# Patient Record
Sex: MALE | Race: WHITE | NOT HISPANIC OR LATINO
[De-identification: names, ages, dates, MRNs, and addresses within clinical notes are randomized per-mention and may not be internally consistent; named-entity substitution may affect disease eponyms.]

---

## 2017-01-09 ENCOUNTER — APPOINTMENT (OUTPATIENT)
Dept: FAMILY MEDICINE | Facility: CLINIC | Age: 52
End: 2017-01-09

## 2017-01-09 ENCOUNTER — RX RENEWAL (OUTPATIENT)
Age: 52
End: 2017-01-09

## 2017-01-10 ENCOUNTER — RX RENEWAL (OUTPATIENT)
Age: 52
End: 2017-01-10

## 2017-01-16 ENCOUNTER — APPOINTMENT (OUTPATIENT)
Dept: FAMILY MEDICINE | Facility: CLINIC | Age: 52
End: 2017-01-16

## 2017-01-30 ENCOUNTER — APPOINTMENT (OUTPATIENT)
Dept: FAMILY MEDICINE | Facility: CLINIC | Age: 52
End: 2017-01-30

## 2017-01-30 VITALS
OXYGEN SATURATION: 98 % | WEIGHT: 252 LBS | HEIGHT: 62 IN | SYSTOLIC BLOOD PRESSURE: 130 MMHG | TEMPERATURE: 98.3 F | RESPIRATION RATE: 14 BRPM | BODY MASS INDEX: 46.38 KG/M2 | HEART RATE: 90 BPM | DIASTOLIC BLOOD PRESSURE: 86 MMHG

## 2017-02-27 ENCOUNTER — APPOINTMENT (OUTPATIENT)
Dept: FAMILY MEDICINE | Facility: CLINIC | Age: 52
End: 2017-02-27

## 2017-02-27 VITALS
HEART RATE: 89 BPM | HEIGHT: 62 IN | RESPIRATION RATE: 13 BRPM | BODY MASS INDEX: 46.38 KG/M2 | DIASTOLIC BLOOD PRESSURE: 76 MMHG | WEIGHT: 252 LBS | SYSTOLIC BLOOD PRESSURE: 128 MMHG | OXYGEN SATURATION: 98 %

## 2017-05-25 ENCOUNTER — OTHER (OUTPATIENT)
Age: 52
End: 2017-05-25

## 2017-06-22 ENCOUNTER — OTHER (OUTPATIENT)
Age: 52
End: 2017-06-22

## 2017-07-17 ENCOUNTER — RX RENEWAL (OUTPATIENT)
Age: 52
End: 2017-07-17

## 2017-08-01 ENCOUNTER — RX RENEWAL (OUTPATIENT)
Age: 52
End: 2017-08-01

## 2017-10-02 ENCOUNTER — APPOINTMENT (OUTPATIENT)
Dept: FAMILY MEDICINE | Facility: CLINIC | Age: 52
End: 2017-10-02
Payer: OTHER MISCELLANEOUS

## 2017-10-02 VITALS
HEIGHT: 62 IN | OXYGEN SATURATION: 98 % | RESPIRATION RATE: 12 BRPM | HEART RATE: 64 BPM | BODY MASS INDEX: 49.13 KG/M2 | DIASTOLIC BLOOD PRESSURE: 74 MMHG | SYSTOLIC BLOOD PRESSURE: 128 MMHG | WEIGHT: 267 LBS | TEMPERATURE: 98.6 F

## 2017-10-02 DIAGNOSIS — R94.31 ABNORMAL ELECTROCARDIOGRAM [ECG] [EKG]: ICD-10-CM

## 2017-10-02 PROCEDURE — 93000 ELECTROCARDIOGRAM COMPLETE: CPT | Mod: 59

## 2017-10-02 PROCEDURE — 36415 COLL VENOUS BLD VENIPUNCTURE: CPT

## 2017-10-02 PROCEDURE — 99215 OFFICE O/P EST HI 40 MIN: CPT | Mod: 25

## 2017-10-03 LAB
ALBUMIN SERPL ELPH-MCNC: 4.6 G/DL
ALP BLD-CCNC: 82 U/L
ALT SERPL-CCNC: 64 U/L
ANION GAP SERPL CALC-SCNC: 18 MMOL/L
APPEARANCE: CLEAR
APTT BLD: 29.5 SEC
AST SERPL-CCNC: 19 U/L
BASOPHILS # BLD AUTO: 0.01 K/UL
BASOPHILS NFR BLD AUTO: 0.2 %
BILIRUB SERPL-MCNC: 0.4 MG/DL
BILIRUBIN URINE: NEGATIVE
BLOOD URINE: NEGATIVE
BUN SERPL-MCNC: 14 MG/DL
CALCIUM SERPL-MCNC: 10.4 MG/DL
CHLORIDE SERPL-SCNC: 101 MMOL/L
CO2 SERPL-SCNC: 23 MMOL/L
COLOR: YELLOW
CREAT SERPL-MCNC: 0.89 MG/DL
EOSINOPHIL # BLD AUTO: 0.09 K/UL
EOSINOPHIL NFR BLD AUTO: 1.5 %
GLUCOSE QUALITATIVE U: 1000 MG/DL
GLUCOSE SERPL-MCNC: 146 MG/DL
HCT VFR BLD CALC: 46 %
HGB BLD-MCNC: 15 G/DL
IMM GRANULOCYTES NFR BLD AUTO: 0.3 %
INR PPP: 0.84 RATIO
KETONES URINE: NEGATIVE
LEUKOCYTE ESTERASE URINE: NEGATIVE
LYMPHOCYTES # BLD AUTO: 2.26 K/UL
LYMPHOCYTES NFR BLD AUTO: 36.5 %
MAN DIFF?: NORMAL
MCHC RBC-ENTMCNC: 30.7 PG
MCHC RBC-ENTMCNC: 32.6 GM/DL
MCV RBC AUTO: 94.3 FL
MONOCYTES # BLD AUTO: 0.52 K/UL
MONOCYTES NFR BLD AUTO: 8.4 %
NEUTROPHILS # BLD AUTO: 3.3 K/UL
NEUTROPHILS NFR BLD AUTO: 53.1 %
NITRITE URINE: NEGATIVE
PH URINE: 5.5
PLATELET # BLD AUTO: 248 K/UL
POTASSIUM SERPL-SCNC: 4.7 MMOL/L
PROT SERPL-MCNC: 7.3 G/DL
PROTEIN URINE: NEGATIVE MG/DL
PT BLD: 9.5 SEC
RBC # BLD: 4.88 M/UL
RBC # FLD: 14.6 %
SODIUM SERPL-SCNC: 142 MMOL/L
SPECIFIC GRAVITY URINE: 1.04
UROBILINOGEN URINE: NORMAL MG/DL
WBC # FLD AUTO: 6.2 K/UL

## 2017-10-13 PROBLEM — R94.31 ABNORMAL EKG: Status: ACTIVE | Noted: 2017-10-13

## 2017-10-16 ENCOUNTER — RX RENEWAL (OUTPATIENT)
Age: 52
End: 2017-10-16

## 2017-10-17 ENCOUNTER — RX RENEWAL (OUTPATIENT)
Age: 52
End: 2017-10-17

## 2017-11-02 ENCOUNTER — APPOINTMENT (OUTPATIENT)
Dept: FAMILY MEDICINE | Facility: CLINIC | Age: 52
End: 2017-11-02
Payer: OTHER MISCELLANEOUS

## 2017-11-02 VITALS
WEIGHT: 270 LBS | RESPIRATION RATE: 12 BRPM | DIASTOLIC BLOOD PRESSURE: 82 MMHG | SYSTOLIC BLOOD PRESSURE: 140 MMHG | HEART RATE: 90 BPM | BODY MASS INDEX: 49.38 KG/M2 | TEMPERATURE: 97.7 F | OXYGEN SATURATION: 98 %

## 2017-11-02 PROCEDURE — 99215 OFFICE O/P EST HI 40 MIN: CPT

## 2017-11-02 PROCEDURE — 36415 COLL VENOUS BLD VENIPUNCTURE: CPT

## 2017-11-03 LAB
ALBUMIN SERPL ELPH-MCNC: 4.5 G/DL
ALP BLD-CCNC: 79 U/L
ALT SERPL-CCNC: 65 U/L
ANION GAP SERPL CALC-SCNC: 20 MMOL/L
APPEARANCE: CLEAR
APTT BLD: 28.6 SEC
AST SERPL-CCNC: 26 U/L
BASOPHILS # BLD AUTO: 0.02 K/UL
BASOPHILS NFR BLD AUTO: 0.3 %
BILIRUB SERPL-MCNC: 0.4 MG/DL
BILIRUBIN URINE: NEGATIVE
BLOOD URINE: NEGATIVE
BUN SERPL-MCNC: 13 MG/DL
CALCIUM SERPL-MCNC: 10.7 MG/DL
CHLORIDE SERPL-SCNC: 100 MMOL/L
CO2 SERPL-SCNC: 24 MMOL/L
COLOR: YELLOW
CREAT SERPL-MCNC: 0.88 MG/DL
EOSINOPHIL # BLD AUTO: 0.07 K/UL
EOSINOPHIL NFR BLD AUTO: 1 %
GLUCOSE QUALITATIVE U: 1000 MG/DL
GLUCOSE SERPL-MCNC: 201 MG/DL
HCT VFR BLD CALC: 44.3 %
HGB BLD-MCNC: 14.8 G/DL
IMM GRANULOCYTES NFR BLD AUTO: 0.1 %
INR PPP: 0.89 RATIO
KETONES URINE: NEGATIVE
LEUKOCYTE ESTERASE URINE: NEGATIVE
LYMPHOCYTES # BLD AUTO: 1.75 K/UL
LYMPHOCYTES NFR BLD AUTO: 25.7 %
MAN DIFF?: NORMAL
MCHC RBC-ENTMCNC: 30.5 PG
MCHC RBC-ENTMCNC: 33.4 GM/DL
MCV RBC AUTO: 91.2 FL
MONOCYTES # BLD AUTO: 0.54 K/UL
MONOCYTES NFR BLD AUTO: 7.9 %
NEUTROPHILS # BLD AUTO: 4.43 K/UL
NEUTROPHILS NFR BLD AUTO: 65 %
NITRITE URINE: NEGATIVE
PH URINE: 5.5
PLATELET # BLD AUTO: 297 K/UL
POTASSIUM SERPL-SCNC: 4.6 MMOL/L
PROT SERPL-MCNC: 7.4 G/DL
PROTEIN URINE: NEGATIVE MG/DL
PT BLD: 10 SEC
RBC # BLD: 4.86 M/UL
RBC # FLD: 13.9 %
SODIUM SERPL-SCNC: 144 MMOL/L
SPECIFIC GRAVITY URINE: 1.04
UROBILINOGEN URINE: NEGATIVE MG/DL
WBC # FLD AUTO: 6.82 K/UL

## 2017-12-14 ENCOUNTER — LABORATORY RESULT (OUTPATIENT)
Age: 52
End: 2017-12-14

## 2017-12-15 ENCOUNTER — APPOINTMENT (OUTPATIENT)
Dept: FAMILY MEDICINE | Facility: CLINIC | Age: 52
End: 2017-12-15
Payer: OTHER MISCELLANEOUS

## 2017-12-15 VITALS
DIASTOLIC BLOOD PRESSURE: 76 MMHG | HEART RATE: 87 BPM | WEIGHT: 270 LBS | OXYGEN SATURATION: 95 % | RESPIRATION RATE: 12 BRPM | BODY MASS INDEX: 49.69 KG/M2 | HEIGHT: 62 IN | TEMPERATURE: 98.4 F | SYSTOLIC BLOOD PRESSURE: 120 MMHG

## 2017-12-15 PROCEDURE — 36415 COLL VENOUS BLD VENIPUNCTURE: CPT

## 2017-12-15 PROCEDURE — 99215 OFFICE O/P EST HI 40 MIN: CPT | Mod: 25

## 2017-12-15 RX ORDER — LEVOFLOXACIN 500 MG/1
500 TABLET, FILM COATED ORAL DAILY
Qty: 10 | Refills: 1 | Status: COMPLETED | COMMUNITY
Start: 2017-05-25 | End: 2017-06-01

## 2017-12-15 RX ORDER — OXICONAZOLE NITRATE 10 MG/ML
1 LOTION TOPICAL
Qty: 60 | Refills: 0 | Status: COMPLETED | COMMUNITY
Start: 2017-11-25

## 2017-12-15 RX ORDER — TIZANIDINE 4 MG/1
4 TABLET ORAL
Qty: 90 | Refills: 0 | Status: COMPLETED | COMMUNITY
Start: 2017-06-22

## 2017-12-15 RX ORDER — GABAPENTIN 600 MG/1
600 TABLET, COATED ORAL
Qty: 120 | Refills: 0 | Status: COMPLETED | COMMUNITY
Start: 2017-06-21

## 2017-12-15 RX ORDER — OSELTAMIVIR PHOSPHATE 75 MG/1
75 CAPSULE ORAL TWICE DAILY
Qty: 10 | Refills: 1 | Status: COMPLETED | COMMUNITY
Start: 2017-01-30 | End: 2017-02-06

## 2017-12-15 RX ORDER — AZITHROMYCIN 250 MG/1
250 TABLET, FILM COATED ORAL
Qty: 1 | Refills: 3 | Status: COMPLETED | COMMUNITY
Start: 2017-01-30 | End: 2017-02-06

## 2017-12-15 RX ORDER — PREGABALIN 75 MG/1
75 CAPSULE ORAL
Qty: 60 | Refills: 0 | Status: COMPLETED | COMMUNITY
Start: 2017-06-22

## 2017-12-15 RX ORDER — DIAZEPAM 5 MG/1
5 TABLET ORAL
Qty: 90 | Refills: 0 | Status: COMPLETED | COMMUNITY
Start: 2017-06-14

## 2017-12-16 LAB
ALBUMIN SERPL ELPH-MCNC: 4.5 G/DL
ALP BLD-CCNC: 82 U/L
ALT SERPL-CCNC: 51 U/L
ANION GAP SERPL CALC-SCNC: 18 MMOL/L
APPEARANCE: CLEAR
AST SERPL-CCNC: 16 U/L
BASOPHILS # BLD AUTO: 0.03 K/UL
BASOPHILS NFR BLD AUTO: 0.4 %
BILIRUB SERPL-MCNC: 0.3 MG/DL
BILIRUBIN URINE: NEGATIVE
BLOOD URINE: NEGATIVE
BUN SERPL-MCNC: 13 MG/DL
CALCIUM SERPL-MCNC: 10.1 MG/DL
CHLORIDE SERPL-SCNC: 100 MMOL/L
CO2 SERPL-SCNC: 23 MMOL/L
COLOR: YELLOW
CREAT SERPL-MCNC: 0.78 MG/DL
EOSINOPHIL # BLD AUTO: 0.13 K/UL
EOSINOPHIL NFR BLD AUTO: 1.9 %
GLUCOSE QUALITATIVE U: 1000 MG/DL
GLUCOSE SERPL-MCNC: 210 MG/DL
HCT VFR BLD CALC: 44.4 %
HGB BLD-MCNC: 14.7 G/DL
IMM GRANULOCYTES NFR BLD AUTO: 0.4 %
KETONES URINE: NEGATIVE
LEUKOCYTE ESTERASE URINE: NEGATIVE
LYMPHOCYTES # BLD AUTO: 2.19 K/UL
LYMPHOCYTES NFR BLD AUTO: 32.7 %
MAN DIFF?: NORMAL
MCHC RBC-ENTMCNC: 30.7 PG
MCHC RBC-ENTMCNC: 33.1 GM/DL
MCV RBC AUTO: 92.7 FL
MONOCYTES # BLD AUTO: 0.63 K/UL
MONOCYTES NFR BLD AUTO: 9.4 %
NEUTROPHILS # BLD AUTO: 3.69 K/UL
NEUTROPHILS NFR BLD AUTO: 55.2 %
NITRITE URINE: NEGATIVE
PH URINE: 5.5
PLATELET # BLD AUTO: 265 K/UL
POTASSIUM SERPL-SCNC: 4.3 MMOL/L
PROT SERPL-MCNC: 7.3 G/DL
PROTEIN URINE: NEGATIVE MG/DL
RBC # BLD: 4.79 M/UL
RBC # FLD: 13.6 %
SODIUM SERPL-SCNC: 141 MMOL/L
SPECIFIC GRAVITY URINE: 1.04
UROBILINOGEN URINE: NEGATIVE MG/DL
WBC # FLD AUTO: 6.7 K/UL

## 2018-01-12 ENCOUNTER — RX RENEWAL (OUTPATIENT)
Age: 53
End: 2018-01-12

## 2018-03-20 ENCOUNTER — RX RENEWAL (OUTPATIENT)
Age: 53
End: 2018-03-20

## 2018-05-11 ENCOUNTER — APPOINTMENT (OUTPATIENT)
Dept: FAMILY MEDICINE | Facility: CLINIC | Age: 53
End: 2018-05-11
Payer: COMMERCIAL

## 2018-05-11 VITALS
WEIGHT: 271 LBS | RESPIRATION RATE: 12 BRPM | HEIGHT: 74 IN | DIASTOLIC BLOOD PRESSURE: 78 MMHG | OXYGEN SATURATION: 97 % | BODY MASS INDEX: 34.78 KG/M2 | HEART RATE: 86 BPM | TEMPERATURE: 97.8 F | SYSTOLIC BLOOD PRESSURE: 124 MMHG

## 2018-05-11 DIAGNOSIS — Z01.818 ENCOUNTER FOR OTHER PREPROCEDURAL EXAMINATION: ICD-10-CM

## 2018-05-11 DIAGNOSIS — J06.9 ACUTE UPPER RESPIRATORY INFECTION, UNSPECIFIED: ICD-10-CM

## 2018-05-11 DIAGNOSIS — Z86.19 PERSONAL HISTORY OF OTHER INFECTIOUS AND PARASITIC DISEASES: ICD-10-CM

## 2018-05-11 PROCEDURE — 99214 OFFICE O/P EST MOD 30 MIN: CPT

## 2018-05-11 RX ORDER — HYDROCODONE BITARTRATE AND ACETAMINOPHEN 5; 325 MG/1; MG/1
5-325 TABLET ORAL
Qty: 120 | Refills: 0 | Status: COMPLETED | COMMUNITY
Start: 2017-01-30 | End: 2018-05-11

## 2018-05-11 RX ORDER — HYDROMORPHONE HYDROCHLORIDE 4 MG/1
4 TABLET ORAL
Qty: 60 | Refills: 0 | Status: COMPLETED | COMMUNITY
Start: 2017-12-23

## 2018-05-11 RX ORDER — OXYCODONE HYDROCHLORIDE 15 MG/1
15 TABLET ORAL
Qty: 120 | Refills: 0 | Status: COMPLETED | COMMUNITY
Start: 2017-10-11 | End: 2018-05-11

## 2018-05-11 RX ORDER — OXYCODONE HYDROCHLORIDE 15 MG/1
15 TABLET, FILM COATED, EXTENDED RELEASE ORAL
Qty: 60 | Refills: 0 | Status: COMPLETED | COMMUNITY
Start: 2017-12-27

## 2018-06-12 ENCOUNTER — RX RENEWAL (OUTPATIENT)
Age: 53
End: 2018-06-12

## 2018-07-09 ENCOUNTER — RX RENEWAL (OUTPATIENT)
Age: 53
End: 2018-07-09

## 2018-08-20 ENCOUNTER — OUTPATIENT (OUTPATIENT)
Dept: OUTPATIENT SERVICES | Facility: HOSPITAL | Age: 53
LOS: 1 days | End: 2018-08-20
Payer: COMMERCIAL

## 2018-08-20 DIAGNOSIS — G90.522 COMPLEX REGIONAL PAIN SYNDROME I OF LEFT LOWER LIMB: ICD-10-CM

## 2018-08-20 LAB — GLUCOSE BLDC GLUCOMTR-MCNC: 149 MG/DL — HIGH (ref 70–99)

## 2018-08-20 PROCEDURE — 64510 N BLOCK STELLATE GANGLION: CPT | Mod: LT

## 2018-08-20 PROCEDURE — 82962 GLUCOSE BLOOD TEST: CPT

## 2018-08-20 PROCEDURE — 77003 FLUOROGUIDE FOR SPINE INJECT: CPT

## 2018-09-10 ENCOUNTER — RX RENEWAL (OUTPATIENT)
Age: 53
End: 2018-09-10

## 2018-09-18 ENCOUNTER — APPOINTMENT (OUTPATIENT)
Dept: FAMILY MEDICINE | Facility: CLINIC | Age: 53
End: 2018-09-18
Payer: COMMERCIAL

## 2018-09-18 VITALS
WEIGHT: 242 LBS | OXYGEN SATURATION: 98 % | HEART RATE: 112 BPM | BODY MASS INDEX: 31.06 KG/M2 | SYSTOLIC BLOOD PRESSURE: 128 MMHG | RESPIRATION RATE: 13 BRPM | DIASTOLIC BLOOD PRESSURE: 86 MMHG | TEMPERATURE: 97.9 F | HEIGHT: 74 IN

## 2018-09-18 DIAGNOSIS — Z00.00 ENCOUNTER FOR GENERAL ADULT MEDICAL EXAMINATION W/OUT ABNORMAL FINDINGS: ICD-10-CM

## 2018-09-18 PROCEDURE — 99214 OFFICE O/P EST MOD 30 MIN: CPT | Mod: 25

## 2018-09-18 PROCEDURE — 36415 COLL VENOUS BLD VENIPUNCTURE: CPT

## 2018-09-18 RX ORDER — PREGABALIN 100 MG/1
100 CAPSULE ORAL
Qty: 90 | Refills: 0 | Status: COMPLETED | COMMUNITY
Start: 2017-10-11 | End: 2018-09-18

## 2018-09-18 RX ORDER — METHOCARBAMOL 500 MG/1
500 TABLET, FILM COATED ORAL
Qty: 42 | Refills: 0 | Status: COMPLETED | COMMUNITY
Start: 2018-08-31

## 2018-09-18 RX ORDER — CIPROFLOXACIN AND DEXAMETHASONE 3; 1 MG/ML; MG/ML
0.3-0.1 SUSPENSION/ DROPS AURICULAR (OTIC)
Qty: 8 | Refills: 0 | Status: COMPLETED | COMMUNITY
Start: 2018-07-10

## 2018-09-18 RX ORDER — QUETIAPINE FUMARATE 50 MG/1
50 TABLET ORAL
Qty: 30 | Refills: 0 | Status: COMPLETED | COMMUNITY
Start: 2018-09-04

## 2018-09-18 RX ORDER — CELECOXIB 200 MG/1
200 CAPSULE ORAL
Qty: 30 | Refills: 0 | Status: COMPLETED | COMMUNITY
Start: 2017-12-23 | End: 2018-09-18

## 2018-09-18 RX ORDER — ONDANSETRON 2 MG/ML
4 INJECTION INTRAMUSCULAR; INTRAVENOUS
Qty: 2 | Refills: 0 | Status: COMPLETED | COMMUNITY
Start: 2018-09-14

## 2018-09-18 RX ORDER — BLOOD SUGAR DIAGNOSTIC
STRIP MISCELLANEOUS
Qty: 3 | Refills: 2 | Status: ACTIVE | COMMUNITY
Start: 2018-09-18 | End: 1900-01-01

## 2018-09-18 RX ORDER — PROMETHAZINE HYDROCHLORIDE AND DEXTROMETHORPHAN HYDROBROMIDE ORAL SOLUTION 15; 6.25 MG/5ML; MG/5ML
6.25-15 SOLUTION ORAL
Qty: 240 | Refills: 1 | Status: COMPLETED | COMMUNITY
Start: 2017-01-30 | End: 2018-09-18

## 2018-09-18 RX ORDER — AMITRIPTYLINE HYDROCHLORIDE 25 MG/1
25 TABLET, FILM COATED ORAL
Qty: 30 | Refills: 0 | Status: COMPLETED | COMMUNITY
Start: 2018-01-09 | End: 2018-09-18

## 2018-09-18 RX ORDER — DICLOFENAC SODIUM 75 MG/1
75 TABLET, DELAYED RELEASE ORAL
Qty: 30 | Refills: 0 | Status: COMPLETED | COMMUNITY
Start: 2017-02-21 | End: 2018-09-18

## 2018-09-18 RX ORDER — BUPRENORPHINE HYDROCHLORIDE 2 MG/1
2 TABLET SUBLINGUAL
Qty: 20 | Refills: 0 | Status: COMPLETED | COMMUNITY
Start: 2018-08-28

## 2018-09-20 LAB
25(OH)D3 SERPL-MCNC: 26.4 NG/ML
ALBUMIN SERPL ELPH-MCNC: 4.7 G/DL
ALP BLD-CCNC: 86 U/L
ALT SERPL-CCNC: 50 U/L
ANION GAP SERPL CALC-SCNC: 24 MMOL/L
APPEARANCE: CLEAR
AST SERPL-CCNC: 17 U/L
BASOPHILS # BLD AUTO: 0.02 K/UL
BASOPHILS NFR BLD AUTO: 0.2 %
BILIRUB SERPL-MCNC: 0.6 MG/DL
BILIRUBIN URINE: NEGATIVE
BLOOD URINE: NEGATIVE
BUN SERPL-MCNC: 10 MG/DL
C PEPTIDE SERPL-MCNC: 2 NG/ML
CALCIUM SERPL-MCNC: 10 MG/DL
CHLORIDE SERPL-SCNC: 97 MMOL/L
CHOLEST SERPL-MCNC: 178 MG/DL
CHOLEST/HDLC SERPL: 4.9 RATIO
CO2 SERPL-SCNC: 17 MMOL/L
COLOR: YELLOW
CREAT SERPL-MCNC: 0.84 MG/DL
CREAT SPEC-SCNC: 71 MG/DL
EOSINOPHIL # BLD AUTO: 0.05 K/UL
EOSINOPHIL NFR BLD AUTO: 0.5 %
FERRITIN SERPL-MCNC: 281 NG/ML
FOLATE SERPL-MCNC: >20 NG/ML
GLUCOSE QUALITATIVE U: 1000 MG/DL
GLUCOSE SERPL-MCNC: 193 MG/DL
HBA1C MFR BLD HPLC: 8.3 %
HCT VFR BLD CALC: 42.2 %
HDLC SERPL-MCNC: 36 MG/DL
HGB BLD-MCNC: 13.9 G/DL
IMM GRANULOCYTES NFR BLD AUTO: 0.3 %
IRON SATN MFR SERPL: 22 %
IRON SERPL-MCNC: 76 UG/DL
KETONES URINE: ABNORMAL
LDLC SERPL CALC-MCNC: 121 MG/DL
LEUKOCYTE ESTERASE URINE: NEGATIVE
LYMPHOCYTES # BLD AUTO: 2.8 K/UL
LYMPHOCYTES NFR BLD AUTO: 26.2 %
MAN DIFF?: NORMAL
MCHC RBC-ENTMCNC: 29.9 PG
MCHC RBC-ENTMCNC: 32.9 GM/DL
MCV RBC AUTO: 90.8 FL
MICROALBUMIN 24H UR DL<=1MG/L-MCNC: <1.2 MG/DL
MICROALBUMIN/CREAT 24H UR-RTO: NORMAL
MONOCYTES # BLD AUTO: 0.95 K/UL
MONOCYTES NFR BLD AUTO: 8.9 %
NEUTROPHILS # BLD AUTO: 6.84 K/UL
NEUTROPHILS NFR BLD AUTO: 63.9 %
NITRITE URINE: NEGATIVE
PH URINE: 6
PLATELET # BLD AUTO: 405 K/UL
POTASSIUM SERPL-SCNC: 4.1 MMOL/L
PROT SERPL-MCNC: 7.4 G/DL
PROTEIN URINE: NEGATIVE MG/DL
PSA SERPL-MCNC: 0.4 NG/ML
RBC # BLD: 4.65 M/UL
RBC # FLD: 13.9 %
SODIUM SERPL-SCNC: 138 MMOL/L
SPECIFIC GRAVITY URINE: 1.04
TIBC SERPL-MCNC: 344 UG/DL
TRIGL SERPL-MCNC: 106 MG/DL
UIBC SERPL-MCNC: 268 UG/DL
UROBILINOGEN URINE: NEGATIVE MG/DL
VIT B12 SERPL-MCNC: 1290 PG/ML
WBC # FLD AUTO: 10.69 K/UL

## 2018-10-15 ENCOUNTER — RX RENEWAL (OUTPATIENT)
Age: 53
End: 2018-10-15

## 2019-01-08 ENCOUNTER — APPOINTMENT (OUTPATIENT)
Dept: FAMILY MEDICINE | Facility: CLINIC | Age: 54
End: 2019-01-08
Payer: COMMERCIAL

## 2019-01-08 VITALS
RESPIRATION RATE: 13 BRPM | HEART RATE: 111 BPM | TEMPERATURE: 98.1 F | OXYGEN SATURATION: 99 % | BODY MASS INDEX: 31.06 KG/M2 | WEIGHT: 242 LBS | SYSTOLIC BLOOD PRESSURE: 142 MMHG | DIASTOLIC BLOOD PRESSURE: 76 MMHG | HEIGHT: 74 IN

## 2019-01-08 DIAGNOSIS — M67.90 UNSPECIFIED DISORDER OF SYNOVIUM AND TENDON, UNSPECIFIED SITE: ICD-10-CM

## 2019-01-08 PROCEDURE — 99213 OFFICE O/P EST LOW 20 MIN: CPT

## 2019-01-08 NOTE — HISTORY OF PRESENT ILLNESS
[FreeTextEntry1] : C/O Mass on Tendon of Left Hand x 4 Months. [de-identified] : C/O Mass on Tendon of Left Hand x 4 Months.

## 2019-01-08 NOTE — REVIEW OF SYSTEMS
[Patient Intake Form Reviewed] : Patient intake form was reviewed [Fatigue] : fatigue [Muscle Pain] : muscle pain [Back Pain] : back pain [Anxiety] : anxiety [Negative] : Heme/Lymph [FreeTextEntry2] : Overweight [FreeTextEntry5] : HTN, HLD [FreeTextEntry9] : HECTOR [facility]CD[facility] Spine [de-identified] : PTSD S/P 911. [FreeTextEntry1] : Low Vit. D,

## 2019-01-08 NOTE — COUNSELING
[Weight management counseling provided] : Weight management [Healthy eating counseling provided] : healthy eating [Activity counseling provided] : activity [Smoking cessation counseling provided] : smoking cessation [Behavioral health counseling provided] : behavioral health  [None] : None

## 2019-01-08 NOTE — PHYSICAL EXAM

## 2019-03-18 ENCOUNTER — OUTPATIENT (OUTPATIENT)
Dept: OUTPATIENT SERVICES | Facility: HOSPITAL | Age: 54
LOS: 1 days | End: 2019-03-18
Payer: COMMERCIAL

## 2019-03-18 DIAGNOSIS — M47.812 SPONDYLOSIS WITHOUT MYELOPATHY OR RADICULOPATHY, CERVICAL REGION: ICD-10-CM

## 2019-03-18 LAB — GLUCOSE BLDC GLUCOMTR-MCNC: 189 MG/DL — HIGH (ref 70–99)

## 2019-03-18 PROCEDURE — 64490 INJ PARAVERT F JNT C/T 1 LEV: CPT | Mod: 50

## 2019-03-18 PROCEDURE — 77003 FLUOROGUIDE FOR SPINE INJECT: CPT

## 2019-03-18 PROCEDURE — 82962 GLUCOSE BLOOD TEST: CPT

## 2019-04-25 ENCOUNTER — APPOINTMENT (OUTPATIENT)
Dept: GASTROENTEROLOGY | Facility: CLINIC | Age: 54
End: 2019-04-25

## 2019-05-10 ENCOUNTER — APPOINTMENT (OUTPATIENT)
Dept: FAMILY MEDICINE | Facility: CLINIC | Age: 54
End: 2019-05-10

## 2019-05-20 ENCOUNTER — RX RENEWAL (OUTPATIENT)
Age: 54
End: 2019-05-20

## 2019-06-06 ENCOUNTER — OUTPATIENT (OUTPATIENT)
Dept: OUTPATIENT SERVICES | Facility: HOSPITAL | Age: 54
LOS: 1 days | Discharge: HOME | End: 2019-06-06
Payer: COMMERCIAL

## 2019-06-06 DIAGNOSIS — M47.812 SPONDYLOSIS WITHOUT MYELOPATHY OR RADICULOPATHY, CERVICAL REGION: ICD-10-CM

## 2019-06-06 LAB — GLUCOSE BLDC GLUCOMTR-MCNC: 240 MG/DL — HIGH (ref 70–99)

## 2019-06-06 PROCEDURE — 82962 GLUCOSE BLOOD TEST: CPT

## 2019-06-06 PROCEDURE — 64490 INJ PARAVERT F JNT C/T 1 LEV: CPT | Mod: 50

## 2019-06-06 PROCEDURE — 77003 FLUOROGUIDE FOR SPINE INJECT: CPT

## 2019-06-20 ENCOUNTER — RX CHANGE (OUTPATIENT)
Age: 54
End: 2019-06-20

## 2019-06-20 RX ORDER — AMLODIPINE BESYLATE 5 MG/1
5 TABLET ORAL DAILY
Qty: 90 | Refills: 3 | Status: DISCONTINUED | COMMUNITY
Start: 2018-09-12 | End: 2019-06-20

## 2019-06-20 RX ORDER — LABETALOL HYDROCHLORIDE 100 MG/1
100 TABLET, FILM COATED ORAL
Qty: 180 | Refills: 3 | Status: DISCONTINUED | COMMUNITY
Start: 2018-09-12 | End: 2019-06-20

## 2019-07-11 ENCOUNTER — RX RENEWAL (OUTPATIENT)
Age: 54
End: 2019-07-11

## 2019-07-11 ENCOUNTER — APPOINTMENT (OUTPATIENT)
Dept: FAMILY MEDICINE | Facility: CLINIC | Age: 54
End: 2019-07-11
Payer: OTHER MISCELLANEOUS

## 2019-07-11 VITALS
HEIGHT: 74 IN | WEIGHT: 270 LBS | BODY MASS INDEX: 34.65 KG/M2 | TEMPERATURE: 98.2 F | HEART RATE: 91 BPM | RESPIRATION RATE: 12 BRPM | DIASTOLIC BLOOD PRESSURE: 88 MMHG | SYSTOLIC BLOOD PRESSURE: 126 MMHG | OXYGEN SATURATION: 95 %
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
## 2019-07-11 DIAGNOSIS — M50.20 OTHER CERVICAL DISC DISPLACEMENT, UNSPECIFIED CERVICAL REGION: ICD-10-CM

## 2019-07-11 DIAGNOSIS — R29.898 OTHER SYMPTOMS AND SIGNS INVOLVING THE MUSCULOSKELETAL SYSTEM: ICD-10-CM

## 2019-07-11 PROCEDURE — 99214 OFFICE O/P EST MOD 30 MIN: CPT

## 2019-08-12 ENCOUNTER — RX RENEWAL (OUTPATIENT)
Age: 54
End: 2019-08-12

## 2019-08-13 ENCOUNTER — RX CHANGE (OUTPATIENT)
Age: 54
End: 2019-08-13

## 2019-08-13 RX ORDER — INSULIN GLARGINE 100 [IU]/ML
100 INJECTION, SOLUTION SUBCUTANEOUS
Qty: 15 | Refills: 2 | Status: DISCONTINUED | COMMUNITY
Start: 2017-08-01 | End: 2019-08-13

## 2019-09-30 ENCOUNTER — RX RENEWAL (OUTPATIENT)
Age: 54
End: 2019-09-30

## 2019-10-07 ENCOUNTER — RX RENEWAL (OUTPATIENT)
Age: 54
End: 2019-10-07

## 2019-10-10 ENCOUNTER — RX CHANGE (OUTPATIENT)
Age: 54
End: 2019-10-10

## 2019-10-10 RX ORDER — INSULIN GLARGINE 100 [IU]/ML
100 INJECTION, SOLUTION SUBCUTANEOUS
Qty: 15 | Refills: 2 | Status: DISCONTINUED | COMMUNITY
Start: 2019-08-13 | End: 2019-10-10

## 2019-10-14 ENCOUNTER — RX RENEWAL (OUTPATIENT)
Age: 54
End: 2019-10-14

## 2019-11-04 ENCOUNTER — OUTPATIENT (OUTPATIENT)
Dept: OUTPATIENT SERVICES | Facility: HOSPITAL | Age: 54
LOS: 1 days | End: 2019-11-04
Payer: COMMERCIAL

## 2019-11-04 DIAGNOSIS — M47.812 SPONDYLOSIS WITHOUT MYELOPATHY OR RADICULOPATHY, CERVICAL REGION: ICD-10-CM

## 2019-11-04 LAB — GLUCOSE BLDC GLUCOMTR-MCNC: 213 MG/DL — HIGH (ref 70–99)

## 2019-11-04 PROCEDURE — 77003 FLUOROGUIDE FOR SPINE INJECT: CPT

## 2019-11-04 PROCEDURE — 82962 GLUCOSE BLOOD TEST: CPT

## 2019-11-04 PROCEDURE — 64490 INJ PARAVERT F JNT C/T 1 LEV: CPT | Mod: 50

## 2019-11-11 ENCOUNTER — APPOINTMENT (OUTPATIENT)
Dept: FAMILY MEDICINE | Facility: CLINIC | Age: 54
End: 2019-11-11

## 2019-11-19 ENCOUNTER — RX RENEWAL (OUTPATIENT)
Age: 54
End: 2019-11-19

## 2019-11-25 ENCOUNTER — APPOINTMENT (OUTPATIENT)
Dept: FAMILY MEDICINE | Facility: CLINIC | Age: 54
End: 2019-11-25
Payer: MEDICARE

## 2019-11-25 VITALS
SYSTOLIC BLOOD PRESSURE: 138 MMHG | DIASTOLIC BLOOD PRESSURE: 90 MMHG | HEART RATE: 92 BPM | WEIGHT: 270 LBS | RESPIRATION RATE: 12 BRPM | HEIGHT: 74 IN | BODY MASS INDEX: 34.65 KG/M2 | OXYGEN SATURATION: 98 %

## 2019-11-25 DIAGNOSIS — Z01.84 ENCOUNTER FOR ANTIBODY RESPONSE EXAMINATION: ICD-10-CM

## 2019-11-25 PROCEDURE — 36415 COLL VENOUS BLD VENIPUNCTURE: CPT

## 2019-11-25 PROCEDURE — 99214 OFFICE O/P EST MOD 30 MIN: CPT | Mod: 25

## 2019-11-25 RX ORDER — NEBIVOLOL HYDROCHLORIDE 5 MG/1
5 TABLET ORAL
Refills: 0 | Status: COMPLETED | COMMUNITY
Start: 2018-09-07 | End: 2019-11-25

## 2019-11-25 RX ORDER — AMITRIPTYLINE HYDROCHLORIDE 50 MG/1
50 TABLET, FILM COATED ORAL
Qty: 30 | Refills: 0 | Status: COMPLETED | COMMUNITY
Start: 2018-02-17 | End: 2019-11-25

## 2019-11-26 LAB
25(OH)D3 SERPL-MCNC: 27.5 NG/ML
ALBUMIN SERPL ELPH-MCNC: 4.3 G/DL
ALP BLD-CCNC: 116 U/L
ALT SERPL-CCNC: 31 U/L
ANION GAP SERPL CALC-SCNC: 15 MMOL/L
APPEARANCE: CLEAR
AST SERPL-CCNC: 8 U/L
BASOPHILS # BLD AUTO: 0.07 K/UL
BASOPHILS NFR BLD AUTO: 0.8 %
BILIRUB SERPL-MCNC: 0.2 MG/DL
BILIRUBIN URINE: NEGATIVE
BLOOD URINE: NEGATIVE
BUN SERPL-MCNC: 6 MG/DL
C PEPTIDE SERPL-MCNC: 2.9 NG/ML
CALCIUM SERPL-MCNC: 10.4 MG/DL
CHLORIDE SERPL-SCNC: 100 MMOL/L
CHOLEST SERPL-MCNC: 227 MG/DL
CHOLEST/HDLC SERPL: 4.4 RATIO
CO2 SERPL-SCNC: 28 MMOL/L
COLOR: NORMAL
CREAT SERPL-MCNC: 0.63 MG/DL
CREAT SPEC-SCNC: 73 MG/DL
EOSINOPHIL # BLD AUTO: 0.21 K/UL
EOSINOPHIL NFR BLD AUTO: 2.3 %
ESTIMATED AVERAGE GLUCOSE: 260 MG/DL
FERRITIN SERPL-MCNC: 84 NG/ML
FOLATE SERPL-MCNC: 13.8 NG/ML
GLUCOSE QUALITATIVE U: ABNORMAL
GLUCOSE SERPL-MCNC: 248 MG/DL
HBA1C MFR BLD HPLC: 10.7 %
HBV SURFACE AB SER QL: NONREACTIVE
HCT VFR BLD CALC: 42.8 %
HDLC SERPL-MCNC: 52 MG/DL
HGB BLD-MCNC: 13.2 G/DL
IMM GRANULOCYTES NFR BLD AUTO: 0.4 %
IRON SATN MFR SERPL: 16 %
IRON SERPL-MCNC: 55 UG/DL
KETONES URINE: NEGATIVE
LDLC SERPL CALC-MCNC: 134 MG/DL
LEUKOCYTE ESTERASE URINE: NEGATIVE
LYMPHOCYTES # BLD AUTO: 2.57 K/UL
LYMPHOCYTES NFR BLD AUTO: 28 %
MAN DIFF?: NORMAL
MCHC RBC-ENTMCNC: 27.4 PG
MCHC RBC-ENTMCNC: 30.8 GM/DL
MCV RBC AUTO: 88.8 FL
MEV IGG FLD QL IA: 5.5 AU/ML
MEV IGG+IGM SER-IMP: NEGATIVE
MICROALBUMIN 24H UR DL<=1MG/L-MCNC: <1.2 MG/DL
MICROALBUMIN/CREAT 24H UR-RTO: NORMAL MG/G
MONOCYTES # BLD AUTO: 0.55 K/UL
MONOCYTES NFR BLD AUTO: 6 %
MUV AB SER-ACNC: POSITIVE
MUV IGG SER QL IA: 185 AU/ML
NEUTROPHILS # BLD AUTO: 5.74 K/UL
NEUTROPHILS NFR BLD AUTO: 62.5 %
NITRITE URINE: NEGATIVE
PH URINE: 7
PLATELET # BLD AUTO: 373 K/UL
POTASSIUM SERPL-SCNC: 4 MMOL/L
PROT SERPL-MCNC: 7.2 G/DL
PROTEIN URINE: NEGATIVE
PSA SERPL-MCNC: 0.2 NG/ML
RBC # BLD: 4.82 M/UL
RBC # FLD: 15.4 %
RUBV IGG FLD-ACNC: 32.4 INDEX
RUBV IGG SER-IMP: POSITIVE
SODIUM SERPL-SCNC: 143 MMOL/L
SPECIFIC GRAVITY URINE: 1.04
T4 SERPL-MCNC: 8.1 UG/DL
TIBC SERPL-MCNC: 347 UG/DL
TRIGL SERPL-MCNC: 207 MG/DL
TSH SERPL-ACNC: 3.03 UIU/ML
UIBC SERPL-MCNC: 292 UG/DL
UROBILINOGEN URINE: NORMAL
VIT B12 SERPL-MCNC: 993 PG/ML
VZV AB TITR SER: POSITIVE
VZV IGG SER IF-ACNC: 2236 INDEX
WBC # FLD AUTO: 9.18 K/UL

## 2019-12-02 ENCOUNTER — RX CHANGE (OUTPATIENT)
Age: 54
End: 2019-12-02

## 2019-12-02 LAB
TESTOST BND SERPL-MCNC: 0.8 PG/ML
TESTOST SERPL-MCNC: 9.8 NG/DL
URINE CULTURE <10: NORMAL

## 2019-12-02 RX ORDER — TADALAFIL 5 MG/1
5 TABLET, FILM COATED ORAL
Qty: 90 | Refills: 5 | Status: DISCONTINUED | COMMUNITY
Start: 2017-12-30 | End: 2019-12-02

## 2019-12-05 ENCOUNTER — APPOINTMENT (OUTPATIENT)
Dept: FAMILY MEDICINE | Facility: CLINIC | Age: 54
End: 2019-12-05

## 2019-12-23 ENCOUNTER — APPOINTMENT (OUTPATIENT)
Dept: FAMILY MEDICINE | Facility: CLINIC | Age: 54
End: 2019-12-23

## 2019-12-26 ENCOUNTER — RX CHANGE (OUTPATIENT)
Age: 54
End: 2019-12-26

## 2019-12-27 ENCOUNTER — APPOINTMENT (OUTPATIENT)
Dept: FAMILY MEDICINE | Facility: CLINIC | Age: 54
End: 2019-12-27
Payer: MEDICARE

## 2019-12-27 VITALS
RESPIRATION RATE: 13 BRPM | HEART RATE: 80 BPM | BODY MASS INDEX: 33.11 KG/M2 | SYSTOLIC BLOOD PRESSURE: 130 MMHG | DIASTOLIC BLOOD PRESSURE: 78 MMHG | WEIGHT: 258 LBS | HEIGHT: 74 IN | OXYGEN SATURATION: 98 %

## 2019-12-27 PROCEDURE — 96372 THER/PROPH/DIAG INJ SC/IM: CPT | Mod: 59

## 2019-12-27 PROCEDURE — 99214 OFFICE O/P EST MOD 30 MIN: CPT | Mod: 25

## 2019-12-27 RX ORDER — NEEDLES, DISPOSABLE 25GX5/8"
18G X 1" NEEDLE, DISPOSABLE MISCELLANEOUS
Qty: 1 | Refills: 0 | Status: ACTIVE | COMMUNITY
Start: 2019-12-27 | End: 1900-01-01

## 2019-12-27 RX ORDER — NEEDLES, SAFETY 22GX1 1/2"
23G X 1" NEEDLE, DISPOSABLE MISCELLANEOUS
Qty: 1 | Refills: 0 | Status: ACTIVE | COMMUNITY
Start: 2019-12-27 | End: 1900-01-01

## 2020-02-27 ENCOUNTER — OUTPATIENT (OUTPATIENT)
Dept: OUTPATIENT SERVICES | Facility: HOSPITAL | Age: 55
LOS: 1 days | End: 2020-02-27
Payer: COMMERCIAL

## 2020-02-27 DIAGNOSIS — M47.812 SPONDYLOSIS WITHOUT MYELOPATHY OR RADICULOPATHY, CERVICAL REGION: ICD-10-CM

## 2020-02-27 LAB — GLUCOSE BLDC GLUCOMTR-MCNC: 176 MG/DL — HIGH (ref 70–99)

## 2020-02-27 PROCEDURE — 77003 FLUOROGUIDE FOR SPINE INJECT: CPT

## 2020-02-27 PROCEDURE — 82962 GLUCOSE BLOOD TEST: CPT

## 2020-02-27 PROCEDURE — 64490 INJ PARAVERT F JNT C/T 1 LEV: CPT | Mod: 50

## 2020-03-12 ENCOUNTER — APPOINTMENT (OUTPATIENT)
Dept: FAMILY MEDICINE | Facility: CLINIC | Age: 55
End: 2020-03-12

## 2020-04-10 ENCOUNTER — RX RENEWAL (OUTPATIENT)
Age: 55
End: 2020-04-10

## 2020-06-19 ENCOUNTER — RX RENEWAL (OUTPATIENT)
Age: 55
End: 2020-06-19

## 2020-06-22 ENCOUNTER — RX RENEWAL (OUTPATIENT)
Age: 55
End: 2020-06-22

## 2020-07-02 ENCOUNTER — RX RENEWAL (OUTPATIENT)
Age: 55
End: 2020-07-02

## 2020-07-23 ENCOUNTER — APPOINTMENT (OUTPATIENT)
Dept: FAMILY MEDICINE | Facility: CLINIC | Age: 55
End: 2020-07-23
Payer: MEDICARE

## 2020-07-23 VITALS
RESPIRATION RATE: 14 BRPM | SYSTOLIC BLOOD PRESSURE: 110 MMHG | HEART RATE: 79 BPM | BODY MASS INDEX: 31.83 KG/M2 | HEIGHT: 74 IN | DIASTOLIC BLOOD PRESSURE: 70 MMHG | WEIGHT: 248 LBS | OXYGEN SATURATION: 98 %

## 2020-07-23 DIAGNOSIS — J06.9 ACUTE UPPER RESPIRATORY INFECTION, UNSPECIFIED: ICD-10-CM

## 2020-07-23 PROCEDURE — 99214 OFFICE O/P EST MOD 30 MIN: CPT | Mod: 25

## 2020-07-23 PROCEDURE — 36415 COLL VENOUS BLD VENIPUNCTURE: CPT

## 2020-07-23 NOTE — PHYSICAL EXAM
[Normal] : no rash [de-identified] : Overweight [de-identified] : Decreased ROM, Pain Associated with Mvt + Leg Raise Tests BL. [de-identified] : Decreased RD, Pain Associated with Mvt. [de-identified] : UE's - Decreased DTRs  and Weakness BL L > L..  LE's - Decreased DTR's BL

## 2020-07-23 NOTE — REVIEW OF SYSTEMS
[Patient Intake Form Reviewed] : Patient intake form was reviewed [Back Pain] : back pain [Anxiety] : anxiety [Muscle Pain] : muscle pain [Fatigue] : fatigue [Negative] : Heme/Lymph [FreeTextEntry2] : Overweight [FreeTextEntry5] : HTN, HLD [de-identified] : UE Cervical Radiculopathy [FreeTextEntry1] : Low Vit. D,  [de-identified] : PTSD S/P 911. [FreeTextEntry9] : HNP C Spine, S/P Cervical Laminectomy Syndrome

## 2020-07-23 NOTE — ASSESSMENT
[FreeTextEntry1] : Overweight/Fatigue/DM - Labs for Fatigue and Dysmetabolism.\par Low Vit. D - Vit. D Level.\par BPH w LUTS/PSA Screening - PSA.\par COVID 19 Ab. Screening -- COVID 19 IgG Abs.\par Low T - T Level.\par \par F/U to Review Test Results.\par

## 2020-07-23 NOTE — HEALTH RISK ASSESSMENT
[No] : In the past 12 months have you used drugs other than those required for medical reasons? No [No falls in past year] : Patient reported no falls in the past year [0] : 2) Feeling down, depressed, or hopeless: Not at all (0) [] : No [de-identified] : Never Smoked [de-identified] : Average [de-identified] : Average

## 2020-07-23 NOTE — COUNSELING
[Fall prevention counseling provided] : Fall prevention counseling provided [Adequate lighting] : Adequate lighting [No throw rugs] : No throw rugs [Behavioral health counseling provided] : Behavioral health counseling provided [Use proper foot wear] : Use proper foot wear [Engage in a relaxing activity] : Engage in a relaxing activity [Sleep ___ hours/day] : Sleep [unfilled] hours/day [Plan in advance] : Plan in advance [Structured Weight Management Program suggested:] : Structured weight management program suggested [AUDIT-C Screening administered and reviewed] : AUDIT-C Screening administered and reviewed [Weigh Self Weekly] : weigh self weekly [Decrease Portions] : decrease portions [Good understanding] : Patient has a good understanding of lifestyle changes and steps needed to achieve self management goal [None] : None [FreeTextEntry2] : Never Smoked

## 2020-07-24 LAB
25(OH)D3 SERPL-MCNC: 39.6 NG/ML
ALBUMIN SERPL ELPH-MCNC: 4.9 G/DL
ALP BLD-CCNC: 94 U/L
ALT SERPL-CCNC: 29 U/L
ANION GAP SERPL CALC-SCNC: 14 MMOL/L
AST SERPL-CCNC: 11 U/L
BASOPHILS # BLD AUTO: 0.06 K/UL
BASOPHILS NFR BLD AUTO: 0.8 %
BILIRUB SERPL-MCNC: 0.3 MG/DL
BUN SERPL-MCNC: 14 MG/DL
C PEPTIDE SERPL-MCNC: 1.2 NG/ML
CALCIUM SERPL-MCNC: 10.4 MG/DL
CHLORIDE SERPL-SCNC: 99 MMOL/L
CHOLEST SERPL-MCNC: 236 MG/DL
CHOLEST/HDLC SERPL: 4.1 RATIO
CO2 SERPL-SCNC: 26 MMOL/L
CREAT SERPL-MCNC: 0.72 MG/DL
CREAT SPEC-SCNC: 76 MG/DL
EOSINOPHIL # BLD AUTO: 0.15 K/UL
EOSINOPHIL NFR BLD AUTO: 1.9 %
ESTIMATED AVERAGE GLUCOSE: 200 MG/DL
FERRITIN SERPL-MCNC: 97 NG/ML
FOLATE SERPL-MCNC: 12 NG/ML
GLUCOSE SERPL-MCNC: 149 MG/DL
HBA1C MFR BLD HPLC: 8.6 %
HCT VFR BLD CALC: 48.4 %
HDLC SERPL-MCNC: 58 MG/DL
HGB BLD-MCNC: 14.3 G/DL
IMM GRANULOCYTES NFR BLD AUTO: 0.1 %
IRON SATN MFR SERPL: 22 %
IRON SERPL-MCNC: 88 UG/DL
LDLC SERPL CALC-MCNC: 145 MG/DL
LYMPHOCYTES # BLD AUTO: 2.14 K/UL
LYMPHOCYTES NFR BLD AUTO: 27.2 %
MAN DIFF?: NORMAL
MCHC RBC-ENTMCNC: 26.2 PG
MCHC RBC-ENTMCNC: 29.5 GM/DL
MCV RBC AUTO: 88.8 FL
MICROALBUMIN 24H UR DL<=1MG/L-MCNC: <1.2 MG/DL
MICROALBUMIN/CREAT 24H UR-RTO: NORMAL MG/G
MONOCYTES # BLD AUTO: 0.66 K/UL
MONOCYTES NFR BLD AUTO: 8.4 %
NEUTROPHILS # BLD AUTO: 4.86 K/UL
NEUTROPHILS NFR BLD AUTO: 61.6 %
PLATELET # BLD AUTO: 402 K/UL
POTASSIUM SERPL-SCNC: 5.2 MMOL/L
PROT SERPL-MCNC: 7.4 G/DL
PSA SERPL-MCNC: 0.63 NG/ML
RBC # BLD: 5.45 M/UL
RBC # FLD: 18 %
SODIUM SERPL-SCNC: 140 MMOL/L
T4 SERPL-MCNC: 6.8 UG/DL
TIBC SERPL-MCNC: 399 UG/DL
TRIGL SERPL-MCNC: 162 MG/DL
TSH SERPL-ACNC: 2.16 UIU/ML
UIBC SERPL-MCNC: 311 UG/DL
VIT B12 SERPL-MCNC: 699 PG/ML
WBC # FLD AUTO: 7.88 K/UL

## 2020-07-28 ENCOUNTER — OUTPATIENT (OUTPATIENT)
Dept: OUTPATIENT SERVICES | Facility: HOSPITAL | Age: 55
LOS: 1 days | End: 2020-07-28
Payer: COMMERCIAL

## 2020-07-28 DIAGNOSIS — Z11.59 ENCOUNTER FOR SCREENING FOR OTHER VIRAL DISEASES: ICD-10-CM

## 2020-07-28 LAB — SARS-COV-2 RNA SPEC QL NAA+PROBE: SIGNIFICANT CHANGE UP

## 2020-07-28 PROCEDURE — U0003: CPT

## 2020-07-30 ENCOUNTER — OUTPATIENT (OUTPATIENT)
Dept: OUTPATIENT SERVICES | Facility: HOSPITAL | Age: 55
LOS: 1 days | End: 2020-07-30
Payer: COMMERCIAL

## 2020-07-30 DIAGNOSIS — M47.812 SPONDYLOSIS WITHOUT MYELOPATHY OR RADICULOPATHY, CERVICAL REGION: ICD-10-CM

## 2020-07-30 LAB
GLUCOSE BLDC GLUCOMTR-MCNC: 145 MG/DL — HIGH (ref 70–99)
TESTOST BND SERPL-MCNC: 2.8 PG/ML
TESTOST SERPL-MCNC: 123 NG/DL

## 2020-07-30 PROCEDURE — 77003 FLUOROGUIDE FOR SPINE INJECT: CPT

## 2020-07-30 PROCEDURE — 64490 INJ PARAVERT F JNT C/T 1 LEV: CPT | Mod: 50

## 2020-07-30 PROCEDURE — 82962 GLUCOSE BLOOD TEST: CPT

## 2020-09-14 ENCOUNTER — RX RENEWAL (OUTPATIENT)
Age: 55
End: 2020-09-14

## 2020-10-05 ENCOUNTER — RX RENEWAL (OUTPATIENT)
Age: 55
End: 2020-10-05

## 2020-10-05 RX ORDER — PEN NEEDLE, DIABETIC 29 G X1/2"
32G X 4 MM NEEDLE, DISPOSABLE MISCELLANEOUS
Qty: 100 | Refills: 4 | Status: ACTIVE | COMMUNITY
Start: 2017-10-17 | End: 1900-01-01

## 2020-10-19 ENCOUNTER — RX RENEWAL (OUTPATIENT)
Age: 55
End: 2020-10-19

## 2020-10-22 ENCOUNTER — RX RENEWAL (OUTPATIENT)
Age: 55
End: 2020-10-22

## 2020-10-22 DIAGNOSIS — R05 COUGH: ICD-10-CM

## 2020-11-13 ENCOUNTER — RX RENEWAL (OUTPATIENT)
Age: 55
End: 2020-11-13

## 2020-11-16 ENCOUNTER — RX RENEWAL (OUTPATIENT)
Age: 55
End: 2020-11-16

## 2020-11-22 ENCOUNTER — OUTPATIENT (OUTPATIENT)
Dept: OUTPATIENT SERVICES | Facility: HOSPITAL | Age: 55
LOS: 1 days | End: 2020-11-22
Payer: COMMERCIAL

## 2020-11-22 DIAGNOSIS — Z11.59 ENCOUNTER FOR SCREENING FOR OTHER VIRAL DISEASES: ICD-10-CM

## 2020-11-22 LAB — SARS-COV-2 RNA SPEC QL NAA+PROBE: SIGNIFICANT CHANGE UP

## 2020-11-22 PROCEDURE — U0003: CPT

## 2020-11-24 ENCOUNTER — OUTPATIENT (OUTPATIENT)
Dept: OUTPATIENT SERVICES | Facility: HOSPITAL | Age: 55
LOS: 1 days | End: 2020-11-24
Payer: COMMERCIAL

## 2020-11-24 DIAGNOSIS — M47.817 SPONDYLOSIS WITHOUT MYELOPATHY OR RADICULOPATHY, LUMBOSACRAL REGION: ICD-10-CM

## 2020-11-24 LAB — GLUCOSE BLDC GLUCOMTR-MCNC: 113 MG/DL — HIGH (ref 70–99)

## 2020-11-24 PROCEDURE — 64490 INJ PARAVERT F JNT C/T 1 LEV: CPT

## 2020-11-24 PROCEDURE — 82962 GLUCOSE BLOOD TEST: CPT

## 2020-12-01 RX ORDER — AZITHROMYCIN 250 MG/1
250 TABLET, FILM COATED ORAL
Qty: 6 | Refills: 2 | Status: COMPLETED | COMMUNITY
Start: 2020-02-13 | End: 2020-12-01

## 2020-12-07 ENCOUNTER — APPOINTMENT (OUTPATIENT)
Dept: FAMILY MEDICINE | Facility: CLINIC | Age: 55
End: 2020-12-07
Payer: MEDICARE

## 2020-12-07 VITALS
RESPIRATION RATE: 13 BRPM | HEART RATE: 80 BPM | WEIGHT: 248 LBS | HEIGHT: 74 IN | OXYGEN SATURATION: 98 % | DIASTOLIC BLOOD PRESSURE: 74 MMHG | BODY MASS INDEX: 31.83 KG/M2 | SYSTOLIC BLOOD PRESSURE: 130 MMHG | TEMPERATURE: 98.2 F

## 2020-12-07 PROCEDURE — 99214 OFFICE O/P EST MOD 30 MIN: CPT

## 2021-01-07 ENCOUNTER — RX RENEWAL (OUTPATIENT)
Age: 56
End: 2021-01-07

## 2021-01-28 ENCOUNTER — APPOINTMENT (OUTPATIENT)
Dept: FAMILY MEDICINE | Facility: CLINIC | Age: 56
End: 2021-01-28
Payer: MEDICARE

## 2021-01-28 VITALS
WEIGHT: 248 LBS | TEMPERATURE: 97.9 F | DIASTOLIC BLOOD PRESSURE: 80 MMHG | OXYGEN SATURATION: 97 % | HEART RATE: 100 BPM | HEIGHT: 62 IN | SYSTOLIC BLOOD PRESSURE: 140 MMHG | BODY MASS INDEX: 45.64 KG/M2 | RESPIRATION RATE: 16 BRPM

## 2021-01-28 DIAGNOSIS — Z11.59 ENCOUNTER FOR SCREENING FOR OTHER VIRAL DISEASES: ICD-10-CM

## 2021-01-28 DIAGNOSIS — N13.8 BENIGN PROSTATIC HYPERPLASIA WITH LOWER URINARY TRACT SYMPMS: ICD-10-CM

## 2021-01-28 DIAGNOSIS — K27.9 PEPTIC ULCER, SITE UNSPECIFIED, UNSPECIFIED AS ACUTE OR CHRONIC, W/OUT HEMORRHAGE OR PERFORATION: ICD-10-CM

## 2021-01-28 DIAGNOSIS — F41.1 GENERALIZED ANXIETY DISORDER: ICD-10-CM

## 2021-01-28 DIAGNOSIS — R53.83 OTHER FATIGUE: ICD-10-CM

## 2021-01-28 DIAGNOSIS — N40.1 BENIGN PROSTATIC HYPERPLASIA WITH LOWER URINARY TRACT SYMPMS: ICD-10-CM

## 2021-01-28 DIAGNOSIS — Z11.4 ENCOUNTER FOR SCREENING FOR HUMAN IMMUNODEFICIENCY VIRUS [HIV]: ICD-10-CM

## 2021-01-28 DIAGNOSIS — E66.3 OVERWEIGHT: ICD-10-CM

## 2021-01-28 DIAGNOSIS — Z12.5 ENCOUNTER FOR SCREENING FOR MALIGNANT NEOPLASM OF PROSTATE: ICD-10-CM

## 2021-01-28 DIAGNOSIS — R79.89 OTHER SPECIFIED ABNORMAL FINDINGS OF BLOOD CHEMISTRY: ICD-10-CM

## 2021-01-28 DIAGNOSIS — Z20.822 CONTACT WITH AND (SUSPECTED) EXPOSURE TO COVID-19: ICD-10-CM

## 2021-01-28 DIAGNOSIS — N52.9 MALE ERECTILE DYSFUNCTION, UNSPECIFIED: ICD-10-CM

## 2021-01-28 PROCEDURE — 99214 OFFICE O/P EST MOD 30 MIN: CPT | Mod: CS,25

## 2021-01-28 PROCEDURE — 36415 COLL VENOUS BLD VENIPUNCTURE: CPT

## 2021-01-28 RX ORDER — AMOXICILLIN 500 MG/1
500 CAPSULE ORAL
Qty: 21 | Refills: 0 | Status: COMPLETED | COMMUNITY
Start: 2020-08-21 | End: 2021-01-28

## 2021-01-29 LAB
25(OH)D3 SERPL-MCNC: 31.1 NG/ML
ALBUMIN SERPL ELPH-MCNC: 4.8 G/DL
ALP BLD-CCNC: 85 U/L
ALT SERPL-CCNC: 29 U/L
ANION GAP SERPL CALC-SCNC: 17 MMOL/L
AST SERPL-CCNC: 8 U/L
BASOPHILS # BLD AUTO: 0.07 K/UL
BASOPHILS NFR BLD AUTO: 0.9 %
BILIRUB SERPL-MCNC: 0.2 MG/DL
BUN SERPL-MCNC: 11 MG/DL
C PEPTIDE SERPL-MCNC: 2.5 NG/ML
CALCIUM SERPL-MCNC: 10.6 MG/DL
CHLORIDE SERPL-SCNC: 101 MMOL/L
CHOLEST SERPL-MCNC: 213 MG/DL
CO2 SERPL-SCNC: 23 MMOL/L
CREAT SERPL-MCNC: 0.82 MG/DL
CREAT SPEC-SCNC: 37 MG/DL
EOSINOPHIL # BLD AUTO: 0.13 K/UL
EOSINOPHIL NFR BLD AUTO: 1.8 %
ESTIMATED AVERAGE GLUCOSE: 226 MG/DL
FERRITIN SERPL-MCNC: 41 NG/ML
FOLATE SERPL-MCNC: 11.2 NG/ML
GLUCOSE SERPL-MCNC: 240 MG/DL
HBA1C MFR BLD HPLC: 9.5 %
HCT VFR BLD CALC: 48.5 %
HDLC SERPL-MCNC: 51 MG/DL
HGB BLD-MCNC: 14.8 G/DL
IMM GRANULOCYTES NFR BLD AUTO: 0.3 %
IRON SATN MFR SERPL: 9 %
IRON SERPL-MCNC: 43 UG/DL
LDLC SERPL CALC-MCNC: 122 MG/DL
LYMPHOCYTES # BLD AUTO: 2.68 K/UL
LYMPHOCYTES NFR BLD AUTO: 36.3 %
MAN DIFF?: NORMAL
MCHC RBC-ENTMCNC: 26.7 PG
MCHC RBC-ENTMCNC: 30.5 GM/DL
MCV RBC AUTO: 87.4 FL
MICROALBUMIN 24H UR DL<=1MG/L-MCNC: <1.2 MG/DL
MICROALBUMIN/CREAT 24H UR-RTO: NORMAL MG/G
MONOCYTES # BLD AUTO: 0.67 K/UL
MONOCYTES NFR BLD AUTO: 9.1 %
NEUTROPHILS # BLD AUTO: 3.81 K/UL
NEUTROPHILS NFR BLD AUTO: 51.6 %
NONHDLC SERPL-MCNC: 162 MG/DL
PLATELET # BLD AUTO: 364 K/UL
POTASSIUM SERPL-SCNC: 4.5 MMOL/L
PROT SERPL-MCNC: 7.5 G/DL
PSA SERPL-MCNC: 0.75 NG/ML
RBC # BLD: 5.55 M/UL
RBC # FLD: 18.3 %
SODIUM SERPL-SCNC: 141 MMOL/L
T4 SERPL-MCNC: 6.9 UG/DL
TIBC SERPL-MCNC: 491 UG/DL
TRIGL SERPL-MCNC: 205 MG/DL
TSH SERPL-ACNC: 3.99 UIU/ML
UIBC SERPL-MCNC: 448 UG/DL
VIT B12 SERPL-MCNC: 546 PG/ML
WBC # FLD AUTO: 7.38 K/UL

## 2021-01-30 LAB
SARS-COV-2 IGG SERPL IA-ACNC: <0.1 INDEX
SARS-COV-2 IGG SERPL QL IA: NEGATIVE

## 2021-01-31 LAB
APPEARANCE: CLEAR
BILIRUBIN URINE: NEGATIVE
BLOOD URINE: NEGATIVE
COLOR: NORMAL
GLUCOSE QUALITATIVE U: ABNORMAL
KETONES URINE: NEGATIVE
LEUKOCYTE ESTERASE URINE: NEGATIVE
NITRITE URINE: NEGATIVE
PH URINE: 5.5
PROTEIN URINE: NEGATIVE
SPECIFIC GRAVITY URINE: 1.04
URINE CULTURE <10: NORMAL
UROBILINOGEN URINE: NORMAL

## 2021-02-02 LAB — HIV1+2 AB SPEC QL IA.RAPID: NONREACTIVE

## 2021-02-04 LAB
HCV RNA SERPL NAA DL=5-ACNC: NOT DETECTED IU/ML
HCV RNA SERPL NAA+PROBE-LOG IU: NOT DETECTED LOG10IU/ML

## 2021-02-10 ENCOUNTER — RX RENEWAL (OUTPATIENT)
Age: 56
End: 2021-02-10

## 2021-02-23 ENCOUNTER — EMERGENCY (EMERGENCY)
Facility: HOSPITAL | Age: 56
LOS: 1 days | Discharge: HOME | End: 2021-02-23
Attending: EMERGENCY MEDICINE | Admitting: EMERGENCY MEDICINE
Payer: MEDICARE

## 2021-02-23 VITALS
SYSTOLIC BLOOD PRESSURE: 171 MMHG | DIASTOLIC BLOOD PRESSURE: 79 MMHG | HEART RATE: 84 BPM | RESPIRATION RATE: 16 BRPM | OXYGEN SATURATION: 100 %

## 2021-02-23 VITALS
HEART RATE: 92 BPM | OXYGEN SATURATION: 98 % | DIASTOLIC BLOOD PRESSURE: 105 MMHG | RESPIRATION RATE: 16 BRPM | SYSTOLIC BLOOD PRESSURE: 180 MMHG

## 2021-02-23 LAB
ALBUMIN SERPL ELPH-MCNC: 3.9 G/DL — SIGNIFICANT CHANGE UP (ref 3.3–5)
ALP SERPL-CCNC: 78 U/L — SIGNIFICANT CHANGE UP (ref 40–120)
ALT FLD-CCNC: 36 U/L — SIGNIFICANT CHANGE UP (ref 12–78)
ANION GAP SERPL CALC-SCNC: 10 MMOL/L — SIGNIFICANT CHANGE UP (ref 5–17)
APPEARANCE UR: CLEAR — SIGNIFICANT CHANGE UP
AST SERPL-CCNC: 13 U/L — LOW (ref 15–37)
BASOPHILS # BLD AUTO: 0.05 K/UL — SIGNIFICANT CHANGE UP (ref 0–0.2)
BASOPHILS NFR BLD AUTO: 0.4 % — SIGNIFICANT CHANGE UP (ref 0–2)
BILIRUB SERPL-MCNC: 0.6 MG/DL — SIGNIFICANT CHANGE UP (ref 0.2–1.2)
BILIRUB UR-MCNC: NEGATIVE — SIGNIFICANT CHANGE UP
BUN SERPL-MCNC: 12 MG/DL — SIGNIFICANT CHANGE UP (ref 7–23)
CALCIUM SERPL-MCNC: 10 MG/DL — SIGNIFICANT CHANGE UP (ref 8.5–10.1)
CHLORIDE SERPL-SCNC: 106 MMOL/L — SIGNIFICANT CHANGE UP (ref 96–108)
CO2 SERPL-SCNC: 23 MMOL/L — SIGNIFICANT CHANGE UP (ref 22–31)
COLOR SPEC: YELLOW — SIGNIFICANT CHANGE UP
CREAT SERPL-MCNC: 0.96 MG/DL — SIGNIFICANT CHANGE UP (ref 0.5–1.3)
DIFF PNL FLD: NEGATIVE — SIGNIFICANT CHANGE UP
EOSINOPHIL # BLD AUTO: 0.05 K/UL — SIGNIFICANT CHANGE UP (ref 0–0.5)
EOSINOPHIL NFR BLD AUTO: 0.4 % — SIGNIFICANT CHANGE UP (ref 0–6)
EPI CELLS # UR: SIGNIFICANT CHANGE UP
GLUCOSE SERPL-MCNC: 252 MG/DL — HIGH (ref 70–99)
GLUCOSE UR QL: 1000 MG/DL
HCT VFR BLD CALC: 47 % — SIGNIFICANT CHANGE UP (ref 39–50)
HGB BLD-MCNC: 15.4 G/DL — SIGNIFICANT CHANGE UP (ref 13–17)
IMM GRANULOCYTES NFR BLD AUTO: 0.2 % — SIGNIFICANT CHANGE UP (ref 0–1.5)
KETONES UR-MCNC: ABNORMAL
LEUKOCYTE ESTERASE UR-ACNC: ABNORMAL
LIDOCAIN IGE QN: 61 U/L — LOW (ref 73–393)
LYMPHOCYTES # BLD AUTO: 1.53 K/UL — SIGNIFICANT CHANGE UP (ref 1–3.3)
LYMPHOCYTES # BLD AUTO: 12.1 % — LOW (ref 13–44)
MCHC RBC-ENTMCNC: 27.2 PG — SIGNIFICANT CHANGE UP (ref 27–34)
MCHC RBC-ENTMCNC: 32.8 GM/DL — SIGNIFICANT CHANGE UP (ref 32–36)
MCV RBC AUTO: 83 FL — SIGNIFICANT CHANGE UP (ref 80–100)
MONOCYTES # BLD AUTO: 0.76 K/UL — SIGNIFICANT CHANGE UP (ref 0–0.9)
MONOCYTES NFR BLD AUTO: 6 % — SIGNIFICANT CHANGE UP (ref 2–14)
NEUTROPHILS # BLD AUTO: 10.22 K/UL — HIGH (ref 1.8–7.4)
NEUTROPHILS NFR BLD AUTO: 80.9 % — HIGH (ref 43–77)
NITRITE UR-MCNC: POSITIVE
NRBC # BLD: 0 /100 WBCS — SIGNIFICANT CHANGE UP (ref 0–0)
PH UR: 7 — SIGNIFICANT CHANGE UP (ref 5–8)
PLATELET # BLD AUTO: 396 K/UL — SIGNIFICANT CHANGE UP (ref 150–400)
POTASSIUM SERPL-MCNC: 4.6 MMOL/L — SIGNIFICANT CHANGE UP (ref 3.5–5.3)
POTASSIUM SERPL-SCNC: 4.6 MMOL/L — SIGNIFICANT CHANGE UP (ref 3.5–5.3)
PROT SERPL-MCNC: 7.8 G/DL — SIGNIFICANT CHANGE UP (ref 6–8.3)
PROT UR-MCNC: 15
RBC # BLD: 5.66 M/UL — SIGNIFICANT CHANGE UP (ref 4.2–5.8)
RBC # FLD: 16.1 % — HIGH (ref 10.3–14.5)
RBC CASTS # UR COMP ASSIST: SIGNIFICANT CHANGE UP /HPF (ref 0–4)
SODIUM SERPL-SCNC: 139 MMOL/L — SIGNIFICANT CHANGE UP (ref 135–145)
SP GR SPEC: 1 — LOW (ref 1.01–1.02)
UROBILINOGEN FLD QL: NEGATIVE — SIGNIFICANT CHANGE UP
WBC # BLD: 12.64 K/UL — HIGH (ref 3.8–10.5)
WBC # FLD AUTO: 12.64 K/UL — HIGH (ref 3.8–10.5)
WBC UR QL: SIGNIFICANT CHANGE UP

## 2021-02-23 PROCEDURE — 96376 TX/PRO/DX INJ SAME DRUG ADON: CPT

## 2021-02-23 PROCEDURE — 80053 COMPREHEN METABOLIC PANEL: CPT

## 2021-02-23 PROCEDURE — 74177 CT ABD & PELVIS W/CONTRAST: CPT

## 2021-02-23 PROCEDURE — 96375 TX/PRO/DX INJ NEW DRUG ADDON: CPT

## 2021-02-23 PROCEDURE — 36415 COLL VENOUS BLD VENIPUNCTURE: CPT

## 2021-02-23 PROCEDURE — 93010 ELECTROCARDIOGRAM REPORT: CPT

## 2021-02-23 PROCEDURE — 83690 ASSAY OF LIPASE: CPT

## 2021-02-23 PROCEDURE — 74177 CT ABD & PELVIS W/CONTRAST: CPT | Mod: 26,MG

## 2021-02-23 PROCEDURE — 76705 ECHO EXAM OF ABDOMEN: CPT

## 2021-02-23 PROCEDURE — 81001 URINALYSIS AUTO W/SCOPE: CPT

## 2021-02-23 PROCEDURE — G1004: CPT

## 2021-02-23 PROCEDURE — 85025 COMPLETE CBC W/AUTO DIFF WBC: CPT

## 2021-02-23 PROCEDURE — 99285 EMERGENCY DEPT VISIT HI MDM: CPT

## 2021-02-23 PROCEDURE — 93005 ELECTROCARDIOGRAM TRACING: CPT

## 2021-02-23 PROCEDURE — 96361 HYDRATE IV INFUSION ADD-ON: CPT

## 2021-02-23 PROCEDURE — 96374 THER/PROPH/DIAG INJ IV PUSH: CPT | Mod: XU

## 2021-02-23 PROCEDURE — 99284 EMERGENCY DEPT VISIT MOD MDM: CPT | Mod: 25

## 2021-02-23 PROCEDURE — 76705 ECHO EXAM OF ABDOMEN: CPT | Mod: 26,RT

## 2021-02-23 RX ORDER — SODIUM CHLORIDE 9 MG/ML
1000 INJECTION INTRAMUSCULAR; INTRAVENOUS; SUBCUTANEOUS ONCE
Refills: 0 | Status: COMPLETED | OUTPATIENT
Start: 2021-02-23 | End: 2021-02-23

## 2021-02-23 RX ORDER — METOCLOPRAMIDE HCL 10 MG
10 TABLET ORAL ONCE
Refills: 0 | Status: COMPLETED | OUTPATIENT
Start: 2021-02-23 | End: 2021-02-23

## 2021-02-23 RX ORDER — MORPHINE SULFATE 50 MG/1
4 CAPSULE, EXTENDED RELEASE ORAL ONCE
Refills: 0 | Status: DISCONTINUED | OUTPATIENT
Start: 2021-02-23 | End: 2021-02-23

## 2021-02-23 RX ORDER — ONDANSETRON 8 MG/1
4 TABLET, FILM COATED ORAL ONCE
Refills: 0 | Status: COMPLETED | OUTPATIENT
Start: 2021-02-23 | End: 2021-02-23

## 2021-02-23 RX ORDER — LIDOCAINE 4 G/100G
1 CREAM TOPICAL ONCE
Refills: 0 | Status: COMPLETED | OUTPATIENT
Start: 2021-02-23 | End: 2021-02-23

## 2021-02-23 RX ORDER — HYDROMORPHONE HYDROCHLORIDE 2 MG/ML
0.5 INJECTION INTRAMUSCULAR; INTRAVENOUS; SUBCUTANEOUS ONCE
Refills: 0 | Status: DISCONTINUED | OUTPATIENT
Start: 2021-02-23 | End: 2021-02-23

## 2021-02-23 RX ORDER — OXYCODONE AND ACETAMINOPHEN 5; 325 MG/1; MG/1
1 TABLET ORAL ONCE
Refills: 0 | Status: DISCONTINUED | OUTPATIENT
Start: 2021-02-23 | End: 2021-02-23

## 2021-02-23 RX ORDER — OXYCODONE HYDROCHLORIDE 5 MG/1
1 TABLET ORAL
Qty: 4 | Refills: 0
Start: 2021-02-23 | End: 2021-02-23

## 2021-02-23 RX ORDER — FAMOTIDINE 10 MG/ML
20 INJECTION INTRAVENOUS ONCE
Refills: 0 | Status: COMPLETED | OUTPATIENT
Start: 2021-02-23 | End: 2021-02-23

## 2021-02-23 RX ORDER — ONDANSETRON 8 MG/1
1 TABLET, FILM COATED ORAL
Qty: 20 | Refills: 0
Start: 2021-02-23 | End: 2021-02-27

## 2021-02-23 RX ORDER — SENNA PLUS 8.6 MG/1
2 TABLET ORAL ONCE
Refills: 0 | Status: DISCONTINUED | OUTPATIENT
Start: 2021-02-23 | End: 2021-02-26

## 2021-02-23 RX ORDER — KETOROLAC TROMETHAMINE 30 MG/ML
30 SYRINGE (ML) INJECTION ONCE
Refills: 0 | Status: DISCONTINUED | OUTPATIENT
Start: 2021-02-23 | End: 2021-02-23

## 2021-02-23 RX ADMIN — LIDOCAINE 1 PATCH: 4 CREAM TOPICAL at 12:02

## 2021-02-23 RX ADMIN — SODIUM CHLORIDE 1000 MILLILITER(S): 9 INJECTION INTRAMUSCULAR; INTRAVENOUS; SUBCUTANEOUS at 11:30

## 2021-02-23 RX ADMIN — SODIUM CHLORIDE 1000 MILLILITER(S): 9 INJECTION INTRAMUSCULAR; INTRAVENOUS; SUBCUTANEOUS at 12:30

## 2021-02-23 RX ADMIN — FAMOTIDINE 20 MILLIGRAM(S): 10 INJECTION INTRAVENOUS at 09:28

## 2021-02-23 RX ADMIN — SODIUM CHLORIDE 1000 MILLILITER(S): 9 INJECTION INTRAMUSCULAR; INTRAVENOUS; SUBCUTANEOUS at 12:57

## 2021-02-23 RX ADMIN — HYDROMORPHONE HYDROCHLORIDE 0.5 MILLIGRAM(S): 2 INJECTION INTRAMUSCULAR; INTRAVENOUS; SUBCUTANEOUS at 10:19

## 2021-02-23 RX ADMIN — ONDANSETRON 4 MILLIGRAM(S): 8 TABLET, FILM COATED ORAL at 10:00

## 2021-02-23 RX ADMIN — HYDROMORPHONE HYDROCHLORIDE 0.5 MILLIGRAM(S): 2 INJECTION INTRAMUSCULAR; INTRAVENOUS; SUBCUTANEOUS at 09:42

## 2021-02-23 RX ADMIN — MORPHINE SULFATE 4 MILLIGRAM(S): 50 CAPSULE, EXTENDED RELEASE ORAL at 09:22

## 2021-02-23 RX ADMIN — SODIUM CHLORIDE 1000 MILLILITER(S): 9 INJECTION INTRAMUSCULAR; INTRAVENOUS; SUBCUTANEOUS at 09:22

## 2021-02-23 RX ADMIN — Medication 104 MILLIGRAM(S): at 12:57

## 2021-02-23 RX ADMIN — OXYCODONE AND ACETAMINOPHEN 1 TABLET(S): 5; 325 TABLET ORAL at 13:15

## 2021-02-23 RX ADMIN — ONDANSETRON 4 MILLIGRAM(S): 8 TABLET, FILM COATED ORAL at 09:25

## 2021-02-23 RX ADMIN — Medication 30 MILLIGRAM(S): at 12:02

## 2021-02-23 RX ADMIN — SODIUM CHLORIDE 1000 MILLILITER(S): 9 INJECTION INTRAMUSCULAR; INTRAVENOUS; SUBCUTANEOUS at 10:22

## 2021-02-23 NOTE — ED PROVIDER NOTE - CARE PROVIDER_API CALL
Janusz Ye ()  Internal Medicine  [address] [place] [address]  Phone: [phone]  Fax: [phone]  Follow Up Time: 1-3 Days

## 2021-02-23 NOTE — ED ADULT NURSE NOTE - OBJECTIVE STATEMENT
Received the patient in the ER. patient is alert and oriented. Skin warm and dry. CC/O abdominal pain , Abdomen soft and tender. Patient is vomiting.

## 2021-02-23 NOTE — ED PROVIDER NOTE - PROGRESS NOTE DETAILS
pt still complains of abd pain c n/v, will give zofran, dilaudid, ivf ns x 1. pt is on dilaudid 4mg q 6 for chronic neck pain.  ekg result dw pt. All results were explained to patient and/or family and a copy of all available results given.  pt feels better, but complains mild abd pain. pt has no diarrhea while in the ED and complains of constipation. Pt vomited x 1 about 13:30.  Pt took Senekot for constipation in the past.  Recommended Miralax OTC.  Offered more IVF and observation bc pt vomited, but pt feels better and wants to go home.

## 2021-02-23 NOTE — ED PROVIDER NOTE - NSFOLLOWUPINSTRUCTIONS_ED_ALL_ED_FT
1.  Take percocet and zofran as prescribed. 1.  Take percocet and zofran as prescribed.      [employer]® CareNotes®     :  [facility]  	                       GASTRITIS - AfterCare(R) Instructions(ER/ED)           Gastritis    WHAT YOU NEED TO KNOW:    Gastritis is inflammation or irritation of the lining of your stomach.     Abdominal Organs         DISCHARGE INSTRUCTIONS:    Call 911 for any of the following:   •You develop chest pain or shortness of breath.          Return to the emergency department if:   •You vomit blood.      •You have black or bloody bowel movements.      •You have severe stomach or back pain.      Contact your healthcare provider if:   •You have a fever.      •You have new or worsening symptoms, even after treatment.      •You have questions or concerns about your condition or care.      Medicines:   •Medicines may be given to help treat a bacterial infection or decrease stomach acid.       •Take your medicine as directed. Contact your healthcare provider if you think your medicine is not helping or if you have side effects. Tell him or her if you are allergic to any medicine. Keep a list of the medicines, vitamins, and herbs you take. Include the amounts, and when and why you take them. Bring the list or the pill bottles to follow-up visits. Carry your medicine list with you in case of an emergency.      Manage or prevent gastritis:   •Do not smoke. Nicotine and other chemicals in cigarettes and cigars can make your symptoms worse and cause lung damage. Ask your healthcare provider for information if you currently smoke and need help to quit. E-cigarettes or smokeless tobacco still contain nicotine. Talk to your healthcare provider before you use these products.       •Do not drink alcohol. Alcohol can prevent healing and make your gastritis worse. Talk to your healthcare provider if you need help to stop drinking.      •Do not take NSAIDs or aspirin unless directed. These and similar medicines can cause irritation. If your healthcare provider says it is okay to take NSAIDs, take them with food.       •Do not eat foods that cause irritation. Foods such as oranges and salsa can cause burning or pain. Eat a variety of healthy foods. Examples include fruits (not citrus), vegetables, low-fat dairy products, beans, whole-grain breads, and lean meats and fish. Try to eat small meals, and drink water with your meals. Do not eat for at least 3 hours before you go to bed.      •Find ways to relax and decrease stress. Stress can increase stomach acid and make gastritis worse. Activities such as yoga, meditation, or listening to music can help you relax. Spend time with friends, or do things you enjoy.      Follow up with your healthcare provider as directed: You may need ongoing tests or treatment, or referral to a gastroenterologist. Write down your questions so you remember to ask them during your visits.       © Copyright [employer] 2021           back to top                          © Copyright [employer] 2021 1.  Take percocet and zofran as prescribed.  2.  Take Miralax as prescribed over the counter for 5 days.        [employer]® CareNotes®     :  [facility]  	                       GASTRITIS - AfterCare(R) Instructions(ER/ED)           Gastritis    WHAT YOU NEED TO KNOW:    Gastritis is inflammation or irritation of the lining of your stomach.     Abdominal Organs         DISCHARGE INSTRUCTIONS:    Call 911 for any of the following:   •You develop chest pain or shortness of breath.          Return to the emergency department if:   •You vomit blood.      •You have black or bloody bowel movements.      •You have severe stomach or back pain.      Contact your healthcare provider if:   •You have a fever.      •You have new or worsening symptoms, even after treatment.      •You have questions or concerns about your condition or care.      Medicines:   •Medicines may be given to help treat a bacterial infection or decrease stomach acid.       •Take your medicine as directed. Contact your healthcare provider if you think your medicine is not helping or if you have side effects. Tell him or her if you are allergic to any medicine. Keep a list of the medicines, vitamins, and herbs you take. Include the amounts, and when and why you take them. Bring the list or the pill bottles to follow-up visits. Carry your medicine list with you in case of an emergency.      Manage or prevent gastritis:   •Do not smoke. Nicotine and other chemicals in cigarettes and cigars can make your symptoms worse and cause lung damage. Ask your healthcare provider for information if you currently smoke and need help to quit. E-cigarettes or smokeless tobacco still contain nicotine. Talk to your healthcare provider before you use these products.       •Do not drink alcohol. Alcohol can prevent healing and make your gastritis worse. Talk to your healthcare provider if you need help to stop drinking.      •Do not take NSAIDs or aspirin unless directed. These and similar medicines can cause irritation. If your healthcare provider says it is okay to take NSAIDs, take them with food.       •Do not eat foods that cause irritation. Foods such as oranges and salsa can cause burning or pain. Eat a variety of healthy foods. Examples include fruits (not citrus), vegetables, low-fat dairy products, beans, whole-grain breads, and lean meats and fish. Try to eat small meals, and drink water with your meals. Do not eat for at least 3 hours before you go to bed.      •Find ways to relax and decrease stress. Stress can increase stomach acid and make gastritis worse. Activities such as yoga, meditation, or listening to music can help you relax. Spend time with friends, or do things you enjoy.      Follow up with your healthcare provider as directed: You may need ongoing tests or treatment, or referral to a gastroenterologist. Write down your questions so you remember to ask them during your visits.       © Copyright [employer] 2021           back to top                          © Copyright [employer] 2021

## 2021-02-23 NOTE — ED ADULT NURSE NOTE - NSIMPLEMENTINTERV_GEN_ALL_ED
Implemented All Fall with Harm Risk Interventions:  [place] to call system. Call bell, personal items and telephone within reach. Instruct patient to call for assistance. Room bathroom lighting operational. Non-slip footwear when patient is off stretcher. Physically safe environment: no spills, clutter or unnecessary equipment. Stretcher in lowest position, wheels locked, appropriate side rails in place. Provide visual cue, wrist band, yellow gown, etc. Monitor gait and stability. Monitor for mental status changes and reorient to person, place, and time. Review medications for side effects contributing to fall risk. Reinforce activity limits and safety measures with patient and family. Provide visual clues: red socks.

## 2021-02-23 NOTE — ED PROVIDER NOTE - PATIENT PORTAL LINK FT
You can access the FollowMyHealth Patient Portal offered by [facility] by registering at the following website: [email or web address]. By joining [facility] [facility]’s FollowMyHealth portal, you will also be able to view your health information using other applications (apps) compatible with our system.

## 2021-03-01 ENCOUNTER — RX RENEWAL (OUTPATIENT)
Age: 56
End: 2021-03-01

## 2021-03-03 PROBLEM — I10 ESSENTIAL (PRIMARY) HYPERTENSION: Chronic | Status: ACTIVE | Noted: 2021-02-23

## 2021-03-03 PROBLEM — E11.9 TYPE 2 DIABETES MELLITUS WITHOUT COMPLICATIONS: Chronic | Status: ACTIVE | Noted: 2021-02-23

## 2021-03-03 PROBLEM — K25.9 GASTRIC ULCER, UNSPECIFIED AS ACUTE OR CHRONIC, WITHOUT HEMORRHAGE OR PERFORATION: Chronic | Status: ACTIVE | Noted: 2021-02-23

## 2021-03-05 ENCOUNTER — APPOINTMENT (OUTPATIENT)
Dept: FAMILY MEDICINE | Facility: CLINIC | Age: 56
End: 2021-03-05

## 2021-04-26 ENCOUNTER — NON-APPOINTMENT (OUTPATIENT)
Age: 56
End: 2021-04-26

## 2021-06-07 ENCOUNTER — RX RENEWAL (OUTPATIENT)
Age: 56
End: 2021-06-07

## 2021-06-28 ENCOUNTER — TRANSCRIPTION ENCOUNTER (OUTPATIENT)
Age: 56
End: 2021-06-28

## 2021-06-28 DIAGNOSIS — B02.9 ZOSTER W/OUT COMPLICATIONS: ICD-10-CM

## 2021-08-12 DIAGNOSIS — B35.4 TINEA CORPORIS: ICD-10-CM

## 2021-08-13 ENCOUNTER — APPOINTMENT (OUTPATIENT)
Dept: FAMILY MEDICINE | Facility: CLINIC | Age: 56
End: 2021-08-13

## 2021-08-26 ENCOUNTER — RX RENEWAL (OUTPATIENT)
Age: 56
End: 2021-08-26

## 2021-09-02 DIAGNOSIS — L25.9 UNSPECIFIED CONTACT DERMATITIS, UNSPECIFIED CAUSE: ICD-10-CM

## 2021-09-06 ENCOUNTER — TRANSCRIPTION ENCOUNTER (OUTPATIENT)
Age: 56
End: 2021-09-06

## 2021-10-19 ENCOUNTER — TRANSCRIPTION ENCOUNTER (OUTPATIENT)
Age: 56
End: 2021-10-19

## 2021-10-23 ENCOUNTER — TRANSCRIPTION ENCOUNTER (OUTPATIENT)
Age: 56
End: 2021-10-23

## 2021-10-24 ENCOUNTER — INPATIENT (INPATIENT)
Facility: HOSPITAL | Age: 56
LOS: 3 days | Discharge: HOME | DRG: 580 | End: 2021-10-28
Attending: STUDENT IN AN ORGANIZED HEALTH CARE EDUCATION/TRAINING PROGRAM | Admitting: STUDENT IN AN ORGANIZED HEALTH CARE EDUCATION/TRAINING PROGRAM
Payer: MEDICARE

## 2021-10-24 ENCOUNTER — EMERGENCY (EMERGENCY)
Facility: HOSPITAL | Age: 56
LOS: 1 days | Discharge: HOME | End: 2021-10-24
Attending: EMERGENCY MEDICINE | Admitting: EMERGENCY MEDICINE
Payer: MEDICARE

## 2021-10-24 VITALS
HEART RATE: 95 BPM | SYSTOLIC BLOOD PRESSURE: 127 MMHG | OXYGEN SATURATION: 100 % | TEMPERATURE: 98 F | RESPIRATION RATE: 18 BRPM | DIASTOLIC BLOOD PRESSURE: 87 MMHG | WEIGHT: 225.09 LBS

## 2021-10-24 VITALS
DIASTOLIC BLOOD PRESSURE: 83 MMHG | WEIGHT: 225.09 LBS | HEIGHT: 74 IN | SYSTOLIC BLOOD PRESSURE: 113 MMHG | TEMPERATURE: 98 F | OXYGEN SATURATION: 98 % | HEART RATE: 108 BPM | RESPIRATION RATE: 18 BRPM

## 2021-10-24 DIAGNOSIS — Z98.890 OTHER SPECIFIED POSTPROCEDURAL STATES: Chronic | ICD-10-CM

## 2021-10-24 DIAGNOSIS — E11.9 TYPE 2 DIABETES MELLITUS WITHOUT COMPLICATIONS: ICD-10-CM

## 2021-10-24 DIAGNOSIS — L03.011 CELLULITIS OF RIGHT FINGER: ICD-10-CM

## 2021-10-24 DIAGNOSIS — Z98.1 ARTHRODESIS STATUS: Chronic | ICD-10-CM

## 2021-10-24 LAB
ALBUMIN SERPL ELPH-MCNC: 3.8 G/DL — SIGNIFICANT CHANGE UP (ref 3.3–5)
ALP SERPL-CCNC: 111 U/L — SIGNIFICANT CHANGE UP (ref 40–120)
ALT FLD-CCNC: 33 U/L — SIGNIFICANT CHANGE UP (ref 10–45)
ANION GAP SERPL CALC-SCNC: 10 MMOL/L — SIGNIFICANT CHANGE UP (ref 5–17)
AST SERPL-CCNC: 5 U/L — LOW (ref 10–40)
BASOPHILS # BLD AUTO: 0.04 K/UL — SIGNIFICANT CHANGE UP (ref 0–0.2)
BASOPHILS # BLD AUTO: 0.04 K/UL — SIGNIFICANT CHANGE UP (ref 0–0.2)
BASOPHILS NFR BLD AUTO: 0.6 % — SIGNIFICANT CHANGE UP (ref 0–2)
BASOPHILS NFR BLD AUTO: 0.6 % — SIGNIFICANT CHANGE UP (ref 0–2)
BILIRUB SERPL-MCNC: 0.4 MG/DL — SIGNIFICANT CHANGE UP (ref 0.2–1.2)
BUN SERPL-MCNC: 12 MG/DL — SIGNIFICANT CHANGE UP (ref 7–23)
CALCIUM SERPL-MCNC: 9.3 MG/DL — SIGNIFICANT CHANGE UP (ref 8.4–10.5)
CHLORIDE SERPL-SCNC: 101 MMOL/L — SIGNIFICANT CHANGE UP (ref 96–108)
CO2 SERPL-SCNC: 26 MMOL/L — SIGNIFICANT CHANGE UP (ref 22–31)
CREAT SERPL-MCNC: 0.96 MG/DL — SIGNIFICANT CHANGE UP (ref 0.5–1.3)
EOSINOPHIL # BLD AUTO: 0.1 K/UL — SIGNIFICANT CHANGE UP (ref 0–0.5)
EOSINOPHIL # BLD AUTO: 0.1 K/UL — SIGNIFICANT CHANGE UP (ref 0–0.5)
EOSINOPHIL NFR BLD AUTO: 1.4 % — SIGNIFICANT CHANGE UP (ref 0–6)
EOSINOPHIL NFR BLD AUTO: 1.4 % — SIGNIFICANT CHANGE UP (ref 0–6)
GLUCOSE BLDC GLUCOMTR-MCNC: 211 MG/DL — HIGH (ref 70–99)
GLUCOSE SERPL-MCNC: 230 MG/DL — HIGH (ref 70–99)
HCT VFR BLD CALC: 42.2 % — SIGNIFICANT CHANGE UP (ref 39–50)
HCT VFR BLD CALC: 42.7 % — SIGNIFICANT CHANGE UP (ref 39–50)
HGB BLD-MCNC: 14.3 G/DL — SIGNIFICANT CHANGE UP (ref 13–17)
HGB BLD-MCNC: 14.4 G/DL — SIGNIFICANT CHANGE UP (ref 13–17)
IMM GRANULOCYTES NFR BLD AUTO: 0.1 % — SIGNIFICANT CHANGE UP (ref 0–1.5)
IMM GRANULOCYTES NFR BLD AUTO: 0.1 % — SIGNIFICANT CHANGE UP (ref 0–1.5)
LACTATE SERPL-SCNC: 1.8 MMOL/L — SIGNIFICANT CHANGE UP (ref 0.7–2)
LACTATE SERPL-SCNC: 2.4 MMOL/L — HIGH (ref 0.7–2)
LACTATE SERPL-SCNC: 3 MMOL/L — HIGH (ref 0.7–2)
LYMPHOCYTES # BLD AUTO: 1.92 K/UL — SIGNIFICANT CHANGE UP (ref 1–3.3)
LYMPHOCYTES # BLD AUTO: 2.47 K/UL — SIGNIFICANT CHANGE UP (ref 1–3.3)
LYMPHOCYTES # BLD AUTO: 27.7 % — SIGNIFICANT CHANGE UP (ref 13–44)
LYMPHOCYTES # BLD AUTO: 34.6 % — SIGNIFICANT CHANGE UP (ref 13–44)
MCHC RBC-ENTMCNC: 29.9 PG — SIGNIFICANT CHANGE UP (ref 27–34)
MCHC RBC-ENTMCNC: 29.9 PG — SIGNIFICANT CHANGE UP (ref 27–34)
MCHC RBC-ENTMCNC: 33.7 GM/DL — SIGNIFICANT CHANGE UP (ref 32–36)
MCHC RBC-ENTMCNC: 33.9 GM/DL — SIGNIFICANT CHANGE UP (ref 32–36)
MCV RBC AUTO: 88.3 FL — SIGNIFICANT CHANGE UP (ref 80–100)
MCV RBC AUTO: 88.6 FL — SIGNIFICANT CHANGE UP (ref 80–100)
MONOCYTES # BLD AUTO: 0.63 K/UL — SIGNIFICANT CHANGE UP (ref 0–0.9)
MONOCYTES # BLD AUTO: 0.69 K/UL — SIGNIFICANT CHANGE UP (ref 0–0.9)
MONOCYTES NFR BLD AUTO: 9.1 % — SIGNIFICANT CHANGE UP (ref 2–14)
MONOCYTES NFR BLD AUTO: 9.7 % — SIGNIFICANT CHANGE UP (ref 2–14)
NEUTROPHILS # BLD AUTO: 3.83 K/UL — SIGNIFICANT CHANGE UP (ref 1.8–7.4)
NEUTROPHILS # BLD AUTO: 4.24 K/UL — SIGNIFICANT CHANGE UP (ref 1.8–7.4)
NEUTROPHILS NFR BLD AUTO: 53.6 % — SIGNIFICANT CHANGE UP (ref 43–77)
NEUTROPHILS NFR BLD AUTO: 61.1 % — SIGNIFICANT CHANGE UP (ref 43–77)
NRBC # BLD: 0 /100 WBCS — SIGNIFICANT CHANGE UP (ref 0–0)
NRBC # BLD: 0 /100 WBCS — SIGNIFICANT CHANGE UP (ref 0–0)
PLATELET # BLD AUTO: 322 K/UL — SIGNIFICANT CHANGE UP (ref 150–400)
PLATELET # BLD AUTO: 347 K/UL — SIGNIFICANT CHANGE UP (ref 150–400)
POTASSIUM SERPL-MCNC: 4.5 MMOL/L — SIGNIFICANT CHANGE UP (ref 3.5–5.3)
POTASSIUM SERPL-SCNC: 4.5 MMOL/L — SIGNIFICANT CHANGE UP (ref 3.5–5.3)
PROCALCITONIN SERPL-MCNC: 0.06 NG/ML — SIGNIFICANT CHANGE UP
PROT SERPL-MCNC: 7.6 G/DL — SIGNIFICANT CHANGE UP (ref 6–8.3)
RBC # BLD: 4.78 M/UL — SIGNIFICANT CHANGE UP (ref 4.2–5.8)
RBC # BLD: 4.82 M/UL — SIGNIFICANT CHANGE UP (ref 4.2–5.8)
RBC # FLD: 13.7 % — SIGNIFICANT CHANGE UP (ref 10.3–14.5)
RBC # FLD: 13.7 % — SIGNIFICANT CHANGE UP (ref 10.3–14.5)
SARS-COV-2 RNA SPEC QL NAA+PROBE: SIGNIFICANT CHANGE UP
SODIUM SERPL-SCNC: 137 MMOL/L — SIGNIFICANT CHANGE UP (ref 135–145)
WBC # BLD: 6.94 K/UL — SIGNIFICANT CHANGE UP (ref 3.8–10.5)
WBC # BLD: 7.14 K/UL — SIGNIFICANT CHANGE UP (ref 3.8–10.5)
WBC # FLD AUTO: 6.94 K/UL — SIGNIFICANT CHANGE UP (ref 3.8–10.5)
WBC # FLD AUTO: 7.14 K/UL — SIGNIFICANT CHANGE UP (ref 3.8–10.5)

## 2021-10-24 PROCEDURE — 73140 X-RAY EXAM OF FINGER(S): CPT | Mod: 26,RT

## 2021-10-24 PROCEDURE — 99284 EMERGENCY DEPT VISIT MOD MDM: CPT

## 2021-10-24 PROCEDURE — 99222 1ST HOSP IP/OBS MODERATE 55: CPT

## 2021-10-24 PROCEDURE — 99285 EMERGENCY DEPT VISIT HI MDM: CPT

## 2021-10-24 RX ORDER — SODIUM CHLORIDE 9 MG/ML
2500 INJECTION INTRAMUSCULAR; INTRAVENOUS; SUBCUTANEOUS ONCE
Refills: 0 | Status: COMPLETED | OUTPATIENT
Start: 2021-10-24 | End: 2021-10-24

## 2021-10-24 RX ORDER — HYDROMORPHONE HYDROCHLORIDE 2 MG/ML
4 INJECTION INTRAMUSCULAR; INTRAVENOUS; SUBCUTANEOUS
Refills: 0 | Status: DISCONTINUED | OUTPATIENT
Start: 2021-10-24 | End: 2021-10-28

## 2021-10-24 RX ORDER — INSULIN GLARGINE 100 [IU]/ML
38 INJECTION, SOLUTION SUBCUTANEOUS EVERY MORNING
Refills: 0 | Status: DISCONTINUED | OUTPATIENT
Start: 2021-10-25 | End: 2021-10-28

## 2021-10-24 RX ORDER — SODIUM CHLORIDE 9 MG/ML
1000 INJECTION, SOLUTION INTRAVENOUS
Refills: 0 | Status: DISCONTINUED | OUTPATIENT
Start: 2021-10-24 | End: 2021-10-28

## 2021-10-24 RX ORDER — CEFAZOLIN SODIUM 1 G
2000 VIAL (EA) INJECTION EVERY 8 HOURS
Refills: 0 | Status: DISCONTINUED | OUTPATIENT
Start: 2021-10-24 | End: 2021-10-27

## 2021-10-24 RX ORDER — AMLODIPINE BESYLATE 2.5 MG/1
2.5 TABLET ORAL DAILY
Refills: 0 | Status: DISCONTINUED | OUTPATIENT
Start: 2021-10-24 | End: 2021-10-28

## 2021-10-24 RX ORDER — GLUCAGON INJECTION, SOLUTION 0.5 MG/.1ML
1 INJECTION, SOLUTION SUBCUTANEOUS ONCE
Refills: 0 | Status: DISCONTINUED | OUTPATIENT
Start: 2021-10-24 | End: 2021-10-28

## 2021-10-24 RX ORDER — ACETAMINOPHEN 500 MG
975 TABLET ORAL ONCE
Refills: 0 | Status: COMPLETED | OUTPATIENT
Start: 2021-10-24 | End: 2021-10-24

## 2021-10-24 RX ORDER — ACETAMINOPHEN 500 MG
650 TABLET ORAL ONCE
Refills: 0 | Status: COMPLETED | OUTPATIENT
Start: 2021-10-24 | End: 2021-10-24

## 2021-10-24 RX ORDER — INSULIN LISPRO 100/ML
VIAL (ML) SUBCUTANEOUS AT BEDTIME
Refills: 0 | Status: DISCONTINUED | OUTPATIENT
Start: 2021-10-24 | End: 2021-10-28

## 2021-10-24 RX ORDER — METHADONE HYDROCHLORIDE 40 MG/1
5 TABLET ORAL
Refills: 0 | Status: DISCONTINUED | OUTPATIENT
Start: 2021-10-24 | End: 2021-10-28

## 2021-10-24 RX ORDER — TETANUS TOXOID, REDUCED DIPHTHERIA TOXOID AND ACELLULAR PERTUSSIS VACCINE, ADSORBED 5; 2.5; 8; 8; 2.5 [IU]/.5ML; [IU]/.5ML; UG/.5ML; UG/.5ML; UG/.5ML
0.5 SUSPENSION INTRAMUSCULAR ONCE
Refills: 0 | Status: COMPLETED | OUTPATIENT
Start: 2021-10-24 | End: 2021-10-24

## 2021-10-24 RX ORDER — INSULIN LISPRO 100/ML
VIAL (ML) SUBCUTANEOUS
Refills: 0 | Status: DISCONTINUED | OUTPATIENT
Start: 2021-10-24 | End: 2021-10-28

## 2021-10-24 RX ORDER — DEXTROSE 50 % IN WATER 50 %
25 SYRINGE (ML) INTRAVENOUS ONCE
Refills: 0 | Status: DISCONTINUED | OUTPATIENT
Start: 2021-10-24 | End: 2021-10-28

## 2021-10-24 RX ORDER — DEXTROSE 50 % IN WATER 50 %
12.5 SYRINGE (ML) INTRAVENOUS ONCE
Refills: 0 | Status: DISCONTINUED | OUTPATIENT
Start: 2021-10-24 | End: 2021-10-28

## 2021-10-24 RX ORDER — VANCOMYCIN HCL 1 G
1000 VIAL (EA) INTRAVENOUS ONCE
Refills: 0 | Status: COMPLETED | OUTPATIENT
Start: 2021-10-24 | End: 2021-10-24

## 2021-10-24 RX ORDER — LISINOPRIL 2.5 MG/1
10 TABLET ORAL DAILY
Refills: 0 | Status: DISCONTINUED | OUTPATIENT
Start: 2021-10-24 | End: 2021-10-28

## 2021-10-24 RX ORDER — ACETAMINOPHEN 500 MG
650 TABLET ORAL EVERY 6 HOURS
Refills: 0 | Status: DISCONTINUED | OUTPATIENT
Start: 2021-10-24 | End: 2021-10-25

## 2021-10-24 RX ORDER — TIZANIDINE 4 MG/1
1 TABLET ORAL
Qty: 0 | Refills: 0 | DISCHARGE

## 2021-10-24 RX ORDER — DEXTROSE 50 % IN WATER 50 %
15 SYRINGE (ML) INTRAVENOUS ONCE
Refills: 0 | Status: DISCONTINUED | OUTPATIENT
Start: 2021-10-24 | End: 2021-10-28

## 2021-10-24 RX ORDER — SODIUM CHLORIDE 9 MG/ML
1000 INJECTION, SOLUTION INTRAVENOUS
Refills: 0 | Status: COMPLETED | OUTPATIENT
Start: 2021-10-24 | End: 2021-10-24

## 2021-10-24 RX ORDER — HYDROMORPHONE HYDROCHLORIDE 2 MG/ML
2 INJECTION INTRAMUSCULAR; INTRAVENOUS; SUBCUTANEOUS
Refills: 0 | Status: DISCONTINUED | OUTPATIENT
Start: 2021-10-24 | End: 2021-10-26

## 2021-10-24 RX ADMIN — SODIUM CHLORIDE 125 MILLILITER(S): 9 INJECTION, SOLUTION INTRAVENOUS at 22:47

## 2021-10-24 RX ADMIN — Medication 100 MILLIGRAM(S): at 19:38

## 2021-10-24 RX ADMIN — Medication 600 MILLIGRAM(S): at 20:10

## 2021-10-24 RX ADMIN — Medication 975 MILLIGRAM(S): at 20:44

## 2021-10-24 RX ADMIN — TETANUS TOXOID, REDUCED DIPHTHERIA TOXOID AND ACELLULAR PERTUSSIS VACCINE, ADSORBED 0.5 MILLILITER(S): 5; 2.5; 8; 8; 2.5 SUSPENSION INTRAMUSCULAR at 12:15

## 2021-10-24 RX ADMIN — HYDROMORPHONE HYDROCHLORIDE 4 MILLIGRAM(S): 2 INJECTION INTRAMUSCULAR; INTRAVENOUS; SUBCUTANEOUS at 22:07

## 2021-10-24 RX ADMIN — Medication 250 MILLIGRAM(S): at 20:42

## 2021-10-24 RX ADMIN — Medication 650 MILLIGRAM(S): at 11:56

## 2021-10-24 RX ADMIN — Medication 100 MILLIGRAM(S): at 22:18

## 2021-10-24 RX ADMIN — SODIUM CHLORIDE 2500 MILLILITER(S): 9 INJECTION INTRAMUSCULAR; INTRAVENOUS; SUBCUTANEOUS at 20:04

## 2021-10-24 RX ADMIN — HYDROMORPHONE HYDROCHLORIDE 4 MILLIGRAM(S): 2 INJECTION INTRAMUSCULAR; INTRAVENOUS; SUBCUTANEOUS at 21:07

## 2021-10-24 RX ADMIN — Medication 150 MILLIGRAM(S): at 21:21

## 2021-10-24 RX ADMIN — Medication 975 MILLIGRAM(S): at 19:44

## 2021-10-24 RX ADMIN — SODIUM CHLORIDE 2500 MILLILITER(S): 9 INJECTION INTRAMUSCULAR; INTRAVENOUS; SUBCUTANEOUS at 21:15

## 2021-10-24 RX ADMIN — Medication 100 MILLIGRAM(S): at 11:55

## 2021-10-24 NOTE — H&P ADULT - NSHPADDITIONALINFOADULT_GEN_ALL_CORE
This report was requested by: Kaitlyn Cali | Reference #: [id number]   Patient Name: Kyle Mast     YOB: 1965  Rx Written  Rx Dispensed    Drug                   Quantity Days Supply Prescriber     Magalis #    Payment Method Dispenser  09/27/2021 10/12/2021 methadone hcl 5 mg tablet  120 30 StamabaltazarJuaquin kat [id number] [facility] #[id number]   09/27/2021 10/12/2021 pregabalin 150 mg capsule  90 30 StamatosJuaquin [id number] [facility] #[id number]   09/27/2021 09/29/2021 hydromorphone 4 mg tablet  120 30 StamatosJuaquin [id number] [facility] #[id number]   08/03/2021 09/10/2021 pregabalin 150 mg capsule  90 30 Vale Robin [id number] [facility] #[id number]   08/30/2021 09/10/2021 methadone hcl 5 mg tablet  120 30 StamatosJuaquin [id number] [facility] #[id number]   08/30/2021 09/01/2021 hydromorphone 4 mg tablet  120 30 StamatosJuaquin [id number] [facility] #[id number]   08/03/2021 08/13/2021 methadone hcl 5 mg tablet  120 30 Vale Robin [id number] [facility] #[id number]   06/30/2021 08/04/2021 pregabalin 150 mg capsule  90 30 Celine Pacheco NP [id number] [facility] #[id number]   08/03/2021 08/04/2021 hydromorphone 4 mg tablet  120 30 Vale Robin [id number] [facility] #[id number]   06/30/2021 07/16/2021 methadone hcl 5 mg tablet  120 30 TrimarcoCeline NP [id number] [facility] #[id number]   06/30/2021 07/06/2021 hydromorphone 4 mg tablet  120 30 Trimarco, Celine MANCILLA [id number] [facility] #[id number]   06/04/2021 06/30/2021 pregabalin 150 mg capsule  90 30 TrimarcCeline gates NP [id number] [facility] #[id number]   06/16/2021 06/19/2021 methadone hcl 5 mg tablet  120 30 TrimginetteoCeline NP [id number] [facility] #[id number]   06/04/2021 06/07/2021 hydromorphone 4 mg tablet  120 30 Celine Pacheco NP [id number] [facility] #[id number]   05/07/2021 05/28/2021 pregabalin 150 mg capsule  90 30 Trimarco, Celine NP [id number] [facility] #[id number]   05/21/2021 05/21/2021 methadone hcl 5 mg tablet  120 30 Trimarco, Celine NP [id number] [facility] #[id number]   05/07/2021 05/10/2021 hydromorphone 4 mg tablet  120 30 OfeliaoCeline NP [id number] [facility] #[id number]   04/24/2021 05/05/2021 hydromorphone 8 mg tablet  40 7 Edinson Gilman MD [id number] [facility] #[id number]   05/04/2021 05/05/2021 hydromorphone 8 mg tablet  40 6 Debbie Shaver [id number] [facility] #[id number]   04/07/2021 04/24/2021 pregabalin 150 mg capsule  90 30 Vale Robin [id number] [facility] #[id number]   04/07/2021 04/24/2021 methadone hcl 5 mg tablet  120 30 Vale Robin [id number] [facility] #[id number]   04/07/2021 04/12/2021 hydromorphone 4 mg tablet  120 30 LobitoVale angulo [id number] [facility] #[id number]   03/10/2021 03/24/2021 methadone hcl 5 mg tablet  120 30 Vale Robin [id number] [facility] #[id number]   03/10/2021 03/15/2021 hydromorphone 4 mg tablet  120 30 Vale Robin [id number] [facility] #[id number]   03/10/2021 03/15/2021 pregabalin 150 mg capsule  90 30 Vale Robin [id number] [facility] #[id number]   02/10/2021 02/24/2021 methadone hcl 5 mg tablet  120 30 Vale Robin [id number] [facility] #[id number]

## 2021-10-24 NOTE — ED PROVIDER NOTE - CLINICAL SUMMARY MEDICAL DECISION MAKING FREE TEXT BOX
56 yr old male with hx of DM, HTN presents with right 2nd finger swelling and redness x 1 week. Pt reports about 2 weeks ago, fish hook stuck in finger, pt removed on his own. Pt reports about 6 days ago symptoms started. Pt seen by pmd and was started on doxycycline, which he took 5 days of. Pt reports not improvement in symptoms. Josemanuel any fever, chills or any other symptoms. tetanus unknown. right hand dominant.  right 2nd finger- + swelling noted, with redness, positive ROM, positive radial pulse, less than 2 sec cap refill, no pus draining at this time, scabbed noted 56 yr old male with hx of DM, HTN presents with right 2nd finger swelling and redness x 1 week. Pt reports about 2 weeks ago, fish hook stuck in finger, pt removed on his own. Pt reports about 6 days ago symptoms started. Pt seen by pmd and was started on doxycycline, which he took 5 days of. Pt reports not improvement in symptoms. Josemanuel any fever, chills or any other symptoms. tetanus unknown. right hand dominant.  right 2nd finger- + swelling noted, with redness, positive ROM, positive radial pulse, less than 2 sec cap refill, no pus draining at this time, scabbed noted   labs and xray reviewed, pt given dose of IV clinda    Offered admission to patient. Hospitalist Dr. Damian seen pt at bedside and does not feel pt needs to be admitted due to local cellulitis and well appearing. Dr. Saasson called and agrees with plan for discharge and will see pt in his office this week. Pt agrees with plan and given instructions to return to ED in 48 hours for wound check if pt was unable to follow up with hand in 2 days 56 yr old male with hx of DM, HTN presents with right 2nd finger swelling and redness x 1 week. Pt reports about 2 weeks ago, fish hook stuck in finger, pt removed on his own. Pt reports about 6 days ago symptoms started. Pt seen by pmd and was started on doxycycline, which he took 5 days of. Pt reports no improvement in symptoms. Josemanuel any fever, chills or any other symptoms. tetanus unknown. right hand dominant. Pt reports there was a scab on the finger which he open himself and " pus came out".  pt is right hand dominant.  right 2nd finger- + swelling noted, with redness, positive ROM, positive radial pulse, less than 2 sec cap refill, no pus draining at this time, scabbed noted   labs and xray reviewed, pt given dose of IV clinda    discussed admission with patient. Hospitalist Dr. Damian seen pt at bedside and does not feel pt needs to be admitted due to local cellulitis and well appearing. Dr. Sasson called and agrees with plan for discharge and will see pt in his office in 1-3 days. Pt agrees with plan and given instructions to return to ED in 48 hours for wound check if pt was unable to follow up with hand in 2 days.

## 2021-10-24 NOTE — ED ADULT TRIAGE NOTE - NSTRIAGECARE_GEN_A_ER
Patient Seen in: [facility]    History   Stated Complaint: Bee sting    HPI    This patient states she was bitten multiple times by  bees or wasps today.   She has noted swelling and redness over the sites of the bite and air)    Current:/50   Pulse 108   Temp 98.2 °F (36.8 °C) (Oral)   Resp 20   Wt 71.7 kg   SpO2 98%   BMI 28.90 kg/m²         Physical Exam    She is awake and alert audible wheezing or chest retractions present  Eyes pupils are equal reactive  ENT the List as of 8/5/2018 11:31 AM    START taking these medications    predniSONE 20 MG Oral Tab  Take 1 tablet (20 mg total) by mouth 2 (two) times daily.  Take only  1 dose of 20 mg of prednisone today at dinnertime and then take twice daily starting tomorrow Face Mask

## 2021-10-24 NOTE — ED ADULT TRIAGE NOTE - PRO INTERPRETER NEED 2
[facility]  PROGRESS NOTE    Patient:  Yo Moura Date of Service:  3/5/2019   YOB: 1979 Admission Date:  3/2/2019   MRN:  [id number] Attending:  Soy Beyer MD   PCP:  No Pcp Hospital Day:  Hospital Day: 4     Primary Cardiologist:  None (now Dr. Partida)    Summary of Hospitalization:  39 year old male never-smoker with history of resistant/recalcitrant HTN and morbid obesity, who presented to the ER with chest pain due HTN emergency.  /142 in ER, not on any home antihypertensive meds.  Elevated WBC 16 on admission, Cr 1.41.  CT chest w/wo negative for dissection, but did show interstitial ground glass opacities with perihilar adenopathy.  Started on labetalol gtt and admitted to CICU for closer monitoring, which was subsequently discontinued after initiating PO antihypertensive regimen. Echo showed LVEF 54%, severe LVH, G2DD, and moderate MR.  Secondary HTN workup pending. Unfortunately, patient left AMA despite extensive discussion regarding risk involved with leaving AMA, including long-term disability or even death.  Patient expressed understanding of the aforementioned, but decided to leave anyway.  Provided with a 1-month supply of medications at that time in attempt to control his BP.      INTERVAL EVENTS     Interval Events:  Resting comfortably in bed, no apparent distress.  No complaints.  Remains chest pain free, no SOB.  BP uncontrolled overnight.  Continuing to uptitrate and hypertensive therapy.    Of note, patient unfortunately left AMA later in the morning as scheduled despite extensive discussion. See summary of hospitalization for details regarding event.    CARDIAC STUDIES     Chest CT W/Wo Contrast 3/2/19:  No acute thoracic aortic process. No acute aortic aneurysm, no aortic  rupture, and no aortic dissection. Abdominal aorta is outside the  field-of-view and therefore not evaluated.     There is mediastinal and hilar lymphadenopathy with groundglass  pulmonary  opacities and septal thickenings could be due to acute infection or  inflammation. Other etiologies are not excluded.    Transthoracic Echo :    Severe left ventricular hypertrophy.  Left ventricular ejection fraction, 54 %.  No regional wall motion abnormalities.  Grade II/IV diastolic dysfunction E/e' =20.4  Normal right ventricular systolic function.  Moderately increased left atrial volume 46.6 ml/m².  Moderate mitral valve regurgitation.  Mild aortic dilatation of the sinuses of valsalva 3.8 cm .  No prior study available for comparison.    Renal Artery Duplex US 3/4/19:  1.  Exam is technically limited because of body habitus and patient's  inability to hold his breath. Given this qualification, left renal artery  peak systolic velocity is abnormally elevated at 264 cm/s. This could  represent true renal artery stenosis or could be artifactual.  2.  CT angiogram of the abdomen is recommended for further evaluation.  Another option to consider is abdomen MR angiogram with and without  contrast.    Stress Test:  None on file.      Latest Cardiac Cath & Interventions:   None on file.     Prior Cardiac Surgeries:  None on file.     Cardiac Devices:  None on file.     CURRENT MEDICATIONS     Scheduled:   • lisinopril  20 mg Oral Daily   • carvedilol  25 mg Oral BID WC   • atorvastatin  20 mg Oral Nightly   • heparin (porcine)  5,000 Units Subcutaneous 3 times per day   • furosemide  40 mg Intravenous BID   • amLODIPine  10 mg Oral Daily   • hydrALAZINE  100 mg Oral TID   • sodium chloride (PF)  2 mL Injection 2 times per day       Infusions:       PRN: hydrALAZINE, sodium chloride, potassium chloride, potassium chloride, potassium chloride 20 mEq/100mL IVPB, potassium chloride, magnesium sulfate, magnesium sulfate, magnesium sulfate, sodium chloride (PF), sodium chloride, sodium chloride, potassium chloride, potassium chloride 20 mEq/100mL IVPB    PHYSICAL EXAM     Vital Signs:  Blood pressure (!)  192/99, pulse 101, temperature 99 °F (37.2 °C), temperature source Oral, resp. rate 26, height 6' 1\" (1.854 m), weight (!) 141.2 kg, SpO2 94 %.  Intake & Output:    Intake/Output Summary (Last 24 hours) at 3/5/2019 0739  Last data filed at 3/5/2019 0348  Gross per 24 hour   Intake 360 ml   Output 1025 ml   Net -665 ml       Physical Exam:    General:  Awake, alert, no apparent distress, morbidly obese, conversant  HEENT:  Normocephalic/atraumatic, anicteric sclerae, moist mucous membranes  Neck:  Supple, large diameter neck, unable to fully assess JVD  Heart:  Regular rate and rhythm, +S1S2, grade 3 holosystolic murmur at LSB and apex  Lungs:  Diminished breath sounds bilaterally; no rales, rhonchi, or wheezing; normal respiratory effort  Abdomen:  Soft, non-tender, non-distended  Extremities:  Extremities grossly normal in appearance, no lower extremity edema bilaterally  Skin:  Warm and dry, no rashes or skin discoloration noted  MSK:  No joint deformity or visual evidence of inflammation  Neuro:  Awake, alert, regards examiner, tracks across room, follows commands, moving extremities spontaneously, normal bulk/tone throughout     LABORATORY RESULTS     Cardiac Markers:   Recent Labs   Lab 03/03/19  0942 03/02/19  2335   RAPDTR 0.07* 0.11*       BNP: No results found    CBC:   Recent Labs   Lab 03/05/19  0320 03/03/19  0942 03/02/19  1900   WBC 13.9* 14.2* 16.0*   HGB 13.6 14.2 16.5   HCT 41.5 42.4 48.9    238 281       CMP:   Recent Labs   Lab 03/05/19  0320 03/05/19  0029 03/04/19  1054 03/03/19  0942 03/02/19  1900   SODIUM 137  --   --  134* 135   POTASSIUM 3.6 3.8 3.6 3.7 3.4   CHLORIDE 102  --   --  99 101   CO2 25  --   --  23 24   BUN 29*  --   --  26* 19   CREATININE 1.56*  --   --  1.59* 1.41*   GLUCOSE 113*  --   --  123* 123*   ALBUMIN  --   --   --   --  4.4   GPT  --   --   --   --  21   ALKPT  --   --   --   --  83   BILIRUBIN  --   --   --   --  1.2*   MG 2.4  --  2.2 2.3 2.2       Lipid  Panel:   Recent Labs   Lab 03/03/19  0942   CHOLESTEROL 234*   TRIGLYCERIDE 142   HDL 33*   CALCLDL 173*       HbA1c:   Hemoglobin A1C (%)   Date Value   03/03/2019 5.3       Coagulation Studies:   Recent Labs   Lab 03/02/19  1900   PT 11.4   PTT 32   INR 1.1       BEST PRACTICE BOX     AMI WITH Heart Failure with Reduced LVEF (<40%)?    no  AMI?  No  Heart Failure with Reduced LVEF (< 40%)?  No     ASSESSMENT & RECOMMENDATIONS     Yo Moura is a 39 year old male never-smoker with history of resistant/recalcitrant HTN and morbid obesity, who presented to the ER with chest pain due HTN emergency.      Assessment:  1.  Hypertensive emergency       - /148 on arrival, now off labetalol gtt, up-titrating PO agents       - Suspect due to obesity with un-dx/tx JOCELYN, cannot exclude secondary causes       - Echo as above, LVEF 54%, severe LVH, G2DD, and moderate MR       - Renal artery US with increased velocities in Rrenal artery, unclear if artifact       - Additional secondary HTN labs pending (PAC:PRA, VMA, metanephrines)       - TSH, Utox, parathyroid studies unremarkable    2.  Moderate primary/degenerative MR       - As noted on echo, experiencing some mild dyspnea    3.  HFpEF/G2DD, hypertensive heart disease       - Echo as above, due to longstanding uncontrolled HTN    4.  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  Morbid obesity       - Likely has JOCELYN, needs outpatient sleep study    6.  Reactive leukocytosis    Recommendations:  - Switch Coreg to labetalol 300 BID, switch IV lasix to 40 PO QD  - Add lisinopril 20 QD, continue amlodipine 10 QD, hydralazine 100 TID  - Initially planned for repeat limited echo tomorrow to assess MR once HR/BP optimized  - May need LIA if suboptimal imaging quality on repeat echo  - F/u secondary HTN lab results, check ESR and CRP     UPDATE:  Patient left AMA prior to above measures could be performed. See summary of hospitalization and/or  discharge summary for details.     Patient seen and examined with Dr. Sanchez, who formulated the plan.      Mikel Nelson DO  Cardiology Fellow, PGY4  3/5/2019 7:39 AM  Pager [phone]    Overnight & Weekend Coverage:  Monday-Thursday (5 pm to 7am):  Please page the on call fellow at [phone].  Friday 5 pm-Monday 7am:  Please check the call schedule and page the on call fellow's personal pager.   If patient had a cardiac procedure today:  Please page on call cath fellow for procedure-related issues from 5 pm to 7am.       English

## 2021-10-24 NOTE — H&P ADULT - ASSESSMENT
55 y/o M with HTN, DM2, neuropathy, chronic pain on chronic narcotics sec to cervical spine and back injury, presented to the ED with right index finger cellulitis, was discharged home on antibx but recalled due to elevated lactate (2.5).  Lactate was repeated and it was 3.  Apparently, pt's right index finger got pricked by fish hook 2 weeks ago, 6 days later, he noted that finger became painful, red and swollen.  So he went to the  and was prescribed Doxycycline, w/c he has been on for the past 5 days, with no improvement.  He c/o increased swelling, pain/throbbing in the finger and he noted a scab had formed and when he removed the scab, some pus came out, so he decided to come to the ED. Pt was given a dose of Clinda IV and was d/rome home on Clinda po. He denies fever, chills, chest pain, dyspnea, nausea, vomiting, abd pain, diarrhea. CBC, CMP were unremarkable except for Glucose of 230.      Cellulitis right index finger  elevated lactate, of note, pt is on Metformin  w/c improved with fluids    Observation  IV Ancef  IV hydration with LR  Cont to monitor FS-cont lantus, SSinsulin  Cont lisinopril, amlodipine  Cont pain meds: pt on chronic hydromorphone, methadone (follows with pain mgt)  Consult Dr Desouza  Low risk for DVT    IMPROVE VTE Individual Risk Assessment  RISK                                                                Points  [  ] Previous VTE                                                  3  [  ] Thrombophilia                                               2  [  ] Lower limb paralysis                                      2        (unable to hold up >15 seconds)    [  ] Current Cancer                                              2         (within 6 months)  [  ] Immobilization > 24 hrs                                1  [  ] ICU/CCU stay > 24 hours                              1  [  ] Age > 60                                                      1  IMPROVE VTE Score __0_______  IMPROVE Score 0-1: Low Risk, No VTE prophylaxis required for most patients, encourage ambulation.   IMPROVE Score 2-3: At risk, pharmacologic VTE prophylaxis is indicated for most patients (in the absence of a contraindication)  IMPROVE Score > or = 4: High Risk, pharmacologic VTE prophylaxis is indicated for most patients (in the absence of a contraindication)   57 y/o M with HTN, DM2, neuropathy, chronic pain on chronic narcotics sec to cervical spine and back injury, presented to the ED with right index finger cellulitis, was discharged home on antibx but recalled due to elevated lactate (2.5).  Lactate was repeated and it was 3.  Apparently, pt's right index finger got pricked by fish hook 2 weeks ago, 6 days later, he noted that finger became painful, red and swollen.  So he went to the  and was prescribed Doxycycline, w/c he has been on for the past 5 days, with no improvement.  He c/o increased swelling, pain/throbbing in the finger and he noted a scab had formed and when he removed the scab, some pus came out, so he decided to come to the ED. Pt was given a dose of Clinda IV and was d/rome home on Clinda po. He denies fever, chills, chest pain, dyspnea, nausea, vomiting, abd pain, diarrhea. CBC, CMP were unremarkable except for Glucose of 230.      Cellulitis right index finger  elevated lactate, of note, pt is on Metformin  w/c improved with fluids    Observation  IV Ancef  IV hydration with LR  Consult Dr Lyly Restrepo to monitor FS-cont lantus, SSinsulin  Cont lisinopril, amlodipine  Cont pain meds: pt on chronic hydromorphone, methadone (follows with pain mgt)  ISTOP performed (See below in the Additional Info section)  Low risk for DVT - encouraged ambulation    IMPROVE VTE Individual Risk Assessment  RISK                                                                Points  [  ] Previous VTE                                                  3  [  ] Thrombophilia                                               2  [  ] Lower limb paralysis                                      2        (unable to hold up >15 seconds)    [  ] Current Cancer                                              2         (within 6 months)  [  ] Immobilization > 24 hrs                                1  [  ] ICU/CCU stay > 24 hours                              1  [  ] Age > 60                                                      1  IMPROVE VTE Score __0_______  IMPROVE Score 0-1: Low Risk, No VTE prophylaxis required for most patients, encourage ambulation.   IMPROVE Score 2-3: At risk, pharmacologic VTE prophylaxis is indicated for most patients (in the absence of a contraindication)  IMPROVE Score > or = 4: High Risk, pharmacologic VTE prophylaxis is indicated for most patients (in the absence of a contraindication)   55 y/o M with HTN, DM2, neuropathy, chronic pain on chronic narcotics sec to cervical spine and back injury, presented to the ED with right index finger cellulitis, was discharged home on antibx but recalled due to elevated lactate (2.5).  Lactate was repeated and it was 3.  Apparently, pt's right index finger got pricked by fish hook 2 weeks ago, 6 days later, he noted that finger became painful, red and swollen.  So he went to the  and was prescribed Doxycycline, w/c he has been on for the past 5 days, with no improvement.  He c/o increased swelling, pain/throbbing in the finger and he noted a scab had formed and when he removed the scab, some pus came out, so he decided to come to the ED. Pt was given a dose of Clinda IV and was d/rome home on Clinda po. He denies fever, chills, chest pain, dyspnea, nausea, vomiting, abd pain, diarrhea. CBC, CMP were unremarkable except for Glucose of 230.      Cellulitis right index finger  elevated lactate, of note, pt is on Metformin  w/c improved with fluids    Observation  IV Ancef  IV hydration with LR  Cont to monitor FS-cont lantus, SSinsulin  Cont lisinopril, amlodipine  Cont pain meds: pt on chronic hydromorphone, methadone (follows with pain mgt)  ISTOP performed (See below in the Additional Info section)  Low risk for DVT - encouraged ambulation    IMPROVE VTE Individual Risk Assessment  RISK                                                                Points  [  ] Previous VTE                                                  3  [  ] Thrombophilia                                               2  [  ] Lower limb paralysis                                      2        (unable to hold up >15 seconds)    [  ] Current Cancer                                              2         (within 6 months)  [  ] Immobilization > 24 hrs                                1  [  ] ICU/CCU stay > 24 hours                              1  [  ] Age > 60                                                      1  IMPROVE VTE Score __0_______  IMPROVE Score 0-1: Low Risk, No VTE prophylaxis required for most patients, encourage ambulation.   IMPROVE Score 2-3: At risk, pharmacologic VTE prophylaxis is indicated for most patients (in the absence of a contraindication)  IMPROVE Score > or = 4: High Risk, pharmacologic VTE prophylaxis is indicated for most patients (in the absence of a contraindication)

## 2021-10-24 NOTE — ED ADULT NURSE NOTE - NSICDXPASTSURGICALHX_GEN_ALL_CORE_FT
PAST SURGICAL HISTORY:  H/O cervical spine surgery     History of back surgery     S/P cervical spinal fusion

## 2021-10-24 NOTE — ED PROVIDER NOTE - PATIENT PORTAL LINK FT
You can access the FollowMyHealth Patient Portal offered by [facility] by registering at the following website: [email or web address]. By joining [facility]’s FollowMyHealth portal, you will also be able to view your health information using other applications (apps) compatible with our system.

## 2021-10-24 NOTE — ED PROVIDER NOTE - NSFOLLOWUPINSTRUCTIONS_ED_ALL_ED_FT
Elevate finger   Take tylenol 650 mg every 6 hours as needed for pain.    Take clindamycin as prescribed   Follow up with hand surgeon- Dr. Desouza this week.   If you are not able to follow up with hand then return to ED in 48 hours for wound check   Return sooner if any worsening symptoms.       Cellulitis    WHAT YOU NEED TO KNOW:    Cellulitis is a skin infection caused by bacteria. Cellulitis is common and can become severe. Cellulitis usually appears on the lower legs. It can also appear on the arms, face, and other areas. Cellulitis develops when bacteria enter a crack or break in your skin, such as a scratch, bite, or cut.    Cellulitis          DISCHARGE INSTRUCTIONS:    Return to the emergency department if:   •Your wound gets larger and more painful.      •You feel a crackling under your skin when you touch it.      •You have purple dots or bumps on your skin, or you see bleeding under your skin.      •You see red streaks coming from the infected area.      Call your doctor if:   •The red, warm, swollen area gets larger.      •Your fever or pain does not go away or gets worse.      •The area does not get smaller after 3 days of antibiotics.      •You have questions or concerns about your condition or care.      Medicines: You should start to see improvement in 3 days. If cellulitis is not treated, the infection can spread through your body and become life-threatening. You may need any of the following medicines:  •Antibiotics help treat the bacterial infection.      •Acetaminophen decreases pain and fever. It is available without a doctor's order. Ask how much to take and how often to take it. Follow directions. Read the labels of all other medicines you are using to see if they also contain acetaminophen, or ask your doctor or pharmacist. Acetaminophen can cause liver damage if not taken correctly. Do not use more than 4 grams (4,000 milligrams) total of acetaminophen in one day.       •NSAIDs, such as ibuprofen, help decrease swelling, pain, and fever. This medicine is available with or without a doctor's order. NSAIDs can cause stomach bleeding or kidney problems in certain people. If you take blood thinner medicine, always ask your healthcare provider if NSAIDs are safe for you. Always read the medicine label and follow directions.      •Take your medicine as directed. Contact your healthcare provider if you think your medicine is not helping or if you have side effects. Tell him or her if you are allergic to any medicine. Keep a list of the medicines, vitamins, and herbs you take. Include the amounts, and when and why you take them. Bring the list or the pill bottles to follow-up visits. Carry your medicine list with you in case of an emergency.      Self-care:   •Wash the area with soap and water every day. Gently pat dry. Use bandages if directed by your healthcare provider.      •Elevate the area above the level of your heart as often as you can. This will help decrease swelling and pain. Prop the area on pillows or blankets to keep it elevated comfortably.  Elevate Leg           •Place a cool, damp cloth on the area. Use clean cloths and clean water. You can do this as often as you need to. Cool, damp cloths may help decrease pain.      •Apply cream or ointment as directed. These help protect the area. Most over-the-counter products, such as petroleum jelly, are good to use. Ask your healthcare provider about specific creams or ointments you should use.      Prevent cellulitis:   •Do not scratch bug bites or areas of injury. You increase your risk for cellulitis by scratching these areas.      •Do not share personal items, such as towels, clothing, and razors.      •Clean exercise equipment with germ-killing  before and after you use it.      •Treat athlete’s foot. This can help prevent the spread of a bacterial skin infection.      •Wash your hands often. Use soap and water. Wash your hands after you use the bathroom, change a child's diapers, or sneeze. Wash your hands before you prepare or eat food. Use lotion to prevent dry, cracked skin.  Handwashing           Follow up with your doctor within 3 days, or as directed: He or she will check if your cellulitis is getting better. Write down your questions so you remember to ask them during your visits.       © Copyright [employer] 2021           back to top                          © Copyright [employer] 2021

## 2021-10-24 NOTE — ED PROVIDER NOTE - UPPER EXTREMITY EXAM, RIGHT
limited flexion at the DIP due to swelling, able to fully extend. (+) erythema, no fluctuance/SWELLING/TENDERNESS

## 2021-10-24 NOTE — ED PROVIDER NOTE - ATTENDING CONTRIBUTION TO CARE
Dr. Prado: I performed a face to face bedside interview with patient regarding history of present illness, review of symptoms and past medical history. I completed an independent physical exam.  I have discussed patient's plan of care with PA.   I agree with note as stated above, having amended the EMR as needed to reflect my findings.   This includes HISTORY OF PRESENT ILLNESS, HIV, PAST MEDICAL/SURGICAL/FAMILY/SOCIAL HISTORY, ALLERGIES AND HOME MEDICATIONS, REVIEW OF SYSTEMS, PHYSICAL EXAM, and any PROGRESS NOTES during the time I functioned as the attending physician for this patient.    dr prado: 56 yr old male with hx of DM, HTN presents with right 2nd finger swelling and redness x 1 week. Pt reports about 2 weeks ago, fish hook stuck in finger, pt removed on his own. Pt reports about 6 days ago symptoms started. Pt seen by pmd and was started on doxycycline, which he took 5 days of. Pt reports no improvement in symptoms. Josemanuel any fever, chills or any other symptoms. tetanus unknown. right hand dominant. Pt reports there was a scab on the finger which he open himself and " pus came out".  pt is right hand dominant.  right 2nd finger- + swelling noted, with redness, positive ROM, positive radial pulse, less than 2 sec cap refill, no pus draining at this time, scabbed noted   labs and xray reviewed, pt given dose of IV clinda    discussed admission with patient. Hospitalist Dr. Damian seen pt at bedside and does not feel pt needs to be admitted due to local cellulitis and well appearing. Dr. Sasson called and agrees with plan for discharge and will see pt in his office in 1-3 days. Pt agrees with plan and given instructions to return to ED in 48 hours for wound check if pt was unable to follow up with hand in 2 days.

## 2021-10-24 NOTE — H&P ADULT - NSICDXPASTMEDICALHX_GEN_ALL_CORE_FT
PAST MEDICAL HISTORY:  Diabetes mellitus     Gastric ulcer     H/O radiculopathy     H/O spinal cord compression     Hypertension     Spinal stenosis     Spondylosis

## 2021-10-24 NOTE — ED PROVIDER NOTE - PHYSICAL EXAMINATION
right 2nd finger- + swelling noted, with redness, positive ROM, positive radial pulse, less than 2 sec cap refill, no pus draining at this time, scabbed noted right 2nd finger- + swelling noted, with redness, positive ROM, positive radial pulse, less than 2 sec cap refill, no pus draining at this time, scabbed noted  redness swelling ttp middle phalanx right index finger

## 2021-10-24 NOTE — ED PROVIDER NOTE - PROGRESS NOTE DETAILS
Offered admission to patient. Hospitalist Dr. Damian seen pt at bedside and does not feel pt needs to be admitted due to local cellulitis and well appearing. Dr. Sasson called and agrees with plan for discharge and will see pt in his office this week. Pt agrees with plan and given instructions to return to ED in 48 hours for wound check if pt was unable to follow up with hand in 2 days. discussed admission with patient. Hospitalist Dr. Damian seen pt at bedside and does not feel pt needs to be admitted due to local cellulitis and well appearing. Dr. Sasson called and agrees with plan for discharge and will see pt in his office in 1-3 days. Pt agrees with plan and given instructions to return to ED in 48 hours for wound check if pt was unable to follow up with hand in 2 days.

## 2021-10-24 NOTE — ED PROVIDER NOTE - PROGRESS NOTE DETAILS
STAN Petty-  Hand surgeon Dr. Sasoon called by Dr Calero. Will see patient in house as per "medicine needs". I spoke to Dr. Cali. She wants patient placed on Obs.

## 2021-10-24 NOTE — ED ADULT NURSE NOTE - NSIMPLEMENTINTERV_GEN_ALL_ED
Implemented All Universal Safety Interventions:  [place] to call system. Call bell, personal items and telephone within reach. Instruct patient to call for assistance. Room bathroom lighting operational. Non-slip footwear when patient is off stretcher. Physically safe environment: no spills, clutter or unnecessary equipment. Stretcher in lowest position, wheels locked, appropriate side rails in place.

## 2021-10-24 NOTE — CONSULT NOTE ADULT - ASSESSMENT
56 yr old male with hx of DM, HTN presents with right index finger swelling and redness x 1 week. Pt reports about 2 weeks ago, fish hook stuck in finger, pt removed on his own. Pt reports about 6 days ago symptoms started. Pt seen by pmd and was started on doxycycline, which he took 5 days of. Pt reports not improvement in symptoms. no fever. tetanus unknown. right hand dominant. Pt reports there was a scab to area which he open himself and " pus came out". in ER, VS normal. patient was afebrile. wbc normal. patient had no complaints except above. hospitalist service was called to see whetehr needs IV antibiotic and hence admission.     seen and examined at bedside. reported right index finger swelling and focal TP over second phalanx. no pain or tenderness or redness over any joints. BS below 200 at home. complaints with meds.     assessment  1. Right index finger cellulitis  xray: no sign of bony involvement . soft tissue swelling+  on doxy. not improving  s/p clinda in ER  s/p tetanus shot  afebrile normal WBC, non-toxic  2; DM with bS uncontrolled.    plan:  suggest consultation with hand surgeon  suggest change antibiotic to clinda or keflex.   educated patient about sign of worsening infection/abscess and return to ER immediately if worsening  advised to see hand surgeon in 24-48 hrs if not available in ER.  patient does not need IV antibiotic/admission at this time  continue home BS meds. call endo office tomorrow to adust med if  or more. informed patient that infection can increase his BS. he follows with endocrinologist OP.       d/w Dr. Arriaga.

## 2021-10-24 NOTE — H&P ADULT - HISTORY OF PRESENT ILLNESS
55 y/o M with HTN, DM2, neuropathy, chronic pain on chronic narcotics sec to cervical spine and back injury, presented to the ED with right index finger cellulitis, was discharged home on antibx but recalled due to elevated lactate (2.5).  Lactate was repeated and it was 3.  Apparently, pt's right index finger got pricked by fish hook 2 weeks ago, 6 days later, he noted that finger became painful, red and swollen.  So he went to the  and was prescribed Doxycycline, w/c he has been on for the past 5 days, with no improvement.  He c/o increased swelling, pain/throbbing in the finger and he noted a scab had formed and when he removed the scab, some pus came out, so he decided to come to the ED. Pt was given a dose of Clinda IV and was d/rome home on Clinda po. He denies fever, chills, chest pain, dyspnea, nausea, vomiting, abd pain, diarrhea. CBC, CMP were unremarkable except for Glucose of 230.

## 2021-10-24 NOTE — ED PROVIDER NOTE - OBJECTIVE STATEMENT
57 y/o M with h/o DM, HTN seen here in the ER earlier today due to an infection of the 2nd right digit obtained 1 week prior after he removed a fish hook from the finger, called to return secondary to high lactate result. Previously took Doxy for 5 days, given IV Clindamycin in the ER and told to follow up with Dr. Kohler within 1-2 days.     56 yr old male with hx of DM, HTN presents with right 2nd finger swelling and redness x 1 week. Pt reports about 2 weeks ago, fish hook stuck in finger (girma wasn't in finger fully), pt removed on his ow 57 y/o M with h/o DM (not well controlled), HTN seen here in the ER earlier today due to an infection of the 2nd right digit starting 1 week prior after he removed a fish hook from the finger 2 weeks ago, called to return to ER secondary to high lactate result. States he picked scab and drainage/pus expelled. Previously took Doxy for 5 days without improvement, given IV Clindamycin in the ER and told to follow up with Dr. Kohler within 1-2 days. Denies fever, chills, ab pain, n/v/d, weakness, numbness, tingling. Right hand dominant.

## 2021-10-24 NOTE — ED PROVIDER NOTE - CLINICAL SUMMARY MEDICAL DECISION MAKING FREE TEXT BOX
55 y/o M with h/o DM (not well controlled), HTN seen here in the ER earlier today due to an infection of the 2nd right digit starting 1 week prior after he removed a fish hook from the finger 2 weeks ago, called to return to ER secondary to high lactate result. States he picked scab and drainage/pus expelled. Previously took Doxy for 5 days without improvement, given IV Clindamycin in the ER and told to follow up with Dr. Kohler within 1-2 days. Denies fever, chills, ab pain, n/v/d, weakness, numbness, tingling. Right hand dominant.   Plan: Will give IV ABX and ADMIT  **update: Hand surgeon Dr. Sasoon called by Dr Calero. Will see patient in house as per "medicine needs". I spoke to Dr. Cali. She wants patient placed on Obs.

## 2021-10-24 NOTE — H&P ADULT - NSICDXFAMILYHX_GEN_ALL_CORE_FT
FAMILY HISTORY:  Father  Still living? Yes, Estimated age: Age Unknown  FH: hypertension, Age at diagnosis: Age Unknown    Mother  Still living? Yes, Estimated age: Age Unknown  FH: diabetes mellitus, Age at diagnosis: Age Unknown

## 2021-10-24 NOTE — ED ADULT NURSE NOTE - NSICDXPASTMEDICALHX_GEN_ALL_CORE_FT
PAST MEDICAL HISTORY:  Diabetes mellitus     Gastric ulcer     Hypertension      PAST MEDICAL HISTORY:  Diabetes mellitus     Gastric ulcer     H/O radiculopathy     H/O spinal cord compression     Hypertension     Spinal stenosis     Spondylosis

## 2021-10-24 NOTE — ED ADULT TRIAGE NOTE - CHIEF COMPLAINT QUOTE
Patient returns to ED after being called for abnormal lab result after visit earlier today. Patient has infected wound to right index finger.

## 2021-10-24 NOTE — ED ADULT TRIAGE NOTE - CHIEF COMPLAINT QUOTE
Patient presents to ED from home complaining of right index finger infection. Patient had a fish hook injury 2 weeks ago, removed it himself at home, went to urgent care 4-5 days ago because it wasn't healing well and they prescribed doxycycline but it still has not improved. Patient denies fever, chills, n/v/d.

## 2021-10-24 NOTE — ED PROVIDER NOTE - OBJECTIVE STATEMENT
56 yr old male with hx of DM, HTN presents with right 2nd finger swelling and redness x 1 week. Pt reports about 2 weeks ago, fish hook stuck in finger, pt removed on his own. Pt reports about 6 days ago symptoms started. Pt seen by pmd and was started on doxycycline, which he took 5 days of. Pt reports not improvement in symptoms. Josemanuel any fever, chills or any other symptoms. tetanus unknown. right hand dominant. Pt reports there was a scab to area which he open himself and " pus came out". 56 yr old male with hx of DM, HTN presents with right 2nd finger swelling and redness x 1 week. Pt reports about 2 weeks ago, fish hook stuck in finger (girma wasn't in finger fully), pt removed on his own. Pt reports about 6 days ago symptoms started. Pt seen by pmd and was started on doxycycline, which he took 5 days of. Pt reports no improvement in symptoms. Josemanuel any fever, chills or any other symptoms. tetanus unknown. right hand dominant. Pt reports there was a scab on the finger which he open himself and " pus came out".  pt is right hand dominant.

## 2021-10-24 NOTE — ED PROVIDER NOTE - ATTENDING CONTRIBUTION TO CARE
pt c/o R 2nd finger pain/swelling. had a fish hook stuck there 2-3 wk ago which he pulled out. few days later mid phalanx where injury occurred started swelling and hurting more and got worse since. had an area which pt thought was a pus pocket which he opened up but only very little fluid came out.  pt has been on doxycycline for 5 days from urgent care, not improving at all. seen here earlier today. had lactate which resulted high (2.4) after dc and pt was called back.  received clindamycin iv earlier today. was seen by hospitalist earlier who felt pt didn't require admission at the time. pt was to f/u c hand Dr Desouza.    exam:   General: well appearing, NAD.   HEENT: eyes perrl,   cor: RRR, s1s2, 2+rad pulses.   lungs: ctabl, no resp distress.   abd: soft, ntnd.   neuro: a&ox3, cn2-12 intact, COBOS, 5/5 strength c nl sensation all extremities, nl coordination.   MSK: R 2nd finger mid phalanx with mod generalized swelling. no definite fluctuance. mild diffuse ttp. full extends finger. flexion dip/pip limited to about 30% due to swelling. normal distal cap refill and color.     AP: 57yo M diabetic with finger puncture injury 3 wk ago, s/p 5 days doxycycline , not improving. nontoxic in appearance, but with failed abx, PMH DM, elevated lactate, would admit for iv abx, hand eval.      update: d/w hand Dr Desouza, states ne emergent need to see pt today, recommend iv abx, asks medicine team to call if he is needed. lact increased to 3.0  .  iv clinda, vancomycin , and iv fluids started. d/w Dr Cali

## 2021-10-24 NOTE — ED PROVIDER NOTE - RATE
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

## 2021-10-24 NOTE — H&P ADULT - NSHPPHYSICALEXAM_GEN_ALL_CORE
Vital Signs (24 Hrs):  T(C): 36.5 (10-24-21 @ 19:07), Max: 36.8 (10-24-21 @ 11:23)  HR: 108 (10-24-21 @ 19:07) (95 - 108)  BP: 113/83 (10-24-21 @ 19:07) (113/83 - 127/87)  RR: 18 (10-24-21 @ 19:07) (18 - 18)  SpO2: 98% (10-24-21 @ 19:07) (98% - 100%)  Daily Height in cm: 187.96 (24 Oct 2021 19:07)

## 2021-10-25 ENCOUNTER — TRANSCRIPTION ENCOUNTER (OUTPATIENT)
Age: 56
End: 2021-10-25

## 2021-10-25 LAB
A1C WITH ESTIMATED AVERAGE GLUCOSE RESULT: 9.8 % — HIGH (ref 4–5.6)
ESTIMATED AVERAGE GLUCOSE: 235 MG/DL — HIGH (ref 68–114)
GLUCOSE BLDC GLUCOMTR-MCNC: 131 MG/DL — HIGH (ref 70–99)
GLUCOSE BLDC GLUCOMTR-MCNC: 132 MG/DL — HIGH (ref 70–99)
GLUCOSE BLDC GLUCOMTR-MCNC: 150 MG/DL — HIGH (ref 70–99)
GLUCOSE BLDC GLUCOMTR-MCNC: 152 MG/DL — HIGH (ref 70–99)

## 2021-10-25 RX ORDER — HYDROMORPHONE HYDROCHLORIDE 2 MG/ML
1 INJECTION INTRAMUSCULAR; INTRAVENOUS; SUBCUTANEOUS ONCE
Refills: 0 | Status: DISCONTINUED | OUTPATIENT
Start: 2021-10-25 | End: 2021-10-25

## 2021-10-25 RX ORDER — BACITRACIN ZINC 500 UNIT/G
1 OINTMENT IN PACKET (EA) TOPICAL
Refills: 0 | Status: DISCONTINUED | OUTPATIENT
Start: 2021-10-25 | End: 2021-10-28

## 2021-10-25 RX ORDER — ACETAMINOPHEN 500 MG
975 TABLET ORAL ONCE
Refills: 0 | Status: COMPLETED | OUTPATIENT
Start: 2021-10-25 | End: 2021-10-25

## 2021-10-25 RX ORDER — ACETAMINOPHEN 500 MG
975 TABLET ORAL EVERY 6 HOURS
Refills: 0 | Status: COMPLETED | OUTPATIENT
Start: 2021-10-25 | End: 2021-10-26

## 2021-10-25 RX ADMIN — Medication 1 APPLICATION(S): at 17:08

## 2021-10-25 RX ADMIN — HYDROMORPHONE HYDROCHLORIDE 4 MILLIGRAM(S): 2 INJECTION INTRAMUSCULAR; INTRAVENOUS; SUBCUTANEOUS at 13:24

## 2021-10-25 RX ADMIN — INSULIN GLARGINE 38 UNIT(S): 100 INJECTION, SOLUTION SUBCUTANEOUS at 09:00

## 2021-10-25 RX ADMIN — Medication 150 MILLIGRAM(S): at 13:24

## 2021-10-25 RX ADMIN — HYDROMORPHONE HYDROCHLORIDE 1 MILLIGRAM(S): 2 INJECTION INTRAMUSCULAR; INTRAVENOUS; SUBCUTANEOUS at 15:45

## 2021-10-25 RX ADMIN — Medication 975 MILLIGRAM(S): at 16:10

## 2021-10-25 RX ADMIN — LISINOPRIL 10 MILLIGRAM(S): 2.5 TABLET ORAL at 05:56

## 2021-10-25 RX ADMIN — AMLODIPINE BESYLATE 2.5 MILLIGRAM(S): 2.5 TABLET ORAL at 05:56

## 2021-10-25 RX ADMIN — HYDROMORPHONE HYDROCHLORIDE 4 MILLIGRAM(S): 2 INJECTION INTRAMUSCULAR; INTRAVENOUS; SUBCUTANEOUS at 18:00

## 2021-10-25 RX ADMIN — HYDROMORPHONE HYDROCHLORIDE 1 MILLIGRAM(S): 2 INJECTION INTRAMUSCULAR; INTRAVENOUS; SUBCUTANEOUS at 22:35

## 2021-10-25 RX ADMIN — HYDROMORPHONE HYDROCHLORIDE 4 MILLIGRAM(S): 2 INJECTION INTRAMUSCULAR; INTRAVENOUS; SUBCUTANEOUS at 16:58

## 2021-10-25 RX ADMIN — Medication 2: at 17:06

## 2021-10-25 RX ADMIN — HYDROMORPHONE HYDROCHLORIDE 2 MILLIGRAM(S): 2 INJECTION INTRAMUSCULAR; INTRAVENOUS; SUBCUTANEOUS at 08:25

## 2021-10-25 RX ADMIN — HYDROMORPHONE HYDROCHLORIDE 1 MILLIGRAM(S): 2 INJECTION INTRAMUSCULAR; INTRAVENOUS; SUBCUTANEOUS at 15:28

## 2021-10-25 RX ADMIN — HYDROMORPHONE HYDROCHLORIDE 1 MILLIGRAM(S): 2 INJECTION INTRAMUSCULAR; INTRAVENOUS; SUBCUTANEOUS at 16:25

## 2021-10-25 RX ADMIN — HYDROMORPHONE HYDROCHLORIDE 4 MILLIGRAM(S): 2 INJECTION INTRAMUSCULAR; INTRAVENOUS; SUBCUTANEOUS at 06:02

## 2021-10-25 RX ADMIN — METHADONE HYDROCHLORIDE 5 MILLIGRAM(S): 40 TABLET ORAL at 10:57

## 2021-10-25 RX ADMIN — Medication 975 MILLIGRAM(S): at 16:34

## 2021-10-25 RX ADMIN — HYDROMORPHONE HYDROCHLORIDE 1 MILLIGRAM(S): 2 INJECTION INTRAMUSCULAR; INTRAVENOUS; SUBCUTANEOUS at 23:35

## 2021-10-25 RX ADMIN — METHADONE HYDROCHLORIDE 5 MILLIGRAM(S): 40 TABLET ORAL at 16:29

## 2021-10-25 RX ADMIN — Medication 975 MILLIGRAM(S): at 21:24

## 2021-10-25 RX ADMIN — HYDROMORPHONE HYDROCHLORIDE 1 MILLIGRAM(S): 2 INJECTION INTRAMUSCULAR; INTRAVENOUS; SUBCUTANEOUS at 18:00

## 2021-10-25 RX ADMIN — Medication 100 MILLIGRAM(S): at 05:58

## 2021-10-25 RX ADMIN — Medication 100 MILLIGRAM(S): at 13:24

## 2021-10-25 RX ADMIN — Medication 975 MILLIGRAM(S): at 20:24

## 2021-10-25 RX ADMIN — HYDROMORPHONE HYDROCHLORIDE 1 MILLIGRAM(S): 2 INJECTION INTRAMUSCULAR; INTRAVENOUS; SUBCUTANEOUS at 17:16

## 2021-10-25 RX ADMIN — METHADONE HYDROCHLORIDE 5 MILLIGRAM(S): 40 TABLET ORAL at 20:24

## 2021-10-25 RX ADMIN — Medication 150 MILLIGRAM(S): at 22:36

## 2021-10-25 RX ADMIN — Medication 100 MILLIGRAM(S): at 22:36

## 2021-10-25 RX ADMIN — HYDROMORPHONE HYDROCHLORIDE 1 MILLIGRAM(S): 2 INJECTION INTRAMUSCULAR; INTRAVENOUS; SUBCUTANEOUS at 16:09

## 2021-10-25 RX ADMIN — Medication 150 MILLIGRAM(S): at 05:56

## 2021-10-25 NOTE — PROGRESS NOTE ADULT - ASSESSMENT
55 y/o M with HTN, DM2, neuropathy, chronic pain on chronic narcotics sec to cervical spine and back injury, presented to the ED with right index finger cellulitis.    Cellulitis right index finger:  Recently went to  and was prescribed Doxycycline, w/c he has been on for the past 5 days, with no improvement.   WBC normal, lactate initially elevated (patient on Metformin)  Cont IN Cefazolin-day 2  Plastic consult- Dr Young-appreciated  Fever curve  Warm soaks to area    *HTN:  Cont norvasc and lisinopril    *DMII:  uncontrolled  HgbA1C 9.8  Diabetes educator appreciated--recs in note for discharge   Hold metformin inpatient  Cont lantus 38 iu q AM and ISS    *Chronic Pain:  Cont home meds  I STOP performed on admission     DVT ppx: OOB    Dispo: home when clinically improved 57 y/o M with HTN, DM2, neuropathy, chronic pain on chronic narcotics sec to cervical spine and back injury, presented to the ED with right index finger cellulitis.    Cellulitis right index finger:  Recently went to  and was prescribed Doxycycline, w/c he has been on for the past 5 days, with no improvement.   WBC normal, lactate initially elevated (patient on Metformin)  Cont IN Cefazolin-day 2  Plastic consult- Dr Young-appreciated-plan for OR tomorrow-NPO @ [facility]  Fup CT hand to ensure tendon sheath no affected   Fever curve  Warm soaks to area    *HTN:  Cont norvasc and lisinopril    *DMII:  uncontrolled  HgbA1C 9.8  Diabetes educator appreciated--recs in note for discharge   Hold metformin inpatient  Cont lantus 38 iu q AM and ISS    *Chronic Pain:  Cont home meds  I STOP performed on admission     DVT ppx: OOB    Dispo: home when clinically improved

## 2021-10-25 NOTE — ADVANCED PRACTICE NURSE CONSULT - RECOMMEDATIONS
Recommendations:  BG Goal 100- 180 mg/dl  c/w Lantus 38 units sub q daily  c/w Admelog correction- moderate AC and HS  May need nutritional Admelog if pre-meal BG consistently above goal range- will monitor  Discharge Recommendations  Lantus Solostar Pen u 100- (on favorite list) 35 units SubCutaneous DAILY   Jardiance 10 mg orally daily  Trulicity 1.5 mg subq weekly  Metformin  mg 2 tabs twice daily with meals- needs new script   Insulin pen needles 4 mm- bijal- (on favorite list)   Alcohol swabs- (on favorite list)  BG meter per insurance- (on favorite list)- needs new script  BG test strips per insurance- (on favorite list)- needs new script  Lancets- per insurance -(on favorite list)   Endocrinology F/U with Dr Buitrago after discharge

## 2021-10-25 NOTE — PROGRESS NOTE ADULT - ATTENDING COMMENTS
55 y/o M with HTN, DM2, neuropathy, chronic pain on chronic narcotics sec to cervical spine and back injury, presented to the ED with right index finger cellulitis.    Cellulitis right index finger:  Recently went to  and was prescribed Doxycycline, w/c he has been on for the past 5 days, with no improvement.   WBC normal, lactate initially elevated (patient on Metformin)  Cont IV Cefazolin-day 2  Plastic consult- underwent I&D at bedside with Dr Antolin Strange CT hand to ensure tendon sheath no affected   Fever curve  Pain management, added ATC Tylenol with PRN Dilaudid    *HTN:  Cont norvasc and lisinopril    *DMII:  uncontrolled  HgbA1C 9.8  Diabetes educator appreciated--recs in note for discharge   Hold metformin inpatient  Cont lantus 38 iu q AM and ISS    *Chronic Pain:  Cont home meds  I STOP performed on admission     DVT ppx: OOB    Dispo: home when clinically improved

## 2021-10-25 NOTE — PROGRESS NOTE ADULT - SUBJECTIVE AND OBJECTIVE BOX
Patient is a 56y old  Male who presents with a chief complaint of cellulitis of right index finger (24 Oct 2021 20:37)  Reports pain on right index finger    Patient seen and examined at bedside.    ALLERGIES:  No Known Allergies    MEDICATIONS  (STANDING):  amLODIPine   Tablet 2.5 milliGRAM(s) Oral daily  ceFAZolin   IVPB 2000 milliGRAM(s) IV Intermittent every 8 hours  dextrose 40% Gel 15 Gram(s) Oral once  dextrose 5%. 1000 milliLiter(s) (50 mL/Hr) IV Continuous <Continuous>  dextrose 5%. 1000 milliLiter(s) (100 mL/Hr) IV Continuous <Continuous>  dextrose 50% Injectable 25 Gram(s) IV Push once  dextrose 50% Injectable 12.5 Gram(s) IV Push once  dextrose 50% Injectable 25 Gram(s) IV Push once  glucagon  Injectable 1 milliGRAM(s) IntraMuscular once  HYDROmorphone   Tablet 4 milliGRAM(s) Oral <User Schedule>  insulin glargine Injectable (LANTUS) 38 Unit(s) SubCutaneous every morning  insulin lispro (ADMELOG) corrective regimen sliding scale   SubCutaneous <User Schedule>  insulin lispro (ADMELOG) corrective regimen sliding scale   SubCutaneous at bedtime  lisinopril 10 milliGRAM(s) Oral daily  methadone    Tablet 5 milliGRAM(s) Oral <User Schedule>  pregabalin 150 milliGRAM(s) Oral three times a day    MEDICATIONS  (PRN):  acetaminophen     Tablet .. 650 milliGRAM(s) Oral every 6 hours PRN Temp greater or equal to 38C (100.4F), Mild Pain (1 - 3)  HYDROmorphone   Tablet 2 milliGRAM(s) Oral two times a day PRN Moderate Pain (4 - 6)  methadone    Tablet 5 milliGRAM(s) Oral <User Schedule> PRN Severe Pain (7 - 10)    Vital Signs Last 24 Hrs  T(F): 97.4 (25 Oct 2021 08:26), Max: 98.4 (24 Oct 2021 21:49)  HR: 85 (25 Oct 2021 08:26) (85 - 108)  BP: 140/88 (25 Oct 2021 08:26) (113/75 - 140/88)  RR: 15 (25 Oct 2021 08:26) (12 - 18)  SpO2: 98% (25 Oct 2021 08:26) (98% - 99%)  I&O's Summary    24 Oct 2021 07:01  -  25 Oct 2021 07:00  --------------------------------------------------------  IN: 800 mL / OUT: 600 mL / NET: 200 mL    25 Oct 2021 07:01  -  25 Oct 2021 11:38  --------------------------------------------------------  IN: 240 mL / OUT: 300 mL / NET: -60 mL      BMI (kg/m2): 28.9 (10-24-21 @ 19:59)  PHYSICAL EXAM:  General: NAD, A/O x 3  ENT: MMM, no thrush  Neck: Supple, No JVD  Lungs: Non labored breathing,  Clear to auscultation bilaterally,   Cardio: RRR, S1/S2, no pitting edema bilaterally  Abdomen: Soft, Nontender, Nondistended; Bowel sounds present  Extremities: No calf tenderness, + right index finger edema and erythema, + scab, limited ROM R index finger due to pain     LABS:                        14.4   7.14  )-----------( 347      ( 24 Oct 2021 19:18 )             42.7       10-24    137  |  101  |  12  ----------------------------<  230  4.5   |  26  |  0.96    Ca    9.3      24 Oct 2021 11:50    TPro  7.6  /  Alb  3.8  /  TBili  0.4  /  DBili  x   /  AST  5   /  ALT  33  /  AlkPhos  111  10-24     eGFR if Non African American: 90 mL/min/1.73M2 (10-24-21 @ 11:50)  eGFR if : 104 mL/min/1.73M2 (10-24-21 @ 11:50)       Lactate, Blood: 1.8 mmol/L (10-24 @ 21:44)  Lactate, Blood: 3.0 mmol/L (10-24 @ 19:18)  Lactate, Blood: 2.4 mmol/L (10-24 @ 13:00)                      POCT Blood Glucose.: 150 mg/dL (25 Oct 2021 08:27)  POCT Blood Glucose.: 211 mg/dL (24 Oct 2021 21:42)  POCT Blood Glucose.: 275 mg/dL (24 Oct 2021 19:47)          COVID-19 PCR: NotDetec (10-24-21 @ 13:00)      RADIOLOGY & ADDITIONAL TESTS:    Care Discussed with Consultants/Other Providers:

## 2021-10-25 NOTE — ADVANCED PRACTICE NURSE CONSULT - ASSESSMENT
HPI: 55 y/o M with HTN, DM2, neuropathy, chronic pain on chronic narcotics sec to cervical spine and back injury, presented to the ED with right index finger cellulitis, was discharged home on antibx but recalled due to elevated lactate (2.5).  Lactate was repeated and it was 3.  Apparently, pt's right index finger got pricked by fish hook 2 weeks ago, 6 days later, he noted that finger became painful, red and swollen.  So he went to the  and was prescribed Doxycycline, w/c he has been on for the past 5 days, with no improvement.  He c/o increased swelling, pain/throbbing in the finger and he noted a scab had formed and when he removed the scab, some pus came out, so he decided to come to the ED. Pt was given a dose of Clinda IV and was d/rome home on Clinda po. He denies fever, chills, chest pain, dyspnea, nausea, vomiting, abd pain, diarrhea. CBC, CMP were unremarkable except for Glucose of 230     PCP- Dr Juaquin Gonzalez  Endocrinologist, Jackie Buitrago DO-  Address: [address] [place] [address] [address] [address]  Phone: [phone]- last seen 5 months ago    HGbA1c- 9.8 on Oct 25, 2021  eGFr- 90- 10/24/2021  POCT  211 at 2142 on 10/24/21  151 at 0827 on 10/25/21  Home Diabetes Medication Regimen  Lantus 35 units subq daily- titrates on sliding scale based on FBG- adds 2 units for every 20 mg/dl above 200 mg/dl  Jardiance 10 mg daily orally  Metformin 850 mg twice daily orally  Trulcity 1.5 mg subq weekly    Pt has a one touch BG meter and monitors BG only daily- fasting- reports co-pay for strip is $85/monthly- pt has never checked insurance to verify if one touch is preferred BG meter- educated pt to do this- and if it is and co-pay cannot be lowered- educated pt on obtaining [employer] Prime Meter and supplies form [employer].  Pt has annual eye exam, regular dental exam and regular podiatry follow up. Reports no retinopathy. Reports presence of neuropathy with numbness/burning bilateral lower extremities.  Denies smoking.  Pt able to verbalize proper insulin and GLP1 storage, injection technique, site rotation, sharps disposal. Pt not able to verbalize A1C goal, BG goal, s/s or tx of hypoglycemia per Rule of 15.   Pt educated on T2DM disease process, progression, importance of proper management, medical follow up, and learning diabetes self- management skills. Education validated via teach back .  Educated pt on S/S of hypoglycemia and tx per 15/15 rule. Educated on when to check BG and keeping BG log- provided BG Log Pt validated education via teach back.     Provided written resources of Leaving the hospital with Diabetes, BG goals, A1c Goals, Preventing complications due to diabetes, Insulin pen use, sharps disposal, BG monitoring, S/S and Tx of hypoglycemia handout form from Learning about Diabetes.   Showed pt how to view Diabetes Education Videos and provided written directions to navigate to videos. Pt validated education via show back.

## 2021-10-26 DIAGNOSIS — L03.011 CELLULITIS OF RIGHT FINGER: ICD-10-CM

## 2021-10-26 LAB
ANION GAP SERPL CALC-SCNC: 9 MMOL/L — SIGNIFICANT CHANGE UP (ref 5–17)
APTT BLD: 28.3 SEC — SIGNIFICANT CHANGE UP (ref 27.5–35.5)
BUN SERPL-MCNC: 10 MG/DL — SIGNIFICANT CHANGE UP (ref 7–23)
CALCIUM SERPL-MCNC: 9.1 MG/DL — SIGNIFICANT CHANGE UP (ref 8.4–10.5)
CHLORIDE SERPL-SCNC: 104 MMOL/L — SIGNIFICANT CHANGE UP (ref 96–108)
CO2 SERPL-SCNC: 25 MMOL/L — SIGNIFICANT CHANGE UP (ref 22–31)
CREAT SERPL-MCNC: 0.82 MG/DL — SIGNIFICANT CHANGE UP (ref 0.5–1.3)
GLUCOSE BLDC GLUCOMTR-MCNC: 152 MG/DL — HIGH (ref 70–99)
GLUCOSE BLDC GLUCOMTR-MCNC: 162 MG/DL — HIGH (ref 70–99)
GLUCOSE BLDC GLUCOMTR-MCNC: 171 MG/DL — HIGH (ref 70–99)
GLUCOSE BLDC GLUCOMTR-MCNC: 245 MG/DL — HIGH (ref 70–99)
GLUCOSE SERPL-MCNC: 191 MG/DL — HIGH (ref 70–99)
HCT VFR BLD CALC: 38.5 % — LOW (ref 39–50)
HGB BLD-MCNC: 12.8 G/DL — LOW (ref 13–17)
INR BLD: 0.89 RATIO — SIGNIFICANT CHANGE UP (ref 0.88–1.16)
MCHC RBC-ENTMCNC: 29.8 PG — SIGNIFICANT CHANGE UP (ref 27–34)
MCHC RBC-ENTMCNC: 33.2 GM/DL — SIGNIFICANT CHANGE UP (ref 32–36)
MCV RBC AUTO: 89.5 FL — SIGNIFICANT CHANGE UP (ref 80–100)
NIGHT BLUE STAIN TISS: SIGNIFICANT CHANGE UP
NRBC # BLD: 0 /100 WBCS — SIGNIFICANT CHANGE UP (ref 0–0)
PLATELET # BLD AUTO: 292 K/UL — SIGNIFICANT CHANGE UP (ref 150–400)
POTASSIUM SERPL-MCNC: 3.7 MMOL/L — SIGNIFICANT CHANGE UP (ref 3.5–5.3)
POTASSIUM SERPL-SCNC: 3.7 MMOL/L — SIGNIFICANT CHANGE UP (ref 3.5–5.3)
PROTHROM AB SERPL-ACNC: 10.8 SEC — SIGNIFICANT CHANGE UP (ref 10.6–13.6)
RBC # BLD: 4.3 M/UL — SIGNIFICANT CHANGE UP (ref 4.2–5.8)
RBC # FLD: 13.5 % — SIGNIFICANT CHANGE UP (ref 10.3–14.5)
SODIUM SERPL-SCNC: 138 MMOL/L — SIGNIFICANT CHANGE UP (ref 135–145)
SPECIMEN SOURCE: SIGNIFICANT CHANGE UP
WBC # BLD: 4.97 K/UL — SIGNIFICANT CHANGE UP (ref 3.8–10.5)
WBC # FLD AUTO: 4.97 K/UL — SIGNIFICANT CHANGE UP (ref 3.8–10.5)

## 2021-10-26 PROCEDURE — 99233 SBSQ HOSP IP/OBS HIGH 50: CPT

## 2021-10-26 PROCEDURE — 71045 X-RAY EXAM CHEST 1 VIEW: CPT | Mod: 26

## 2021-10-26 RX ORDER — HYDROMORPHONE HYDROCHLORIDE 2 MG/ML
1 INJECTION INTRAMUSCULAR; INTRAVENOUS; SUBCUTANEOUS EVERY 6 HOURS
Refills: 0 | Status: DISCONTINUED | OUTPATIENT
Start: 2021-10-26 | End: 2021-10-28

## 2021-10-26 RX ORDER — HYDROMORPHONE HYDROCHLORIDE 2 MG/ML
2 INJECTION INTRAMUSCULAR; INTRAVENOUS; SUBCUTANEOUS EVERY 8 HOURS
Refills: 0 | Status: DISCONTINUED | OUTPATIENT
Start: 2021-10-26 | End: 2021-10-26

## 2021-10-26 RX ORDER — HYDROMORPHONE HYDROCHLORIDE 2 MG/ML
1 INJECTION INTRAMUSCULAR; INTRAVENOUS; SUBCUTANEOUS ONCE
Refills: 0 | Status: DISCONTINUED | OUTPATIENT
Start: 2021-10-26 | End: 2021-10-26

## 2021-10-26 RX ADMIN — HYDROMORPHONE HYDROCHLORIDE 2 MILLIGRAM(S): 2 INJECTION INTRAMUSCULAR; INTRAVENOUS; SUBCUTANEOUS at 01:54

## 2021-10-26 RX ADMIN — Medication 975 MILLIGRAM(S): at 01:54

## 2021-10-26 RX ADMIN — INSULIN GLARGINE 38 UNIT(S): 100 INJECTION, SOLUTION SUBCUTANEOUS at 08:17

## 2021-10-26 RX ADMIN — Medication 975 MILLIGRAM(S): at 14:36

## 2021-10-26 RX ADMIN — Medication 2: at 11:46

## 2021-10-26 RX ADMIN — Medication 150 MILLIGRAM(S): at 06:47

## 2021-10-26 RX ADMIN — AMLODIPINE BESYLATE 2.5 MILLIGRAM(S): 2.5 TABLET ORAL at 06:47

## 2021-10-26 RX ADMIN — HYDROMORPHONE HYDROCHLORIDE 4 MILLIGRAM(S): 2 INJECTION INTRAMUSCULAR; INTRAVENOUS; SUBCUTANEOUS at 22:27

## 2021-10-26 RX ADMIN — HYDROMORPHONE HYDROCHLORIDE 2 MILLIGRAM(S): 2 INJECTION INTRAMUSCULAR; INTRAVENOUS; SUBCUTANEOUS at 12:59

## 2021-10-26 RX ADMIN — Medication 975 MILLIGRAM(S): at 09:03

## 2021-10-26 RX ADMIN — Medication 975 MILLIGRAM(S): at 02:56

## 2021-10-26 RX ADMIN — HYDROMORPHONE HYDROCHLORIDE 4 MILLIGRAM(S): 2 INJECTION INTRAMUSCULAR; INTRAVENOUS; SUBCUTANEOUS at 11:47

## 2021-10-26 RX ADMIN — HYDROMORPHONE HYDROCHLORIDE 1 MILLIGRAM(S): 2 INJECTION INTRAMUSCULAR; INTRAVENOUS; SUBCUTANEOUS at 17:36

## 2021-10-26 RX ADMIN — Medication 150 MILLIGRAM(S): at 21:27

## 2021-10-26 RX ADMIN — HYDROMORPHONE HYDROCHLORIDE 1 MILLIGRAM(S): 2 INJECTION INTRAMUSCULAR; INTRAVENOUS; SUBCUTANEOUS at 11:19

## 2021-10-26 RX ADMIN — HYDROMORPHONE HYDROCHLORIDE 2 MILLIGRAM(S): 2 INJECTION INTRAMUSCULAR; INTRAVENOUS; SUBCUTANEOUS at 16:53

## 2021-10-26 RX ADMIN — Medication 100 MILLIGRAM(S): at 21:27

## 2021-10-26 RX ADMIN — HYDROMORPHONE HYDROCHLORIDE 4 MILLIGRAM(S): 2 INJECTION INTRAMUSCULAR; INTRAVENOUS; SUBCUTANEOUS at 06:47

## 2021-10-26 RX ADMIN — Medication 150 MILLIGRAM(S): at 14:33

## 2021-10-26 RX ADMIN — HYDROMORPHONE HYDROCHLORIDE 4 MILLIGRAM(S): 2 INJECTION INTRAMUSCULAR; INTRAVENOUS; SUBCUTANEOUS at 17:52

## 2021-10-26 RX ADMIN — Medication 1 APPLICATION(S): at 17:53

## 2021-10-26 RX ADMIN — HYDROMORPHONE HYDROCHLORIDE 1 MILLIGRAM(S): 2 INJECTION INTRAMUSCULAR; INTRAVENOUS; SUBCUTANEOUS at 10:19

## 2021-10-26 RX ADMIN — HYDROMORPHONE HYDROCHLORIDE 1 MILLIGRAM(S): 2 INJECTION INTRAMUSCULAR; INTRAVENOUS; SUBCUTANEOUS at 16:36

## 2021-10-26 RX ADMIN — Medication 975 MILLIGRAM(S): at 08:03

## 2021-10-26 RX ADMIN — Medication 1 APPLICATION(S): at 06:48

## 2021-10-26 RX ADMIN — HYDROMORPHONE HYDROCHLORIDE 2 MILLIGRAM(S): 2 INJECTION INTRAMUSCULAR; INTRAVENOUS; SUBCUTANEOUS at 02:56

## 2021-10-26 RX ADMIN — Medication 100 MILLIGRAM(S): at 06:48

## 2021-10-26 RX ADMIN — HYDROMORPHONE HYDROCHLORIDE 1 MILLIGRAM(S): 2 INJECTION INTRAMUSCULAR; INTRAVENOUS; SUBCUTANEOUS at 22:56

## 2021-10-26 RX ADMIN — METHADONE HYDROCHLORIDE 5 MILLIGRAM(S): 40 TABLET ORAL at 14:34

## 2021-10-26 RX ADMIN — Medication 2: at 16:37

## 2021-10-26 RX ADMIN — LISINOPRIL 10 MILLIGRAM(S): 2.5 TABLET ORAL at 06:48

## 2021-10-26 RX ADMIN — HYDROMORPHONE HYDROCHLORIDE 4 MILLIGRAM(S): 2 INJECTION INTRAMUSCULAR; INTRAVENOUS; SUBCUTANEOUS at 12:47

## 2021-10-26 RX ADMIN — Medication 975 MILLIGRAM(S): at 15:36

## 2021-10-26 RX ADMIN — HYDROMORPHONE HYDROCHLORIDE 2 MILLIGRAM(S): 2 INJECTION INTRAMUSCULAR; INTRAVENOUS; SUBCUTANEOUS at 13:59

## 2021-10-26 RX ADMIN — METHADONE HYDROCHLORIDE 5 MILLIGRAM(S): 40 TABLET ORAL at 20:12

## 2021-10-26 RX ADMIN — HYDROMORPHONE HYDROCHLORIDE 4 MILLIGRAM(S): 2 INJECTION INTRAMUSCULAR; INTRAVENOUS; SUBCUTANEOUS at 21:27

## 2021-10-26 RX ADMIN — METHADONE HYDROCHLORIDE 5 MILLIGRAM(S): 40 TABLET ORAL at 10:32

## 2021-10-26 RX ADMIN — HYDROMORPHONE HYDROCHLORIDE 1 MILLIGRAM(S): 2 INJECTION INTRAMUSCULAR; INTRAVENOUS; SUBCUTANEOUS at 23:11

## 2021-10-26 RX ADMIN — Medication 100 MILLIGRAM(S): at 14:37

## 2021-10-26 RX ADMIN — HYDROMORPHONE HYDROCHLORIDE 2 MILLIGRAM(S): 2 INJECTION INTRAMUSCULAR; INTRAVENOUS; SUBCUTANEOUS at 15:53

## 2021-10-26 NOTE — CONSULT NOTE ADULT - ASSESSMENT
Patient is a 56y male with a past history of DM, HTN, C spine injury with multiple surgical procedures and left arm weakness who was admitted to [facility] on 10/25 to treat a rt index finger infection.  He was injured about 10 days ago when a fish hook impaled the volar side of rt index finger.He removed hook and over the next few days the finger became painful and swollen.He was seen in an urgent care  center and placed on doxy.He took doxy x 5 days and noted no improvement.He came to ER yesterday and was given Clinda and discharged but called back to ER when lactic acid level was elevated at 2.6.No fever or chills, he had negative X rays and is s/p bedside I&D of wound on the volar surface.A wound cultures was sent.He also had a CT scan of finger, no changes seen in bone or joints.  He has pain to palpation of finger, swelling, and limited motion secondary to pain.No obvious extension to palm or dorsal hand.He was placed on cefazolin.  The finger remains painful and swollen, it appears that this is largely a soft tissue infection.Given injury occurred in marine environment will need to be concerned about vibrio infections although this should have responded to doxycycline.Erysipelothrix rhusiopathiae also in differential.  Suggest:  1.Await bedside wound culture  2.I have asked micro to add on mycobacterial culture  3.will leave on cefazolin pending #1  4.I am not sure MRI will add much at this point that will guide management from ID viewpoint.

## 2021-10-26 NOTE — PROGRESS NOTE ADULT - ATTENDING COMMENTS
A/P: 55yo HTN, DM2, neuropathy, chronic pain on Methadone & narcotics sec to cervical spine and back injury, presented to the ED with right index finger cellulitis. Reported pricked by fish hook ~2wks ago, dev. redness & swelling 6 days later. Went to Urgent care rx;d Doxycycline, took for 5 days pta with no improvement. Then presented to ED, d/c'd on Clinda. Called back into ED 2/2 elevated lactate 2.5 (repeated 3.0, then 2.4).      *Right Hand, 2nd digit Cellulitis  Lactate initially elevated. No leukocytosis, Afebrile.  Cont Cefazolin Day#3  Appreciate Plastics consult now s/p I&D  f/u Wound Cx  Consulted ID re: Abx, f/u wound and Mycobacterial Cx  Wound care per recs- Ordered   CT Hand reviewed    *HTN  Cont Norvasc and Lisinopril    *DMII, Uncontrolled  HgbA1C 9.8  Diabetes educator recs appreciated  Home Metformin on hold   Cont Lantus 38 iu q AM and ISS    *Chronic Pain  Cont home regimen   PRN pain management for acute pain   I STOP performed on admission     *DVT ppx  OOB/Ambulation    Dispo: Plan for d/c home when medically cleared. 57yo HTN, DM2, neuropathy, chronic pain on Methadone & narcotics sec to cervical spine and back injury, admitted for right index finger cellulitis s/p fish hook injury 2 weeks prior, now s/p bedside I&D by Dr Dangelo on 10/25/21.    *Right Hand, 2nd digit Cellulitis  Lactate initially elevated. No leukocytosis, Afebrile.  Cont Cefazolin Day#3  s/p I&D by plastics Dr Dangelo on 10/25  f/u Wound and Mycobacterial Cx  ID recs appreciated  Wound care per recs- Ordered   CT Hand reviewed  Pain control - added PRN Dilaudid 1mg IVP for now    *HTN  Likely elevated at times 2/2 pain  Cont Norvasc and Lisinopril    *DMII, Uncontrolled  HgbA1C 9.8  Diabetes educator recs appreciated  Home Metformin on hold   Cont Lantus 38 u q AM and ISS    *Chronic Pain  Cont home regimen, with PRN IVP for acute pain   PRN pain management for acute pain   I STOP performed on admission     *DVT ppx  OOB/Ambulation    Dispo: Plan for d/c home when cleared by ID

## 2021-10-26 NOTE — PROGRESS NOTE ADULT - SUBJECTIVE AND OBJECTIVE BOX
Patient is a 56y old  Male who presents with a chief complaint of cellulitis of right index finger (25 Oct 2021 14:00)      Patient seen and examined at bedside.  S: c/o pain to site of wound since I/D. Denies f/c.     ALLERGIES:  No Known Allergies    MEDICATIONS:  acetaminophen     Tablet .. 975 milliGRAM(s) Oral every 6 hours  amLODIPine   Tablet 2.5 milliGRAM(s) Oral daily  BACItracin   Ointment 1 Application(s) Topical two times a day  ceFAZolin   IVPB 2000 milliGRAM(s) IV Intermittent every 8 hours  dextrose 40% Gel 15 Gram(s) Oral once  dextrose 5%. 1000 milliLiter(s) IV Continuous <Continuous>  dextrose 5%. 1000 milliLiter(s) IV Continuous <Continuous>  dextrose 50% Injectable 25 Gram(s) IV Push once  dextrose 50% Injectable 12.5 Gram(s) IV Push once  dextrose 50% Injectable 25 Gram(s) IV Push once  glucagon  Injectable 1 milliGRAM(s) IntraMuscular once  HYDROmorphone   Tablet 2 milliGRAM(s) Oral two times a day PRN  HYDROmorphone   Tablet 4 milliGRAM(s) Oral <User Schedule>  insulin glargine Injectable (LANTUS) 38 Unit(s) SubCutaneous every morning  insulin lispro (ADMELOG) corrective regimen sliding scale   SubCutaneous <User Schedule>  insulin lispro (ADMELOG) corrective regimen sliding scale   SubCutaneous at bedtime  lisinopril 10 milliGRAM(s) Oral daily  methadone    Tablet 5 milliGRAM(s) Oral <User Schedule>  methadone    Tablet 5 milliGRAM(s) Oral <User Schedule> PRN  pregabalin 150 milliGRAM(s) Oral three times a day    Vital Signs Last 24 Hrs  T(F): 98 (26 Oct 2021 11:04), Max: 98.1 (26 Oct 2021 00:00)  HR: 86 (26 Oct 2021 11:04) (81 - 95)  BP: 144/94 (26 Oct 2021 11:04) (113/74 - 144/94)  RR: 16 (26 Oct 2021 11:04) (14 - 18)  SpO2: 96% (26 Oct 2021 11:04) (94% - 99%)  I&O's Summary    25 Oct 2021 07:01  -  26 Oct 2021 07:00  --------------------------------------------------------  IN: 480 mL / OUT: 300 mL / NET: 180 mL    26 Oct 2021 07:01  -  26 Oct 2021 11:37  --------------------------------------------------------  IN: 0 mL / OUT: 800 mL / NET: -800 mL        PHYSICAL EXAM:  General: NAD, A/O x 3  ENT: MMM  Lungs: Clear to auscultation bilaterally (Anteriorly)   Cardio: RR, S1/S2  Abdomen: Soft, NT/ND, Normal active Bowel Sounds   Extremities: RUE +radial pulse, warm, well-perfused. R hand, 2nd digit +swelling, redness to finger, open wound- no red streaking up arm.    LABS:                        12.8   4.97  )-----------( 292      ( 26 Oct 2021 05:20 )             38.5     10-26    138  |  104  |  10  ----------------------------<  191  3.7   |  25  |  0.82    Ca    9.1      26 Oct 2021 05:20    TPro  7.6  /  Alb  3.8  /  TBili  0.4  /  DBili  x   /  AST  5   /  ALT  33  /  AlkPhos  111  10-24    eGFR if Non African American: 100 mL/min/1.73M2 (10-26-21 @ 05:20)  eGFR if African American: 116 mL/min/1.73M2 (10-26-21 @ 05:20)    PT/INR - ( 26 Oct 2021 05:50 )   PT: 10.8 sec;   INR: 0.89 ratio         PTT - ( 26 Oct 2021 05:50 )  PTT:28.3 sec  Lactate, Blood: 1.8 mmol/L (10-24 @ 21:44)  Lactate, Blood: 3.0 mmol/L (10-24 @ 19:18)  Lactate, Blood: 2.4 mmol/L (10-24 @ 13:00)            POCT Blood Glucose.: 171 mg/dL (26 Oct 2021 11:08)  POCT Blood Glucose.: 162 mg/dL (26 Oct 2021 08:01)  POCT Blood Glucose.: 131 mg/dL (25 Oct 2021 22:34)  POCT Blood Glucose.: 152 mg/dL (25 Oct 2021 17:04)  POCT Blood Glucose.: 132 mg/dL (25 Oct 2021 11:39)          Culture - Blood (collected 24 Oct 2021 13:00)  Source: .Blood Blood-Peripheral  Preliminary Report (25 Oct 2021 20:01):    No growth to date.    Culture - Blood (collected 24 Oct 2021 13:00)  Source: .Blood Blood-Peripheral  Preliminary Report (25 Oct 2021 20:01):    No growth to date.      COVID-19 PCR: NotDetec (10-24-21 @ 13:00)      RADIOLOGY & ADDITIONAL TESTS:    < from: CT Hand w/ IV Cont, Right (10.25.21 @ 15:12) >  Deep wound along the volar aspect of the index finger at the level of the proximal interphalangeal joint. No peripherally enhancing fluid collection to suggest abscess. No peripherally enhancing fluid within the tendon sheath to suggest tenosynovitis. Evaluation for subtle tenosynovitis is limited by CT. Further evaluation with MRI can be performed to further characterize. No CT evidence of osteomyelitis. No subcutaneous air.    Care Discussed with Consultants/Other Providers:   LAURENT Spence discussed case and plan wKylah Aguilar.    Patient is a 56y old  Male who presents with a chief complaint of cellulitis of right index finger (25 Oct 2021 14:00)      Patient seen and examined at bedside.  S: c/o pain to site of wound since I/D. Denies f/c. Pain controlled overnight with regimen. In more pain this morning after dressing change.    ALLERGIES:  No Known Allergies    MEDICATIONS:  acetaminophen     Tablet .. 975 milliGRAM(s) Oral every 6 hours  amLODIPine   Tablet 2.5 milliGRAM(s) Oral daily  BACItracin   Ointment 1 Application(s) Topical two times a day  ceFAZolin   IVPB 2000 milliGRAM(s) IV Intermittent every 8 hours  dextrose 40% Gel 15 Gram(s) Oral once  dextrose 5%. 1000 milliLiter(s) IV Continuous <Continuous>  dextrose 5%. 1000 milliLiter(s) IV Continuous <Continuous>  dextrose 50% Injectable 25 Gram(s) IV Push once  dextrose 50% Injectable 12.5 Gram(s) IV Push once  dextrose 50% Injectable 25 Gram(s) IV Push once  glucagon  Injectable 1 milliGRAM(s) IntraMuscular once  HYDROmorphone   Tablet 2 milliGRAM(s) Oral two times a day PRN  HYDROmorphone   Tablet 4 milliGRAM(s) Oral <User Schedule>  insulin glargine Injectable (LANTUS) 38 Unit(s) SubCutaneous every morning  insulin lispro (ADMELOG) corrective regimen sliding scale   SubCutaneous <User Schedule>  insulin lispro (ADMELOG) corrective regimen sliding scale   SubCutaneous at bedtime  lisinopril 10 milliGRAM(s) Oral daily  methadone    Tablet 5 milliGRAM(s) Oral <User Schedule>  methadone    Tablet 5 milliGRAM(s) Oral <User Schedule> PRN  pregabalin 150 milliGRAM(s) Oral three times a day    Vital Signs Last 24 Hrs  T(F): 98 (26 Oct 2021 11:04), Max: 98.1 (26 Oct 2021 00:00)  HR: 86 (26 Oct 2021 11:04) (81 - 95)  BP: 144/94 (26 Oct 2021 11:04) (113/74 - 144/94)  RR: 16 (26 Oct 2021 11:04) (14 - 18)  SpO2: 96% (26 Oct 2021 11:04) (94% - 99%)  I&O's Summary    25 Oct 2021 07:01  -  26 Oct 2021 07:00  --------------------------------------------------------  IN: 480 mL / OUT: 300 mL / NET: 180 mL    26 Oct 2021 07:01  -  26 Oct 2021 11:37  --------------------------------------------------------  IN: 0 mL / OUT: 800 mL / NET: -800 mL        PHYSICAL EXAM:  General: NAD, A/O x 3  ENT: MMM  Lungs: Clear to auscultation bilaterally (Anteriorly)   Cardio: RR, S1/S2  Abdomen: Soft, NT/ND, Normal active Bowel Sounds   Extremities: RUE +radial pulse, warm, well-perfused. R hand, 2nd digit +swelling, redness to finger, open wound- no red streaking up arm.    LABS:                        12.8   4.97  )-----------( 292      ( 26 Oct 2021 05:20 )             38.5     10-26    138  |  104  |  10  ----------------------------<  191  3.7   |  25  |  0.82    Ca    9.1      26 Oct 2021 05:20    TPro  7.6  /  Alb  3.8  /  TBili  0.4  /  DBili  x   /  AST  5   /  ALT  33  /  AlkPhos  111  10-24    eGFR if Non African American: 100 mL/min/1.73M2 (10-26-21 @ 05:20)  eGFR if African American: 116 mL/min/1.73M2 (10-26-21 @ 05:20)    PT/INR - ( 26 Oct 2021 05:50 )   PT: 10.8 sec;   INR: 0.89 ratio         PTT - ( 26 Oct 2021 05:50 )  PTT:28.3 sec  Lactate, Blood: 1.8 mmol/L (10-24 @ 21:44)  Lactate, Blood: 3.0 mmol/L (10-24 @ 19:18)  Lactate, Blood: 2.4 mmol/L (10-24 @ 13:00)            POCT Blood Glucose.: 171 mg/dL (26 Oct 2021 11:08)  POCT Blood Glucose.: 162 mg/dL (26 Oct 2021 08:01)  POCT Blood Glucose.: 131 mg/dL (25 Oct 2021 22:34)  POCT Blood Glucose.: 152 mg/dL (25 Oct 2021 17:04)  POCT Blood Glucose.: 132 mg/dL (25 Oct 2021 11:39)          Culture - Blood (collected 24 Oct 2021 13:00)  Source: .Blood Blood-Peripheral  Preliminary Report (25 Oct 2021 20:01):    No growth to date.    Culture - Blood (collected 24 Oct 2021 13:00)  Source: .Blood Blood-Peripheral  Preliminary Report (25 Oct 2021 20:01):    No growth to date.      COVID-19 PCR: NotDetec (10-24-21 @ 13:00)      RADIOLOGY & ADDITIONAL TESTS:    < from: CT Hand w/ IV Cont, Right (10.25.21 @ 15:12) >  Deep wound along the volar aspect of the index finger at the level of the proximal interphalangeal joint. No peripherally enhancing fluid collection to suggest abscess. No peripherally enhancing fluid within the tendon sheath to suggest tenosynovitis. Evaluation for subtle tenosynovitis is limited by CT. Further evaluation with MRI can be performed to further characterize. No CT evidence of osteomyelitis. No subcutaneous air.    Care Discussed with Consultants/Other Providers:   LAURENT Spence discussed case and plan wKylah Aguilar.

## 2021-10-26 NOTE — CONSULT NOTE ADULT - SUBJECTIVE AND OBJECTIVE BOX
See dictated note.  Formal writtrn consent obtained and safety time out performed.  Tolerated excisional debridement and I&D of right index finger, culture sent.  Rec: Consider MRI, R/O osteomyelitis/IV abx/ID consult/local wound care (ordered by me)/discharge home when clinically stable on abx and when OK by ID and follow up next wk in my office as outpatient.  Please call me prn while inpt. [phone].
HPI:   Patient is a 56y male with a past history of DM, HTN, C spine injury with multiple surgical procedures and left arm weakness who was admitted to [facility] on 10/25 to treat a rt index finger infection.  He was injured about 10 days ago when a fish hook impaled the volar side of rt index finger.He removed hook and over the next few days the finger became painful and swollen.He was seen in an urgent care  center and placed on doxy.He took doxy x 5 days and noted no improvement.He came to ER yesterday and was given Clinda and discharged but called back to ER when lactic acid level was elevated at 2.6.No fever or chills, he had negative X rays and is s/p bedside I&D of wound on the volar surface.A wound cultures was sent.He also had a CT scan of finger, no changes seen in bone or joints.  He has pain to palpation of finger, swelling, and limited motion secondary to pain.No obvious extension to palm or dorsal hand.He was placed on cefazolin.  REVIEW OF SYSTEMS:  All other review of systems negative (Comprehensive ROS)    PAST MEDICAL & SURGICAL HISTORY:  Hypertension    Diabetes mellitus    Gastric ulcer    Spinal stenosis    H/O spinal cord compression    Spondylosis    H/O radiculopathy    H/O cervical spine surgery    History of back surgery    S/P cervical spinal fusion        Allergies    No Known Allergies    Intolerances        Antimicrobials Day #  :  ceFAZolin   IVPB 2000 milliGRAM(s) IV Intermittent every 8 hours    Other Medications:  acetaminophen     Tablet .. 975 milliGRAM(s) Oral every 6 hours  amLODIPine   Tablet 2.5 milliGRAM(s) Oral daily  BACItracin   Ointment 1 Application(s) Topical two times a day  dextrose 40% Gel 15 Gram(s) Oral once  dextrose 5%. 1000 milliLiter(s) IV Continuous <Continuous>  dextrose 5%. 1000 milliLiter(s) IV Continuous <Continuous>  dextrose 50% Injectable 25 Gram(s) IV Push once  dextrose 50% Injectable 12.5 Gram(s) IV Push once  dextrose 50% Injectable 25 Gram(s) IV Push once  glucagon  Injectable 1 milliGRAM(s) IntraMuscular once  HYDROmorphone   Tablet 2 milliGRAM(s) Oral two times a day PRN  HYDROmorphone   Tablet 4 milliGRAM(s) Oral <User Schedule>  insulin glargine Injectable (LANTUS) 38 Unit(s) SubCutaneous every morning  insulin lispro (ADMELOG) corrective regimen sliding scale   SubCutaneous <User Schedule>  insulin lispro (ADMELOG) corrective regimen sliding scale   SubCutaneous at bedtime  lisinopril 10 milliGRAM(s) Oral daily  methadone    Tablet 5 milliGRAM(s) Oral <User Schedule>  methadone    Tablet 5 milliGRAM(s) Oral <User Schedule> PRN  pregabalin 150 milliGRAM(s) Oral three times a day      FAMILY HISTORY:  FH: hypertension (Father)    FH: diabetes mellitus (Mother)        SOCIAL HISTORY:  Smoking: x   ETOH: x   Drug Use: x      T(F): 98 (10-26-21 @ 11:04), Max: 98.1 (10-26-21 @ 00:00)  HR: 86 (10-26-21 @ 11:04)  BP: 144/94 (10-26-21 @ 11:04)  RR: 16 (10-26-21 @ 11:04)  SpO2: 96% (10-26-21 @ 11:04)  Wt(kg): --    PHYSICAL EXAM:  General: alert, no acute distress  Eyes:  anicteric, no conjunctival injection, no discharge  Oropharynx: no lesions or injection 	  Neck: supple, without adenopathy  Lungs: clear to auscultation  Heart: regular rate and rhythm; no murmur, rubs or gallops  Abdomen: soft, nondistended, nontender, without mass or organomegaly  Skin: no lesions  Extremities: no clubbing, cyanosis, or edema  Neurologic: alert, oriented, limited motion of left hand and arm  Rt index finger with swelling , open wound volar surface, no odor or purulent drainage,no PIP or DIP joint swelling  LAB RESULTS:                        12.8   4.97  )-----------( 292      ( 26 Oct 2021 05:20 )             38.5     10-26    138  |  104  |  10  ----------------------------<  191<H>  3.7   |  25  |  0.82    Ca    9.1      26 Oct 2021 05:20    TPro  7.6  /  Alb  3.8  /  TBili  0.4  /  DBili  x   /  AST  5<L>  /  ALT  33  /  AlkPhos  111  10-24    LIVER FUNCTIONS - ( 24 Oct 2021 11:50 )  Alb: 3.8 g/dL / Pro: 7.6 g/dL / ALK PHOS: 111 U/L / ALT: 33 U/L / AST: 5 U/L / GGT: x               MICROBIOLOGY:  RECENT CULTURES:  10-24 @ 13:00 .Blood Blood-Peripheral     No growth to date.            RADIOLOGY REVIEWED:  < from: CT Hand w/ IV Cont, Right (10.25.21 @ 15:12) >  IMPRESSION:    Deep wound along the volar aspect of the index finger at the level of the proximal interphalangeal joint. No peripherally enhancing fluid collection to suggest abscess. No peripherally enhancing fluid within the tendon sheath to suggest tenosynovitis. Evaluation for subtle tenosynovitis is limited by CT. Further evaluation with MRI can be performed to further characterize. No CT evidence of osteomyelitis. No subcutaneous air.    --- End of Report ---    < end of copied text >  < from: Xray Chest 1 View- PORTABLE-Urgent (Xray Chest 1 View- PORTABLE-Urgent .) (10.24.21 @ 21:09) >  IMPRESSION: No evidence for focal infiltrate or lobar consolidation.    < end of copied text >  < from: Xray Finger, Right Hand (10.24.21 @ 12:02) >  IMPRESSION:    Soft tissue swelling right second digit. No fracture, dislocation or significant osseous abnormality. No soft tissue air or radiopaque foreign body.

## 2021-10-26 NOTE — PROGRESS NOTE ADULT - ASSESSMENT
A/P: 57yo HTN, DM2, neuropathy, chronic pain on Methadone & narcotics sec to cervical spine and back injury, presented to the ED with right index finger cellulitis. Reported pricked by fish hook ~2wks ago, dev. redness & swelling 6 days later. Went to Urgent care rx;d Doxycycline, took for 5 days pta with no improvement. Then presented to ED, d/c'd on Clinda. Called back into ED 2/2 elevated lactate 2.5 (repeated 3.0, then 2.4).      *Right Hand, 2nd digit Cellulitis  Lactate initially elevated. No leukocytosis, Afebrile.  Cont Cefazolin Day#3  Appreciate Plastics consult now s/p I&D  f/u Wound Cx  Consult ID re: Abx  Wound care per recs- Ordered   f/u CT Hand re: ensure tendon sheath not affected   Plastic consult- Dr Young-appreciated-plan for OR tomorrow-NPO @ [facility]    *HTN  Cont Norvasc and Lisinopril    *DMII, Uncontrolled  HgbA1C 9.8  Diabetes educator recs appreciated  Home Metformin on hold   Cont Lantus 38 iu q AM and ISS    *Chronic Pain  Cont home regimen   PRN pain management for acute pain   I STOP performed on admission     *DVT ppx  OOB/Ambulation    Dispo: Plan for d/c home when medically cleared.  A/P: 57yo HTN, DM2, neuropathy, chronic pain on Methadone & narcotics sec to cervical spine and back injury, presented to the ED with right index finger cellulitis. Reported pricked by fish hook ~2wks ago, dev. redness & swelling 6 days later. Went to Urgent care rx;d Doxycycline, took for 5 days pta with no improvement. Then presented to ED, d/c'd on Clinda. Called back into ED 2/2 elevated lactate 2.5 (repeated 3.0, then 2.4).      *Right Hand, 2nd digit Cellulitis  Lactate initially elevated. No leukocytosis, Afebrile.  Cont Cefazolin Day#3  Appreciate Plastics consult now s/p I&D  f/u Wound Cx  Consulted ID re: Abx, f/u wound and Mycobacterial Cx  Wound care per recs- Ordered   CT Hand reviewed    *HTN  Cont Norvasc and Lisinopril    *DMII, Uncontrolled  HgbA1C 9.8  Diabetes educator recs appreciated  Home Metformin on hold   Cont Lantus 38 iu q AM and ISS    *Chronic Pain  Cont home regimen   PRN pain management for acute pain   I STOP performed on admission     *DVT ppx  OOB/Ambulation    Dispo: Plan for d/c home when medically cleared.  55yo HTN, DM2, neuropathy, chronic pain on Methadone & narcotics sec to cervical spine and back injury, admitted for right index finger cellulitis s/p fish hook injury 2 weeks prior, now s/p bedside I&D by Dr Dangelo on 10/25/21.     Reported pricked by fish hook ~2wks ago, dev. redness & swelling 6 days later. Went to Urgent care rx;d Doxycycline, took for 5 days pta with no improvement. Then presented to ED, d/c'd on Clinda. Called back into ED 2/2 elevated lactate 2.5 (repeated 3.0, then 2.4).      *Right Hand, 2nd digit Cellulitis  Lactate initially elevated. No leukocytosis, Afebrile.  Cont Cefazolin Day#3  s/p I&D by plastics Dr Dangelo on 10/25  f/u Wound and Mycobacterial Cx  ID recs appreciated  Wound care per recs- Ordered   CT Hand reviewed  Pain control - added PRN Dilaudid 1mg IVP for now    *HTN  Likely elevated at times 2/2 pain  Cont Norvasc and Lisinopril    *DMII, Uncontrolled  HgbA1C 9.8  Diabetes educator recs appreciated  Home Metformin on hold   Cont Lantus 38 u q AM and ISS    *Chronic Pain  Cont home regimen, with PRN IVP for acute pain   PRN pain management for acute pain   I STOP performed on admission     *DVT ppx  OOB/Ambulation    Dispo: Plan for d/c home when cleared by ID 55yo HTN, DM2, neuropathy, chronic pain on Methadone & narcotics sec to cervical spine and back injury, presented with right index finger cellulitis. Reported pricked by fish hook ~2wks ago, dev. redness & swelling 6 days later. Went to Urgent care rx;d Doxycycline, took for 5 days pta with no improvement. Then presented to ED, d/c'd on Clinda. Called back into ED 2/2 elevated lactate 2.5 (repeated 3.0, then 2.4).      *Right Hand, 2nd digit Cellulitis  Lactate initially elevated. No leukocytosis, Afebrile.  Cont Cefazolin Day#3  s/p I&D by plastics Dr Dangelo on 10/25  f/u Wound and Mycobacterial Cx  ID recs appreciated  Wound care per recs- Ordered   CT Hand reviewed  Pain control - added PRN Dilaudid 1mg IVP for now    *HTN  Likely elevated at times 2/2 pain  Cont Norvasc and Lisinopril    *DMII, Uncontrolled  HgbA1C 9.8  Diabetes educator recs appreciated  Home Metformin on hold   Cont Lantus 38 u q AM and ISS    *Chronic Pain  Cont home regimen, with PRN IVP for acute pain   PRN pain management for acute pain   I STOP performed on admission     *DVT ppx  OOB/Ambulation    Dispo: Plan for d/c home when cleared by ID

## 2021-10-26 NOTE — PHARMACOTHERAPY INTERVENTION NOTE - COMMENTS
Met with patient and reviewed current medications. Pt reports no issues taking medications, complains of pain. Patient Refused

## 2021-10-27 LAB
ALBUMIN SERPL ELPH-MCNC: 3.2 G/DL — LOW (ref 3.3–5)
ALP SERPL-CCNC: 83 U/L — SIGNIFICANT CHANGE UP (ref 40–120)
ALT FLD-CCNC: 23 U/L — SIGNIFICANT CHANGE UP (ref 10–45)
ANION GAP SERPL CALC-SCNC: 8 MMOL/L — SIGNIFICANT CHANGE UP (ref 5–17)
AST SERPL-CCNC: 6 U/L — LOW (ref 10–40)
BILIRUB SERPL-MCNC: 0.2 MG/DL — SIGNIFICANT CHANGE UP (ref 0.2–1.2)
BUN SERPL-MCNC: 9 MG/DL — SIGNIFICANT CHANGE UP (ref 7–23)
CALCIUM SERPL-MCNC: 9.1 MG/DL — SIGNIFICANT CHANGE UP (ref 8.4–10.5)
CHLORIDE SERPL-SCNC: 104 MMOL/L — SIGNIFICANT CHANGE UP (ref 96–108)
CO2 SERPL-SCNC: 28 MMOL/L — SIGNIFICANT CHANGE UP (ref 22–31)
COVID-19 SPIKE DOMAIN AB INTERP: POSITIVE
COVID-19 SPIKE DOMAIN ANTIBODY RESULT: >250 U/ML — HIGH
CREAT SERPL-MCNC: 0.88 MG/DL — SIGNIFICANT CHANGE UP (ref 0.5–1.3)
GLUCOSE BLDC GLUCOMTR-MCNC: 133 MG/DL — HIGH (ref 70–99)
GLUCOSE BLDC GLUCOMTR-MCNC: 145 MG/DL — HIGH (ref 70–99)
GLUCOSE BLDC GLUCOMTR-MCNC: 156 MG/DL — HIGH (ref 70–99)
GLUCOSE SERPL-MCNC: 190 MG/DL — HIGH (ref 70–99)
HCT VFR BLD CALC: 40.2 % — SIGNIFICANT CHANGE UP (ref 39–50)
HGB BLD-MCNC: 13.2 G/DL — SIGNIFICANT CHANGE UP (ref 13–17)
MCHC RBC-ENTMCNC: 29.6 PG — SIGNIFICANT CHANGE UP (ref 27–34)
MCHC RBC-ENTMCNC: 32.8 GM/DL — SIGNIFICANT CHANGE UP (ref 32–36)
MCV RBC AUTO: 90.1 FL — SIGNIFICANT CHANGE UP (ref 80–100)
NRBC # BLD: 0 /100 WBCS — SIGNIFICANT CHANGE UP (ref 0–0)
PLATELET # BLD AUTO: 286 K/UL — SIGNIFICANT CHANGE UP (ref 150–400)
POTASSIUM SERPL-MCNC: 4.5 MMOL/L — SIGNIFICANT CHANGE UP (ref 3.5–5.3)
POTASSIUM SERPL-SCNC: 4.5 MMOL/L — SIGNIFICANT CHANGE UP (ref 3.5–5.3)
PROT SERPL-MCNC: 6.6 G/DL — SIGNIFICANT CHANGE UP (ref 6–8.3)
RBC # BLD: 4.46 M/UL — SIGNIFICANT CHANGE UP (ref 4.2–5.8)
RBC # FLD: 13.6 % — SIGNIFICANT CHANGE UP (ref 10.3–14.5)
SARS-COV-2 IGG+IGM SERPL QL IA: >250 U/ML — HIGH
SARS-COV-2 IGG+IGM SERPL QL IA: POSITIVE
SODIUM SERPL-SCNC: 140 MMOL/L — SIGNIFICANT CHANGE UP (ref 135–145)
WBC # BLD: 4.84 K/UL — SIGNIFICANT CHANGE UP (ref 3.8–10.5)
WBC # FLD AUTO: 4.84 K/UL — SIGNIFICANT CHANGE UP (ref 3.8–10.5)

## 2021-10-27 PROCEDURE — 99233 SBSQ HOSP IP/OBS HIGH 50: CPT

## 2021-10-27 RX ORDER — ETHAMBUTOL HYDROCHLORIDE 400 MG/1
2400 TABLET, FILM COATED ORAL EVERY 24 HOURS
Refills: 0 | Status: DISCONTINUED | OUTPATIENT
Start: 2021-10-27 | End: 2021-10-28

## 2021-10-27 RX ORDER — CLARITHROMYCIN 500 MG
500 TABLET ORAL
Refills: 0 | Status: DISCONTINUED | OUTPATIENT
Start: 2021-10-27 | End: 2021-10-27

## 2021-10-27 RX ORDER — HYDROMORPHONE HYDROCHLORIDE 2 MG/ML
1 INJECTION INTRAMUSCULAR; INTRAVENOUS; SUBCUTANEOUS ONCE
Refills: 0 | Status: DISCONTINUED | OUTPATIENT
Start: 2021-10-27 | End: 2021-10-27

## 2021-10-27 RX ADMIN — HYDROMORPHONE HYDROCHLORIDE 4 MILLIGRAM(S): 2 INJECTION INTRAMUSCULAR; INTRAVENOUS; SUBCUTANEOUS at 22:29

## 2021-10-27 RX ADMIN — AMLODIPINE BESYLATE 2.5 MILLIGRAM(S): 2.5 TABLET ORAL at 06:21

## 2021-10-27 RX ADMIN — HYDROMORPHONE HYDROCHLORIDE 1 MILLIGRAM(S): 2 INJECTION INTRAMUSCULAR; INTRAVENOUS; SUBCUTANEOUS at 07:14

## 2021-10-27 RX ADMIN — METHADONE HYDROCHLORIDE 5 MILLIGRAM(S): 40 TABLET ORAL at 10:21

## 2021-10-27 RX ADMIN — HYDROMORPHONE HYDROCHLORIDE 1 MILLIGRAM(S): 2 INJECTION INTRAMUSCULAR; INTRAVENOUS; SUBCUTANEOUS at 07:30

## 2021-10-27 RX ADMIN — HYDROMORPHONE HYDROCHLORIDE 1 MILLIGRAM(S): 2 INJECTION INTRAMUSCULAR; INTRAVENOUS; SUBCUTANEOUS at 13:50

## 2021-10-27 RX ADMIN — Medication 150 MILLIGRAM(S): at 21:29

## 2021-10-27 RX ADMIN — HYDROMORPHONE HYDROCHLORIDE 1 MILLIGRAM(S): 2 INJECTION INTRAMUSCULAR; INTRAVENOUS; SUBCUTANEOUS at 13:35

## 2021-10-27 RX ADMIN — LISINOPRIL 10 MILLIGRAM(S): 2.5 TABLET ORAL at 06:22

## 2021-10-27 RX ADMIN — HYDROMORPHONE HYDROCHLORIDE 4 MILLIGRAM(S): 2 INJECTION INTRAMUSCULAR; INTRAVENOUS; SUBCUTANEOUS at 12:28

## 2021-10-27 RX ADMIN — HYDROMORPHONE HYDROCHLORIDE 1 MILLIGRAM(S): 2 INJECTION INTRAMUSCULAR; INTRAVENOUS; SUBCUTANEOUS at 18:30

## 2021-10-27 RX ADMIN — HYDROMORPHONE HYDROCHLORIDE 4 MILLIGRAM(S): 2 INJECTION INTRAMUSCULAR; INTRAVENOUS; SUBCUTANEOUS at 16:43

## 2021-10-27 RX ADMIN — Medication 100 MILLIGRAM(S): at 13:35

## 2021-10-27 RX ADMIN — Medication 150 MILLIGRAM(S): at 06:22

## 2021-10-27 RX ADMIN — METHADONE HYDROCHLORIDE 5 MILLIGRAM(S): 40 TABLET ORAL at 20:23

## 2021-10-27 RX ADMIN — INSULIN GLARGINE 38 UNIT(S): 100 INJECTION, SOLUTION SUBCUTANEOUS at 09:01

## 2021-10-27 RX ADMIN — HYDROMORPHONE HYDROCHLORIDE 4 MILLIGRAM(S): 2 INJECTION INTRAMUSCULAR; INTRAVENOUS; SUBCUTANEOUS at 06:22

## 2021-10-27 RX ADMIN — HYDROMORPHONE HYDROCHLORIDE 4 MILLIGRAM(S): 2 INJECTION INTRAMUSCULAR; INTRAVENOUS; SUBCUTANEOUS at 21:29

## 2021-10-27 RX ADMIN — HYDROMORPHONE HYDROCHLORIDE 4 MILLIGRAM(S): 2 INJECTION INTRAMUSCULAR; INTRAVENOUS; SUBCUTANEOUS at 12:13

## 2021-10-27 RX ADMIN — HYDROMORPHONE HYDROCHLORIDE 1 MILLIGRAM(S): 2 INJECTION INTRAMUSCULAR; INTRAVENOUS; SUBCUTANEOUS at 23:22

## 2021-10-27 RX ADMIN — HYDROMORPHONE HYDROCHLORIDE 1 MILLIGRAM(S): 2 INJECTION INTRAMUSCULAR; INTRAVENOUS; SUBCUTANEOUS at 18:45

## 2021-10-27 RX ADMIN — Medication 100 MILLIGRAM(S): at 06:22

## 2021-10-27 RX ADMIN — Medication 150 MILLIGRAM(S): at 13:35

## 2021-10-27 RX ADMIN — HYDROMORPHONE HYDROCHLORIDE 4 MILLIGRAM(S): 2 INJECTION INTRAMUSCULAR; INTRAVENOUS; SUBCUTANEOUS at 17:53

## 2021-10-27 RX ADMIN — Medication 1 APPLICATION(S): at 16:44

## 2021-10-27 RX ADMIN — HYDROMORPHONE HYDROCHLORIDE 4 MILLIGRAM(S): 2 INJECTION INTRAMUSCULAR; INTRAVENOUS; SUBCUTANEOUS at 06:37

## 2021-10-27 RX ADMIN — HYDROMORPHONE HYDROCHLORIDE 1 MILLIGRAM(S): 2 INJECTION INTRAMUSCULAR; INTRAVENOUS; SUBCUTANEOUS at 23:45

## 2021-10-27 RX ADMIN — METHADONE HYDROCHLORIDE 5 MILLIGRAM(S): 40 TABLET ORAL at 14:47

## 2021-10-27 RX ADMIN — Medication 1 APPLICATION(S): at 06:25

## 2021-10-27 NOTE — PROGRESS NOTE ADULT - SUBJECTIVE AND OBJECTIVE BOX
CC: f/u for finger infection    Patient reports finger hurts a lot    REVIEW OF SYSTEMS:  All other review of systems negative (Comprehensive ROS)    Antimicrobials Day #  :  ceFAZolin   IVPB 2000 milliGRAM(s) IV Intermittent every 8 hours  clarithromycin 500 milliGRAM(s) Oral two times a day    Other Medications Reviewed    T(F): 97.6 (10-27-21 @ 06:09), Max: 98.4 (10-26-21 @ 20:11)  HR: 97 (10-27-21 @ 06:09)  BP: 119/86 (10-27-21 @ 06:09)  RR: 15 (10-27-21 @ 06:09)  SpO2: 97% (10-27-21 @ 06:09)  Wt(kg): --    PHYSICAL EXAM:  General: alert, no acute distress  Eyes:  anicteric, no conjunctival injection, no discharge  Oropharynx: no lesions or injection 	  Neck: supple, without adenopathy  Lungs: clear to auscultation  Heart: regular rate and rhythm; no murmur, rubs or gallops  Abdomen: soft, nondistended, nontender, without mass or organomegaly  Skin: no lesions  Extremities: no clubbing, cyanosis, or edema. index finger with open wound and swelling over palmar side middle phalynx  Neurologic: alert, oriented, moves all extremities    LAB RESULTS:                        13.2   4.84  )-----------( 286      ( 27 Oct 2021 05:56 )             40.2     10-27    140  |  104  |  9   ----------------------------<  190<H>  4.5   |  28  |  0.88    Ca    9.1      27 Oct 2021 05:56    TPro  6.6  /  Alb  3.2<L>  /  TBili  0.2  /  DBili  x   /  AST  6<L>  /  ALT  23  /  AlkPhos  83  10-27    LIVER FUNCTIONS - ( 27 Oct 2021 05:56 )  Alb: 3.2 g/dL / Pro: 6.6 g/dL / ALK PHOS: 83 U/L / ALT: 23 U/L / AST: 6 U/L / GGT: x             MICROBIOLOGY:  RECENT CULTURES:  10-26 @ 11:26 .Other Rt index finger         10-25 @ 13:50 .Abscess right index     Rare Coag Negative Staphylococcus  "Susceptibilities not performed"      10-24 @ 13:00 .Blood Blood-Peripheral     No growth to date.          RADIOLOGY REVIEWED:    < from: CT Hand w/ IV Cont, Right (10.25.21 @ 15:12) >    IMPRESSION:    Deep wound along the volar aspect of the index finger at the level of the proximal interphalangeal joint. No peripherally enhancing fluid collection to suggest abscess. No peripherally enhancing fluid within the tendon sheath to suggest tenosynovitis. Evaluation for subtle tenosynovitis is limited by CT. Further evaluation with MRI can be performed to further characterize. No CT evidence of osteomyelitis. No subcutaneous air.    --- End of Report ---    < end of copied text >            Assessment:  Patient s/p injury to right index finger with a fish hook, he did not improve after 5days of doxycycline. he had the finger opened up by plastics. 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   will stop cefazolin  if tolerates meds over night and otherwise well, can go home for ID perspective in am  will need ophtho exam in the next week, can be done as outpt and will need monthly f/u with ethambutol  will need at least 3 months of tx, maybe longer but await organism

## 2021-10-27 NOTE — DIETITIAN INITIAL EVALUATION ADULT. - OTHER INFO
pt. admitted w/ left index finger cellulitis. Hx. htn, dm. ate well this am asking for extra food. unable to interview at this time. skin intact. no edema noted. no n/v/ diarrhea. no bm as of yet. weight stable. will attempt to educate prior to d/c on cons. cho dash diet due to high A1C.

## 2021-10-27 NOTE — PROGRESS NOTE ADULT - ASSESSMENT
55yo HTN, DM2, neuropathy, chronic pain on Methadone & narcotics sec to cervical spine and back injury, presented with right index finger cellulitis. Reported pricked by fish hook ~2wks ago, dev. redness & swelling 6 days later. Went to Urgent care rx;d Doxycycline, took for 5 days pta with no improvement. Then presented to ED, d/c'd on Clinda. Called back into ED 2/2 elevated lactate 2.5 (repeated 3.0, then 2.4).      *Right Hand, 2nd digit Cellulitis  -s/p fish hook injury from 10 days ago  -Lactic acidosis resolved; no leukocytosis, Afebrile.  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

## 2021-10-27 NOTE — PROGRESS NOTE ADULT - SUBJECTIVE AND OBJECTIVE BOX
Patient is a 56y old  Male who presents with a chief complaint of cellulitis of right index finger (26 Oct 2021 11:36)    Patient seen and examined at bedside.  Pt. still with pain at right index finger, states it is "throbbing".      ALLERGIES:  No Known Allergies    MEDICATIONS  (STANDING):  amLODIPine   Tablet 2.5 milliGRAM(s) Oral daily  BACItracin   Ointment 1 Application(s) Topical two times a day  ceFAZolin   IVPB 2000 milliGRAM(s) IV Intermittent every 8 hours  dextrose 40% Gel 15 Gram(s) Oral once  dextrose 5%. 1000 milliLiter(s) (50 mL/Hr) IV Continuous <Continuous>  dextrose 5%. 1000 milliLiter(s) (100 mL/Hr) IV Continuous <Continuous>  dextrose 50% Injectable 25 Gram(s) IV Push once  dextrose 50% Injectable 12.5 Gram(s) IV Push once  dextrose 50% Injectable 25 Gram(s) IV Push once  glucagon  Injectable 1 milliGRAM(s) IntraMuscular once  HYDROmorphone   Tablet 4 milliGRAM(s) Oral <User Schedule>  insulin glargine Injectable (LANTUS) 38 Unit(s) SubCutaneous every morning  insulin lispro (ADMELOG) corrective regimen sliding scale   SubCutaneous <User Schedule>  insulin lispro (ADMELOG) corrective regimen sliding scale   SubCutaneous at bedtime  lisinopril 10 milliGRAM(s) Oral daily  methadone    Tablet 5 milliGRAM(s) Oral <User Schedule>  pregabalin 150 milliGRAM(s) Oral three times a day    MEDICATIONS  (PRN):  HYDROmorphone  Injectable 1 milliGRAM(s) IV Push every 6 hours PRN Severe Pain (7 - 10)  methadone    Tablet 5 milliGRAM(s) Oral <User Schedule> PRN Severe Pain (7 - 10)    Vital Signs Last 24 Hrs  T(F): 97.6 (27 Oct 2021 06:09), Max: 98.4 (26 Oct 2021 16:50)  HR: 97 (27 Oct 2021 06:09) (86 - 100)  BP: 119/86 (27 Oct 2021 06:09) (119/86 - 144/94)  RR: 15 (27 Oct 2021 06:09) (15 - 19)  SpO2: 97% (27 Oct 2021 06:09) (96% - 98%)  I&O's Summary    26 Oct 2021 07:01  -  27 Oct 2021 07:00  --------------------------------------------------------  IN: 580 mL / OUT: 800 mL / NET: -220 mL      PHYSICAL EXAM:  General: NAD, A/O x 3  ENT: No gross hearing impairment, Moist mucous membranes, no thrush  Neck: Supple, No JVD  Lungs: Clear to auscultation bilaterally, good air entry, non-labored breathing  Cardio: RRR, S1/S2, No murmur  Abdomen: Soft, Nontender, Nondistended; Bowel sounds present  Extremities: right index finger dressed, no apparent drainage, mild edema and erythema of right hand  Neuro:  alert, nonfocal    LABS:                        13.2   4.84  )-----------( 286      ( 27 Oct 2021 05:56 )             40.2     10-27    140  |  104  |  9   ----------------------------<  190  4.5   |  28  |  0.88    Ca    9.1      27 Oct 2021 05:56    TPro  6.6  /  Alb  3.2  /  TBili  0.2  /  DBili  x   /  AST  6   /  ALT  23  /  AlkPhos  83  10-27        eGFR if Non African American: 97 mL/min/1.73M2 (10-27-21 @ 05:56)    PT/INR - ( 26 Oct 2021 05:50 )   PT: 10.8 sec;   INR: 0.89 ratio         PTT - ( 26 Oct 2021 05:50 )  PTT:28.3 sec  Lactate, Blood: 1.8 mmol/L (10-24 @ 21:44)  Lactate, Blood: 3.0 mmol/L (10-24 @ 19:18)  Lactate, Blood: 2.4 mmol/L (10-24 @ 13:00)    Procalcitonin, Serum: 0.06 ng/mL (10-24-21 @ 11:50)                      POCT Blood Glucose.: 156 mg/dL (27 Oct 2021 09:00)  POCT Blood Glucose.: 245 mg/dL (26 Oct 2021 21:30)  POCT Blood Glucose.: 152 mg/dL (26 Oct 2021 16:06)  POCT Blood Glucose.: 171 mg/dL (26 Oct 2021 11:08)          Culture - Acid Fast - Other w/Smear (collected 26 Oct 2021 11:26)  Source: .Other Rt index finger    Culture - Abscess with Gram Stain (collected 25 Oct 2021 13:50)  Source: .Abscess right index  Preliminary Report (26 Oct 2021 18:05):    Rare Gram positive cocci in pairs    Identification to follow.    Culture - Blood (collected 24 Oct 2021 13:00)  Source: .Blood Blood-Peripheral  Preliminary Report (25 Oct 2021 20:01):    No growth to date.    Culture - Blood (collected 24 Oct 2021 13:00)  Source: .Blood Blood-Peripheral  Preliminary Report (25 Oct 2021 20:01):    No growth to date.      COVID-19 PCR: NotDetec (10-24-21 @ 13:00)    RADIOLOGY & ADDITIONAL TESTS:    Care Discussed with Consultants/Other Providers:

## 2021-10-27 NOTE — PROGRESS NOTE ADULT - ATTENDING COMMENTS
Right hand cellulitis  HTN  DM  Abx per ID  AFB positive - 3 early morning sputum samples to be collected  Airborne precautions

## 2021-10-28 ENCOUNTER — TRANSCRIPTION ENCOUNTER (OUTPATIENT)
Age: 56
End: 2021-10-28

## 2021-10-28 VITALS
HEART RATE: 83 BPM | RESPIRATION RATE: 16 BRPM | OXYGEN SATURATION: 98 % | SYSTOLIC BLOOD PRESSURE: 133 MMHG | TEMPERATURE: 97 F | DIASTOLIC BLOOD PRESSURE: 95 MMHG

## 2021-10-28 PROBLEM — Z86.69 PERSONAL HISTORY OF OTHER DISEASES OF THE NERVOUS SYSTEM AND SENSE ORGANS: Chronic | Status: ACTIVE | Noted: 2021-10-24

## 2021-10-28 PROBLEM — M47.9 SPONDYLOSIS, UNSPECIFIED: Chronic | Status: ACTIVE | Noted: 2021-10-24

## 2021-10-28 PROBLEM — M48.00 SPINAL STENOSIS, SITE UNSPECIFIED: Chronic | Status: ACTIVE | Noted: 2021-10-24

## 2021-10-28 LAB
ALBUMIN SERPL ELPH-MCNC: 3.4 G/DL — SIGNIFICANT CHANGE UP (ref 3.3–5)
ALP SERPL-CCNC: 98 U/L — SIGNIFICANT CHANGE UP (ref 40–120)
ALT FLD-CCNC: 29 U/L — SIGNIFICANT CHANGE UP (ref 10–45)
ANION GAP SERPL CALC-SCNC: 6 MMOL/L — SIGNIFICANT CHANGE UP (ref 5–17)
AST SERPL-CCNC: 11 U/L — SIGNIFICANT CHANGE UP (ref 10–40)
BASOPHILS # BLD AUTO: 0.04 K/UL — SIGNIFICANT CHANGE UP (ref 0–0.2)
BASOPHILS NFR BLD AUTO: 0.7 % — SIGNIFICANT CHANGE UP (ref 0–2)
BILIRUB SERPL-MCNC: 0.3 MG/DL — SIGNIFICANT CHANGE UP (ref 0.2–1.2)
BUN SERPL-MCNC: 12 MG/DL — SIGNIFICANT CHANGE UP (ref 7–23)
CALCIUM SERPL-MCNC: 9.1 MG/DL — SIGNIFICANT CHANGE UP (ref 8.4–10.5)
CHLORIDE SERPL-SCNC: 104 MMOL/L — SIGNIFICANT CHANGE UP (ref 96–108)
CO2 SERPL-SCNC: 28 MMOL/L — SIGNIFICANT CHANGE UP (ref 22–31)
CREAT SERPL-MCNC: 0.73 MG/DL — SIGNIFICANT CHANGE UP (ref 0.5–1.3)
EOSINOPHIL # BLD AUTO: 0.16 K/UL — SIGNIFICANT CHANGE UP (ref 0–0.5)
EOSINOPHIL NFR BLD AUTO: 2.8 % — SIGNIFICANT CHANGE UP (ref 0–6)
GLUCOSE BLDC GLUCOMTR-MCNC: 122 MG/DL — HIGH (ref 70–99)
GLUCOSE BLDC GLUCOMTR-MCNC: 141 MG/DL — HIGH (ref 70–99)
GLUCOSE SERPL-MCNC: 166 MG/DL — HIGH (ref 70–99)
HCT VFR BLD CALC: 40.9 % — SIGNIFICANT CHANGE UP (ref 39–50)
HGB BLD-MCNC: 13.7 G/DL — SIGNIFICANT CHANGE UP (ref 13–17)
IMM GRANULOCYTES NFR BLD AUTO: 0 % — SIGNIFICANT CHANGE UP (ref 0–1.5)
LYMPHOCYTES # BLD AUTO: 2.23 K/UL — SIGNIFICANT CHANGE UP (ref 1–3.3)
LYMPHOCYTES # BLD AUTO: 39 % — SIGNIFICANT CHANGE UP (ref 13–44)
MCHC RBC-ENTMCNC: 29.2 PG — SIGNIFICANT CHANGE UP (ref 27–34)
MCHC RBC-ENTMCNC: 33.5 GM/DL — SIGNIFICANT CHANGE UP (ref 32–36)
MCV RBC AUTO: 87.2 FL — SIGNIFICANT CHANGE UP (ref 80–100)
MONOCYTES # BLD AUTO: 0.51 K/UL — SIGNIFICANT CHANGE UP (ref 0–0.9)
MONOCYTES NFR BLD AUTO: 8.9 % — SIGNIFICANT CHANGE UP (ref 2–14)
NEUTROPHILS # BLD AUTO: 2.78 K/UL — SIGNIFICANT CHANGE UP (ref 1.8–7.4)
NEUTROPHILS NFR BLD AUTO: 48.6 % — SIGNIFICANT CHANGE UP (ref 43–77)
NIGHT BLUE STAIN TISS: SIGNIFICANT CHANGE UP
NRBC # BLD: 0 /100 WBCS — SIGNIFICANT CHANGE UP (ref 0–0)
PLATELET # BLD AUTO: 303 K/UL — SIGNIFICANT CHANGE UP (ref 150–400)
POTASSIUM SERPL-MCNC: 4.2 MMOL/L — SIGNIFICANT CHANGE UP (ref 3.5–5.3)
POTASSIUM SERPL-SCNC: 4.2 MMOL/L — SIGNIFICANT CHANGE UP (ref 3.5–5.3)
PROT SERPL-MCNC: 7.1 G/DL — SIGNIFICANT CHANGE UP (ref 6–8.3)
RBC # BLD: 4.69 M/UL — SIGNIFICANT CHANGE UP (ref 4.2–5.8)
RBC # FLD: 13.5 % — SIGNIFICANT CHANGE UP (ref 10.3–14.5)
SODIUM SERPL-SCNC: 138 MMOL/L — SIGNIFICANT CHANGE UP (ref 135–145)
SPECIMEN SOURCE: SIGNIFICANT CHANGE UP
WBC # BLD: 5.72 K/UL — SIGNIFICANT CHANGE UP (ref 3.8–10.5)
WBC # FLD AUTO: 5.72 K/UL — SIGNIFICANT CHANGE UP (ref 3.8–10.5)

## 2021-10-28 PROCEDURE — 87070 CULTURE OTHR SPECIMN AEROBIC: CPT

## 2021-10-28 PROCEDURE — 87116 MYCOBACTERIA CULTURE: CPT

## 2021-10-28 PROCEDURE — 96376 TX/PRO/DX INJ SAME DRUG ADON: CPT

## 2021-10-28 PROCEDURE — 80048 BASIC METABOLIC PNL TOTAL CA: CPT

## 2021-10-28 PROCEDURE — 83036 HEMOGLOBIN GLYCOSYLATED A1C: CPT

## 2021-10-28 PROCEDURE — 96374 THER/PROPH/DIAG INJ IV PUSH: CPT

## 2021-10-28 PROCEDURE — 84145 PROCALCITONIN (PCT): CPT

## 2021-10-28 PROCEDURE — 87015 SPECIMEN INFECT AGNT CONCNTJ: CPT

## 2021-10-28 PROCEDURE — 85730 THROMBOPLASTIN TIME PARTIAL: CPT

## 2021-10-28 PROCEDURE — 90471 IMMUNIZATION ADMIN: CPT

## 2021-10-28 PROCEDURE — 99239 HOSP IP/OBS DSCHRG MGMT >30: CPT

## 2021-10-28 PROCEDURE — 87077 CULTURE AEROBIC IDENTIFY: CPT

## 2021-10-28 PROCEDURE — 87635 SARS-COV-2 COVID-19 AMP PRB: CPT

## 2021-10-28 PROCEDURE — 99285 EMERGENCY DEPT VISIT HI MDM: CPT | Mod: 25

## 2021-10-28 PROCEDURE — 85025 COMPLETE CBC W/AUTO DIFF WBC: CPT

## 2021-10-28 PROCEDURE — 36415 COLL VENOUS BLD VENIPUNCTURE: CPT

## 2021-10-28 PROCEDURE — 73140 X-RAY EXAM OF FINGER(S): CPT

## 2021-10-28 PROCEDURE — 85610 PROTHROMBIN TIME: CPT

## 2021-10-28 PROCEDURE — 82962 GLUCOSE BLOOD TEST: CPT

## 2021-10-28 PROCEDURE — 86480 TB TEST CELL IMMUN MEASURE: CPT

## 2021-10-28 PROCEDURE — 80053 COMPREHEN METABOLIC PANEL: CPT

## 2021-10-28 PROCEDURE — 87205 SMEAR GRAM STAIN: CPT

## 2021-10-28 PROCEDURE — 86769 SARS-COV-2 COVID-19 ANTIBODY: CPT

## 2021-10-28 PROCEDURE — G0378: CPT

## 2021-10-28 PROCEDURE — 85027 COMPLETE CBC AUTOMATED: CPT

## 2021-10-28 PROCEDURE — 90715 TDAP VACCINE 7 YRS/> IM: CPT

## 2021-10-28 PROCEDURE — 93005 ELECTROCARDIOGRAM TRACING: CPT

## 2021-10-28 PROCEDURE — 76376 3D RENDER W/INTRP POSTPROCES: CPT

## 2021-10-28 PROCEDURE — G1004: CPT

## 2021-10-28 PROCEDURE — 96361 HYDRATE IV INFUSION ADD-ON: CPT

## 2021-10-28 PROCEDURE — 87040 BLOOD CULTURE FOR BACTERIA: CPT

## 2021-10-28 PROCEDURE — 99284 EMERGENCY DEPT VISIT MOD MDM: CPT | Mod: 25

## 2021-10-28 PROCEDURE — 83605 ASSAY OF LACTIC ACID: CPT

## 2021-10-28 PROCEDURE — 73201 CT UPPER EXTREMITY W/DYE: CPT | Mod: MF

## 2021-10-28 PROCEDURE — 71045 X-RAY EXAM CHEST 1 VIEW: CPT

## 2021-10-28 PROCEDURE — 96375 TX/PRO/DX INJ NEW DRUG ADDON: CPT

## 2021-10-28 PROCEDURE — 96365 THER/PROPH/DIAG IV INF INIT: CPT

## 2021-10-28 PROCEDURE — 87206 SMEAR FLUORESCENT/ACID STAI: CPT

## 2021-10-28 RX ORDER — METFORMIN HYDROCHLORIDE 850 MG/1
1 TABLET ORAL
Qty: 0 | Refills: 0 | DISCHARGE

## 2021-10-28 RX ORDER — DULAGLUTIDE 4.5 MG/.5ML
1 INJECTION, SOLUTION SUBCUTANEOUS
Qty: 4 | Refills: 0
Start: 2021-10-28 | End: 2021-11-26

## 2021-10-28 RX ORDER — DULAGLUTIDE 4.5 MG/.5ML
0 INJECTION, SOLUTION SUBCUTANEOUS
Qty: 0 | Refills: 0 | DISCHARGE

## 2021-10-28 RX ORDER — ETHAMBUTOL HYDROCHLORIDE 400 MG/1
6 TABLET, FILM COATED ORAL
Qty: 180 | Refills: 0
Start: 2021-10-28 | End: 2021-11-26

## 2021-10-28 RX ORDER — ENOXAPARIN SODIUM 100 MG/ML
0 INJECTION SUBCUTANEOUS
Qty: 0 | Refills: 0 | DISCHARGE

## 2021-10-28 RX ORDER — ISOPROPYL ALCOHOL, BENZOCAINE .7; .06 ML/ML; ML/ML
1 SWAB TOPICAL
Qty: 100 | Refills: 1
Start: 2021-10-28 | End: 2021-12-16

## 2021-10-28 RX ORDER — METFORMIN HYDROCHLORIDE 850 MG/1
1 TABLET ORAL
Qty: 60 | Refills: 0
Start: 2021-10-28 | End: 2021-11-26

## 2021-10-28 RX ORDER — TETANUS TOXOID, REDUCED DIPHTHERIA TOXOID AND ACELLULAR PERTUSSIS VACCINE, ADSORBED 5; 2.5; 8; 8; 2.5 [IU]/.5ML; [IU]/.5ML; UG/.5ML; UG/.5ML; UG/.5ML
0.5 SUSPENSION INTRAMUSCULAR ONCE
Refills: 0 | Status: DISCONTINUED | OUTPATIENT
Start: 2021-10-28 | End: 2021-10-28

## 2021-10-28 RX ADMIN — Medication 1 APPLICATION(S): at 05:04

## 2021-10-28 RX ADMIN — Medication 1 TABLET(S): at 17:25

## 2021-10-28 RX ADMIN — Medication 1 TABLET(S): at 05:04

## 2021-10-28 RX ADMIN — HYDROMORPHONE HYDROCHLORIDE 1 MILLIGRAM(S): 2 INJECTION INTRAMUSCULAR; INTRAVENOUS; SUBCUTANEOUS at 12:49

## 2021-10-28 RX ADMIN — HYDROMORPHONE HYDROCHLORIDE 4 MILLIGRAM(S): 2 INJECTION INTRAMUSCULAR; INTRAVENOUS; SUBCUTANEOUS at 07:52

## 2021-10-28 RX ADMIN — HYDROMORPHONE HYDROCHLORIDE 1 MILLIGRAM(S): 2 INJECTION INTRAMUSCULAR; INTRAVENOUS; SUBCUTANEOUS at 06:22

## 2021-10-28 RX ADMIN — HYDROMORPHONE HYDROCHLORIDE 4 MILLIGRAM(S): 2 INJECTION INTRAMUSCULAR; INTRAVENOUS; SUBCUTANEOUS at 18:44

## 2021-10-28 RX ADMIN — HYDROMORPHONE HYDROCHLORIDE 4 MILLIGRAM(S): 2 INJECTION INTRAMUSCULAR; INTRAVENOUS; SUBCUTANEOUS at 17:26

## 2021-10-28 RX ADMIN — AMLODIPINE BESYLATE 2.5 MILLIGRAM(S): 2.5 TABLET ORAL at 05:04

## 2021-10-28 RX ADMIN — INSULIN GLARGINE 38 UNIT(S): 100 INJECTION, SOLUTION SUBCUTANEOUS at 09:13

## 2021-10-28 RX ADMIN — HYDROMORPHONE HYDROCHLORIDE 4 MILLIGRAM(S): 2 INJECTION INTRAMUSCULAR; INTRAVENOUS; SUBCUTANEOUS at 12:45

## 2021-10-28 RX ADMIN — Medication 150 MILLIGRAM(S): at 05:03

## 2021-10-28 RX ADMIN — HYDROMORPHONE HYDROCHLORIDE 4 MILLIGRAM(S): 2 INJECTION INTRAMUSCULAR; INTRAVENOUS; SUBCUTANEOUS at 06:52

## 2021-10-28 RX ADMIN — HYDROMORPHONE HYDROCHLORIDE 1 MILLIGRAM(S): 2 INJECTION INTRAMUSCULAR; INTRAVENOUS; SUBCUTANEOUS at 05:22

## 2021-10-28 RX ADMIN — METHADONE HYDROCHLORIDE 5 MILLIGRAM(S): 40 TABLET ORAL at 15:05

## 2021-10-28 RX ADMIN — HYDROMORPHONE HYDROCHLORIDE 1 MILLIGRAM(S): 2 INJECTION INTRAMUSCULAR; INTRAVENOUS; SUBCUTANEOUS at 01:00

## 2021-10-28 RX ADMIN — Medication 1 APPLICATION(S): at 17:26

## 2021-10-28 RX ADMIN — METHADONE HYDROCHLORIDE 5 MILLIGRAM(S): 40 TABLET ORAL at 10:44

## 2021-10-28 RX ADMIN — Medication 150 MILLIGRAM(S): at 15:04

## 2021-10-28 RX ADMIN — ETHAMBUTOL HYDROCHLORIDE 2400 MILLIGRAM(S): 400 TABLET, FILM COATED ORAL at 05:05

## 2021-10-28 RX ADMIN — HYDROMORPHONE HYDROCHLORIDE 4 MILLIGRAM(S): 2 INJECTION INTRAMUSCULAR; INTRAVENOUS; SUBCUTANEOUS at 11:45

## 2021-10-28 RX ADMIN — LISINOPRIL 10 MILLIGRAM(S): 2.5 TABLET ORAL at 05:03

## 2021-10-28 NOTE — DISCHARGE NOTE NURSING/CASE MANAGEMENT/SOCIAL WORK - NSDCFUADDAPPT_GEN_ALL_CORE_FT
We made you a hospital follow-up appointment with Dr. Gonzalez on 11/2/2021 at 3:30pm, office #[phone].

## 2021-10-28 NOTE — DISCHARGE NOTE NURSING/CASE MANAGEMENT/SOCIAL WORK - NSDCVIVACCINE_GEN_ALL_CORE_FT
Tdap; 24-Oct-2021 12:15; Jahaira Ruiz (RN); [employer]; [id number] (Exp. Date: 29-Jul-2023); IntraMuscular; Deltoid Right.; 0.5 milliLiter(s); VIS (VIS Published: 09-May-2013, VIS Presented: 24-Oct-2021);

## 2021-10-28 NOTE — DISCHARGE NOTE PROVIDER - NSDCFUADDAPPT_GEN_ALL_CORE_FT
We made you a hospital follow-up appointment with Dr. Gonzalez on 11/2/2021 at 3:30pm, office #[phone].

## 2021-10-28 NOTE — DISCHARGE NOTE PROVIDER - NSDCFUSCHEDAPPT_GEN_ALL_CORE_FT
ROSE STRAUSS ; 11/02/2021 ; [facility] [address] ROSE STRAUSS ; 11/09/2021 ; [facility] [address]

## 2021-10-28 NOTE — DISCHARGE NOTE PROVIDER - CARE PROVIDER_API CALL
Josue Antonio (DO)  Internal Medicine  [address] [place] [address]  Phone: [phone]  Fax: [phone]  Follow Up Time:     Jackie Buitrago (DO)  EndocrinologyMetabDiabetes  [address]  [place] [address]  Phone: [phone]  Fax: [phone]  Follow Up Time:

## 2021-10-28 NOTE — DISCHARGE NOTE PROVIDER - HOSPITAL COURSE
55yo HTN, DM2, neuropathy, chronic pain on Methadone & narcotics sec to cervical spine and back injury, presented with right index finger cellulitis. Reportedly pricked by fish hook  approximately 2wks ago, dev. redness & swelling 6 days later. Went to Urgent care rx;d Doxycycline, took for 5 days pta with no improvement. Then presented to ED, d/c'd on Clinda. Called back into ED 2/2 elevated lactate 2.5 (repeated 3.0, then 2.4).  Admitted with right hand, 2nd digit cellulitis. S/p I&D by plastics Dr Dangelo on 10/25, cont local wound care. 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 No signs or symptoms suggestive of TB. Received IV Cefazolin transitioned to PO Bactrim and Ethambutol. Unable to use rifampin or biaxin due to interaction with methadone. [facility] from [facility] infection control in contact with MARIA LUISA whom has cleared the patient and will follow up with [employer]. Will need close ID follow up. Continue Ethambutol and Bactrim DS x 4 weeks. Patient may require 3 months of treatment in total. A1C noted to be 9.8 during admission, seen by diabetic educator.  Will need outpatient diabetic follow up.         Source of Infection: Finger  Antibiotic / Last Day: Ethambutol and Bactrim DS x 4 weeks      Code Status: Full     Discharging Provider:  Anuel Cee NP  Contact Info: [phone]. Please call with any questions or concerns.    Outpatient Provider: Dr. Antonio notified          55yo HTN, DM2, neuropathy, chronic pain on Methadone & narcotics sec to cervical spine and back injury, presented with right index finger cellulitis. Reportedly pricked by fish hook  approximately 2wks ago, dev. redness & swelling 6 days later. Went to Urgent care rx;d Doxycycline, took for 5 days pta with no improvement. Then presented to ED, d/c'd on Clinda. Called back into ED 2/2 elevated lactate 2.5 (repeated 3.0, then 2.4).  Admitted with right hand, 2nd digit cellulitis. S/p I&D by plastics Dr Dangelo on 10/25, cont local wound care. 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 No signs or symptoms suggestive of TB. Received IV Cefazolin transitioned to PO Bactrim and Ethambutol. Unable to use rifampin or biaxin due to interaction with methadone. [facility] from [facility] infection control in contact with MARIA LUISA whom has cleared the patient and will follow up with [employer]. Will need close ID follow up. Continue Ethambutol and Bactrim DS x 4 weeks. Patient may require 3 months of treatment in total. A1C noted to be 9.8 during admission, seen by diabetic educator.  Will need outpatient diabetic follow up.         Source of Infection: Finger  Antibiotic / Last Day: Ethambutol and Bactrim DS x 4 weeks      Code Status: Full     Discharging Provider:  Anuel Cee NP  Contact Info: [phone]. Please call with any questions or concerns.    Outpatient Provider: Dr. Antonio notified     time spent: 37 mins

## 2021-10-28 NOTE — DISCHARGE NOTE NURSING/CASE MANAGEMENT/SOCIAL WORK - NSDCPETBCESMAN_GEN_ALL_CORE
Group Topic: BH Activity Group    Date: June 1  Start Time:  2:20 PM  End Time:  3:30 PM    Focus: Stages of Change   Number in attendance: 9    An discussing the five stages of change was given. Discussed goal setting techniques and the benefits of a support system.       Patient Response:  Pt will send time with family, and work on her hobby of playing darts. Pt reports that she is working on stop smoking. Pt reports that she is in an action stages of cutting down her cigarettes count    If you are a smoker, it is important for your health to stop smoking. Please be aware that second hand smoke is also harmful.

## 2021-10-28 NOTE — PROGRESS NOTE ADULT - REASON FOR ADMISSION
cellulitis of right index finger

## 2021-10-28 NOTE — PROGRESS NOTE ADULT - SUBJECTIVE AND OBJECTIVE BOX
CC: f/u for    Patient reports    REVIEW OF SYSTEMS: no fevers, no chills, no nausea, no vomiting, no abd pain, no resp symptoms  All other review of systems negative (Comprehensive ROS)    Antimicrobials Day #    ethambutol 2400 milliGRAM(s) Oral every 24 hours  trimethoprim  160 mG/sulfamethoxazole 800 mG 1 Tablet(s) Oral two times a day    Other Medications Reviewed    T(F): 97.4 (10-28-21 @ 04:44), Max: 98.1 (10-27-21 @ 17:56)  HR: 83 (10-28-21 @ 04:44)  BP: 133/95 (10-28-21 @ 04:44)  RR: 16 (10-28-21 @ 04:44)  SpO2: 98% (10-28-21 @ 04:44)    PHYSICAL EXAM:  General: alert, no acute distress  Eyes:  anicteric, no conjunctival injection, no discharge  Oropharynx: no lesions or injection 	  Neck: supple, without adenopathy  Lungs: clear to auscultation  Heart: regular rate and rhythm; no murmur, rubs or gallops  Abdomen: soft, nondistended, nontender, without mass or organomegaly  Skin: no lesions  Extremities: no clubbing, cyanosis, or edema  Neurologic: alert, oriented, moves all extremities    LAB RESULTS:                        13.7   5.72  )-----------( 303      ( 28 Oct 2021 09:12 )             40.9     10-28    138  |  104  |  12  ----------------------------<  166<H>  4.2   |  28  |  0.73    Ca    9.1      28 Oct 2021 09:12    TPro  7.1  /  Alb  3.4  /  TBili  0.3  /  DBili  x   /  AST  11  /  ALT  29  /  AlkPhos  98  10-28    LIVER FUNCTIONS - ( 28 Oct 2021 09:12 )  Alb: 3.4 g/dL / Pro: 7.1 g/dL / ALK PHOS: 98 U/L / ALT: 29 U/L / AST: 11 U/L / GGT: x               MICROBIOLOGY REVIEWED:    RADIOLOGY REVIEWED:     CC: f/u for finger infection    Patient reports no new c/o, localized finger pain    REVIEW OF SYSTEMS: no fevers, no chills, no nausea, no vomiting, no abd pain, no resp symptoms  All other review of systems negative (Comprehensive ROS)    Antimicrobials Day #2    ethambutol 2400 milliGRAM(s) Oral every 24 hours  trimethoprim  160 mG/sulfamethoxazole 800 mG 1 Tablet(s) Oral two times a day    Other Medications Reviewed    T(F): 97.4 (10-28-21 @ 04:44), Max: 98.1 (10-27-21 @ 17:56)  HR: 83 (10-28-21 @ 04:44)  BP: 133/95 (10-28-21 @ 04:44)  RR: 16 (10-28-21 @ 04:44)  SpO2: 98% (10-28-21 @ 04:44)    PHYSICAL EXAM:  General: alert, no acute distress  Eyes:  anicteric, no conjunctival injection, no discharge  Oropharynx: no lesions or injection 	  Neck: supple, without adenopathy  Lungs: clear to auscultation  Heart: regular rate and rhythm; no murmur, rubs or gallops  Abdomen: soft, nondistended, nontender, without mass or organomegaly  Skin: no lesions  Extremities: no clubbing, cyanosis, or edema. index finger with open wound and swelling over palmar side middle phalynx  Neurologic: alert, oriented, moves all extremities    LAB RESULTS:                        13.7   5.72  )-----------( 303      ( 28 Oct 2021 09:12 )             40.9     10-28    138  |  104  |  12  ----------------------------<  166<H>  4.2   |  28  |  0.73    Ca    9.1      28 Oct 2021 09:12    TPro  7.1  /  Alb  3.4  /  TBili  0.3  /  DBili  x   /  AST  11  /  ALT  29  /  AlkPhos  98  10-28    LIVER FUNCTIONS - ( 28 Oct 2021 09:12 )  Alb: 3.4 g/dL / Pro: 7.1 g/dL / ALK PHOS: 98 U/L / ALT: 29 U/L / AST: 11 U/L / GGT: x               MICROBIOLOGY REVIEWED:  Culture - Acid Fast - Other w/Smear (10.26.21 @ 11:26)   Specimen Source: .Other Rt index finger   Acid Fast Bacilli Smear:   Few Acid fast bacillus seen by fluorochrome stain.   RADIOLOGY REVIEWED:

## 2021-10-28 NOTE — DISCHARGE NOTE NURSING/CASE MANAGEMENT/SOCIAL WORK - PATIENT PORTAL LINK FT
You can access the FollowMyHealth Patient Portal offered by [facility] by registering at the following website: [email or web address]. By joining [facility]’s FollowMyHealth portal, you will also be able to view your health information using other applications (apps) compatible with our system.

## 2021-10-28 NOTE — PROGRESS NOTE ADULT - ATTENDING COMMENTS
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  If patient is to remain as inpatient, can check sputum for AFB [id number].  However pt is cleared by MARIA LUISA if medically cleared for DC.

## 2021-10-28 NOTE — PROGRESS NOTE ADULT - ASSESSMENT
57yo HTN, DM2, neuropathy, chronic pain on Methadone & narcotics sec to cervical spine and back injury, presented with right index finger cellulitis. Reported pricked by fish hook ~2wks ago, dev. redness & swelling 6 days later. Went to Urgent care rx;d Doxycycline, took for 5 days pta with no improvement. Then presented to ED, d/c'd on Clinda. Called back into ED 2/2 elevated lactate 2.5 (repeated 3.0, then 2.4).      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  -Cefazolin transitioned to Bactrim and Ethambutol. Cannot use rifampin or biaxin due to interaction with methadone  -No signs or symptoms suggestive of TB  -As per MARIA LUISA will need 3 negative AFB sputums drawn first thing in the morning, 1st one sent today.   -CANNOT discharge patient until directed by MARIA LUISA  -ID recs appreciated;  MRI will not add anything to the management  -Pain control - added on prn Dilaudid 1mg IVP for now    *HTN  -cont Norvasc and Lisinopril  -cont to monitor vitals    *DM2 with hyperglycemia  HgbA1C 9.8  Diabetes educator recs appreciated  Home Metformin on hold   Cont Lantus 38 u q AM and ISS    *Chronic Pain, secondary to cervical spine injuries  -Cont home regimen, with additional prn meds  -I STOP performed on admission     *DVT ppx- OOB/Ambulation    Dispo:  CANNOT discharge until directed so by MARIA LUISA. Will likely need 3 negative sputums or sensitivities returned 55yo HTN, DM2, neuropathy, chronic pain on Methadone & narcotics sec to cervical spine and back injury, presented with right index finger cellulitis. Reported pricked by fish hook ~2wks ago, dev. redness & swelling 6 days later. Went to Urgent care rx;d Doxycycline, took for 5 days pta with no improvement. Then presented to ED, d/c'd on Clinda. Called back into ED 2/2 elevated lactate 2.5 (repeated 3.0, then 2.4).      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  -wound culture is AFB positive  -Cefazolin transitioned to Bactrim and Ethambutol. Cannot use rifampin or biaxin due to interaction with methadone  -No signs or symptoms suggestive of TB  -As per MARIA LUISA will need 3 negative AFB sputums drawn first thing in the morning, 1st one sent today.   -CANNOT discharge patient until directed by MARIA LUISA  -ID recs appreciated;  MRI will not add anything to the management  -Pain control - added on prn Dilaudid 1mg IVP for now    *HTN  -cont Norvasc and Lisinopril  -cont to monitor vitals    *DM2 with hyperglycemia  HgbA1C 9.8  Diabetes educator recs appreciated  Home Metformin on hold   Cont Lantus 38 u q AM and ISS    *Chronic Pain, secondary to cervical spine injuries  -Cont home regimen, with additional prn meds  -I STOP performed on admission     *DVT ppx- OOB/Ambulation    Dispo:  CANNOT discharge until directed so by MARIA LUISA. Will likely need 3 negative sputums or sensitivities returned

## 2021-10-28 NOTE — DISCHARGE NOTE PROVIDER - NSDCCPCAREPLAN_GEN_ALL_CORE_FT
PRINCIPAL DISCHARGE DIAGNOSIS  Diagnosis: Cellulitis of index finger, right  Assessment and Plan of Treatment: You grew a bacteria in your finger called brittni vargas. This requires special medications for treatment. You are being monitored by the department of health whom will help guide your case. You will require at least 1 month of Bactrim DS and ethambutol. you will require close follow up with infectious disease within 1 week. Visit an opthomologist in order to check for certain side effects Ethambutol may cause. follow wound care orders provided to you. You may clean the wound daily with soap and water, apply bacitracin twice daily and otherwise keep the wound dry. Call your PCP for fever, chills, severe pain or worsening symptoms.

## 2021-10-28 NOTE — DISCHARGE NOTE PROVIDER - NSDCMRMEDTOKEN_GEN_ALL_CORE_FT
alcohol swabs : Apply topically to affected area 4 times a day   amLODIPine 2.5 mg oral tablet: 1 tab(s) orally once a day  Bystolic 2.5 mg oral tablet: 1 tab(s) orally once a day  ethambutol 400 mg oral tablet: 6 tab(s) orally every 24 hours  glucometer (per patient&#x27;s insurance): Test blood sugars four times a day. Dispense #1 glucometer.  HYDROmorphone 4 mg oral tablet: 1 tab(s) orally every 6 hours  Insulin Pen Needles, 4mm: 1 application subcutaneously 4 times a day. ** Use with insulin pen **   Jardiance: orally once a day  lancets: 1 application subcutaneously 4 times a day   Lantus Solostar Pen 100 units/mL subcutaneous solution: 35 unit(s) subcutaneous once a day  lisinopril 10 mg oral tablet: 1 tab(s) orally once a day  Lyrica 150 mg oral capsule: 1 cap(s) orally 3 times a day  metFORMIN 500 mg oral tablet: 1 tab(s) orally 2 times a day   methadone 5 mg oral tablet: orally 4 times a day, As Needed  sulfamethoxazole-trimethoprim 800 mg-160 mg oral tablet: 1 tab(s) orally 2 times a day  tadalafil 5 mg oral tablet: 1 tab(s) orally once a day  test strips (per patient&#x27;s insurance): 1 application subcutaneously 4 times a day. ** Compatible with patient&#x27;s glucometer **  Trulicity Pen 1.5 mg/0.5 mL subcutaneous solution: 1 application subcutaneous once a week   Vitamin D2 50,000 intl units (1.25 mg) oral capsule:    alcohol swabs : Apply topically to affected area 4 times a day  for DM II   E11.9   amLODIPine 2.5 mg oral tablet: 1 tab(s) orally once a day  Bystolic 2.5 mg oral tablet: 1 tab(s) orally once a day  ethambutol 400 mg oral tablet: 6 tab(s) orally every 24 hours  glucometer (per patient&#x27;s insurance): Test blood sugars four times a day. Dispense #1 glucometer.  for DM II   E11.9   HYDROmorphone 4 mg oral tablet: 1 tab(s) orally every 6 hours  Insulin Pen Needles, 4mm: 1 application subcutaneously 4 times a day. ** Use with insulin pen **   for DM II   E11.9   Jardiance: orally once a day  lancets: 1 application subcutaneously 4 times a day   for DM II   E11.9   Lantus Solostar Pen 100 units/mL subcutaneous solution: 35 unit(s) subcutaneous once a day      lisinopril 10 mg oral tablet: 1 tab(s) orally once a day  Lyrica 150 mg oral capsule: 1 cap(s) orally 3 times a day  metFORMIN 500 mg oral tablet: 1 tab(s) orally 2 times a day   methadone 5 mg oral tablet: orally 4 times a day, As Needed  sulfamethoxazole-trimethoprim 800 mg-160 mg oral tablet: 1 tab(s) orally 2 times a day  tadalafil 5 mg oral tablet: 1 tab(s) orally once a day  test strips (per patient&#x27;s insurance): 1 application subcutaneously 4 times a day. ** Compatible with patient&#x27;s glucometer **  for DM II   E11.9   Trulicity Pen 1.5 mg/0.5 mL subcutaneous solution: 1 application subcutaneous once a week   Vitamin D2 50,000 intl units (1.25 mg) oral capsule:

## 2021-10-28 NOTE — PROGRESS NOTE ADULT - SUBJECTIVE AND OBJECTIVE BOX
Patient is a 56y old  Male who presents with a chief complaint of cellulitis of right index finger       Patient seen and examined at bedside. Pt c/o his finger throbbing , but otherwise feels fine, denies further complaints including chest pain, shortness of breath, cough, dizziness, night sweats, nausea, vomiting, diarrhea, fever or chill    ALLERGIES:  No Known Allergies    MEDICATIONS  (STANDING):  amLODIPine   Tablet 2.5 milliGRAM(s) Oral daily  BACItracin   Ointment 1 Application(s) Topical two times a day  dextrose 40% Gel 15 Gram(s) Oral once  dextrose 5%. 1000 milliLiter(s) (50 mL/Hr) IV Continuous <Continuous>  dextrose 5%. 1000 milliLiter(s) (100 mL/Hr) IV Continuous <Continuous>  dextrose 50% Injectable 25 Gram(s) IV Push once  dextrose 50% Injectable 12.5 Gram(s) IV Push once  dextrose 50% Injectable 25 Gram(s) IV Push once  diphtheria/tetanus/pertussis (acellular) Vaccine (ADAcel) 0.5 milliLiter(s) IntraMuscular once  ethambutol 2400 milliGRAM(s) Oral every 24 hours  glucagon  Injectable 1 milliGRAM(s) IntraMuscular once  HYDROmorphone   Tablet 4 milliGRAM(s) Oral <User Schedule>  insulin glargine Injectable (LANTUS) 38 Unit(s) SubCutaneous every morning  insulin lispro (ADMELOG) corrective regimen sliding scale   SubCutaneous <User Schedule>  insulin lispro (ADMELOG) corrective regimen sliding scale   SubCutaneous at bedtime  lisinopril 10 milliGRAM(s) Oral daily  methadone    Tablet 5 milliGRAM(s) Oral <User Schedule>  pregabalin 150 milliGRAM(s) Oral three times a day  trimethoprim  160 mG/sulfamethoxazole 800 mG 1 Tablet(s) Oral two times a day    MEDICATIONS  (PRN):  HYDROmorphone  Injectable 1 milliGRAM(s) IV Push every 6 hours PRN Severe Pain (7 - 10)  methadone    Tablet 5 milliGRAM(s) Oral <User Schedule> PRN Severe Pain (7 - 10)    Vital Signs Last 24 Hrs  T(F): 97.4 (28 Oct 2021 04:44), Max: 98.1 (27 Oct 2021 17:56)  HR: 83 (28 Oct 2021 04:44) (83 - 91)  BP: 133/95 (28 Oct 2021 04:44) (133/95 - 160/97)  RR: 16 (28 Oct 2021 04:44) (15 - 16)  SpO2: 98% (28 Oct 2021 04:44) (95% - 98%)  I&O's Summary    27 Oct 2021 07:01  -  28 Oct 2021 07:00  --------------------------------------------------------  IN: 800 mL / OUT: 0 mL / NET: 800 mL    28 Oct 2021 07:01  -  28 Oct 2021 12:59  --------------------------------------------------------  IN: 820 mL / OUT: 0 mL / NET: 820 mL      PHYSICAL EXAM:  General: NAD, A/O x 3  ENT: MMM, no oral thrush   Neck: Supple, No JVD  Lungs: Clear to auscultation bilaterally, non labored breathing  Cardio: RRR, S1/S2, No murmurs  Abdomen: Soft, Nontender, Nondistended; Bowel sounds present  Extremities: Right second finger wrapped, bandage dry,  No calf tenderness, No pitting edema    LABS:                        13.7   5.72  )-----------( 303      ( 28 Oct 2021 09:12 )             40.9     10-28    138  |  104  |  12  ----------------------------<  166  4.2   |  28  |  0.73    Ca    9.1      28 Oct 2021 09:12    TPro  7.1  /  Alb  3.4  /  TBili  0.3  /  DBili  x   /  AST  11  /  ALT  29  /  AlkPhos  98  10-28    eGFR if Non African American: 105 mL/min/1.73M2 (10-28-21 @ 09:12)  eGFR if African American: 122 mL/min/1.73M2 (10-28-21 @ 09:12)    PT/INR - ( 26 Oct 2021 05:50 )   PT: 10.8 sec;   INR: 0.89 ratio         PTT - ( 26 Oct 2021 05:50 )  PTT:28.3 sec                      POCT Blood Glucose.: 122 mg/dL (28 Oct 2021 11:44)  POCT Blood Glucose.: 141 mg/dL (27 Oct 2021 21:28)  POCT Blood Glucose.: 133 mg/dL (27 Oct 2021 16:46)          Culture - Acid Fast - Other w/Smear (collected 26 Oct 2021 11:26)  Source: .Other Rt index finger    Culture - Abscess with Gram Stain (collected 25 Oct 2021 13:50)  Source: .Abscess right index  Preliminary Report (27 Oct 2021 17:09):    Rare Coag Negative Staphylococcus    "Susceptibilities not performed"    Culture - Blood (collected 24 Oct 2021 13:00)  Source: .Blood Blood-Peripheral  Preliminary Report (25 Oct 2021 20:01):    No growth to date.    Culture - Blood (collected 24 Oct 2021 13:00)  Source: .Blood Blood-Peripheral  Preliminary Report (25 Oct 2021 20:01):    No growth to date.      COVID-19 PCR: NotDetec (10-24-21 @ 13:00)      RADIOLOGY & ADDITIONAL TESTS:    Care Discussed with Consultants/Other Providers:

## 2021-10-29 LAB
CULTURE RESULTS: SIGNIFICANT CHANGE UP
CULTURE RESULTS: SIGNIFICANT CHANGE UP
SPECIMEN SOURCE: SIGNIFICANT CHANGE UP
SPECIMEN SOURCE: SIGNIFICANT CHANGE UP

## 2021-10-29 RX ORDER — EMPAGLIFLOZIN 10 MG/1
0 TABLET, FILM COATED ORAL
Qty: 0 | Refills: 0 | DISCHARGE

## 2021-10-29 RX ORDER — DULAGLUTIDE 4.5 MG/.5ML
1 INJECTION, SOLUTION SUBCUTANEOUS
Qty: 4 | Refills: 0
Start: 2021-10-29 | End: 2021-11-27

## 2021-10-29 RX ORDER — METFORMIN HYDROCHLORIDE 850 MG/1
1 TABLET ORAL
Qty: 60 | Refills: 0
Start: 2021-10-29 | End: 2021-11-27

## 2021-10-29 RX ORDER — ENOXAPARIN SODIUM 100 MG/ML
35 INJECTION SUBCUTANEOUS
Qty: 0 | Refills: 0 | DISCHARGE

## 2021-10-29 RX ORDER — ENOXAPARIN SODIUM 100 MG/ML
35 INJECTION SUBCUTANEOUS
Qty: 1050 | Refills: 0
Start: 2021-10-29 | End: 2021-11-27

## 2021-10-29 RX ORDER — ISOPROPYL ALCOHOL, BENZOCAINE .7; .06 ML/ML; ML/ML
1 SWAB TOPICAL
Qty: 100 | Refills: 1
Start: 2021-10-29 | End: 2021-12-17

## 2021-10-30 LAB
CULTURE RESULTS: SIGNIFICANT CHANGE UP
GAMMA INTERFERON BACKGROUND BLD IA-ACNC: 0.02 IU/ML — SIGNIFICANT CHANGE UP
M TB IFN-G BLD-IMP: POSITIVE
M TB IFN-G CD4+ BCKGRND COR BLD-ACNC: 3.19 IU/ML — SIGNIFICANT CHANGE UP
M TB IFN-G CD4+CD8+ BCKGRND COR BLD-ACNC: 2.79 IU/ML — SIGNIFICANT CHANGE UP
QUANT TB PLUS MITOGEN MINUS NIL: 7.65 IU/ML — SIGNIFICANT CHANGE UP
SPECIMEN SOURCE: SIGNIFICANT CHANGE UP

## 2021-11-02 ENCOUNTER — APPOINTMENT (OUTPATIENT)
Dept: FAMILY MEDICINE | Facility: CLINIC | Age: 56
End: 2021-11-02

## 2021-11-05 ENCOUNTER — NON-APPOINTMENT (OUTPATIENT)
Age: 56
End: 2021-11-05

## 2021-11-09 ENCOUNTER — APPOINTMENT (OUTPATIENT)
Dept: FAMILY MEDICINE | Facility: CLINIC | Age: 56
End: 2021-11-09

## 2021-12-06 ENCOUNTER — RX RENEWAL (OUTPATIENT)
Age: 56
End: 2021-12-06

## 2021-12-06 LAB
CULTURE RESULTS: SIGNIFICANT CHANGE UP
SPECIMEN SOURCE: SIGNIFICANT CHANGE UP

## 2021-12-18 LAB
CULTURE RESULTS: SIGNIFICANT CHANGE UP
SPECIMEN SOURCE: SIGNIFICANT CHANGE UP

## 2022-01-11 ENCOUNTER — OUTPATIENT (OUTPATIENT)
Dept: OUTPATIENT SERVICES | Facility: HOSPITAL | Age: 57
LOS: 1 days | End: 2022-01-11
Payer: MEDICARE

## 2022-01-11 DIAGNOSIS — Z98.1 ARTHRODESIS STATUS: Chronic | ICD-10-CM

## 2022-01-11 DIAGNOSIS — Z98.890 OTHER SPECIFIED POSTPROCEDURAL STATES: Chronic | ICD-10-CM

## 2022-01-11 DIAGNOSIS — Z20.828 CONTACT WITH AND (SUSPECTED) EXPOSURE TO OTHER VIRAL COMMUNICABLE DISEASES: ICD-10-CM

## 2022-01-11 LAB — SARS-COV-2 RNA SPEC QL NAA+PROBE: SIGNIFICANT CHANGE UP

## 2022-01-11 PROCEDURE — U0005: CPT

## 2022-01-11 PROCEDURE — U0003: CPT

## 2022-01-13 ENCOUNTER — OUTPATIENT (OUTPATIENT)
Dept: OUTPATIENT SERVICES | Facility: HOSPITAL | Age: 57
LOS: 1 days | End: 2022-01-13
Payer: MEDICARE

## 2022-01-13 DIAGNOSIS — M54.16 RADICULOPATHY, LUMBAR REGION: ICD-10-CM

## 2022-01-13 DIAGNOSIS — Z98.890 OTHER SPECIFIED POSTPROCEDURAL STATES: Chronic | ICD-10-CM

## 2022-01-13 DIAGNOSIS — Z98.1 ARTHRODESIS STATUS: Chronic | ICD-10-CM

## 2022-01-13 LAB — GLUCOSE BLDC GLUCOMTR-MCNC: 243 MG/DL — HIGH (ref 70–99)

## 2022-01-13 PROCEDURE — 62323 NJX INTERLAMINAR LMBR/SAC: CPT

## 2022-01-13 PROCEDURE — 82962 GLUCOSE BLOOD TEST: CPT

## 2022-01-21 ENCOUNTER — RX RENEWAL (OUTPATIENT)
Age: 57
End: 2022-01-21

## 2022-01-27 ENCOUNTER — APPOINTMENT (OUTPATIENT)
Dept: FAMILY MEDICINE | Facility: CLINIC | Age: 57
End: 2022-01-27

## 2022-02-01 ENCOUNTER — OUTPATIENT (OUTPATIENT)
Dept: OUTPATIENT SERVICES | Facility: HOSPITAL | Age: 57
LOS: 1 days | End: 2022-02-01
Payer: MEDICARE

## 2022-02-01 DIAGNOSIS — Z20.828 CONTACT WITH AND (SUSPECTED) EXPOSURE TO OTHER VIRAL COMMUNICABLE DISEASES: ICD-10-CM

## 2022-02-01 DIAGNOSIS — Z98.890 OTHER SPECIFIED POSTPROCEDURAL STATES: Chronic | ICD-10-CM

## 2022-02-01 DIAGNOSIS — Z98.1 ARTHRODESIS STATUS: Chronic | ICD-10-CM

## 2022-02-01 LAB — SARS-COV-2 RNA SPEC QL NAA+PROBE: SIGNIFICANT CHANGE UP

## 2022-02-01 PROCEDURE — U0005: CPT

## 2022-02-01 PROCEDURE — U0003: CPT

## 2022-02-03 ENCOUNTER — OUTPATIENT (OUTPATIENT)
Dept: OUTPATIENT SERVICES | Facility: HOSPITAL | Age: 57
LOS: 1 days | End: 2022-02-03
Payer: MEDICARE

## 2022-02-03 DIAGNOSIS — Z98.1 ARTHRODESIS STATUS: Chronic | ICD-10-CM

## 2022-02-03 DIAGNOSIS — M54.16 RADICULOPATHY, LUMBAR REGION: ICD-10-CM

## 2022-02-03 DIAGNOSIS — Z98.890 OTHER SPECIFIED POSTPROCEDURAL STATES: Chronic | ICD-10-CM

## 2022-02-03 LAB — GLUCOSE BLDC GLUCOMTR-MCNC: 237 MG/DL — HIGH (ref 70–99)

## 2022-02-03 PROCEDURE — 62323 NJX INTERLAMINAR LMBR/SAC: CPT

## 2022-02-03 PROCEDURE — 82962 GLUCOSE BLOOD TEST: CPT

## 2022-02-28 ENCOUNTER — RX RENEWAL (OUTPATIENT)
Age: 57
End: 2022-02-28

## 2022-03-01 ENCOUNTER — EMERGENCY (EMERGENCY)
Facility: HOSPITAL | Age: 57
LOS: 1 days | Discharge: ANOTHER HOSPITAL | End: 2022-03-01
Attending: EMERGENCY MEDICINE | Admitting: EMERGENCY MEDICINE
Payer: MEDICARE

## 2022-03-01 ENCOUNTER — INPATIENT (INPATIENT)
Facility: HOSPITAL | Age: 57
LOS: 9 days | Discharge: HOME HEALTH | DRG: 250 | End: 2022-03-11
Attending: HOSPITALIST | Admitting: INTERNAL MEDICINE
Payer: MEDICARE

## 2022-03-01 VITALS
RESPIRATION RATE: 22 BRPM | WEIGHT: 239.42 LBS | TEMPERATURE: 96 F | HEART RATE: 88 BPM | DIASTOLIC BLOOD PRESSURE: 88 MMHG | OXYGEN SATURATION: 100 % | SYSTOLIC BLOOD PRESSURE: 114 MMHG | HEIGHT: 74 IN

## 2022-03-01 VITALS
RESPIRATION RATE: 20 BRPM | OXYGEN SATURATION: 100 % | HEART RATE: 155 BPM | DIASTOLIC BLOOD PRESSURE: 81 MMHG | SYSTOLIC BLOOD PRESSURE: 119 MMHG

## 2022-03-01 VITALS
DIASTOLIC BLOOD PRESSURE: 69 MMHG | RESPIRATION RATE: 24 BRPM | HEIGHT: 74 IN | SYSTOLIC BLOOD PRESSURE: 96 MMHG | HEART RATE: 85 BPM | TEMPERATURE: 97 F | OXYGEN SATURATION: 96 %

## 2022-03-01 DIAGNOSIS — Z98.890 OTHER SPECIFIED POSTPROCEDURAL STATES: Chronic | ICD-10-CM

## 2022-03-01 DIAGNOSIS — Z98.1 ARTHRODESIS STATUS: Chronic | ICD-10-CM
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
## 2022-03-01 LAB
ACETONE SERPL-MCNC: ABNORMAL
ALBUMIN SERPL ELPH-MCNC: 2.8 G/DL — LOW (ref 3.3–5)
ALBUMIN SERPL ELPH-MCNC: 2.9 G/DL — LOW (ref 3.3–5)
ALBUMIN SERPL ELPH-MCNC: 3 G/DL — LOW (ref 3.3–5)
ALP SERPL-CCNC: 120 U/L — SIGNIFICANT CHANGE UP (ref 40–120)
ALP SERPL-CCNC: 120 U/L — SIGNIFICANT CHANGE UP (ref 40–120)
ALP SERPL-CCNC: 97 U/L — SIGNIFICANT CHANGE UP (ref 40–120)
ALT FLD-CCNC: 180 U/L — HIGH (ref 10–45)
ALT FLD-CCNC: 195 U/L — HIGH (ref 10–45)
ALT FLD-CCNC: 261 U/L — HIGH (ref 10–45)
ANION GAP SERPL CALC-SCNC: 34 MMOL/L — HIGH (ref 5–17)
ANION GAP SERPL CALC-SCNC: 41 MMOL/L — HIGH (ref 5–17)
ANION GAP SERPL CALC-SCNC: 42 MMOL/L — HIGH (ref 5–17)
APPEARANCE UR: CLEAR — SIGNIFICANT CHANGE UP
APPEARANCE UR: CLEAR — SIGNIFICANT CHANGE UP
APTT BLD: 28.3 SEC — SIGNIFICANT CHANGE UP (ref 27.5–35.5)
APTT BLD: 41.4 SEC — HIGH (ref 27.5–35.5)
AST SERPL-CCNC: 101 U/L — HIGH (ref 10–40)
AST SERPL-CCNC: 193 U/L — HIGH (ref 10–40)
AST SERPL-CCNC: 81 U/L — HIGH (ref 10–40)
B PERT DNA SPEC QL NAA+PROBE: SIGNIFICANT CHANGE UP
B-OH-BUTYR SERPL-SCNC: >8 MMOL/L — HIGH
B-OH-BUTYR SERPL-SCNC: >8 MMOL/L — HIGH
BACTERIA # UR AUTO: ABNORMAL /HPF
BACTERIA # UR AUTO: NEGATIVE — SIGNIFICANT CHANGE UP
BASE EXCESS BLDA CALC-SCNC: -24.8 MMOL/L — LOW (ref -2–3)
BASOPHILS # BLD AUTO: 0 K/UL — SIGNIFICANT CHANGE UP (ref 0–0.2)
BASOPHILS # BLD AUTO: 0.14 K/UL — SIGNIFICANT CHANGE UP (ref 0–0.2)
BASOPHILS NFR BLD AUTO: 0 % — SIGNIFICANT CHANGE UP (ref 0–2)
BASOPHILS NFR BLD AUTO: 0.9 % — SIGNIFICANT CHANGE UP (ref 0–2)
BILIRUB SERPL-MCNC: 0.4 MG/DL — SIGNIFICANT CHANGE UP (ref 0.2–1.2)
BILIRUB SERPL-MCNC: 0.4 MG/DL — SIGNIFICANT CHANGE UP (ref 0.2–1.2)
BILIRUB SERPL-MCNC: 1 MG/DL — SIGNIFICANT CHANGE UP (ref 0.2–1.2)
BILIRUB UR-MCNC: NEGATIVE — SIGNIFICANT CHANGE UP
BILIRUB UR-MCNC: NEGATIVE — SIGNIFICANT CHANGE UP
BLD GP AB SCN SERPL QL: NEGATIVE — SIGNIFICANT CHANGE UP
BUN SERPL-MCNC: 41 MG/DL — HIGH (ref 7–23)
BUN SERPL-MCNC: 43 MG/DL — HIGH (ref 7–23)
BUN SERPL-MCNC: 44 MG/DL — HIGH (ref 7–23)
BURR CELLS BLD QL SMEAR: PRESENT — SIGNIFICANT CHANGE UP
BURR CELLS BLD QL SMEAR: SLIGHT — SIGNIFICANT CHANGE UP
C PNEUM DNA SPEC QL NAA+PROBE: SIGNIFICANT CHANGE UP
CALCIUM SERPL-MCNC: 10.5 MG/DL — SIGNIFICANT CHANGE UP (ref 8.4–10.5)
CALCIUM SERPL-MCNC: 11.8 MG/DL — HIGH (ref 8.4–10.5)
CALCIUM SERPL-MCNC: 12.7 MG/DL — HIGH (ref 8.4–10.5)
CHLORIDE SERPL-SCNC: 82 MMOL/L — LOW (ref 96–108)
CHLORIDE SERPL-SCNC: 87 MMOL/L — LOW (ref 96–108)
CHLORIDE SERPL-SCNC: 90 MMOL/L — LOW (ref 96–108)
CK MB BLD-MCNC: 0.2 % — SIGNIFICANT CHANGE UP (ref 0–3.5)
CK MB BLD-MCNC: 0.3 % — SIGNIFICANT CHANGE UP (ref 0–3.5)
CK MB CFR SERPL CALC: 19.2 NG/ML — HIGH (ref 0–6.7)
CK MB CFR SERPL CALC: 19.6 NG/ML — HIGH (ref 0–6.7)
CK SERPL-CCNC: 6120 U/L — HIGH (ref 30–200)
CK SERPL-CCNC: 9209 U/L — HIGH (ref 30–200)
CO2 BLDA-SCNC: 4 MMOL/L — LOW (ref 19–24)
CO2 SERPL-SCNC: 5 MMOL/L — CRITICAL LOW (ref 22–31)
CO2 SERPL-SCNC: <10 MMOL/L — CRITICAL LOW (ref 22–31)
CO2 SERPL-SCNC: <10 MMOL/L — CRITICAL LOW (ref 22–31)
COLOR SPEC: COLORLESS — SIGNIFICANT CHANGE UP
COLOR SPEC: YELLOW — SIGNIFICANT CHANGE UP
COMMENT - URINE: SIGNIFICANT CHANGE UP
CREAT SERPL-MCNC: 1.01 MG/DL — SIGNIFICANT CHANGE UP (ref 0.5–1.3)
CREAT SERPL-MCNC: 1.24 MG/DL — SIGNIFICANT CHANGE UP (ref 0.5–1.3)
CREAT SERPL-MCNC: 2.14 MG/DL — HIGH (ref 0.5–1.3)
DACRYOCYTES BLD QL SMEAR: SLIGHT — SIGNIFICANT CHANGE UP
DIFF PNL FLD: ABNORMAL
DIFF PNL FLD: ABNORMAL
EGFR: 36 ML/MIN/1.73M2 — LOW
EGFR: 69 ML/MIN/1.73M2 — SIGNIFICANT CHANGE UP
EGFR: 88 ML/MIN/1.73M2 — SIGNIFICANT CHANGE UP
ELLIPTOCYTES BLD QL SMEAR: SLIGHT — SIGNIFICANT CHANGE UP
EOSINOPHIL # BLD AUTO: 0 K/UL — SIGNIFICANT CHANGE UP (ref 0–0.5)
EOSINOPHIL # BLD AUTO: 0.16 K/UL — SIGNIFICANT CHANGE UP (ref 0–0.5)
EOSINOPHIL NFR BLD AUTO: 0 % — SIGNIFICANT CHANGE UP (ref 0–6)
EOSINOPHIL NFR BLD AUTO: 1 % — SIGNIFICANT CHANGE UP (ref 0–6)
EPI CELLS # UR: 0 /HPF — SIGNIFICANT CHANGE UP
EPI CELLS # UR: SIGNIFICANT CHANGE UP
FLUAV H1 2009 PAND RNA SPEC QL NAA+PROBE: SIGNIFICANT CHANGE UP
FLUAV H1 RNA SPEC QL NAA+PROBE: SIGNIFICANT CHANGE UP
FLUAV H3 RNA SPEC QL NAA+PROBE: SIGNIFICANT CHANGE UP
FLUAV SUBTYP SPEC NAA+PROBE: SIGNIFICANT CHANGE UP
FLUBV RNA SPEC QL NAA+PROBE: SIGNIFICANT CHANGE UP
GAS PNL BLDA: SIGNIFICANT CHANGE UP
GIANT PLATELETS BLD QL SMEAR: PRESENT — SIGNIFICANT CHANGE UP
GLUCOSE BLDC GLUCOMTR-MCNC: 224 MG/DL — HIGH (ref 70–99)
GLUCOSE BLDC GLUCOMTR-MCNC: 269 MG/DL — HIGH (ref 70–99)
GLUCOSE BLDC GLUCOMTR-MCNC: 290 MG/DL — HIGH (ref 70–99)
GLUCOSE BLDC GLUCOMTR-MCNC: 299 MG/DL — HIGH (ref 70–99)
GLUCOSE BLDC GLUCOMTR-MCNC: 429 MG/DL — HIGH (ref 70–99)
GLUCOSE BLDC GLUCOMTR-MCNC: 448 MG/DL — HIGH (ref 70–99)
GLUCOSE BLDC GLUCOMTR-MCNC: 463 MG/DL — CRITICAL HIGH (ref 70–99)
GLUCOSE BLDC GLUCOMTR-MCNC: 473 MG/DL — CRITICAL HIGH (ref 70–99)
GLUCOSE BLDC GLUCOMTR-MCNC: 498 MG/DL — CRITICAL HIGH (ref 70–99)
GLUCOSE BLDC GLUCOMTR-MCNC: 499 MG/DL — CRITICAL HIGH (ref 70–99)
GLUCOSE BLDC GLUCOMTR-MCNC: 503 MG/DL — CRITICAL HIGH (ref 70–99)
GLUCOSE BLDC GLUCOMTR-MCNC: 582 MG/DL — CRITICAL HIGH (ref 70–99)
GLUCOSE SERPL-MCNC: 371 MG/DL — HIGH (ref 70–99)
GLUCOSE SERPL-MCNC: 589 MG/DL — CRITICAL HIGH (ref 70–99)
GLUCOSE SERPL-MCNC: 663 MG/DL — CRITICAL HIGH (ref 70–99)
GLUCOSE UR QL: 1000 MG/DL
GLUCOSE UR QL: ABNORMAL
HADV DNA SPEC QL NAA+PROBE: SIGNIFICANT CHANGE UP
HCO3 BLDA-SCNC: 3 MMOL/L — CRITICAL LOW (ref 21–28)
HCOV PNL SPEC NAA+PROBE: SIGNIFICANT CHANGE UP
HCT VFR BLD CALC: 43.5 % — SIGNIFICANT CHANGE UP (ref 39–50)
HCT VFR BLD CALC: 45.1 % — SIGNIFICANT CHANGE UP (ref 39–50)
HGB BLD-MCNC: 13.9 G/DL — SIGNIFICANT CHANGE UP (ref 13–17)
HGB BLD-MCNC: 15.1 G/DL — SIGNIFICANT CHANGE UP (ref 13–17)
HMPV RNA SPEC QL NAA+PROBE: SIGNIFICANT CHANGE UP
HOROWITZ INDEX BLDA+IHG-RTO: 21 — SIGNIFICANT CHANGE UP
HPIV1 RNA SPEC QL NAA+PROBE: SIGNIFICANT CHANGE UP
HPIV2 RNA SPEC QL NAA+PROBE: SIGNIFICANT CHANGE UP
HPIV3 RNA SPEC QL NAA+PROBE: SIGNIFICANT CHANGE UP
HPIV4 RNA SPEC QL NAA+PROBE: SIGNIFICANT CHANGE UP
HYALINE CASTS # UR AUTO: 3 /LPF — HIGH (ref 0–2)
INR BLD: 1.21 RATIO — HIGH (ref 0.88–1.16)
INR BLD: 1.26 RATIO — HIGH (ref 0.88–1.16)
KETONES UR-MCNC: ABNORMAL
KETONES UR-MCNC: ABNORMAL
LACTATE SERPL-SCNC: 2.3 MMOL/L — HIGH (ref 0.7–2)
LACTATE SERPL-SCNC: 5.6 MMOL/L — CRITICAL HIGH (ref 0.7–2)
LEUKOCYTE ESTERASE UR-ACNC: NEGATIVE — SIGNIFICANT CHANGE UP
LEUKOCYTE ESTERASE UR-ACNC: NEGATIVE — SIGNIFICANT CHANGE UP
LG PLATELETS BLD QL AUTO: SLIGHT — SIGNIFICANT CHANGE UP
LYMPHOCYTES # BLD AUTO: 1.57 K/UL — SIGNIFICANT CHANGE UP (ref 1–3.3)
LYMPHOCYTES # BLD AUTO: 1.71 K/UL — SIGNIFICANT CHANGE UP (ref 1–3.3)
LYMPHOCYTES # BLD AUTO: 11 % — LOW (ref 13–44)
LYMPHOCYTES # BLD AUTO: 9.8 % — LOW (ref 13–44)
MAGNESIUM SERPL-MCNC: 1.9 MG/DL — SIGNIFICANT CHANGE UP (ref 1.6–2.6)
MAGNESIUM SERPL-MCNC: 2.4 MG/DL — SIGNIFICANT CHANGE UP (ref 1.6–2.6)
MAGNESIUM SERPL-MCNC: 2.7 MG/DL — HIGH (ref 1.6–2.6)
MANUAL SMEAR VERIFICATION: SIGNIFICANT CHANGE UP
MANUAL SMEAR VERIFICATION: SIGNIFICANT CHANGE UP
MCHC RBC-ENTMCNC: 30 PG — SIGNIFICANT CHANGE UP (ref 27–34)
MCHC RBC-ENTMCNC: 30.2 PG — SIGNIFICANT CHANGE UP (ref 27–34)
MCHC RBC-ENTMCNC: 32 GM/DL — SIGNIFICANT CHANGE UP (ref 32–36)
MCHC RBC-ENTMCNC: 33.5 GM/DL — SIGNIFICANT CHANGE UP (ref 32–36)
MCV RBC AUTO: 90.2 FL — SIGNIFICANT CHANGE UP (ref 80–100)
MCV RBC AUTO: 94 FL — SIGNIFICANT CHANGE UP (ref 80–100)
MONOCYTES # BLD AUTO: 2.15 K/UL — HIGH (ref 0–0.9)
MONOCYTES # BLD AUTO: 2.95 K/UL — HIGH (ref 0–0.9)
MONOCYTES NFR BLD AUTO: 13.4 % — SIGNIFICANT CHANGE UP (ref 2–14)
MONOCYTES NFR BLD AUTO: 19 % — HIGH (ref 2–14)
MYELOCYTES NFR BLD: 1 % — HIGH (ref 0–0)
NEUTROPHILS # BLD AUTO: 10.56 K/UL — HIGH (ref 1.8–7.4)
NEUTROPHILS # BLD AUTO: 12.18 K/UL — HIGH (ref 1.8–7.4)
NEUTROPHILS NFR BLD AUTO: 67 % — SIGNIFICANT CHANGE UP (ref 43–77)
NEUTROPHILS NFR BLD AUTO: 68.8 % — SIGNIFICANT CHANGE UP (ref 43–77)
NEUTS BAND # BLD: 1 % — SIGNIFICANT CHANGE UP (ref 0–8)
NEUTS BAND # BLD: 7.1 % — SIGNIFICANT CHANGE UP (ref 0–8)
NITRITE UR-MCNC: NEGATIVE — SIGNIFICANT CHANGE UP
NITRITE UR-MCNC: NEGATIVE — SIGNIFICANT CHANGE UP
NRBC # BLD: 0 /100 — SIGNIFICANT CHANGE UP (ref 0–0)
NT-PROBNP SERPL-SCNC: 5054 PG/ML — HIGH (ref 0–300)
OVALOCYTES BLD QL SMEAR: SLIGHT — SIGNIFICANT CHANGE UP
PCO2 BLDA: <19 MMHG — LOW (ref 35–48)
PH BLDA: 7.19 — CRITICAL LOW (ref 7.35–7.45)
PH UR: 5 — SIGNIFICANT CHANGE UP (ref 5–8)
PH UR: 5 — SIGNIFICANT CHANGE UP (ref 5–8)
PHOSPHATE SERPL-MCNC: 2.4 MG/DL — LOW (ref 2.5–4.5)
PHOSPHATE SERPL-MCNC: 6.7 MG/DL — HIGH (ref 2.5–4.5)
PHOSPHATE SERPL-MCNC: 9.5 MG/DL — HIGH (ref 2.5–4.5)
PLAT MORPH BLD: ABNORMAL
PLAT MORPH BLD: NORMAL — SIGNIFICANT CHANGE UP
PLATELET # BLD AUTO: 373 K/UL — SIGNIFICANT CHANGE UP (ref 150–400)
PLATELET # BLD AUTO: 405 K/UL — HIGH (ref 150–400)
PO2 BLDA: 133 MMHG — HIGH (ref 83–108)
POIKILOCYTOSIS BLD QL AUTO: SIGNIFICANT CHANGE UP
POIKILOCYTOSIS BLD QL AUTO: SLIGHT — SIGNIFICANT CHANGE UP
POLYCHROMASIA BLD QL SMEAR: SLIGHT — SIGNIFICANT CHANGE UP
POLYCHROMASIA BLD QL SMEAR: SLIGHT — SIGNIFICANT CHANGE UP
POTASSIUM SERPL-MCNC: 3.2 MMOL/L — LOW (ref 3.5–5.3)
POTASSIUM SERPL-MCNC: 3.7 MMOL/L — SIGNIFICANT CHANGE UP (ref 3.5–5.3)
POTASSIUM SERPL-MCNC: 3.7 MMOL/L — SIGNIFICANT CHANGE UP (ref 3.5–5.3)
POTASSIUM SERPL-SCNC: 3.2 MMOL/L — LOW (ref 3.5–5.3)
POTASSIUM SERPL-SCNC: 3.7 MMOL/L — SIGNIFICANT CHANGE UP (ref 3.5–5.3)
POTASSIUM SERPL-SCNC: 3.7 MMOL/L — SIGNIFICANT CHANGE UP (ref 3.5–5.3)
PROT SERPL-MCNC: 6 G/DL — SIGNIFICANT CHANGE UP (ref 6–8.3)
PROT SERPL-MCNC: 6.4 G/DL — SIGNIFICANT CHANGE UP (ref 6–8.3)
PROT SERPL-MCNC: 7.4 G/DL — SIGNIFICANT CHANGE UP (ref 6–8.3)
PROT UR-MCNC: 30 MG/DL
PROT UR-MCNC: SIGNIFICANT CHANGE UP
PROTHROM AB SERPL-ACNC: 14.5 SEC — HIGH (ref 10.5–13.4)
PROTHROM AB SERPL-ACNC: 14.6 SEC — HIGH (ref 10.5–13.4)
RAPID RVP RESULT: SIGNIFICANT CHANGE UP
RBC # BLD: 4.63 M/UL — SIGNIFICANT CHANGE UP (ref 4.2–5.8)
RBC # BLD: 5 M/UL — SIGNIFICANT CHANGE UP (ref 4.2–5.8)
RBC # FLD: 13.7 % — SIGNIFICANT CHANGE UP (ref 10.3–14.5)
RBC # FLD: 13.7 % — SIGNIFICANT CHANGE UP (ref 10.3–14.5)
RBC BLD AUTO: ABNORMAL
RBC BLD AUTO: ABNORMAL
RBC CASTS # UR COMP ASSIST: 2 /HPF — SIGNIFICANT CHANGE UP (ref 0–4)
RBC CASTS # UR COMP ASSIST: SIGNIFICANT CHANGE UP /HPF (ref 0–4)
RH IG SCN BLD-IMP: POSITIVE — SIGNIFICANT CHANGE UP
RSV RNA SPEC QL NAA+PROBE: SIGNIFICANT CHANGE UP
RV+EV RNA SPEC QL NAA+PROBE: SIGNIFICANT CHANGE UP
SAO2 % BLDA: 98.9 % — HIGH (ref 94–98)
SARS-COV-2 RNA SPEC QL NAA+PROBE: SIGNIFICANT CHANGE UP
SODIUM SERPL-SCNC: 129 MMOL/L — LOW (ref 135–145)
SODIUM SERPL-SCNC: 130 MMOL/L — LOW (ref 135–145)
SODIUM SERPL-SCNC: 131 MMOL/L — LOW (ref 135–145)
SP GR SPEC: 1.02 — SIGNIFICANT CHANGE UP (ref 1.01–1.02)
SP GR SPEC: 1.02 — SIGNIFICANT CHANGE UP (ref 1.01–1.02)
TROPONIN I, HIGH SENSITIVITY RESULT: 7560.2 NG/L — HIGH
TROPONIN T, HIGH SENSITIVITY RESULT: 1234 NG/L — HIGH (ref 0–51)
TROPONIN T, HIGH SENSITIVITY RESULT: 847 NG/L — HIGH (ref 0–51)
UROBILINOGEN FLD QL: NEGATIVE — SIGNIFICANT CHANGE UP
UROBILINOGEN FLD QL: NEGATIVE — SIGNIFICANT CHANGE UP
WBC # BLD: 15.53 K/UL — HIGH (ref 3.8–10.5)
WBC # BLD: 16.05 K/UL — HIGH (ref 3.8–10.5)
WBC # FLD AUTO: 15.53 K/UL — HIGH (ref 3.8–10.5)
WBC # FLD AUTO: 16.05 K/UL — HIGH (ref 3.8–10.5)
WBC UR QL: 0 /HPF — SIGNIFICANT CHANGE UP (ref 0–5)
WBC UR QL: SIGNIFICANT CHANGE UP /HPF (ref 0–5)

## 2022-03-01 PROCEDURE — 93010 ELECTROCARDIOGRAM REPORT: CPT

## 2022-03-01 PROCEDURE — 84100 ASSAY OF PHOSPHORUS: CPT

## 2022-03-01 PROCEDURE — 99285 EMERGENCY DEPT VISIT HI MDM: CPT

## 2022-03-01 PROCEDURE — 99291 CRITICAL CARE FIRST HOUR: CPT | Mod: 25

## 2022-03-01 PROCEDURE — 0225U NFCT DS DNA&RNA 21 SARSCOV2: CPT

## 2022-03-01 PROCEDURE — 93010 ELECTROCARDIOGRAM REPORT: CPT | Mod: 77

## 2022-03-01 PROCEDURE — 87086 URINE CULTURE/COLONY COUNT: CPT

## 2022-03-01 PROCEDURE — 93321 DOPPLER ECHO F-UP/LMTD STD: CPT | Mod: 26

## 2022-03-01 PROCEDURE — 99292 CRITICAL CARE ADDL 30 MIN: CPT | Mod: 25

## 2022-03-01 PROCEDURE — 80053 COMPREHEN METABOLIC PANEL: CPT

## 2022-03-01 PROCEDURE — 84484 ASSAY OF TROPONIN QUANT: CPT

## 2022-03-01 PROCEDURE — 83605 ASSAY OF LACTIC ACID: CPT

## 2022-03-01 PROCEDURE — 96375 TX/PRO/DX INJ NEW DRUG ADDON: CPT

## 2022-03-01 PROCEDURE — 93005 ELECTROCARDIOGRAM TRACING: CPT

## 2022-03-01 PROCEDURE — 36415 COLL VENOUS BLD VENIPUNCTURE: CPT

## 2022-03-01 PROCEDURE — 96374 THER/PROPH/DIAG INJ IV PUSH: CPT

## 2022-03-01 PROCEDURE — 85610 PROTHROMBIN TIME: CPT

## 2022-03-01 PROCEDURE — 36600 WITHDRAWAL OF ARTERIAL BLOOD: CPT

## 2022-03-01 PROCEDURE — 85025 COMPLETE CBC W/AUTO DIFF WBC: CPT

## 2022-03-01 PROCEDURE — 82962 GLUCOSE BLOOD TEST: CPT

## 2022-03-01 PROCEDURE — 93308 TTE F-UP OR LMTD: CPT | Mod: 26

## 2022-03-01 PROCEDURE — 87040 BLOOD CULTURE FOR BACTERIA: CPT

## 2022-03-01 PROCEDURE — 99285 EMERGENCY DEPT VISIT HI MDM: CPT | Mod: 25

## 2022-03-01 PROCEDURE — 82803 BLOOD GASES ANY COMBINATION: CPT

## 2022-03-01 PROCEDURE — 83735 ASSAY OF MAGNESIUM: CPT

## 2022-03-01 PROCEDURE — 81001 URINALYSIS AUTO W/SCOPE: CPT

## 2022-03-01 PROCEDURE — 85730 THROMBOPLASTIN TIME PARTIAL: CPT

## 2022-03-01 PROCEDURE — 36620 INSERTION CATHETER ARTERY: CPT

## 2022-03-01 PROCEDURE — 82009 KETONE BODYS QUAL: CPT

## 2022-03-01 RX ORDER — ASPIRIN/CALCIUM CARB/MAGNESIUM 324 MG
81 TABLET ORAL DAILY
Refills: 0 | Status: DISCONTINUED | OUTPATIENT
Start: 2022-03-02 | End: 2022-03-03

## 2022-03-01 RX ORDER — SODIUM CHLORIDE 9 MG/ML
1000 INJECTION, SOLUTION INTRAVENOUS
Refills: 0 | Status: DISCONTINUED | OUTPATIENT
Start: 2022-03-01 | End: 2022-03-01

## 2022-03-01 RX ORDER — SODIUM BICARBONATE 1 MEQ/ML
50 SYRINGE (ML) INTRAVENOUS
Refills: 0 | Status: COMPLETED | OUTPATIENT
Start: 2022-03-01 | End: 2022-03-01

## 2022-03-01 RX ORDER — SODIUM CHLORIDE 9 MG/ML
1000 INJECTION INTRAMUSCULAR; INTRAVENOUS; SUBCUTANEOUS ONCE
Refills: 0 | Status: COMPLETED | OUTPATIENT
Start: 2022-03-01 | End: 2022-03-01

## 2022-03-01 RX ORDER — NITROGLYCERIN 6.5 MG
10 CAPSULE, EXTENDED RELEASE ORAL
Qty: 50 | Refills: 0 | Status: DISCONTINUED | OUTPATIENT
Start: 2022-03-01 | End: 2022-03-04

## 2022-03-01 RX ORDER — SODIUM CHLORIDE 9 MG/ML
1000 INJECTION, SOLUTION INTRAVENOUS
Refills: 0 | Status: COMPLETED | OUTPATIENT
Start: 2022-03-01 | End: 2022-03-01

## 2022-03-01 RX ORDER — POTASSIUM CHLORIDE 20 MEQ
40 PACKET (EA) ORAL ONCE
Refills: 0 | Status: COMPLETED | OUTPATIENT
Start: 2022-03-01 | End: 2022-03-01

## 2022-03-01 RX ORDER — HEPARIN SODIUM 5000 [USP'U]/ML
4000 INJECTION INTRAVENOUS; SUBCUTANEOUS ONCE
Refills: 0 | Status: COMPLETED | OUTPATIENT
Start: 2022-03-01 | End: 2022-03-01

## 2022-03-01 RX ORDER — CHLORHEXIDINE GLUCONATE 213 G/1000ML
1 SOLUTION TOPICAL
Refills: 0 | Status: DISCONTINUED | OUTPATIENT
Start: 2022-03-01 | End: 2022-03-08

## 2022-03-01 RX ORDER — HEPARIN SODIUM 5000 [USP'U]/ML
1700 INJECTION INTRAVENOUS; SUBCUTANEOUS
Qty: 25000 | Refills: 0 | Status: DISCONTINUED | OUTPATIENT
Start: 2022-03-01 | End: 2022-03-04

## 2022-03-01 RX ORDER — HEPARIN SODIUM 5000 [USP'U]/ML
4000 INJECTION INTRAVENOUS; SUBCUTANEOUS EVERY 6 HOURS
Refills: 0 | Status: DISCONTINUED | OUTPATIENT
Start: 2022-03-01 | End: 2022-03-04

## 2022-03-01 RX ORDER — DEXTROSE 50 % IN WATER 50 %
50 SYRINGE (ML) INTRAVENOUS
Refills: 0 | Status: DISCONTINUED | OUTPATIENT
Start: 2022-03-01 | End: 2022-03-03

## 2022-03-01 RX ORDER — INFLUENZA VIRUS VACCINE 15; 15; 15; 15 UG/.5ML; UG/.5ML; UG/.5ML; UG/.5ML
0.5 SUSPENSION INTRAMUSCULAR ONCE
Refills: 0 | Status: DISCONTINUED | OUTPATIENT
Start: 2022-03-01 | End: 2022-03-11

## 2022-03-01 RX ORDER — METOPROLOL TARTRATE 50 MG
12.5 TABLET ORAL
Refills: 0 | Status: DISCONTINUED | OUTPATIENT
Start: 2022-03-01 | End: 2022-03-03

## 2022-03-01 RX ORDER — TICAGRELOR 90 MG/1
90 TABLET ORAL EVERY 12 HOURS
Refills: 0 | Status: DISCONTINUED | OUTPATIENT
Start: 2022-03-01 | End: 2022-03-05

## 2022-03-01 RX ORDER — INSULIN HUMAN 100 [IU]/ML
6 INJECTION, SOLUTION SUBCUTANEOUS ONCE
Refills: 0 | Status: COMPLETED | OUTPATIENT
Start: 2022-03-01 | End: 2022-03-01

## 2022-03-01 RX ORDER — DILTIAZEM HCL 120 MG
10 CAPSULE, EXT RELEASE 24 HR ORAL ONCE
Refills: 0 | Status: COMPLETED | OUTPATIENT
Start: 2022-03-01 | End: 2022-03-01

## 2022-03-01 RX ORDER — MAGNESIUM SULFATE 500 MG/ML
2 VIAL (ML) INJECTION ONCE
Refills: 0 | Status: COMPLETED | OUTPATIENT
Start: 2022-03-01 | End: 2022-03-01

## 2022-03-01 RX ORDER — TICAGRELOR 90 MG/1
180 TABLET ORAL ONCE
Refills: 0 | Status: COMPLETED | OUTPATIENT
Start: 2022-03-01 | End: 2022-03-01

## 2022-03-01 RX ORDER — DILTIAZEM HCL 120 MG
30 CAPSULE, EXT RELEASE 24 HR ORAL ONCE
Refills: 0 | Status: COMPLETED | OUTPATIENT
Start: 2022-03-01 | End: 2022-03-01

## 2022-03-01 RX ORDER — SODIUM CHLORIDE 9 MG/ML
2500 INJECTION INTRAMUSCULAR; INTRAVENOUS; SUBCUTANEOUS ONCE
Refills: 0 | Status: COMPLETED | OUTPATIENT
Start: 2022-03-01 | End: 2022-03-01

## 2022-03-01 RX ORDER — POTASSIUM CHLORIDE 20 MEQ
10 PACKET (EA) ORAL
Refills: 0 | Status: COMPLETED | OUTPATIENT
Start: 2022-03-01 | End: 2022-03-01

## 2022-03-01 RX ORDER — INSULIN HUMAN 100 [IU]/ML
6 INJECTION, SOLUTION SUBCUTANEOUS
Qty: 100 | Refills: 0 | Status: DISCONTINUED | OUTPATIENT
Start: 2022-03-01 | End: 2022-03-04

## 2022-03-01 RX ORDER — ATORVASTATIN CALCIUM 80 MG/1
80 TABLET, FILM COATED ORAL AT BEDTIME
Refills: 0 | Status: DISCONTINUED | OUTPATIENT
Start: 2022-03-01 | End: 2022-03-11

## 2022-03-01 RX ORDER — HEPARIN SODIUM 5000 [USP'U]/ML
INJECTION INTRAVENOUS; SUBCUTANEOUS
Qty: 25000 | Refills: 0 | Status: DISCONTINUED | OUTPATIENT
Start: 2022-03-01 | End: 2022-03-04

## 2022-03-01 RX ORDER — SODIUM BICARBONATE 1 MEQ/ML
0.1 SYRINGE (ML) INTRAVENOUS
Qty: 75 | Refills: 0 | Status: DISCONTINUED | OUTPATIENT
Start: 2022-03-01 | End: 2022-03-03

## 2022-03-01 RX ORDER — PIPERACILLIN AND TAZOBACTAM 4; .5 G/20ML; G/20ML
3.38 INJECTION, POWDER, LYOPHILIZED, FOR SOLUTION INTRAVENOUS ONCE
Refills: 0 | Status: DISCONTINUED | OUTPATIENT
Start: 2022-03-01 | End: 2022-03-04

## 2022-03-01 RX ORDER — MORPHINE SULFATE 50 MG/1
2 CAPSULE, EXTENDED RELEASE ORAL ONCE
Refills: 0 | Status: DISCONTINUED | OUTPATIENT
Start: 2022-03-01 | End: 2022-03-01

## 2022-03-01 RX ORDER — HYDRALAZINE HCL 50 MG
10 TABLET ORAL EVERY 8 HOURS
Refills: 0 | Status: DISCONTINUED | OUTPATIENT
Start: 2022-03-01 | End: 2022-03-02

## 2022-03-01 RX ORDER — SODIUM CHLORIDE 9 MG/ML
1000 INJECTION, SOLUTION INTRAVENOUS
Refills: 0 | Status: DISCONTINUED | OUTPATIENT
Start: 2022-03-01 | End: 2022-03-02

## 2022-03-01 RX ORDER — NITROGLYCERIN 6.5 MG
0.4 CAPSULE, EXTENDED RELEASE ORAL ONCE
Refills: 0 | Status: COMPLETED | OUTPATIENT
Start: 2022-03-01 | End: 2022-03-01

## 2022-03-01 RX ORDER — ISOSORBIDE DINITRATE 5 MG/1
10 TABLET ORAL THREE TIMES A DAY
Refills: 0 | Status: DISCONTINUED | OUTPATIENT
Start: 2022-03-01 | End: 2022-03-02

## 2022-03-01 RX ADMIN — INSULIN HUMAN 6 UNIT(S): 100 INJECTION, SOLUTION SUBCUTANEOUS at 14:58

## 2022-03-01 RX ADMIN — Medication 50 MILLIEQUIVALENT(S): at 15:33

## 2022-03-01 RX ADMIN — Medication 50 MILLIEQUIVALENT(S): at 14:23

## 2022-03-01 RX ADMIN — ISOSORBIDE DINITRATE 10 MILLIGRAM(S): 5 TABLET ORAL at 21:18

## 2022-03-01 RX ADMIN — Medication 50 MILLIEQUIVALENT(S): at 14:55

## 2022-03-01 RX ADMIN — Medication 50 MILLIEQUIVALENT(S): at 17:07

## 2022-03-01 RX ADMIN — TICAGRELOR 180 MILLIGRAM(S): 90 TABLET ORAL at 12:15

## 2022-03-01 RX ADMIN — Medication 150 MEQ/KG/HR: at 19:52

## 2022-03-01 RX ADMIN — SODIUM CHLORIDE 1000 MILLILITER(S): 9 INJECTION INTRAMUSCULAR; INTRAVENOUS; SUBCUTANEOUS at 14:09

## 2022-03-01 RX ADMIN — Medication 150 MEQ/KG/HR: at 15:26

## 2022-03-01 RX ADMIN — SODIUM CHLORIDE 500 MILLILITER(S): 9 INJECTION, SOLUTION INTRAVENOUS at 20:52

## 2022-03-01 RX ADMIN — Medication 50 MILLIEQUIVALENT(S): at 15:16

## 2022-03-01 RX ADMIN — HEPARIN SODIUM 17 UNIT(S)/HR: 5000 INJECTION INTRAVENOUS; SUBCUTANEOUS at 14:56

## 2022-03-01 RX ADMIN — Medication 10 MILLIGRAM(S): at 11:48

## 2022-03-01 RX ADMIN — SODIUM CHLORIDE 500 MILLILITER(S): 9 INJECTION, SOLUTION INTRAVENOUS at 19:51

## 2022-03-01 RX ADMIN — Medication 12.5 MILLIGRAM(S): at 19:45

## 2022-03-01 RX ADMIN — TICAGRELOR 90 MILLIGRAM(S): 90 TABLET ORAL at 19:51

## 2022-03-01 RX ADMIN — INSULIN HUMAN 6 UNIT(S)/HR: 100 INJECTION, SOLUTION SUBCUTANEOUS at 19:51

## 2022-03-01 RX ADMIN — Medication 50 MILLIEQUIVALENT(S): at 16:06

## 2022-03-01 RX ADMIN — MORPHINE SULFATE 2 MILLIGRAM(S): 50 CAPSULE, EXTENDED RELEASE ORAL at 21:48

## 2022-03-01 RX ADMIN — Medication 40 MILLIEQUIVALENT(S): at 16:29

## 2022-03-01 RX ADMIN — Medication 50 MILLIEQUIVALENT(S): at 15:10

## 2022-03-01 RX ADMIN — Medication 30 MILLIGRAM(S): at 12:15

## 2022-03-01 RX ADMIN — Medication 50 MILLIEQUIVALENT(S): at 17:22

## 2022-03-01 RX ADMIN — SODIUM CHLORIDE 500 MILLILITER(S): 9 INJECTION, SOLUTION INTRAVENOUS at 18:29

## 2022-03-01 RX ADMIN — INSULIN HUMAN 6 UNIT(S)/HR: 100 INJECTION, SOLUTION SUBCUTANEOUS at 14:56

## 2022-03-01 RX ADMIN — ATORVASTATIN CALCIUM 80 MILLIGRAM(S): 80 TABLET, FILM COATED ORAL at 21:31

## 2022-03-01 RX ADMIN — SODIUM CHLORIDE 2500 MILLILITER(S): 9 INJECTION INTRAMUSCULAR; INTRAVENOUS; SUBCUTANEOUS at 11:56

## 2022-03-01 RX ADMIN — HEPARIN SODIUM 4000 UNIT(S): 5000 INJECTION INTRAVENOUS; SUBCUTANEOUS at 12:16

## 2022-03-01 RX ADMIN — Medication 25 GRAM(S): at 19:52

## 2022-03-01 RX ADMIN — Medication 10 MILLIGRAM(S): at 11:43

## 2022-03-01 RX ADMIN — Medication 40 MILLIEQUIVALENT(S): at 14:19

## 2022-03-01 RX ADMIN — MORPHINE SULFATE 2 MILLIGRAM(S): 50 CAPSULE, EXTENDED RELEASE ORAL at 21:18

## 2022-03-01 RX ADMIN — HEPARIN SODIUM 1000 UNIT(S)/HR: 5000 INJECTION INTRAVENOUS; SUBCUTANEOUS at 12:17

## 2022-03-01 RX ADMIN — Medication 0.4 MILLIGRAM(S): at 22:50

## 2022-03-01 NOTE — H&P ADULT - NSHPPHYSICALEXAM_GEN_ALL_CORE
VITALS:   T(C): 35.8 (03-01-22 @ 13:54), Max: 36.7 (03-01-22 @ 11:40)  HR: 138 (03-01-22 @ 15:30) (85 - 164)  BP: 140/87 (03-01-22 @ 15:30) (96/69 - 140/87)  RR: 22 (03-01-22 @ 15:30) (20 - 25)  SpO2: 100% (03-01-22 @ 15:30) (96% - 100%)    GENERAL: lying in bed comfortably, awake, alert  HEAD:  Atraumatic, Normocephalic  EYES: EOMI, PERRLA, conjunctiva and sclera clear  ENT: dry mucous membranes  NECK: Supple, No JVD  CHEST/LUNG: Clear to auscultation bilaterally; No rales, rhonchi, wheezing, or rubs. Labored respirations likely kussmaul respirations  HEART: tachycardic; No murmurs, rubs, or gallops  ABDOMEN: BSx4; Soft, nontender, nondistended  EXTREMITIES:  2+ Peripheral Pulses, brisk capillary refill. No clubbing, cyanosis, or edema  NERVOUS SYSTEM:  A&Ox1, no focal deficits   SKIN: No rashes or lesions  psych: normal behavior, normal affect

## 2022-03-01 NOTE — H&P ADULT - HISTORY OF PRESENT ILLNESS
55 y/o M with PMH of HTN, T2DM, neuropathy, chronic pain on Methadone & opioids for cervical spine and back injury transferred to [facility] CICU from [place] due to DKA and anterolateral ST elevations 57 y/o M with PMH of HTN, T2DM, neuropathy, chronic pain on Methadone & opioids for cervical spine and back injury transferred to [facility] CICU from [place] due to DKA and anterolateral ST elevations + afib on EKG.      Labs from [facility] significant for: WBC 15.53, lactate 5.6, troponin 7560.2, K 3.2, CO2 5, AG 42, SCr/BUN 2.14/44, Glucose 663, calcium 12.7, Mg 2.7, Phos 9.5  FSGs >600 -> 503  ABG 7.19/<19/133/3  UA: large ketones, moderate bacteria, negative LE and nitrite  RVP/COVID negative     55 y/o M with PMH of HTN, T2DM, neuropathy, chronic pain on Methadone & opioids for cervical spine and back injury transferred to [facility] CICU from [place] due to DKA and anterolateral ST elevations + afib on EKG.    Baseline SCr 0.7-0.9 in 10/2021 from previous hospitalization at [facility] for cellulitis of hand.  Labs from [facility] significant for: WBC 15.53, lactate 5.6, troponin 7560.2, K 3.2, CO2 5, AG 42, SCr/BUN 2.14/44, Glucose 663, calcium 12.7, Mg 2.7, Phos 9.5  FSGs >600 -> 503  ABG 7.19/<19/133/3  UA: large ketones, moderate bacteria, negative LE and nitrite  RVP/COVID negative     55 y/o M with PMH of HTN, T2DM, neuropathy, chronic pain on Methadone & opioids for cervical spine and back injury transferred to [facility] CICU from [place] due to STEMI and DKA. Pt was reportedly found to have anterolateral ST elevations + afib on EKG, trop 7560. Pt initially came to [facility] ED due to nausea and vomiting for 3 days, reportedly had CP several days ago which apparently stopped on its own.     Pt transferred to [facility] for cath, noted to be in afib w/ RVR to 130s-150s, received cardizem 25 mg IV x 2 and started on cardizem gtt, cath was aborted as pt had SCR of 2.14, (baseline 0.7-0.9 in 10/2021), transferred to CCU for optimization of DKA prior to cath.     Baseline SCr 0.7-0.9 in 10/2021 from previous hospitalization at [facility] for cellulitis of hand, was d/rome on bactrim DS + ethambutol x 4 weeks.    Labs from [facility] significant for: WBC 15.53, lactate 5.6, troponin 7560.2, K 3.2, CO2 5, AG 42, SCr/BUN 2.14/44, Glucose 663, calcium 12.7, Mg 2.7, Phos 9.5  FSGs >600 -> 503  ABG 7.19/<19/133/3  UA: large ketones, moderate bacteria, negative LE and nitrite  RVP/COVID negative 57 y/o M with PMH of HTN, T2DM, neuropathy, chronic pain on Methadone & opioids for cervical spine and back injury transferred to [facility] CICU from [place] due to STEMI and DKA. Pt was reportedly found to have inferolateral ST elevations + afib on EKG, trop 7560. Pt initially came to [facility] ED due to nausea and vomiting for 3 days, reportedly had CP several days ago which apparently stopped on its own.     Pt transferred to [facility] for cath, noted to be in afib w/ RVR to 130s-150s, received cardizem 25 mg IV x 2 and started on cardizem gtt, cath was aborted as pt had SCR of 2.14, (baseline 0.7-0.9 in 10/2021), transferred to CCU for optimization of DKA prior to cath.     Baseline SCr 0.7-0.9 in 10/2021 from previous hospitalization at [facility] for cellulitis of hand, was d/rome on bactrim DS + ethambutol x 4 weeks.    Labs from [facility] significant for: WBC 15.53, lactate 5.6, troponin 7560.2, K 3.2, CO2 5, AG 42, SCr/BUN 2.14/44, Glucose 663, calcium 12.7, Mg 2.7, Phos 9.5  FSGs >600 -> 503  ABG 7.19/<19/133/3  UA: large ketones, moderate bacteria, negative LE and nitrite  RVP/COVID negative 57 y/o M with PMH of HTN, T2DM, neuropathy, chronic pain on Methadone & opioids for cervical spine and back injury transferred to [facility] CICU from [place] due to STEMI and DKA. Pt was reportedly found to have inferolateral ST elevations + afib on EKG, trop 7560. Pt initially came to [facility] ED due to nausea and vomiting for 3 days, reportedly had CP several days ago which apparently stopped on its own. Pt currently altered, difficult to obtain history from.    Pt transferred to [facility] for cath, noted to be in afib w/ RVR to 130s-150s, received cardizem 25 mg IV x 2 and started on cardizem gtt, cath was aborted as pt had SCR of 2.14, (baseline 0.7-0.9 in 10/2021), transferred to CCU for optimization of DKA prior to cath.     Baseline SCr 0.7-0.9 in 10/2021 from previous hospitalization at [facility] for cellulitis of hand, was d/rome on bactrim DS + ethambutol x 4 weeks.    Labs from [facility] significant for: WBC 15.53, lactate 5.6, troponin 7560.2, K 3.2, CO2 5, AG 42, SCr/BUN 2.14/44, Glucose 663, calcium 12.7, Mg 2.7, Phos 9.5  FSGs >600 -> 503  ABG 7.19/<19/133/3  UA: large ketones, moderate bacteria, negative LE and nitrite  RVP/COVID negative

## 2022-03-01 NOTE — PROGRESS NOTE ADULT - SUBJECTIVE AND OBJECTIVE BOX
ROSE STRAUSS  MRN-[id number]  Patient is a 56y old  Male who presents with a chief complaint of STEMI, DKA (01 Mar 2022 13:56)    HPI:  57 y/o M with PMH of HTN, T2DM, neuropathy, chronic pain on Methadone & opioids for cervical spine and back injury transferred to [facility] CICU from [place] due to STEMI and DKA. Pt was reportedly found to have inferolateral ST elevations + afib on EKG, trop 7560. Pt initially came to [facility] ED due to nausea and vomiting for 3 days, reportedly had CP several days ago which apparently stopped on its own. Pt currently altered, difficult to obtain history from.    Pt transferred to [facility] for cath, noted to be in afib w/ RVR to 130s-150s, received cardizem 25 mg IV x 2 and started on cardizem gtt, cath was aborted as pt had SCR of 2.14, (baseline 0.7-0.9 in 10/2021), transferred to CCU for optimization of DKA prior to cath.     Baseline SCr 0.7-0.9 in 10/2021 from previous hospitalization at [facility] for cellulitis of hand, was d/rome on bactrim DS + ethambutol x 4 weeks.    Labs from [facility] significant for: WBC 15.53, lactate 5.6, troponin 7560.2, K 3.2, CO2 5, AG 42, SCr/BUN 2.14/44, Glucose 663, calcium 12.7, Mg 2.7, Phos 9.5  FSGs >600 -> 503  ABG 7.19/<19/133/3  UA: large ketones, moderate bacteria, negative LE and nitrite  RVP/COVID negative (01 Mar 2022 13:56)      Hospital Course:  3/1 Transferred here to the CCU due to optimization of DKA prior to cath     24 HOUR EVENTS:    REVIEW OF SYSTEMS:    Unable to obtain due to pt mental status    ICU Vital Signs Last 24 Hrs  T(C): 35.8 (01 Mar 2022 13:54), Max: 36.7 (01 Mar 2022 11:40)  T(F): 96.4 (01 Mar 2022 13:54), Max: 98 (01 Mar 2022 11:40)  HR: 144 (01 Mar 2022 19:00) (85 - 164)  BP: 127/75 (01 Mar 2022 19:00) (96/69 - 140/87)  BP(mean): 95 (01 Mar 2022 19:00) (79 - 105)  ABP: --  ABP(mean): --  RR: 20 (01 Mar 2022 19:00) (18 - 25)  SpO2: 96% (01 Mar 2022 19:00) (96% - 100%)      CVP(mm Hg): --  CO: --  CI: --  PA: --  PA(mean): --  PA(direct): --  PCWP: --  LA: --  RA: --  SVR: --  SVRI: --  PVR: --  PVRI: --  I&O's Summary    01 Mar 2022 07:01  -  01 Mar 2022 20:45  --------------------------------------------------------  IN: 4641.5 mL / OUT: 3150 mL / NET: 1491.5 mL        CAPILLARY BLOOD GLUCOSE    CAPILLARY BLOOD GLUCOSE      POCT Blood Glucose.: 299 mg/dL (01 Mar 2022 20:05)      PHYSICAL EXAM:  GENERAL: lying in bed comfortably, awake, alert  HEAD:  Atraumatic, Normocephalic  EYES: EOMI, PERRLA, conjunctiva and sclera clear  ENT: dry mucous membranes  NECK: Supple, No JVD  CHEST/LUNG: Clear to auscultation bilaterally; No rales, rhonchi, wheezing, or rubs. Labored respirations likely kussmaul respirations  HEART: tachycardic; No murmurs, rubs, or gallops  ABDOMEN: BSx4; Soft, nontender, nondistended  EXTREMITIES:  2+ Peripheral Pulses, brisk capillary refill. No clubbing, cyanosis, or edema  NERVOUS SYSTEM:  A&Ox1, no focal deficits   SKIN: No rashes or lesions  psych: normal behavior, normal affect    ============================I/O===========================   I&O's Detail    01 Mar 2022 07:01  -  01 Mar 2022 20:45  --------------------------------------------------------  IN:    Heparin: 85 mL    Insulin: 16.5 mL    IV PiggyBack: 200 mL    IV PiggyBack: 250 mL    Oral Fluid: 1340 mL    Sodium Bicarbonate: 750 mL    Sodium Chloride 0.9% Bolus: 1000 mL    sodium chloride 0.9% w/ Additives: 1000 mL  Total IN: 4641.5 mL    OUT:    Voided (mL): 3150 mL  Total OUT: 3150 mL    Total NET: 1491.5 mL        ============================ LABS =========================                        13.9   16.05 )-----------( 373      ( 01 Mar 2022 15:03 )             43.5         131<L>  |  90<L>  |  43<H>  ----------------------------<  371<H>  3.7   |  <10<LL>  |  1.01    Ca    10.5      01 Mar 2022 18:40  Phos  2.4       Mg     1.9         TPro  6.0  /  Alb  2.9<L>  /  TBili  0.4  /  DBili  x   /  AST  81<H>  /  ALT  180<H>  /  AlkPhos  97      Troponin T, High Sensitivity Result: 847 ng/L (22 @ 18:40)  Troponin T, High Sensitivity Result: 1234 ng/L (22 @ 15:28)    CKMB Units: 19.2 ng/mL (22 @ 18:40)  CKMB Units: 19.6 ng/mL (22 @ 15:28)    Creatine Kinase, Serum: 6120 U/L (03-01-22 @ 18:40)  Creatine Kinase, Serum: 9209 U/L (22 @ 15:28)    CPK Mass Assay %: 0.3 % (22 @ 18:40)  CPK Mass Assay %: 0.2 % (22 @ 15:28)        LIVER FUNCTIONS - ( 01 Mar 2022 18:40 )  Alb: 2.9 g/dL / Pro: 6.0 g/dL / ALK PHOS: 97 U/L / ALT: 180 U/L / AST: 81 U/L / GGT: x           PT/INR - ( 01 Mar 2022 15:05 )   PT: 14.5 sec;   INR: 1.21 ratio         PTT - ( 01 Mar 2022 15:05 )  PTT:41.4 sec  ABG - ( 01 Mar 2022 19:44 )  pH, Arterial: 7.25  pH, Blood: x     /  pCO2: <19   /  pO2: 170   / HCO3: 8     / Base Excess: -17.0 /  SaO2: 99.5              Blood Gas Arterial, Lactate: 1.8 mmol/L (22 @ 19:44)  Lactate, Blood: 2.3 mmol/L (22 @ 18:40)  Blood Gas Arterial, Lactate: 3.1 mmol/L (22 @ 14:20)  Lactate, Blood: 5.6 mmol/L (22 @ 12:12)    Urinalysis Basic - ( 01 Mar 2022 15:55 )    Color: Colorless / Appearance: Clear / S.021 / pH: x  Gluc: x / Ketone: Large  / Bili: Negative / Urobili: Negative   Blood: x / Protein: Trace / Nitrite: Negative   Leuk Esterase: Negative / RBC: 2 /hpf / WBC 0 /HPF   Sq Epi: x / Non Sq Epi: 0 /hpf / Bacteria: Negative      ======================Micro/Rad/Cardio=================  Telemtry: Reviewed   EKG: Reviewed  CXR: Reviewed  Echo: Limited Transthoracic Echo (w/Cont):   Patient name: ROSE STRAUSS  YOB: 1965   Age: 56 (M)   MR#: [id number]  Study Date: 3/1/2022  [id number] Jennifer Head RDCS  Study quality: Technically difficult  Referring Physician: Iraida Bautista MD  Blood Pressure: 137/83 mmHg  Height: 188 cm  Weight: 102 kg  BSA: 2.3 m2  ------------------------------------------------------------------------  PROCEDURE: Limited transthoracic echocardiogram with 2-D.  M-Mode and spectral and color flow Doppler. Verbal consent  was obtained for injection of  Ultrasonic Enhancing Agent  following a discussion of risks and benefits. Following  intravenous injection of Ultrasonic Enhancing Agent ,  harmonic imaging was performed.  INDICATION: Non-ST elevation (NSTEMI) myocardial infarction  (I21.4)  ------------------------------------------------------------------------  CONCLUSIONS:  Limited TTE   Endocardial visualization enhanced with intravenous  injection of Ultrasonic Enhancing Agent (Definity). Overall  preserved left ventricular ejection fraction.  Very limited  images as patient supine with tachypnea and elevated HR.  The basal  inferolateral wall, and the mid inferior wall  are hypokinetic. The basal inferior wall is akinetic. No  pericardial effusion.  *** No previous Echo exam. Dr. Bautista at bedside.  ------------------------------------------------------------------------  Confirmed on  3/1/2022 - 16:08:14 by RANDAL Reddy  ------------------------------------------------------------------------ (22 @ 13:06)    Cath:   ======================================================  PAST MEDICAL & SURGICAL HISTORY:  Hypertension    Diabetes mellitus    Gastric ulcer    Spinal stenosis    H/O spinal cord compression    Spondylosis    H/O radiculopathy    H/O cervical spine surgery    History of back surgery    S/P cervical spinal fusion      ====================ASSESSMENT ==============  STEMI  AFib w/ RVR  kussmaul respirations 2/2 DKA  JAXON likely prerenal 2/2 dehydration 2/2 DKA  Transaminitis: Elevated LFTs  DKA  Mental status: altered. Likely metabolic encephalopathy 2/2 DKA    Plan:  ====================== NEUROLOGY=====================  Mental status: altered. Likely metabolic encephalopathy 2/2 DKA  -Currently A&O x 1 likely 2/2 DKA, able to conversate but difficult to recall/concentrate  -will treat DKA as below      ==================== RESPIRATORY======================  Currently satting well on RA, but has deep shallow breathing, likely kussmaul respirations 2/2 DKA  -continue to monitor O2 sats and respiratory status, currently protecting airway. O2 NC PRN        ====================CARDIOVASCULAR==================  STEMI (trop 7560, inferolateral LUIS on EKG), per chart was loaded with 180 mg Ticagrelor, heparin bolus in [facility]  - Revascularization with PCI deferred due to pt's elevated SCr, likely prerenal JAXON 2/2 dehydration from DKA  - MISSY score: 122  - DAPT: ASA 81 + Ticagrelor 90mg BID for 1 year (currently not on ASA yet)  - Statin: atorvastatin 80mg qD  - Heparin gtt  - limited TTE 3/1/22 showed hypokinetic inferolateral and mid inferior wall, basal inferior wall is akinetic.   - trend trop, CK, CKMB  - sent TSH and lipid panel    AFib w/ RVR:   - CHADSVASC score: 3  - HAS-BLED score: 1  - currently on heparin gtt  - s/p cardizem 25 mg IVP x 2 and cardizem gtt; d/rome due to potentially decreased EF on limited TTE  - will c/w fluid resuscitation as below  - Monitor on telemetry  - Lopressor for afib     ticagrelor 90 milliGRAM(s) Oral every 12 hours  metoprolol tartrate 12.5 milliGRAM(s) Oral two times a day  atorvastatin 80 milliGRAM(s) Oral at bedtime  ===================HEMATOLOGIC/ONC ===================  INR slightly elevated at  -> 1.21, may be due to decreased PO intake, will continue to monitor  - H/H stable  - Continue AC therapy with IV Heparin     heparin  Infusion 1700 Unit(s)/Hr (17 mL/Hr) IV Continuous <Continuous>      ===================== RENAL =========================  JAXON likely prerenal 2/2 dehydration 2/2 DKA  -SCr 2.14, BUN 44, fluid responsive, improved to SCr/BUN 1.24/41 with 1L NS bolus + fluids received from gtts  -UAs significant for large ketones and glucose  -Monitor I/Os, UO      ==================== GASTROINTESTINAL===================  Transaminitis: Elevated LFTs  -AST//261 -> 101/195, improving with fluids, possible shock liver due to hypovolemia from DKA    Diet: NPO due to DKA    sodium bicarbonate  Infusion 0.104 mEq/kG/Hr (150 mL/Hr) IV Continuous <Continuous>  sodium chloride 0.9% 1000 milliLiter(s) (500 mL/Hr) IV Continuous <Continuous>    =======================    ENDOCRINE  =====================  DKA  - DKA protocol initiated - started insulin gtt @ 6/hr, bicarb gtt @ 150/hr due to HAGMA  - K 3.2 -> 3.7; was ordered for 40 mEq PO x 2, 10 mEq IV, insulin gtt started once K > 3.3  - will continue to supplement K as level is < 5.3 ordered NS bolus with 30 mEq of K  - c/w IVF resuscitation per DKA protocol, NS bolus 1L    - BMP x0hfuxe, BHB q1hour  to monitor AG + K, will supplement PRN  - HbA1c 9.8 in 10/2021; pt seems to have history of uncontrolled T2DM, currently unable to answer questions regarding his PMH  - Endocrine consult in AM  - Will plan for eventual bridge from insulin gtt to long acting/premeal insulin once AG closes and FSG < 200      dextrose 50% Injectable 50 milliLiter(s) IV Push every 15 minutes  insulin regular Infusion 6 Unit(s)/Hr (6 mL/Hr) IV Continuous <Continuous>    ========================INFECTIOUS DISEASE================  UA x 2 negative for bacteria, afebrile, although WBC 16 -> 15, HR 130s, RR > 20  - Pt without strong objective or clinical evidence of infection. Will observe off antibiotics      Patient requires continuous monitoring with bedside rhythm monitoring, pulse ox monitoring, and intermittent blood gas analysis. Care plan discussed with ICU care team. Patient remained critical and at risk for life threatening decompensation.  Patient seen, examined and plan discussed with CCU team during rounds.     I have personally provided ____ minutes of critical care time excluding time spent on separate procedures, in addition to initial critical care time provided by the CICU Attending, Dr. Byrne.     By signing my name below, I, Lashell Sanches, attest that this documentation has been prepared under the direction and in the presence of Blanche Edwards   Electronically signed: Crystal Diaz, 22 @ 20:45    I, Grace Mancia, personally performed the services described in this documentation. all medical record entries made by the crystal were at my direction and in my presence. I have reviewed the chart and agree that the record reflects my personal performance and is accurate and complete  Electronically signed: Grace Mancia       ROSE STRAUSS  MRN-[id number]  Patient is a 56y old  Male who presents with a chief complaint of STEMI, DKA (01 Mar 2022 13:56)    HPI:  55 y/o M with PMH of HTN, T2DM, neuropathy, chronic pain on Methadone & opioids for cervical spine and back injury transferred to [facility] CICU from [place] due to STEMI and DKA. Pt was reportedly found to have inferolateral ST elevations + afib on EKG, trop 7560. Pt initially came to [facility] ED due to nausea and vomiting for 3 days, reportedly had CP several days ago which apparently stopped on its own. Pt currently altered, difficult to obtain history from.    Pt transferred to [facility] for cath, noted to be in afib w/ RVR to 130s-150s, received cardizem 25 mg IV x 2 and started on cardizem gtt, cath was aborted as pt had SCR of 2.14, (baseline 0.7-0.9 in 10/2021), transferred to CCU for optimization of DKA prior to cath.     Baseline SCr 0.7-0.9 in 10/2021 from previous hospitalization at [facility] for cellulitis of hand, was d/rome on bactrim DS + ethambutol x 4 weeks.    Labs from [facility] significant for: WBC 15.53, lactate 5.6, troponin 7560.2, K 3.2, CO2 5, AG 42, SCr/BUN 2.14/44, Glucose 663, calcium 12.7, Mg 2.7, Phos 9.5  FSGs >600 -> 503  ABG 7.19/<19/133/3  UA: large ketones, moderate bacteria, negative LE and nitrite  RVP/COVID negative (01 Mar 2022 13:56)      Hospital Course:  3/1 Transferred here to the CCU due to optimization of DKA prior to cath     24 HOUR EVENTS: patient receiving NS with K+ additive. remains on bicarb gtt. lactate resolved.     REVIEW OF SYSTEMS:    CONSTITUTIONAL: No weakness, fevers or chills  EYES/ENT: No visual changes;  No vertigo or throat pain   NECK: No pain or stiffness  RESPIRATORY: No cough, wheezing, hemoptysis; No shortness of breath  CARDIOVASCULAR: +chest pain, pt unable to characterize. No palpitations  GASTROINTESTINAL: No abdominal or epigastric pain. No nausea, vomiting, or hematemesis; No diarrhea or constipation. No melena or hematochezia.  GENITOURINARY: No dysuria, frequency or hematuria  NEUROLOGICAL: No numbness or weakness  SKIN: No itching, rashes      ICU Vital Signs Last 24 Hrs  T(C): 35.8 (01 Mar 2022 13:54), Max: 36.7 (01 Mar 2022 11:40)  T(F): 96.4 (01 Mar 2022 13:54), Max: 98 (01 Mar 2022 11:40)  HR: 144 (01 Mar 2022 19:00) (85 - 164)  BP: 127/75 (01 Mar 2022 19:00) (96/69 - 140/87)  BP(mean): 95 (01 Mar 2022 19:00) (79 - 105)  RR: 20 (01 Mar 2022 19:00) (18 - 25)  SpO2: 96% (01 Mar 2022 19:00) (96% - 100%)      I&O's Summary    01 Mar 2022 07:  -  01 Mar 2022 20:45  --------------------------------------------------------  IN: 4641.5 mL / OUT: 3150 mL / NET: 1491.5 mL      POCT Blood Glucose.: 299 mg/dL (01 Mar 2022 20:05)      ============================I/O===========================   I&O's Detail    01 Mar 2022 07:  -  01 Mar 2022 20:45  --------------------------------------------------------  IN:    Heparin: 85 mL    Insulin: 16.5 mL    IV PiggyBack: 200 mL    IV PiggyBack: 250 mL    Oral Fluid: 1340 mL    Sodium Bicarbonate: 750 mL    Sodium Chloride 0.9% Bolus: 1000 mL    sodium chloride 0.9% w/ Additives: 1000 mL  Total IN: 4641.5 mL    OUT:    Voided (mL): 3150 mL  Total OUT: 3150 mL    Total NET: 1491.5 mL        ============================ LABS =========================                        13.9   16.05 )-----------( 373      ( 01 Mar 2022 15:03 )             43.5         131<L>  |  90<L>  |  43<H>  ----------------------------<  371<H>  3.7   |  <10<LL>  |  1.01    Ca    10.5      01 Mar 2022 18:40  Phos  2.4       Mg     1.9         TPro  6.0  /  Alb  2.9<L>  /  TBili  0.4  /  DBili  x   /  AST  81<H>  /  ALT  180<H>  /  AlkPhos  97      Troponin T, High Sensitivity Result: 847 ng/L (22 @ 18:40)  Troponin T, High Sensitivity Result: 1234 ng/L (22 @ 15:28)    CKMB Units: 19.2 ng/mL (22 @ 18:40)  CKMB Units: 19.6 ng/mL (22 @ 15:28)    Creatine Kinase, Serum: 6120 U/L (22 @ 18:40)  Creatine Kinase, Serum: 9209 U/L (22 @ 15:28)    CPK Mass Assay %: 0.3 % (22 @ 18:40)  CPK Mass Assay %: 0.2 % (22 @ 15:28)        LIVER FUNCTIONS - ( 01 Mar 2022 18:40 )  Alb: 2.9 g/dL / Pro: 6.0 g/dL / ALK PHOS: 97 U/L / ALT: 180 U/L / AST: 81 U/L / GGT: x           PT/INR - ( 01 Mar 2022 15:05 )   PT: 14.5 sec;   INR: 1.21 ratio         PTT - ( 01 Mar 2022 15:05 )  PTT:41.4 sec  ABG - ( 01 Mar 2022 19:44 )  pH, Arterial: 7.25  pH, Blood: x     /  pCO2: <19   /  pO2: 170   / HCO3: 8     / Base Excess: -17.0 /  SaO2: 99.5              Blood Gas Arterial, Lactate: 1.8 mmol/L (22 @ 19:44)  Lactate, Blood: 2.3 mmol/L (22 @ 18:40)  Blood Gas Arterial, Lactate: 3.1 mmol/L (22 @ 14:20)  Lactate, Blood: 5.6 mmol/L (22 @ 12:12)    Urinalysis Basic - ( 01 Mar 2022 15:55 )    Color: Colorless / Appearance: Clear / S.021 / pH: x  Gluc: x / Ketone: Large  / Bili: Negative / Urobili: Negative   Blood: x / Protein: Trace / Nitrite: Negative   Leuk Esterase: Negative / RBC: 2 /hpf / WBC 0 /HPF   Sq Epi: x / Non Sq Epi: 0 /hpf / Bacteria: Negative      ======================Micro/Rad/Cardio=================  Telemtry: Reviewed   EKG: Reviewed  CXR: Reviewed  Echo: Limited Transthoracic Echo (w/Cont):   Patient name: ROSE STRAUSS  YOB: 1965   Age: 56 (M)   MR#: [id number]  Study Date: 3/1/2022  [id number]: Jennifer Head RDCS  Study quality: Technically difficult  Referring Physician: Iraida Bautista MD  Blood Pressure: 137/83 mmHg  Height: 188 cm  Weight: 102 kg  BSA: 2.3 m2  ------------------------------------------------------------------------  PROCEDURE: Limited transthoracic echocardiogram with 2-D.  M-Mode and spectral and color flow Doppler. Verbal consent  was obtained for injection of  Ultrasonic Enhancing Agent  following a discussion of risks and benefits. Following  intravenous injection of Ultrasonic Enhancing Agent ,  harmonic imaging was performed.  INDICATION: Non-ST elevation (NSTEMI) myocardial infarction  (I21.4)  ------------------------------------------------------------------------  CONCLUSIONS:  Limited TTE   Endocardial visualization enhanced with intravenous  injection of Ultrasonic Enhancing Agent (Definity). Overall  preserved left ventricular ejection fraction.  Very limited  images as patient supine with tachypnea and elevated HR.  The basal  inferolateral wall, and the mid inferior wall  are hypokinetic. The basal inferior wall is akinetic. No  pericardial effusion.  *** No previous Echo exam. Dr. Bautista at bedside.  ------------------------------------------------------------------------  Confirmed on  3/1/2022 - 16:08:14 by RANDAL Reddy  ------------------------------------------------------------------------ (22 @ 13:06)    Cath:   ======================================================  PAST MEDICAL & SURGICAL HISTORY:  Hypertension    Diabetes mellitus    Gastric ulcer    Spinal stenosis    H/O spinal cord compression    Spondylosis    H/O radiculopathy    H/O cervical spine surgery    History of back surgery    S/P cervical spinal fusion        ASSESSMENT/PLAN:   55 y/o M with PMH of HTN, T2DM, neuropathy, chronic pain (on Methadone & opioids) for cervical spine and back injury transferred to [facility] CCU from [place] due to STEMI (trop 7560, inferolateral LUIS) and DKA. Found to have A fib w/ RVR to 130s-150s, JAXON w/ SCr 2.14 (baseline 0.7-0.9 in 10/2021), admitted to CCU for optimization of DKA and kidney function prior to cath.      NEURO:  #Mental status: altered. Likely metabolic encephalopathy 2/2 DKA  -Currently A&O x 2 likely 2/2 DKA, able to conversate but difficult to recall/concentrate  -will treat DKA as below  - monitor neuro status per protocol     PULM:  -SPO2 % on 2L nasal cannula, wean off as tolerated   -continue to monitor O2 sats and respiratory status, currently protecting airway.     CV:  #STEMI   - (trop 7560, inferolateral LUIS on EKG), per chart was loaded with 180 mg Ticagrelor, heparin bolus in [facility]  - Revascularization with PCI deferred due to pt's elevated SCr, likely prerenal JAXON 2/2 dehydration from DKA  - cont heparin gtt and DAPT (ASA, brilinta)  - high intensity statin therapy  - started on low dose BB   - started on isordil 10 for afterload reduction  - limited TTE 3/1/22 showed hypokinetic inferolateral and mid inferior wall, basal inferior wall is akinetic.   - trend trop, CK, CKMB  - sent TSH, lipid panel, and hgb a1c  - will need eventual PCI once renal function stabilizes     #AFib w/ RVR:   - CHADSVASC score: 3  - HAS-BLED score: 1  - currently on heparin gtt  - s/p cardizem 25 mg IVP x 2 and cardizem gtt; d/rome due to potentially decreased EF on limited TTE  - will c/w fluid resuscitation as below, rates are better controlled now  - Monitor on telemetry, monitor lytes closely       RENAL:  #Nonoliguric JAXON  - likely prerenal 2/2 dehydration 2/2 DKA  -SCr 2.14, BUN 44, fluid responsive, improved to SCr/BUN 1.01 with fluid resuscitation   -UAs significant for large ketones and glucose  -Monitor I/Os, UO  - DKA treatment as below     GI:  #Transaminitis: Elevated LFTs  -AST/ALT elevation initially 193/261 -> 81/180, improving with fluids, possible shock liver due to hypovolemia from DKA  #Diet: NPO due to DKA    ENDO:  #DKA:  - DKA protocol initiated - cont insulin gtt per protocol and FS check q1, bicarb gtt @ 150/hr due to HAGMA  - K 3.2 -> 3.7; was ordered for 40 mEq PO x 2, 10 mEq IV, insulin gtt started once K > 3.3  - will continue to supplement K as level is < 5.3  - c/w IVF resuscitation per DKA protocol, NS bolus 1L and second liter now with 20meq K+  - check ABG q2, BMP with lytes q4 for now   - HbA1c 9.8 in 10/2021; pt seems to have history of uncontrolled T2DM, currently unable to answer questions regarding his PMH  - Endocrine consult in AM  - Will plan for eventual bridge from insulin gtt to long acting/premeal insulin once AG closes and FSG < 200    HEMATOLOGIC:  -INR slightly elevated at  -> 1.21, may be due to decreased PO intake, will continue to monitor  -H/H stable    ID:  UA x 2 negative for bacteria, afebrile, although WBC 16 -> 15, HR 130s, RR > 20  Pt without strong objective or clinical evidence of infection. Will observe off antibiotics    SKIN  #Lines:  RR Katelyn 3/-    #Ethics  Full code      Patient requires continuous monitoring with bedside rhythm monitoring, pulse ox monitoring, and intermittent blood gas analysis. Care plan discussed with ICU care team. Patient remained critical and at risk for life threatening decompensation.  Patient seen, examined and plan discussed with CCU team during rounds.     I have personally provided 35 minutes of critical care time excluding time spent on separate procedures, in addition to initial critical care time provided by the CICU Attending, Dr. Byrne.     By signing my name below, Lashell GONZALEZ, attest that this documentation has been prepared under the direction and in the presence of Blanche Edwards   Electronically signed: Devang Diaz, 22 @ 20:45    CARLOS, Grace Mancia, personally performed the services described in this documentation. all medical record entries made by the scribe were at my direction and in my presence. I have reviewed the chart and agree that the record reflects my personal performance and is accurate and complete  Electronically signed: Grace Mancia

## 2022-03-01 NOTE — H&P ADULT - NSHPLABSRESULTS_GEN_ALL_CORE
Personally reviewed labs, imaging, ekg                           13.9   16.05 )-----------( 373      ( 01 Mar 2022 15:03 )             43.5       03-    129<L>  |  82<L>  |  44<H>  ----------------------------<  663<HH>  3.2<L>   |  5<LL>  |  2.14<H>    Ca    12.7<H>      01 Mar 2022 11:53  Phos  9.5       Mg     2.7         TPro  7.4  /  Alb  2.8<L>  /  TBili  1.0  /  DBili  x   /  AST  193<H>  /  ALT  261<H>  /  AlkPhos  120            ABG - ( 01 Mar 2022 14:20 )  pH, Arterial: 7.14  pH, Blood: x     /  pCO2: <19   /  pO2: 155   / HCO3: 3     / Base Excess: -23.0 /  SaO2: 99.8                Urinalysis Basic - ( 01 Mar 2022 12:30 )    Color: Yellow / Appearance: Clear / S.020 / pH: x  Gluc: x / Ketone: Large  / Bili: Negative / Urobili: Negative   Blood: x / Protein: 30 mg/dL / Nitrite: Negative   Leuk Esterase: Negative / RBC: 0-4 /HPF / WBC 0-2 /HPF   Sq Epi: x / Non Sq Epi: Neg.-Few / Bacteria: Moderate /HPF        PT/INR - ( 01 Mar 2022 12:12 )   PT: 14.6 sec;   INR: 1.26 ratio         PTT - ( 01 Mar 2022 12:12 )  PTT:28.3 sec    Lactate Trend   @ 12:12 Lactate:5.6             CAPILLARY BLOOD GLUCOSE      POCT Blood Glucose.: 503 mg/dL (01 Mar 2022 11:36)            EKG:

## 2022-03-01 NOTE — ED ADULT NURSE NOTE - OBJECTIVE STATEMENT
Pt presents to the ED with c/o weakness x 4-5 days and hyperglycemia. Pt states lately he been missing his doses of insulin. Denies cp/sob.

## 2022-03-01 NOTE — PATIENT PROFILE ADULT - NSPROHMSYMPCOND_GEN_A_NUR
Samina, YES he needs to stay on Propanolol, please call in refills.  Please send copy of labs to Dr. Archibald (Nephrology).  michelle   none

## 2022-03-01 NOTE — H&P ADULT - ASSESSMENT
57 y/o M with PMH of HTN, T2DM, neuropathy, chronic pain on Methadone & opioids for cervical spine and back injury transferred to [facility] CCU from [place] due to STEMI and DKA. Found to have A fib w/ RVR to 130s-150s, JAXON w/ SCr 2.14 (baseline 0.7-0.9 in 10/2021), admitted to CCU for optimization of DKA and kidney function prior to cath.      NEURO:  #Mental status: baseline/altered/non altered. Likely 2/2 ____ (structural (bleed or stroke), encephalitis, meningitis, non convulsive status epilepticus, metabolic cause (endogenous vs exogenous)).  -Currently A&O x 3  -Eligible for early mobilization and immediate physical therapy?    CV:  #STEMI (+trops, +EKG changes) | NSTEMI (+trops) | unstable angina (-trops): Typical/atypical symptoms, loaded with 325 mg ASA, 180mg Ticagrelor/ 600mg Clopidogrel, sublingual nitroglycerin 0.4mg, heparin bolus (check monogram)  - Revascularization (within 48hrs of symptoms) with PCI  - MSISY score: (if >140, revascularize early)  - Nitroglycerin gtt 5mcg/min UNLESS anterior MI for pain relief  - DAPT: ASA 81, Clopidogrel 75mg qD /Ticagrelor 90mg BID for 1 year  - Statin: atorvastatin 80mg qD  - Heparin gtt (for 48 hrs or until revascularization with PCI, check monogram)    s/p stent: stop heparin, nitro gtt  -c/w DAPT  - start B-blocker: metoprolol tartrate  - start ACE-I (wait until AM labs return)   - statin: atorvastatin 80mg qD  - check ECHO in 48hr if anterior wall MI    #CHF: EF __%, on JVD ***  -Bblocker: metoprolol succinate 25mg qD / carvedilol 3.125mg qD  -Diuretic: furosemide (lasix) 20-40-60-80 mg qD | torsemide | bumetanide (bumex) 1mg qD  -Trend I/Os and daily weights, and Cr    #Arrythmia:   - Monitor on tele    #AFib:   - CHADSVASC score:  - HAS-BLED score:  - a/c: DOAC (apixiban 5mg BID (Eliquis)| dabigatran 150mg BID (Pradexa)| Riveroxaban 20mg qD (Xeralto) > Coumadin  - If new: get echo (check for tachycardia, valvular AF, L atrial size which is large if chronic)  - Rate control: BB/Digoxin/Amio if low EF  - Monitor telemetry    #s/p Atrial fibrillation Ablation:  - CHADSVASC/HAS-BLED score:  - a/c: Coumadin, DOAC (eliquis/pradexa/xeralto) resume after 6hrs post procedure  - Bedrest x6hrs, resume head of bed @30 degrees afterwards  - 12-lead EKG   - Continuous telemetry monitoring for arrythmias  - TTE echo (to r/o tamponade and heart fx)  - Protonix 40mg qD (prophylactic prevent acidity b/c heat from ablation since L atrium is close to esophagusx 1mo)   - DASH diet: Soft  - Pro-BNP labs in AM (to determine underlying dilation of heart)    #s/p Atrial flutter Ablation:  - CHADSVASC/HAS-BLED score:  - a/c: Coumadin, DOAC (eliquis/pradexa/xeralto)  - Rate control: CCB or BB  - Continuous telemetry monitoring for arrythmias  - Bedrest x6hrs, resume head of bed @30 degrees afterwards  - 12-lead EKG now    #Hemodynamically stable/unstable:  - On pressors due to ____ shock (cardiogenic due to muscle or valve failure, obstructive due to peff, PE, PTX, hypovolemic, vasopegic)     PULM:  #COPD: *Home meds* on __L O2    RENAL:  #JAXON:   -UO:  -Valero:    GI:  #Transaminitis: Elevated LFTs  #Diet:  #Bowel regiment:    ENDO:  #DM2: HbA1c ***   - Insulin Sliding Scale    METABOLIC:  #Electrolyte abnormalities    HEMATOLOGIC:  #CBC results show  #Coag panel shows  #DVT prophylaxis with     ID:  #Pt without strong objective or clinical evidence of infection. Will observe off antibiotics    SKIN:  #Lines:  #Decubitus ulcers:       57 y/o M with PMH of HTN, T2DM, neuropathy, chronic pain on Methadone & opioids for cervical spine and back injury transferred to [facility] CCU from [place] due to STEMI and DKA. Found to have A fib w/ RVR to 130s-150s, JAXON w/ SCr 2.14 (baseline 0.7-0.9 in 10/2021), admitted to CCU for optimization of DKA and kidney function prior to cath.      NEURO:  #Mental status: altered. Likely metabolic encephalopathy 2/2 DKA  -Currently A&O x 1 likely 2/2 DKA, able to conversate but difficult to recall/concentrate  -will treat DKA as below    CV:  #STEMI (+trops, +EKG changes) | NSTEMI (+trops) | unstable angina (-trops): Typical/atypical symptoms, loaded with 325 mg ASA, 180mg Ticagrelor/ 600mg Clopidogrel, sublingual nitroglycerin 0.4mg, heparin bolus (check monogram)  - Revascularization (within 48hrs of symptoms) with PCI  - MISSY score: (if >140, revascularize early)  - Nitroglycerin gtt 5mcg/min UNLESS anterior MI for pain relief  - DAPT: ASA 81, Clopidogrel 75mg qD /Ticagrelor 90mg BID for 1 year  - Statin: atorvastatin 80mg qD  - Heparin gtt (for 48 hrs or until revascularization with PCI, check monogram)    s/p stent: stop heparin, nitro gtt  -c/w DAPT  - start B-blocker: metoprolol tartrate  - start ACE-I (wait until AM labs return)   - statin: atorvastatin 80mg qD  - check ECHO in 48hr if anterior wall MI    #CHF: EF __%, on JVD ***  -Bblocker: metoprolol succinate 25mg qD / carvedilol 3.125mg qD  -Diuretic: furosemide (lasix) 20-40-60-80 mg qD | torsemide | bumetanide (bumex) 1mg qD  -Trend I/Os and daily weights, and Cr    #Arrythmia:   - Monitor on tele    #AFib:   - CHADSVASC score:  - HAS-BLED score:  - a/c: DOAC (apixiban 5mg BID (Eliquis)| dabigatran 150mg BID (Pradexa)| Riveroxaban 20mg qD (Xeralto) > Coumadin  - If new: get echo (check for tachycardia, valvular AF, L atrial size which is large if chronic)  - Rate control: BB/Digoxin/Amio if low EF  - Monitor telemetry    #s/p Atrial fibrillation Ablation:  - CHADSVASC/HAS-BLED score:  - a/c: Coumadin, DOAC (eliquis/pradexa/xeralto) resume after 6hrs post procedure  - Bedrest x6hrs, resume head of bed @30 degrees afterwards  - 12-lead EKG   - Continuous telemetry monitoring for arrythmias  - TTE echo (to r/o tamponade and heart fx)  - Protonix 40mg qD (prophylactic prevent acidity b/c heat from ablation since L atrium is close to esophagusx 1mo)   - DASH diet: Soft  - Pro-BNP labs in AM (to determine underlying dilation of heart)    #s/p Atrial flutter Ablation:  - CHADSVASC/HAS-BLED score:  - a/c: Coumadin, DOAC (eliquis/pradexa/xeralto)  - Rate control: CCB or BB  - Continuous telemetry monitoring for arrythmias  - Bedrest x6hrs, resume head of bed @30 degrees afterwards  - 12-lead EKG now    #Hemodynamically stable/unstable:  - On pressors due to ____ shock (cardiogenic due to muscle or valve failure, obstructive due to peff, PE, PTX, hypovolemic, vasopegic)     PULM:  #COPD: *Home meds* on __L O2    RENAL:  #JAXON:   -UO:  -Valero:    GI:  #Transaminitis: Elevated LFTs  #Diet:  #Bowel regiment:    ENDO:  #DM2: HbA1c ***   - Insulin Sliding Scale    METABOLIC:  #Electrolyte abnormalities    HEMATOLOGIC:  #CBC results show  #Coag panel shows  #DVT prophylaxis with     ID:  #Pt without strong objective or clinical evidence of infection. Will observe off antibiotics    SKIN:  #Lines:  #Decubitus ulcers:       55 y/o M with PMH of HTN, T2DM, neuropathy, chronic pain (on Methadone & opioids) for cervical spine and back injury transferred to [facility] CCU from [place] due to STEMI (trop 7560, inferolateral LUIS) and DKA. Found to have A fib w/ RVR to 130s-150s, JAXON w/ SCr 2.14 (baseline 0.7-0.9 in 10/2021), admitted to CCU for optimization of DKA and kidney function prior to cath.      NEURO:  #Mental status: altered. Likely metabolic encephalopathy 2/2 DKA  -Currently A&O x 1 likely 2/2 DKA, able to conversate but difficult to recall/concentrate  -will treat DKA as below    CV:  #STEMI (trop 7560, inferolateral LUIS on EKG ) loaded [id number] Ticagrelor, heparin bolus (check monogram)  - Revascularization (within 48hrs of symptoms) with PCI  - MISSY score: (if >140, revascularize early)  - Nitroglycerin gtt 5mcg/min UNLESS anterior MI for pain relief  - DAPT: ASA 81, Clopidogrel 75mg qD /Ticagrelor 90mg BID for 1 year  - Statin: atorvastatin 80mg qD  - Heparin gtt (for 48 hrs or until revascularization with PCI, check monogram)    s/p stent: stop heparin, nitro gtt  -c/w DAPT  - start B-blocker: metoprolol tartrate  - start ACE-I (wait until AM labs return)   - statin: atorvastatin 80mg qD  - check ECHO in 48hr if anterior wall MI    #CHF: EF __%, on JVD ***  -Bblocker: metoprolol succinate 25mg qD / carvedilol 3.125mg qD  -Diuretic: furosemide (lasix) 20-40-60-80 mg qD | torsemide | bumetanide (bumex) 1mg qD  -Trend I/Os and daily weights, and Cr    #Arrythmia:   - Monitor on tele    #AFib:   - CHADSVASC score:  - HAS-BLED score:  - a/c: DOAC (apixiban 5mg BID (Eliquis)| dabigatran 150mg BID (Pradexa)| Riveroxaban 20mg qD (Xeralto) > Coumadin  - If new: get echo (check for tachycardia, valvular AF, L atrial size which is large if chronic)  - Rate control: BB/Digoxin/Amio if low EF  - Monitor telemetry    #s/p Atrial fibrillation Ablation:  - CHADSVASC/HAS-BLED score:  - a/c: Coumadin, DOAC (eliquis/pradexa/xeralto) resume after 6hrs post procedure  - Bedrest x6hrs, resume head of bed @30 degrees afterwards  - 12-lead EKG   - Continuous telemetry monitoring for arrythmias  - TTE echo (to r/o tamponade and heart fx)  - Protonix 40mg qD (prophylactic prevent acidity b/c heat from ablation since L atrium is close to esophagusx 1mo)   - DASH diet: Soft  - Pro-BNP labs in AM (to determine underlying dilation of heart)    #s/p Atrial flutter Ablation:  - CHADSVASC/HAS-BLED score:  - a/c: Coumadin, DOAC (eliquis/pradexa/xeralto)  - Rate control: CCB or BB  - Continuous telemetry monitoring for arrythmias  - Bedrest x6hrs, resume head of bed @30 degrees afterwards  - 12-lead EKG now    #Hemodynamically stable/unstable:  - On pressors due to ____ shock (cardiogenic due to muscle or valve failure, obstructive due to peff, PE, PTX, hypovolemic, vasopegic)     PULM:  #COPD: *Home meds* on __L O2    RENAL:  #JAXON:   -UO:  -Valero:    GI:  #Transaminitis: Elevated LFTs  #Diet:  #Bowel regiment:    ENDO:  #DM2: HbA1c ***   - Insulin Sliding Scale    METABOLIC:  #Electrolyte abnormalities    HEMATOLOGIC:  #CBC results show  #Coag panel shows  #DVT prophylaxis with     ID:  #Pt without strong objective or clinical evidence of infection. Will observe off antibiotics    SKIN:  #Lines:  #Decubitus ulcers:       55 y/o M with PMH of HTN, T2DM, neuropathy, chronic pain (on Methadone & opioids) for cervical spine and back injury transferred to [facility] CCU from [place] due to STEMI (trop 7560, inferolateral LUIS) and DKA. Found to have A fib w/ RVR to 130s-150s, JAXON w/ SCr 2.14 (baseline 0.7-0.9 in 10/2021), admitted to CCU for optimization of DKA and kidney function prior to cath.      NEURO:  #Mental status: altered. Likely metabolic encephalopathy 2/2 DKA  -Currently A&O x 1 likely 2/2 DKA, able to conversate but difficult to recall/concentrate  -will treat DKA as below    CV:  #STEMI (trop 7560, inferolateral LUIS on EKG), per chart was loaded with 180 mg Ticagrelor, heparin bolus in [facility]  - Revascularization with PCI deferred due to pt's elevated SCr, likely prerenal JAXON 2/2 dehydration from DKA  - MISSY score: 122  - Nitroglycerin gtt 5mcg/min UNLESS anterior MI for pain relief  - DAPT: ASA 81 + Ticagrelor 90mg BID for 1 year  - Statin: atorvastatin 80mg qD  - Heparin gtt (for 48 hrs or until revascularization with PCI, check monogram)    s/p stent: stop heparin, nitro gtt  -c/w DAPT  - start B-blocker: metoprolol tartrate  - start ACE-I (wait until AM labs return)   - statin: atorvastatin 80mg qD  - check ECHO in 48hr if anterior wall MI    #CHF: EF __%, on JVD ***  -Bblocker: metoprolol succinate 25mg qD / carvedilol 3.125mg qD  -Diuretic: furosemide (lasix) 20-40-60-80 mg qD | torsemide | bumetanide (bumex) 1mg qD  -Trend I/Os and daily weights, and Cr    #Arrythmia:   - Monitor on tele    #AFib:   - CHADSVASC score:  - HAS-BLED score:  - a/c: DOAC (apixiban 5mg BID (Eliquis)| dabigatran 150mg BID (Pradexa)| Riveroxaban 20mg qD (Xeralto) > Coumadin  - If new: get echo (check for tachycardia, valvular AF, L atrial size which is large if chronic)  - Rate control: BB/Digoxin/Amio if low EF  - Monitor telemetry    #s/p Atrial fibrillation Ablation:  - CHADSVASC/HAS-BLED score:  - a/c: Coumadin, DOAC (eliquis/pradexa/xeralto) resume after 6hrs post procedure  - Bedrest x6hrs, resume head of bed @30 degrees afterwards  - 12-lead EKG   - Continuous telemetry monitoring for arrythmias  - TTE echo (to r/o tamponade and heart fx)  - Protonix 40mg qD (prophylactic prevent acidity b/c heat from ablation since L atrium is close to esophagusx 1mo)   - DASH diet: Soft  - Pro-BNP labs in AM (to determine underlying dilation of heart)    #s/p Atrial flutter Ablation:  - CHADSVASC/HAS-BLED score:  - a/c: Coumadin, DOAC (eliquis/pradexa/xeralto)  - Rate control: CCB or BB  - Continuous telemetry monitoring for arrythmias  - Bedrest x6hrs, resume head of bed @30 degrees afterwards  - 12-lead EKG now    #Hemodynamically stable/unstable:  - On pressors due to ____ shock (cardiogenic due to muscle or valve failure, obstructive due to peff, PE, PTX, hypovolemic, vasopegic)     PULM:  #COPD: *Home meds* on __L O2    RENAL:  #JAXON:   -UO:  -Valero:    GI:  #Transaminitis: Elevated LFTs  #Diet:  #Bowel regiment:    ENDO:  #DM2: HbA1c ***   - Insulin Sliding Scale    METABOLIC:  #Electrolyte abnormalities    HEMATOLOGIC:  #CBC results show  #Coag panel shows  #DVT prophylaxis with     ID:  #Pt without strong objective or clinical evidence of infection. Will observe off antibiotics    SKIN:  #Lines:  #Decubitus ulcers:       55 y/o M with PMH of HTN, T2DM, neuropathy, chronic pain (on Methadone & opioids) for cervical spine and back injury transferred to [facility]U from [place] due to STEMI (trop 7560, inferolateral LUIS) and DKA. Found to have A fib w/ RVR to 130s-150s, JAXON w/ SCr 2.14 (baseline 0.7-0.9 in 10/2021), admitted to CCU for optimization of DKA and kidney function prior to cath.      NEURO:  #Mental status: altered. Likely metabolic encephalopathy 2/2 DKA  -Currently A&O x 1 likely 2/2 DKA, able to conversate but difficult to recall/concentrate  -will treat DKA as below    CV:  #STEMI (trop 7560, inferolateral LUIS on EKG), per chart was loaded with 180 mg Ticagrelor, heparin bolus in [facility]  - Revascularization with PCI deferred due to pt's elevated SCr, likely prerenal JAXON 2/2 dehydration from DKA  - MISSY score: 122  - DAPT: ASA 81 + Ticagrelor 90mg BID for 1 year  - Statin: atorvastatin 80mg qD  - Heparin gtt  - limited TTE 3/1/22 showed hypokinetic inferolateral and mid inferior wall, basal inferior wall is akinetic.       #AFib w/ RVR:   - CHADSVASC score: 3  - HAS-BLED score: 1  - currently on heparin gtt  - s/p cardizem 25 mg IVP x 2 and cardizem gtt; d/rome due to potentially decreased EF on limited TTE  - Monitor on telemetry      PULM:  -Currently satting well on RA, but has deep shallow breathing, likely kussmaul respirations 2/2 DKA  -continue to monitor O2 sats and respiratory status, currently protecting airway. O2 NC PRN    RENAL:  #JAXON likely prerenal 2/2 dehydration 2/2 DKA  -SCr 2.14, BUN 44, fluid responsive, improved to SCr/BUN 1.24/41 with 1L NS bolus + fluids received from gtts  -UAs significant for large ketones and glucose  -Monitor I/Os, UO    HAGMA:  -pt currently has AG of 42 -> 41, likely 2/2 DKA  -received 5 amps of bicarb, bicarb gtt started @ 150cc/hr  -IVF resuscitation  -DKA management as below    GI:  #Transaminitis: Elevated LFTs  -AST//261 -> 101/195, improving with fluids, possible shock liver due to hypovolemia from DKA  #Diet: NPO due to DKA    ENDO:  #DKA:  - DKA protocol initiated - started insulin gtt @ 6/hr, bicarb gtt @ 150/hr due to HAGMA  - K 3.2 -> 3.7; was ordered for 40 mEq PO x 2, 10 mEq IV, insulin gtt started once K > 3.3  - will continue to supplement K as level is < 5.3 ordered NS bolus with 30 mEq of K  - BMP [id number] to monitor AG + K, will supplement PRN  - HbA1c 9.8 in 10/2021; pt seems to have history of uncontrolled T2DM, currently unable to answer questions regarding his PMH  - Endocrine consult in AM  - Will plan for eventual bridge from insulin gtt to long acting/premeal insulin once AG closes and FSG < 200    HEMATOLOGIC:  -INR slightly elevated at 1/26 -> 1.21, may be due to decreased PO intake, will continue to monitor  -H/H stable    ID:  UA x 2 negative for bacteria, afebrile, although WBC 16 -> 15.  Pt without strong objective or clinical evidence of infection. Will observe off antibiotics    SKIN  #Lines:  PIVs    #Ethics  Full code         55 y/o M with PMH of HTN, T2DM, neuropathy, chronic pain (on Methadone & opioids) for cervical spine and back injury transferred to [facility]U from [place] due to STEMI (trop 7560, inferolateral LUIS) and DKA. Found to have A fib w/ RVR to 130s-150s, JAXON w/ SCr 2.14 (baseline 0.7-0.9 in 10/2021), admitted to CCU for optimization of DKA and kidney function prior to cath.      NEURO:  #Mental status: altered. Likely metabolic encephalopathy 2/2 DKA  -Currently A&O x 1 likely 2/2 DKA, able to conversate but difficult to recall/concentrate  -will treat DKA as below    CV:  #STEMI (trop 7560, inferolateral LUIS on EKG), per chart was loaded with 180 mg Ticagrelor, heparin bolus in [facility]  - Revascularization with PCI deferred due to pt's elevated SCr, likely prerenal JAXON 2/2 dehydration from DKA  - MISSY score: 122  - DAPT: ASA 81 + Ticagrelor 90mg BID for 1 year  - Statin: atorvastatin 80mg qD  - Heparin gtt  - limited TTE 3/1/22 showed hypokinetic inferolateral and mid inferior wall, basal inferior wall is akinetic.       #AFib w/ RVR:   - CHADSVASC score: 3  - HAS-BLED score: 1  - currently on heparin gtt  - s/p cardizem 25 mg IVP x 2 and cardizem gtt; d/rome due to potentially decreased EF on limited TTE  - Monitor on telemetry      PULM:  -Currently satting well on RA, but has deep shallow breathing, likely kussmaul respirations 2/2 DKA  -continue to monitor O2 sats and respiratory status, currently protecting airway. O2 NC PRN    RENAL:  #JAXON likely prerenal 2/2 dehydration 2/2 DKA  -SCr 2.14, BUN 44, fluid responsive, improved to SCr/BUN 1.24/41 with 1L NS bolus + fluids received from gtts  -UAs significant for large ketones and glucose  -Monitor I/Os, UO    HAGMA:  -pt currently has AG of 42 -> 41, likely 2/2 DKA  -received 5 amps of bicarb, bicarb gtt started @ 150cc/hr  -IVF resuscitation  -DKA management as below    GI:  #Transaminitis: Elevated LFTs  -AST//261 -> 101/195, improving with fluids, possible shock liver due to hypovolemia from DKA  #Diet: NPO due to DKA    ENDO:  #DKA:  - DKA protocol initiated - started insulin gtt @ 6/hr, bicarb gtt @ 150/hr due to HAGMA  - K 3.2 -> 3.7; was ordered for 40 mEq PO x 2, 10 mEq IV, insulin gtt started once K > 3.3  - will continue to supplement K as level is < 5.3 ordered NS bolus with 30 mEq of K  - BMP [id number], BHB q1hour  to monitor AG + K, will supplement PRN  - HbA1c 9.8 in 10/2021; pt seems to have history of uncontrolled T2DM, currently unable to answer questions regarding his PMH  - Endocrine consult in AM  - Will plan for eventual bridge from insulin gtt to long acting/premeal insulin once AG closes and FSG < 200    HEMATOLOGIC:  -INR slightly elevated at 1/26 -> 1.21, may be due to decreased PO intake, will continue to monitor  -H/H stable    ID:  UA x 2 negative for bacteria, afebrile, although WBC 16 -> 15.  Pt without strong objective or clinical evidence of infection. Will observe off antibiotics    SKIN  #Lines:  PIVs    #Ethics  Full code         55 y/o M with PMH of HTN, T2DM, neuropathy, chronic pain (on Methadone & opioids) for cervical spine and back injury transferred to [facility] CCU from [place] due to STEMI (trop 7560, inferolateral LUIS) and DKA. Found to have A fib w/ RVR to 130s-150s, JAXON w/ SCr 2.14 (baseline 0.7-0.9 in 10/2021), admitted to CCU for optimization of DKA and kidney function prior to cath.      NEURO:  #Mental status: altered. Likely metabolic encephalopathy 2/2 DKA  -Currently A&O x 1 likely 2/2 DKA, able to conversate but difficult to recall/concentrate  -will treat DKA as below    CV:  #STEMI (trop 7560, inferolateral LUIS on EKG), per chart was loaded with 180 mg Ticagrelor, heparin bolus in [facility]  - Revascularization with PCI deferred due to pt's elevated SCr, likely prerenal JAXON 2/2 dehydration from DKA  - MISSY score: 122  - DAPT: ASA 81 + Ticagrelor 90mg BID for 1 year  - Statin: atorvastatin 80mg qD  - Heparin gtt  - limited TTE 3/1/22 showed hypokinetic inferolateral and mid inferior wall, basal inferior wall is akinetic.       #AFib w/ RVR:   - CHADSVASC score: 3  - HAS-BLED score: 1  - currently on heparin gtt  - s/p cardizem 25 mg IVP x 2 and cardizem gtt; d/rome due to potentially decreased EF on limited TTE  - Monitor on telemetry      PULM:  -Currently satting well on RA, but has deep shallow breathing, likely kussmaul respirations 2/2 DKA  -continue to monitor O2 sats and respiratory status, currently protecting airway. O2 NC PRN    RENAL:  #JAXON likely prerenal 2/2 dehydration 2/2 DKA  -SCr 2.14, BUN 44, fluid responsive, improved to SCr/BUN 1.24/41 with 1L NS bolus + fluids received from gtts  -UAs significant for large ketones and glucose  -Monitor I/Os, UO    HAGMA:  -pt currently has AG of 42 -> 41, likely 2/2 DKA  -received 5 amps of bicarb, bicarb gtt started @ 150cc/hr  -IVF resuscitation  -DKA management as below    GI:  #Transaminitis: Elevated LFTs  -AST//261 -> 101/195, improving with fluids, possible shock liver due to hypovolemia from DKA  #Diet: NPO due to DKA    ENDO:  #DKA:  - DKA protocol initiated - started insulin gtt @ 6/hr, bicarb gtt @ 150/hr due to HAGMA  - K 3.2 -> 3.7; was ordered for 40 mEq PO x 2, 10 mEq IV, insulin gtt started once K > 3.3  - will continue to supplement K as level is < 5.3 ordered NS bolus with 30 mEq of K  - BMP [id number], BHB q1hour  to monitor AG + K, will supplement PRN  - HbA1c 9.8 in 10/2021; pt seems to have history of uncontrolled T2DM, currently unable to answer questions regarding his PMH  - Endocrine consult in AM  - Will plan for eventual bridge from insulin gtt to long acting/premeal insulin once AG closes and FSG < 200    HEMATOLOGIC:  -INR slightly elevated at 1/26 -> 1.21, may be due to decreased PO intake, will continue to monitor  -H/H stable    ID:  UA x 2 negative for bacteria, afebrile, although WBC 16 -> 15, HR 130s, RR > 20  Pt without strong objective or clinical evidence of infection. Will observe off antibiotics    SKIN  #Lines:  PIVs    #Ethics  Full code         55 y/o M with PMH of HTN, T2DM, neuropathy, chronic pain (on Methadone & opioids) for cervical spine and back injury transferred to [facility] CCU from [place] due to STEMI (trop 7560, inferolateral LUIS) and DKA. Found to have A fib w/ RVR to 130s-150s, JAXON w/ SCr 2.14 (baseline 0.7-0.9 in 10/2021), admitted to CCU for optimization of DKA and kidney function prior to cath.      NEURO:  #Mental status: altered. Likely metabolic encephalopathy 2/2 DKA  -Currently A&O x 1 likely 2/2 DKA, able to conversate but difficult to recall/concentrate  -will treat DKA as below    CV:  #STEMI (trop 7560, inferolateral LUIS on EKG), per chart was loaded with 180 mg Ticagrelor, heparin bolus in [facility]  - Revascularization with PCI deferred due to pt's elevated SCr, likely prerenal JAXON 2/2 dehydration from DKA  - MISSY score: 122  - DAPT: ASA 81 + Ticagrelor 90mg BID for 1 year  - Statin: atorvastatin 80mg qD  - Heparin gtt  - limited TTE 3/1/22 showed hypokinetic inferolateral and mid inferior wall, basal inferior wall is akinetic.   - trend trop, CK, CKMB    #AFib w/ RVR:   - CHADSVASC score: 3  - HAS-BLED score: 1  - currently on heparin gtt  - s/p cardizem 25 mg IVP x 2 and cardizem gtt; d/rome due to potentially decreased EF on limited TTE  - Monitor on telemetry      PULM:  -Currently satting well on RA, but has deep shallow breathing, likely kussmaul respirations 2/2 DKA  -continue to monitor O2 sats and respiratory status, currently protecting airway. O2 NC PRN    RENAL:  #JAXON likely prerenal 2/2 dehydration 2/2 DKA  -SCr 2.14, BUN 44, fluid responsive, improved to SCr/BUN 1.24/41 with 1L NS bolus + fluids received from gtts  -UAs significant for large ketones and glucose  -Monitor I/Os, UO    HAGMA:  -pt currently has AG of 42 -> 41, likely 2/2 DKA  -received 5 amps of bicarb, bicarb gtt started @ 150cc/hr  -IVF resuscitation  -DKA management as below    GI:  #Transaminitis: Elevated LFTs  -AST//261 -> 101/195, improving with fluids, possible shock liver due to hypovolemia from DKA  #Diet: NPO due to DKA    ENDO:  #DKA:  - DKA protocol initiated - started insulin gtt @ 6/hr, bicarb gtt @ 150/hr due to HAGMA  - K 3.2 -> 3.7; was ordered for 40 mEq PO x 2, 10 mEq IV, insulin gtt started once K > 3.3  - will continue to supplement K as level is < 5.3 ordered NS bolus with 30 mEq of K  - BMP [id number], BHB q1hour  to monitor AG + K, will supplement PRN  - HbA1c 9.8 in 10/2021; pt seems to have history of uncontrolled T2DM, currently unable to answer questions regarding his PMH  - Endocrine consult in AM  - Will plan for eventual bridge from insulin gtt to long acting/premeal insulin once AG closes and FSG < 200    HEMATOLOGIC:  -INR slightly elevated at 1/26 -> 1.21, may be due to decreased PO intake, will continue to monitor  -H/H stable    ID:  UA x 2 negative for bacteria, afebrile, although WBC 16 -> 15, HR 130s, RR > 20  Pt without strong objective or clinical evidence of infection. Will observe off antibiotics    SKIN  #Lines:  PIVs    #Ethics  Full code         57 y/o M with PMH of HTN, T2DM, neuropathy, chronic pain (on Methadone & opioids) for cervical spine and back injury transferred to [facility] CCU from [place] due to STEMI (trop 7560, inferolateral LUIS) and DKA. Found to have A fib w/ RVR to 130s-150s, JAXON w/ SCr 2.14 (baseline 0.7-0.9 in 10/2021), admitted to CCU for optimization of DKA and kidney function prior to cath.      NEURO:  #Mental status: altered. Likely metabolic encephalopathy 2/2 DKA  -Currently A&O x 1 likely 2/2 DKA, able to conversate but difficult to recall/concentrate  -will treat DKA as below    CV:  #STEMI (trop 7560, inferolateral LUIS on EKG), per chart was loaded with 180 mg Ticagrelor, heparin bolus in [facility]  - Revascularization with PCI deferred due to pt's elevated SCr, likely prerenal JAXON 2/2 dehydration from DKA  - MISSY score: 122  - DAPT: ASA 81 + Ticagrelor 90mg BID for 1 year  - Statin: atorvastatin 80mg qD  - Heparin gtt  - limited TTE 3/1/22 showed hypokinetic inferolateral and mid inferior wall, basal inferior wall is akinetic.   - trend trop, CK, CKMB  - sent TSH and lipid panel    #AFib w/ RVR:   - CHADSVASC score: 3  - HAS-BLED score: 1  - currently on heparin gtt  - s/p cardizem 25 mg IVP x 2 and cardizem gtt; d/rome due to potentially decreased EF on limited TTE  - Monitor on telemetry      PULM:  -Currently satting well on RA, but has deep shallow breathing, likely kussmaul respirations 2/2 DKA  -continue to monitor O2 sats and respiratory status, currently protecting airway. O2 NC PRN    RENAL:  #JAXON likely prerenal 2/2 dehydration 2/2 DKA  -SCr 2.14, BUN 44, fluid responsive, improved to SCr/BUN 1.24/41 with 1L NS bolus + fluids received from gtts  -UAs significant for large ketones and glucose  -Monitor I/Os, UO    HAGMA:  -pt currently has AG of 42 -> 41, likely 2/2 DKA  -received 5 amps of bicarb, bicarb gtt started @ 150cc/hr  -IVF resuscitation  -DKA management as below    GI:  #Transaminitis: Elevated LFTs  -AST//261 -> 101/195, improving with fluids, possible shock liver due to hypovolemia from DKA  #Diet: NPO due to DKA    ENDO:  #DKA:  - DKA protocol initiated - started insulin gtt @ 6/hr, bicarb gtt @ 150/hr due to HAGMA  - K 3.2 -> 3.7; was ordered for 40 mEq PO x 2, 10 mEq IV, insulin gtt started once K > 3.3  - will continue to supplement K as level is < 5.3 ordered NS bolus with 30 mEq of K  - BMP l0xxgpx, BHB q1hour  to monitor AG + K, will supplement PRN  - HbA1c 9.8 in 10/2021; pt seems to have history of uncontrolled T2DM, currently unable to answer questions regarding his PMH  - Endocrine consult in AM  - Will plan for eventual bridge from insulin gtt to long acting/premeal insulin once AG closes and FSG < 200    HEMATOLOGIC:  -INR slightly elevated at 1/26 -> 1.21, may be due to decreased PO intake, will continue to monitor  -H/H stable    ID:  UA x 2 negative for bacteria, afebrile, although WBC 16 -> 15, HR 130s, RR > 20  Pt without strong objective or clinical evidence of infection. Will observe off antibiotics    SKIN  #Lines:  PIVs    #Ethics  Full code         57 y/o M with PMH of HTN, T2DM, neuropathy, chronic pain (on Methadone & opioids) for cervical spine and back injury transferred to [facility] CCU from [place] due to STEMI (trop 7560, inferolateral LUIS) and DKA. Found to have A fib w/ RVR to 130s-150s, JAXON w/ SCr 2.14 (baseline 0.7-0.9 in 10/2021), admitted to CCU for optimization of DKA and kidney function prior to cath.      NEURO:  #Mental status: altered. Likely metabolic encephalopathy 2/2 DKA  -Currently A&O x 1 likely 2/2 DKA, able to conversate but difficult to recall/concentrate  -will treat DKA as below    CV:  #STEMI (trop 7560, inferolateral LUIS on EKG), per chart was loaded with 180 mg Ticagrelor, heparin bolus in [facility]  - Revascularization with PCI deferred due to pt's elevated SCr, likely prerenal JAXON 2/2 dehydration from DKA  - MISSY score: 122  - DAPT: ASA 81 + Ticagrelor 90mg BID for 1 year  - Statin: atorvastatin 80mg qD  - Heparin gtt  - limited TTE 3/1/22 showed hypokinetic inferolateral and mid inferior wall, basal inferior wall is akinetic.   - trend trop, CK, CKMB  - sent TSH and lipid panel    #AFib w/ RVR:   - CHADSVASC score: 3  - HAS-BLED score: 1  - currently on heparin gtt  - s/p cardizem 25 mg IVP x 2 and cardizem gtt; d/rome due to potentially decreased EF on limited TTE  - will c/w fluid resuscitation as below  - Monitor on telemetry      PULM:  -Currently satting well on RA, but has deep shallow breathing, likely kussmaul respirations 2/2 DKA  -continue to monitor O2 sats and respiratory status, currently protecting airway. O2 NC PRN    RENAL:  #JAXON likely prerenal 2/2 dehydration 2/2 DKA  -SCr 2.14, BUN 44, fluid responsive, improved to SCr/BUN 1.24/41 with 1L NS bolus + fluids received from gtts  -UAs significant for large ketones and glucose  -Monitor I/Os, UO    HAGMA:  -pt currently has AG of 42 -> 41, likely 2/2 DKA  -received 5 amps of bicarb, bicarb gtt started @ 150cc/hr  -IVF resuscitation  -DKA management as below    GI:  #Transaminitis: Elevated LFTs  -AST//261 -> 101/195, improving with fluids, possible shock liver due to hypovolemia from DKA  #Diet: NPO due to DKA    ENDO:  #DKA:  - DKA protocol initiated - started insulin gtt @ 6/hr, bicarb gtt @ 150/hr due to HAGMA  - K 3.2 -> 3.7; was ordered for 40 mEq PO x 2, 10 mEq IV, insulin gtt started once K > 3.3  - will continue to supplement K as level is < 5.3 ordered NS bolus with 30 mEq of K  - c/w IVF resuscitation per DKA protocol, NS bolus 1L    - BMP s0wgepj, BHB q1hour  to monitor AG + K, will supplement PRN  - HbA1c 9.8 in 10/2021; pt seems to have history of uncontrolled T2DM, currently unable to answer questions regarding his PMH  - Endocrine consult in AM  - Will plan for eventual bridge from insulin gtt to long acting/premeal insulin once AG closes and FSG < 200    HEMATOLOGIC:  -INR slightly elevated at 1/26 -> 1.21, may be due to decreased PO intake, will continue to monitor  -H/H stable    ID:  UA x 2 negative for bacteria, afebrile, although WBC 16 -> 15, HR 130s, RR > 20  Pt without strong objective or clinical evidence of infection. Will observe off antibiotics    SKIN  #Lines:  PIVs    #Ethics  Full code

## 2022-03-01 NOTE — ED PROVIDER NOTE - CLINICAL SUMMARY MEDICAL DECISION MAKING FREE TEXT BOX
56 yr old female with hx of chronic back pain, DM presents from home due to hyperglycemia. pt reports nausea and vomiting for the last 3 days. Pt unable to give hx. reports lower back pain which is chronic.    FS noted to be over 600 and in DKA, HR in 170 on arrival. no rectal temp noted    due to extreme volume depletion, held off on insulin for DKA and given fluids first   EKG showing ST elevations and afib, no known hx of afib.  given fluids and Cardizem, HR improved, repeat EKG showing ST elevations.   transfer center called for cardiology  consult, accepted pt to cath lab and for tier 1 transfer   pt was unable to give hx. 56 yr old female with hx of chronic back pain, DM presents from home due to hyperglycemia. pt reports nausea and vomiting for the last 3 days. Pt unable to give hx. reports lower back pain which is chronic.    FS noted to be over 600 and in DKA, HR in 170 on arrival. no rectal temp noted.    due to extreme volume depletion, held off on insulin for DKA and given fluids first.   EKG showing ST elevations and afib, no known hx of afib.  given fluids and Cardizem to improve HR and reassess EKG. HR down to 110. EKG repeated and ST Elevation are clear. Faxed to cards.   transfer center called for cardiology  consult, accepted pt to cath lab and for tier 1 transfer   pt was unable to give hx.    Proph abx due to increased WBC maurilio in setting of DKA and hemodynamic disturbances.   IV fluids due to DKA presentation. 2 IV's placed for proper fluid resuscitation.   Loaded with Brillinta and given IV heparin bolus and gtt.   D/W Dr Bautista via Txfr center and accepted to cath lab.   Report given to EMS at bedside. Stable for txfr to proper facility.

## 2022-03-01 NOTE — ED PROVIDER NOTE - ATTENDING CONTRIBUTION TO CARE
Patient was critically ill with a high probability of imminent or life threatening deterioration.  direct patient care (not related to procedure), additional history taking, interpretation of diagnostic studies, documentation, consultation with other physicians, telephone consultation with the patient's family  I have personally provided the amount of critical care time documented below excluding time spent on separate procedures. Gerardo with [facility]. 56 yr old female with hx of chronic back pain, DM presents from home due to hyperglycemia. pt reports nausea and vomiting for the last 3 days. Pt unable to give hx. reports lower back pain which is chronic.    FS noted to be over 600 and in DKA, HR in 170 on arrival. no rectal temp noted.    due to extreme volume depletion, held off on insulin for DKA and given fluids first.   EKG showing ST elevations and afib, no known hx of afib.  given fluids and Cardizem to improve HR and reassess EKG. HR down to 110. EKG repeated and ST Elevation are clear. Faxed to cards.   transfer center called for cardiology  consult, accepted pt to cath lab and for tier 1 transfer   pt was unable to give hx.    Proph abx due to increased WBC maurilio in setting of DKA and hemodynamic disturbances.   IV fluids due to DKA presentation. 2 IV's placed for proper fluid resuscitation.   Loaded with Brillinta and given IV heparin bolus and gtt.   D/W Dr Bautista via Txfr center and accepted to cath lab.   Report given to EMS at bedside. Stable for txfr to proper facility.     Patient was critically ill with a high probability of imminent or life threatening deterioration.  direct patient care (not related to procedure), additional history taking, interpretation of diagnostic studies, documentation, consultation with other physicians, telephone consultation with the patient's family  I have personally provided the amount of critical care time documented below excluding time spent on separate procedures.    I performed a face to face bedside interview with patient regarding history of present illness, review of symptoms and past medical history. I completed an independent physical exam.  I have discussed the patient's plan of care with Physician Assistant (PA). I agree with note as stated above, having amended the EMR as needed to reflect my findings.   This includes History of Present Illness, HIV, Past Medical/Surgical/Family/Social History, Allergies and Home Medications, Review of Systems, Physical Exam, and any Progress Notes during the time I functioned as the attending physician for this patient.

## 2022-03-01 NOTE — ED PROVIDER NOTE - OBJECTIVE STATEMENT
56 yr old female with hx of chronic back pain, DM presents from home due to hyperglycemia. pt reports nausea and vomiting for the last 3 days. Pt unable to give hx. reports lower back pain which is chronic.

## 2022-03-01 NOTE — ED ADULT NURSE NOTE - CAS EDN DISCHARGE INTERVENTIONS
Click and Subscribe to  [email or web address] for weekly patient education videos      Jerome Cooper MD (DrRic)    Counseling time > 50% regarding lifestyle change, medication, supplements stress reduction and exercsie  Face to face time 40 minutes   IV intact

## 2022-03-01 NOTE — ED ADULT NURSE NOTE - NS_NURSE_UNIT_LOCATION_ED_ALL_ED_FT
RHEUMATOLOGY PROBLEM LIST AND CHIEF COMPLAINT  1. Psoriatic arthritis - psoriasis, inflammatory arthralgia, fatigue, morning stiffness. Therapy History:  Prior DMARDs: Plaquenil (05/2016-11/2016), Otezla (02/2015-9/2017; AEs: diarrhea)  Current DMARDs: Cosentyx (pending 9/2017), Orencia (pending 9/2017)    2. Sicca syndrome - paresthesia, dry mouth  3. Autoimmune hepatitis - previous prednisone for one year, now on CellCept  4. Primary biliary cirrhosis    INTERVAL HISTORY  This is a 57 y.o.  female. Today, the patient complains of pain in the joints. Location: hand, hip, knee  Severity:  0 on a scale of 0-10  Timing: none at this time   Duration:  2 years  Modifying factors: Otezla AEs  Context/Associated signs and symptoms: The patient is doing well today. She complains of pain in her ankles and hips but states that Ethelyn Antoine is provides relief. She admits to missing her morning dose due to adverse effects. She states it worsened after taking zinc. She states all her joints worsen if she misses Otezla completely. She continues with Cellcept 1000 mg BID and mentions no adverse effects to this medication. RHEUMATOLOGY REVIEW OF SYSTEMS   Positives as per history  Negatives as follows:  Gerda Grater:  Denies unexplained persistent fevers or weight change  RESPIRATORY:  No pleuritic pain, exertional dyspnea  CARDIOVASCULAR:  Denies chest pain  GASTRO:   Denies heartburn, abdominal pain, nausea, vomiting, diarrhea  SKIN:    Denies rash   MSK:    No morning stiffness >1 hour, persistent joint swelling    PAST MEDICAL HISTORY  Reviewed with patient, significant changes in medical history - no    PHYSICAL EXAM  Blood pressure 122/87, pulse 85, temperature 99.1 °F (37.3 °C), temperature source Oral, height 5' 6\" (1.676 m), weight 166 lb 6.4 oz (75.5 kg), SpO2 97 %. GENERAL APPEARANCE: Well-nourished, no acute distress  NECK: No adenopathy  ENT: No oral ulcers  CARDIOVASCULAR: Heart rhythm is regular.  No murmur, rub, gallop  CHEST: Normal vesicular breath sounds. No wheezes, rales, pleural friction rubs  ABDOMINAL: The abdomen is soft and nontender. Bowel sounds are normal  SKIN: Mild psoriasis over scalp  MUSCULOSKELETAL: no SI joint pain. Negative Doron's bilaterally. Improvement in arthralgias with no tenderness over joints today  Upper extremities - full range of motion, no tenderness, no swelling, no synovial thickening and no deformity of joints  Lower extremities - full range of motion, no tenderness, no swelling, no synovial thickening and no deformity of joints     LABS, RADIOLOGY AND PROCEDURES   Previous labs reviewed -Yes    ASSESSMENT  1. Psoriatic arthritis (Established problem -  Stable disease) - The patient continues on Otezla 30 mg BID. I want her to stop Otezla due to her AEs. I will consult with Hepatology on her treatment plan. I am currently considering Cosentyx and Orencia. She should continue Cellcept 1000 mg BID per Hepatology and return in 3 months for a follow up. I will check labs today. 2. Sicca syndrome - symptomatic treatment for dryness recommended. 3. Drug therapy monitoring for toxicity (cellcept) - CBC, BUN, Cr, AST, ALT and albumin every 3 months  4. Drug therapy monitoring for toxicity (plaquenil ) - CBC, BUN, Cr, AST, ALT and albumin every 3 months; eye exams every 6-12 months for retinal toxicity. PLAN  1. Consider Cosentyx and Orencia; consult with Dr. Toir Tirado prior to starting one of these medications  2. Symptomatic treatment for sicca syndrome  3. CellCept 1000 mg BID per Hepatology  4. Hep B, TB, CMP, CBC  5. Return in 4 months     Steven Asher MD  Adult and Pediatric Rheumatology     [address] [place] [address], [facility], Phone [phone], Fax [phone]     Visiting  of Pediatrics    Department of Pediatrics, [facility] of [address], [place], [address] [place] [address], Phone [phone], Fax [phone]    There are no Patient Instructions on file for this visit. cc:  MD Remy Caputo    Written by pantera Tony, as dictated by Alexey Lynch.  Daniel Rojas M.D. Cath LAB

## 2022-03-01 NOTE — H&P ADULT - ATTENDING COMMENTS
Admitted with late presentation inferior wall STEMI, not revascularized, complicated by DKA  DAPT with ASA, Ticagrelor; Lipitor 80  Afib with RVR, chest pain free - start beta blocker  Check TTE  Trend cardiac enzymes until peak  May do coronary angiogram once more stable  O2 sats high 90s on nasal cannula - wean to room air  Non-oliguric JAXON and AG metabolic acidosis in the setting of DKA - hydrate aggressively, check electrolytes q2-3h  H/H acceptable  COVID negative, no antibiotics   Sugars poorly controlledin DKA, likely precipitated by STEMI - start Insulin drip  No central access Admitted with late presentation inferior wall STEMI, not revascularized, complicated by DKA  A+Ox1-2, ? post STEMI vs. acidosis  DAPT with ASA, Ticagrelor; Lipitor 80  Afib with RVR, complaining of chest pain - start beta blocker, add Nitro as needed  Check TTE  Trend cardiac enzymes until peak  May do coronary angiogram once more stable  O2 sats high 90s on nasal cannula - wean to room air  Non-oliguric JAXON and AG metabolic acidosis in the setting of DKA - hydrate aggressively, check electrolytes q2-3h  H/H acceptable  COVID negative, no antibiotics   Sugars poorly controlled in DKA, likely precipitated by STEMI - start Insulin drip  No central access

## 2022-03-01 NOTE — ED PROVIDER NOTE - CONSTITUTIONAL MENTATION
unable to articulate sentences due to ill appearing/awake/alert unable to articulate sentences due to illness; pt keeps stopping and saying he can't focus./awake/alert

## 2022-03-01 NOTE — ED PROVIDER NOTE - CARE PLAN
Principal Discharge DX:	STEMI (ST elevation myocardial infarction)  Secondary Diagnosis:	DKA (diabetic ketoacidosis)  Secondary Diagnosis:	Rapid atrial fibrillation   1

## 2022-03-02 LAB
A1C WITH ESTIMATED AVERAGE GLUCOSE RESULT: 11.2 % — HIGH (ref 4–5.6)
ALBUMIN SERPL ELPH-MCNC: 2.3 G/DL — LOW (ref 3.3–5)
ALBUMIN SERPL ELPH-MCNC: 2.3 G/DL — LOW (ref 3.3–5)
ALBUMIN SERPL ELPH-MCNC: 2.4 G/DL — LOW (ref 3.3–5)
ALBUMIN SERPL ELPH-MCNC: 2.5 G/DL — LOW (ref 3.3–5)
ALBUMIN SERPL ELPH-MCNC: 2.6 G/DL — LOW (ref 3.3–5)
ALBUMIN SERPL ELPH-MCNC: 2.8 G/DL — LOW (ref 3.3–5)
ALP SERPL-CCNC: 64 U/L — SIGNIFICANT CHANGE UP (ref 40–120)
ALP SERPL-CCNC: 68 U/L — SIGNIFICANT CHANGE UP (ref 40–120)
ALP SERPL-CCNC: 71 U/L — SIGNIFICANT CHANGE UP (ref 40–120)
ALP SERPL-CCNC: 73 U/L — SIGNIFICANT CHANGE UP (ref 40–120)
ALP SERPL-CCNC: 79 U/L — SIGNIFICANT CHANGE UP (ref 40–120)
ALP SERPL-CCNC: 85 U/L — SIGNIFICANT CHANGE UP (ref 40–120)
ALT FLD-CCNC: 102 U/L — HIGH (ref 10–45)
ALT FLD-CCNC: 110 U/L — HIGH (ref 10–45)
ALT FLD-CCNC: 111 U/L — HIGH (ref 10–45)
ALT FLD-CCNC: 126 U/L — HIGH (ref 10–45)
ALT FLD-CCNC: 140 U/L — HIGH (ref 10–45)
ALT FLD-CCNC: 156 U/L — HIGH (ref 10–45)
ANION GAP SERPL CALC-SCNC: 18 MMOL/L — HIGH (ref 5–17)
ANION GAP SERPL CALC-SCNC: 19 MMOL/L — HIGH (ref 5–17)
ANION GAP SERPL CALC-SCNC: 21 MMOL/L — HIGH (ref 5–17)
ANION GAP SERPL CALC-SCNC: 24 MMOL/L — HIGH (ref 5–17)
ANION GAP SERPL CALC-SCNC: SIGNIFICANT CHANGE UP MMOL/L (ref 5–17)
APTT BLD: 32.6 SEC — SIGNIFICANT CHANGE UP (ref 27.5–35.5)
APTT BLD: 39.9 SEC — HIGH (ref 27.5–35.5)
APTT BLD: 53.3 SEC — HIGH (ref 27.5–35.5)
APTT BLD: 57.1 SEC — HIGH (ref 27.5–35.5)
AST SERPL-CCNC: 43 U/L — HIGH (ref 10–40)
AST SERPL-CCNC: 45 U/L — HIGH (ref 10–40)
AST SERPL-CCNC: 45 U/L — HIGH (ref 10–40)
AST SERPL-CCNC: 50 U/L — HIGH (ref 10–40)
AST SERPL-CCNC: 55 U/L — HIGH (ref 10–40)
AST SERPL-CCNC: 60 U/L — HIGH (ref 10–40)
B-OH-BUTYR SERPL-SCNC: 4.7 MMOL/L — HIGH
B-OH-BUTYR SERPL-SCNC: 6.1 MMOL/L — HIGH
B-OH-BUTYR SERPL-SCNC: 6.2 MMOL/L — HIGH
B-OH-BUTYR SERPL-SCNC: 7 MMOL/L — HIGH
BASOPHILS # BLD AUTO: 0.03 K/UL — SIGNIFICANT CHANGE UP (ref 0–0.2)
BASOPHILS # BLD AUTO: 0.03 K/UL — SIGNIFICANT CHANGE UP (ref 0–0.2)
BASOPHILS NFR BLD AUTO: 0.2 % — SIGNIFICANT CHANGE UP (ref 0–2)
BASOPHILS NFR BLD AUTO: 0.2 % — SIGNIFICANT CHANGE UP (ref 0–2)
BILIRUB SERPL-MCNC: 0.3 MG/DL — SIGNIFICANT CHANGE UP (ref 0.2–1.2)
BILIRUB SERPL-MCNC: 0.4 MG/DL — SIGNIFICANT CHANGE UP (ref 0.2–1.2)
BILIRUB SERPL-MCNC: 0.4 MG/DL — SIGNIFICANT CHANGE UP (ref 0.2–1.2)
BUN SERPL-MCNC: 21 MG/DL — SIGNIFICANT CHANGE UP (ref 7–23)
BUN SERPL-MCNC: 25 MG/DL — HIGH (ref 7–23)
BUN SERPL-MCNC: 30 MG/DL — HIGH (ref 7–23)
BUN SERPL-MCNC: 34 MG/DL — HIGH (ref 7–23)
BUN SERPL-MCNC: 36 MG/DL — HIGH (ref 7–23)
BUN SERPL-MCNC: 37 MG/DL — HIGH (ref 7–23)
CALCIUM SERPL-MCNC: 7.8 MG/DL — LOW (ref 8.4–10.5)
CALCIUM SERPL-MCNC: 8.1 MG/DL — LOW (ref 8.4–10.5)
CALCIUM SERPL-MCNC: 8.1 MG/DL — LOW (ref 8.4–10.5)
CALCIUM SERPL-MCNC: 8.4 MG/DL — SIGNIFICANT CHANGE UP (ref 8.4–10.5)
CALCIUM SERPL-MCNC: 8.9 MG/DL — SIGNIFICANT CHANGE UP (ref 8.4–10.5)
CALCIUM SERPL-MCNC: 9.4 MG/DL — SIGNIFICANT CHANGE UP (ref 8.4–10.5)
CHLORIDE SERPL-SCNC: 100 MMOL/L — SIGNIFICANT CHANGE UP (ref 96–108)
CHLORIDE SERPL-SCNC: 101 MMOL/L — SIGNIFICANT CHANGE UP (ref 96–108)
CHLORIDE SERPL-SCNC: 103 MMOL/L — SIGNIFICANT CHANGE UP (ref 96–108)
CHLORIDE SERPL-SCNC: 104 MMOL/L — SIGNIFICANT CHANGE UP (ref 96–108)
CHLORIDE SERPL-SCNC: 98 MMOL/L — SIGNIFICANT CHANGE UP (ref 96–108)
CHLORIDE SERPL-SCNC: 99 MMOL/L — SIGNIFICANT CHANGE UP (ref 96–108)
CHOLEST SERPL-MCNC: 264 MG/DL — HIGH
CK MB BLD-MCNC: 0.3 % — SIGNIFICANT CHANGE UP (ref 0–3.5)
CK MB CFR SERPL CALC: 11.9 NG/ML — HIGH (ref 0–6.7)
CK MB CFR SERPL CALC: 13.4 NG/ML — HIGH (ref 0–6.7)
CK MB CFR SERPL CALC: 15.5 NG/ML — HIGH (ref 0–6.7)
CK MB CFR SERPL CALC: 9.6 NG/ML — HIGH (ref 0–6.7)
CK SERPL-CCNC: 3235 U/L — HIGH (ref 30–200)
CK SERPL-CCNC: 3833 U/L — HIGH (ref 30–200)
CK SERPL-CCNC: 3840 U/L — HIGH (ref 30–200)
CK SERPL-CCNC: 4655 U/L — HIGH (ref 30–200)
CO2 SERPL-SCNC: 12 MMOL/L — LOW (ref 22–31)
CO2 SERPL-SCNC: 14 MMOL/L — LOW (ref 22–31)
CO2 SERPL-SCNC: 15 MMOL/L — LOW (ref 22–31)
CO2 SERPL-SCNC: <10 MMOL/L — CRITICAL LOW (ref 22–31)
CREAT SERPL-MCNC: 0.71 MG/DL — SIGNIFICANT CHANGE UP (ref 0.5–1.3)
CREAT SERPL-MCNC: 0.74 MG/DL — SIGNIFICANT CHANGE UP (ref 0.5–1.3)
CREAT SERPL-MCNC: 0.76 MG/DL — SIGNIFICANT CHANGE UP (ref 0.5–1.3)
CREAT SERPL-MCNC: 0.85 MG/DL — SIGNIFICANT CHANGE UP (ref 0.5–1.3)
CREAT SERPL-MCNC: 0.85 MG/DL — SIGNIFICANT CHANGE UP (ref 0.5–1.3)
CREAT SERPL-MCNC: 0.89 MG/DL — SIGNIFICANT CHANGE UP (ref 0.5–1.3)
CULTURE RESULTS: SIGNIFICANT CHANGE UP
EGFR: 102 ML/MIN/1.73M2 — SIGNIFICANT CHANGE UP
EGFR: 103 ML/MIN/1.73M2 — SIGNIFICANT CHANGE UP
EGFR: 103 ML/MIN/1.73M2 — SIGNIFICANT CHANGE UP
EGFR: 107 ML/MIN/1.73M2 — SIGNIFICANT CHANGE UP
EGFR: 108 ML/MIN/1.73M2 — SIGNIFICANT CHANGE UP
EGFR: 109 ML/MIN/1.73M2 — SIGNIFICANT CHANGE UP
EOSINOPHIL # BLD AUTO: 0.01 K/UL — SIGNIFICANT CHANGE UP (ref 0–0.5)
EOSINOPHIL # BLD AUTO: 0.16 K/UL — SIGNIFICANT CHANGE UP (ref 0–0.5)
EOSINOPHIL NFR BLD AUTO: 0.1 % — SIGNIFICANT CHANGE UP (ref 0–6)
EOSINOPHIL NFR BLD AUTO: 1.1 % — SIGNIFICANT CHANGE UP (ref 0–6)
ESTIMATED AVERAGE GLUCOSE: 275 MG/DL — HIGH (ref 68–114)
ETHANOL SERPL-MCNC: SIGNIFICANT CHANGE UP MG/DL (ref 0–10)
GAS PNL BLDA: SIGNIFICANT CHANGE UP
GLUCOSE BLDC GLUCOMTR-MCNC: 140 MG/DL — HIGH (ref 70–99)
GLUCOSE BLDC GLUCOMTR-MCNC: 147 MG/DL — HIGH (ref 70–99)
GLUCOSE BLDC GLUCOMTR-MCNC: 159 MG/DL — HIGH (ref 70–99)
GLUCOSE BLDC GLUCOMTR-MCNC: 165 MG/DL — HIGH (ref 70–99)
GLUCOSE BLDC GLUCOMTR-MCNC: 176 MG/DL — HIGH (ref 70–99)
GLUCOSE BLDC GLUCOMTR-MCNC: 178 MG/DL — HIGH (ref 70–99)
GLUCOSE BLDC GLUCOMTR-MCNC: 183 MG/DL — HIGH (ref 70–99)
GLUCOSE BLDC GLUCOMTR-MCNC: 199 MG/DL — HIGH (ref 70–99)
GLUCOSE BLDC GLUCOMTR-MCNC: 207 MG/DL — HIGH (ref 70–99)
GLUCOSE BLDC GLUCOMTR-MCNC: 208 MG/DL — HIGH (ref 70–99)
GLUCOSE BLDC GLUCOMTR-MCNC: 209 MG/DL — HIGH (ref 70–99)
GLUCOSE BLDC GLUCOMTR-MCNC: 213 MG/DL — HIGH (ref 70–99)
GLUCOSE BLDC GLUCOMTR-MCNC: 216 MG/DL — HIGH (ref 70–99)
GLUCOSE BLDC GLUCOMTR-MCNC: 217 MG/DL — HIGH (ref 70–99)
GLUCOSE BLDC GLUCOMTR-MCNC: 217 MG/DL — HIGH (ref 70–99)
GLUCOSE BLDC GLUCOMTR-MCNC: 220 MG/DL — HIGH (ref 70–99)
GLUCOSE BLDC GLUCOMTR-MCNC: 220 MG/DL — HIGH (ref 70–99)
GLUCOSE BLDC GLUCOMTR-MCNC: 224 MG/DL — HIGH (ref 70–99)
GLUCOSE BLDC GLUCOMTR-MCNC: 228 MG/DL — HIGH (ref 70–99)
GLUCOSE BLDC GLUCOMTR-MCNC: 228 MG/DL — HIGH (ref 70–99)
GLUCOSE BLDC GLUCOMTR-MCNC: 229 MG/DL — HIGH (ref 70–99)
GLUCOSE BLDC GLUCOMTR-MCNC: 231 MG/DL — HIGH (ref 70–99)
GLUCOSE BLDC GLUCOMTR-MCNC: 244 MG/DL — HIGH (ref 70–99)
GLUCOSE BLDC GLUCOMTR-MCNC: 259 MG/DL — HIGH (ref 70–99)
GLUCOSE SERPL-MCNC: 151 MG/DL — HIGH (ref 70–99)
GLUCOSE SERPL-MCNC: 199 MG/DL — HIGH (ref 70–99)
GLUCOSE SERPL-MCNC: 221 MG/DL — HIGH (ref 70–99)
GLUCOSE SERPL-MCNC: 223 MG/DL — HIGH (ref 70–99)
GLUCOSE SERPL-MCNC: 237 MG/DL — HIGH (ref 70–99)
GLUCOSE SERPL-MCNC: 239 MG/DL — HIGH (ref 70–99)
HCT VFR BLD CALC: 29.7 % — LOW (ref 39–50)
HCT VFR BLD CALC: 34.5 % — LOW (ref 39–50)
HDLC SERPL-MCNC: 33 MG/DL — LOW
HGB BLD-MCNC: 11.5 G/DL — LOW (ref 13–17)
HGB BLD-MCNC: 9.9 G/DL — LOW (ref 13–17)
IMM GRANULOCYTES NFR BLD AUTO: 0.6 % — SIGNIFICANT CHANGE UP (ref 0–1.5)
IMM GRANULOCYTES NFR BLD AUTO: 0.7 % — SIGNIFICANT CHANGE UP (ref 0–1.5)
INR BLD: 1.12 RATIO — SIGNIFICANT CHANGE UP (ref 0.88–1.16)
INR BLD: 1.16 RATIO — SIGNIFICANT CHANGE UP (ref 0.88–1.16)
LACTATE SERPL-SCNC: 0.8 MMOL/L — SIGNIFICANT CHANGE UP (ref 0.7–2)
LACTATE SERPL-SCNC: 1 MMOL/L — SIGNIFICANT CHANGE UP (ref 0.7–2)
LIPID PNL WITH DIRECT LDL SERPL: 186 MG/DL — HIGH
LYMPHOCYTES # BLD AUTO: 17.5 % — SIGNIFICANT CHANGE UP (ref 13–44)
LYMPHOCYTES # BLD AUTO: 19.4 % — SIGNIFICANT CHANGE UP (ref 13–44)
LYMPHOCYTES # BLD AUTO: 2.37 K/UL — SIGNIFICANT CHANGE UP (ref 1–3.3)
LYMPHOCYTES # BLD AUTO: 2.91 K/UL — SIGNIFICANT CHANGE UP (ref 1–3.3)
MAGNESIUM SERPL-MCNC: 1.8 MG/DL — SIGNIFICANT CHANGE UP (ref 1.6–2.6)
MAGNESIUM SERPL-MCNC: 1.8 MG/DL — SIGNIFICANT CHANGE UP (ref 1.6–2.6)
MAGNESIUM SERPL-MCNC: 1.9 MG/DL — SIGNIFICANT CHANGE UP (ref 1.6–2.6)
MAGNESIUM SERPL-MCNC: 1.9 MG/DL — SIGNIFICANT CHANGE UP (ref 1.6–2.6)
MAGNESIUM SERPL-MCNC: 2 MG/DL — SIGNIFICANT CHANGE UP (ref 1.6–2.6)
MAGNESIUM SERPL-MCNC: 2 MG/DL — SIGNIFICANT CHANGE UP (ref 1.6–2.6)
MCHC RBC-ENTMCNC: 29.5 PG — SIGNIFICANT CHANGE UP (ref 27–34)
MCHC RBC-ENTMCNC: 29.7 PG — SIGNIFICANT CHANGE UP (ref 27–34)
MCHC RBC-ENTMCNC: 33.3 GM/DL — SIGNIFICANT CHANGE UP (ref 32–36)
MCHC RBC-ENTMCNC: 33.3 GM/DL — SIGNIFICANT CHANGE UP (ref 32–36)
MCV RBC AUTO: 88.4 FL — SIGNIFICANT CHANGE UP (ref 80–100)
MCV RBC AUTO: 89.1 FL — SIGNIFICANT CHANGE UP (ref 80–100)
MONOCYTES # BLD AUTO: 1.55 K/UL — HIGH (ref 0–0.9)
MONOCYTES # BLD AUTO: 1.95 K/UL — HIGH (ref 0–0.9)
MONOCYTES NFR BLD AUTO: 11.4 % — SIGNIFICANT CHANGE UP (ref 2–14)
MONOCYTES NFR BLD AUTO: 13 % — SIGNIFICANT CHANGE UP (ref 2–14)
NEUTROPHILS # BLD AUTO: 9.53 K/UL — HIGH (ref 1.8–7.4)
NEUTROPHILS # BLD AUTO: 9.87 K/UL — HIGH (ref 1.8–7.4)
NEUTROPHILS NFR BLD AUTO: 65.7 % — SIGNIFICANT CHANGE UP (ref 43–77)
NEUTROPHILS NFR BLD AUTO: 70.1 % — SIGNIFICANT CHANGE UP (ref 43–77)
NON HDL CHOLESTEROL: 231 MG/DL — HIGH
NRBC # BLD: 0 /100 WBCS — SIGNIFICANT CHANGE UP (ref 0–0)
NRBC # BLD: 0 /100 WBCS — SIGNIFICANT CHANGE UP (ref 0–0)
OSMOLALITY SERPL: 302 MOSMOL/KG — HIGH (ref 275–300)
PHOSPHATE SERPL-MCNC: 1 MG/DL — CRITICAL LOW (ref 2.5–4.5)
PHOSPHATE SERPL-MCNC: 1.1 MG/DL — LOW (ref 2.5–4.5)
PHOSPHATE SERPL-MCNC: 1.6 MG/DL — LOW (ref 2.5–4.5)
PHOSPHATE SERPL-MCNC: 2 MG/DL — LOW (ref 2.5–4.5)
PLATELET # BLD AUTO: 236 K/UL — SIGNIFICANT CHANGE UP (ref 150–400)
PLATELET # BLD AUTO: 290 K/UL — SIGNIFICANT CHANGE UP (ref 150–400)
POTASSIUM SERPL-MCNC: 3.7 MMOL/L — SIGNIFICANT CHANGE UP (ref 3.5–5.3)
POTASSIUM SERPL-MCNC: 3.7 MMOL/L — SIGNIFICANT CHANGE UP (ref 3.5–5.3)
POTASSIUM SERPL-MCNC: 3.9 MMOL/L — SIGNIFICANT CHANGE UP (ref 3.5–5.3)
POTASSIUM SERPL-MCNC: 3.9 MMOL/L — SIGNIFICANT CHANGE UP (ref 3.5–5.3)
POTASSIUM SERPL-MCNC: 4.6 MMOL/L — SIGNIFICANT CHANGE UP (ref 3.5–5.3)
POTASSIUM SERPL-MCNC: 4.7 MMOL/L — SIGNIFICANT CHANGE UP (ref 3.5–5.3)
POTASSIUM SERPL-SCNC: 3.7 MMOL/L — SIGNIFICANT CHANGE UP (ref 3.5–5.3)
POTASSIUM SERPL-SCNC: 3.7 MMOL/L — SIGNIFICANT CHANGE UP (ref 3.5–5.3)
POTASSIUM SERPL-SCNC: 3.9 MMOL/L — SIGNIFICANT CHANGE UP (ref 3.5–5.3)
POTASSIUM SERPL-SCNC: 3.9 MMOL/L — SIGNIFICANT CHANGE UP (ref 3.5–5.3)
POTASSIUM SERPL-SCNC: 4.6 MMOL/L — SIGNIFICANT CHANGE UP (ref 3.5–5.3)
POTASSIUM SERPL-SCNC: 4.7 MMOL/L — SIGNIFICANT CHANGE UP (ref 3.5–5.3)
PROT SERPL-MCNC: 4.7 G/DL — LOW (ref 6–8.3)
PROT SERPL-MCNC: 4.7 G/DL — LOW (ref 6–8.3)
PROT SERPL-MCNC: 4.9 G/DL — LOW (ref 6–8.3)
PROT SERPL-MCNC: 5.1 G/DL — LOW (ref 6–8.3)
PROT SERPL-MCNC: 5.3 G/DL — LOW (ref 6–8.3)
PROT SERPL-MCNC: 5.5 G/DL — LOW (ref 6–8.3)
PROTHROM AB SERPL-ACNC: 13 SEC — SIGNIFICANT CHANGE UP (ref 10.5–13.4)
PROTHROM AB SERPL-ACNC: 13.5 SEC — HIGH (ref 10.5–13.4)
RBC # BLD: 3.36 M/UL — LOW (ref 4.2–5.8)
RBC # BLD: 3.87 M/UL — LOW (ref 4.2–5.8)
RBC # FLD: 14 % — SIGNIFICANT CHANGE UP (ref 10.3–14.5)
RBC # FLD: 14.1 % — SIGNIFICANT CHANGE UP (ref 10.3–14.5)
SODIUM SERPL-SCNC: 131 MMOL/L — LOW (ref 135–145)
SODIUM SERPL-SCNC: 131 MMOL/L — LOW (ref 135–145)
SODIUM SERPL-SCNC: 132 MMOL/L — LOW (ref 135–145)
SODIUM SERPL-SCNC: 134 MMOL/L — LOW (ref 135–145)
SODIUM SERPL-SCNC: 136 MMOL/L — SIGNIFICANT CHANGE UP (ref 135–145)
SODIUM SERPL-SCNC: 137 MMOL/L — SIGNIFICANT CHANGE UP (ref 135–145)
SPECIMEN SOURCE: SIGNIFICANT CHANGE UP
TRIGL SERPL-MCNC: 229 MG/DL — HIGH
TROPONIN T, HIGH SENSITIVITY RESULT: 704 NG/L — HIGH (ref 0–51)
TROPONIN T, HIGH SENSITIVITY RESULT: 704 NG/L — HIGH (ref 0–51)
TROPONIN T, HIGH SENSITIVITY RESULT: 725 NG/L — HIGH (ref 0–51)
TROPONIN T, HIGH SENSITIVITY RESULT: 787 NG/L — HIGH (ref 0–51)
TSH SERPL-MCNC: 0.43 UIU/ML — SIGNIFICANT CHANGE UP (ref 0.27–4.2)
WBC # BLD: 13.58 K/UL — HIGH (ref 3.8–10.5)
WBC # BLD: 15.01 K/UL — HIGH (ref 3.8–10.5)
WBC # FLD AUTO: 13.58 K/UL — HIGH (ref 3.8–10.5)
WBC # FLD AUTO: 15.01 K/UL — HIGH (ref 3.8–10.5)

## 2022-03-02 PROCEDURE — 93306 TTE W/DOPPLER COMPLETE: CPT | Mod: 26

## 2022-03-02 PROCEDURE — 70450 CT HEAD/BRAIN W/O DYE: CPT | Mod: 26

## 2022-03-02 PROCEDURE — 99292 CRITICAL CARE ADDL 30 MIN: CPT | Mod: 25

## 2022-03-02 PROCEDURE — 95720 EEG PHY/QHP EA INCR W/VEEG: CPT

## 2022-03-02 PROCEDURE — 93010 ELECTROCARDIOGRAM REPORT: CPT

## 2022-03-02 PROCEDURE — 99291 CRITICAL CARE FIRST HOUR: CPT | Mod: 25

## 2022-03-02 RX ORDER — MORPHINE SULFATE 50 MG/1
2 CAPSULE, EXTENDED RELEASE ORAL ONCE
Refills: 0 | Status: DISCONTINUED | OUTPATIENT
Start: 2022-03-02 | End: 2022-03-02

## 2022-03-02 RX ORDER — ISOSORBIDE DINITRATE 5 MG/1
10 TABLET ORAL ONCE
Refills: 0 | Status: COMPLETED | OUTPATIENT
Start: 2022-03-02 | End: 2022-03-02

## 2022-03-02 RX ORDER — SODIUM BICARBONATE 1 MEQ/ML
50 SYRINGE (ML) INTRAVENOUS ONCE
Refills: 0 | Status: COMPLETED | OUTPATIENT
Start: 2022-03-02 | End: 2022-03-02

## 2022-03-02 RX ORDER — NYSTATIN CREAM 100000 [USP'U]/G
1 CREAM TOPICAL
Refills: 0 | Status: DISCONTINUED | OUTPATIENT
Start: 2022-03-02 | End: 2022-03-08

## 2022-03-02 RX ORDER — HYDRALAZINE HCL 50 MG
25 TABLET ORAL EVERY 8 HOURS
Refills: 0 | Status: DISCONTINUED | OUTPATIENT
Start: 2022-03-02 | End: 2022-03-03

## 2022-03-02 RX ORDER — MAGNESIUM SULFATE 500 MG/ML
1 VIAL (ML) INJECTION ONCE
Refills: 0 | Status: COMPLETED | OUTPATIENT
Start: 2022-03-02 | End: 2022-03-02

## 2022-03-02 RX ORDER — POTASSIUM CHLORIDE 20 MEQ
40 PACKET (EA) ORAL ONCE
Refills: 0 | Status: COMPLETED | OUTPATIENT
Start: 2022-03-02 | End: 2022-03-02

## 2022-03-02 RX ORDER — POTASSIUM CHLORIDE 20 MEQ
10 PACKET (EA) ORAL
Refills: 0 | Status: COMPLETED | OUTPATIENT
Start: 2022-03-02 | End: 2022-03-02

## 2022-03-02 RX ORDER — ISOSORBIDE DINITRATE 5 MG/1
30 TABLET ORAL THREE TIMES A DAY
Refills: 0 | Status: DISCONTINUED | OUTPATIENT
Start: 2022-03-02 | End: 2022-03-08

## 2022-03-02 RX ORDER — METHADONE HYDROCHLORIDE 40 MG/1
5 TABLET ORAL EVERY 6 HOURS
Refills: 0 | Status: DISCONTINUED | OUTPATIENT
Start: 2022-03-02 | End: 2022-03-09

## 2022-03-02 RX ORDER — POTASSIUM PHOSPHATE, MONOBASIC POTASSIUM PHOSPHATE, DIBASIC 236; 224 MG/ML; MG/ML
15 INJECTION, SOLUTION INTRAVENOUS ONCE
Refills: 0 | Status: COMPLETED | OUTPATIENT
Start: 2022-03-02 | End: 2022-03-02

## 2022-03-02 RX ORDER — POTASSIUM PHOSPHATE, MONOBASIC POTASSIUM PHOSPHATE, DIBASIC 236; 224 MG/ML; MG/ML
30 INJECTION, SOLUTION INTRAVENOUS ONCE
Refills: 0 | Status: COMPLETED | OUTPATIENT
Start: 2022-03-02 | End: 2022-03-02

## 2022-03-02 RX ORDER — SODIUM CHLORIDE 9 MG/ML
1000 INJECTION, SOLUTION INTRAVENOUS
Refills: 0 | Status: DISCONTINUED | OUTPATIENT
Start: 2022-03-02 | End: 2022-03-02

## 2022-03-02 RX ORDER — SODIUM CHLORIDE 9 MG/ML
1000 INJECTION, SOLUTION INTRAVENOUS
Refills: 0 | Status: COMPLETED | OUTPATIENT
Start: 2022-03-02 | End: 2022-03-02

## 2022-03-02 RX ORDER — DEXTROSE MONOHYDRATE, SODIUM CHLORIDE, AND POTASSIUM CHLORIDE 50; .745; 4.5 G/1000ML; G/1000ML; G/1000ML
1000 INJECTION, SOLUTION INTRAVENOUS
Refills: 0 | Status: DISCONTINUED | OUTPATIENT
Start: 2022-03-02 | End: 2022-03-03

## 2022-03-02 RX ORDER — HYDROMORPHONE HYDROCHLORIDE 2 MG/ML
0.5 INJECTION INTRAMUSCULAR; INTRAVENOUS; SUBCUTANEOUS ONCE
Refills: 0 | Status: DISCONTINUED | OUTPATIENT
Start: 2022-03-02 | End: 2022-03-02

## 2022-03-02 RX ORDER — DEXMEDETOMIDINE HYDROCHLORIDE IN 0.9% SODIUM CHLORIDE 4 UG/ML
0.3 INJECTION INTRAVENOUS
Qty: 200 | Refills: 0 | Status: DISCONTINUED | OUTPATIENT
Start: 2022-03-02 | End: 2022-03-02

## 2022-03-02 RX ORDER — ISOSORBIDE DINITRATE 5 MG/1
20 TABLET ORAL THREE TIMES A DAY
Refills: 0 | Status: DISCONTINUED | OUTPATIENT
Start: 2022-03-02 | End: 2022-03-02

## 2022-03-02 RX ADMIN — SODIUM CHLORIDE 250 MILLILITER(S): 9 INJECTION, SOLUTION INTRAVENOUS at 05:00

## 2022-03-02 RX ADMIN — MORPHINE SULFATE 2 MILLIGRAM(S): 50 CAPSULE, EXTENDED RELEASE ORAL at 05:59

## 2022-03-02 RX ADMIN — HEPARIN SODIUM 24 UNIT(S)/HR: 5000 INJECTION INTRAVENOUS; SUBCUTANEOUS at 13:00

## 2022-03-02 RX ADMIN — SODIUM CHLORIDE 250 MILLILITER(S): 9 INJECTION, SOLUTION INTRAVENOUS at 04:48

## 2022-03-02 RX ADMIN — Medication 50 MILLIEQUIVALENT(S): at 07:43

## 2022-03-02 RX ADMIN — TICAGRELOR 90 MILLIGRAM(S): 90 TABLET ORAL at 09:40

## 2022-03-02 RX ADMIN — Medication 12.5 MILLIGRAM(S): at 17:28

## 2022-03-02 RX ADMIN — HEPARIN SODIUM 20 UNIT(S)/HR: 5000 INJECTION INTRAVENOUS; SUBCUTANEOUS at 00:24

## 2022-03-02 RX ADMIN — Medication 50 MILLIEQUIVALENT(S): at 17:46

## 2022-03-02 RX ADMIN — Medication 150 MEQ/KG/HR: at 22:35

## 2022-03-02 RX ADMIN — MORPHINE SULFATE 2 MILLIGRAM(S): 50 CAPSULE, EXTENDED RELEASE ORAL at 03:20

## 2022-03-02 RX ADMIN — MORPHINE SULFATE 2 MILLIGRAM(S): 50 CAPSULE, EXTENDED RELEASE ORAL at 06:31

## 2022-03-02 RX ADMIN — DEXTROSE MONOHYDRATE, SODIUM CHLORIDE, AND POTASSIUM CHLORIDE 200 MILLILITER(S): 50; .745; 4.5 INJECTION, SOLUTION INTRAVENOUS at 22:35

## 2022-03-02 RX ADMIN — Medication 50 MILLIEQUIVALENT(S): at 19:00

## 2022-03-02 RX ADMIN — Medication 100 GRAM(S): at 09:40

## 2022-03-02 RX ADMIN — Medication 50 MILLIEQUIVALENT(S): at 08:45

## 2022-03-02 RX ADMIN — Medication 3 MICROGRAM(S)/MIN: at 07:44

## 2022-03-02 RX ADMIN — MORPHINE SULFATE 2 MILLIGRAM(S): 50 CAPSULE, EXTENDED RELEASE ORAL at 03:50

## 2022-03-02 RX ADMIN — Medication 2 MILLIGRAM(S): at 13:46

## 2022-03-02 RX ADMIN — Medication 10 MILLIGRAM(S): at 13:47

## 2022-03-02 RX ADMIN — INSULIN HUMAN 6 UNIT(S)/HR: 100 INJECTION, SOLUTION SUBCUTANEOUS at 07:44

## 2022-03-02 RX ADMIN — Medication 150 MEQ/KG/HR: at 09:40

## 2022-03-02 RX ADMIN — ISOSORBIDE DINITRATE 10 MILLIGRAM(S): 5 TABLET ORAL at 12:12

## 2022-03-02 RX ADMIN — Medication 2 MILLIGRAM(S): at 18:10

## 2022-03-02 RX ADMIN — Medication 40 MILLIEQUIVALENT(S): at 15:30

## 2022-03-02 RX ADMIN — HEPARIN SODIUM 26 UNIT(S)/HR: 5000 INJECTION INTRAVENOUS; SUBCUTANEOUS at 22:36

## 2022-03-02 RX ADMIN — Medication 10 MILLIGRAM(S): at 05:38

## 2022-03-02 RX ADMIN — MORPHINE SULFATE 2 MILLIGRAM(S): 50 CAPSULE, EXTENDED RELEASE ORAL at 05:26

## 2022-03-02 RX ADMIN — Medication 12.5 MILLIGRAM(S): at 05:38

## 2022-03-02 RX ADMIN — ISOSORBIDE DINITRATE 10 MILLIGRAM(S): 5 TABLET ORAL at 12:58

## 2022-03-02 RX ADMIN — MORPHINE SULFATE 2 MILLIGRAM(S): 50 CAPSULE, EXTENDED RELEASE ORAL at 05:00

## 2022-03-02 RX ADMIN — POTASSIUM PHOSPHATE, MONOBASIC POTASSIUM PHOSPHATE, DIBASIC 83.33 MILLIMOLE(S): 236; 224 INJECTION, SOLUTION INTRAVENOUS at 22:36

## 2022-03-02 RX ADMIN — DEXMEDETOMIDINE HYDROCHLORIDE IN 0.9% SODIUM CHLORIDE 8.15 MICROGRAM(S)/KG/HR: 4 INJECTION INTRAVENOUS at 06:29

## 2022-03-02 RX ADMIN — HEPARIN SODIUM 22 UNIT(S)/HR: 5000 INJECTION INTRAVENOUS; SUBCUTANEOUS at 05:46

## 2022-03-02 RX ADMIN — ISOSORBIDE DINITRATE 20 MILLIGRAM(S): 5 TABLET ORAL at 16:20

## 2022-03-02 RX ADMIN — MORPHINE SULFATE 2 MILLIGRAM(S): 50 CAPSULE, EXTENDED RELEASE ORAL at 05:56

## 2022-03-02 RX ADMIN — METHADONE HYDROCHLORIDE 5 MILLIGRAM(S): 40 TABLET ORAL at 17:28

## 2022-03-02 RX ADMIN — POTASSIUM PHOSPHATE, MONOBASIC POTASSIUM PHOSPHATE, DIBASIC 83.33 MILLIMOLE(S): 236; 224 INJECTION, SOLUTION INTRAVENOUS at 15:30

## 2022-03-02 RX ADMIN — Medication 81 MILLIGRAM(S): at 12:11

## 2022-03-02 RX ADMIN — NYSTATIN CREAM 1 APPLICATION(S): 100000 CREAM TOPICAL at 18:28

## 2022-03-02 RX ADMIN — HYDROMORPHONE HYDROCHLORIDE 0.5 MILLIGRAM(S): 2 INJECTION INTRAMUSCULAR; INTRAVENOUS; SUBCUTANEOUS at 06:24

## 2022-03-02 RX ADMIN — ISOSORBIDE DINITRATE 10 MILLIGRAM(S): 5 TABLET ORAL at 05:38

## 2022-03-02 RX ADMIN — Medication 3 MICROGRAM(S)/MIN: at 00:14

## 2022-03-02 RX ADMIN — Medication 3 MICROGRAM(S)/MIN: at 22:35

## 2022-03-02 RX ADMIN — MORPHINE SULFATE 2 MILLIGRAM(S): 50 CAPSULE, EXTENDED RELEASE ORAL at 04:30

## 2022-03-02 RX ADMIN — SODIUM CHLORIDE 250 MILLILITER(S): 9 INJECTION, SOLUTION INTRAVENOUS at 00:49

## 2022-03-02 RX ADMIN — Medication 50 MILLIEQUIVALENT(S): at 20:09

## 2022-03-02 RX ADMIN — INSULIN HUMAN 6 UNIT(S)/HR: 100 INJECTION, SOLUTION SUBCUTANEOUS at 22:35

## 2022-03-02 RX ADMIN — Medication 50 MILLIEQUIVALENT(S): at 10:30

## 2022-03-02 RX ADMIN — Medication 2 MILLIGRAM(S): at 22:36

## 2022-03-02 RX ADMIN — HYDROMORPHONE HYDROCHLORIDE 0.5 MILLIGRAM(S): 2 INJECTION INTRAMUSCULAR; INTRAVENOUS; SUBCUTANEOUS at 07:00

## 2022-03-02 RX ADMIN — METHADONE HYDROCHLORIDE 5 MILLIGRAM(S): 40 TABLET ORAL at 12:59

## 2022-03-02 RX ADMIN — Medication 100 GRAM(S): at 17:46

## 2022-03-02 RX ADMIN — Medication 62.5 MILLIMOLE(S): at 01:43

## 2022-03-02 RX ADMIN — POTASSIUM PHOSPHATE, MONOBASIC POTASSIUM PHOSPHATE, DIBASIC 62.5 MILLIMOLE(S): 236; 224 INJECTION, SOLUTION INTRAVENOUS at 09:40

## 2022-03-02 RX ADMIN — Medication 150 MEQ/KG/HR: at 07:45

## 2022-03-02 NOTE — PROGRESS NOTE ADULT - ASSESSMENT
57 y/o M with PMH of HTN, T2DM, neuropathy, chronic pain (on Methadone & opioids) for cervical spine and back injury transferred to [facility] from [place] due to STEMI (trop 7560, inferolateral LUIS) and DKA. Found to have A fib w/ RVR to 130s-150s, JAXON w/ SCr 2.14 (baseline 0.7-0.9 in 10/2021), admitted to CCU for optimization of DKA and kidney function prior to cath.      NEURO:  #Mental status: altered. Likely metabolic encephalopathy 2/2 DKA  -Currently A&O x 1 likely 2/2 DKA, able to conversate but difficult to recall/concentrate  -will treat DKA as below    CV:  #STEMI (trop 7560, inferolateral LUIS on EKG), per chart was loaded with 180 mg Ticagrelor, heparin bolus in [facility]  - Revascularization with PCI deferred due to pt's elevated SCr, likely prerenal JAXON 2/2 dehydration from DKA  - MISSY score: 122  - DAPT: ASA 81 + Ticagrelor 90mg BID for 1 year  - Statin: atorvastatin 80mg qD  - Heparin gtt  - limited TTE 3/1/22 showed hypokinetic inferolateral and mid inferior wall, basal inferior wall is akinetic.   - trend trop, CK, CKMB  - sent TSH and lipid panel    #AFib w/ RVR:   - CHADSVASC score: 3  - HAS-BLED score: 1  - currently on heparin gtt  - s/p cardizem 25 mg IVP x 2 and cardizem gtt; d/rome due to potentially decreased EF on limited TTE  - will c/w fluid resuscitation as below  - Monitor on telemetry      PULM:  -Currently satting well on RA, but has deep shallow breathing, likely kussmaul respirations 2/2 DKA  -continue to monitor O2 sats and respiratory status, currently protecting airway. O2 NC PRN    RENAL:  #JAXON likely prerenal 2/2 dehydration 2/2 DKA  -SCr 2.14, BUN 44, fluid responsive, improved to SCr/BUN 1.24/41 with 1L NS bolus + fluids received from gtts  -UAs significant for large ketones and glucose  -Monitor I/Os, UO    HAGMA:  -pt currently has AG of 42 -> 41, likely 2/2 DKA  -received 5 amps of bicarb, bicarb gtt started @ 150cc/hr  -IVF resuscitation  -DKA management as below    GI:  #Transaminitis: Elevated LFTs  -AST//261 -> 101/195, improving with fluids, possible shock liver due to hypovolemia from DKA  #Diet: NPO due to DKA    ENDO:  #DKA:  - DKA protocol initiated - started insulin gtt @ 6/hr, bicarb gtt @ 150/hr due to HAGMA  - K 3.2 -> 3.7; was ordered for 40 mEq PO x 2, 10 mEq IV, insulin gtt started once K > 3.3  - will continue to supplement K as level is < 5.3 ordered NS bolus with 30 mEq of K  - c/w IVF resuscitation per DKA protocol, NS bolus 1L    - BMP [id number], BHB q1hour  to monitor AG + K, will supplement PRN  - HbA1c 9.8 in 10/2021; pt seems to have history of uncontrolled T2DM, currently unable to answer questions regarding his PMH  - Endocrine consult in AM  - Will plan for eventual bridge from insulin gtt to long acting/premeal insulin once AG closes and FSG < 200    HEMATOLOGIC:  -INR slightly elevated at 1/26 -> 1.21, may be due to decreased PO intake, will continue to monitor  -H/H stable    ID:  UA x 2 negative for bacteria, afebrile, although WBC 16 -> 15, HR 130s, RR > 20  Pt without strong objective or clinical evidence of infection. Will observe off antibiotics    SKIN  #Lines:  PIVs    #Ethics  Full code         55 y/o M with PMH of HTN, T2DM, neuropathy, chronic pain (on Methadone & opioids) for cervical spine and back injury transferred to [facility]U from [place] due to STEMI (trop 7560, inferolateral LUIS) and DKA. Found to have A fib w/ RVR to 130s-150s, JAXON w/ SCr 2.14 (baseline 0.7-0.9 in 10/2021), admitted to CCU for optimization of DKA and kidney function prior to cath.      NEURO:  #Mental status: altered. Likely metabolic encephalopathy 2/2 DKA  -Currently A&O x 1 likely 2/2 DKA, able to conversate but difficult to recall/concentrate  -will treat DKA as below  -ordered tox screen, serum methanol, ethyl alcohol, CT Head, serum osms    CV:  #STEMI (trop 7560, inferolateral LUIS on EKG), per chart was loaded with 180 mg Ticagrelor, heparin bolus in [facility]  - Revascularization with PCI deferred due to pt's elevated SCr, likely prerenal JAXON 2/2 dehydration from DKA  - MISSY score: 122  - DAPT: ASA 81 + Ticagrelor 90mg BID for 1 year  - Statin: atorvastatin 80mg qD  - Heparin gtt  - limited TTE 3/1/22 showed hypokinetic inferolateral and mid inferior wall, basal inferior wall is akinetic.   - trend trop, CK, CKMB  - TSH and lipid panel  - c/w lopressor 12.5 mg BID    #AFib w/ RVR:   - CHADSVASC score: 3  - HAS-BLED score: 1  - currently on heparin gtt  - s/p cardizem 25 mg IVP x 2 and cardizem gtt; d/rome due to potentially decreased EF on limited TTE  - will c/w fluid resuscitation as below  - Monitor on telemetry  - c/w lopressor 12.5 mg BID      PULM:  -Currently satting well on RA, but has deep shallow breathing, likely kussmaul respirations 2/2 DKA  -continue to monitor O2 sats and respiratory status, currently protecting airway. O2 NC PRN    RENAL:  #JAXON likely prerenal 2/2 dehydration 2/2 DKA; resolved  -SCr 2.14, BUN 44, fluid responsive, improved to baseline SCr/BUN with IVF  -UAs significant for large ketones and glucose  -Monitor I/Os, UO    HAGMA:  -pt had AG of 42 -> 41, improved  to ~30s -> 20s likely 2/2 DKA  -c/w bicarb gtt started @ 150cc/hr + multiple amps of bicarb  -IVF resuscitation  -DKA management as below    GI:  #Transaminitis: Elevated LFTs  -AST//261 -> 101/195 -> 45/110 improving with fluids, possible shock liver due to hypovolemia from DKA  #Diet: NPO due to DKA    ENDO:  #DKA:  - DKA protocol initiated - c/w insulin gtt, bicarb gtt @ 150/hr due to HAGMA  - K 3.2 -> 3.7; was ordered for 40 mEq PO x 2, 10 mEq IV, insulin gtt started once K > 3.3  - will continue to supplement K as level is < 5.3 ordered NS bolus with 30 mEq of K  - c/w IVF resuscitation per DKA protocol, NS bolus 1L    - BMP q4zsums, BHB q1hour  to monitor AG + K, will supplement PRN  - HbA1c 9.8 in 10/2021; pt seems to have history of uncontrolled T2DM, currently unable to answer questions regarding his PMH  - Will plan for eventual bridge from insulin gtt to long acting/premeal insulin once AG closes and FSG < 200      HEMATOLOGIC:  -H/H stable    ID:  UA x 2 negative for bacteria, afebrile, although WBC 16 -> 15, HR 130s, RR > 20  Pt without strong objective or clinical evidence of infection. Will observe off antibiotics    SKIN  #Lines:  PIVs    #Ethics  Full code         55 y/o M with PMH of HTN, T2DM, neuropathy, chronic pain (on Methadone & opioids) for cervical spine and back injury transferred to [facility] from [place] due to STEMI (trop 7560, inferolateral LUIS) and DKA. Found to have A fib w/ RVR to 130s-150s, JAXON w/ SCr 2.14 (baseline 0.7-0.9 in 10/2021), admitted to CCU for optimization of DKA and kidney function prior to cath.      NEURO:  #Mental status: altered. Likely metabolic encephalopathy 2/2 DKA  -Currently A&O x 1 likely 2/2 DKA, able to conversate but difficult to recall/concentrate  -will treat DKA as below  -ordered tox screen, serum methanol, ethyl alcohol, CT Head, serum osms    CV:  #STEMI (trop 7560, inferolateral LUIS on EKG), per chart was loaded with 180 mg Ticagrelor, heparin bolus in [facility]  - Revascularization with PCI deferred due to pt's elevated SCr, likely prerenal JAXON 2/2 dehydration from DKA  - MISSY score: 122  - DAPT: ASA 81 + Ticagrelor 90mg BID for 1 year  - Statin: atorvastatin 80mg qD  - Heparin gtt  - limited TTE 3/1/22 showed hypokinetic inferolateral and mid inferior wall, basal inferior wall is akinetic.   - trend trop, CK, CKMB  - TSH and lipid panel  - c/w lopressor 12.5 mg BID  -3/2: pt had CP in AM, will c/w nitro gtt, d/rome precedex gtt. c/w hydral and isordil. plan to reach out to cath lab for potential PCI.    #AFib w/ RVR:   - CHADSVASC score: 3  - HAS-BLED score: 1  - currently on heparin gtt  - s/p cardizem 25 mg IVP x 2 and cardizem gtt; d/rome due to potentially decreased EF on limited TTE  - will c/w fluid resuscitation as below  - Monitor on telemetry  - c/w lopressor 12.5 mg BID      PULM:  -Currently satting well on RA, but has deep shallow breathing, likely kussmaul respirations 2/2 DKA  -continue to monitor O2 sats and respiratory status, currently protecting airway. O2 NC PRN    RENAL:  #JAXON likely prerenal 2/2 dehydration 2/2 DKA; resolved  -SCr 2.14, BUN 44, fluid responsive, improved to baseline SCr/BUN with IVF  -UAs significant for large ketones and glucose  -Monitor I/Os, UO    HAGMA:  -pt had AG of 42 -> 41, improved  to ~30s -> 20s likely 2/2 DKA  -c/w bicarb gtt started @ 150cc/hr + multiple amps of bicarb  -IVF resuscitation  -DKA management as below    GI:  #Transaminitis: Elevated LFTs  -AST//261 -> 101/195 -> 45/110 improving with fluids, possible shock liver due to hypovolemia from DKA  #Diet: NPO due to DKA    ENDO:  #DKA:  - DKA protocol initiated - c/w insulin gtt, bicarb gtt @ 150/hr due to HAGMA  - K 3.2 -> 3.7; was ordered for 40 mEq PO x 2, 10 mEq IV, insulin gtt started once K > 3.3  - will continue to supplement K as level is < 5.3 ordered NS bolus with 30 mEq of K  - c/w IVF resuscitation per DKA protocol, NS bolus 1L    - BMP [id number], BHB q1hour  to monitor AG + K, will supplement PRN  - HbA1c 9.8 in 10/2021; pt seems to have history of uncontrolled T2DM, currently unable to answer questions regarding his PMH  - Will plan for eventual bridge from insulin gtt to long acting/premeal insulin once AG closes and FSG < 200      HEMATOLOGIC:  -H/H stable    ID:  UA x 2 negative for bacteria, afebrile, although WBC 16 -> 15, HR 130s, RR > 20  Pt without strong objective or clinical evidence of infection. Will observe off antibiotics    SKIN  #Lines:  PIVs    #Ethics  Full code

## 2022-03-02 NOTE — PROGRESS NOTE ADULT - SUBJECTIVE AND OBJECTIVE BOX
ROSE STRAUSS  MRN-[id number]  Patient is a 56y old  Male who presents with a chief complaint of STEMI, DKA (02 Mar 2022 08:10)    HPI:  57 y/o M with PMH of HTN, T2DM, neuropathy, chronic pain on Methadone & opioids for cervical spine and back injury transferred to [facility] CICU from [place] due to STEMI and DKA. Pt was reportedly found to have inferolateral ST elevations + afib on EKG, trop 7560. Pt initially came to [facility] ED due to nausea and vomiting for 3 days, reportedly had CP several days ago which apparently stopped on its own. Pt currently altered, difficult to obtain history from.    Pt transferred to [facility] for cath, noted to be in afib w/ RVR to 130s-150s, received cardizem 25 mg IV x 2 and started on cardizem gtt, cath was aborted as pt had SCR of 2.14, (baseline 0.7-0.9 in 10/2021), transferred to CCU for optimization of DKA prior to cath.     Baseline SCr 0.7-0.9 in 10/2021 from previous hospitalization at [facility] for cellulitis of hand, was d/rome on bactrim DS + ethambutol x 4 weeks.    Labs from [facility] significant for: WBC 15.53, lactate 5.6, troponin 7560.2, K 3.2, CO2 5, AG 42, SCr/BUN 2.14/44, Glucose 663, calcium 12.7, Mg 2.7, Phos 9.5  FSGs >600 -> 503  ABG 7.19/<19/133/3  UA: large ketones, moderate bacteria, negative LE and nitrite  RVP/COVID negative (01 Mar 2022 13:56)      Hospital Course:  3/2 Transferred here to the CCU due to optimization of DKA prior to cath    24 HOUR EVENTS:    REVIEW OF SYSTEMS:    CONSTITUTIONAL: No weakness, fevers or chills  EYES/ENT: No visual changes;  No vertigo or throat pain   NECK: No pain or stiffness  RESPIRATORY: No cough, wheezing, hemoptysis; No shortness of breath  CARDIOVASCULAR: No chest pain or palpitations  GASTROINTESTINAL: No abdominal or epigastric pain. No nausea, vomiting, or hematemesis; No diarrhea or constipation. No melena or hematochezia.  GENITOURINARY: No dysuria, frequency or hematuria  NEUROLOGICAL: No numbness or weakness  SKIN: No itching, rashes      ICU Vital Signs Last 24 Hrs  T(C): 36.5 (02 Mar 2022 07:30), Max: 36.8 (02 Mar 2022 06:00)  T(F): 97.7 (02 Mar 2022 07:30), Max: 98.3 (02 Mar 2022 06:00)  HR: 123 (02 Mar 2022 18:00) (89 - 123)  BP: 123/74 (02 Mar 2022 03:00) (107/65 - 123/74)  BP(mean): 93 (02 Mar 2022 03:00) (79 - 93)  ABP: 165/114 (02 Mar 2022 18:00) (93/49 - 189/72)  ABP(mean): 130 (02 Mar 2022 18:00) (56 - 156)  RR: 33 (02 Mar 2022 18:00) (8 - 46)  SpO2: 100% (02 Mar 2022 18:00) (98% - 100%)      CVP(mm Hg): --  CO: --  CI: --  PA: --  PA(mean): --  PA(direct): --  PCWP: --  LA: --  RA: --  SVR: --  SVRI: --  PVR: --  PVRI: --  I&O's Summary    01 Mar 2022 07:01  -  02 Mar 2022 07:00  --------------------------------------------------------  IN: 61448.1 mL / OUT: 6100 mL / NET: 4829.1 mL    02 Mar 2022 07:01  -  02 Mar 2022 19:23  --------------------------------------------------------  IN: 5460.3 mL / OUT: 875 mL / NET: 4585.3 mL        CAPILLARY BLOOD GLUCOSE    CAPILLARY BLOOD GLUCOSE      POCT Blood Glucose.: 213 mg/dL (02 Mar 2022 18:46)      PHYSICAL EXAM:  GENERAL: lying in bed comfortably, awake, alert  HEAD:  Atraumatic, Normocephalic  EYES: EOMI, PERRLA, conjunctiva and sclera clear  ENT: dry mucous membranes  NECK: Supple, No JVD  CHEST/LUNG: Clear to auscultation bilaterally; No rales, rhonchi, wheezing, or rubs. Labored respirations likely kussmaul respirations  HEART: tachycardic; No murmurs, rubs, or gallops  ABDOMEN: BSx4; Soft, nontender, nondistended  EXTREMITIES:  2+ Peripheral Pulses, brisk capillary refill. No clubbing, cyanosis, or edema  NERVOUS SYSTEM:  A&Ox1, no focal deficits   SKIN: No rashes or lesions  psych: normal behavior, normal affect      ============================I/O===========================   I&O's Detail    01 Mar 2022 07:  -  02 Mar 2022 07:00  --------------------------------------------------------  IN:    Dexmedetomidine: 8.1 mL    Heparin: 297 mL    Insulin: 43 mL    IV PiggyBack: 250 mL    IV PiggyBack: 401 mL    Nitroglycerin: 280 mL    Oral Fluid: 2500 mL    Sodium Bicarbonate: 2400 mL    Sodium Chloride 0.9% Bolus: 1000 mL    sodium chloride 0.9% w/ Additives: 750 mL    sodium chloride 0.9% w/ Additives: 1000 mL    sodium chloride 0.9% w/ Additives: 1000 mL    sodium chloride 0.9% w/ Additives: 1000 mL  Total IN: 95187.1 mL    OUT:    Voided (mL): 6100 mL  Total OUT: 6100 mL    Total NET: 4829.1 mL      02 Mar 2022 07:  -  02 Mar 2022 19:23  --------------------------------------------------------  IN:    Dexmedetomidine: 24.3 mL    dextrose 5% + sodium chloride 0.45% w/ Additives: 1600 mL    Heparin: 254 mL    Insulin: 44 mL    Nitroglycerin: 538 mL    Oral Fluid: 600 mL    Sodium Bicarbonate: 1650 mL    sodium chloride 0.9% w/ Additives: 750 mL  Total IN: 5460.3 mL    OUT:    Voided (mL): 875 mL  Total OUT: 875 mL    Total NET: 4585.3 mL        ============================ LABS =========================                        9.9    13.58 )-----------( 236      ( 02 Mar 2022 12:21 )             29.7         134<L>  |  101  |  25<H>  ----------------------------<  223<H>  3.7   |  12<L>  |  0.74    Ca    8.1<L>      02 Mar 2022 16:33  Phos  1.1       Mg     1.8         TPro  4.9<L>  /  Alb  2.4<L>  /  TBili  0.4  /  DBili  x   /  AST  45<H>  /  ALT  110<H>  /  AlkPhos  71      Troponin T, High Sensitivity Result: 704 ng/L (22 @ 12:22)  Troponin T, High Sensitivity Result: 787 ng/L (22 @ 07:15)  Troponin T, High Sensitivity Result: 725 ng/L (22 @ 03:28)  Troponin T, High Sensitivity Result: 704 ng/L (22 @ 23:41)  Troponin T, High Sensitivity Result: 847 ng/L (22 @ 18:40)  Troponin T, High Sensitivity Result: 1234 ng/L (22 @ 15:28)    CKMB Units: 9.6 ng/mL (22 @ 12:22)  CKMB Units: 11.9 ng/mL (22 @ 07:15)  CKMB Units: 13.4 ng/mL (22 @ 03:28)  CKMB Units: 15.5 ng/mL (22 @ 23:41)  CKMB Units: 19.2 ng/mL (22 @ 18:40)  CKMB Units: 19.6 ng/mL (22 @ 15:28)    Creatine Kinase, Serum: 3235 U/L (22 @ :22)  Creatine Kinase, Serum: 3840 U/L (22 @ 07:15)  Creatine Kinase, Serum: 4100 U/L (22 @ 03:28)  Creatine Kinase, Serum: 4655 U/L (22 @ 23:41)  Creatine Kinase, Serum: 6120 U/L (22 @ 18:40)  Creatine Kinase, Serum: 9209 U/L (22 @ 15:28)    CPK Mass Assay %: 0.3 % (22 @ :22)  CPK Mass Assay %: 0.3 % (22 @ 07:15)  CPK Mass Assay %: 0.3 % (22 @ 03:28)  CPK Mass Assay %: 0.3 % (22 @ 23:41)  CPK Mass Assay %: 0.3 % (22 @ 18:40)  CPK Mass Assay %: 0.2 % (22 @ 15:28)        LIVER FUNCTIONS - ( 02 Mar 2022 16:33 )  Alb: 2.4 g/dL / Pro: 4.9 g/dL / ALK PHOS: 71 U/L / ALT: 110 U/L / AST: 45 U/L / GGT: x           PT/INR - ( 02 Mar 2022 03:28 )   PT: 13.5 sec;   INR: 1.16 ratio         PTT - ( 02 Mar 2022 12:21 )  PTT:53.3 sec  ABG - ( 02 Mar 2022 18:52 )  pH, Arterial: 7.41  pH, Blood: x     /  pCO2: 22    /  pO2: 112   / HCO3: 14    / Base Excess: -9.2  /  SaO2: 99.3              Blood Gas Arterial, Lactate: 0.8 mmol/L (22 @ 18:52)  Blood Gas Arterial, Lactate: 0.6 mmol/L (22 @ 12:13)  Blood Gas Arterial, Lactate: 0.7 mmol/L (22 @ 07:15)  Blood Gas Arterial, Lactate: 0.7 mmol/L (22 @ 05:19)  Lactate, Blood: 0.8 mmol/L (22 @ 03:28)  Blood Gas Arterial, Lactate: 1.0 mmol/L (22 @ 03:26)  Blood Gas Arterial, Lactate: 0.9 mmol/L (22 @ 01:33)  Lactate, Blood: 1.0 mmol/L (22 @ 23:41)  Blood Gas Arterial, Lactate: 1.0 mmol/L (22 @ 23:33)  Blood Gas Arterial, Lactate: 1.2 mmol/L (22 @ 21:47)  Blood Gas Arterial, Lactate: 1.8 mmol/L (22 @ 19:44)  Lactate, Blood: 2.3 mmol/L (22 @ 18:40)  Blood Gas Arterial, Lactate: 3.1 mmol/L (22 @ 14:20)  Lactate, Blood: 5.6 mmol/L (22 @ 12:12)    Urinalysis Basic - ( 01 Mar 2022 15:55 )    Color: Colorless / Appearance: Clear / S.021 / pH: x  Gluc: x / Ketone: Large  / Bili: Negative / Urobili: Negative   Blood: x / Protein: Trace / Nitrite: Negative   Leuk Esterase: Negative / RBC: 2 /hpf / WBC 0 /HPF   Sq Epi: x / Non Sq Epi: 0 /hpf / Bacteria: Negative      ======================Micro/Rad/Cardio=================  Telemtry: Reviewed   EKG: Reviewed  CXR: Reviewed  Culture: Reviewed   Echo: Transthoracic Echocardiogram:   Patient name: ROSE STRAUSS  YOB: 1965   Age: 56 (M)   MR#: [id number]  Study Date: 3/2/2022  [facility]  Study quality: Technically good  Referring Physician: Richard Byrne MD  Blood Pressure: 121/57 mmHg  Height: 188 cm  Weight: 108 kg  BSA: 2.3 m2  ------------------------------------------------------------------------  PROCEDURE: Transthoracic echocardiogram with 2-D, M-Mode  and complete spectral and color flow Doppler.  INDICATION:ST elevation (STEMI) myocardial infarction of  unspecified site (I21.3)  ------------------------------------------------------------------------  Dimensions:    Normal Values:  LA:     5.1    2.0 - 4.0 cm  Ao:     3.7    2.0 - 3.8 cm  SEPTUM: 0.80.6 - 1.2 cm  PWT:    0.9    0.6 - 1.1 cm  LVIDd:  5.4    3.0 - 5.6 cm  LVIDs:  4.0    1.8 - 4.0 cm  Derived variables:  LVMI: 72 g/m2  RWT: 0.33  Fractional short: 26 %  EF (Ha Rule): 48 %Doppler Peak Velocity (m/sec):  AoV=1.2  ------------------------------------------------------------------------  Observations:  Mitral Valve: Normal mitral valve. Minimal mitral  regurgitation.  Aortic Valve/Aorta: Normal trileaflet aortic valve. Peak  transaortic valve gradient equals 6 mm Hg, mean transaortic  valve gradient equals 3 mm Hg, aortic valve velocity time  integral equals 19 cm, estimated aortic valve area equals  3.2 sqcm. Peak left ventricular outflow tract gradient  equals 4 mm Hg, mean gradient is equal to 2 mm Hg, LVOT  velocity time integral equals 15 cm.  Aortic Root: 3.7 cm.  LVOT diameter: 2.3 cm.  Left Atrium: Mildly dilated left atrium.  LA volume index =  39 cc/m2.  Left Ventricle: Mild left ventricular systolic dysfunction.     Hypokinesis of the basal inferior wall, inferolateral  wall. Normal left ventricular internal dimensions and wall  thicknesses. Mild diastolic dysfunction (Stage I).  Right Heart: Normal right atrium. Normal right ventricular  size and function. Normal tricuspid valve. Minimal  tricuspid regurgitation. Normal pulmonic valve.  Pericardium/Pleura: Normal pericardium with no pericardial  effusion.  Hemodynamic: Estimated right atrial pressure is 8 mm Hg.  ------------------------------------------------------------------------  Conclusions:  1. Normal left ventricular internal dimensions and wall  thicknesses.  2. Mild left ventricular systolic dysfunction.  Hypokinesis of the basal inferior wall, inferolateral wall.  3. Mild diastolic dysfunction (Stage I).  *** Compared with echocardiogram of 3/1/2022, no  significant changes noted.  ------------------------------------------------------------------------  Confirmed on  3/2/2022 - 11:34:05 by MANJEET Jeff  ------------------------------------------------------------------------ (22 @ 09:48)      Cath:   ======================================================  PAST MEDICAL & SURGICAL HISTORY:  Hypertension    Diabetes mellitus    Gastric ulcer    Spinal stenosis    H/O spinal cord compression    Spondylosis    H/O radiculopathy    H/O cervical spine surgery    History of back surgery    S/P cervical spinal fusion      ====================ASSESSMENT ==============  STEMI   AFib w/ RVR  Kussmaul respirations 2/2 DKA  JAXON likely prerenal 2/2 dehydration 2/2 DKA  Transaminitis: Elevated LFTs  DKA  Mental status: altered. Likely metabolic encephalopathy 2/2 DKA    Plan:  ====================== NEUROLOGY=====================  Mental status: altered. Likely metabolic encephalopathy 2/2 DKA  -Currently A&O x 1 likely 2/2 DKA, able to conversate but difficult to recall/concentrate  -will treat DKA as below  -ordered tox screen, serum methanol, ethyl alcohol, CT Head, serum osms      LORazepam     Tablet 1 milliGRAM(s) Oral every 1 hour PRN CIWA-Ar score 8 or greater  LORazepam     Tablet 2 milliGRAM(s) Oral every 1 hour PRN Symptom-triggered: each CIWA -Ar score 8 or GREATER  LORazepam     Tablet 2 milliGRAM(s) Oral every 2 hours PRN Symptom-triggered: 2 point increase in CIWA -Ar score and a total score of 7 or LESS  LORazepam   Injectable 2 milliGRAM(s) IV Push every 2 hours PRN Symptom-triggered: 2 point increase in CIWA -Ar score and a total score of 7 or LESS  LORazepam   Injectable 2 milliGRAM(s) IntraMuscular every 2 hours PRN Symptom-triggered: 2 point increase in CIWA -Ar score and a total score of 7 or LESS  LORazepam   Injectable 2 milliGRAM(s) IV Push every 1 hour PRN Symptom-triggered: each CIWA -Ar score 8 or GREATER  LORazepam   Injectable 2 milliGRAM(s) IntraMuscular every 1 hour PRN Symptom-triggered: each CIWA -Ar score 8 or GREATER  LORazepam   Injectable 1 milliGRAM(s) IV Push every 1 hour PRN CIWA-Ar score 8 or greater  LORazepam   Injectable 1 milliGRAM(s) IntraMuscular every 1 hour PRN CIWA-Ar score 8 or greater  methadone    Tablet 5 milliGRAM(s) Oral every 6 hours    ==================== RESPIRATORY======================  Kussmaul respirations 2/2 DKA  -Currently satting well on RA, but has deep shallow breathing  -continue to monitor O2 sats and respiratory status, currently protecting airway. O2 NC PRN      ====================CARDIOVASCULAR==================  STEMI (trop 7560, inferolateral LUIS on EKG), per chart was loaded with 180 mg Ticagrelor, heparin bolus in [facility]  - Revascularization with PCI deferred due to pt's elevated SCr, likely prerenal JAXON 2/2 dehydration from DKA  - MISSY score: 122  - DAPT: ASA 81 + Ticagrelor 90mg BID for 1 year  - Statin: atorvastatin 80mg qD  - Heparin gtt  - limited TTE 3/1/22 showed hypokinetic inferolateral and mid inferior wall, basal inferior wall is akinetic.   - trend trop, CK, CKMB  - TSH and lipid panel  - c/w lopressor 12.5 mg BID  -3/2: pt had CP in AM, will c/w nitro gtt, d/rome precedex gtt. c/w hydral and isordil. plan to reach out to cath lab for potential PCI.    AFib w/ RVR:   - CHADSVASC score: 3  - HAS-BLED score: 1  - currently on heparin gtt  - s/p cardizem 25 mg IVP x 2 and cardizem gtt; d/rome due to potentially decreased EF on limited TTE  - will c/w fluid resuscitation as below  - Monitor on telemetry  - c/w lopressor 12.5 mg BID      hydrALAZINE 10 milliGRAM(s) Oral every 8 hours  isosorbide   dinitrate Tablet (ISORDIL) 20 milliGRAM(s) Oral three times a day  metoprolol tartrate 12.5 milliGRAM(s) Oral two times a day  nitroglycerin  Infusion 10 MICROgram(s)/Min (3 mL/Hr) IV Continuous <Continuous>  atorvastatin 80 milliGRAM(s) Oral at bedtime  aspirin enteric coated 81 milliGRAM(s) Oral daily  ticagrelor 90 milliGRAM(s) Oral every 12 hours  ===================HEMATOLOGIC/ONC ===================  Continue to monitor H&H/Plts   - Continue AC therapy with IV Heparin PTT goal 60-80    heparin  Infusion 1700 Unit(s)/Hr (24 mL/Hr) IV Continuous <Continuous>      ===================== RENAL =========================  JAXON likely prerenal 2/2 dehydration 2/2 DKA; resolved  -SCr 2.14, BUN 44, fluid responsive, improved to baseline SCr/BUN with IVF  -UAs significant for large ketones and glucose  -Monitor I/Os, UO      ==================== GASTROINTESTINAL===================  Transaminitis: Elevated LFTs  -AST//261 -> 101/195 -> 45/110 improving with fluids, possible shock liver due to hypovolemia from DKA  Diet: NPO due to DKA    dextrose 5% + sodium chloride 0.45% with potassium chloride 20 mEq/L 1000 milliLiter(s) (200 mL/Hr) IV Continuous <Continuous>  potassium chloride  10 mEq/50 mL IVPB 10 milliEquivalent(s) IV Intermittent every 1 hour  potassium phosphate IVPB 30 milliMole(s) IV Intermittent once  sodium bicarbonate  Infusion 0.104 mEq/kG/Hr (150 mL/Hr) IV Continuous <Continuous>  sodium bicarbonate  Injectable 50 milliEquivalent(s) IV Push once    =======================    ENDOCRINE  =====================  DKA:  - DKA protocol initiated - c/w insulin gtt, bicarb gtt @ 150/hr due to HAGMA  - K 3.2 -> 3.7; was ordered for 40 mEq PO x 2, 10 mEq IV, insulin gtt started once K > 3.3  - will continue to supplement K as level is < 5.3 ordered NS bolus with 30 mEq of K  - c/w IVF resuscitation per DKA protocol, NS bolus 1L    - BMP t0vruxo, BHB q1hour  to monitor AG + K, will supplement PRN  - HbA1c 9.8 in 10/2021; pt seems to have history of uncontrolled T2DM, currently unable to answer questions regarding his PMH  - Will plan for eventual bridge from insulin gtt to long acting/premeal insulin once AG closes and FSG < 200      dextrose 50% Injectable 50 milliLiter(s) IV Push every 15 minutes  insulin regular Infusion 6 Unit(s)/Hr (6 mL/Hr) IV Continuous <Continuous>    ========================INFECTIOUS DISEASE================  UA x 2 negative for bacteria, afebrile, although WBC 15 -> 13.58, HR 130s, RR > 20  - Pt without strong objective or clinical evidence of infection. Will observe off antibiotics      Patient requires continuous monitoring with bedside rhythm monitoring, pulse ox monitoring, and intermittent blood gas analysis. Care plan discussed with ICU care team. Patient remained critical and at risk for life threatening decompensation.  Patient seen, examined and plan discussed with CCU team during rounds.     I have personally provided ____ minutes of critical care time excluding time spent on separate procedures, in addition to initial critical care time provided by the CICU Attending, Dr. Byrne.     By signing my name below, I, Lashell Sanches, attest that this documentation has been prepared under the direction and in the presence of Preethi Booth  Electronically signed: Devang Diaz, 22 @ 19:23    IPreethi, personally performed the services described in this documentation. all medical record entries made by the sarahibe were at my direction and in my presence. I have reviewed the chart and agree that the record reflects my personal performance and is accurate and complete  Electronically signed: Preethi Booth       ROSE STRAUSS  MRN-[id number]  Patient is a 56y old  Male who presents with a chief complaint of STEMI, DKA (02 Mar 2022 08:10)    HPI:  55 y/o M with PMH of HTN, T2DM, neuropathy, chronic pain on Methadone & opioids for cervical spine and back injury transferred to [facility] CICU from [place] due to STEMI and DKA. Pt was reportedly found to have inferolateral ST elevations + afib on EKG, trop 7560. Pt initially came to [facility] ED due to nausea and vomiting for 3 days, reportedly had CP several days ago which apparently stopped on its own. Pt currently altered, difficult to obtain history from.    Pt transferred to [facility] for cath, noted to be in afib w/ RVR to 130s-150s, received cardizem 25 mg IV x 2 and started on cardizem gtt, cath was aborted as pt had SCR of 2.14, (baseline 0.7-0.9 in 10/2021), transferred to CCU for optimization of DKA prior to cath.     Baseline SCr 0.7-0.9 in 10/2021 from previous hospitalization at [facility] for cellulitis of hand, was d/rome on bactrim DS + ethambutol x 4 weeks.    Labs from [facility] significant for: WBC 15.53, lactate 5.6, troponin 7560.2, K 3.2, CO2 5, AG 42, SCr/BUN 2.14/44, Glucose 663, calcium 12.7, Mg 2.7, Phos 9.5  FSGs >600 -> 503  ABG 7.19/<19/133/3  UA: large ketones, moderate bacteria, negative LE and nitrite  RVP/COVID negative (01 Mar 2022 13:56)      Hospital Course:  3/2 Transferred here to the CCU due to optimization of DKA prior to cath    24 HOUR EVENTS:    REVIEW OF SYSTEMS:    CONSTITUTIONAL: No weakness, fevers or chills  EYES/ENT: No visual changes;  No vertigo or throat pain   NECK: No pain or stiffness  RESPIRATORY: No cough, wheezing, hemoptysis; No shortness of breath  CARDIOVASCULAR: No chest pain or palpitations  GASTROINTESTINAL: No abdominal or epigastric pain. No nausea, vomiting, or hematemesis; No diarrhea or constipation. No melena or hematochezia.  GENITOURINARY: No dysuria, frequency or hematuria  NEUROLOGICAL: No numbness or weakness  SKIN: No itching, rashes      ICU Vital Signs Last 24 Hrs  T(C): 36.5 (02 Mar 2022 07:30), Max: 36.8 (02 Mar 2022 06:00)  T(F): 97.7 (02 Mar 2022 07:30), Max: 98.3 (02 Mar 2022 06:00)  HR: 123 (02 Mar 2022 18:00) (89 - 123)  BP: 123/74 (02 Mar 2022 03:00) (107/65 - 123/74)  BP(mean): 93 (02 Mar 2022 03:00) (79 - 93)  ABP: 165/114 (02 Mar 2022 18:00) (93/49 - 189/72)  ABP(mean): 130 (02 Mar 2022 18:00) (56 - 156)  RR: 33 (02 Mar 2022 18:00) (8 - 46)  SpO2: 100% (02 Mar 2022 18:00) (98% - 100%)      CVP(mm Hg): --  CO: --  CI: --  PA: --  PA(mean): --  PA(direct): --  PCWP: --  LA: --  RA: --  SVR: --  SVRI: --  PVR: --  PVRI: --  I&O's Summary    01 Mar 2022 07:01  -  02 Mar 2022 07:00  --------------------------------------------------------  IN: 81210.1 mL / OUT: 6100 mL / NET: 4829.1 mL    02 Mar 2022 07:01  -  02 Mar 2022 19:23  --------------------------------------------------------  IN: 5460.3 mL / OUT: 875 mL / NET: 4585.3 mL        CAPILLARY BLOOD GLUCOSE    CAPILLARY BLOOD GLUCOSE      POCT Blood Glucose.: 213 mg/dL (02 Mar 2022 18:46)      PHYSICAL EXAM:  GENERAL: lying in bed comfortably, awake, alert  HEAD:  Atraumatic, Normocephalic  EYES: EOMI, PERRLA, conjunctiva and sclera clear  ENT: dry mucous membranes  NECK: Supple, No JVD  CHEST/LUNG: Clear to auscultation bilaterally; No rales, rhonchi, wheezing, or rubs. Labored respirations likely kussmaul respirations  HEART: tachycardic; No murmurs, rubs, or gallops  ABDOMEN: BSx4; Soft, nontender, nondistended  EXTREMITIES:  2+ Peripheral Pulses, brisk capillary refill. No clubbing, cyanosis, or edema  NERVOUS SYSTEM:  A&Ox1, no focal deficits   SKIN: No rashes or lesions  psych: normal behavior, normal affect      ============================I/O===========================   I&O's Detail    01 Mar 2022 07:  -  02 Mar 2022 07:00  --------------------------------------------------------  IN:    Dexmedetomidine: 8.1 mL    Heparin: 297 mL    Insulin: 43 mL    IV PiggyBack: 250 mL    IV PiggyBack: 401 mL    Nitroglycerin: 280 mL    Oral Fluid: 2500 mL    Sodium Bicarbonate: 2400 mL    Sodium Chloride 0.9% Bolus: 1000 mL    sodium chloride 0.9% w/ Additives: 750 mL    sodium chloride 0.9% w/ Additives: 1000 mL    sodium chloride 0.9% w/ Additives: 1000 mL    sodium chloride 0.9% w/ Additives: 1000 mL  Total IN: 02192.1 mL    OUT:    Voided (mL): 6100 mL  Total OUT: 6100 mL    Total NET: 4829.1 mL      02 Mar 2022 07:  -  02 Mar 2022 19:23  --------------------------------------------------------  IN:    Dexmedetomidine: 24.3 mL    dextrose 5% + sodium chloride 0.45% w/ Additives: 1600 mL    Heparin: 254 mL    Insulin: 44 mL    Nitroglycerin: 538 mL    Oral Fluid: 600 mL    Sodium Bicarbonate: 1650 mL    sodium chloride 0.9% w/ Additives: 750 mL  Total IN: 5460.3 mL    OUT:    Voided (mL): 875 mL  Total OUT: 875 mL    Total NET: 4585.3 mL        ============================ LABS =========================                        9.9    13.58 )-----------( 236      ( 02 Mar 2022 12:21 )             29.7         134<L>  |  101  |  25<H>  ----------------------------<  223<H>  3.7   |  12<L>  |  0.74    Ca    8.1<L>      02 Mar 2022 16:33  Phos  1.1       Mg     1.8         TPro  4.9<L>  /  Alb  2.4<L>  /  TBili  0.4  /  DBili  x   /  AST  45<H>  /  ALT  110<H>  /  AlkPhos  71      Troponin T, High Sensitivity Result: 704 ng/L (22 @ 12:22)  Troponin T, High Sensitivity Result: 787 ng/L (22 @ 07:15)  Troponin T, High Sensitivity Result: 725 ng/L (22 @ 03:28)  Troponin T, High Sensitivity Result: 704 ng/L (22 @ 23:41)  Troponin T, High Sensitivity Result: 847 ng/L (22 @ 18:40)  Troponin T, High Sensitivity Result: 1234 ng/L (22 @ 15:28)    CKMB Units: 9.6 ng/mL (22 @ 12:22)  CKMB Units: 11.9 ng/mL (22 @ 07:15)  CKMB Units: 13.4 ng/mL (22 @ 03:28)  CKMB Units: 15.5 ng/mL (22 @ 23:41)  CKMB Units: 19.2 ng/mL (22 @ 18:40)  CKMB Units: 19.6 ng/mL (22 @ 15:28)    Creatine Kinase, Serum: 3235 U/L (22 @ :22)  Creatine Kinase, Serum: 3840 U/L (22 @ 07:15)  Creatine Kinase, Serum: 4100 U/L (22 @ 03:28)  Creatine Kinase, Serum: 4655 U/L (22 @ 23:41)  Creatine Kinase, Serum: 6120 U/L (22 @ 18:40)  Creatine Kinase, Serum: 9209 U/L (22 @ 15:28)    CPK Mass Assay %: 0.3 % (22 @ :22)  CPK Mass Assay %: 0.3 % (22 @ 07:15)  CPK Mass Assay %: 0.3 % (22 @ 03:28)  CPK Mass Assay %: 0.3 % (22 @ 23:41)  CPK Mass Assay %: 0.3 % (22 @ 18:40)  CPK Mass Assay %: 0.2 % (22 @ 15:28)        LIVER FUNCTIONS - ( 02 Mar 2022 16:33 )  Alb: 2.4 g/dL / Pro: 4.9 g/dL / ALK PHOS: 71 U/L / ALT: 110 U/L / AST: 45 U/L / GGT: x           PT/INR - ( 02 Mar 2022 03:28 )   PT: 13.5 sec;   INR: 1.16 ratio         PTT - ( 02 Mar 2022 12:21 )  PTT:53.3 sec  ABG - ( 02 Mar 2022 18:52 )  pH, Arterial: 7.41  pH, Blood: x     /  pCO2: 22    /  pO2: 112   / HCO3: 14    / Base Excess: -9.2  /  SaO2: 99.3              Blood Gas Arterial, Lactate: 0.8 mmol/L (22 @ 18:52)  Blood Gas Arterial, Lactate: 0.6 mmol/L (22 @ 12:13)  Blood Gas Arterial, Lactate: 0.7 mmol/L (22 @ 07:15)  Blood Gas Arterial, Lactate: 0.7 mmol/L (22 @ 05:19)  Lactate, Blood: 0.8 mmol/L (22 @ 03:28)  Blood Gas Arterial, Lactate: 1.0 mmol/L (22 @ 03:26)  Blood Gas Arterial, Lactate: 0.9 mmol/L (22 @ 01:33)  Lactate, Blood: 1.0 mmol/L (22 @ 23:41)  Blood Gas Arterial, Lactate: 1.0 mmol/L (22 @ 23:33)  Blood Gas Arterial, Lactate: 1.2 mmol/L (22 @ 21:47)  Blood Gas Arterial, Lactate: 1.8 mmol/L (22 @ 19:44)  Lactate, Blood: 2.3 mmol/L (22 @ 18:40)  Blood Gas Arterial, Lactate: 3.1 mmol/L (22 @ 14:20)  Lactate, Blood: 5.6 mmol/L (22 @ 12:12)    Urinalysis Basic - ( 01 Mar 2022 15:55 )    Color: Colorless / Appearance: Clear / S.021 / pH: x  Gluc: x / Ketone: Large  / Bili: Negative / Urobili: Negative   Blood: x / Protein: Trace / Nitrite: Negative   Leuk Esterase: Negative / RBC: 2 /hpf / WBC 0 /HPF   Sq Epi: x / Non Sq Epi: 0 /hpf / Bacteria: Negative      ======================Micro/Rad/Cardio=================  Telemtry: Reviewed   EKG: Reviewed  CXR: Reviewed  Culture: Reviewed   Echo: Transthoracic Echocardiogram:   Patient name: ROSE STRAUSS  YOB: 1965   Age: 56 (M)   MR#: [id number]  Study Date: 3/2/2022  [facility] Blas [facility] CHEY  Study quality: Technically good  Referring Physician: Richard Byrne MD  Blood Pressure: 121/57 mmHg  Height: 188 cm  Weight: 108 kg  BSA: 2.3 m2  ------------------------------------------------------------------------  PROCEDURE: Transthoracic echocardiogram with 2-D, M-Mode  and complete spectral and color flow Doppler.  INDICATION:ST elevation (STEMI) myocardial infarction of  unspecified site (I21.3)  ------------------------------------------------------------------------  Dimensions:    Normal Values:  LA:     5.1    2.0 - 4.0 cm  Ao:     3.7    2.0 - 3.8 cm  SEPTUM: 0.80.6 - 1.2 cm  PWT:    0.9    0.6 - 1.1 cm  LVIDd:  5.4    3.0 - 5.6 cm  LVIDs:  4.0    1.8 - 4.0 cm  Derived variables:  LVMI: 72 g/m2  RWT: 0.33  Fractional short: 26 %  EF (Ha Rule): 48 %Doppler Peak Velocity (m/sec):  AoV=1.2  ------------------------------------------------------------------------  Observations:  Mitral Valve: Normal mitral valve. Minimal mitral  regurgitation.  Aortic Valve/Aorta: Normal trileaflet aortic valve. Peak  transaortic valve gradient equals 6 mm Hg, mean transaortic  valve gradient equals 3 mm Hg, aortic valve velocity time  integral equals 19 cm, estimated aortic valve area equals  3.2 sqcm. Peak left ventricular outflow tract gradient  equals 4 mm Hg, mean gradient is equal to 2 mm Hg, LVOT  velocity time integral equals 15 cm.  Aortic Root: 3.7 cm.  LVOT diameter: 2.3 cm.  Left Atrium: Mildly dilated left atrium.  LA volume index =  39 cc/m2.  Left Ventricle: Mild left ventricular systolic dysfunction.     Hypokinesis of the basal inferior wall, inferolateral  wall. Normal left ventricular internal dimensions and wall  thicknesses. Mild diastolic dysfunction (Stage I).  Right Heart: Normal right atrium. Normal right ventricular  size and function. Normal tricuspid valve. Minimal  tricuspid regurgitation. Normal pulmonic valve.  Pericardium/Pleura: Normal pericardium with no pericardial  effusion.  Hemodynamic: Estimated right atrial pressure is 8 mm Hg.  ------------------------------------------------------------------------  Conclusions:  1. Normal left ventricular internal dimensions and wall  thicknesses.  2. Mild left ventricular systolic dysfunction.  Hypokinesis of the basal inferior wall, inferolateral wall.  3. Mild diastolic dysfunction (Stage I).  *** Compared with echocardiogram of 3/1/2022, no  significant changes noted.  ------------------------------------------------------------------------  Confirmed on  3/2/2022 - 11:34:05 by MANJEET Jeff  ------------------------------------------------------------------------ (22 @ 09:48)      Cath:   ======================================================  PAST MEDICAL & SURGICAL HISTORY:  Hypertension    Diabetes mellitus    Gastric ulcer    Spinal stenosis    H/O spinal cord compression    Spondylosis    H/O radiculopathy    H/O cervical spine surgery    History of back surgery    S/P cervical spinal fusion      ====================ASSESSMENT ==============  STEMI   AFib w/ RVR  Kussmaul respirations 2/2 DKA  JAXON likely prerenal 2/2 dehydration 2/2 DKA  Transaminitis: Elevated LFTs  DKA  Mental status: altered. Likely metabolic encephalopathy 2/2 DKA    Plan:  ====================== NEUROLOGY=====================  Mental status: altered. Likely metabolic encephalopathy 2/2 DKA  -Currently A&O x 1 likely 2/2 DKA, able to conversate but difficult to recall/concentrate  -will treat DKA as below  -ordered tox screen, serum methanol, ethyl alcohol, CT Head, serum osms      LORazepam     Tablet 1 milliGRAM(s) Oral every 1 hour PRN CIWA-Ar score 8 or greater  LORazepam     Tablet 2 milliGRAM(s) Oral every 1 hour PRN Symptom-triggered: each CIWA -Ar score 8 or GREATER  LORazepam     Tablet 2 milliGRAM(s) Oral every 2 hours PRN Symptom-triggered: 2 point increase in CIWA -Ar score and a total score of 7 or LESS  LORazepam   Injectable 2 milliGRAM(s) IV Push every 2 hours PRN Symptom-triggered: 2 point increase in CIWA -Ar score and a total score of 7 or LESS  LORazepam   Injectable 2 milliGRAM(s) IntraMuscular every 2 hours PRN Symptom-triggered: 2 point increase in CIWA -Ar score and a total score of 7 or LESS  LORazepam   Injectable 2 milliGRAM(s) IV Push every 1 hour PRN Symptom-triggered: each CIWA -Ar score 8 or GREATER  LORazepam   Injectable 2 milliGRAM(s) IntraMuscular every 1 hour PRN Symptom-triggered: each CIWA -Ar score 8 or GREATER  LORazepam   Injectable 1 milliGRAM(s) IV Push every 1 hour PRN CIWA-Ar score 8 or greater  LORazepam   Injectable 1 milliGRAM(s) IntraMuscular every 1 hour PRN CIWA-Ar score 8 or greater  methadone    Tablet 5 milliGRAM(s) Oral every 6 hours    ==================== RESPIRATORY======================  Kussmaul respirations 2/2 DKA  -Currently satting well on RA, but has deep shallow breathing  -continue to monitor O2 sats and respiratory status, currently protecting airway. O2 NC PRN      ====================CARDIOVASCULAR==================  STEMI (trop 7560, inferolateral LUIS on EKG), per chart was loaded with 180 mg Ticagrelor, heparin bolus in [facility]  - Revascularization with PCI deferred due to pt's elevated SCr, likely prerenal JAXON 2/2 dehydration from DKA  - MISSY score: 122  - DAPT: ASA 81 + Ticagrelor 90mg BID for 1 year  - Statin: atorvastatin 80mg qD  - Heparin gtt  - limited TTE 3/1/22 showed hypokinetic inferolateral and mid inferior wall, basal inferior wall is akinetic.   - trend trop, CK, CKMB  - TSH and lipid panel  - c/w lopressor 12.5 mg BID  -3/2: pt had CP in AM, will c/w nitro gtt, d/rome precedex gtt. c/w hydral and isordil. plan to reach out to cath lab for potential PCI.    AFib w/ RVR:   - CHADSVASC score: 3  - HAS-BLED score: 1  - currently on heparin gtt  - s/p cardizem 25 mg IVP x 2 and cardizem gtt; d/rome due to potentially decreased EF on limited TTE  - will c/w fluid resuscitation as below  - Monitor on telemetry  - c/w lopressor 12.5 mg BID      hydrALAZINE 10 milliGRAM(s) Oral every 8 hours  isosorbide   dinitrate Tablet (ISORDIL) 20 milliGRAM(s) Oral three times a day  metoprolol tartrate 12.5 milliGRAM(s) Oral two times a day  nitroglycerin  Infusion 10 MICROgram(s)/Min (3 mL/Hr) IV Continuous <Continuous>  atorvastatin 80 milliGRAM(s) Oral at bedtime  aspirin enteric coated 81 milliGRAM(s) Oral daily  ticagrelor 90 milliGRAM(s) Oral every 12 hours  ===================HEMATOLOGIC/ONC ===================  Continue to monitor H&H/Plts   - Continue AC therapy with IV Heparin PTT goal 60-80    heparin  Infusion 1700 Unit(s)/Hr (24 mL/Hr) IV Continuous <Continuous>      ===================== RENAL =========================  JAXON likely prerenal 2/2 dehydration 2/2 DKA; resolved  -SCr 2.14, BUN 44, fluid responsive, improved to baseline SCr/BUN with IVF  -UAs significant for large ketones and glucose  -Monitor I/Os, UO      ==================== GASTROINTESTINAL===================  Transaminitis: Elevated LFTs  -AST//261 -> 101/195 -> 45/110 improving with fluids, possible shock liver due to hypovolemia from DKA  Diet: NPO due to DKA    dextrose 5% + sodium chloride 0.45% with potassium chloride 20 mEq/L 1000 milliLiter(s) (200 mL/Hr) IV Continuous <Continuous>  potassium chloride  10 mEq/50 mL IVPB 10 milliEquivalent(s) IV Intermittent every 1 hour  potassium phosphate IVPB 30 milliMole(s) IV Intermittent once  sodium bicarbonate  Infusion 0.104 mEq/kG/Hr (150 mL/Hr) IV Continuous <Continuous>  sodium bicarbonate  Injectable 50 milliEquivalent(s) IV Push once    =======================    ENDOCRINE  =====================  DKA:  - DKA protocol initiated - c/w insulin gtt, bicarb gtt @ 150/hr due to HAGMA  - K 3.2 -> 3.7; was ordered for 40 mEq PO x 2, 10 mEq IV, insulin gtt started once K > 3.3  - will continue to supplement K as level is < 5.3 ordered NS bolus with 30 mEq of K  - c/w IVF resuscitation per DKA protocol, NS bolus 1L    - BMP [id number], BHB q1hour  to monitor AG + K, will supplement PRN  - HbA1c 9.8 in 10/2021; pt seems to have history of uncontrolled T2DM, currently unable to answer questions regarding his PMH  - Will plan for eventual bridge from insulin gtt to long acting/premeal insulin once AG closes and FSG < 200      dextrose 50% Injectable 50 milliLiter(s) IV Push every 15 minutes  insulin regular Infusion 6 Unit(s)/Hr (6 mL/Hr) IV Continuous <Continuous>    ========================INFECTIOUS DISEASE================  UA x 2 negative for bacteria, afebrile, although WBC 15 -> 13.58, HR 130s, RR > 20  - Pt without strong objective or clinical evidence of infection. Will observe off antibiotics      Patient requires continuous monitoring with bedside rhythm monitoring, pulse ox monitoring, and intermittent blood gas analysis. Care plan discussed with ICU care team. Patient remained critical and at risk for life threatening decompensation.  Patient seen, examined and plan discussed with CCU team during rounds.     I have personally provided ____ minutes of critical care time excluding time spent on separate procedures, in addition to initial critical care time provided by the CICU Attending, Dr. Byrne.     By signing my name below, I, Lashell Sanches, attest that this documentation has been prepared under the direction and in the presence of Preethi Booth  Electronically signed: Devang Diaz, 22 @ 19:23    IPreethi, personally performed the services described in this documentation. all medical record entries made by the sarahibe were at my direction and in my presence. I have reviewed the chart and agree that the record reflects my personal performance and is accurate and complete  Electronically signed: Preethi Camarena Fellow Attestation   Continue insulin gtt until AG closes and Bicarb wnl and/or pH wnl. Continue CIWA. On nitro gtt for BP control, may need NGT given patient encephelopathic likely 2/2 withdrawal.    ROSE STRAUSS  MRN-[id number]  Patient is a 56y old  Male who presents with a chief complaint of STEMI, DKA (02 Mar 2022 08:10)    HPI:  55 y/o M with PMH of HTN, T2DM, neuropathy, chronic pain on Methadone & opioids for cervical spine and back injury transferred to [facility] CICU from [place] due to STEMI and DKA. Pt was reportedly found to have inferolateral ST elevations + afib on EKG, trop 7560. Pt initially came to [facility] ED due to nausea and vomiting for 3 days, reportedly had CP several days ago which apparently stopped on its own. Pt currently altered, difficult to obtain history from.    Pt transferred to [facility] for cath, noted to be in afib w/ RVR to 130s-150s, received cardizem 25 mg IV x 2 and started on cardizem gtt, cath was aborted as pt had SCR of 2.14, (baseline 0.7-0.9 in 10/2021), transferred to CCU for optimization of DKA prior to cath.     Baseline SCr 0.7-0.9 in 10/2021 from previous hospitalization at [facility] for cellulitis of hand, was d/rome on bactrim DS + ethambutol x 4 weeks.    Labs from [facility] significant for: WBC 15.53, lactate 5.6, troponin 7560.2, K 3.2, CO2 5, AG 42, SCr/BUN 2.14/44, Glucose 663, calcium 12.7, Mg 2.7, Phos 9.5  FSGs >600 -> 503  ABG 7.19/<19/133/3  UA: large ketones, moderate bacteria, negative LE and nitrite  RVP/COVID negative (01 Mar 2022 13:56)      Hospital Course:  3/2 Transferred here to the CCU due to optimization of DKA prior to cath    24 HOUR EVENTS:    REVIEW OF SYSTEMS:    AMS-drowsy and confused       ICU Vital Signs Last 24 Hrs  T(C): 36.5 (02 Mar 2022 07:30), Max: 36.8 (02 Mar 2022 06:00)  T(F): 97.7 (02 Mar 2022 07:30), Max: 98.3 (02 Mar 2022 06:00)  HR: 123 (02 Mar 2022 18:00) (89 - 123)  BP: 123/74 (02 Mar 2022 03:00) (107/65 - 123/74)  BP(mean): 93 (02 Mar 2022 03:00) (79 - 93)  ABP: 165/114 (02 Mar 2022 18:00) (93/49 - 189/72)  ABP(mean): 130 (02 Mar 2022 18:00) (56 - 156)  RR: 33 (02 Mar 2022 18:00) (8 - 46)  SpO2: 100% (02 Mar 2022 18:00) (98% - 100%)      CVP(mm Hg): --  CO: --  CI: --  PA: --  PA(mean): --  PA(direct): --  PCWP: --  LA: --  RA: --  SVR: --  SVRI: --  PVR: --  PVRI: --  I&O's Summary    01 Mar 2022 07:01  -  02 Mar 2022 07:00  --------------------------------------------------------  IN: 82098.1 mL / OUT: 6100 mL / NET: 4829.1 mL    02 Mar 2022 07:01  -  02 Mar 2022 19:23  --------------------------------------------------------  IN: 5460.3 mL / OUT: 875 mL / NET: 4585.3 mL        CAPILLARY BLOOD GLUCOSE    CAPILLARY BLOOD GLUCOSE      POCT Blood Glucose.: 213 mg/dL (02 Mar 2022 18:46)      PHYSICAL EXAM:  GENERAL: lying in bed comfortably, sleeping   HEAD:  Atraumatic, Normocephalic  EYES: EOMI, PERRLA, conjunctiva and sclera clear  ENT: dry mucous membranes  NECK: Supple, No JVD  CHEST/LUNG: Clear to auscultation bilaterally; No rales, rhonchi, wheezing, or rubs. Labored respirations likely kussmaul respirations  HEART: tachycardic; No murmurs, rubs, or gallops  ABDOMEN: BSx4; Soft, nontender, nondistended  EXTREMITIES:  2+ Peripheral Pulses, brisk capillary refill. No clubbing, cyanosis, or edema  NERVOUS SYSTEM:  no focal deficits   SKIN: No rashes or lesions  psych: Drowsy       ============================I/O===========================   I&O's Detail    01 Mar 2022 07:  -  02 Mar 2022 07:00  --------------------------------------------------------  IN:    Dexmedetomidine: 8.1 mL    Heparin: 297 mL    Insulin: 43 mL    IV PiggyBack: 250 mL    IV PiggyBack: 401 mL    Nitroglycerin: 280 mL    Oral Fluid: 2500 mL    Sodium Bicarbonate: 2400 mL    Sodium Chloride 0.9% Bolus: 1000 mL    sodium chloride 0.9% w/ Additives: 750 mL    sodium chloride 0.9% w/ Additives: 1000 mL    sodium chloride 0.9% w/ Additives: 1000 mL    sodium chloride 0.9% w/ Additives: 1000 mL  Total IN: 79937.1 mL    OUT:    Voided (mL): 6100 mL  Total OUT: 6100 mL    Total NET: 4829.1 mL      02 Mar 2022 07:  -  02 Mar 2022 19:23  --------------------------------------------------------  IN:    Dexmedetomidine: 24.3 mL    dextrose 5% + sodium chloride 0.45% w/ Additives: 1600 mL    Heparin: 254 mL    Insulin: 44 mL    Nitroglycerin: 538 mL    Oral Fluid: 600 mL    Sodium Bicarbonate: 1650 mL    sodium chloride 0.9% w/ Additives: 750 mL  Total IN: 5460.3 mL    OUT:    Voided (mL): 875 mL  Total OUT: 875 mL    Total NET: 4585.3 mL        ============================ LABS =========================                        9.9    13.58 )-----------( 236      ( 02 Mar 2022 12:21 )             29.7         134<L>  |  101  |  25<H>  ----------------------------<  223<H>  3.7   |  12<L>  |  0.74    Ca    8.1<L>      02 Mar 2022 16:33  Phos  1.1       Mg     1.8         TPro  4.9<L>  /  Alb  2.4<L>  /  TBili  0.4  /  DBili  x   /  AST  45<H>  /  ALT  110<H>  /  AlkPhos  71      Troponin T, High Sensitivity Result: 704 ng/L (22 @ 12:22)  Troponin T, High Sensitivity Result: 787 ng/L (22 @ 07:15)  Troponin T, High Sensitivity Result: 725 ng/L (22 @ 03:28)  Troponin T, High Sensitivity Result: 704 ng/L (22 @ 23:41)  Troponin T, High Sensitivity Result: 847 ng/L (22 @ 18:40)  Troponin T, High Sensitivity Result: 1234 ng/L (22 @ 15:28)    CKMB Units: 9.6 ng/mL (22 @ 12:22)  CKMB Units: 11.9 ng/mL (22 @ 07:15)  CKMB Units: 13.4 ng/mL (22 @ 03:28)  CKMB Units: 15.5 ng/mL (22 @ 23:41)  CKMB Units: 19.2 ng/mL (22 @ 18:40)  CKMB Units: 19.6 ng/mL (22 @ 15:28)    Creatine Kinase, Serum: 3235 U/L (22 @ 12:22)  Creatine Kinase, Serum: 3840 U/L (22 @ 07:15)  Creatine Kinase, Serum: 4100 U/L (22 @ 03:28)  Creatine Kinase, Serum: 4655 U/L (22 @ 23:41)  Creatine Kinase, Serum: 6120 U/L (22 @ 18:40)  Creatine Kinase, Serum: 9209 U/L (22 @ 15:28)    CPK Mass Assay %: 0.3 % (22 @ 12:22)  CPK Mass Assay %: 0.3 % (22 @ 07:15)  CPK Mass Assay %: 0.3 % (22 @ 03:28)  CPK Mass Assay %: 0.3 % (22 @ 23:41)  CPK Mass Assay %: 0.3 % (22 @ 18:40)  CPK Mass Assay %: 0.2 % (22 @ 15:28)        LIVER FUNCTIONS - ( 02 Mar 2022 16:33 )  Alb: 2.4 g/dL / Pro: 4.9 g/dL / ALK PHOS: 71 U/L / ALT: 110 U/L / AST: 45 U/L / GGT: x           PT/INR - ( 02 Mar 2022 03:28 )   PT: 13.5 sec;   INR: 1.16 ratio         PTT - ( 02 Mar 2022 12:21 )  PTT:53.3 sec  ABG - ( 02 Mar 2022 18:52 )  pH, Arterial: 7.41  pH, Blood: x     /  pCO2: 22    /  pO2: 112   / HCO3: 14    / Base Excess: -9.2  /  SaO2: 99.3              Blood Gas Arterial, Lactate: 0.8 mmol/L (22 @ 18:52)  Blood Gas Arterial, Lactate: 0.6 mmol/L (22 @ 12:13)  Blood Gas Arterial, Lactate: 0.7 mmol/L (22 @ 07:15)  Blood Gas Arterial, Lactate: 0.7 mmol/L (22 @ 05:19)  Lactate, Blood: 0.8 mmol/L (22 @ 03:28)  Blood Gas Arterial, Lactate: 1.0 mmol/L (22 @ 03:26)  Blood Gas Arterial, Lactate: 0.9 mmol/L (22 @ 01:33)  Lactate, Blood: 1.0 mmol/L (22 @ 23:41)  Blood Gas Arterial, Lactate: 1.0 mmol/L (22 @ 23:33)  Blood Gas Arterial, Lactate: 1.2 mmol/L (22 @ 21:47)  Blood Gas Arterial, Lactate: 1.8 mmol/L (22 @ 19:44)  Lactate, Blood: 2.3 mmol/L (22 @ 18:40)  Blood Gas Arterial, Lactate: 3.1 mmol/L (22 @ 14:20)  Lactate, Blood: 5.6 mmol/L (22 @ 12:12)    Urinalysis Basic - ( 01 Mar 2022 15:55 )    Color: Colorless / Appearance: Clear / S.021 / pH: x  Gluc: x / Ketone: Large  / Bili: Negative / Urobili: Negative   Blood: x / Protein: Trace / Nitrite: Negative   Leuk Esterase: Negative / RBC: 2 /hpf / WBC 0 /HPF   Sq Epi: x / Non Sq Epi: 0 /hpf / Bacteria: Negative      ======================Micro/Rad/Cardio=================  Telemtry: Reviewed   EKG: Reviewed  CXR: Reviewed  Culture: Reviewed   Echo: Transthoracic Echocardiogram:   Patient name: ROSE STRAUSS  YOB: 1965   Age: 56 (M)   MR#: [id number]  Study Date: 3/2/2022  [facility] RD[facility]  Study quality: Technically good  Referring Physician: Richard Byrne MD  Blood Pressure: 121/57 mmHg  Height: 188 cm  Weight: 108 kg  BSA: 2.3 m2  ------------------------------------------------------------------------  PROCEDURE: Transthoracic echocardiogram with 2-D, M-Mode  and complete spectral and color flow Doppler.  INDICATION:ST elevation (STEMI) myocardial infarction of  unspecified site (I21.3)  ------------------------------------------------------------------------  Dimensions:    Normal Values:  LA:     5.1    2.0 - 4.0 cm  Ao:     3.7    2.0 - 3.8 cm  SEPTUM: 0.80.6 - 1.2 cm  PWT:    0.9    0.6 - 1.1 cm  LVIDd:  5.4    3.0 - 5.6 cm  LVIDs:  4.0    1.8 - 4.0 cm  Derived variables:  LVMI: 72 g/m2  RWT: 0.33  Fractional short: 26 %  EF (Ha Rule): 48 %Doppler Peak Velocity (m/sec):  AoV=1.2  ------------------------------------------------------------------------  Observations:  Mitral Valve: Normal mitral valve. Minimal mitral  regurgitation.  Aortic Valve/Aorta: Normal trileaflet aortic valve. Peak  transaortic valve gradient equals 6 mm Hg, mean transaortic  valve gradient equals 3 mm Hg, aortic valve velocity time  integral equals 19 cm, estimated aortic valve area equals  3.2 sqcm. Peak left ventricular outflow tract gradient  equals 4 mm Hg, mean gradient is equal to 2 mm Hg, LVOT  velocity time integral equals 15 cm.  Aortic Root: 3.7 cm.  LVOT diameter: 2.3 cm.  Left Atrium: Mildly dilated left atrium.  LA volume index =  39 cc/m2.  Left Ventricle: Mild left ventricular systolic dysfunction.     Hypokinesis of the basal inferior wall, inferolateral  wall. Normal left ventricular internal dimensions and wall  thicknesses. Mild diastolic dysfunction (Stage I).  Right Heart: Normal right atrium. Normal right ventricular  size and function. Normal tricuspid valve. Minimal  tricuspid regurgitation. Normal pulmonic valve.  Pericardium/Pleura: Normal pericardium with no pericardial  effusion.  Hemodynamic: Estimated right atrial pressure is 8 mm Hg.  ------------------------------------------------------------------------  Conclusions:  1. Normal left ventricular internal dimensions and wall  thicknesses.  2. Mild left ventricular systolic dysfunction.  Hypokinesis of the basal inferior wall, inferolateral wall.  3. Mild diastolic dysfunction (Stage I).  *** Compared with echocardiogram of 3/1/2022, no  significant changes noted.  ------------------------------------------------------------------------  Confirmed on  3/2/2022 - 11:34:05 by MANJEET Jeff  ------------------------------------------------------------------------ (22 @ 09:48)      Cath:   ======================================================  PAST MEDICAL & SURGICAL HISTORY:  Hypertension    Diabetes mellitus    Gastric ulcer    Spinal stenosis    H/O spinal cord compression    Spondylosis    H/O radiculopathy    H/O cervical spine surgery    History of back surgery    S/P cervical spinal fusion      ====================ASSESSMENT ==============  STEMI   AFib w/ RVR  Kussmaul respirations 2/2 DKA  JAXON likely prerenal 2/2 dehydration 2/2 DKA  Transaminitis: Elevated LFTs  DKA  Mental status: altered. Likely metabolic encephalopathy 2/2 DKA    Plan:  ====================== NEUROLOGY=====================  Mental status: altered. Likely metabolic encephalopathy 2/2 DKA  -Currently A&O x 1 likely 2/2 DKA,  -will treat DKA as below  -ordered tox screen, serum methanol, ethyl alcohol  -CTH 3/2 shows age indeterminate lacunar infarcts. no hemorrhage/midline shift. Foci of air in the left sella turcica with bony thinning possible dhiscence   -Pending MRA head/neck       LORazepam     Tablet 1 milliGRAM(s) Oral every 1 hour PRN CIWA-Ar score 8 or greater  LORazepam     Tablet 2 milliGRAM(s) Oral every 1 hour PRN Symptom-triggered: each CIWA -Ar score 8 or GREATER  LORazepam     Tablet 2 milliGRAM(s) Oral every 2 hours PRN Symptom-triggered: 2 point increase in CIWA -Ar score and a total score of 7 or LESS  LORazepam   Injectable 2 milliGRAM(s) IV Push every 2 hours PRN Symptom-triggered: 2 point increase in CIWA -Ar score and a total score of 7 or LESS  LORazepam   Injectable 2 milliGRAM(s) IntraMuscular every 2 hours PRN Symptom-triggered: 2 point increase in CIWA -Ar score and a total score of 7 or LESS  LORazepam   Injectable 2 milliGRAM(s) IV Push every 1 hour PRN Symptom-triggered: each CIWA -Ar score 8 or GREATER  LORazepam   Injectable 2 milliGRAM(s) IntraMuscular every 1 hour PRN Symptom-triggered: each CIWA -Ar score 8 or GREATER  LORazepam   Injectable 1 milliGRAM(s) IV Push every 1 hour PRN CIWA-Ar score 8 or greater  LORazepam   Injectable 1 milliGRAM(s) IntraMuscular every 1 hour PRN CIWA-Ar score 8 or greater  methadone    Tablet 5 milliGRAM(s) Oral every 6 hours    ==================== RESPIRATORY======================  Kussmaul respirations 2/2 DKA  -Currently satting well on RA, but has deep shallow breathing  -continue to monitor O2 sats and respiratory status, currently protecting airway. O2 NC PRN      ====================CARDIOVASCULAR==================  STEMI (trop 7560, inferolateral LUIS on EKG), per chart was loaded with 180 mg Ticagrelor, heparin bolus in [facility]  - Revascularization with PCI deferred due to pt's elevated SCr, likely prerenal JAXON 2/2 dehydration from DKA  - MISSY score: 122  - DAPT: ASA 81 + Ticagrelor 90mg BID for 1 year  - Statin: atorvastatin 80mg qD  - Heparin gtt per protocol   - limited TTE 3/1/22 showed hypokinetic inferolateral and mid inferior wall, basal inferior wall is akinetic.   - trend trop, CK, CKMB  - TSH and lipid panel  - c/w lopressor 12.5 mg BID  -3/2: pt had CP in AM, will c/w nitro gtt, d/rome precedex gtt. c/w hydral and isordil. plan to reach out to cath lab for potential PCI when AMS improves     AFib w/ RVR:   - CHADSVASC score: 3  - HAS-BLED score: 1  - currently on heparin gtt  - s/p cardizem 25 mg IVP x 2 and cardizem gtt; d/rome due to potentially decreased EF on limited TTE  - will c/w fluid resuscitation as below  - Monitor on telemetry  - c/w lopressor 12.5 mg BID    HTN  -c/w nitro gtt  -Increased oral hydralazine and isosorbide but unable to swallow at this time    hydrALAZINE 10 milliGRAM(s) Oral every 8 hours  isosorbide   dinitrate Tablet (ISORDIL) 20 milliGRAM(s) Oral three times a day  metoprolol tartrate 12.5 milliGRAM(s) Oral two times a day  nitroglycerin  Infusion 10 MICROgram(s)/Min (3 mL/Hr) IV Continuous <Continuous>  atorvastatin 80 milliGRAM(s) Oral at bedtime  aspirin enteric coated 81 milliGRAM(s) Oral daily  ticagrelor 90 milliGRAM(s) Oral every 12 hours  ===================HEMATOLOGIC/ONC ===================  Continue to monitor H&H/Plts   - Continue AC therapy with IV Heparin PTT goal 60-80    heparin  Infusion 1700 Unit(s)/Hr (24 mL/Hr) IV Continuous <Continuous>      ===================== RENAL =========================  JAXON likely prerenal 2/2 dehydration 2/2 DKA; resolved  -SCr 2.14, BUN 44, fluid responsive, improved to baseline SCr/BUN with IVF  -UAs significant for large ketones and glucose  -Monitor I/Os, UO      ==================== GASTROINTESTINAL===================  Transaminitis: Elevated LFTs  -AST//261 -> 101/195 -> 45/110 improving with fluids, possible shock liver due to hypovolemia from DKA  Diet: NPO due to DKA    dextrose 5% + sodium chloride 0.45% with potassium chloride 20 mEq/L 1000 milliLiter(s) (200 mL/Hr) IV Continuous <Continuous>  potassium chloride  10 mEq/50 mL IVPB 10 milliEquivalent(s) IV Intermittent every 1 hour  potassium phosphate IVPB 30 milliMole(s) IV Intermittent once  sodium bicarbonate  Infusion 0.104 mEq/kG/Hr (150 mL/Hr) IV Continuous <Continuous>  sodium bicarbonate  Injectable 50 milliEquivalent(s) IV Push once    =======================    ENDOCRINE  =====================  DKA:  - DKA protocol initiated - c/w insulin gtt, bicarb gtt @ 150/hr due to HAGMA  - K 3.2 -> 3.7; was ordered for 40 mEq PO x 2, 10 mEq IV, insulin gtt started once K > 3.3  - will continue to supplement K as level is < 5.3 ordered NS bolus with 30 mEq of K  - c/w IVF resuscitation per DKA protocol, NS bolus 1L    - BMP [id number], BHB q1hour  to monitor AG + K, will supplement PRN  - HbA1c 9.8 in 10/2021; pt seems to have history of uncontrolled T2DM, currently unable to answer questions regarding his PMH  - Will plan for eventual bridge from insulin gtt to long acting/premeal insulin once AG closes and FSG < 200      dextrose 50% Injectable 50 milliLiter(s) IV Push every 15 minutes  insulin regular Infusion 6 Unit(s)/Hr (6 mL/Hr) IV Continuous <Continuous>    ========================INFECTIOUS DISEASE================  UA x 2 negative for bacteria, afebrile, although WBC 15 -> 13.58, HR 130s, RR > 20  - Pt without strong objective or clinical evidence of infection. Will observe off antibiotics      Patient requires continuous monitoring with bedside rhythm monitoring, pulse ox monitoring, and intermittent blood gas analysis. Care plan discussed with ICU care team. Patient remained critical and at risk for life threatening decompensation.  Patient seen, examined and plan discussed with CCU team during rounds.     I have personally provided ___30_ minutes of critical care time excluding time spent on separate procedures, in addition to initial critical care time provided by the CICU Attending, Dr. Byrne.     By signing my name below, I, Lashell Sanches, attest that this documentation has been prepared under the direction and in the presence of Preethi Booth  Electronically signed: Devang Diaz, 22 @ 19:23    I, Preethi Booht, personally performed the services described in this documentation. all medical record entries made by the sarahibdaylin were at my direction and in my presence. I have reviewed the chart and agree that the record reflects my personal performance and is accurate and complete  Electronically signed: Preethi Camarena Fellow Attestation   Continue insulin gtt until AG closes and Bicarb wnl and/or pH wnl. Continue CIWA. On nitro gtt for BP control, may need NGT given patient encephelopathic likely 2/2 withdrawal.

## 2022-03-02 NOTE — PROGRESS NOTE ADULT - SUBJECTIVE AND OBJECTIVE BOX
Patient is a 56y old  Male who presents with a chief complaint of STEMI, DKA (01 Mar 2022 20:44)    HPI:  57 y/o M with PMH of HTN, T2DM, neuropathy, chronic pain on Methadone & opioids for cervical spine and back injury transferred to [facility] CICU from [place] due to STEMI and DKA. Pt was reportedly found to have inferolateral ST elevations + afib on EKG, trop 7560. Pt initially came to [facility] ED due to nausea and vomiting for 3 days, reportedly had CP several days ago which apparently stopped on its own. Pt currently altered, difficult to obtain history from.    Pt transferred to [facility] for cath, noted to be in afib w/ RVR to 130s-150s, received cardizem 25 mg IV x 2 and started on cardizem gtt, cath was aborted as pt had SCR of 2.14, (baseline 0.7-0.9 in 10/2021), transferred to CCU for optimization of DKA prior to cath.     Baseline SCr 0.7-0.9 in 10/2021 from previous hospitalization at [facility] for cellulitis of hand, was d/rome on bactrim DS + ethambutol x 4 weeks.    Labs from [facility] significant for: WBC 15.53, lactate 5.6, troponin 7560.2, K 3.2, CO2 5, AG 42, SCr/BUN 2.14/44, Glucose 663, calcium 12.7, Mg 2.7, Phos 9.5  FSGs >600 -> 503  ABG 7.19/<19/133/3  UA: large ketones, moderate bacteria, negative LE and nitrite  RVP/COVID negative (01 Mar 2022 13:56)       INTERVAL HPI/OVERNIGHT EVENTS:   No overnight events   Afebrile, hemodynamically stable     Subjective:    ICU Vital Signs Last 24 Hrs  T(C): 36.8 (02 Mar 2022 06:00), Max: 36.8 (02 Mar 2022 06:00)  T(F): 98.3 (02 Mar 2022 06:00), Max: 98.3 (02 Mar 2022 06:00)  HR: 99 (02 Mar 2022 08:00) (85 - 164)  BP: 123/74 (02 Mar 2022 03:00) (96/69 - 140/87)  BP(mean): 93 (02 Mar 2022 03:00) (79 - 105)  ABP: 120/52 (02 Mar 2022 08:00) (108/44 - 189/72)  ABP(mean): 71 (02 Mar 2022 08:00) (56 - 102)  RR: 20 (02 Mar 2022 08:00) (11 - 46)  SpO2: 100% (02 Mar 2022 08:00) (96% - 100%)    I&O's Summary    01 Mar 2022 07:01  -  02 Mar 2022 07:00  --------------------------------------------------------  IN: 63099.1 mL / OUT: 6100 mL / NET: 4807.1 mL          Daily Height in cm: 187.96 (01 Mar 2022 13:54)    Daily     Adult Advanced Hemodynamics Last 24 Hrs  CVP(mm Hg): --  CVP(cm H2O): --  CO: --  CI: --  PA: --  PA(mean): --  PCWP: --  SVR: --  SVRI: --  PVR: --  PVRI: --    EKG/Telemetry Events:    MEDICATIONS  (STANDING):  aspirin enteric coated 81 milliGRAM(s) Oral daily  atorvastatin 80 milliGRAM(s) Oral at bedtime  chlorhexidine 4% Liquid 1 Application(s) Topical <User Schedule>  dexMEDEtomidine Infusion 0.3 MICROgram(s)/kG/Hr (8.15 mL/Hr) IV Continuous <Continuous>  dextrose 5% + sodium chloride 0.45% with potassium chloride 20 mEq/L 1000 milliLiter(s) (200 mL/Hr) IV Continuous <Continuous>  dextrose 50% Injectable 50 milliLiter(s) IV Push every 15 minutes  heparin  Infusion 1700 Unit(s)/Hr (22 mL/Hr) IV Continuous <Continuous>  hydrALAZINE 10 milliGRAM(s) Oral every 8 hours  influenza   Vaccine 0.5 milliLiter(s) IntraMuscular once  insulin regular Infusion 6 Unit(s)/Hr (6 mL/Hr) IV Continuous <Continuous>  isosorbide   dinitrate Tablet (ISORDIL) 10 milliGRAM(s) Oral three times a day  magnesium sulfate  IVPB 1 Gram(s) IV Intermittent once  metoprolol tartrate 12.5 milliGRAM(s) Oral two times a day  nitroglycerin  Infusion 10 MICROgram(s)/Min (3 mL/Hr) IV Continuous <Continuous>  potassium phosphate IVPB 15 milliMole(s) IV Intermittent once  sodium bicarbonate  Infusion 0.104 mEq/kG/Hr (150 mL/Hr) IV Continuous <Continuous>  ticagrelor 90 milliGRAM(s) Oral every 12 hours    MEDICATIONS  (PRN):      PHYSICAL EXAM:  GENERAL:   HEAD:  Atraumatic, Normocephalic  EYES: EOMI, PERRLA, conjunctiva and sclera clear  NECK: Supple, No JVD, Normal thyroid, no enlarged nodes  NERVOUS SYSTEM:  Alert & Awake.   CHEST/LUNG: B/L good air entry; No rales, rhonchi, or wheezing  HEART: S1S2 normal, no S3, Regular rate and rhythm; No murmurs  ABDOMEN: Soft, Nontender, Nondistended; Bowel sounds present  EXTREMITIES:  2+ Peripheral Pulses, No clubbing, cyanosis, or edema  LYMPH: No lymphadenopathy noted  SKIN: No rashes or lesions    LABS:                        11.5   15.01 )-----------( 290      ( 02 Mar 2022 03:28 )             34.5     03-02    131<L>  |  100  |  34<H>  ----------------------------<  221<H>  3.9   |  <10<LL>  |  0.85    Ca    8.4      02 Mar 2022 07:15  Phos  1.6     03-02  Mg     1.9     03-02    TPro  5.1<L>  /  Alb  2.3<L>  /  TBili  0.3  /  DBili  x   /  AST  50<H>  /  ALT  126<H>  /  AlkPhos  73  03-02    LIVER FUNCTIONS - ( 02 Mar 2022 07:15 )  Alb: 2.3 g/dL / Pro: 5.1 g/dL / ALK PHOS: 73 U/L / ALT: 126 U/L / AST: 50 U/L / GGT: x           PT/INR - ( 02 Mar 2022 03:28 )   PT: 13.5 sec;   INR: 1.16 ratio         PTT - ( 02 Mar 2022 03:28 )  PTT:39.9 sec  CAPILLARY BLOOD GLUCOSE      POCT Blood Glucose.: 199 mg/dL (02 Mar 2022 07:56)  POCT Blood Glucose.: 209 mg/dL (02 Mar 2022 07:01)  POCT Blood Glucose.: 228 mg/dL (02 Mar 2022 06:08)  POCT Blood Glucose.: 228 mg/dL (02 Mar 2022 05:07)  POCT Blood Glucose.: 220 mg/dL (02 Mar 2022 04:18)  POCT Blood Glucose.: 231 mg/dL (02 Mar 2022 03:07)  POCT Blood Glucose.: 216 mg/dL (02 Mar 2022 02:06)  POCT Blood Glucose.: 224 mg/dL (02 Mar 2022 01:01)  POCT Blood Glucose.: 229 mg/dL (02 Mar 2022 00:16)  POCT Blood Glucose.: 224 mg/dL (01 Mar 2022 23:22)  POCT Blood Glucose.: 269 mg/dL (01 Mar 2022 22:08)  POCT Blood Glucose.: 290 mg/dL (01 Mar 2022 21:00)  POCT Blood Glucose.: 299 mg/dL (01 Mar 2022 20:05)  POCT Blood Glucose.: 448 mg/dL (01 Mar 2022 18:56)  POCT Blood Glucose.: 429 mg/dL (01 Mar 2022 17:59)  POCT Blood Glucose.: 473 mg/dL (01 Mar 2022 17:02)  POCT Blood Glucose.: 499 mg/dL (01 Mar 2022 17:02)  POCT Blood Glucose.: 498 mg/dL (01 Mar 2022 16:02)  POCT Blood Glucose.: 463 mg/dL (01 Mar 2022 16:00)  POCT Blood Glucose.: 582 mg/dL (01 Mar 2022 15:59)  POCT Blood Glucose.: 503 mg/dL (01 Mar 2022 11:36)  POCT Blood Glucose.: >600 mg/dL (01 Mar 2022 11:23)    ABG - ( 02 Mar 2022 07:15 )  pH, Arterial: 7.28  pH, Blood: x     /  pCO2: 22    /  pO2: 138   / HCO3: 10    / Base Excess: -14.7 /  SaO2: 98.9              CKMB Units: 11.9 ng/mL ( @ 07:15)  Creatine Kinase, Serum: 3840 U/L ( @ 07:15)  CKMB Units: 13.4 ng/mL ( @ 03:28)  Creatine Kinase, Serum: 4100 U/L ( @ 03:28)  Creatine Kinase, Serum: 4655 U/L ( @ 23:41)  CKMB Units: 15.5 ng/mL ( @ 23:41)  Creatine Kinase, Serum: 6120 U/L ( @ 18:40)  CKMB Units: 19.2 ng/mL ( @ 18:40)  CKMB Units: 19.6 ng/mL ( @ 15:28)  Creatine Kinase, Serum: 9209 U/L ( @ 15:28)    CARDIAC MARKERS ( 02 Mar 2022 07:15 )  x     / x     / 3840 U/L / x     / 11.9 ng/mL  CARDIAC MARKERS ( 02 Mar 2022 03:28 )  x     / x     / 4100 U/L / x     / 13.4 ng/mL  CARDIAC MARKERS ( 01 Mar 2022 23:41 )  x     / x     / 4655 U/L / x     / 15.5 ng/mL  CARDIAC MARKERS ( 01 Mar 2022 18:40 )  x     / x     / 6120 U/L / x     / 19.2 ng/mL  CARDIAC MARKERS ( 01 Mar 2022 15:28 )  x     / x     / 9209 U/L / x     / 19.6 ng/mL      Urinalysis Basic - ( 01 Mar 2022 15:55 )    Color: Colorless / Appearance: Clear / S.021 / pH: x  Gluc: x / Ketone: Large  / Bili: Negative / Urobili: Negative   Blood: x / Protein: Trace / Nitrite: Negative   Leuk Esterase: Negative / RBC: 2 /hpf / WBC 0 /HPF   Sq Epi: x / Non Sq Epi: 0 /hpf / Bacteria: Negative          RADIOLOGY & ADDITIONAL TESTS:  CXR:        Care Discussed with Consultants/Other Providers [ x] YES  [ ] NO           Patient is a 56y old  Male who presents with a chief complaint of STEMI, DKA (01 Mar 2022 20:44)       INTERVAL HPI/OVERNIGHT EVENTS:   In AM started 5th liter of fluid (NS), FSGs improved to 200s, started D5W 1/2NS when below 200.  SCr improved to 0.85, baseline.  pH improved to 7.28, Bicarb 10, gave 2 amps of bicarb  C/o CP: received morphine IVP 3-4x, received dilaudid and precedex gtt started, nitro gtt started.   Also was ordered for isordil and hydral    Subjective:    ICU Vital Signs Last 24 Hrs  T(C): 36.8 (02 Mar 2022 06:00), Max: 36.8 (02 Mar 2022 06:00)  T(F): 98.3 (02 Mar 2022 06:00), Max: 98.3 (02 Mar 2022 06:00)  HR: 99 (02 Mar 2022 08:00) (85 - 164)  BP: 123/74 (02 Mar 2022 03:00) (96/69 - 140/87)  BP(mean): 93 (02 Mar 2022 03:00) (79 - 105)  ABP: 120/52 (02 Mar 2022 08:00) (108/44 - 189/72)  ABP(mean): 71 (02 Mar 2022 08:00) (56 - 102)  RR: 20 (02 Mar 2022 08:00) (11 - 46)  SpO2: 100% (02 Mar 2022 08:00) (96% - 100%)    I&O's Summary    01 Mar 2022 07:01  -  02 Mar 2022 07:00  --------------------------------------------------------  IN: 05773.1 mL / OUT: 6100 mL / NET: 4807.1 mL          Daily Height in cm: 187.96 (01 Mar 2022 13:54)    Daily     Adult Advanced Hemodynamics Last 24 Hrs  CVP(mm Hg): --  CVP(cm H2O): --  CO: --  CI: --  PA: --  PA(mean): --  PCWP: --  SVR: --  SVRI: --  PVR: --  PVRI: --    EKG/Telemetry Events:    MEDICATIONS  (STANDING):  aspirin enteric coated 81 milliGRAM(s) Oral daily  atorvastatin 80 milliGRAM(s) Oral at bedtime  chlorhexidine 4% Liquid 1 Application(s) Topical <User Schedule>  dexMEDEtomidine Infusion 0.3 MICROgram(s)/kG/Hr (8.15 mL/Hr) IV Continuous <Continuous>  dextrose 5% + sodium chloride 0.45% with potassium chloride 20 mEq/L 1000 milliLiter(s) (200 mL/Hr) IV Continuous <Continuous>  dextrose 50% Injectable 50 milliLiter(s) IV Push every 15 minutes  heparin  Infusion 1700 Unit(s)/Hr (22 mL/Hr) IV Continuous <Continuous>  hydrALAZINE 10 milliGRAM(s) Oral every 8 hours  influenza   Vaccine 0.5 milliLiter(s) IntraMuscular once  insulin regular Infusion 6 Unit(s)/Hr (6 mL/Hr) IV Continuous <Continuous>  isosorbide   dinitrate Tablet (ISORDIL) 10 milliGRAM(s) Oral three times a day  magnesium sulfate  IVPB 1 Gram(s) IV Intermittent once  metoprolol tartrate 12.5 milliGRAM(s) Oral two times a day  nitroglycerin  Infusion 10 MICROgram(s)/Min (3 mL/Hr) IV Continuous <Continuous>  potassium phosphate IVPB 15 milliMole(s) IV Intermittent once  sodium bicarbonate  Infusion 0.104 mEq/kG/Hr (150 mL/Hr) IV Continuous <Continuous>  ticagrelor 90 milliGRAM(s) Oral every 12 hours    MEDICATIONS  (PRN):      PHYSICAL EXAM:  GENERAL: lying in bed comfortably, awake, alert  HEAD:  Atraumatic, Normocephalic  EYES: EOMI, PERRLA, conjunctiva and sclera clear  ENT: dry mucous membranes  NECK: Supple, No JVD  CHEST/LUNG: Clear to auscultation bilaterally; No rales, rhonchi, wheezing, or rubs. Labored respirations likely kussmaul respirations  HEART: tachycardic; No murmurs, rubs, or gallops  ABDOMEN: BSx4; Soft, nontender, nondistended  EXTREMITIES:  2+ Peripheral Pulses, brisk capillary refill. No clubbing, cyanosis, or edema  NERVOUS SYSTEM:  A&Ox1, no focal deficits   SKIN: No rashes or lesions  psych: normal behavior, normal affect    LABS:                        11.5   15.01 )-----------( 290      ( 02 Mar 2022 03:28 )             34.5     03-02    131<L>  |  100  |  34<H>  ----------------------------<  221<H>  3.9   |  <10<LL>  |  0.85    Ca    8.4      02 Mar 2022 07:15  Phos  1.6     03-02  Mg     1.9     03-02    TPro  5.1<L>  /  Alb  2.3<L>  /  TBili  0.3  /  DBili  x   /  AST  50<H>  /  ALT  126<H>  /  AlkPhos  73  03-02    LIVER FUNCTIONS - ( 02 Mar 2022 07:15 )  Alb: 2.3 g/dL / Pro: 5.1 g/dL / ALK PHOS: 73 U/L / ALT: 126 U/L / AST: 50 U/L / GGT: x           PT/INR - ( 02 Mar 2022 03:28 )   PT: 13.5 sec;   INR: 1.16 ratio         PTT - ( 02 Mar 2022 03:28 )  PTT:39.9 sec  CAPILLARY BLOOD GLUCOSE      POCT Blood Glucose.: 199 mg/dL (02 Mar 2022 07:56)  POCT Blood Glucose.: 209 mg/dL (02 Mar 2022 07:01)  POCT Blood Glucose.: 228 mg/dL (02 Mar 2022 06:08)  POCT Blood Glucose.: 228 mg/dL (02 Mar 2022 05:07)  POCT Blood Glucose.: 220 mg/dL (02 Mar 2022 04:18)  POCT Blood Glucose.: 231 mg/dL (02 Mar 2022 03:07)  POCT Blood Glucose.: 216 mg/dL (02 Mar 2022 02:06)  POCT Blood Glucose.: 224 mg/dL (02 Mar 2022 01:01)  POCT Blood Glucose.: 229 mg/dL (02 Mar 2022 00:16)  POCT Blood Glucose.: 224 mg/dL (01 Mar 2022 23:22)  POCT Blood Glucose.: 269 mg/dL (01 Mar 2022 22:08)  POCT Blood Glucose.: 290 mg/dL (01 Mar 2022 21:00)  POCT Blood Glucose.: 299 mg/dL (01 Mar 2022 20:05)  POCT Blood Glucose.: 448 mg/dL (01 Mar 2022 18:56)  POCT Blood Glucose.: 429 mg/dL (01 Mar 2022 17:59)  POCT Blood Glucose.: 473 mg/dL (01 Mar 2022 17:02)  POCT Blood Glucose.: 499 mg/dL (01 Mar 2022 17:02)  POCT Blood Glucose.: 498 mg/dL (01 Mar 2022 16:02)  POCT Blood Glucose.: 463 mg/dL (01 Mar 2022 16:00)  POCT Blood Glucose.: 582 mg/dL (01 Mar 2022 15:59)  POCT Blood Glucose.: 503 mg/dL (01 Mar 2022 11:36)  POCT Blood Glucose.: >600 mg/dL (01 Mar 2022 11:23)    ABG - ( 02 Mar 2022 07:15 )  pH, Arterial: 7.28  pH, Blood: x     /  pCO2: 22    /  pO2: 138   / HCO3: 10    / Base Excess: -14.7 /  SaO2: 98.9              CKMB Units: 11.9 ng/mL ( @ 07:15)  Creatine Kinase, Serum: 3840 U/L ( @ 07:15)  CKMB Units: 13.4 ng/mL ( @ 03:28)  Creatine Kinase, Serum: 4100 U/L ( @ 03:28)  Creatine Kinase, Serum: 4655 U/L ( @ 23:41)  CKMB Units: 15.5 ng/mL ( @ 23:41)  Creatine Kinase, Serum: 6120 U/L ( @ 18:40)  CKMB Units: 19.2 ng/mL ( @ 18:40)  CKMB Units: 19.6 ng/mL ( @ 15:28)  Creatine Kinase, Serum: 9209 U/L ( @ 15:28)    CARDIAC MARKERS ( 02 Mar 2022 07:15 )  x     / x     / 3840 U/L / x     / 11.9 ng/mL  CARDIAC MARKERS ( 02 Mar 2022 03:28 )  x     / x     / 4100 U/L / x     / 13.4 ng/mL  CARDIAC MARKERS ( 01 Mar 2022 23:41 )  x     / x     / 4655 U/L / x     / 15.5 ng/mL  CARDIAC MARKERS ( 01 Mar 2022 18:40 )  x     / x     / 6120 U/L / x     / 19.2 ng/mL  CARDIAC MARKERS ( 01 Mar 2022 15:28 )  x     / x     / 9209 U/L / x     / 19.6 ng/mL      Urinalysis Basic - ( 01 Mar 2022 15:55 )    Color: Colorless / Appearance: Clear / S.021 / pH: x  Gluc: x / Ketone: Large  / Bili: Negative / Urobili: Negative   Blood: x / Protein: Trace / Nitrite: Negative   Leuk Esterase: Negative / RBC: 2 /hpf / WBC 0 /HPF   Sq Epi: x / Non Sq Epi: 0 /hpf / Bacteria: Negative          RADIOLOGY & ADDITIONAL TESTS:  CXR:        Care Discussed with Consultants/Other Providers [ x] YES  [ ] NO

## 2022-03-03 LAB
ALBUMIN SERPL ELPH-MCNC: 2.4 G/DL — LOW (ref 3.3–5)
ALBUMIN SERPL ELPH-MCNC: 2.4 G/DL — LOW (ref 3.3–5)
ALBUMIN SERPL ELPH-MCNC: 2.5 G/DL — LOW (ref 3.3–5)
ALBUMIN SERPL ELPH-MCNC: 2.6 G/DL — LOW (ref 3.3–5)
ALP SERPL-CCNC: 59 U/L — SIGNIFICANT CHANGE UP (ref 40–120)
ALP SERPL-CCNC: 63 U/L — SIGNIFICANT CHANGE UP (ref 40–120)
ALP SERPL-CCNC: 64 U/L — SIGNIFICANT CHANGE UP (ref 40–120)
ALP SERPL-CCNC: 65 U/L — SIGNIFICANT CHANGE UP (ref 40–120)
ALT FLD-CCNC: 83 U/L — HIGH (ref 10–45)
ALT FLD-CCNC: 87 U/L — HIGH (ref 10–45)
ALT FLD-CCNC: 96 U/L — HIGH (ref 10–45)
ALT FLD-CCNC: 99 U/L — HIGH (ref 10–45)
AMMONIA BLD-MCNC: 15 UMOL/L — SIGNIFICANT CHANGE UP (ref 11–55)
ANION GAP SERPL CALC-SCNC: 15 MMOL/L — SIGNIFICANT CHANGE UP (ref 5–17)
ANION GAP SERPL CALC-SCNC: 15 MMOL/L — SIGNIFICANT CHANGE UP (ref 5–17)
ANION GAP SERPL CALC-SCNC: 17 MMOL/L — SIGNIFICANT CHANGE UP (ref 5–17)
ANION GAP SERPL CALC-SCNC: 18 MMOL/L — HIGH (ref 5–17)
APPEARANCE UR: CLEAR — SIGNIFICANT CHANGE UP
APTT BLD: 75.9 SEC — HIGH (ref 27.5–35.5)
APTT BLD: 82.5 SEC — HIGH (ref 27.5–35.5)
APTT BLD: 90.5 SEC — HIGH (ref 27.5–35.5)
AST SERPL-CCNC: 43 U/L — HIGH (ref 10–40)
AST SERPL-CCNC: 45 U/L — HIGH (ref 10–40)
AST SERPL-CCNC: 48 U/L — HIGH (ref 10–40)
AST SERPL-CCNC: 49 U/L — HIGH (ref 10–40)
B-OH-BUTYR SERPL-SCNC: 3.5 MMOL/L — HIGH
BACTERIA # UR AUTO: NEGATIVE — SIGNIFICANT CHANGE UP
BASOPHILS # BLD AUTO: 0.01 K/UL — SIGNIFICANT CHANGE UP (ref 0–0.2)
BASOPHILS # BLD AUTO: 0.03 K/UL — SIGNIFICANT CHANGE UP (ref 0–0.2)
BASOPHILS NFR BLD AUTO: 0.1 % — SIGNIFICANT CHANGE UP (ref 0–2)
BASOPHILS NFR BLD AUTO: 0.3 % — SIGNIFICANT CHANGE UP (ref 0–2)
BILIRUB SERPL-MCNC: 0.4 MG/DL — SIGNIFICANT CHANGE UP (ref 0.2–1.2)
BILIRUB SERPL-MCNC: 0.5 MG/DL — SIGNIFICANT CHANGE UP (ref 0.2–1.2)
BILIRUB SERPL-MCNC: 0.6 MG/DL — SIGNIFICANT CHANGE UP (ref 0.2–1.2)
BILIRUB SERPL-MCNC: 0.6 MG/DL — SIGNIFICANT CHANGE UP (ref 0.2–1.2)
BILIRUB UR-MCNC: NEGATIVE — SIGNIFICANT CHANGE UP
BUN SERPL-MCNC: 14 MG/DL — SIGNIFICANT CHANGE UP (ref 7–23)
BUN SERPL-MCNC: 17 MG/DL — SIGNIFICANT CHANGE UP (ref 7–23)
BUN SERPL-MCNC: 9 MG/DL — SIGNIFICANT CHANGE UP (ref 7–23)
BUN SERPL-MCNC: 9 MG/DL — SIGNIFICANT CHANGE UP (ref 7–23)
CALCIUM SERPL-MCNC: 7.1 MG/DL — LOW (ref 8.4–10.5)
CALCIUM SERPL-MCNC: 7.4 MG/DL — LOW (ref 8.4–10.5)
CALCIUM SERPL-MCNC: 7.6 MG/DL — LOW (ref 8.4–10.5)
CALCIUM SERPL-MCNC: 7.6 MG/DL — LOW (ref 8.4–10.5)
CHLORIDE SERPL-SCNC: 101 MMOL/L — SIGNIFICANT CHANGE UP (ref 96–108)
CHLORIDE SERPL-SCNC: 104 MMOL/L — SIGNIFICANT CHANGE UP (ref 96–108)
CHLORIDE SERPL-SCNC: 104 MMOL/L — SIGNIFICANT CHANGE UP (ref 96–108)
CHLORIDE SERPL-SCNC: 105 MMOL/L — SIGNIFICANT CHANGE UP (ref 96–108)
CO2 SERPL-SCNC: 15 MMOL/L — LOW (ref 22–31)
CO2 SERPL-SCNC: 15 MMOL/L — LOW (ref 22–31)
CO2 SERPL-SCNC: 17 MMOL/L — LOW (ref 22–31)
CO2 SERPL-SCNC: 18 MMOL/L — LOW (ref 22–31)
COLOR SPEC: COLORLESS — SIGNIFICANT CHANGE UP
CREAT SERPL-MCNC: 0.5 MG/DL — SIGNIFICANT CHANGE UP (ref 0.5–1.3)
CREAT SERPL-MCNC: 0.51 MG/DL — SIGNIFICANT CHANGE UP (ref 0.5–1.3)
CREAT SERPL-MCNC: 0.58 MG/DL — SIGNIFICANT CHANGE UP (ref 0.5–1.3)
CREAT SERPL-MCNC: 0.66 MG/DL — SIGNIFICANT CHANGE UP (ref 0.5–1.3)
CRP SERPL-MCNC: 84 MG/L — HIGH (ref 0–4)
DIFF PNL FLD: NEGATIVE — SIGNIFICANT CHANGE UP
EGFR: 111 ML/MIN/1.73M2 — SIGNIFICANT CHANGE UP
EGFR: 116 ML/MIN/1.73M2 — SIGNIFICANT CHANGE UP
EGFR: 120 ML/MIN/1.73M2 — SIGNIFICANT CHANGE UP
EGFR: 121 ML/MIN/1.73M2 — SIGNIFICANT CHANGE UP
EOSINOPHIL # BLD AUTO: 0.02 K/UL — SIGNIFICANT CHANGE UP (ref 0–0.5)
EOSINOPHIL # BLD AUTO: 0.02 K/UL — SIGNIFICANT CHANGE UP (ref 0–0.5)
EOSINOPHIL NFR BLD AUTO: 0.2 % — SIGNIFICANT CHANGE UP (ref 0–6)
EOSINOPHIL NFR BLD AUTO: 0.2 % — SIGNIFICANT CHANGE UP (ref 0–6)
EPI CELLS # UR: 0 /HPF — SIGNIFICANT CHANGE UP
GAS PNL BLDA: SIGNIFICANT CHANGE UP
GAS PNL BLDA: SIGNIFICANT CHANGE UP
GLUCOSE BLDC GLUCOMTR-MCNC: 108 MG/DL — HIGH (ref 70–99)
GLUCOSE BLDC GLUCOMTR-MCNC: 116 MG/DL — HIGH (ref 70–99)
GLUCOSE BLDC GLUCOMTR-MCNC: 127 MG/DL — HIGH (ref 70–99)
GLUCOSE BLDC GLUCOMTR-MCNC: 128 MG/DL — HIGH (ref 70–99)
GLUCOSE BLDC GLUCOMTR-MCNC: 130 MG/DL — HIGH (ref 70–99)
GLUCOSE BLDC GLUCOMTR-MCNC: 134 MG/DL — HIGH (ref 70–99)
GLUCOSE BLDC GLUCOMTR-MCNC: 137 MG/DL — HIGH (ref 70–99)
GLUCOSE BLDC GLUCOMTR-MCNC: 142 MG/DL — HIGH (ref 70–99)
GLUCOSE BLDC GLUCOMTR-MCNC: 152 MG/DL — HIGH (ref 70–99)
GLUCOSE BLDC GLUCOMTR-MCNC: 156 MG/DL — HIGH (ref 70–99)
GLUCOSE BLDC GLUCOMTR-MCNC: 159 MG/DL — HIGH (ref 70–99)
GLUCOSE BLDC GLUCOMTR-MCNC: 160 MG/DL — HIGH (ref 70–99)
GLUCOSE BLDC GLUCOMTR-MCNC: 170 MG/DL — HIGH (ref 70–99)
GLUCOSE BLDC GLUCOMTR-MCNC: 173 MG/DL — HIGH (ref 70–99)
GLUCOSE BLDC GLUCOMTR-MCNC: 183 MG/DL — HIGH (ref 70–99)
GLUCOSE BLDC GLUCOMTR-MCNC: 183 MG/DL — HIGH (ref 70–99)
GLUCOSE BLDC GLUCOMTR-MCNC: 184 MG/DL — HIGH (ref 70–99)
GLUCOSE BLDC GLUCOMTR-MCNC: 184 MG/DL — HIGH (ref 70–99)
GLUCOSE BLDC GLUCOMTR-MCNC: 190 MG/DL — HIGH (ref 70–99)
GLUCOSE BLDC GLUCOMTR-MCNC: 201 MG/DL — HIGH (ref 70–99)
GLUCOSE BLDC GLUCOMTR-MCNC: 208 MG/DL — HIGH (ref 70–99)
GLUCOSE BLDC GLUCOMTR-MCNC: 227 MG/DL — HIGH (ref 70–99)
GLUCOSE BLDC GLUCOMTR-MCNC: 253 MG/DL — HIGH (ref 70–99)
GLUCOSE BLDC GLUCOMTR-MCNC: 438 MG/DL — HIGH (ref 70–99)
GLUCOSE SERPL-MCNC: 162 MG/DL — HIGH (ref 70–99)
GLUCOSE SERPL-MCNC: 162 MG/DL — HIGH (ref 70–99)
GLUCOSE SERPL-MCNC: 193 MG/DL — HIGH (ref 70–99)
GLUCOSE SERPL-MCNC: 230 MG/DL — HIGH (ref 70–99)
GLUCOSE UR QL: ABNORMAL
HCT VFR BLD CALC: 25.4 % — LOW (ref 39–50)
HCT VFR BLD CALC: 25.9 % — LOW (ref 39–50)
HGB BLD-MCNC: 8.8 G/DL — LOW (ref 13–17)
HGB BLD-MCNC: 9.1 G/DL — LOW (ref 13–17)
HYALINE CASTS # UR AUTO: 1 /LPF — SIGNIFICANT CHANGE UP (ref 0–2)
IMM GRANULOCYTES NFR BLD AUTO: 0.9 % — SIGNIFICANT CHANGE UP (ref 0–1.5)
IMM GRANULOCYTES NFR BLD AUTO: 1.7 % — HIGH (ref 0–1.5)
INR BLD: 1.14 RATIO — SIGNIFICANT CHANGE UP (ref 0.88–1.16)
INR BLD: 1.2 RATIO — HIGH (ref 0.88–1.16)
KETONES UR-MCNC: ABNORMAL
LEUKOCYTE ESTERASE UR-ACNC: NEGATIVE — SIGNIFICANT CHANGE UP
LYMPHOCYTES # BLD AUTO: 1.58 K/UL — SIGNIFICANT CHANGE UP (ref 1–3.3)
LYMPHOCYTES # BLD AUTO: 14.1 % — SIGNIFICANT CHANGE UP (ref 13–44)
LYMPHOCYTES # BLD AUTO: 17.5 % — SIGNIFICANT CHANGE UP (ref 13–44)
LYMPHOCYTES # BLD AUTO: 2.04 K/UL — SIGNIFICANT CHANGE UP (ref 1–3.3)
MAGNESIUM SERPL-MCNC: 1.6 MG/DL — SIGNIFICANT CHANGE UP (ref 1.6–2.6)
MAGNESIUM SERPL-MCNC: 1.8 MG/DL — SIGNIFICANT CHANGE UP (ref 1.6–2.6)
MAGNESIUM SERPL-MCNC: 1.8 MG/DL — SIGNIFICANT CHANGE UP (ref 1.6–2.6)
MAGNESIUM SERPL-MCNC: 1.9 MG/DL — SIGNIFICANT CHANGE UP (ref 1.6–2.6)
MCHC RBC-ENTMCNC: 29.5 PG — SIGNIFICANT CHANGE UP (ref 27–34)
MCHC RBC-ENTMCNC: 30.2 PG — SIGNIFICANT CHANGE UP (ref 27–34)
MCHC RBC-ENTMCNC: 34.6 GM/DL — SIGNIFICANT CHANGE UP (ref 32–36)
MCHC RBC-ENTMCNC: 35.1 GM/DL — SIGNIFICANT CHANGE UP (ref 32–36)
MCV RBC AUTO: 85.2 FL — SIGNIFICANT CHANGE UP (ref 80–100)
MCV RBC AUTO: 86 FL — SIGNIFICANT CHANGE UP (ref 80–100)
MONOCYTES # BLD AUTO: 1.34 K/UL — HIGH (ref 0–0.9)
MONOCYTES # BLD AUTO: 1.4 K/UL — HIGH (ref 0–0.9)
MONOCYTES NFR BLD AUTO: 11.9 % — SIGNIFICANT CHANGE UP (ref 2–14)
MONOCYTES NFR BLD AUTO: 12 % — SIGNIFICANT CHANGE UP (ref 2–14)
NEUTROPHILS # BLD AUTO: 8.07 K/UL — HIGH (ref 1.8–7.4)
NEUTROPHILS # BLD AUTO: 8.09 K/UL — HIGH (ref 1.8–7.4)
NEUTROPHILS NFR BLD AUTO: 69.3 % — SIGNIFICANT CHANGE UP (ref 43–77)
NEUTROPHILS NFR BLD AUTO: 71.8 % — SIGNIFICANT CHANGE UP (ref 43–77)
NITRITE UR-MCNC: NEGATIVE — SIGNIFICANT CHANGE UP
NRBC # BLD: 0 /100 WBCS — SIGNIFICANT CHANGE UP (ref 0–0)
NRBC # BLD: 0 /100 WBCS — SIGNIFICANT CHANGE UP (ref 0–0)
PH UR: 6.5 — SIGNIFICANT CHANGE UP (ref 5–8)
PHOSPHATE SERPL-MCNC: 1 MG/DL — CRITICAL LOW (ref 2.5–4.5)
PHOSPHATE SERPL-MCNC: 1 MG/DL — CRITICAL LOW (ref 2.5–4.5)
PHOSPHATE SERPL-MCNC: 1.3 MG/DL — LOW (ref 2.5–4.5)
PHOSPHATE SERPL-MCNC: 1.5 MG/DL — LOW (ref 2.5–4.5)
PLATELET # BLD AUTO: 200 K/UL — SIGNIFICANT CHANGE UP (ref 150–400)
PLATELET # BLD AUTO: 218 K/UL — SIGNIFICANT CHANGE UP (ref 150–400)
POTASSIUM SERPL-MCNC: 3.5 MMOL/L — SIGNIFICANT CHANGE UP (ref 3.5–5.3)
POTASSIUM SERPL-MCNC: 3.6 MMOL/L — SIGNIFICANT CHANGE UP (ref 3.5–5.3)
POTASSIUM SERPL-MCNC: 3.6 MMOL/L — SIGNIFICANT CHANGE UP (ref 3.5–5.3)
POTASSIUM SERPL-MCNC: 3.9 MMOL/L — SIGNIFICANT CHANGE UP (ref 3.5–5.3)
POTASSIUM SERPL-SCNC: 3.5 MMOL/L — SIGNIFICANT CHANGE UP (ref 3.5–5.3)
POTASSIUM SERPL-SCNC: 3.6 MMOL/L — SIGNIFICANT CHANGE UP (ref 3.5–5.3)
POTASSIUM SERPL-SCNC: 3.6 MMOL/L — SIGNIFICANT CHANGE UP (ref 3.5–5.3)
POTASSIUM SERPL-SCNC: 3.9 MMOL/L — SIGNIFICANT CHANGE UP (ref 3.5–5.3)
PROT SERPL-MCNC: 4.5 G/DL — LOW (ref 6–8.3)
PROT SERPL-MCNC: 4.6 G/DL — LOW (ref 6–8.3)
PROT SERPL-MCNC: 4.7 G/DL — LOW (ref 6–8.3)
PROT SERPL-MCNC: 4.7 G/DL — LOW (ref 6–8.3)
PROT UR-MCNC: ABNORMAL
PROTHROM AB SERPL-ACNC: 13.1 SEC — SIGNIFICANT CHANGE UP (ref 10.5–13.4)
PROTHROM AB SERPL-ACNC: 13.9 SEC — HIGH (ref 10.5–13.4)
RBC # BLD: 2.98 M/UL — LOW (ref 4.2–5.8)
RBC # BLD: 3.01 M/UL — LOW (ref 4.2–5.8)
RBC # FLD: 14.3 % — SIGNIFICANT CHANGE UP (ref 10.3–14.5)
RBC # FLD: 14.3 % — SIGNIFICANT CHANGE UP (ref 10.3–14.5)
RBC CASTS # UR COMP ASSIST: 4 /HPF — SIGNIFICANT CHANGE UP (ref 0–4)
SODIUM SERPL-SCNC: 133 MMOL/L — LOW (ref 135–145)
SODIUM SERPL-SCNC: 136 MMOL/L — SIGNIFICANT CHANGE UP (ref 135–145)
SODIUM SERPL-SCNC: 137 MMOL/L — SIGNIFICANT CHANGE UP (ref 135–145)
SODIUM SERPL-SCNC: 138 MMOL/L — SIGNIFICANT CHANGE UP (ref 135–145)
SP GR SPEC: 1.02 — SIGNIFICANT CHANGE UP (ref 1.01–1.02)
UROBILINOGEN FLD QL: NEGATIVE — SIGNIFICANT CHANGE UP
WBC # BLD: 11.23 K/UL — HIGH (ref 3.8–10.5)
WBC # BLD: 11.67 K/UL — HIGH (ref 3.8–10.5)
WBC # FLD AUTO: 11.23 K/UL — HIGH (ref 3.8–10.5)
WBC # FLD AUTO: 11.67 K/UL — HIGH (ref 3.8–10.5)
WBC UR QL: 1 /HPF — SIGNIFICANT CHANGE UP (ref 0–5)

## 2022-03-03 PROCEDURE — 99292 CRITICAL CARE ADDL 30 MIN: CPT | Mod: 25

## 2022-03-03 PROCEDURE — 99291 CRITICAL CARE FIRST HOUR: CPT | Mod: 25

## 2022-03-03 PROCEDURE — 93010 ELECTROCARDIOGRAM REPORT: CPT

## 2022-03-03 PROCEDURE — 71045 X-RAY EXAM CHEST 1 VIEW: CPT | Mod: 26

## 2022-03-03 PROCEDURE — 99223 1ST HOSP IP/OBS HIGH 75: CPT

## 2022-03-03 RX ORDER — CARVEDILOL PHOSPHATE 80 MG/1
12.5 CAPSULE, EXTENDED RELEASE ORAL EVERY 12 HOURS
Refills: 0 | Status: DISCONTINUED | OUTPATIENT
Start: 2022-03-03 | End: 2022-03-04

## 2022-03-03 RX ORDER — INSULIN GLARGINE 100 [IU]/ML
22 INJECTION, SOLUTION SUBCUTANEOUS ONCE
Refills: 0 | Status: COMPLETED | OUTPATIENT
Start: 2022-03-03 | End: 2022-03-03

## 2022-03-03 RX ORDER — POTASSIUM PHOSPHATE, MONOBASIC POTASSIUM PHOSPHATE, DIBASIC 236; 224 MG/ML; MG/ML
30 INJECTION, SOLUTION INTRAVENOUS ONCE
Refills: 0 | Status: DISCONTINUED | OUTPATIENT
Start: 2022-03-03 | End: 2022-03-03

## 2022-03-03 RX ORDER — MAGNESIUM SULFATE 500 MG/ML
2 VIAL (ML) INJECTION ONCE
Refills: 0 | Status: COMPLETED | OUTPATIENT
Start: 2022-03-03 | End: 2022-03-03

## 2022-03-03 RX ORDER — POTASSIUM PHOSPHATE, MONOBASIC POTASSIUM PHOSPHATE, DIBASIC 236; 224 MG/ML; MG/ML
30 INJECTION, SOLUTION INTRAVENOUS ONCE
Refills: 0 | Status: COMPLETED | OUTPATIENT
Start: 2022-03-03 | End: 2022-03-03

## 2022-03-03 RX ORDER — THIAMINE MONONITRATE (VIT B1) 100 MG
500 TABLET ORAL THREE TIMES A DAY
Refills: 0 | Status: COMPLETED | OUTPATIENT
Start: 2022-03-03 | End: 2022-03-08

## 2022-03-03 RX ORDER — DEXTROSE MONOHYDRATE, SODIUM CHLORIDE, AND POTASSIUM CHLORIDE 50; .745; 4.5 G/1000ML; G/1000ML; G/1000ML
1000 INJECTION, SOLUTION INTRAVENOUS
Refills: 0 | Status: DISCONTINUED | OUTPATIENT
Start: 2022-03-03 | End: 2022-03-05

## 2022-03-03 RX ORDER — HUMAN INSULIN 100 [IU]/ML
13 INJECTION, SUSPENSION SUBCUTANEOUS EVERY 6 HOURS
Refills: 0 | Status: DISCONTINUED | OUTPATIENT
Start: 2022-03-03 | End: 2022-03-03

## 2022-03-03 RX ORDER — ASPIRIN/CALCIUM CARB/MAGNESIUM 324 MG
81 TABLET ORAL DAILY
Refills: 0 | Status: DISCONTINUED | OUTPATIENT
Start: 2022-03-03 | End: 2022-03-10

## 2022-03-03 RX ORDER — FOLIC ACID 0.8 MG
1 TABLET ORAL ONCE
Refills: 0 | Status: COMPLETED | OUTPATIENT
Start: 2022-03-03 | End: 2022-03-03

## 2022-03-03 RX ORDER — HYDRALAZINE HCL 50 MG
50 TABLET ORAL EVERY 8 HOURS
Refills: 0 | Status: DISCONTINUED | OUTPATIENT
Start: 2022-03-03 | End: 2022-03-04

## 2022-03-03 RX ORDER — HUMAN INSULIN 100 [IU]/ML
11 INJECTION, SUSPENSION SUBCUTANEOUS EVERY 6 HOURS
Refills: 0 | Status: DISCONTINUED | OUTPATIENT
Start: 2022-03-03 | End: 2022-03-03

## 2022-03-03 RX ORDER — INSULIN LISPRO 100/ML
VIAL (ML) SUBCUTANEOUS EVERY 6 HOURS
Refills: 0 | Status: DISCONTINUED | OUTPATIENT
Start: 2022-03-03 | End: 2022-03-03

## 2022-03-03 RX ADMIN — Medication 3 MICROGRAM(S)/MIN: at 19:34

## 2022-03-03 RX ADMIN — TICAGRELOR 90 MILLIGRAM(S): 90 TABLET ORAL at 20:26

## 2022-03-03 RX ADMIN — CHLORHEXIDINE GLUCONATE 1 APPLICATION(S): 213 SOLUTION TOPICAL at 00:07

## 2022-03-03 RX ADMIN — Medication 50 MILLIGRAM(S): at 17:10

## 2022-03-03 RX ADMIN — POTASSIUM PHOSPHATE, MONOBASIC POTASSIUM PHOSPHATE, DIBASIC 83.33 MILLIMOLE(S): 236; 224 INJECTION, SOLUTION INTRAVENOUS at 05:53

## 2022-03-03 RX ADMIN — METHADONE HYDROCHLORIDE 5 MILLIGRAM(S): 40 TABLET ORAL at 09:01

## 2022-03-03 RX ADMIN — Medication 12.5 MILLIGRAM(S): at 09:00

## 2022-03-03 RX ADMIN — DEXTROSE MONOHYDRATE, SODIUM CHLORIDE, AND POTASSIUM CHLORIDE 200 MILLILITER(S): 50; .745; 4.5 INJECTION, SOLUTION INTRAVENOUS at 22:45

## 2022-03-03 RX ADMIN — HEPARIN SODIUM 24 UNIT(S)/HR: 5000 INJECTION INTRAVENOUS; SUBCUTANEOUS at 11:26

## 2022-03-03 RX ADMIN — CARVEDILOL PHOSPHATE 12.5 MILLIGRAM(S): 80 CAPSULE, EXTENDED RELEASE ORAL at 17:09

## 2022-03-03 RX ADMIN — CHLORHEXIDINE GLUCONATE 1 APPLICATION(S): 213 SOLUTION TOPICAL at 22:34

## 2022-03-03 RX ADMIN — Medication 81 MILLIGRAM(S): at 12:13

## 2022-03-03 RX ADMIN — Medication 2: at 17:10

## 2022-03-03 RX ADMIN — POTASSIUM PHOSPHATE, MONOBASIC POTASSIUM PHOSPHATE, DIBASIC 83.33 MILLIMOLE(S): 236; 224 INJECTION, SOLUTION INTRAVENOUS at 15:55

## 2022-03-03 RX ADMIN — Medication 105 MILLIGRAM(S): at 16:55

## 2022-03-03 RX ADMIN — CARVEDILOL PHOSPHATE 12.5 MILLIGRAM(S): 80 CAPSULE, EXTENDED RELEASE ORAL at 12:13

## 2022-03-03 RX ADMIN — DEXTROSE MONOHYDRATE, SODIUM CHLORIDE, AND POTASSIUM CHLORIDE 200 MILLILITER(S): 50; .745; 4.5 INJECTION, SOLUTION INTRAVENOUS at 18:00

## 2022-03-03 RX ADMIN — ISOSORBIDE DINITRATE 30 MILLIGRAM(S): 5 TABLET ORAL at 09:01

## 2022-03-03 RX ADMIN — Medication 105 MILLIGRAM(S): at 22:44

## 2022-03-03 RX ADMIN — Medication 25 GRAM(S): at 11:03

## 2022-03-03 RX ADMIN — NYSTATIN CREAM 1 APPLICATION(S): 100000 CREAM TOPICAL at 17:10

## 2022-03-03 RX ADMIN — HUMAN INSULIN 11 UNIT(S): 100 INJECTION, SUSPENSION SUBCUTANEOUS at 17:10

## 2022-03-03 RX ADMIN — ATORVASTATIN CALCIUM 80 MILLIGRAM(S): 80 TABLET, FILM COATED ORAL at 22:45

## 2022-03-03 RX ADMIN — Medication 25 MILLIGRAM(S): at 09:01

## 2022-03-03 RX ADMIN — TICAGRELOR 90 MILLIGRAM(S): 90 TABLET ORAL at 09:01

## 2022-03-03 RX ADMIN — HEPARIN SODIUM 23 UNIT(S)/HR: 5000 INJECTION INTRAVENOUS; SUBCUTANEOUS at 17:50

## 2022-03-03 RX ADMIN — INSULIN HUMAN 6 UNIT(S)/HR: 100 INJECTION, SOLUTION SUBCUTANEOUS at 22:45

## 2022-03-03 RX ADMIN — INSULIN GLARGINE 22 UNIT(S): 100 INJECTION, SOLUTION SUBCUTANEOUS at 12:13

## 2022-03-03 RX ADMIN — INSULIN HUMAN 6 UNIT(S)/HR: 100 INJECTION, SOLUTION SUBCUTANEOUS at 19:36

## 2022-03-03 RX ADMIN — METHADONE HYDROCHLORIDE 5 MILLIGRAM(S): 40 TABLET ORAL at 17:10

## 2022-03-03 RX ADMIN — Medication 1 MILLIGRAM(S): at 16:55

## 2022-03-03 RX ADMIN — DEXTROSE MONOHYDRATE, SODIUM CHLORIDE, AND POTASSIUM CHLORIDE 200 MILLILITER(S): 50; .745; 4.5 INJECTION, SOLUTION INTRAVENOUS at 19:34

## 2022-03-03 RX ADMIN — NYSTATIN CREAM 1 APPLICATION(S): 100000 CREAM TOPICAL at 02:17

## 2022-03-03 RX ADMIN — ISOSORBIDE DINITRATE 30 MILLIGRAM(S): 5 TABLET ORAL at 17:10

## 2022-03-03 RX ADMIN — Medication 25 GRAM(S): at 13:30

## 2022-03-03 RX ADMIN — HEPARIN SODIUM 23 UNIT(S)/HR: 5000 INJECTION INTRAVENOUS; SUBCUTANEOUS at 19:38

## 2022-03-03 RX ADMIN — Medication 50 MILLIGRAM(S): at 22:44

## 2022-03-03 NOTE — DIETITIAN INITIAL EVALUATION ADULT. - PERTINENT LABORATORY DATA
03-03 @ 12:13: Na 133<L>, BUN 9, Cr 0.51, <H>, K+ 3.9, Phos 1.5<L>, Mg 1.8, Alk Phos 59, ALT/SGPT 83<H>, AST/SGOT 43<H>, HbA1c --  03-03 @ 04:07: Na 138, BUN 14, Cr 0.58, <H>, K+ 3.5, Phos 1.0<LL>, Mg 1.6, Alk Phos 64, ALT/SGPT 96<H>, AST/SGOT 49<H>, HbA1c --  03-03 @ 00:30: Na 136, BUN 17, Cr 0.66, <H>, K+ 3.6, Phos 1.0<LL>, Mg 1.8, Alk Phos 65, ALT/SGPT 99<H>, AST/SGOT 48<H>, HbA1c --  03-02 @ 20:43: Na 137, BUN 21, Cr 0.71, <H>, K+ 3.7, Phos 1.0<LL>, Mg 1.9, Alk Phos 68, ALT/SGPT 102<H>, AST/SGOT 45<H>, HbA1c --  03-02 @ 16:33: Na 134<L>, BUN 25<H>, Cr 0.74, <H>, K+ 3.7, Phos 1.1<L>, Mg 1.8, Alk Phos 71, ALT/SGPT 110<H>, AST/SGOT 45<H>, HbA1c --    A1C with Estimated Average Glucose Result: 11.2 % (03-02-22 @ 01:48)  A1C with Estimated Average Glucose Result: 9.8 % (10-25-21 @ 00:21)    CAPILLARY BLOOD GLUCOSE:  POCT Blood Glucose.: 127 mg/dL (03 Mar 2022 15:06)  POCT Blood Glucose.: 253 mg/dL (03 Mar 2022 14:12)  POCT Blood Glucose.: 190 mg/dL (03 Mar 2022 13:10)  POCT Blood Glucose.: 227 mg/dL (03 Mar 2022 12:02)  POCT Blood Glucose.: 184 mg/dL (03 Mar 2022 11:01)  POCT Blood Glucose.: 156 mg/dL (03 Mar 2022 10:00)  POCT Blood Glucose.: 116 mg/dL (03 Mar 2022 09:00)  POCT Blood Glucose.: 128 mg/dL (03 Mar 2022 07:58)  POCT Blood Glucose.: 142 mg/dL (03 Mar 2022 07:07)  POCT Blood Glucose.: 134 mg/dL (03 Mar 2022 06:09)  POCT Blood Glucose.: 137 mg/dL (03 Mar 2022 05:06)  POCT Blood Glucose.: 159 mg/dL (03 Mar 2022 03:29)  POCT Blood Glucose.: 170 mg/dL (03 Mar 2022 02:05)  POCT Blood Glucose.: 173 mg/dL (03 Mar 2022 01:08)  POCT Blood Glucose.: 184 mg/dL (03 Mar 2022 00:17)  POCT Blood Glucose.: 183 mg/dL (02 Mar 2022 23:07)  POCT Blood Glucose.: 176 mg/dL (02 Mar 2022 22:16)  POCT Blood Glucose.: 178 mg/dL (02 Mar 2022 21:05)  POCT Blood Glucose.: 259 mg/dL (02 Mar 2022 19:59)  POCT Blood Glucose.: 213 mg/dL (02 Mar 2022 18:46)  POCT Blood Glucose.: 207 mg/dL (02 Mar 2022 18:12)  POCT Blood Glucose.: 208 mg/dL (02 Mar 2022 17:04)  POCT Blood Glucose.: 217 mg/dL (02 Mar 2022 16:17)

## 2022-03-03 NOTE — PROGRESS NOTE ADULT - SUBJECTIVE AND OBJECTIVE BOX
Patient is a 56y old  Male who presents with a chief complaint of STEMI, DKA (02 Mar 2022 19:22)      INTERVAL HPI/OVERNIGHT EVENTS:   No overnight events   Afebrile, hemodynamically stable     Subjective:    ICU Vital Signs Last 24 Hrs  T(C): 36.9 (03 Mar 2022 03:00), Max: 37.1 (02 Mar 2022 23:00)  T(F): 98.5 (03 Mar 2022 03:00), Max: 98.7 (02 Mar 2022 23:00)  HR: 115 (03 Mar 2022 06:00) (89 - 124)  BP: --  BP(mean): --  ABP: 171/83 (03 Mar 2022 06:00) (93/49 - 201/182)  ABP(mean): 109 (03 Mar 2022 06:00) (62 - 190)  RR: 32 (03 Mar 2022 06:00) (8 - 42)  SpO2: 98% (03 Mar 2022 06:00) (96% - 100%)    I&O's Summary    02 Mar 2022 07:01  -  03 Mar 2022 07:00  --------------------------------------------------------  IN: 47414.3 mL / OUT: 1575 mL / NET: 9160.3 mL          Daily     Daily Weight in k.2 (03 Mar 2022 02:30)    Adult Advanced Hemodynamics Last 24 Hrs  CVP(mm Hg): --  CVP(cm H2O): --  CO: --  CI: --  PA: --  PA(mean): --  PCWP: --  SVR: --  SVRI: --  PVR: --  PVRI: --    EKG/Telemetry Events:    MEDICATIONS  (STANDING):  aspirin enteric coated 81 milliGRAM(s) Oral daily  atorvastatin 80 milliGRAM(s) Oral at bedtime  chlorhexidine 4% Liquid 1 Application(s) Topical <User Schedule>  dextrose 5% + sodium chloride 0.45% with potassium chloride 20 mEq/L 1000 milliLiter(s) (200 mL/Hr) IV Continuous <Continuous>  dextrose 50% Injectable 50 milliLiter(s) IV Push every 15 minutes  heparin  Infusion 1700 Unit(s)/Hr (26 mL/Hr) IV Continuous <Continuous>  hydrALAZINE 25 milliGRAM(s) Oral every 8 hours  influenza   Vaccine 0.5 milliLiter(s) IntraMuscular once  insulin regular Infusion 6 Unit(s)/Hr (6 mL/Hr) IV Continuous <Continuous>  isosorbide   dinitrate Tablet (ISORDIL) 30 milliGRAM(s) Oral three times a day  methadone    Tablet 5 milliGRAM(s) Oral every 6 hours  metoprolol tartrate 12.5 milliGRAM(s) Oral two times a day  nitroglycerin  Infusion 10 MICROgram(s)/Min (3 mL/Hr) IV Continuous <Continuous>  nystatin Powder 1 Application(s) Topical two times a day  sodium bicarbonate  Infusion 0.104 mEq/kG/Hr (150 mL/Hr) IV Continuous <Continuous>  ticagrelor 90 milliGRAM(s) Oral every 12 hours    MEDICATIONS  (PRN):  LORazepam     Tablet 1 milliGRAM(s) Oral every 1 hour PRN CIWA-Ar score 8 or greater  LORazepam     Tablet 2 milliGRAM(s) Oral every 1 hour PRN Symptom-triggered: each CIWA -Ar score 8 or GREATER  LORazepam     Tablet 2 milliGRAM(s) Oral every 2 hours PRN Symptom-triggered: 2 point increase in CIWA -Ar score and a total score of 7 or LESS  LORazepam   Injectable 2 milliGRAM(s) IV Push every 2 hours PRN Symptom-triggered: 2 point increase in CIWA -Ar score and a total score of 7 or LESS  LORazepam   Injectable 2 milliGRAM(s) IntraMuscular every 2 hours PRN Symptom-triggered: 2 point increase in CIWA -Ar score and a total score of 7 or LESS  LORazepam   Injectable 2 milliGRAM(s) IV Push every 1 hour PRN Symptom-triggered: each CIWA -Ar score 8 or GREATER  LORazepam   Injectable 2 milliGRAM(s) IntraMuscular every 1 hour PRN Symptom-triggered: each CIWA -Ar score 8 or GREATER  LORazepam   Injectable 1 milliGRAM(s) IV Push every 1 hour PRN CIWA-Ar score 8 or greater  LORazepam   Injectable 1 milliGRAM(s) IntraMuscular every 1 hour PRN CIWA-Ar score 8 or greater      PHYSICAL EXAM:  GENERAL:   HEAD:  Atraumatic, Normocephalic  EYES: EOMI, PERRLA, conjunctiva and sclera clear  NECK: Supple, No JVD, Normal thyroid, no enlarged nodes  NERVOUS SYSTEM:  Alert & Awake.   CHEST/LUNG: B/L good air entry; No rales, rhonchi, or wheezing  HEART: S1S2 normal, no S3, Regular rate and rhythm; No murmurs  ABDOMEN: Soft, Nontender, Nondistended; Bowel sounds present  EXTREMITIES:  2+ Peripheral Pulses, No clubbing, cyanosis, or edema  LYMPH: No lymphadenopathy noted  SKIN: No rashes or lesions    LABS:                        9.1    11.67 )-----------( 218      ( 03 Mar 2022 04:07 )             25.9     03-03    138  |  105  |  14  ----------------------------<  162<H>  3.5   |  18<L>  |  0.58    Ca    7.6<L>      03 Mar 2022 04:07  Phos  1.0     03-03  Mg     1.6     03-03    TPro  4.7<L>  /  Alb  2.5<L>  /  TBili  0.5  /  DBili  x   /  AST  49<H>  /  ALT  96<H>  /  AlkPhos  64  03-03    LIVER FUNCTIONS - ( 03 Mar 2022 04:07 )  Alb: 2.5 g/dL / Pro: 4.7 g/dL / ALK PHOS: 64 U/L / ALT: 96 U/L / AST: 49 U/L / GGT: x           PT/INR - ( 03 Mar 2022 04:07 )   PT: 13.1 sec;   INR: 1.14 ratio         PTT - ( 03 Mar 2022 04:07 )  PTT:75.9 sec  CAPILLARY BLOOD GLUCOSE      POCT Blood Glucose.: 142 mg/dL (03 Mar 2022 07:07)  POCT Blood Glucose.: 134 mg/dL (03 Mar 2022 06:09)  POCT Blood Glucose.: 137 mg/dL (03 Mar 2022 05:06)  POCT Blood Glucose.: 159 mg/dL (03 Mar 2022 03:29)  POCT Blood Glucose.: 170 mg/dL (03 Mar 2022 02:05)  POCT Blood Glucose.: 173 mg/dL (03 Mar 2022 01:08)  POCT Blood Glucose.: 184 mg/dL (03 Mar 2022 00:17)  POCT Blood Glucose.: 183 mg/dL (02 Mar 2022 23:07)  POCT Blood Glucose.: 176 mg/dL (02 Mar 2022 22:16)  POCT Blood Glucose.: 178 mg/dL (02 Mar 2022 21:05)  POCT Blood Glucose.: 259 mg/dL (02 Mar 2022 19:59)  POCT Blood Glucose.: 213 mg/dL (02 Mar 2022 18:46)  POCT Blood Glucose.: 207 mg/dL (02 Mar 2022 18:12)  POCT Blood Glucose.: 208 mg/dL (02 Mar 2022 17:04)  POCT Blood Glucose.: 217 mg/dL (02 Mar 2022 16:17)  POCT Blood Glucose.: 244 mg/dL (02 Mar 2022 15:04)  POCT Blood Glucose.: 217 mg/dL (02 Mar 2022 13:54)  POCT Blood Glucose.: 220 mg/dL (02 Mar 2022 13:02)  POCT Blood Glucose.: 140 mg/dL (02 Mar 2022 12:01)  POCT Blood Glucose.: 159 mg/dL (02 Mar 2022 11:00)  POCT Blood Glucose.: 165 mg/dL (02 Mar 2022 10:00)  POCT Blood Glucose.: 147 mg/dL (02 Mar 2022 09:20)  POCT Blood Glucose.: 199 mg/dL (02 Mar 2022 07:56)    ABG - ( 03 Mar 2022 04:02 )  pH, Arterial: 7.52  pH, Blood: x     /  pCO2: 23    /  pO2: 95    / HCO3: 19    / Base Excess: -3.0  /  SaO2: 99.0              Creatine Kinase, Serum: 3235 U/L ( @ 12:22)  CKMB Units: 9.6 ng/mL ( @ 12:22)    CARDIAC MARKERS ( 02 Mar 2022 12:22 )  x     / x     / 3235 U/L / x     / 9.6 ng/mL  CARDIAC MARKERS ( 02 Mar 2022 07:15 )  x     / x     / 3840 U/L / x     / 11.9 ng/mL  CARDIAC MARKERS ( 02 Mar 2022 03:28 )  x     / x     / 4100 U/L / x     / 13.4 ng/mL  CARDIAC MARKERS ( 01 Mar 2022 23:41 )  x     / x     / 4655 U/L / x     / 15.5 ng/mL  CARDIAC MARKERS ( 01 Mar 2022 18:40 )  x     / x     / 6120 U/L / x     / 19.2 ng/mL  CARDIAC MARKERS ( 01 Mar 2022 15:28 )  x     / x     / 9209 U/L / x     / 19.6 ng/mL      Urinalysis Basic - ( 01 Mar 2022 15:55 )    Color: Colorless / Appearance: Clear / S.021 / pH: x  Gluc: x / Ketone: Large  / Bili: Negative / Urobili: Negative   Blood: x / Protein: Trace / Nitrite: Negative   Leuk Esterase: Negative / RBC: 2 /hpf / WBC 0 /HPF   Sq Epi: x / Non Sq Epi: 0 /hpf / Bacteria: Negative          RADIOLOGY & ADDITIONAL TESTS:  CXR:        Care Discussed with Consultants/Other Providers [ x] YES  [ ] NO           Patient is a 56y old  Male who presents with a chief complaint of STEMI, DKA (02 Mar 2022 19:22)      INTERVAL HPI/OVERNIGHT EVENTS:   Overnight, pt's AG closed, insulin gtt @ 7, supposed to be held at 5 AM per protocol, held at 9 AM.  Pt has continued to be altered, MS is worsening, waxing and waning. Pt has hardware in spine, MRI of brain/spine postponed until more information is obtained from pt's family to determine MRI compatibility   Afebrile, hemodynamically stable     Subjective:    ICU Vital Signs Last 24 Hrs  T(C): 36.9 (03 Mar 2022 03:00), Max: 37.1 (02 Mar 2022 23:00)  T(F): 98.5 (03 Mar 2022 03:00), Max: 98.7 (02 Mar 2022 23:00)  HR: 115 (03 Mar 2022 06:00) (89 - 124)  BP: --  BP(mean): --  ABP: 171/83 (03 Mar 2022 06:00) (93/49 - 201/182)  ABP(mean): 109 (03 Mar 2022 06:00) (62 - 190)  RR: 32 (03 Mar 2022 06:00) (8 - 42)  SpO2: 98% (03 Mar 2022 06:00) (96% - 100%)    I&O's Summary    02 Mar 2022 07:01  -  03 Mar 2022 07:00  --------------------------------------------------------  IN: 75169.3 mL / OUT: 1575 mL / NET: 9160.3 mL          Daily     Daily Weight in k.2 (03 Mar 2022 02:30)    Adult Advanced Hemodynamics Last 24 Hrs  CVP(mm Hg): --  CVP(cm H2O): --  CO: --  CI: --  PA: --  PA(mean): --  PCWP: --  SVR: --  SVRI: --  PVR: --  PVRI: --    EKG/Telemetry Events:    MEDICATIONS  (STANDING):  aspirin enteric coated 81 milliGRAM(s) Oral daily  atorvastatin 80 milliGRAM(s) Oral at bedtime  chlorhexidine 4% Liquid 1 Application(s) Topical <User Schedule>  dextrose 5% + sodium chloride 0.45% with potassium chloride 20 mEq/L 1000 milliLiter(s) (200 mL/Hr) IV Continuous <Continuous>  dextrose 50% Injectable 50 milliLiter(s) IV Push every 15 minutes  heparin  Infusion 1700 Unit(s)/Hr (26 mL/Hr) IV Continuous <Continuous>  hydrALAZINE 25 milliGRAM(s) Oral every 8 hours  influenza   Vaccine 0.5 milliLiter(s) IntraMuscular once  insulin regular Infusion 6 Unit(s)/Hr (6 mL/Hr) IV Continuous <Continuous>  isosorbide   dinitrate Tablet (ISORDIL) 30 milliGRAM(s) Oral three times a day  methadone    Tablet 5 milliGRAM(s) Oral every 6 hours  metoprolol tartrate 12.5 milliGRAM(s) Oral two times a day  nitroglycerin  Infusion 10 MICROgram(s)/Min (3 mL/Hr) IV Continuous <Continuous>  nystatin Powder 1 Application(s) Topical two times a day  sodium bicarbonate  Infusion 0.104 mEq/kG/Hr (150 mL/Hr) IV Continuous <Continuous>  ticagrelor 90 milliGRAM(s) Oral every 12 hours    MEDICATIONS  (PRN):  LORazepam     Tablet 1 milliGRAM(s) Oral every 1 hour PRN CIWA-Ar score 8 or greater  LORazepam     Tablet 2 milliGRAM(s) Oral every 1 hour PRN Symptom-triggered: each CIWA -Ar score 8 or GREATER  LORazepam     Tablet 2 milliGRAM(s) Oral every 2 hours PRN Symptom-triggered: 2 point increase in CIWA -Ar score and a total score of 7 or LESS  LORazepam   Injectable 2 milliGRAM(s) IV Push every 2 hours PRN Symptom-triggered: 2 point increase in CIWA -Ar score and a total score of 7 or LESS  LORazepam   Injectable 2 milliGRAM(s) IntraMuscular every 2 hours PRN Symptom-triggered: 2 point increase in CIWA -Ar score and a total score of 7 or LESS  LORazepam   Injectable 2 milliGRAM(s) IV Push every 1 hour PRN Symptom-triggered: each CIWA -Ar score 8 or GREATER  LORazepam   Injectable 2 milliGRAM(s) IntraMuscular every 1 hour PRN Symptom-triggered: each CIWA -Ar score 8 or GREATER  LORazepam   Injectable 1 milliGRAM(s) IV Push every 1 hour PRN CIWA-Ar score 8 or greater  LORazepam   Injectable 1 milliGRAM(s) IntraMuscular every 1 hour PRN CIWA-Ar score 8 or greater      PHYSICAL EXAM:  GENERAL:   HEAD:  Atraumatic, Normocephalic  EYES: EOMI, PERRLA, conjunctiva and sclera clear  NECK: Supple, No JVD, Normal thyroid, no enlarged nodes  NERVOUS SYSTEM:  Alert & Awake.   CHEST/LUNG: B/L good air entry; No rales, rhonchi, or wheezing  HEART: S1S2 normal, no S3, Regular rate and rhythm; No murmurs  ABDOMEN: Soft, Nontender, Nondistended; Bowel sounds present  EXTREMITIES:  2+ Peripheral Pulses, No clubbing, cyanosis, or edema  LYMPH: No lymphadenopathy noted  SKIN: No rashes or lesions    LABS:                        9.1    11.67 )-----------( 218      ( 03 Mar 2022 04:07 )             25.9     03-03    138  |  105  |  14  ----------------------------<  162<H>  3.5   |  18<L>  |  0.58    Ca    7.6<L>      03 Mar 2022 04:07  Phos  1.0     03-03  Mg     1.6     03-03    TPro  4.7<L>  /  Alb  2.5<L>  /  TBili  0.5  /  DBili  x   /  AST  49<H>  /  ALT  96<H>  /  AlkPhos  64  03-03    LIVER FUNCTIONS - ( 03 Mar 2022 04:07 )  Alb: 2.5 g/dL / Pro: 4.7 g/dL / ALK PHOS: 64 U/L / ALT: 96 U/L / AST: 49 U/L / GGT: x           PT/INR - ( 03 Mar 2022 04:07 )   PT: 13.1 sec;   INR: 1.14 ratio         PTT - ( 03 Mar 2022 04:07 )  PTT:75.9 sec  CAPILLARY BLOOD GLUCOSE      POCT Blood Glucose.: 142 mg/dL (03 Mar 2022 07:07)  POCT Blood Glucose.: 134 mg/dL (03 Mar 2022 06:09)  POCT Blood Glucose.: 137 mg/dL (03 Mar 2022 05:06)  POCT Blood Glucose.: 159 mg/dL (03 Mar 2022 03:29)  POCT Blood Glucose.: 170 mg/dL (03 Mar 2022 02:05)  POCT Blood Glucose.: 173 mg/dL (03 Mar 2022 01:08)  POCT Blood Glucose.: 184 mg/dL (03 Mar 2022 00:17)  POCT Blood Glucose.: 183 mg/dL (02 Mar 2022 23:07)  POCT Blood Glucose.: 176 mg/dL (02 Mar 2022 22:16)  POCT Blood Glucose.: 178 mg/dL (02 Mar 2022 21:05)  POCT Blood Glucose.: 259 mg/dL (02 Mar 2022 19:59)  POCT Blood Glucose.: 213 mg/dL (02 Mar 2022 18:46)  POCT Blood Glucose.: 207 mg/dL (02 Mar 2022 18:12)  POCT Blood Glucose.: 208 mg/dL (02 Mar 2022 17:04)  POCT Blood Glucose.: 217 mg/dL (02 Mar 2022 16:17)  POCT Blood Glucose.: 244 mg/dL (02 Mar 2022 15:04)  POCT Blood Glucose.: 217 mg/dL (02 Mar 2022 13:54)  POCT Blood Glucose.: 220 mg/dL (02 Mar 2022 13:02)  POCT Blood Glucose.: 140 mg/dL (02 Mar 2022 12:01)  POCT Blood Glucose.: 159 mg/dL (02 Mar 2022 11:00)  POCT Blood Glucose.: 165 mg/dL (02 Mar 2022 10:00)  POCT Blood Glucose.: 147 mg/dL (02 Mar 2022 09:20)  POCT Blood Glucose.: 199 mg/dL (02 Mar 2022 07:56)    ABG - ( 03 Mar 2022 04:02 )  pH, Arterial: 7.52  pH, Blood: x     /  pCO2: 23    /  pO2: 95    / HCO3: 19    / Base Excess: -3.0  /  SaO2: 99.0              Creatine Kinase, Serum: 3235 U/L ( @ 12:22)  CKMB Units: 9.6 ng/mL ( @ 12:22)    CARDIAC MARKERS ( 02 Mar 2022 12:22 )  x     / x     / 3235 U/L / x     / 9.6 ng/mL  CARDIAC MARKERS ( 02 Mar 2022 07:15 )  x     / x     / 3840 U/L / x     / 11.9 ng/mL  CARDIAC MARKERS ( 02 Mar 2022 03:28 )  x     / x     / 4100 U/L / x     / 13.4 ng/mL  CARDIAC MARKERS ( 01 Mar 2022 23:41 )  x     / x     / 4655 U/L / x     / 15.5 ng/mL  CARDIAC MARKERS ( 01 Mar 2022 18:40 )  x     / x     / 6120 U/L / x     / 19.2 ng/mL  CARDIAC MARKERS ( 01 Mar 2022 15:28 )  x     / x     / 9209 U/L / x     / 19.6 ng/mL      Urinalysis Basic - ( 01 Mar 2022 15:55 )    Color: Colorless / Appearance: Clear / S.021 / pH: x  Gluc: x / Ketone: Large  / Bili: Negative / Urobili: Negative   Blood: x / Protein: Trace / Nitrite: Negative   Leuk Esterase: Negative / RBC: 2 /hpf / WBC 0 /HPF   Sq Epi: x / Non Sq Epi: 0 /hpf / Bacteria: Negative          RADIOLOGY & ADDITIONAL TESTS:  CXR:        Care Discussed with Consultants/Other Providers [ x] YES  [ ] NO           Patient is a 56y old  Male who presents with a chief complaint of STEMI, DKA (02 Mar 2022 19:22)      INTERVAL HPI/OVERNIGHT EVENTS:   Overnight, pt's AG closed, insulin gtt @ 7, supposed to be held at 5 AM per protocol, held at 9 AM.  Pt has continued to be altered, MS is worsening, waxing and waning. Pt has hardware in spine, MRI of brain/spine postponed until more information is obtained from pt's family to determine MRI compatibility   Received 2 mg ativan x 2 per CIWA protocl  Afebrile, hemodynamically stable     Subjective:    ICU Vital Signs Last 24 Hrs  T(C): 36.9 (03 Mar 2022 03:00), Max: 37.1 (02 Mar 2022 23:00)  T(F): 98.5 (03 Mar 2022 03:00), Max: 98.7 (02 Mar 2022 23:00)  HR: 115 (03 Mar 2022 06:00) (89 - 124)  BP: --  BP(mean): --  ABP: 171/83 (03 Mar 2022 06:00) (93/49 - 201/182)  ABP(mean): 109 (03 Mar 2022 06:00) (62 - 190)  RR: 32 (03 Mar 2022 06:00) (8 - 42)  SpO2: 98% (03 Mar 2022 06:00) (96% - 100%)    I&O's Summary    02 Mar 2022 07:01  -  03 Mar 2022 07:00  --------------------------------------------------------  IN: 10218.3 mL / OUT: 1575 mL / NET: 9160.3 mL          Daily     Daily Weight in k.2 (03 Mar 2022 02:30)    Adult Advanced Hemodynamics Last 24 Hrs  CVP(mm Hg): --  CVP(cm H2O): --  CO: --  CI: --  PA: --  PA(mean): --  PCWP: --  SVR: --  SVRI: --  PVR: --  PVRI: --    EKG/Telemetry Events:    MEDICATIONS  (STANDING):  aspirin enteric coated 81 milliGRAM(s) Oral daily  atorvastatin 80 milliGRAM(s) Oral at bedtime  chlorhexidine 4% Liquid 1 Application(s) Topical <User Schedule>  dextrose 5% + sodium chloride 0.45% with potassium chloride 20 mEq/L 1000 milliLiter(s) (200 mL/Hr) IV Continuous <Continuous>  dextrose 50% Injectable 50 milliLiter(s) IV Push every 15 minutes  heparin  Infusion 1700 Unit(s)/Hr (26 mL/Hr) IV Continuous <Continuous>  hydrALAZINE 25 milliGRAM(s) Oral every 8 hours  influenza   Vaccine 0.5 milliLiter(s) IntraMuscular once  insulin regular Infusion 6 Unit(s)/Hr (6 mL/Hr) IV Continuous <Continuous>  isosorbide   dinitrate Tablet (ISORDIL) 30 milliGRAM(s) Oral three times a day  methadone    Tablet 5 milliGRAM(s) Oral every 6 hours  metoprolol tartrate 12.5 milliGRAM(s) Oral two times a day  nitroglycerin  Infusion 10 MICROgram(s)/Min (3 mL/Hr) IV Continuous <Continuous>  nystatin Powder 1 Application(s) Topical two times a day  sodium bicarbonate  Infusion 0.104 mEq/kG/Hr (150 mL/Hr) IV Continuous <Continuous>  ticagrelor 90 milliGRAM(s) Oral every 12 hours    MEDICATIONS  (PRN):  LORazepam     Tablet 1 milliGRAM(s) Oral every 1 hour PRN CIWA-Ar score 8 or greater  LORazepam     Tablet 2 milliGRAM(s) Oral every 1 hour PRN Symptom-triggered: each CIWA -Ar score 8 or GREATER  LORazepam     Tablet 2 milliGRAM(s) Oral every 2 hours PRN Symptom-triggered: 2 point increase in CIWA -Ar score and a total score of 7 or LESS  LORazepam   Injectable 2 milliGRAM(s) IV Push every 2 hours PRN Symptom-triggered: 2 point increase in CIWA -Ar score and a total score of 7 or LESS  LORazepam   Injectable 2 milliGRAM(s) IntraMuscular every 2 hours PRN Symptom-triggered: 2 point increase in CIWA -Ar score and a total score of 7 or LESS  LORazepam   Injectable 2 milliGRAM(s) IV Push every 1 hour PRN Symptom-triggered: each CIWA -Ar score 8 or GREATER  LORazepam   Injectable 2 milliGRAM(s) IntraMuscular every 1 hour PRN Symptom-triggered: each CIWA -Ar score 8 or GREATER  LORazepam   Injectable 1 milliGRAM(s) IV Push every 1 hour PRN CIWA-Ar score 8 or greater  LORazepam   Injectable 1 milliGRAM(s) IntraMuscular every 1 hour PRN CIWA-Ar score 8 or greater      PHYSICAL EXAM:  GENERAL: lying in bed, lethargic  HEAD:  Atraumatic, Normocephalic  EYES: EOMI, PERRLA, conjunctiva and sclera clear  ENT: dry mucous membranes  NECK: Supple, No JVD  CHEST/LUNG: Clear to auscultation bilaterally; No rales, rhonchi, wheezing, or rubs. Labored respirations likely kussmaul respirations  HEART: tachycardic; No murmurs, rubs, or gallops  ABDOMEN: BSx4; Soft, nontender, nondistended  EXTREMITIES:  2+ Peripheral Pulses, brisk capillary refill. No clubbing, cyanosis, or edema  NERVOUS SYSTEM:  A&Ox0, b/l increased tone in UE, more on L > R. difficult to rouse.  SKIN: erythematous rash on b/l hands and feet  psych: normal behavior, normal affect      LABS:                        9.1    11.67 )-----------( 218      ( 03 Mar 2022 04:07 )             25.9     03-03    138  |  105  |  14  ----------------------------<  162<H>  3.5   |  18<L>  |  0.58    Ca    7.6<L>      03 Mar 2022 04:07  Phos  1.0     03-03  Mg     1.6     03-03    TPro  4.7<L>  /  Alb  2.5<L>  /  TBili  0.5  /  DBili  x   /  AST  49<H>  /  ALT  96<H>  /  AlkPhos  64  03-03    LIVER FUNCTIONS - ( 03 Mar 2022 04:07 )  Alb: 2.5 g/dL / Pro: 4.7 g/dL / ALK PHOS: 64 U/L / ALT: 96 U/L / AST: 49 U/L / GGT: x           PT/INR - ( 03 Mar 2022 04:07 )   PT: 13.1 sec;   INR: 1.14 ratio         PTT - ( 03 Mar 2022 04:07 )  PTT:75.9 sec  CAPILLARY BLOOD GLUCOSE      POCT Blood Glucose.: 142 mg/dL (03 Mar 2022 07:07)  POCT Blood Glucose.: 134 mg/dL (03 Mar 2022 06:09)  POCT Blood Glucose.: 137 mg/dL (03 Mar 2022 05:06)  POCT Blood Glucose.: 159 mg/dL (03 Mar 2022 03:29)  POCT Blood Glucose.: 170 mg/dL (03 Mar 2022 02:05)  POCT Blood Glucose.: 173 mg/dL (03 Mar 2022 01:08)  POCT Blood Glucose.: 184 mg/dL (03 Mar 2022 00:17)  POCT Blood Glucose.: 183 mg/dL (02 Mar 2022 23:07)  POCT Blood Glucose.: 176 mg/dL (02 Mar 2022 22:16)  POCT Blood Glucose.: 178 mg/dL (02 Mar 2022 21:05)  POCT Blood Glucose.: 259 mg/dL (02 Mar 2022 19:59)  POCT Blood Glucose.: 213 mg/dL (02 Mar 2022 18:46)  POCT Blood Glucose.: 207 mg/dL (02 Mar 2022 18:12)  POCT Blood Glucose.: 208 mg/dL (02 Mar 2022 17:04)  POCT Blood Glucose.: 217 mg/dL (02 Mar 2022 16:17)  POCT Blood Glucose.: 244 mg/dL (02 Mar 2022 15:04)  POCT Blood Glucose.: 217 mg/dL (02 Mar 2022 13:54)  POCT Blood Glucose.: 220 mg/dL (02 Mar 2022 13:02)  POCT Blood Glucose.: 140 mg/dL (02 Mar 2022 12:01)  POCT Blood Glucose.: 159 mg/dL (02 Mar 2022 11:00)  POCT Blood Glucose.: 165 mg/dL (02 Mar 2022 10:00)  POCT Blood Glucose.: 147 mg/dL (02 Mar 2022 09:20)  POCT Blood Glucose.: 199 mg/dL (02 Mar 2022 07:56)    ABG - ( 03 Mar 2022 04:02 )  pH, Arterial: 7.52  pH, Blood: x     /  pCO2: 23    /  pO2: 95    / HCO3: 19    / Base Excess: -3.0  /  SaO2: 99.0              Creatine Kinase, Serum: 3235 U/L ( @ 12:22)  CKMB Units: 9.6 ng/mL ( @ 12:22)    CARDIAC MARKERS ( 02 Mar 2022 12:22 )  x     / x     / 3235 U/L / x     / 9.6 ng/mL  CARDIAC MARKERS ( 02 Mar 2022 07:15 )  x     / x     / 3840 U/L / x     / 11.9 ng/mL  CARDIAC MARKERS ( 02 Mar 2022 03:28 )  x     / x     / 4100 U/L / x     / 13.4 ng/mL  CARDIAC MARKERS ( 01 Mar 2022 23:41 )  x     / x     / 4655 U/L / x     / 15.5 ng/mL  CARDIAC MARKERS ( 01 Mar 2022 18:40 )  x     / x     / 6120 U/L / x     / 19.2 ng/mL  CARDIAC MARKERS ( 01 Mar 2022 15:28 )  x     / x     / 9209 U/L / x     / 19.6 ng/mL      Urinalysis Basic - ( 01 Mar 2022 15:55 )    Color: Colorless / Appearance: Clear / S.021 / pH: x  Gluc: x / Ketone: Large  / Bili: Negative / Urobili: Negative   Blood: x / Protein: Trace / Nitrite: Negative   Leuk Esterase: Negative / RBC: 2 /hpf / WBC 0 /HPF   Sq Epi: x / Non Sq Epi: 0 /hpf / Bacteria: Negative          RADIOLOGY & ADDITIONAL TESTS:  CXR:        Care Discussed with Consultants/Other Providers [ x] YES  [ ] NO

## 2022-03-03 NOTE — DIETITIAN INITIAL EVALUATION ADULT. - ADD RECOMMEND
Pt is at risk for refeeding syndrome 2/2 reported EtOH abuse, DKA on insulin gtt. As medically feasible, add multivitamin, thiamine and folic acid daily; monitor K, Phos and Mg prior to advancing tube feedings. Replete electrolytes PRN. Continue to monitor nutritional intake, labs, weights, BM, skin, clinical course. Pt is at risk for refeeding syndrome 2/2 reported EtOH abuse, DKA on insulin gtt. As medically feasible, add multivitamin, thiamine and folic acid daily; monitor K, Phos and Mg prior to advancing tube feedings. Replete electrolytes PRN. Discussed with team. Continue to monitor nutritional intake, labs, weights, BM, skin, clinical course.

## 2022-03-03 NOTE — DIETITIAN INITIAL EVALUATION ADULT. - PHYSCIAL ASSESSMENT
No noted pressure injuries as per documentation.   Nutrition focused physical exam deferred at this time as pt with AMS, unable to consent to exam.

## 2022-03-03 NOTE — DIETITIAN INITIAL EVALUATION ADULT. - ENTERAL
Glucerna 1.2 at 20ml/hr advancing by 10ml/hr q12H or if electrolytes WNL to goal rate of 75ml/hr x 24hr to provide 1800ml total volume, 2160kcal, 108g protein, 1449ml free water. Meets 25kcal/kg, 1.25g/kg protein based on IBW 86.1kg. Defer additional free water flushes to team.

## 2022-03-03 NOTE — PROGRESS NOTE ADULT - ASSESSMENT
57 y/o M with PMH of HTN, T2DM, neuropathy, chronic pain (on Methadone & opioids) for cervical spine and back injury transferred to [facility] CCU from [place] due to STEMI (trop 7560, inferolateral LUIS) and DKA. Found to have A fib w/ RVR to 130s-150s, JAXON w/ SCr 2.14 (baseline 0.7-0.9 in 10/2021), admitted to CCU for optimization of DKA and kidney function prior to cath.      NEURO:  #Mental status: altered. Likely metabolic encephalopathy 2/2 DKA  -Currently A&O x 1 likely 2/2 DKA, able to conversate but difficult to recall/concentrate  -will treat DKA as below  -ordered tox screen, serum methanol, ethyl alcohol, CT Head, serum osms    CV:  #STEMI (trop 7560, inferolateral LUIS on EKG), per chart was loaded with 180 mg Ticagrelor, heparin bolus in [facility]  - Revascularization with PCI deferred due to pt's elevated SCr, likely prerenal JAXON 2/2 dehydration from DKA  - MISSY score: 122  - DAPT: ASA 81 + Ticagrelor 90mg BID for 1 year  - Statin: atorvastatin 80mg qD  - Heparin gtt  - limited TTE 3/1/22 showed hypokinetic inferolateral and mid inferior wall, basal inferior wall is akinetic.   - trend trop, CK, CKMB  - TSH and lipid panel  - c/w lopressor 12.5 mg BID  -3/2: pt had CP in AM, will c/w nitro gtt, d/rome precedex gtt. c/w hydral and isordil. plan to reach out to cath lab for potential PCI.    #AFib w/ RVR:   - CHADSVASC score: 3  - HAS-BLED score: 1  - currently on heparin gtt  - s/p cardizem 25 mg IVP x 2 and cardizem gtt; d/rome due to potentially decreased EF on limited TTE  - will c/w fluid resuscitation as below  - Monitor on telemetry  - c/w lopressor 12.5 mg BID      PULM:  -Currently satting well on RA, but has deep shallow breathing, likely kussmaul respirations 2/2 DKA  -continue to monitor O2 sats and respiratory status, currently protecting airway. O2 NC PRN    RENAL:  #JAXON likely prerenal 2/2 dehydration 2/2 DKA; resolved  -SCr 2.14, BUN 44, fluid responsive, improved to baseline SCr/BUN with IVF  -UAs significant for large ketones and glucose  -Monitor I/Os, UO    HAGMA:  -pt had AG of 42 -> 41, improved  to ~30s -> 20s likely 2/2 DKA  -c/w bicarb gtt started @ 150cc/hr + multiple amps of bicarb  -IVF resuscitation  -DKA management as below    GI:  #Transaminitis: Elevated LFTs  -AST//261 -> 101/195 -> 45/110 improving with fluids, possible shock liver due to hypovolemia from DKA  #Diet: NPO due to DKA    ENDO:  #DKA:  - DKA protocol initiated - c/w insulin gtt, bicarb gtt @ 150/hr due to HAGMA  - K 3.2 -> 3.7; was ordered for 40 mEq PO x 2, 10 mEq IV, insulin gtt started once K > 3.3  - will continue to supplement K as level is < 5.3 ordered NS bolus with 30 mEq of K  - c/w IVF resuscitation per DKA protocol, NS bolus 1L    - BMP [id number], BHB q1hour  to monitor AG + K, will supplement PRN  - HbA1c 9.8 in 10/2021; pt seems to have history of uncontrolled T2DM, currently unable to answer questions regarding his PMH  - Will plan for eventual bridge from insulin gtt to long acting/premeal insulin once AG closes and FSG < 200      HEMATOLOGIC:  -H/H stable    ID:  UA x 2 negative for bacteria, afebrile, although WBC 16 -> 15, HR 130s, RR > 20  Pt without strong objective or clinical evidence of infection. Will observe off antibiotics    SKIN  #Lines:  PIVs    #Ethics  Full code 57 y/o M with PMH of HTN, T2DM, neuropathy, chronic pain (on Methadone & opioids) for cervical spine and back injury transferred to [facility] CCU from [place] due to STEMI (trop 7560, inferolateral LUIS) and DKA. Found to have A fib w/ RVR to 130s-150s, JAXON w/ SCr 2.14 (baseline 0.7-0.9 in 10/2021), admitted to CCU for optimization of DKA and kidney function prior to cath.      NEURO:  #Mental status: altered. Likely metabolic encephalopathy 2/2 DKA  -Currently A&O x 0, worsening  -neurology consulted, appreciate recs  -CT Head:  Volume loss, microvascular disease, age indeterminate lacunar infarcts, Foci of air in the left sella turcica with bony thinning possible dehiscence, edentulous maxilla with dental hardware, bony calvarial sclerosis correlate renal function.   -plan for MRI head/neck to evaluate CT Head findings  -will treat DKA as below  -ordered tox screen, serum methanol, ethyl alcohol,  serum osms    CV:  #STEMI (trop 7560, inferolateral LUIS on EKG), per chart was loaded with 180 mg Ticagrelor, heparin bolus in H  - Revascularization with PCI deferred due to pt's elevated SCr, likely prerenal JAXON 2/2 dehydration from DKA  - MISSY score: 122  - DAPT: ASA 81 + Ticagrelor 90mg BID for 1 year  - Statin: atorvastatin 80mg qD  - Heparin gtt  - limited TTE 3/1/22 showed hypokinetic inferolateral and mid inferior wall, basal inferior wall is akinetic.   - trend trop, CK, CKMB  - TSH and lipid panel  - lopressor 12.5 mg BID -> coreg 12.5 mg BID  -3/2: pt had CP in AM, will c/w nitro gtt, d/rome precedex gtt. c/w hydral and isordil. plan to reach out to cath lab for potential PCI.  -3/3: c/w nitro gtt, hydral and IDN, switched lopressor to coreg    #AFib w/ RVR:   - CHADSVASC score: 3  - HAS-BLED score: 1  - currently on heparin gtt  - s/p cardizem 25 mg IVP x 2 and cardizem gtt; d/rome due to potentially decreased EF on limited TTE  - will c/w fluid resuscitation as below  - Monitor on telemetry  - c/w coreg 12.5 mg BID      PULM:  -Currently satting well on RA, but has deep shallow breathing, likely kussmaul respirations 2/2 DKA  -continue to monitor O2 sats and respiratory status, currently protecting airway. O2 NC PRN    RENAL:  #JAXON likely prerenal 2/2 dehydration 2/2 DKA; resolved  -SCr 2.14, BUN 44, fluid responsive, improved to baseline SCr/BUN with IVF  -UAs significant for large ketones and glucose  -Monitor I/Os, UO    HAGMA: - resolving  -AG closed, bicarb ~18 - resolving  -d/c bicarb gtt  -IVF resuscitation  -DKA management as below    GI:  #Transaminitis: Elevated LFTs - resolved  -AST/ALT improving with fluids, possible shock liver due to hypovolemia from DKA  #Diet: NPO due to DKA    ENDO:  #DKA:  - DKA protocol initiated - c/w insulin gtt, will transition to long acting insulin and NPH  - K 3.2 -> 3.7; was ordered for 40 mEq PO x 2, 10 mEq IV, insulin gtt started once K > 3.3  - will continue to supplement K as level is < 5.3 ordered NS bolus with 30 mEq of K  - c/w IVF resuscitation per DKA protocol, NS bolus 1L    - BMP q0fohja, BHB q1hour  to monitor AG + K, will supplement PRN  - HbA1c 9.8 in 10/2021; pt seems to have history of uncontrolled T2DM, currently unable to answer questions regarding his PMH  - Will bridge from insulin gtt to long acting NPH once AG closes and FSG < 200      HEMATOLOGIC:  -H/H stable    ID:  UA x 2 negative for bacteria, afebrile, although WBC 16 -> 15, HR 130s, RR > 20  Pt without strong objective or clinical evidence of infection. Will observe off antibiotics    SKIN  #Lines:  PIVs    #Ethics  Full code 55 y/o M with PMH of HTN, T2DM, neuropathy, chronic pain (on Methadone & opioids) for cervical spine and back injury transferred to [facility] CCU from [place] due to STEMI (trop 7560, inferolateral LUIS) and DKA. Found to have A fib w/ RVR to 130s-150s, JAXON w/ SCr 2.14 (baseline 0.7-0.9 in 10/2021), admitted to CCU for optimization of DKA and kidney function prior to cath.      NEURO:  #Mental status: altered. Likely metabolic encephalopathy 2/2 DKA  -Currently A&O x 0, worsening  -neurology consulted, appreciate recs  -CT Head:  Volume loss, microvascular disease, age indeterminate lacunar infarcts, Foci of air in the left sella turcica with bony thinning possible dehiscence, edentulous maxilla with dental hardware, bony calvarial sclerosis correlate renal function.   -plan for MRI head/neck to evaluate CT Head findings  -will treat DKA as below  -ordered tox screen, serum methanol, ethyl alcohol,  serum osms    CV:  #STEMI (trop 7560, inferolateral LUIS on EKG), per chart was loaded with 180 mg Ticagrelor, heparin bolus in H  - Revascularization with PCI deferred due to pt's elevated SCr, likely prerenal JAXON 2/2 dehydration from DKA  - MISSY score: 122  - DAPT: ASA 81 + Ticagrelor 90mg BID for 1 year  - Statin: atorvastatin 80mg qD  - Heparin gtt  - limited TTE 3/1/22 showed hypokinetic inferolateral and mid inferior wall, basal inferior wall is akinetic.   - trend trop, CK, CKMB  - TSH and lipid panel  - lopressor 12.5 mg BID -> coreg 12.5 mg BID  -3/2: pt had CP in AM, will c/w nitro gtt, d/rome precedex gtt. c/w hydral and isordil. plan to reach out to cath lab for potential PCI.  -3/3: c/w nitro gtt, hydral and IDN, switched lopressor to coreg    #AFib w/ RVR:   - CHADSVASC score: 3  - HAS-BLED score: 1  - currently on heparin gtt  - s/p cardizem 25 mg IVP x 2 and cardizem gtt; d/rome due to potentially decreased EF on limited TTE  - will c/w fluid resuscitation as below  - Monitor on telemetry  - c/w coreg 12.5 mg BID      PULM:  -Currently satting well on RA, but has deep shallow breathing, likely kussmaul respirations 2/2 DKA  -continue to monitor O2 sats and respiratory status, currently protecting airway. O2 NC PRN    RENAL:  #JAXON likely prerenal 2/2 dehydration 2/2 DKA; resolved  -SCr 2.14, BUN 44, fluid responsive, improved to baseline SCr/BUN with IVF  -UAs significant for large ketones and glucose  -Monitor I/Os, UO    HAGMA: - resolving  -AG closed, bicarb ~18 - resolving  -d/c bicarb gtt  -IVF resuscitation  -DKA management as below    GI:  #Transaminitis: Elevated LFTs - resolved  -AST/ALT improving with fluids, possible shock liver due to hypovolemia from DKA  #Diet: NPO due to DKA    ENDO:  #DKA:  - DKA protocol initiated - c/w insulin gtt, will transition to long acting insulin and NPH - improving  - K 3.2 -> 3.7; was ordered for 40 mEq PO x 2, 10 mEq IV, insulin gtt started once K > 3.3  - will continue to supplement K as level is < 5.3 ordered NS bolus with 30 mEq of K  - c/w IVF resuscitation per DKA protocol, D5W + 1/2 NS  - BMP i6siilh, BHB q1hour  to monitor AG + K, will supplement PRN  - HbA1c 9.8 in 10/2021; pt seems to have history of uncontrolled T2DM, currently unable to answer questions regarding his PMH  - Will bridge from insulin gtt to long acting NPH once AG closes and FSG < 200      HEMATOLOGIC:  -H/H stable    ID:  UA x 2 negative for bacteria, afebrile, although WBC 16 -> 15, HR 130s, RR > 20  Pt without strong objective or clinical evidence of infection. Will observe off antibiotics    SKIN  #Lines:  PIVs    #Ethics  Full code

## 2022-03-03 NOTE — CHART NOTE - NSCHARTNOTEFT_GEN_A_CORE
EEG reviewed until ~1115      Intermittent triphasic waves noted, which are typically seen in metabolic encephalopathy, but may increase the risk for seizures in certain clinical situations. Clinical correlation is advised.   No seizures recorded.    Final report to be completed at the completion of the study tomorrow morning.

## 2022-03-03 NOTE — CONSULT NOTE ADULT - SUBJECTIVE AND OBJECTIVE BOX
HPI:  55 y/o M with PMH of HTN, T2DM, neuropathy, chronic pain on Methadone & opioids for cervical spine and back injury transferred to [facility] CICU from [place] due to STEMI and DKA. Pt was reportedly found to have inferolateral ST elevations + afib on EKG, trop 7560. Pt initially came to [facility] ED due to nausea and vomiting for 3 days, reportedly had CP several days ago which apparently stopped on its own. Pt currently altered, difficult to obtain history from. Pt transferred to [facility] for cath, noted to be in afib w/ RVR to 130s-150s, received cardizem 25 mg IV x 2 and started on cardizem gtt, cath was aborted as pt had SCR of 2.14, (baseline 0.7-0.9 in 10/2021), transferred to CCU for optimization of DKA prior to cath. Baseline SCr 0.7-0.9 in 10/2021 from previous hospitalization at [facility] for cellulitis of hand, was d/rome on bactrim DS + ethambutol x 4 weeks.     Neurology was consulted for altered mental status. According to nursing staff, when patient was first admitted, he was alert & oriented,  fluent speech with some word finding difficulties and left arm weakness. His verbal output has progressively decreased. Yesterday he was replying to questions with yes or no but since middle of the day he started grunting with no output of words.  During his stay, he was started insulin drip but has been transitioned to subq insulin injection since his blood glucose has improved and anion gap has closed. Revascularization with PCI is deferred due to pt's elevated SCr, likely prerenal JAXON 2/2 dehydration from DKA. He is also currently hypertensive, on nitroglycerin drip (180mcg/min).        A 10-system ROS was performed and is negative except for those items noted above and/or in the HPI.    PAST MEDICAL & SURGICAL HISTORY:  Hypertension    Diabetes mellitus    Gastric ulcer    Spinal stenosis    H/O spinal cord compression    Spondylosis    H/O radiculopathy    H/O cervical spine surgery    History of back surgery    S/P cervical spinal fusion      FAMILY HISTORY:  FH: hypertension (Father)    FH: diabetes mellitus (Mother)      SOCIAL HISTORY:   T/E/D: No smoke, drugs, drinks alcohol    Vital Signs:  ICU Vital Signs Last 24 Hrs  T(C): 36.9 (03 Mar 2022 08:00), Max: 37.1 (02 Mar 2022 23:00)  T(F): 98.4 (03 Mar 2022 08:00), Max: 98.7 (02 Mar 2022 23:00)  HR: 105 (03 Mar 2022 12:30) (104 - 127)  BP: --  BP(mean): --  ABP: 140/82 (03 Mar 2022 12:30) (95/76 - 201/182)  ABP(mean): 102 (03 Mar 2022 12:30) (69 - 190)  RR: 35 (03 Mar 2022 12:30) (11 - 42)  SpO2: 100% (03 Mar 2022 12:30) (96% - 100%)      MEDICATIONS (HOME):  Home Medications:  amLODIPine 2.5 mg oral tablet: 1 tab(s) orally once a day (01 Mar 2022 18:33)  Bystolic 2.5 mg oral tablet: 1 tab(s) orally once a day (01 Mar 2022 18:33)  cloNIDine 0.2 mg oral tablet: 1 tab(s) orally 2 times a day (01 Mar 2022 18:33)  HYDROmorphone 4 mg oral tablet: 1 tab(s) orally every 6 hours (01 Mar 2022 18:33)  Jardiance: orally once a day (01 Mar 2022 18:33)  lisinopril 10 mg oral tablet: 1 tab(s) orally once a day (01 Mar 2022 18:33)  Lyrica 150 mg oral capsule: 1 cap(s) orally 3 times a day (01 Mar 2022 18:33)  methadone 5 mg oral tablet: orally 4 times a day, As Needed (01 Mar 2022 18:33)  QUEtiapine 100 mg oral tablet: 1 tab(s) orally 2 times a day (01 Mar 2022 18:33)  tadalafil 5 mg oral tablet: 1 tab(s) orally once a day (01 Mar 2022 18:33)  Vitamin D2 50,000 intl units (1.25 mg) oral capsule:  (01 Mar 2022 18:33)    MEDICATIONS  (STANDING):  aspirin  chewable 81 milliGRAM(s) Oral daily  atorvastatin 80 milliGRAM(s) Oral at bedtime  carvedilol 12.5 milliGRAM(s) Oral every 12 hours  chlorhexidine 4% Liquid 1 Application(s) Topical <User Schedule>  heparin  Infusion 1700 Unit(s)/Hr (24 mL/Hr) IV Continuous <Continuous>  hydrALAZINE 25 milliGRAM(s) Oral every 8 hours  influenza   Vaccine 0.5 milliLiter(s) IntraMuscular once  insulin NPH human recombinant 11 Unit(s) SubCutaneous every 6 hours  insulin regular Infusion 6 Unit(s)/Hr (6 mL/Hr) IV Continuous <Continuous>  isosorbide   dinitrate Tablet (ISORDIL) 30 milliGRAM(s) Oral three times a day  methadone    Tablet 5 milliGRAM(s) Oral every 6 hours  nitroglycerin  Infusion 10 MICROgram(s)/Min (3 mL/Hr) IV Continuous <Continuous>  nystatin Powder 1 Application(s) Topical two times a day  ticagrelor 90 milliGRAM(s) Oral every 12 hours    MEDICATIONS  (PRN):  LORazepam     Tablet 1 milliGRAM(s) Oral every 1 hour PRN CIWA-Ar score 8 or greater  LORazepam     Tablet 2 milliGRAM(s) Oral every 1 hour PRN Symptom-triggered: each CIWA -Ar score 8 or GREATER  LORazepam     Tablet 2 milliGRAM(s) Oral every 2 hours PRN Symptom-triggered: 2 point increase in CIWA -Ar score and a total score of 7 or LESS  LORazepam   Injectable 2 milliGRAM(s) IV Push every 2 hours PRN Symptom-triggered: 2 point increase in CIWA -Ar score and a total score of 7 or LESS  LORazepam   Injectable 2 milliGRAM(s) IntraMuscular every 2 hours PRN Symptom-triggered: 2 point increase in CIWA -Ar score and a total score of 7 or LESS  LORazepam   Injectable 2 milliGRAM(s) IV Push every 1 hour PRN Symptom-triggered: each CIWA -Ar score 8 or GREATER  LORazepam   Injectable 2 milliGRAM(s) IntraMuscular every 1 hour PRN Symptom-triggered: each CIWA -Ar score 8 or GREATER  LORazepam   Injectable 1 milliGRAM(s) IV Push every 1 hour PRN CIWA-Ar score 8 or greater  LORazepam   Injectable 1 milliGRAM(s) IntraMuscular every 1 hour PRN CIWA-Ar score 8 or greater        Physical Exam:   Constitutional: well-developed, well-nourished, well-groomed  Eyes: ophthalmoscopic exam deferred secondary to COVID-19 pandemic  Cardiovascular: tachycardiac, right forearm swollen, warm-to-touch    MS: lethargic, open eyes to verbal stimuli, does not have sustained wakefulness, does not follow commands  CN: blink to threat, resist eye opening, AUBREY. pupils 5mm, equal and reactive to light, Face symmetric.   Motor: voluntary movement in all extremities, does not follow command for motor strength assessment.  - unable to assess pronator drift.   - Normal muscle bulk and tone throughout.    - Reflexes:   Bicep (C5/C6):                  R 2+ --- L mute  tricep                               R 2+--- L mute  Brachioradialis (C5/C6) :   R 2+ --- L mute  Patella (L3/L4) :                 R 3+ --- L 3+   Ankle (S1) :                       R 2+ --- L 2+     - Plant responses upward bilaterally.    - Sensory: Intact throughout to light touch and pinprick, all extremities withdraw to pain stimuli.   - Coordination: deferred since pt unable to follow commands  - Gait: deferred    LABS:  03-02 @ 12:22 Creatine 3235 U/L<H> [30 - 200]  03-02 @ 07:15 Creatine 3840 U/L<H> [30 - 200]  cret                        9.1    11.67 )-----------( 218      ( 03 Mar 2022 04:07 )             25.9     03-03    138  |  105  |  14  ----------------------------<  162<H>  3.5   |  18<L>  |  0.58    Ca    7.6<L>      03 Mar 2022 04:07  Phos  1.0     03-03  Mg     1.6     03-03    TPro  4.7<L>  /  Alb  2.5<L>  /  TBili  0.5  /  DBili  x   /  AST  49<H>  /  ALT  96<H>  /  AlkPhos  64  03-03    PT/INR - ( 03 Mar 2022 04:07 )   PT: 13.1 sec;   INR: 1.14 ratio         PTT - ( 03 Mar 2022 10:08 )  PTT:90.5 sec    STUDIES & IMAGING:    CT Head No Cont (03.02.22)   IMPRESSION:    Volume loss, microvascular disease, age indeterminate lacunar infarcts   MRI is more sensitive for ischemia, no hemorrhage or midline shift. Foci   of air in the left sella turcica with bony thinning possible dehiscence,   edentulous maxilla with dental hardware, bony calvarial sclerosis   correlate renal function. If symptoms persist consider follow-up head CT   or MR if no contraindications.                   HPI:  55 y/o M with PMH of HTN, T2DM, neuropathy, alcohol use, chronic pain on Methadone & opioids for cervical spine and back injury transferred to [facility] CICU from [place] due to STEMI and DKA. Pt was reportedly found to have inferolateral ST elevations + afib on EKG, trop 7560. Pt initially came to [facility] ED due to nausea and vomiting for 3 days, reportedly had CP several days ago which apparently stopped on its own. Pt currently altered, difficult to obtain history from. Pt transferred to [facility] for cath, noted to be in afib w/ RVR to 130s-150s, received cardizem 25 mg IV x 2 and started on cardizem gtt, cath was aborted as pt had SCR of 2.14, (baseline 0.7-0.9 in 10/2021), transferred to CCU for optimization of DKA prior to cath. Baseline SCr 0.7-0.9 in 10/2021 from previous hospitalization at [facility] for cellulitis of hand, was d/rome on bactrim DS + ethambutol x 4 weeks.     Neurology was consulted for altered mental status. According to nursing staff, when patient was first admitted, he was alert & oriented,  fluent speech with some word finding difficulties and left arm weakness. His verbal output has progressively decreased. Yesterday he was replying to questions with yes or no but since middle of the day he started grunting with no output of words.  During his stay, he was started insulin drip but has been transitioned to subq insulin injection since his blood glucose has improved and anion gap has closed. He is currently on heparin drip since revascularization with PCI is deferred due to pt's elevated SCr, likely prerenal JAXON 2/2 dehydration from DKA. He is also currently hypertensive, on nitroglycerin drip (180mcg/min).        A 10-system ROS was performed and is negative except for those items noted above and/or in the HPI.    PAST MEDICAL & SURGICAL HISTORY:  Hypertension    Diabetes mellitus    Gastric ulcer    Spinal stenosis    H/O spinal cord compression    Spondylosis    H/O radiculopathy    H/O cervical spine surgery    History of back surgery    S/P cervical spinal fusion      FAMILY HISTORY:  FH: hypertension (Father)    FH: diabetes mellitus (Mother)      SOCIAL HISTORY:   T/E/D: No smoke, drugs, drinks alcohol    Vital Signs:  ICU Vital Signs Last 24 Hrs  T(C): 36.9 (03 Mar 2022 08:00), Max: 37.1 (02 Mar 2022 23:00)  T(F): 98.4 (03 Mar 2022 08:00), Max: 98.7 (02 Mar 2022 23:00)  HR: 105 (03 Mar 2022 12:30) (104 - 127)  BP: --  BP(mean): --  ABP: 140/82 (03 Mar 2022 12:30) (95/76 - 201/182)  ABP(mean): 102 (03 Mar 2022 12:30) (69 - 190)  RR: 35 (03 Mar 2022 12:30) (11 - 42)  SpO2: 100% (03 Mar 2022 12:30) (96% - 100%)      MEDICATIONS (HOME):  Home Medications:  amLODIPine 2.5 mg oral tablet: 1 tab(s) orally once a day (01 Mar 2022 18:33)  Bystolic 2.5 mg oral tablet: 1 tab(s) orally once a day (01 Mar 2022 18:33)  cloNIDine 0.2 mg oral tablet: 1 tab(s) orally 2 times a day (01 Mar 2022 18:33)  HYDROmorphone 4 mg oral tablet: 1 tab(s) orally every 6 hours (01 Mar 2022 18:33)  Jardiance: orally once a day (01 Mar 2022 18:33)  lisinopril 10 mg oral tablet: 1 tab(s) orally once a day (01 Mar 2022 18:33)  Lyrica 150 mg oral capsule: 1 cap(s) orally 3 times a day (01 Mar 2022 18:33)  methadone 5 mg oral tablet: orally 4 times a day, As Needed (01 Mar 2022 18:33)  QUEtiapine 100 mg oral tablet: 1 tab(s) orally 2 times a day (01 Mar 2022 18:33)  tadalafil 5 mg oral tablet: 1 tab(s) orally once a day (01 Mar 2022 18:33)  Vitamin D2 50,000 intl units (1.25 mg) oral capsule:  (01 Mar 2022 18:33)    MEDICATIONS  (STANDING):  aspirin  chewable 81 milliGRAM(s) Oral daily  atorvastatin 80 milliGRAM(s) Oral at bedtime  carvedilol 12.5 milliGRAM(s) Oral every 12 hours  chlorhexidine 4% Liquid 1 Application(s) Topical <User Schedule>  heparin  Infusion 1700 Unit(s)/Hr (24 mL/Hr) IV Continuous <Continuous>  hydrALAZINE 25 milliGRAM(s) Oral every 8 hours  influenza   Vaccine 0.5 milliLiter(s) IntraMuscular once  insulin NPH human recombinant 11 Unit(s) SubCutaneous every 6 hours  insulin regular Infusion 6 Unit(s)/Hr (6 mL/Hr) IV Continuous <Continuous>  isosorbide   dinitrate Tablet (ISORDIL) 30 milliGRAM(s) Oral three times a day  methadone    Tablet 5 milliGRAM(s) Oral every 6 hours  nitroglycerin  Infusion 10 MICROgram(s)/Min (3 mL/Hr) IV Continuous <Continuous>  nystatin Powder 1 Application(s) Topical two times a day  ticagrelor 90 milliGRAM(s) Oral every 12 hours    MEDICATIONS  (PRN):  LORazepam     Tablet 1 milliGRAM(s) Oral every 1 hour PRN CIWA-Ar score 8 or greater  LORazepam     Tablet 2 milliGRAM(s) Oral every 1 hour PRN Symptom-triggered: each CIWA -Ar score 8 or GREATER  LORazepam     Tablet 2 milliGRAM(s) Oral every 2 hours PRN Symptom-triggered: 2 point increase in CIWA -Ar score and a total score of 7 or LESS  LORazepam   Injectable 2 milliGRAM(s) IV Push every 2 hours PRN Symptom-triggered: 2 point increase in CIWA -Ar score and a total score of 7 or LESS  LORazepam   Injectable 2 milliGRAM(s) IntraMuscular every 2 hours PRN Symptom-triggered: 2 point increase in CIWA -Ar score and a total score of 7 or LESS  LORazepam   Injectable 2 milliGRAM(s) IV Push every 1 hour PRN Symptom-triggered: each CIWA -Ar score 8 or GREATER  LORazepam   Injectable 2 milliGRAM(s) IntraMuscular every 1 hour PRN Symptom-triggered: each CIWA -Ar score 8 or GREATER  LORazepam   Injectable 1 milliGRAM(s) IV Push every 1 hour PRN CIWA-Ar score 8 or greater  LORazepam   Injectable 1 milliGRAM(s) IntraMuscular every 1 hour PRN CIWA-Ar score 8 or greater        Physical Exam:   Constitutional: well-developed, well-nourished, well-groomed  Eyes: ophthalmoscopic exam deferred secondary to COVID-19 pandemic  Cardiovascular: tachycardiac, right forearm swollen, warm-to-touch    MS: lethargic, open eyes to verbal stimuli, does not have sustained wakefulness, does not follow commands  CN: blink to threat, resist eye opening, AUBREY. pupils 5mm, equal and reactive to light, Face symmetric.   Motor: voluntary movement in all extremities, does not follow command for motor strength assessment.  - unable to assess pronator drift.   - Normal muscle bulk and tone throughout.    - Reflexes:   Bicep (C5/C6):                  R 2+ --- L mute  tricep                               R 2+--- L mute  Brachioradialis (C5/C6) :   R 2+ --- L mute  Patella (L3/L4) :                 R 3+ --- L 3+   Ankle (S1) :                       R 2+ --- L 2+     - Plant responses upward bilaterally.    - Sensory: Intact throughout to light touch and pinprick, all extremities withdraw to pain stimuli.   - Coordination: deferred since pt unable to follow commands  - Gait: deferred    LABS:  03-02 @ 12:22 Creatine 3235 U/L<H> [30 - 200]  03-02 @ 07:15 Creatine 3840 U/L<H> [30 - 200]  cret                        9.1    11.67 )-----------( 218      ( 03 Mar 2022 04:07 )             25.9     03-03    138  |  105  |  14  ----------------------------<  162<H>  3.5   |  18<L>  |  0.58    Ca    7.6<L>      03 Mar 2022 04:07  Phos  1.0     03-03  Mg     1.6     03-03    TPro  4.7<L>  /  Alb  2.5<L>  /  TBili  0.5  /  DBili  x   /  AST  49<H>  /  ALT  96<H>  /  AlkPhos  64  03-03    PT/INR - ( 03 Mar 2022 04:07 )   PT: 13.1 sec;   INR: 1.14 ratio         PTT - ( 03 Mar 2022 10:08 )  PTT:90.5 sec    STUDIES & IMAGING:    CT Head No Cont (03.02.22)   IMPRESSION:    Volume loss, microvascular disease, age indeterminate lacunar infarcts   MRI is more sensitive for ischemia, no hemorrhage or midline shift. Foci   of air in the left sella turcica with bony thinning possible dehiscence,   edentulous maxilla with dental hardware, bony calvarial sclerosis   correlate renal function. If symptoms persist consider follow-up head CT   or MR if no contraindications.                   HPI:  55 y/o M with PMH of HTN, T2DM, neuropathy, alcohol use, chronic pain on Methadone & opioids for cervical spine and back injury transferred to [facility] CICU from [place] due to STEMI and DKA. Pt was reportedly found to have inferolateral ST elevations + afib on EKG, trop 7560. Pt initially came to [facility] ED due to nausea and vomiting for 3 days, reportedly had CP several days ago which apparently stopped on its own. Pt currently altered, difficult to obtain history from. Pt transferred to [facility] for cath, noted to be in afib w/ RVR to 130s-150s, received cardizem 25 mg IV x 2 and started on cardizem gtt, cath was aborted as pt had SCR of 2.14, (baseline 0.7-0.9 in 10/2021), transferred to CCU for optimization of DKA prior to cath. Baseline SCr 0.7-0.9 in 10/2021 from previous hospitalization at [facility] for cellulitis of hand, was d/rome on bactrim DS + ethambutol x 4 weeks.     Neurology was consulted for altered mental status. According to nursing staff, he had altered mental status with coherent speech and some word finding difficulties as well as left arm weakness. His verbal output has progressively decreased. Yesterday he was replying to questions with yes or no but since middle of the day he started grunting with no output of words.  During his stay, he was started insulin drip but has been transitioned to subq insulin injection since his blood glucose has improved and anion gap has closed. He is currently on heparin drip since revascularization with PCI is deferred due to pt's elevated SCr, likely prerenal JAXON 2/2 dehydration from DKA. He is also currently hypertensive, on nitroglycerin drip (180mcg/min).        A 10-system ROS was performed and is negative except for those items noted above and/or in the HPI.    PAST MEDICAL & SURGICAL HISTORY:  Hypertension    Diabetes mellitus    Gastric ulcer    Spinal stenosis    H/O spinal cord compression    Spondylosis    H/O radiculopathy    H/O cervical spine surgery    History of back surgery    S/P cervical spinal fusion      FAMILY HISTORY:  FH: hypertension (Father)    FH: diabetes mellitus (Mother)      SOCIAL HISTORY:   T/E/D: No smoke, drugs, drinks alcohol    Vital Signs:  ICU Vital Signs Last 24 Hrs  T(C): 36.9 (03 Mar 2022 08:00), Max: 37.1 (02 Mar 2022 23:00)  T(F): 98.4 (03 Mar 2022 08:00), Max: 98.7 (02 Mar 2022 23:00)  HR: 105 (03 Mar 2022 12:30) (104 - 127)  BP: --  BP(mean): --  ABP: 140/82 (03 Mar 2022 12:30) (95/76 - 201/182)  ABP(mean): 102 (03 Mar 2022 12:30) (69 - 190)  RR: 35 (03 Mar 2022 12:30) (11 - 42)  SpO2: 100% (03 Mar 2022 12:30) (96% - 100%)      MEDICATIONS (HOME):  Home Medications:  amLODIPine 2.5 mg oral tablet: 1 tab(s) orally once a day (01 Mar 2022 18:33)  Bystolic 2.5 mg oral tablet: 1 tab(s) orally once a day (01 Mar 2022 18:33)  cloNIDine 0.2 mg oral tablet: 1 tab(s) orally 2 times a day (01 Mar 2022 18:33)  HYDROmorphone 4 mg oral tablet: 1 tab(s) orally every 6 hours (01 Mar 2022 18:33)  Jardiance: orally once a day (01 Mar 2022 18:33)  lisinopril 10 mg oral tablet: 1 tab(s) orally once a day (01 Mar 2022 18:33)  Lyrica 150 mg oral capsule: 1 cap(s) orally 3 times a day (01 Mar 2022 18:33)  methadone 5 mg oral tablet: orally 4 times a day, As Needed (01 Mar 2022 18:33)  QUEtiapine 100 mg oral tablet: 1 tab(s) orally 2 times a day (01 Mar 2022 18:33)  tadalafil 5 mg oral tablet: 1 tab(s) orally once a day (01 Mar 2022 18:33)  Vitamin D2 50,000 intl units (1.25 mg) oral capsule:  (01 Mar 2022 18:33)    MEDICATIONS  (STANDING):  aspirin  chewable 81 milliGRAM(s) Oral daily  atorvastatin 80 milliGRAM(s) Oral at bedtime  carvedilol 12.5 milliGRAM(s) Oral every 12 hours  chlorhexidine 4% Liquid 1 Application(s) Topical <User Schedule>  heparin  Infusion 1700 Unit(s)/Hr (24 mL/Hr) IV Continuous <Continuous>  hydrALAZINE 25 milliGRAM(s) Oral every 8 hours  influenza   Vaccine 0.5 milliLiter(s) IntraMuscular once  insulin NPH human recombinant 11 Unit(s) SubCutaneous every 6 hours  insulin regular Infusion 6 Unit(s)/Hr (6 mL/Hr) IV Continuous <Continuous>  isosorbide   dinitrate Tablet (ISORDIL) 30 milliGRAM(s) Oral three times a day  methadone    Tablet 5 milliGRAM(s) Oral every 6 hours  nitroglycerin  Infusion 10 MICROgram(s)/Min (3 mL/Hr) IV Continuous <Continuous>  nystatin Powder 1 Application(s) Topical two times a day  ticagrelor 90 milliGRAM(s) Oral every 12 hours    MEDICATIONS  (PRN):  LORazepam     Tablet 1 milliGRAM(s) Oral every 1 hour PRN CIWA-Ar score 8 or greater  LORazepam     Tablet 2 milliGRAM(s) Oral every 1 hour PRN Symptom-triggered: each CIWA -Ar score 8 or GREATER  LORazepam     Tablet 2 milliGRAM(s) Oral every 2 hours PRN Symptom-triggered: 2 point increase in CIWA -Ar score and a total score of 7 or LESS  LORazepam   Injectable 2 milliGRAM(s) IV Push every 2 hours PRN Symptom-triggered: 2 point increase in CIWA -Ar score and a total score of 7 or LESS  LORazepam   Injectable 2 milliGRAM(s) IntraMuscular every 2 hours PRN Symptom-triggered: 2 point increase in CIWA -Ar score and a total score of 7 or LESS  LORazepam   Injectable 2 milliGRAM(s) IV Push every 1 hour PRN Symptom-triggered: each CIWA -Ar score 8 or GREATER  LORazepam   Injectable 2 milliGRAM(s) IntraMuscular every 1 hour PRN Symptom-triggered: each CIWA -Ar score 8 or GREATER  LORazepam   Injectable 1 milliGRAM(s) IV Push every 1 hour PRN CIWA-Ar score 8 or greater  LORazepam   Injectable 1 milliGRAM(s) IntraMuscular every 1 hour PRN CIWA-Ar score 8 or greater        Physical Exam:   Constitutional: well-developed, well-nourished, well-groomed  Eyes: ophthalmoscopic exam deferred secondary to COVID-19 pandemic  Cardiovascular: tachycardiac, right forearm swollen, warm-to-touch    MS: lethargic, open eyes to verbal stimuli, does not have sustained wakefulness, does not follow commands  CN: blink to threat, resist eye opening, AUBREY. pupils 5mm, equal and reactive to light, Face symmetric.   Motor: voluntary movement in all extremities, does not follow command for motor strength assessment.  - unable to assess pronator drift.   - Normal muscle bulk and tone throughout.    - Reflexes:   Bicep (C5/C6):                  R 2+ --- L mute  tricep                               R 2+--- L mute  Brachioradialis (C5/C6) :   R 2+ --- L mute  Patella (L3/L4) :                 R 3+ --- L 3+   Ankle (S1) :                       R 2+ --- L 2+     - Plant responses upward bilaterally.    - Sensory: Intact throughout to light touch and pinprick, all extremities withdraw to pain stimuli.   - Coordination: deferred since pt unable to follow commands  - Gait: deferred    LABS:  03-02 @ 12:22 Creatine 3235 U/L<H> [30 - 200]  03-02 @ 07:15 Creatine 3840 U/L<H> [30 - 200]  cret                        9.1    11.67 )-----------( 218      ( 03 Mar 2022 04:07 )             25.9     03-03    138  |  105  |  14  ----------------------------<  162<H>  3.5   |  18<L>  |  0.58    Ca    7.6<L>      03 Mar 2022 04:07  Phos  1.0     03-03  Mg     1.6     03-03    TPro  4.7<L>  /  Alb  2.5<L>  /  TBili  0.5  /  DBili  x   /  AST  49<H>  /  ALT  96<H>  /  AlkPhos  64  03-03    PT/INR - ( 03 Mar 2022 04:07 )   PT: 13.1 sec;   INR: 1.14 ratio         PTT - ( 03 Mar 2022 10:08 )  PTT:90.5 sec    STUDIES & IMAGING:    CT Head No Cont (03.02.22)   IMPRESSION:    Volume loss, microvascular disease, age indeterminate lacunar infarcts   MRI is more sensitive for ischemia, no hemorrhage or midline shift. Foci   of air in the left sella turcica with bony thinning possible dehiscence,   edentulous maxilla with dental hardware, bony calvarial sclerosis   correlate renal function. If symptoms persist consider follow-up head CT   or MR if no contraindications.                   HPI:  55 y/o M with PMH of HTN, T2DM, neuropathy, alcohol use, chronic pain on Methadone & opioids for cervical spine and back injury transferred to [facility] CICU from [place] due to STEMI and DKA. Pt was reportedly found to have inferolateral ST elevations + afib on EKG, trop 7560. Pt initially came to [facility] ED due to nausea and vomiting for 3 days, reportedly had CP several days ago which apparently stopped on its own. Pt currently altered, difficult to obtain history from. Pt transferred to [facility] for cath, noted to be in afib w/ RVR to 130s-150s, received cardizem 25 mg IV x 2 and started on cardizem gtt, cath was aborted as pt had SCR of 2.14, (baseline 0.7-0.9 in 10/2021), transferred to CCU for optimization of DKA prior to cath. Baseline SCr 0.7-0.9 in 10/2021 from previous hospitalization at [facility] for cellulitis of hand, was d/rome on bactrim DS + ethambutol x 4 weeks.     Neurology was consulted for altered mental status. According to nursing staff, when patient was initially admitted, he had altered mental status with coherent speech and some word finding difficulties as well as left arm weakness. His verbal output has progressively decreased. Yesterday he was replying to questions with yes or no but since middle of the day he started grunting with no output of words.  During his stay, he was started insulin drip, his blood glucose has improved and anion gap has closed. He is currently on heparin drip for afib and dual antiplatelet therapy since revascularization with PCI is deferred due to pt's elevated SCr. He is also currently hypertensive, on nitroglycerin drip (180mcg/min).        A 10-system ROS was performed and is negative except for those items noted above and/or in the HPI.    PAST MEDICAL & SURGICAL HISTORY:  Hypertension    Diabetes mellitus    Gastric ulcer    Spinal stenosis    H/O spinal cord compression    Spondylosis    H/O radiculopathy    H/O cervical spine surgery    History of back surgery    S/P cervical spinal fusion      FAMILY HISTORY:  FH: hypertension (Father)    FH: diabetes mellitus (Mother)      SOCIAL HISTORY:   T/E/D: No smoke, drugs, drinks alcohol    Vital Signs:  ICU Vital Signs Last 24 Hrs  T(C): 36.9 (03 Mar 2022 08:00), Max: 37.1 (02 Mar 2022 23:00)  T(F): 98.4 (03 Mar 2022 08:00), Max: 98.7 (02 Mar 2022 23:00)  HR: 105 (03 Mar 2022 12:30) (104 - 127)  BP: --  BP(mean): --  ABP: 140/82 (03 Mar 2022 12:30) (95/76 - 201/182)  ABP(mean): 102 (03 Mar 2022 12:30) (69 - 190)  RR: 35 (03 Mar 2022 12:30) (11 - 42)  SpO2: 100% (03 Mar 2022 12:30) (96% - 100%)      MEDICATIONS (HOME):  Home Medications:  amLODIPine 2.5 mg oral tablet: 1 tab(s) orally once a day (01 Mar 2022 18:33)  Bystolic 2.5 mg oral tablet: 1 tab(s) orally once a day (01 Mar 2022 18:33)  cloNIDine 0.2 mg oral tablet: 1 tab(s) orally 2 times a day (01 Mar 2022 18:33)  HYDROmorphone 4 mg oral tablet: 1 tab(s) orally every 6 hours (01 Mar 2022 18:33)  Jardiance: orally once a day (01 Mar 2022 18:33)  lisinopril 10 mg oral tablet: 1 tab(s) orally once a day (01 Mar 2022 18:33)  Lyrica 150 mg oral capsule: 1 cap(s) orally 3 times a day (01 Mar 2022 18:33)  methadone 5 mg oral tablet: orally 4 times a day, As Needed (01 Mar 2022 18:33)  QUEtiapine 100 mg oral tablet: 1 tab(s) orally 2 times a day (01 Mar 2022 18:33)  tadalafil 5 mg oral tablet: 1 tab(s) orally once a day (01 Mar 2022 18:33)  Vitamin D2 50,000 intl units (1.25 mg) oral capsule:  (01 Mar 2022 18:33)    MEDICATIONS  (STANDING):  aspirin  chewable 81 milliGRAM(s) Oral daily  atorvastatin 80 milliGRAM(s) Oral at bedtime  carvedilol 12.5 milliGRAM(s) Oral every 12 hours  chlorhexidine 4% Liquid 1 Application(s) Topical <User Schedule>  heparin  Infusion 1700 Unit(s)/Hr (24 mL/Hr) IV Continuous <Continuous>  hydrALAZINE 25 milliGRAM(s) Oral every 8 hours  influenza   Vaccine 0.5 milliLiter(s) IntraMuscular once  insulin NPH human recombinant 11 Unit(s) SubCutaneous every 6 hours  insulin regular Infusion 6 Unit(s)/Hr (6 mL/Hr) IV Continuous <Continuous>  isosorbide   dinitrate Tablet (ISORDIL) 30 milliGRAM(s) Oral three times a day  methadone    Tablet 5 milliGRAM(s) Oral every 6 hours  nitroglycerin  Infusion 10 MICROgram(s)/Min (3 mL/Hr) IV Continuous <Continuous>  nystatin Powder 1 Application(s) Topical two times a day  ticagrelor 90 milliGRAM(s) Oral every 12 hours    MEDICATIONS  (PRN):  LORazepam     Tablet 1 milliGRAM(s) Oral every 1 hour PRN CIWA-Ar score 8 or greater  LORazepam     Tablet 2 milliGRAM(s) Oral every 1 hour PRN Symptom-triggered: each CIWA -Ar score 8 or GREATER  LORazepam     Tablet 2 milliGRAM(s) Oral every 2 hours PRN Symptom-triggered: 2 point increase in CIWA -Ar score and a total score of 7 or LESS  LORazepam   Injectable 2 milliGRAM(s) IV Push every 2 hours PRN Symptom-triggered: 2 point increase in CIWA -Ar score and a total score of 7 or LESS  LORazepam   Injectable 2 milliGRAM(s) IntraMuscular every 2 hours PRN Symptom-triggered: 2 point increase in CIWA -Ar score and a total score of 7 or LESS  LORazepam   Injectable 2 milliGRAM(s) IV Push every 1 hour PRN Symptom-triggered: each CIWA -Ar score 8 or GREATER  LORazepam   Injectable 2 milliGRAM(s) IntraMuscular every 1 hour PRN Symptom-triggered: each CIWA -Ar score 8 or GREATER  LORazepam   Injectable 1 milliGRAM(s) IV Push every 1 hour PRN CIWA-Ar score 8 or greater  LORazepam   Injectable 1 milliGRAM(s) IntraMuscular every 1 hour PRN CIWA-Ar score 8 or greater        Physical Exam:   Constitutional: well-developed, well-nourished, well-groomed  Eyes: ophthalmoscopic exam deferred secondary to COVID-19 pandemic  Cardiovascular: tachycardiac, right forearm swollen, warm-to-touch    MS: lethargic, open eyes to verbal stimuli, does not have sustained wakefulness, does not follow commands  CN: blink to threat right eye, left gaze neglect, pupils 5mm, equal and reactive to light, Face symmetric.   Motor: voluntary movement in all extremities, does not follow command for motor strength assessment.  - unable to assess pronator drift.   - Normal muscle bulk and tone throughout.    - Reflexes:   Bicep (C5/C6):                  R 1+ --- L mute  tricep                               R 1+--- L mute  Brachioradialis (C5/C6) :   R 1+ --- L mute  Patella (L3/L4) :                 R 3+ --- L 3+   Ankle (S1) :                       R 4+ --- L 4+  clonus present bilateral feet    - Plant responses left side down, right side neutral    - Sensory: Intact throughout to light touch and pinprick, RUE, B/L LE extremities withdraw to pain stimuli, LUE does not withdraw to pain stimuli but pt grunts from pain stimuli  - Coordination: deferred since pt unable to follow commands  - Gait: deferred    LABS:  03-02 @ 12:22 Creatine 3235 U/L<H> [30 - 200]  03-02 @ 07:15 Creatine 3840 U/L<H> [30 - 200]  cret                        9.1    11.67 )-----------( 218      ( 03 Mar 2022 04:07 )             25.9     03-03    138  |  105  |  14  ----------------------------<  162<H>  3.5   |  18<L>  |  0.58    Ca    7.6<L>      03 Mar 2022 04:07  Phos  1.0     03-03  Mg     1.6     03-03    TPro  4.7<L>  /  Alb  2.5<L>  /  TBili  0.5  /  DBili  x   /  AST  49<H>  /  ALT  96<H>  /  AlkPhos  64  03-03    PT/INR - ( 03 Mar 2022 04:07 )   PT: 13.1 sec;   INR: 1.14 ratio         PTT - ( 03 Mar 2022 10:08 )  PTT:90.5 sec    STUDIES & IMAGING:    CT Head No Cont (03.02.22)   IMPRESSION:    Volume loss, microvascular disease, age indeterminate lacunar infarcts   MRI is more sensitive for ischemia, no hemorrhage or midline shift. Foci   of air in the left sella turcica with bony thinning possible dehiscence,   edentulous maxilla with dental hardware, bony calvarial sclerosis   correlate renal function. If symptoms persist consider follow-up head CT   or MR if no contraindications.

## 2022-03-03 NOTE — DIETITIAN INITIAL EVALUATION ADULT. - PERTINENT MEDS FT
MEDICATIONS  (STANDING):  aspirin  chewable 81 milliGRAM(s) Oral daily  atorvastatin 80 milliGRAM(s) Oral at bedtime  carvedilol 12.5 milliGRAM(s) Oral every 12 hours  chlorhexidine 4% Liquid 1 Application(s) Topical <User Schedule>  heparin  Infusion 1700 Unit(s)/Hr (24 mL/Hr) IV Continuous <Continuous>  hydrALAZINE 50 milliGRAM(s) Oral every 8 hours  influenza   Vaccine 0.5 milliLiter(s) IntraMuscular once  insulin lispro (ADMELOG) corrective regimen sliding scale   SubCutaneous every 6 hours  insulin NPH human recombinant 11 Unit(s) SubCutaneous every 6 hours  insulin regular Infusion 6 Unit(s)/Hr (6 mL/Hr) IV Continuous <Continuous>  isosorbide   dinitrate Tablet (ISORDIL) 30 milliGRAM(s) Oral three times a day  methadone    Tablet 5 milliGRAM(s) Oral every 6 hours  nitroglycerin  Infusion 10 MICROgram(s)/Min (3 mL/Hr) IV Continuous <Continuous>  nystatin Powder 1 Application(s) Topical two times a day  potassium phosphate IVPB 30 milliMole(s) IV Intermittent once  ticagrelor 90 milliGRAM(s) Oral every 12 hours    MEDICATIONS  (PRN):  LORazepam     Tablet 1 milliGRAM(s) Oral every 1 hour PRN CIWA-Ar score 8 or greater  LORazepam     Tablet 2 milliGRAM(s) Oral every 1 hour PRN Symptom-triggered: each CIWA -Ar score 8 or GREATER  LORazepam     Tablet 2 milliGRAM(s) Oral every 2 hours PRN Symptom-triggered: 2 point increase in CIWA -Ar score and a total score of 7 or LESS  LORazepam   Injectable 2 milliGRAM(s) IV Push every 2 hours PRN Symptom-triggered: 2 point increase in CIWA -Ar score and a total score of 7 or LESS  LORazepam   Injectable 2 milliGRAM(s) IntraMuscular every 2 hours PRN Symptom-triggered: 2 point increase in CIWA -Ar score and a total score of 7 or LESS  LORazepam   Injectable 2 milliGRAM(s) IV Push every 1 hour PRN Symptom-triggered: each CIWA -Ar score 8 or GREATER  LORazepam   Injectable 2 milliGRAM(s) IntraMuscular every 1 hour PRN Symptom-triggered: each CIWA -Ar score 8 or GREATER  LORazepam   Injectable 1 milliGRAM(s) IV Push every 1 hour PRN CIWA-Ar score 8 or greater  LORazepam   Injectable 1 milliGRAM(s) IntraMuscular every 1 hour PRN CIWA-Ar score 8 or greater

## 2022-03-03 NOTE — DIETITIAN INITIAL EVALUATION ADULT. - CHIEF COMPLAINT
Pt is a 57 y/o M with PMH of HTN, T2DM, neuropathy, chronic pain (on Methadone & opioids) for cervical spine and back injury transferred to [facility] CCU from [place] due to STEMI (trop 7560, inferolateral LUIS) and DKA. Found to have A fib w/ RVR to 130s-150s, JAXON w/ SCr 2.14 (baseline 0.7-0.9 in 10/2021), admitted to CCU for optimization of DKA and kidney function prior to cath.

## 2022-03-03 NOTE — CONSULT NOTE ADULT - ASSESSMENT
Assessment:     57 y/o M with PMH of HTN, T2DM, neuropathy, chronic pain on Methadone & opioids for cervical spine and back injury transferred to [facility] CICU from [place] due to STEMI and DKA. Pt was reportedly found to have inferolateral ST elevations + afib on EKG, trop 7560. Pt initially came to [facility] ED due to nausea and vomiting for 3 days, CP and altered mental status. Pt transferred to [facility] for cath, noted to be in afib w/ RVR to 130s-150s, received cardizem 25 mg IV x 2 and started on cardizem gtt, cath was aborted as pt had SCR of 2.14, (baseline 0.7-0.9 in 10/2021), transferred to CCU for optimization of DKA prior to cath. Neurology was consulted for altered mental status. According to nursing staff, when patient was first admitted, he had altered mental status but has coherent speech and some word finding difficulties as well as left arm weakness. His verbal output has progressively decreased. Yesterday, he was replying to questions with yes or no but since middle of the day he started grunting with no output of words.  During his stay, he was started insulin drip but has been transitioned to subq insulin injection since his blood glucose has improved and anion gap has closed. Revascularization with PCI is deferred due to pt's elevated SCr, likely prerenal JAXON 2/2 dehydration from DKA. He is also currently hypertensive, on nitroglycerin drip (180mcg/min).    CT head 3/2/22 shows Volume loss, microvascular disease, age indeterminate lacunar infarcts, Foci of air in the left sella turcica with bony thinning possible dehiscence, edentulous maxilla with dental hardware, bony calvarial sclerosis correlate renal function. Unable to obtain MRI since patient has metal in his spine ( according to his family). Preliminary EEG on 3/3/22 shows Intermittent triphasic waves , which are typically seen in metabolic encephalopathy, but may increase the risk for seizures in certain clinical situations. Clinical correlation is advised.   No seizures recorded.    Impression: Encephalopathy with unclear etiology, possible toxic metabolic, vs pres vs stroke vs seizure    Plans:  [] routine EEG  [] check ESR, CRP, B12 level, folate level, methylmalonic acid and homocysteine, rheum factor, CCP, TSH, syphilis serology, RADHA, Zn++, Cu++, SPEP, Fe, TIBC, Ammonia   Assessment:     57 y/o M with PMH of HTN, T2DM, neuropathy, alcohol use, chronic pain on Methadone & opioids for cervical spine and back injury transferred to [facility] CICU from [place] due to STEMI and DKA. Pt was reportedly found to have inferolateral ST elevations + afib on EKG, trop 7560. Pt initially came to [facility] ED due to nausea and vomiting for 3 days, CP and altered mental status. Pt transferred to [facility] for cath, noted to be in afib w/ RVR to 130s-150s, received cardizem 25 mg IV x 2 and started on cardizem gtt, cath was aborted as pt had SCR of 2.14, (baseline 0.7-0.9 in 10/2021), transferred to CCU for optimization of DKA prior to cath. Neurology was consulted for altered mental status. According to nursing staff, when patient was first admitted, he had altered mental status but has coherent speech and some word finding difficulties as well as left arm weakness. His verbal output has progressively decreased. Yesterday, he was replying to questions with yes or no but since middle of the day he started grunting with no output of words.  During his stay, he was started insulin drip but has been transitioned to subq insulin injection since his blood glucose has improved and anion gap has closed. He is currently on heparin drip since revascularization with PCI is deferred due to pt's elevated SCr, likely prerenal JAXON 2/2 dehydration from DKA. He is also currently hypertensive, on nitroglycerin drip (180mcg/min).    CT head 3/2/22 shows Volume loss, microvascular disease, age indeterminate lacunar infarcts, Foci of air in the left sella turcica with bony thinning possible dehiscence, edentulous maxilla with dental hardware, bony calvarial sclerosis correlate renal function. Unable to obtain MRI since patient has metal in his spine ( according to his family). Preliminary EEG on 3/3/22 shows Intermittent triphasic waves , which are typically seen in metabolic encephalopathy, but may increase the risk for seizures in certain clinical situations. Clinical correlation is advised.   No seizures recorded.    Impression: Encephalopathy with unclear etiology, possible toxic metabolic, vs pres vs stroke vs seizure vs wernicke    Plans:  [] routine EEG  [] MRI Head unable to obtain due to metal implant in spine  [] check ESR, CRP, B12 level, thiamin, folate level, methylmalonic acid and homocysteine, rheum factor, CCP, TSH, syphilis serology, RADHA, Zn++, Cu++, SPEP, Fe, TIBC, Ammonia    Case to be discussed to Neurology attending, Dr. aRy   Assessment:     55 y/o M with PMH of HTN, T2DM, neuropathy, alcohol use, chronic pain on Methadone & opioids for cervical spine and back injury transferred to [facility] CICU from [place] due to STEMI and DKA. Pt was reportedly found to have inferolateral ST elevations + afib on EKG, trop 7560. Pt initially came to [facility] ED due to nausea and vomiting for 3 days, CP and altered mental status. Pt transferred to [facility] for cath, noted to be in afib w/ RVR to 130s-150s, received cardizem 25 mg IV x 2 and started on cardizem gtt, cath was aborted as pt had SCR of 2.14, (baseline 0.7-0.9 in 10/2021), transferred to CCU for optimization of DKA prior to cath. Neurology was consulted for altered mental status. According to nursing staff, when patient was first admitted, he had altered mental status with coherent speech and some word finding difficulties as well as left arm weakness. His verbal output has progressively decreased. Yesterday, he was replying to questions with yes or no but since middle of the day he started grunting with no output of words.  During his stay, he was started insulin drip but has been transitioned to subq insulin injection since his blood glucose has improved and anion gap has closed. He is currently on heparin drip since revascularization with PCI is deferred due to pt's elevated SCr, likely prerenal JAXON 2/2 dehydration from DKA. He is also currently hypertensive, on nitroglycerin drip (180mcg/min).    CT head 3/2/22 shows Volume loss, microvascular disease, age indeterminate lacunar infarcts, Foci of air in the left sella turcica with bony thinning possible dehiscence, edentulous maxilla with dental hardware, bony calvarial sclerosis correlate renal function. Unable to obtain MRI since patient has metal in his spine ( according to his family). Preliminary EEG on 3/3/22 shows Intermittent triphasic waves , which are typically seen in metabolic encephalopathy, but may increase the risk for seizures in certain clinical situations. Clinical correlation is advised.   No seizures recorded.    Impression: Encephalopathy with unclear etiology, possible toxic metabolic, vs pres vs stroke vs seizure vs wernicke    Plans:  [] routine EEG  [] MRI Head unable to obtain due to metal implant in spine  [] check ESR, CRP, B12 level, thiamin, folate level, methylmalonic acid and homocysteine, rheum factor, CCP, TSH, syphilis serology, RADHA, Zn++, Cu++, SPEP, Fe, TIBC, Ammonia    Case to be discussed to Neurology attending, Dr. Ray   Assessment:     57 y/o M with PMH of HTN, T2DM, neuropathy, alcohol use, chronic pain on Methadone & opioids for cervical spine and back injury transferred to [facility] CICU from [place] due to STEMI and DKA. Pt was reportedly found to have inferolateral ST elevations + afib on EKG, trop 7560. Pt initially came to [facility] ED due to nausea and vomiting for 3 days, CP and altered mental status. Pt transferred to [facility] for cath, noted to be in afib w/ RVR to 130s-150s, received cardizem 25 mg IV x 2 and started on cardizem gtt, cath was aborted as pt had SCR of 2.14, (baseline 0.7-0.9 in 10/2021), transferred to CCU for optimization of DKA prior to cath. Neurology was consulted for altered mental status. According to nursing staff, when patient was first admitted, he had altered mental status with coherent speech and some word finding difficulties as well as left arm weakness. His verbal output has progressively decreased. Yesterday, he was replying to questions with yes or no but since middle of the day he started grunting with no output of words.  During his stay, he was started insulin drip but has been transitioned to subq insulin injection since his blood glucose has improved and anion gap has closed. He is currently on heparin drip since revascularization with PCI is deferred due to pt's elevated SCr, likely prerenal JAXON 2/2 dehydration from DKA. He is also currently hypertensive, on nitroglycerin drip (180mcg/min).    CT head 3/2/22 shows Volume loss, microvascular disease, age indeterminate lacunar infarcts, Foci of air in the left sella turcica with bony thinning possible dehiscence, edentulous maxilla with dental hardware, bony calvarial sclerosis correlate renal function. Unable to obtain MRI since patient has metal in his spine ( according to his family). Preliminary EEG on 3/3/22 shows Intermittent triphasic waves , which are typically seen in metabolic encephalopathy, but may increase the risk for seizures in certain clinical situations. Clinical correlation is advised.   No seizures recorded.    Impression: Encephalopathy with unclear etiology, possible metabolic vs seizure vs wernicke less likely pres vs stroke    Plans:  [] routine EEG  [] CT head done 3/2/22 shows Volume loss, microvascular disease, age indeterminate lacunar infarcts, Foci of air in the left sella turcica with bony thinning possible dehiscence, edentulous maxilla with dental hardware, bony calvarial sclerosis correlate renal function.   [] MRI Head unable to obtain due to metal implant in spine  [] check ESR, CRP, B12 level, thiamin, folate level, methylmalonic acid and homocysteine, rheum factor, CCP, TSH, syphilis serology, RADHA, Zn++, Cu++, SPEP, Fe, TIBC, Ammonia    Case to be discussed with Neurology attending, Dr. Ray   Assessment:     57 y/o M with PMH of HTN, T2DM, neuropathy, alcohol use, chronic pain on Methadone & opioids for cervical spine and back injury transferred to [facility] CICU from [place] due to STEMI and DKA. Pt was reportedly found to have inferolateral ST elevations + afib on EKG, trop 7560. Pt initially came to [facility] ED due to nausea and vomiting for 3 days, CP and altered mental status. Pt transferred to [facility] for cath, noted to be in afib w/ RVR to 130s-150s, received cardizem 25 mg IV x 2 and started on cardizem gtt, cath was aborted as pt had SCR of 2.14, (baseline 0.7-0.9 in 10/2021), transferred to CCU for optimization of DKA prior to cath. Neurology was consulted for altered mental status. According to nursing staff, when patient was first admitted, he had altered mental status with coherent speech and some word finding difficulties as well as left arm weakness. His verbal output has progressively decreased. Yesterday, he was replying to questions with yes or no but since middle of the day he started grunting with no output of words.  During his stay, he was started insulin drip, his blood glucose has improved and anion gap has closed. He is currently on heparin drip for afib and dual antiplatelet therapy since revascularization with PCI is deferred due to pt's elevated SCr. He is also currently hypertensive, on nitroglycerin drip (180mcg/min).      CT head 3/2/22 shows Volume loss, microvascular disease, age indeterminate lacunar infarcts, Foci of air in the left sella turcica with bony thinning possible dehiscence, edentulous maxilla with dental hardware, bony calvarial sclerosis correlate renal function. Unable to obtain MRI since patient has metal in his spine ( according to his family). Preliminary EEG on 3/3/22 shows Intermittent triphasic waves , which are typically seen in metabolic encephalopathy, but may increase the risk for seizures in certain clinical situations. Clinical correlation is advised.   No seizures recorded.    Impression: Encephalopathy with unclear etiology, possible metabolic vs stroke vs seizure vs wernicke less likely pres     Plans:  [] routine EEG  [] CT head done 3/2/22 shows Volume loss, microvascular disease, age indeterminate lacunar infarcts, Foci of air in the left sella turcica with bony thinning possible dehiscence, edentulous maxilla with dental hardware, bony calvarial sclerosis correlate renal function.   [] age indeterminate lacunar infarcts seen in ct head are not contraindication to heparin therapy, if needed for cardiac reason.  [] for any sudden change in mental status or focal neurological deficit, can obtain CT head non con stat.  [] MRI head non contrast- clarify with family member about MRI compatibility of metal implants from spinal surgeries. if not MRI compatible, repeat CT head non con in 48 hours.  [] continue aspirin 81mg, brilinta 90mg BID, atorvastatin 80mg for possible stroke given left gaze neglect and left arm weakness  [] check Vitamin B1, B12 level, thiamin, folate level, Ammonia    Case seen and discussed with Neurology attending, Dr. Ray

## 2022-03-03 NOTE — DIETITIAN INITIAL EVALUATION ADULT. - OTHER INFO
Per family pt with weight loss in recent years but no dramatic weight changes in recent months. Reports weight loss was intentional/unintentional as pt experiencing stomach ulcers impacting PO years ago. UBW unknown. Dosing weight 239.4lb (3/1). Most recent wt 264.9lb (3/3). Weight fluctuations in house likely 2/2 fluid shifts. Per [facility] pt 225.1lb (10/2021), 248lb (07/2020).    Pt with HbA1c of 11.2%, indicates poor glycemic control PTA. Per family pt not caring for diabetes PTA, states pt owns glucometer but unclear if pt using. Per chart, pt on Metformin, Trulicity, Jardiance PTA. Pt presented in DKA, current on insulin gtt with plan to wean to SSI per RN.    Noted Phos this AM of 1.0 - repleted with potassium phosphate. Likely 2/2 insulin gtt - will continue to monitor.     Pt with no noted GI distress, per RN no BM since admission noted. Pt with NGT in place, feeds of Glucerna 1.2 infusing at 20ml/hr (started today). Any questions regarding tube feeding answered at visit, pt's family made aware RD remains available for any questions/concerns.

## 2022-03-03 NOTE — PROGRESS NOTE ADULT - ATTENDING COMMENTS
Admitted with late presentation inferior wall STEMI, not revascularized, complicated by DKA  A+Ox0 but agitated, likely alcohol abuse per family - on CIWA  CT head with lacunar infarcts, unable to get MRI due to back rods - EEG on, consult Neurology   DAPT with ASA, Ticagrelor; Lipitor 80  Hypertensive on Nitroglycerin drip - add Coreg and wean Nitro  Interventional Cardiology has deferred cath  TTE with mildly reduced LVEF  O2 sats high 90s on room air  NPO - start tube feeds  Normal renal function, acidosis has resolved, compensated on exam - stop bicarb drip and D5-1/2NS  H/H acceptable  COVID negative, no antibiotics   Presented in DKA, AG now closed - transition off drip  Katelyn 3/1 Admitted with late presentation inferior wall STEMI, not revascularized, complicated by DKA  A+Ox0 but agitated, likely alcohol abuse per family - on CIWA  CT head with lacunar infarcts, unable to get MRI due to back rods - EEG on, consult Neurology   DAPT with ASA, Ticagrelor; Lipitor 80  Hypertensive on Nitroglycerin drip - add Coreg and wean Nitro  Interventional Cardiology has deferred cath  TTE with mildly reduced LVEF - titrate up Bidil  O2 sats high 90s on room air  NPO - start tube feeds  Normal renal function, acidosis has resolved, compensated on exam - stop bicarb drip and D5-1/2NS  H/H acceptable  COVID negative, no antibiotics   Presented in DKA, AG now closed - transition off drip  Katelyn 3/1 Admitted with late presentation inferior wall STEMI, not revascularized, complicated by DKA  A+Ox0 but agitated, likely alcohol abuse per family - on CIWA  CT head with lacunar infarcts, unable to get MRI due to back rods - EEG on, consult Neurology   DAPT with ASA, Ticagrelor; Lipitor 80  Hypertensive on Nitroglycerin drip - add Coreg and wean Nitro  Interventional Cardiology has deferred cath  TTE with mildly reduced LVEF - titrate up Bidil  O2 sats high 90s on room air  NPO - start tube feeds  Normal renal function, acidosis has resolved, compensated on exam - stop bicarb drip and D5-1/2NS  H/H acceptable on Heparin drip for afib  COVID negative, no antibiotics   Presented in DKA, AG now closed - transition off drip  Katelyn 3/1 Admitted with late presentation inferior wall STEMI, not revascularized, complicated by DKA  A+Ox0 but agitated, likely alcohol abuse per family - on CIWA  CT head with lacunar infarcts, unable to get MRI due to back rods - EEG on, consult Neurology   DAPT with ASA, Ticagrelor; Lipitor 80  Hypertensive on Nitroglycerin drip - add Coreg and wean Nitro  Interventional Cardiology has deferred cath  TTE with mildly reduced LVEF - titrate up Bidil  O2 sats high 90s on room air  NPO - start tube feeds  Normal renal function, acidosis has resolved, compensated on exam - stop bicarb drip and D5-1/2NS  H/H acceptable on Heparin drip for afib  COVID negative, no antibiotics but will panculture in case infection is driving his altered mentation  Presented in DKA, AG now closed - transition off drip  Katelyn 3/1

## 2022-03-03 NOTE — DIETITIAN INITIAL EVALUATION ADULT. - REASON INDICATOR FOR ASSESSMENT
Assessment warranted for length of stay in CICU.  Information obtained from pt's family at bedside, RN, EMR. Assessment warranted for length of stay in CICU.  Information obtained from pt's family at bedside, RN, interdisciplinary team, EMR.

## 2022-03-03 NOTE — PROGRESS NOTE ADULT - SUBJECTIVE AND OBJECTIVE BOX
ROSE STRAUSS  MRN-[id number]  Patient is a 56y old  Male who presents with a chief complaint of STEMI, DKA (03 Mar 2022 15:14)    HPI:  57 y/o M with PMH of HTN, T2DM, neuropathy, chronic pain on Methadone & opioids for cervical spine and back injury transferred to [facility] CICU from [place] due to STEMI and DKA. Pt was reportedly found to have inferolateral ST elevations + afib on EKG, trop 7560. Pt initially came to [facility] ED due to nausea and vomiting for 3 days, reportedly had CP several days ago which apparently stopped on its own. Pt currently altered, difficult to obtain history from.    Pt transferred to [facility] for cath, noted to be in afib w/ RVR to 130s-150s, received cardizem 25 mg IV x 2 and started on cardizem gtt, cath was aborted as pt had SCR of 2.14, (baseline 0.7-0.9 in 10/2021), transferred to CCU for optimization of DKA prior to cath.     Baseline SCr 0.7-0.9 in 10/2021 from previous hospitalization at [facility] for cellulitis of hand, was d/rome on bactrim DS + ethambutol x 4 weeks.    Labs from [facility] significant for: WBC 15.53, lactate 5.6, troponin 7560.2, K 3.2, CO2 5, AG 42, SCr/BUN 2.14/44, Glucose 663, calcium 12.7, Mg 2.7, Phos 9.5  FSGs >600 -> 503  ABG 7.19/<19/133/3  UA: large ketones, moderate bacteria, negative LE and nitrite  RVP/COVID negative (01 Mar 2022 13:56)      Hospital Course:  3/2 Transferred here to the CCU due to optimization of DKA prior to cath      24 HOUR EVENTS:    REVIEW OF SYSTEMS:    CONSTITUTIONAL: No weakness, fevers or chills  EYES/ENT: No visual changes;  No vertigo or throat pain   NECK: No pain or stiffness  RESPIRATORY: No cough, wheezing, hemoptysis; No shortness of breath  CARDIOVASCULAR: No chest pain or palpitations  GASTROINTESTINAL: No abdominal or epigastric pain. No nausea, vomiting, or hematemesis; No diarrhea or constipation. No melena or hematochezia.  GENITOURINARY: No dysuria, frequency or hematuria  NEUROLOGICAL: No numbness or weakness  SKIN: No itching, rashes      ICU Vital Signs Last 24 Hrs  T(C): 37.3 (03 Mar 2022 18:00), Max: 37.3 (03 Mar 2022 13:00)  T(F): 99.1 (03 Mar 2022 18:00), Max: 99.1 (03 Mar 2022 13:00)  HR: 104 (03 Mar 2022 18:15) (101 - 127)  BP: --  BP(mean): --  ABP: 132/70 (03 Mar 2022 18:15) (108/83 - 201/182)  ABP(mean): 89 (03 Mar 2022 18:15) (77 - 190)  RR: 32 (03 Mar 2022 18:15) (11 - 42)  SpO2: 99% (03 Mar 2022 18:15) (96% - 100%)      CVP(mm Hg): --  CO: --  CI: --  PA: --  PA(mean): --  PA(direct): --  PCWP: --  LA: --  RA: --  SVR: --  SVRI: --  PVR: --  PVRI: --  I&O's Summary    02 Mar 2022 07:01  -  03 Mar 2022 07:00  --------------------------------------------------------  IN: 42254.9 mL / OUT: 1575 mL / NET: 9770.9 mL    03 Mar 2022 07:01  -  03 Mar 2022 19:46  --------------------------------------------------------  IN: 3255.1 mL / OUT: 3050 mL / NET: 205.1 mL        CAPILLARY BLOOD GLUCOSE    CAPILLARY BLOOD GLUCOSE      POCT Blood Glucose.: 208 mg/dL (03 Mar 2022 19:27)      PHYSICAL EXAM:  GENERAL: lying in bed, lethargic  HEAD:  Atraumatic, Normocephalic  EYES: EOMI, PERRLA, conjunctiva and sclera clear  ENT: dry mucous membranes  NECK: Supple, No JVD  CHEST/LUNG: Clear to auscultation bilaterally; No rales, rhonchi, wheezing, or rubs. Labored respirations likely kussmaul respirations  HEART: tachycardic; No murmurs, rubs, or gallops  ABDOMEN: BSx4; Soft, nontender, nondistended  EXTREMITIES:  2+ Peripheral Pulses, brisk capillary refill. No clubbing, cyanosis, or edema  NERVOUS SYSTEM:  A&Ox0, b/l increased tone in UE, more on L > R. difficult to rouse.  SKIN: erythematous rash on b/l hands and feet  psych: normal behavior, normal affect    ============================I/O===========================   I&O's Detail    02 Mar 2022 07:01  -  03 Mar 2022 07:00  --------------------------------------------------------  IN:    Dexmedetomidine: 24.3 mL    dextrose 5% + sodium chloride 0.45% w/ Additives: 4200 mL    Heparin: 586 mL    Insulin: 122 mL    IV PiggyBack: 166.6 mL    Nitroglycerin: 1297 mL    Oral Fluid: 600 mL    Sodium Bicarbonate: 3600 mL    sodium chloride 0.9% w/ Additives: 750 mL  Total IN: 55660.9 mL    OUT:    Incontinent per Condom Catheter (mL): 700 mL    Voided (mL): 875 mL  Total OUT: 1575 mL    Total NET: 9770.9 mL      03 Mar 2022 07:01  -  03 Mar 2022 19:46  --------------------------------------------------------  IN:    dextrose 5% + sodium chloride 0.45% w/ Additives: 800 mL    Enteral Tube Flush: 220 mL    Glucerna: 180 mL    Heparin: 271 mL    Insulin: 23 mL    IV PiggyBack: 633.1 mL    IV PiggyBack: 150 mL    Nitroglycerin: 528 mL    Sodium Bicarbonate: 450 mL  Total IN: 3255.1 mL    OUT:    Incontinent per Condom Catheter (mL): 3050 mL  Total OUT: 3050 mL    Total NET: 205.1 mL        ============================ LABS =========================                        8.8    11.23 )-----------( 200      ( 03 Mar 2022 17:06 )             25.4         137  |  104  |  9   ----------------------------<  162<H>  3.6   |  15<L>  |  0.50    Ca    7.4<L>      03 Mar 2022 17:06  Phos  1.3       Mg     1.9         TPro  4.6<L>  /  Alb  2.4<L>  /  TBili  0.6  /  DBili  x   /  AST  45<H>  /  ALT  87<H>  /  AlkPhos  63  -03    Troponin T, High Sensitivity Result: 704 ng/L (22 @ 12:22)  Troponin T, High Sensitivity Result: 787 ng/L (22 @ 07:15)  Troponin T, High Sensitivity Result: 725 ng/L (22 @ 03:28)  Troponin T, High Sensitivity Result: 704 ng/L (22 @ 23:41)  Troponin T, High Sensitivity Result: 847 ng/L (22 @ 18:40)  Troponin T, High Sensitivity Result: 1234 ng/L (22 @ 15:28)    CKMB Units: 9.6 ng/mL (22 @ 12:22)  CKMB Units: 11.9 ng/mL (22 @ 07:15)  CKMB Units: 13.4 ng/mL (22 @ 03:28)  CKMB Units: 15.5 ng/mL (22 @ 23:41)  CKMB Units: 19.2 ng/mL (22 @ 18:40)  CKMB Units: 19.6 ng/mL (22 @ 15:28)    Creatine Kinase, Serum: 3235 U/L (22 @ 12:22)  Creatine Kinase, Serum: 3840 U/L (22 @ 07:15)  Creatine Kinase, Serum: 4100 U/L (22 @ 03:28)  Creatine Kinase, Serum: 4655 U/L (22 @ 23:41)  Creatine Kinase, Serum: 6120 U/L (22 @ 18:40)  Creatine Kinase, Serum: 9209 U/L (22 @ 15:28)    CPK Mass Assay %: 0.3 % (22 @ 12:22)  CPK Mass Assay %: 0.3 % (22 @ 07:15)  CPK Mass Assay %: 0.3 % (22 @ 03:28)  CPK Mass Assay %: 0.3 % (22 @ 23:41)  CPK Mass Assay %: 0.3 % (22 @ 18:40)  CPK Mass Assay %: 0.2 % (22 @ 15:28)        LIVER FUNCTIONS - ( 03 Mar 2022 17:06 )  Alb: 2.4 g/dL / Pro: 4.6 g/dL / ALK PHOS: 63 U/L / ALT: 87 U/L / AST: 45 U/L / GGT: x           PT/INR - ( 03 Mar 2022 17:03 )   PT: 13.9 sec;   INR: 1.20 ratio         PTT - ( 03 Mar 2022 17:03 )  PTT:82.5 sec  ABG - ( 03 Mar 2022 04:02 )  pH, Arterial: 7.52  pH, Blood: x     /  pCO2: 23    /  pO2: 95    / HCO3: 19    / Base Excess: -3.0  /  SaO2: 99.0              Blood Gas Arterial, Lactate: 1.0 mmol/L (22 @ 04:02)  Blood Gas Arterial, Lactate: 0.8 mmol/L (22 @ 00:23)  Blood Gas Arterial, Lactate: 0.7 mmol/L (22 @ 20:27)  Blood Gas Arterial, Lactate: 0.8 mmol/L (22 @ 18:52)  Blood Gas Arterial, Lactate: 0.6 mmol/L (22 @ 12:13)  Blood Gas Arterial, Lactate: 0.7 mmol/L (22 @ 07:15)  Blood Gas Arterial, Lactate: 0.7 mmol/L (22 @ 05:19)  Lactate, Blood: 0.8 mmol/L (22 @ 03:28)  Blood Gas Arterial, Lactate: 1.0 mmol/L (22 @ 03:26)  Blood Gas Arterial, Lactate: 0.9 mmol/L (22 @ 01:33)  Lactate, Blood: 1.0 mmol/L (22 @ 23:41)  Blood Gas Arterial, Lactate: 1.0 mmol/L (22 @ 23:33)  Blood Gas Arterial, Lactate: 1.2 mmol/L (22 @ 21:47)    Urinalysis Basic - ( 03 Mar 2022 11:37 )    Color: Colorless / Appearance: Clear / S.017 / pH: x  Gluc: x / Ketone: Moderate  / Bili: Negative / Urobili: Negative   Blood: x / Protein: 300 mg/dL / Nitrite: Negative   Leuk Esterase: Negative / RBC: 4 /hpf / WBC 1 /HPF   Sq Epi: x / Non Sq Epi: 0 /hpf / Bacteria: Negative      ======================Micro/Rad/Cardio=================  Telemtry: Reviewed   EKG: Reviewed  CXR: Reviewed  Culture: Reviewed   Echo: Transthoracic Echocardiogram:   Patient name: ROSE STRAUSS  YOB: 1965   Age: 56 (M)   MR#: [id number]  Study Date: 3/2/2022  [facility] [place] Blas Pablo RDCS  Study quality: Technically good  Referring Physician: Richard Byrne MD  Blood Pressure: 121/57 mmHg  Height: 188 cm  Weight: 108 kg  BSA: 2.3 m2  ------------------------------------------------------------------------  PROCEDURE: Transthoracic echocardiogram with 2-D, M-Mode  and complete spectral and color flow Doppler.  INDICATION:ST elevation (STEMI) myocardial infarction of  unspecified site (I21.3)  ------------------------------------------------------------------------  Dimensions:    Normal Values:  LA:     5.1    2.0 - 4.0 cm  Ao:     3.7    2.0 - 3.8 cm  SEPTUM: 0.80.6 - 1.2 cm  PWT:    0.9    0.6 - 1.1 cm  LVIDd:  5.4    3.0 - 5.6 cm  LVIDs:  4.0    1.8 - 4.0 cm  Derived variables:  LVMI: 72 g/m2  RWT: 0.33  Fractional short: 26 %  EF (Ha Rule): 48 %Doppler Peak Velocity (m/sec):  AoV=1.2  ------------------------------------------------------------------------  Observations:  Mitral Valve: Normal mitral valve. Minimal mitral  regurgitation.  Aortic Valve/Aorta: Normal trileaflet aortic valve. Peak  transaortic valve gradient equals 6 mm Hg, mean transaortic  valve gradient equals 3 mm Hg, aortic valve velocity time  integral equals 19 cm, estimated aortic valve area equals  3.2 sqcm. Peak left ventricular outflow tract gradient  equals 4 mm Hg, mean gradient is equal to 2 mm Hg, LVOT  velocity time integral equals 15 cm.  Aortic Root: 3.7 cm.  LVOT diameter: 2.3 cm.  Left Atrium: Mildly dilated left atrium.  LA volume index =  39 cc/m2.  Left Ventricle: Mild left ventricular systolic dysfunction.     Hypokinesis of the basal inferior wall, inferolateral  wall. Normal left ventricular internal dimensions and wall  thicknesses. Mild diastolic dysfunction (Stage I).  Right Heart: Normal right atrium. Normal right ventricular  size and function. Normal tricuspid valve. Minimal  tricuspid regurgitation. Normal pulmonic valve.  Pericardium/Pleura: Normal pericardium with no pericardial  effusion.  Hemodynamic: Estimated right atrial pressure is 8 mm Hg.  ------------------------------------------------------------------------  Conclusions:  1. Normal left ventricular internal dimensions and wall  thicknesses.  2. Mild left ventricular systolic dysfunction.  Hypokinesis of the basal inferior wall, inferolateral wall.  3. Mild diastolic dysfunction (Stage I).  *** Compared with echocardiogram of 3/1/2022, no  significant changes noted.  ------------------------------------------------------------------------  Confirmed on  3/2/2022 - 11:34:05 by MANJEET Jeff  ------------------------------------------------------------------------ (22 @ 09:48)      Cath:   ======================================================  PAST MEDICAL & SURGICAL HISTORY:  Hypertension    Diabetes mellitus    Gastric ulcer    Spinal stenosis    H/O spinal cord compression    Spondylosis    H/O radiculopathy    H/O cervical spine surgery    History of back surgery    S/P cervical spinal fusion      ====================ASSESSMENT ==============  STEMI (trop 7560, inferolateral LUIS on EKG)  AFib w/ RVR  Kussmaul respirations 2/2 DKA  JAXON likely prerenal 2/2 dehydration 2/2 DKA  Transaminitis: Elevated LFTs  DKA  HAGMA  Mental status: altered. Likely metabolic encephalopathy 2/2 DKA    Plan:  ====================== NEUROLOGY=====================  Mental status: altered. Likely metabolic encephalopathy 2/2 DKA  -Currently A&O x 0, worsening  -neurology consulted, appreciate recs  -CT Head:  Volume loss, microvascular disease, age indeterminate lacunar infarcts, Foci of air in the left sella turcica with bony thinning possible dehiscence, edentulous maxilla with dental hardware, bony calvarial sclerosis correlate renal function.   -plan for MRI head/neck to evaluate CT Head findings  -will treat DKA as below  -ordered tox screen, serum methanol, ethyl alcohol,  serum osms      LORazepam     Tablet 2 milliGRAM(s) Oral every 1 hour PRN Symptom-triggered: each CIWA -Ar score 8 or GREATER  LORazepam     Tablet 2 milliGRAM(s) Oral every 2 hours PRN Symptom-triggered: 2 point increase in CIWA -Ar score and a total score of 7 or LESS  LORazepam     Tablet 1 milliGRAM(s) Oral every 1 hour PRN CIWA-Ar score 8 or greater  LORazepam   Injectable 1 milliGRAM(s) IV Push every 1 hour PRN CIWA-Ar score 8 or greater  LORazepam   Injectable 1 milliGRAM(s) IntraMuscular every 1 hour PRN CIWA-Ar score 8 or greater  LORazepam   Injectable 2 milliGRAM(s) IV Push every 2 hours PRN Symptom-triggered: 2 point increase in CIWA -Ar score and a total score of 7 or LESS  LORazepam   Injectable 2 milliGRAM(s) IntraMuscular every 2 hours PRN Symptom-triggered: 2 point increase in CIWA -Ar score and a total score of 7 or LESS  LORazepam   Injectable 2 milliGRAM(s) IV Push every 1 hour PRN Symptom-triggered: each CIWA -Ar score 8 or GREATER  LORazepam   Injectable 2 milliGRAM(s) IntraMuscular every 1 hour PRN Symptom-triggered: each CIWA -Ar score 8 or GREATER  methadone    Tablet 5 milliGRAM(s) Oral every 6 hours    ==================== RESPIRATORY======================  Kussmaul respirations 2/2 DKA  -Currently satting well on RA, but has deep shallow breathing   -continue to monitor O2 sats and respiratory status, currently protecting airway. O2 NC PRN      ====================CARDIOVASCULAR==================  STEMI (trop 7560, inferolateral LUIS on EKG), per chart was loaded with 180 mg Ticagrelor, heparin bolus in [facility]  - Revascularization with PCI deferred due to pt's elevated SCr, likely prerenal JAXON 2/2 dehydration from DKA  - MISSY score: 122  - DAPT: ASA 81 + Ticagrelor 90mg BID for 1 year  - Statin: atorvastatin 80mg qD  - Heparin gtt  - limited TTE 3/1/22 showed hypokinetic inferolateral and mid inferior wall, basal inferior wall is akinetic.   - trend trop, CK, CKMB  - TSH and lipid panel  - lopressor 12.5 mg BID -> coreg 12.5 mg BID  -3/2: pt had CP in AM, will c/w nitro gtt, d/rome precedex gtt. c/w hydral and isordil. plan to reach out to cath lab for potential PCI.  -3/3: c/w nitro gtt, hydral and IDN, switched lopressor to coreg    AFib w/ RVR:   - CHADSVASC score: 3  - HAS-BLED score: 1  - currently on heparin gtt  - s/p cardizem 25 mg IVP x 2 and cardizem gtt; d/rome due to potentially decreased EF on limited TTE  - will c/w fluid resuscitation as below  - Monitor on telemetry  - c/w coreg 12.5 mg BID      carvedilol 12.5 milliGRAM(s) Oral every 12 hours  hydrALAZINE 50 milliGRAM(s) Oral every 8 hours  isosorbide   dinitrate Tablet (ISORDIL) 30 milliGRAM(s) Oral three times a day  nitroglycerin  Infusion 10 MICROgram(s)/Min (3 mL/Hr) IV Continuous <Continuous>  atorvastatin 80 milliGRAM(s) Oral at bedtime  aspirin  chewable 81 milliGRAM(s) Oral daily  ticagrelor 90 milliGRAM(s) Oral every 12 hours  ===================HEMATOLOGIC/ONC ===================  H/H stable  - Continue AC therapy with IV Heparin     heparin  Infusion 1700 Unit(s)/Hr (23 mL/Hr) IV Continuous <Continuous>    ===================== RENAL =========================  JAXON likely prerenal 2/2 dehydration 2/2 DKA; resolved  -SCr 2.14, BUN 44, fluid responsive, improved to baseline SCr/BUN with IVF  -UAs significant for large ketones and glucose  -Monitor I/Os, UO      ==================== GASTROINTESTINAL===================  Transaminitis: Elevated LFTs - resolved  -AST/ALT improving with fluids, possible shock liver due to hypovolemia from DKA    Diet: NPO due to DKA      dextrose 5% + sodium chloride 0.45% with potassium chloride 20 mEq/L 1000 milliLiter(s) (200 mL/Hr) IV Continuous <Continuous>  thiamine IVPB 500 milliGRAM(s) IV Intermittent three times a day    =======================    ENDOCRINE  =====================  DKA:  - DKA protocol initiated - c/w insulin gtt, will transition to long acting insulin and NPH - improving  - K 3.2 -> 3.7; was ordered for 40 mEq PO x 2, 10 mEq IV, insulin gtt started once K > 3.3  - will continue to supplement K as level is < 5.3 ordered NS bolus with 30 mEq of K  - c/w IVF resuscitation per DKA protocol, D5W + 1/2 NS  - BMP [id number], BHB q1hour  to monitor AG + K, will supplement PRN  - HbA1c 9.8 in 10/2021; pt seems to have history of uncontrolled T2DM, currently unable to answer questions regarding his PMH  - Will bridge from insulin gtt to long acting NPH once AG closes and FSG < 200    HAGMA: - resolving  -AG closed, bicarb ~18 - resolving  -d/c bicarb gtt  -IVF resuscitation  -DKA management as below      insulin lispro (ADMELOG) corrective regimen sliding scale   SubCutaneous every 6 hours  insulin NPH human recombinant 13 Unit(s) SubCutaneous every 6 hours  insulin regular Infusion 6 Unit(s)/Hr (6 mL/Hr) IV Continuous <Continuous>    ========================INFECTIOUS DISEASE================  UA x 2 negative for bacteria, afebrile, although WBC 11.23, HR 130s, RR > 20  - Pt without strong objective or clinical evidence of infection. Will observe off antibiotics      Patient requires continuous monitoring with bedside rhythm monitoring, pulse ox monitoring, and intermittent blood gas analysis. Care plan discussed with ICU care team. Patient remained critical and at risk for life threatening decompensation.  Patient seen, examined and plan discussed with CCU team during rounds.     I have personally provided ____ minutes of critical care time excluding time spent on separate procedures, in addition to initial critical care time provided by the CICU Attending, Dr. Byrne.     By signing my name below, I, Lashell Sanches, attest that this documentation has been prepared under the direction and in the presence of Preethi Booth  Electronically signed: Devang Diaz, 22 @ 19:46    I, Preethi Booth, personally performed the services described in this documentation. all medical record entries made by the scribe were at my direction and in my presence. I have reviewed the chart and agree that the record reflects my personal performance and is accurate and complete  Electronically signed: Preethi Booth       ROSE STRAUSS  MRN-[id number]  Patient is a 56y old  Male who presents with a chief complaint of STEMI, DKA (03 Mar 2022 15:14)    HPI:  57 y/o M with PMH of HTN, T2DM, neuropathy, chronic pain on Methadone & opioids for cervical spine and back injury transferred to [facility] CICU from [place] due to STEMI and DKA. Pt was reportedly found to have inferolateral ST elevations + afib on EKG, trop 7560. Pt initially came to [facility] ED due to nausea and vomiting for 3 days, reportedly had CP several days ago which apparently stopped on its own. Pt currently altered, difficult to obtain history from.    Pt transferred to [facility] for cath, noted to be in afib w/ RVR to 130s-150s, received cardizem 25 mg IV x 2 and started on cardizem gtt, cath was aborted as pt had SCR of 2.14, (baseline 0.7-0.9 in 10/2021), transferred to CCU for optimization of DKA prior to cath.     Baseline SCr 0.7-0.9 in 10/2021 from previous hospitalization at [facility] for cellulitis of hand, was d/rome on bactrim DS + ethambutol x 4 weeks.    Labs from [facility] significant for: WBC 15.53, lactate 5.6, troponin 7560.2, K 3.2, CO2 5, AG 42, SCr/BUN 2.14/44, Glucose 663, calcium 12.7, Mg 2.7, Phos 9.5  FSGs >600 -> 503  ABG 7.19/<19/133/3  UA: large ketones, moderate bacteria, negative LE and nitrite  RVP/COVID negative (01 Mar 2022 13:56)      Hospital Course:  3/2 Transferred here to the CCU due to optimization of DKA prior to cath      24 HOUR EVENTS:    REVIEW OF SYSTEMS:  Altered mental status, unable to assess      ICU Vital Signs Last 24 Hrs  T(C): 37.3 (03 Mar 2022 18:00), Max: 37.3 (03 Mar 2022 13:00)  T(F): 99.1 (03 Mar 2022 18:00), Max: 99.1 (03 Mar 2022 13:00)  HR: 104 (03 Mar 2022 18:15) (101 - 127)  BP: --  BP(mean): --  ABP: 132/70 (03 Mar 2022 18:15) (108/83 - 201/182)  ABP(mean): 89 (03 Mar 2022 18:15) (77 - 190)  RR: 32 (03 Mar 2022 18:15) (11 - 42)  SpO2: 99% (03 Mar 2022 18:15) (96% - 100%)      CVP(mm Hg): --  CO: --  CI: --  PA: --  PA(mean): --  PA(direct): --  PCWP: --  LA: --  RA: --  SVR: --  SVRI: --  PVR: --  PVRI: --  I&O's Summary    02 Mar 2022 07:01  -  03 Mar 2022 07:00  --------------------------------------------------------  IN: 78079.9 mL / OUT: 1575 mL / NET: 9770.9 mL    03 Mar 2022 07:01  -  03 Mar 2022 19:46  --------------------------------------------------------  IN: 3255.1 mL / OUT: 3050 mL / NET: 205.1 mL        CAPILLARY BLOOD GLUCOSE    CAPILLARY BLOOD GLUCOSE      POCT Blood Glucose.: 208 mg/dL (03 Mar 2022 19:27)      PHYSICAL EXAM:  GENERAL: lying in bed, lethargic  HEAD:  Atraumatic, Normocephalic  EYES: EOMI, PERRLA, conjunctiva and sclera clear  ENT: dry mucous membranes  NECK: Supple, No JVD  CHEST/LUNG: Clear to auscultation bilaterally; No rales, rhonchi, wheezing, or rubs. Labored respirations likely kussmaul respirations  HEART: tachycardic; No murmurs, rubs, or gallops  ABDOMEN: BSx4; Soft, nontender, nondistended  EXTREMITIES:  2+ Peripheral Pulses, brisk capillary refill. No clubbing, cyanosis, or edema  NERVOUS SYSTEM:  A&Ox0, b/l increased tone in UE, more on L > R. difficult to rouse.  SKIN: erythematous rash on b/l hands and feet  psych: drowsy and sleepy.  non verbal     ============================I/O===========================   I&O's Detail    02 Mar 2022 07:01  -  03 Mar 2022 07:00  --------------------------------------------------------  IN:    Dexmedetomidine: 24.3 mL    dextrose 5% + sodium chloride 0.45% w/ Additives: 4200 mL    Heparin: 586 mL    Insulin: 122 mL    IV PiggyBack: 166.6 mL    Nitroglycerin: 1297 mL    Oral Fluid: 600 mL    Sodium Bicarbonate: 3600 mL    sodium chloride 0.9% w/ Additives: 750 mL  Total IN: 48995.9 mL    OUT:    Incontinent per Condom Catheter (mL): 700 mL    Voided (mL): 875 mL  Total OUT: 1575 mL    Total NET: 9770.9 mL      03 Mar 2022 07:01  -  03 Mar 2022 19:46  --------------------------------------------------------  IN:    dextrose 5% + sodium chloride 0.45% w/ Additives: 800 mL    Enteral Tube Flush: 220 mL    Glucerna: 180 mL    Heparin: 271 mL    Insulin: 23 mL    IV PiggyBack: 633.1 mL    IV PiggyBack: 150 mL    Nitroglycerin: 528 mL    Sodium Bicarbonate: 450 mL  Total IN: 3255.1 mL    OUT:    Incontinent per Condom Catheter (mL): 3050 mL  Total OUT: 3050 mL    Total NET: 205.1 mL        ============================ LABS =========================                        8.8    11.23 )-----------( 200      ( 03 Mar 2022 17:06 )             25.4     03    137  |  104  |  9   ----------------------------<  162<H>  3.6   |  15<L>  |  0.50    Ca    7.4<L>      03 Mar 2022 17:06  Phos  1.3       Mg     1.9         TPro  4.6<L>  /  Alb  2.4<L>  /  TBili  0.6  /  DBili  x   /  AST  45<H>  /  ALT  87<H>  /  AlkPhos  63      Troponin T, High Sensitivity Result: 704 ng/L (22 @ 12:22)  Troponin T, High Sensitivity Result: 787 ng/L (22 @ 07:15)  Troponin T, High Sensitivity Result: 725 ng/L (22 @ 03:28)  Troponin T, High Sensitivity Result: 704 ng/L (22 @ 23:41)  Troponin T, High Sensitivity Result: 847 ng/L (22 @ 18:40)  Troponin T, High Sensitivity Result: 1234 ng/L (22 @ 15:28)    CKMB Units: 9.6 ng/mL (22 @ 12:22)  CKMB Units: 11.9 ng/mL (22 @ 07:15)  CKMB Units: 13.4 ng/mL (22 @ 03:28)  CKMB Units: 15.5 ng/mL (22 @ 23:41)  CKMB Units: 19.2 ng/mL (22 @ 18:40)  CKMB Units: 19.6 ng/mL (22 @ 15:28)    Creatine Kinase, Serum: 3235 U/L (22 @ 12:22)  Creatine Kinase, Serum: 3840 U/L (22 @ 07:15)  Creatine Kinase, Serum: 4100 U/L (22 @ 03:28)  Creatine Kinase, Serum: 4655 U/L (22 @ 23:41)  Creatine Kinase, Serum: 6120 U/L (22 @ 18:40)  Creatine Kinase, Serum: 9209 U/L (22 @ 15:28)    CPK Mass Assay %: 0.3 % (22 @ 12:22)  CPK Mass Assay %: 0.3 % (22 @ 07:15)  CPK Mass Assay %: 0.3 % (22 @ 03:28)  CPK Mass Assay %: 0.3 % (22 @ 23:41)  CPK Mass Assay %: 0.3 % (22 @ 18:40)  CPK Mass Assay %: 0.2 % (22 @ 15:28)        LIVER FUNCTIONS - ( 03 Mar 2022 17:06 )  Alb: 2.4 g/dL / Pro: 4.6 g/dL / ALK PHOS: 63 U/L / ALT: 87 U/L / AST: 45 U/L / GGT: x           PT/INR - ( 03 Mar 2022 17:03 )   PT: 13.9 sec;   INR: 1.20 ratio         PTT - ( 03 Mar 2022 17:03 )  PTT:82.5 sec  ABG - ( 03 Mar 2022 04:02 )  pH, Arterial: 7.52  pH, Blood: x     /  pCO2: 23    /  pO2: 95    / HCO3: 19    / Base Excess: -3.0  /  SaO2: 99.0              Blood Gas Arterial, Lactate: 1.0 mmol/L (22 @ 04:02)  Blood Gas Arterial, Lactate: 0.8 mmol/L (22 @ 00:23)  Blood Gas Arterial, Lactate: 0.7 mmol/L (22 @ 20:27)  Blood Gas Arterial, Lactate: 0.8 mmol/L (22 @ 18:52)  Blood Gas Arterial, Lactate: 0.6 mmol/L (22 @ 12:13)  Blood Gas Arterial, Lactate: 0.7 mmol/L (22 @ 07:15)  Blood Gas Arterial, Lactate: 0.7 mmol/L (22 @ 05:19)  Lactate, Blood: 0.8 mmol/L (22 @ 03:28)  Blood Gas Arterial, Lactate: 1.0 mmol/L (22 @ 03:26)  Blood Gas Arterial, Lactate: 0.9 mmol/L (22 @ 01:33)  Lactate, Blood: 1.0 mmol/L (22 @ 23:41)  Blood Gas Arterial, Lactate: 1.0 mmol/L (22 @ 23:33)  Blood Gas Arterial, Lactate: 1.2 mmol/L (22 @ 21:47)    Urinalysis Basic - ( 03 Mar 2022 11:37 )    Color: Colorless / Appearance: Clear / S.017 / pH: x  Gluc: x / Ketone: Moderate  / Bili: Negative / Urobili: Negative   Blood: x / Protein: 300 mg/dL / Nitrite: Negative   Leuk Esterase: Negative / RBC: 4 /hpf / WBC 1 /HPF   Sq Epi: x / Non Sq Epi: 0 /hpf / Bacteria: Negative      ======================Micro/Rad/Cardio=================  Telemtry: Reviewed   EKG: Reviewed  CXR: Reviewed  Culture: Reviewed   Echo: Transthoracic Echocardiogram:   Patient name: ROSE STRAUSS  YOB: 1965   Age: 56 (M)   MR#: [id number]  Study Date: 3/2/2022  [facility]  Study quality: Technically good  Referring Physician: Richard Byrne MD  Blood Pressure: 121/57 mmHg  Height: 188 cm  Weight: 108 kg  BSA: 2.3 m2  ------------------------------------------------------------------------  PROCEDURE: Transthoracic echocardiogram with 2-D, M-Mode  and complete spectral and color flow Doppler.  INDICATION:ST elevation (STEMI) myocardial infarction of  unspecified site (I21.3)  ------------------------------------------------------------------------  Dimensions:    Normal Values:  LA:     5.1    2.0 - 4.0 cm  Ao:     3.7    2.0 - 3.8 cm  SEPTUM: 0.80.6 - 1.2 cm  PWT:    0.9    0.6 - 1.1 cm  LVIDd:  5.4    3.0 - 5.6 cm  LVIDs:  4.0    1.8 - 4.0 cm  Derived variables:  LVMI: 72 g/m2  RWT: 0.33  Fractional short: 26 %  EF (Ha Rule): 48 %Doppler Peak Velocity (m/sec):  AoV=1.2  ------------------------------------------------------------------------  Observations:  Mitral Valve: Normal mitral valve. Minimal mitral  regurgitation.  Aortic Valve/Aorta: Normal trileaflet aortic valve. Peak  transaortic valve gradient equals 6 mm Hg, mean transaortic  valve gradient equals 3 mm Hg, aortic valve velocity time  integral equals 19 cm, estimated aortic valve area equals  3.2 sqcm. Peak left ventricular outflow tract gradient  equals 4 mm Hg, mean gradient is equal to 2 mm Hg, LVOT  velocity time integral equals 15 cm.  Aortic Root: 3.7 cm.  LVOT diameter: 2.3 cm.  Left Atrium: Mildly dilated left atrium.  LA volume index =  39 cc/m2.  Left Ventricle: Mild left ventricular systolic dysfunction.     Hypokinesis of the basal inferior wall, inferolateral  wall. Normal left ventricular internal dimensions and wall  thicknesses. Mild diastolic dysfunction (Stage I).  Right Heart: Normal right atrium. Normal right ventricular  size and function. Normal tricuspid valve. Minimal  tricuspid regurgitation. Normal pulmonic valve.  Pericardium/Pleura: Normal pericardium with no pericardial  effusion.  Hemodynamic: Estimated right atrial pressure is 8 mm Hg.  ------------------------------------------------------------------------  Conclusions:  1. Normal left ventricular internal dimensions and wall  thicknesses.  2. Mild left ventricular systolic dysfunction.  Hypokinesis of the basal inferior wall, inferolateral wall.  3. Mild diastolic dysfunction (Stage I).  *** Compared with echocardiogram of 3/1/2022, no  significant changes noted.  ------------------------------------------------------------------------  Confirmed on  3/2/2022 - 11:34:05 by MANJEET Jeff  ------------------------------------------------------------------------ (22 @ 09:48)      Cath:   ======================================================  PAST MEDICAL & SURGICAL HISTORY:  Hypertension    Diabetes mellitus    Gastric ulcer    Spinal stenosis    H/O spinal cord compression    Spondylosis    H/O radiculopathy    H/O cervical spine surgery    History of back surgery    S/P cervical spinal fusion      ====================ASSESSMENT ==============  STEMI (trop 7560, inferolateral LUIS on EKG)  AFib w/ RVR  Kussmaul respirations 2/2 DKA  JAXON likely prerenal 2/2 dehydration 2/2 DKA  Transaminitis: Elevated LFTs  DKA  HAGMA  Mental status: altered. Likely metabolic encephalopathy 2/2 DKA    Plan:  ====================== NEUROLOGY=====================  Mental status: altered. Likely metabolic encephalopathy 2/2 DKA  -Currently A&O x 0, worsening  -neurology consulted, appreciate recs  -CT Head:  Volume loss, microvascular disease, age indeterminate lacunar infarcts, Foci of air in the left sella turcica with bony thinning possible dehiscence, edentulous maxilla with dental hardware, bony calvarial sclerosis correlate renal function.   -plan for MRI head/neck to evaluate CT Head findings  -will treat DKA as below  -ordered tox screen, serum methanol, ethyl alcohol,  serum osms      LORazepam     Tablet 2 milliGRAM(s) Oral every 1 hour PRN Symptom-triggered: each CIWA -Ar score 8 or GREATER  LORazepam     Tablet 2 milliGRAM(s) Oral every 2 hours PRN Symptom-triggered: 2 point increase in CIWA -Ar score and a total score of 7 or LESS  LORazepam     Tablet 1 milliGRAM(s) Oral every 1 hour PRN CIWA-Ar score 8 or greater  LORazepam   Injectable 1 milliGRAM(s) IV Push every 1 hour PRN CIWA-Ar score 8 or greater  LORazepam   Injectable 1 milliGRAM(s) IntraMuscular every 1 hour PRN CIWA-Ar score 8 or greater  LORazepam   Injectable 2 milliGRAM(s) IV Push every 2 hours PRN Symptom-triggered: 2 point increase in CIWA -Ar score and a total score of 7 or LESS  LORazepam   Injectable 2 milliGRAM(s) IntraMuscular every 2 hours PRN Symptom-triggered: 2 point increase in CIWA -Ar score and a total score of 7 or LESS  LORazepam   Injectable 2 milliGRAM(s) IV Push every 1 hour PRN Symptom-triggered: each CIWA -Ar score 8 or GREATER  LORazepam   Injectable 2 milliGRAM(s) IntraMuscular every 1 hour PRN Symptom-triggered: each CIWA -Ar score 8 or GREATER  methadone    Tablet 5 milliGRAM(s) Oral every 6 hours    ==================== RESPIRATORY======================  Kussmaul respirations 2/2 DKA  -Currently satting well on RA, but has deep shallow breathing   -continue to monitor O2 sats and respiratory status, currently protecting airway. O2 NC PRN      ====================CARDIOVASCULAR==================  STEMI (trop 7560, inferolateral LUIS on EKG), per chart was loaded with 180 mg Ticagrelor, heparin bolus in [facility]  - Revascularization with PCI deferred due to pt's elevated SCr, likely prerenal JAXON 2/2 dehydration from DKA  - MISSY score: 122  - DAPT: ASA 81 + Ticagrelor 90mg BID for 1 year  - Statin: atorvastatin 80mg qD  - Heparin gtt  - limited TTE 3/1/22 showed hypokinetic inferolateral and mid inferior wall, basal inferior wall is akinetic.   - trend trop, CK, CKMB  - TSH and lipid panel  - lopressor 12.5 mg BID -> coreg 12.5 mg BID  -3/2: pt had CP in AM, will c/w nitro gtt, d/rome precedex gtt. c/w hydral and isordil. plan to reach out to cath lab for potential PCI.  -3/3: c/w nitro gtt, hydral and IDN, switched lopressor to coreg    AFib w/ RVR:   - CHADSVASC score: 3  - HAS-BLED score: 1  - currently on heparin gtt  - s/p cardizem 25 mg IVP x 2 and cardizem gtt; d/rome due to potentially decreased EF on limited TTE  - will c/w fluid resuscitation as below  - Monitor on telemetry  - c/w coreg 12.5 mg BID      carvedilol 12.5 milliGRAM(s) Oral every 12 hours  hydrALAZINE 50 milliGRAM(s) Oral every 8 hours  isosorbide   dinitrate Tablet (ISORDIL) 30 milliGRAM(s) Oral three times a day  nitroglycerin  Infusion 10 MICROgram(s)/Min (3 mL/Hr) IV Continuous <Continuous>  atorvastatin 80 milliGRAM(s) Oral at bedtime  aspirin  chewable 81 milliGRAM(s) Oral daily  ticagrelor 90 milliGRAM(s) Oral every 12 hours  ===================HEMATOLOGIC/ONC ===================  H/H stable  - Continue AC therapy with IV Heparin     heparin  Infusion 1700 Unit(s)/Hr (23 mL/Hr) IV Continuous <Continuous>    ===================== RENAL =========================  JAXON likely prerenal 2/2 dehydration 2/2 DKA; resolved  -SCr 2.14, BUN 44, fluid responsive, improved to baseline SCr/BUN with IVF  -UAs significant for large ketones and glucose  -Monitor I/Os, UO      ==================== GASTROINTESTINAL===================  Transaminitis: Elevated LFTs - resolved  -AST/ALT improving with fluids, possible shock liver due to hypovolemia from DKA    Diet: NPO due to DKA      dextrose 5% + sodium chloride 0.45% with potassium chloride 20 mEq/L 1000 milliLiter(s) (200 mL/Hr) IV Continuous <Continuous>  thiamine IVPB 500 milliGRAM(s) IV Intermittent three times a day    =======================    ENDOCRINE  =====================  DKA:  - DKA protocol initiated - c/w insulin gtt, will transition to long acting insulin and NPH - improving  - K 3.2 -> 3.7; was ordered for 40 mEq PO x 2, 10 mEq IV, insulin gtt started once K > 3.3  - will continue to supplement K as level is < 5.3 ordered NS bolus with 30 mEq of K  - c/w IVF resuscitation per DKA protocol, D5W + 1/2 NS  - BMP m4nutbw, BHB q1hour  to monitor AG + K, will supplement PRN  - HbA1c 9.8 in 10/2021; pt seems to have history of uncontrolled T2DM, currently unable to answer questions regarding his PMH  - Will bridge from insulin gtt to long acting NPH once AG closes and FSG < 200    HAGMA: - resolving  -AG closed, bicarb ~18 - resolving  -d/c bicarb gtt  -IVF resuscitation  -DKA management as below      insulin lispro (ADMELOG) corrective regimen sliding scale   SubCutaneous every 6 hours  insulin NPH human recombinant 13 Unit(s) SubCutaneous every 6 hours  insulin regular Infusion 6 Unit(s)/Hr (6 mL/Hr) IV Continuous <Continuous>    ========================INFECTIOUS DISEASE================  UA x 2 negative for bacteria, afebrile, although WBC 11.23, HR 130s, RR > 20  - Pt without strong objective or clinical evidence of infection. Will observe off antibiotics      Patient requires continuous monitoring with bedside rhythm monitoring, pulse ox monitoring, and intermittent blood gas analysis. Care plan discussed with ICU care team. Patient remained critical and at risk for life threatening decompensation.  Patient seen, examined and plan discussed with CCU team during rounds.     I have personally provided ____ minutes of critical care time excluding time spent on separate procedures, in addition to initial critical care time provided by the CICU Attending, Dr. Byrne.     By signing my name below, I, Lashell Sanches, attest that this documentation has been prepared under the direction and in the presence of Preethi Booth  Electronically signed: Devang Diaz, 22 @ 19:46    I, Preethi Booth, personally performed the services described in this documentation. all medical record entries made by the sarahibdaylin were at my direction and in my presence. I have reviewed the chart and agree that the record reflects my personal performance and is accurate and complete  Electronically signed: Preethi Booth

## 2022-03-03 NOTE — DIETITIAN INITIAL EVALUATION ADULT. - ORAL INTAKE PTA/DIET HISTORY
Pt with AMS, collateral obtained from family member at bedside. Reports: to family's knowledge pt eating regularly PTA but not "well" in regards to his diabetes. No recent known changes in appetite or PO intake. Reports pt with EtOH abuse, family unable to quantify extent of use, states "middle to high" intake. No known micronutrient supplementation PTA. NKFA. PTA pt with no difficulty chewing/swallowing.

## 2022-03-04 ENCOUNTER — RESULT REVIEW (OUTPATIENT)
Age: 57
End: 2022-03-04

## 2022-03-04 LAB
ALBUMIN SERPL ELPH-MCNC: 2.3 G/DL — LOW (ref 3.3–5)
ALBUMIN SERPL ELPH-MCNC: 2.5 G/DL — LOW (ref 3.3–5)
ALBUMIN SERPL ELPH-MCNC: 2.7 G/DL — LOW (ref 3.3–5)
ALP SERPL-CCNC: 57 U/L — SIGNIFICANT CHANGE UP (ref 40–120)
ALP SERPL-CCNC: 63 U/L — SIGNIFICANT CHANGE UP (ref 40–120)
ALP SERPL-CCNC: 68 U/L — SIGNIFICANT CHANGE UP (ref 40–120)
ALT FLD-CCNC: 74 U/L — HIGH (ref 10–45)
ALT FLD-CCNC: 74 U/L — HIGH (ref 10–45)
ALT FLD-CCNC: 86 U/L — HIGH (ref 10–45)
ANA TITR SER: NEGATIVE — SIGNIFICANT CHANGE UP
ANION GAP SERPL CALC-SCNC: 11 MMOL/L — SIGNIFICANT CHANGE UP (ref 5–17)
ANION GAP SERPL CALC-SCNC: 13 MMOL/L — SIGNIFICANT CHANGE UP (ref 5–17)
ANION GAP SERPL CALC-SCNC: 15 MMOL/L — SIGNIFICANT CHANGE UP (ref 5–17)
APTT BLD: 61.3 SEC — HIGH (ref 27.5–35.5)
APTT BLD: 62.8 SEC — HIGH (ref 27.5–35.5)
AST SERPL-CCNC: 31 U/L — SIGNIFICANT CHANGE UP (ref 10–40)
AST SERPL-CCNC: 32 U/L — SIGNIFICANT CHANGE UP (ref 10–40)
AST SERPL-CCNC: 44 U/L — HIGH (ref 10–40)
BILIRUB SERPL-MCNC: 0.4 MG/DL — SIGNIFICANT CHANGE UP (ref 0.2–1.2)
BILIRUB SERPL-MCNC: 0.4 MG/DL — SIGNIFICANT CHANGE UP (ref 0.2–1.2)
BILIRUB SERPL-MCNC: 0.6 MG/DL — SIGNIFICANT CHANGE UP (ref 0.2–1.2)
BUN SERPL-MCNC: 10 MG/DL — SIGNIFICANT CHANGE UP (ref 7–23)
BUN SERPL-MCNC: 8 MG/DL — SIGNIFICANT CHANGE UP (ref 7–23)
BUN SERPL-MCNC: 9 MG/DL — SIGNIFICANT CHANGE UP (ref 7–23)
CALCIUM SERPL-MCNC: 7.7 MG/DL — LOW (ref 8.4–10.5)
CALCIUM SERPL-MCNC: 7.8 MG/DL — LOW (ref 8.4–10.5)
CALCIUM SERPL-MCNC: 7.9 MG/DL — LOW (ref 8.4–10.5)
CHLORIDE SERPL-SCNC: 103 MMOL/L — SIGNIFICANT CHANGE UP (ref 96–108)
CHLORIDE SERPL-SCNC: 106 MMOL/L — SIGNIFICANT CHANGE UP (ref 96–108)
CHLORIDE SERPL-SCNC: 107 MMOL/L — SIGNIFICANT CHANGE UP (ref 96–108)
CO2 SERPL-SCNC: 16 MMOL/L — LOW (ref 22–31)
CO2 SERPL-SCNC: 17 MMOL/L — LOW (ref 22–31)
CO2 SERPL-SCNC: 20 MMOL/L — LOW (ref 22–31)
CREAT SERPL-MCNC: 0.49 MG/DL — LOW (ref 0.5–1.3)
CREAT SERPL-MCNC: 0.5 MG/DL — SIGNIFICANT CHANGE UP (ref 0.5–1.3)
CREAT SERPL-MCNC: 0.53 MG/DL — SIGNIFICANT CHANGE UP (ref 0.5–1.3)
EGFR: 119 ML/MIN/1.73M2 — SIGNIFICANT CHANGE UP
EGFR: 121 ML/MIN/1.73M2 — SIGNIFICANT CHANGE UP
EGFR: 122 ML/MIN/1.73M2 — SIGNIFICANT CHANGE UP
ERYTHROCYTE [SEDIMENTATION RATE] IN BLOOD: 6 MM/HR — SIGNIFICANT CHANGE UP (ref 0–20)
FOLATE SERPL-MCNC: >20 NG/ML — SIGNIFICANT CHANGE UP
GAS PNL BLDA: SIGNIFICANT CHANGE UP
GLUCOSE BLDC GLUCOMTR-MCNC: 114 MG/DL — HIGH (ref 70–99)
GLUCOSE BLDC GLUCOMTR-MCNC: 130 MG/DL — HIGH (ref 70–99)
GLUCOSE BLDC GLUCOMTR-MCNC: 131 MG/DL — HIGH (ref 70–99)
GLUCOSE BLDC GLUCOMTR-MCNC: 141 MG/DL — HIGH (ref 70–99)
GLUCOSE BLDC GLUCOMTR-MCNC: 142 MG/DL — HIGH (ref 70–99)
GLUCOSE BLDC GLUCOMTR-MCNC: 142 MG/DL — HIGH (ref 70–99)
GLUCOSE BLDC GLUCOMTR-MCNC: 151 MG/DL — HIGH (ref 70–99)
GLUCOSE BLDC GLUCOMTR-MCNC: 156 MG/DL — HIGH (ref 70–99)
GLUCOSE BLDC GLUCOMTR-MCNC: 159 MG/DL — HIGH (ref 70–99)
GLUCOSE BLDC GLUCOMTR-MCNC: 171 MG/DL — HIGH (ref 70–99)
GLUCOSE BLDC GLUCOMTR-MCNC: 183 MG/DL — HIGH (ref 70–99)
GLUCOSE BLDC GLUCOMTR-MCNC: 201 MG/DL — HIGH (ref 70–99)
GLUCOSE SERPL-MCNC: 130 MG/DL — HIGH (ref 70–99)
GLUCOSE SERPL-MCNC: 166 MG/DL — HIGH (ref 70–99)
GLUCOSE SERPL-MCNC: 176 MG/DL — HIGH (ref 70–99)
HCT VFR BLD CALC: 27.8 % — LOW (ref 39–50)
HCYS SERPL-MCNC: 4.3 UMOL/L — SIGNIFICANT CHANGE UP
HGB BLD-MCNC: 9.7 G/DL — LOW (ref 13–17)
INR BLD: 1.16 RATIO — SIGNIFICANT CHANGE UP (ref 0.88–1.16)
IRON SATN MFR SERPL: 22 % — SIGNIFICANT CHANGE UP (ref 16–55)
IRON SATN MFR SERPL: 43 UG/DL — LOW (ref 45–165)
LACTATE SERPL-SCNC: 1 MMOL/L — SIGNIFICANT CHANGE UP (ref 0.7–2)
MAGNESIUM SERPL-MCNC: 1.8 MG/DL — SIGNIFICANT CHANGE UP (ref 1.6–2.6)
MAGNESIUM SERPL-MCNC: 1.9 MG/DL — SIGNIFICANT CHANGE UP (ref 1.6–2.6)
MAGNESIUM SERPL-MCNC: 2 MG/DL — SIGNIFICANT CHANGE UP (ref 1.6–2.6)
MCHC RBC-ENTMCNC: 29.7 PG — SIGNIFICANT CHANGE UP (ref 27–34)
MCHC RBC-ENTMCNC: 34.9 GM/DL — SIGNIFICANT CHANGE UP (ref 32–36)
MCV RBC AUTO: 85 FL — SIGNIFICANT CHANGE UP (ref 80–100)
METHANOL BLD-MCNC: <0.01 G/DL — SIGNIFICANT CHANGE UP (ref 0–0.01)
METHANOL BLD-MCNC: <0.01 G/DL — SIGNIFICANT CHANGE UP (ref 0–0.01)
NRBC # BLD: 0 /100 WBCS — SIGNIFICANT CHANGE UP (ref 0–0)
PHOSPHATE SERPL-MCNC: 1.6 MG/DL — LOW (ref 2.5–4.5)
PHOSPHATE SERPL-MCNC: 1.8 MG/DL — LOW (ref 2.5–4.5)
PHOSPHATE SERPL-MCNC: 2.4 MG/DL — LOW (ref 2.5–4.5)
PLATELET # BLD AUTO: 227 K/UL — SIGNIFICANT CHANGE UP (ref 150–400)
POTASSIUM SERPL-MCNC: 3.7 MMOL/L — SIGNIFICANT CHANGE UP (ref 3.5–5.3)
POTASSIUM SERPL-MCNC: 3.7 MMOL/L — SIGNIFICANT CHANGE UP (ref 3.5–5.3)
POTASSIUM SERPL-MCNC: 4.4 MMOL/L — SIGNIFICANT CHANGE UP (ref 3.5–5.3)
POTASSIUM SERPL-SCNC: 3.7 MMOL/L — SIGNIFICANT CHANGE UP (ref 3.5–5.3)
POTASSIUM SERPL-SCNC: 3.7 MMOL/L — SIGNIFICANT CHANGE UP (ref 3.5–5.3)
POTASSIUM SERPL-SCNC: 4.4 MMOL/L — SIGNIFICANT CHANGE UP (ref 3.5–5.3)
PROT SERPL-MCNC: 4.3 G/DL — LOW (ref 6–8.3)
PROT SERPL-MCNC: 4.3 G/DL — LOW (ref 6–8.3)
PROT SERPL-MCNC: 4.6 G/DL — LOW (ref 6–8.3)
PROT SERPL-MCNC: 5.1 G/DL — LOW (ref 6–8.3)
PROT SERPL-MCNC: 5.2 G/DL — LOW (ref 6–8.3)
PROTHROM AB SERPL-ACNC: 13.5 SEC — HIGH (ref 10.5–13.4)
RBC # BLD: 3.27 M/UL — LOW (ref 4.2–5.8)
RBC # FLD: 14.5 % — SIGNIFICANT CHANGE UP (ref 10.3–14.5)
RHEUMATOID FACT SERPL-ACNC: 11 IU/ML — SIGNIFICANT CHANGE UP (ref 0–13)
SODIUM SERPL-SCNC: 135 MMOL/L — SIGNIFICANT CHANGE UP (ref 135–145)
SODIUM SERPL-SCNC: 136 MMOL/L — SIGNIFICANT CHANGE UP (ref 135–145)
SODIUM SERPL-SCNC: 137 MMOL/L — SIGNIFICANT CHANGE UP (ref 135–145)
T PALLIDUM AB TITR SER: NEGATIVE — SIGNIFICANT CHANGE UP
TIBC SERPL-MCNC: 197 UG/DL — LOW (ref 220–430)
UIBC SERPL-MCNC: 153 UG/DL — SIGNIFICANT CHANGE UP (ref 110–370)
VIT B12 SERPL-MCNC: 1350 PG/ML — HIGH (ref 232–1245)
WBC # BLD: 13.08 K/UL — HIGH (ref 3.8–10.5)
WBC # FLD AUTO: 13.08 K/UL — HIGH (ref 3.8–10.5)

## 2022-03-04 PROCEDURE — 70544 MR ANGIOGRAPHY HEAD W/O DYE: CPT | Mod: 26,59

## 2022-03-04 PROCEDURE — 99292 CRITICAL CARE ADDL 30 MIN: CPT | Mod: 25

## 2022-03-04 PROCEDURE — 88305 TISSUE EXAM BY PATHOLOGIST: CPT | Mod: 26

## 2022-03-04 PROCEDURE — 99291 CRITICAL CARE FIRST HOUR: CPT | Mod: 25

## 2022-03-04 PROCEDURE — 71045 X-RAY EXAM CHEST 1 VIEW: CPT | Mod: 26

## 2022-03-04 PROCEDURE — 11105 PUNCH BX SKIN EA SEP/ADDL: CPT

## 2022-03-04 PROCEDURE — 99223 1ST HOSP IP/OBS HIGH 75: CPT | Mod: 25

## 2022-03-04 PROCEDURE — 70553 MRI BRAIN STEM W/O & W/DYE: CPT | Mod: 26

## 2022-03-04 PROCEDURE — 88342 IMHCHEM/IMCYTCHM 1ST ANTB: CPT | Mod: 26

## 2022-03-04 PROCEDURE — 93010 ELECTROCARDIOGRAM REPORT: CPT

## 2022-03-04 PROCEDURE — 70549 MR ANGIOGRAPH NECK W/O&W/DYE: CPT | Mod: 26

## 2022-03-04 PROCEDURE — 88312 SPECIAL STAINS GROUP 1: CPT | Mod: 26

## 2022-03-04 PROCEDURE — 11104 PUNCH BX SKIN SINGLE LESION: CPT

## 2022-03-04 RX ORDER — MAGNESIUM SULFATE 500 MG/ML
1 VIAL (ML) INJECTION ONCE
Refills: 0 | Status: COMPLETED | OUTPATIENT
Start: 2022-03-04 | End: 2022-03-04

## 2022-03-04 RX ORDER — POTASSIUM CHLORIDE 20 MEQ
10 PACKET (EA) ORAL
Refills: 0 | Status: COMPLETED | OUTPATIENT
Start: 2022-03-04 | End: 2022-03-04

## 2022-03-04 RX ORDER — SODIUM CHLORIDE 9 MG/ML
1000 INJECTION INTRAMUSCULAR; INTRAVENOUS; SUBCUTANEOUS ONCE
Refills: 0 | Status: DISCONTINUED | OUTPATIENT
Start: 2022-03-04 | End: 2022-03-04

## 2022-03-04 RX ORDER — INSULIN GLARGINE 100 [IU]/ML
50 INJECTION, SOLUTION SUBCUTANEOUS
Refills: 0 | Status: DISCONTINUED | OUTPATIENT
Start: 2022-03-04 | End: 2022-03-08

## 2022-03-04 RX ORDER — HEPARIN SODIUM 5000 [USP'U]/ML
2300 INJECTION INTRAVENOUS; SUBCUTANEOUS
Qty: 25000 | Refills: 0 | Status: DISCONTINUED | OUTPATIENT
Start: 2022-03-04 | End: 2022-03-06

## 2022-03-04 RX ORDER — POTASSIUM PHOSPHATE, MONOBASIC POTASSIUM PHOSPHATE, DIBASIC 236; 224 MG/ML; MG/ML
15 INJECTION, SOLUTION INTRAVENOUS ONCE
Refills: 0 | Status: COMPLETED | OUTPATIENT
Start: 2022-03-04 | End: 2022-03-04

## 2022-03-04 RX ORDER — HYDRALAZINE HCL 50 MG
25 TABLET ORAL THREE TIMES A DAY
Refills: 0 | Status: DISCONTINUED | OUTPATIENT
Start: 2022-03-04 | End: 2022-03-06

## 2022-03-04 RX ORDER — INSULIN GLARGINE 100 [IU]/ML
50 INJECTION, SOLUTION SUBCUTANEOUS ONCE
Refills: 0 | Status: COMPLETED | OUTPATIENT
Start: 2022-03-04 | End: 2022-03-04

## 2022-03-04 RX ORDER — SODIUM CHLORIDE 9 MG/ML
500 INJECTION INTRAMUSCULAR; INTRAVENOUS; SUBCUTANEOUS ONCE
Refills: 0 | Status: COMPLETED | OUTPATIENT
Start: 2022-03-04 | End: 2022-03-04

## 2022-03-04 RX ORDER — INSULIN LISPRO 100/ML
VIAL (ML) SUBCUTANEOUS AT BEDTIME
Refills: 0 | Status: DISCONTINUED | OUTPATIENT
Start: 2022-03-04 | End: 2022-03-11

## 2022-03-04 RX ORDER — POTASSIUM PHOSPHATE, MONOBASIC POTASSIUM PHOSPHATE, DIBASIC 236; 224 MG/ML; MG/ML
30 INJECTION, SOLUTION INTRAVENOUS ONCE
Refills: 0 | Status: COMPLETED | OUTPATIENT
Start: 2022-03-04 | End: 2022-03-04

## 2022-03-04 RX ORDER — MIDAZOLAM HYDROCHLORIDE 1 MG/ML
2 INJECTION, SOLUTION INTRAMUSCULAR; INTRAVENOUS ONCE
Refills: 0 | Status: DISCONTINUED | OUTPATIENT
Start: 2022-03-04 | End: 2022-03-04

## 2022-03-04 RX ORDER — DEXMEDETOMIDINE HYDROCHLORIDE IN 0.9% SODIUM CHLORIDE 4 UG/ML
0.5 INJECTION INTRAVENOUS
Qty: 200 | Refills: 0 | Status: DISCONTINUED | OUTPATIENT
Start: 2022-03-04 | End: 2022-03-04

## 2022-03-04 RX ORDER — NOREPINEPHRINE BITARTRATE/D5W 8 MG/250ML
0.02 PLASTIC BAG, INJECTION (ML) INTRAVENOUS
Qty: 8 | Refills: 0 | Status: DISCONTINUED | OUTPATIENT
Start: 2022-03-04 | End: 2022-03-04

## 2022-03-04 RX ORDER — INSULIN LISPRO 100/ML
VIAL (ML) SUBCUTANEOUS EVERY 6 HOURS
Refills: 0 | Status: DISCONTINUED | OUTPATIENT
Start: 2022-03-04 | End: 2022-03-04

## 2022-03-04 RX ORDER — MAGNESIUM SULFATE 500 MG/ML
2 VIAL (ML) INJECTION ONCE
Refills: 0 | Status: COMPLETED | OUTPATIENT
Start: 2022-03-04 | End: 2022-03-04

## 2022-03-04 RX ORDER — INSULIN LISPRO 100/ML
VIAL (ML) SUBCUTANEOUS
Refills: 0 | Status: DISCONTINUED | OUTPATIENT
Start: 2022-03-04 | End: 2022-03-11

## 2022-03-04 RX ADMIN — Medication 100 GRAM(S): at 10:33

## 2022-03-04 RX ADMIN — HEPARIN SODIUM 23 UNIT(S)/HR: 5000 INJECTION INTRAVENOUS; SUBCUTANEOUS at 18:15

## 2022-03-04 RX ADMIN — NYSTATIN CREAM 1 APPLICATION(S): 100000 CREAM TOPICAL at 18:47

## 2022-03-04 RX ADMIN — ATORVASTATIN CALCIUM 80 MILLIGRAM(S): 80 TABLET, FILM COATED ORAL at 20:47

## 2022-03-04 RX ADMIN — Medication 100 MILLIEQUIVALENT(S): at 10:33

## 2022-03-04 RX ADMIN — Medication 105 MILLIGRAM(S): at 16:27

## 2022-03-04 RX ADMIN — POTASSIUM PHOSPHATE, MONOBASIC POTASSIUM PHOSPHATE, DIBASIC 83.33 MILLIMOLE(S): 236; 224 INJECTION, SOLUTION INTRAVENOUS at 11:38

## 2022-03-04 RX ADMIN — METHADONE HYDROCHLORIDE 5 MILLIGRAM(S): 40 TABLET ORAL at 20:47

## 2022-03-04 RX ADMIN — NYSTATIN CREAM 1 APPLICATION(S): 100000 CREAM TOPICAL at 05:56

## 2022-03-04 RX ADMIN — METHADONE HYDROCHLORIDE 5 MILLIGRAM(S): 40 TABLET ORAL at 00:29

## 2022-03-04 RX ADMIN — DEXTROSE MONOHYDRATE, SODIUM CHLORIDE, AND POTASSIUM CHLORIDE 200 MILLILITER(S): 50; .745; 4.5 INJECTION, SOLUTION INTRAVENOUS at 18:15

## 2022-03-04 RX ADMIN — MIDAZOLAM HYDROCHLORIDE 2 MILLIGRAM(S): 1 INJECTION, SOLUTION INTRAMUSCULAR; INTRAVENOUS at 13:36

## 2022-03-04 RX ADMIN — DEXMEDETOMIDINE HYDROCHLORIDE IN 0.9% SODIUM CHLORIDE 13.6 MICROGRAM(S)/KG/HR: 4 INJECTION INTRAVENOUS at 03:58

## 2022-03-04 RX ADMIN — Medication 81 MILLIGRAM(S): at 20:47

## 2022-03-04 RX ADMIN — Medication 25 GRAM(S): at 01:51

## 2022-03-04 RX ADMIN — Medication 25 MILLIGRAM(S): at 22:19

## 2022-03-04 RX ADMIN — Medication 105 MILLIGRAM(S): at 22:12

## 2022-03-04 RX ADMIN — DEXMEDETOMIDINE HYDROCHLORIDE IN 0.9% SODIUM CHLORIDE 13.6 MICROGRAM(S)/KG/HR: 4 INJECTION INTRAVENOUS at 10:34

## 2022-03-04 RX ADMIN — HEPARIN SODIUM 23 UNIT(S)/HR: 5000 INJECTION INTRAVENOUS; SUBCUTANEOUS at 05:55

## 2022-03-04 RX ADMIN — TICAGRELOR 90 MILLIGRAM(S): 90 TABLET ORAL at 20:47

## 2022-03-04 RX ADMIN — DEXTROSE MONOHYDRATE, SODIUM CHLORIDE, AND POTASSIUM CHLORIDE 200 MILLILITER(S): 50; .745; 4.5 INJECTION, SOLUTION INTRAVENOUS at 05:24

## 2022-03-04 RX ADMIN — POTASSIUM PHOSPHATE, MONOBASIC POTASSIUM PHOSPHATE, DIBASIC 62.5 MILLIMOLE(S): 236; 224 INJECTION, SOLUTION INTRAVENOUS at 02:34

## 2022-03-04 RX ADMIN — DEXMEDETOMIDINE HYDROCHLORIDE IN 0.9% SODIUM CHLORIDE 13.6 MICROGRAM(S)/KG/HR: 4 INJECTION INTRAVENOUS at 05:54

## 2022-03-04 RX ADMIN — Medication 100 MILLIEQUIVALENT(S): at 15:52

## 2022-03-04 RX ADMIN — Medication 100 MILLIEQUIVALENT(S): at 16:24

## 2022-03-04 RX ADMIN — SODIUM CHLORIDE 500 MILLILITER(S): 9 INJECTION INTRAMUSCULAR; INTRAVENOUS; SUBCUTANEOUS at 15:50

## 2022-03-04 RX ADMIN — ISOSORBIDE DINITRATE 30 MILLIGRAM(S): 5 TABLET ORAL at 22:12

## 2022-03-04 RX ADMIN — Medication 4.07 MICROGRAM(S)/KG/MIN: at 16:00

## 2022-03-04 RX ADMIN — Medication 105 MILLIGRAM(S): at 05:55

## 2022-03-04 RX ADMIN — INSULIN GLARGINE 50 UNIT(S): 100 INJECTION, SOLUTION SUBCUTANEOUS at 11:37

## 2022-03-04 RX ADMIN — CHLORHEXIDINE GLUCONATE 1 APPLICATION(S): 213 SOLUTION TOPICAL at 21:00

## 2022-03-04 NOTE — PROGRESS NOTE ADULT - SUBJECTIVE AND OBJECTIVE BOX
ROSE STRAUSS  MRN-[id number]  Patient is a 56y old  Male who presents with a chief complaint of STEMI, DKA (04 Mar 2022 12:27)    HPI:  55 y/o M with PMH of HTN, T2DM, neuropathy, chronic pain on Methadone & opioids for cervical spine and back injury transferred to [facility] CICU from [place] due to STEMI and DKA. Pt was reportedly found to have inferolateral ST elevations + afib on EKG, trop 7560. Pt initially came to [facility] ED due to nausea and vomiting for 3 days, reportedly had CP several days ago which apparently stopped on its own. Pt currently altered, difficult to obtain history from.    Pt transferred to [facility] for cath, noted to be in afib w/ RVR to 130s-150s, received cardizem 25 mg IV x 2 and started on cardizem gtt, cath was aborted as pt had SCR of 2.14, (baseline 0.7-0.9 in 10/2021), transferred to CCU for optimization of DKA prior to cath.     Baseline SCr 0.7-0.9 in 10/2021 from previous hospitalization at [facility] for cellulitis of hand, was d/rome on bactrim DS + ethambutol x 4 weeks.    Labs from [facility] significant for: WBC 15.53, lactate 5.6, troponin 7560.2, K 3.2, CO2 5, AG 42, SCr/BUN 2.14/44, Glucose 663, calcium 12.7, Mg 2.7, Phos 9.5  FSGs >600 -> 503  ABG 7.19/<19/133/3  UA: large ketones, moderate bacteria, negative LE and nitrite  RVP/COVID negative (01 Mar 2022 13:56)      Hospital Course:  3/1 Transferred to CCU for further management     24 HOUR EVENTS:    REVIEW OF SYSTEMS:    CONSTITUTIONAL: No weakness, fevers or chills  EYES/ENT: No visual changes;  No vertigo or throat pain   NECK: No pain or stiffness  RESPIRATORY: No cough, wheezing, hemoptysis; No shortness of breath  CARDIOVASCULAR: No chest pain or palpitations  GASTROINTESTINAL: No abdominal or epigastric pain. No nausea, vomiting, or hematemesis; No diarrhea or constipation. No melena or hematochezia.  GENITOURINARY: No dysuria, frequency or hematuria  NEUROLOGICAL: No numbness or weakness  SKIN: No itching, rashes      ICU Vital Signs Last 24 Hrs  T(C): 36.4 (04 Mar 2022 19:00), Max: 37.1 (04 Mar 2022 00:00)  T(F): 97.5 (04 Mar 2022 19:00), Max: 98.8 (04 Mar 2022 00:00)  HR: 96 (04 Mar 2022 20:00) (67 - 116)  BP: 147/87 (04 Mar 2022 20:00) (105/66 - 147/87)  BP(mean): 111 (04 Mar 2022 20:00) (81 - 111)  ABP: 106/89 (04 Mar 2022 20:00) (69/40 - 164/81)  ABP(mean): 95 (04 Mar 2022 20:00) (49 - 109)  RR: 21 (04 Mar 2022 20:00) (14 - 62)  SpO2: 100% (04 Mar 2022 20:00) (98% - 100%)      CVP(mm Hg): --  CO: --  CI: --  PA: --  PA(mean): --  PA(direct): --  PCWP: --  LA: --  RA: --  SVR: --  SVRI: --  PVR: --  PVRI: --  I&O's Summary    03 Mar 2022 07:01  -  04 Mar 2022 07:00  --------------------------------------------------------  IN: 6699.7 mL / OUT: 6550 mL / NET: 149.7 mL    04 Mar 2022 07:01  -  04 Mar 2022 20:18  --------------------------------------------------------  IN: 3035.4 mL / OUT: 1050 mL / NET: 1985.4 mL        CAPILLARY BLOOD GLUCOSE    CAPILLARY BLOOD GLUCOSE      POCT Blood Glucose.: 142 mg/dL (04 Mar 2022 16:57)      PHYSICAL EXAM:  GENERAL: No acute distress, well-developed  HEAD:  Atraumatic, Normocephalic  EYES: EOMI, PERRLA, conjunctiva and sclera clear  NECK: Supple, no lymphadenopathy, no JVD  CHEST/LUNG: CTAB; No wheezes, rales, or rhonchi  HEART: Regular rate and rhythm. Normal S1/S2. No murmurs, rubs, or gallops  ABDOMEN: Soft, non-tender, non-distended; normal bowel sounds, no organomegaly  EXTREMITIES:  2+ peripheral pulses b/l, No clubbing, cyanosis, or edema  NEUROLOGY: A&O x 3, no focal deficits  SKIN: No rashes or lesions    ============================I/O===========================   I&O's Detail    03 Mar 2022 07:  -  04 Mar 2022 07:00  --------------------------------------------------------  IN:    Dexmedetomidine: 47.6 mL    dextrose 5% + sodium chloride 0.45% w/ Additives: 800 mL    dextrose 5% + sodium chloride 0.45% w/ Additives: 2600 mL    Enteral Tube Flush: 340 mL    Glucerna: 180 mL    Heparin: 570 mL    Insulin: 66 mL    IV PiggyBack: 150 mL    IV PiggyBack: 833.1 mL    Nitroglycerin: 663 mL    Sodium Bicarbonate: 450 mL  Total IN: 6699.7 mL    OUT:    Incontinent per Condom Catheter (mL): 6550 mL  Total OUT: 6550 mL    Total NET: 149.7 mL      04 Mar 2022 07:01  -  04 Mar 2022 20:18  --------------------------------------------------------  IN:    Dexmedetomidine: 27.2 mL    dextrose 5% + sodium chloride 0.45% w/ Additives: 1600 mL    Heparin: 69 mL    IV PiggyBack: 500 mL    IV PiggyBack: 333.2 mL    Norepinephrine: 6 mL    Sodium Chloride 0.9% Bolus: 500 mL  Total IN: 3035.4 mL    OUT:    Incontinent per Condom Catheter (mL): 1050 mL    Insulin: 0 mL    Nitroglycerin: 0 mL  Total OUT: 1050 mL    Total NET: 1985.4 mL        ============================ LABS =========================                        9.7    13.08 )-----------( 227      ( 04 Mar 2022 00:50 )             27.8         136  |  107  |  9   ----------------------------<  176<H>  4.4   |  16<L>  |  0.50    Ca    7.8<L>      04 Mar 2022 18:15  Phos  2.4     -  Mg     2.0         TPro  5.1<L>  /  Alb  2.5<L>  /  TBili  0.4  /  DBili  x   /  AST  31  /  ALT  74<H>  /  AlkPhos  63  -04    Troponin T, High Sensitivity Result: 704 ng/L (22 @ 12:22)  Troponin T, High Sensitivity Result: 787 ng/L (22 @ 07:15)  Troponin T, High Sensitivity Result: 725 ng/L (22 @ 03:28)  Troponin T, High Sensitivity Result: 704 ng/L (22 @ 23:41)    CKMB Units: 9.6 ng/mL (22 @ 12:22)  CKMB Units: 11.9 ng/mL (22 @ 07:15)  CKMB Units: 13.4 ng/mL (22 @ 03:28)  CKMB Units: 15.5 ng/mL (22 @ 23:41)    Creatine Kinase, Serum: 3235 U/L (22 @ 12:22)  Creatine Kinase, Serum: 3840 U/L (22 @ 07:15)  Creatine Kinase, Serum: 4100 U/L (22 @ 03:28)  Creatine Kinase, Serum: 4655 U/L (22 @ 23:41)    CPK Mass Assay %: 0.3 % (22 @ 12:22)  CPK Mass Assay %: 0.3 % (22 @ 07:15)  CPK Mass Assay %: 0.3 % (22 @ 03:28)  CPK Mass Assay %: 0.3 % (22 @ 23:41)        LIVER FUNCTIONS - ( 04 Mar 2022 18:15 )  Alb: 2.5 g/dL / Pro: 5.1 g/dL / ALK PHOS: 63 U/L / ALT: 74 U/L / AST: 31 U/L / GGT: x           PT/INR - ( 04 Mar 2022 00:50 )   PT: 13.5 sec;   INR: 1.16 ratio         PTT - ( 04 Mar 2022 06:46 )  PTT:62.8 sec  ABG - ( 04 Mar 2022 18:19 )  pH, Arterial: 7.51  pH, Blood: x     /  pCO2: 22    /  pO2: 130   / HCO3: 18    / Base Excess: -2.7  /  SaO2: 98.8              Blood Gas Arterial, Lactate: 0.8 mmol/L (22 @ 18:19)  Blood Gas Arterial, Lactate: 0.6 mmol/L (22 @ 07:57)  Lactate, Blood: 1.0 mmol/L (22 @ 00:50)  Blood Gas Arterial, Lactate: 0.9 mmol/L (22 @ 00:30)  Blood Gas Arterial, Lactate: 1.0 mmol/L (22 @ 04:02)  Blood Gas Arterial, Lactate: 0.8 mmol/L (22 @ 00:23)  Blood Gas Arterial, Lactate: 0.7 mmol/L (22 @ 20:27)  Blood Gas Arterial, Lactate: 0.8 mmol/L (22 @ 18:52)  Blood Gas Arterial, Lactate: 0.6 mmol/L (22 @ 12:13)  Blood Gas Arterial, Lactate: 0.7 mmol/L (22 @ 07:15)  Blood Gas Arterial, Lactate: 0.7 mmol/L (22 @ 05:19)  Lactate, Blood: 0.8 mmol/L (22 @ 03:28)  Blood Gas Arterial, Lactate: 1.0 mmol/L (22 @ 03:26)  Blood Gas Arterial, Lactate: 0.9 mmol/L (22 @ 01:33)  Lactate, Blood: 1.0 mmol/L (22 @ 23:41)  Blood Gas Arterial, Lactate: 1.0 mmol/L (22 @ 23:33)  Blood Gas Arterial, Lactate: 1.2 mmol/L (22 @ 21:47)    Urinalysis Basic - ( 03 Mar 2022 11:37 )    Color: Colorless / Appearance: Clear / S.017 / pH: x  Gluc: x / Ketone: Moderate  / Bili: Negative / Urobili: Negative   Blood: x / Protein: 300 mg/dL / Nitrite: Negative   Leuk Esterase: Negative / RBC: 4 /hpf / WBC 1 /HPF   Sq Epi: x / Non Sq Epi: 0 /hpf / Bacteria: Negative      ======================Micro/Rad/Cardio=================  Telemtry: Reviewed   EKG: Reviewed  CXR: Reviewed  Culture: Reviewed   Echo: Transthoracic Echocardiogram:   Patient name: ROSE STRAUSS  YOB: 1965   Age: 56 (M)   MR#: [id number]  Study Date: 3/2/2022  [facility] Blas Pablo RD[facility]  Study quality: Technically good  Referring Physician: Richard Byrne MD  Blood Pressure: 121/57 mmHg  Height: 188 cm  Weight: 108 kg  BSA: 2.3 m2  ------------------------------------------------------------------------  PROCEDURE: Transthoracic echocardiogram with 2-D, M-Mode  and complete spectral and color flow Doppler.  INDICATION:ST elevation (STEMI) myocardial infarction of  unspecified site (I21.3)  ------------------------------------------------------------------------  Dimensions:    Normal Values:  LA:     5.1    2.0 - 4.0 cm  Ao:     3.7    2.0 - 3.8 cm  SEPTUM: 0.80.6 - 1.2 cm  PWT:    0.9    0.6 - 1.1 cm  LVIDd:  5.4    3.0 - 5.6 cm  LVIDs:  4.0    1.8 - 4.0 cm  Derived variables:  LVMI: 72 g/m2  RWT: 0.33  Fractional short: 26 %  EF (Ha Rule): 48 %Doppler Peak Velocity (m/sec):  AoV=1.2  ------------------------------------------------------------------------  Conclusions:  1. Normal left ventricular internal dimensions and wall  thicknesses.  2. Mild left ventricular systolic dysfunction.  Hypokinesis of the basal inferior wall, inferolateral wall.  3. Mild diastolic dysfunction (Stage I).  *** Compared with echocardiogram of 3/1/2022, no  significant changes noted.  ------------------------------------------------------------------------  Confirmed on  3/2/2022 - 11:34:05 by MANJEET Jeff  ------------------------------------------------------------------------ (22 @ 09:48)    Cath:   ======================================================  PAST MEDICAL & SURGICAL HISTORY:  Hypertension    Diabetes mellitus    Gastric ulcer    Spinal stenosis    H/O spinal cord compression    Spondylosis    H/O radiculopathy    H/O cervical spine surgery    History of back surgery    S/P cervical spinal fusion      ====================ASSESSMENT ==============  55 y/o M with PMH of HTN, T2DM, neuropathy, chronic pain (on Methadone & opioids) for cervical spine and back injury transferred to [facility] CCU from [place] due to STEMI (trop 7560, inferolateral LUIS) and DKA. Found to have A fib w/ RVR to 130s-150s, JAXON w/ SCr 2.14 (baseline 0.7-0.9 in 10/2021), admitted to CCU for optimization of DKA and kidney function prior to cath.      Plan:  ====================== NEUROLOGY=====================  Mental status: altered. Likely metabolic encephalopathy 2/2 DKA  -Currently A&O x 0, worsening  -neurology consulted, appreciate recs  -CT Head:  Volume loss, microvascular disease, age indeterminate lacunar infarcts, Foci of air in the left sella turcica with bony thinning possible dehiscence, edentulous maxilla with dental hardware, bony calvarial sclerosis correlate renal function.   -plan for MRI head/neck to evaluate CT Head findings;  -will treat DKA as below  - c/w methadone and lorazepam     LORazepam     Tablet 1 milliGRAM(s) Oral every 1 hour PRN CIWA-Ar score 8 or greater  LORazepam     Tablet 2 milliGRAM(s) Oral every 1 hour PRN Symptom-triggered: each CIWA -Ar score 8 or GREATER  LORazepam     Tablet 2 milliGRAM(s) Oral every 2 hours PRN Symptom-triggered: 2 point increase in CIWA -Ar score and a total score of 7 or LESS  LORazepam   Injectable 2 milliGRAM(s) IV Push every 2 hours PRN Symptom-triggered: 2 point increase in CIWA -Ar score and a total score of 7 or LESS  LORazepam   Injectable 2 milliGRAM(s) IntraMuscular every 2 hours PRN Symptom-triggered: 2 point increase in CIWA -Ar score and a total score of 7 or LESS  LORazepam   Injectable 2 milliGRAM(s) IV Push every 1 hour PRN Symptom-triggered: each CIWA -Ar score 8 or GREATER  LORazepam   Injectable 2 milliGRAM(s) IntraMuscular every 1 hour PRN Symptom-triggered: each CIWA -Ar score 8 or GREATER  LORazepam   Injectable 1 milliGRAM(s) IV Push every 1 hour PRN CIWA-Ar score 8 or greater  LORazepam   Injectable 1 milliGRAM(s) IntraMuscular every 1 hour PRN CIWA-Ar score 8 or greater  methadone    Tablet 5 milliGRAM(s) Oral every 6 hours    ==================== RESPIRATORY======================  -Currently satting well on RA, but has deep shallow breathing, likely kussmaul respirations 2/2 DKA  -continue to monitor O2 sats and respiratory status, currently protecting airway. O2 NC PRN       ====================CARDIOVASCULAR==================  STEMI (trop 7560, inferolateral LUIS on EKG), per chart was loaded with 180 mg Ticagrelor, heparin bolus in [facility]  - Revascularization with PCI deferred due to pt's elevated SCr, likely prerenal JXAON 2/2 dehydration from DKA  - MISSY score: 122  - DAPT: ASA 81 + Ticagrelor 90mg BID for 1 year  - Statin: atorvastatin 80mg qD  - Heparin gtt  - limited TTE 3/1/22 showed hypokinetic inferolateral and mid inferior wall, basal inferior wall is akinetic.   - trend trop, CK, CKMB  - TSH and lipid panel      AFib w/ RVR:   - CHADSVASC score: 3  - HAS-BLED score: 1  - currently on heparin gtt  - s/p cardizem 25 mg IVP x 2 and cardizem gtt; d/rome due to potentially decreased EF on limited TTE  - will c/w fluid resuscitation as below  - Monitor on telemetry  - c/w isosorbide     aspirin  chewable 81 milliGRAM(s) Oral daily  atorvastatin 80 milliGRAM(s) Oral at bedtime  isosorbide   dinitrate Tablet (ISORDIL) 30 milliGRAM(s) Oral three times a day  ticagrelor 90 milliGRAM(s) Oral every 12 hours    ===================HEMATOLOGIC/ONC ===================  - Monitor H/H & PLTs  - Continue Heparin for venous thromboembolism prophylaxis.     heparin  Infusion 2300 Unit(s)/Hr (23 mL/Hr) IV Continuous <Continuous>      ===================== RENAL =========================  JAXON likely prerenal 2/2 dehydration 2/2 DKA; resolved  -SCr 2.14, BUN 44, fluid responsive, improved to baseline SCr/BUN with IVF  -UAs significant for large ketones and glucose  -Monitor I/Os, UO    HAGMA: - resolving  -AG closed, bicarb ~18 - resolving  -d/c bicarb gtt  -DKA management as below        ==================== GASTROINTESTINAL===================  Transaminitis: Elevated LFTs - resolved  -AST/ALT improving with fluids, possible shock liver due to hypovolemia from DKA  -Diet: NPO due to DKA  - c/w thiamine     dextrose 5% + sodium chloride 0.45% with potassium chloride 20 mEq/L 1000 milliLiter(s) (200 mL/Hr) IV Continuous <Continuous>  thiamine IVPB 500 milliGRAM(s) IV Intermittent three times a day    =======================    ENDOCRINE  =====================  DKA:  - DKA protocol initiated - c/w insulin gtt, will transition to long acting insulin and NPH - improving  - K 3.2 -> 3.7; was ordered for 40 mEq PO x 2, 10 mEq IV, insulin gtt started once K > 3.3  - will continue to supplement K as level is < 5.3 ordered NS bolus with 30 mEq of K  - c/w IVF resuscitation per DKA protocol, D5W + 1/2 NS  - BMP g6pligu, BHB q1hour  to monitor AG + K, will supplement PRN  - HbA1c 9.8 in 10/2021; pt seems to have history of uncontrolled T2DM, currently unable to answer questions regarding his PMH  - 3/4: Will bridge from insulin gtt to lantus and  NPH as AG closed, c/w ISS for now       insulin lispro (ADMELOG) corrective regimen sliding scale   SubCutaneous every 6 hours    ========================INFECTIOUS DISEASE================  -UA x 2 negative for bacteria, afebrile, although WBC 16 -> 15-> 13, HR 130s, RR > 20  -Pt without strong objective or clinical evidence of infection. Will observe off antibiotics        Patient requires continuous monitoring with bedside rhythm monitoring, pulse ox monitoring, and intermittent blood gas analysis. Care plan discussed with ICU care team. Patient remained critical and at risk for life threatening decompensation.  Patient seen, examined and plan discussed with CCU team during rounds.     I have personally provided ____ minutes of critical care time excluding time spent on separate procedures, in addition to initial critical care time provided by the CICU Attending, Dr. Byrne.    By signing my name below, I, Anita Nichols, attest that this documentation has been prepared under the direction and in the presence of Preethi Booth NP  Electronically signed: Devang Fowler, 22 @ 20:18    IPreethi NP, personally performed the services described in this documentation. all medical record entries made by the scribe were at my direction and in my presence. I have reviewed the chart and agree that the record reflects my personal performance and is accurate and complete  Electronically signed: Preethi Booth NP        ROSE STRAUSS  MRN-[id number]  Patient is a 56y old  Male who presents with a chief complaint of STEMI, DKA (04 Mar 2022 12:27)    HPI:  57 y/o M with PMH of HTN, T2DM, neuropathy, chronic pain on Methadone & opioids for cervical spine and back injury transferred to [facility] CICU from [place] due to STEMI and DKA. Pt was reportedly found to have inferolateral ST elevations + afib on EKG, trop 7560. Pt initially came to [facility] ED due to nausea and vomiting for 3 days, reportedly had CP several days ago which apparently stopped on its own. Pt currently altered, difficult to obtain history from.    Pt transferred to [facility] for cath, noted to be in afib w/ RVR to 130s-150s, received cardizem 25 mg IV x 2 and started on cardizem gtt, cath was aborted as pt had SCR of 2.14, (baseline 0.7-0.9 in 10/2021), transferred to CCU for optimization of DKA prior to cath.     Baseline SCr 0.7-0.9 in 10/2021 from previous hospitalization at [facility] for cellulitis of hand, was d/rome on bactrim DS + ethambutol x 4 weeks.    Labs from [facility] significant for: WBC 15.53, lactate 5.6, troponin 7560.2, K 3.2, CO2 5, AG 42, SCr/BUN 2.14/44, Glucose 663, calcium 12.7, Mg 2.7, Phos 9.5  FSGs >600 -> 503  ABG 7.19/<19/133/3  UA: large ketones, moderate bacteria, negative LE and nitrite  RVP/COVID negative (01 Mar 2022 13:56)      Hospital Course:  3/1 Transferred to CCU for further management     24 HOUR EVENTS:    REVIEW OF SYSTEMS:    CONSTITUTIONAL: No weakness, fevers or chills  EYES/ENT: No visual changes;  No vertigo or throat pain   NECK: No pain or stiffness  RESPIRATORY: No cough, wheezing, hemoptysis; No shortness of breath  CARDIOVASCULAR: No chest pain or palpitations  GASTROINTESTINAL: No abdominal or epigastric pain. No nausea, vomiting, or hematemesis; No diarrhea or constipation. No melena or hematochezia.  GENITOURINARY: No dysuria, frequency or hematuria  NEUROLOGICAL: No numbness or weakness  SKIN: No itching, rashes      ICU Vital Signs Last 24 Hrs  T(C): 36.4 (04 Mar 2022 19:00), Max: 37.1 (04 Mar 2022 00:00)  T(F): 97.5 (04 Mar 2022 19:00), Max: 98.8 (04 Mar 2022 00:00)  HR: 96 (04 Mar 2022 20:00) (67 - 116)  BP: 147/87 (04 Mar 2022 20:00) (105/66 - 147/87)  BP(mean): 111 (04 Mar 2022 20:00) (81 - 111)  ABP: 106/89 (04 Mar 2022 20:00) (69/40 - 164/81)  ABP(mean): 95 (04 Mar 2022 20:00) (49 - 109)  RR: 21 (04 Mar 2022 20:00) (14 - 62)  SpO2: 100% (04 Mar 2022 20:00) (98% - 100%)      CVP(mm Hg): --  CO: --  CI: --  PA: --  PA(mean): --  PA(direct): --  PCWP: --  LA: --  RA: --  SVR: --  SVRI: --  PVR: --  PVRI: --  I&O's Summary    03 Mar 2022 07:01  -  04 Mar 2022 07:00  --------------------------------------------------------  IN: 6699.7 mL / OUT: 6550 mL / NET: 149.7 mL    04 Mar 2022 07:01  -  04 Mar 2022 20:18  --------------------------------------------------------  IN: 3035.4 mL / OUT: 1050 mL / NET: 1985.4 mL        CAPILLARY BLOOD GLUCOSE    CAPILLARY BLOOD GLUCOSE      POCT Blood Glucose.: 142 mg/dL (04 Mar 2022 16:57)      PHYSICAL EXAM:  GENERAL: No acute distress, well-developed  HEAD:  Atraumatic, Normocephalic  EYES: EOMI, PERRLA, conjunctiva and sclera clear  NECK: Supple, no lymphadenopathy, no JVD  CHEST/LUNG: CTAB; No wheezes, rales, or rhonchi  HEART: Regular rate and rhythm. Normal S1/S2. No murmurs, rubs, or gallops  ABDOMEN: Soft, non-tender, non-distended; normal bowel sounds, no organomegaly  EXTREMITIES:  2+ peripheral pulses b/l, No clubbing, cyanosis, or edema  NEUROLOGY: A&O x 3, no focal deficits  SKIN: No rashes or lesions    ============================I/O===========================   I&O's Detail    03 Mar 2022 07:  -  04 Mar 2022 07:00  --------------------------------------------------------  IN:    Dexmedetomidine: 47.6 mL    dextrose 5% + sodium chloride 0.45% w/ Additives: 800 mL    dextrose 5% + sodium chloride 0.45% w/ Additives: 2600 mL    Enteral Tube Flush: 340 mL    Glucerna: 180 mL    Heparin: 570 mL    Insulin: 66 mL    IV PiggyBack: 150 mL    IV PiggyBack: 833.1 mL    Nitroglycerin: 663 mL    Sodium Bicarbonate: 450 mL  Total IN: 6699.7 mL    OUT:    Incontinent per Condom Catheter (mL): 6550 mL  Total OUT: 6550 mL    Total NET: 149.7 mL      04 Mar 2022 07:01  -  04 Mar 2022 20:18  --------------------------------------------------------  IN:    Dexmedetomidine: 27.2 mL    dextrose 5% + sodium chloride 0.45% w/ Additives: 1600 mL    Heparin: 69 mL    IV PiggyBack: 500 mL    IV PiggyBack: 333.2 mL    Norepinephrine: 6 mL    Sodium Chloride 0.9% Bolus: 500 mL  Total IN: 3035.4 mL    OUT:    Incontinent per Condom Catheter (mL): 1050 mL    Insulin: 0 mL    Nitroglycerin: 0 mL  Total OUT: 1050 mL    Total NET: 1985.4 mL        ============================ LABS =========================                        9.7    13.08 )-----------( 227      ( 04 Mar 2022 00:50 )             27.8         136  |  107  |  9   ----------------------------<  176<H>  4.4   |  16<L>  |  0.50    Ca    7.8<L>      04 Mar 2022 18:15  Phos  2.4     -  Mg     2.0         TPro  5.1<L>  /  Alb  2.5<L>  /  TBili  0.4  /  DBili  x   /  AST  31  /  ALT  74<H>  /  AlkPhos  63  -04    Troponin T, High Sensitivity Result: 704 ng/L (22 @ 12:22)  Troponin T, High Sensitivity Result: 787 ng/L (22 @ 07:15)  Troponin T, High Sensitivity Result: 725 ng/L (22 @ 03:28)  Troponin T, High Sensitivity Result: 704 ng/L (22 @ 23:41)    CKMB Units: 9.6 ng/mL (22 @ 12:22)  CKMB Units: 11.9 ng/mL (22 @ 07:15)  CKMB Units: 13.4 ng/mL (22 @ 03:28)  CKMB Units: 15.5 ng/mL (22 @ 23:41)    Creatine Kinase, Serum: 3235 U/L (22 @ 12:22)  Creatine Kinase, Serum: 3840 U/L (22 @ 07:15)  Creatine Kinase, Serum: 4100 U/L (22 @ 03:28)  Creatine Kinase, Serum: 4655 U/L (22 @ 23:41)    CPK Mass Assay %: 0.3 % (22 @ 12:22)  CPK Mass Assay %: 0.3 % (22 @ 07:15)  CPK Mass Assay %: 0.3 % (22 @ 03:28)  CPK Mass Assay %: 0.3 % (22 @ 23:41)        LIVER FUNCTIONS - ( 04 Mar 2022 18:15 )  Alb: 2.5 g/dL / Pro: 5.1 g/dL / ALK PHOS: 63 U/L / ALT: 74 U/L / AST: 31 U/L / GGT: x           PT/INR - ( 04 Mar 2022 00:50 )   PT: 13.5 sec;   INR: 1.16 ratio         PTT - ( 04 Mar 2022 06:46 )  PTT:62.8 sec  ABG - ( 04 Mar 2022 18:19 )  pH, Arterial: 7.51  pH, Blood: x     /  pCO2: 22    /  pO2: 130   / HCO3: 18    / Base Excess: -2.7  /  SaO2: 98.8              Blood Gas Arterial, Lactate: 0.8 mmol/L (22 @ 18:19)  Blood Gas Arterial, Lactate: 0.6 mmol/L (22 @ 07:57)  Lactate, Blood: 1.0 mmol/L (22 @ 00:50)  Blood Gas Arterial, Lactate: 0.9 mmol/L (22 @ 00:30)  Blood Gas Arterial, Lactate: 1.0 mmol/L (22 @ 04:02)  Blood Gas Arterial, Lactate: 0.8 mmol/L (22 @ 00:23)  Blood Gas Arterial, Lactate: 0.7 mmol/L (22 @ 20:27)  Blood Gas Arterial, Lactate: 0.8 mmol/L (22 @ 18:52)  Blood Gas Arterial, Lactate: 0.6 mmol/L (22 @ 12:13)  Blood Gas Arterial, Lactate: 0.7 mmol/L (22 @ 07:15)  Blood Gas Arterial, Lactate: 0.7 mmol/L (22 @ 05:19)  Lactate, Blood: 0.8 mmol/L (22 @ 03:28)  Blood Gas Arterial, Lactate: 1.0 mmol/L (22 @ 03:26)  Blood Gas Arterial, Lactate: 0.9 mmol/L (22 @ 01:33)  Lactate, Blood: 1.0 mmol/L (22 @ 23:41)  Blood Gas Arterial, Lactate: 1.0 mmol/L (22 @ 23:33)  Blood Gas Arterial, Lactate: 1.2 mmol/L (22 @ 21:47)    Urinalysis Basic - ( 03 Mar 2022 11:37 )    Color: Colorless / Appearance: Clear / S.017 / pH: x  Gluc: x / Ketone: Moderate  / Bili: Negative / Urobili: Negative   Blood: x / Protein: 300 mg/dL / Nitrite: Negative   Leuk Esterase: Negative / RBC: 4 /hpf / WBC 1 /HPF   Sq Epi: x / Non Sq Epi: 0 /hpf / Bacteria: Negative      ======================Micro/Rad/Cardio=================  Telemtry: Reviewed   EKG: Reviewed  CXR: Reviewed  Culture: Reviewed   Echo: Transthoracic Echocardiogram:   Patient name: ROSE STRAUSS  YOB: 1965   Age: 56 (M)   MR#: [id number]  Study Date: 3/2/2022  [facility] Blas Pablo RDCS  Study quality: Technically good  Referring Physician: Richard Byrne MD  Blood Pressure: 121/57 mmHg  Height: 188 cm  Weight: 108 kg  BSA: 2.3 m2  ------------------------------------------------------------------------  PROCEDURE: Transthoracic echocardiogram with 2-D, M-Mode  and complete spectral and color flow Doppler.  INDICATION:ST elevation (STEMI) myocardial infarction of  unspecified site (I21.3)  ------------------------------------------------------------------------  Dimensions:    Normal Values:  LA:     5.1    2.0 - 4.0 cm  Ao:     3.7    2.0 - 3.8 cm  SEPTUM: 0.80.6 - 1.2 cm  PWT:    0.9    0.6 - 1.1 cm  LVIDd:  5.4    3.0 - 5.6 cm  LVIDs:  4.0    1.8 - 4.0 cm  Derived variables:  LVMI: 72 g/m2  RWT: 0.33  Fractional short: 26 %  EF (Ha Rule): 48 %Doppler Peak Velocity (m/sec):  AoV=1.2  ------------------------------------------------------------------------  Conclusions:  1. Normal left ventricular internal dimensions and wall  thicknesses.  2. Mild left ventricular systolic dysfunction.  Hypokinesis of the basal inferior wall, inferolateral wall.  3. Mild diastolic dysfunction (Stage I).  *** Compared with echocardiogram of 3/1/2022, no  significant changes noted.  ------------------------------------------------------------------------  Confirmed on  3/2/2022 - 11:34:05 by MANJEET Jeff  ------------------------------------------------------------------------ (22 @ 09:48)    Cath:   ======================================================  PAST MEDICAL & SURGICAL HISTORY:  Hypertension    Diabetes mellitus    Gastric ulcer    Spinal stenosis    H/O spinal cord compression    Spondylosis    H/O radiculopathy    H/O cervical spine surgery    History of back surgery    S/P cervical spinal fusion      ====================ASSESSMENT ==============  57 y/o M with PMH of HTN, T2DM, neuropathy, chronic pain (on Methadone & opioids) for cervical spine and back injury transferred to [facility] CCU from [place] due to STEMI (trop 7560, inferolateral LUIS) and DKA. Found to have A fib w/ RVR to 130s-150s, JAXON w/ SCr 2.14 (baseline 0.7-0.9 in 10/2021), admitted to CCU for optimization of DKA and kidney function prior to cath.      Plan:  ====================== NEUROLOGY=====================  Mental status: altered. Likely metabolic encephalopathy 2/2 DKA  -Currently A&O x 0, improving today, able to follow command and responding appropriately   -neurology consulted, appreciate recs  -CT Head:  Volume loss, microvascular disease, age indeterminate lacunar infarcts, Foci of air in the left sella turcica with bony thinning possible dehiscence, edentulous maxilla with dental hardware, bony calvarial sclerosis correlate renal function.   -s/p MRI head/neck today showing left vertebral artery with critical stenosis.    -Neurology will f/u for plan of care.    - c/w methadone and lorazepam     LORazepam     Tablet 1 milliGRAM(s) Oral every 1 hour PRN CIWA-Ar score 8 or greater  LORazepam     Tablet 2 milliGRAM(s) Oral every 1 hour PRN Symptom-triggered: each CIWA -Ar score 8 or GREATER  LORazepam     Tablet 2 milliGRAM(s) Oral every 2 hours PRN Symptom-triggered: 2 point increase in CIWA -Ar score and a total score of 7 or LESS  LORazepam   Injectable 2 milliGRAM(s) IV Push every 2 hours PRN Symptom-triggered: 2 point increase in CIWA -Ar score and a total score of 7 or LESS  LORazepam   Injectable 2 milliGRAM(s) IntraMuscular every 2 hours PRN Symptom-triggered: 2 point increase in CIWA -Ar score and a total score of 7 or LESS  LORazepam   Injectable 2 milliGRAM(s) IV Push every 1 hour PRN Symptom-triggered: each CIWA -Ar score 8 or GREATER  LORazepam   Injectable 2 milliGRAM(s) IntraMuscular every 1 hour PRN Symptom-triggered: each CIWA -Ar score 8 or GREATER  LORazepam   Injectable 1 milliGRAM(s) IV Push every 1 hour PRN CIWA-Ar score 8 or greater  LORazepam   Injectable 1 milliGRAM(s) IntraMuscular every 1 hour PRN CIWA-Ar score 8 or greater  methadone    Tablet 5 milliGRAM(s) Oral every 6 hours    ==================== RESPIRATORY======================  -Currently satting well on RA, but has deep shallow breathing, likely kussmaul respirations 2/2 DKA  -continue to monitor O2 sats and respiratory status, currently protecting airway. O2 NC PRN       ====================CARDIOVASCULAR==================  STEMI (trop 7560, inferolateral LUIS on EKG), per chart was loaded with 180 mg Ticagrelor, heparin bolus in [facility]  - Revascularization with PCI deferred due to pt's elevated SCr, likely prerenal JAXON 2/2 dehydration from DKA  - MISSY score: 122  - DAPT: ASA 81 + Ticagrelor 90mg BID for 1 year  - Statin: atorvastatin 80mg qD  - Heparin gtt per protocol   - limited TTE 3/1/22 showed hypokinetic inferolateral and mid inferior wall, basal inferior wall is akinetic.   - trend trop, CK, CKMB  - TSH and lipid panel      AFib w/ RVR:   - CHADSVASC score: 3  - HAS-BLED score: 1  - currently on heparin gtt  - s/p cardizem 25 mg IVP x 2 and cardizem gtt; d/rome due to potentially decreased EF on limited TTE  - will c/w fluid resuscitation as below  - Monitor on telemetry  - c/w isosorbide     aspirin  chewable 81 milliGRAM(s) Oral daily  atorvastatin 80 milliGRAM(s) Oral at bedtime  isosorbide   dinitrate Tablet (ISORDIL) 30 milliGRAM(s) Oral three times a day  ticagrelor 90 milliGRAM(s) Oral every 12 hours    ===================HEMATOLOGIC/ONC ===================  - Monitor H/H & PLTs  - Continue Heparin for venous thromboembolism prophylaxis.     heparin  Infusion 2300 Unit(s)/Hr (23 mL/Hr) IV Continuous <Continuous>      ===================== RENAL =========================  JAXON likely prerenal 2/2 dehydration 2/2 DKA; resolved  -SCr 2.14, BUN 44, fluid responsive, improved to baseline SCr/BUN with IVF  -UAs significant for large ketones and glucose  -Monitor I/Os, UO    HAGMA: - resolving  -AG closed, bicarb ~18 - resolving  -transitioned to Lantus todayl, with ISS   -d/c bicarb gtt  -DKA management as below        ==================== GASTROINTESTINAL===================  Transaminitis: Elevated LFTs - resolved  -AST/ALT improving with fluids, possible shock liver due to hypovolemia from DKA  -Diet: will resume oral diet, now with improved mental status and able to swallow   - c/w thiamine     dextrose 5% + sodium chloride 0.45% with potassium chloride 20 mEq/L 1000 milliLiter(s) (200 mL/Hr) IV Continuous <Continuous>  thiamine IVPB 500 milliGRAM(s) IV Intermittent three times a day    =======================    ENDOCRINE  =====================  DKA:  - DKA protocol initiated - transitioned off insulin gtt, will transition to long acting insulin and ISS  - K 3.2 -> 3.7; was ordered for 40 mEq PO x 2, 10 mEq IV, insulin gtt started once K > 3.3  - will continue to supplement K as level is < 5.3 ordered NS bolus with 30 mEq of K  - c/w IVF resuscitation per DKA protocol, D5W + 1/2 NS  - BMP [id number], BHB q1hour  to monitor AG + K, will supplement PRN  - HbA1c 9.8 in 10/2021; pt seems to have history of uncontrolled T2DM, currently unable to answer questions regarding his PMH      insulin lispro (ADMELOG) corrective regimen sliding scale   SubCutaneous every 6 hours    ========================INFECTIOUS DISEASE================  -UA x 2 negative for bacteria, afebrile, although WBC 16 -> 15-> 13, HR 130s, RR > 20  -Pt without strong objective or clinical evidence of infection. Will observe off antibiotics        Patient requires continuous monitoring with bedside rhythm monitoring, pulse ox monitoring, and intermittent blood gas analysis. Care plan discussed with ICU care team. Patient remained critical and at risk for life threatening decompensation.  Patient seen, examined and plan discussed with CCU team during rounds.     I have personally provided __30__ minutes of critical care time excluding time spent on separate procedures, in addition to initial critical care time provided by the CICU Attending, Dr. Byrne.    By signing my name below, I, Anita Nichols, attest that this documentation has been prepared under the direction and in the presence of Preethi Booth NP  Electronically signed: Devang Fowler, 22 @ 20:18    I, Preethi Booth NP, personally performed the services described in this documentation. all medical record entries made by the scribe were at my direction and in my presence. I have reviewed the chart and agree that the record reflects my personal performance and is accurate and complete  Electronically signed: Preethi Booth NP        ROSE STRAUSS  MRN-[id number]  Patient is a 56y old  Male who presents with a chief complaint of STEMI, DKA (04 Mar 2022 12:27)    HPI:  55 y/o M with PMH of HTN, T2DM, neuropathy, chronic pain on Methadone & opioids for cervical spine and back injury transferred to [facility] CICU from [place] due to STEMI and DKA. Pt was reportedly found to have inferolateral ST elevations + afib on EKG, trop 7560. Pt initially came to [facility] ED due to nausea and vomiting for 3 days, reportedly had CP several days ago which apparently stopped on its own. Pt currently altered, difficult to obtain history from.    Pt transferred to [facility] for cath, noted to be in afib w/ RVR to 130s-150s, received cardizem 25 mg IV x 2 and started on cardizem gtt, cath was aborted as pt had SCR of 2.14, (baseline 0.7-0.9 in 10/2021), transferred to CCU for optimization of DKA prior to cath.     Baseline SCr 0.7-0.9 in 10/2021 from previous hospitalization at [facility] for cellulitis of hand, was d/rome on bactrim DS + ethambutol x 4 weeks.    Labs from [facility] significant for: WBC 15.53, lactate 5.6, troponin 7560.2, K 3.2, CO2 5, AG 42, SCr/BUN 2.14/44, Glucose 663, calcium 12.7, Mg 2.7, Phos 9.5  FSGs >600 -> 503  ABG 7.19/<19/133/3  UA: large ketones, moderate bacteria, negative LE and nitrite  RVP/COVID negative (01 Mar 2022 13:56)      Hospital Course:  3/1 Transferred to CCU for further management     24 HOUR EVENTS:    REVIEW OF SYSTEMS:    CONSTITUTIONAL: No weakness, fevers or chills  EYES/ENT: No visual changes;  No vertigo or throat pain   NECK: No pain or stiffness  RESPIRATORY: No cough, wheezing, hemoptysis; No shortness of breath  CARDIOVASCULAR: No chest pain or palpitations  GASTROINTESTINAL: No abdominal or epigastric pain. No nausea, vomiting, or hematemesis; No diarrhea or constipation. No melena or hematochezia.  GENITOURINARY: No dysuria, frequency or hematuria  NEUROLOGICAL: No numbness or weakness  SKIN: No itching, rashes      ICU Vital Signs Last 24 Hrs  T(C): 36.4 (04 Mar 2022 19:00), Max: 37.1 (04 Mar 2022 00:00)  T(F): 97.5 (04 Mar 2022 19:00), Max: 98.8 (04 Mar 2022 00:00)  HR: 96 (04 Mar 2022 20:00) (67 - 116)  BP: 147/87 (04 Mar 2022 20:00) (105/66 - 147/87)  BP(mean): 111 (04 Mar 2022 20:00) (81 - 111)  ABP: 106/89 (04 Mar 2022 20:00) (69/40 - 164/81)  ABP(mean): 95 (04 Mar 2022 20:00) (49 - 109)  RR: 21 (04 Mar 2022 20:00) (14 - 62)  SpO2: 100% (04 Mar 2022 20:00) (98% - 100%)      CVP(mm Hg): --  CO: --  CI: --  PA: --  PA(mean): --  PA(direct): --  PCWP: --  LA: --  RA: --  SVR: --  SVRI: --  PVR: --  PVRI: --  I&O's Summary    03 Mar 2022 07:01  -  04 Mar 2022 07:00  --------------------------------------------------------  IN: 6699.7 mL / OUT: 6550 mL / NET: 149.7 mL    04 Mar 2022 07:01  -  04 Mar 2022 20:18  --------------------------------------------------------  IN: 3035.4 mL / OUT: 1050 mL / NET: 1985.4 mL        CAPILLARY BLOOD GLUCOSE    CAPILLARY BLOOD GLUCOSE      POCT Blood Glucose.: 142 mg/dL (04 Mar 2022 16:57)      PHYSICAL EXAM:  GENERAL: No acute distress, well-developed  HEAD:  Atraumatic, Normocephalic  EYES: EOMI, PERRLA, conjunctiva and sclera clear  NECK: Supple, no lymphadenopathy, no JVD  CHEST/LUNG: CTAB; No wheezes, rales, or rhonchi  HEART: Regular rate and rhythm. Normal S1/S2. No murmurs, rubs, or gallops  ABDOMEN: Soft, non-tender, non-distended; normal bowel sounds, no organomegaly  EXTREMITIES:  2+ peripheral pulses b/l, No clubbing, cyanosis, or edema  NEUROLOGY: A&O x 3, no focal deficits  SKIN: No rashes or lesions    ============================I/O===========================   I&O's Detail    03 Mar 2022 07:  -  04 Mar 2022 07:00  --------------------------------------------------------  IN:    Dexmedetomidine: 47.6 mL    dextrose 5% + sodium chloride 0.45% w/ Additives: 800 mL    dextrose 5% + sodium chloride 0.45% w/ Additives: 2600 mL    Enteral Tube Flush: 340 mL    Glucerna: 180 mL    Heparin: 570 mL    Insulin: 66 mL    IV PiggyBack: 150 mL    IV PiggyBack: 833.1 mL    Nitroglycerin: 663 mL    Sodium Bicarbonate: 450 mL  Total IN: 6699.7 mL    OUT:    Incontinent per Condom Catheter (mL): 6550 mL  Total OUT: 6550 mL    Total NET: 149.7 mL      04 Mar 2022 07:01  -  04 Mar 2022 20:18  --------------------------------------------------------  IN:    Dexmedetomidine: 27.2 mL    dextrose 5% + sodium chloride 0.45% w/ Additives: 1600 mL    Heparin: 69 mL    IV PiggyBack: 500 mL    IV PiggyBack: 333.2 mL    Norepinephrine: 6 mL    Sodium Chloride 0.9% Bolus: 500 mL  Total IN: 3035.4 mL    OUT:    Incontinent per Condom Catheter (mL): 1050 mL    Insulin: 0 mL    Nitroglycerin: 0 mL  Total OUT: 1050 mL    Total NET: 1985.4 mL        ============================ LABS =========================                        9.7    13.08 )-----------( 227      ( 04 Mar 2022 00:50 )             27.8         136  |  107  |  9   ----------------------------<  176<H>  4.4   |  16<L>  |  0.50    Ca    7.8<L>      04 Mar 2022 18:15  Phos  2.4     -  Mg     2.0         TPro  5.1<L>  /  Alb  2.5<L>  /  TBili  0.4  /  DBili  x   /  AST  31  /  ALT  74<H>  /  AlkPhos  63  -04    Troponin T, High Sensitivity Result: 704 ng/L (22 @ 12:22)  Troponin T, High Sensitivity Result: 787 ng/L (22 @ 07:15)  Troponin T, High Sensitivity Result: 725 ng/L (22 @ 03:28)  Troponin T, High Sensitivity Result: 704 ng/L (22 @ 23:41)    CKMB Units: 9.6 ng/mL (22 @ 12:22)  CKMB Units: 11.9 ng/mL (22 @ 07:15)  CKMB Units: 13.4 ng/mL (22 @ 03:28)  CKMB Units: 15.5 ng/mL (22 @ 23:41)    Creatine Kinase, Serum: 3235 U/L (22 @ 12:22)  Creatine Kinase, Serum: 3840 U/L (22 @ 07:15)  Creatine Kinase, Serum: 4100 U/L (22 @ 03:28)  Creatine Kinase, Serum: 4655 U/L (22 @ 23:41)    CPK Mass Assay %: 0.3 % (22 @ 12:22)  CPK Mass Assay %: 0.3 % (22 @ 07:15)  CPK Mass Assay %: 0.3 % (22 @ 03:28)  CPK Mass Assay %: 0.3 % (22 @ 23:41)        LIVER FUNCTIONS - ( 04 Mar 2022 18:15 )  Alb: 2.5 g/dL / Pro: 5.1 g/dL / ALK PHOS: 63 U/L / ALT: 74 U/L / AST: 31 U/L / GGT: x           PT/INR - ( 04 Mar 2022 00:50 )   PT: 13.5 sec;   INR: 1.16 ratio         PTT - ( 04 Mar 2022 06:46 )  PTT:62.8 sec  ABG - ( 04 Mar 2022 18:19 )  pH, Arterial: 7.51  pH, Blood: x     /  pCO2: 22    /  pO2: 130   / HCO3: 18    / Base Excess: -2.7  /  SaO2: 98.8              Blood Gas Arterial, Lactate: 0.8 mmol/L (22 @ 18:19)  Blood Gas Arterial, Lactate: 0.6 mmol/L (22 @ 07:57)  Lactate, Blood: 1.0 mmol/L (22 @ 00:50)  Blood Gas Arterial, Lactate: 0.9 mmol/L (22 @ 00:30)  Blood Gas Arterial, Lactate: 1.0 mmol/L (22 @ 04:02)  Blood Gas Arterial, Lactate: 0.8 mmol/L (22 @ 00:23)  Blood Gas Arterial, Lactate: 0.7 mmol/L (22 @ 20:27)  Blood Gas Arterial, Lactate: 0.8 mmol/L (22 @ 18:52)  Blood Gas Arterial, Lactate: 0.6 mmol/L (22 @ 12:13)  Blood Gas Arterial, Lactate: 0.7 mmol/L (22 @ 07:15)  Blood Gas Arterial, Lactate: 0.7 mmol/L (22 @ 05:19)  Lactate, Blood: 0.8 mmol/L (22 @ 03:28)  Blood Gas Arterial, Lactate: 1.0 mmol/L (22 @ 03:26)  Blood Gas Arterial, Lactate: 0.9 mmol/L (22 @ 01:33)  Lactate, Blood: 1.0 mmol/L (22 @ 23:41)  Blood Gas Arterial, Lactate: 1.0 mmol/L (22 @ 23:33)  Blood Gas Arterial, Lactate: 1.2 mmol/L (22 @ 21:47)    Urinalysis Basic - ( 03 Mar 2022 11:37 )    Color: Colorless / Appearance: Clear / S.017 / pH: x  Gluc: x / Ketone: Moderate  / Bili: Negative / Urobili: Negative   Blood: x / Protein: 300 mg/dL / Nitrite: Negative   Leuk Esterase: Negative / RBC: 4 /hpf / WBC 1 /HPF   Sq Epi: x / Non Sq Epi: 0 /hpf / Bacteria: Negative      ======================Micro/Rad/Cardio=================  Telemtry: Reviewed   EKG: Reviewed  CXR: Reviewed  Culture: Reviewed   Echo: Transthoracic Echocardiogram:   Patient name: ROSE STRAUSS  YOB: 1965   Age: 56 (M)   MR#: [id number]  Study Date: 3/2/2022  [facility] Blas [facility] RDCS  Study quality: Technically good  Referring Physician: Richard Byrne MD  Blood Pressure: 121/57 mmHg  Height: 188 cm  Weight: 108 kg  BSA: 2.3 m2  ------------------------------------------------------------------------  PROCEDURE: Transthoracic echocardiogram with 2-D, M-Mode  and complete spectral and color flow Doppler.  INDICATION:ST elevation (STEMI) myocardial infarction of  unspecified site (I21.3)  ------------------------------------------------------------------------  Dimensions:    Normal Values:  LA:     5.1    2.0 - 4.0 cm  Ao:     3.7    2.0 - 3.8 cm  SEPTUM: 0.80.6 - 1.2 cm  PWT:    0.9    0.6 - 1.1 cm  LVIDd:  5.4    3.0 - 5.6 cm  LVIDs:  4.0    1.8 - 4.0 cm  Derived variables:  LVMI: 72 g/m2  RWT: 0.33  Fractional short: 26 %  EF (Ha Rule): 48 %Doppler Peak Velocity (m/sec):  AoV=1.2  ------------------------------------------------------------------------  Conclusions:  1. Normal left ventricular internal dimensions and wall  thicknesses.  2. Mild left ventricular systolic dysfunction.  Hypokinesis of the basal inferior wall, inferolateral wall.  3. Mild diastolic dysfunction (Stage I).  *** Compared with echocardiogram of 3/1/2022, no  significant changes noted.  ------------------------------------------------------------------------  Confirmed on  3/2/2022 - 11:34:05 by MANJEET Jeff  ------------------------------------------------------------------------ (22 @ 09:48)    Cath:   ======================================================  PAST MEDICAL & SURGICAL HISTORY:  Hypertension    Diabetes mellitus    Gastric ulcer    Spinal stenosis    H/O spinal cord compression    Spondylosis    H/O radiculopathy    H/O cervical spine surgery    History of back surgery    S/P cervical spinal fusion      ====================ASSESSMENT ==============  55 y/o M with PMH of HTN, T2DM, neuropathy, chronic pain (on Methadone & opioids) for cervical spine and back injury transferred to [facility] CCU from [place] due to STEMI (trop 7560, inferolateral LUIS) and DKA. Found to have A fib w/ RVR to 130s-150s, JAXON w/ SCr 2.14 (baseline 0.7-0.9 in 10/2021), admitted to CCU for optimization of DKA and kidney function prior to cath.      Plan:  ====================== NEUROLOGY=====================  Mental status: altered. Likely metabolic encephalopathy 2/2 DKA  -Currently A&O x 0, improving today, able to follow command and responding appropriately   -neurology consulted, appreciate recs  -CT Head:  Volume loss, microvascular disease, age indeterminate lacunar infarcts, Foci of air in the left sella turcica with bony thinning possible dehiscence, edentulous maxilla with dental hardware, bony calvarial sclerosis correlate renal function.   -s/p MRI head/neck today showing left vertebral artery with critical stenosis.    -Neurology will f/u for plan of care.    - c/w methadone and lorazepam     LORazepam     Tablet 1 milliGRAM(s) Oral every 1 hour PRN CIWA-Ar score 8 or greater  LORazepam     Tablet 2 milliGRAM(s) Oral every 1 hour PRN Symptom-triggered: each CIWA -Ar score 8 or GREATER  LORazepam     Tablet 2 milliGRAM(s) Oral every 2 hours PRN Symptom-triggered: 2 point increase in CIWA -Ar score and a total score of 7 or LESS  LORazepam   Injectable 2 milliGRAM(s) IV Push every 2 hours PRN Symptom-triggered: 2 point increase in CIWA -Ar score and a total score of 7 or LESS  LORazepam   Injectable 2 milliGRAM(s) IntraMuscular every 2 hours PRN Symptom-triggered: 2 point increase in CIWA -Ar score and a total score of 7 or LESS  LORazepam   Injectable 2 milliGRAM(s) IV Push every 1 hour PRN Symptom-triggered: each CIWA -Ar score 8 or GREATER  LORazepam   Injectable 2 milliGRAM(s) IntraMuscular every 1 hour PRN Symptom-triggered: each CIWA -Ar score 8 or GREATER  LORazepam   Injectable 1 milliGRAM(s) IV Push every 1 hour PRN CIWA-Ar score 8 or greater  LORazepam   Injectable 1 milliGRAM(s) IntraMuscular every 1 hour PRN CIWA-Ar score 8 or greater  methadone    Tablet 5 milliGRAM(s) Oral every 6 hours    ==================== RESPIRATORY======================  -Currently satting well on RA, but has deep shallow breathing, likely kussmaul respirations 2/2 DKA  -continue to monitor O2 sats and respiratory status, currently protecting airway. O2 NC PRN       ====================CARDIOVASCULAR==================  STEMI (trop 7560, inferolateral LUIS on EKG), per chart was loaded with 180 mg Ticagrelor, heparin bolus in [facility]  - Revascularization with PCI deferred due to pt's elevated SCr, likely prerenal JAXON 2/2 dehydration from DKA  - MISSY score: 122  - DAPT: ASA 81 + Ticagrelor 90mg BID for 1 year  - Statin: atorvastatin 80mg qD  - Heparin gtt per protocol   - limited TTE 3/1/22 showed hypokinetic inferolateral and mid inferior wall, basal inferior wall is akinetic.   - trend trop, CK, CKMB  - TSH and lipid panel      AFib w/ RVR:   - CHADSVASC score: 3  - HAS-BLED score: 1  - currently on heparin gtt  - s/p cardizem 25 mg IVP x 2 and cardizem gtt; d/rome due to potentially decreased EF on limited TTE  - will c/w fluid resuscitation as below  - Monitor on telemetry  - c/w isosorbide     aspirin  chewable 81 milliGRAM(s) Oral daily  atorvastatin 80 milliGRAM(s) Oral at bedtime  isosorbide   dinitrate Tablet (ISORDIL) 30 milliGRAM(s) Oral three times a day  ticagrelor 90 milliGRAM(s) Oral every 12 hours    ===================HEMATOLOGIC/ONC ===================  - Monitor H/H & PLTs  - Continue Heparin for venous thromboembolism prophylaxis.     heparin  Infusion 2300 Unit(s)/Hr (23 mL/Hr) IV Continuous <Continuous>      ===================== RENAL =========================  JAXON likely prerenal 2/2 dehydration 2/2 DKA; resolved  -SCr 2.14, BUN 44, fluid responsive, improved to baseline SCr/BUN with IVF  -UAs significant for large ketones and glucose  -Monitor I/Os, UO    HAGMA: - resolving  -AG closed, bicarb ~18 - resolving  -transitioned to Lantus todayl, with ISS   -d/c bicarb gtt  -DKA management as below        ==================== GASTROINTESTINAL===================  Transaminitis: Elevated LFTs - resolved  -AST/ALT improving with fluids, possible shock liver due to hypovolemia from DKA  -Diet: will resume oral diet, now with improved mental status and able to swallow   - c/w thiamine     dextrose 5% + sodium chloride 0.45% with potassium chloride 20 mEq/L 1000 milliLiter(s) (200 mL/Hr) IV Continuous <Continuous>  thiamine IVPB 500 milliGRAM(s) IV Intermittent three times a day    =======================    ENDOCRINE  =====================  DKA:  - DKA protocol initiated - transitioned off insulin gtt, will transition to long acting insulin and ISS  - K 3.2 -> 3.7; was ordered for 40 mEq PO x 2, 10 mEq IV, insulin gtt started once K > 3.3  - will continue to supplement K as level is < 5.3 ordered NS bolus with 30 mEq of K  - c/w IVF resuscitation per DKA protocol, D5W + 1/2 NS  - BMP a7wianp, BHB q1hour  to monitor AG + K, will supplement PRN  - HbA1c 9.8 in 10/2021; pt seems to have history of uncontrolled T2DM, currently unable to answer questions regarding his PMH      insulin lispro (ADMELOG) corrective regimen sliding scale   SubCutaneous every 6 hours    ========================INFECTIOUS DISEASE================  -UA x 2 negative for bacteria, afebrile, although WBC 16 -> 15-> 13, HR 130s, RR > 20  -Pt without strong objective or clinical evidence of infection. Will observe off antibiotics        Patient requires continuous monitoring with bedside rhythm monitoring, pulse ox monitoring, and intermittent blood gas analysis. Care plan discussed with ICU care team. Patient remained critical and at risk for life threatening decompensation.  Patient seen, examined and plan discussed with CCU team during rounds.     I have personally provided __30__ minutes of critical care time excluding time spent on separate procedures, in addition to initial critical care time provided by the CICU Attending, Dr. Byrne.    By signing my name below, I, Anita Nichols, attest that this documentation has been prepared under the direction and in the presence of Preethi Booth NP  Electronically signed: Devang Fowler, 22 @ 20:18    I, Preethi Booth NP, personally performed the services described in this documentation. all medical record entries made by the scribe were at my direction and in my presence. I have reviewed the chart and agree that the record reflects my personal performance and is accurate and complete  Electronically signed: Preethi Booth NP     _____________________________________________________    Fellow Addendum.     56M PMH HTN, DM2, chronic back pain who presented with IWSTEMI (late) and DKA. Intervention deferred due to JAXON. DKA resolved. Course c/b Afib w/ RVR and subsequent AMS. CTH age indeterminant infarcts with MRI showing stenosis of L vertebral artery. Overall mental status improving. Awaiting recs from interventional neurology regarding vertebral artery stenosis.

## 2022-03-04 NOTE — CONSULT NOTE ADULT - ASSESSMENT
DDx: septic emboli, vs ecythema gangrenosum vs disemeitnitae gonnococemias, vasculitis less likely  - strong suspicion for infection  - broad antimocribotai coverage and fungal coveraign if decomponsated    [ ] biopsy   Left medial malleous – H&E  Right dorsal foot tissue culture  AFB, fungal and mycobacterial  KOH stain   -test for gonorrhea   DDx: septic emboli, vs ecythema gangrenosum vs disemeitnitae gonnococemias, vasculitis less likely  - strong suspicion for infection  - broad antimocribotai coverage and fungal coveraign if decomponsated    [ ] biopsy   Left medial malleous – H&E  Right dorsal foot tissue culture  AFB, fungal and mycobacterial  KOH stain   -test for gonorrhea      DD: septic emboli, ecythma gangrenosum, disseminated gonoccocus, vasculitis ASSESSMENT/PLAN:    #Strong suspicious for infection  DDx: septic emboli, vs ecythema gangrenosum vs disseminated gonnococemias vs vasculitis (less likely)  - Recommend testing for gonorrhea  - broad antimocribial coverage and fungal coverage if pt decompensates  - Biopsy taken as below.  ---  Dermatology Punch Biopsy Procedure Note  -After risks and benefits of procedure including bleeding, infection and scar were reviewed (consents including photo consent reviewed, signed and in chart), allergies were reviewed and time out performed.  -Area cleaned with rubbing alcohol and anesthetized with lidocaine and epinephrine.  Two 4mm punch biopsies were performed to L medial ankle for H&E and R dorsal foot for tissue culture (AFB, fungal, bacterial, and KOH stain), Hemostasis achieved with 4-0 Chromic gut sutures. Dressing with Vaseline. Wound care reviewed with patient and team.  -Sutures are dissolvable, no need for removal. Please leave dressing on for 24-48 hours and after that please apply vaseline on the biopsy site 2-3 times daily to keep area moist and promote healing.  ---    Recommendations were communicated with the primary team.  Discussed with the dermatology attending, Dr. Benites  Dermatology will continue to follow. Thank you for consulting our service.    Tracie Howard MD MPH  Resident Physician, PGY2  [facility] Dermatology  Office: [phone]  Pager: [phone]  **Please page with 10-DIGIT callback # for further related questions.** ASSESSMENT/PLAN:    #Strong suspicion for infection  DDx: septic emboli, vs ecythema gangrenosum vs disseminated gonnococemias vs vasculitis (less likely)  - Recommend testing for gonorrhea  - broad antimocribial coverage and fungal coverage if pt decompensates  - Biopsy taken as below.  ---  Dermatology Punch Biopsy Procedure Note  -After risks and benefits of procedure including bleeding, infection and scar were reviewed (consents including photo consent reviewed, signed and in chart), allergies were reviewed and time out performed.  -Area cleaned with rubbing alcohol and anesthetized with lidocaine and epinephrine.  Two 4mm punch biopsies were performed to L medial ankle for H&E and R dorsal foot for tissue culture (AFB, fungal, bacterial, and KOH stain), Hemostasis achieved with 4-0 Chromic gut sutures. Dressing with Vaseline. Wound care reviewed with patient and team.  -Sutures are dissolvable, no need for removal. Please leave dressing on for 24-48 hours and after that please apply vaseline on the biopsy site 2-3 times daily to keep area moist and promote healing.  ---    Recommendations were communicated with the primary team.  Discussed with the dermatology attending, Dr. Benites  Dermatology will continue to follow. Thank you for consulting our service.    Tracie Howard MD MPH  Resident Physician, PGY2  [facility] Dermatology  Office: [phone]  Pager: [phone]  **Please page with 10-DIGIT callback # for further related questions.**

## 2022-03-04 NOTE — CHART NOTE - NSCHARTNOTEFT_GEN_A_CORE
MRA NECK:  There is no significant stenosis of the origins of the great vessels from the aortic arch.    Right carotid: The right common carotid artery shows no significant stenosis. There is no significant narrowing involving the right carotid bifurcation. No evidence for any hemodynamically significant stenosis at the ICA origin by NASCET criteria. The remainder of the right internal carotid artery to the skull base shows no significant narrowing.    Left carotid: The left common carotid artery shows no significant stenosis. There is no significant narrowing involving the left carotid bifurcation. No evidence for any hemodynamically significant stenosis at the ICA origin by NASCET criteria. The remainder of the left internal carotid artery to the skull base shows no significant narrowing.    Vertebral arteries: There is focal abrupt short segment loss of flow related signal within the midportion of the V2 segment of the left vertebral artery with abrupt reconstitution which may represent focal critical stenosis. There is flow seen in the right cervical vertebral artery without evidence of high-grade stenosis.    MR brain negative    Impression: Incidental focal stenosis of the L. vertebral artery involving the extracranial and intracranial portions. Although high grade, this is asymptomatic. Patient on ASA and Brillanta nontheless.     Plan:   Continue DAPT

## 2022-03-04 NOTE — PROGRESS NOTE ADULT - ASSESSMENT
55 y/o M with PMH of HTN, T2DM, neuropathy, chronic pain (on Methadone & opioids) for cervical spine and back injury transferred to [facility] CCU from [place] due to STEMI (trop 7560, inferolateral LUIS) and DKA. Found to have A fib w/ RVR to 130s-150s, JAXON w/ SCr 2.14 (baseline 0.7-0.9 in 10/2021), admitted to CCU for optimization of DKA and kidney function prior to cath.      NEURO:  #Mental status: altered. Likely metabolic encephalopathy 2/2 DKA  -Currently A&O x 0, worsening  -neurology consulted, appreciate recs  -CT Head:  Volume loss, microvascular disease, age indeterminate lacunar infarcts, Foci of air in the left sella turcica with bony thinning possible dehiscence, edentulous maxilla with dental hardware, bony calvarial sclerosis correlate renal function.   -plan for MRI head/neck to evaluate CT Head findings  -will treat DKA as below  -ordered tox screen, serum methanol, ethyl alcohol,  serum osms    CV:  #STEMI (trop 7560, inferolateral LUIS on EKG), per chart was loaded with 180 mg Ticagrelor, heparin bolus in H  - Revascularization with PCI deferred due to pt's elevated SCr, likely prerenal JAXON 2/2 dehydration from DKA  - MISSY score: 122  - DAPT: ASA 81 + Ticagrelor 90mg BID for 1 year  - Statin: atorvastatin 80mg qD  - Heparin gtt  - limited TTE 3/1/22 showed hypokinetic inferolateral and mid inferior wall, basal inferior wall is akinetic.   - trend trop, CK, CKMB  - TSH and lipid panel  - lopressor 12.5 mg BID -> coreg 12.5 mg BID  -3/2: pt had CP in AM, will c/w nitro gtt, d/rome precedex gtt. c/w hydral and isordil. plan to reach out to cath lab for potential PCI.  -3/3: c/w nitro gtt, hydral and IDN, switched lopressor to coreg    #AFib w/ RVR:   - CHADSVASC score: 3  - HAS-BLED score: 1  - currently on heparin gtt  - s/p cardizem 25 mg IVP x 2 and cardizem gtt; d/rome due to potentially decreased EF on limited TTE  - will c/w fluid resuscitation as below  - Monitor on telemetry  - c/w coreg 12.5 mg BID      PULM:  -Currently satting well on RA, but has deep shallow breathing, likely kussmaul respirations 2/2 DKA  -continue to monitor O2 sats and respiratory status, currently protecting airway. O2 NC PRN    RENAL:  #JAXON likely prerenal 2/2 dehydration 2/2 DKA; resolved  -SCr 2.14, BUN 44, fluid responsive, improved to baseline SCr/BUN with IVF  -UAs significant for large ketones and glucose  -Monitor I/Os, UO    HAGMA: - resolving  -AG closed, bicarb ~18 - resolving  -d/c bicarb gtt  -IVF resuscitation  -DKA management as below    GI:  #Transaminitis: Elevated LFTs - resolved  -AST/ALT improving with fluids, possible shock liver due to hypovolemia from DKA  #Diet: NPO due to DKA    ENDO:  #DKA:  - DKA protocol initiated - c/w insulin gtt, will transition to long acting insulin and NPH - improving  - K 3.2 -> 3.7; was ordered for 40 mEq PO x 2, 10 mEq IV, insulin gtt started once K > 3.3  - will continue to supplement K as level is < 5.3 ordered NS bolus with 30 mEq of K  - c/w IVF resuscitation per DKA protocol, D5W + 1/2 NS  - BMP [id number], BHB q1hour  to monitor AG + K, will supplement PRN  - HbA1c 9.8 in 10/2021; pt seems to have history of uncontrolled T2DM, currently unable to answer questions regarding his PMH  - Will bridge from insulin gtt to long acting NPH once AG closes and FSG < 200      HEMATOLOGIC:  -H/H stable    ID:  UA x 2 negative for bacteria, afebrile, although WBC 16 -> 15, HR 130s, RR > 20  Pt without strong objective or clinical evidence of infection. Will observe off antibiotics    SKIN  #Lines:  PIVs    #Ethics  Full code 57 y/o M with PMH of HTN, T2DM, neuropathy, chronic pain (on Methadone & opioids) for cervical spine and back injury transferred to [facility]U from [facility] due to STEMI (trop 7560, inferolateral LUIS) and DKA. Found to have A fib w/ RVR to 130s-150s, JAXON w/ SCr 2.14 (baseline 0.7-0.9 in 10/2021), admitted to CCU for optimization of DKA and kidney function prior to cath.      NEURO:  #Mental status: altered. Likely metabolic encephalopathy 2/2 DKA  -Currently A&O x 0, worsening  -neurology consulted, appreciate recs  -CT Head:  Volume loss, microvascular disease, age indeterminate lacunar infarcts, Foci of air in the left sella turcica with bony thinning possible dehiscence, edentulous maxilla with dental hardware, bony calvarial sclerosis correlate renal function.   -plan for MRI head/neck to evaluate CT Head findings;  -will treat DKA as below  -ordered tox screen, serum methanol, ethyl alcohol,  serum osms    CV:  #STEMI (trop 7560, inferolateral LUIS on EKG), per chart was loaded with 180 mg Ticagrelor, heparin bolus in H  - Revascularization with PCI deferred due to pt's elevated SCr, likely prerenal JAXON 2/2 dehydration from DKA  - MISSY score: 122  - DAPT: ASA 81 + Ticagrelor 90mg BID for 1 year  - Statin: atorvastatin 80mg qD  - Heparin gtt  - limited TTE 3/1/22 showed hypokinetic inferolateral and mid inferior wall, basal inferior wall is akinetic.   - trend trop, CK, CKMB  - TSH and lipid panel  - lopressor 12.5 mg BID -> coreg 12.5 mg BID  -3/2: pt had CP in AM, will c/w nitro gtt, d/rome precedex gtt. c/w hydral and isordil. plan to reach out to cath lab for potential PCI.  -3/3: c/w nitro gtt, hydral and IDN, switched lopressor to coreg    #AFib w/ RVR:   - CHADSVASC score: 3  - HAS-BLED score: 1  - currently on heparin gtt  - s/p cardizem 25 mg IVP x 2 and cardizem gtt; d/rome due to potentially decreased EF on limited TTE  - will c/w fluid resuscitation as below  - Monitor on telemetry  - c/w coreg 12.5 mg BID      PULM:  -Currently satting well on RA, but has deep shallow breathing, likely kussmaul respirations 2/2 DKA  -continue to monitor O2 sats and respiratory status, currently protecting airway. O2 NC PRN    RENAL:  #JAXON likely prerenal 2/2 dehydration 2/2 DKA; resolved  -SCr 2.14, BUN 44, fluid responsive, improved to baseline SCr/BUN with IVF  -UAs significant for large ketones and glucose  -Monitor I/Os, UO    HAGMA: - resolving  -AG closed, bicarb ~18 - resolving  -d/c bicarb gtt  -IVF resuscitation  -DKA management as below    GI:  #Transaminitis: Elevated LFTs - resolved  -AST/ALT improving with fluids, possible shock liver due to hypovolemia from DKA  #Diet: NPO due to DKA    ENDO:  #DKA:  - DKA protocol initiated - c/w insulin gtt, will transition to long acting insulin and NPH - improving  - K 3.2 -> 3.7; was ordered for 40 mEq PO x 2, 10 mEq IV, insulin gtt started once K > 3.3  - will continue to supplement K as level is < 5.3 ordered NS bolus with 30 mEq of K  - c/w IVF resuscitation per DKA protocol, D5W + 1/2 NS  - BMP [id number], BHB q1hour  to monitor AG + K, will supplement PRN  - HbA1c 9.8 in 10/2021; pt seems to have history of uncontrolled T2DM, currently unable to answer questions regarding his PMH  - Will bridge from insulin gtt to lantus and  NPH once AG closes and FSG < 200      HEMATOLOGIC:  -H/H stable    ID:  UA x 2 negative for bacteria, afebrile, although WBC 16 -> 15, HR 130s, RR > 20  Pt without strong objective or clinical evidence of infection. Will observe off antibiotics    SKIN  #Lines:  PIVs    #Ethics  Full code 57 y/o M with PMH of HTN, T2DM, neuropathy, chronic pain (on Methadone & opioids) for cervical spine and back injury transferred [facility] due to STEMI (trop 7560, inferolateral LUIS) and DKA. Found to have A fib w/ RVR to 130s-150s, JAXON w/ SCr 2.14 (baseline 0.7-0.9 in 10/2021), admitted to CCU for optimization of DKA and kidney function prior to cath.      NEURO:  #Mental status: altered. Likely metabolic encephalopathy 2/2 DKA  -Currently A&O x 0, worsening  -neurology consulted, appreciate recs  -CT Head:  Volume loss, microvascular disease, age indeterminate lacunar infarcts, Foci of air in the left sella turcica with bony thinning possible dehiscence, edentulous maxilla with dental hardware, bony calvarial sclerosis correlate renal function.   -plan for MRI head/neck to evaluate CT Head findings;  -will treat DKA as below  -ordered tox screen, serum methanol, ethyl alcohol,  serum osms    CV:  #STEMI (trop 7560, inferolateral LUIS on EKG), per chart was loaded with 180 mg Ticagrelor, heparin bolus in H  - Revascularization with PCI deferred due to pt's elevated SCr, likely prerenal JAXON 2/2 dehydration from DKA  - MISSY score: 122  - DAPT: ASA 81 + Ticagrelor 90mg BID for 1 year  - Statin: atorvastatin 80mg qD  - Heparin gtt  - limited TTE 3/1/22 showed hypokinetic inferolateral and mid inferior wall, basal inferior wall is akinetic.   - trend trop, CK, CKMB  - TSH and lipid panel  - lopressor 12.5 mg BID -> coreg 12.5 mg BID  -3/2: pt had CP in AM, will c/w nitro gtt, d/rome precedex gtt. c/w hydral and isordil. plan to reach out to cath lab for potential PCI.  -3/3: c/w nitro gtt, hydral and IDN, switched lopressor to coreg    #AFib w/ RVR:   - CHADSVASC score: 3  - HAS-BLED score: 1  - currently on heparin gtt  - s/p cardizem 25 mg IVP x 2 and cardizem gtt; d/rome due to potentially decreased EF on limited TTE  - will c/w fluid resuscitation as below  - Monitor on telemetry  - c/w coreg 12.5 mg BID      PULM:  -Currently satting well on RA, but has deep shallow breathing, likely kussmaul respirations 2/2 DKA  -continue to monitor O2 sats and respiratory status, currently protecting airway. O2 NC PRN    RENAL:  #JAXON likely prerenal 2/2 dehydration 2/2 DKA; resolved  -SCr 2.14, BUN 44, fluid responsive, improved to baseline SCr/BUN with IVF  -UAs significant for large ketones and glucose  -Monitor I/Os, UO    HAGMA: - resolving  -AG closed, bicarb ~18 - resolving  -d/c bicarb gtt  -IVF resuscitation  -DKA management as below    GI:  #Transaminitis: Elevated LFTs - resolved  -AST/ALT improving with fluids, possible shock liver due to hypovolemia from DKA  #Diet: NPO due to DKA    ENDO:  #DKA:  - DKA protocol initiated - c/w insulin gtt, will transition to long acting insulin and NPH - improving  - K 3.2 -> 3.7; was ordered for 40 mEq PO x 2, 10 mEq IV, insulin gtt started once K > 3.3  - will continue to supplement K as level is < 5.3 ordered NS bolus with 30 mEq of K  - c/w IVF resuscitation per DKA protocol, D5W + 1/2 NS  - BMP [id number], BHB q1hour  to monitor AG + K, will supplement PRN  - HbA1c 9.8 in 10/2021; pt seems to have history of uncontrolled T2DM, currently unable to answer questions regarding his PMH  - 3/4: Will bridge from insulin gtt to lantus and  NPH as AG closed      HEMATOLOGIC:  -H/H stable    ID:  UA x 2 negative for bacteria, afebrile, although WBC 16 -> 15, HR 130s, RR > 20  Pt without strong objective or clinical evidence of infection. Will observe off antibiotics    SKIN  #Lines:  A-lne placed 3/1  PIVs    #Ethics  Full code

## 2022-03-04 NOTE — CONSULT NOTE ADULT - SUBJECTIVE AND OBJECTIVE BOX
HPI:  55 y/o M with PMH of HTN, T2DM, neuropathy, chronic pain on Methadone & opioids for cervical spine and back injury transferred to [facility] CICU from [place] due to STEMI and DKA. Pt was reportedly found to have inferolateral ST elevations + afib on EKG, trop 7560. Pt initially came to [facility] ED due to nausea and vomiting for 3 days, reportedly had CP several days ago which apparently stopped on its own. Pt currently altered, difficult to obtain history from.    Pt transferred to [facility] for cath, noted to be in afib w/ RVR to 130s-150s, received cardizem 25 mg IV x 2 and started on cardizem gtt, cath was aborted as pt had SCR of 2.14, (baseline 0.7-0.9 in 10/2021), transferred to CCU for optimization of DKA prior to cath.     Baseline SCr 0.7-0.9 in 10/2021 from previous hospitalization at [facility] for cellulitis of hand, was d/rome on bactrim DS + ethambutol x 4 weeks.    Labs from [facility] significant for: WBC 15.53, lactate 5.6, troponin 7560.2, K 3.2, CO2 5, AG 42, SCr/BUN 2.14/44, Glucose 663, calcium 12.7, Mg 2.7, Phos 9.5  FSGs >600 -> 503  ABG 7.19/<19/133/3  UA: large ketones, moderate bacteria, negative LE and nitrite  RVP/COVID negative (01 Mar 2022 13:56)      PAST MEDICAL & SURGICAL HISTORY:  Hypertension    Diabetes mellitus    Gastric ulcer    Spinal stenosis    H/O spinal cord compression    Spondylosis    H/O radiculopathy    H/O cervical spine surgery    History of back surgery    S/P cervical spinal fusion        REVIEW OF SYSTEMS:  General: no fevers/chills; no lethargy  Skin/Breast: see HPI  Ophthalmologic: no eye pain or changes in vision  ENT: no dysphagia or changes in hearing  Respiratory: no SOB or cough  Cardiovascular: no palpitations or chest pain  Gastrointestinal: no abdominal pain or blood in stool  Genitourinary: no dysuria or frequency  Musculoskeletal: no joint pains or weakness  Neurological: no weakness, numbness, or tingling    MEDICATIONS  (STANDING):  aspirin  chewable 81 milliGRAM(s) Oral daily  atorvastatin 80 milliGRAM(s) Oral at bedtime  carvedilol 12.5 milliGRAM(s) Oral every 12 hours  chlorhexidine 4% Liquid 1 Application(s) Topical <User Schedule>  dexMEDEtomidine Infusion 0.5 MICROgram(s)/kG/Hr (13.6 mL/Hr) IV Continuous <Continuous>  dextrose 5% + sodium chloride 0.45% with potassium chloride 20 mEq/L 1000 milliLiter(s) (200 mL/Hr) IV Continuous <Continuous>  hydrALAZINE 50 milliGRAM(s) Oral every 8 hours  influenza   Vaccine 0.5 milliLiter(s) IntraMuscular once  isosorbide   dinitrate Tablet (ISORDIL) 30 milliGRAM(s) Oral three times a day  methadone    Tablet 5 milliGRAM(s) Oral every 6 hours  nystatin Powder 1 Application(s) Topical two times a day  potassium chloride  10 mEq/100 mL IVPB 10 milliEquivalent(s) IV Intermittent every 1 hour  thiamine IVPB 500 milliGRAM(s) IV Intermittent three times a day  ticagrelor 90 milliGRAM(s) Oral every 12 hours    MEDICATIONS  (PRN):  LORazepam     Tablet 1 milliGRAM(s) Oral every 1 hour PRN CIWA-Ar score 8 or greater  LORazepam     Tablet 2 milliGRAM(s) Oral every 1 hour PRN Symptom-triggered: each CIWA -Ar score 8 or GREATER  LORazepam     Tablet 2 milliGRAM(s) Oral every 2 hours PRN Symptom-triggered: 2 point increase in CIWA -Ar score and a total score of 7 or LESS  LORazepam   Injectable 2 milliGRAM(s) IV Push every 2 hours PRN Symptom-triggered: 2 point increase in CIWA -Ar score and a total score of 7 or LESS  LORazepam   Injectable 2 milliGRAM(s) IntraMuscular every 2 hours PRN Symptom-triggered: 2 point increase in CIWA -Ar score and a total score of 7 or LESS  LORazepam   Injectable 2 milliGRAM(s) IV Push every 1 hour PRN Symptom-triggered: each CIWA -Ar score 8 or GREATER  LORazepam   Injectable 2 milliGRAM(s) IntraMuscular every 1 hour PRN Symptom-triggered: each CIWA -Ar score 8 or GREATER  LORazepam   Injectable 1 milliGRAM(s) IV Push every 1 hour PRN CIWA-Ar score 8 or greater  LORazepam   Injectable 1 milliGRAM(s) IntraMuscular every 1 hour PRN CIWA-Ar score 8 or greater      Allergies    No Known Allergies    Intolerances        SOCIAL HISTORY:    FAMILY HISTORY:  FH: hypertension (Father)    FH: diabetes mellitus (Mother)        Vital Signs Last 24 Hrs  T(C): 36.9 (04 Mar 2022 08:00), Max: 37.3 (03 Mar 2022 13:00)  T(F): 98.5 (04 Mar 2022 08:00), Max: 99.1 (03 Mar 2022 13:00)  HR: 92 (04 Mar 2022 11:00) (85 - 121)  BP: --  BP(mean): --  RR: 26 (04 Mar 2022 11:00) (14 - 62)  SpO2: 100% (04 Mar 2022 11:00) (98% - 100%)    PHYSICAL EXAM:  The patient was AOx3, well nourished, and in NAD.  OP showed no ulcerations.  There was no visible LAD.  Conjunctiva were non-injected.  There was no clubbing or edema of extremities.  The scalp, hair, face, eyebrows, lips, OP, neck, chest, back, extremities x4, and nails were examined.  There was no hyperhidrosis or bromhidrosis.    Of note on skin exam:    LABS:                        9.7    13.08 )-----------( 227      ( 04 Mar 2022 00:50 )             27.8     03-04    137  |  106  |  8   ----------------------------<  130<H>  3.7   |  20<L>  |  0.49<L>    Ca    7.9<L>      04 Mar 2022 09:00  Phos  1.6     03-04  Mg     1.9     03-04    TPro  4.6<L>  /  Alb  2.3<L>  /  TBili  0.4  /  DBili  x   /  AST  32  /  ALT  74<H>  /  AlkPhos  57  03-04    PT/INR - ( 04 Mar 2022 00:50 )   PT: 13.5 sec;   INR: 1.16 ratio         PTT - ( 04 Mar 2022 06:46 )  PTT:62.8 sec  Urinalysis Basic - ( 03 Mar 2022 11:37 )    Color: Colorless / Appearance: Clear / S.017 / pH: x  Gluc: x / Ketone: Moderate  / Bili: Negative / Urobili: Negative   Blood: x / Protein: 300 mg/dL / Nitrite: Negative   Leuk Esterase: Negative / RBC: 4 /hpf / WBC 1 /HPF   Sq Epi: x / Non Sq Epi: 0 /hpf / Bacteria: Negative        RADIOLOGY & ADDITIONAL STUDIES: HPI:  57 y/o M with PMH of HTN, T2DM, neuropathy, chronic pain on Methadone & opioids for cervical spine and back injury transferred to [facility] CICU from [place] due to STEMI and DKA. Pt was reportedly found to have inferolateral ST elevations + afib on EKG, trop 7560. Pt initially came to [facility] ED due to nausea and vomiting for 3 days, reportedly had CP several days ago which apparently stopped on its own. Pt currently altered, difficult to obtain history from.    Pt transferred to [facility] for cath, noted to be in afib w/ RVR to 130s-150s, received cardizem 25 mg IV x 2 and started on cardizem gtt, cath was aborted as pt had SCR of 2.14, (baseline 0.7-0.9 in 10/2021), transferred to CCU for optimization of DKA prior to cath.     Baseline SCr 0.7-0.9 in 10/2021 from previous hospitalization at [facility] for cellulitis of hand, was d/rome on bactrim DS + ethambutol x 4 weeks.    Labs from [facility] significant for: WBC 15.53, lactate 5.6, troponin 7560.2, K 3.2, CO2 5, AG 42, SCr/BUN 2.14/44, Glucose 663, calcium 12.7, Mg 2.7, Phos 9.5  FSGs >600 -> 503  ABG 7.19/<19/133/3  UA: large ketones, moderate bacteria, negative LE and nitrite  RVP/COVID negative (01 Mar 2022 13:56)      Derm hx: Yesterday, the nurse noticed red erythematous patches overlying MCPs DIPs and PIPs. Dermatology consults for new onset rash    Exam:  - purple-red papules with black center  - blanching erythematous patches overlying MCPs, DIPs, and PIPs on b/l feet and hands  - Scattered necrotic papulovesicles with prediliciton of distal extremities  Highly suspicious of bacterial and fungal culture  - oral cavity is clear, no scleral injection    A&P  #Strong suspicious for infection  DDx: septic emboli, vs ecythema gangrenosum vs disseminated gonnococemias vs vasculitis (less likely)  - broad antimocribial coverage and fungal coverage if pt decompensates  - Biopsy taken as below.  - Recommend testing for gonorrhea  ---  Dermatology Punch Biopsy Procedure Note  -After risks and benefits of procedure including bleeding, infection and scar were reviewed (consents including photo consent reviewed, signed and in chart), allergies were reviewed and time out performed.  -Area cleaned with rubbing alcohol and anesthetized with lidocaine and epinephrine.  Two 4mm punch biopsies were performed to L medial ankle for H&E and R dorsal foot for tissue culture (AFB, fungal, bacterial, and  KOH stain), Hemostasis achieved with 4-0 Chromic gut sutures. Dressing with Vaseline. Wound care reviewed with patient and team.  -Sutures are dissolvable, no need for removal. Please leave dressing on for 24-48 hours and after that please apply vaseline on the biopsy site 2-3 times daily to keep area moist and promote healing.      PAST MEDICAL & SURGICAL HISTORY:  Hypertension    Diabetes mellitus    Gastric ulcer    Spinal stenosis    H/O spinal cord compression    Spondylosis    H/O radiculopathy    H/O cervical spine surgery    History of back surgery    S/P cervical spinal fusion        REVIEW OF SYSTEMS (could not obtain due to mental status)     MEDICATIONS  (STANDING):  aspirin  chewable 81 milliGRAM(s) Oral daily  atorvastatin 80 milliGRAM(s) Oral at bedtime  carvedilol 12.5 milliGRAM(s) Oral every 12 hours  chlorhexidine 4% Liquid 1 Application(s) Topical <User Schedule>  dexMEDEtomidine Infusion 0.5 MICROgram(s)/kG/Hr (13.6 mL/Hr) IV Continuous <Continuous>  dextrose 5% + sodium chloride 0.45% with potassium chloride 20 mEq/L 1000 milliLiter(s) (200 mL/Hr) IV Continuous <Continuous>  hydrALAZINE 50 milliGRAM(s) Oral every 8 hours  influenza   Vaccine 0.5 milliLiter(s) IntraMuscular once  isosorbide   dinitrate Tablet (ISORDIL) 30 milliGRAM(s) Oral three times a day  methadone    Tablet 5 milliGRAM(s) Oral every 6 hours  nystatin Powder 1 Application(s) Topical two times a day  potassium chloride  10 mEq/100 mL IVPB 10 milliEquivalent(s) IV Intermittent every 1 hour  thiamine IVPB 500 milliGRAM(s) IV Intermittent three times a day  ticagrelor 90 milliGRAM(s) Oral every 12 hours    MEDICATIONS  (PRN):  LORazepam     Tablet 1 milliGRAM(s) Oral every 1 hour PRN CIWA-Ar score 8 or greater  LORazepam     Tablet 2 milliGRAM(s) Oral every 1 hour PRN Symptom-triggered: each CIWA -Ar score 8 or GREATER  LORazepam     Tablet 2 milliGRAM(s) Oral every 2 hours PRN Symptom-triggered: 2 point increase in CIWA -Ar score and a total score of 7 or LESS  LORazepam   Injectable 2 milliGRAM(s) IV Push every 2 hours PRN Symptom-triggered: 2 point increase in CIWA -Ar score and a total score of 7 or LESS  LORazepam   Injectable 2 milliGRAM(s) IntraMuscular every 2 hours PRN Symptom-triggered: 2 point increase in CIWA -Ar score and a total score of 7 or LESS  LORazepam   Injectable 2 milliGRAM(s) IV Push every 1 hour PRN Symptom-triggered: each CIWA -Ar score 8 or GREATER  LORazepam   Injectable 2 milliGRAM(s) IntraMuscular every 1 hour PRN Symptom-triggered: each CIWA -Ar score 8 or GREATER  LORazepam   Injectable 1 milliGRAM(s) IV Push every 1 hour PRN CIWA-Ar score 8 or greater  LORazepam   Injectable 1 milliGRAM(s) IntraMuscular every 1 hour PRN CIWA-Ar score 8 or greater      Allergies    No Known Allergies    Intolerances        SOCIAL HISTORY:    FAMILY HISTORY:  FH: hypertension (Father)    FH: diabetes mellitus (Mother)        Vital Signs Last 24 Hrs  T(C): 36.9 (04 Mar 2022 08:00), Max: 37.3 (03 Mar 2022 13:00)  T(F): 98.5 (04 Mar 2022 08:00), Max: 99.1 (03 Mar 2022 13:00)  HR: 92 (04 Mar 2022 11:00) (85 - 121)  BP: --  BP(mean): --  RR: 26 (04 Mar 2022 11:00) (14 - 62)  SpO2: 100% (04 Mar 2022 11:00) (98% - 100%)    PHYSICAL EXAM:  The patient was AOx2, well nourished, and in NAD.  OP showed no ulcerations.  There was no visible LAD.  Conjunctiva were non-injected.  There was no clubbing or edema of extremities.  The scalp, hair, face, eyebrows, lips, OP, neck, chest, back, extremities x4, and nails were examined.  There was no hyperhidrosis or bromhidrosis.    Of note on skin exam:  - purple-red papules with black center overlying b/l feet  - blanching erythematous patches overlying MCPs, DIPs, and PIPs on b/l feet and hands  - Scattered necrotic papulovesicles with predilection of distal extremities  - oral cavity is clear, no scleral injection    LABS:                        9.7    13.08 )-----------( 227      ( 04 Mar 2022 00:50 )             27.8     -    137  |  106  |  8   ----------------------------<  130<H>  3.7   |  20<L>  |  0.49<L>    Ca    7.9<L>      04 Mar 2022 09:00  Phos  1.6     03-04  Mg     1.9     -    TPro  4.6<L>  /  Alb  2.3<L>  /  TBili  0.4  /  DBili  x   /  AST  32  /  ALT  74<H>  /  AlkPhos  57  03-04    PT/INR - ( 04 Mar 2022 00:50 )   PT: 13.5 sec;   INR: 1.16 ratio         PTT - ( 04 Mar 2022 06:46 )  PTT:62.8 sec  Urinalysis Basic - ( 03 Mar 2022 11:37 )    Color: Colorless / Appearance: Clear / S.017 / pH: x  Gluc: x / Ketone: Moderate  / Bili: Negative / Urobili: Negative   Blood: x / Protein: 300 mg/dL / Nitrite: Negative   Leuk Esterase: Negative / RBC: 4 /hpf / WBC 1 /HPF   Sq Epi: x / Non Sq Epi: 0 /hpf / Bacteria: Negative        RADIOLOGY & ADDITIONAL STUDIES: HPI:  57 y/o M with PMH of HTN, T2DM, neuropathy, chronic pain on Methadone & opioids for cervical spine and back injury transferred to [facility] CICU from [place] due to STEMI and DKA. Pt was reportedly found to have inferolateral ST elevations + afib on EKG, trop 7560. Pt initially came to [facility] ED due to nausea and vomiting for 3 days, reportedly had CP several days ago which apparently stopped on its own. Pt currently altered, difficult to obtain history from.    Pt transferred to [facility] for cath, noted to be in afib w/ RVR to 130s-150s, received cardizem 25 mg IV x 2 and started on cardizem gtt, cath was aborted as pt had SCR of 2.14, (baseline 0.7-0.9 in 10/2021), transferred to CCU for optimization of DKA prior to cath.     Baseline SCr 0.7-0.9 in 10/2021 from previous hospitalization at [facility] for cellulitis of hand, was d/rome on bactrim DS + ethambutol x 4 weeks.    Labs from [facility] significant for: WBC 15.53, lactate 5.6, troponin 7560.2, K 3.2, CO2 5, AG 42, SCr/BUN 2.14/44, Glucose 663, calcium 12.7, Mg 2.7, Phos 9.5  FSGs >600 -> 503  ABG 7.19/<19/133/3  UA: large ketones, moderate bacteria, negative LE and nitrite  RVP/COVID negative (01 Mar 2022 13:56)      Derm hx: Yesterday, the nurse noticed red erythematous patches overlying MCPs DIPs and PIPs. Dermatology consults for new onset rash    Exam:  - purple-red papules with black center  - blanching erythematous patches overlying MCPs, DIPs, and PIPs on b/l feet and hands  - Scattered necrotic papulovesicles with predilection of distal extremities  - few scars overlying right dorsal hand and medial aspect of right index finger  - oral cavity is clear, no scleral injection    A&P  #Strong suspicious for infection  DDx: septic emboli, vs ecythema gangrenosum vs disseminated gonococcemias vs vasculitis (less likely)  - broad antimocribial coverage and fungal coverage if pt decompensates. The necrotic papulovesicles with predilection of distal extremities is highly suspicious of bacterial and fungal culture  - Biopsy taken as below.  - Recommend testing for gonorrhea  ---  Dermatology Punch Biopsy Procedure Note  -After risks and benefits of procedure including bleeding, infection and scar were reviewed (consents including photo consent reviewed, signed and in chart), allergies were reviewed and time out performed.  -Area cleaned with rubbing alcohol and anesthetized with lidocaine and epinephrine.  Two 4mm punch biopsies were performed to L medial ankle for H&E and R dorsal foot for tissue culture (AFB, fungal, bacterial, and  KOH stain), Hemostasis achieved with 4-0 Chromic gut sutures. Dressing with Vaseline. Wound care reviewed with patient and team.  -Sutures are dissolvable, no need for removal. Please leave dressing on for 24-48 hours and after that please apply vaseline on the biopsy site 2-3 times daily to keep area moist and promote healing.      PAST MEDICAL & SURGICAL HISTORY:  Hypertension    Diabetes mellitus    Gastric ulcer    Spinal stenosis    H/O spinal cord compression    Spondylosis    H/O radiculopathy    H/O cervical spine surgery    History of back surgery    S/P cervical spinal fusion        REVIEW OF SYSTEMS (could not obtain due to mental status)     MEDICATIONS  (STANDING):  aspirin  chewable 81 milliGRAM(s) Oral daily  atorvastatin 80 milliGRAM(s) Oral at bedtime  carvedilol 12.5 milliGRAM(s) Oral every 12 hours  chlorhexidine 4% Liquid 1 Application(s) Topical <User Schedule>  dexMEDEtomidine Infusion 0.5 MICROgram(s)/kG/Hr (13.6 mL/Hr) IV Continuous <Continuous>  dextrose 5% + sodium chloride 0.45% with potassium chloride 20 mEq/L 1000 milliLiter(s) (200 mL/Hr) IV Continuous <Continuous>  hydrALAZINE 50 milliGRAM(s) Oral every 8 hours  influenza   Vaccine 0.5 milliLiter(s) IntraMuscular once  isosorbide   dinitrate Tablet (ISORDIL) 30 milliGRAM(s) Oral three times a day  methadone    Tablet 5 milliGRAM(s) Oral every 6 hours  nystatin Powder 1 Application(s) Topical two times a day  potassium chloride  10 mEq/100 mL IVPB 10 milliEquivalent(s) IV Intermittent every 1 hour  thiamine IVPB 500 milliGRAM(s) IV Intermittent three times a day  ticagrelor 90 milliGRAM(s) Oral every 12 hours    MEDICATIONS  (PRN):  LORazepam     Tablet 1 milliGRAM(s) Oral every 1 hour PRN CIWA-Ar score 8 or greater  LORazepam     Tablet 2 milliGRAM(s) Oral every 1 hour PRN Symptom-triggered: each CIWA -Ar score 8 or GREATER  LORazepam     Tablet 2 milliGRAM(s) Oral every 2 hours PRN Symptom-triggered: 2 point increase in CIWA -Ar score and a total score of 7 or LESS  LORazepam   Injectable 2 milliGRAM(s) IV Push every 2 hours PRN Symptom-triggered: 2 point increase in CIWA -Ar score and a total score of 7 or LESS  LORazepam   Injectable 2 milliGRAM(s) IntraMuscular every 2 hours PRN Symptom-triggered: 2 point increase in CIWA -Ar score and a total score of 7 or LESS  LORazepam   Injectable 2 milliGRAM(s) IV Push every 1 hour PRN Symptom-triggered: each CIWA -Ar score 8 or GREATER  LORazepam   Injectable 2 milliGRAM(s) IntraMuscular every 1 hour PRN Symptom-triggered: each CIWA -Ar score 8 or GREATER  LORazepam   Injectable 1 milliGRAM(s) IV Push every 1 hour PRN CIWA-Ar score 8 or greater  LORazepam   Injectable 1 milliGRAM(s) IntraMuscular every 1 hour PRN CIWA-Ar score 8 or greater      Allergies    No Known Allergies    Intolerances        SOCIAL HISTORY:    FAMILY HISTORY:  FH: hypertension (Father)    FH: diabetes mellitus (Mother)        Vital Signs Last 24 Hrs  T(C): 36.9 (04 Mar 2022 08:00), Max: 37.3 (03 Mar 2022 13:00)  T(F): 98.5 (04 Mar 2022 08:00), Max: 99.1 (03 Mar 2022 13:00)  HR: 92 (04 Mar 2022 11:00) (85 - 121)  BP: --  BP(mean): --  RR: 26 (04 Mar 2022 11:00) (14 - 62)  SpO2: 100% (04 Mar 2022 11:00) (98% - 100%)    PHYSICAL EXAM:  The patient was AOx2, well nourished, and in NAD.  OP showed no ulcerations.  There was no visible LAD.  Conjunctiva were non-injected.  There was no clubbing or edema of extremities.  The scalp, hair, face, eyebrows, lips, OP, neck, chest, back, extremities x4, and nails were examined.  There was no hyperhidrosis or bromhidrosis.    Of note on skin exam:  - purple-red papules with black center overlying b/l feet  - blanching erythematous patches overlying MCPs, DIPs, and PIPs on b/l feet and hands  - Scattered necrotic papulovesicles with predilection of distal extremities  - oral cavity is clear, no scleral injection    LABS:                        9.7    13.08 )-----------( 227      ( 04 Mar 2022 00:50 )             27.8     03-04    137  |  106  |  8   ----------------------------<  130<H>  3.7   |  20<L>  |  0.49<L>    Ca    7.9<L>      04 Mar 2022 09:00  Phos  1.6     03-04  Mg     1.9     -04    TPro  4.6<L>  /  Alb  2.3<L>  /  TBili  0.4  /  DBili  x   /  AST  32  /  ALT  74<H>  /  AlkPhos  57  03-04    PT/INR - ( 04 Mar 2022 00:50 )   PT: 13.5 sec;   INR: 1.16 ratio         PTT - ( 04 Mar 2022 06:46 )  PTT:62.8 sec  Urinalysis Basic - ( 03 Mar 2022 11:37 )    Color: Colorless / Appearance: Clear / S.017 / pH: x  Gluc: x / Ketone: Moderate  / Bili: Negative / Urobili: Negative   Blood: x / Protein: 300 mg/dL / Nitrite: Negative   Leuk Esterase: Negative / RBC: 4 /hpf / WBC 1 /HPF   Sq Epi: x / Non Sq Epi: 0 /hpf / Bacteria: Negative        RADIOLOGY & ADDITIONAL STUDIES:

## 2022-03-04 NOTE — PROGRESS NOTE ADULT - SUBJECTIVE AND OBJECTIVE BOX
Patient is a 56y old  Male who presents with a chief complaint of STEMI, DKA (03 Mar 2022 19:46)    HPI:  55 y/o M with PMH of HTN, T2DM, neuropathy, chronic pain on Methadone & opioids for cervical spine and back injury transferred to [facility] CICU from [place] due to STEMI and DKA. Pt was reportedly found to have inferolateral ST elevations + afib on EKG, trop 7560. Pt initially came to [facility] ED due to nausea and vomiting for 3 days, reportedly had CP several days ago which apparently stopped on its own. Pt currently altered, difficult to obtain history from.    Pt transferred to [facility] for cath, noted to be in afib w/ RVR to 130s-150s, received cardizem 25 mg IV x 2 and started on cardizem gtt, cath was aborted as pt had SCR of 2.14, (baseline 0.7-0.9 in 10/2021), transferred to CCU for optimization of DKA prior to cath.     Baseline SCr 0.7-0.9 in 10/2021 from previous hospitalization at [facility] for cellulitis of hand, was d/rome on bactrim DS + ethambutol x 4 weeks.    Labs from [facility] significant for: WBC 15.53, lactate 5.6, troponin 7560.2, K 3.2, CO2 5, AG 42, SCr/BUN 2.14/44, Glucose 663, calcium 12.7, Mg 2.7, Phos 9.5  FSGs >600 -> 503  ABG 7.19/<19/133/3  UA: large ketones, moderate bacteria, negative LE and nitrite  RVP/COVID negative (01 Mar 2022 13:56)       INTERVAL HPI/OVERNIGHT EVENTS:   AG increased overnight, insulin gtt restarted, held tube feeds.  Pt pulled NGT out but has MRI scheduled for 8 AM.  Precedex gtt started at 4 AM, nitro gtt weaned off.  Afebrile, hemodynamically stable     Subjective:    ICU Vital Signs Last 24 Hrs  T(C): 36.9 (04 Mar 2022 08:00), Max: 37.3 (03 Mar 2022 13:00)  T(F): 98.5 (04 Mar 2022 08:00), Max: 99.1 (03 Mar 2022 13:00)  HR: 86 (04 Mar 2022 08:00) (86 - 121)  BP: --  BP(mean): --  ABP: 151/69 (04 Mar 2022 08:00) (117/60 - 169/74)  ABP(mean): 94 (04 Mar 2022 08:00) (76 - 109)  RR: 14 (04 Mar 2022 08:00) (14 - 62)  SpO2: 100% (04 Mar 2022 08:00) (98% - 100%)    I&O's Summary    03 Mar 2022 07:01  -  04 Mar 2022 07:00  --------------------------------------------------------  IN: 6699.7 mL / OUT: 6550 mL / NET: 149.7 mL    04 Mar 2022 07:01  -  04 Mar 2022 08:00  --------------------------------------------------------  IN: 229.8 mL / OUT: 325 mL / NET: -95.2 mL          Daily     Daily Weight in k.8 (04 Mar 2022 04:00)    Adult Advanced Hemodynamics Last 24 Hrs  CVP(mm Hg): --  CVP(cm H2O): --  CO: --  CI: --  PA: --  PA(mean): --  PCWP: --  SVR: --  SVRI: --  PVR: --  PVRI: --    EKG/Telemetry Events:    MEDICATIONS  (STANDING):  aspirin  chewable 81 milliGRAM(s) Oral daily  atorvastatin 80 milliGRAM(s) Oral at bedtime  carvedilol 12.5 milliGRAM(s) Oral every 12 hours  chlorhexidine 4% Liquid 1 Application(s) Topical <User Schedule>  dexMEDEtomidine Infusion 0.5 MICROgram(s)/kG/Hr (13.6 mL/Hr) IV Continuous <Continuous>  dextrose 5% + sodium chloride 0.45% with potassium chloride 20 mEq/L 1000 milliLiter(s) (200 mL/Hr) IV Continuous <Continuous>  heparin  Infusion 1700 Unit(s)/Hr (23 mL/Hr) IV Continuous <Continuous>  hydrALAZINE 50 milliGRAM(s) Oral every 8 hours  influenza   Vaccine 0.5 milliLiter(s) IntraMuscular once  insulin regular Infusion 6 Unit(s)/Hr (6 mL/Hr) IV Continuous <Continuous>  isosorbide   dinitrate Tablet (ISORDIL) 30 milliGRAM(s) Oral three times a day  methadone    Tablet 5 milliGRAM(s) Oral every 6 hours  nitroglycerin  Infusion 10 MICROgram(s)/Min (3 mL/Hr) IV Continuous <Continuous>  nystatin Powder 1 Application(s) Topical two times a day  thiamine IVPB 500 milliGRAM(s) IV Intermittent three times a day  ticagrelor 90 milliGRAM(s) Oral every 12 hours    MEDICATIONS  (PRN):  LORazepam     Tablet 1 milliGRAM(s) Oral every 1 hour PRN CIWA-Ar score 8 or greater  LORazepam     Tablet 2 milliGRAM(s) Oral every 1 hour PRN Symptom-triggered: each CIWA -Ar score 8 or GREATER  LORazepam     Tablet 2 milliGRAM(s) Oral every 2 hours PRN Symptom-triggered: 2 point increase in CIWA -Ar score and a total score of 7 or LESS  LORazepam   Injectable 2 milliGRAM(s) IV Push every 2 hours PRN Symptom-triggered: 2 point increase in CIWA -Ar score and a total score of 7 or LESS  LORazepam   Injectable 2 milliGRAM(s) IntraMuscular every 2 hours PRN Symptom-triggered: 2 point increase in CIWA -Ar score and a total score of 7 or LESS  LORazepam   Injectable 2 milliGRAM(s) IV Push every 1 hour PRN Symptom-triggered: each CIWA -Ar score 8 or GREATER  LORazepam   Injectable 2 milliGRAM(s) IntraMuscular every 1 hour PRN Symptom-triggered: each CIWA -Ar score 8 or GREATER  LORazepam   Injectable 1 milliGRAM(s) IV Push every 1 hour PRN CIWA-Ar score 8 or greater  LORazepam   Injectable 1 milliGRAM(s) IntraMuscular every 1 hour PRN CIWA-Ar score 8 or greater      PHYSICAL EXAM:  GENERAL:   HEAD:  Atraumatic, Normocephalic  EYES: EOMI, PERRLA, conjunctiva and sclera clear  NECK: Supple, No JVD, Normal thyroid, no enlarged nodes  NERVOUS SYSTEM:  Alert & Awake.   CHEST/LUNG: B/L good air entry; No rales, rhonchi, or wheezing  HEART: S1S2 normal, no S3, Regular rate and rhythm; No murmurs  ABDOMEN: Soft, Nontender, Nondistended; Bowel sounds present  EXTREMITIES:  2+ Peripheral Pulses, No clubbing, cyanosis, or edema  LYMPH: No lymphadenopathy noted  SKIN: No rashes or lesions    LABS:                        9.7    13.08 )-----------( 227      ( 04 Mar 2022 00:50 )             27.8     03-04    135  |  103  |  10  ----------------------------<  166<H>  3.7   |  17<L>  |  0.53    Ca    7.7<L>      04 Mar 2022 00:50  Phos  1.8     03-04  Mg     1.8     03-04    TPro  4.3<L>  /  Alb  x   /  TBili  x   /  DBili  x   /  AST  x   /  ALT  x   /  AlkPhos  x   03-04    LIVER FUNCTIONS - ( 04 Mar 2022 02:54 )  Alb: x     / Pro: 4.3 g/dL / ALK PHOS: x     / ALT: x     / AST: x     / GGT: x           PT/INR - ( 04 Mar 2022 00:50 )   PT: 13.5 sec;   INR: 1.16 ratio         PTT - ( 04 Mar 2022 06:46 )  PTT:62.8 sec  CAPILLARY BLOOD GLUCOSE      POCT Blood Glucose.: 114 mg/dL (04 Mar 2022 07:19)  POCT Blood Glucose.: 159 mg/dL (04 Mar 2022 06:03)  POCT Blood Glucose.: 131 mg/dL (04 Mar 2022 05:07)  POCT Blood Glucose.: 142 mg/dL (04 Mar 2022 04:08)  POCT Blood Glucose.: 171 mg/dL (04 Mar 2022 03:09)  POCT Blood Glucose.: 156 mg/dL (04 Mar 2022 02:00)  POCT Blood Glucose.: 201 mg/dL (04 Mar 2022 01:12)  POCT Blood Glucose.: 130 mg/dL (04 Mar 2022 00:11)  POCT Blood Glucose.: 108 mg/dL (03 Mar 2022 23:21)  POCT Blood Glucose.: 130 mg/dL (03 Mar 2022 22:17)  POCT Blood Glucose.: 183 mg/dL (03 Mar 2022 21:06)  POCT Blood Glucose.: 438 mg/dL (03 Mar 2022 21:05)  POCT Blood Glucose.: 201 mg/dL (03 Mar 2022 20:10)  POCT Blood Glucose.: 208 mg/dL (03 Mar 2022 19:27)  POCT Blood Glucose.: 183 mg/dL (03 Mar 2022 18:11)  POCT Blood Glucose.: 160 mg/dL (03 Mar 2022 17:03)  POCT Blood Glucose.: 152 mg/dL (03 Mar 2022 15:59)  POCT Blood Glucose.: 127 mg/dL (03 Mar 2022 15:06)  POCT Blood Glucose.: 253 mg/dL (03 Mar 2022 14:12)  POCT Blood Glucose.: 190 mg/dL (03 Mar 2022 13:10)  POCT Blood Glucose.: 227 mg/dL (03 Mar 2022 12:02)  POCT Blood Glucose.: 184 mg/dL (03 Mar 2022 11:01)  POCT Blood Glucose.: 156 mg/dL (03 Mar 2022 10:00)  POCT Blood Glucose.: 116 mg/dL (03 Mar 2022 09:00)    ABG - ( 04 Mar 2022 00:30 )  pH, Arterial: 7.57  pH, Blood: x     /  pCO2: <19   /  pO2: 116   / HCO3: 16    / Base Excess: -4.6  /  SaO2: 98.0                CARDIAC MARKERS ( 02 Mar 2022 12:22 )  x     / x     / 3235 U/L / x     / 9.6 ng/mL      Urinalysis Basic - ( 03 Mar 2022 11:37 )    Color: Colorless / Appearance: Clear / S.017 / pH: x  Gluc: x / Ketone: Moderate  / Bili: Negative / Urobili: Negative   Blood: x / Protein: 300 mg/dL / Nitrite: Negative   Leuk Esterase: Negative / RBC: 4 /hpf / WBC 1 /HPF   Sq Epi: x / Non Sq Epi: 0 /hpf / Bacteria: Negative          RADIOLOGY & ADDITIONAL TESTS:  CXR:        Care Discussed with Consultants/Other Providers [ x] YES  [ ] NO           Patient is a 56y old  Male who presents with a chief complaint of STEMI, DKA (03 Mar 2022 19:46)    HPI:  55 y/o M with PMH of HTN, T2DM, neuropathy, chronic pain on Methadone & opioids for cervical spine and back injury transferred to [facility] CICU from [place] due to STEMI and DKA. Pt was reportedly found to have inferolateral ST elevations + afib on EKG, trop 7560. Pt initially came to [facility] ED due to nausea and vomiting for 3 days, reportedly had CP several days ago which apparently stopped on its own. Pt currently altered, difficult to obtain history from.    Pt transferred to [facility] for cath, noted to be in afib w/ RVR to 130s-150s, received cardizem 25 mg IV x 2 and started on cardizem gtt, cath was aborted as pt had SCR of 2.14, (baseline 0.7-0.9 in 10/2021), transferred to CCU for optimization of DKA prior to cath.     Baseline SCr 0.7-0.9 in 10/2021 from previous hospitalization at [facility] for cellulitis of hand, was d/rome on bactrim DS + ethambutol x 4 weeks.    Labs from [facility] significant for: WBC 15.53, lactate 5.6, troponin 7560.2, K 3.2, CO2 5, AG 42, SCr/BUN 2.14/44, Glucose 663, calcium 12.7, Mg 2.7, Phos 9.5  FSGs >600 -> 503  ABG 7.19/<19/133/3  UA: large ketones, moderate bacteria, negative LE and nitrite  RVP/COVID negative (01 Mar 2022 13:56)       INTERVAL HPI/OVERNIGHT EVENTS:   AG increased overnight, insulin gtt restarted, held tube feeds.  Pt pulled NGT out but has MRI scheduled for 8 AM.  Precedex gtt started at 4 AM, nitro gtt weaned off.  Afebrile, hemodynamically stable     Subjective:    ICU Vital Signs Last 24 Hrs  T(C): 36.9 (04 Mar 2022 08:00), Max: 37.3 (03 Mar 2022 13:00)  T(F): 98.5 (04 Mar 2022 08:00), Max: 99.1 (03 Mar 2022 13:00)  HR: 86 (04 Mar 2022 08:00) (86 - 121)  BP: --  BP(mean): --  ABP: 151/69 (04 Mar 2022 08:00) (117/60 - 169/74)  ABP(mean): 94 (04 Mar 2022 08:00) (76 - 109)  RR: 14 (04 Mar 2022 08:00) (14 - 62)  SpO2: 100% (04 Mar 2022 08:00) (98% - 100%)    I&O's Summary    03 Mar 2022 07:01  -  04 Mar 2022 07:00  --------------------------------------------------------  IN: 6699.7 mL / OUT: 6550 mL / NET: 149.7 mL    04 Mar 2022 07:01  -  04 Mar 2022 08:00  --------------------------------------------------------  IN: 229.8 mL / OUT: 325 mL / NET: -95.2 mL          Daily     Daily Weight in k.8 (04 Mar 2022 04:00)    Adult Advanced Hemodynamics Last 24 Hrs  CVP(mm Hg): --  CVP(cm H2O): --  CO: --  CI: --  PA: --  PA(mean): --  PCWP: --  SVR: --  SVRI: --  PVR: --  PVRI: --    EKG/Telemetry Events:    MEDICATIONS  (STANDING):  aspirin  chewable 81 milliGRAM(s) Oral daily  atorvastatin 80 milliGRAM(s) Oral at bedtime  carvedilol 12.5 milliGRAM(s) Oral every 12 hours  chlorhexidine 4% Liquid 1 Application(s) Topical <User Schedule>  dexMEDEtomidine Infusion 0.5 MICROgram(s)/kG/Hr (13.6 mL/Hr) IV Continuous <Continuous>  dextrose 5% + sodium chloride 0.45% with potassium chloride 20 mEq/L 1000 milliLiter(s) (200 mL/Hr) IV Continuous <Continuous>  heparin  Infusion 1700 Unit(s)/Hr (23 mL/Hr) IV Continuous <Continuous>  hydrALAZINE 50 milliGRAM(s) Oral every 8 hours  influenza   Vaccine 0.5 milliLiter(s) IntraMuscular once  insulin regular Infusion 6 Unit(s)/Hr (6 mL/Hr) IV Continuous <Continuous>  isosorbide   dinitrate Tablet (ISORDIL) 30 milliGRAM(s) Oral three times a day  methadone    Tablet 5 milliGRAM(s) Oral every 6 hours  nitroglycerin  Infusion 10 MICROgram(s)/Min (3 mL/Hr) IV Continuous <Continuous>  nystatin Powder 1 Application(s) Topical two times a day  thiamine IVPB 500 milliGRAM(s) IV Intermittent three times a day  ticagrelor 90 milliGRAM(s) Oral every 12 hours    MEDICATIONS  (PRN):  LORazepam     Tablet 1 milliGRAM(s) Oral every 1 hour PRN CIWA-Ar score 8 or greater  LORazepam     Tablet 2 milliGRAM(s) Oral every 1 hour PRN Symptom-triggered: each CIWA -Ar score 8 or GREATER  LORazepam     Tablet 2 milliGRAM(s) Oral every 2 hours PRN Symptom-triggered: 2 point increase in CIWA -Ar score and a total score of 7 or LESS  LORazepam   Injectable 2 milliGRAM(s) IV Push every 2 hours PRN Symptom-triggered: 2 point increase in CIWA -Ar score and a total score of 7 or LESS  LORazepam   Injectable 2 milliGRAM(s) IntraMuscular every 2 hours PRN Symptom-triggered: 2 point increase in CIWA -Ar score and a total score of 7 or LESS  LORazepam   Injectable 2 milliGRAM(s) IV Push every 1 hour PRN Symptom-triggered: each CIWA -Ar score 8 or GREATER  LORazepam   Injectable 2 milliGRAM(s) IntraMuscular every 1 hour PRN Symptom-triggered: each CIWA -Ar score 8 or GREATER  LORazepam   Injectable 1 milliGRAM(s) IV Push every 1 hour PRN CIWA-Ar score 8 or greater  LORazepam   Injectable 1 milliGRAM(s) IntraMuscular every 1 hour PRN CIWA-Ar score 8 or greater      PHYSICAL EXAM:  GENERAL: lying in bed, lethargic  HEAD:  Atraumatic, Normocephalic  EYES: EOMI, PERRLA, conjunctiva and sclera clear  ENT: dry mucous membranes  NECK: Supple, No JVD  CHEST/LUNG: Clear to auscultation bilaterally; No rales, rhonchi, wheezing, or rubs. Labored respirations likely kussmaul respirations  HEART: tachycardic; No murmurs, rubs, or gallops  ABDOMEN: BSx4; Soft, nontender, nondistended  EXTREMITIES:  2+ Peripheral Pulses, brisk capillary refill. No clubbing, cyanosis, or edema  NERVOUS SYSTEM:  A&Ox0, b/l increased tone in UE, more on L > R. difficult to rouse.  SKIN: erythematous rash on b/l hands and feet  psych: normal behavior, normal affect      LABS:                        9.7    13.08 )-----------( 227      ( 04 Mar 2022 00:50 )             27.8     03-04    135  |  103  |  10  ----------------------------<  166<H>  3.7   |  17<L>  |  0.53    Ca    7.7<L>      04 Mar 2022 00:50  Phos  1.8     03-04  Mg     1.8     03-04    TPro  4.3<L>  /  Alb  x   /  TBili  x   /  DBili  x   /  AST  x   /  ALT  x   /  AlkPhos  x   03-04    LIVER FUNCTIONS - ( 04 Mar 2022 02:54 )  Alb: x     / Pro: 4.3 g/dL / ALK PHOS: x     / ALT: x     / AST: x     / GGT: x           PT/INR - ( 04 Mar 2022 00:50 )   PT: 13.5 sec;   INR: 1.16 ratio         PTT - ( 04 Mar 2022 06:46 )  PTT:62.8 sec  CAPILLARY BLOOD GLUCOSE      POCT Blood Glucose.: 114 mg/dL (04 Mar 2022 07:19)  POCT Blood Glucose.: 159 mg/dL (04 Mar 2022 06:03)  POCT Blood Glucose.: 131 mg/dL (04 Mar 2022 05:07)  POCT Blood Glucose.: 142 mg/dL (04 Mar 2022 04:08)  POCT Blood Glucose.: 171 mg/dL (04 Mar 2022 03:09)  POCT Blood Glucose.: 156 mg/dL (04 Mar 2022 02:00)  POCT Blood Glucose.: 201 mg/dL (04 Mar 2022 01:12)  POCT Blood Glucose.: 130 mg/dL (04 Mar 2022 00:11)  POCT Blood Glucose.: 108 mg/dL (03 Mar 2022 23:21)  POCT Blood Glucose.: 130 mg/dL (03 Mar 2022 22:17)  POCT Blood Glucose.: 183 mg/dL (03 Mar 2022 21:06)  POCT Blood Glucose.: 438 mg/dL (03 Mar 2022 21:05)  POCT Blood Glucose.: 201 mg/dL (03 Mar 2022 20:10)  POCT Blood Glucose.: 208 mg/dL (03 Mar 2022 19:27)  POCT Blood Glucose.: 183 mg/dL (03 Mar 2022 18:11)  POCT Blood Glucose.: 160 mg/dL (03 Mar 2022 17:03)  POCT Blood Glucose.: 152 mg/dL (03 Mar 2022 15:59)  POCT Blood Glucose.: 127 mg/dL (03 Mar 2022 15:06)  POCT Blood Glucose.: 253 mg/dL (03 Mar 2022 14:12)  POCT Blood Glucose.: 190 mg/dL (03 Mar 2022 13:10)  POCT Blood Glucose.: 227 mg/dL (03 Mar 2022 12:02)  POCT Blood Glucose.: 184 mg/dL (03 Mar 2022 11:01)  POCT Blood Glucose.: 156 mg/dL (03 Mar 2022 10:00)  POCT Blood Glucose.: 116 mg/dL (03 Mar 2022 09:00)    ABG - ( 04 Mar 2022 00:30 )  pH, Arterial: 7.57  pH, Blood: x     /  pCO2: <19   /  pO2: 116   / HCO3: 16    / Base Excess: -4.6  /  SaO2: 98.0                CARDIAC MARKERS ( 02 Mar 2022 12:22 )  x     / x     / 3235 U/L / x     / 9.6 ng/mL      Urinalysis Basic - ( 03 Mar 2022 11:37 )    Color: Colorless / Appearance: Clear / S.017 / pH: x  Gluc: x / Ketone: Moderate  / Bili: Negative / Urobili: Negative   Blood: x / Protein: 300 mg/dL / Nitrite: Negative   Leuk Esterase: Negative / RBC: 4 /hpf / WBC 1 /HPF   Sq Epi: x / Non Sq Epi: 0 /hpf / Bacteria: Negative          RADIOLOGY & ADDITIONAL TESTS:  CXR:        Care Discussed with Consultants/Other Providers [ x] YES  [ ] NO

## 2022-03-04 NOTE — PROGRESS NOTE ADULT - ATTENDING COMMENTS
Admitted with late presentation inferior wall STEMI, not revascularized, complicated by DKA  A+Ox0 but agitated, likely alcohol abuse per family - on CIWA  CT head with lacunar infarcts, EEG unremarkable; rods in his back are MRI compatible, Neurology following  For MRI today, may also get LP pending MRI results; started on Precedex to facilitate imaging  DAPT with ASA, Ticagrelor; Lipitor 80  Weaned off Nitroglycerin drip after Precedex was started - continue Coreg and Bidil  Interventional Cardiology has deferred cath  TTE with mildly reduced LVEF   O2 sats high 90s on room air  Tolerating tube feeds  Normal renal function, acidosis has resolved, compensated on exam - target even  H/H acceptable on Heparin drip for afib - holding Heparin drip for potential LP  COVID negative, no antibiotics, pancultured yesterday in case infection is driving his altered mentation  Presented in DKA, AG reopened yesterday and Insulin drip was restarted, now closed - transition off drip  Katelyn 3/1 Admitted with late presentation inferior wall STEMI, not revascularized, complicated by DKA  A+Ox0 but agitated, likely alcohol abuse per family - on CIWA  CT head with lacunar infarcts, EEG unremarkable; rods in his back are MRI compatible, Neurology following  For MRI today, may also get LP pending MRI results; started on Precedex to facilitate imaging  DAPT with ASA, Ticagrelor; Lipitor 80  Weaned off Nitroglycerin drip after Precedex was started - continue Coreg and Bidil  Interventional Cardiology has deferred cath  TTE with mildly reduced LVEF   O2 sats high 90s on room air  Tolerating tube feeds  Normal renal function, acidosis has resolved, compensated on exam - target even  H/H acceptable on Heparin drip for afib - holding Heparin drip for potential LP  COVID negative, no antibiotics, pancultured yesterday in case infection is driving his altered mentation  Presented in DKA, AG reopened yesterday and Insulin drip was restarted, now closed - transition off drip  Katelyn 3/1  Sister was updated at the bedside

## 2022-03-04 NOTE — EEG REPORT - NS EEG TEXT BOX
REPORT OF CONTINUOUS VIDEO EEG   [facility]: [address] , [address], [place] [address], Ph#: [phone] [facility]: [id number] [place] [address], [place] [address], Ph#: [phone] Office: [address] [place] [address], [place] [address] Ph#: [phone]  Patient Name: Kyle Mast   Age: 56 year, : 1965 MRN #: MR# [id number], Srinivasan: -CICU BED 3 Referring Physician: - EEG #: [id number]  Study Date: 3/3/2022   Start Time: 9:58:29 AM    End Date:  3/4/22        End Time: 0147 AM   (electrodes off) Study Duration: ~16h  Study Information:  EEG Recording Technique: The patient underwent continuous Video-EEG monitoring, using Telemetry System hardware on the XLTek Digital System. EEG and video data were stored on a computer hard drive with important events saved in digital archive files. The material was reviewed by a physician (electroencephalographer / epileptologist) on a daily basis. Karsten and seizure detection algorithms were utilized and reviewed. An EEG Technician attended to the patient, and was available throughout daytime work hours.  The epilepsy center neurologist was available in person or on call 24-hours per day.  EEG Placement and Labeling of Electrodes: The EEG was performed utilizing 20 channel referential EEG connections (coronal over temporal over parasagittal montage) using all standard 10-20 electrode placements with EKG, with additional electrodes placed in the inferior temporal region using the modified 10-10 montage electrode placements for elective admissions, or if deemed necessary. Recording was at a sampling rate of 256 samples per second per channel. Time synchronized digital video recording was done simultaneously with EEG recording. A low light infrared camera was used for low light recording.   History:  Video EEG performed at bedside COR: drowsy, did not following commands No HV due to COVID protocol, photic performed 56 y/ M h/o  dm, radiculopathy, spinal cord compression, htn, spinal stenosis, spondylosis, spine and back surgery, neuropathy  p/w  STEMI, DKA  r/o  AMS   Medication Ativan Methadone Nitroglycerin  Interpretation:  [Abbreviation Key:  PDR=alpha rhythm/posterior dominant rhythm. A-P=anterior posterior gradient.  Amplitude: ‘very low’:<20; ‘low’:20-50; ‘medium’:; ‘high’:>200uV.  Persistence for periodic/rhythmic patterns (% of epoch) ‘rare’:<1%; ‘occasional’:1-10%; ‘frequent’:10-50%; ‘abundant’:50-90%; ‘continuous’:>90%.  Persistence for sporadic discharges: ‘rare’:<1/hr; ‘occasional’:1/min-1/hr; ‘frequent’:>1/min; ‘abundant’:>1/10 sec.  GRDA=generalized rhythmic delta activity, LRDA=lateralized rhythmic delta activity, TIRDA=temporal intermittent rhythmic delta activity, FIRDA=frontal intermittent rhythmic activity. LPD=PLED=lateralized periodic discharges, GPD=generalized periodic discharges, BiPDs=BiPLEDs=bilateral independent periodic epileptiform discharges, SIRPID=stimulus induced rhythmic, periodic, or ictal appearing discharges.  Modifiers: +F=with fast component, +S=with spike component, +R=with rhythmic component.  S-B=burst suppression pattern.  PFA: paroxysmal fast activity. Max=maximal. N1-drowsy, N2-stage II sleep, N3-slow wave sleep.  HV=hyperventilation, PS=photic stimulation]   Daily EEG Visual Analysis  FINDINGS: The background was continuous, spontaneously variable and reactive. During wakefulness, the posterior dominant rhythm was not recorded.    Background Slowing: Generalized slowing: diffuse theta/delta slowing. Intermittent 1.5 hz GRDA noted. Focal Slowing: None was present.  Sleep Background: Drowsiness was characterized by fragmentation, attenuation, and slowing of the background activity.   Stage II sleep transients were not recorded.  Other Non-Epileptiform Findings: None were present.  Interictal Epileptiform Activity:  Fluctuating generalized periodic discharges with triphasic morphology, occurring up to 0.25 to 0.5 hz  Events: Clinical events: None recorded. Seizures: None recorded.  Activation Procedures:  Hyperventilation was not performed.   Photic stimulation was not performed.  Artifacts: Intermittent myogenic and movement artifacts were noted.  ECG: The heart rate on single channel ECG was predominantly between 110-120 BPM.  EEG Summary / Classification:  Abnormal EEG. - Fluctuating generalized periodic discharges with triphasic morphology, occurring up to 0.25 to 0.5 hz - Moderate generalized slowing/GRDA  EEG Impression / Clinical Correlate:  The findings of generalized periodic discharges with triphasic morphology are typically seen in metabolic encephalopathy, but may increase the risk for seizures in certain clinical situations. Clinical correlation is advised.  There is evidence of moderate multifocal/diffuse nonspecific cerebral dysfunction, not specific for etiology.  No seizures seen.  Preliminary Fellow Report, final report pending attending review  John Marquez MD Epilepsy Fellow  Reading Room: [phone] On Call Service After Hours: [phone]    REPORT OF CONTINUOUS VIDEO EEG   [facility]: [address] , [address], [place] [address], Ph#: [phone] [facility]: [id number] [place] [address], [place] [address], Ph#: [phone] Office: [address] [place] [address], [place] [address] Ph#: [phone]  Patient Name: Kyle Mast   Age: 56 year, : 1965 MRN #: MR# [id number], Srinivasan: -CICU BED 3 Referring Physician: - EEG #: [id number]  Study Date: 3/3/2022   Start Time: 9:58:29 AM    End Date:  3/4/22        End Time: 0147 AM   (electrodes off) Study Duration: ~16h  Study Information:  EEG Recording Technique: The patient underwent continuous Video-EEG monitoring, using Telemetry System hardware on the XLTek Digital System. EEG and video data were stored on a computer hard drive with important events saved in digital archive files. The material was reviewed by a physician (electroencephalographer / epileptologist) on a daily basis. Karsten and seizure detection algorithms were utilized and reviewed. An EEG Technician attended to the patient, and was available throughout daytime work hours.  The epilepsy center neurologist was available in person or on call 24-hours per day.  EEG Placement and Labeling of Electrodes: The EEG was performed utilizing 20 channel referential EEG connections (coronal over temporal over parasagittal montage) using all standard 10-20 electrode placements with EKG, with additional electrodes placed in the inferior temporal region using the modified 10-10 montage electrode placements for elective admissions, or if deemed necessary. Recording was at a sampling rate of 256 samples per second per channel. Time synchronized digital video recording was done simultaneously with EEG recording. A low light infrared camera was used for low light recording.   History:  Video EEG performed at bedside COR: drowsy, did not following commands No HV due to COVID protocol, photic performed 56 y/ M h/o  dm, radiculopathy, spinal cord compression, htn, spinal stenosis, spondylosis, spine and back surgery, neuropathy  p/w  STEMI, DKA  r/o  AMS   Medication Ativan Methadone Nitroglycerin  Interpretation:  [Abbreviation Key:  PDR=alpha rhythm/posterior dominant rhythm. A-P=anterior posterior gradient.  Amplitude: ‘very low’:<20; ‘low’:20-50; ‘medium’:; ‘high’:>200uV.  Persistence for periodic/rhythmic patterns (% of epoch) ‘rare’:<1%; ‘occasional’:1-10%; ‘frequent’:10-50%; ‘abundant’:50-90%; ‘continuous’:>90%.  Persistence for sporadic discharges: ‘rare’:<1/hr; ‘occasional’:1/min-1/hr; ‘frequent’:>1/min; ‘abundant’:>1/10 sec.  GRDA=generalized rhythmic delta activity, LRDA=lateralized rhythmic delta activity, TIRDA=temporal intermittent rhythmic delta activity, FIRDA=frontal intermittent rhythmic activity. LPD=PLED=lateralized periodic discharges, GPD=generalized periodic discharges, BiPDs=BiPLEDs=bilateral independent periodic epileptiform discharges, SIRPID=stimulus induced rhythmic, periodic, or ictal appearing discharges.  Modifiers: +F=with fast component, +S=with spike component, +R=with rhythmic component.  S-B=burst suppression pattern.  PFA: paroxysmal fast activity. Max=maximal. N1-drowsy, N2-stage II sleep, N3-slow wave sleep.  HV=hyperventilation, PS=photic stimulation]   Daily EEG Visual Analysis  FINDINGS: The background was continuous, spontaneously variable and reactive. During wakefulness, the posterior dominant rhythm was not recorded.    Background Slowing: Generalized slowing: diffuse theta/delta slowing. Intermittent 1.5 hz GRDA noted. Focal Slowing: None was present.  Sleep Background: Drowsiness was characterized by fragmentation, attenuation, and slowing of the background activity.   Stage II sleep transients were not recorded.  Other Non-Epileptiform Findings: None were present.  Interictal Epileptiform Activity:  Fluctuating generalized periodic discharges with triphasic morphology, occurring up to 0.25 to 0.5 hz  Events: Clinical events: None recorded. Seizures: None recorded.  Activation Procedures:  Hyperventilation was not performed.   Photic stimulation was not performed.  Artifacts: Intermittent myogenic and movement artifacts were noted.  ECG: The heart rate on single channel ECG was predominantly between 110-120 BPM.  EEG Summary / Classification:  Abnormal EEG. - Fluctuating generalized periodic discharges with triphasic morphology, occurring up to 0.25 to 0.5 hz - Moderate generalized slowing/GRDA  EEG Impression / Clinical Correlate:  The findings of generalized periodic discharges with triphasic morphology are typically seen in metabolic encephalopathy, but may increase the risk for seizures in certain clinical situations. Clinical correlation is advised.  There is evidence of moderate multifocal/diffuse nonspecific cerebral dysfunction, not specific for etiology.  No seizures seen.  John Marquez MD Epilepsy Fellow  Obdulio Hodge MD EEG/Epilepsy Attending   Reading Room: [phone] On Call Service After Hours: [phone]

## 2022-03-05 ENCOUNTER — FORM ENCOUNTER (OUTPATIENT)
Age: 57
End: 2022-03-05

## 2022-03-05 LAB
ALBUMIN SERPL ELPH-MCNC: 2.6 G/DL — LOW (ref 3.3–5)
ALP SERPL-CCNC: 61 U/L — SIGNIFICANT CHANGE UP (ref 40–120)
ALP SERPL-CCNC: 64 U/L — SIGNIFICANT CHANGE UP (ref 40–120)
ALP SERPL-CCNC: 66 U/L — SIGNIFICANT CHANGE UP (ref 40–120)
ALT FLD-CCNC: 66 U/L — HIGH (ref 10–45)
ALT FLD-CCNC: 70 U/L — HIGH (ref 10–45)
ALT FLD-CCNC: 73 U/L — HIGH (ref 10–45)
ANION GAP SERPL CALC-SCNC: 12 MMOL/L — SIGNIFICANT CHANGE UP (ref 5–17)
ANION GAP SERPL CALC-SCNC: 8 MMOL/L — SIGNIFICANT CHANGE UP (ref 5–17)
ANION GAP SERPL CALC-SCNC: 9 MMOL/L — SIGNIFICANT CHANGE UP (ref 5–17)
APTT BLD: 19.2 SEC — LOW (ref 27.5–35.5)
APTT BLD: 56.2 SEC — HIGH (ref 27.5–35.5)
APTT BLD: 78.5 SEC — HIGH (ref 27.5–35.5)
APTT BLD: 83.3 SEC — HIGH (ref 27.5–35.5)
AST SERPL-CCNC: 22 U/L — SIGNIFICANT CHANGE UP (ref 10–40)
AST SERPL-CCNC: 24 U/L — SIGNIFICANT CHANGE UP (ref 10–40)
AST SERPL-CCNC: 29 U/L — SIGNIFICANT CHANGE UP (ref 10–40)
BASOPHILS # BLD AUTO: 0.05 K/UL — SIGNIFICANT CHANGE UP (ref 0–0.2)
BASOPHILS # BLD AUTO: 0.05 K/UL — SIGNIFICANT CHANGE UP (ref 0–0.2)
BASOPHILS NFR BLD AUTO: 0.4 % — SIGNIFICANT CHANGE UP (ref 0–2)
BASOPHILS NFR BLD AUTO: 0.5 % — SIGNIFICANT CHANGE UP (ref 0–2)
BILIRUB SERPL-MCNC: 0.3 MG/DL — SIGNIFICANT CHANGE UP (ref 0.2–1.2)
BILIRUB SERPL-MCNC: 0.3 MG/DL — SIGNIFICANT CHANGE UP (ref 0.2–1.2)
BILIRUB SERPL-MCNC: 0.4 MG/DL — SIGNIFICANT CHANGE UP (ref 0.2–1.2)
BUN SERPL-MCNC: 5 MG/DL — LOW (ref 7–23)
BUN SERPL-MCNC: 6 MG/DL — LOW (ref 7–23)
BUN SERPL-MCNC: 8 MG/DL — SIGNIFICANT CHANGE UP (ref 7–23)
CALCIUM SERPL-MCNC: 7.9 MG/DL — LOW (ref 8.4–10.5)
CALCIUM SERPL-MCNC: 7.9 MG/DL — LOW (ref 8.4–10.5)
CALCIUM SERPL-MCNC: 8.2 MG/DL — LOW (ref 8.4–10.5)
CALCOFLUOR WHITE SPEC: SIGNIFICANT CHANGE UP
CHLORIDE SERPL-SCNC: 106 MMOL/L — SIGNIFICANT CHANGE UP (ref 96–108)
CO2 SERPL-SCNC: 17 MMOL/L — LOW (ref 22–31)
CO2 SERPL-SCNC: 19 MMOL/L — LOW (ref 22–31)
CO2 SERPL-SCNC: 21 MMOL/L — LOW (ref 22–31)
CREAT SERPL-MCNC: 0.53 MG/DL — SIGNIFICANT CHANGE UP (ref 0.5–1.3)
CREAT SERPL-MCNC: 0.53 MG/DL — SIGNIFICANT CHANGE UP (ref 0.5–1.3)
CREAT SERPL-MCNC: 0.62 MG/DL — SIGNIFICANT CHANGE UP (ref 0.5–1.3)
CULTURE RESULTS: NO GROWTH — SIGNIFICANT CHANGE UP
EGFR: 114 ML/MIN/1.73M2 — SIGNIFICANT CHANGE UP
EGFR: 119 ML/MIN/1.73M2 — SIGNIFICANT CHANGE UP
EGFR: 119 ML/MIN/1.73M2 — SIGNIFICANT CHANGE UP
EOSINOPHIL # BLD AUTO: 0.11 K/UL — SIGNIFICANT CHANGE UP (ref 0–0.5)
EOSINOPHIL # BLD AUTO: 0.11 K/UL — SIGNIFICANT CHANGE UP (ref 0–0.5)
EOSINOPHIL NFR BLD AUTO: 0.9 % — SIGNIFICANT CHANGE UP (ref 0–6)
EOSINOPHIL NFR BLD AUTO: 1.1 % — SIGNIFICANT CHANGE UP (ref 0–6)
GLUCOSE BLDC GLUCOMTR-MCNC: 160 MG/DL — HIGH (ref 70–99)
GLUCOSE BLDC GLUCOMTR-MCNC: 191 MG/DL — HIGH (ref 70–99)
GLUCOSE BLDC GLUCOMTR-MCNC: 196 MG/DL — HIGH (ref 70–99)
GLUCOSE SERPL-MCNC: 139 MG/DL — HIGH (ref 70–99)
GLUCOSE SERPL-MCNC: 184 MG/DL — HIGH (ref 70–99)
GLUCOSE SERPL-MCNC: 184 MG/DL — HIGH (ref 70–99)
GRAM STN FLD: SIGNIFICANT CHANGE UP
HCT VFR BLD CALC: 28.7 % — LOW (ref 39–50)
HCT VFR BLD CALC: 29.7 % — LOW (ref 39–50)
HGB BLD-MCNC: 9.7 G/DL — LOW (ref 13–17)
HGB BLD-MCNC: 9.8 G/DL — LOW (ref 13–17)
IMM GRANULOCYTES NFR BLD AUTO: 4.6 % — HIGH (ref 0–1.5)
IMM GRANULOCYTES NFR BLD AUTO: 4.8 % — HIGH (ref 0–1.5)
INR BLD: 1.13 RATIO — SIGNIFICANT CHANGE UP (ref 0.88–1.16)
LACTATE SERPL-SCNC: 0.7 MMOL/L — SIGNIFICANT CHANGE UP (ref 0.7–2)
LYMPHOCYTES # BLD AUTO: 1.89 K/UL — SIGNIFICANT CHANGE UP (ref 1–3.3)
LYMPHOCYTES # BLD AUTO: 19 % — SIGNIFICANT CHANGE UP (ref 13–44)
LYMPHOCYTES # BLD AUTO: 19.2 % — SIGNIFICANT CHANGE UP (ref 13–44)
LYMPHOCYTES # BLD AUTO: 2.29 K/UL — SIGNIFICANT CHANGE UP (ref 1–3.3)
MAGNESIUM SERPL-MCNC: 1.7 MG/DL — SIGNIFICANT CHANGE UP (ref 1.6–2.6)
MAGNESIUM SERPL-MCNC: 1.9 MG/DL — SIGNIFICANT CHANGE UP (ref 1.6–2.6)
MAGNESIUM SERPL-MCNC: 2 MG/DL — SIGNIFICANT CHANGE UP (ref 1.6–2.6)
MCHC RBC-ENTMCNC: 29.3 PG — SIGNIFICANT CHANGE UP (ref 27–34)
MCHC RBC-ENTMCNC: 30.1 PG — SIGNIFICANT CHANGE UP (ref 27–34)
MCHC RBC-ENTMCNC: 33 GM/DL — SIGNIFICANT CHANGE UP (ref 32–36)
MCHC RBC-ENTMCNC: 33.8 GM/DL — SIGNIFICANT CHANGE UP (ref 32–36)
MCV RBC AUTO: 88.9 FL — SIGNIFICANT CHANGE UP (ref 80–100)
MCV RBC AUTO: 89.1 FL — SIGNIFICANT CHANGE UP (ref 80–100)
MONOCYTES # BLD AUTO: 0.97 K/UL — HIGH (ref 0–0.9)
MONOCYTES # BLD AUTO: 1.02 K/UL — HIGH (ref 0–0.9)
MONOCYTES NFR BLD AUTO: 8.5 % — SIGNIFICANT CHANGE UP (ref 2–14)
MONOCYTES NFR BLD AUTO: 9.8 % — SIGNIFICANT CHANGE UP (ref 2–14)
NEUTROPHILS # BLD AUTO: 6.43 K/UL — SIGNIFICANT CHANGE UP (ref 1.8–7.4)
NEUTROPHILS # BLD AUTO: 7.91 K/UL — HIGH (ref 1.8–7.4)
NEUTROPHILS NFR BLD AUTO: 64.8 % — SIGNIFICANT CHANGE UP (ref 43–77)
NEUTROPHILS NFR BLD AUTO: 66.4 % — SIGNIFICANT CHANGE UP (ref 43–77)
NIGHT BLUE STAIN TISS: SIGNIFICANT CHANGE UP
NRBC # BLD: 0 /100 WBCS — SIGNIFICANT CHANGE UP (ref 0–0)
NRBC # BLD: 0 /100 WBCS — SIGNIFICANT CHANGE UP (ref 0–0)
OB PNL STL: POSITIVE
PHOSPHATE SERPL-MCNC: 1.8 MG/DL — LOW (ref 2.5–4.5)
PHOSPHATE SERPL-MCNC: 2.1 MG/DL — LOW (ref 2.5–4.5)
PHOSPHATE SERPL-MCNC: 3 MG/DL — SIGNIFICANT CHANGE UP (ref 2.5–4.5)
PLATELET # BLD AUTO: 236 K/UL — SIGNIFICANT CHANGE UP (ref 150–400)
PLATELET # BLD AUTO: 238 K/UL — SIGNIFICANT CHANGE UP (ref 150–400)
POTASSIUM SERPL-MCNC: 3.7 MMOL/L — SIGNIFICANT CHANGE UP (ref 3.5–5.3)
POTASSIUM SERPL-MCNC: 3.9 MMOL/L — SIGNIFICANT CHANGE UP (ref 3.5–5.3)
POTASSIUM SERPL-MCNC: 3.9 MMOL/L — SIGNIFICANT CHANGE UP (ref 3.5–5.3)
POTASSIUM SERPL-SCNC: 3.7 MMOL/L — SIGNIFICANT CHANGE UP (ref 3.5–5.3)
POTASSIUM SERPL-SCNC: 3.9 MMOL/L — SIGNIFICANT CHANGE UP (ref 3.5–5.3)
POTASSIUM SERPL-SCNC: 3.9 MMOL/L — SIGNIFICANT CHANGE UP (ref 3.5–5.3)
PROT SERPL-MCNC: 4.9 G/DL — LOW (ref 6–8.3)
PROT SERPL-MCNC: 4.9 G/DL — LOW (ref 6–8.3)
PROT SERPL-MCNC: 5 G/DL — LOW (ref 6–8.3)
PROTHROM AB SERPL-ACNC: 13 SEC — SIGNIFICANT CHANGE UP (ref 10.5–13.4)
RBC # BLD: 3.22 M/UL — LOW (ref 4.2–5.8)
RBC # BLD: 3.34 M/UL — LOW (ref 4.2–5.8)
RBC # FLD: 14.6 % — HIGH (ref 10.3–14.5)
RBC # FLD: 14.9 % — HIGH (ref 10.3–14.5)
SODIUM SERPL-SCNC: 134 MMOL/L — LOW (ref 135–145)
SODIUM SERPL-SCNC: 135 MMOL/L — SIGNIFICANT CHANGE UP (ref 135–145)
SODIUM SERPL-SCNC: 135 MMOL/L — SIGNIFICANT CHANGE UP (ref 135–145)
SPECIMEN SOURCE: SIGNIFICANT CHANGE UP
WBC # BLD: 11.93 K/UL — HIGH (ref 3.8–10.5)
WBC # BLD: 9.93 K/UL — SIGNIFICANT CHANGE UP (ref 3.8–10.5)
WBC # FLD AUTO: 11.93 K/UL — HIGH (ref 3.8–10.5)
WBC # FLD AUTO: 9.93 K/UL — SIGNIFICANT CHANGE UP (ref 3.8–10.5)

## 2022-03-05 PROCEDURE — 99233 SBSQ HOSP IP/OBS HIGH 50: CPT

## 2022-03-05 PROCEDURE — 93010 ELECTROCARDIOGRAM REPORT: CPT | Mod: 76

## 2022-03-05 RX ORDER — DEXTROSE 50 % IN WATER 50 %
15 SYRINGE (ML) INTRAVENOUS ONCE
Refills: 0 | Status: DISCONTINUED | OUTPATIENT
Start: 2022-03-05 | End: 2022-03-06

## 2022-03-05 RX ORDER — CARVEDILOL PHOSPHATE 80 MG/1
12.5 CAPSULE, EXTENDED RELEASE ORAL EVERY 12 HOURS
Refills: 0 | Status: DISCONTINUED | OUTPATIENT
Start: 2022-03-05 | End: 2022-03-08

## 2022-03-05 RX ORDER — DEXTROSE 50 % IN WATER 50 %
12.5 SYRINGE (ML) INTRAVENOUS ONCE
Refills: 0 | Status: DISCONTINUED | OUTPATIENT
Start: 2022-03-05 | End: 2022-03-06

## 2022-03-05 RX ORDER — DEXTROSE 50 % IN WATER 50 %
25 SYRINGE (ML) INTRAVENOUS ONCE
Refills: 0 | Status: DISCONTINUED | OUTPATIENT
Start: 2022-03-05 | End: 2022-03-06

## 2022-03-05 RX ORDER — CLOPIDOGREL BISULFATE 75 MG/1
600 TABLET, FILM COATED ORAL ONCE
Refills: 0 | Status: COMPLETED | OUTPATIENT
Start: 2022-03-06 | End: 2022-03-06

## 2022-03-05 RX ORDER — MAGNESIUM SULFATE 500 MG/ML
1 VIAL (ML) INJECTION ONCE
Refills: 0 | Status: COMPLETED | OUTPATIENT
Start: 2022-03-05 | End: 2022-03-05

## 2022-03-05 RX ORDER — GLUCAGON INJECTION, SOLUTION 0.5 MG/.1ML
1 INJECTION, SOLUTION SUBCUTANEOUS ONCE
Refills: 0 | Status: DISCONTINUED | OUTPATIENT
Start: 2022-03-05 | End: 2022-03-06

## 2022-03-05 RX ORDER — CARVEDILOL PHOSPHATE 80 MG/1
6.25 CAPSULE, EXTENDED RELEASE ORAL EVERY 12 HOURS
Refills: 0 | Status: DISCONTINUED | OUTPATIENT
Start: 2022-03-05 | End: 2022-03-05

## 2022-03-05 RX ORDER — POTASSIUM PHOSPHATE, MONOBASIC POTASSIUM PHOSPHATE, DIBASIC 236; 224 MG/ML; MG/ML
15 INJECTION, SOLUTION INTRAVENOUS ONCE
Refills: 0 | Status: COMPLETED | OUTPATIENT
Start: 2022-03-05 | End: 2022-03-05

## 2022-03-05 RX ORDER — MAGNESIUM SULFATE 500 MG/ML
2 VIAL (ML) INJECTION ONCE
Refills: 0 | Status: COMPLETED | OUTPATIENT
Start: 2022-03-05 | End: 2022-03-05

## 2022-03-05 RX ORDER — INSULIN LISPRO 100/ML
2 VIAL (ML) SUBCUTANEOUS
Refills: 0 | Status: DISCONTINUED | OUTPATIENT
Start: 2022-03-05 | End: 2022-03-08

## 2022-03-05 RX ORDER — CLOPIDOGREL BISULFATE 75 MG/1
75 TABLET, FILM COATED ORAL DAILY
Refills: 0 | Status: DISCONTINUED | OUTPATIENT
Start: 2022-03-07 | End: 2022-03-11

## 2022-03-05 RX ORDER — SODIUM CHLORIDE 9 MG/ML
1000 INJECTION, SOLUTION INTRAVENOUS
Refills: 0 | Status: DISCONTINUED | OUTPATIENT
Start: 2022-03-05 | End: 2022-03-06

## 2022-03-05 RX ORDER — PANTOPRAZOLE SODIUM 20 MG/1
40 TABLET, DELAYED RELEASE ORAL EVERY 12 HOURS
Refills: 0 | Status: DISCONTINUED | OUTPATIENT
Start: 2022-03-05 | End: 2022-03-08

## 2022-03-05 RX ADMIN — PANTOPRAZOLE SODIUM 40 MILLIGRAM(S): 20 TABLET, DELAYED RELEASE ORAL at 17:03

## 2022-03-05 RX ADMIN — Medication 1 APPLICATION(S): at 17:39

## 2022-03-05 RX ADMIN — CARVEDILOL PHOSPHATE 12.5 MILLIGRAM(S): 80 CAPSULE, EXTENDED RELEASE ORAL at 17:04

## 2022-03-05 RX ADMIN — ATORVASTATIN CALCIUM 80 MILLIGRAM(S): 80 TABLET, FILM COATED ORAL at 21:27

## 2022-03-05 RX ADMIN — POTASSIUM PHOSPHATE, MONOBASIC POTASSIUM PHOSPHATE, DIBASIC 62.5 MILLIMOLE(S): 236; 224 INJECTION, SOLUTION INTRAVENOUS at 16:50

## 2022-03-05 RX ADMIN — Medication 25 MILLIGRAM(S): at 05:52

## 2022-03-05 RX ADMIN — POTASSIUM PHOSPHATE, MONOBASIC POTASSIUM PHOSPHATE, DIBASIC 62.5 MILLIMOLE(S): 236; 224 INJECTION, SOLUTION INTRAVENOUS at 05:52

## 2022-03-05 RX ADMIN — NYSTATIN CREAM 1 APPLICATION(S): 100000 CREAM TOPICAL at 17:29

## 2022-03-05 RX ADMIN — HEPARIN SODIUM 23 UNIT(S)/HR: 5000 INJECTION INTRAVENOUS; SUBCUTANEOUS at 22:24

## 2022-03-05 RX ADMIN — Medication 1 MILLIGRAM(S): at 11:49

## 2022-03-05 RX ADMIN — METHADONE HYDROCHLORIDE 5 MILLIGRAM(S): 40 TABLET ORAL at 17:04

## 2022-03-05 RX ADMIN — Medication 105 MILLIGRAM(S): at 06:24

## 2022-03-05 RX ADMIN — ISOSORBIDE DINITRATE 30 MILLIGRAM(S): 5 TABLET ORAL at 05:51

## 2022-03-05 RX ADMIN — CHLORHEXIDINE GLUCONATE 1 APPLICATION(S): 213 SOLUTION TOPICAL at 21:28

## 2022-03-05 RX ADMIN — Medication 81 MILLIGRAM(S): at 11:49

## 2022-03-05 RX ADMIN — HEPARIN SODIUM 24 UNIT(S)/HR: 5000 INJECTION INTRAVENOUS; SUBCUTANEOUS at 09:10

## 2022-03-05 RX ADMIN — Medication 2: at 12:23

## 2022-03-05 RX ADMIN — TICAGRELOR 90 MILLIGRAM(S): 90 TABLET ORAL at 17:04

## 2022-03-05 RX ADMIN — Medication 105 MILLIGRAM(S): at 14:03

## 2022-03-05 RX ADMIN — Medication 25 MILLIGRAM(S): at 21:27

## 2022-03-05 RX ADMIN — METHADONE HYDROCHLORIDE 5 MILLIGRAM(S): 40 TABLET ORAL at 09:10

## 2022-03-05 RX ADMIN — DEXTROSE MONOHYDRATE, SODIUM CHLORIDE, AND POTASSIUM CHLORIDE 200 MILLILITER(S): 50; .745; 4.5 INJECTION, SOLUTION INTRAVENOUS at 11:50

## 2022-03-05 RX ADMIN — Medication 2: at 07:41

## 2022-03-05 RX ADMIN — CARVEDILOL PHOSPHATE 6.25 MILLIGRAM(S): 80 CAPSULE, EXTENDED RELEASE ORAL at 10:00

## 2022-03-05 RX ADMIN — Medication 105 MILLIGRAM(S): at 21:27

## 2022-03-05 RX ADMIN — Medication 100 GRAM(S): at 05:52

## 2022-03-05 RX ADMIN — TICAGRELOR 90 MILLIGRAM(S): 90 TABLET ORAL at 05:52

## 2022-03-05 RX ADMIN — Medication 25 MILLIGRAM(S): at 14:03

## 2022-03-05 RX ADMIN — Medication 2 UNIT(S): at 17:38

## 2022-03-05 RX ADMIN — Medication 25 GRAM(S): at 16:35

## 2022-03-05 RX ADMIN — INSULIN GLARGINE 50 UNIT(S): 100 INJECTION, SOLUTION SUBCUTANEOUS at 10:53

## 2022-03-05 RX ADMIN — NYSTATIN CREAM 1 APPLICATION(S): 100000 CREAM TOPICAL at 06:01

## 2022-03-05 RX ADMIN — METHADONE HYDROCHLORIDE 5 MILLIGRAM(S): 40 TABLET ORAL at 23:55

## 2022-03-05 RX ADMIN — ISOSORBIDE DINITRATE 30 MILLIGRAM(S): 5 TABLET ORAL at 11:49

## 2022-03-05 RX ADMIN — Medication 2: at 17:05

## 2022-03-05 RX ADMIN — ISOSORBIDE DINITRATE 30 MILLIGRAM(S): 5 TABLET ORAL at 17:03

## 2022-03-05 RX ADMIN — HEPARIN SODIUM 24 UNIT(S)/HR: 5000 INJECTION INTRAVENOUS; SUBCUTANEOUS at 15:23

## 2022-03-05 RX ADMIN — METHADONE HYDROCHLORIDE 5 MILLIGRAM(S): 40 TABLET ORAL at 05:51

## 2022-03-05 NOTE — PROGRESS NOTE ADULT - ASSESSMENT
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 However, m. marnium does not clinically appear as scars.  - Ordered HSV/VZV swab today    Recommendations were communicated with the primary team.  Discussed with the dermatology attending, Dr. Benites  Dermatology will continue to follow. Thank you for consulting our service.    Tracie Howard MD MPH  Resident Physician, PGY2  [facility]  Office: [phone]  Pager: [phone]  **Please page with 10-[phone] callback # for further related questions.**

## 2022-03-05 NOTE — PROGRESS NOTE ADULT - ASSESSMENT
assessment   55 y/o M with PMH of HTN, T2DM, neuropathy, chronic pain (on Methadone & opioids) for cervical spine and back injury transferred to [facility] CCU from [place] due to STEMI (trop 7560, inferolateral LUIS) and DKA. Found to have A fib w/ RVR to 130s-150s, JAXON w/ SCr 2.14 (baseline 0.7-0.9 in 10/2021), admitted to CCU for optimization of DKA and kidney function prior to cath.    Plan   # NEURO: Admitted w/ AMS Likely metabolic encephalopathy 2/2 DKA  CT Head(3/2):  Volume loss, microvascular disease, age indeterminate lacunar infarcts, Foci of air in the left sella turcica with bony thinning possible dehiscence, edentulous maxilla with dental hardware, bony calvarial sclerosis correlate renal function.   MRI findings(3/4): MR Brain: There is no mass, intracranial hemorrhage, or acute infarction.  - Currently A&O x 3 , answers questions appropriately   - Cont. CIWA protocol   - Neuro assessment as per ICU protocol     # PULM: Denies SOB or dyspnea, CTA on exam   O2 sat >92% on room air   -continue to monitor O2 sats and respiratory status, currently protecting airway  - Oxygen supplement as needed     # CV: STEMI (trop 7560, inferolateral LUIS on EKG), per chart was loaded with 180 mg Ticagrelor, heparin bolus in [facility]  Limited TTE 3/1/22 showed hypokinetic inferolateral and mid inferior wall, basal inferior wall is akinetic.   - Revascularization with PCI deferred due to pt's elevated SCr, likely prerenal JAOXN 2/2 dehydration from DKA, was on Nitro gtt switched to hydra and IDN  - Cont. ASA, and Ticagrelor  - Cont. atorvastatin 80mg qD  - Heparin gtt for afib   - trend trop, CK, CKMB  - lopressor changed to coreg 12.5 mg BID  - Eventual ischemic work up     Admitted w/ AFib w/ RVR: CHADSVASC score: 3 and HAS-BLED score: 1  - currently on heparin gtt  - Monitor on telemetry  - c/w coreg 6.25 mg BID    # RENAL: (Cr. 2.14) JAXON likely prerenal 2/2 dehydration 2/2 DKA; Now resolved  Improved w/ IVF  Overall he is +18L   Trace edema bilateral upper/lower extremities  - Cont. Monitor BUN/Cr, UO and lytes   Metabolic acidosis w/ anion gap in setting of acute DKA now resolved   Hyponatremia : Monitor/ Trend Na +     # GI: Transaminitis in setting of STEMI now resolved  -AST/ALT improving with fluids, possible shock liver due to hypovolemia from DKA  - Cont. DASH puree diet   Presents w/ black tarry liquid stool + FOBT   - Monitor H/H     # ENDO: admitted w/ DKA  - s/p DKA protocol - off insulin gtt, transition to long acting insulin and NPH - improving  - replete K and Mg/phos as needed  = pt now on PO diet  - HbA1c 9.8 in 10/2021; pt seems to have history of uncontrolled T2DM  - 3/4: Will bridge from insulin gtt to lantus and  NPH as AG closed  - 3/5: c/w lantus 50U, pt did not eat breakfast due to diarrhea, would start admelog 15U TID premeal when pt begins to eat.    # HEMATOLOGIC: H/H stable  - Cont. Hep gtt for AC    # ID: Pt remains afebrile w/ slightly elevated WBC w/o any signs and symptoms of infection   - BCx NGTD and UCx NGTD  - Trend WBC     # MIS: Full code   assessment   57 y/o M with PMH of HTN, T2DM, neuropathy, chronic pain (on Methadone & opioids) for cervical spine and back injury transferred to [facility] CCU from [place] due to STEMI (trop 7560, inferolateral LUIS) and DKA. Found to have A fib w/ RVR to 130s-150s, JAXON w/ SCr 2.14 (baseline 0.7-0.9 in 10/2021), admitted to CCU for optimization of DKA and kidney function prior to cath.    Plan   # NEURO: Admitted w/ AMS Likely metabolic encephalopathy 2/2 DKA  CT Head(3/2):  Volume loss, microvascular disease, age indeterminate lacunar infarcts, Foci of air in the left sella turcica with bony thinning possible dehiscence, edentulous maxilla with dental hardware, bony calvarial sclerosis correlate renal function.   MRI findings(3/4): MR Brain: There is no mass, intracranial hemorrhage, or acute infarction.  - Currently A&O x 3 , answers questions appropriately   - Cont. CIWA protocol   - Neuro assessment as per ICU protocol     # PULM: Denies SOB or dyspnea, CTA on exam   O2 sat >92% on room air   -continue to monitor O2 sats and respiratory status, currently protecting airway  - Oxygen supplement as needed     # CV: STEMI (trop 7560, inferolateral LUIS on EKG), per chart was loaded with 180 mg Ticagrelor, heparin bolus in [facility]  Limited TTE 3/1/22 showed hypokinetic inferolateral and mid inferior wall, basal inferior wall is akinetic.   - Revascularization with PCI deferred due to pt's elevated SCr, likely prerenal JAXON 2/2 dehydration from DKA, was on Nitro gtt switched to hydra and IDN  - Cont. ASA, and Ticagrelor  - Cont. atorvastatin 80mg qD  - Heparin gtt for afib   - trend trop, CK, CKMB  - lopressor changed to coreg 12.5 mg BID  - Eventual ischemic work up     Admitted w/ AFib w/ RVR: CHADSVASC score: 3 and HAS-BLED score: 1  - currently on heparin gtt  - Monitor on telemetry  - c/w coreg 6.25 mg BID    # RENAL: (Cr. 2.14) JAXON likely prerenal 2/2 dehydration 2/2 DKA; Now resolved  Improved w/ IVF  Overall he is +18L   Trace edema bilateral upper/lower extremities  - Cont. Monitor BUN/Cr, UO and lytes   Metabolic acidosis w/ anion gap in setting of acute DKA now resolved   Hyponatremia : Monitor/ Trend Na +     # GI: Transaminitis in setting of STEMI now resolved  -AST/ALT improving with fluids, possible shock liver due to hypovolemia from DKA  - Cont. DASH puree diet   Presents w/ black tarry liquid stool + FOBT   - Monitor H/H     # ENDO: admitted w/ DKA  - s/p DKA protocol - off insulin gtt, transition to long acting insulin and NPH - improving  Insulin gtt off (3/4/22)  - Lantus 15 HS and Amalog 2 units TID pre-meals ('s) w/ Mod ISS     # HEMATOLOGIC: H/H stable  - Cont. Hep gtt for AC    # ID: Pt remains afebrile w/ slightly elevated WBC w/o any signs and symptoms of infection   - BCx NGTD and UCx NGTD  - Trend WBC     # MIS: Full code

## 2022-03-05 NOTE — PROGRESS NOTE ADULT - SUBJECTIVE AND OBJECTIVE BOX
INTERVAL HPI/OVERNIGHT EVENTS: No acute overnight events. Brother at bedside. ANOX3. Patient appears more alert and talkative.   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    MEDICATIONS  (STANDING):  aspirin  chewable 81 milliGRAM(s) Oral daily  atorvastatin 80 milliGRAM(s) Oral at bedtime  carvedilol 12.5 milliGRAM(s) Oral every 12 hours  chlorhexidine 4% Liquid 1 Application(s) Topical <User Schedule>  clobetasol 0.05% Ointment 1 Application(s) Topical two times a day  dextrose 40% Gel 15 Gram(s) Oral once  dextrose 5%. 1000 milliLiter(s) (50 mL/Hr) IV Continuous <Continuous>  dextrose 5%. 1000 milliLiter(s) (100 mL/Hr) IV Continuous <Continuous>  dextrose 50% Injectable 25 Gram(s) IV Push once  dextrose 50% Injectable 12.5 Gram(s) IV Push once  dextrose 50% Injectable 25 Gram(s) IV Push once  glucagon  Injectable 1 milliGRAM(s) IntraMuscular once  heparin  Infusion 2300 Unit(s)/Hr (23 mL/Hr) IV Continuous <Continuous>  hydrALAZINE 25 milliGRAM(s) Oral three times a day  influenza   Vaccine 0.5 milliLiter(s) IntraMuscular once  insulin glargine Injectable (LANTUS) 50 Unit(s) SubCutaneous <User Schedule>  insulin lispro (ADMELOG) corrective regimen sliding scale   SubCutaneous three times a day before meals  insulin lispro (ADMELOG) corrective regimen sliding scale   SubCutaneous at bedtime  insulin lispro Injectable (ADMELOG) 2 Unit(s) SubCutaneous three times a day before meals  isosorbide   dinitrate Tablet (ISORDIL) 30 milliGRAM(s) Oral three times a day  methadone    Tablet 5 milliGRAM(s) Oral every 6 hours  nystatin Powder 1 Application(s) Topical two times a day  pantoprazole  Injectable 40 milliGRAM(s) IV Push every 12 hours  thiamine IVPB 500 milliGRAM(s) IV Intermittent three times a day    MEDICATIONS  (PRN):  LORazepam     Tablet 1 milliGRAM(s) Oral every 1 hour PRN CIWA-Ar score 8 or greater  LORazepam     Tablet 2 milliGRAM(s) Oral every 1 hour PRN Symptom-triggered: each CIWA -Ar score 8 or GREATER  LORazepam     Tablet 2 milliGRAM(s) Oral every 2 hours PRN Symptom-triggered: 2 point increase in CIWA -Ar score and a total score of 7 or LESS  LORazepam   Injectable 2 milliGRAM(s) IV Push every 2 hours PRN Symptom-triggered: 2 point increase in CIWA -Ar score and a total score of 7 or LESS  LORazepam   Injectable 2 milliGRAM(s) IntraMuscular every 2 hours PRN Symptom-triggered: 2 point increase in CIWA -Ar score and a total score of 7 or LESS  LORazepam   Injectable 2 milliGRAM(s) IV Push every 1 hour PRN Symptom-triggered: each CIWA -Ar score 8 or GREATER  LORazepam   Injectable 2 milliGRAM(s) IntraMuscular every 1 hour PRN Symptom-triggered: each CIWA -Ar score 8 or GREATER  LORazepam   Injectable 1 milliGRAM(s) IV Push every 1 hour PRN CIWA-Ar score 8 or greater  LORazepam   Injectable 1 milliGRAM(s) IntraMuscular every 1 hour PRN CIWA-Ar score 8 or greater      Allergies    No Known Allergies    Intolerances        REVIEW OF SYSTEMS      General: no fevers/chills, no NS	    Skin: see HPI  	  Ophthalmologic: no eye pain or change in vision    Genitourinary: no dysuria or hematuria    Musculoskeletal: no joint pains or weakness	    Neurological: no weakness or tingling          Vital Signs Last 24 Hrs  T(C): 36.5 (05 Mar 2022 20:00), Max: 36.8 (05 Mar 2022 12:00)  T(F): 97.7 (05 Mar 2022 20:00), Max: 98.3 (05 Mar 2022 16:00)  HR: 95 (05 Mar 2022 23:00) (84 - 102)  BP: 138/74 (05 Mar 2022 23:00) (118/69 - 170/96)  BP(mean): 99 (05 Mar 2022 23:00) (75 - 127)  RR: 20 (05 Mar 2022 23:00) (17 - 29)  SpO2: 97% (05 Mar 2022 23:00) (95% - 100%)    PHYSICAL EXAM:  The patient was alert and oriented X 3, well nourished, and in no apparent distress.  There was no visible lymphadenopathy.  Conjunctiva were non injected  There was no clubbing or edema of extremities.    Of note on skin exam:  - purple-red papules with hemorrhagic center overlying b/l feet  - blanching erythematous patches overlying MCPs, DIPs, and PIPs on b/l feet and hands  - few scars overlying right dorsal hand and medial aspect of right index finger  - Scattered necrotic papulovesicles with predilection of distal extremities  - oral cavity is clear, no scleral injection    LABS:                        9.8    11.93 )-----------( 236      ( 05 Mar 2022 14:59 )             29.7     03-05    135  |  106  |  5<L>  ----------------------------<  139<H>  3.9   |  21<L>  |  0.62    Ca    8.2<L>      05 Mar 2022 21:58  Phos  3.0     03-05  Mg     2.0     03-05    TPro  4.9<L>  /  Alb  2.6<L>  /  TBili  0.3  /  DBili  x   /  AST  22  /  ALT  66<H>  /  AlkPhos  61  03-05    PT/INR - ( 05 Mar 2022 00:54 )   PT: 13.0 sec;   INR: 1.13 ratio         PTT - ( 05 Mar 2022 21:57 )  PTT:83.3 sec      RADIOLOGY & ADDITIONAL TESTS:

## 2022-03-05 NOTE — PROGRESS NOTE ADULT - ASSESSMENT
57 y/o M with PMH of HTN, T2DM, neuropathy, chronic pain (on Methadone & opioids) for cervical spine and back injury transferred to [facility] CCU from [facility] [place] due to STEMI (trop 7560, inferolateral LUIS) and DKA. Found to have A fib w/ RVR to 130s-150s, JAXON w/ SCr 2.14 (baseline 0.7-0.9 in 10/2021), admitted to CCU for optimization of DKA and kidney function prior to cath.      NEURO:  #Mental status: altered. Likely metabolic encephalopathy 2/2 DKA  -Currently A&O x 0, worsening  -neurology consulted, appreciate recs  -CT Head:  Volume loss, microvascular disease, age indeterminate lacunar infarcts, Foci of air in the left sella turcica with bony thinning possible dehiscence, edentulous maxilla with dental hardware, bony calvarial sclerosis correlate renal function.   -plan for MRI head/neck to evaluate CT Head findings;  -will treat DKA as below  -ordered tox screen, serum methanol, ethyl alcohol,  serum osms    CV:  #STEMI (trop 7560, inferolateral LUIS on EKG), per chart was loaded with 180 mg Ticagrelor, heparin bolus in H  - Revascularization with PCI deferred due to pt's elevated SCr, likely prerenal JAXON 2/2 dehydration from DKA  - MISSY score: 122  - DAPT: ASA 81 + Ticagrelor 90mg BID for 1 year  - Statin: atorvastatin 80mg qD  - Heparin gtt  - limited TTE 3/1/22 showed hypokinetic inferolateral and mid inferior wall, basal inferior wall is akinetic.   - trend trop, CK, CKMB  - TSH and lipid panel  - lopressor 12.5 mg BID -> coreg 12.5 mg BID  -3/2: pt had CP in AM, will c/w nitro gtt, d/rome precedex gtt. c/w hydral and isordil. plan to reach out to cath lab for potential PCI.  -3/3: c/w nitro gtt, hydral and IDN, switched lopressor to coreg    #AFib w/ RVR:   - CHADSVASC score: 3  - HAS-BLED score: 1  - currently on heparin gtt  - s/p cardizem 25 mg IVP x 2 and cardizem gtt; d/rome due to potentially decreased EF on limited TTE  - will c/w fluid resuscitation as below  - Monitor on telemetry  - c/w coreg 12.5 mg BID      PULM:  -Currently satting well on RA, but has deep shallow breathing, likely kussmaul respirations 2/2 DKA  -continue to monitor O2 sats and respiratory status, currently protecting airway. O2 NC PRN    RENAL:  #JAXON likely prerenal 2/2 dehydration 2/2 DKA; resolved  -SCr 2.14, BUN 44, fluid responsive, improved to baseline SCr/BUN with IVF  -UAs significant for large ketones and glucose  -Monitor I/Os, UO    HAGMA: - resolving  -AG closed, bicarb ~18 - resolving  -d/c bicarb gtt  -IVF resuscitation  -DKA management as below    GI:  #Transaminitis: Elevated LFTs - resolved  -AST/ALT improving with fluids, possible shock liver due to hypovolemia from DKA  #Diet: NPO due to DKA    ENDO:  #DKA:  - DKA protocol initiated - c/w insulin gtt, will transition to long acting insulin and NPH - improving  - K 3.2 -> 3.7; was ordered for 40 mEq PO x 2, 10 mEq IV, insulin gtt started once K > 3.3  - will continue to supplement K as level is < 5.3 ordered NS bolus with 30 mEq of K  - c/w IVF resuscitation per DKA protocol, D5W + 1/2 NS  - BMP [id number], BHB q1hour  to monitor AG + K, will supplement PRN  - HbA1c 9.8 in 10/2021; pt seems to have history of uncontrolled T2DM, currently unable to answer questions regarding his PMH  - 3/4: Will bridge from insulin gtt to lantus and  NPH as AG closed      HEMATOLOGIC:  -H/H stable    ID:  UA x 2 negative for bacteria, afebrile, although WBC 16 -> 15, HR 130s, RR > 20  Pt without strong objective or clinical evidence of infection. Will observe off antibiotics    SKIN  #Lines:  A-lne placed 3/1  PIVs    #Ethics  Full code 55 y/o M with PMH of HTN, T2DM, neuropathy, chronic pain (on Methadone & opioids) for cervical spine and back injury transferred to [facility] CCU from [place] due to STEMI (trop 7560, inferolateral LUIS) and DKA. Found to have A fib w/ RVR to 130s-150s, JAXON w/ SCr 2.14 (baseline 0.7-0.9 in 10/2021), admitted to CCU for optimization of DKA and kidney function prior to cath.      NEURO:  #Mental status: altered. Likely metabolic encephalopathy 2/2 DKA  -Currently A&O x 0, worsening  -neurology consulted, appreciate recs  -CT Head:  Volume loss, microvascular disease, age indeterminate lacunar infarcts, Foci of air in the left sella turcica with bony thinning possible dehiscence, edentulous maxilla with dental hardware, bony calvarial sclerosis correlate renal function.   -MRI findings: MR Brain:  There is no mass, intracranial hemorrhage, or acute infarction.  MRA head:  No substantial intracranial arterial stenosis or large vessel occlusion.  MRA neck:  No hemodynamically significant stenosis of the internal carotid arteries.  Possible critical stenosis involving the left vertebral artery as   described.    -will treat DKA as below  -AMS lab workup mostly negative  -vertebral artery stenosis likely not etiology of AMS; can pursue vascular surgery consult when pt is stabilized post cath/PCI on medicine floors.    CV:  #STEMI (trop 7560, inferolateral LUIS on EKG), per chart was loaded with 180 mg Ticagrelor, heparin bolus in [facility]  - Revascularization with PCI deferred due to pt's elevated SCr, likely prerenal JAXON 2/2 dehydration from DKA  - MISSY score: 122  - DAPT: ASA 81 + Ticagrelor 90mg BID for 1 year  - Statin: atorvastatin 80mg qD  - Heparin gtt  - limited TTE 3/1/22 showed hypokinetic inferolateral and mid inferior wall, basal inferior wall is akinetic.   - trend trop, CK, CKMB  - TSH and lipid panel  - lopressor 12.5 mg BID -> coreg 12.5 mg BID  -3/2: pt had CP in AM, will c/w nitro gtt, d/rome precedex gtt. c/w hydral and isordil. plan to reach out to cath lab for potential PCI.  -3/3: c/w nitro gtt, hydral and IDN, switched lopressor to coreg  3/5: coreg d/rome due to low BPs, which have now improved; restarted coreg at 6.25 mg BID    #AFib w/ RVR:   - CHADSVASC score: 3  - HAS-BLED score: 1  - currently on heparin gtt  - s/p cardizem 25 mg IVP x 2 and cardizem gtt; d/rome due to potentially decreased EF on limited TTE  - will c/w fluid resuscitation as below  - Monitor on telemetry  - c/w coreg 6.25 mg BID      PULM:  -Currently satting well on RA, but has deep shallow breathing, likely kussmaul respirations 2/2 DKA  -continue to monitor O2 sats and respiratory status, currently protecting airway. O2 NC PRN    RENAL:  #JAXON likely prerenal 2/2 dehydration 2/2 DKA; resolved  -SCr 2.14, BUN 44, fluid responsive, improved to baseline SCr/BUN with IVF  -UAs significant for large ketones and glucose  -Monitor I/Os, UO    HAGMA: - resolving  -AG closed, bicarb ~18 - resolving  -d/c bicarb gtt  -IVF resuscitation  -DKA management as below    GI:  #Transaminitis: Elevated LFTs - resolved  -AST/ALT improving with fluids, possible shock liver due to hypovolemia from DKA  #Diet: NPO due to DKA    ENDO:  #DKA:  - s/p DKA protocol - off insulin gtt, transition to long acting insulin and NPH - improving  - replete K and Mg/phos as needed  = pt now on PO diet  - HbA1c 9.8 in 10/2021; pt seems to have history of uncontrolled T2DM  - 3/4: Will bridge from insulin gtt to lantus and  NPH as AG closed  - 3/5: c/w lantus 50U, pt did not eat breakfast due to diarrhea, would start admelog 15U TID premeal when pt begins to eat.    HEMATOLOGIC:  -H/H stable    ID:  BCx NGTD, AMS improving  UCx NGTD    SKIN  #Lines:  A-lne placed 3/1  PIVs    #Ethics  Full code

## 2022-03-05 NOTE — PROGRESS NOTE ADULT - SUBJECTIVE AND OBJECTIVE BOX
CICU MID-MIGHT NOTE  Admission date: 3/1/22  Chief complaint/ Diagnosis: IWSTEMI   HPI: a 55 y/o M with PMH of HTN, T2DM, neuropathy, chronic pain on Methadone & opioids for cervical spine and back injury transferred to [facility] CICU from [place] due to STEMI and DKA. Pt was reportedly found to have inferolateral ST elevations + afib on EKG, trop 7560. Pt initially came to [facility] ED due to nausea and vomiting for 3 days, reportedly had CP several days ago which apparently stopped on its own. Pt currently altered, difficult to obtain history from.  Pt transferred to [facility] for cath, noted to be in afib w/ RVR to 130s-150s, received cardizem 25 mg IV x 2 and started on cardizem gtt, cath was aborted as pt had SCR of 2.14, (baseline 0.7-0.9 in 10/2021), transferred to CCU for optimization of DKA prior to cath.   Baseline SCr 0.7-0.9 in 10/2021 from previous hospitalization at [facility] for cellulitis of hand, was d/rome on bactrim DS + ethambutol x 4 weeks.  Labs from [facility] significant for: WBC 15.53, lactate 5.6, troponin 7560.2, K 3.2, CO2 5, AG 42, SCr/BUN 2.14/44, Glucose 663, calcium 12.7, Mg 2.7, Phos 9.5  FSGs >600 -> 503  ABG 7.19/<19/133/3  UA: large ketones, moderate bacteria, negative LE and nitrite  RVP/COVID negative (01 Mar 2022 13:56)    Interval history: More awake and responses questions appropriately   currently on hep gtt for afib     REVIEW OF SYSTEMS  Denies CP, Palpitation, SOB, Dyspnea [ x ] All other systems negative    MEDICATIONS  (STANDING)  aspirin  chewable 81 milliGRAM(s) Oral daily  atorvastatin 80 milliGRAM(s) Oral at bedtime  carvedilol 12.5 milliGRAM(s) Oral every 12 hours  chlorhexidine 4% Liquid 1 Application(s) Topical <User Schedule>  glucagon  Injectable 1 milliGRAM(s) IntraMuscular once  heparin  Infusion 2300 Unit(s)/Hr (24 mL/Hr) IV Continuous <Continuous>  hydrALAZINE 25 milliGRAM(s) Oral three times a day  influenza   Vaccine 0.5 milliLiter(s) IntraMuscular once  insulin glargine Injectable (LANTUS) 50 Unit(s) SubCutaneous <User Schedule>  insulin lispro (ADMELOG) corrective regimen sliding scale   SubCutaneous three times a day before meals  insulin lispro (ADMELOG) corrective regimen sliding scale   SubCutaneous at bedtime  insulin lispro Injectable (ADMELOG) 2 Unit(s) SubCutaneous three times a day before meals  isosorbide   dinitrate Tablet (ISORDIL) 30 milliGRAM(s) Oral three times a day  methadone    Tablet 5 milliGRAM(s) Oral every 6 hours  nystatin Powder 1 Application(s) Topical two times a day  pantoprazole  Injectable 40 milliGRAM(s) IV Push every 12 hours  thiamine IVPB 500 milliGRAM(s) IV Intermittent three times a day  ticagrelor 90 milliGRAM(s) Oral every 12 hours    MEDICATIONS  (PRN):  LORazepam     Tablet 1 milliGRAM(s) Oral every 1 hour PRN CIWA-Ar score 8 or greater  LORazepam     Tablet 2 milliGRAM(s) Oral every 1 hour PRN Symptom-triggered: each CIWA -Ar score 8 or GREATER  LORazepam     Tablet 2 milliGRAM(s) Oral every 2 hours PRN Symptom-triggered: 2 point increase in CIWA -Ar score and a total score of 7 or LESS  LORazepam   Injectable 2 milliGRAM(s) IV Push every 2 hours PRN Symptom-triggered: 2 point increase in CIWA -Ar score and a total score of 7 or LESS  LORazepam   Injectable 2 milliGRAM(s) IntraMuscular every 2 hours PRN Symptom-triggered: 2 point increase in CIWA -Ar score and a total score of 7 or LESS  LORazepam   Injectable 2 milliGRAM(s) IV Push every 1 hour PRN Symptom-triggered: each CIWA -Ar score 8 or GREATER  LORazepam   Injectable 2 milliGRAM(s) IntraMuscular every 1 hour PRN Symptom-triggered: each CIWA -Ar score 8 or GREATER  LORazepam   Injectable 1 milliGRAM(s) IV Push every 1 hour PRN CIWA-Ar score 8 or greater  LORazepam   Injectable 1 milliGRAM(s) IntraMuscular every 1 hour PRN CIWA-Ar score 8 or greater    PAST MEDICAL & SURGICAL HISTORY:  Hypertension  Diabetes mellitus  Gastric ulcer  Spinal stenosis  H/O spinal cord compression  Spondylois  H/O radiculopathy  H/O cervical spine surgery  History of back surgery  S/P cervical spinal fusion    FAMILY HISTORY:  FH: hypertension (Father)  FH: diabetes mellitus (Mother)    Allergy   No Known Allergies    ICU Vital Signs Last 24 Hrs  T(C): 36.5 (Max: 37.3)  HR: 85 (85 - 102)  BP: 128/62 (118/69 - 170/96)  RR: 17  (17 - 29)  SpO2: 97% (95% - 100%)    I&O's Summary  IN: 2902 mL / OUT: 3300 mL / NET: 398 mL    PHYSICAL EXAM  Appearance: Normal, NAD  HEAD:  Normocephalic  EYES: PERRLA, conjunctiva and sclera clear  NECK: Supple, No JVD  CHEST/LUNG: Clear to auscultation bilaterally; No wheezing  HEART: Normal S1, S2. No murmurs, rubs, or gallops  ABDOMEN: + Bowel sounds, Soft, NT, ND   EXTREMITIES:  2+ Peripheral Pulses, No clubbing, OR cyanosis, + Trace edema bilateral upper/lower extremities   NEUROLOGY: non-focal, aaox3  SKIN: No rashes or lesions    Interpretation of Telemetry:                        9.8    11.93 )-----------( 236      ( 05 Mar 2022 14:59 )             29.7     134<135<136  |  106  |  6<L>  ----------------------------<  184<H>  3.7   |  19<L>  |  0.53    Ca    7.9<L>     Phos  2.1     Mg     1.7 (repleted)  TPro  4.9<L>  /  Alb  2.6<L>  /  TBili  0.3  /  DBili  x   /  AST  24  /  ALT  70<H>  /  AlkPhos  64      ABG - ( 04 Mar 2022 18:19 )  pH, Arterial: 7.51 /  pCO2: 22 /  pO2: 130   / HCO3: 18    / Base Excess: -2.7  /  SaO2: 98.8    F/S 160-196

## 2022-03-05 NOTE — PROGRESS NOTE ADULT - ATTENDING COMMENTS
Few scattered hemorrhagic papules on extremities and petechiae on fingertips are suggestive of embolic phenomena, however TTE was WNL and bacterial cultures have been negative. Skin biopsy and tissue culture performed to further elucidate underlying process. Notabely, pt has a history of reported mycobacterial infection of the right hand however skin lesions on exam today are not in line with mycobacterial infection. Several papules on right hand appear more scar-like and patient confirms they have been present for many months. I suspect the increased erythema is a product of hand swelling-- trial of hand elevation and topical steroids.

## 2022-03-05 NOTE — PROGRESS NOTE ADULT - SUBJECTIVE AND OBJECTIVE BOX
CICU MID-NIGHT NOTAdmission date:   Chief complaint/ Diagnosis  HPI:  55 y/o M with PMH of HTN, T2DM, neuropathy, chronic pain on Methadone & opioids for cervical spine and back injury transferred to [facility] CICU from [place] due to STEMI and DKA. Pt was reportedly found to have inferolateral ST elevations + afib on EKG, trop 7560. Pt initially came to [facility] ED due to nausea and vomiting for 3 days, reportedly had CP several days ago which apparently stopped on its own. Pt currently altered, difficult to obtain history from.    Pt transferred to [facility] for cath, noted to be in afib w/ RVR to 130s-150s, received cardizem 25 mg IV x 2 and started on cardizem gtt, cath was aborted as pt had SCR of 2.14, (baseline 0.7-0.9 in 10/2021), transferred to CCU for optimization of DKA prior to cath.     Baseline SCr 0.7-0.9 in 10/2021 from previous hospitalization at [facility] for cellulitis of hand, was d/rome on bactrim DS + ethambutol x 4 weeks.    Labs from [facility] significant for: WBC 15.53, lactate 5.6, troponin 7560.2, K 3.2, CO2 5, AG 42, SCr/BUN 2.14/44, Glucose 663, calcium 12.7, Mg 2.7, Phos 9.5  FSGs >600 -> 503  ABG 7.19/<19/133/3  UA: large ketones, moderate bacteria, negative LE and nitrite  RVP/COVID negative (01 Mar 2022 13:56)    Interval history: Unevenful overnight  REVIEW OF SYSTEMS  Denies CP, Palpitation, SOB, Dyspnea  [  ] All other systems negative  [  ] Unable to assess ROS because ________    MEDICATIONS  (STANDING):  aspirin  chewable 81 milliGRAM(s) Oral daily  atorvastatin 80 milliGRAM(s) Oral at bedtime  carvedilol 12.5 milliGRAM(s) Oral every 12 hours  chlorhexidine 4% Liquid 1 Application(s) Topical <User Schedule>  clobetasol 0.05% Ointment 1 Application(s) Topical two times a day  dextrose 40% Gel 15 Gram(s) Oral once  dextrose 5%. 1000 milliLiter(s) (50 mL/Hr) IV Continuous <Continuous>  dextrose 5%. 1000 milliLiter(s) (100 mL/Hr) IV Continuous <Continuous>  dextrose 50% Injectable 25 Gram(s) IV Push once  dextrose 50% Injectable 12.5 Gram(s) IV Push once  dextrose 50% Injectable 25 Gram(s) IV Push once  glucagon  Injectable 1 milliGRAM(s) IntraMuscular once  heparin  Infusion 2300 Unit(s)/Hr (24 mL/Hr) IV Continuous <Continuous>  hydrALAZINE 25 milliGRAM(s) Oral three times a day  influenza   Vaccine 0.5 milliLiter(s) IntraMuscular once  insulin glargine Injectable (LANTUS) 50 Unit(s) SubCutaneous <User Schedule>  insulin lispro (ADMELOG) corrective regimen sliding scale   SubCutaneous three times a day before meals  insulin lispro (ADMELOG) corrective regimen sliding scale   SubCutaneous at bedtime  insulin lispro Injectable (ADMELOG) 2 Unit(s) SubCutaneous three times a day before meals  isosorbide   dinitrate Tablet (ISORDIL) 30 milliGRAM(s) Oral three times a day  methadone    Tablet 5 milliGRAM(s) Oral every 6 hours  nystatin Powder 1 Application(s) Topical two times a day  pantoprazole  Injectable 40 milliGRAM(s) IV Push every 12 hours  thiamine IVPB 500 milliGRAM(s) IV Intermittent three times a day  ticagrelor 90 milliGRAM(s) Oral every 12 hours    MEDICATIONS  (PRN):  LORazepam     Tablet 1 milliGRAM(s) Oral every 1 hour PRN CIWA-Ar score 8 or greater  LORazepam     Tablet 2 milliGRAM(s) Oral every 1 hour PRN Symptom-triggered: each CIWA -Ar score 8 or GREATER  LORazepam     Tablet 2 milliGRAM(s) Oral every 2 hours PRN Symptom-triggered: 2 point increase in CIWA -Ar score and a total score of 7 or LESS  LORazepam   Injectable 2 milliGRAM(s) IV Push every 2 hours PRN Symptom-triggered: 2 point increase in CIWA -Ar score and a total score of 7 or LESS  LORazepam   Injectable 2 milliGRAM(s) IntraMuscular every 2 hours PRN Symptom-triggered: 2 point increase in CIWA -Ar score and a total score of 7 or LESS  LORazepam   Injectable 2 milliGRAM(s) IV Push every 1 hour PRN Symptom-triggered: each CIWA -Ar score 8 or GREATER  LORazepam   Injectable 2 milliGRAM(s) IntraMuscular every 1 hour PRN Symptom-triggered: each CIWA -Ar score 8 or GREATER  LORazepam   Injectable 1 milliGRAM(s) IV Push every 1 hour PRN CIWA-Ar score 8 or greater  LORazepam   Injectable 1 milliGRAM(s) IntraMuscular every 1 hour PRN CIWA-Ar score 8 or greater      PAST MEDICAL & SURGICAL HISTORY:  Hypertension    Diabetes mellitus    Gastric ulcer    Spinal stenosis    H/O spinal cord compression    Spondylosis    H/O radiculopathy    H/O cervical spine surgery    History of back surgery    S/P cervical spinal fusion    FAMILY HISTORY:  FH: hypertension (Father)    FH: diabetes mellitus (Mother)      No Known Allergies      ICU Vital Signs Last 24 Hrs  T(C): 36.5 (05 Mar 2022 20:00), Max: 37.3 (04 Mar 2022 22:00)  T(F): 97.7 (05 Mar 2022 20:00), Max: 99.2 (04 Mar 2022 22:00)  HR: 85 (05 Mar 2022 20:00) (85 - 102)  BP: 128/62 (05 Mar 2022 20:00) (118/69 - 170/96)  BP(mean): 88 (05 Mar 2022 20:00) (75 - 127)  ABP: --  ABP(mean): --  RR: 17 (05 Mar 2022 20:00) (17 - 29)  SpO2: 97% (05 Mar 2022 20:00) (95% - 100%)      I&O's Summary    04 Mar 2022 07:01  -  05 Mar 2022 07:00  --------------------------------------------------------  IN: 6594.4 mL / OUT: 2400 mL / NET: 4194.4 mL    05 Mar 2022 07:01  -  05 Mar 2022 20:39  --------------------------------------------------------  IN: 2902 mL / OUT: 3300 mL / NET: -398 mL      Daily     Daily Weight in k (05 Mar 2022 06:00)    PHYSICAL EXAM  Appearance: Normal, NAD  HEAD:  Atraumatic, Normocephalic  EYES: EOMI, PERRLA, conjunctiva and sclera clear  NECK: Supple, No JVD  CHEST/LUNG: Clear to auscultation bilaterally; No wheezing  HEART: Normal S1, S2. No murmurs, rubs, or gallops  ABDOMEN: + Bowel sounds, Soft, NT, ND   EXTREMITIES:  2+ Peripheral Pulses, No clubbing, cyanosis, or edema  NEUROLOGY: non-focal, aaox3  Lymphatic: No lymphadenopathy  SKIN: No rashes or lesions    Interpretation of Telemetry:                          9.8    11.93 )-----------( 236      ( 05 Mar 2022 14:59 )             29.7       03-05    134<L>  |  106  |  6<L>  ----------------------------<  184<H>  3.7   |  19<L>  |  0.53    Ca    7.9<L>      05 Mar 2022 14:59  Phos  2.1     03-05  Mg     1.7     03-05    TPro  4.9<L>  /  Alb  2.6<L>  /  TBili  0.3  /  DBili  x   /  AST  24  /  ALT  70<H>  /  AlkPhos  64  03-05            ABG - ( 04 Mar 2022 18:19 )  pH, Arterial: 7.51  pH, Blood: x     /  pCO2: 22    /  pO2: 130   / HCO3: 18    / Base Excess: -2.7  /  SaO2: 98.8                CAPILLARY BLOOD GLUCOSE      POCT Blood Glucose.: 191 mg/dL (05 Mar 2022 16:57)  POCT Blood Glucose.: 160 mg/dL (05 Mar 2022 07:18)  POCT Blood Glucose.: 196 mg/dL (05 Mar 2022 00:37)

## 2022-03-05 NOTE — PROGRESS NOTE ADULT - ASSESSMENT
Patient is 69 y/o Male with PMHx of T2DM, HLD presented to [facility] for CP, found to have inferolateral wall STEMI, transferred to [facility] for cardiac catheterization, had LHC w/ PCI using R radial artery access site to dRCA (100%, 1x JOELLE placed) and pRPL (1x JOELLE placed); mLAD 30%, mLCx 99% (staging).      NEURO:  #Mental status: at baseline  -Currently A&O x 3  -OOBTC    CV:  #STEMI (+trops, inferolateral EKG changes) s/p 1x JOELLE to dRCA and pRPL  - Pt found to have ST elevations in II, III, aVF, and V6; ST depressions in I, aVR, aVL, V1-V3.  - Loaded with 324 mg ASA, 180mg Ticagrelor, heparin bolus/gtt  - Pt had LHC w/ PCI using R radial artery access site to dRCA (100%, 1x JOELLE placed) and pRPL (1x JOELLE placed); mLAD 30%, mLCx 99% (staging).  - DAPT: ASA 81, Ticagrelor 90mg BID for 1 year  - Statin: atorvastatin 80mg qD  - Heparin gtt d/rome after revascularization with PCI  - trend cardiac enzymes: trop, CK, CKMB  - f/u A1C and lipid profile    s/p stent: stop heparin, nitro gtt  -c/w DAPT  - start B-blocker: metoprolol tartrate 12.5 mg BID  - start home lisinopril 5 mg daily   - statin: started atorvastatin 80mg qD; on simvastatin 10-20 mg at home  - monitor on telemetry    PULM:  -satting well on RA    RENAL:  -SCr/BUN stable, no hx of CKD  -c/w home lisinopril    GI:  #Transaminitis: mildly elevated LFTs  - AST//29, -> 153/37 mildly elevated AST likely from STEMI, will trend with cardiac enzymes    #Diet: DASH/consistent carb      ENDO:  #T2DM  - Insulin Sliding Scale  - on metformin 1g BID and glipizide 5 mg at home      HEMATOLOGIC:  H/H stable    ID:  No active issues.    SKIN:  No active issues.  #Lines: PIVs    ETHICS:  Full code

## 2022-03-05 NOTE — PROGRESS NOTE ADULT - SUBJECTIVE AND OBJECTIVE BOX
Patient is a 56y old  Male who presents with a chief complaint of STEMI, DKA (04 Mar 2022 20:18)    HPI:  55 y/o M with PMH of HTN, T2DM, neuropathy, chronic pain on Methadone & opioids for cervical spine and back injury transferred to [facility] CICU from [place] due to STEMI and DKA. Pt was reportedly found to have inferolateral ST elevations + afib on EKG, trop 7560. Pt initially came to [facility] ED due to nausea and vomiting for 3 days, reportedly had CP several days ago which apparently stopped on its own. Pt currently altered, difficult to obtain history from.    Pt transferred to [facility] for cath, noted to be in afib w/ RVR to 130s-150s, received cardizem 25 mg IV x 2 and started on cardizem gtt, cath was aborted as pt had SCR of 2.14, (baseline 0.7-0.9 in 10/2021), transferred to CCU for optimization of DKA prior to cath.     Baseline SCr 0.7-0.9 in 10/2021 from previous hospitalization at [facility] for cellulitis of hand, was d/rome on bactrim DS + ethambutol x 4 weeks.    Labs from [facility] significant for: WBC 15.53, lactate 5.6, troponin 7560.2, K 3.2, CO2 5, AG 42, SCr/BUN 2.14/44, Glucose 663, calcium 12.7, Mg 2.7, Phos 9.5  FSGs >600 -> 503  ABG 7.19/<19/133/3  UA: large ketones, moderate bacteria, negative LE and nitrite  RVP/COVID negative (01 Mar 2022 13:56)       INTERVAL HPI/OVERNIGHT EVENTS:   No overnight events   Afebrile, hemodynamically stable     Subjective:    ICU Vital Signs Last 24 Hrs  T(C): 36.5 (05 Mar 2022 03:00), Max: 37.3 (04 Mar 2022 22:00)  T(F): 97.7 (05 Mar 2022 03:00), Max: 99.2 (04 Mar 2022 22:00)  HR: 102 (05 Mar 2022 06:00) (67 - 102)  BP: 151/80 (05 Mar 2022 06:00) (105/66 - 159/88)  BP(mean): 109 (05 Mar 2022 06:00) (75 - 117)  ABP: 106/89 (04 Mar 2022 20:00) (69/40 - 151/69)  ABP(mean): 95 (04 Mar 2022 20:00) (49 - 106)  RR: 22 (05 Mar 2022 06:00) (14 - 29)  SpO2: 98% (05 Mar 2022 06:00) (98% - 100%)    I&O's Summary    04 Mar 2022 07:01  -  05 Mar 2022 07:00  --------------------------------------------------------  IN: 6594.4 mL / OUT: 2400 mL / NET: 4194.4 mL          Daily     Daily Weight in k (05 Mar 2022 06:00)    Adult Advanced Hemodynamics Last 24 Hrs  CVP(mm Hg): --  CVP(cm H2O): --  CO: --  CI: --  PA: --  PA(mean): --  PCWP: --  SVR: --  SVRI: --  PVR: --  PVRI: --    EKG/Telemetry Events:    MEDICATIONS  (STANDING):  aspirin  chewable 81 milliGRAM(s) Oral daily  atorvastatin 80 milliGRAM(s) Oral at bedtime  chlorhexidine 4% Liquid 1 Application(s) Topical <User Schedule>  dextrose 5% + sodium chloride 0.45% with potassium chloride 20 mEq/L 1000 milliLiter(s) (200 mL/Hr) IV Continuous <Continuous>  heparin  Infusion 2300 Unit(s)/Hr (23 mL/Hr) IV Continuous <Continuous>  hydrALAZINE 25 milliGRAM(s) Oral three times a day  influenza   Vaccine 0.5 milliLiter(s) IntraMuscular once  insulin glargine Injectable (LANTUS) 50 Unit(s) SubCutaneous <User Schedule>  insulin lispro (ADMELOG) corrective regimen sliding scale   SubCutaneous three times a day before meals  insulin lispro (ADMELOG) corrective regimen sliding scale   SubCutaneous at bedtime  isosorbide   dinitrate Tablet (ISORDIL) 30 milliGRAM(s) Oral three times a day  methadone    Tablet 5 milliGRAM(s) Oral every 6 hours  nystatin Powder 1 Application(s) Topical two times a day  thiamine IVPB 500 milliGRAM(s) IV Intermittent three times a day  ticagrelor 90 milliGRAM(s) Oral every 12 hours    MEDICATIONS  (PRN):  LORazepam     Tablet 1 milliGRAM(s) Oral every 1 hour PRN CIWA-Ar score 8 or greater  LORazepam     Tablet 2 milliGRAM(s) Oral every 1 hour PRN Symptom-triggered: each CIWA -Ar score 8 or GREATER  LORazepam     Tablet 2 milliGRAM(s) Oral every 2 hours PRN Symptom-triggered: 2 point increase in CIWA -Ar score and a total score of 7 or LESS  LORazepam   Injectable 2 milliGRAM(s) IV Push every 1 hour PRN Symptom-triggered: each CIWA -Ar score 8 or GREATER  LORazepam   Injectable 2 milliGRAM(s) IntraMuscular every 1 hour PRN Symptom-triggered: each CIWA -Ar score 8 or GREATER  LORazepam   Injectable 1 milliGRAM(s) IV Push every 1 hour PRN CIWA-Ar score 8 or greater  LORazepam   Injectable 1 milliGRAM(s) IntraMuscular every 1 hour PRN CIWA-Ar score 8 or greater  LORazepam   Injectable 2 milliGRAM(s) IV Push every 2 hours PRN Symptom-triggered: 2 point increase in CIWA -Ar score and a total score of 7 or LESS  LORazepam   Injectable 2 milliGRAM(s) IntraMuscular every 2 hours PRN Symptom-triggered: 2 point increase in CIWA -Ar score and a total score of 7 or LESS      PHYSICAL EXAM:  GENERAL:   HEAD:  Atraumatic, Normocephalic  EYES: EOMI, PERRLA, conjunctiva and sclera clear  NECK: Supple, No JVD, Normal thyroid, no enlarged nodes  NERVOUS SYSTEM:  Alert & Awake.   CHEST/LUNG: B/L good air entry; No rales, rhonchi, or wheezing  HEART: S1S2 normal, no S3, Regular rate and rhythm; No murmurs  ABDOMEN: Soft, Nontender, Nondistended; Bowel sounds present  EXTREMITIES:  2+ Peripheral Pulses, No clubbing, cyanosis, or edema  LYMPH: No lymphadenopathy noted  SKIN: No rashes or lesions    LABS:                        9.7    9.93  )-----------( 238      ( 05 Mar 2022 00:54 )             28.7     03-05    135  |  106  |  8   ----------------------------<  184<H>  3.9   |  17<L>  |  0.53    Ca    7.9<L>      05 Mar 2022 00:54  Phos  1.8     03-  Mg     1.9     -    TPro  5.0<L>  /  Alb  2.6<L>  /  TBili  0.4  /  DBili  x   /  AST  29  /  ALT  73<H>  /  AlkPhos  66  03-05    LIVER FUNCTIONS - ( 05 Mar 2022 00:54 )  Alb: 2.6 g/dL / Pro: 5.0 g/dL / ALK PHOS: 66 U/L / ALT: 73 U/L / AST: 29 U/L / GGT: x           PT/INR - ( 05 Mar 2022 00:54 )   PT: 13.0 sec;   INR: 1.13 ratio         PTT - ( 05 Mar 2022 00:54 )  PTT:19.2 sec  CAPILLARY BLOOD GLUCOSE      POCT Blood Glucose.: 196 mg/dL (05 Mar 2022 00:37)  POCT Blood Glucose.: 142 mg/dL (04 Mar 2022 16:57)  POCT Blood Glucose.: 141 mg/dL (04 Mar 2022 14:53)  POCT Blood Glucose.: 151 mg/dL (04 Mar 2022 10:58)  POCT Blood Glucose.: 183 mg/dL (04 Mar 2022 09:30)  POCT Blood Glucose.: 114 mg/dL (04 Mar 2022 07:19)    ABG - ( 04 Mar 2022 18:19 )  pH, Arterial: 7.51  pH, Blood: x     /  pCO2: 22    /  pO2: 130   / HCO3: 18    / Base Excess: -2.7  /  SaO2: 98.8                    Urinalysis Basic - ( 03 Mar 2022 11:37 )    Color: Colorless / Appearance: Clear / S.017 / pH: x  Gluc: x / Ketone: Moderate  / Bili: Negative / Urobili: Negative   Blood: x / Protein: 300 mg/dL / Nitrite: Negative   Leuk Esterase: Negative / RBC: 4 /hpf / WBC 1 /HPF   Sq Epi: x / Non Sq Epi: 0 /hpf / Bacteria: Negative          RADIOLOGY & ADDITIONAL TESTS:  CXR:        Care Discussed with Consultants/Other Providers [ x] YES  [ ] NO           Patient is a 56y old  Male who presents with a chief complaint of STEMI, DKA (04 Mar 2022 20:18)    HPI:  57 y/o M with PMH of HTN, T2DM, neuropathy, chronic pain on Methadone & opioids for cervical spine and back injury transferred to [facility] CICU from [place] due to STEMI and DKA. Pt was reportedly found to have inferolateral ST elevations + afib on EKG, trop 7560. Pt initially came to [facility] ED due to nausea and vomiting for 3 days, reportedly had CP several days ago which apparently stopped on its own. Pt currently altered, difficult to obtain history from.    Pt transferred to [facility] for cath, noted to be in afib w/ RVR to 130s-150s, received cardizem 25 mg IV x 2 and started on cardizem gtt, cath was aborted as pt had SCR of 2.14, (baseline 0.7-0.9 in 10/2021), transferred to CCU for optimization of DKA prior to cath.     Baseline SCr 0.7-0.9 in 10/2021 from previous hospitalization at [facility] for cellulitis of hand, was d/rome on bactrim DS + ethambutol x 4 weeks.    Labs from [facility] significant for: WBC 15.53, lactate 5.6, troponin 7560.2, K 3.2, CO2 5, AG 42, SCr/BUN 2.14/44, Glucose 663, calcium 12.7, Mg 2.7, Phos 9.5  FSGs >600 -> 503  ABG 7.19/<19/133/3  UA: large ketones, moderate bacteria, negative LE and nitrite  RVP/COVID negative (01 Mar 2022 13:56)       INTERVAL HPI/OVERNIGHT EVENTS:   PO diet restarted by night team due to improving mental status, pt pulled out NGT. Pt now AAOx2  Afebrile, hemodynamically stable     Subjective:    ICU Vital Signs Last 24 Hrs  T(C): 36.5 (05 Mar 2022 03:00), Max: 37.3 (04 Mar 2022 22:00)  T(F): 97.7 (05 Mar 2022 03:00), Max: 99.2 (04 Mar 2022 22:00)  HR: 102 (05 Mar 2022 06:00) (67 - 102)  BP: 151/80 (05 Mar 2022 06:00) (105/66 - 159/88)  BP(mean): 109 (05 Mar 2022 06:00) (75 - 117)  ABP: 106/89 (04 Mar 2022 20:00) (69/40 - 151/69)  ABP(mean): 95 (04 Mar 2022 20:00) (49 - 106)  RR: 22 (05 Mar 2022 06:00) (14 - 29)  SpO2: 98% (05 Mar 2022 06:00) (98% - 100%)    I&O's Summary    04 Mar 2022 07:01  -  05 Mar 2022 07:00  --------------------------------------------------------  IN: 6594.4 mL / OUT: 2400 mL / NET: 4194.4 mL          Daily     Daily Weight in k (05 Mar 2022 06:00)    Adult Advanced Hemodynamics Last 24 Hrs  CVP(mm Hg): --  CVP(cm H2O): --  CO: --  CI: --  PA: --  PA(mean): --  PCWP: --  SVR: --  SVRI: --  PVR: --  PVRI: --    EKG/Telemetry Events:    MEDICATIONS  (STANDING):  aspirin  chewable 81 milliGRAM(s) Oral daily  atorvastatin 80 milliGRAM(s) Oral at bedtime  chlorhexidine 4% Liquid 1 Application(s) Topical <User Schedule>  dextrose 5% + sodium chloride 0.45% with potassium chloride 20 mEq/L 1000 milliLiter(s) (200 mL/Hr) IV Continuous <Continuous>  heparin  Infusion 2300 Unit(s)/Hr (23 mL/Hr) IV Continuous <Continuous>  hydrALAZINE 25 milliGRAM(s) Oral three times a day  influenza   Vaccine 0.5 milliLiter(s) IntraMuscular once  insulin glargine Injectable (LANTUS) 50 Unit(s) SubCutaneous <User Schedule>  insulin lispro (ADMELOG) corrective regimen sliding scale   SubCutaneous three times a day before meals  insulin lispro (ADMELOG) corrective regimen sliding scale   SubCutaneous at bedtime  isosorbide   dinitrate Tablet (ISORDIL) 30 milliGRAM(s) Oral three times a day  methadone    Tablet 5 milliGRAM(s) Oral every 6 hours  nystatin Powder 1 Application(s) Topical two times a day  thiamine IVPB 500 milliGRAM(s) IV Intermittent three times a day  ticagrelor 90 milliGRAM(s) Oral every 12 hours    MEDICATIONS  (PRN):  LORazepam     Tablet 1 milliGRAM(s) Oral every 1 hour PRN CIWA-Ar score 8 or greater  LORazepam     Tablet 2 milliGRAM(s) Oral every 1 hour PRN Symptom-triggered: each CIWA -Ar score 8 or GREATER  LORazepam     Tablet 2 milliGRAM(s) Oral every 2 hours PRN Symptom-triggered: 2 point increase in CIWA -Ar score and a total score of 7 or LESS  LORazepam   Injectable 2 milliGRAM(s) IV Push every 1 hour PRN Symptom-triggered: each CIWA -Ar score 8 or GREATER  LORazepam   Injectable 2 milliGRAM(s) IntraMuscular every 1 hour PRN Symptom-triggered: each CIWA -Ar score 8 or GREATER  LORazepam   Injectable 1 milliGRAM(s) IV Push every 1 hour PRN CIWA-Ar score 8 or greater  LORazepam   Injectable 1 milliGRAM(s) IntraMuscular every 1 hour PRN CIWA-Ar score 8 or greater  LORazepam   Injectable 2 milliGRAM(s) IV Push every 2 hours PRN Symptom-triggered: 2 point increase in CIWA -Ar score and a total score of 7 or LESS  LORazepam   Injectable 2 milliGRAM(s) IntraMuscular every 2 hours PRN Symptom-triggered: 2 point increase in CIWA -Ar score and a total score of 7 or LESS      PHYSICAL EXAM:  GENERAL: not in acute distress, tired appearing but awake and alert  HEAD:  Atraumatic, Normocephalic  EYES: EOMI, PERRLA, conjunctiva and sclera clear  ENT: dry mucous membranes  NECK: Supple, No JVD  CHEST/LUNG: Clear to auscultation bilaterally; No rales, rhonchi, wheezing, or rubs. Labored respirations likely kussmaul respirations  HEART: tachycardic; No murmurs, rubs, or gallops  ABDOMEN: BSx4; Soft, nontender, nondistended  EXTREMITIES:  2+ Peripheral Pulses, brisk capillary refill. No clubbing, cyanosis, or edema  NERVOUS SYSTEM:  A&Ox2, following commands, moving all extremities spontaneously. L > R weakness  SKIN: erythematous rash on b/l hands and feet  psych: normal behavior, normal affect        LABS:                        9.7    9.93  )-----------( 238      ( 05 Mar 2022 00:54 )             28.7     -    135  |  106  |  8   ----------------------------<  184<H>  3.9   |  17<L>  |  0.53    Ca    7.9<L>      05 Mar 2022 00:54  Phos  1.8     -  Mg     1.9     -05    TPro  5.0<L>  /  Alb  2.6<L>  /  TBili  0.4  /  DBili  x   /  AST  29  /  ALT  73<H>  /  AlkPhos  66  03-05    LIVER FUNCTIONS - ( 05 Mar 2022 00:54 )  Alb: 2.6 g/dL / Pro: 5.0 g/dL / ALK PHOS: 66 U/L / ALT: 73 U/L / AST: 29 U/L / GGT: x           PT/INR - ( 05 Mar 2022 00:54 )   PT: 13.0 sec;   INR: 1.13 ratio         PTT - ( 05 Mar 2022 00:54 )  PTT:19.2 sec  CAPILLARY BLOOD GLUCOSE      POCT Blood Glucose.: 196 mg/dL (05 Mar 2022 00:37)  POCT Blood Glucose.: 142 mg/dL (04 Mar 2022 16:57)  POCT Blood Glucose.: 141 mg/dL (04 Mar 2022 14:53)  POCT Blood Glucose.: 151 mg/dL (04 Mar 2022 10:58)  POCT Blood Glucose.: 183 mg/dL (04 Mar 2022 09:30)  POCT Blood Glucose.: 114 mg/dL (04 Mar 2022 07:19)    ABG - ( 04 Mar 2022 18:19 )  pH, Arterial: 7.51  pH, Blood: x     /  pCO2: 22    /  pO2: 130   / HCO3: 18    / Base Excess: -2.7  /  SaO2: 98.8                    Urinalysis Basic - ( 03 Mar 2022 11:37 )    Color: Colorless / Appearance: Clear / S.017 / pH: x  Gluc: x / Ketone: Moderate  / Bili: Negative / Urobili: Negative   Blood: x / Protein: 300 mg/dL / Nitrite: Negative   Leuk Esterase: Negative / RBC: 4 /hpf / WBC 1 /HPF   Sq Epi: x / Non Sq Epi: 0 /hpf / Bacteria: Negative          RADIOLOGY & ADDITIONAL TESTS:  CXR:        Care Discussed with Consultants/Other Providers [ x] YES  [ ] NO

## 2022-03-05 NOTE — PROGRESS NOTE ADULT - ATTENDING COMMENTS
57yo gentleman htn, DM, neuropathy on chronic methadone for cervical pain and back injury, s/p IWMI with DKA and afib wit RVR who is improving. JAXON on presentation and cath postponed. No CP today. MS improving. Cr improved and now 0.5 Hb improved at 9.7. Glucose under better control on  sliding scale. Started on lantus. MS thought to have been secondary to DKA and JAXON. Troponin improving. Will need cath on Monday. Increase coreg today, continue hydralazine and imdur for now.  Vertebral stenosis. Continue heparin for now. Dark stools guaiac pending. Converted back to sinus. Derm biopsy of foot results pending. CIWA now zero.

## 2022-03-06 LAB
ALBUMIN SERPL ELPH-MCNC: 2.9 G/DL — LOW (ref 3.3–5)
ALP SERPL-CCNC: 69 U/L — SIGNIFICANT CHANGE UP (ref 40–120)
ALT FLD-CCNC: 69 U/L — HIGH (ref 10–45)
ANION GAP SERPL CALC-SCNC: 10 MMOL/L — SIGNIFICANT CHANGE UP (ref 5–17)
APTT BLD: 86.1 SEC — HIGH (ref 27.5–35.5)
AST SERPL-CCNC: 22 U/L — SIGNIFICANT CHANGE UP (ref 10–40)
BASOPHILS # BLD AUTO: 0.08 K/UL — SIGNIFICANT CHANGE UP (ref 0–0.2)
BASOPHILS NFR BLD AUTO: 0.7 % — SIGNIFICANT CHANGE UP (ref 0–2)
BILIRUB SERPL-MCNC: 0.4 MG/DL — SIGNIFICANT CHANGE UP (ref 0.2–1.2)
BUN SERPL-MCNC: 6 MG/DL — LOW (ref 7–23)
CALCIUM SERPL-MCNC: 8.8 MG/DL — SIGNIFICANT CHANGE UP (ref 8.4–10.5)
CHLORIDE SERPL-SCNC: 105 MMOL/L — SIGNIFICANT CHANGE UP (ref 96–108)
CO2 SERPL-SCNC: 21 MMOL/L — LOW (ref 22–31)
CREAT SERPL-MCNC: 0.61 MG/DL — SIGNIFICANT CHANGE UP (ref 0.5–1.3)
CULTURE RESULTS: SIGNIFICANT CHANGE UP
CULTURE RESULTS: SIGNIFICANT CHANGE UP
EGFR: 114 ML/MIN/1.73M2 — SIGNIFICANT CHANGE UP
EOSINOPHIL # BLD AUTO: 0.14 K/UL — SIGNIFICANT CHANGE UP (ref 0–0.5)
EOSINOPHIL NFR BLD AUTO: 1.3 % — SIGNIFICANT CHANGE UP (ref 0–6)
GLUCOSE BLDC GLUCOMTR-MCNC: 117 MG/DL — HIGH (ref 70–99)
GLUCOSE BLDC GLUCOMTR-MCNC: 132 MG/DL — HIGH (ref 70–99)
GLUCOSE BLDC GLUCOMTR-MCNC: 144 MG/DL — HIGH (ref 70–99)
GLUCOSE BLDC GLUCOMTR-MCNC: 152 MG/DL — HIGH (ref 70–99)
GLUCOSE SERPL-MCNC: 117 MG/DL — HIGH (ref 70–99)
HCT VFR BLD CALC: 32.7 % — LOW (ref 39–50)
HGB BLD-MCNC: 10.6 G/DL — LOW (ref 13–17)
HSV+VZV DNA SPEC QL NAA+PROBE: SIGNIFICANT CHANGE UP
IMM GRANULOCYTES NFR BLD AUTO: 5.1 % — HIGH (ref 0–1.5)
INR BLD: 1.23 RATIO — HIGH (ref 0.88–1.16)
LACTATE SERPL-SCNC: 0.8 MMOL/L — SIGNIFICANT CHANGE UP (ref 0.7–2)
LYMPHOCYTES # BLD AUTO: 2.85 K/UL — SIGNIFICANT CHANGE UP (ref 1–3.3)
LYMPHOCYTES # BLD AUTO: 25.7 % — SIGNIFICANT CHANGE UP (ref 13–44)
MAGNESIUM SERPL-MCNC: 1.9 MG/DL — SIGNIFICANT CHANGE UP (ref 1.6–2.6)
MCHC RBC-ENTMCNC: 29.6 PG — SIGNIFICANT CHANGE UP (ref 27–34)
MCHC RBC-ENTMCNC: 32.4 GM/DL — SIGNIFICANT CHANGE UP (ref 32–36)
MCV RBC AUTO: 91.3 FL — SIGNIFICANT CHANGE UP (ref 80–100)
MONOCYTES # BLD AUTO: 0.97 K/UL — HIGH (ref 0–0.9)
MONOCYTES NFR BLD AUTO: 8.8 % — SIGNIFICANT CHANGE UP (ref 2–14)
NEUTROPHILS # BLD AUTO: 6.48 K/UL — SIGNIFICANT CHANGE UP (ref 1.8–7.4)
NEUTROPHILS NFR BLD AUTO: 58.4 % — SIGNIFICANT CHANGE UP (ref 43–77)
NRBC # BLD: 0 /100 WBCS — SIGNIFICANT CHANGE UP (ref 0–0)
PHOSPHATE SERPL-MCNC: 3.3 MG/DL — SIGNIFICANT CHANGE UP (ref 2.5–4.5)
PLATELET # BLD AUTO: 331 K/UL — SIGNIFICANT CHANGE UP (ref 150–400)
POTASSIUM SERPL-MCNC: 3.9 MMOL/L — SIGNIFICANT CHANGE UP (ref 3.5–5.3)
POTASSIUM SERPL-SCNC: 3.9 MMOL/L — SIGNIFICANT CHANGE UP (ref 3.5–5.3)
PROT SERPL-MCNC: 5.5 G/DL — LOW (ref 6–8.3)
PROTHROM AB SERPL-ACNC: 14.3 SEC — HIGH (ref 10.5–13.4)
RBC # BLD: 3.58 M/UL — LOW (ref 4.2–5.8)
RBC # FLD: 14.9 % — HIGH (ref 10.3–14.5)
SODIUM SERPL-SCNC: 136 MMOL/L — SIGNIFICANT CHANGE UP (ref 135–145)
SPECIMEN SOURCE: SIGNIFICANT CHANGE UP
WBC # BLD: 11.08 K/UL — HIGH (ref 3.8–10.5)
WBC # FLD AUTO: 11.08 K/UL — HIGH (ref 3.8–10.5)

## 2022-03-06 PROCEDURE — 92920 PRQ TRLUML C ANGIOP 1ART&/BR: CPT | Mod: LC

## 2022-03-06 PROCEDURE — 99233 SBSQ HOSP IP/OBS HIGH 50: CPT

## 2022-03-06 PROCEDURE — 99152 MOD SED SAME PHYS/QHP 5/>YRS: CPT

## 2022-03-06 PROCEDURE — 93454 CORONARY ARTERY ANGIO S&I: CPT | Mod: 26,59

## 2022-03-06 PROCEDURE — 93010 ELECTROCARDIOGRAM REPORT: CPT

## 2022-03-06 RX ORDER — HEPARIN SODIUM 5000 [USP'U]/ML
2100 INJECTION INTRAVENOUS; SUBCUTANEOUS
Qty: 25000 | Refills: 0 | Status: DISCONTINUED | OUTPATIENT
Start: 2022-03-06 | End: 2022-03-07

## 2022-03-06 RX ORDER — HYDRALAZINE HCL 50 MG
50 TABLET ORAL EVERY 8 HOURS
Refills: 0 | Status: DISCONTINUED | OUTPATIENT
Start: 2022-03-06 | End: 2022-03-06

## 2022-03-06 RX ORDER — LISINOPRIL 2.5 MG/1
10 TABLET ORAL DAILY
Refills: 0 | Status: DISCONTINUED | OUTPATIENT
Start: 2022-03-07 | End: 2022-03-11

## 2022-03-06 RX ADMIN — ISOSORBIDE DINITRATE 30 MILLIGRAM(S): 5 TABLET ORAL at 05:23

## 2022-03-06 RX ADMIN — Medication 105 MILLIGRAM(S): at 21:39

## 2022-03-06 RX ADMIN — Medication 50 MILLIGRAM(S): at 05:24

## 2022-03-06 RX ADMIN — PANTOPRAZOLE SODIUM 40 MILLIGRAM(S): 20 TABLET, DELAYED RELEASE ORAL at 17:37

## 2022-03-06 RX ADMIN — INSULIN GLARGINE 50 UNIT(S): 100 INJECTION, SOLUTION SUBCUTANEOUS at 12:30

## 2022-03-06 RX ADMIN — Medication 105 MILLIGRAM(S): at 13:51

## 2022-03-06 RX ADMIN — Medication 2 UNIT(S): at 17:37

## 2022-03-06 RX ADMIN — Medication 2 UNIT(S): at 08:26

## 2022-03-06 RX ADMIN — CLOPIDOGREL BISULFATE 600 MILLIGRAM(S): 75 TABLET, FILM COATED ORAL at 05:23

## 2022-03-06 RX ADMIN — Medication 105 MILLIGRAM(S): at 05:23

## 2022-03-06 RX ADMIN — Medication 1 APPLICATION(S): at 05:24

## 2022-03-06 RX ADMIN — NYSTATIN CREAM 1 APPLICATION(S): 100000 CREAM TOPICAL at 19:06

## 2022-03-06 RX ADMIN — METHADONE HYDROCHLORIDE 5 MILLIGRAM(S): 40 TABLET ORAL at 23:40

## 2022-03-06 RX ADMIN — HEPARIN SODIUM 21 UNIT(S)/HR: 5000 INJECTION INTRAVENOUS; SUBCUTANEOUS at 18:40

## 2022-03-06 RX ADMIN — METHADONE HYDROCHLORIDE 5 MILLIGRAM(S): 40 TABLET ORAL at 12:31

## 2022-03-06 RX ADMIN — Medication 50 MILLIGRAM(S): at 13:47

## 2022-03-06 RX ADMIN — Medication 1 APPLICATION(S): at 17:38

## 2022-03-06 RX ADMIN — PANTOPRAZOLE SODIUM 40 MILLIGRAM(S): 20 TABLET, DELAYED RELEASE ORAL at 05:23

## 2022-03-06 RX ADMIN — CHLORHEXIDINE GLUCONATE 1 APPLICATION(S): 213 SOLUTION TOPICAL at 23:46

## 2022-03-06 RX ADMIN — ISOSORBIDE DINITRATE 30 MILLIGRAM(S): 5 TABLET ORAL at 16:19

## 2022-03-06 RX ADMIN — Medication 81 MILLIGRAM(S): at 12:31

## 2022-03-06 RX ADMIN — NYSTATIN CREAM 1 APPLICATION(S): 100000 CREAM TOPICAL at 05:24

## 2022-03-06 RX ADMIN — CARVEDILOL PHOSPHATE 12.5 MILLIGRAM(S): 80 CAPSULE, EXTENDED RELEASE ORAL at 05:24

## 2022-03-06 RX ADMIN — Medication 2 UNIT(S): at 12:30

## 2022-03-06 RX ADMIN — ATORVASTATIN CALCIUM 80 MILLIGRAM(S): 80 TABLET, FILM COATED ORAL at 21:34

## 2022-03-06 RX ADMIN — HEPARIN SODIUM 21 UNIT(S)/HR: 5000 INJECTION INTRAVENOUS; SUBCUTANEOUS at 05:53

## 2022-03-06 RX ADMIN — CARVEDILOL PHOSPHATE 12.5 MILLIGRAM(S): 80 CAPSULE, EXTENDED RELEASE ORAL at 17:37

## 2022-03-06 RX ADMIN — METHADONE HYDROCHLORIDE 5 MILLIGRAM(S): 40 TABLET ORAL at 17:37

## 2022-03-06 RX ADMIN — METHADONE HYDROCHLORIDE 5 MILLIGRAM(S): 40 TABLET ORAL at 05:23

## 2022-03-06 RX ADMIN — Medication 50 MILLIGRAM(S): at 21:34

## 2022-03-06 RX ADMIN — ISOSORBIDE DINITRATE 30 MILLIGRAM(S): 5 TABLET ORAL at 21:34

## 2022-03-06 NOTE — PROGRESS NOTE ADULT - ASSESSMENT
assessment   55 y/o M with PMH of HTN, T2DM, neuropathy, chronic pain (on Methadone & opioids) for cervical spine and back injury transferred to [facility] CCU from [place] due to STEMI (trop 7560, inferolateral LUIS) and DKA. Found to have A fib w/ RVR to 130s-150s, JAXON w/ SCr 2.14 (baseline 0.7-0.9 in 10/2021), admitted to CCU for optimization of DKA and kidney function prior to cath.    Plan   # NEURO: Admitted w/ AMS Likely metabolic encephalopathy 2/2 DKA  CT Head(3/2):  Volume loss, microvascular disease, age indeterminate lacunar infarcts, Foci of air in the left sella turcica with bony thinning possible dehiscence, edentulous maxilla with dental hardware, bony calvarial sclerosis correlate renal function.   MRI findings(3/4): MR Brain: There is no mass, intracranial hemorrhage, or acute infarction.  - Currently A&O x 3 , answers questions appropriately   - Cont. CIWA protocol   - Neuro assessment as per ICU protocol     # PULM: Denies SOB or dyspnea, CTA on exam   O2 sat >92% on room air   -continue to monitor O2 sats and respiratory status, currently protecting airway  - Oxygen supplement as needed     # CV: STEMI (trop 7560, inferolateral LUIS on EKG), per chart was loaded with 180 mg Ticagrelor, heparin bolus in [facility]  Limited TTE 3/1/22 showed hypokinetic inferolateral and mid inferior wall, basal inferior wall is akinetic.   - Revascularization with PCI deferred due to pt's elevated SCr, likely prerenal JAXON 2/2 dehydration from DKA, was on Nitro gtt switched to hydra and IDN  - Cont. ASA, and Ticagrelor  - Cont. atorvastatin 80mg qD  - Heparin gtt for afib   - trend trop, CK, CKMB  - lopressor changed to coreg 12.5 mg BID  - Eventual ischemic work up     Admitted w/ AFib w/ RVR: CHADSVASC score: 3 and HAS-BLED score: 1  - currently on heparin gtt  - Monitor on telemetry  - c/w coreg 6.25 mg BID    # RENAL: (Cr. 2.14) JAXON likely prerenal 2/2 dehydration 2/2 DKA; Now resolved  Improved w/ IVF  Overall he is +18L   Trace edema bilateral upper/lower extremities  - Cont. Monitor BUN/Cr, UO and lytes   Metabolic acidosis w/ anion gap in setting of acute DKA now resolved   Hyponatremia : Monitor/ Trend Na +     # GI: Transaminitis in setting of STEMI now resolved  -AST/ALT improving with fluids, possible shock liver due to hypovolemia from DKA  - Cont. DASH puree diet   Presents w/ black tarry liquid stool + FOBT   - Monitor H/H     # ENDO: admitted w/ DKA  - s/p DKA protocol - off insulin gtt, transition to long acting insulin and NPH - improving  Insulin gtt off (3/4/22)  - Lantus 15 HS and Amalog 2 units TID pre-meals ('s) w/ Mod ISS     # HEMATOLOGIC: H/H stable  - Cont. Hep gtt for AC    # ID: Pt remains afebrile w/ slightly elevated WBC w/o any signs and symptoms of infection   - BCx NGTD and UCx NGTD  - Trend WBC     # MIS: Full code   Assessment   55 y/o M with PMH of HTN, T2DM, neuropathy, chronic pain (on Methadone & opioids) for cervical spine and back injury transferred to [facility] CCU from [place] due to STEMI (trop 7560, inferolateral LUIS) and DKA. Found to have A fib w/ RVR to 130s-150s, JAXON w/ SCr 2.14 (baseline 0.7-0.9 in 10/2021), admitted to CCU for optimization of DKA and kidney function prior to cath.    Plan   # NEURO: Admitted w/ AMS Likely metabolic encephalopathy 2/2 DKA, Now aao x3 No active issues   CT Head(3/2):  Volume loss, microvascular disease, age indeterminate lacunar infarcts, Foci of air in the left sella turcica with bony thinning possible dehiscence, edentulous maxilla with dental hardware, bony calvarial sclerosis correlate renal function.   MRI findings(3/4): MR Brain: There is no mass, intracranial hemorrhage, or acute infarction.  - Currently A&O x 3 , answers questions appropriately   - Cont. CIWA protocol   - Neuro assessment as per ICU protocol     # PULM: Denies SOB or dyspnea, CTA on exam   O2 sat >92% on room air   -continue to monitor O2 sats and respiratory status, currently protecting airway  - Oxygen supplement as needed     # CV: STEMI (trop 7560, inferolateral LUIS on EKG), per chart was loaded with 180 mg Ticagrelor, heparin bolus in [facility]  Limited TTE 3/1/22 showed hypokinetic inferolateral and mid inferior wall, basal inferior wall is akinetic.   - Revascularization with PCI deferred due to pt's elevated SCr, likely prerenal JAXON 2/2 dehydration from DKA, was on Nitro gtt switched to hydra and IDN  S/P [facility] (3/6) POBA to LCX  initially loaded w/ ASA and Brilinta changed to Plavix due to possible GIB   - Cont. ASA, and Plavix   - Cont. atorvastatin 80mg qD  - Heparin gtt for afib and change to po AC   - Cont. Coreg 12.5 mg BID    Admitted w/ AFib w/ RVR: CHADSVASC score: 3 and HAS-BLED score: 1  - currently on heparin gtt  - Monitor on telemetry    # RENAL: (Cr. 2.14) JAXON likely prerenal 2/2 dehydration 2/2 DKA; Now resolved  Improved w/ IVF  Overall he is +18L   Trace edema bilateral upper/lower extremities  - Cont. Monitor BUN/Cr, UO and lytes   Metabolic acidosis w/ anion gap in setting of acute DKA now resolved   Hyponatremia : resolved     # GI: Transaminitis in setting of STEMI now resolved  -AST/ALT improving with fluids, possible shock liver due to hypovolemia from DKA  - Cont. DASH puree diet   Presents w/ black tarry liquid stool + FOBT   H/H stable   - Monitor H/H     # ENDO: admitted w/ DKA  - s/p DKA protocol - off insulin gtt, transition to long acting insulin and NPH - improving  Insulin gtt off (3/4/22), F/S 110-140's  - Lantus 50 HS and Amalog 2 units TID pre-meals ('s) w/ Mod ISS     # HEMATOLOGIC: H/H stable  - Cont. Hep gtt for AC    # ID: Pt remains afebrile w/ slightly elevated WBC w/o any signs and symptoms of infection   - BCx NGTD and UCx NGTD  - Trend WBC     # MIS: Full code

## 2022-03-06 NOTE — PROGRESS NOTE ADULT - ATTENDING COMMENTS
55yo gentleman DM, chronic pain on methadone, admitted with IWMI, DKA and afib with RVR,and JAXON who is improving. Black tarry stools and changed from brilinta to plavix and Hb stable. Creatinine improved and on baseline. Coreg increased yesterday. DM under better control off insulin drip. Consider cardiac cath today and if cr stable change hydralazine to ACEI or ARB. Derm biopsy results pending.

## 2022-03-06 NOTE — PROGRESS NOTE ADULT - SUBJECTIVE AND OBJECTIVE BOX
HPI:  57 y/o M with PMH of HTN, T2DM, neuropathy, chronic pain on Methadone & opioids for cervical spine and back injury transferred to [facility] CICU from [place] due to STEMI and DKA. Pt was reportedly found to have inferolateral ST elevations + afib on EKG, trop 7560. Pt initially came to [facility] ED due to nausea and vomiting for 3 days, reportedly had CP several days ago which apparently stopped on its own. Pt currently altered, difficult to obtain history from.    Pt transferred to [facility] for cath, noted to be in afib w/ RVR to 130s-150s, received cardizem 25 mg IV x 2 and started on cardizem gtt, cath was aborted as pt had SCR of 2.14, (baseline 0.7-0.9 in 10/2021), transferred to CCU for optimization of DKA prior to cath.     Baseline SCr 0.7-0.9 in 10/2021 from previous hospitalization at [facility] for cellulitis of hand, was d/rome on bactrim DS + ethambutol x 4 weeks.    Labs from [facility] significant for: WBC 15.53, lactate 5.6, troponin 7560.2, K 3.2, CO2 5, AG 42, SCr/BUN 2.14/44, Glucose 663, calcium 12.7, Mg 2.7, Phos 9.5  FSGs >600 -> 503  ABG 7.19/<19/133/3  UA: large ketones, moderate bacteria, negative LE and nitrite  RVP/COVID negative (01 Mar 2022 13:56)    24 Hour Events:  Patient had melanotic stools x4. Guaiac (+) Brilinta changed to plavix. However remained hemodynamically stable with stable H/H. CIWA discontinued. Scheduled for cath 3/6.     MEDICATIONS  (STANDING):  aspirin  chewable 81 milliGRAM(s) Oral daily  atorvastatin 80 milliGRAM(s) Oral at bedtime  carvedilol 12.5 milliGRAM(s) Oral every 12 hours  chlorhexidine 4% Liquid 1 Application(s) Topical <User Schedule>  clobetasol 0.05% Ointment 1 Application(s) Topical two times a day  heparin  Infusion 2300 Unit(s)/Hr (21 mL/Hr) IV Continuous <Continuous>  hydrALAZINE 50 milliGRAM(s) Oral every 8 hours  influenza   Vaccine 0.5 milliLiter(s) IntraMuscular once  insulin glargine Injectable (LANTUS) 50 Unit(s) SubCutaneous <User Schedule>  insulin lispro (ADMELOG) corrective regimen sliding scale   SubCutaneous three times a day before meals  insulin lispro (ADMELOG) corrective regimen sliding scale   SubCutaneous at bedtime  insulin lispro Injectable (ADMELOG) 2 Unit(s) SubCutaneous three times a day before meals  isosorbide   dinitrate Tablet (ISORDIL) 30 milliGRAM(s) Oral three times a day  methadone    Tablet 5 milliGRAM(s) Oral every 6 hours  nystatin Powder 1 Application(s) Topical two times a day  pantoprazole  Injectable 40 milliGRAM(s) IV Push every 12 hours  thiamine IVPB 500 milliGRAM(s) IV Intermittent three times a day    MEDICATIONS  (PRN):  LORazepam     Tablet 2 milliGRAM(s) Oral every 1 hour PRN Symptom-triggered: each CIWA -Ar score 8 or GREATER  LORazepam     Tablet 2 milliGRAM(s) Oral every 2 hours PRN Symptom-triggered: 2 point increase in CIWA -Ar score and a total score of 7 or LESS  LORazepam     Tablet 1 milliGRAM(s) Oral every 1 hour PRN CIWA-Ar score 8 or greater  LORazepam   Injectable 1 milliGRAM(s) IV Push every 1 hour PRN CIWA-Ar score 8 or greater  LORazepam   Injectable 1 milliGRAM(s) IntraMuscular every 1 hour PRN CIWA-Ar score 8 or greater  LORazepam   Injectable 2 milliGRAM(s) IV Push every 2 hours PRN Symptom-triggered: 2 point increase in CIWA -Ar score and a total score of 7 or LESS  LORazepam   Injectable 2 milliGRAM(s) IntraMuscular every 2 hours PRN Symptom-triggered: 2 point increase in CIWA -Ar score and a total score of 7 or LESS  LORazepam   Injectable 2 milliGRAM(s) IV Push every 1 hour PRN Symptom-triggered: each CIWA -Ar score 8 or GREATER  LORazepam   Injectable 2 milliGRAM(s) IntraMuscular every 1 hour PRN Symptom-triggered: each CIWA -Ar score 8 or GREATER      REVIEW OF SYSTEMS:  Otherwise, 10 point ROS done and otherwise negative.    PHYSICAL EXAM:  Vital Signs Last 24 Hrs  T(C): 36.4 (06 Mar 2022 04:00), Max: 36.8 (05 Mar 2022 12:00)  T(F): 97.6 (06 Mar 2022 04:00), Max: 98.3 (05 Mar 2022 16:00)  HR: 87 (06 Mar 2022 08:00) (82 - 103)  BP: 120/77 (06 Mar 2022 08:00) (118/69 - 170/96)  BP(mean): 93 (06 Mar 2022 08:00) (86 - 127)  RR: 18 (06 Mar 2022 08:00) (17 - 27)  SpO2: 97% (06 Mar 2022 08:00) (95% - 99%)  I&O's Summary    05 Mar 2022 07:01  -  06 Mar 2022 07:00  --------------------------------------------------------  IN: 3702 mL / OUT: 5425 mL / NET: -1723 mL    06 Mar 2022 07:01  -  06 Mar 2022 10:18  --------------------------------------------------------  IN: 21 mL / OUT: 250 mL / NET: -229 mL        Appearance: Normal	  HEENT:   Normal oral mucosa, PERRL, EOMI	  Lymphatic: No lymphadenopathy  Cardiovascular: Normal S1 S2, No JVD, No murmurs, No edema  Respiratory: Lungs clear to auscultation	  Psychiatry: A & O x 3, Mood & affect appropriate  Gastrointestinal:  Soft, Non-tender, + BS	  Skin: Lesions to bilateral hands. No ecchymoses, No cyanosis	  Neurologic: Non-focal  Extremities: Normal range of motion, No clubbing, cyanosis. 1+ Edema bilaterally upper and lower extremities.   Vascular: Peripheral pulses palpable 2+ bilaterally    LABS:	 	                        10.6   11.08 )-----------( 331      ( 06 Mar 2022 05:06 )             32.7     Auto Eosinophil # 0.14  / Auto Eosinophil % 1.3   / Auto Neutrophil # 6.48  / Auto Neutrophil % 58.4  / BANDS % x                            9.8    11.93 )-----------( 236      ( 05 Mar 2022 14:59 )             29.7     Auto Eosinophil # 0.11  / Auto Eosinophil % 0.9   / Auto Neutrophil # 7.91  / Auto Neutrophil % 66.4  / BANDS % x                            9.7    9.93  )-----------( 238      ( 05 Mar 2022 00:54 )             28.7     Auto Eosinophil # 0.11  / Auto Eosinophil % 1.1   / Auto Neutrophil # 6.43  / Auto Neutrophil % 64.8  / BANDS % x        INR: 1.23 ratio (03-06 @ 05:06)  INR: 1.13 ratio (03-05 @ 00:54)  INR: 1.16 ratio (03-04 @ 00:50)    03-06    136  |  105  |  6<L>  ----------------------------<  117<H>  3.9   |  21<L>  |  0.61  03-05    135  |  106  |  5<L>  ----------------------------<  139<H>  3.9   |  21<L>  |  0.62  03-05    134<L>  |  106  |  6<L>  ----------------------------<  184<H>  3.7   |  19<L>  |  0.53    Ca    8.8      06 Mar 2022 05:06  Mg     1.9     03-06  Phos  3.3     03-06  TPro  5.5<L>  /  Alb  2.9<L>  /  TBili  0.4  /  DBili  x   /  AST  22  /  ALT  69<H>  /  AlkPhos  69  03-06  TPro  4.9<L>  /  Alb  2.6<L>  /  TBili  0.3  /  DBili  x   /  AST  22  /  ALT  66<H>  /  AlkPhos  61  03-05  TPro  4.9<L>  /  Alb  2.6<L>  /  TBili  0.3  /  DBili  x   /  AST  24  /  ALT  70<H>  /  AlkPhos  64  03-05    Lactate, Blood: 0.8 mmol/L (03-06 @ 05:06)  Lactate, Blood: 0.7 mmol/L (03-05 @ 14:59)  Lactate, Blood: 1.0 mmol/L (03-04 @ 00:50)      proBNP: Serum Pro-Brain Natriuretic Peptide: 5054 pg/mL (03-01 @ 15:28)    Lipid Profile: 03-02 Chol 264<H> LDL -- HDL 33<L> Trig 229<H>  HgA1c:   TSH: Thyroid Stimulating Hormone, Serum: 0.43 uIU/mL (03-01 @ 23:21)      CARDIAC MARKERS:   02 Mar 2022 12:22 Troponin x     / Creatine Kinase 3235 U/L /  CKMB 9.6 ng/mL / CPK Mass Assay % 0.3 %   02 Mar 2022 07:15 Troponin x     / Creatine Kinase 3840 U/L /  CKMB 11.9 ng/mL / CPK Mass Assay % 0.3 %   02 Mar 2022 03:28 Troponin x     / Creatine Kinase 4100 U/L /  CKMB 13.4 ng/mL / CPK Mass Assay % 0.3 %        TELEMETRY: 	    ECG:  	  RADIOLOGY:  OTHER: 	    PREVIOUS DIAGNOSTIC TESTING:    [ ] Echocardiogram:  [ ]  Catheterization:  [ ] Stress Test:

## 2022-03-06 NOTE — PROGRESS NOTE ADULT - SUBJECTIVE AND OBJECTIVE BOX
CICU MID-NIGHT NOTE  Admission date: 3/6/22  Chief complaint/ Diagnosis: STEMI   HPI: A 57 y/o M with PMH of HTN, T2DM, neuropathy, chronic pain on Methadone & opioids for cervical spine and back injury transferred to [facility] CICU from [place] due to STEMI and DKA. Pt was reportedly found to have inferolateral ST elevations + afib on EKG, trop 7560. Pt initially came to [facility] ED due to nausea and vomiting for 3 days, reportedly had CP several days ago which apparently stopped on its own. Pt currently altered, difficult to obtain history from.  Pt transferred to [facility] for cath, noted to be in afib w/ RVR to 130s-150s, received cardizem 25 mg IV x 2 and started on cardizem gtt, cath was aborted as pt had SCR of 2.14, (baseline 0.7-0.9 in 10/2021), transferred to CCU for optimization of DKA prior to cath.   Baseline SCr 0.7-0.9 in 10/2021 from previous hospitalization at [facility] for cellulitis of hand, was d/rome on bactrim DS + ethambutol x 4 weeks.    Interval history: Uneventful overnight    REVIEW OF SYSTEMS  Denies CP, Palpitation, SOB, Dyspnea [X] All other systems negative    MEDICATIONS  (STANDING)  aspirin  chewable 81 milliGRAM(s) Oral daily  atorvastatin 80 milliGRAM(s) Oral at bedtime  carvedilol 12.5 milliGRAM(s) Oral every 12 hours  chlorhexidine 4% Liquid 1 Application(s) Topical <User Schedule>  clobetasol 0.05% Ointment 1 Application(s) Topical two times a day  heparin  Infusion 2100 Unit(s)/Hr (21 mL/Hr) IV Continuous <Continuous>  hydrALAZINE 50 milliGRAM(s) Oral every 8 hours  influenza   Vaccine 0.5 milliLiter(s) IntraMuscular once  insulin glargine Injectable (LANTUS) 50 Unit(s) SubCutaneous <User Schedule>  insulin lispro (ADMELOG) corrective regimen sliding scale   SubCutaneous three times a day before meals  insulin lispro (ADMELOG) corrective regimen sliding scale   SubCutaneous at bedtime  insulin lispro Injectable (ADMELOG) 2 Unit(s) SubCutaneous three times a day before meals  isosorbide   dinitrate Tablet (ISORDIL) 30 milliGRAM(s) Oral three times a day  methadone    Tablet 5 milliGRAM(s) Oral every 6 hours  nystatin Powder 1 Application(s) Topical two times a day  pantoprazole  Injectable 40 milliGRAM(s) IV Push every 12 hours  thiamine IVPB 500 milliGRAM(s) IV Intermittent three times a day    MEDICATIONS  (PRN):  LORazepam     Tablet 1 milliGRAM(s) Oral every 1 hour PRN CIWA-Ar score 8 or greater  LORazepam     Tablet 2 milliGRAM(s) Oral every 1 hour PRN Symptom-triggered: each CIWA -Ar score 8 or GREATER  LORazepam     Tablet 2 milliGRAM(s) Oral every 2 hours PRN Symptom-triggered: 2 point increase in CIWA -Ar score and a total score of 7 or LESS  LORazepam   Injectable 2 milliGRAM(s) IV Push every 1 hour PRN Symptom-triggered: each CIWA -Ar score 8 or GREATER  LORazepam   Injectable 2 milliGRAM(s) IntraMuscular every 1 hour PRN Symptom-triggered: each CIWA -Ar score 8 or GREATER  LORazepam   Injectable 1 milliGRAM(s) IV Push every 1 hour PRN CIWA-Ar score 8 or greater  LORazepam   Injectable 1 milliGRAM(s) IntraMuscular every 1 hour PRN CIWA-Ar score 8 or greater  LORazepam   Injectable 2 milliGRAM(s) IV Push every 2 hours PRN Symptom-triggered: 2 point increase in CIWA -Ar score and a total score of 7 or LESS  LORazepam   Injectable 2 milliGRAM(s) IntraMuscular every 2 hours PRN Symptom-triggered: 2 point increase in CIWA -Ar score and a total score of 7 or LESS      PAST MEDICAL & SURGICAL HISTORY:  Hypertension  Diabetes mellitus  Gastric ulcer  Spinal stenosis  H/O spinal cord compression  Spondylosis  H/O radiculopathy  H/O cervical spine surgery  History of back surgery  S/P cervical spinal fusion    FAMILY HISTORY:  FH: hypertension (Father)  FH: diabetes mellitus (Mother)    Allergy   No Known Allergies    ICU Vital Signs Last 24 Hrs  T(C): 36.6 (Max: 36.6)  HR: 95  (81 - 109)  BP: 123/74 (120/77 - 168/93)  BP(mean): 94 (06 Mar 2022 19:00) (91 - 124)  RR: 25 (06 Mar 2022 19:00) (16 - 25)  SpO2: 98% (06 Mar 2022 19:00) (96% - 99%)      I&O's Summary    05 Mar 2022 07:01  -  06 Mar 2022 07:00  --------------------------------------------------------  IN: 3702 mL / OUT: 5425 mL / NET: -1723 mL    06 Mar 2022 07:01  -  06 Mar 2022 20:37  --------------------------------------------------------  IN: 635 mL / OUT: 1700 mL / NET: -1065 mL      Daily     Daily Weight in k.7 (06 Mar 2022 05:00)    PHYSICAL EXAM  Appearance: Normal, NAD  HEAD:  Atraumatic, Normocephalic  EYES: EOMI, PERRLA, conjunctiva and sclera clear  NECK: Supple, No JVD  CHEST/LUNG: Clear to auscultation bilaterally; No wheezing  HEART: Normal S1, S2. No murmurs, rubs, or gallops  ABDOMEN: + Bowel sounds, Soft, NT, ND   EXTREMITIES:  2+ Peripheral Pulses, No clubbing, cyanosis, or edema  NEUROLOGY: non-focal, aaox3  Lymphatic: No lymphadenopathy  SKIN: No rashes or lesions    Interpretation of Telemetry:                          10.6   11.08 )-----------( 331      ( 06 Mar 2022 05:06 )             32.7       03-06    136  |  105  |  6<L>  ----------------------------<  117<H>  3.9   |  21<L>  |  0.61    Ca    8.8      06 Mar 2022 05:06  Phos  3.3     03-06  Mg     1.9     03-06    TPro  5.5<L>  /  Alb  2.9<L>  /  TBili  0.4  /  DBili  x   /  AST  22  /  ALT  69<H>  /  AlkPhos  69  03-06                CAPILLARY BLOOD GLUCOSE      POCT Blood Glucose.: 144 mg/dL (06 Mar 2022 17:27)  POCT Blood Glucose.: 117 mg/dL (06 Mar 2022 12:03)  POCT Bloo   CICU MID-NIGHT NOTE  Admission date: 3/6/22  Chief complaint/ Diagnosis: STEMI   HPI: A 57 y/o M with PMH of HTN, T2DM, neuropathy, chronic pain on Methadone & opioids for cervical spine and back injury transferred to [facility] CICU from [place] due to STEMI and DKA. Pt was reportedly found to have inferolateral ST elevations + afib on EKG, trop 7560. Pt initially came to [facility] ED due to nausea and vomiting for 3 days, reportedly had CP several days ago which apparently stopped on its own. Pt currently altered, difficult to obtain history from.  Pt transferred to [facility] for cath, noted to be in afib w/ RVR to 130s-150s, received cardizem 25 mg IV x 2 and started on cardizem gtt, cath was aborted as pt had SCR of 2.14, (baseline 0.7-0.9 in 10/2021), transferred to CCU for optimization of DKA prior to cath.   Baseline SCr 0.7-0.9 in 10/2021 from previous hospitalization at [facility] for cellulitis of hand, was d/rome on bactrim DS + ethambutol x 4 weeks.    Interval history: Uneventful overnight    REVIEW OF SYSTEMS  Denies CP, Palpitation, SOB, Dyspnea [X] All other systems negative    MEDICATIONS  (STANDING)  aspirin  chewable 81 milliGRAM(s) Oral daily  atorvastatin 80 milliGRAM(s) Oral at bedtime  carvedilol 12.5 milliGRAM(s) Oral every 12 hours  chlorhexidine 4% Liquid 1 Application(s) Topical <User Schedule>  clobetasol 0.05% Ointment 1 Application(s) Topical two times a day  heparin  Infusion 2100 Unit(s)/Hr (21 mL/Hr) IV Continuous <Continuous>  hydrALAZINE 50 milliGRAM(s) Oral every 8 hours  influenza   Vaccine 0.5 milliLiter(s) IntraMuscular once  insulin glargine Injectable (LANTUS) 50 Unit(s) SubCutaneous <User Schedule>  insulin lispro (ADMELOG) corrective regimen sliding scale   SubCutaneous three times a day before meals  insulin lispro (ADMELOG) corrective regimen sliding scale   SubCutaneous at bedtime  insulin lispro Injectable (ADMELOG) 2 Unit(s) SubCutaneous three times a day before meals  isosorbide   dinitrate Tablet (ISORDIL) 30 milliGRAM(s) Oral three times a day  methadone    Tablet 5 milliGRAM(s) Oral every 6 hours  nystatin Powder 1 Application(s) Topical two times a day  pantoprazole  Injectable 40 milliGRAM(s) IV Push every 12 hours  thiamine IVPB 500 milliGRAM(s) IV Intermittent three times a day    MEDICATIONS  (PRN):  LORazepam     Tablet 1 milliGRAM(s) Oral every 1 hour PRN CIWA-Ar score 8 or greater  LORazepam     Tablet 2 milliGRAM(s) Oral every 1 hour PRN Symptom-triggered: each CIWA -Ar score 8 or GREATER  LORazepam     Tablet 2 milliGRAM(s) Oral every 2 hours PRN Symptom-triggered: 2 point increase in CIWA -Ar score and a total score of 7 or LESS  LORazepam   Injectable 2 milliGRAM(s) IV Push every 1 hour PRN Symptom-triggered: each CIWA -Ar score 8 or GREATER  LORazepam   Injectable 2 milliGRAM(s) IntraMuscular every 1 hour PRN Symptom-triggered: each CIWA -Ar score 8 or GREATER  LORazepam   Injectable 1 milliGRAM(s) IV Push every 1 hour PRN CIWA-Ar score 8 or greater  LORazepam   Injectable 1 milliGRAM(s) IntraMuscular every 1 hour PRN CIWA-Ar score 8 or greater  LORazepam   Injectable 2 milliGRAM(s) IV Push every 2 hours PRN Symptom-triggered: 2 point increase in CIWA -Ar score and a total score of 7 or LESS  LORazepam   Injectable 2 milliGRAM(s) IntraMuscular every 2 hours PRN Symptom-triggered: 2 point increase in CIWA -Ar score and a total score of 7 or LESS      PAST MEDICAL & SURGICAL HISTORY:  Hypertension  Diabetes mellitus  Gastric ulcer  Spinal stenosis  H/O spinal cord compression  Spondylosis  H/O radiculopathy  H/O cervical spine surgery  History of back surgery  S/P cervical spinal fusion    FAMILY HISTORY:  FH: hypertension (Father)  FH: diabetes mellitus (Mother)    Allergy   No Known Allergies    ICU Vital Signs Last 24 Hrs  T(C): 36.6 (Max: 36.6)  HR: 95  (81 - 109)  BP: 123/74 (120/77 - 168/93)  BP(mean): 94 (06 Mar 2022 19:00) (91 - 124)  RR: 25 (06 Mar 2022 19:00) (16 - 25)  SpO2: 98% (06 Mar 2022 19:00) (96% - 99%)    I&O's Summary  IN: 635 mL / OUT: 1700 mL / NET: -1065 mL    PHYSICAL EXAM  Appearance: Normal, NAD  HEAD:  Normocephalic  EYES: PERRLA, conjunctiva and sclera clear  NECK: Supple, No JVD  CHEST/LUNG: Clear to auscultation bilaterally; No wheezing  HEART: Normal S1, S2. No murmurs, rubs, or gallops  ABDOMEN: + Bowel sounds, Soft, NT, ND   EXTREMITIES:  2+ Peripheral Pulses, No clubbing, cyanosis, or edema  NEUROLOGY: non-focal, aaox3  Lymphatic: No lymphadenopathy  SKIN: No rashes or lesions    Interpretation of Telemetry:                         10.6   11.08 )-----------( 331                 32.7     136  |  105  |  6<L>  ----------------------------<  117<H>  3.9   |  21<L>  |  0.61    Ca    8.8      Phos  3.3     Mg     1.9      TPro  5.5<L>  /  Alb  2.9<L>  /  TBili  0.4  /  DBili  x   /  AST  22  /  ALT  69<H>  /  AlkPhos  69   F.S 110-140's

## 2022-03-06 NOTE — PROGRESS NOTE ADULT - ASSESSMENT
57 y/o M with PMH of HTN, T2DM, neuropathy, chronic pain (on Methadone & opioids) for cervical spine and back injury transferred to [facility] CCU from [place] due to STEMI (trop 7560, inferolateral LUIS) and DKA. Found to have A fib w/ RVR to 130s-150s, JAXON w/ SCr 2.14 (baseline 0.7-0.9 in 10/2021), admitted to CCU for optimization of DKA and kidney function prior to cath. DKA now resolved AMS resolved with MRI negative for bleeding or thrombosis. Serum creatinine 0.6. Stable for cardiac cath 3/6.     Plan   # NEURO: Admitted w/ AMS Likely metabolic encephalopathy 2/2 DKA  CT Head(3/2):  Volume loss, microvascular disease, age indeterminate lacunar infarcts, Foci of air in the left sella turcica with bony thinning possible dehiscence, edentulous maxilla with dental hardware, bony calvarial sclerosis correlate renal function.   MRI findings(3/4): MR Brain: There is no mass, intracranial hemorrhage, or acute infarction.  - Currently A&O x 3 , answers questions appropriately   [facility]  - Neuro assessment as per ICU protocol     # PULM: Denies SOB or dyspnea, CTA on exam   O2 sat >92% on room air   -continue to monitor O2 sats and respiratory status, currently protecting airway  - Oxygen supplement as needed     # CV: STEMI (trop 7560, inferolateral LUIS on EKG), per chart was loaded with 180 mg Ticagrelor, heparin bolus in [facility]  Limited TTE 3/1/22 showed hypokinetic inferolateral and mid inferior wall, basal inferior wall is akinetic.   - Revascularization with PCI deferred due to pt's elevated SCr, likely prerenal JAXON 2/2 dehydration from DKA, was on Nitro gtt switched to hydra and IDN  - Cont. ASA, hydralazine, isordil  - Cont. atorvastatin 80mg qD  - Heparin gtt for afib   - trend trop, CK, CKMB  - lopressor changed to coreg 12.5 mg BID  - Eventual ischemic work up   - Brilinta changed to plavix 3/6 in setting of melanotic stools  - Scheduled for cath 3/6 now that creatinine has normalized (0.6)    Admitted w/ AFib w/ RVR: CHADSVASC score: 3 and HAS-BLED score: 1  - currently on heparin gtt  - Monitor on telemetry  - c/w coreg 12.5 mg BID    # RENAL:   JAXON likely prerenal 2/2 dehydration 2/2 DKA; Now resolved  - Improved w/ IVF    - Trace edema bilateral upper/lower extremities  - Cont. Monitor BUN/Cr, UO and lytes   - Creatinine normalized, 0.6  - Net negative 1.7L for 24 hours  Metabolic acidosis w/ anion gap in setting of acute DKA  - Resolved  Hyponatremia : Monitor/ Trend Na +  - Resolved     # GI: Transaminitis in setting of STEMI now resolved  -AST/ALT improving with fluids, possible shock liver due to hypovolemia from DKA  - Cont. DASH puree diet   Presents w/ black tarry liquid stool + FOBT   - Monitor H/H     # ENDO: admitted w/ DKA  - s/p DKA protocol - off insulin gtt, transition to long acting insulin and NPH - improving  Insulin gtt off (3/4/22)  - Lantus 15 HS and Amalog 2 units TID pre-meals ('s) w/ Mod ISS     # HEMATOLOGIC: H/H stable  - Cont. Hep gtt for AC    # ID: Pt remains afebrile w/ slightly elevated WBC w/o any signs and symptoms of infection   - BCx NGTD and UCx NGTD  - Trend WBC     # Derm  Blistering to upper extremities and lower extremities bilaterally  - Derm following  - Tissue culture negative to date, AFB negative, KOH negative  - F/u HSV/VZV swab  - F/u biopsy results  - F/u GC urine    # MIS: Full code

## 2022-03-07 LAB
% ALBUMIN: 50.4 % — SIGNIFICANT CHANGE UP
% ALPHA 1: 9.9 % — SIGNIFICANT CHANGE UP
% ALPHA 2: 18.5 % — SIGNIFICANT CHANGE UP
% BETA: 12.8 % — SIGNIFICANT CHANGE UP
% GAMMA: 8.4 % — SIGNIFICANT CHANGE UP
ALBUMIN SERPL ELPH-MCNC: 2.2 G/DL — LOW (ref 3.6–5.5)
ALBUMIN SERPL ELPH-MCNC: 2.6 G/DL — LOW (ref 3.3–5)
ALBUMIN/GLOB SERPL ELPH: 1 RATIO — SIGNIFICANT CHANGE UP
ALP SERPL-CCNC: 60 U/L — SIGNIFICANT CHANGE UP (ref 40–120)
ALPHA1 GLOB SERPL ELPH-MCNC: 0.4 G/DL — SIGNIFICANT CHANGE UP (ref 0.1–0.4)
ALPHA2 GLOB SERPL ELPH-MCNC: 0.8 G/DL — SIGNIFICANT CHANGE UP (ref 0.5–1)
ALT FLD-CCNC: 52 U/L — HIGH (ref 10–45)
ANION GAP SERPL CALC-SCNC: 13 MMOL/L — SIGNIFICANT CHANGE UP (ref 5–17)
APTT BLD: 70 SEC — HIGH (ref 27.5–35.5)
AST SERPL-CCNC: 15 U/L — SIGNIFICANT CHANGE UP (ref 10–40)
B-GLOBULIN SERPL ELPH-MCNC: 0.6 G/DL — SIGNIFICANT CHANGE UP (ref 0.5–1)
BILIRUB SERPL-MCNC: 0.3 MG/DL — SIGNIFICANT CHANGE UP (ref 0.2–1.2)
BUN SERPL-MCNC: 7 MG/DL — SIGNIFICANT CHANGE UP (ref 7–23)
CALCIUM SERPL-MCNC: 8.6 MG/DL — SIGNIFICANT CHANGE UP (ref 8.4–10.5)
CCP IGG SERPL-ACNC: <8 UNITS — SIGNIFICANT CHANGE UP
CHLORIDE SERPL-SCNC: 104 MMOL/L — SIGNIFICANT CHANGE UP (ref 96–108)
CO2 SERPL-SCNC: 19 MMOL/L — LOW (ref 22–31)
CREAT SERPL-MCNC: 0.59 MG/DL — SIGNIFICANT CHANGE UP (ref 0.5–1.3)
EGFR: 115 ML/MIN/1.73M2 — SIGNIFICANT CHANGE UP
GAMMA GLOBULIN: 0.4 G/DL — LOW (ref 0.6–1.6)
GLUCOSE BLDC GLUCOMTR-MCNC: 136 MG/DL — HIGH (ref 70–99)
GLUCOSE BLDC GLUCOMTR-MCNC: 188 MG/DL — HIGH (ref 70–99)
GLUCOSE BLDC GLUCOMTR-MCNC: 202 MG/DL — HIGH (ref 70–99)
GLUCOSE BLDC GLUCOMTR-MCNC: 225 MG/DL — HIGH (ref 70–99)
GLUCOSE SERPL-MCNC: 144 MG/DL — HIGH (ref 70–99)
HCT VFR BLD CALC: 28.8 % — LOW (ref 39–50)
HGB BLD-MCNC: 9.6 G/DL — LOW (ref 13–17)
IGA FLD-MCNC: 114 MG/DL — SIGNIFICANT CHANGE UP (ref 84–499)
IGG FLD-MCNC: 417 MG/DL — LOW (ref 610–1660)
IGM SERPL-MCNC: 79 MG/DL — SIGNIFICANT CHANGE UP (ref 35–242)
INR BLD: 1.29 RATIO — HIGH (ref 0.88–1.16)
INTERPRETATION SERPL IFE-IMP: SIGNIFICANT CHANGE UP
KAPPA LC SER QL IFE: 0.81 MG/DL — SIGNIFICANT CHANGE UP (ref 0.33–1.94)
KAPPA/LAMBDA FREE LIGHT CHAIN RATIO, SERUM: 0.78 RATIO — SIGNIFICANT CHANGE UP (ref 0.26–1.65)
LAMBDA LC SER QL IFE: 1.04 MG/DL — SIGNIFICANT CHANGE UP (ref 0.57–2.63)
MAGNESIUM SERPL-MCNC: 1.6 MG/DL — SIGNIFICANT CHANGE UP (ref 1.6–2.6)
MCHC RBC-ENTMCNC: 29.8 PG — SIGNIFICANT CHANGE UP (ref 27–34)
MCHC RBC-ENTMCNC: 33.3 GM/DL — SIGNIFICANT CHANGE UP (ref 32–36)
MCV RBC AUTO: 89.4 FL — SIGNIFICANT CHANGE UP (ref 80–100)
N GONORRHOEA RRNA SPEC QL NAA+PROBE: SIGNIFICANT CHANGE UP
NRBC # BLD: 0 /100 WBCS — SIGNIFICANT CHANGE UP (ref 0–0)
PHOSPHATE SERPL-MCNC: 3.7 MG/DL — SIGNIFICANT CHANGE UP (ref 2.5–4.5)
PLATELET # BLD AUTO: 347 K/UL — SIGNIFICANT CHANGE UP (ref 150–400)
POTASSIUM SERPL-MCNC: 3.7 MMOL/L — SIGNIFICANT CHANGE UP (ref 3.5–5.3)
POTASSIUM SERPL-SCNC: 3.7 MMOL/L — SIGNIFICANT CHANGE UP (ref 3.5–5.3)
PROT PATTERN SERPL ELPH-IMP: SIGNIFICANT CHANGE UP
PROT SERPL-MCNC: 5.1 G/DL — LOW (ref 6–8.3)
PROTHROM AB SERPL-ACNC: 14.9 SEC — HIGH (ref 10.5–13.4)
RBC # BLD: 3.22 M/UL — LOW (ref 4.2–5.8)
RBC # FLD: 15.2 % — HIGH (ref 10.3–14.5)
RF+CCP IGG SER-IMP: NEGATIVE — SIGNIFICANT CHANGE UP
SODIUM SERPL-SCNC: 136 MMOL/L — SIGNIFICANT CHANGE UP (ref 135–145)
SPECIMEN SOURCE: SIGNIFICANT CHANGE UP
WBC # BLD: 11.23 K/UL — HIGH (ref 3.8–10.5)
WBC # FLD AUTO: 11.23 K/UL — HIGH (ref 3.8–10.5)

## 2022-03-07 PROCEDURE — 99223 1ST HOSP IP/OBS HIGH 75: CPT | Mod: GC

## 2022-03-07 PROCEDURE — 93970 EXTREMITY STUDY: CPT | Mod: 26

## 2022-03-07 PROCEDURE — 99233 SBSQ HOSP IP/OBS HIGH 50: CPT

## 2022-03-07 PROCEDURE — 99291 CRITICAL CARE FIRST HOUR: CPT

## 2022-03-07 PROCEDURE — 99292 CRITICAL CARE ADDL 30 MIN: CPT

## 2022-03-07 PROCEDURE — 99221 1ST HOSP IP/OBS SF/LOW 40: CPT

## 2022-03-07 RX ORDER — APIXABAN 2.5 MG/1
5 TABLET, FILM COATED ORAL EVERY 12 HOURS
Refills: 0 | Status: DISCONTINUED | OUTPATIENT
Start: 2022-03-07 | End: 2022-03-11

## 2022-03-07 RX ORDER — MAGNESIUM SULFATE 500 MG/ML
2 VIAL (ML) INJECTION
Refills: 0 | Status: COMPLETED | OUTPATIENT
Start: 2022-03-07 | End: 2022-03-07

## 2022-03-07 RX ORDER — HYDROMORPHONE HYDROCHLORIDE 2 MG/ML
4 INJECTION INTRAMUSCULAR; INTRAVENOUS; SUBCUTANEOUS EVERY 6 HOURS
Refills: 0 | Status: DISCONTINUED | OUTPATIENT
Start: 2022-03-07 | End: 2022-03-11

## 2022-03-07 RX ORDER — POTASSIUM CHLORIDE 20 MEQ
20 PACKET (EA) ORAL ONCE
Refills: 0 | Status: COMPLETED | OUTPATIENT
Start: 2022-03-07 | End: 2022-03-07

## 2022-03-07 RX ADMIN — Medication 2 UNIT(S): at 12:41

## 2022-03-07 RX ADMIN — Medication 105 MILLIGRAM(S): at 13:34

## 2022-03-07 RX ADMIN — HYDROMORPHONE HYDROCHLORIDE 4 MILLIGRAM(S): 2 INJECTION INTRAMUSCULAR; INTRAVENOUS; SUBCUTANEOUS at 11:29

## 2022-03-07 RX ADMIN — Medication 2 UNIT(S): at 16:58

## 2022-03-07 RX ADMIN — NYSTATIN CREAM 1 APPLICATION(S): 100000 CREAM TOPICAL at 16:39

## 2022-03-07 RX ADMIN — Medication 25 GRAM(S): at 07:00

## 2022-03-07 RX ADMIN — METHADONE HYDROCHLORIDE 5 MILLIGRAM(S): 40 TABLET ORAL at 11:44

## 2022-03-07 RX ADMIN — PANTOPRAZOLE SODIUM 40 MILLIGRAM(S): 20 TABLET, DELAYED RELEASE ORAL at 17:00

## 2022-03-07 RX ADMIN — APIXABAN 5 MILLIGRAM(S): 2.5 TABLET, FILM COATED ORAL at 17:01

## 2022-03-07 RX ADMIN — HYDROMORPHONE HYDROCHLORIDE 4 MILLIGRAM(S): 2 INJECTION INTRAMUSCULAR; INTRAVENOUS; SUBCUTANEOUS at 21:08

## 2022-03-07 RX ADMIN — ISOSORBIDE DINITRATE 30 MILLIGRAM(S): 5 TABLET ORAL at 13:33

## 2022-03-07 RX ADMIN — APIXABAN 5 MILLIGRAM(S): 2.5 TABLET, FILM COATED ORAL at 06:06

## 2022-03-07 RX ADMIN — HYDROMORPHONE HYDROCHLORIDE 4 MILLIGRAM(S): 2 INJECTION INTRAMUSCULAR; INTRAVENOUS; SUBCUTANEOUS at 22:00

## 2022-03-07 RX ADMIN — CLOPIDOGREL BISULFATE 75 MILLIGRAM(S): 75 TABLET, FILM COATED ORAL at 11:44

## 2022-03-07 RX ADMIN — PANTOPRAZOLE SODIUM 40 MILLIGRAM(S): 20 TABLET, DELAYED RELEASE ORAL at 05:24

## 2022-03-07 RX ADMIN — Medication 105 MILLIGRAM(S): at 05:23

## 2022-03-07 RX ADMIN — Medication 2: at 16:58

## 2022-03-07 RX ADMIN — Medication 150 MILLIGRAM(S): at 13:33

## 2022-03-07 RX ADMIN — Medication 105 MILLIGRAM(S): at 21:07

## 2022-03-07 RX ADMIN — Medication 2 UNIT(S): at 08:44

## 2022-03-07 RX ADMIN — HYDROMORPHONE HYDROCHLORIDE 4 MILLIGRAM(S): 2 INJECTION INTRAMUSCULAR; INTRAVENOUS; SUBCUTANEOUS at 12:30

## 2022-03-07 RX ADMIN — CARVEDILOL PHOSPHATE 12.5 MILLIGRAM(S): 80 CAPSULE, EXTENDED RELEASE ORAL at 17:00

## 2022-03-07 RX ADMIN — CHLORHEXIDINE GLUCONATE 1 APPLICATION(S): 213 SOLUTION TOPICAL at 22:00

## 2022-03-07 RX ADMIN — CARVEDILOL PHOSPHATE 12.5 MILLIGRAM(S): 80 CAPSULE, EXTENDED RELEASE ORAL at 05:22

## 2022-03-07 RX ADMIN — Medication 81 MILLIGRAM(S): at 11:43

## 2022-03-07 RX ADMIN — METHADONE HYDROCHLORIDE 5 MILLIGRAM(S): 40 TABLET ORAL at 17:00

## 2022-03-07 RX ADMIN — INSULIN GLARGINE 50 UNIT(S): 100 INJECTION, SOLUTION SUBCUTANEOUS at 11:29

## 2022-03-07 RX ADMIN — METHADONE HYDROCHLORIDE 5 MILLIGRAM(S): 40 TABLET ORAL at 23:31

## 2022-03-07 RX ADMIN — METHADONE HYDROCHLORIDE 5 MILLIGRAM(S): 40 TABLET ORAL at 05:22

## 2022-03-07 RX ADMIN — Medication 1 APPLICATION(S): at 17:11

## 2022-03-07 RX ADMIN — Medication 25 GRAM(S): at 05:14

## 2022-03-07 RX ADMIN — NYSTATIN CREAM 1 APPLICATION(S): 100000 CREAM TOPICAL at 05:18

## 2022-03-07 RX ADMIN — ATORVASTATIN CALCIUM 80 MILLIGRAM(S): 80 TABLET, FILM COATED ORAL at 21:07

## 2022-03-07 RX ADMIN — ISOSORBIDE DINITRATE 30 MILLIGRAM(S): 5 TABLET ORAL at 05:22

## 2022-03-07 RX ADMIN — ISOSORBIDE DINITRATE 30 MILLIGRAM(S): 5 TABLET ORAL at 21:08

## 2022-03-07 RX ADMIN — LISINOPRIL 10 MILLIGRAM(S): 2.5 TABLET ORAL at 05:22

## 2022-03-07 RX ADMIN — Medication 150 MILLIGRAM(S): at 21:08

## 2022-03-07 RX ADMIN — HEPARIN SODIUM 21 UNIT(S)/HR: 5000 INJECTION INTRAVENOUS; SUBCUTANEOUS at 05:16

## 2022-03-07 RX ADMIN — Medication 1 APPLICATION(S): at 05:17

## 2022-03-07 RX ADMIN — Medication 20 MILLIEQUIVALENT(S): at 05:22

## 2022-03-07 RX ADMIN — Medication 4: at 12:41

## 2022-03-07 NOTE — PROGRESS NOTE ADULT - ATTENDING COMMENTS
Patient seen and examined. Agree with assessment and plan as outlined above.  56 year-old man with HTN, DM, chronic back/spine pain on methadone/dilaudid with inferolateral STEMI in the setting of DKA/atrial fibrillation with rapid ventricular response s/p POBA left circumflex. Patient with heme positive stools in the setting of dapt/full-dose anti-coagulation. Patient started on eliquis. He remains in sinus rhythm. Continue dapt x 1 week with plan to discontinue aspirin after one week. GI evaluation for heme positive stools. Pain management and physical therapy evaluation. On lantus and sliding scale insulin. Patient for telemetry transfer.

## 2022-03-07 NOTE — PROGRESS NOTE ADULT - SUBJECTIVE AND OBJECTIVE BOX
ROSE STRAUSS  Male  MRN-[id number]    Interval: Patient seen and examined at bedside. Patient alert and interactive, reports feeling better, denies any pain or distress.      VITAL SIGNS:  T(F): 97.5  HR: 97  BP: 132/75  RR: 16  SpO2: 97%      Physical Examination:     Constitutional: well-developed, well-nourished, well-groomed  Eyes: ophthalmoscopic exam deferred secondary to COVID-19 pandemic  Cardiovascular: no swelling, warm-to-touch    Neurological Examination:    - Mental Status: Alert, awake, oriented to person, place, year and day, needed reminder for month. speech is fluent with intact naming and repetition. Spells world forward but not backward, able to remember 3 words and recall 5 minutes later.   - Cranial Nerves:  II: Visual fields are full; pupils are equal, round, and reactive to light   III, IV, VI: Extraocular movements are intact without nystagmus  V: Facial sensation is intact in the V1-V3 distribution bilaterally  VII: Face is symmetric with normal eye closure and smile  VIII: Hearing is intact to finger rub  IX, X: Uvula is midline and soft palate rises symmetrically  XI: Shoulder shrug intact  XII: Tongue protrudes in the midline    - Motor/Strength Testing:                                 Right           Left  Deltoid                     5                 4  Biceps                      5                 4  Triceps                     5                 4  Wrist Ext (radial)       5                 4  Hand                  5                 5    Hip Flex                   5                  5  Knee Ext	      5                  5  Dorsiflex                  5                  5  Plantarflex               5                  5    - Normal muscle bulk and tone throughout.    - Reflexes:   Bicep (C5/C6):                  R 3+ --- L 2+   Brachioradialis (C5/C6) :   R 3+ --- L 2+   Patella (L3/L4) :                 R 3+ --- L 2+   Ankle (S1) :                       R 2+ --- L 2+     - Plant responses down bilaterally.    - Sensory: Intact throughout to light touch. diminished sensation on left arm to light touch. intact to LT and PP throughout  - Coordination: Finger-nose-finger intact without ataxia or dysmetria.    - Gait: deferred.    LABS:                          9.6    11.23 )-----------( 347      ( 07 Mar 2022 02:43 )             28.8     03-07    136  |  104  |  7   ----------------------------<  144<H>  3.7   |  19<L>  |  0.59    Ca    8.6      07 Mar 2022 02:43  Phos  3.7     03-07  Mg     1.6     03-07    TPro  5.1<L>  /  Alb  2.6<L>  /  TBili  0.3  /  DBili  x   /  AST  15  /  ALT  52<H>  /  AlkPhos  60  03-07    PT/INR - ( 07 Mar 2022 02:43 )   PT: 14.9 sec;   INR: 1.29 ratio         PTT - ( 07 Mar 2022 02:43 )  PTT:70.0 sec    MEDICATIONS:   apixaban 5 milliGRAM(s) Oral every 12 hours  aspirin  chewable 81 milliGRAM(s) Oral daily  atorvastatin 80 milliGRAM(s) Oral at bedtime  carvedilol 12.5 milliGRAM(s) Oral every 12 hours  chlorhexidine 4% Liquid 1 Application(s) Topical <User Schedule>  clobetasol 0.05% Ointment 1 Application(s) Topical two times a day  clopidogrel Tablet 75 milliGRAM(s) Oral daily  HYDROmorphone   Tablet 4 milliGRAM(s) Oral every 6 hours PRN  influenza   Vaccine 0.5 milliLiter(s) IntraMuscular once  insulin glargine Injectable (LANTUS) 50 Unit(s) SubCutaneous <User Schedule>  insulin lispro (ADMELOG) corrective regimen sliding scale   SubCutaneous three times a day before meals  insulin lispro (ADMELOG) corrective regimen sliding scale   SubCutaneous at bedtime  insulin lispro Injectable (ADMELOG) 2 Unit(s) SubCutaneous three times a day before meals  isosorbide   dinitrate Tablet (ISORDIL) 30 milliGRAM(s) Oral three times a day  lisinopril 10 milliGRAM(s) Oral daily  methadone    Tablet 5 milliGRAM(s) Oral every 6 hours  nystatin Powder 1 Application(s) Topical two times a day  pantoprazole  Injectable 40 milliGRAM(s) IV Push every 12 hours  pregabalin 150 milliGRAM(s) Oral three times a day  thiamine IVPB 500 milliGRAM(s) IV Intermittent three times a day          RADIOLOGY & ADDITIONAL STUDIES:       MR Head w/wo IV Cont (03.04.22 )     IMPRESSION:  MR BRAIN:  There is no mass, intracranial hemorrhage, or acute infarction.  MRA head:  No substantial intracranial arterial stenosis or large vessel occlusion.  MRA neck:  No hemodynamically significant stenosis of the internal carotid arteries.  Possible critical stenosis involving the left vertebral artery as   described.           CT Head No Cont (03.02.22)   IMPRESSION:    Volume loss, microvascular disease, age indeterminate lacunar infarcts     Intermittent triphasic waves noted, which are typically seen in metabolic encephalopathy, but may increase the risk for seizures in certain clinical situations. Clinical correlation is advised.   No seizures recorded.  MRI is more sensitive for ischemia, no hemorrhage or midline shift. Foci   of air in the left sella turcica with bony thinning possible dehiscence,   edentulous maxilla with dental hardware, bony calvarial sclerosis   correlate renal function. If symptoms persist consider follow-up head CT   or MR if no contraindications.      3/4/22 EEG Summary / Classification:    Abnormal EEG.  - Fluctuating generalized periodic discharges with triphasic morphology, occurring up to 0.25 to 0.5 hz  - Moderate generalized slowing/GRDA    EEG Impression / Clinical Correlate:    The findings of generalized periodic discharges with triphasic morphology are typically seen in metabolic encephalopathy, but may increase the risk for seizures in certain clinical situations. Clinical correlation is advised.   There is evidence of moderate multifocal/diffuse nonspecific cerebral dysfunction, not specific for etiology.   No seizures seen.     Transthoracic Echocardiogram (03.02.22 )   Conclusions:  1. Normal left ventricular internal dimensions and wall  thicknesses.  2. Mild left ventricular systolic dysfunction.  Hypokinesis of the basal inferior wall, inferolateral wall.  3. Mild diastolic dysfunction (Stage I).  *** Compared with echocardiogram of 3/1/2022, no  significant changes noted.

## 2022-03-07 NOTE — PROGRESS NOTE ADULT - SUBJECTIVE AND OBJECTIVE BOX
ROSE STRAUSS  MRN-[id number]  Patient is a 56y old  Male who presents with a chief complaint of STEMI, DKA (07 Mar 2022 12:56)    HPI:  57 y/o M with PMH of HTN, T2DM, neuropathy, chronic pain on Methadone & opioids for cervical spine and back injury transferred to [facility] CICU from [place] due to STEMI and DKA. Pt was reportedly found to have inferolateral ST elevations + afib on EKG, trop 7560. Pt initially came to [facility] ED due to nausea and vomiting for 3 days, reportedly had CP several days ago which apparently stopped on its own. Pt currently altered, difficult to obtain history from.    Pt transferred to [facility] for cath, noted to be in afib w/ RVR to 130s-150s, received cardizem 25 mg IV x 2 and started on cardizem gtt, cath was aborted as pt had SCR of 2.14, (baseline 0.7-0.9 in 10/2021), transferred to CCU for optimization of DKA prior to cath.     Baseline SCr 0.7-0.9 in 10/2021 from previous hospitalization at [facility] for cellulitis of hand, was d/rome on bactrim DS + ethambutol x 4 weeks.    Labs from [facility] significant for: WBC 15.53, lactate 5.6, troponin 7560.2, K 3.2, CO2 5, AG 42, SCr/BUN 2.14/44, Glucose 663, calcium 12.7, Mg 2.7, Phos 9.5  FSGs >600 -> 503  ABG 7.19/<19/133/3  UA: large ketones, moderate bacteria, negative LE and nitrite  RVP/COVID negative (01 Mar 2022 13:56)        24 events: no acute events    REVIEW OF SYSTEMS:    CONSTITUTIONAL: No weakness, fevers or chills  EYES/ENT: No visual changes;  No vertigo or throat pain   NECK: No pain or stiffness  RESPIRATORY: No cough, wheezing, hemoptysis; No shortness of breath  CARDIOVASCULAR: No chest pain or palpitations  GASTROINTESTINAL: No abdominal or epigastric pain. No nausea, vomiting, or hematemesis; No diarrhea or constipation. No melena or hematochezia.  GENITOURINARY: No dysuria, frequency or hematuria  NEUROLOGICAL: No numbness or weakness  SKIN: No itching, rashes      Physical Exam:  Vital Signs Last 24 Hrs  T(C): 36.9 (07 Mar 2022 19:00), Max: 37 (07 Mar 2022 17:00)  T(F): 98.5 (07 Mar 2022 19:00), Max: 98.6 (07 Mar 2022 17:00)  HR: 91 (07 Mar 2022 19:00) (82 - 106)  BP: 104/57 (07 Mar 2022 19:00) (102/58 - 151/81)  BP(mean): 75 (07 Mar 2022 19:00) (73 - 101)  RR: 17 (07 Mar 2022 19:00) (15 - 21)  SpO2: 95% (07 Mar 2022 19:00) (95% - 99%)      ============================I/O===========================   I&O's Detail    06 Mar 2022 07:01  -  07 Mar 2022 07:00  --------------------------------------------------------  IN:    Heparin: 189 mL    Heparin: 42 mL    IV PiggyBack: 100 mL    IV PiggyBack: 250 mL    Oral Fluid: 720 mL  Total IN: 1301 mL    OUT:    Incontinent per Condom Catheter (mL): 2800 mL  Total OUT: 2800 mL    Total NET: -1499 mL      07 Mar 2022 07:01  -  07 Mar 2022 19:30  --------------------------------------------------------  IN:    IV PiggyBack: 100 mL    Oral Fluid: 720 mL  Total IN: 820 mL    OUT:    Incontinent per Condom Catheter (mL): 800 mL    Voided (mL): 650 mL  Total OUT: 1450 mL    Total NET: -630 mL        ============================ LABS =========================                        9.6    11.23 )-----------( 347      ( 07 Mar 2022 02:43 )             28.8     03-07    136  |  104  |  7   ----------------------------<  144<H>  3.7   |  19<L>  |  0.59    Ca    8.6      07 Mar 2022 02:43  Phos  3.7     03-07  Mg     1.6     03-07    TPro  5.1<L>  /  Alb  2.6<L>  /  TBili  0.3  /  DBili  x   /  AST  15  /  ALT  52<H>  /  AlkPhos  60  03-07    LIVER FUNCTIONS - ( 07 Mar 2022 02:43 )  Alb: 2.6 g/dL / Pro: 5.1 g/dL / ALK PHOS: 60 U/L / ALT: 52 U/L / AST: 15 U/L / GGT: x           PT/INR - ( 07 Mar 2022 02:43 )   PT: 14.9 sec;   INR: 1.29 ratio         PTT - ( 07 Mar 2022 02:43 )  PTT:70.0 sec      ======================Micro/Rad/Cardio=================  Culture: Reviewed   CXR: Reviewed  Echo:Reviewed  ======================================================  PAST MEDICAL & SURGICAL HISTORY:  Hypertension    Diabetes mellitus    Gastric ulcer    Spinal stenosis    H/O spinal cord compression    Spondylosis    H/O radiculopathy    H/O cervical spine surgery    History of back surgery    S/P cervical spinal fusion      ====================ASSESSMENT ==============  57 y/o M with PMH of HTN, T2DM, neuropathy, chronic pain (on Methadone & opioids) for cervical spine and back injury transferred to [facility] CCU from [place] due to STEMI (trop 7560, inferolateral LUIS) and DKA. Found to have A fib w/ RVR to 130s-150s, JAXON w/ SCr 2.14 (baseline 0.7-0.9 in 10/2021), admitted to CCU for optimization of DKA and kidney function prior to cath. DKA now resolved AMS resolved with MRI negative for bleeding or thrombosis. Serum creatinine 0.6. Stable for cardiac cath 3/6.     Plan:  ====================== NEUROLOGY=====================  AMS Likely metabolic encephalopathy 2/2 DKA  CT Head(3/2):  Volume loss, microvascular disease, age indeterminate lacunar infarcts, Foci of air in the left sella turcica with bony thinning possible dehiscence, edentulous maxilla with dental hardware, bony calvarial sclerosis correlate renal function.   MRI findings(3/4): MR Brain: There is no mass, intracranial hemorrhage, or acute infarction.  - Currently A&O x 3  - [facility]'ed 3/6  - neuro following       HYDROmorphone   Tablet 4 milliGRAM(s) Oral every 6 hours PRN Severe Pain (7 - 10)  methadone    Tablet 5 milliGRAM(s) Oral every 6 hours  pregabalin 150 milliGRAM(s) Oral three times a day    ==================== RESPIRATORY======================  No active issues  - O2 sat >92% on room air   - continue to monitor O2 sats and respiratory status  - Oxygen supplement as needed       ====================CARDIOVASCULAR==================  STEMI (trop 7560, inferolateral LUIS on EKG), per chart was loaded with 180 mg Ticagrelor, heparin bolus in [facility]  Limited TTE 3/1/22 showed hypokinetic inferolateral and mid inferior wall, basal inferior wall is akinetic.   - LHC: LCX POBA RFA access 3/6  - Cont. ASA, plavix, isordil  - Cont. atorvastatin 80mg qD  - Eliquis 5mg q12 for afib   - coreg 12.5 mg BID    Admitted w/ AFib w/ RVR: CHADSVASC score: 3 and HAS-BLED score: 1  - currently on Eliquis 5mg q12  - Monitor on telemetry  - c/w coreg 12.5 mg BID      carvedilol 12.5 milliGRAM(s) Oral every 12 hours  isosorbide   dinitrate Tablet (ISORDIL) 30 milliGRAM(s) Oral three times a day  lisinopril 10 milliGRAM(s) Oral daily    ===================HEMATOLOGIC/ONC ===================  No active issues  - H/H stable  - Cont. Eliquis for AC    apixaban 5 milliGRAM(s) Oral every 12 hours  aspirin  chewable 81 milliGRAM(s) Oral daily  clopidogrel Tablet 75 milliGRAM(s) Oral daily    ===================== RENAL =========================  JAXON likely prerenal 2/2 dehydration 2/2 DKA; Now resolved  - Improved w/ IVF    - Cont. Monitor BUN/Cr  - Creatinine normalized, 0.6    Metabolic acidosis w/ anion gap in setting of acute DKA  - Resolved    Hyponatremia : Monitor/ Trend Na +  - Resolved     ==================== GASTROINTESTINAL===================  Transaminitis in setting of STEMI now resolved  -AST/ALT improving with fluids, possible shock liver due to hypovolemia from DKA  - Cont. DASH diet    Presents w/ black tarry liquid stool + FOBT   - Monitor H/H   - GI following, no EGD at this time    pantoprazole  Injectable 40 milliGRAM(s) IV Push every 12 hours  thiamine IVPB 500 milliGRAM(s) IV Intermittent three times a day    =======================    ENDOCRINE  =====================   DKA  - s/p DKA protocol - off insulin gtt, transition to long acting insulin and NPH - improving  - Insulin gtt off (3/4/22)  - Lantus 15 HS and Amalog 2 units TID pre-meals ('s) w/ Mod ISS     atorvastatin 80 milliGRAM(s) Oral at bedtime  insulin glargine Injectable (LANTUS) 50 Unit(s) SubCutaneous <User Schedule>  insulin lispro (ADMELOG) corrective regimen sliding scale   SubCutaneous three times a day before meals  insulin lispro (ADMELOG) corrective regimen sliding scale   SubCutaneous at bedtime  insulin lispro Injectable (ADMELOG) 2 Unit(s) SubCutaneous three times a day before meals    ========================INFECTIOUS DISEASE================  No active issues  - Pt remains afebrile w/ slightly elevated WBC w/o any signs and symptoms of infection   - BCx NGTD and UCx NGTD  - Trend WBC       ==============DERM====================  Blistering to upper extremities and lower extremities bilaterally  - Derm following  - F/u biopsy results  - F/u GC urine    =================MSK====================  - Pain management consult given history of opioid/alcohol abuse, significant home pain medication regime of methadone, dilaudid, pregabalin, and tizanidine  - Physical therapy consult.        Patient requires continuous monitoring with bedside rhythm monitoring, pulse ox monitoring, and intermittent blood gas analysis. Care plan discussed with ICU care team. Patient remained critical and at risk for life threatening decompensation.  Patient seen, examined and plan discussed with CCU team during rounds.     I have personally provided 35 minutes of critical care time excluding time spent on separate procedures, in addition to initial critical care time provided by the CICU Attending, Dr. Dyson.

## 2022-03-07 NOTE — CHART NOTE - NSCHARTNOTEFT_GEN_A_CORE
CCU Transfer Note    Transfer from: CCU  Transfer to:  (X) Medicine    (  ) Telemetry    (  ) RCU    (  ) Palliative    (  ) Stroke Unit    (  ) _______________  Accepting physician: Quin Harrison    MEDICATIONS:  STANDING MEDICATIONS  apixaban 5 milliGRAM(s) Oral every 12 hours  aspirin  chewable 81 milliGRAM(s) Oral daily  atorvastatin 80 milliGRAM(s) Oral at bedtime  carvedilol 12.5 milliGRAM(s) Oral every 12 hours  chlorhexidine 4% Liquid 1 Application(s) Topical <User Schedule>  clobetasol 0.05% Ointment 1 Application(s) Topical two times a day  clopidogrel Tablet 75 milliGRAM(s) Oral daily  influenza   Vaccine 0.5 milliLiter(s) IntraMuscular once  insulin glargine Injectable (LANTUS) 50 Unit(s) SubCutaneous <User Schedule>  insulin lispro (ADMELOG) corrective regimen sliding scale   SubCutaneous three times a day before meals  insulin lispro (ADMELOG) corrective regimen sliding scale   SubCutaneous at bedtime  insulin lispro Injectable (ADMELOG) 2 Unit(s) SubCutaneous three times a day before meals  isosorbide   dinitrate Tablet (ISORDIL) 30 milliGRAM(s) Oral three times a day  lisinopril 10 milliGRAM(s) Oral daily  methadone    Tablet 5 milliGRAM(s) Oral every 6 hours  nystatin Powder 1 Application(s) Topical two times a day  pantoprazole  Injectable 40 milliGRAM(s) IV Push every 12 hours  pregabalin 150 milliGRAM(s) Oral three times a day  thiamine IVPB 500 milliGRAM(s) IV Intermittent three times a day    PRN MEDICATIONS  HYDROmorphone   Tablet 4 milliGRAM(s) Oral every 6 hours PRN      VITAL SIGNS: Last 24 Hours  T(C): 36.4 (07 Mar 2022 08:00), Max: 36.7 (07 Mar 2022 04:00)  T(F): 97.5 (07 Mar 2022 08:00), Max: 98.1 (07 Mar 2022 04:00)  HR: 93 (07 Mar 2022 12:00) (82 - 109)  BP: 138/75 (07 Mar 2022 12:00) (103/59 - 155/82)  BP(mean): 98 (07 Mar 2022 12:00) (73 - 115)  RR: 21 (07 Mar 2022 12:00) (15 - 25)  SpO2: 97% (07 Mar 2022 12:00) (97% - 99%)    LABS:                        9.6    11.23 )-----------( 347      ( 07 Mar 2022 02:43 )             28.8     03-07    136  |  104  |  7   ----------------------------<  144<H>  3.7   |  19<L>  |  0.59    Ca    8.6      07 Mar 2022 02:43  Phos  3.7     03-07  Mg     1.6     03-07    TPro  5.1<L>  /  Alb  2.6<L>  /  TBili  0.3  /  DBili  x   /  AST  15  /  ALT  52<H>  /  AlkPhos  60  03-07    PT/INR - ( 07 Mar 2022 02:43 )   PT: 14.9 sec;   INR: 1.29 ratio         PTT - ( 07 Mar 2022 02:43 )  PTT:70.0 sec        Culture - Tissue with Gram Stain (collected 05 Mar 2022 06:01)  Source: .Tissue Other  Gram Stain (05 Mar 2022 14:26):    No polymorphonuclear leukocytes seen per low power field    No organisms seen per oil power field  Preliminary Report (06 Mar 2022 10:48):    No growth to date.    Culture - Fungal, Tissue (collected 05 Mar 2022 06:01)  Source: .Tissue right dorsal foot  Preliminary Report (07 Mar 2022 09:27):    Testing in progress    Culture - Acid Fast - Tissue w/Smear (collected 05 Mar 2022 06:01)  Source: .Tissue Other          RADIOLOGY:    CCU COURSE:          ASSESSMENT & PLAN:         For Follow-Up: CCU Transfer Note    Transfer from: CCU  Transfer to:  ( ) Medicine    (X) Telemetry    (  ) RCU    (  ) Palliative    (  ) Stroke Unit    (  ) _______________  Accepting physician: Quin Harrison    MEDICATIONS:  STANDING MEDICATIONS  apixaban 5 milliGRAM(s) Oral every 12 hours  aspirin  chewable 81 milliGRAM(s) Oral daily  atorvastatin 80 milliGRAM(s) Oral at bedtime  carvedilol 12.5 milliGRAM(s) Oral every 12 hours  chlorhexidine 4% Liquid 1 Application(s) Topical <User Schedule>  clobetasol 0.05% Ointment 1 Application(s) Topical two times a day  clopidogrel Tablet 75 milliGRAM(s) Oral daily  influenza   Vaccine 0.5 milliLiter(s) IntraMuscular once  insulin glargine Injectable (LANTUS) 50 Unit(s) SubCutaneous <User Schedule>  insulin lispro (ADMELOG) corrective regimen sliding scale   SubCutaneous three times a day before meals  insulin lispro (ADMELOG) corrective regimen sliding scale   SubCutaneous at bedtime  insulin lispro Injectable (ADMELOG) 2 Unit(s) SubCutaneous three times a day before meals  isosorbide   dinitrate Tablet (ISORDIL) 30 milliGRAM(s) Oral three times a day  lisinopril 10 milliGRAM(s) Oral daily  methadone    Tablet 5 milliGRAM(s) Oral every 6 hours  nystatin Powder 1 Application(s) Topical two times a day  pantoprazole  Injectable 40 milliGRAM(s) IV Push every 12 hours  pregabalin 150 milliGRAM(s) Oral three times a day  thiamine IVPB 500 milliGRAM(s) IV Intermittent three times a day    PRN MEDICATIONS  HYDROmorphone   Tablet 4 milliGRAM(s) Oral every 6 hours PRN      VITAL SIGNS: Last 24 Hours  T(C): 36.4 (07 Mar 2022 08:00), Max: 36.7 (07 Mar 2022 04:00)  T(F): 97.5 (07 Mar 2022 08:00), Max: 98.1 (07 Mar 2022 04:00)  HR: 93 (07 Mar 2022 12:00) (82 - 109)  BP: 138/75 (07 Mar 2022 12:00) (103/59 - 155/82)  BP(mean): 98 (07 Mar 2022 12:00) (73 - 115)  RR: 21 (07 Mar 2022 12:00) (15 - 25)  SpO2: 97% (07 Mar 2022 12:00) (97% - 99%)    LABS:                        9.6    11.23 )-----------( 347      ( 07 Mar 2022 02:43 )             28.8     03-07    136  |  104  |  7   ----------------------------<  144<H>  3.7   |  19<L>  |  0.59    Ca    8.6      07 Mar 2022 02:43  Phos  3.7     03-07  Mg     1.6     03-07    TPro  5.1<L>  /  Alb  2.6<L>  /  TBili  0.3  /  DBili  x   /  AST  15  /  ALT  52<H>  /  AlkPhos  60  03-07    PT/INR - ( 07 Mar 2022 02:43 )   PT: 14.9 sec;   INR: 1.29 ratio         PTT - ( 07 Mar 2022 02:43 )  PTT:70.0 sec        Culture - Tissue with Gram Stain (collected 05 Mar 2022 06:01)  Source: .Tissue Other  Gram Stain (05 Mar 2022 14:26):    No polymorphonuclear leukocytes seen per low power field    No organisms seen per oil power field  Preliminary Report (06 Mar 2022 10:48):    No growth to date.    Culture - Fungal, Tissue (collected 05 Mar 2022 06:01)  Source: .Tissue right dorsal foot  Preliminary Report (07 Mar 2022 09:27):    Testing in progress    Culture - Acid Fast - Tissue w/Smear (collected 05 Mar 2022 06:01)  Source: .Tissue Other          RADIOLOGY:    CCU COURSE:          ASSESSMENT & PLAN:         For Follow-Up: CCU Transfer Note    Transfer from: CCU  Transfer to:  ( ) Medicine    (X) Telemetry    (  ) RCU    (  ) Palliative    (  ) Stroke Unit    (  ) _______________  Accepting physician: Quin Harrison    MEDICATIONS:  STANDING MEDICATIONS  apixaban 5 milliGRAM(s) Oral every 12 hours  aspirin  chewable 81 milliGRAM(s) Oral daily  atorvastatin 80 milliGRAM(s) Oral at bedtime  carvedilol 12.5 milliGRAM(s) Oral every 12 hours  chlorhexidine 4% Liquid 1 Application(s) Topical <User Schedule>  clobetasol 0.05% Ointment 1 Application(s) Topical two times a day  clopidogrel Tablet 75 milliGRAM(s) Oral daily  influenza   Vaccine 0.5 milliLiter(s) IntraMuscular once  insulin glargine Injectable (LANTUS) 50 Unit(s) SubCutaneous <User Schedule>  insulin lispro (ADMELOG) corrective regimen sliding scale   SubCutaneous three times a day before meals  insulin lispro (ADMELOG) corrective regimen sliding scale   SubCutaneous at bedtime  insulin lispro Injectable (ADMELOG) 2 Unit(s) SubCutaneous three times a day before meals  isosorbide   dinitrate Tablet (ISORDIL) 30 milliGRAM(s) Oral three times a day  lisinopril 10 milliGRAM(s) Oral daily  methadone    Tablet 5 milliGRAM(s) Oral every 6 hours  nystatin Powder 1 Application(s) Topical two times a day  pantoprazole  Injectable 40 milliGRAM(s) IV Push every 12 hours  pregabalin 150 milliGRAM(s) Oral three times a day  thiamine IVPB 500 milliGRAM(s) IV Intermittent three times a day    PRN MEDICATIONS  HYDROmorphone   Tablet 4 milliGRAM(s) Oral every 6 hours PRN      VITAL SIGNS: Last 24 Hours  T(C): 36.4 (07 Mar 2022 08:00), Max: 36.7 (07 Mar 2022 04:00)  T(F): 97.5 (07 Mar 2022 08:00), Max: 98.1 (07 Mar 2022 04:00)  HR: 93 (07 Mar 2022 12:00) (82 - 109)  BP: 138/75 (07 Mar 2022 12:00) (103/59 - 155/82)  BP(mean): 98 (07 Mar 2022 12:00) (73 - 115)  RR: 21 (07 Mar 2022 12:00) (15 - 25)  SpO2: 97% (07 Mar 2022 12:00) (97% - 99%)    LABS:                        9.6    11.23 )-----------( 347      ( 07 Mar 2022 02:43 )             28.8     03-07    136  |  104  |  7   ----------------------------<  144<H>  3.7   |  19<L>  |  0.59    Ca    8.6      07 Mar 2022 02:43  Phos  3.7     03-07  Mg     1.6     03-07    TPro  5.1<L>  /  Alb  2.6<L>  /  TBili  0.3  /  DBili  x   /  AST  15  /  ALT  52<H>  /  AlkPhos  60  03-07    PT/INR - ( 07 Mar 2022 02:43 )   PT: 14.9 sec;   INR: 1.29 ratio         PTT - ( 07 Mar 2022 02:43 )  PTT:70.0 sec        Culture - Tissue with Gram Stain (collected 05 Mar 2022 06:01)  Source: .Tissue Other  Gram Stain (05 Mar 2022 14:26):    No polymorphonuclear leukocytes seen per low power field    No organisms seen per oil power field  Preliminary Report (06 Mar 2022 10:48):    No growth to date.    Culture - Fungal, Tissue (collected 05 Mar 2022 06:01)  Source: .Tissue right dorsal foot  Preliminary Report (07 Mar 2022 09:27):    Testing in progress    Culture - Acid Fast - Tissue w/Smear (collected 05 Mar 2022 06:01)  Source: .Tissue Other          CCU COURSE:  55 y/o M with PMH of HTN, T2DM, neuropathy, chronic pain (on methadone & opioids) for cervical spine and back injury transferred from [place] due to STEMI (inferolateral ST elevation) and DKA. Pt transferred to [facility] for cath, noted to be in afib w/ RVR to 130s-150s, received Cardizem 25 mg IV x 2 and started on Cardizem gtt, cath was aborted as pt had SCR of 2.14, (baseline 0.7-0.9 in 10/2021), transferred to CCU for optimization of DKA prior to cath. Patient admitted with AMS likely 2/2 to metabolic encephalopathy 2/2 DKA. CT head 3/2 showed volume loss, microvascular disease, and ago indeterminate lacunar infarct, and foci of air in left sella turcica. MRI 3/4 showed no mass, intracranial hemorrhage, or acute infarct. AMS resolved with resolution of DKA and patient now AOx4. JAXON resolved with IV fluid. DKA resolved s/p DKA protocol and patient transitioned to long acting insulin/NPH 3/4. 3/3 patient developed rash on BL hands and feet. Biopsy performed and rash improved on its own so low suspicion for infectious etiology. 3/6 patient developed melanotic stools, FOBT+. Pt remained hemodynamically stable, hemoglobin remained stable, and patient transitioned from Brilinta to Plavix. Patient stable for PCI 3/6 and received LCX POBA via LFA access with angioseal closure. Transitioned from heparin to Eliquis 3/7.         ASSESSMENT & PLAN:    NEURO: Admitted w/ AMS Likely metabolic encephalopathy 2/2 DKA  CT Head(3/2):  Volume loss, microvascular disease, age indeterminate lacunar infarcts, Foci of air in the left sella turcica with bony thinning possible dehiscence, edentulous maxilla with dental hardware, bony calvarial sclerosis correlate renal function.   MRI findings(3/4): MR Brain: There is no mass, intracranial hemorrhage, or acute infarction.  - Currently A&O x 3  [facility]    # PULM: Denies SOB or dyspnea, CTA on exam   O2 sat >92% on room air   - continue to monitor O2 sats and respiratory status  - Oxygen supplement as needed     # CV: STEMI (trop 7560, inferolateral LUIS on EKG), per chart was loaded with 180 mg Ticagrelor, heparin bolus in [facility]  Limited TTE 3/1/22 showed hypokinetic inferolateral and mid inferior wall, basal inferior wall is akinetic.   - LHC: LCX POBA LFA access 3/6  - Cont. ASA, plavix, isordil  - Cont. atorvastatin 80mg qD  - Eliquis 5mg q12 for afib   - trend trop, CK, CKMB  - coreg 12.5 mg BID    Admitted w/ AFib w/ RVR: CHADSVASC score: 3 and HAS-BLED score: 1  - currently on Eliquis 5mg q12  - Monitor on telemetry  - c/w coreg 12.5 mg BID    # RENAL:   JAXON likely prerenal 2/2 dehydration 2/2 DKA; Now resolved  - Improved w/ IVF    - Cont. Monitor BUN/Cr  - Creatinine normalized, 0.6  Metabolic acidosis w/ anion gap in setting of acute DKA  - Resolved  Hyponatremia : Monitor/ Trend Na +  - Resolved     # GI: Transaminitis in setting of STEMI now resolved  -AST/ALT improving with fluids, possible shock liver due to hypovolemia from DKA  - Cont. DASH diet  Presents w/ black tarry liquid stool + FOBT   - Monitor H/H   - GI consult given incontinence and dark stool    # ENDO: admitted w/ DKA  - s/p DKA protocol - off insulin gtt, transition to long acting insulin and NPH - improving  Insulin gtt off (3/4/22)  - Lantus 15 HS and Amalog 2 units TID pre-meals ('s) w/ Mod ISS     # HEMATOLOGIC: H/H stable  - Cont. Eliquis for AC    # ID: Pt remains afebrile w/ slightly elevated WBC w/o any signs and symptoms of infection   - BCx NGTD and UCx NGTD  - Trend WBC     # Derm  Blistering to upper extremities and lower extremities bilaterally  - Derm following  - F/u biopsy results  - F/u GC urine    #MSK  - Pain management consult given history of opioid/alcohol abuse, significant home pain medication regime of methadone, dilaudid, pregabalin, and tizanidine  - Physical therapy consult CCU Transfer Note    Transfer from: CCU  Transfer to:  ( ) Medicine    (X) Telemetry    (  ) RCU    (  ) Palliative    (  ) Stroke Unit    (  ) _______________  Accepting physician: Quin Harrison    MEDICATIONS:  STANDING MEDICATIONS  apixaban 5 milliGRAM(s) Oral every 12 hours  aspirin  chewable 81 milliGRAM(s) Oral daily  atorvastatin 80 milliGRAM(s) Oral at bedtime  carvedilol 12.5 milliGRAM(s) Oral every 12 hours  chlorhexidine 4% Liquid 1 Application(s) Topical <User Schedule>  clobetasol 0.05% Ointment 1 Application(s) Topical two times a day  clopidogrel Tablet 75 milliGRAM(s) Oral daily  influenza   Vaccine 0.5 milliLiter(s) IntraMuscular once  insulin glargine Injectable (LANTUS) 50 Unit(s) SubCutaneous <User Schedule>  insulin lispro (ADMELOG) corrective regimen sliding scale   SubCutaneous three times a day before meals  insulin lispro (ADMELOG) corrective regimen sliding scale   SubCutaneous at bedtime  insulin lispro Injectable (ADMELOG) 2 Unit(s) SubCutaneous three times a day before meals  isosorbide   dinitrate Tablet (ISORDIL) 30 milliGRAM(s) Oral three times a day  lisinopril 10 milliGRAM(s) Oral daily  methadone    Tablet 5 milliGRAM(s) Oral every 6 hours  nystatin Powder 1 Application(s) Topical two times a day  pantoprazole  Injectable 40 milliGRAM(s) IV Push every 12 hours  pregabalin 150 milliGRAM(s) Oral three times a day  thiamine IVPB 500 milliGRAM(s) IV Intermittent three times a day    PRN MEDICATIONS  HYDROmorphone   Tablet 4 milliGRAM(s) Oral every 6 hours PRN      VITAL SIGNS: Last 24 Hours  T(C): 36.4 (07 Mar 2022 08:00), Max: 36.7 (07 Mar 2022 04:00)  T(F): 97.5 (07 Mar 2022 08:00), Max: 98.1 (07 Mar 2022 04:00)  HR: 93 (07 Mar 2022 12:00) (82 - 109)  BP: 138/75 (07 Mar 2022 12:00) (103/59 - 155/82)  BP(mean): 98 (07 Mar 2022 12:00) (73 - 115)  RR: 21 (07 Mar 2022 12:00) (15 - 25)  SpO2: 97% (07 Mar 2022 12:00) (97% - 99%)    LABS:                        9.6    11.23 )-----------( 347      ( 07 Mar 2022 02:43 )             28.8     03-07    136  |  104  |  7   ----------------------------<  144<H>  3.7   |  19<L>  |  0.59    Ca    8.6      07 Mar 2022 02:43  Phos  3.7     03-07  Mg     1.6     03-07    TPro  5.1<L>  /  Alb  2.6<L>  /  TBili  0.3  /  DBili  x   /  AST  15  /  ALT  52<H>  /  AlkPhos  60  03-07    PT/INR - ( 07 Mar 2022 02:43 )   PT: 14.9 sec;   INR: 1.29 ratio         PTT - ( 07 Mar 2022 02:43 )  PTT:70.0 sec        Culture - Tissue with Gram Stain (collected 05 Mar 2022 06:01)  Source: .Tissue Other  Gram Stain (05 Mar 2022 14:26):    No polymorphonuclear leukocytes seen per low power field    No organisms seen per oil power field  Preliminary Report (06 Mar 2022 10:48):    No growth to date.    Culture - Fungal, Tissue (collected 05 Mar 2022 06:01)  Source: .Tissue right dorsal foot  Preliminary Report (07 Mar 2022 09:27):    Testing in progress    Culture - Acid Fast - Tissue w/Smear (collected 05 Mar 2022 06:01)  Source: .Tissue Other          CCU COURSE:  55 y/o M with PMH of HTN, T2DM, neuropathy, chronic pain (on methadone & opioids) for cervical spine and back injury transferred from [place] due to STEMI (inferolateral ST elevation) and DKA. Pt transferred to [facility] for cath, noted to be in afib w/ RVR to 130s-150s, received Cardizem 25 mg IV x 2 and started on Cardizem gtt, cath was aborted as pt had SCR of 2.14, (baseline 0.7-0.9 in 10/2021), transferred to CCU for optimization of DKA prior to cath. Patient admitted with AMS likely 2/2 to metabolic encephalopathy 2/2 DKA. CT head 3/2 showed volume loss, microvascular disease, and ago indeterminate lacunar infarct, and foci of air in left sella turcica. MRI 3/4 showed no mass, intracranial hemorrhage, or acute infarct. AMS resolved with resolution of DKA and patient now AOx4. JAXON resolved with IV fluid. DKA resolved s/p DKA protocol and patient transitioned to long acting insulin/NPH 3/4. 3/3 patient developed rash on BL hands and feet. Biopsy performed and rash improved on its own so low suspicion for infectious etiology. 3/6 patient developed melanotic stools, FOBT+. Pt remained hemodynamically stable, hemoglobin remained stable, and patient transitioned from Brilinta to Plavix. Patient stable for PCI 3/6 and received LCX POBA via RFA access with angioseal closure. Transitioned from heparin to Eliquis 3/7.         ASSESSMENT & PLAN:    NEURO: Admitted w/ AMS Likely metabolic encephalopathy 2/2 DKA  CT Head(3/2):  Volume loss, microvascular disease, age indeterminate lacunar infarcts, Foci of air in the left sella turcica with bony thinning possible dehiscence, edentulous maxilla with dental hardware, bony calvarial sclerosis correlate renal function.   MRI findings(3/4): MR Brain: There is no mass, intracranial hemorrhage, or acute infarction.  - Currently A&O x 3  [facility]    # PULM: Denies SOB or dyspnea, CTA on exam   O2 sat >92% on room air   - continue to monitor O2 sats and respiratory status  - Oxygen supplement as needed     # CV: STEMI (trop 7560, inferolateral LUIS on EKG), per chart was loaded with 180 mg Ticagrelor, heparin bolus in [facility]  Limited TTE 3/1/22 showed hypokinetic inferolateral and mid inferior wall, basal inferior wall is akinetic.   - LHC: LCX POBA RFA access 3/6  - Cont. ASA, plavix, isordil  - Cont. atorvastatin 80mg qD  - Eliquis 5mg q12 for afib   - trend trop, CK, CKMB  - coreg 12.5 mg BID    Admitted w/ AFib w/ RVR: CHADSVASC score: 3 and HAS-BLED score: 1  - currently on Eliquis 5mg q12  - Monitor on telemetry  - c/w coreg 12.5 mg BID    # RENAL:   JAXON likely prerenal 2/2 dehydration 2/2 DKA; Now resolved  - Improved w/ IVF    - Cont. Monitor BUN/Cr  - Creatinine normalized, 0.6  Metabolic acidosis w/ anion gap in setting of acute DKA  - Resolved  Hyponatremia : Monitor/ Trend Na +  - Resolved     # GI: Transaminitis in setting of STEMI now resolved  -AST/ALT improving with fluids, possible shock liver due to hypovolemia from DKA  - Cont. DASH diet  Presents w/ black tarry liquid stool + FOBT   - Monitor H/H   - GI consult given incontinence and dark stool    # ENDO: admitted w/ DKA  - s/p DKA protocol - off insulin gtt, transition to long acting insulin and NPH - improving  Insulin gtt off (3/4/22)  - Lantus 15 HS and Amalog 2 units TID pre-meals ('s) w/ Mod ISS     # HEMATOLOGIC: H/H stable  - Cont. Eliquis for AC    # ID: Pt remains afebrile w/ slightly elevated WBC w/o any signs and symptoms of infection   - BCx NGTD and UCx NGTD  - Trend WBC     # Derm  Blistering to upper extremities and lower extremities bilaterally  - Derm following  - F/u biopsy results  - F/u GC urine    #MSK  - Pain management consult given history of opioid/alcohol abuse, significant home pain medication regime of methadone, dilaudid, pregabalin, and tizanidine  - Physical therapy consult CCU Transfer Note    Transfer from: CCU  Transfer to:  ( ) Medicine    (X) Telemetry    (  ) RCU    (  ) Palliative    (  ) Stroke Unit    (  ) _______________  Accepting physician: Dorie Harrison    MEDICATIONS:  STANDING MEDICATIONS  apixaban 5 milliGRAM(s) Oral every 12 hours  aspirin  chewable 81 milliGRAM(s) Oral daily  atorvastatin 80 milliGRAM(s) Oral at bedtime  carvedilol 12.5 milliGRAM(s) Oral every 12 hours  chlorhexidine 4% Liquid 1 Application(s) Topical <User Schedule>  clobetasol 0.05% Ointment 1 Application(s) Topical two times a day  clopidogrel Tablet 75 milliGRAM(s) Oral daily  influenza   Vaccine 0.5 milliLiter(s) IntraMuscular once  insulin glargine Injectable (LANTUS) 50 Unit(s) SubCutaneous <User Schedule>  insulin lispro (ADMELOG) corrective regimen sliding scale   SubCutaneous three times a day before meals  insulin lispro (ADMELOG) corrective regimen sliding scale   SubCutaneous at bedtime  insulin lispro Injectable (ADMELOG) 2 Unit(s) SubCutaneous three times a day before meals  isosorbide   dinitrate Tablet (ISORDIL) 30 milliGRAM(s) Oral three times a day  lisinopril 10 milliGRAM(s) Oral daily  methadone    Tablet 5 milliGRAM(s) Oral every 6 hours  nystatin Powder 1 Application(s) Topical two times a day  pantoprazole  Injectable 40 milliGRAM(s) IV Push every 12 hours  pregabalin 150 milliGRAM(s) Oral three times a day  thiamine IVPB 500 milliGRAM(s) IV Intermittent three times a day    PRN MEDICATIONS  HYDROmorphone   Tablet 4 milliGRAM(s) Oral every 6 hours PRN      VITAL SIGNS: Last 24 Hours  T(C): 36.4 (07 Mar 2022 08:00), Max: 36.7 (07 Mar 2022 04:00)  T(F): 97.5 (07 Mar 2022 08:00), Max: 98.1 (07 Mar 2022 04:00)  HR: 93 (07 Mar 2022 12:00) (82 - 109)  BP: 138/75 (07 Mar 2022 12:00) (103/59 - 155/82)  BP(mean): 98 (07 Mar 2022 12:00) (73 - 115)  RR: 21 (07 Mar 2022 12:00) (15 - 25)  SpO2: 97% (07 Mar 2022 12:00) (97% - 99%)    LABS:                        9.6    11.23 )-----------( 347      ( 07 Mar 2022 02:43 )             28.8     03-07    136  |  104  |  7   ----------------------------<  144<H>  3.7   |  19<L>  |  0.59    Ca    8.6      07 Mar 2022 02:43  Phos  3.7     03-07  Mg     1.6     03-07    TPro  5.1<L>  /  Alb  2.6<L>  /  TBili  0.3  /  DBili  x   /  AST  15  /  ALT  52<H>  /  AlkPhos  60  03-07    PT/INR - ( 07 Mar 2022 02:43 )   PT: 14.9 sec;   INR: 1.29 ratio         PTT - ( 07 Mar 2022 02:43 )  PTT:70.0 sec        Culture - Tissue with Gram Stain (collected 05 Mar 2022 06:01)  Source: .Tissue Other  Gram Stain (05 Mar 2022 14:26):    No polymorphonuclear leukocytes seen per low power field    No organisms seen per oil power field  Preliminary Report (06 Mar 2022 10:48):    No growth to date.    Culture - Fungal, Tissue (collected 05 Mar 2022 06:01)  Source: .Tissue right dorsal foot  Preliminary Report (07 Mar 2022 09:27):    Testing in progress    Culture - Acid Fast - Tissue w/Smear (collected 05 Mar 2022 06:01)  Source: .Tissue Other          CCU COURSE:  55 y/o M with PMH of HTN, T2DM, neuropathy, chronic pain (on methadone & opioids) for cervical spine and back injury transferred from [place] due to STEMI (inferolateral ST elevation) and DKA. Pt transferred to [facility] for cath, noted to be in afib w/ RVR to 130s-150s, received Cardizem 25 mg IV x 2 and started on Cardizem gtt, cath was aborted as pt had SCR of 2.14, (baseline 0.7-0.9 in 10/2021), transferred to CCU for optimization of DKA prior to cath. Patient admitted with AMS likely 2/2 to metabolic encephalopathy 2/2 DKA. CT head 3/2 showed volume loss, microvascular disease, and ago indeterminate lacunar infarct, and foci of air in left sella turcica. MRI 3/4 showed no mass, intracranial hemorrhage, or acute infarct. AMS resolved with resolution of DKA and patient now AOx4. JAXON resolved with IV fluid. DKA resolved s/p DKA protocol and patient transitioned to long acting insulin/NPH 3/4. 3/3 patient developed rash on BL hands and feet. Biopsy performed and rash improved on its own so low suspicion for infectious etiology. 3/6 patient developed melanotic stools, FOBT+. Pt remained hemodynamically stable, hemoglobin remained stable, and patient transitioned from Brilinta to Plavix. Patient stable for PCI 3/6 and received LCX POBA via RFA access with angioseal closure. Transitioned from heparin to Eliquis 3/7.         ASSESSMENT & PLAN:    NEURO: Admitted w/ AMS Likely metabolic encephalopathy 2/2 DKA  CT Head(3/2):  Volume loss, microvascular disease, age indeterminate lacunar infarcts, Foci of air in the left sella turcica with bony thinning possible dehiscence, edentulous maxilla with dental hardware, bony calvarial sclerosis correlate renal function.   MRI findings(3/4): MR Brain: There is no mass, intracranial hemorrhage, or acute infarction.  - Currently A&O x 3  [facility]    # PULM: Denies SOB or dyspnea, CTA on exam   O2 sat >92% on room air   - continue to monitor O2 sats and respiratory status  - Oxygen supplement as needed     # CV: STEMI (trop 7560, inferolateral LUIS on EKG), per chart was loaded with 180 mg Ticagrelor, heparin bolus in [facility]  Limited TTE 3/1/22 showed hypokinetic inferolateral and mid inferior wall, basal inferior wall is akinetic.   - LHC: LCX POBA RFA access 3/6  - Cont. ASA, plavix, isordil  - Cont. atorvastatin 80mg qD  - Eliquis 5mg q12 for afib   - trend trop, CK, CKMB  - coreg 12.5 mg BID    Admitted w/ AFib w/ RVR: CHADSVASC score: 3 and HAS-BLED score: 1  - currently on Eliquis 5mg q12  - Monitor on telemetry  - c/w coreg 12.5 mg BID    # RENAL:   JAXON likely prerenal 2/2 dehydration 2/2 DKA; Now resolved  - Improved w/ IVF    - Cont. Monitor BUN/Cr  - Creatinine normalized, 0.6  Metabolic acidosis w/ anion gap in setting of acute DKA  - Resolved  Hyponatremia : Monitor/ Trend Na +  - Resolved     # GI: Transaminitis in setting of STEMI now resolved  -AST/ALT improving with fluids, possible shock liver due to hypovolemia from DKA  - Cont. DASH diet  Presents w/ black tarry liquid stool + FOBT   - Monitor H/H   - GI consult given incontinence and dark stool    # ENDO: admitted w/ DKA  - s/p DKA protocol - off insulin gtt, transition to long acting insulin and NPH - improving  Insulin gtt off (3/4/22)  - Lantus 15 HS and Amalog 2 units TID pre-meals ('s) w/ Mod ISS     # HEMATOLOGIC: H/H stable  - Cont. Eliquis for AC    # ID: Pt remains afebrile w/ slightly elevated WBC w/o any signs and symptoms of infection   - BCx NGTD and UCx NGTD  - Trend WBC     # Derm  Blistering to upper extremities and lower extremities bilaterally  - Derm following  - F/u biopsy results  - F/u GC urine    #MSK  - Pain management consult given history of opioid/alcohol abuse, significant home pain medication regime of methadone, dilaudid, pregabalin, and tizanidine  - Physical therapy consult CCU Transfer Note    Transfer from: CCU  Transfer to:  ( ) Medicine    (X) Telemetry    (  ) RCU    (  ) Palliative    (  ) Stroke Unit    (  ) _______________  Accepting physician: Dorie Harrison    MEDICATIONS:  STANDING MEDICATIONS  apixaban 5 milliGRAM(s) Oral every 12 hours  aspirin  chewable 81 milliGRAM(s) Oral daily  atorvastatin 80 milliGRAM(s) Oral at bedtime  carvedilol 12.5 milliGRAM(s) Oral every 12 hours  chlorhexidine 4% Liquid 1 Application(s) Topical <User Schedule>  clobetasol 0.05% Ointment 1 Application(s) Topical two times a day  clopidogrel Tablet 75 milliGRAM(s) Oral daily  influenza   Vaccine 0.5 milliLiter(s) IntraMuscular once  insulin glargine Injectable (LANTUS) 50 Unit(s) SubCutaneous <User Schedule>  insulin lispro (ADMELOG) corrective regimen sliding scale   SubCutaneous three times a day before meals  insulin lispro (ADMELOG) corrective regimen sliding scale   SubCutaneous at bedtime  insulin lispro Injectable (ADMELOG) 2 Unit(s) SubCutaneous three times a day before meals  isosorbide   dinitrate Tablet (ISORDIL) 30 milliGRAM(s) Oral three times a day  lisinopril 10 milliGRAM(s) Oral daily  methadone    Tablet 5 milliGRAM(s) Oral every 6 hours  nystatin Powder 1 Application(s) Topical two times a day  pantoprazole  Injectable 40 milliGRAM(s) IV Push every 12 hours  pregabalin 150 milliGRAM(s) Oral three times a day  thiamine IVPB 500 milliGRAM(s) IV Intermittent three times a day    PRN MEDICATIONS  HYDROmorphone   Tablet 4 milliGRAM(s) Oral every 6 hours PRN      VITAL SIGNS: Last 24 Hours  T(C): 36.4 (07 Mar 2022 08:00), Max: 36.7 (07 Mar 2022 04:00)  T(F): 97.5 (07 Mar 2022 08:00), Max: 98.1 (07 Mar 2022 04:00)  HR: 93 (07 Mar 2022 12:00) (82 - 109)  BP: 138/75 (07 Mar 2022 12:00) (103/59 - 155/82)  BP(mean): 98 (07 Mar 2022 12:00) (73 - 115)  RR: 21 (07 Mar 2022 12:00) (15 - 25)  SpO2: 97% (07 Mar 2022 12:00) (97% - 99%)    LABS:                        9.6    11.23 )-----------( 347      ( 07 Mar 2022 02:43 )             28.8     03-07    136  |  104  |  7   ----------------------------<  144<H>  3.7   |  19<L>  |  0.59    Ca    8.6      07 Mar 2022 02:43  Phos  3.7     03-07  Mg     1.6     03-07    TPro  5.1<L>  /  Alb  2.6<L>  /  TBili  0.3  /  DBili  x   /  AST  15  /  ALT  52<H>  /  AlkPhos  60  03-07    PT/INR - ( 07 Mar 2022 02:43 )   PT: 14.9 sec;   INR: 1.29 ratio         PTT - ( 07 Mar 2022 02:43 )  PTT:70.0 sec        Culture - Tissue with Gram Stain (collected 05 Mar 2022 06:01)  Source: .Tissue Other  Gram Stain (05 Mar 2022 14:26):    No polymorphonuclear leukocytes seen per low power field    No organisms seen per oil power field  Preliminary Report (06 Mar 2022 10:48):    No growth to date.    Culture - Fungal, Tissue (collected 05 Mar 2022 06:01)  Source: .Tissue right dorsal foot  Preliminary Report (07 Mar 2022 09:27):    Testing in progress    Culture - Acid Fast - Tissue w/Smear (collected 05 Mar 2022 06:01)  Source: .Tissue Other          CCU COURSE:  57 y/o M with PMH of HTN, T2DM, neuropathy, chronic pain (on methadone & opioids) for cervical spine and back injury transferred from [place] due to STEMI (inferolateral ST elevation) and DKA. Pt transferred to [facility] for cath, noted to be in afib w/ RVR to 130s-150s, received Cardizem 25 mg IV x 2 and started on Cardizem gtt, cath was aborted as pt had SCR of 2.14, (baseline 0.7-0.9 in 10/2021), transferred to CCU for optimization of DKA prior to cath. Patient admitted with AMS likely 2/2 to metabolic encephalopathy 2/2 DKA. CT head 3/2 showed volume loss, microvascular disease, and ago indeterminate lacunar infarct, and foci of air in left sella turcica. MRI 3/4 showed no mass, intracranial hemorrhage, or acute infarct. AMS resolved with resolution of DKA and patient now AOx4. JAXON resolved with IV fluid. DKA resolved s/p DKA protocol and patient transitioned to long acting insulin/NPH 3/4. 3/3 patient developed rash on BL hands and feet. Biopsy performed and rash improved on its own so low suspicion for infectious etiology. 3/6 patient developed melanotic stools, FOBT+. Pt remained hemodynamically stable, hemoglobin remained stable, and patient transitioned from Brilinta to Plavix. Patient stable for PCI 3/6 and received LCX POBA via RFA access with angioseal closure. Transitioned from heparin to Eliquis 3/7.         ASSESSMENT & PLAN:   NEURO: Admitted w/ AMS Likely metabolic encephalopathy 2/2 DKA  CT Head(3/2):  Volume loss, microvascular disease, age indeterminate lacunar infarcts, Foci of air in the left sella turcica with bony thinning possible dehiscence, edentulous maxilla with dental hardware, bony calvarial sclerosis correlate renal function.   MRI findings(3/4): MR Brain: There is no mass, intracranial hemorrhage, or acute infarction.  - Currently A&O x 3    # PULM: Denies SOB or dyspnea, CTA on exam   O2 sat >92% on room air   - Oxygen supplement if needed     # CV: STEMI (trop 7560, inferolateral LUIS on EKG), per chart was loaded with 180 mg Ticagrelor, heparin bolus in [facility]  Limited TTE 3/1/22 showed hypokinetic inferolateral and mid inferior wall, basal inferior wall is akinetic.   - LHC: LCX POBA RFA access 3/6  - Cont. ASA, plavix, isordil. Plan to discontinue aspirin 1 week after PCI on 3/6  - Cont. atorvastatin 80mg qD  - Eliquis 5mg q12 for afib   - trend trop, CK, CKMB  - coreg 12.5 mg BID    Admitted w/ AFib w/ RVR: CHADSVASC score: 3 and HAS-BLED score: 1  - currently on Eliquis 5mg q12  - Monitor on telemetry  - c/w coreg 12.5 mg BID    # RENAL:   JAXON likely prerenal 2/2 dehydration 2/2 DKA; RESOLVED  - Cont. Monitor BUN/Cr  - Creatinine normalized, 0.6  Metabolic acidosis w/ anion gap in setting of acute DKA  - Resolved  Hyponatremia : Monitor/ Trend Na +  - Resolved     # GI: Transaminitis in setting of STEMI now resolved  - Cont. DASH diet  - GI consulted for FOBT+ stools, recommend EGD at later time for anemia    # ENDO: admitted w/ DKA  - s/p DKA protocol - off insulin gtt, transition to long acting insulin and NPH   - endocrine home recommendations - 40 Lantus, Metformin ER 2g, Jardiance 25 once daily, Trulicity 1.5 once a week    # HEMATOLOGIC: H/H stable  - Cont. Eliquis for AC    # ID: Pt remains afebrile w/ WBC WNL, without any signs and symptoms of infection     # Derm  Blistering to upper extremities and lower extremities bilaterally  - F/u biopsy results    #MSK  - Pain management consult given history of opioid/alcohol abuse, recommended that patient continue home regimen of methadone 5mg q6hrs, pregabalin 150mg q8, and oral diluadid 4mg q6hrs, recommend that patient follow up with his pain management physician, Dr. Pham after discharge. CCU Transfer Note    Transfer from: CCU  Transfer to:  ( ) Medicine    (X) Telemetry    (  ) RCU    (  ) Palliative    (  ) Stroke Unit    (  ) _______________  Accepting physician: Dorie Harrison  Signout given to: Dr Harrison, MAR, 2C ACP     MEDICATIONS:  STANDING MEDICATIONS  apixaban 5 milliGRAM(s) Oral every 12 hours  aspirin  chewable 81 milliGRAM(s) Oral daily  atorvastatin 80 milliGRAM(s) Oral at bedtime  carvedilol 12.5 milliGRAM(s) Oral every 12 hours  chlorhexidine 4% Liquid 1 Application(s) Topical <User Schedule>  clobetasol 0.05% Ointment 1 Application(s) Topical two times a day  clopidogrel Tablet 75 milliGRAM(s) Oral daily  influenza   Vaccine 0.5 milliLiter(s) IntraMuscular once  insulin glargine Injectable (LANTUS) 50 Unit(s) SubCutaneous <User Schedule>  insulin lispro (ADMELOG) corrective regimen sliding scale   SubCutaneous three times a day before meals  insulin lispro (ADMELOG) corrective regimen sliding scale   SubCutaneous at bedtime  insulin lispro Injectable (ADMELOG) 2 Unit(s) SubCutaneous three times a day before meals  isosorbide   dinitrate Tablet (ISORDIL) 30 milliGRAM(s) Oral three times a day  lisinopril 10 milliGRAM(s) Oral daily  methadone    Tablet 5 milliGRAM(s) Oral every 6 hours  nystatin Powder 1 Application(s) Topical two times a day  pantoprazole  Injectable 40 milliGRAM(s) IV Push every 12 hours  pregabalin 150 milliGRAM(s) Oral three times a day  thiamine IVPB 500 milliGRAM(s) IV Intermittent three times a day    PRN MEDICATIONS  HYDROmorphone   Tablet 4 milliGRAM(s) Oral every 6 hours PRN      VITAL SIGNS: Last 24 Hours  T(C): 36.4 (07 Mar 2022 08:00), Max: 36.7 (07 Mar 2022 04:00)  T(F): 97.5 (07 Mar 2022 08:00), Max: 98.1 (07 Mar 2022 04:00)  HR: 93 (07 Mar 2022 12:00) (82 - 109)  BP: 138/75 (07 Mar 2022 12:00) (103/59 - 155/82)  BP(mean): 98 (07 Mar 2022 12:00) (73 - 115)  RR: 21 (07 Mar 2022 12:00) (15 - 25)  SpO2: 97% (07 Mar 2022 12:00) (97% - 99%)    LABS:                        9.6    11.23 )-----------( 347      ( 07 Mar 2022 02:43 )             28.8     03-07    136  |  104  |  7   ----------------------------<  144<H>  3.7   |  19<L>  |  0.59    Ca    8.6      07 Mar 2022 02:43  Phos  3.7     03-07  Mg     1.6     03-07    TPro  5.1<L>  /  Alb  2.6<L>  /  TBili  0.3  /  DBili  x   /  AST  15  /  ALT  52<H>  /  AlkPhos  60  03-07    PT/INR - ( 07 Mar 2022 02:43 )   PT: 14.9 sec;   INR: 1.29 ratio         PTT - ( 07 Mar 2022 02:43 )  PTT:70.0 sec        Culture - Tissue with Gram Stain (collected 05 Mar 2022 06:01)  Source: .Tissue Other  Gram Stain (05 Mar 2022 14:26):    No polymorphonuclear leukocytes seen per low power field    No organisms seen per oil power field  Preliminary Report (06 Mar 2022 10:48):    No growth to date.    Culture - Fungal, Tissue (collected 05 Mar 2022 06:01)  Source: .Tissue right dorsal foot  Preliminary Report (07 Mar 2022 09:27):    Testing in progress    Culture - Acid Fast - Tissue w/Smear (collected 05 Mar 2022 06:01)  Source: .Tissue Other          CCU COURSE:  55 y/o M with PMH of HTN, T2DM, neuropathy, chronic pain (on methadone & opioids) for cervical spine and back injury transferred from [place] due to STEMI (inferolateral ST elevation) and DKA. Pt transferred to [facility] for cath, noted to be in afib w/ RVR to 130s-150s, received Cardizem 25 mg IV x 2 and started on Cardizem gtt, cath was aborted as pt had SCR of 2.14, (baseline 0.7-0.9 in 10/2021), transferred to CCU for optimization of DKA prior to cath. Patient admitted with AMS likely 2/2 to metabolic encephalopathy 2/2 DKA. CT head 3/2 showed volume loss, microvascular disease, and ago indeterminate lacunar infarct, and foci of air in left sella turcica. MRI 3/4 showed no mass, intracranial hemorrhage, or acute infarct. AMS resolved with resolution of DKA and patient now AOx4. JAXON resolved with IV fluid. DKA resolved s/p DKA protocol and patient transitioned to long acting insulin/NPH 3/4. 3/3 patient developed rash on BL hands and feet. Biopsy performed and rash improved on its own so low suspicion for infectious etiology. 3/6 patient developed melanotic stools, FOBT+. Pt remained hemodynamically stable, hemoglobin remained stable, and patient transitioned from Brilinta to Plavix. Patient stable for PCI 3/6 and received LCX POBA via RFA access with angioseal closure. Transitioned from heparin to Eliquis 3/7.         ASSESSMENT & PLAN:   55 y/o M with PMH of HTN, T2DM, neuropathy, chronic pain (on Methadone & opioids) for cervical spine and back injury transferred to [facility] CCU from [place] due to STEMI (trop 7560, inferolateral LUIS) and DKA. Found to have A fib w/ RVR to 130s-150s, JAXON w/ SCr 2.14 (baseline 0.7-0.9 in 10/2021), admitted to CCU for optimization of DKA and kidney function prior to cath. DKA, JAXON, and AMS now resolved and cardiac cath with POBA completed 3/6.    Plan   # NEURO: Admitted w/ AMS Likely metabolic encephalopathy 2/2 DKA  CT Head(3/2):  Volume loss, microvascular disease, age indeterminate lacunar infarcts, Foci of air in the left sella turcica with bony thinning possible dehiscence, edentulous maxilla with dental hardware, bony calvarial sclerosis correlate renal function.   MRI findings(3/4): MR Brain: There is no mass, intracranial hemorrhage, or acute infarction. ?V2 LVA focal stenosis.  - A&O x 3 , answers questions appropriately   - Neuro assessment as per ICU protocol     # PULM: Denies SOB or dyspnea, CTA on exam   O2 sat >92% on room air   -continue to monitor O2 sats and respiratory status, currently protecting airway  - Oxygen supplement as needed     # CV: STEMI (trop 7560, inferolateral LUIS on EKG). Limited TTE 3/1/22 showed hypokinetic inferolateral and mid inferior wall, basal inferior wall is akinetic.   - Revascularization with PCI deferred due to pt's elevated SCr, likely prerenal JAXON 2/2 dehydration from DKA.   - Brilinta changed to plavix 3/6 in setting of melanotic stools  - LHC: LCX POBA RFA access completed 3/6  - Cont. ASA, plavix, isordil  - Cont. atorvastatin 80mg qD  - Eliquis 5mg q12  - coreg 12.5 mg BID      Admitted w/ AFib w/ RVR: CHADSVASC score: 3 and HAS-BLED score: 1  - currently on Eliquis 5mg q12  - Monitor on telemetry  - c/w coreg 12.5 mg BID    # RENAL:   JAXON likely prerenal 2/2 dehydration 2/2 DKA; Now resolved  - Improved w/ IVF    - Cont. Monitor BUN/Cr, UO  - Creatinine normalized, 0.6  Metabolic acidosis w/ anion gap in setting of acute DKA  - Resolved  Hyponatremia : Monitor/ Trend Na +  - Resolved     # GI: Transaminitis in setting of STEMI now resolved  -AST/ALT improving with fluids, possible shock liver due to hypovolemia from DKA  - Cont. DASH diet  Presents w/ black tarry liquid stool + FOBT   - Monitor H/H   - H/H stable  - PPI 40mg IV BID  - Per GI, defer endoscopy and reasonable to continue DAPT as H/H remains stable    # ENDO: admitted w/ DKA  - s/p DKA protocol - off insulin gtt, transition to long acting insulin and NPH - improving  Insulin gtt off (3/4/22)  - Lantus 15 HS and Amalog 2 units TID pre-meals ('s) w/ Mod ISS   - Endocrine consult, recommendations for home medication regime appreciated    # HEMATOLOGIC: H/H stable  - Cont. Eliquis for AC    # ID: Pt afebrile, WBC WNL  - BCx NGTD and UCx NGTD  - Trend WBC     # Derm  Blistering to upper extremities and lower extremities bilaterally - improving   - Derm following  - Tissue culture negative to date, AFB negative, KOH negative  - HSV/VZV negative, GC urine negative  - F/u biopsy results    #MSK  - Pain management recommends to continue home regimen of methadone 5 mg q6, lyrica 150mg TID, and PO dilaudid 4 mg QID  - Physical therapy consult    # MIS: Full code    Follow up:  [ ] home nursing   [ ] PT and OT   [ ] continue all DAPT meds   [ ] continue to monitor blood glucose and anion gap

## 2022-03-07 NOTE — PROGRESS NOTE ADULT - ATTENDING COMMENTS
Interval history as per NP note, personally verified by me. Patient much improved although still with some confusion. Seen with wife, at bedside    FHx: Non-contributory    ROS: All systems negative except as documented in Interval history    MS - AAO x 2.75 (knew all of date except for March), speech fluent without dysarthria  Strength 5/5 all except for LUE 4-4+/5 (baseline from shoulder injuries)    A/P:  Encephalopathy (improved)  L sided weakness (resolved)  Atrial fibrillation  JAXON  STEMI  DM type 2 with DKA  HTN  Alcohol abuse  Age indeterminate lacunar strokes and left vertebral stenosis    - Most concerning etiology for clinical picture was cerebrovascular cause such as strokes or PRES. Also needed to consider other toxic/metabolic causes, although less likely Wernicke's given lack of ocular findings and very acute onset. No evidence of seizures on EEG and consistent with toxic/metabolic process with generalized slowing and triphasic waves  - MRI brain w/o unrevealing for acute event  - vEEG to without evidence for seizures or epileptiform discharges  - Empiric high dose thiamine repletion  - Await labs for additional causes with B12, TSH, free T4, Vit D, NH3, B1  - No neurologic contraindications to using heparin drip/anticoagulation for cardiac/medical reasons if strokes (on CT head) are acute as they are small and not hemorrhagic. However, would get CT head w/o STAT for any acute change in mental status or new focal neurologic deficits  - Continue to address above medical problems, as you are doing  - No neurologic contraindications to discharge if otherwise medically stable. He can follow-up with neurology as outpatient  - Will sign off, please call with additional questions or concerns

## 2022-03-07 NOTE — DIETITIAN NUTRITION RISK NOTIFICATION - TREATMENT: THE FOLLOWING DIET HAS BEEN RECOMMENDED
Diet, DASH/TLC:   Sodium & Cholesterol Restricted  Consistent Carbohydrate {No Snacks} (CSTCHO) (03-07-22 @ 05:34) [Active]

## 2022-03-07 NOTE — CONSULT NOTE ADULT - ASSESSMENT
55 y/o M with PMH of HTN, T2DM, neuropathy, chronic pain on Methadone & opioids for cervical spine and back injury transferred to [facility] CICU from [place] due to STEMI and DKA. Pt was reportedly found to have inferolateral ST elevations + afib on EKG, trop 7560. Pt initially came to [facility] ED due to nausea and vomiting for 3 days, reportedly had CP several days ago which apparently stopped on its own. Pt currently altered, difficult to obtain history from.  Pt transferred to [facility] for cath, noted to be in afib w/ RVR to 130s-150s, received cardizem 25 mg IV x 2 and started on cardizem gtt, cath was aborted as pt had SCR of 2.14, (baseline 0.7-0.9 in 10/2021), transferred to CCU for optimization of DKA prior to cath.     Pt with chronic neck pain, post lami syndrome and neuropathy.  Pain scores 8/10, down to 3/10 with medications.    Continue methadone 5 mg Q 6 hours.  Continue lyrica 150 mg TID.  Continue PO dilaudid 4 mg QID PRN.    Monitor for sedation and respiratory depression.  Bowel regimen.  OOB per CICU team.    Follow up with Dr Juaquin Pham for continued pain management after discharge.  Discharge home with a narcan rescue kit (naloxone 4 mg/ 0.1 ml nasal spray- 1 spray Q 2-3 minutes alternating between nostrils).      Minutes spent on total encounter: 30 minutes      [facility]  [phone]

## 2022-03-07 NOTE — PROGRESS NOTE ADULT - SUBJECTIVE AND OBJECTIVE BOX
HPI:  57 y/o M with PMH of HTN, T2DM, neuropathy, chronic pain on Methadone & opioids for cervical spine and back injury transferred to [facility] CICU from [place] due to STEMI and DKA. Pt was reportedly found to have inferolateral ST elevations + afib on EKG, trop 7560. Pt initially came to [facility] ED due to nausea and vomiting for 3 days, reportedly had CP several days ago which apparently stopped on its own. Pt currently altered, difficult to obtain history from.    Pt transferred to [facility] for cath, noted to be in afib w/ RVR to 130s-150s, received cardizem 25 mg IV x 2 and started on cardizem gtt, cath was aborted as pt had SCR of 2.14, (baseline 0.7-0.9 in 10/2021), transferred to CCU for optimization of DKA prior to cath.     Baseline SCr 0.7-0.9 in 10/2021 from previous hospitalization at [facility] for cellulitis of hand, was d/rome on bactrim DS + ethambutol x 4 weeks.    Labs from [facility] significant for: WBC 15.53, lactate 5.6, troponin 7560.2, K 3.2, CO2 5, AG 42, SCr/BUN 2.14/44, Glucose 663, calcium 12.7, Mg 2.7, Phos 9.5  FSGs >600 -> 503  ABG 7.19/<19/133/3  UA: large ketones, moderate bacteria, negative LE and nitrite  RVP/COVID negative (01 Mar 2022 13:56)    24 Hour Events:  Patient had melanotic stools and incontiencne. However remained hemodynamically stable with stable H/H. Cath completed 3/6.     MEDICATIONS  (STANDING):  apixaban 5 milliGRAM(s) Oral every 12 hours  aspirin  chewable 81 milliGRAM(s) Oral daily  atorvastatin 80 milliGRAM(s) Oral at bedtime  carvedilol 12.5 milliGRAM(s) Oral every 12 hours  chlorhexidine 4% Liquid 1 Application(s) Topical <User Schedule>  clobetasol 0.05% Ointment 1 Application(s) Topical two times a day  clopidogrel Tablet 75 milliGRAM(s) Oral daily  influenza   Vaccine 0.5 milliLiter(s) IntraMuscular once  insulin glargine Injectable (LANTUS) 50 Unit(s) SubCutaneous <User Schedule>  insulin lispro (ADMELOG) corrective regimen sliding scale   SubCutaneous three times a day before meals  insulin lispro (ADMELOG) corrective regimen sliding scale   SubCutaneous at bedtime  insulin lispro Injectable (ADMELOG) 2 Unit(s) SubCutaneous three times a day before meals  isosorbide   dinitrate Tablet (ISORDIL) 30 milliGRAM(s) Oral three times a day  lisinopril 10 milliGRAM(s) Oral daily  methadone    Tablet 5 milliGRAM(s) Oral every 6 hours  nystatin Powder 1 Application(s) Topical two times a day  pantoprazole  Injectable 40 milliGRAM(s) IV Push every 12 hours  thiamine IVPB 500 milliGRAM(s) IV Intermittent three times a day    MEDICATIONS  (PRN):      REVIEW OF SYSTEMS:  Otherwise, 10 point ROS done and otherwise negative.    PHYSICAL EXAM:  ICU Vital Signs Last 24 Hrs  T(C): 36.4 (07 Mar 2022 08:00), Max: 36.7 (07 Mar 2022 04:00)  T(F): 97.5 (07 Mar 2022 08:00), Max: 98.1 (07 Mar 2022 04:00)  HR: 85 (07 Mar 2022 08:00) (81 - 109)  BP: 110/68 (07 Mar 2022 08:00) (103/59 - 156/88)  BP(mean): 82 (07 Mar 2022 08:00) (73 - 115)  ABP: --  ABP(mean): --  RR: 16 (07 Mar 2022 08:00) (15 - 25)  SpO2: 97% (07 Mar 2022 08:00) (97% - 99%)    I&O's Detail    06 Mar 2022 07:01  -  07 Mar 2022 07:00  --------------------------------------------------------  IN:    Heparin: 189 mL    Heparin: 42 mL    IV PiggyBack: 100 mL    IV PiggyBack: 250 mL    Oral Fluid: 720 mL  Total IN: 1301 mL    OUT:    Incontinent per Condom Catheter (mL): 2800 mL  Total OUT: 2800 mL    Total NET: -1499 mL      07 Mar 2022 07:01  -  07 Mar 2022 08:23  --------------------------------------------------------  IN:    Oral Fluid: 120 mL  Total IN: 120 mL    OUT:    Incontinent per Condom Catheter (mL): 150 mL  Total OUT: 150 mL    Total NET: -30 mL            Appearance: Normal	  HEENT:   Normal oral mucosa, PERRL, EOMI	  Lymphatic: No lymphadenopathy  Cardiovascular: Normal S1 S2, No JVD, No murmurs, No edema  Respiratory: Lungs clear to auscultation	  Psychiatry: A & O x 3, Mood & affect appropriate  Gastrointestinal:  Soft, Non-tender, + BS	  Skin: Lesions to bilateral hands. No ecchymoses, No cyanosis	  Neurologic: Non-focal  Extremities: Normal range of motion, No clubbing, cyanosis. 1+ Edema bilaterally upper and lower extremities.   Vascular: Peripheral pulses palpable 2+ bilaterally    LABS:	 	    .  LABS:                         9.6    11.23 )-----------( 347      ( 07 Mar 2022 02:43 )             28.8     03-07    136  |  104  |  7   ----------------------------<  144<H>  3.7   |  19<L>  |  0.59    Ca    8.6      07 Mar 2022 02:43  Phos  3.7     03-07  Mg     1.6     03-07    TPro  5.1<L>  /  Alb  2.6<L>  /  TBili  0.3  /  DBili  x   /  AST  15  /  ALT  52<H>  /  AlkPhos  60  03-07    PT/INR - ( 07 Mar 2022 02:43 )   PT: 14.9 sec;   INR: 1.29 ratio         PTT - ( 07 Mar 2022 02:43 )  PTT:70.0 sec              RADIOLOGY, EKG & ADDITIONAL TESTS: Reviewed.     TELEMETRY: 	    ECG:  	  RADIOLOGY:  OTHER: 	    PREVIOUS DIAGNOSTIC TESTING:    [ ] Echocardiogram:  [ ]  Catheterization:  [ ] Stress Test:  	  	 HPI:  57 y/o M with PMH of HTN, T2DM, neuropathy, chronic pain on Methadone & opioids for cervical spine and back injury transferred to [facility] CICU from [place] due to STEMI and DKA. Pt was reportedly found to have inferolateral ST elevations + afib on EKG, trop 7560. Pt initially came to [facility] ED due to nausea and vomiting for 3 days, reportedly had CP several days ago which apparently stopped on its own. Pt currently altered, difficult to obtain history from.    Pt transferred to [facility] for cath, noted to be in afib w/ RVR to 130s-150s, received cardizem 25 mg IV x 2 and started on cardizem gtt, cath was aborted as pt had SCR of 2.14, (baseline 0.7-0.9 in 10/2021), transferred to CCU for optimization of DKA prior to cath.     Baseline SCr 0.7-0.9 in 10/2021 from previous hospitalization at [facility] for cellulitis of hand, was d/rome on bactrim DS + ethambutol x 4 weeks.    Labs from [facility] significant for: WBC 15.53, lactate 5.6, troponin 7560.2, K 3.2, CO2 5, AG 42, SCr/BUN 2.14/44, Glucose 663, calcium 12.7, Mg 2.7, Phos 9.5  FSGs >600 -> 503  ABG 7.19/<19/133/3  UA: large ketones, moderate bacteria, negative LE and nitrite  RVP/COVID negative (01 Mar 2022 13:56)    24 Hour Events:  Patient had melanotic stools and incontiencne. However remained hemodynamically stable with stable H/H. Cath completed 3/6.     MEDICATIONS  (STANDING):  apixaban 5 milliGRAM(s) Oral every 12 hours  aspirin  chewable 81 milliGRAM(s) Oral daily  atorvastatin 80 milliGRAM(s) Oral at bedtime  carvedilol 12.5 milliGRAM(s) Oral every 12 hours  chlorhexidine 4% Liquid 1 Application(s) Topical <User Schedule>  clobetasol 0.05% Ointment 1 Application(s) Topical two times a day  clopidogrel Tablet 75 milliGRAM(s) Oral daily  influenza   Vaccine 0.5 milliLiter(s) IntraMuscular once  insulin glargine Injectable (LANTUS) 50 Unit(s) SubCutaneous <User Schedule>  insulin lispro (ADMELOG) corrective regimen sliding scale   SubCutaneous three times a day before meals  insulin lispro (ADMELOG) corrective regimen sliding scale   SubCutaneous at bedtime  insulin lispro Injectable (ADMELOG) 2 Unit(s) SubCutaneous three times a day before meals  isosorbide   dinitrate Tablet (ISORDIL) 30 milliGRAM(s) Oral three times a day  lisinopril 10 milliGRAM(s) Oral daily  methadone    Tablet 5 milliGRAM(s) Oral every 6 hours  nystatin Powder 1 Application(s) Topical two times a day  pantoprazole  Injectable 40 milliGRAM(s) IV Push every 12 hours  thiamine IVPB 500 milliGRAM(s) IV Intermittent three times a day    MEDICATIONS  (PRN):      REVIEW OF SYSTEMS:  Otherwise, 10 point ROS done and otherwise negative.    PHYSICAL EXAM:  ICU Vital Signs Last 24 Hrs  T(C): 36.4 (07 Mar 2022 08:00), Max: 36.7 (07 Mar 2022 04:00)  T(F): 97.5 (07 Mar 2022 08:00), Max: 98.1 (07 Mar 2022 04:00)  HR: 85 (07 Mar 2022 08:00) (81 - 109)  BP: 110/68 (07 Mar 2022 08:00) (103/59 - 156/88)  BP(mean): 82 (07 Mar 2022 08:00) (73 - 115)  ABP: --  ABP(mean): --  RR: 16 (07 Mar 2022 08:00) (15 - 25)  SpO2: 97% (07 Mar 2022 08:00) (97% - 99%)    I&O's Detail    06 Mar 2022 07:01  -  07 Mar 2022 07:00  --------------------------------------------------------  IN:    Heparin: 189 mL    Heparin: 42 mL    IV PiggyBack: 100 mL    IV PiggyBack: 250 mL    Oral Fluid: 720 mL  Total IN: 1301 mL    OUT:    Incontinent per Condom Catheter (mL): 2800 mL  Total OUT: 2800 mL    Total NET: -1499 mL      07 Mar 2022 07:01  -  07 Mar 2022 08:23  --------------------------------------------------------  IN:    Oral Fluid: 120 mL  Total IN: 120 mL    OUT:    Incontinent per Condom Catheter (mL): 150 mL  Total OUT: 150 mL    Total NET: -30 mL            Appearance: Normal	  HEENT:   Normal oral mucosa, PERRL, EOMI	  Lymphatic: No lymphadenopathy  Cardiovascular: Normal S1 S2, No JVD, No murmurs, No edema  Respiratory: Lungs clear to auscultation	  Psychiatry: A & O x 3, Mood & affect appropriate  Gastrointestinal:  Soft, Non-tender, + BS	  Skin: Lesions to bilateral hands. No ecchymoses, No cyanosis	  Neurologic: Non-focal  Extremities: Normal range of motion, No clubbing, cyanosis. 1+ Edema bilaterally upper and lower extremities. RFA access site dry, no bleeding, no hematoma, no pain with palpation  Vascular: Peripheral pulses palpable 2+ bilaterally      LABS:                         9.6    11.23 )-----------( 347      ( 07 Mar 2022 02:43 )             28.8     03-07    136  |  104  |  7   ----------------------------<  144<H>  3.7   |  19<L>  |  0.59    Ca    8.6      07 Mar 2022 02:43  Phos  3.7     03-07  Mg     1.6     03-07    TPro  5.1<L>  /  Alb  2.6<L>  /  TBili  0.3  /  DBili  x   /  AST  15  /  ALT  52<H>  /  AlkPhos  60  03-07    PT/INR - ( 07 Mar 2022 02:43 )   PT: 14.9 sec;   INR: 1.29 ratio         PTT - ( 07 Mar 2022 02:43 )  PTT:70.0 sec              RADIOLOGY, EKG & ADDITIONAL TESTS: Reviewed.

## 2022-03-07 NOTE — PROGRESS NOTE ADULT - ASSESSMENT
Assessment: 56 yr old male with PMH of HTN, DM II, neuropathy, alcohol, use, chronic pain on methadone and opioids for cervical spine and back injury admitted to CCU for STEMI &DKA. DKA has been resolved and off insuline drip. Patient had Left heart catheterization on 03/06 s/p POBA to LCX artery. Neurology was consulted for altered mental status.  CT head 03/02/22 shows Volume loss, microvascular disease, age indeterminate lacunar infarcts  Foci of air in the left sella turcica with bony thinning possible dehiscence, edentulous maxilla with dental hardware, bony calvarial sclerosis correlate renal function.   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 Patient is currently on dual antiplatelet therapy ( aspirin and plavix) and elliquis. Patient's altered mental status has resolved and is currently AOX4, follows all commands. EEG shows no seizure activities.     Impression: Resolved encephalopathy with metabolic etiology. It is not likely stroke since his MRI &MRA brain &neck are unremarkable.     Plans:    [] Continue aspirin 81mg daily, plavis 75mg daily, elliquis 5mg BID, atorvastatin 80mg daily  [] follow up with outpatient neurology Assessment: 56 yr old male with PMH of HTN, DM II, neuropathy, alcohol, use, chronic pain on methadone and opioids for cervical spine and back injury admitted to CCU for STEMI &DKA. DKA has been resolved and off insuline drip. Patient had Left heart catheterization on 03/06 s/p POBA to LCX artery. Neurology was consulted for altered mental status.  CT head 03/02/22 shows Volume loss, microvascular disease, age indeterminate lacunar infarcts  Foci of air in the left sella turcica with bony thinning possible dehiscence, edentulous maxilla with dental hardware, bony calvarial sclerosis correlate renal function.   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 Patient is currently on dual antiplatelet therapy ( aspirin and plavix) and elliquis. Patient's altered mental status has resolved and is currently AOX4, follows all commands. EEG shows no seizure activities.     Impression: Resolved encephalopathy with metabolic etiology. It is not likely stroke since his MRI &MRA brain &neck are unremarkable.     Plans:    [] Continue aspirin 81mg daily for stroke prophylaxis, no neurological contraindication for plavix 75mg daily, elliquis 5mg BID for cardiac treatment, continue atorvastatin 80mg daily with goal LDL< 70.  [] follow up with outpatient neurology

## 2022-03-07 NOTE — PROGRESS NOTE ADULT - ASSESSMENT
57 y/o M with PMH of HTN, T2DM, neuropathy, chronic pain (on Methadone & opioids) for cervical spine and back injury transferred to [facility] CCU from [place] due to STEMI (trop 7560, inferolateral LUIS) and DKA. Found to have A fib w/ RVR to 130s-150s, JAXON w/ SCr 2.14 (baseline 0.7-0.9 in 10/2021), admitted to CCU for optimization of DKA and kidney function prior to cath. DKA now resolved AMS resolved with MRI negative for bleeding or thrombosis. Serum creatinine 0.6. Stable for cardiac cath 3/6.     Plan   # NEURO: Admitted w/ AMS Likely metabolic encephalopathy 2/2 DKA  CT Head(3/2):  Volume loss, microvascular disease, age indeterminate lacunar infarcts, Foci of air in the left sella turcica with bony thinning possible dehiscence, edentulous maxilla with dental hardware, bony calvarial sclerosis correlate renal function.   MRI findings(3/4): MR Brain: There is no mass, intracranial hemorrhage, or acute infarction.  - Currently A&O x 3 , answers questions appropriately   [facility]  - Neuro assessment as per ICU protocol     # PULM: Denies SOB or dyspnea, CTA on exam   O2 sat >92% on room air   -continue to monitor O2 sats and respiratory status, currently protecting airway  - Oxygen supplement as needed     # CV: STEMI (trop 7560, inferolateral LUIS on EKG), per chart was loaded with 180 mg Ticagrelor, heparin bolus in [facility]  Limited TTE 3/1/22 showed hypokinetic inferolateral and mid inferior wall, basal inferior wall is akinetic.   - Revascularization with PCI deferred due to pt's elevated SCr, likely prerenal JAXON 2/2 dehydration from DKA, was on Nitro gtt switched to hydra and IDN  - Cont. ASA, hydralazine, isordil  - Cont. atorvastatin 80mg qD  - Heparin gtt for afib   - trend trop, CK, CKMB  - lopressor changed to coreg 12.5 mg BID  - Eventual ischemic work up   - Brilinta changed to plavix 3/6 in setting of melanotic stools  - Scheduled for cath 3/6 now that creatinine has normalized (0.6)    Admitted w/ AFib w/ RVR: CHADSVASC score: 3 and HAS-BLED score: 1  - currently on heparin gtt  - Monitor on telemetry  - c/w coreg 12.5 mg BID    # RENAL:   JAXON likely prerenal 2/2 dehydration 2/2 DKA; Now resolved  - Improved w/ IVF    - Trace edema bilateral upper/lower extremities  - Cont. Monitor BUN/Cr, UO and lytes   - Creatinine normalized, 0.6  - Net negative 1.7L for 24 hours  Metabolic acidosis w/ anion gap in setting of acute DKA  - Resolved  Hyponatremia : Monitor/ Trend Na +  - Resolved     # GI: Transaminitis in setting of STEMI now resolved  -AST/ALT improving with fluids, possible shock liver due to hypovolemia from DKA  - Cont. DASH puree diet   Presents w/ black tarry liquid stool + FOBT   - Monitor H/H     # ENDO: admitted w/ DKA  - s/p DKA protocol - off insulin gtt, transition to long acting insulin and NPH - improving  Insulin gtt off (3/4/22)  - Lantus 15 HS and Amalog 2 units TID pre-meals ('s) w/ Mod ISS     # HEMATOLOGIC: H/H stable  - Cont. Hep gtt for AC    # ID: Pt remains afebrile w/ slightly elevated WBC w/o any signs and symptoms of infection   - BCx NGTD and UCx NGTD  - Trend WBC     # Derm  Blistering to upper extremities and lower extremities bilaterally  - Derm following  - Tissue culture negative to date, AFB negative, KOH negative  - F/u HSV/VZV swab  - F/u biopsy results  - F/u GC urine    # MIS: Full code     55 y/o M with PMH of HTN, T2DM, neuropathy, chronic pain (on Methadone & opioids) for cervical spine and back injury transferred to [facility] CCU from [place] due to STEMI (trop 7560, inferolateral LUIS) and DKA. Found to have A fib w/ RVR to 130s-150s, JAXON w/ SCr 2.14 (baseline 0.7-0.9 in 10/2021), admitted to CCU for optimization of DKA and kidney function prior to cath. DKA now resolved AMS resolved with MRI negative for bleeding or thrombosis. Serum creatinine 0.6. Stable for cardiac cath 3/6.     Plan   # NEURO: Admitted w/ AMS Likely metabolic encephalopathy 2/2 DKA  CT Head(3/2):  Volume loss, microvascular disease, age indeterminate lacunar infarcts, Foci of air in the left sella turcica with bony thinning possible dehiscence, edentulous maxilla with dental hardware, bony calvarial sclerosis correlate renal function.   MRI findings(3/4): MR Brain: There is no mass, intracranial hemorrhage, or acute infarction.  - Currently A&O x 3 , answers questions appropriately   [facility]  - Neuro assessment as per ICU protocol     # PULM: Denies SOB or dyspnea, CTA on exam   O2 sat >92% on room air   -continue to monitor O2 sats and respiratory status, currently protecting airway  - Oxygen supplement as needed     # CV: STEMI (trop 7560, inferolateral LUIS on EKG), per chart was loaded with 180 mg Ticagrelor, heparin bolus in [facility]  Limited TTE 3/1/22 showed hypokinetic inferolateral and mid inferior wall, basal inferior wall is akinetic.   - Revascularization with PCI deferred due to pt's elevated SCr, likely prerenal JAXON 2/2 dehydration from DKA.   - [facility] 3/6  - Cont. ASA, isordil  - Cont. atorvastatin 80mg qD  - Eliquis 5mg q12 for afib   - trend trop, CK, CKMB  - coreg 12.5 mg BID  - Eventual ischemic work up   - Brilinta changed to plavix 3/6 in setting of melanotic stools    Admitted w/ AFib w/ RVR: CHADSVASC score: 3 and HAS-BLED score: 1  - currently on Eliquis 5mg q12  - Monitor on telemetry  - c/w coreg 12.5 mg BID    # RENAL:   JAXON likely prerenal 2/2 dehydration 2/2 DKA; Now resolved  - Improved w/ IVF    - Trace edema bilateral upper/lower extremities  - Cont. Monitor BUN/Cr, UO and lytes   - Creatinine normalized, 0.6  Metabolic acidosis w/ anion gap in setting of acute DKA  - Resolved  Hyponatremia : Monitor/ Trend Na +  - Resolved     # GI: Transaminitis in setting of STEMI now resolved  -AST/ALT improving with fluids, possible shock liver due to hypovolemia from DKA  - Cont. DASH diet  Presents w/ black tarry liquid stool + FOBT   - Monitor H/H     # ENDO: admitted w/ DKA  - s/p DKA protocol - off insulin gtt, transition to long acting insulin and NPH - improving  Insulin gtt off (3/4/22)  - Lantus 15 HS and Amalog 2 units TID pre-meals ('s) w/ Mod ISS     # HEMATOLOGIC: H/H stable  - Cont. Eliquis for AC    # ID: Pt remains afebrile w/ slightly elevated WBC w/o any signs and symptoms of infection   - BCx NGTD and UCx NGTD  - Trend WBC     # Derm  Blistering to upper extremities and lower extremities bilaterally  - Derm following  - Tissue culture negative to date, AFB negative, KOH negative  - F/u HSV/VZV swab  - F/u biopsy results  - F/u GC urine    # MIS: Full code     55 y/o M with PMH of HTN, T2DM, neuropathy, chronic pain (on Methadone & opioids) for cervical spine and back injury transferred to [facility] CCU from [place] due to STEMI (trop 7560, inferolateral LUIS) and DKA. Found to have A fib w/ RVR to 130s-150s, JAXON w/ SCr 2.14 (baseline 0.7-0.9 in 10/2021), admitted to CCU for optimization of DKA and kidney function prior to cath. DKA now resolved AMS resolved with MRI negative for bleeding or thrombosis. Serum creatinine 0.6. Stable for cardiac cath 3/6.     Plan   # NEURO: Admitted w/ AMS Likely metabolic encephalopathy 2/2 DKA  CT Head(3/2):  Volume loss, microvascular disease, age indeterminate lacunar infarcts, Foci of air in the left sella turcica with bony thinning possible dehiscence, edentulous maxilla with dental hardware, bony calvarial sclerosis correlate renal function.   MRI findings(3/4): MR Brain: There is no mass, intracranial hemorrhage, or acute infarction.  - Currently A&O x 3 , answers questions appropriately   - [facility]  - Neuro assessment as per ICU protocol     # PULM: Denies SOB or dyspnea, CTA on exam   O2 sat >92% on room air   -continue to monitor O2 sats and respiratory status, currently protecting airway  - Oxygen supplement as needed     # CV: STEMI (trop 7560, inferolateral LUIS on EKG), per chart was loaded with 180 mg Ticagrelor, heparin bolus in [facility]  Limited TTE 3/1/22 showed hypokinetic inferolateral and mid inferior wall, basal inferior wall is akinetic.   - Revascularization with PCI deferred due to pt's elevated SCr, likely prerenal JAXON 2/2 dehydration from DKA.   - [facility]: LCX POBA LFA access 3/6  - Cont. ASA, plavix, isordil  - Cont. atorvastatin 80mg qD  - Eliquis 5mg q12 for afib   - trend trop, CK, CKMB  - coreg 12.5 mg BID  - Eventual ischemic work up   - Brilinta changed to plavix 3/6 in setting of melanotic stools    Admitted w/ AFib w/ RVR: CHADSVASC score: 3 and HAS-BLED score: 1  - currently on Eliquis 5mg q12  - Monitor on telemetry  - c/w coreg 12.5 mg BID    # RENAL:   JAXON likely prerenal 2/2 dehydration 2/2 DKA; Now resolved  - Improved w/ IVF    - Trace edema bilateral upper/lower extremities  - Cont. Monitor BUN/Cr, UO and lytes   - Creatinine normalized, 0.6  Metabolic acidosis w/ anion gap in setting of acute DKA  - Resolved  Hyponatremia : Monitor/ Trend Na +  - Resolved     # GI: Transaminitis in setting of STEMI now resolved  -AST/ALT improving with fluids, possible shock liver due to hypovolemia from DKA  - Cont. DASH diet  Presents w/ black tarry liquid stool + FOBT   - Monitor H/H   - GI consult given incontinence and dark stool    # ENDO: admitted w/ DKA  - s/p DKA protocol - off insulin gtt, transition to long acting insulin and NPH - improving  Insulin gtt off (3/4/22)  - Lantus 15 HS and Amalog 2 units TID pre-meals ('s) w/ Mod ISS     # HEMATOLOGIC: H/H stable  - Cont. Eliquis for AC    # ID: Pt remains afebrile w/ slightly elevated WBC w/o any signs and symptoms of infection   - BCx NGTD and UCx NGTD  - Trend WBC     # Derm  Blistering to upper extremities and lower extremities bilaterally  - Derm following  - Tissue culture negative to date, AFB negative, KOH negative  - HSV/VZV negative  - F/u biopsy results  - F/u GC urine    #MSK  - Pain management consult given history of opioid/alcohol abuse, significant home pain medication regime of methadone, dilaudid, pregabalin, and tizanidine  - Physical therapy consult    # MIS: Full code     55 y/o M with PMH of HTN, T2DM, neuropathy, chronic pain (on Methadone & opioids) for cervical spine and back injury transferred to [facility] CCU from [place] due to STEMI (trop 7560, inferolateral LUIS) and DKA. Found to have A fib w/ RVR to 130s-150s, JAXON w/ SCr 2.14 (baseline 0.7-0.9 in 10/2021), admitted to CCU for optimization of DKA and kidney function prior to cath. DKA now resolved AMS resolved with MRI negative for bleeding or thrombosis. Serum creatinine 0.6. Stable for cardiac cath 3/6.     Plan   # NEURO: Admitted w/ AMS Likely metabolic encephalopathy 2/2 DKA  CT Head(3/2):  Volume loss, microvascular disease, age indeterminate lacunar infarcts, Foci of air in the left sella turcica with bony thinning possible dehiscence, edentulous maxilla with dental hardware, bony calvarial sclerosis correlate renal function.   MRI findings(3/4): MR Brain: There is no mass, intracranial hemorrhage, or acute infarction.  - Currently A&O x 3 , answers questions appropriately   - [facility] 3[facility]  - Neuro assessment as per ICU protocol     # PULM: Denies SOB or dyspnea, CTA on exam   O2 sat >92% on room air   -continue to monitor O2 sats and respiratory status, currently protecting airway  - Oxygen supplement as needed     # CV: STEMI (trop 7560, inferolateral LUIS on EKG), per chart was loaded with 180 mg Ticagrelor, heparin bolus in [facility]  Limited TTE 3/1/22 showed hypokinetic inferolateral and mid inferior wall, basal inferior wall is akinetic.   - Revascularization with PCI deferred due to pt's elevated SCr, likely prerenal JAXON 2/2 dehydration from DKA.   - Brilinta changed to plavix 3/6 in setting of melanotic stools  - C: LCX POBA LFA access 3/6  - Cont. ASA, plavix, isordil  - Cont. atorvastatin 80mg qD  - Eliquis 5mg q12 for afib   - trend trop, CK, CKMB  - coreg 12.5 mg BID  - Eventual ischemic work up       Admitted w/ AFib w/ RVR: CHADSVASC score: 3 and HAS-BLED score: 1  - currently on Eliquis 5mg q12  - Monitor on telemetry  - c/w coreg 12.5 mg BID    # RENAL:   JXAON likely prerenal 2/2 dehydration 2/2 DKA; Now resolved  - Improved w/ IVF    - Cont. Monitor BUN/Cr, UO and lytes   - Creatinine normalized, 0.6  Metabolic acidosis w/ anion gap in setting of acute DKA  - Resolved  Hyponatremia : Monitor/ Trend Na +  - Resolved     # GI: Transaminitis in setting of STEMI now resolved  -AST/ALT improving with fluids, possible shock liver due to hypovolemia from DKA  - Cont. DASH diet  Presents w/ black tarry liquid stool + FOBT   - Monitor H/H   - GI consult given incontinence and dark stool    # ENDO: admitted w/ DKA  - s/p DKA protocol - off insulin gtt, transition to long acting insulin and NPH - improving  Insulin gtt off (3/4/22)  - Lantus 15 HS and Amalog 2 units TID pre-meals ('s) w/ Mod ISS     # HEMATOLOGIC: H/H stable  - Cont. Eliquis for AC    # ID: Pt remains afebrile w/ slightly elevated WBC w/o any signs and symptoms of infection   - BCx NGTD and UCx NGTD  - Trend WBC     # Derm  Blistering to upper extremities and lower extremities bilaterally  - Derm following  - Tissue culture negative to date, AFB negative, KOH negative  - HSV/VZV negative  - F/u biopsy results  - F/u GC urine    #MSK  - Pain management consult given history of opioid/alcohol abuse, significant home pain medication regime of methadone, dilaudid, pregabalin, and tizanidine  - Physical therapy consult    # MIS: Full code     55 y/o M with PMH of HTN, T2DM, neuropathy, chronic pain (on Methadone & opioids) for cervical spine and back injury transferred to [facility] CCU from [place] due to STEMI (trop 7560, inferolateral LUIS) and DKA. Found to have A fib w/ RVR to 130s-150s, JAXON w/ SCr 2.14 (baseline 0.7-0.9 in 10/2021), admitted to CCU for optimization of DKA and kidney function prior to cath. DKA now resolved AMS resolved with MRI negative for bleeding or thrombosis. Serum creatinine 0.6. Stable for cardiac cath 3/6.     Plan   # NEURO: Admitted w/ AMS Likely metabolic encephalopathy 2/2 DKA  CT Head(3/2):  Volume loss, microvascular disease, age indeterminate lacunar infarcts, Foci of air in the left sella turcica with bony thinning possible dehiscence, edentulous maxilla with dental hardware, bony calvarial sclerosis correlate renal function.   MRI findings(3/4): MR Brain: There is no mass, intracranial hemorrhage, or acute infarction.  - Currently A&O x 3 , answers questions appropriately   - [facility] 3/6  - Neuro assessment as per ICU protocol     # PULM: Denies SOB or dyspnea, CTA on exam   O2 sat >92% on room air   -continue to monitor O2 sats and respiratory status, currently protecting airway  - Oxygen supplement as needed     # CV: STEMI (trop 7560, inferolateral LUIS on EKG), per chart was loaded with 180 mg Ticagrelor, heparin bolus in [facility]  Limited TTE 3/1/22 showed hypokinetic inferolateral and mid inferior wall, basal inferior wall is akinetic.   - Revascularization with PCI deferred due to pt's elevated SCr, likely prerenal JAXON 2/2 dehydration from DKA.   - Brilinta changed to plavix 3/6 in setting of melanotic stools  - C: LCX POBA RFA access 3/6  - Cont. ASA, plavix, isordil  - Cont. atorvastatin 80mg qD  - Eliquis 5mg q12 for afib   - trend trop, CK, CKMB  - coreg 12.5 mg BID  - Eventual ischemic work up       Admitted w/ AFib w/ RVR: CHADSVASC score: 3 and HAS-BLED score: 1  - currently on Eliquis 5mg q12  - Monitor on telemetry  - c/w coreg 12.5 mg BID    # RENAL:   JAXON likely prerenal 2/2 dehydration 2/2 DKA; Now resolved  - Improved w/ IVF    - Cont. Monitor BUN/Cr, UO and lytes   - Creatinine normalized, 0.6  Metabolic acidosis w/ anion gap in setting of acute DKA  - Resolved  Hyponatremia : Monitor/ Trend Na +  - Resolved     # GI: Transaminitis in setting of STEMI now resolved  -AST/ALT improving with fluids, possible shock liver due to hypovolemia from DKA  - Cont. DASH diet  Presents w/ black tarry liquid stool + FOBT   - Monitor H/H   - GI consult given incontinence and dark stool    # ENDO: admitted w/ DKA  - s/p DKA protocol - off insulin gtt, transition to long acting insulin and NPH - improving  Insulin gtt off (3/4/22)  - Lantus 15 HS and Amalog 2 units TID pre-meals ('s) w/ Mod ISS     # HEMATOLOGIC: H/H stable  - Cont. Eliquis for AC    # ID: Pt remains afebrile w/ slightly elevated WBC w/o any signs and symptoms of infection   - BCx NGTD and UCx NGTD  - Trend WBC     # Derm  Blistering to upper extremities and lower extremities bilaterally  - Derm following  - Tissue culture negative to date, AFB negative, KOH negative  - HSV/VZV negative  - F/u biopsy results  - F/u GC urine    #MSK  - Pain management consult given history of opioid/alcohol abuse, significant home pain medication regime of methadone, dilaudid, pregabalin, and tizanidine  - Physical therapy consult    # MIS: Full code

## 2022-03-07 NOTE — CHART NOTE - NSCHARTNOTEFT_GEN_A_CORE
Nutrition Follow Up Note  Patient seen for: nutrition consult/follow up on CICU.     Chart reviewed, events noted. Pt is a 57 y/o M with PMH of HTN, T2DM, neuropathy, chronic pain (on Methadone & opioids) for cervical spine and back injury transferred to [facility] CCU from [place] due to STEMI (trop 7560, inferolateral LUIS) and DKA. Found to have A fib w/ RVR to 130s-150s, JAXON w/ SCr 2.14 (baseline 0.7-0.9 in 10/2021), admitted to CCU for optimization of DKA and kidney function prior to cath. DKA now resolved AMS resolved with MRI negative for bleeding or thrombosis.     Source: [x] Patient       [x] EMR        [] RN        [] Family at bedside       [] Other:    -If unable to interview patient: [] Trach/Vent/BiPAP  [] Disoriented/confused/inappropriate to interview    Diet Order:   Diet, DASH/TLC:   Sodium & Cholesterol Restricted  Consistent Carbohydrate {No Snacks} (CSTCHO) (22)    - Is current order appropriate/adequate? [x] Yes  []  No:     - PO intake :   [x] >75%  Adequate    [x] 50-75%  Fair       [] <50%  Poor   - Pt states intake in house is improving, earlier this admission had little appetite.     - Nutrition-related concerns:   - Pt reports in last few months due to depressed state he was eating meals out more vs. cooking at home. Reports consuming 1-2 meals per day, reports he was taking in smaller portions due to acid reflux. Per most recent care coordination note, "Patient reports that he was drinking 3-4 glasses of wine or beer 2x week prior to admission."   - Pt with HbA1c 11.2%, reports checking FS every few days or so at home.    - Pt reports weight loss in past 5 months, states he was previously 275lb and remembers weighing self PTA at 228lb. Dosing wt 238.9lb on 3/1. Weights in house ranging from 248.4 - 266lb with most recent weight 254.1lb (3/7). Pt noted weight edema possibly masking lower weight. Possibility of 27lb wt loss x 5 months; 9.8% - clinically significant. Wt loss also likely worsened 2/2 DKA.     GI: Noted GI consult for melena. Last BM 3/7.   Bowel Regimen? [] Yes   [x] No    Weights: Dosing wt 108.6kg.   Daily Weight in k.3 (), Weight in k.7 (), Weight in k (), Weight in k.8 (), Weight in k.2 ()  wt fluctuations noted, likely 2/2 fluid shifts throughout admission.    Nutritionally Pertinent MEDICATIONS  (STANDING):  atorvastatin  carvedilol  insulin glargine Injectable (LANTUS)  insulin lispro (ADMELOG) corrective regimen sliding scale  insulin lispro (ADMELOG) corrective regimen sliding scale  insulin lispro Injectable (ADMELOG)  isosorbide   dinitrate Tablet (ISORDIL)  lisinopril  pantoprazole  Injectable  thiamine IVPB    Pertinent Labs:  @ 02:43: Na 136, BUN 7, Cr 0.59, <H>, K+ 3.7, Phos 3.7, Mg 1.6, Alk Phos 60, ALT/SGPT 52<H>, AST/SGOT 15, HbA1c --    A1C with Estimated Average Glucose Result: 11.2 % (22 @ 01:48)  A1C with Estimated Average Glucose Result: 9.8 % (10-25-21 @ 00:21)    Finger Sticks:  POCT Blood Glucose.: 202 mg/dL ( @ 12:36)  POCT Blood Glucose.: 136 mg/dL ( @ 08:24)  POCT Blood Glucose.: 152 mg/dL ( @ 21:28)  POCT Blood Glucose.: 144 mg/dL ( @ 17:27)    Triglycerides, Serum: 229 mg/dL (22 @ 01:56)    Skin per nursing documentation: no pressure injuries noted  Edema: 2+ generalized, bilateral foot and ankle     Estimated Needs:   [x] no change since previous assessment  [] recalculated:     Previous Nutrition Diagnosis: Altered Nutrition Related Lab Values  Nutrition Diagnosis is: [x] ongoing  [] resolved [] not applicable     New Nutrition Diagnosis: [x] Moderate malnutrition in context of chronic illness related to inadequate protein/energy intake reported d/t persistent reflux as evidenced by pt with ~10% wt loss x < 6 months, reported meeting <75% EER x > 1 month, mild fluid retention     Nutrition Care Plan:  [x] In Progress  [] Achieved  [] Not applicable    Nutrition Interventions:     Education Provided:       [x] Yes: Provided education on T2DM nutrition therapy. Provided education on foods containing carbohydrates, foods containing proteins, and portion sizes. Stressed the importance of a balanced meal to maintain blood glucose. Encouraged vegetables consumption. Described HbA1c and stressed importance of its normal levels. Encouraged Pt to continue monitoring blood glucose at home. Discussed how to manage hypoglycemia. Recommended water consumption with avoidance of soda and juice. Written handouts provided to pt, pt made aware RD remains available for any questions/concerns.     Recommendations:      1. Continue DASH/TLC, consistent carbohydrate diet as tolerated.  2. Consider multivitamin, continue thiamine supplementation.  3. Reinforce nutrition education as needed - RD remains available.   4. Malnutrition alert placed in EMR.     Monitoring and Evaluation:   Continue to monitor nutritional intake, tolerance to diet prescription, weights, labs, skin integrity    RD remains available upon request and will follow up per protocol    Randee Holloway MS, RD, CDN, [facility] #[phone]

## 2022-03-07 NOTE — CONSULT NOTE ADULT - SUBJECTIVE AND OBJECTIVE BOX
Chief Complaint:  Patient is a 56y old  Male who presents with a chief complaint of STEMI, DKA (07 Mar 2022 12:00)      HPI: Pt is a 55 yo M with PMH of HTN, T2DM, neuropathy, chronic pain on Methadone & opioids for cervical spine and back injury transferred to [facility] CICU from [place] due to STEMI and DKA. Pt was reportedly found to have inferolateral ST elevations + afib on EKG, trop 7560. Pt initially came to [facility] ED due to nausea and vomiting for 3 days, reportedly had CP several days ago which apparently stopped on its own. Patient had reported dark stools. Patient had multiple episodes of black stool. No n/v, abdominal pain, hematochezia, fever or chills. Does c/o incontiencne. However remained hemodynamically stable with stable H/H. Cath completed 3/6.     Allergies:  No Known Allergies      Home Medications:    Hospital Medications:  apixaban 5 milliGRAM(s) Oral every 12 hours  aspirin  chewable 81 milliGRAM(s) Oral daily  atorvastatin 80 milliGRAM(s) Oral at bedtime  carvedilol 12.5 milliGRAM(s) Oral every 12 hours  chlorhexidine 4% Liquid 1 Application(s) Topical <User Schedule>  clobetasol 0.05% Ointment 1 Application(s) Topical two times a day  clopidogrel Tablet 75 milliGRAM(s) Oral daily  HYDROmorphone   Tablet 4 milliGRAM(s) Oral every 6 hours PRN  influenza   Vaccine 0.5 milliLiter(s) IntraMuscular once  insulin glargine Injectable (LANTUS) 50 Unit(s) SubCutaneous <User Schedule>  insulin lispro (ADMELOG) corrective regimen sliding scale   SubCutaneous three times a day before meals  insulin lispro (ADMELOG) corrective regimen sliding scale   SubCutaneous at bedtime  insulin lispro Injectable (ADMELOG) 2 Unit(s) SubCutaneous three times a day before meals  isosorbide   dinitrate Tablet (ISORDIL) 30 milliGRAM(s) Oral three times a day  lisinopril 10 milliGRAM(s) Oral daily  methadone    Tablet 5 milliGRAM(s) Oral every 6 hours  nystatin Powder 1 Application(s) Topical two times a day  pantoprazole  Injectable 40 milliGRAM(s) IV Push every 12 hours  pregabalin 150 milliGRAM(s) Oral three times a day  thiamine IVPB 500 milliGRAM(s) IV Intermittent three times a day      PMHX/PSHX:  Hypertension    Diabetes mellitus    Gastric ulcer    Spinal stenosis    H/O spinal cord compression    Spondylosis    H/O radiculopathy    No significant past surgical history    No significant past surgical history    H/O cervical spine surgery    History of back surgery    S/P cervical spinal fusion        Family history:  FH: hypertension (Father)    FH: diabetes mellitus (Mother)        Social History:     ROS: As per HPI, 14-point ROS negative otherwise.    General:  No wt loss, fevers, chills, night sweats, fatigue,   Eyes:  Good vision, no reported pain  ENT:  No sore throat, pain, runny nose, dysphagia  CV:  No pain, palpitations, hypo/hypertension  Resp:  No dyspnea, cough, tachypnea, wheezing  GI:  See HPI  :  No pain, bleeding, incontinence, nocturia  Muscle:  No pain, weakness  Neuro:  No weakness, tingling, memory problems  Psych:  No fatigue, insomnia, mood problems, depression  Endocrine:  No polyuria, polydipsia, cold/heat intolerance  Heme:  No petechiae, ecchymosis, easy bruisability  Skin:  No rash, edema      PHYSICAL EXAM:     Vital Signs:  Vital Signs Last 24 Hrs  T(C): 36.4 (07 Mar 2022 08:00), Max: 36.7 (07 Mar 2022 04:00)  T(F): 97.5 (07 Mar 2022 08:00), Max: 98.1 (07 Mar 2022 04:00)  HR: 93 (07 Mar 2022 12:00) (82 - 109)  BP: 138/75 (07 Mar 2022 12:00) (103/59 - 156/88)  BP(mean): 98 (07 Mar 2022 12:00) (73 - 115)  RR: 21 (07 Mar 2022 12:00) (15 - 25)  SpO2: 97% (07 Mar 2022 12:00) (97% - 99%)  Daily     Daily Weight in k.3 (07 Mar 2022 05:00)    GENERAL:  Appears stated age, well-groomed, well-nourished, no distress  HEENT:  NC/AT,  conjunctivae clear and pink  CHEST:  Full & symmetric excursion, no increased effort  HEART:  Regular rhythm, no JVD  ABDOMEN:  Soft, non-tender, non-distended, normoactive bowel sounds,  no masses , no hepatosplenomegaly  EXTREMITIES:  no cyanosis, clubbing or edema  SKIN:  No rash/erythema/ecchymoses/petechiae/wounds/abscess/warm/dry  NEURO:  Alert, oriented, nonfocal    LABS:                        9.6    11.23 )-----------( 347      ( 07 Mar 2022 02:43 )             28.8     03-07    136  |  104  |  7   ----------------------------<  144<H>  3.7   |  19<L>  |  0.59    Ca    8.6      07 Mar 2022 02:43  Phos  3.7     03-07  Mg     1.6     03-07    TPro  5.1<L>  /  Alb  2.6<L>  /  TBili  0.3  /  DBili  x   /  AST  15  /  ALT  52<H>  /  AlkPhos  60  03-07    LIVER FUNCTIONS - ( 07 Mar 2022 02:43 )  Alb: 2.6 g/dL / Pro: 5.1 g/dL / ALK PHOS: 60 U/L / ALT: 52 U/L / AST: 15 U/L / GGT: x           PT/INR - ( 07 Mar 2022 02:43 )   PT: 14.9 sec;   INR: 1.29 ratio         PTT - ( 07 Mar 2022 02:43 )  PTT:70.0 sec        Imaging:           Chief Complaint:  Patient is a 56y old  Male who presents with a chief complaint of STEMI, DKA (07 Mar 2022 12:00)      HPI: Pt is a 55 yo M with PMH of HTN, T2DM, neuropathy, chronic pain on Methadone & opioids for cervical spine and back injury transferred to [facility] CICU from [place] due to STEMI and DKA. Pt was reportedly found to have inferolateral ST elevations + afib on EKG, trop 7560. Pt initially came to [facility] ED due to nausea and vomiting for 3 days, reportedly had CP several days ago which apparently stopped on its own. Patient had reported dark stools. Patient had multiple episodes of black stool. No n/v, abdominal pain, hematochezia, fever or chills. Does c/o incontiencne. However remained hemodynamically stable with stable H/H. Cath completed 3/6. Had EGD several years ago with ulcer but no report of hpylori. Also had colonoscopy 4 years ago with one polyp.    Allergies:  No Known Allergies      Home Medications:    Hospital Medications:  apixaban 5 milliGRAM(s) Oral every 12 hours  aspirin  chewable 81 milliGRAM(s) Oral daily  atorvastatin 80 milliGRAM(s) Oral at bedtime  carvedilol 12.5 milliGRAM(s) Oral every 12 hours  chlorhexidine 4% Liquid 1 Application(s) Topical <User Schedule>  clobetasol 0.05% Ointment 1 Application(s) Topical two times a day  clopidogrel Tablet 75 milliGRAM(s) Oral daily  HYDROmorphone   Tablet 4 milliGRAM(s) Oral every 6 hours PRN  influenza   Vaccine 0.5 milliLiter(s) IntraMuscular once  insulin glargine Injectable (LANTUS) 50 Unit(s) SubCutaneous <User Schedule>  insulin lispro (ADMELOG) corrective regimen sliding scale   SubCutaneous three times a day before meals  insulin lispro (ADMELOG) corrective regimen sliding scale   SubCutaneous at bedtime  insulin lispro Injectable (ADMELOG) 2 Unit(s) SubCutaneous three times a day before meals  isosorbide   dinitrate Tablet (ISORDIL) 30 milliGRAM(s) Oral three times a day  lisinopril 10 milliGRAM(s) Oral daily  methadone    Tablet 5 milliGRAM(s) Oral every 6 hours  nystatin Powder 1 Application(s) Topical two times a day  pantoprazole  Injectable 40 milliGRAM(s) IV Push every 12 hours  pregabalin 150 milliGRAM(s) Oral three times a day  thiamine IVPB 500 milliGRAM(s) IV Intermittent three times a day      PMHX/PSHX:  Hypertension    Diabetes mellitus    Gastric ulcer    Spinal stenosis    H/O spinal cord compression    Spondylosis    H/O radiculopathy    No significant past surgical history    No significant past surgical history    H/O cervical spine surgery    History of back surgery    S/P cervical spinal fusion        Family history:  FH: hypertension (Father)    FH: diabetes mellitus (Mother)        Social History:     ROS: As per HPI, 14-point ROS negative otherwise.    General:  No wt loss, fevers, chills, night sweats, fatigue,   Eyes:  Good vision, no reported pain  ENT:  No sore throat, pain, runny nose, dysphagia  CV:  No pain, palpitations, hypo/hypertension  Resp:  No dyspnea, cough, tachypnea, wheezing  GI:  See HPI  :  No pain, bleeding, incontinence, nocturia  Muscle:  No pain, weakness  Neuro:  No weakness, tingling, memory problems  Psych:  No fatigue, insomnia, mood problems, depression  Endocrine:  No polyuria, polydipsia, cold/heat intolerance  Heme:  No petechiae, ecchymosis, easy bruisability  Skin:  No rash, edema      PHYSICAL EXAM:     Vital Signs:  Vital Signs Last 24 Hrs  T(C): 36.4 (07 Mar 2022 08:00), Max: 36.7 (07 Mar 2022 04:00)  T(F): 97.5 (07 Mar 2022 08:00), Max: 98.1 (07 Mar 2022 04:00)  HR: 93 (07 Mar 2022 12:00) (82 - 109)  BP: 138/75 (07 Mar 2022 12:00) (103/59 - 156/88)  BP(mean): 98 (07 Mar 2022 12:00) (73 - 115)  RR: 21 (07 Mar 2022 12:00) (15 - 25)  SpO2: 97% (07 Mar 2022 12:00) (97% - 99%)  Daily     Daily Weight in k.3 (07 Mar 2022 05:00)    GENERAL:  Appears stated age, well-groomed, well-nourished, no distress  HEENT:  NC/AT,  conjunctivae clear and pink  CHEST:  Full & symmetric excursion, no increased effort  HEART:  Regular rhythm, no JVD  ABDOMEN:  Soft, non-tender, non-distended, normoactive bowel sounds,  no masses , no hepatosplenomegaly  EXTREMITIES:  no cyanosis, clubbing or edema  SKIN:  No rash/erythema/ecchymoses/petechiae/wounds/abscess/warm/dry  NEURO:  Alert, oriented, nonfocal    LABS:                        9.6    11.23 )-----------( 347      ( 07 Mar 2022 02:43 )             28.8     03-07    136  |  104  |  7   ----------------------------<  144<H>  3.7   |  19<L>  |  0.59    Ca    8.6      07 Mar 2022 02:43  Phos  3.7     03-07  Mg     1.6     03-07    TPro  5.1<L>  /  Alb  2.6<L>  /  TBili  0.3  /  DBili  x   /  AST  15  /  ALT  52<H>  /  AlkPhos  60  03-07    LIVER FUNCTIONS - ( 07 Mar 2022 02:43 )  Alb: 2.6 g/dL / Pro: 5.1 g/dL / ALK PHOS: 60 U/L / ALT: 52 U/L / AST: 15 U/L / GGT: x           PT/INR - ( 07 Mar 2022 02:43 )   PT: 14.9 sec;   INR: 1.29 ratio         PTT - ( 07 Mar 2022 02:43 )  PTT:70.0 sec        Imaging:           Chief Complaint:  Patient is a 56y old  Male who presents with a chief complaint of STEMI, DKA (07 Mar 2022 12:00)      HPI: Pt is a 57 yo M with PMH of HTN, T2DM, neuropathy, chronic pain on Methadone & opioids for cervical spine and back injury transferred to [facility] CICU from [place] due to STEMI and DKA. Pt was reportedly found to have inferolateral ST elevations + afib on EKG, trop 7560. Pt initially came to [facility] ED due to nausea and vomiting for 3 days, reportedly had CP several days ago which apparently stopped on its own. Patient had reported dark stools. Patient had multiple episodes of black stool. No n/v, abdominal pain, hematochezia, fever or chills. Does c/o incontiencne. However remained hemodynamically stable with stable H/H. Cath completed 3/6. Had EGD several years ago with ulcer but no report of hpylori. Also had colonoscopy 4 years ago with one polyp.    Allergies:  No Known Allergies      Home Medications:    Hospital Medications:  apixaban 5 milliGRAM(s) Oral every 12 hours  aspirin  chewable 81 milliGRAM(s) Oral daily  atorvastatin 80 milliGRAM(s) Oral at bedtime  carvedilol 12.5 milliGRAM(s) Oral every 12 hours  chlorhexidine 4% Liquid 1 Application(s) Topical <User Schedule>  clobetasol 0.05% Ointment 1 Application(s) Topical two times a day  clopidogrel Tablet 75 milliGRAM(s) Oral daily  HYDROmorphone   Tablet 4 milliGRAM(s) Oral every 6 hours PRN  influenza   Vaccine 0.5 milliLiter(s) IntraMuscular once  insulin glargine Injectable (LANTUS) 50 Unit(s) SubCutaneous <User Schedule>  insulin lispro (ADMELOG) corrective regimen sliding scale   SubCutaneous three times a day before meals  insulin lispro (ADMELOG) corrective regimen sliding scale   SubCutaneous at bedtime  insulin lispro Injectable (ADMELOG) 2 Unit(s) SubCutaneous three times a day before meals  isosorbide   dinitrate Tablet (ISORDIL) 30 milliGRAM(s) Oral three times a day  lisinopril 10 milliGRAM(s) Oral daily  methadone    Tablet 5 milliGRAM(s) Oral every 6 hours  nystatin Powder 1 Application(s) Topical two times a day  pantoprazole  Injectable 40 milliGRAM(s) IV Push every 12 hours  pregabalin 150 milliGRAM(s) Oral three times a day  thiamine IVPB 500 milliGRAM(s) IV Intermittent three times a day      PMHX/PSHX:  Hypertension    Diabetes mellitus    Gastric ulcer    Spinal stenosis    H/O spinal cord compression    Spondylosis    H/O radiculopathy    No significant past surgical history    No significant past surgical history    H/O cervical spine surgery    History of back surgery    S/P cervical spinal fusion        Family history:  FH: hypertension (Father)    FH: diabetes mellitus (Mother)        Social History: No illicit drugs, no smoking, no ETOH    ROS: As per HPI, 14-point ROS negative otherwise.    General:  No wt loss, fevers, chills, night sweats, fatigue,   Eyes:  Good vision, no reported pain  ENT:  No sore throat, pain, runny nose, dysphagia  CV:  No pain, palpitations, hypo/hypertension  Resp:  No dyspnea, cough, tachypnea, wheezing  GI:  See HPI  :  No pain, bleeding, incontinence, nocturia  Muscle:  No pain, weakness  Neuro:  No weakness, tingling, memory problems  Psych:  No fatigue, insomnia, mood problems, depression  Endocrine:  No polyuria, polydipsia, cold/heat intolerance  Heme:  No petechiae, ecchymosis, easy bruisability  Skin:  No rash, edema      PHYSICAL EXAM:     Vital Signs:  Vital Signs Last 24 Hrs  T(C): 36.4 (07 Mar 2022 08:00), Max: 36.7 (07 Mar 2022 04:00)  T(F): 97.5 (07 Mar 2022 08:00), Max: 98.1 (07 Mar 2022 04:00)  HR: 93 (07 Mar 2022 12:00) (82 - 109)  BP: 138/75 (07 Mar 2022 12:00) (103/59 - 156/88)  BP(mean): 98 (07 Mar 2022 12:00) (73 - 115)  RR: 21 (07 Mar 2022 12:00) (15 - 25)  SpO2: 97% (07 Mar 2022 12:00) (97% - 99%)  Daily     Daily Weight in k.3 (07 Mar 2022 05:00)    GENERAL:  Appears stated age, well-groomed, well-nourished, no distress  HEENT:  NC/AT,  conjunctivae clear and pink  CHEST:  Full & symmetric excursion, no increased effort  HEART:  Regular rhythm, no JVD  ABDOMEN:  Soft, non-tender, non-distended, normoactive bowel sounds,  no masses , no hepatosplenomegaly  EXTREMITIES:  no cyanosis, clubbing or edema  SKIN:  No rash/erythema/ecchymoses/petechiae/wounds/abscess/warm/dry  NEURO:  Alert, oriented, nonfocal    LABS:                        9.6    11.23 )-----------( 347      ( 07 Mar 2022 02:43 )             28.8     03-07    136  |  104  |  7   ----------------------------<  144<H>  3.7   |  19<L>  |  0.59    Ca    8.6      07 Mar 2022 02:43  Phos  3.7     03-07  Mg     1.6     03-07    TPro  5.1<L>  /  Alb  2.6<L>  /  TBili  0.3  /  DBili  x   /  AST  15  /  ALT  52<H>  /  AlkPhos  60  03-07    LIVER FUNCTIONS - ( 07 Mar 2022 02:43 )  Alb: 2.6 g/dL / Pro: 5.1 g/dL / ALK PHOS: 60 U/L / ALT: 52 U/L / AST: 15 U/L / GGT: x           PT/INR - ( 07 Mar 2022 02:43 )   PT: 14.9 sec;   INR: 1.29 ratio         PTT - ( 07 Mar 2022 02:43 )  PTT:70.0 sec        Imaging:

## 2022-03-07 NOTE — CONSULT NOTE ADULT - SUBJECTIVE AND OBJECTIVE BOX
Chief Complaint:  Patient is a 56y old  Male who presents with a chief complaint of STEMI, DKA      HPI:  57 y/o M with PMH of HTN, T2DM, neuropathy, chronic pain on Methadone & opioids for cervical spine and back injury transferred to [facility] CICU from [place] due to STEMI and DKA. Pt was reportedly found to have inferolateral ST elevations + afib on EKG, trop 7560. Pt initially came to [facility] ED due to nausea and vomiting for 3 days, reportedly had CP several days ago which apparently stopped on its own. Pt currently altered, difficult to obtain history from.  Pt transferred to [facility] for cath, noted to be in afib w/ RVR to 130s-150s, received cardizem 25 mg IV x 2 and started on cardizem gtt, cath was aborted as pt had SCR of 2.14, (baseline 0.7-0.9 in 10/2021), transferred to CCU for optimization of DKA prior to cath.     Current Pain Score: 8/10    Current out- patient pain regimen: methadone 5 mg Q 6 hours, dilaudid 4 mg PO QID PRN, lyrica 150 mg TID    Out Patient Pain Management provider: Juaquin Pham MD    [facility] Prescription Monitoring Program: Reference #[id number]    PAST MEDICAL & SURGICAL HISTORY:  Hypertension    Diabetes mellitus    Gastric ulcer    Spinal stenosis    H/O spinal cord compression    Spondylosis    H/O radiculopathy    H/O cervical spine surgery    History of back surgery    S/P cervical spinal fusion      FAMILY HISTORY:  FH: hypertension (Father)    FH: diabetes mellitus (Mother)      SOCIAL HISTORY:  Tobacco Use: denies  Alcohol Use: denies  Recreational Marijuana: denies  Illcicit Drug Use: denies    Opioid Risk Tool (ORT-OUD) Score: low    Allergies    No Known Allergies    Intolerances    None known    REVIEW OF SYSTEMS:  CONSTITUTIONAL: No fever, weight loss, fatigue   NEURO: No headaches, memory loss, tremors, dizziness or blurred vision  RESP: No shortness of breath, cough  CV: No chest pain, palpitations  GI: No abdominal pain, nausea, vomiting, diarrhea, constipation   : No urinary incontinence/retention, dysuria  MSK: No joint pain; + neck and left upper extremity pain; + left upper extremity motor strength weakness   SKIN: No itching, burning, rashes   PSYCHIATRIC: No depression, anxiety, mood swings, or difficulty sleeping      MEDICATIONS  (STANDING):  apixaban 5 milliGRAM(s) Oral every 12 hours  aspirin  chewable 81 milliGRAM(s) Oral daily  atorvastatin 80 milliGRAM(s) Oral at bedtime  carvedilol 12.5 milliGRAM(s) Oral every 12 hours  chlorhexidine 4% Liquid 1 Application(s) Topical <User Schedule>  clobetasol 0.05% Ointment 1 Application(s) Topical two times a day  clopidogrel Tablet 75 milliGRAM(s) Oral daily  influenza   Vaccine 0.5 milliLiter(s) IntraMuscular once  insulin glargine Injectable (LANTUS) 50 Unit(s) SubCutaneous <User Schedule>  insulin lispro (ADMELOG) corrective regimen sliding scale   SubCutaneous three times a day before meals  insulin lispro (ADMELOG) corrective regimen sliding scale   SubCutaneous at bedtime  insulin lispro Injectable (ADMELOG) 2 Unit(s) SubCutaneous three times a day before meals  isosorbide   dinitrate Tablet (ISORDIL) 30 milliGRAM(s) Oral three times a day  lisinopril 10 milliGRAM(s) Oral daily  methadone    Tablet 5 milliGRAM(s) Oral every 6 hours  nystatin Powder 1 Application(s) Topical two times a day  pantoprazole  Injectable 40 milliGRAM(s) IV Push every 12 hours  pregabalin 150 milliGRAM(s) Oral three times a day  thiamine IVPB 500 milliGRAM(s) IV Intermittent three times a day    MEDICATIONS  (PRN):  HYDROmorphone   Tablet 4 milliGRAM(s) Oral every 6 hours PRN Severe Pain (7 - 10)      PHYSICAL EXAM  GENERAL: seen at bedside; NAD; well developed, well groomed; no signs of toxicity  HEENT: head atraumatic, normocephalic; anicteric; speech clear and fluent  NEURO: A + O X 3; good concentration; Cranial Nerves II- XII intact; SILT  RESPIRATORY: lungs clear to auscultation B/L; no rales or rhonchi; chest expansion equal  CARDIAC: regular cardiac rhythm; S1 S2; no JVD  GI: abdomen soft, non distended; + bowel sounds; + flatus: appetite fair  :  voiding  EXTREMITIES/ PV: COBOS; 2+ peripheral pulses; no cyanosis or clubbing; + edema L hand  and digits  MUSCULOSKELETAL: motor strength 5/5 B/L LE and RUE; LUE with muscle atrophy and 5/5 hand grasp, but marked decreased ROM L shoulder and elbow with 3/5 motor strength  SKIN: no rashes, lesions   PSYCH: affect normal; good eye contact; no signs of depression or anxiety    Vital Signs:  T(C): 36.9 (03-07-22 @ 19:00)  HR: 91 (03-07-22 @ 19:00)  BP: 104/57 (03-07-22 @ 19:00)  RR: 17 (03-07-22 @ 19:00)  SpO2: 95% (03-07-22 @ 19:00)    Pertinent labs/radiology:  03-07    136  |  104  |  7   ----------------------------<  144<H>  3.7   |  19<L>  |  0.59    Ca    8.6      07 Mar 2022 02:43  Phos  3.7     03-07  Mg     1.6     03-07    TPro  5.1<L>  /  Alb  2.6<L>  /  TBili  0.3  /  DBili  x   /  AST  15  /  ALT  52<H>  /  AlkPhos  60  03-07                        9.6    11.23 )-----------( 347      ( 07 Mar 2022 02:43 )             28.8

## 2022-03-07 NOTE — CONSULT NOTE ADULT - ASSESSMENT
Impression:  # Dark stool: DDx includes PUD, erosive gastritis, esophagitis, dieulafoy lesion, angioectasia. Currently hemodynamically stable and hgb relatively stable.   # Anemia: Normocytic. Patient is iron deficient. No recent endosocpy or colonoscopy on record  # CAD on DAPT  # STEMI  # DKA    Recommendation:  - PPI 40mg IV BID  - trend CBC, CMP, INR  - 2 large bore IVs; active type and screen  - transfuse Hgb > 7, Platelets > 50  - given acute medical issues and comorbidities with stable hgb and VSS, will defer endoscopy for anemia as patient is stable on DAPT now  - if hgb downtrends and has ongoing c/f GI bleeding, can plan for EGD +/- colonoscopy  - reasonable to resume DAPT given cardiac issues  - regardless, patient will need eventual endoscopy eval for anemia, however this can be deferred for now if counts remains stable given ongoing medical issues   - supportive care as per primary team    Channing Lara  [phone]  [phone]  Please call/page on call fellow on weekends and weekdays after 5pm   Impression:  # Dark stool: DDx includes PUD, erosive gastritis, esophagitis, dieulafoy lesion, angioectasia. Currently hemodynamically stable and hgb relatively stable.   # Anemia: Normocytic. Patient is iron deficient. No recent endosocpy or colonoscopy on record  # CAD on DAPT  # STEMI  # DKA    Recommendation:  - PPI 40mg IV BID  - trend CBC, CMP, INR  - 2 large bore IVs; active type and screen  - transfuse Hgb > 7, Platelets > 50  - given acute medical issues and comorbidities with stable hgb and VSS, will defer endoscopy for anemia as patient is stable already on DAPT now, and he does not want to pursue endoscopic evaluation at this time  - if hgb downtrends and has ongoing c/f GI bleeding, can plan for EGD +/- colonoscopy  - reasonable to resume DAPT given cardiac issues  - regardless, patient will need eventual endoscopy eval for anemia, however this can be deferred for now if counts remains stable given ongoing medical issues   - supportive care as per primary team    Channing Lara  [phone]  Please call/page on call fellow on weekends and weekdays after 5pm

## 2022-03-07 NOTE — CONSULT NOTE ADULT - ATTENDING COMMENTS
Patient seen and examined, agree with above. Patient with dark stools but H/H stable. Reportedly had EGD/ colonoscopy 4 years ago showing ulcers,  but unclear H pylori status. At this time he does not want to pursue EGD. Would place on PPI BID and monitor H/H - explained to him that if h/H drops of signs of melena continues, we will need to consider at least EGD inpatient as he is being continued on DAPT/anticoagulation. He wants to monitor on PPI for now and monitor.    Will follow.
Scattered hemorrhagic papules on extremities and petechiae on fingertips are suggestive of embolic phenomena, however TTE was WN and bacterial cultures have been negative. Skin biopsy and tissue culture performed to further elucidate underlying process. Notabely, pt has a history of reported mycobacterial infection of the right hand however skin lesions on exam today are not in line with mycobacterial infection. Several papules on right hand appear more scar like and patient confirms they have been present for many months. I suspect the increased erythema is a product of hand swelling-- trial of hand elevation and topical steroids.
HPI as per NP note, personally verified by me. Wife, at bedside, reports patient has spinal hardware from recent surgeries within the past 5 years but no metal shrapnel or spinal cord stimulator. He has had progressive decline in mental status over the past 24 hours    ROS: Due to clinical condition unable to assess (LAUREN)    GTT's:  Heparin 2400 Units/hour  Nitroglycerin 160 mcg/min  Insulin 7 Units/hour    MS - Obtunded and groaning occasionally, attends to visual and auditory stimuli on R side and tactile b/l, no meaningful speech output, does not follow commands. LAUREN orientation, rep/naming, attn/conc, recent and remote memory, fund of knowledge  CN - PERRL, EOMI with R gaze preference, VF with dec L visual field in both eyes, face sens/str by corneals b/l and no droop, hearing grossly intact to conversation on the R, SCM at least 4/5 b/l and symmetric. LAUREN tongue/palate  Motor - Normal bulk and paratonia of RUE but normal tone elsewhere. RUE 4+/5, LUE 3/5, BLE's 2/5  Sens - He grimaces, withdraws, and localizes to strong noxious stimuli in all extremities except for LUE in which he grimaces only  DTR's - 1+ biceps/triceps b/l, 0+ BR b/l, 3+ KJ b/l, 4+ AJ b/l (3-4 beats of clonus), and equivocal R (initially questionable Babinski but later thought to be down) and neutral L plantar response  Coord - Grossly appropriate for level of strength  Gait and station - LAUREN    A/P:  Encephalopathy  L sided weakness  Atrial fibrillation  JAXON  STEMI  DM type 2 with DKA  HTN  Alcohol abuse  Age indeterminate lacunar strokes    - Most concerning etiology for clinical picture would be cerebrovascular cause such as strokes or PRES. Also need to consider other toxic/metabolic causes, although less likely Wernicke's given lack of ocular findings and very acute onset. No evidence of seizures on EEG and consistent with toxic/metabolic process with generalized slowing and triphasic waves  - MRI brain w/o if able, otherwise repeat CT head w/o in 24 hours  - vEEG to continue to evaluate for seizures  - Empiric high dose thiamine repletion  - Check labs for additional causes with B12, TSH, free T4, Vit D, NH3, B1  - No neurologic contraindications to using heparin drip/anticoagulation for cardiac/medical reasons if strokes (on CT head) are acute as they are small and not hemorrhagic. However, would get CT head w/o STAT for any acute change in mental status or new focal neurologic deficits  - Continue to address above medical problems, as you are doing  - Will continue to follow patient with you, pending results of above studies

## 2022-03-08 ENCOUNTER — TRANSCRIPTION ENCOUNTER (OUTPATIENT)
Age: 57
End: 2022-03-08

## 2022-03-08 DIAGNOSIS — N17.9 ACUTE KIDNEY FAILURE, UNSPECIFIED: ICD-10-CM

## 2022-03-08 DIAGNOSIS — I21.3 ST ELEVATION (STEMI) MYOCARDIAL INFARCTION OF UNSPECIFIED SITE: ICD-10-CM

## 2022-03-08 DIAGNOSIS — I10 ESSENTIAL (PRIMARY) HYPERTENSION: ICD-10-CM

## 2022-03-08 DIAGNOSIS — I48.0 PAROXYSMAL ATRIAL FIBRILLATION: ICD-10-CM

## 2022-03-08 DIAGNOSIS — E78.5 HYPERLIPIDEMIA, UNSPECIFIED: ICD-10-CM

## 2022-03-08 DIAGNOSIS — G89.4 CHRONIC PAIN SYNDROME: ICD-10-CM

## 2022-03-08 DIAGNOSIS — E11.65 TYPE 2 DIABETES MELLITUS WITH HYPERGLYCEMIA: ICD-10-CM

## 2022-03-08 DIAGNOSIS — E11.9 TYPE 2 DIABETES MELLITUS WITHOUT COMPLICATIONS: ICD-10-CM

## 2022-03-08 LAB
ALBUMIN SERPL ELPH-MCNC: 2.7 G/DL — LOW (ref 3.3–5)
ALP SERPL-CCNC: 61 U/L — SIGNIFICANT CHANGE UP (ref 40–120)
ALT FLD-CCNC: 42 U/L — SIGNIFICANT CHANGE UP (ref 10–45)
ANION GAP SERPL CALC-SCNC: 11 MMOL/L — SIGNIFICANT CHANGE UP (ref 5–17)
APTT BLD: 30.2 SEC — SIGNIFICANT CHANGE UP (ref 27.5–35.5)
AST SERPL-CCNC: 12 U/L — SIGNIFICANT CHANGE UP (ref 10–40)
BASOPHILS # BLD AUTO: 0.08 K/UL — SIGNIFICANT CHANGE UP (ref 0–0.2)
BASOPHILS NFR BLD AUTO: 0.8 % — SIGNIFICANT CHANGE UP (ref 0–2)
BILIRUB SERPL-MCNC: 0.3 MG/DL — SIGNIFICANT CHANGE UP (ref 0.2–1.2)
BUN SERPL-MCNC: 7 MG/DL — SIGNIFICANT CHANGE UP (ref 7–23)
CALCIUM SERPL-MCNC: 8.6 MG/DL — SIGNIFICANT CHANGE UP (ref 8.4–10.5)
CHLORIDE SERPL-SCNC: 106 MMOL/L — SIGNIFICANT CHANGE UP (ref 96–108)
CO2 SERPL-SCNC: 23 MMOL/L — SIGNIFICANT CHANGE UP (ref 22–31)
CREAT SERPL-MCNC: 0.66 MG/DL — SIGNIFICANT CHANGE UP (ref 0.5–1.3)
CULTURE RESULTS: SIGNIFICANT CHANGE UP
CULTURE RESULTS: SIGNIFICANT CHANGE UP
EGFR: 111 ML/MIN/1.73M2 — SIGNIFICANT CHANGE UP
EOSINOPHIL # BLD AUTO: 0.13 K/UL — SIGNIFICANT CHANGE UP (ref 0–0.5)
EOSINOPHIL NFR BLD AUTO: 1.3 % — SIGNIFICANT CHANGE UP (ref 0–6)
GLUCOSE BLDC GLUCOMTR-MCNC: 167 MG/DL — HIGH (ref 70–99)
GLUCOSE BLDC GLUCOMTR-MCNC: 217 MG/DL — HIGH (ref 70–99)
GLUCOSE BLDC GLUCOMTR-MCNC: 221 MG/DL — HIGH (ref 70–99)
GLUCOSE BLDC GLUCOMTR-MCNC: 244 MG/DL — HIGH (ref 70–99)
GLUCOSE SERPL-MCNC: 155 MG/DL — HIGH (ref 70–99)
HCT VFR BLD CALC: 30.6 % — LOW (ref 39–50)
HGB BLD-MCNC: 10 G/DL — LOW (ref 13–17)
IMM GRANULOCYTES NFR BLD AUTO: 4.8 % — HIGH (ref 0–1.5)
INR BLD: 1.12 RATIO — SIGNIFICANT CHANGE UP (ref 0.88–1.16)
LYMPHOCYTES # BLD AUTO: 2.42 K/UL — SIGNIFICANT CHANGE UP (ref 1–3.3)
LYMPHOCYTES # BLD AUTO: 24.9 % — SIGNIFICANT CHANGE UP (ref 13–44)
MAGNESIUM SERPL-MCNC: 1.7 MG/DL — SIGNIFICANT CHANGE UP (ref 1.6–2.6)
MCHC RBC-ENTMCNC: 30.2 PG — SIGNIFICANT CHANGE UP (ref 27–34)
MCHC RBC-ENTMCNC: 32.7 GM/DL — SIGNIFICANT CHANGE UP (ref 32–36)
MCV RBC AUTO: 92.4 FL — SIGNIFICANT CHANGE UP (ref 80–100)
METHYLMALONATE SERPL-SCNC: 86 NMOL/L — SIGNIFICANT CHANGE UP (ref 0–378)
MONOCYTES # BLD AUTO: 1.15 K/UL — HIGH (ref 0–0.9)
MONOCYTES NFR BLD AUTO: 11.8 % — SIGNIFICANT CHANGE UP (ref 2–14)
NEUTROPHILS # BLD AUTO: 5.47 K/UL — SIGNIFICANT CHANGE UP (ref 1.8–7.4)
NEUTROPHILS NFR BLD AUTO: 56.4 % — SIGNIFICANT CHANGE UP (ref 43–77)
NRBC # BLD: 0 /100 WBCS — SIGNIFICANT CHANGE UP (ref 0–0)
PHOSPHATE SERPL-MCNC: 3.2 MG/DL — SIGNIFICANT CHANGE UP (ref 2.5–4.5)
PLATELET # BLD AUTO: 414 K/UL — HIGH (ref 150–400)
POTASSIUM SERPL-MCNC: 4 MMOL/L — SIGNIFICANT CHANGE UP (ref 3.5–5.3)
POTASSIUM SERPL-SCNC: 4 MMOL/L — SIGNIFICANT CHANGE UP (ref 3.5–5.3)
PROT SERPL-MCNC: 5.4 G/DL — LOW (ref 6–8.3)
PROTHROM AB SERPL-ACNC: 13 SEC — SIGNIFICANT CHANGE UP (ref 10.5–13.4)
RBC # BLD: 3.31 M/UL — LOW (ref 4.2–5.8)
RBC # FLD: 15.5 % — HIGH (ref 10.3–14.5)
SODIUM SERPL-SCNC: 140 MMOL/L — SIGNIFICANT CHANGE UP (ref 135–145)
SPECIMEN SOURCE: SIGNIFICANT CHANGE UP
SPECIMEN SOURCE: SIGNIFICANT CHANGE UP
WBC # BLD: 9.72 K/UL — SIGNIFICANT CHANGE UP (ref 3.8–10.5)
WBC # FLD AUTO: 9.72 K/UL — SIGNIFICANT CHANGE UP (ref 3.8–10.5)

## 2022-03-08 PROCEDURE — 99223 1ST HOSP IP/OBS HIGH 75: CPT

## 2022-03-08 PROCEDURE — 99232 SBSQ HOSP IP/OBS MODERATE 35: CPT | Mod: GC

## 2022-03-08 PROCEDURE — 99233 SBSQ HOSP IP/OBS HIGH 50: CPT

## 2022-03-08 RX ORDER — INSULIN GLARGINE 100 [IU]/ML
36 INJECTION, SOLUTION SUBCUTANEOUS
Refills: 0 | Status: DISCONTINUED | OUTPATIENT
Start: 2022-03-08 | End: 2022-03-09

## 2022-03-08 RX ORDER — PANTOPRAZOLE SODIUM 20 MG/1
40 TABLET, DELAYED RELEASE ORAL
Refills: 0 | Status: DISCONTINUED | OUTPATIENT
Start: 2022-03-08 | End: 2022-03-11

## 2022-03-08 RX ORDER — INSULIN LISPRO 100/ML
10 VIAL (ML) SUBCUTANEOUS
Refills: 0 | Status: DISCONTINUED | OUTPATIENT
Start: 2022-03-09 | End: 2022-03-09

## 2022-03-08 RX ORDER — INSULIN LISPRO 100/ML
5 VIAL (ML) SUBCUTANEOUS ONCE
Refills: 0 | Status: COMPLETED | OUTPATIENT
Start: 2022-03-08 | End: 2022-03-08

## 2022-03-08 RX ORDER — CARVEDILOL PHOSPHATE 80 MG/1
25 CAPSULE, EXTENDED RELEASE ORAL EVERY 12 HOURS
Refills: 0 | Status: DISCONTINUED | OUTPATIENT
Start: 2022-03-08 | End: 2022-03-11

## 2022-03-08 RX ORDER — CARVEDILOL PHOSPHATE 80 MG/1
12.5 CAPSULE, EXTENDED RELEASE ORAL ONCE
Refills: 0 | Status: COMPLETED | OUTPATIENT
Start: 2022-03-08 | End: 2022-03-08

## 2022-03-08 RX ADMIN — HYDROMORPHONE HYDROCHLORIDE 4 MILLIGRAM(S): 2 INJECTION INTRAMUSCULAR; INTRAVENOUS; SUBCUTANEOUS at 06:45

## 2022-03-08 RX ADMIN — Medication 4: at 17:06

## 2022-03-08 RX ADMIN — Medication 5 UNIT(S): at 17:06

## 2022-03-08 RX ADMIN — APIXABAN 5 MILLIGRAM(S): 2.5 TABLET, FILM COATED ORAL at 05:46

## 2022-03-08 RX ADMIN — Medication 150 MILLIGRAM(S): at 05:47

## 2022-03-08 RX ADMIN — APIXABAN 5 MILLIGRAM(S): 2.5 TABLET, FILM COATED ORAL at 17:05

## 2022-03-08 RX ADMIN — NYSTATIN CREAM 1 APPLICATION(S): 100000 CREAM TOPICAL at 05:48

## 2022-03-08 RX ADMIN — HYDROMORPHONE HYDROCHLORIDE 4 MILLIGRAM(S): 2 INJECTION INTRAMUSCULAR; INTRAVENOUS; SUBCUTANEOUS at 05:46

## 2022-03-08 RX ADMIN — Medication 2: at 07:50

## 2022-03-08 RX ADMIN — HYDROMORPHONE HYDROCHLORIDE 4 MILLIGRAM(S): 2 INJECTION INTRAMUSCULAR; INTRAVENOUS; SUBCUTANEOUS at 13:10

## 2022-03-08 RX ADMIN — ATORVASTATIN CALCIUM 80 MILLIGRAM(S): 80 TABLET, FILM COATED ORAL at 21:51

## 2022-03-08 RX ADMIN — LISINOPRIL 10 MILLIGRAM(S): 2.5 TABLET ORAL at 07:53

## 2022-03-08 RX ADMIN — HYDROMORPHONE HYDROCHLORIDE 4 MILLIGRAM(S): 2 INJECTION INTRAMUSCULAR; INTRAVENOUS; SUBCUTANEOUS at 23:42

## 2022-03-08 RX ADMIN — CLOPIDOGREL BISULFATE 75 MILLIGRAM(S): 75 TABLET, FILM COATED ORAL at 12:40

## 2022-03-08 RX ADMIN — Medication 105 MILLIGRAM(S): at 14:43

## 2022-03-08 RX ADMIN — Medication 150 MILLIGRAM(S): at 21:51

## 2022-03-08 RX ADMIN — CARVEDILOL PHOSPHATE 12.5 MILLIGRAM(S): 80 CAPSULE, EXTENDED RELEASE ORAL at 12:40

## 2022-03-08 RX ADMIN — Medication 81 MILLIGRAM(S): at 12:41

## 2022-03-08 RX ADMIN — Medication 105 MILLIGRAM(S): at 05:46

## 2022-03-08 RX ADMIN — METHADONE HYDROCHLORIDE 5 MILLIGRAM(S): 40 TABLET ORAL at 17:31

## 2022-03-08 RX ADMIN — ISOSORBIDE DINITRATE 30 MILLIGRAM(S): 5 TABLET ORAL at 05:47

## 2022-03-08 RX ADMIN — Medication 2 UNIT(S): at 12:41

## 2022-03-08 RX ADMIN — Medication 1 APPLICATION(S): at 17:07

## 2022-03-08 RX ADMIN — METHADONE HYDROCHLORIDE 5 MILLIGRAM(S): 40 TABLET ORAL at 12:41

## 2022-03-08 RX ADMIN — Medication 2 UNIT(S): at 07:51

## 2022-03-08 RX ADMIN — Medication 4: at 12:42

## 2022-03-08 RX ADMIN — HYDROMORPHONE HYDROCHLORIDE 4 MILLIGRAM(S): 2 INJECTION INTRAMUSCULAR; INTRAVENOUS; SUBCUTANEOUS at 19:00

## 2022-03-08 RX ADMIN — Medication 1 APPLICATION(S): at 05:47

## 2022-03-08 RX ADMIN — HYDROMORPHONE HYDROCHLORIDE 4 MILLIGRAM(S): 2 INJECTION INTRAMUSCULAR; INTRAVENOUS; SUBCUTANEOUS at 12:40

## 2022-03-08 RX ADMIN — PANTOPRAZOLE SODIUM 40 MILLIGRAM(S): 20 TABLET, DELAYED RELEASE ORAL at 17:05

## 2022-03-08 RX ADMIN — CARVEDILOL PHOSPHATE 12.5 MILLIGRAM(S): 80 CAPSULE, EXTENDED RELEASE ORAL at 05:46

## 2022-03-08 RX ADMIN — CARVEDILOL PHOSPHATE 25 MILLIGRAM(S): 80 CAPSULE, EXTENDED RELEASE ORAL at 17:05

## 2022-03-08 RX ADMIN — HYDROMORPHONE HYDROCHLORIDE 4 MILLIGRAM(S): 2 INJECTION INTRAMUSCULAR; INTRAVENOUS; SUBCUTANEOUS at 18:29

## 2022-03-08 RX ADMIN — METHADONE HYDROCHLORIDE 5 MILLIGRAM(S): 40 TABLET ORAL at 05:46

## 2022-03-08 RX ADMIN — Medication 150 MILLIGRAM(S): at 14:44

## 2022-03-08 RX ADMIN — INSULIN GLARGINE 50 UNIT(S): 100 INJECTION, SOLUTION SUBCUTANEOUS at 12:41

## 2022-03-08 RX ADMIN — PANTOPRAZOLE SODIUM 40 MILLIGRAM(S): 20 TABLET, DELAYED RELEASE ORAL at 05:46

## 2022-03-08 NOTE — PROGRESS NOTE ADULT - ASSESSMENT
56 yr old male presenting with AMS, STEMI, DKA, and JAXON transferred from [facility] to floors 3/8/22

## 2022-03-08 NOTE — PROGRESS NOTE ADULT - ASSESSMENT
57 y/o M with PMH of HTN, T2DM, neuropathy, chronic pain (on Methadone & opioids) for cervical spine and back injury transferred to [facility] CCU from [place] due to STEMI (trop 7560, inferolateral LUIS) and DKA. Found to have A fib w/ RVR to 130s-150s, JAXON w/ SCr 2.14 (baseline 0.7-0.9 in 10/2021), admitted to CCU for optimization of DKA and kidney function prior to cath. DKA, JAXON, and AMS now resolved and cardiac cath with POBA completed 3/6.    Plan   # NEURO: Admitted w/ AMS Likely metabolic encephalopathy 2/2 DKA  CT Head(3/2):  Volume loss, microvascular disease, age indeterminate lacunar infarcts, Foci of air in the left sella turcica with bony thinning possible dehiscence, edentulous maxilla with dental hardware, bony calvarial sclerosis correlate renal function.   MRI findings(3/4): MR Brain: There is no mass, intracranial hemorrhage, or acute infarction. ?V2 LVA focal stenosis.  - A&O x 3 , answers questions appropriately   - Neuro assessment as per ICU protocol     # PULM: Denies SOB or dyspnea, CTA on exam   O2 sat >92% on room air   -continue to monitor O2 sats and respiratory status, currently protecting airway  - Oxygen supplement as needed     # CV: STEMI (trop 7560, inferolateral LUIS on EKG). Limited TTE 3/1/22 showed hypokinetic inferolateral and mid inferior wall, basal inferior wall is akinetic.   - Revascularization with PCI deferred due to pt's elevated SCr, likely prerenal JAXON 2/2 dehydration from DKA.   - Brilinta changed to plavix 3/6 in setting of melanotic stools  - C: LCX POBA RFA access completed 3/6  - Cont. ASA, plavix, isordil  - Cont. atorvastatin 80mg qD  - Eliquis 5mg q12  - coreg 12.5 mg BID      Admitted w/ AFib w/ RVR: CHADSVASC score: 3 and HAS-BLED score: 1  - currently on Eliquis 5mg q12  - Monitor on telemetry  - c/w coreg 12.5 mg BID    # RENAL:   JAXON likely prerenal 2/2 dehydration 2/2 DKA; Now resolved  - Improved w/ IVF    - Cont. Monitor BUN/Cr, UO  - Creatinine normalized, 0.6  Metabolic acidosis w/ anion gap in setting of acute DKA  - Resolved  Hyponatremia : Monitor/ Trend Na +  - Resolved     # GI: Transaminitis in setting of STEMI now resolved  -AST/ALT improving with fluids, possible shock liver due to hypovolemia from DKA  - Cont. DASH diet  Presents w/ black tarry liquid stool + FOBT   - Monitor H/H   - H/H stable  - PPI 40mg IV BID  - Per GI, defer endoscopy and reasonable to continue DAPT as H/H remains stable    # ENDO: admitted w/ DKA  - s/p DKA protocol - off insulin gtt, transition to long acting insulin and NPH - improving  Insulin gtt off (3/4/22)  - Lantus 15 HS and Amalog 2 units TID pre-meals ('s) w/ Mod ISS     # HEMATOLOGIC: H/H stable  - Cont. Eliquis for AC    # ID: Pt afebrile, WBC WNL  - BCx NGTD and UCx NGTD  - Trend WBC     # Derm  Blistering to upper extremities and lower extremities bilaterally  - Derm following  - Tissue culture negative to date, AFB negative, KOH negative  - HSV/VZV negative  - F/u biopsy results  - F/u GC urine    #MSK  - Pain management recommends to continue home regimen of methadone 5 mg q6, lyrica 150mg TID, and PO dilaudid 4 mg QID  - Physical therapy consult    # MIS: Full code     55 y/o M with PMH of HTN, T2DM, neuropathy, chronic pain (on Methadone & opioids) for cervical spine and back injury transferred to [facility] CCU from [place] due to STEMI (trop 7560, inferolateral LUIS) and DKA. Found to have A fib w/ RVR to 130s-150s, JAXON w/ SCr 2.14 (baseline 0.7-0.9 in 10/2021), admitted to CCU for optimization of DKA and kidney function prior to cath. DKA, JAXON, and AMS now resolved and cardiac cath with POBA completed 3/6.    Plan   # NEURO: Admitted w/ AMS Likely metabolic encephalopathy 2/2 DKA  CT Head(3/2):  Volume loss, microvascular disease, age indeterminate lacunar infarcts, Foci of air in the left sella turcica with bony thinning possible dehiscence, edentulous maxilla with dental hardware, bony calvarial sclerosis correlate renal function.   MRI findings(3/4): MR Brain: There is no mass, intracranial hemorrhage, or acute infarction. ?V2 LVA focal stenosis.  - A&O x 3 , answers questions appropriately   - Neuro assessment as per ICU protocol     # PULM: Denies SOB or dyspnea, CTA on exam   O2 sat >92% on room air   -continue to monitor O2 sats and respiratory status, currently protecting airway  - Oxygen supplement as needed     # CV: STEMI (trop 7560, inferolateral LUIS on EKG). Limited TTE 3/1/22 showed hypokinetic inferolateral and mid inferior wall, basal inferior wall is akinetic.   - Revascularization with PCI deferred due to pt's elevated SCr, likely prerenal JAXON 2/2 dehydration from DKA.   - Brilinta changed to plavix 3/6 in setting of melanotic stools  - C: LCX POBA RFA access completed 3/6  - Cont. ASA, plavix, isordil  - Cont. atorvastatin 80mg qD  - Eliquis 5mg q12  - coreg 12.5 mg BID      Admitted w/ AFib w/ RVR: CHADSVASC score: 3 and HAS-BLED score: 1  - currently on Eliquis 5mg q12  - Monitor on telemetry  - c/w coreg 12.5 mg BID    # RENAL:   JAXON likely prerenal 2/2 dehydration 2/2 DKA; Now resolved  - Improved w/ IVF    - Cont. Monitor BUN/Cr, UO  - Creatinine normalized, 0.6  Metabolic acidosis w/ anion gap in setting of acute DKA  - Resolved  Hyponatremia : Monitor/ Trend Na +  - Resolved     # GI: Transaminitis in setting of STEMI now resolved  -AST/ALT improving with fluids, possible shock liver due to hypovolemia from DKA  - Cont. DASH diet  Presents w/ black tarry liquid stool + FOBT   - Monitor H/H   - H/H stable  - PPI 40mg IV BID  - Per GI, defer endoscopy and reasonable to continue DAPT as H/H remains stable    # ENDO: admitted w/ DKA  - s/p DKA protocol - off insulin gtt, transition to long acting insulin and NPH - improving  Insulin gtt off (3/4/22)  - Lantus 15 HS and Amalog 2 units TID pre-meals ('s) w/ Mod ISS     # HEMATOLOGIC: H/H stable  - Cont. Eliquis for AC    # ID: Pt afebrile, WBC WNL  - BCx NGTD and UCx NGTD  - Trend WBC     # Derm  Blistering to upper extremities and lower extremities bilaterally - improving   - Derm following  - Tissue culture negative to date, AFB negative, KOH negative  - HSV/VZV negative, GC urine negative  - F/u biopsy results    #MSK  - Pain management recommends to continue home regimen of methadone 5 mg q6, lyrica 150mg TID, and PO dilaudid 4 mg QID  - Physical therapy consult    # MIS: Full code     57 y/o M with PMH of HTN, T2DM, neuropathy, chronic pain (on Methadone & opioids) for cervical spine and back injury transferred to [facility]U from [place] due to STEMI (trop 7560, inferolateral LUIS) and DKA. Found to have A fib w/ RVR to 130s-150s, JAXON w/ SCr 2.14 (baseline 0.7-0.9 in 10/2021), admitted to CCU for optimization of DKA and kidney function prior to cath. DKA, JAXON, and AMS now resolved and cardiac cath with POBA completed 3/6.    Plan   # NEURO: Admitted w/ AMS Likely metabolic encephalopathy 2/2 DKA  CT Head(3/2):  Volume loss, microvascular disease, age indeterminate lacunar infarcts, Foci of air in the left sella turcica with bony thinning possible dehiscence, edentulous maxilla with dental hardware, bony calvarial sclerosis correlate renal function.   MRI findings(3/4): MR Brain: There is no mass, intracranial hemorrhage, or acute infarction. ?V2 LVA focal stenosis.  - A&O x 3 , answers questions appropriately   - Neuro assessment as per ICU protocol     # PULM: Denies SOB or dyspnea, CTA on exam   O2 sat >92% on room air   -continue to monitor O2 sats and respiratory status, currently protecting airway  - Oxygen supplement as needed     # CV: STEMI (trop 7560, inferolateral LUIS on EKG). Limited TTE 3/1/22 showed hypokinetic inferolateral and mid inferior wall, basal inferior wall is akinetic.   - Revascularization with PCI deferred due to pt's elevated SCr, likely prerenal JAXON 2/2 dehydration from DKA.   - Brilinta changed to plavix 3/6 in setting of melanotic stools  - C: LCX POBA RFA access completed 3/6  - Cont. ASA, plavix, isordil  - Cont. atorvastatin 80mg qD  - Eliquis 5mg q12  - coreg 12.5 mg BID      Admitted w/ AFib w/ RVR: CHADSVASC score: 3 and HAS-BLED score: 1  - currently on Eliquis 5mg q12  - Monitor on telemetry  - c/w coreg 12.5 mg BID    # RENAL:   JAXON likely prerenal 2/2 dehydration 2/2 DKA; Now resolved  - Improved w/ IVF    - Cont. Monitor BUN/Cr, UO  - Creatinine normalized, 0.6  Metabolic acidosis w/ anion gap in setting of acute DKA  - Resolved  Hyponatremia : Monitor/ Trend Na +  - Resolved     # GI: Transaminitis in setting of STEMI now resolved  -AST/ALT improving with fluids, possible shock liver due to hypovolemia from DKA  - Cont. DASH diet  Presents w/ black tarry liquid stool + FOBT   - Monitor H/H   - H/H stable  - PPI 40mg IV BID  - Per GI, defer endoscopy and reasonable to continue DAPT as H/H remains stable    # ENDO: admitted w/ DKA  - s/p DKA protocol - off insulin gtt, transition to long acting insulin and NPH - improving  Insulin gtt off (3/4/22)  - Lantus 15 HS and Amalog 2 units TID pre-meals ('s) w/ Mod ISS   - Endocrine consult, recommendations for home medication regime appreciated    # HEMATOLOGIC: H/H stable  - Cont. Eliquis for AC    # ID: Pt afebrile, WBC WNL  - BCx NGTD and UCx NGTD  - Trend WBC     # Derm  Blistering to upper extremities and lower extremities bilaterally - improving   - Derm following  - Tissue culture negative to date, AFB negative, KOH negative  - HSV/VZV negative, GC urine negative  - F/u biopsy results    #MSK  - Pain management recommends to continue home regimen of methadone 5 mg q6, lyrica 150mg TID, and PO dilaudid 4 mg QID  - Physical therapy consult    # MIS: Full code

## 2022-03-08 NOTE — PROGRESS NOTE ADULT - SUBJECTIVE AND OBJECTIVE BOX
Gastroenterology/Hepatology Progress Note      Interval Events:   - no acute events overnight  - Hg uptrending w/o transfusions. Continues on triple therapy     Allergies:  No Known Allergies      Hospital Medications:  apixaban 5 milliGRAM(s) Oral every 12 hours  aspirin  chewable 81 milliGRAM(s) Oral daily  atorvastatin 80 milliGRAM(s) Oral at bedtime  carvedilol 12.5 milliGRAM(s) Oral every 12 hours  chlorhexidine 4% Liquid 1 Application(s) Topical <User Schedule>  clobetasol 0.05% Ointment 1 Application(s) Topical two times a day  clopidogrel Tablet 75 milliGRAM(s) Oral daily  HYDROmorphone   Tablet 4 milliGRAM(s) Oral every 6 hours PRN  influenza   Vaccine 0.5 milliLiter(s) IntraMuscular once  insulin glargine Injectable (LANTUS) 50 Unit(s) SubCutaneous <User Schedule>  insulin lispro (ADMELOG) corrective regimen sliding scale   SubCutaneous three times a day before meals  insulin lispro (ADMELOG) corrective regimen sliding scale   SubCutaneous at bedtime  insulin lispro Injectable (ADMELOG) 2 Unit(s) SubCutaneous three times a day before meals  isosorbide   dinitrate Tablet (ISORDIL) 30 milliGRAM(s) Oral three times a day  lisinopril 10 milliGRAM(s) Oral daily  methadone    Tablet 5 milliGRAM(s) Oral every 6 hours  nystatin Powder 1 Application(s) Topical two times a day  pantoprazole  Injectable 40 milliGRAM(s) IV Push every 12 hours  pregabalin 150 milliGRAM(s) Oral three times a day  thiamine IVPB 500 milliGRAM(s) IV Intermittent three times a day      ROS: 14 point ROS negative unless otherwise state in subjective    PHYSICAL EXAM:   Vital Signs:  Vital Signs Last 24 Hrs  T(C): 36.9 (08 Mar 2022 05:00), Max: 37 (07 Mar 2022 17:00)  T(F): 98.5 (08 Mar 2022 05:00), Max: 98.6 (07 Mar 2022 17:00)  HR: 91 (08 Mar 2022 07:30) (80 - 106)  BP: 119/67 (08 Mar 2022 07:30) (102/58 - 140/69)  BP(mean): 87 (08 Mar 2022 07:30) (75 - 98)  RR: 18 (08 Mar 2022 07:30) (14 - 22)  SpO2: 98% (08 Mar 2022 07:30) (95% - 98%)  Daily     Daily     GENERAL:  No acute distress  HEENT:  NCAT, no scleral icterus  CHEST: no resp distress  HEART:  RRR  ABDOMEN:  Soft, non-tender, non-distended, normoactive bowel sounds, no masses  EXTREMITIES:  No cyanosis, clubbing, or edema  SKIN:  No rash/erythema/ecchymoses/petechiae/wounds/abscess/warm/dry  NEURO:  Alert and oriented x 3, no asterixis, no tremor    LABS:                        10.0   9.72  )-----------( 414      ( 08 Mar 2022 06:21 )             30.6     Mean Cell Volume: 92.4 fl (03-08-22 @ 06:21)    03-08    140  |  106  |  7   ----------------------------<  155<H>  4.0   |  23  |  0.66    Ca    8.6      08 Mar 2022 06:21  Phos  3.2     03-08  Mg     1.7     03-08    TPro  5.4<L>  /  Alb  2.7<L>  /  TBili  0.3  /  DBili  x   /  AST  12  /  ALT  42  /  AlkPhos  61  03-08    LIVER FUNCTIONS - ( 08 Mar 2022 06:21 )  Alb: 2.7 g/dL / Pro: 5.4 g/dL / ALK PHOS: 61 U/L / ALT: 42 U/L / AST: 12 U/L / GGT: x           PT/INR - ( 08 Mar 2022 06:21 )   PT: 13.0 sec;   INR: 1.12 ratio         PTT - ( 08 Mar 2022 06:21 )  PTT:30.2 sec          Imaging:           Gastroenterology/Hepatology Progress Note      Interval Events:   - no acute events overnight, no bowel movements   - Hg uptrending w/o transfusions. Continues on triple therapy     Allergies:  No Known Allergies      Hospital Medications:  apixaban 5 milliGRAM(s) Oral every 12 hours  aspirin  chewable 81 milliGRAM(s) Oral daily  atorvastatin 80 milliGRAM(s) Oral at bedtime  carvedilol 12.5 milliGRAM(s) Oral every 12 hours  chlorhexidine 4% Liquid 1 Application(s) Topical <User Schedule>  clobetasol 0.05% Ointment 1 Application(s) Topical two times a day  clopidogrel Tablet 75 milliGRAM(s) Oral daily  HYDROmorphone   Tablet 4 milliGRAM(s) Oral every 6 hours PRN  influenza   Vaccine 0.5 milliLiter(s) IntraMuscular once  insulin glargine Injectable (LANTUS) 50 Unit(s) SubCutaneous <User Schedule>  insulin lispro (ADMELOG) corrective regimen sliding scale   SubCutaneous three times a day before meals  insulin lispro (ADMELOG) corrective regimen sliding scale   SubCutaneous at bedtime  insulin lispro Injectable (ADMELOG) 2 Unit(s) SubCutaneous three times a day before meals  isosorbide   dinitrate Tablet (ISORDIL) 30 milliGRAM(s) Oral three times a day  lisinopril 10 milliGRAM(s) Oral daily  methadone    Tablet 5 milliGRAM(s) Oral every 6 hours  nystatin Powder 1 Application(s) Topical two times a day  pantoprazole  Injectable 40 milliGRAM(s) IV Push every 12 hours  pregabalin 150 milliGRAM(s) Oral three times a day  thiamine IVPB 500 milliGRAM(s) IV Intermittent three times a day      ROS: 14 point ROS negative unless otherwise state in subjective    PHYSICAL EXAM:   Vital Signs:  Vital Signs Last 24 Hrs  T(C): 36.9 (08 Mar 2022 05:00), Max: 37 (07 Mar 2022 17:00)  T(F): 98.5 (08 Mar 2022 05:00), Max: 98.6 (07 Mar 2022 17:00)  HR: 91 (08 Mar 2022 07:30) (80 - 106)  BP: 119/67 (08 Mar 2022 07:30) (102/58 - 140/69)  BP(mean): 87 (08 Mar 2022 07:30) (75 - 98)  RR: 18 (08 Mar 2022 07:30) (14 - 22)  SpO2: 98% (08 Mar 2022 07:30) (95% - 98%)  Daily     Daily     GENERAL:  No acute distress  HEENT:  NCAT, no scleral icterus  CHEST: no resp distress  HEART:  RRR  ABDOMEN:  Soft, non-tender, non-distended, normoactive bowel sounds, no masses  EXTREMITIES:  No cyanosis, clubbing, or edema  SKIN:  No rash/erythema/ecchymoses/petechiae/wounds/abscess/warm/dry  NEURO:  Alert and oriented x 3, no asterixis, no tremor    LABS:                        10.0   9.72  )-----------( 414      ( 08 Mar 2022 06:21 )             30.6     Mean Cell Volume: 92.4 fl (03-08-22 @ 06:21)    03-08    140  |  106  |  7   ----------------------------<  155<H>  4.0   |  23  |  0.66    Ca    8.6      08 Mar 2022 06:21  Phos  3.2     03-08  Mg     1.7     03-08    TPro  5.4<L>  /  Alb  2.7<L>  /  TBili  0.3  /  DBili  x   /  AST  12  /  ALT  42  /  AlkPhos  61  03-08    LIVER FUNCTIONS - ( 08 Mar 2022 06:21 )  Alb: 2.7 g/dL / Pro: 5.4 g/dL / ALK PHOS: 61 U/L / ALT: 42 U/L / AST: 12 U/L / GGT: x           PT/INR - ( 08 Mar 2022 06:21 )   PT: 13.0 sec;   INR: 1.12 ratio         PTT - ( 08 Mar 2022 06:21 )  PTT:30.2 sec          Imaging:           Gastroenterology/Hepatology Progress Note      Interval Events:   - no acute events overnight, no bowel movements   - Hg uptrending w/o transfusions. Remains on A/C and DAPT    Allergies:  No Known Allergies      Hospital Medications:  apixaban 5 milliGRAM(s) Oral every 12 hours  aspirin  chewable 81 milliGRAM(s) Oral daily  atorvastatin 80 milliGRAM(s) Oral at bedtime  carvedilol 12.5 milliGRAM(s) Oral every 12 hours  chlorhexidine 4% Liquid 1 Application(s) Topical <User Schedule>  clobetasol 0.05% Ointment 1 Application(s) Topical two times a day  clopidogrel Tablet 75 milliGRAM(s) Oral daily  HYDROmorphone   Tablet 4 milliGRAM(s) Oral every 6 hours PRN  influenza   Vaccine 0.5 milliLiter(s) IntraMuscular once  insulin glargine Injectable (LANTUS) 50 Unit(s) SubCutaneous <User Schedule>  insulin lispro (ADMELOG) corrective regimen sliding scale   SubCutaneous three times a day before meals  insulin lispro (ADMELOG) corrective regimen sliding scale   SubCutaneous at bedtime  insulin lispro Injectable (ADMELOG) 2 Unit(s) SubCutaneous three times a day before meals  isosorbide   dinitrate Tablet (ISORDIL) 30 milliGRAM(s) Oral three times a day  lisinopril 10 milliGRAM(s) Oral daily  methadone    Tablet 5 milliGRAM(s) Oral every 6 hours  nystatin Powder 1 Application(s) Topical two times a day  pantoprazole  Injectable 40 milliGRAM(s) IV Push every 12 hours  pregabalin 150 milliGRAM(s) Oral three times a day  thiamine IVPB 500 milliGRAM(s) IV Intermittent three times a day      ROS: 14 point ROS negative unless otherwise state in subjective    PHYSICAL EXAM:   Vital Signs:  Vital Signs Last 24 Hrs  T(C): 36.9 (08 Mar 2022 05:00), Max: 37 (07 Mar 2022 17:00)  T(F): 98.5 (08 Mar 2022 05:00), Max: 98.6 (07 Mar 2022 17:00)  HR: 91 (08 Mar 2022 07:30) (80 - 106)  BP: 119/67 (08 Mar 2022 07:30) (102/58 - 140/69)  BP(mean): 87 (08 Mar 2022 07:30) (75 - 98)  RR: 18 (08 Mar 2022 07:30) (14 - 22)  SpO2: 98% (08 Mar 2022 07:30) (95% - 98%)  Daily     Daily     GENERAL:  No acute distress  HEENT:  NCAT, no scleral icterus  CHEST: no resp distress  HEART:  RRR  ABDOMEN:  Soft, non-tender, non-distended, normoactive bowel sounds, no masses  EXTREMITIES:  No cyanosis, clubbing, or edema  SKIN:  No rash/erythema/ecchymoses/petechiae/wounds/abscess/warm/dry  NEURO:  Alert and oriented x 3, no asterixis, no tremor    LABS:                        10.0   9.72  )-----------( 414      ( 08 Mar 2022 06:21 )             30.6     Mean Cell Volume: 92.4 fl (03-08-22 @ 06:21)    03-08    140  |  106  |  7   ----------------------------<  155<H>  4.0   |  23  |  0.66    Ca    8.6      08 Mar 2022 06:21  Phos  3.2     03-08  Mg     1.7     03-08    TPro  5.4<L>  /  Alb  2.7<L>  /  TBili  0.3  /  DBili  x   /  AST  12  /  ALT  42  /  AlkPhos  61  03-08    LIVER FUNCTIONS - ( 08 Mar 2022 06:21 )  Alb: 2.7 g/dL / Pro: 5.4 g/dL / ALK PHOS: 61 U/L / ALT: 42 U/L / AST: 12 U/L / GGT: x           PT/INR - ( 08 Mar 2022 06:21 )   PT: 13.0 sec;   INR: 1.12 ratio         PTT - ( 08 Mar 2022 06:21 )  PTT:30.2 sec

## 2022-03-08 NOTE — PROGRESS NOTE ADULT - ASSESSMENT
Impression:  # Dark stool: DDx includes PUD, erosive gastritis, esophagitis, dieulafoy lesion, angioectasia. Currently hemodynamically stable and hgb relatively stable.   # Anemia: Normocytic. Patient is iron deficient. No recent endosocpy or colonoscopy on record. Uptrending Hg w/o transfusions   # CAD on DAPT  # STEMI  # DKA    Recommendation:  - PPI 40mg IV BID  - trend CBC, CMP, INR  - 2 large bore IVs; active type and screen  - transfuse Hgb > 7, Platelets > 50  - pantoprazole 40mg IV BID  - c/w DAPT + apixaban  - given acute medical issues and comorbidities with stable hgb and VSS, will defer endoscopy for anemia as patient is stable already on DAPT now, and he does not want to pursue endoscopic evaluation at this time  - if hgb downtrends and has ongoing c/f GI bleeding, can plan for EGD +/- colonoscopy while on DAPT  - regardless, patient will need eventual endoscopy eval for anemia, however this can be deferred for now if counts remains stable given ongoing medical issues   - supportive care as per primary team    GI will continue to follow.     All recommendations are tentative until note is attested by attending.     Lincoln Manzo, PGY5  Gastroenterology/Hepatology Fellow  Available on Microsoft Teams  [phone] ([employer] Short Range Pager)  [phone] (Long Range Pager)    After 5pm, please contact the on-call GI fellow. [phone]   Impression:  # Dark stool: DDx includes PUD, erosive gastritis, esophagitis, dieulafoy lesion, angioectasia. Currently hemodynamically stable and hgb relatively stable.   # Anemia: Normocytic. Patient is iron deficient. No recent endosocpy or colonoscopy on record. Uptrending Hg w/o transfusions   # CAD on DAPT  # STEMI  # DKA    Recommendation:  - PPI 40mg IV BID  - trend CBC, CMP, INR  - 2 large bore IVs; active type and screen  - transfuse Hgb > 7, Platelets > 50  - pantoprazole 40mg IV BID  - c/w DAPT + apixaban  - given acute medical issues and comorbidities with stable hgb and VSS, will defer endoscopy for anemia as patient is stable already on DAPT now, and he does not want to pursue endoscopic evaluation at this time  - if hgb downtrends and has ongoing c/f GI bleeding, can plan for EGD +/- colonoscopy while on DAPT  - regardless, patient will need eventual endoscopy eval for anemia, however this can be deferred for now if counts remains stable given ongoing medical issues   - supportive care as per primary team    GI will sign off. Please reconsult as necessary    All recommendations are tentative until note is attested by attending.     Lincoln Manzo, PGY5  Gastroenterology/Hepatology Fellow  Available on Microsoft Teams  [phone] ([employer] Short Range Pager)  [phone] (Long Range Pager)    After 5pm, please contact the on-call GI fellow. [phone]

## 2022-03-08 NOTE — PROGRESS NOTE ADULT - SUBJECTIVE AND OBJECTIVE BOX
HPI:  55 y/o M with PMH of HTN, T2DM, neuropathy, chronic pain on Methadone & opioids for cervical spine and back injury transferred to [facility] CICU from [place] due to STEMI and DKA. Pt was reportedly found to have inferolateral ST elevations + afib on EKG, trop 7560. Pt initially came to [facility] ED due to nausea and vomiting for 3 days, reportedly had CP several days ago which apparently stopped on its own. Pt currently altered, difficult to obtain history from.    Pt transferred to [facility] for cath, noted to be in afib w/ RVR to 130s-150s, received cardizem 25 mg IV x 2 and started on cardizem gtt, cath was aborted as pt had SCR of 2.14, (baseline 0.7-0.9 in 10/2021), transferred to CCU for optimization of DKA prior to cath.     Baseline SCr 0.7-0.9 in 10/2021 from previous hospitalization at [facility] for cellulitis of hand, was d/rome on bactrim DS + ethambutol x 4 weeks.    Labs from [facility] significant for: WBC 15.53, lactate 5.6, troponin 7560.2, K 3.2, CO2 5, AG 42, SCr/BUN 2.14/44, Glucose 663, calcium 12.7, Mg 2.7, Phos 9.5  FSGs >600 -> 503  ABG 7.19/<19/133/3  UA: large ketones, moderate bacteria, negative LE and nitrite  RVP/COVID negative (01 Mar 2022 13:56)    24 Hour Events:  No events overnight. Hemoglobin stable.    MEDICATIONS  (STANDING):  apixaban 5 milliGRAM(s) Oral every 12 hours  aspirin  chewable 81 milliGRAM(s) Oral daily  atorvastatin 80 milliGRAM(s) Oral at bedtime  carvedilol 12.5 milliGRAM(s) Oral every 12 hours  chlorhexidine 4% Liquid 1 Application(s) Topical <User Schedule>  clobetasol 0.05% Ointment 1 Application(s) Topical two times a day  clopidogrel Tablet 75 milliGRAM(s) Oral daily  influenza   Vaccine 0.5 milliLiter(s) IntraMuscular once  insulin glargine Injectable (LANTUS) 50 Unit(s) SubCutaneous <User Schedule>  insulin lispro (ADMELOG) corrective regimen sliding scale   SubCutaneous three times a day before meals  insulin lispro (ADMELOG) corrective regimen sliding scale   SubCutaneous at bedtime  insulin lispro Injectable (ADMELOG) 2 Unit(s) SubCutaneous three times a day before meals  isosorbide   dinitrate Tablet (ISORDIL) 30 milliGRAM(s) Oral three times a day  lisinopril 10 milliGRAM(s) Oral daily  methadone    Tablet 5 milliGRAM(s) Oral every 6 hours  nystatin Powder 1 Application(s) Topical two times a day  pantoprazole  Injectable 40 milliGRAM(s) IV Push every 12 hours  pregabalin 150 milliGRAM(s) Oral three times a day  thiamine IVPB 500 milliGRAM(s) IV Intermittent three times a day    MEDICATIONS  (PRN):  HYDROmorphone   Tablet 4 milliGRAM(s) Oral every 6 hours PRN Severe Pain (7 - 10)        REVIEW OF SYSTEMS:  Otherwise, 10 point ROS done and otherwise negative.    PHYSICAL EXAM:  ICU Vital Signs Last 24 Hrs  T(C): 36.9 (08 Mar 2022 05:00), Max: 37 (07 Mar 2022 17:00)  T(F): 98.5 (08 Mar 2022 05:00), Max: 98.6 (07 Mar 2022 17:00)  HR: 90 (08 Mar 2022 07:00) (80 - 106)  BP: 129/70 (08 Mar 2022 06:00) (102/58 - 140/69)  BP(mean): 92 (08 Mar 2022 06:00) (75 - 98)  ABP: --  ABP(mean): --  RR: 18 (08 Mar 2022 07:00) (14 - 22)  SpO2: 98% (08 Mar 2022 07:00) (95% - 98%)    I&O's Detail    07 Mar 2022 07:01  -  08 Mar 2022 07:00  --------------------------------------------------------  IN:    IV PiggyBack: 200 mL    Oral Fluid: 720 mL  Total IN: 920 mL    OUT:    Incontinent per Condom Catheter (mL): 800 mL    Voided (mL): 4150 mL  Total OUT: 4950 mL    Total NET: -4030 mL        Appearance: Normal	  HEENT:   Normal oral mucosa, PERRL, EOMI	  Lymphatic: No lymphadenopathy  Cardiovascular: Normal S1 S2, No JVD, No murmurs, No edema  Respiratory: Lungs clear to auscultation	  Psychiatry: A & O x 3, Mood & affect appropriate  Gastrointestinal:  Soft, Non-tender, + BS	  Skin: Lesions to bilateral hands. No ecchymoses, No cyanosis	  Neurologic: Non-focal  Extremities: Normal range of motion, No clubbing, cyanosis. 1+ Edema bilaterally upper and lower extremities. RFA access site dry, no bleeding, no hematoma, no pain with palpation  Vascular: Peripheral pulses palpable 2+ bilaterally    .  LABS:                         10.0   9.72  )-----------( 414      ( 08 Mar 2022 06:21 )             30.6     03-08    140  |  106  |  7   ----------------------------<  155<H>  4.0   |  23  |  0.66    Ca    8.6      08 Mar 2022 06:21  Phos  3.2     03-08  Mg     1.7     03-08    TPro  5.4<L>  /  Alb  2.7<L>  /  TBili  0.3  /  DBili  x   /  AST  12  /  ALT  42  /  AlkPhos  61  03-08    PT/INR - ( 08 Mar 2022 06:21 )   PT: 13.0 sec;   INR: 1.12 ratio         PTT - ( 08 Mar 2022 06:21 )  PTT:30.2 sec              RADIOLOGY, EKG & ADDITIONAL TESTS: Reviewed.

## 2022-03-08 NOTE — PROGRESS NOTE ADULT - ATTENDING COMMENTS
Patient seen and examined. Agree with assessment and plan as outlined above.  57 yo M with chronic spinal pain with inferolateral ST elevation MI, DKA and atrial fibrillation with RVR. Patient with old lacunar infarcts on brain MRI. Patient status-post LCx poba. DKA resolved and patient in sinus rhythm. Patient on triple therapy with aspirin, plavix, eliquis. Patient to complete 1 week of triple therapy and then will continue plavix/eliquis bid. Continue coreg bid/isordil and atorvastatin. Endocrine evaluation for medical management of diabetes. Social work evaluation. Patient for out-patient physical therapy. Outpatient GI follow-up. He will need out-patient cardiology follow-up. Patient for telemetry transfer. Patient seen and examined. Agree with assessment and plan as outlined above.  57 yo M with chronic spinal pain with inferolateral ST elevation MI, DKA and atrial fibrillation with RVR. Patient with old lacunar infarcts on brain MRI. Patient status-post LCx poba. DKA resolved and patient in sinus rhythm. Patient on triple therapy with aspirin, plavix, eliquis. Patient to complete 1 week of triple therapy and then will continue plavix/eliquis bid. Continue coreg bid/lisinopril and atorvastatin. Discontinue isosorbide. Endocrine evaluation for medical management of diabetes. Social work evaluation. Patient for out-patient physical therapy. Outpatient GI follow-up. He will need out-patient cardiology follow-up. Patient for telemetry transfer.

## 2022-03-08 NOTE — DISCHARGE NOTE NURSING/CASE MANAGEMENT/SOCIAL WORK - NSDCVIVACCINE_GEN_ALL_CORE_FT
Tdap; 24-Oct-2021 12:15; Jahaira Ruiz (RN); [employer]; [id number] (Exp. Date: 29-Jul-2023); IntraMuscular; Deltoid Right.; 0.5 milliLiter(s); VIS (VIS Published: 09-May-2013, VIS Presented: 24-Oct-2021);

## 2022-03-08 NOTE — PROGRESS NOTE ADULT - PROBLEM SELECTOR PLAN 5
Chronic stable  Pain management consulted with recommendations ordered:  Continue methadone 5 mg Q 6 hours.  Continue lyrica 150 mg TID.  Continue PO dilaudid 4 mg QID PRN.

## 2022-03-08 NOTE — PROGRESS NOTE ADULT - PROBLEM SELECTOR PLAN 1
Presented with STEMI  (trop 7560, inferolateral LUIS on EKG).   TTE 3/1: hypokinetic inferolateral and mid inferior wall, basal inferior wall is akinetic.   PCI: 3/6: LCX POBA RFA access   Continue asa 18 mg daily, plavix 75 mg daily,  atorvastatin 80mg daily, eliquis 5mg BID, coreg 25mg BID  Monitor on telemetry

## 2022-03-08 NOTE — PROGRESS NOTE ADULT - PROBLEM SELECTOR PLAN 2
----- Message from Constance Robledo DO sent at 4/5/2019 12:21 PM CDT -----  Lipid panel shows  and HDL low at 39. Goal of LDL should be closer to 70. Recommend increasing statin dosing.   Presented with DKA  Endocrinology consulted with the following recommendations:  Starting TOMORROW:   Start Lantus 36 units daily (has been receiving Lantus around 11am)  Start Admelog 10 units qac  Mod correction scale qac + bedtime  Goal glucose 100-180  Outpt. endo follow-up  Outpt. optho, podiatry, micro/cr  Plan to d/c on basal + optimized regimen of Metformin (change to XL for better adherence 2000mg daily), Jardiance 25mg daily, and Trulicity 1.5mg weekly). Needs better adherence to medications and diet which we discussed at length.

## 2022-03-08 NOTE — PHYSICAL THERAPY INITIAL EVALUATION ADULT - PERTINENT HX OF CURRENT PROBLEM, REHAB EVAL
55 y/o M with PMH of HTN, T2DM, neuropathy, chronic pain on Methadone & opioids for cervical spine and back injury transferred to [facility] CICU from [place] due to STEMI and DKA. Pt was found to have inferolateral ST elevations + afib on EKG, trop 7560. Pt initially came to [facility] ED due to N/V x 3 days, reportedly had CP several days ago which apparently stopped on its own.

## 2022-03-08 NOTE — PHYSICAL THERAPY INITIAL EVALUATION ADULT - ACTIVE RANGE OF MOTION EXAMINATION, REHAB EVAL
except LUE 50% ROM d/t cellulitis/bilateral upper extremity Active ROM was WFL (within functional limits)/bilateral  lower extremity Active ROM was WFL (within functional limits) except LUE 50% available ROM d/t cellulitis/bilateral upper extremity Active ROM was WFL (within functional limits)/bilateral  lower extremity Active ROM was WFL (within functional limits)

## 2022-03-08 NOTE — CHART NOTE - NSCHARTNOTEFT_GEN_A_CORE
MAR Accept Note  Kirill Chavira MD, PGY3    Transfer to:    Accepting Attending Physician:    Assigned Room:      Patient seen at bedside.  Labs and data reviewed.   No findings precluding transfer of service.       HPI/MICU COURSE:   Please refer to MICU transfer note for full details. Briefly, this is a 55 y/o M with PMH of HTN, T2DM, neuropathy, chronic pain (on methadone & opioids) for cervical spine and back injury transferred from [place] due to STEMI (inferolateral ST elevation) and DKA. Pt transferred to [facility] for cath, noted to be in afib w/ RVR to 130s-150s, received Cardizem 25 mg IV x 2 and started on Cardizem gtt, cath was aborted as pt had SCR of 2.14, (baseline 0.7-0.9 in 10/2021), transferred to CCU for optimization of DKA prior to cath. Patient admitted with AMS likely 2/2 to metabolic encephalopathy 2/2 DKA. CT head 3/2 showed volume loss, microvascular disease, and ago indeterminate lacunar infarct, and foci of air in left sella turcica. MRI 3/4 showed no mass, intracranial hemorrhage, or acute infarct. AMS resolved with resolution of DKA and patient now AOx4. JAXON resolved with IV fluid. DKA resolved s/p DKA protocol and patient transitioned to long acting insulin/NPH 3/4. 3/3 patient developed rash on BL hands and feet. Biopsy performed and rash improved on its own so low suspicion for infectious etiology. 3/6 patient developed melanotic stools, FOBT+. Pt remained hemodynamically stable, hemoglobin remained stable, and patient transitioned from Brilinta to Plavix. Patient stable for PCI 3/6 and received LCX POBA via RFA access with angioseal closure. Transitioned from heparin to Eliquis 3/7.       FOR FOLLOW-UP:  [ ] home nursing   [ ] PT and OT   [ ] continue all DAPT meds   [ ] continue to monitor blood glucose and anion gap.    Kirill Chavira, PGY3  MAR [id number] (NS) / [id number] ([facility])

## 2022-03-08 NOTE — PROGRESS NOTE ADULT - TIME-BASED BILLING (NON-CRITICAL CARE)
Pt LM on my voicemail and he need a lab order prior to his appt on 5/16/17.    Thank you  Adrianne   Time-based billing (NON-critical care)

## 2022-03-08 NOTE — PROGRESS NOTE ADULT - ATTENDING COMMENTS
Agree with above. H/H stable, on DAPT and anticoagulation but no further signs of bleeding. Would monitor on PPI for now, EGD deferred per patient's wishes also. He should f/u eventually for EGD/colon cancer screening as outpatient, can f/u at [phone] upon discharge.

## 2022-03-08 NOTE — DISCHARGE NOTE NURSING/CASE MANAGEMENT/SOCIAL WORK - PATIENT PORTAL LINK FT
You can access the FollowMyHealth Patient Portal offered by [facility] by registering at the following website: [email or web address]. By joining [facility]’s FollowMyHealth portal, you will also be able to view your health information using other applications (apps) compatible with our system.

## 2022-03-08 NOTE — DISCHARGE NOTE NURSING/CASE MANAGEMENT/SOCIAL WORK - NSDCPEFALRISK_GEN_ALL_CORE
For information on Fall & Injury Prevention, visit: [email or web address] OR  [email or web address] OR  https://www.cdc.gov/steadi/patient.html

## 2022-03-08 NOTE — CONSULT NOTE ADULT - PROBLEM SELECTOR RECOMMENDATION 9
Diabetes Education and Nutrition Eval  Balance out regimen as patient on high doses of basal and very little prandial insulin.   Already received Lantus 50 units therefore recommend Admelog 5 units with dinner.  Starting TOMORROW:   Start Lantus 36 units daily (has been receiving Lantus around 11am)  Start Admelog 10 units qac  Mod correction scale qac + bedtime  Goal glucose 100-180  Outpt. endo follow-up  Outpt. optho, podiatry, micro/cr  Plan to d/c on basal + optimized regimen of Metformin (change to XL for better adherence), Jardiance 25mg daily, and Trulicity 1.5mg weekly). Needs better adherence to medications and diet which we discussed at length. Diabetes Education and Nutrition Eval  Balance out regimen as patient on high doses of basal and very little prandial insulin.   Already received Lantus 50 units therefore recommend Admelog 5 units with dinner.  Starting TOMORROW:   Start Lantus 36 units daily (has been receiving Lantus around 11am)  Start Admelog 10 units qac  Mod correction scale qac + bedtime  Goal glucose 100-180  Outpt. endo follow-up  Outpt. optho, podiatry, micro/cr  Plan to d/c on basal + optimized regimen of Metformin (change to XL for better adherence 2000mg daily), Jardiance 25mg daily, and Trulicity 1.5mg weekly). Needs better adherence to medications and diet which we discussed at length.

## 2022-03-08 NOTE — PHYSICAL THERAPY INITIAL EVALUATION ADULT - ADDITIONAL COMMENTS
[facility] LUIS, R handrail. Pt states he has girlfriend that visits often. PTA pt was independent with all ADLs, +driving.

## 2022-03-08 NOTE — PROGRESS NOTE ADULT - SUBJECTIVE AND OBJECTIVE BOX
56 yr old male with a pmh of HTN, T2DM, neuropathy, chronic pain on Methadone & opioids for cervical spine and back injury transferred to [facility] CICU from [place] due to STEMI and DKA. Pt was reportedly found to have inferolateral ST elevations + afib RVR on EKG, trop 7560, JAXON with Cr 2.14 s/p PCI on 3/6 and received LCX POBA via RFA access with angioseal closure.    Patient reports currently feeling well.  Denies  headache, dizziness, chest pain, palpitations, SOB, abdominal pain, joint pain, diarrhea/constipation, urinary symptoms.   Vitals on transfer: T T98.3, HR 96, /71, RR 20 satting 95% RA    PAST MEDICAL HISTORY:  Diabetes mellitus   Gastric ulcer   H/O radiculopathy/spinal cord compression   Hypertension   Spinal stenosis   Spondylosis.     PAST SURGICAL HISTORY:  H/O cervical spine surgery   History of back surgery   S/P cervical spinal fusion.     FAMILY HISTORY:  Father  Still living? Yes, Estimated age: Age Unknown  FH: hypertension, Age at diagnosis: Age Unknown    Mother  Still living? Yes, Estimated age: Age Unknown  FH: diabetes mellitus, Age at diagnosis: Age Unknown.    Social history: nonsmoker, social alcohol intake and no illict drug use  Allergies: no known drug allergies       REVIEW OF SYSTEMS:    CONSTITUTIONAL: No weakness, fevers or chills  EYES/ENT: No visual changes;  No dysphagia; No sore throat; No rhinorrhea; No sinus pain/pressure  NECK: No pain or stiffness  RESPIRATORY: No cough, wheezing, hemoptysis; No shortness of breath  CARDIOVASCULAR: No chest pain or palpitations; No lower extremity edema  GASTROINTESTINAL: No abdominal or epigastric pain. No nausea, vomiting, or hematemesis; No diarrhea or constipation. No melena or hematochezia.  GENITOURINARY: No dysuria, frequency or hematuria  NEUROLOGICAL: No numbness or weakness  MSK: ambulates with  SKIN: No itching, burning, rashes, or lesions   All other review of systems is negative unless indicated above.    PHYSICAL EXAM:  GENERAL: NAD, well-developed, well-nourished  HEAD:  Atraumatic, Normocephalic  EYES: EOMI, PERRL, conjunctiva and sclera clear  NECK: Supple, No JVD  CHEST/LUNG: Clear to auscultation bilaterally; No wheezes, rales or rhonchi  HEART: Regular rate and rhythm; No murmurs, rubs, or gallops, (+)S1, S2  ABDOMEN: Soft, Nontender, Nondistended; Normal Bowel sounds   EXTREMITIES:  2+ Peripheral Pulses, No clubbing, cyanosis, or edema  PSYCH: normal mood and affect  NEUROLOGY: AAOx3, non-focal  SKIN: No rashes or lesions

## 2022-03-08 NOTE — PHYSICAL THERAPY INITIAL EVALUATION ADULT - PRECAUTIONS/LIMITATIONS, REHAB EVAL
Pt transferred to [facility] for cath, noted to be in afib w/ RVR to 130s-150s, received cardizem 25 mg IV x 2 and started on cardizem gtt, cath was aborted as pt had SCR of 2.14, (baseline 0.7-0.9 in 10/2021), transferred to CCU for optimization of DKA prior to cath./no known precautions/limitations Pt transferred to [facility] for cath, noted to be in afib w/ RVR to 130s-150s, received cardizem 25 mg IV x 2 and started on cardizem gtt, cath was aborted as pt had SCR of 2.14, (baseline 0.7-0.9 in 10/2021), transferred to CCU for optimization of DKA prior to cath.; AMS now resolved and cardiac cath with POBA completed 3/6./no known precautions/limitations

## 2022-03-08 NOTE — CONSULT NOTE ADULT - SUBJECTIVE AND OBJECTIVE BOX
HPI:  55 y/o M with PMH of HTN, T2DM, neuropathy, chronic pain on Methadone & opioids for cervical spine and back injury transferred to [facility] CICU from [place] due to STEMI and DKA. Pt was reportedly found to have inferolateral ST elevations + afib on EKG, trop 7560. Pt initially came to [facility] ED due to nausea and vomiting for 3 days, reportedly had CP several days ago which apparently stopped on its own. Pt currently altered, difficult to obtain history from.    Pt transferred to [facility] for cath, noted to be in afib w/ RVR to 130s-150s, received cardizem 25 mg IV x 2 and started on cardizem gtt, cath was aborted as pt had SCR of 2.14, (baseline 0.7-0.9 in 10/2021), transferred to CCU for optimization of DKA prior to cath.     Baseline SCr 0.7-0.9 in 10/2021 from previous hospitalization at [facility] for cellulitis of hand, was d/rome on bactrim DS + ethambutol x 4 weeks.    Labs from [facility] significant for: WBC 15.53, lactate 5.6, troponin 7560.2, K 3.2, CO2 5, AG 42, SCr/BUN 2.14/44, Glucose 663, calcium 12.7, Mg 2.7, Phos 9.5  FSGs >600 -> 503  ABG 7.19/<19/133/3  UA: large ketones, moderate bacteria, negative LE and nitrite  RVP/COVID negative (01 Mar 2022 13:56)      PAST MEDICAL & SURGICAL HISTORY:  Hypertension    Diabetes mellitus    Gastric ulcer    Spinal stenosis    H/O spinal cord compression    Spondylosis    H/O radiculopathy    H/O cervical spine surgery    History of back surgery    S/P cervical spinal fusion        FAMILY HISTORY:  FH: hypertension (Father)    FH: diabetes mellitus (Mother)        Social History:    Outpatient Medications:    MEDICATIONS  (STANDING):  apixaban 5 milliGRAM(s) Oral every 12 hours  aspirin  chewable 81 milliGRAM(s) Oral daily  atorvastatin 80 milliGRAM(s) Oral at bedtime  carvedilol 25 milliGRAM(s) Oral every 12 hours  clobetasol 0.05% Ointment 1 Application(s) Topical two times a day  clopidogrel Tablet 75 milliGRAM(s) Oral daily  influenza   Vaccine 0.5 milliLiter(s) IntraMuscular once  insulin glargine Injectable (LANTUS) 36 Unit(s) SubCutaneous <User Schedule>  insulin lispro (ADMELOG) corrective regimen sliding scale   SubCutaneous three times a day before meals  insulin lispro (ADMELOG) corrective regimen sliding scale   SubCutaneous at bedtime  insulin lispro Injectable (ADMELOG). 5 Unit(s) SubCutaneous once  lisinopril 10 milliGRAM(s) Oral daily  methadone    Tablet 5 milliGRAM(s) Oral every 6 hours  nystatin Powder 1 Application(s) Topical two times a day  pantoprazole  Injectable 40 milliGRAM(s) IV Push every 12 hours  pregabalin 150 milliGRAM(s) Oral three times a day    MEDICATIONS  (PRN):  HYDROmorphone   Tablet 4 milliGRAM(s) Oral every 6 hours PRN Severe Pain (7 - 10)      Allergies    No Known Allergies    Intolerances      Review of Systems:  Constitutional: No fever, No change in weight  Eyes: No blurry vision  Neuro: No headache, No paresthesias  HEENT: No throat pain  Cardiovascular: No chest pain  Respiratory: No SOB  GI: No nausea or vomiting  : No polyuria  Skin: no rash  Psych: no depression  Endocrine: No polydipsia, No heat or cold intolerance, rest as noted in HPI  Hem/lymph: no swelling    All other review of systems negative      PHYSICAL EXAM:  VITALS: T(C): 36.7 (03-08-22 @ 14:00)  T(F): 98.1 (03-08-22 @ 14:00), Max: 98.6 (03-07-22 @ 17:00)  HR: 93 (03-08-22 @ 16:00) (80 - 108)  BP: 110/64 (03-08-22 @ 16:00) (101/62 - 140/76)  RR:  (14 - 22)  SpO2:  (95% - 99%)  Wt(kg): --  GENERAL: NAD at this time  EYES: No proptosis, EOMI  HEENT:  Atraumatic, Normocephalic,   THYROID: Normal size, no palpable nodules  RESPIRATORY: Clear to auscultation bilaterally, full excursion, non-labored  CARDIOVASCULAR: Regular rhythm; No murmurs; no peripheral edema  GI: Soft, nontender, non distended, normal bowel sounds  SKIN: Dry, intact, No rashes or lesions  MUSCULOSKELETAL: normal strength  NEURO: follows commands, no tremor, normal reflexes  PSYCH: Alert and oriented x 3, normal affect, normal mood  CUSHING'S SIGNS: no striae      POCT Blood Glucose.: 244 mg/dL (03-08-22 @ 12:27)  POCT Blood Glucose.: 167 mg/dL (03-08-22 @ 07:45)  POCT Blood Glucose.: 225 mg/dL (03-07-22 @ 21:11)  POCT Blood Glucose.: 188 mg/dL (03-07-22 @ 16:56)  POCT Blood Glucose.: 202 mg/dL (03-07-22 @ 12:36)  POCT Blood Glucose.: 136 mg/dL (03-07-22 @ 08:24)  POCT Blood Glucose.: 152 mg/dL (03-06-22 @ 21:28)  POCT Blood Glucose.: 144 mg/dL (03-06-22 @ 17:27)  POCT Blood Glucose.: 117 mg/dL (03-06-22 @ 12:03)  POCT Blood Glucose.: 132 mg/dL (03-06-22 @ 08:17)  POCT Blood Glucose.: 191 mg/dL (03-05-22 @ 16:57)                              10.0   9.72  )-----------( 414      ( 08 Mar 2022 06:21 )             30.6       03-08    140  |  106  |  7   ----------------------------<  155<H>  4.0   |  23  |  0.66    EGFR if : x   EGFR if non : x     Ca    8.6      03-08  Mg     1.7     03-08  Phos  3.2     03-08    TPro  5.4<L>  /  Alb  2.7<L>  /  TBili  0.3  /  DBili  x   /  AST  12  /  ALT  42  /  AlkPhos  61  03-08    Thyroid Function Tests:  03-01 @ 23:21 TSH 0.43 FreeT4 -- T3 -- Anti TPO -- Anti Thyroglobulin Ab -- TSI --          03-02 Chol 264<H> Direct LDL -- LDL calculated 186<H> HDL 33<L> Trig 229<H>  Radiology:                  HPI:  55 y/o M w/ hx of Type 2 DM complicated by neuropathy on Lyrica and CAD. At home on sliding scale doses of Lantus from 38 units + additional depending on glucose, metformin BID, jardiance daily, and Trulicity weekly. He follows with an endo but cannot recall her name at this time. He is generally nonadherent to medications. Misses doses of all meds more than 2-3x/week. Also nonadherent to diet. Glucose values are variable. No reported hypoglycemia or symptoms of hypoglycemia.   Also hx of HTN, chronic pain on Methadone & opioids for cervical spine and back injury transferred to [facility] CICU from [place] due to STEMI and DKA. Pt was reportedly found to have inferolateral ST elevations + afib on EKG, trop 7560. Pt initially came to [facility] ED due to nausea and vomiting for 3 days, reportedly had CP several days ago which apparently stopped on its own. Pt currently altered, difficult to obtain history from.    Pt transferred to [facility] for cath, noted to be in afib w/ RVR to 130s-150s, received cardizem 25 mg IV x 2 and started on cardizem gtt, cath was aborted as pt had SCR of 2.14, (baseline 0.7-0.9 in 10/2021), transferred to CCU for optimization of DKA prior to cath.     Baseline SCr 0.7-0.9 in 10/2021 from previous hospitalization at [facility] for cellulitis of hand, was d/rome on bactrim DS + ethambutol x 4 weeks.    Labs from [facility] significant for: WBC 15.53, lactate 5.6, troponin 7560.2, K 3.2, CO2 5, AG 42, SCr/BUN 2.14/44, Glucose 663, calcium 12.7, Mg 2.7, Phos 9.5  FSGs >600 -> 503  ABG 7.19/<19/133/3  UA: large ketones, moderate bacteria, negative LE and nitrite  RVP/COVID negative (01 Mar 2022 13:56)      PAST MEDICAL & SURGICAL HISTORY:  Hypertension    Diabetes mellitus    Gastric ulcer    Spinal stenosis    H/O spinal cord compression    Spondylosis    H/O radiculopathy    H/O cervical spine surgery    History of back surgery    S/P cervical spinal fusion        FAMILY HISTORY:  FH: hypertension (Father)    FH: diabetes mellitus (Mother)        Social History: No alcohol or drug abuse    Outpatient Medications:  · 	Lantus Solostar Pen 100 units/mL subcutaneous solution: Last Dose Taken:  , 38 unit(s) subcutaneous once a day  	  	  · 	Trulicity Pen 1.5 mg/0.5 mL subcutaneous solution: Last Dose Taken:  , 1 application subcutaneous once a week   · 	metFORMIN 500 mg oral tablet: Last Dose Taken:  , 1 tab(s) orally 2 times a day   · 	test strips (per patient's insurance): Last Dose Taken:  , 1 application subcutaneously 4 times a day. ** Compatible with patient's glucometer **  	for DM II   	E11.9   · 	lancets: Last Dose Taken:  , 1 application subcutaneously 4 times a day   	for DM II   	E11.9   · 	alcohol swabs : Last Dose Taken:  , Apply topically to affected area 4 times a day  	for DM II   	E11.9   · 	glucometer (per patient's insurance): Last Dose Taken:  , Test blood sugars four times a day. Dispense #1 glucometer.  	for DM II   	E11.9   · 	Insulin Pen Needles, 4mm: Last Dose Taken:  , 1 application subcutaneously 4 times a day. ** Use with insulin pen **   	for DM II   	E11.9   · 	methadone 5 mg oral tablet: Last Dose Taken:  , orally 4 times a day, As Needed  · 	lisinopril 10 mg oral tablet: Last Dose Taken:  , 1 tab(s) orally once a day  · 	HYDROmorphone 4 mg oral tablet: Last Dose Taken:  , 1 tab(s) orally every 6 hours  · 	Vitamin D2 50,000 intl units (1.25 mg) oral capsule: Last Dose Taken:    · 	amLODIPine 2.5 mg oral tablet: Last Dose Taken:  , 1 tab(s) orally once a day  · 	Lyrica 150 mg oral capsule: Last Dose Taken:  , 1 cap(s) orally 3 times a day  · 	Bystolic 2.5 mg oral tablet: Last Dose Taken:  , 1 tab(s) orally once a day  · 	tadalafil 5 mg oral tablet: Last Dose Taken:  , 1 tab(s) orally once a day  · 	cloNIDine 0.2 mg oral tablet: Last Dose Taken:  , 1 tab(s) orally 2 times a day  · 	QUEtiapine 100 mg oral tablet: Last Dose Taken:  , 1 tab(s) orally 2 times a day  · 	Jardiance: Last Dose Taken:  , orally once a day    MEDICATIONS  (STANDING):  apixaban 5 milliGRAM(s) Oral every 12 hours  aspirin  chewable 81 milliGRAM(s) Oral daily  atorvastatin 80 milliGRAM(s) Oral at bedtime  carvedilol 25 milliGRAM(s) Oral every 12 hours  clobetasol 0.05% Ointment 1 Application(s) Topical two times a day  clopidogrel Tablet 75 milliGRAM(s) Oral daily  influenza   Vaccine 0.5 milliLiter(s) IntraMuscular once  insulin glargine Injectable (LANTUS) 36 Unit(s) SubCutaneous <User Schedule>  insulin lispro (ADMELOG) corrective regimen sliding scale   SubCutaneous three times a day before meals  insulin lispro (ADMELOG) corrective regimen sliding scale   SubCutaneous at bedtime  insulin lispro Injectable (ADMELOG). 5 Unit(s) SubCutaneous once  lisinopril 10 milliGRAM(s) Oral daily  methadone    Tablet 5 milliGRAM(s) Oral every 6 hours  nystatin Powder 1 Application(s) Topical two times a day  pantoprazole  Injectable 40 milliGRAM(s) IV Push every 12 hours  pregabalin 150 milliGRAM(s) Oral three times a day    MEDICATIONS  (PRN):  HYDROmorphone   Tablet 4 milliGRAM(s) Oral every 6 hours PRN Severe Pain (7 - 10)      Allergies    No Known Allergies    Intolerances      Review of Systems:  Constitutional: No fever, No change in weight  Eyes: No blurry vision  Neuro: No headache, No paresthesias  HEENT: No throat pain  Cardiovascular: + improved chest pain  Respiratory: No SOB  GI: No nausea or vomiting  : No polyuria  Skin: no rash  Psych: no depression  Endocrine: No polydipsia, No heat or cold intolerance, rest as noted in HPI  Hem/lymph: no swelling    All other review of systems negative      PHYSICAL EXAM:  VITALS: T(C): 36.7 (03-08-22 @ 14:00)  T(F): 98.1 (03-08-22 @ 14:00), Max: 98.6 (03-07-22 @ 17:00)  HR: 93 (03-08-22 @ 16:00) (80 - 108)  BP: 110/64 (03-08-22 @ 16:00) (101/62 - 140/76)  RR:  (14 - 22)  SpO2:  (95% - 99%)  Wt(kg): --  GENERAL: NAD at this time  EYES: No proptosis, EOMI  HEENT:  Atraumatic, Normocephalic,   THYROID: Normal size, no palpable nodules  RESPIRATORY: Clear to auscultation bilaterally, full excursion, non-labored  CARDIOVASCULAR: Regular rhythm; No murmurs; no peripheral edema  GI: Soft, nontender, non distended, normal bowel sounds  SKIN: Dry, intact, No rashes or lesions  MUSCULOSKELETAL: normal strength  NEURO: follows commands  PSYCH: Alert and oriented x 3, normal affect, normal mood  CUSHING'S SIGNS: no striae      POCT Blood Glucose.: 244 mg/dL (03-08-22 @ 12:27)  POCT Blood Glucose.: 167 mg/dL (03-08-22 @ 07:45)  POCT Blood Glucose.: 225 mg/dL (03-07-22 @ 21:11)  POCT Blood Glucose.: 188 mg/dL (03-07-22 @ 16:56)  POCT Blood Glucose.: 202 mg/dL (03-07-22 @ 12:36)  POCT Blood Glucose.: 136 mg/dL (03-07-22 @ 08:24)  POCT Blood Glucose.: 152 mg/dL (03-06-22 @ 21:28)  POCT Blood Glucose.: 144 mg/dL (03-06-22 @ 17:27)  POCT Blood Glucose.: 117 mg/dL (03-06-22 @ 12:03)  POCT Blood Glucose.: 132 mg/dL (03-06-22 @ 08:17)  POCT Blood Glucose.: 191 mg/dL (03-05-22 @ 16:57)                              10.0   9.72  )-----------( 414      ( 08 Mar 2022 06:21 )             30.6       03-08    140  |  106  |  7   ----------------------------<  155<H>  4.0   |  23  |  0.66    EGFR if : x   EGFR if non : x     Ca    8.6      03-08  Mg     1.7     03-08  Phos  3.2     03-08    TPro  5.4<L>  /  Alb  2.7<L>  /  TBili  0.3  /  DBili  x   /  AST  12  /  ALT  42  /  AlkPhos  61  03-08    Thyroid Function Tests:  03-01 @ 23:21 TSH 0.43 FreeT4 -- T3 -- Anti TPO -- Anti Thyroglobulin Ab -- TSI --          03-02 Chol 264<H> Direct LDL -- LDL calculated 186<H> HDL 33<L> Trig 229<H>  Radiology:

## 2022-03-09 DIAGNOSIS — K92.1 MELENA: ICD-10-CM

## 2022-03-09 LAB
ALBUMIN SERPL ELPH-MCNC: 3.1 G/DL — LOW (ref 3.3–5)
ALP SERPL-CCNC: 60 U/L — SIGNIFICANT CHANGE UP (ref 40–120)
ALT FLD-CCNC: 40 U/L — SIGNIFICANT CHANGE UP (ref 10–45)
ANION GAP SERPL CALC-SCNC: 10 MMOL/L — SIGNIFICANT CHANGE UP (ref 5–17)
AST SERPL-CCNC: 12 U/L — SIGNIFICANT CHANGE UP (ref 10–40)
BILIRUB SERPL-MCNC: 0.2 MG/DL — SIGNIFICANT CHANGE UP (ref 0.2–1.2)
BLD GP AB SCN SERPL QL: NEGATIVE — SIGNIFICANT CHANGE UP
BUN SERPL-MCNC: 9 MG/DL — SIGNIFICANT CHANGE UP (ref 7–23)
CALCIUM SERPL-MCNC: 9 MG/DL — SIGNIFICANT CHANGE UP (ref 8.4–10.5)
CHLORIDE SERPL-SCNC: 101 MMOL/L — SIGNIFICANT CHANGE UP (ref 96–108)
CO2 SERPL-SCNC: 24 MMOL/L — SIGNIFICANT CHANGE UP (ref 22–31)
CREAT SERPL-MCNC: 0.61 MG/DL — SIGNIFICANT CHANGE UP (ref 0.5–1.3)
EGFR: 114 ML/MIN/1.73M2 — SIGNIFICANT CHANGE UP
GLUCOSE BLDC GLUCOMTR-MCNC: 106 MG/DL — HIGH (ref 70–99)
GLUCOSE BLDC GLUCOMTR-MCNC: 189 MG/DL — HIGH (ref 70–99)
GLUCOSE BLDC GLUCOMTR-MCNC: 197 MG/DL — HIGH (ref 70–99)
GLUCOSE BLDC GLUCOMTR-MCNC: 214 MG/DL — HIGH (ref 70–99)
GLUCOSE SERPL-MCNC: 205 MG/DL — HIGH (ref 70–99)
HCT VFR BLD CALC: 29.7 % — LOW (ref 39–50)
HGB BLD-MCNC: 9.7 G/DL — LOW (ref 13–17)
MAGNESIUM SERPL-MCNC: 1.5 MG/DL — LOW (ref 1.6–2.6)
MCHC RBC-ENTMCNC: 29.6 PG — SIGNIFICANT CHANGE UP (ref 27–34)
MCHC RBC-ENTMCNC: 32.7 GM/DL — SIGNIFICANT CHANGE UP (ref 32–36)
MCV RBC AUTO: 90.5 FL — SIGNIFICANT CHANGE UP (ref 80–100)
NRBC # BLD: 0 /100 WBCS — SIGNIFICANT CHANGE UP (ref 0–0)
PHOSPHATE SERPL-MCNC: 3.1 MG/DL — SIGNIFICANT CHANGE UP (ref 2.5–4.5)
PLATELET # BLD AUTO: 519 K/UL — HIGH (ref 150–400)
POTASSIUM SERPL-MCNC: 4 MMOL/L — SIGNIFICANT CHANGE UP (ref 3.5–5.3)
POTASSIUM SERPL-SCNC: 4 MMOL/L — SIGNIFICANT CHANGE UP (ref 3.5–5.3)
PROT SERPL-MCNC: 5.6 G/DL — LOW (ref 6–8.3)
RBC # BLD: 3.28 M/UL — LOW (ref 4.2–5.8)
RBC # FLD: 15.3 % — HIGH (ref 10.3–14.5)
RH IG SCN BLD-IMP: POSITIVE — SIGNIFICANT CHANGE UP
SODIUM SERPL-SCNC: 135 MMOL/L — SIGNIFICANT CHANGE UP (ref 135–145)
WBC # BLD: 10.92 K/UL — HIGH (ref 3.8–10.5)
WBC # FLD AUTO: 10.92 K/UL — HIGH (ref 3.8–10.5)

## 2022-03-09 PROCEDURE — 99233 SBSQ HOSP IP/OBS HIGH 50: CPT

## 2022-03-09 PROCEDURE — 99233 SBSQ HOSP IP/OBS HIGH 50: CPT | Mod: GC

## 2022-03-09 RX ORDER — METHADONE HYDROCHLORIDE 40 MG/1
5 TABLET ORAL EVERY 6 HOURS
Refills: 0 | Status: DISCONTINUED | OUTPATIENT
Start: 2022-03-09 | End: 2022-03-11

## 2022-03-09 RX ORDER — INSULIN GLARGINE 100 [IU]/ML
36 INJECTION, SOLUTION SUBCUTANEOUS EVERY MORNING
Refills: 0 | Status: DISCONTINUED | OUTPATIENT
Start: 2022-03-10 | End: 2022-03-10

## 2022-03-09 RX ORDER — INSULIN LISPRO 100/ML
12 VIAL (ML) SUBCUTANEOUS
Refills: 0 | Status: DISCONTINUED | OUTPATIENT
Start: 2022-03-09 | End: 2022-03-11

## 2022-03-09 RX ADMIN — Medication 12 UNIT(S): at 17:07

## 2022-03-09 RX ADMIN — ATORVASTATIN CALCIUM 80 MILLIGRAM(S): 80 TABLET, FILM COATED ORAL at 22:29

## 2022-03-09 RX ADMIN — METHADONE HYDROCHLORIDE 5 MILLIGRAM(S): 40 TABLET ORAL at 23:41

## 2022-03-09 RX ADMIN — Medication 150 MILLIGRAM(S): at 13:03

## 2022-03-09 RX ADMIN — METHADONE HYDROCHLORIDE 5 MILLIGRAM(S): 40 TABLET ORAL at 00:23

## 2022-03-09 RX ADMIN — HYDROMORPHONE HYDROCHLORIDE 4 MILLIGRAM(S): 2 INJECTION INTRAMUSCULAR; INTRAVENOUS; SUBCUTANEOUS at 15:00

## 2022-03-09 RX ADMIN — Medication 10 UNIT(S): at 11:37

## 2022-03-09 RX ADMIN — Medication 4: at 11:38

## 2022-03-09 RX ADMIN — LISINOPRIL 10 MILLIGRAM(S): 2.5 TABLET ORAL at 05:34

## 2022-03-09 RX ADMIN — APIXABAN 5 MILLIGRAM(S): 2.5 TABLET, FILM COATED ORAL at 05:34

## 2022-03-09 RX ADMIN — HYDROMORPHONE HYDROCHLORIDE 4 MILLIGRAM(S): 2 INJECTION INTRAMUSCULAR; INTRAVENOUS; SUBCUTANEOUS at 23:46

## 2022-03-09 RX ADMIN — HYDROMORPHONE HYDROCHLORIDE 4 MILLIGRAM(S): 2 INJECTION INTRAMUSCULAR; INTRAVENOUS; SUBCUTANEOUS at 16:02

## 2022-03-09 RX ADMIN — METHADONE HYDROCHLORIDE 5 MILLIGRAM(S): 40 TABLET ORAL at 05:37

## 2022-03-09 RX ADMIN — Medication 10 UNIT(S): at 08:07

## 2022-03-09 RX ADMIN — Medication 1 APPLICATION(S): at 05:43

## 2022-03-09 RX ADMIN — CLOPIDOGREL BISULFATE 75 MILLIGRAM(S): 75 TABLET, FILM COATED ORAL at 08:02

## 2022-03-09 RX ADMIN — METHADONE HYDROCHLORIDE 5 MILLIGRAM(S): 40 TABLET ORAL at 11:48

## 2022-03-09 RX ADMIN — Medication 150 MILLIGRAM(S): at 05:32

## 2022-03-09 RX ADMIN — Medication 81 MILLIGRAM(S): at 08:02

## 2022-03-09 RX ADMIN — PANTOPRAZOLE SODIUM 40 MILLIGRAM(S): 20 TABLET, DELAYED RELEASE ORAL at 05:32

## 2022-03-09 RX ADMIN — PANTOPRAZOLE SODIUM 40 MILLIGRAM(S): 20 TABLET, DELAYED RELEASE ORAL at 17:06

## 2022-03-09 RX ADMIN — APIXABAN 5 MILLIGRAM(S): 2.5 TABLET, FILM COATED ORAL at 17:06

## 2022-03-09 RX ADMIN — Medication 2: at 08:08

## 2022-03-09 RX ADMIN — CARVEDILOL PHOSPHATE 25 MILLIGRAM(S): 80 CAPSULE, EXTENDED RELEASE ORAL at 17:07

## 2022-03-09 RX ADMIN — Medication 150 MILLIGRAM(S): at 22:29

## 2022-03-09 RX ADMIN — Medication 1 APPLICATION(S): at 17:06

## 2022-03-09 RX ADMIN — METHADONE HYDROCHLORIDE 5 MILLIGRAM(S): 40 TABLET ORAL at 17:07

## 2022-03-09 RX ADMIN — HYDROMORPHONE HYDROCHLORIDE 4 MILLIGRAM(S): 2 INJECTION INTRAMUSCULAR; INTRAVENOUS; SUBCUTANEOUS at 05:34

## 2022-03-09 RX ADMIN — INSULIN GLARGINE 36 UNIT(S): 100 INJECTION, SOLUTION SUBCUTANEOUS at 11:42

## 2022-03-09 RX ADMIN — CARVEDILOL PHOSPHATE 25 MILLIGRAM(S): 80 CAPSULE, EXTENDED RELEASE ORAL at 05:33

## 2022-03-09 NOTE — PROGRESS NOTE ADULT - PROBLEM SELECTOR PLAN 7
Presented with afib with RVR now NSR  New A fib diagnosis per patient   [id number] 3, HASBLED 1  Continue eliquis 5mg BID and coreg 25mg BID  Monitor on Telemetry Yes Presented with afib with RVR now NSR  New A fib diagnosis per patient   [id number] 3, HASBLED 1  Continue eliquis 5mg BID and coreg 25mg BID  Monitor on Telemetry    seen by neuro previously for AMS which has resolved, unlikely stroke. MR did show ?L vertebral a. stenosis however pt is asymptomatic and on asa/statin and medical therapy and can have this monitored w/ PCP    D/W gf at bedside  Dispo: Dc home 3/10 pending endo recs; OUTPT follow up for L epitrochlear lymph node enlargement

## 2022-03-09 NOTE — PROGRESS NOTE ADULT - PROBLEM SELECTOR PLAN 1
Presented with STEMI  (trop 7560, inferolateral LUIS on EKG).   TTE 3/1: hypokinetic inferolateral and mid inferior wall, basal inferior wall is akinetic.   TTE 3/2: EF 48%, hypokinesis of basal inferior wall, inferolateral wall, stage I diastolic dysfunction   PCI: 3/6: LCX POBA RFA access w/ angioseal closure   Continue asa 18 mg daily, plavix 75 mg daily,  atorvastatin 80mg daily, eliquis 5mg BID, coreg 25mg BID --- will discuss with cardiology team regarding duration of triple therapy   Monitor on telemetry Presented with STEMI  (trop 7560, inferolateral LUIS on EKG).   TTE 3/2: EF 48%, hypokinesis of basal inferior wall, inferolateral wall, stage I diastolic dysfunction   PCI: 3/6: LCX POBA RFA access w/ angioseal closure   -Continue asa 18 mg daily, plavix 75 mg daily,  atorvastatin 80mg daily, eliquis 5mg BID, coreg 25mg BID --- will discuss with cardiology team regarding duration of triple therapy   -Monitor on telemetry

## 2022-03-09 NOTE — OCCUPATIONAL THERAPY INITIAL EVALUATION ADULT - LIVES WITH, PROFILE
as per PT chart[facility] [place], R handrail. Pt states he has girlfriend that visits often. PTA pt was independent with all ADLs, +driving. as per PT chart and confirmed with pt-[place] [facility] [place] 3 LUIS, R handrail. Pt states he has girlfriend that visits often. PTA pt was independent with all ADLs, however required increased time and modifications, +driving./alone
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
## 2022-03-09 NOTE — PROGRESS NOTE ADULT - SUBJECTIVE AND OBJECTIVE BOX
DIABETES FOLLOW UP NOTE: Saw pt earlier today    Chief Complaint: Endocrine consult requested for management of T2DM    INTERVAL HX: Pt stable, reports tolerating POs with BG still above goal while on present insulin doses. No hypoglycemia. Denies any CP/SOB.       Review of Systems:  General: As above  Cardiovascular: No chest pain, palpitations  Respiratory: No SOB, no cough  GI: No nausea, vomiting, abdominal pain  Endocrine: No polyuria, polydipsia or S&Sx of hypoglycemia    Allergies    No Known Allergies    Intolerances      MEDICATIONS:  atorvastatin 80 milliGRAM(s) Oral at bedtime  insulin glargine Injectable (LANTUS) 36 Unit(s) SubCutaneous <User Schedule>  insulin lispro (ADMELOG) corrective regimen sliding scale   SubCutaneous three times a day before meals  insulin lispro (ADMELOG) corrective regimen sliding scale   SubCutaneous at bedtime  insulin lispro Injectable (ADMELOG) 10 Unit(s) SubCutaneous three times a day before meals    PHYSICAL EXAM:  VITALS: T(C): 36.9 (03-09-22 @ 11:20)  T(F): 98.4 (03-09-22 @ 11:20), Max: 98.6 (03-08-22 @ 19:00)  HR: 88 (03-09-22 @ 11:20) (87 - 107)  BP: 102/67 (03-09-22 @ 11:20) (92/63 - 135/76)  RR:  (16 - 20)  SpO2:  (94% - 99%)  Wt(kg): --  GENERAL: Male laying in bed in NAD  Abdomen: Soft, nontender, non distended, central adiposity  Extremities: Warm, no edema in all 4 exts  NEURO: A&O X3    LABS:  POCT Blood Glucose.: 214 mg/dL (03-09-22 @ 11:18)  POCT Blood Glucose.: 197 mg/dL (03-09-22 @ 07:58)  POCT Blood Glucose.: 217 mg/dL (03-08-22 @ 21:49)  POCT Blood Glucose.: 221 mg/dL (03-08-22 @ 16:58)  POCT Blood Glucose.: 244 mg/dL (03-08-22 @ 12:27)  POCT Blood Glucose.: 167 mg/dL (03-08-22 @ 07:45)  POCT Blood Glucose.: 225 mg/dL (03-07-22 @ 21:11)  POCT Blood Glucose.: 188 mg/dL (03-07-22 @ 16:56)  POCT Blood Glucose.: 202 mg/dL (03-07-22 @ 12:36)  POCT Blood Glucose.: 136 mg/dL (03-07-22 @ 08:24)  POCT Blood Glucose.: 152 mg/dL (03-06-22 @ 21:28)  POCT Blood Glucose.: 144 mg/dL (03-06-22 @ 17:27)                            9.7    10.92 )-----------( 519      ( 09 Mar 2022 12:05 )             29.7       03-08    140  |  106  |  7   ----------------------------<  155<H>  4.0   |  23  |  0.66      Ca    8.6      03-08  Mg     1.7     03-08  Phos  3.2     03-08    TPro  5.4<L>  /  Alb  2.7<L>  /  TBili  0.3  /  DBili  x   /  AST  12  /  ALT  42  /  AlkPhos  61  03-08      Thyroid Function Tests:  03-01 @ 23:21 TSH 0.43 FreeT4 -- T3 -- Anti TPO -- Anti Thyroglobulin Ab -- TSI --      A1C with Estimated Average Glucose Result: 11.2 % (03-02-22 @ 01:48)      Estimated Average Glucose: 275 mg/dL (03-02-22 @ 01:48)        03-02 Chol 264<H> Direct LDL -- LDL calculated 186<H> HDL 33<L> Trig 229<H>

## 2022-03-09 NOTE — PROGRESS NOTE ADULT - PROBLEM SELECTOR PLAN 4
Chronic, stable   Continue coreg 25mg BID, lisinopril 10mg daily Chronic, stable   -Continue coreg 25mg BID, lisinopril 10mg daily

## 2022-03-09 NOTE — PROGRESS NOTE ADULT - ASSESSMENT
56 yr old male presenting with AMS, STEMI, DKA, and JAXON transferred from Ci[facility] to floors 3/8/22     55 yo M PMH DM@, HTN, neuropathy, chronic pain, PUD presenting with AMS (resolved), STEMI s/p LHC with balloon angioplasty, DKA (now resolved), and JAXON (resolved), melena transferred from [facility] to floors 3/8/22

## 2022-03-09 NOTE — OCCUPATIONAL THERAPY INITIAL EVALUATION ADULT - RANGE OF MOTION, PT EVAL
GOAL: Pt will increase PROM to LUE to WFL in 4 weeks. GOAL: Pt will increase AROM to LUE by 30 degrees within 4 weeks.

## 2022-03-09 NOTE — PROGRESS NOTE ADULT - PROBLEM SELECTOR PLAN 1
Test  BG AC AND HS  Lantus 36 units q am  Increase Admelog dose to 12 units qac  C/w Mod correction scale qac + bedtime  Will follow and make adjustments as needed  -need RD consult  -c-peptide to make sure it has recovered fromrecent DKA  Discharge:   -Will be determined base on C-peptide levels since pt presented with severe DKA.  -If low c- peptide will need to stay on basal/bolus.  -If normal C-peptide can discharge on basal insulin plus Metformin (change to XL for better adherence 2000mg daily), Jardiance 25mg daily, and Trulicity 1.5mg weekly).   Outpt. endo follow-up with Dr Dianelys Meléndez. optho, podiatry, micro/cr

## 2022-03-09 NOTE — OCCUPATIONAL THERAPY INITIAL EVALUATION ADULT - PERTINENT HX OF CURRENT PROBLEM, REHAB EVAL
57 y/o M transferred to [facility] CICU from [place] due to STEMI and DKA. Pt was reportedly found to have inferolateral ST elevations + afib on EKG, trop 7560. Pt initially came to [facility] ED due to nausea and vomiting for 3 days, reportedly had CP several days ago which apparently stopped on its own. Pt transferred to [facility] for cath, noted to be in afib w/ RVR to 130s-150s, transferred to CCU for optimization of DKA prior to cath. cont to additional comments

## 2022-03-09 NOTE — PROGRESS NOTE ADULT - SUBJECTIVE AND OBJECTIVE BOX
PROGRESS NOTE:     CONTACT INFO:  Ave Esquivel MD  Internal Medicine PGY2  Pager: [phone]    Patient is a 56y old  Male who presents with a chief complaint of STEMI, DKA (09 Mar 2022 12:41)      SUBJECTIVE / OVERNIGHT EVENTS:  No acute events overnight. Patient seen and evaluated at bedside. Denies SOB at rest, chest pain, palpitations, abdominal pain, nausea/vomiting    ADDITIONAL REVIEW OF SYSTEMS:    MEDICATIONS  (STANDING):  apixaban 5 milliGRAM(s) Oral every 12 hours  aspirin  chewable 81 milliGRAM(s) Oral daily  atorvastatin 80 milliGRAM(s) Oral at bedtime  carvedilol 25 milliGRAM(s) Oral every 12 hours  clobetasol 0.05% Ointment 1 Application(s) Topical two times a day  clopidogrel Tablet 75 milliGRAM(s) Oral daily  influenza   Vaccine 0.5 milliLiter(s) IntraMuscular once  insulin glargine Injectable (LANTUS) 36 Unit(s) SubCutaneous <User Schedule>  insulin lispro (ADMELOG) corrective regimen sliding scale   SubCutaneous three times a day before meals  insulin lispro (ADMELOG) corrective regimen sliding scale   SubCutaneous at bedtime  insulin lispro Injectable (ADMELOG) 12 Unit(s) SubCutaneous three times a day before meals  lisinopril 10 milliGRAM(s) Oral daily  methadone    Tablet 5 milliGRAM(s) Oral every 6 hours  pantoprazole    Tablet 40 milliGRAM(s) Oral two times a day  pregabalin 150 milliGRAM(s) Oral three times a day    MEDICATIONS  (PRN):  HYDROmorphone   Tablet 4 milliGRAM(s) Oral every 6 hours PRN Severe Pain (7 - 10)      CAPILLARY BLOOD GLUCOSE      POCT Blood Glucose.: 214 mg/dL (09 Mar 2022 11:18)  POCT Blood Glucose.: 197 mg/dL (09 Mar 2022 07:58)  POCT Blood Glucose.: 217 mg/dL (08 Mar 2022 21:49)  POCT Blood Glucose.: 221 mg/dL (08 Mar 2022 16:58)    I&O's Summary    08 Mar 2022 07:01  -  09 Mar 2022 07:00  --------------------------------------------------------  IN: 700 mL / OUT: 2550 mL / NET: -1850 mL    09 Mar 2022 07:01  -  09 Mar 2022 12:55  --------------------------------------------------------  IN: 600 mL / OUT: 0 mL / NET: 600 mL        PHYSICAL EXAM:  Vital Signs Last 24 Hrs  T(C): 36.9 (09 Mar 2022 11:20), Max: 37 (08 Mar 2022 19:00)  T(F): 98.4 (09 Mar 2022 11:20), Max: 98.6 (08 Mar 2022 19:00)  HR: 88 (09 Mar 2022 11:20) (87 - 107)  BP: 102/67 (09 Mar 2022 11:20) (92/63 - 135/76)  BP(mean): 72 (08 Mar 2022 20:00) (68 - 101)  RR: 18 (09 Mar 2022 11:20) (16 - 20)  SpO2: 94% (09 Mar 2022 11:20) (94% - 99%)    CONSTITUTIONAL: NAD, well-developed  RESPIRATORY: Normal respiratory effort; lungs are clear to auscultation bilaterally  CARDIOVASCULAR: Regular rate and rhythm, normal S1 and S2, no murmur/rub/gallop; No lower extremity edema.  ABDOMEN: Sot, nondistended, Nontender to palpation, normoactive bowel sounds, no rebound/guarding.  PSYCH: A+O to person, place, and time; affect appropriate    LABS:                        9.7    10.92 )-----------( 519      ( 09 Mar 2022 12:05 )             29.7     03-09    135  |  101  |  9   ----------------------------<  205<H>  4.0   |  24  |  0.61    Ca    9.0      09 Mar 2022 12:05  Phos  3.1     03-09  Mg     1.5     03-09    TPro  5.6<L>  /  Alb  3.1<L>  /  TBili  0.2  /  DBili  x   /  AST  12  /  ALT  40  /  AlkPhos  60  03-09    PT/INR - ( 08 Mar 2022 06:21 )   PT: 13.0 sec;   INR: 1.12 ratio         PTT - ( 08 Mar 2022 06:21 )  PTT:30.2 sec    RADIOLOGY & ADDITIONAL TESTS:  Results Reviewed:   Imaging Personally Reviewed:  Electrocardiogram Personally Reviewed:    COORDINATION OF CARE:  Care Discussed with Consultants/Other Providers [Y/N]:  Prior or Outpatient Records Reviewed [Y/N]:   PROGRESS NOTE:     Patient is a 56y old  Male who presents with a chief complaint of STEMI, DKA (09 Mar 2022 12:41)      SUBJECTIVE / OVERNIGHT EVENTS:  No acute events overnight. Patient seen and evaluated at bedside. Denies SOB at rest, chest pain, palpitations, abdominal pain, nausea/vomiting    ADDITIONAL REVIEW OF SYSTEMS:    MEDICATIONS  (STANDING):  apixaban 5 milliGRAM(s) Oral every 12 hours  aspirin  chewable 81 milliGRAM(s) Oral daily  atorvastatin 80 milliGRAM(s) Oral at bedtime  carvedilol 25 milliGRAM(s) Oral every 12 hours  clobetasol 0.05% Ointment 1 Application(s) Topical two times a day  clopidogrel Tablet 75 milliGRAM(s) Oral daily  influenza   Vaccine 0.5 milliLiter(s) IntraMuscular once  insulin glargine Injectable (LANTUS) 36 Unit(s) SubCutaneous <User Schedule>  insulin lispro (ADMELOG) corrective regimen sliding scale   SubCutaneous three times a day before meals  insulin lispro (ADMELOG) corrective regimen sliding scale   SubCutaneous at bedtime  insulin lispro Injectable (ADMELOG) 12 Unit(s) SubCutaneous three times a day before meals  lisinopril 10 milliGRAM(s) Oral daily  methadone    Tablet 5 milliGRAM(s) Oral every 6 hours  pantoprazole    Tablet 40 milliGRAM(s) Oral two times a day  pregabalin 150 milliGRAM(s) Oral three times a day    MEDICATIONS  (PRN):  HYDROmorphone   Tablet 4 milliGRAM(s) Oral every 6 hours PRN Severe Pain (7 - 10)      CAPILLARY BLOOD GLUCOSE      POCT Blood Glucose.: 214 mg/dL (09 Mar 2022 11:18)  POCT Blood Glucose.: 197 mg/dL (09 Mar 2022 07:58)  POCT Blood Glucose.: 217 mg/dL (08 Mar 2022 21:49)  POCT Blood Glucose.: 221 mg/dL (08 Mar 2022 16:58)    I&O's Summary    08 Mar 2022 07:01  -  09 Mar 2022 07:00  --------------------------------------------------------  IN: 700 mL / OUT: 2550 mL / NET: -1850 mL    09 Mar 2022 07:01  -  09 Mar 2022 12:55  --------------------------------------------------------  IN: 600 mL / OUT: 0 mL / NET: 600 mL        PHYSICAL EXAM:  Vital Signs Last 24 Hrs  T(C): 36.9 (09 Mar 2022 11:20), Max: 37 (08 Mar 2022 19:00)  T(F): 98.4 (09 Mar 2022 11:20), Max: 98.6 (08 Mar 2022 19:00)  HR: 88 (09 Mar 2022 11:20) (87 - 107)  BP: 102/67 (09 Mar 2022 11:20) (92/63 - 135/76)  BP(mean): 72 (08 Mar 2022 20:00) (68 - 101)  RR: 18 (09 Mar 2022 11:20) (16 - 20)  SpO2: 94% (09 Mar 2022 11:20) (94% - 99%)    CONSTITUTIONAL: NAD, well-developed  RESPIRATORY: Normal respiratory effort; lungs are clear to auscultation bilaterally  CARDIOVASCULAR: Regular rate and rhythm, normal S1 and S2, no murmur/rub/gallop; No lower extremity edema.  ABDOMEN: Sot, nondistended, Nontender to palpation, normoactive bowel sounds, no rebound/guarding.  PSYCH: A+O to person, place, and time; affect appropriate    LABS:                        9.7    10.92 )-----------( 519      ( 09 Mar 2022 12:05 )             29.7     03-09    135  |  101  |  9   ----------------------------<  205<H>  4.0   |  24  |  0.61    Ca    9.0      09 Mar 2022 12:05  Phos  3.1     03-09  Mg     1.5     03-09    TPro  5.6<L>  /  Alb  3.1<L>  /  TBili  0.2  /  DBili  x   /  AST  12  /  ALT  40  /  AlkPhos  60  03-09    PT/INR - ( 08 Mar 2022 06:21 )   PT: 13.0 sec;   INR: 1.12 ratio         PTT - ( 08 Mar 2022 06:21 )  PTT:30.2 sec    RADIOLOGY & ADDITIONAL TESTS:  Results Reviewed:   Imaging Personally Reviewed:  Electrocardiogram Personally Reviewed:    COORDINATION OF CARE:  Care Discussed with Consultants/Other Providers [Y/N]:  Prior or Outpatient Records Reviewed [Y/N]:   PROGRESS NOTE:     Patient is a 56y old  Male who presents with a chief complaint of STEMI, DKA (09 Mar 2022 12:41)      SUBJECTIVE / OVERNIGHT EVENTS:  No acute events overnight. Patient seen and evaluated at bedside. Denies SOB at rest, chest pain, palpitations, abdominal pain, nausea/vomiting    ADDITIONAL REVIEW OF SYSTEMS:    CAPILLARY BLOOD GLUCOSE      POCT Blood Glucose.: 214 mg/dL (09 Mar 2022 11:18)  POCT Blood Glucose.: 197 mg/dL (09 Mar 2022 07:58)  POCT Blood Glucose.: 217 mg/dL (08 Mar 2022 21:49)  POCT Blood Glucose.: 221 mg/dL (08 Mar 2022 16:58)    I&O's Summary    08 Mar 2022 07:01  -  09 Mar 2022 07:00  --------------------------------------------------------  IN: 700 mL / OUT: 2550 mL / NET: -1850 mL    09 Mar 2022 07:01  -  09 Mar 2022 12:55  --------------------------------------------------------  IN: 600 mL / OUT: 0 mL / NET: 600 mL        PHYSICAL EXAM:  Vital Signs Last 24 Hrs  T(C): 36.9 (09 Mar 2022 11:20), Max: 37 (08 Mar 2022 19:00)  T(F): 98.4 (09 Mar 2022 11:20), Max: 98.6 (08 Mar 2022 19:00)  HR: 88 (09 Mar 2022 11:20) (87 - 107)  BP: 102/67 (09 Mar 2022 11:20) (92/63 - 135/76)  BP(mean): 72 (08 Mar 2022 20:00) (68 - 101)  RR: 18 (09 Mar 2022 11:20) (16 - 20)  SpO2: 94% (09 Mar 2022 11:20) (94% - 99%)    PHYSICAL EXAM:  GENERAL:  Well appearing, in NAD  HEAD:  NCAT  EYES: PERRLA, conjunctiva clear  NECK: Supple, No JVD  CHEST/LUNG: CTA B/L. No w/r/r.  HEART: Reg rate. Normal S1, S2. No m/r/g.   ABDOMEN: SNTND. Bowel sounds present  EXTREMITIES:  2+ Peripheral Pulses, No clubbing, cyanosis, edema.  PSYCH: appropriate affect  SKIN: No rashes or lesions    LABS:                        9.7    10.92 )-----------( 519      ( 09 Mar 2022 12:05 )             29.7     03-09    135  |  101  |  9   ----------------------------<  205<H>  4.0   |  24  |  0.61    Ca    9.0      09 Mar 2022 12:05  Phos  3.1     03-09  Mg     1.5     03-09    TPro  5.6<L>  /  Alb  3.1<L>  /  TBili  0.2  /  DBili  x   /  AST  12  /  ALT  40  /  AlkPhos  60  03-09    PT/INR - ( 08 Mar 2022 06:21 )   PT: 13.0 sec;   INR: 1.12 ratio         PTT - ( 08 Mar 2022 06:21 )  PTT:30.2 sec    RADIOLOGY & ADDITIONAL TESTS:  COORDINATION OF CARE:  Care Discussed with Consultants/Other Providers [Y/N]:  Prior or Outpatient Records Reviewed [Y/N]:    MEDICATIONS  (STANDING):  apixaban 5 milliGRAM(s) Oral every 12 hours  aspirin  chewable 81 milliGRAM(s) Oral daily  atorvastatin 80 milliGRAM(s) Oral at bedtime  carvedilol 25 milliGRAM(s) Oral every 12 hours  clobetasol 0.05% Ointment 1 Application(s) Topical two times a day  clopidogrel Tablet 75 milliGRAM(s) Oral daily  influenza   Vaccine 0.5 milliLiter(s) IntraMuscular once  insulin glargine Injectable (LANTUS) 36 Unit(s) SubCutaneous <User Schedule>  insulin lispro (ADMELOG) corrective regimen sliding scale   SubCutaneous three times a day before meals  insulin lispro (ADMELOG) corrective regimen sliding scale   SubCutaneous at bedtime  insulin lispro Injectable (ADMELOG) 12 Unit(s) SubCutaneous three times a day before meals  lisinopril 10 milliGRAM(s) Oral daily  methadone    Tablet 5 milliGRAM(s) Oral every 6 hours  pantoprazole    Tablet 40 milliGRAM(s) Oral two times a day  pregabalin 150 milliGRAM(s) Oral three times a day    MEDICATIONS  (PRN):  HYDROmorphone   Tablet 4 milliGRAM(s) Oral every 6 hours PRN Severe Pain (7 - 10)

## 2022-03-09 NOTE — OCCUPATIONAL THERAPY INITIAL EVALUATION ADULT - LEVEL OF INDEPENDENCE: GROOMING, OT EVAL
pt washed face standing at sink while using RUE- pt unable to bring LUE to face to wash face/independent

## 2022-03-09 NOTE — OCCUPATIONAL THERAPY INITIAL EVALUATION ADULT - GROOMING, PREVIOUS LEVEL OF FUNCTION, OT EVAL
pt required modifications to engagement in ADLs 2/2 decreased ROM and strength within LUE/independent

## 2022-03-09 NOTE — PROGRESS NOTE ADULT - ASSESSMENT
57 y/o M w/ uncontrolled Type 2 DM (A1C 11.2%) on Lantus scale plus Jardiance 25mg, Trulicity 1.5mg q week plus Metformin 1g bid with intermittent adherence PTA. DM c/b  neuropathy and now CAD. Also h/o HTN, Chronic back pain, cervical spine injury, gastric ulcer. Transferred from [place] after presenting with CP/N/V and found to have STEMI/DKA. S/p cardiac cath with balloon angio 3/6/22. Tolerating POs with hyperglycemia. Will continue to adjust insulin to BG goal 100 to 180s. No hypoglycemia.     Met with patient and reviewed the following:  -A1c LEVEL: Present and goal  -Blood glucose goals: 100s to 150s as out pt  -Glucose monitoring frequency: ac and hs  -Healthy eating and portion control. General info about carbs given. Needs RD consult   -Insulin(s) action, time of administration and side effects and DM meds reviewed including importance of adherende  -Importance of follow up care. Follows with Dr Buitrago  Pt verbalized understanding and appears motivated to improve self care

## 2022-03-09 NOTE — OCCUPATIONAL THERAPY INITIAL EVALUATION ADULT - LEVEL OF INDEPENDENCE: EATING, OT EVAL
pt unable to hold spoon in L hand and feed self 2/2 decreased ROM, pt unable to use fork and knife at same time 2/2 LUE ROM, however able to feed self independently using RUE/independent

## 2022-03-09 NOTE — OCCUPATIONAL THERAPY INITIAL EVALUATION ADULT - FINE MOTOR COORDINATION, LEFT HAND, MANIPULATION OF OBJECTS, OT EVAL
pt unable to manipulate spoon appropriately to bring food to mouth without spillage/severe impairment

## 2022-03-09 NOTE — PROGRESS NOTE ADULT - PROBLEM SELECTOR PLAN 3
Hgb 9-10 throughout this admission   - Patient reports melena during this hospitalization, last melena 3/8 PM.   - s/p GI consult: - given acute medical issues and comorbidities with stable hgb and VSS, will defer endoscopy for anemia as patient is stable already on DAPT now, and he does not want to pursue endoscopic evaluation at this time  - if hgb downtrends and has ongoing c/f GI bleeding, will reconsult GI to plan for EGD +/- colonoscopy; otherwise  patient will pursue endoscopy outpatient as eval for anemia  - Iron studiesL Fe 43, TIBC 197, MCV 90, RCDW 15--- consistent w/ iron deficiency anemia   - Patient has hx of gastric ulcer diagnosed via EGD 5-6 years ago   - c/w protonix, two large bore peripheral IV, CBC daily Hgb 9-10 throughout this admission stable, last melena 3/8 PM.   Iron def anemia, Hx of gastric ulcer diagnosed via EGD 5-6 years ago   - s/p GI consult: - given acute medical issues and comorbidities with stable hgb and VSS, will defer endoscopy for anemia as patient is stable already on DAPT now, and he does not want to pursue endoscopic evaluation at this time  - c/w protonix, CBC daily

## 2022-03-09 NOTE — OCCUPATIONAL THERAPY INITIAL EVALUATION ADULT - LEVEL OF INDEPENDENCE: DRESS UPPER BODY, OT EVAL
pt required verbal cues to engage in UB dressing tasks 2/2 decreased ROM in LUE, pt required increased time/supervision

## 2022-03-09 NOTE — OCCUPATIONAL THERAPY INITIAL EVALUATION ADULT - ADL RETRAINING, OT EVAL
GOAL: pt will be independent with all UB/LB dressing tasks with use of BUE within 4 weeks. GOAL: pt will demonstrate ability to wash face with both hands in 4 weeks.

## 2022-03-09 NOTE — OCCUPATIONAL THERAPY INITIAL EVALUATION ADULT - ADDITIONAL COMMENTS
CT Brain 3/2-Volume loss, microvascular disease, age indeterminate lacunar infarcts MRI is more sensitive for ischemia, no hemorrhage or midline shift. Foci of air in the left sella turcica with bony thinning possible dehiscence, edentulous maxilla with dental hardware, bony calvarial sclerosis correlate renal function. MR BRAIN: 3/4 There is no mass, intracranial hemorrhage, or acute infarction. MRA head 3/4-No substantial intracranial arterial stenosis or large vessel occlusion. MRA neck 3/4:No hemodynamically significant stenosis of the internal carotid arteries. Duplex Scan 3/7-No evidence of deep venous thrombosis in either upper extremity. Likely enlarged left epitrochlear lymph node

## 2022-03-09 NOTE — OCCUPATIONAL THERAPY INITIAL EVALUATION ADULT - DIAGNOSIS, OT EVAL
Pt presents with decreased sensation, ROM, and strength within LUE, limiting ability to engage in ADLs and functional transfers

## 2022-03-09 NOTE — OCCUPATIONAL THERAPY INITIAL EVALUATION ADULT - RANGE OF MOTION EXAMINATION, UPPER EXTREMITY
LUE AROM shoulder to 40 degrees, PROM to 90, further ROM held 2/2 c/o pain. L digits AROM WFL, limited in external/internal rotation, abduction and adduction, supination and pronation/Right UE Active ROM was WFL (within functional limits)

## 2022-03-09 NOTE — PROGRESS NOTE ADULT - ATTENDING COMMENTS
tx from CICU, monitor FS on current insulin regimen as per endo recs  monitor cbc and stool - melena likely from prior bleed, currently with stable hgb  anticipate DC home tomorrow

## 2022-03-09 NOTE — PROGRESS NOTE ADULT - PROBLEM SELECTOR PLAN 2
Presented with DKA, DKA resolved   s/p Endocrinology consult, recs appreciated.  c/w Lantus 36 units daily (has been receiving Lantus around 11am)  c/w Admelog 10 units qac  Mod correction scale qac + bedtime  Goal glucose 100-180  Outpt. endo follow-up  Outpt. optho, podiatry, micro/cr  Plan to d/c on basal + optimized regimen of Metformin (change to XL for better adherence 2000mg daily), Jardiance 25mg daily, and Trulicity 1.5mg weekly).   - Needs better adherence to medications and diet which we discussed at length. Presented with DKA, DKA resolved   -c/w Lantus 36 units daily, Admelog incr to 12 units qac  -Mod correction scale qac + bedtime  -ENDO following, F/U Cpeptide

## 2022-03-10 ENCOUNTER — TRANSCRIPTION ENCOUNTER (OUTPATIENT)
Age: 57
End: 2022-03-10

## 2022-03-10 LAB
ANION GAP SERPL CALC-SCNC: 10 MMOL/L — SIGNIFICANT CHANGE UP (ref 5–17)
BUN SERPL-MCNC: 8 MG/DL — SIGNIFICANT CHANGE UP (ref 7–23)
C PEPTIDE SERPL-MCNC: 0.7 NG/ML — LOW (ref 1.1–4.4)
CALCIUM SERPL-MCNC: 8.7 MG/DL — SIGNIFICANT CHANGE UP (ref 8.4–10.5)
CHLORIDE SERPL-SCNC: 100 MMOL/L — SIGNIFICANT CHANGE UP (ref 96–108)
CO2 SERPL-SCNC: 25 MMOL/L — SIGNIFICANT CHANGE UP (ref 22–31)
CREAT SERPL-MCNC: 0.62 MG/DL — SIGNIFICANT CHANGE UP (ref 0.5–1.3)
CULTURE RESULTS: SIGNIFICANT CHANGE UP
EGFR: 114 ML/MIN/1.73M2 — SIGNIFICANT CHANGE UP
GLUCOSE BLDC GLUCOMTR-MCNC: 153 MG/DL — HIGH (ref 70–99)
GLUCOSE BLDC GLUCOMTR-MCNC: 155 MG/DL — HIGH (ref 70–99)
GLUCOSE BLDC GLUCOMTR-MCNC: 171 MG/DL — HIGH (ref 70–99)
GLUCOSE BLDC GLUCOMTR-MCNC: 171 MG/DL — HIGH (ref 70–99)
GLUCOSE SERPL-MCNC: 194 MG/DL — HIGH (ref 70–99)
POTASSIUM SERPL-MCNC: 4 MMOL/L — SIGNIFICANT CHANGE UP (ref 3.5–5.3)
POTASSIUM SERPL-SCNC: 4 MMOL/L — SIGNIFICANT CHANGE UP (ref 3.5–5.3)
SODIUM SERPL-SCNC: 135 MMOL/L — SIGNIFICANT CHANGE UP (ref 135–145)
SPECIMEN SOURCE: SIGNIFICANT CHANGE UP

## 2022-03-10 PROCEDURE — 99232 SBSQ HOSP IP/OBS MODERATE 35: CPT

## 2022-03-10 PROCEDURE — 99239 HOSP IP/OBS DSCHRG MGMT >30: CPT

## 2022-03-10 PROCEDURE — 99231 SBSQ HOSP IP/OBS SF/LOW 25: CPT

## 2022-03-10 RX ORDER — CARVEDILOL PHOSPHATE 80 MG/1
1 CAPSULE, EXTENDED RELEASE ORAL
Qty: 60 | Refills: 0
Start: 2022-03-10 | End: 2022-04-08

## 2022-03-10 RX ORDER — EMPAGLIFLOZIN 10 MG/1
0 TABLET, FILM COATED ORAL
Qty: 0 | Refills: 0 | DISCHARGE

## 2022-03-10 RX ORDER — AMLODIPINE BESYLATE 2.5 MG/1
1 TABLET ORAL
Qty: 0 | Refills: 0 | DISCHARGE

## 2022-03-10 RX ORDER — INSULIN GLARGINE 100 [IU]/ML
40 INJECTION, SOLUTION SUBCUTANEOUS EVERY MORNING
Refills: 0 | Status: DISCONTINUED | OUTPATIENT
Start: 2022-03-11 | End: 2022-03-11

## 2022-03-10 RX ORDER — INSULIN LISPRO 100/ML
12 VIAL (ML) SUBCUTANEOUS
Qty: 1 | Refills: 0
Start: 2022-03-10 | End: 2022-04-08

## 2022-03-10 RX ORDER — APIXABAN 2.5 MG/1
1 TABLET, FILM COATED ORAL
Qty: 60 | Refills: 0
Start: 2022-03-10 | End: 2022-04-08

## 2022-03-10 RX ORDER — INSULIN GLARGINE 100 [IU]/ML
40 INJECTION, SOLUTION SUBCUTANEOUS
Qty: 0 | Refills: 0 | DISCHARGE
Start: 2022-03-10

## 2022-03-10 RX ORDER — TADALAFIL 10 MG/1
1 TABLET, FILM COATED ORAL
Qty: 0 | Refills: 0 | DISCHARGE

## 2022-03-10 RX ORDER — DULAGLUTIDE 4.5 MG/.5ML
0 INJECTION, SOLUTION SUBCUTANEOUS
Qty: 0 | Refills: 0 | DISCHARGE
Start: 2022-03-10

## 2022-03-10 RX ORDER — QUETIAPINE FUMARATE 200 MG/1
1 TABLET, FILM COATED ORAL
Qty: 0 | Refills: 0 | DISCHARGE

## 2022-03-10 RX ORDER — NEBIVOLOL HYDROCHLORIDE 5 MG/1
1 TABLET ORAL
Qty: 0 | Refills: 0 | DISCHARGE

## 2022-03-10 RX ORDER — INSULIN GLARGINE 100 [IU]/ML
30 INJECTION, SOLUTION SUBCUTANEOUS
Qty: 0 | Refills: 0 | DISCHARGE
Start: 2022-03-10

## 2022-03-10 RX ORDER — CLOPIDOGREL BISULFATE 75 MG/1
1 TABLET, FILM COATED ORAL
Qty: 30 | Refills: 0
Start: 2022-03-10 | End: 2022-04-08

## 2022-03-10 RX ORDER — PANTOPRAZOLE SODIUM 20 MG/1
1 TABLET, DELAYED RELEASE ORAL
Qty: 60 | Refills: 0
Start: 2022-03-10 | End: 2022-04-08

## 2022-03-10 RX ORDER — ATORVASTATIN CALCIUM 80 MG/1
1 TABLET, FILM COATED ORAL
Qty: 30 | Refills: 0
Start: 2022-03-10 | End: 2022-04-08

## 2022-03-10 RX ADMIN — METHADONE HYDROCHLORIDE 5 MILLIGRAM(S): 40 TABLET ORAL at 05:45

## 2022-03-10 RX ADMIN — Medication 2: at 16:41

## 2022-03-10 RX ADMIN — INSULIN GLARGINE 36 UNIT(S): 100 INJECTION, SOLUTION SUBCUTANEOUS at 08:30

## 2022-03-10 RX ADMIN — PANTOPRAZOLE SODIUM 40 MILLIGRAM(S): 20 TABLET, DELAYED RELEASE ORAL at 17:20

## 2022-03-10 RX ADMIN — HYDROMORPHONE HYDROCHLORIDE 4 MILLIGRAM(S): 2 INJECTION INTRAMUSCULAR; INTRAVENOUS; SUBCUTANEOUS at 12:19

## 2022-03-10 RX ADMIN — HYDROMORPHONE HYDROCHLORIDE 4 MILLIGRAM(S): 2 INJECTION INTRAMUSCULAR; INTRAVENOUS; SUBCUTANEOUS at 06:40

## 2022-03-10 RX ADMIN — APIXABAN 5 MILLIGRAM(S): 2.5 TABLET, FILM COATED ORAL at 17:20

## 2022-03-10 RX ADMIN — CARVEDILOL PHOSPHATE 25 MILLIGRAM(S): 80 CAPSULE, EXTENDED RELEASE ORAL at 17:20

## 2022-03-10 RX ADMIN — CARVEDILOL PHOSPHATE 25 MILLIGRAM(S): 80 CAPSULE, EXTENDED RELEASE ORAL at 05:45

## 2022-03-10 RX ADMIN — METHADONE HYDROCHLORIDE 5 MILLIGRAM(S): 40 TABLET ORAL at 18:20

## 2022-03-10 RX ADMIN — Medication 12 UNIT(S): at 08:06

## 2022-03-10 RX ADMIN — Medication 1 APPLICATION(S): at 17:32

## 2022-03-10 RX ADMIN — ATORVASTATIN CALCIUM 80 MILLIGRAM(S): 80 TABLET, FILM COATED ORAL at 21:04

## 2022-03-10 RX ADMIN — LISINOPRIL 10 MILLIGRAM(S): 2.5 TABLET ORAL at 05:45

## 2022-03-10 RX ADMIN — Medication 2: at 11:52

## 2022-03-10 RX ADMIN — HYDROMORPHONE HYDROCHLORIDE 4 MILLIGRAM(S): 2 INJECTION INTRAMUSCULAR; INTRAVENOUS; SUBCUTANEOUS at 00:30

## 2022-03-10 RX ADMIN — HYDROMORPHONE HYDROCHLORIDE 4 MILLIGRAM(S): 2 INJECTION INTRAMUSCULAR; INTRAVENOUS; SUBCUTANEOUS at 18:20

## 2022-03-10 RX ADMIN — Medication 150 MILLIGRAM(S): at 05:44

## 2022-03-10 RX ADMIN — Medication 150 MILLIGRAM(S): at 21:04

## 2022-03-10 RX ADMIN — Medication 12 UNIT(S): at 11:52

## 2022-03-10 RX ADMIN — HYDROMORPHONE HYDROCHLORIDE 4 MILLIGRAM(S): 2 INJECTION INTRAMUSCULAR; INTRAVENOUS; SUBCUTANEOUS at 13:00

## 2022-03-10 RX ADMIN — Medication 150 MILLIGRAM(S): at 13:39

## 2022-03-10 RX ADMIN — Medication 2: at 08:06

## 2022-03-10 RX ADMIN — APIXABAN 5 MILLIGRAM(S): 2.5 TABLET, FILM COATED ORAL at 05:45

## 2022-03-10 RX ADMIN — METHADONE HYDROCHLORIDE 5 MILLIGRAM(S): 40 TABLET ORAL at 11:51

## 2022-03-10 RX ADMIN — PANTOPRAZOLE SODIUM 40 MILLIGRAM(S): 20 TABLET, DELAYED RELEASE ORAL at 05:45

## 2022-03-10 RX ADMIN — HYDROMORPHONE HYDROCHLORIDE 4 MILLIGRAM(S): 2 INJECTION INTRAMUSCULAR; INTRAVENOUS; SUBCUTANEOUS at 05:49

## 2022-03-10 RX ADMIN — Medication 1 APPLICATION(S): at 06:49

## 2022-03-10 RX ADMIN — Medication 12 UNIT(S): at 16:41

## 2022-03-10 RX ADMIN — Medication 81 MILLIGRAM(S): at 11:52

## 2022-03-10 RX ADMIN — CLOPIDOGREL BISULFATE 75 MILLIGRAM(S): 75 TABLET, FILM COATED ORAL at 11:52

## 2022-03-10 NOTE — DISCHARGE NOTE PROVIDER - NSDCMRMEDTOKEN_GEN_ALL_CORE_FT
alcohol swabs : Apply topically to affected area 4 times a day  for DM II   E11.9   amLODIPine 2.5 mg oral tablet: 1 tab(s) orally once a day  Bystolic 2.5 mg oral tablet: 1 tab(s) orally once a day  cloNIDine 0.2 mg oral tablet: 1 tab(s) orally 2 times a day  glucometer (per patient&#x27;s insurance): Test blood sugars four times a day. Dispense #1 glucometer.  for DM II   E11.9   HYDROmorphone 4 mg oral tablet: 1 tab(s) orally every 6 hours  Insulin Pen Needles, 4mm: 1 application subcutaneously 4 times a day. ** Use with insulin pen **   for DM II   E11.9   Jardiance: orally once a day  lancets: 1 application subcutaneously 4 times a day   for DM II   E11.9   Lantus Solostar Pen 100 units/mL subcutaneous solution: 35 unit(s) subcutaneous once a day      lisinopril 10 mg oral tablet: 1 tab(s) orally once a day  Lyrica 150 mg oral capsule: 1 cap(s) orally 3 times a day  metFORMIN 500 mg oral tablet: 1 tab(s) orally 2 times a day   methadone 5 mg oral tablet: orally 4 times a day, As Needed  QUEtiapine 100 mg oral tablet: 1 tab(s) orally 2 times a day  tadalafil 5 mg oral tablet: 1 tab(s) orally once a day  test strips (per patient&#x27;s insurance): 1 application subcutaneously 4 times a day. ** Compatible with patient&#x27;s glucometer **  for DM II   E11.9   Trulicity Pen 1.5 mg/0.5 mL subcutaneous solution: 1 application subcutaneous once a week   Vitamin D2 50,000 intl units (1.25 mg) oral capsule:    Admelog SoloStar 100 units/mL injectable solution: 12 unit(s) injectable 3 times a day (before meals)   alcohol swabs : Apply topically to affected area 4 times a day  for DM II   E11.9   amLODIPine 2.5 mg oral tablet: 1 tab(s) orally once a day  Bystolic 2.5 mg oral tablet: 1 tab(s) orally once a day  glucometer (per patient&#x27;s insurance): Test blood sugars four times a day. Dispense #1 glucometer.  for DM II   E11.9   HYDROmorphone 4 mg oral tablet: 1 tab(s) orally every 6 hours  Insulin Pen Needles, 4mm: 1 application subcutaneously 4 times a day. ** Use with insulin pen **   for DM II   E11.9   Jardiance: orally once a day  lancets: 1 application subcutaneously 4 times a day   for DM II   E11.9   Lantus Solostar Pen 100 units/mL subcutaneous solution: 35 unit(s) subcutaneous once a day      lisinopril 10 mg oral tablet: 1 tab(s) orally once a day  Lyrica 150 mg oral capsule: 1 cap(s) orally 3 times a day  metFORMIN 500 mg oral tablet: 1 tab(s) orally 2 times a day   methadone 5 mg oral tablet: orally 4 times a day, As Needed  QUEtiapine 100 mg oral tablet: 1 tab(s) orally 2 times a day  test strips (per patient&#x27;s insurance): 1 application subcutaneously 4 times a day. ** Compatible with patient&#x27;s glucometer **  for DM II   E11.9   Trulicity Pen 1.5 mg/0.5 mL subcutaneous solution: 1 application subcutaneous once a week   Vitamin D2 50,000 intl units (1.25 mg) oral capsule:    apixaban 5 mg oral tablet: 1 tab(s) orally every 12 hours  atorvastatin 80 mg oral tablet: 1 tab(s) orally once a day (at bedtime)  carvedilol 25 mg oral tablet: 1 tab(s) orally every 12 hours  clobetasol 0.05% topical ointment: 1 application topically 2 times a day  clopidogrel 75 mg oral tablet: 1 tab(s) orally once a day  dulaglutide 1.5 mg/0.5 mL subcutaneous solution:  subcutaneous once a week  HumaLOG KwikPen 100 units/mL injectable solution: 12 unit(s) injectable 3 times a day (before meals)   HYDROmorphone 4 mg oral tablet: 1 tab(s) orally every 6 hours  insulin glargine: 40 unit(s) subcutaneous once a day (at bedtime)  lisinopril 10 mg oral tablet: 1 tab(s) orally once a day  Lyrica 150 mg oral capsule: 1 cap(s) orally 3 times a day  methadone 5 mg oral tablet: orally 4 times a day, As Needed  pantoprazole 40 mg oral delayed release tablet: 1 tab(s) orally 2 times a day  Vitamin D2 50,000 intl units (1.25 mg) oral capsule:    apixaban 5 mg oral tablet: 1 tab(s) orally every 12 hours  atorvastatin 80 mg oral tablet: 1 tab(s) orally once a day (at bedtime)  carvedilol 25 mg oral tablet: 1 tab(s) orally every 12 hours  clobetasol 0.05% topical ointment: 1 application topically 2 times a day  clopidogrel 75 mg oral tablet: 1 tab(s) orally once a day  HumaLOG KwikPen 100 units/mL injectable solution: 12 unit(s) injectable 3 times a day (before meals)   HYDROmorphone 4 mg oral tablet: 1 tab(s) orally every 6 hours  insulin glargine: 40 unit(s) subcutaneous once a day (at bedtime)  lisinopril 10 mg oral tablet: 1 tab(s) orally once a day  Lyrica 150 mg oral capsule: 1 cap(s) orally 3 times a day  methadone 5 mg oral tablet: orally 4 times a day, As Needed  Occupational therapy: OT for evaluation and treatment  pantoprazole 40 mg oral delayed release tablet: 1 tab(s) orally 2 times a day  Vitamin D2 50,000 intl units (1.25 mg) oral capsule:    apixaban 5 mg oral tablet: 1 tab(s) orally every 12 hours  atorvastatin 80 mg oral tablet: 1 tab(s) orally once a day (at bedtime)  carvedilol 25 mg oral tablet: 1 tab(s) orally every 12 hours  clobetasol 0.05% topical ointment: 1 application topically 2 times a day  clopidogrel 75 mg oral tablet: 1 tab(s) orally once a day  HumaLOG KwikPen 100 units/mL injectable solution: 12 unit(s) injectable 3 times a day (before meals)   HYDROmorphone 4 mg oral tablet: 1 tab(s) orally every 6 hours  insulin glargine: 40 unit(s) subcutaneous once a day (at bedtime)  lisinopril 10 mg oral tablet: 1 tab(s) orally once a day  Lyrica 150 mg oral capsule: 1 cap(s) orally 3 times a day  methadone 5 mg oral tablet: orally 4 times a day, As Needed  Occupational therapy: OT for evaluation and treatment  pantoprazole 40 mg oral delayed release tablet: 1 tab(s) orally 2 times a day  Physical therapy: PT for evaluation and treatment  Vitamin D2 50,000 intl units (1.25 mg) oral capsule:    apixaban 5 mg oral tablet: 1 tab(s) orally every 12 hours  atorvastatin 80 mg oral tablet: 1 tab(s) orally once a day (at bedtime)  carvedilol 25 mg oral tablet: 1 tab(s) orally every 12 hours  clobetasol 0.05% topical ointment: 1 application topically 2 times a day  clopidogrel 75 mg oral tablet: 1 tab(s) orally once a day  HumaLOG KwikPen 100 units/mL injectable solution: 12 unit(s) injectable 3 times a day (before meals)   HYDROmorphone 4 mg oral tablet: 1 tab(s) orally every 6 hours  insulin glargine: 40 unit(s) subcutaneous once a day (at bedtime)  Insulin Pen Needles, 4mm: 1 application subcutaneously 4 times a day. ** Use with insulin pen **   lisinopril 10 mg oral tablet: 1 tab(s) orally once a day  Lyrica 150 mg oral capsule: 1 cap(s) orally 3 times a day  methadone 5 mg oral tablet: orally 4 times a day, As Needed  Occupational therapy: OT for evaluation and treatment  pantoprazole 40 mg oral delayed release tablet: 1 tab(s) orally 2 times a day  Physical therapy: PT for evaluation and treatment  Vitamin D2 50,000 intl units (1.25 mg) oral capsule:    apixaban 5 mg oral tablet: 1 tab(s) orally every 12 hours  atorvastatin 80 mg oral tablet: 1 tab(s) orally once a day (at bedtime)  carvedilol 25 mg oral tablet: 1 tab(s) orally every 12 hours  clobetasol 0.05% topical ointment: 1 application topically 2 times a day  clopidogrel 75 mg oral tablet: 1 tab(s) orally once a day  HumaLOG KwikPen 100 units/mL injectable solution: 16 unit(s) injectable 3 times a day (before meals)  ( please use only 12 units 3 times a day )   HYDROmorphone 4 mg oral tablet: 1 tab(s) orally every 6 hours  insulin glargine: 40 unit(s) subcutaneous once a day (at bedtime)  Insulin Pen Needles, 4mm: 1 application subcutaneously 4 times a day. ** Use with insulin pen **   lisinopril 10 mg oral tablet: 1 tab(s) orally once a day  Lyrica 150 mg oral capsule: 1 cap(s) orally 3 times a day  methadone 5 mg oral tablet: orally 4 times a day, As Needed  pantoprazole 40 mg oral delayed release tablet: 1 tab(s) orally 2 times a day  Vitamin D2 50,000 intl units (1.25 mg) oral capsule:    apixaban 5 mg oral tablet: 1 tab(s) orally every 12 hours  atorvastatin 80 mg oral tablet: 1 tab(s) orally once a day (at bedtime)  carvedilol 25 mg oral tablet: 1 tab(s) orally every 12 hours  clobetasol 0.05% topical ointment: 1 application topically 2 times a day  clopidogrel 75 mg oral tablet: 1 tab(s) orally once a day  HumaLOG KwikPen 100 units/mL injectable solution: 16 unit(s) injectable 3 times a day (before meals)  ( please use only 12 units 3 times a day )   HYDROmorphone 4 mg oral tablet: 1 tab(s) orally every 6 hours  insulin glargine: 40 unit(s) subcutaneous once a day (at bedtime)  Insulin Pen Needles, 4mm: 1 application subcutaneously 4 times a day. ** Use with insulin pen **   lisinopril 10 mg oral tablet: 1 tab(s) orally once a day  Lyrica 150 mg oral capsule: 1 cap(s) orally 3 times a day  methadone 5 mg oral tablet: orally 4 times a day, As Needed  NovoLOG FlexPen 100 units/mL injectable solution: 12 unit(s) injectable 3 times a day (before meals)   pantoprazole 40 mg oral delayed release tablet: 1 tab(s) orally 2 times a day  Vitamin D2 50,000 intl units (1.25 mg) oral capsule:    apixaban 5 mg oral tablet: 1 tab(s) orally every 12 hours  atorvastatin 80 mg oral tablet: 1 tab(s) orally once a day (at bedtime)  carvedilol 25 mg oral tablet: 1 tab(s) orally every 12 hours  clobetasol 0.05% topical ointment: 1 application topically 2 times a day  clopidogrel 75 mg oral tablet: 1 tab(s) orally once a day  HYDROmorphone 4 mg oral tablet: 1 tab(s) orally every 6 hours  insulin glargine: 40 unit(s) subcutaneous once a day in AM  Insulin Pen Needles, 4mm: 1 application subcutaneously 4 times a day. ** Use with insulin pen **   lisinopril 10 mg oral tablet: 1 tab(s) orally once a day  Lyrica 150 mg oral capsule: 1 cap(s) orally 3 times a day  methadone 5 mg oral tablet: orally 4 times a day, As Needed  NovoLOG FlexPen 100 units/mL injectable solution: 12 unit(s) injectable 3 times a day (before meals)   pantoprazole 40 mg oral delayed release tablet: 1 tab(s) orally 2 times a day  Vitamin D2 50,000 intl units (1.25 mg) oral capsule:

## 2022-03-10 NOTE — PROGRESS NOTE ADULT - PROBLEM SELECTOR PLAN 7
Presented with afib with RVR now NSR  New A fib diagnosis per patient   [id number] 3, HASBLED 1  Continue eliquis 5mg BID and coreg 25mg BID  Monitor on Telemetry    seen by neuro previously for AMS which has resolved, unlikely stroke. MR did show ?L vertebral a. stenosis however he is asymptomatic and being medically managed - he understands he is to follow up with neurology outpt about this    Dispo: Dc home 3/10 pending endo recs; OUTPT follow up for L epitrochlear lymph node enlargement

## 2022-03-10 NOTE — DISCHARGE NOTE PROVIDER - CARE PROVIDERS DIRECT ADDRESSES
,DirectAddress_Unknown,[email or web address],DirectAddress_Unknown

## 2022-03-10 NOTE — PROGRESS NOTE ADULT - ASSESSMENT
57 yo M PMH DM@, HTN, neuropathy, chronic pain, PUD presenting with AMS (resolved), STEMI s/p LHC with balloon angioplasty, DKA (now resolved), and JAXON (resolved), melena transferred from [facility] to floors 3/8/22, stable for dc home.

## 2022-03-10 NOTE — PROGRESS NOTE ADULT - SUBJECTIVE AND OBJECTIVE BOX
Patricia Mojica MD  Division of Hospital Medicine  Pager [phone], If no response/off hours [phone]    Patient is a 56y old  Male who presents with a chief complaint of STEMI, DKA (10 Mar 2022 10:40)        SUBJECTIVE / OVERNIGHT EVENTS:  overnight no acute events  no n/v/f/chills, cp, sob, abd pain  feels well    CAPILLARY BLOOD GLUCOSE      POCT Blood Glucose.: 171 mg/dL (10 Mar 2022 11:38)  POCT Blood Glucose.: 171 mg/dL (10 Mar 2022 07:47)  POCT Blood Glucose.: 189 mg/dL (09 Mar 2022 21:29)  POCT Blood Glucose.: 106 mg/dL (09 Mar 2022 16:51)    I&O's Summary    09 Mar 2022 07:01  -  10 Mar 2022 07:00  --------------------------------------------------------  IN: 1440 mL / OUT: 0 mL / NET: 1440 mL    10 Mar 2022 07:01  -  10 Mar 2022 13:21  --------------------------------------------------------  IN: 360 mL / OUT: 0 mL / NET: 360 mL      Vital Signs Last 24 Hrs  T(C): 37.1 (10 Mar 2022 11:37), Max: 37.1 (10 Mar 2022 11:37)  T(F): 98.8 (10 Mar 2022 11:37), Max: 98.8 (10 Mar 2022 11:37)  HR: 81 (10 Mar 2022 11:37) (60 - 93)  BP: 105/68 (10 Mar 2022 11:37) (98/65 - 116/79)  BP(mean): 84 (10 Mar 2022 09:31) (77 - 91)  RR: 18 (10 Mar 2022 11:37) (18 - 18)  SpO2: 95% (10 Mar 2022 11:37) (94% - 95%)    PHYSICAL EXAM:  GENERAL:  Well appearing, in NAD  HEAD:  NCAT  EYES: PERRLA, conjunctiva clear  NECK: Supple, No JVD  CHEST/LUNG: CTA B/L. No w/r/r.  HEART: Reg rate. Normal S1, S2. No m/r/g.   ABDOMEN: SNTND. Bowel sounds present  EXTREMITIES:  2+ Peripheral Pulses, No clubbing, cyanosis, edema.  PSYCH: appropriate affect  SKIN: No rashes or lesions    LABS:                        9.7    10.92 )-----------( 519      ( 09 Mar 2022 12:05 )             29.7     03-10    135  |  100  |  8   ----------------------------<  194<H>  4.0   |  25  |  0.62    Ca    8.7      10 Mar 2022 10:42  Phos  3.1     03-09  Mg     1.5     03-09    TPro  5.6<L>  /  Alb  3.1<L>  /  TBili  0.2  /  DBili  x   /  AST  12  /  ALT  40  /  AlkPhos  60  03-09      RADIOLOGY & ADDITIONAL TESTS:  Consultant(s) Notes Reviewed:  endo  Care Discussed with Consultants/Other Providers: Floor NP/PA, endo    MEDICATIONS  (STANDING):  apixaban 5 milliGRAM(s) Oral every 12 hours  aspirin  chewable 81 milliGRAM(s) Oral daily  atorvastatin 80 milliGRAM(s) Oral at bedtime  carvedilol 25 milliGRAM(s) Oral every 12 hours  clobetasol 0.05% Ointment 1 Application(s) Topical two times a day  clopidogrel Tablet 75 milliGRAM(s) Oral daily  influenza   Vaccine 0.5 milliLiter(s) IntraMuscular once  insulin lispro (ADMELOG) corrective regimen sliding scale   SubCutaneous three times a day before meals  insulin lispro (ADMELOG) corrective regimen sliding scale   SubCutaneous at bedtime  insulin lispro Injectable (ADMELOG) 12 Unit(s) SubCutaneous three times a day before meals  lisinopril 10 milliGRAM(s) Oral daily  methadone    Tablet 5 milliGRAM(s) Oral every 6 hours  pantoprazole    Tablet 40 milliGRAM(s) Oral two times a day  pregabalin 150 milliGRAM(s) Oral three times a day    MEDICATIONS  (PRN):  HYDROmorphone   Tablet 4 milliGRAM(s) Oral every 6 hours PRN Severe Pain (7 - 10)

## 2022-03-10 NOTE — PROGRESS NOTE ADULT - ASSESSMENT
57 y/o M w/ uncontrolled Type 2 DM (A1C 11.2%) on Lantus scale plus Jardiance 25mg, Trulicity 1.5mg q week plus Metformin 1g bid with intermittent adherence PTA. DM c/b  neuropathy and now CAD. Also h/o HTN, Chronic back pain, cervical spine injury, gastric ulcer. Transferred from [facility] after presenting with CP/N/V and found to have STEMI/DKA. S/p cardiac cath with balloon angio 3/6/22. Tolerating POs. BG values improved on basal/bolus regimen. C-peptide ordered however was never sent. Discussed these results aren't necessary for safe discharge w/pt. Will need close outpt follow up regardless of discharge plan. Pt not amenable to basal/bolus regimen but explained if insulin reserve has not recovered since DKA will need insulin injections to control glucose levels. Discussed need for consistent medication adherence as this likely contributed to worsening A1C and complications. Pt not interested in Freestyle Tono trial/discussed benefits w/pt.  BG goal (100-180mg/dl).

## 2022-03-10 NOTE — DISCHARGE NOTE PROVIDER - NSDCFUADDAPPT_GEN_ALL_CORE_FT
Please schedule follow up appointments for cardiology and Endocrinology.  Please schedule follow up appointments for cardiology and Endocrinology.     MRI did show L vertebral a. stenosis - follow up with neurology outpt about this    Follow up OUTPT with PCP about L epitrochlear lymph node enlargement.

## 2022-03-10 NOTE — DISCHARGE NOTE PROVIDER - NSDCCPCAREPLAN_GEN_ALL_CORE_FT
PRINCIPAL DISCHARGE DIAGNOSIS  Diagnosis: STEMI (ST elevation myocardial infarction)  Assessment and Plan of Treatment: Call your doctor if you have unusual chest pain, pressure, or discomfort, shortness of breath, nausea, vomiting, burping, heartburn, tingling upper body parts, sweating, cold, clammy sking, racing heartbeat  Call 911 if you think you are having a heart attack  Take all cardiac medications as prescribed - notify your doctor if you have any side effects  Follow cardiac diet - avoid fatty & fried foods, don't eat too much red meat, eat lots of fruits & vegetables, dairy products should be low fat  Lose weight if you are overweight  Become more active with walking, gardening, or any other activity that gets you to move        SECONDARY DISCHARGE DIAGNOSES  Diagnosis: DKA (diabetic ketoacidosis)  Assessment and Plan of Treatment: you have uncontrolled DM  HgA1C this admission.  Make sure you get your HgA1c checked every three months.  If you take oral diabetes medications, check your blood glucose two times a day.  If you take insulin, check your blood glucose before meals and at bedtime.  It's important not to skip any meals.  Keep a log of your blood glucose results and always take it with you to your doctor appointments.  Keep a list of your current medications including injectables and over the counter medications and bring this medication list with you to all your doctor appointments.  If you have not seen your ophthalmologist this year call for appointment.  Check your feet daily for redness, sores, or openings. Do not self treat. If no improvement in two days call your primary care physician for an appointment.  Low blood sugar (hypoglycemia) is a blood sugar below 70mg/dl. Check your blood sugar if you feel signs/symptoms of hypoglycemia. If your blood sugar is below 70 take 15 grams of carbohydrates (ex 4 oz of apple juice, 3-4 glucose tablets, or 4-6 oz of regular soda) wait 15 minutes and repeat blood sugar to make sure it comes up above 70.  If your blood sugar is above 70 and you are due for a meal, have a meal.  If you are not due for a meal have a snack.  This snack helps keeps your blood sugar at a safe range.      Diagnosis: Paroxysmal atrial fibrillation with rapid ventricular response  Assessment and Plan of Treatment: Atrial fibrillation is the most common heart rhythm problem.  The condition puts you at risk for has stroke and heart attack  It helps if you control your blood pressure, not drink more than 1-2 alcohol drinks per day, cut down on caffeine, getting treatment for over active thyroid gland, and get regular exercise  Call your doctor if you feel your heart racing or beating unusually, chest tightness or pain, lightheaded, faint, shortness of breath especially with exercise  It is important to take your heart medication as prescribed  You may be on anticoagulation which is very important to take as directed - you may need blood work to monitor drug levels      Diagnosis: JAXON (acute kidney injury)  Assessment and Plan of Treatment: Resolved    Diagnosis: Chronic pain syndrome  Assessment and Plan of Treatment: continue current pain medications  Follow up with pain specialist    Diagnosis: Melena  Assessment and Plan of Treatment: continue protonix  Follow up with Gi specialist

## 2022-03-10 NOTE — PROGRESS NOTE ADULT - PROBLEM SELECTOR PLAN 3
: c/w statin  f/u as out pt.    discussed w/pt, NP and attending MD  Can be reached via TEAMS/pager: [phone]   office:  [phone] (M-F 9a-5pm)               [phone] (nights/weekends)   [email or web address] password [email or web address]

## 2022-03-10 NOTE — DISCHARGE NOTE PROVIDER - NSDCFUSCHEDAPPT_GEN_ALL_CORE_FT
ROSE STRAUSS ; 03/14/2022 ; [facility] [address]   ROSE STRAUSS ; 03/15/2022 ; [facility] [address] [facility] ROSE STRAUSS ; 03/14/2022 ; [facility] [address]   ROSE STRAUSS ; 03/15/2022 ; [facility] [address] [facility]

## 2022-03-10 NOTE — PROGRESS NOTE ADULT - SUBJECTIVE AND OBJECTIVE BOX
Diabetes Follow up note:    Chief complaint: T2DM    Interval Hx: BG values improved over past 24 hours. Pt seen at bedside this AM. Reports tolerating POs and feels well. C-peptide ordered yesterday and was never sent. Pt does not feel like he will be able to manage 4 injections/day at home. Feels motivated to improve his DM care. Was using Lantus on a sliding scale at home and not taking his medications consistently from day to day. Would often miss his evening Metformin dose.     Review of Systems:  General:  no complaints.   GI: Tolerating POs. Denies N/V/D/Abd pain  CV: Denies CP/SOB  ENDO: No S&Sx of hypoglycemia    MEDS:  atorvastatin 80 milliGRAM(s) Oral at bedtime  insulin glargine Injectable (LANTUS) 36 Unit(s) SubCutaneous every morning  insulin lispro (ADMELOG) corrective regimen sliding scale   SubCutaneous three times a day before meals  insulin lispro (ADMELOG) corrective regimen sliding scale   SubCutaneous at bedtime  insulin lispro Injectable (ADMELOG) 12 Unit(s) SubCutaneous three times a day before meals      Allergies    No Known Allergies        PE:  General: Male lying in bed. NAD>   Vital Signs Last 24 Hrs  T(C): 36.6 (10 Mar 2022 04:00), Max: 37 (09 Mar 2022 19:14)  T(F): 97.9 (10 Mar 2022 04:00), Max: 98.6 (09 Mar 2022 19:14)  HR: 80 (10 Mar 2022 04:00) (60 - 93)  BP: 114/75 (10 Mar 2022 04:00) (100/66 - 116/79)  BP(mean): 88 (10 Mar 2022 04:00) (77 - 91)  RR: 18 (10 Mar 2022 04:00) (18 - 18)  SpO2: 94% (10 Mar 2022 04:00) (94% - 94%)  Abd: Soft, NT,ND, Central adiposity.   Extremities: Warm. no edema x 4 ext.   Neuro: A&O X3    LABS:  POCT Blood Glucose.: 171 mg/dL (03-10-22 @ 07:47)  POCT Blood Glucose.: 189 mg/dL (03-09-22 @ 21:29)  POCT Blood Glucose.: 106 mg/dL (03-09-22 @ 16:51)  POCT Blood Glucose.: 214 mg/dL (03-09-22 @ 11:18)  POCT Blood Glucose.: 197 mg/dL (03-09-22 @ 07:58)  POCT Blood Glucose.: 217 mg/dL (03-08-22 @ 21:49)  POCT Blood Glucose.: 221 mg/dL (03-08-22 @ 16:58)  POCT Blood Glucose.: 244 mg/dL (03-08-22 @ 12:27)  POCT Blood Glucose.: 167 mg/dL (03-08-22 @ 07:45)  POCT Blood Glucose.: 225 mg/dL (03-07-22 @ 21:11)  POCT Blood Glucose.: 188 mg/dL (03-07-22 @ 16:56)  POCT Blood Glucose.: 202 mg/dL (03-07-22 @ 12:36)                            9.7    10.92 )-----------( 519      ( 09 Mar 2022 12:05 )             29.7       03-09    135  |  101  |  9   ----------------------------<  205<H>  4.0   |  24  |  0.61    Ca    9.0      09 Mar 2022 12:05  Phos  3.1     03-09  Mg     1.5     03-09    TPro  5.6<L>  /  Alb  3.1<L>  /  TBili  0.2  /  DBili  x   /  AST  12  /  ALT  40  /  AlkPhos  60  03-09      Thyroid Function Tests:  03-01 @ 23:21 TSH 0.43 FreeT4 -- T3 -- Anti TPO -- Anti Thyroglobulin Ab -- TSI --      A1C with Estimated Average Glucose Result: 11.2 % (03-02-22 @ 01:48)  A1C with Estimated Average Glucose Result: 9.8 % (10-25-21 @ 00:21)          Contact number: lou [phone] or [phone]

## 2022-03-10 NOTE — DISCHARGE NOTE PROVIDER - CARE PROVIDER_API CALL
Jackie Buitrago (DO)  EndocrinologyMetabDiabetes  [address] [facility] [address], [address]  [place] [address]  Phone: [phone]  Fax: [phone]  Follow Up Time: 1 week    Herminio Gresham  CARDIOLOGY  [address] [facility], [address]  [place] [address]  Phone: [phone]  Fax: [phone]  Follow Up Time: 1 week    Juaquin Pham)  Anesthesiology; Pain Medicine  [address]  [place] [address]  Phone: [phone]  Fax: [phone]  Follow Up Time: 2 weeks

## 2022-03-10 NOTE — PROGRESS NOTE ADULT - PROBLEM SELECTOR PLAN 1
Presented with STEMI  (trop 7560, inferolateral LUIS on EKG).   TTE 3/2: EF 48%, hypokinesis of basal inferior wall, inferolateral wall, stage I diastolic dysfunction   PCI: 3/6: LCX POBA RFA access w/ angioseal closure   -Continue plavix 75 mg daily, atorvastatin 80mg daily, eliquis 5mg BID, coreg 25mg BID  -dc ASA as pt has finished 1 week of this  -Monitor on telemetry

## 2022-03-10 NOTE — CHART NOTE - NSCHARTNOTEFT_GEN_A_CORE
Nutrition Follow Up Note  Patient seen for: nutrition consult for education.     Chart reviewed, events noted. Pt is a 57 y/o M with PMH of HTN, T2DM, neuropathy, chronic pain (on Methadone & opioids) for cervical spine and back injury transferred to [facility] CCU from [place] due to STEMI (trop 7560, inferolateral LUIS) and DKA. Found to have A fib w/ RVR to 130s-150s, JAXON w/ SCr 2.14 (baseline 0.7-0.9 in 10/2021), admitted to CCU for optimization of DKA and kidney function prior to cath. DKA now resolved AMS resolved with MRI negative for bleeding or thrombosis.     Source: [x] Patient       [x] EMR        [] RN        [] Family at bedside       [] Other:    -If unable to interview patient: [] Trach/Vent/BiPAP  [] Disoriented/confused/inappropriate to interview    Diet Order:   Diet, DASH/TLC:   Sodium & Cholesterol Restricted  Consistent Carbohydrate {No Snacks} (CSTCHO) (22)    - Is current order appropriate/adequate? [x] Yes  []  No:     - PO intake :   [x] >75%  Adequate    [] 50-75%  Fair       [] <50%  Poor   -Per pt this morning, appetite improved and is eating well    - Nutrition-related concerns:  Per previous RD note 3/7:    - "Pt reports in last few months due to depressed state he was eating meals out more vs. cooking at home. Reports consuming 1-2 meals per day, reports he was taking in smaller portions due to acid reflux. Per most recent care coordination note, "Patient reports that he was drinking 3-4 glasses of wine or beer 2x week prior to admission."   - Pt with HbA1c 11.2%, reports checking FS every few days or so at home.    - Pt reports weight loss in past 5 months, states he was previously 275lb and remembers weighing self PTA at 228lb. Dosing wt 238.9lb on 3/1. Weights in house ranging from 248.4 - 266lb with most recent weight 254.1lb (3/7). Pt noted weight edema possibly masking lower weight. Possibility of 27lb wt loss x 5 months; 9.8% - clinically significant. Wt loss also likely worsened 2/2 DKA."    GI: Per GI consult 3/8: "Dark stool: DDx includes PUD, erosive gastritis, esophagitis, dieulafoy lesion, angioectasia. Currently hemodynamically stable and hgb relatively stable" Last BM 3/9 per flow sheets.   Bowel Regimen? [] Yes   [x] No    Weights:   Daily Weight in k.9 (03-10), Weight in k (), Weight in k.3 (), Weight in k.7 (), Weight in k (), Weight in k.8 ()    MEDICATIONS  (STANDING):  atorvastatin  carvedilol  insulin lispro (ADMELOG) corrective regimen sliding scale  insulin lispro (ADMELOG) corrective regimen sliding scale  insulin lispro Injectable (ADMELOG)  lisinopril  pantoprazole    Tablet    Pertinent Labs:  @ 12:05: Na 135, BUN 9, Cr 0.61, <H>, K+ 4.0, Phos 3.1, Mg 1.5<L>, Alk Phos 60, ALT/SGPT 40, AST/SGOT 12, HbA1c --    A1C with Estimated Average Glucose Result: 11.2 % (22 @ 01:48)  A1C with Estimated Average Glucose Result: 9.8 % (10-25-21 @ 00:21)    Finger Sticks:  POCT Blood Glucose.: 171 mg/dL (03-10 @ 07:47)  POCT Blood Glucose.: 189 mg/dL ( @ 21:29)  POCT Blood Glucose.: 106 mg/dL ( @ 16:51)  POCT Blood Glucose.: 214 mg/dL ( @ 11:18)    Triglycerides, Serum: 229 mg/dL (22 @ 01:56)    Skin per nursing documentation: no pressure injuries noted  Edema: none noted per flow sheets    Estimated Needs:   [x] no change since previous assessment  [] recalculated:     Previous Nutrition Diagnosis: Altered Nutrition Related Lab Values, Moderate malnutrition  Nutrition Diagnosis is: [x] ongoing  [] resolved [] not applicable     New Nutrition Diagnosis: N/A    Nutrition Care Plan:  [x] In Progress  [] Achieved  [] Not applicable    Nutrition Interventions:     Education Provided:       [x] Yes: Reinforced DM nutrition education. Provided patient with the following handouts:  1) Carbohydrate Counting for people with Diabetes"  2) Nutrition Labeling tip for people with Diabetes"     Recommendations:      1. Continue DASH/TLC, consistent carbohydrate diet as tolerated.  2. Consider multivitamin, thiamine supplementation.  3. Reinforce nutrition education as needed - RD remains available.     Monitoring and Evaluation:   Continue to monitor nutritional intake, tolerance to diet prescription, weights, labs, skin integrity    RD remains available upon request and will follow up per protocol    Sarah Araujo MS, RD, CDN #[phone]

## 2022-03-10 NOTE — PROGRESS NOTE ADULT - PROBLEM SELECTOR PLAN 3
Hgb 9-10 throughout this admission stable, last melena 3/8 PM.   Iron def anemia, Hx of gastric ulcer diagnosed via EGD 5-6 years ago   - defer endoscopy for anemia as patient is stable already on DAPT now, and he does not want to pursue endoscopic evaluation at this time  - c/w protonix, CBC daily

## 2022-03-10 NOTE — PROGRESS NOTE ADULT - PROBLEM SELECTOR PLAN 1
-test BG AC/HS  -Increase Lantus 40 units QAM  -c/w Admelog 12 units AC meals  -c/w Admelog moderate correction scale AC and mod HS scale  -cons carb diet  -C-peptide w/BMP reordered this AM stat  Dispo:  If c-peptide not resulted can be discharged on Lantus 40 units QAM, Trulicity 1.5mg qweekly, Metformin XR 2000mg daily (needs new script for this). Would stop Jardiance for time being given severe DKA on admission.   If c-peptide low, will recommend basal/bolus therapy  Needs Endo follow up w/Dr Buitrago in next 1-2 weeks.   Outpt. optho, podiatry, micro/cr. -test BG AC/HS  -Increase Lantus 40 units QAM  -c/w Admelog 12 units AC meals  -c/w Admelog moderate correction scale AC and mod HS scale  -cons carb diet  -C-peptide w/BMP reordered this AM stat  Dispo:  If c-peptide not resulted or adequate level can be discharged on Lantus 40 units QAM, Trulicity 1.5mg qweekly, Metformin XR 2000mg daily (needs new script for this). Would stop Jardiance for time being given severe DKA on admission.   If c-peptide low, will recommend basal/bolus therapy  Needs Endo follow up w/Dr Buitrago in next 1-2 weeks.   Outpt. optho, podiatry, micro/cr.

## 2022-03-10 NOTE — DISCHARGE NOTE PROVIDER - DETAILS OF MALNUTRITION DIAGNOSIS/DIAGNOSES
This patient has been assessed with a concern for Malnutrition and was treated during this hospitalization for the following Nutrition diagnosis/diagnoses:     -  03/07/2022: Moderate protein-calorie malnutrition

## 2022-03-10 NOTE — DISCHARGE NOTE PROVIDER - PROVIDER TOKENS
PROVIDER:[TOKEN:[[id number]],FOLLOWUP:[1 week]],PROVIDER:[TOKEN:[[id number]],FOLLOWUP:[1 week]],PROVIDER:[TOKEN:[[id number]],FOLLOWUP:[2 weeks]]

## 2022-03-10 NOTE — PROGRESS NOTE ADULT - PROBLEM SELECTOR PLAN 2
Presented with DKA, DKA resolved   -c/w Lantus 36 units daily, Admelog 12 units qac  -Mod correction scale qac + bedtime  -ENDO following, F/U Cpeptide

## 2022-03-10 NOTE — DISCHARGE NOTE PROVIDER - HOSPITAL COURSE
55 yo M PMH DM@, HTN, neuropathy, chronic pain, PUD presenting with AMS (resolved), STEMI s/p LHC with balloon angioplasty, DKA (now resolved), and JAXON (resolved), melena transferred from CiCU to floors on 3/8/22,     Problem/Plan - 1:  ·  Problem: STEMI (ST elevation myocardial infarction).   ·  Plan: Presented with STEMI  (trop 7560, inferolateral LUIS on EKG).   TTE 3/2: EF 48%, hypokinesis of basal inferior wall, inferolateral wall, stage I diastolic dysfunction   PCI: 3/6: LCX POBA RFA access w/ angioseal closure   -Continue plavix 75 mg daily, atorvastatin 80mg daily, eliquis 5mg BID, coreg 25mg BID  -dc ASA as pt has finished 1 week of this  -Monitor on telemetry.     Problem/Plan - 2:  ·  Problem: T2DM (type 2 diabetes mellitus).   ·  Plan: Presented with DKA, DKA resolved   -c/w Lantus 36 units daily, Admelog 12 units qac  -Mod correction scale qac + bedtime  -ENDO following, F/U Cpeptide.     Problem/Plan - 3:  ·  Problem: Melena.   ·  Plan: Hgb 9-10 throughout this admission stable, last melena 3/8 PM.   Iron def anemia, Hx of gastric ulcer diagnosed via EGD 5-6 years ago   - defer endoscopy for anemia as patient is stable already on DAPT now, and he does not want to pursue endoscopic evaluation at this time  - c/w protonix, CBC daily.     Problem/Plan - 4:  ·  Problem: Benign essential HTN.   ·  Plan: Chronic, stable   -Continue coreg 25mg BID, lisinopril 10mg daily.     Problem/Plan - 5:  ·  Problem: JAXON (acute kidney injury).   ·  Plan: Presented with Cr 2.14 which has now resolved to baseline of Cr 0.66  -Continue to monitor.     Problem/Plan - 6:  ·  Problem: Chronic pain syndrome.   ·  Plan: Chronic stable  Pain management consulted with recommendations ordered:  Continue methadone 5 mg Q 6 hours.  Continue lyrica 150 mg TID.  Continue PO dilaudid 4 mg QID PRN.  last BM 3/8 PM.     Problem/Plan - 7:  ·  Problem: Paroxysmal atrial fibrillation with rapid ventricular response.   ·  Plan: Presented with afib with RVR now NSR  New A fib diagnosis per patient   [id number] 3, HASBLED 1  Continue eliquis 5mg BID and coreg 25mg BID  Monitor on Telemetry    seen by neuro previously for AMS which has resolved, unlikely stroke. MR did show ?L vertebral a. stenosis however he is asymptomatic and being medically managed - he understands he is to follow up with neurology outpt about this. Also needs outpt follow up for L epitrochlear lymph node enlargement.         55 yo M PMH DM@, HTN, neuropathy, chronic pain, PUD presenting with AMS (resolved), STEMI s/p LHC with balloon angioplasty, DKA (now resolved), and JAXON (resolved), melena transferred from Ci[facility] to floors on 3/8/22,     Problem/Plan - 1:  ·  Problem: STEMI (ST elevation myocardial infarction).   ·  Plan: Presented with STEMI  (trop 7560, inferolateral LUIS on EKG).   TTE 3/2: EF 48%, hypokinesis of basal inferior wall, inferolateral wall, stage I diastolic dysfunction   PCI: 3/6: LCX POBA RFA access w/ angioseal closure   -Continue plavix 75 mg daily, atorvastatin 80mg daily, eliquis 5mg BID, coreg 25mg BID  -dc ASA as pt has finished 1 week of this  -Monitor on telemetry.     Problem/Plan - 2:  ·  Problem: T2DM (type 2 diabetes mellitus).   ·  Plan: Presented with DKA, DKA resolved   -c/w Lantus 36 units daily, Admelog 12 units qac  -Mod correction scale qac + bedtime  -ENDO following, Cpeptide low. discharged home on basal, bolus      Problem/Plan - 3:  ·  Problem: Melena.   ·  Plan: Hgb 9-10 throughout this admission stable, last melena 3/8 PM.   Iron def anemia, Hx of gastric ulcer diagnosed via EGD 5-6 years ago   - defer endoscopy for anemia as patient is stable already on DAPT now, and he does not want to pursue endoscopic evaluation at this time  - c/w protonix, CBC daily.     Problem/Plan - 4:  ·  Problem: Benign essential HTN.   ·  Plan: Chronic, stable   -Continue coreg 25mg BID, lisinopril 10mg daily.     Problem/Plan - 5:  ·  Problem: JAXON (acute kidney injury).   ·  Plan: Presented with Cr 2.14 which has now resolved to baseline of Cr 0.66  -Continue to monitor.     Problem/Plan - 6:  ·  Problem: Chronic pain syndrome.   ·  Plan: Chronic stable  Pain management consulted with recommendations ordered:  Continue methadone 5 mg Q 6 hours.  Continue lyrica 150 mg TID.  Continue PO dilaudid 4 mg QID PRN.  last BM 3/8 PM.     Problem/Plan - 7:  ·  Problem: Paroxysmal atrial fibrillation with rapid ventricular response.   ·  Plan: Presented with afib with RVR now NSR  New A fib diagnosis per patient   [id number] 3, HASBLED 1  Continue eliquis 5mg BID and coreg 25mg BID  Monitor on Telemetry    seen by neuro previously for AMS which has resolved, unlikely stroke. MR did show ?L vertebral a. stenosis however he is asymptomatic and being medically managed - he understands he is to follow up with neurology outpt about this. Also needs outpt follow up for L epitrochlear lymph node enlargement.         55 yo M PMH DM@, HTN, neuropathy, chronic pain, PUD presenting with AMS (resolved), STEMI s/p LHC with balloon angioplasty, DKA (now resolved), and JAXON (resolved), melena transferred from Ci[facility] to floors 3/8/22, stable for dc home today.     Problem/Plan - 1:  ·  Problem: STEMI (ST elevation myocardial infarction).   ·  Plan: Presented with STEMI  (trop 7560, inferolateral LUIS on EKG).   TTE 3/2: EF 48%, hypokinesis of basal inferior wall, inferolateral wall, stage I diastolic dysfunction   PCI: 3/6: LCX POBA RFA access w/ angioseal closure   -Continue plavix 75 mg daily, atorvastatin 80mg daily, eliquis 5mg BID, coreg 25mg BID  -Monitor on telemetry.     Problem/Plan - 2:  ·  Problem: T2DM (type 2 diabetes mellitus).   ·  Plan: Presented with DKA, DKA resolved   -c/w Lantus 40 units daily, Admelog 12 units qac  -Mod correction scale qac + bedtime  -ENDO following - stable for dc with above regimen given low c peptide level.     Problem/Plan - 3:  ·  Problem: Melena.   ·  Plan: Hgb 9-10 throughout this admission stable, last melena 3/8 PM.   Iron def anemia, Hx of gastric ulcer diagnosed via EGD 5-6 years ago   - defer endoscopy for anemia as patient is stable already on DAPT now, and he does not want to pursue endoscopic evaluation at this time  - c/w protonix, CBC daily.     Problem/Plan - 4:  ·  Problem: Benign essential HTN.   ·  Plan: Chronic, stable   -Continue coreg 25mg BID, lisinopril 10mg daily.     Problem/Plan - 5:  ·  Problem: JAXON (acute kidney injury).   ·  Plan: Presented with Cr 2.14 which has now resolved to baseline of Cr 0.66  -Continue to monitor.     Problem/Plan - 6:  ·  Problem: Chronic pain syndrome.   ·  Plan: Chronic stable  Pain management consulted with recommendations ordered:  Continue methadone 5 mg Q 6 hours.  Continue lyrica 150 mg TID.  Continue PO dilaudid 4 mg QID PRN.     Problem/Plan - 7:  ·  Problem: Paroxysmal atrial fibrillation with rapid ventricular response.   ·  Plan: Presented with afib with RVR now NSR  New A fib diagnosis per patient   [id number] 3, HASBLED 1  Continue eliquis 5mg BID and coreg 25mg BID  Monitor on Telemetry    seen by neuro previously for AMS which has resolved, unlikely stroke. MR did show ?L vertebral a. stenosis however he is asymptomatic and being medically managed - he understands he is to follow up with neurology outpt about this    Dispo: MS home 3/11. Pt asked to stay overnight to get comfortable with insulin injections and regimen prior. OUTPT follow up for L epitrochlear lymph node enlargement.

## 2022-03-10 NOTE — PROGRESS NOTE ADULT - SUBJECTIVE AND OBJECTIVE BOX
Chief Complaint:  Patient is a 56y old  Male who presents with a chief complaint of STEMI, DKA      HPI:  57 y/o M with PMH of HTN, T2DM, neuropathy, chronic pain on Methadone & opioids for cervical spine and back injury transferred to [facility] CICU from [place] due to STEMI and DKA. Pt was reportedly found to have inferolateral ST elevations + afib on EKG, trop 7560. Pt initially came to [facility] ED due to nausea and vomiting for 3 days, reportedly had CP several days ago which apparently stopped on its own. Pt currently altered, difficult to obtain history from.  Pt transferred to [facility] for cath, noted to be in afib w/ RVR to 130s-150s, received cardizem 25 mg IV x 2 and started on cardizem gtt, cath was aborted as pt had SCR of 2.14, (baseline 0.7-0.9 in 10/2021), transferred to CCU for optimization of DKA prior to cath.     Current Pain Score: 0/10    Current out- patient pain regimen: methadone 5 mg Q 6 hours, dilaudid 4 mg PO QID PRN, lyrica 150 mg TID  Out Patient Pain Management provider: Juaquin Pham MD     MEDICATIONS  (STANDING):  apixaban 5 milliGRAM(s) Oral every 12 hours  aspirin  chewable 81 milliGRAM(s) Oral daily  atorvastatin 80 milliGRAM(s) Oral at bedtime  carvedilol 12.5 milliGRAM(s) Oral every 12 hours  chlorhexidine 4% Liquid 1 Application(s) Topical <User Schedule>  clobetasol 0.05% Ointment 1 Application(s) Topical two times a day  clopidogrel Tablet 75 milliGRAM(s) Oral daily  influenza   Vaccine 0.5 milliLiter(s) IntraMuscular once  insulin glargine Injectable (LANTUS) 50 Unit(s) SubCutaneous <User Schedule>  insulin lispro (ADMELOG) corrective regimen sliding scale   SubCutaneous three times a day before meals  insulin lispro (ADMELOG) corrective regimen sliding scale   SubCutaneous at bedtime  insulin lispro Injectable (ADMELOG) 2 Unit(s) SubCutaneous three times a day before meals  isosorbide   dinitrate Tablet (ISORDIL) 30 milliGRAM(s) Oral three times a day  lisinopril 10 milliGRAM(s) Oral daily  methadone    Tablet 5 milliGRAM(s) Oral every 6 hours  nystatin Powder 1 Application(s) Topical two times a day  pantoprazole  Injectable 40 milliGRAM(s) IV Push every 12 hours  pregabalin 150 milliGRAM(s) Oral three times a day  thiamine IVPB 500 milliGRAM(s) IV Intermittent three times a day    MEDICATIONS  (PRN):  HYDROmorphone   Tablet 4 milliGRAM(s) Oral every 6 hours PRN Severe Pain (7 - 10)      PHYSICAL EXAM  Seen at bedside; NAD  A + O X 3; good concentration; follows commands; speech clear and fluent  Appetite fair; + BM; voiding  COBOS; 2+ peripheral pulses; no cyanosis or clubbing; + edema L hand and digits (chronic)  Motor strength 5/5 B/L LE and RUE; LUE with muscle atrophy and 5/5 hand grasp, but marked decreased ROM L shoulder and elbow with 3/5 motor strength  Affect normal; good eye contact; no signs of depression or anxiety  T(C): 37.1 (03-10-22 @ 11:37)  HR: 89 (03-10-22 @ 16:47)  BP: 121/70 (03-10-22 @ 16:47)  RR: 18 (03-10-22 @ 11:37)  SpO2: 95% (03-10-22 @ 11:37)     Pertinent labs, radiology, additional studies:  03-10    135  |  100  |  8   ----------------------------<  194<H>  4.0   |  25  |  0.62    Ca    8.7      10 Mar 2022 10:42  Phos  3.1     03-09  Mg     1.5     03-09    TPro  5.6<L>  /  Alb  3.1<L>  /  TBili  0.2  /  DBili  x   /  AST  12  /  ALT  40  /  AlkPhos  60  03-09                          9.7    10.92 )-----------( 519      ( 09 Mar 2022 12:05 )             29.7

## 2022-03-10 NOTE — PROGRESS NOTE ADULT - ASSESSMENT
55 y/o M with PMH of HTN, T2DM, neuropathy, chronic pain on Methadone & opioids for cervical spine and back injury transferred to [facility] CICU from [place] due to STEMI and DKA. Pt was reportedly found to have inferolateral ST elevations + afib on EKG, trop 7560. Pt initially came to [facility] ED due to nausea and vomiting for 3 days, reportedly had CP several days ago which apparently stopped on its own. Pt currently altered, difficult to obtain history from.  Pt transferred to [facility] for cath, noted to be in afib w/ RVR to 130s-150s, received cardizem 25 mg IV x 2 and started on cardizem gtt, cath was aborted as pt had SCR of 2.14, (baseline 0.7-0.9 in 10/2021), transferred to CCU for optimization of DKA prior to cath.     Pt with chronic neck pain, post lami syndrome and neuropathy.  Pain scores 7/10, down to 0/10 with medications.    Continue methadone 5 mg Q 6 hours.  Continue lyrica 150 mg TID.  Continue PO dilaudid 4 mg QID PRN.    Pt to be discharged home.  Follow up with Dr Juaquin Pham for continued pain management after discharge.  Discharge home with a narcan rescue kit (naloxone 4 mg/ 0.1 ml nasal spray- 1 spray Q 2-3 minutes alternating between nostrils).      Minutes spent on total encounter: 15 minutes      [facility]  [phone]

## 2022-03-11 VITALS
SYSTOLIC BLOOD PRESSURE: 105 MMHG | RESPIRATION RATE: 17 BRPM | HEART RATE: 84 BPM | OXYGEN SATURATION: 96 % | DIASTOLIC BLOOD PRESSURE: 71 MMHG | TEMPERATURE: 98 F

## 2022-03-11 LAB
GLUCOSE BLDC GLUCOMTR-MCNC: 202 MG/DL — HIGH (ref 70–99)
SURGICAL PATHOLOGY STUDY: SIGNIFICANT CHANGE UP

## 2022-03-11 PROCEDURE — 82803 BLOOD GASES ANY COMBINATION: CPT

## 2022-03-11 PROCEDURE — C1760: CPT

## 2022-03-11 PROCEDURE — 84165 PROTEIN E-PHORESIS SERUM: CPT

## 2022-03-11 PROCEDURE — 93454 CORONARY ARTERY ANGIO S&I: CPT

## 2022-03-11 PROCEDURE — 95714 VEEG EA 12-26 HR UNMNTR: CPT

## 2022-03-11 PROCEDURE — 87102 FUNGUS ISOLATION CULTURE: CPT

## 2022-03-11 PROCEDURE — 86431 RHEUMATOID FACTOR QUANT: CPT

## 2022-03-11 PROCEDURE — 85018 HEMOGLOBIN: CPT

## 2022-03-11 PROCEDURE — 93970 EXTREMITY STUDY: CPT

## 2022-03-11 PROCEDURE — 84155 ASSAY OF PROTEIN SERUM: CPT

## 2022-03-11 PROCEDURE — 85014 HEMATOCRIT: CPT

## 2022-03-11 PROCEDURE — 99153 MOD SED SAME PHYS/QHP EA: CPT

## 2022-03-11 PROCEDURE — G0480: CPT

## 2022-03-11 PROCEDURE — 86038 ANTINUCLEAR ANTIBODIES: CPT

## 2022-03-11 PROCEDURE — 80053 COMPREHEN METABOLIC PANEL: CPT

## 2022-03-11 PROCEDURE — 83605 ASSAY OF LACTIC ACID: CPT

## 2022-03-11 PROCEDURE — 93306 TTE W/DOPPLER COMPLETE: CPT

## 2022-03-11 PROCEDURE — 82330 ASSAY OF CALCIUM: CPT

## 2022-03-11 PROCEDURE — 70549 MR ANGIOGRAPH NECK W/O&W/DYE: CPT

## 2022-03-11 PROCEDURE — 83735 ASSAY OF MAGNESIUM: CPT

## 2022-03-11 PROCEDURE — C1769: CPT

## 2022-03-11 PROCEDURE — A9585: CPT

## 2022-03-11 PROCEDURE — 83036 HEMOGLOBIN GLYCOSYLATED A1C: CPT

## 2022-03-11 PROCEDURE — 87086 URINE CULTURE/COLONY COUNT: CPT

## 2022-03-11 PROCEDURE — 82784 ASSAY IGA/IGD/IGG/IGM EACH: CPT

## 2022-03-11 PROCEDURE — 86900 BLOOD TYPING SEROLOGIC ABO: CPT

## 2022-03-11 PROCEDURE — 84100 ASSAY OF PHOSPHORUS: CPT

## 2022-03-11 PROCEDURE — 86200 CCP ANTIBODY: CPT

## 2022-03-11 PROCEDURE — 93321 DOPPLER ECHO F-UP/LMTD STD: CPT

## 2022-03-11 PROCEDURE — 82010 KETONE BODYS QUAN: CPT

## 2022-03-11 PROCEDURE — 86780 TREPONEMA PALLIDUM: CPT

## 2022-03-11 PROCEDURE — 82272 OCCULT BLD FECES 1-3 TESTS: CPT

## 2022-03-11 PROCEDURE — 87116 MYCOBACTERIA CULTURE: CPT

## 2022-03-11 PROCEDURE — 82565 ASSAY OF CREATININE: CPT

## 2022-03-11 PROCEDURE — 70544 MR ANGIOGRAPHY HEAD W/O DYE: CPT

## 2022-03-11 PROCEDURE — 87015 SPECIMEN INFECT AGNT CONCNTJ: CPT

## 2022-03-11 PROCEDURE — C1725: CPT

## 2022-03-11 PROCEDURE — 87206 SMEAR FLUORESCENT/ACID STAI: CPT

## 2022-03-11 PROCEDURE — 86334 IMMUNOFIX E-PHORESIS SERUM: CPT

## 2022-03-11 PROCEDURE — 84425 ASSAY OF VITAMIN B-1: CPT

## 2022-03-11 PROCEDURE — 84681 ASSAY OF C-PEPTIDE: CPT

## 2022-03-11 PROCEDURE — 88305 TISSUE EXAM BY PATHOLOGIST: CPT

## 2022-03-11 PROCEDURE — 85730 THROMBOPLASTIN TIME PARTIAL: CPT

## 2022-03-11 PROCEDURE — 85652 RBC SED RATE AUTOMATED: CPT

## 2022-03-11 PROCEDURE — 83540 ASSAY OF IRON: CPT

## 2022-03-11 PROCEDURE — C1887: CPT

## 2022-03-11 PROCEDURE — C1894: CPT

## 2022-03-11 PROCEDURE — 80061 LIPID PANEL: CPT

## 2022-03-11 PROCEDURE — 86850 RBC ANTIBODY SCREEN: CPT

## 2022-03-11 PROCEDURE — 87220 TISSUE EXAM FOR FUNGI: CPT

## 2022-03-11 PROCEDURE — 83880 ASSAY OF NATRIURETIC PEPTIDE: CPT

## 2022-03-11 PROCEDURE — 99232 SBSQ HOSP IP/OBS MODERATE 35: CPT

## 2022-03-11 PROCEDURE — 88312 SPECIAL STAINS GROUP 1: CPT

## 2022-03-11 PROCEDURE — 95700 EEG CONT REC W/VID EEG TECH: CPT

## 2022-03-11 PROCEDURE — 97116 GAIT TRAINING THERAPY: CPT

## 2022-03-11 PROCEDURE — 82962 GLUCOSE BLOOD TEST: CPT

## 2022-03-11 PROCEDURE — 87529 HSV DNA AMP PROBE: CPT

## 2022-03-11 PROCEDURE — 92920 PRQ TRLUML C ANGIOP 1ART&/BR: CPT | Mod: LC

## 2022-03-11 PROCEDURE — 84484 ASSAY OF TROPONIN QUANT: CPT

## 2022-03-11 PROCEDURE — 82435 ASSAY OF BLOOD CHLORIDE: CPT

## 2022-03-11 PROCEDURE — 82607 VITAMIN B-12: CPT

## 2022-03-11 PROCEDURE — 87040 BLOOD CULTURE FOR BACTERIA: CPT

## 2022-03-11 PROCEDURE — 80048 BASIC METABOLIC PNL TOTAL CA: CPT

## 2022-03-11 PROCEDURE — 88342 IMHCHEM/IMCYTCHM 1ST ANTB: CPT

## 2022-03-11 PROCEDURE — 87075 CULTR BACTERIA EXCEPT BLOOD: CPT

## 2022-03-11 PROCEDURE — 82140 ASSAY OF AMMONIA: CPT

## 2022-03-11 PROCEDURE — C8924: CPT

## 2022-03-11 PROCEDURE — 86140 C-REACTIVE PROTEIN: CPT

## 2022-03-11 PROCEDURE — 85025 COMPLETE CBC W/AUTO DIFF WBC: CPT

## 2022-03-11 PROCEDURE — 87070 CULTURE OTHR SPECIMN AEROBIC: CPT

## 2022-03-11 PROCEDURE — 83930 ASSAY OF BLOOD OSMOLALITY: CPT

## 2022-03-11 PROCEDURE — 87798 DETECT AGENT NOS DNA AMP: CPT

## 2022-03-11 PROCEDURE — 99152 MOD SED SAME PHYS/QHP 5/>YRS: CPT

## 2022-03-11 PROCEDURE — 70553 MRI BRAIN STEM W/O & W/DYE: CPT

## 2022-03-11 PROCEDURE — 36415 COLL VENOUS BLD VENIPUNCTURE: CPT

## 2022-03-11 PROCEDURE — 84132 ASSAY OF SERUM POTASSIUM: CPT

## 2022-03-11 PROCEDURE — 97161 PT EVAL LOW COMPLEX 20 MIN: CPT

## 2022-03-11 PROCEDURE — 81001 URINALYSIS AUTO W/SCOPE: CPT

## 2022-03-11 PROCEDURE — 86901 BLOOD TYPING SEROLOGIC RH(D): CPT

## 2022-03-11 PROCEDURE — 82550 ASSAY OF CK (CPK): CPT

## 2022-03-11 PROCEDURE — 84443 ASSAY THYROID STIM HORMONE: CPT

## 2022-03-11 PROCEDURE — 70450 CT HEAD/BRAIN W/O DYE: CPT

## 2022-03-11 PROCEDURE — 85610 PROTHROMBIN TIME: CPT

## 2022-03-11 PROCEDURE — 83550 IRON BINDING TEST: CPT

## 2022-03-11 PROCEDURE — 93005 ELECTROCARDIOGRAM TRACING: CPT

## 2022-03-11 PROCEDURE — 84295 ASSAY OF SERUM SODIUM: CPT

## 2022-03-11 PROCEDURE — 87591 N.GONORRHOEAE DNA AMP PROB: CPT

## 2022-03-11 PROCEDURE — 83090 ASSAY OF HOMOCYSTEINE: CPT

## 2022-03-11 PROCEDURE — 82947 ASSAY GLUCOSE BLOOD QUANT: CPT

## 2022-03-11 PROCEDURE — 85027 COMPLETE CBC AUTOMATED: CPT

## 2022-03-11 PROCEDURE — 71045 X-RAY EXAM CHEST 1 VIEW: CPT

## 2022-03-11 PROCEDURE — 97165 OT EVAL LOW COMPLEX 30 MIN: CPT

## 2022-03-11 PROCEDURE — 82746 ASSAY OF FOLIC ACID SERUM: CPT

## 2022-03-11 PROCEDURE — 82553 CREATINE MB FRACTION: CPT

## 2022-03-11 PROCEDURE — 80307 DRUG TEST PRSMV CHEM ANLYZR: CPT

## 2022-03-11 PROCEDURE — 83921 ORGANIC ACID SINGLE QUANT: CPT

## 2022-03-11 RX ORDER — INSULIN LISPRO 100/ML
16 VIAL (ML) SUBCUTANEOUS
Qty: 1 | Refills: 0
Start: 2022-03-11 | End: 2022-04-09

## 2022-03-11 RX ORDER — INSULIN ASPART 100 [IU]/ML
12 INJECTION, SOLUTION SUBCUTANEOUS
Qty: 1 | Refills: 0
Start: 2022-03-11 | End: 2022-04-09

## 2022-03-11 RX ADMIN — CARVEDILOL PHOSPHATE 25 MILLIGRAM(S): 80 CAPSULE, EXTENDED RELEASE ORAL at 05:46

## 2022-03-11 RX ADMIN — HYDROMORPHONE HYDROCHLORIDE 4 MILLIGRAM(S): 2 INJECTION INTRAMUSCULAR; INTRAVENOUS; SUBCUTANEOUS at 06:31

## 2022-03-11 RX ADMIN — Medication 12 UNIT(S): at 09:30

## 2022-03-11 RX ADMIN — HYDROMORPHONE HYDROCHLORIDE 4 MILLIGRAM(S): 2 INJECTION INTRAMUSCULAR; INTRAVENOUS; SUBCUTANEOUS at 01:06

## 2022-03-11 RX ADMIN — PANTOPRAZOLE SODIUM 40 MILLIGRAM(S): 20 TABLET, DELAYED RELEASE ORAL at 05:45

## 2022-03-11 RX ADMIN — HYDROMORPHONE HYDROCHLORIDE 4 MILLIGRAM(S): 2 INJECTION INTRAMUSCULAR; INTRAVENOUS; SUBCUTANEOUS at 07:05

## 2022-03-11 RX ADMIN — METHADONE HYDROCHLORIDE 5 MILLIGRAM(S): 40 TABLET ORAL at 05:50

## 2022-03-11 RX ADMIN — LISINOPRIL 10 MILLIGRAM(S): 2.5 TABLET ORAL at 05:45

## 2022-03-11 RX ADMIN — APIXABAN 5 MILLIGRAM(S): 2.5 TABLET, FILM COATED ORAL at 05:46

## 2022-03-11 RX ADMIN — Medication 1 APPLICATION(S): at 05:45

## 2022-03-11 RX ADMIN — METHADONE HYDROCHLORIDE 5 MILLIGRAM(S): 40 TABLET ORAL at 00:45

## 2022-03-11 RX ADMIN — Medication 150 MILLIGRAM(S): at 05:46

## 2022-03-11 RX ADMIN — INSULIN GLARGINE 40 UNIT(S): 100 INJECTION, SOLUTION SUBCUTANEOUS at 09:29

## 2022-03-11 RX ADMIN — HYDROMORPHONE HYDROCHLORIDE 4 MILLIGRAM(S): 2 INJECTION INTRAMUSCULAR; INTRAVENOUS; SUBCUTANEOUS at 01:36

## 2022-03-11 RX ADMIN — Medication 4: at 09:30

## 2022-03-11 NOTE — PROGRESS NOTE ADULT - PROBLEM SELECTOR PLAN 2
Presented with DKA, DKA resolved   -c/w Lantus 40 units daily, Admelog 12 units qac  -Mod correction scale qac + bedtime  -ENDO following - stable for dc with above regimen given low c peptide level

## 2022-03-11 NOTE — PROGRESS NOTE ADULT - NSPROGADDITIONALINFOA_GEN_ALL_CORE
D/W LAURENT Church and LESLIE Hernandez/[facility] endo
D/W LAURENT Church
Outpatient GI f/u as had dark stool while inpatient- he was seen by GI while in CICU
32 minutes development of plan of care/coordination of care/glycemic control/discharge planning through review of labs, blood glucose values and vital signs.
-Plan discussed with pt/team.  Contact info: [phone] (24/7). pager [phone]  [email or web address] password [email or web address]  Spent over 35  minutes providing face to face education which was more than 50% of encounter that also included assessing pt/labs/meds and discussing plan of care with primary team.  Adjusting insulin  Discharge plan  Follow up care
-Plan discussed with pt/team.  Contact info: [phone] (24/7). pager [phone]  [email or web address] password [email or web address]  Spent 30  minutes providing face to face education which was more than 50% of encounter that also included assessing pt/labs/meds and discussing plan of care with primary team.  Adjusting insulin  Discharge plan  Follow up care

## 2022-03-11 NOTE — PROGRESS NOTE ADULT - PROBLEM SELECTOR PLAN 6
Chronic stable  Pain management consulted with recommendations ordered:  Continue methadone 5 mg Q 6 hours.  Continue lyrica 150 mg TID.  Continue PO dilaudid 4 mg QID PRN.
Chronic stable  Pain management consulted with recommendations ordered:  Continue methadone 5 mg Q 6 hours.  Continue lyrica 150 mg TID.  Continue PO dilaudid 4 mg QID PRN.  last BM 3/8 PM
Presented with afib with RVR now NSR  [id number] 3, HASBLED 1  Continue eliquis 5mg BID and coreg 25mg BID  Monitor on Telemetry
Chronic stable  Pain management consulted with recommendations ordered:  Continue methadone 5 mg Q 6 hours.  Continue lyrica 150 mg TID.  Continue PO dilaudid 4 mg QID PRN.  last BM 3/8 PM

## 2022-03-11 NOTE — PROGRESS NOTE ADULT - ASSESSMENT
57 yo M PMH DM@, HTN, neuropathy, chronic pain, PUD presenting with AMS (resolved), STEMI s/p LHC with balloon angioplasty, DKA (now resolved), and JAXON (resolved), melena transferred from CiCU to floors 3/8/22, stable for dc home today.

## 2022-03-11 NOTE — PROGRESS NOTE ADULT - PROBLEM SELECTOR PLAN 1
Presented with STEMI  (trop 7560, inferolateral LUIS on EKG).   TTE 3/2: EF 48%, hypokinesis of basal inferior wall, inferolateral wall, stage I diastolic dysfunction   PCI: 3/6: LCX POBA RFA access w/ angioseal closure   -Continue plavix 75 mg daily, atorvastatin 80mg daily, eliquis 5mg BID, coreg 25mg BID  -Monitor on telemetry

## 2022-03-11 NOTE — PROGRESS NOTE ADULT - SUBJECTIVE AND OBJECTIVE BOX
Patricia Mojica MD  Division of Hospital Medicine  Pager [phone], If no response/off hours [phone]    Patient is a 56y old  Male who presents with a chief complaint of STEMI, DKA (10 Mar 2022 17:26)        SUBJECTIVE / OVERNIGHT EVENTS:  overnight no events  no n/f/v, chills, cp, sob, abd pain  feels well  Pt was told he needs to go home on insulin only and wanted to make sure he felt comfortable with injections before going home and asked to stay one more night.    CAPILLARY BLOOD GLUCOSE  POCT Blood Glucose.: 202 mg/dL (11 Mar 2022 08:39)  POCT Blood Glucose.: 153 mg/dL (10 Mar 2022 21:41)  POCT Blood Glucose.: 155 mg/dL (10 Mar 2022 16:31)    I&O's Summary    10 Mar 2022 07:01  -  11 Mar 2022 07:00  --------------------------------------------------------  IN: 1020 mL / OUT: 0 mL / NET: 1020 mL    11 Mar 2022 07:01  -  11 Mar 2022 12:21  --------------------------------------------------------  IN: 320 mL / OUT: 0 mL / NET: 320 mL      Vital Signs Last 24 Hrs  T(C): 36.7 (11 Mar 2022 04:30), Max: 37 (10 Mar 2022 18:49)  T(F): 98.1 (11 Mar 2022 04:30), Max: 98.6 (10 Mar 2022 18:49)  HR: 84 (11 Mar 2022 04:30) (84 - 93)  BP: 105/71 (11 Mar 2022 04:30) (92/59 - 121/70)  BP(mean): --  RR: 17 (11 Mar 2022 04:30) (17 - 18)  SpO2: 96% (11 Mar 2022 04:30) (95% - 96%)    PHYSICAL EXAM:  GENERAL:  Well appearing, in NAD  HEAD:  NCAT  EYES: PERRLA, conjunctiva clear  NECK: Supple, No JVD  CHEST/LUNG: CTA B/L. No w/r/r.  HEART: Reg rate. Normal S1, S2. No m/r/g.   ABDOMEN: SNTND. Bowel sounds present  EXTREMITIES:  2+ Peripheral Pulses, No clubbing, cyanosis, edema.  PSYCH: appropriate affect  SKIN: No rashes or lesions    LABS:    03-10    135  |  100  |  8   ----------------------------<  194<H>  4.0   |  25  |  0.62    Ca    8.7      10 Mar 2022 10:42      RADIOLOGY & ADDITIONAL TESTS:  Consultant(s) Notes Reviewed:  endo  Care Discussed with Consultants/Other Providers: Floor NP/PA    MEDICATIONS  (STANDING):  apixaban 5 milliGRAM(s) Oral every 12 hours  atorvastatin 80 milliGRAM(s) Oral at bedtime  carvedilol 25 milliGRAM(s) Oral every 12 hours  clobetasol 0.05% Ointment 1 Application(s) Topical two times a day  clopidogrel Tablet 75 milliGRAM(s) Oral daily  influenza   Vaccine 0.5 milliLiter(s) IntraMuscular once  insulin glargine Injectable (LANTUS) 40 Unit(s) SubCutaneous every morning  insulin lispro (ADMELOG) corrective regimen sliding scale   SubCutaneous three times a day before meals  insulin lispro (ADMELOG) corrective regimen sliding scale   SubCutaneous at bedtime  insulin lispro Injectable (ADMELOG) 12 Unit(s) SubCutaneous three times a day before meals  lisinopril 10 milliGRAM(s) Oral daily  methadone    Tablet 5 milliGRAM(s) Oral every 6 hours  pantoprazole    Tablet 40 milliGRAM(s) Oral two times a day  pregabalin 150 milliGRAM(s) Oral three times a day    MEDICATIONS  (PRN):  HYDROmorphone   Tablet 4 milliGRAM(s) Oral every 6 hours PRN Severe Pain (7 - 10)

## 2022-03-11 NOTE — PROGRESS NOTE ADULT - ASSESSMENT
55 y/o M w/ uncontrolled Type 2 DM (A1C 11.2%) on Lantus scale plus Jardiance 25mg, Trulicity 1.5mg q week plus Metformin 1g bid with intermittent adherence PTA. DM c/b  neuropathy and now CAD. Also h/o HTN, Chronic back pain, cervical spine injury, gastric ulcer. Transferred from [place] after presenting with CP/N/V and found to have STEMI/DKA. S/p cardiac cath with balloon angio 3/6/22. Tolerating POs with improving BG levels while on present insulin doses. No hypoglycemia and going home today. BG goal 100 to 180s.    Spoke to pt and girlfriend about results of C-peptide levels and the need to continue with basal/bolus insulin therapy until c-peptide recovers. Severe DKA likely caused by long standing uncontrolled DM with poor med adherence that was worsen in the setting of  acute MI and pt taking Jardiance.   Pt aware to f/u with LESLIE Ivory to repeat c-peptide as out pt. Once C-peptide recovers can consider other oral DM meds and injectable GLP1RA for CV protection.   Pt and girlfriend able to verbalize understanding and agreed with plan. Pt has this provider's contact info in case of questions.

## 2022-03-11 NOTE — PROGRESS NOTE ADULT - PROBLEM SELECTOR PROBLEM 6
Chronic pain syndrome
Paroxysmal atrial fibrillation with rapid ventricular response
Chronic pain syndrome
Chronic pain syndrome

## 2022-03-11 NOTE — PROGRESS NOTE ADULT - PROBLEM SELECTOR PROBLEM 2
Essential hypertension
T2DM (type 2 diabetes mellitus)
T2DM (type 2 diabetes mellitus)
Essential hypertension
Essential hypertension
T2DM (type 2 diabetes mellitus)
T2DM (type 2 diabetes mellitus)

## 2022-03-11 NOTE — PROGRESS NOTE ADULT - SUBJECTIVE AND OBJECTIVE BOX
DIABETES FOLLOW UP NOTE: Saw pt earlier today prior to discharge    Chief Complaint: Endocrine consult requested for management of T2DM    INTERVAL HX: Pt stable, reports tolerating POs with BG levels improving while on present insulin doses. No hypoglycemia. Denies any CP/SOB. Eager to go home today. Noted C-peptide level low.       Review of Systems:  General: As above  Cardiovascular: No chest pain, palpitations  Respiratory: No SOB, no cough  GI: No nausea, vomiting, abdominal pain  Endocrine: No polyuria, polydipsia or S&Sx of hypoglycemia    Allergies    No Known Allergies    Intolerances      MEDICATIONS:  atorvastatin 80 milliGRAM(s) Oral at bedtime  insulin glargine Injectable (LANTUS) 40 Unit(s) SubCutaneous every morning  insulin lispro (ADMELOG) corrective regimen sliding scale   SubCutaneous three times a day before meals  insulin lispro (ADMELOG) corrective regimen sliding scale   SubCutaneous at bedtime  insulin lispro Injectable (ADMELOG) 12 Unit(s) SubCutaneous three times a day before meals      PHYSICAL EXAM:  VITALS: T(C): 36.7 (03-11-22 @ 04:30)  T(F): 98.1 (03-11-22 @ 04:30), Max: 98.6 (03-10-22 @ 18:49)  HR: 84 (03-11-22 @ 04:30) (84 - 93)  BP: 105/71 (03-11-22 @ 04:30) (92/59 - 105/71)  RR:  (17 - 18)  SpO2:  (95% - 96%)  Wt(kg): --  GENERAL: Male sitting at edge of bed in NAD. Girlfriend at bedside  Abdomen: Soft, nontender, non distended, Central adiposity  Extremities: Warm, no edema in all 4 exts  NEURO: A&O X3    LABS:  POCT Blood Glucose.: 202 mg/dL (03-11-22 @ 08:39)  POCT Blood Glucose.: 153 mg/dL (03-10-22 @ 21:41)  POCT Blood Glucose.: 155 mg/dL (03-10-22 @ 16:31)  POCT Blood Glucose.: 171 mg/dL (03-10-22 @ 11:38)  POCT Blood Glucose.: 171 mg/dL (03-10-22 @ 07:47)  POCT Blood Glucose.: 189 mg/dL (03-09-22 @ 21:29)  POCT Blood Glucose.: 106 mg/dL (03-09-22 @ 16:51)  POCT Blood Glucose.: 214 mg/dL (03-09-22 @ 11:18)  POCT Blood Glucose.: 197 mg/dL (03-09-22 @ 07:58)  POCT Blood Glucose.: 217 mg/dL (03-08-22 @ 21:49)                            9.7    10.92 )-----------( 519      ( 09 Mar 2022 12:05 )             29.7       03-10    135  |  100  |  8   ----------------------------<  194<H>  4.0   |  25  |  0.62    EGFR if : x   EGFR if non : x     Ca    8.7      03-10  Mg     1.5     03-09  Phos  3.1     03-09    TPro  5.6<L>  /  Alb  3.1<L>  /  TBili  0.2  /  DBili  x   /  AST  12  /  ALT  40  /  AlkPhos  60  03-09      Thyroid Function Tests:  03-01 @ 23:21 TSH 0.43 FreeT4 -- T3 -- Anti TPO -- Anti Thyroglobulin Ab -- TSI --      A1C with Estimated Average Glucose Result: 11.2 % (03-02-22 @ 01:48)      Estimated Average Glucose: 275 mg/dL (03-02-22 @ 01:48)    C-Peptide, Serum: 0.7 ng/mL (03-10-22 @ 15:16) with glucose of 194      03-02 Chol 264<H> Direct LDL -- LDL calculated 186<H> HDL 33<L> Trig 229<H>

## 2022-03-11 NOTE — PROGRESS NOTE ADULT - PROBLEM SELECTOR PROBLEM 7
Paroxysmal atrial fibrillation with rapid ventricular response

## 2022-03-11 NOTE — PROGRESS NOTE ADULT - REASON FOR ADMISSION
STEMI, DKA

## 2022-03-11 NOTE — PROGRESS NOTE ADULT - PROBLEM SELECTOR PLAN 1
Test  BG AC AND HS  Lantus 36 units q am  C/w Admelog dose to 12 units qac  C/w Mod correction scale qac + bedtime  Discharge:   - Pt has low c- peptide levels so will need to stay on basal/bolus insulin therapy. Lantus 36 q am (pt prefers this time of the day to ensure adherence) Novolog 12 units ac meals.   -Pt to f/u with Dr Ivory C-peptide levels and to adjust DM regimen as needed.    Outpt. optho, podiatry, micro/cr Test  BG AC AND HS  Lantus 40 units q am  C/w Admelog dose to 12 units qac  C/w Mod correction scale qac + bedtime  Discharge:   - Pt has low c- peptide levels so will need to stay on basal/bolus insulin therapy. Lantus 40 q am (pt prefers this time of the day to ensure adherence) Novolog 12 units ac meals.   -Pt to f/u with Dr Ivory C-peptide levels and to adjust DM regimen as needed.    Outpt. optho, podiatry, micro/cr

## 2022-03-11 NOTE — PROGRESS NOTE ADULT - PROBLEM SELECTOR PROBLEM 1
STEMI (ST elevation myocardial infarction)
STEMI (ST elevation myocardial infarction)
Diabetes type 2, uncontrolled
Diabetes type 2, uncontrolled
STEMI (ST elevation myocardial infarction)
Diabetes type 2, uncontrolled
STEMI (ST elevation myocardial infarction)

## 2022-03-11 NOTE — PROGRESS NOTE ADULT - PROBLEM SELECTOR PROBLEM 5
JAXON (acute kidney injury)
Chronic pain syndrome
JAXON (acute kidney injury)
JAXON (acute kidney injury)

## 2022-03-14 ENCOUNTER — APPOINTMENT (OUTPATIENT)
Dept: CARDIOLOGY | Facility: CLINIC | Age: 57
End: 2022-03-14
Payer: MEDICARE

## 2022-03-14 ENCOUNTER — NON-APPOINTMENT (OUTPATIENT)
Age: 57
End: 2022-03-14

## 2022-03-14 VITALS
DIASTOLIC BLOOD PRESSURE: 53 MMHG | BODY MASS INDEX: 43.98 KG/M2 | HEIGHT: 62 IN | RESPIRATION RATE: 16 BRPM | OXYGEN SATURATION: 94 % | WEIGHT: 239 LBS | SYSTOLIC BLOOD PRESSURE: 92 MMHG | HEART RATE: 90 BPM

## 2022-03-14 DIAGNOSIS — I22.1 SUBSEQUENT ST ELEVATION (STEMI) MYOCARDIAL INFARCTION OF INFERIOR WALL: ICD-10-CM

## 2022-03-14 DIAGNOSIS — Z87.898 PERSONAL HISTORY OF OTHER SPECIFIED CONDITIONS: ICD-10-CM

## 2022-03-14 LAB
6-ACETYLCODEINE UR CFM-MCNC: NEGATIVE NG/ML — SIGNIFICANT CHANGE UP
AMPHET UR-MCNC: NEGATIVE — SIGNIFICANT CHANGE UP
BARBITURATES, URINE.: NEGATIVE — SIGNIFICANT CHANGE UP
BENZODIAZ UR-MCNC: NEGATIVE — SIGNIFICANT CHANGE UP
COCAINE METAB.OTHER UR-MCNC: NEGATIVE — SIGNIFICANT CHANGE UP
CODEINE UR CFM-MCNC: SIGNIFICANT CHANGE UP NG/ML
CREATININE, URINE THERAPEUTIC: 36 MG/DL — SIGNIFICANT CHANGE UP
EDDP UR CFM-MCNC: 783 NG/ML — SIGNIFICANT CHANGE UP
HYDROCODONE UR CFM-MCNC: SIGNIFICANT CHANGE UP NG/ML
HYDROMORPHONE UR CFM-MCNC: 269 NG/ML — SIGNIFICANT CHANGE UP
METHADONE UR CFM-MCNC: 1738 NG/ML — SIGNIFICANT CHANGE UP
METHADONE UR-MCNC: SIGNIFICANT CHANGE UP
METHAQUALONE UR QL: NEGATIVE — SIGNIFICANT CHANGE UP
METHAQUALONE UR-MCNC: NEGATIVE — SIGNIFICANT CHANGE UP
MORPHINE UR QL CFM: 1624 NG/ML — SIGNIFICANT CHANGE UP
OPIATES UR-MCNC: SIGNIFICANT CHANGE UP
PCP UR-MCNC: NEGATIVE — SIGNIFICANT CHANGE UP
PROPOXYPH UR QL: NEGATIVE — SIGNIFICANT CHANGE UP
THC UR QL: NEGATIVE — SIGNIFICANT CHANGE UP
VIT B1 SERPL-MCNC: 144.3 NMOL/L — SIGNIFICANT CHANGE UP (ref 66.5–200)

## 2022-03-14 PROCEDURE — 93000 ELECTROCARDIOGRAM COMPLETE: CPT

## 2022-03-14 PROCEDURE — 99205 OFFICE O/P NEW HI 60 MIN: CPT

## 2022-03-14 RX ORDER — NEBIVOLOL HYDROCHLORIDE 2.5 MG/1
2.5 TABLET ORAL DAILY
Qty: 90 | Refills: 1 | Status: DISCONTINUED | COMMUNITY
Start: 2016-09-06 | End: 2022-03-14

## 2022-03-14 RX ORDER — IBUPROFEN 600 MG/1
600 TABLET, FILM COATED ORAL
Qty: 28 | Refills: 0 | Status: DISCONTINUED | COMMUNITY
Start: 2020-08-21 | End: 2022-03-14

## 2022-03-14 RX ORDER — ETHAMBUTOL HYDROCHLORIDE 400 MG/1
400 TABLET ORAL
Qty: 180 | Refills: 0 | Status: DISCONTINUED | COMMUNITY
Start: 2021-10-28

## 2022-03-14 RX ORDER — EMPAGLIFLOZIN 25 MG/1
25 TABLET, FILM COATED ORAL
Qty: 90 | Refills: 0 | Status: DISCONTINUED | COMMUNITY
Start: 2020-06-19 | End: 2022-03-14

## 2022-03-14 RX ORDER — CLOBETASOL PROPIONATE 0.5 MG/G
0.05 OINTMENT TOPICAL
Qty: 60 | Refills: 0 | Status: DISCONTINUED | COMMUNITY
Start: 2022-03-10

## 2022-03-14 RX ORDER — CLONIDINE HYDROCHLORIDE 0.1 MG/1
0.1 TABLET ORAL
Qty: 60 | Refills: 0 | Status: DISCONTINUED | COMMUNITY
Start: 2022-01-26

## 2022-03-14 RX ORDER — TESTOSTERONE CYPIONATE 200 MG/ML
200 INJECTION, SOLUTION INTRAMUSCULAR
Qty: 1 | Refills: 0 | Status: DISCONTINUED | COMMUNITY
Start: 2019-12-27 | End: 2022-03-14

## 2022-03-14 RX ORDER — DULAGLUTIDE 0.75 MG/.5ML
0.75 INJECTION, SOLUTION SUBCUTANEOUS
Qty: 6 | Refills: 0 | Status: DISCONTINUED | COMMUNITY
Start: 2020-06-19 | End: 2022-03-14

## 2022-03-14 RX ORDER — CYCLOBENZAPRINE HYDROCHLORIDE 5 MG/1
5 TABLET, FILM COATED ORAL DAILY
Qty: 60 | Refills: 1 | Status: DISCONTINUED | COMMUNITY
Start: 2020-09-17 | End: 2022-03-14

## 2022-03-14 RX ORDER — DOXYCYCLINE 100 MG/1
100 CAPSULE ORAL
Qty: 14 | Refills: 0 | Status: DISCONTINUED | COMMUNITY
Start: 2021-10-19

## 2022-03-14 RX ORDER — SUCRALFATE 1 G/10ML
1 SUSPENSION ORAL 4 TIMES DAILY
Qty: 1200 | Refills: 5 | Status: DISCONTINUED | COMMUNITY
Start: 2018-09-12 | End: 2022-03-14

## 2022-03-14 RX ORDER — CLONIDINE HYDROCHLORIDE 0.2 MG/1
0.2 TABLET ORAL
Qty: 90 | Refills: 0 | Status: DISCONTINUED | COMMUNITY
Start: 2022-02-17

## 2022-03-14 RX ORDER — VALACYCLOVIR 1 G/1
1 TABLET, FILM COATED ORAL
Qty: 30 | Refills: 2 | Status: DISCONTINUED | COMMUNITY
Start: 2021-06-28 | End: 2022-03-14

## 2022-03-14 RX ORDER — METFORMIN HYDROCHLORIDE 500 MG/1
500 TABLET, COATED ORAL
Qty: 60 | Refills: 0 | Status: DISCONTINUED | COMMUNITY
Start: 2021-10-29

## 2022-03-14 NOTE — CARDIOLOGY SUMMARY
[de-identified] : March 14, 2022 sinus rhythm nonspecific ST-T change [de-identified] : 2022 normal LV function inferolateral hypokinesis [de-identified] : March 2022 30% LAD 40% mid LAD 90% OM 1.  RCA and ramus minor irregularities.\par \par PCI performed without stent

## 2022-03-14 NOTE — DISCUSSION/SUMMARY
[Patient] : the patient [Risks] : risks [Benefits] : benefits [Alternatives] : alternatives [___ Month(s)] : in [unfilled] month(s) [FreeTextEntry1] : Extended discussion with patient and son review of labs angiographic data Echo data.  Discussed Mediterranean diet medications which we will continue as indicated above referral to cardiac rehab.  Avoidance of extremes of temperature.  Maintain hydration.  Follow-up in 1 month.  Risk of restenosis discussed.  Handout on Mediterranean diet given questions addressed with both patient and son

## 2022-03-14 NOTE — REASON FOR VISIT
[Other: ____] : [unfilled] [Other: _____] : [unfilled] [FreeTextEntry1] : Reviewed available notes from [facility] discharge summary discussion with patient and son.  Patient with diabetes and hypertension initially brought to [facility] found to have DKA inferolateral MI and sent to [place].  He is placed in a critical care unit and angiogram deferred related to elevated creatinine.  Eventually underwent catheterization and angioplasty without stenting.  He had abnormal mental status which was evaluated and corrected.  Troponin was elevated.\par \par Initial presentation also reported atrial fibrillation\par \par He has no prior history of heart disease myocardial infarction or angina.\par \par He is on disability had worked as a .  Lives in [place]..  Is limited in some of his activities related to orthopedic issues and prior injuries

## 2022-03-14 NOTE — ASSESSMENT
[FreeTextEntry1] : Status post ST NICOLE status post balloon angioplasty status post DKA status post atrial fibrillation.  Diabetes hypertension.

## 2022-03-16 NOTE — PROGRESS NOTE ADULT - PROBLEM SELECTOR PLAN 7
Presented with afib with RVR now NSR  New A fib diagnosis per patient   [id number] 3, HASBLED 1  Continue eliquis 5mg BID and coreg 25mg BID  Monitor on Telemetry    seen by neuro previously for AMS which has resolved, unlikely stroke. MR did show ?L vertebral a. stenosis however he is asymptomatic and being medically managed - he understands he is to follow up with neurology outpt about this    Dispo: MI home 3/11. Pt asked to stay overnight to get comfortable with insulin injections and regimen prior. OUTPT follow up for L epitrochlear lymph node enlargement None
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
## 2022-03-18 LAB — METHADONE BLD-MCNC: 46 NG/ML — LOW (ref 100–400)

## 2022-04-02 LAB
CULTURE RESULTS: SIGNIFICANT CHANGE UP
SPECIMEN SOURCE: SIGNIFICANT CHANGE UP

## 2022-04-04 ENCOUNTER — NON-APPOINTMENT (OUTPATIENT)
Age: 57
End: 2022-04-04

## 2022-04-08 ENCOUNTER — NON-APPOINTMENT (OUTPATIENT)
Age: 57
End: 2022-04-08

## 2022-04-09 RX ORDER — APIXABAN 5 MG/1
5 TABLET, FILM COATED ORAL
Qty: 120 | Refills: 0 | Status: DISCONTINUED | COMMUNITY
End: 2022-04-09

## 2022-04-09 RX ORDER — ATORVASTATIN CALCIUM 80 MG/1
80 TABLET, FILM COATED ORAL
Qty: 30 | Refills: 0 | Status: DISCONTINUED | COMMUNITY
End: 2022-04-09

## 2022-04-09 RX ORDER — CLOPIDOGREL BISULFATE 75 MG/1
75 TABLET, FILM COATED ORAL DAILY
Qty: 60 | Refills: 0 | Status: DISCONTINUED | COMMUNITY
End: 2022-04-09

## 2022-04-09 RX ORDER — PANTOPRAZOLE 40 MG/1
40 TABLET, DELAYED RELEASE ORAL DAILY
Qty: 30 | Refills: 0 | Status: DISCONTINUED | COMMUNITY
Start: 2018-09-12 | End: 2022-04-09

## 2022-04-09 RX ORDER — CARVEDILOL 25 MG/1
25 TABLET, FILM COATED ORAL TWICE DAILY
Qty: 120 | Refills: 0 | Status: DISCONTINUED | COMMUNITY
End: 2022-04-09

## 2022-04-14 ENCOUNTER — APPOINTMENT (OUTPATIENT)
Dept: FAMILY MEDICINE | Facility: CLINIC | Age: 57
End: 2022-04-14

## 2022-04-23 LAB
CULTURE RESULTS: SIGNIFICANT CHANGE UP
SPECIMEN SOURCE: SIGNIFICANT CHANGE UP

## 2022-04-27 NOTE — CHART NOTE - NSCHARTNOTEFT_GEN_A_CORE
Left voicemail informing that all tissue cultures negative. Provided dermatology clinic phone number for call back for any questions.

## 2022-04-27 NOTE — CHART NOTE - NSCHARTNOTESELECT_GEN_ALL_CORE
Transfer Note
Dermatology/Event Note
MAR accept/Event Note
Neurology/Event Note
Nutrition Services
Nutrition Services
eeg preliminary

## 2022-05-09 ENCOUNTER — RX RENEWAL (OUTPATIENT)
Age: 57
End: 2022-05-09

## 2022-05-09 ENCOUNTER — NON-APPOINTMENT (OUTPATIENT)
Age: 57
End: 2022-05-09

## 2022-05-09 ENCOUNTER — APPOINTMENT (OUTPATIENT)
Dept: CARDIOLOGY | Facility: CLINIC | Age: 57
End: 2022-05-09
Payer: MEDICARE

## 2022-05-09 VITALS
BODY MASS INDEX: 45.64 KG/M2 | TEMPERATURE: 96.4 F | DIASTOLIC BLOOD PRESSURE: 90 MMHG | OXYGEN SATURATION: 97 % | WEIGHT: 248 LBS | HEIGHT: 62 IN | RESPIRATION RATE: 20 BRPM | HEART RATE: 102 BPM | SYSTOLIC BLOOD PRESSURE: 128 MMHG

## 2022-05-09 DIAGNOSIS — I10 ESSENTIAL (PRIMARY) HYPERTENSION: ICD-10-CM

## 2022-05-09 PROCEDURE — 99214 OFFICE O/P EST MOD 30 MIN: CPT

## 2022-05-09 PROCEDURE — 93000 ELECTROCARDIOGRAM COMPLETE: CPT

## 2022-05-09 RX ORDER — LABETALOL HYDROCHLORIDE 100 MG/1
100 TABLET, FILM COATED ORAL
Qty: 180 | Refills: 2 | Status: DISCONTINUED | COMMUNITY
Start: 2019-06-20 | End: 2022-05-09

## 2022-05-09 NOTE — DISCUSSION/SUMMARY
[Patient] : the patient [Risks] : risks [Benefits] : benefits [Alternatives] : alternatives [FreeTextEntry1] :  Discussed Mediterranean diet medications which we will continue as indicated   Avoidance of extremes of temperature.  Maintain hydration.  .  Risk of restenosis discussed.  Questions addressed regarding restenosis and follow-up of his CAD.  Maintain medication as above.  Periodic blood testing recommended.  Follow-up 3 to 4 months.

## 2022-05-09 NOTE — ASSESSMENT
[FreeTextEntry1] : Status post S NICOLE status post balloon angioplasty status post DKA status post atrial fibrillation.  Diabetes hypertension.  Currently clinically stable and asymptomatic.  Elevated blood pressure noted

## 2022-05-09 NOTE — REASON FOR VISIT
[Hypertension] : hypertension [Coronary Artery Disease] : coronary artery disease [Other: _____] : [unfilled] [FreeTextEntry1] : Follow-up visit status post MI, status post catheterization and angioplasty without stenting. \par \par Initial presentation also reported atrial fibrillation\par \par No chest pain dyspnea palpitations

## 2022-05-09 NOTE — CARDIOLOGY SUMMARY
[de-identified] : May 9, 2022 sinus rhythm nonspecific ST-T change [de-identified] : 2022 normal LV function inferolateral hypokinesis [de-identified] : March 2022 30% LAD 40% mid LAD 90% OM 1.  RCA and ramus minor irregularities.\par \par PCI performed without stent

## 2022-05-16 ENCOUNTER — RX RENEWAL (OUTPATIENT)
Age: 57
End: 2022-05-16

## 2022-07-18 ENCOUNTER — RX RENEWAL (OUTPATIENT)
Age: 57
End: 2022-07-18

## 2022-07-21 ENCOUNTER — RX RENEWAL (OUTPATIENT)
Age: 57
End: 2022-07-21

## 2022-07-29 ENCOUNTER — NON-APPOINTMENT (OUTPATIENT)
Age: 57
End: 2022-07-29

## 2022-08-15 ENCOUNTER — RX RENEWAL (OUTPATIENT)
Age: 57
End: 2022-08-15

## 2022-09-03 ENCOUNTER — RX RENEWAL (OUTPATIENT)
Age: 57
End: 2022-09-03

## 2022-09-08 ENCOUNTER — RX RENEWAL (OUTPATIENT)
Age: 57
End: 2022-09-08

## 2022-09-12 ENCOUNTER — APPOINTMENT (OUTPATIENT)
Dept: FAMILY MEDICINE | Facility: CLINIC | Age: 57
End: 2022-09-12

## 2022-09-15 ENCOUNTER — APPOINTMENT (OUTPATIENT)
Dept: CARDIOLOGY | Facility: CLINIC | Age: 57
End: 2022-09-15

## 2022-10-04 ENCOUNTER — NON-APPOINTMENT (OUTPATIENT)
Age: 57
End: 2022-10-04

## 2022-10-06 ENCOUNTER — NON-APPOINTMENT (OUTPATIENT)
Age: 57
End: 2022-10-06

## 2022-10-07 ENCOUNTER — NON-APPOINTMENT (OUTPATIENT)
Age: 57
End: 2022-10-07

## 2022-10-10 ENCOUNTER — RX RENEWAL (OUTPATIENT)
Age: 57
End: 2022-10-10

## 2022-10-13 ENCOUNTER — APPOINTMENT (OUTPATIENT)
Dept: CARDIOLOGY | Facility: CLINIC | Age: 57
End: 2022-10-13

## 2022-11-07 ENCOUNTER — APPOINTMENT (OUTPATIENT)
Dept: CARDIOLOGY | Facility: CLINIC | Age: 57
End: 2022-11-07

## 2022-11-10 ENCOUNTER — RX RENEWAL (OUTPATIENT)
Age: 57
End: 2022-11-10

## 2022-11-14 ENCOUNTER — RX RENEWAL (OUTPATIENT)
Age: 57
End: 2022-11-14

## 2022-11-14 ENCOUNTER — APPOINTMENT (OUTPATIENT)
Dept: FAMILY MEDICINE | Facility: CLINIC | Age: 57
End: 2022-11-14

## 2022-12-02 ENCOUNTER — INPATIENT (INPATIENT)
Facility: HOSPITAL | Age: 57
LOS: 4 days | Discharge: HOME HEALTH | DRG: 917 | End: 2022-12-07
Attending: STUDENT IN AN ORGANIZED HEALTH CARE EDUCATION/TRAINING PROGRAM | Admitting: SPECIALIST
Payer: MEDICARE

## 2022-12-02 VITALS
DIASTOLIC BLOOD PRESSURE: 50 MMHG | WEIGHT: 242.07 LBS | OXYGEN SATURATION: 100 % | RESPIRATION RATE: 20 BRPM | HEART RATE: 75 BPM | SYSTOLIC BLOOD PRESSURE: 84 MMHG | HEIGHT: 74 IN

## 2022-12-02 DIAGNOSIS — Z98.1 ARTHRODESIS STATUS: Chronic | ICD-10-CM

## 2022-12-02 DIAGNOSIS — Z98.890 OTHER SPECIFIED POSTPROCEDURAL STATES: Chronic | ICD-10-CM

## 2022-12-02 DIAGNOSIS — R55 SYNCOPE AND COLLAPSE: ICD-10-CM

## 2022-12-02 LAB
ALBUMIN SERPL ELPH-MCNC: 3.8 G/DL — SIGNIFICANT CHANGE UP (ref 3.3–5)
ALP SERPL-CCNC: 83 U/L — SIGNIFICANT CHANGE UP (ref 40–120)
ALT FLD-CCNC: 14 U/L — SIGNIFICANT CHANGE UP (ref 10–45)
ANION GAP SERPL CALC-SCNC: 18 MMOL/L — HIGH (ref 5–17)
APPEARANCE UR: CLEAR — SIGNIFICANT CHANGE UP
APTT BLD: 32.8 SEC — SIGNIFICANT CHANGE UP (ref 27.5–35.5)
AST SERPL-CCNC: 8 U/L — LOW (ref 10–40)
B-OH-BUTYR SERPL-SCNC: 0.1 MMOL/L — SIGNIFICANT CHANGE UP
BASE EXCESS BLDV CALC-SCNC: -3.4 MMOL/L — LOW (ref -2–3)
BASE EXCESS BLDV CALC-SCNC: -4.8 MMOL/L — LOW (ref -2–3)
BASOPHILS # BLD AUTO: 0.03 K/UL — SIGNIFICANT CHANGE UP (ref 0–0.2)
BASOPHILS NFR BLD AUTO: 0.4 % — SIGNIFICANT CHANGE UP (ref 0–2)
BILIRUB SERPL-MCNC: 0.2 MG/DL — SIGNIFICANT CHANGE UP (ref 0.2–1.2)
BILIRUB UR-MCNC: NEGATIVE — SIGNIFICANT CHANGE UP
BUN SERPL-MCNC: 39 MG/DL — HIGH (ref 7–23)
CA-I SERPL-SCNC: 1.14 MMOL/L — LOW (ref 1.15–1.33)
CA-I SERPL-SCNC: 1.22 MMOL/L — SIGNIFICANT CHANGE UP (ref 1.15–1.33)
CALCIUM SERPL-MCNC: 9.4 MG/DL — SIGNIFICANT CHANGE UP (ref 8.4–10.5)
CHLORIDE BLDV-SCNC: 103 MMOL/L — SIGNIFICANT CHANGE UP (ref 96–108)
CHLORIDE BLDV-SCNC: 104 MMOL/L — SIGNIFICANT CHANGE UP (ref 96–108)
CHLORIDE SERPL-SCNC: 103 MMOL/L — SIGNIFICANT CHANGE UP (ref 96–108)
CO2 BLDV-SCNC: 23 MMOL/L — SIGNIFICANT CHANGE UP (ref 22–26)
CO2 BLDV-SCNC: 26 MMOL/L — SIGNIFICANT CHANGE UP (ref 22–26)
CO2 SERPL-SCNC: 17 MMOL/L — LOW (ref 22–31)
COLOR SPEC: SIGNIFICANT CHANGE UP
CREAT SERPL-MCNC: 1.84 MG/DL — HIGH (ref 0.5–1.3)
DIFF PNL FLD: NEGATIVE — SIGNIFICANT CHANGE UP
EGFR: 43 ML/MIN/1.73M2 — LOW
EOSINOPHIL # BLD AUTO: 0.13 K/UL — SIGNIFICANT CHANGE UP (ref 0–0.5)
EOSINOPHIL NFR BLD AUTO: 1.9 % — SIGNIFICANT CHANGE UP (ref 0–6)
GAS PNL BLDV: 132 MMOL/L — LOW (ref 136–145)
GAS PNL BLDV: 135 MMOL/L — LOW (ref 136–145)
GAS PNL BLDV: SIGNIFICANT CHANGE UP
GLUCOSE BLDC GLUCOMTR-MCNC: 149 MG/DL — HIGH (ref 70–99)
GLUCOSE BLDV-MCNC: 41 MG/DL — CRITICAL LOW (ref 70–99)
GLUCOSE BLDV-MCNC: 60 MG/DL — LOW (ref 70–99)
GLUCOSE SERPL-MCNC: 43 MG/DL — CRITICAL LOW (ref 70–99)
GLUCOSE UR QL: ABNORMAL
HCO3 BLDV-SCNC: 22 MMOL/L — SIGNIFICANT CHANGE UP (ref 22–29)
HCO3 BLDV-SCNC: 24 MMOL/L — SIGNIFICANT CHANGE UP (ref 22–29)
HCT VFR BLD CALC: 39.1 % — SIGNIFICANT CHANGE UP (ref 39–50)
HCT VFR BLDA CALC: 30 % — LOW (ref 39–51)
HCT VFR BLDA CALC: 36 % — LOW (ref 39–51)
HGB BLD CALC-MCNC: 10.1 G/DL — LOW (ref 12.6–17.4)
HGB BLD CALC-MCNC: 12.1 G/DL — LOW (ref 12.6–17.4)
HGB BLD-MCNC: 11.9 G/DL — LOW (ref 13–17)
IMM GRANULOCYTES NFR BLD AUTO: 0.3 % — SIGNIFICANT CHANGE UP (ref 0–0.9)
INR BLD: 1.48 RATIO — HIGH (ref 0.88–1.16)
KETONES UR-MCNC: NEGATIVE — SIGNIFICANT CHANGE UP
LACTATE BLDV-MCNC: 4.2 MMOL/L — CRITICAL HIGH (ref 0.5–2)
LACTATE BLDV-MCNC: 5.7 MMOL/L — CRITICAL HIGH (ref 0.5–2)
LEUKOCYTE ESTERASE UR-ACNC: NEGATIVE — SIGNIFICANT CHANGE UP
LIDOCAIN IGE QN: 22 U/L — SIGNIFICANT CHANGE UP (ref 7–60)
LYMPHOCYTES # BLD AUTO: 1.75 K/UL — SIGNIFICANT CHANGE UP (ref 1–3.3)
LYMPHOCYTES # BLD AUTO: 25.1 % — SIGNIFICANT CHANGE UP (ref 13–44)
MAGNESIUM SERPL-MCNC: 1.3 MG/DL — LOW (ref 1.6–2.6)
MCHC RBC-ENTMCNC: 26.3 PG — LOW (ref 27–34)
MCHC RBC-ENTMCNC: 30.4 GM/DL — LOW (ref 32–36)
MCV RBC AUTO: 86.3 FL — SIGNIFICANT CHANGE UP (ref 80–100)
MONOCYTES # BLD AUTO: 0.81 K/UL — SIGNIFICANT CHANGE UP (ref 0–0.9)
MONOCYTES NFR BLD AUTO: 11.6 % — SIGNIFICANT CHANGE UP (ref 2–14)
NEUTROPHILS # BLD AUTO: 4.23 K/UL — SIGNIFICANT CHANGE UP (ref 1.8–7.4)
NEUTROPHILS NFR BLD AUTO: 60.7 % — SIGNIFICANT CHANGE UP (ref 43–77)
NITRITE UR-MCNC: NEGATIVE — SIGNIFICANT CHANGE UP
NRBC # BLD: 0 /100 WBCS — SIGNIFICANT CHANGE UP (ref 0–0)
NT-PROBNP SERPL-SCNC: 86 PG/ML — SIGNIFICANT CHANGE UP (ref 0–300)
PCO2 BLDV: 45 MMHG — SIGNIFICANT CHANGE UP (ref 42–55)
PCO2 BLDV: 52 MMHG — SIGNIFICANT CHANGE UP (ref 42–55)
PH BLDV: 7.27 — LOW (ref 7.32–7.43)
PH BLDV: 7.29 — LOW (ref 7.32–7.43)
PH UR: 6 — SIGNIFICANT CHANGE UP (ref 5–8)
PLATELET # BLD AUTO: 392 K/UL — SIGNIFICANT CHANGE UP (ref 150–400)
PO2 BLDV: 29 MMHG — SIGNIFICANT CHANGE UP (ref 25–45)
PO2 BLDV: 31 MMHG — SIGNIFICANT CHANGE UP (ref 25–45)
POTASSIUM BLDV-SCNC: 4.5 MMOL/L — SIGNIFICANT CHANGE UP (ref 3.5–5.1)
POTASSIUM BLDV-SCNC: 4.9 MMOL/L — SIGNIFICANT CHANGE UP (ref 3.5–5.1)
POTASSIUM SERPL-MCNC: 4.9 MMOL/L — SIGNIFICANT CHANGE UP (ref 3.5–5.3)
POTASSIUM SERPL-SCNC: 4.9 MMOL/L — SIGNIFICANT CHANGE UP (ref 3.5–5.3)
PROT SERPL-MCNC: 7 G/DL — SIGNIFICANT CHANGE UP (ref 6–8.3)
PROT UR-MCNC: SIGNIFICANT CHANGE UP
PROTHROM AB SERPL-ACNC: 17.1 SEC — HIGH (ref 10.5–13.4)
RAPID RVP RESULT: SIGNIFICANT CHANGE UP
RBC # BLD: 4.53 M/UL — SIGNIFICANT CHANGE UP (ref 4.2–5.8)
RBC # FLD: 15.3 % — HIGH (ref 10.3–14.5)
SAO2 % BLDV: 38.2 % — LOW (ref 67–88)
SAO2 % BLDV: 42.9 % — LOW (ref 67–88)
SARS-COV-2 RNA SPEC QL NAA+PROBE: SIGNIFICANT CHANGE UP
SODIUM SERPL-SCNC: 138 MMOL/L — SIGNIFICANT CHANGE UP (ref 135–145)
SP GR SPEC: 1.03 — HIGH (ref 1.01–1.02)
TROPONIN T, HIGH SENSITIVITY RESULT: 30 NG/L — SIGNIFICANT CHANGE UP (ref 0–51)
TROPONIN T, HIGH SENSITIVITY RESULT: 37 NG/L — SIGNIFICANT CHANGE UP (ref 0–51)
UROBILINOGEN FLD QL: NEGATIVE — SIGNIFICANT CHANGE UP
WBC # BLD: 6.97 K/UL — SIGNIFICANT CHANGE UP (ref 3.8–10.5)
WBC # FLD AUTO: 6.97 K/UL — SIGNIFICANT CHANGE UP (ref 3.8–10.5)

## 2022-12-02 PROCEDURE — 71045 X-RAY EXAM CHEST 1 VIEW: CPT | Mod: 26

## 2022-12-02 PROCEDURE — 99285 EMERGENCY DEPT VISIT HI MDM: CPT

## 2022-12-02 PROCEDURE — 74177 CT ABD & PELVIS W/CONTRAST: CPT | Mod: 26,QQ

## 2022-12-02 RX ORDER — SODIUM CHLORIDE 9 MG/ML
1000 INJECTION, SOLUTION INTRAVENOUS
Refills: 0 | Status: DISCONTINUED | OUTPATIENT
Start: 2022-12-02 | End: 2022-12-03

## 2022-12-02 RX ORDER — SODIUM CHLORIDE 9 MG/ML
1000 INJECTION, SOLUTION INTRAVENOUS
Refills: 0 | Status: DISCONTINUED | OUTPATIENT
Start: 2022-12-02 | End: 2022-12-07

## 2022-12-02 RX ORDER — DEXTROSE 50 % IN WATER 50 %
25 SYRINGE (ML) INTRAVENOUS ONCE
Refills: 0 | Status: DISCONTINUED | OUTPATIENT
Start: 2022-12-02 | End: 2022-12-07

## 2022-12-02 RX ORDER — DEXAMETHASONE 0.5 MG/5ML
10 ELIXIR ORAL ONCE
Refills: 0 | Status: COMPLETED | OUTPATIENT
Start: 2022-12-02 | End: 2022-12-02

## 2022-12-02 RX ORDER — MAGNESIUM SULFATE 500 MG/ML
2 VIAL (ML) INJECTION ONCE
Refills: 0 | Status: COMPLETED | OUTPATIENT
Start: 2022-12-02 | End: 2022-12-02

## 2022-12-02 RX ORDER — DEXTROSE 50 % IN WATER 50 %
50 SYRINGE (ML) INTRAVENOUS ONCE
Refills: 0 | Status: COMPLETED | OUTPATIENT
Start: 2022-12-02 | End: 2022-12-02

## 2022-12-02 RX ORDER — SODIUM CHLORIDE 9 MG/ML
1000 INJECTION, SOLUTION INTRAVENOUS ONCE
Refills: 0 | Status: COMPLETED | OUTPATIENT
Start: 2022-12-02 | End: 2022-12-02

## 2022-12-02 RX ORDER — DEXTROSE 50 % IN WATER 50 %
15 SYRINGE (ML) INTRAVENOUS ONCE
Refills: 0 | Status: DISCONTINUED | OUTPATIENT
Start: 2022-12-02 | End: 2022-12-07

## 2022-12-02 RX ORDER — DEXTROSE 50 % IN WATER 50 %
12.5 SYRINGE (ML) INTRAVENOUS ONCE
Refills: 0 | Status: DISCONTINUED | OUTPATIENT
Start: 2022-12-02 | End: 2022-12-07

## 2022-12-02 RX ORDER — LEVOTHYROXINE SODIUM 125 MCG
100 TABLET ORAL ONCE
Refills: 0 | Status: COMPLETED | OUTPATIENT
Start: 2022-12-02 | End: 2022-12-02

## 2022-12-02 RX ORDER — SODIUM CHLORIDE 9 MG/ML
1000 INJECTION INTRAMUSCULAR; INTRAVENOUS; SUBCUTANEOUS ONCE
Refills: 0 | Status: DISCONTINUED | OUTPATIENT
Start: 2022-12-02 | End: 2022-12-02

## 2022-12-02 RX ORDER — GLUCAGON INJECTION, SOLUTION 0.5 MG/.1ML
1 INJECTION, SOLUTION SUBCUTANEOUS ONCE
Refills: 0 | Status: DISCONTINUED | OUTPATIENT
Start: 2022-12-02 | End: 2022-12-07

## 2022-12-02 RX ORDER — CHLORHEXIDINE GLUCONATE 213 G/1000ML
1 SOLUTION TOPICAL
Refills: 0 | Status: DISCONTINUED | OUTPATIENT
Start: 2022-12-02 | End: 2022-12-07

## 2022-12-02 RX ADMIN — Medication 50 MILLILITER(S): at 20:40

## 2022-12-02 RX ADMIN — SODIUM CHLORIDE 100 MILLILITER(S): 9 INJECTION, SOLUTION INTRAVENOUS at 22:16

## 2022-12-02 RX ADMIN — Medication 25 GRAM(S): at 22:40

## 2022-12-02 RX ADMIN — Medication 100 MICROGRAM(S): at 22:12

## 2022-12-02 RX ADMIN — SODIUM CHLORIDE 1000 MILLILITER(S): 9 INJECTION, SOLUTION INTRAVENOUS at 19:45

## 2022-12-02 RX ADMIN — SODIUM CHLORIDE 1000 MILLILITER(S): 9 INJECTION, SOLUTION INTRAVENOUS at 20:20

## 2022-12-02 RX ADMIN — Medication 102 MILLIGRAM(S): at 22:13

## 2022-12-02 RX ADMIN — Medication 50 MILLILITER(S): at 22:20

## 2022-12-02 RX ADMIN — Medication 50 MILLILITER(S): at 19:45

## 2022-12-02 NOTE — H&P ADULT - NSHPPHYSICALEXAM_GEN_ALL_CORE
T(C): 37.1 (12-02-22 @ 20:14), Max: 37.1 (12-02-22 @ 20:14)  HR: 67 (12-02-22 @ 23:00) (60 - 77)  BP: 114/70 (12-02-22 @ 23:00) (82/51 - 114/70)  RR: 16 (12-02-22 @ 23:00) (16 - 20)  SpO2: 98% (12-02-22 @ 23:00) (97% - 100%)    CONSTITUTIONAL: Well groomed, no apparent distress  EYES: PERRLA and symmetric, EOMI, No conjunctival or scleral injection, non-icteric  ENMT: Oral mucosa with moist membranes. Normal dentition; no pharyngeal injection or exudates             NECK: Supple, symmetric and without tracheal deviation   RESP: No respiratory distress, no use of accessory muscles; CTA b/l, no WRR  CV: RRR, +S1S2, no MRG; no JVD; no peripheral edema  GI: Soft, NT, ND, no rebound, no guarding; no palpable masses; no hepatosplenomegaly; no hernia palpated  LYMPH: No cervical LAD or tenderness; no axillary LAD or tenderness; no inguinal LAD or tenderness  MSK: Normal gait; No digital clubbing or cyanosis; examination of the (head/neck/spine/ribs/pelvis, RUE, LUE, RLE, LLE) without misalignment,            Normal ROM without pain, no spinal tenderness, normal muscle strength/tone  SKIN: No rashes or ulcers noted; no subcutaneous nodules or induration palpable  NEURO: CN II-XII intact; normal reflexes in upper and lower extremities, sensation intact in upper and lower extremities b/l to light touch   PSYCH: Appropriate insight/judgment; A+O x 3, mood and affect appropriate, recent/remote memory intact VITALS:   T(C): 37.1 (12-02-22 @ 20:14), Max: 37.1 (12-02-22 @ 20:14)  HR: 67 (12-02-22 @ 23:00) (60 - 77)  BP: 114/70 (12-02-22 @ 23:00) (82/51 - 114/70)  RR: 16 (12-02-22 @ 23:00) (16 - 20)  SpO2: 98% (12-02-22 @ 23:00) (97% - 100%)    PHYSICAL EXAM:     GENERAL: NAD, lying in bed comfortably  HEAD:  Atraumatic, Normocephalic  EYES: EOMI, PERRLA, conjunctiva and sclera clear  ENT: Moist mucous membranes  NECK: Supple, No JVD  CHEST/LUNG: Clear to auscultation bilaterally; No rales, rhonchi, wheezing, or rubs. Unlabored respirations  HEART: Regular rate and rhythm; No murmurs, rubs, or gallops  ABDOMEN: normal bowel sounds; Soft, nontender, nondistended  EXTREMITIES:  2+ Peripheral Pulses, brisk capillary refill. No clubbing, cyanosis, or edema  Neurological:  A&Ox3, no focal deficits   SKIN: No rashes or lesions  PSYCH: normal affect and mood

## 2022-12-02 NOTE — ED PROVIDER NOTE - PHYSICAL EXAMINATION
Gen: Appears fatigued. NAD. A&Ox4.  HEENT: Normocephalic and atraumatic. PERRL, EOMI, no nasal discharge, mucous membranes dry, no scleral icterus.  CV: Regular rate and rhythm. +S1/S2, no M/R/G. No significant lower extremity edema. Radial pulses present and symmetrical.  Resp: Normal effort and rate. CTAB, no rales, rhonchi, or wheezes.  GI: Abdomen soft, non-distended, TTP in epigastric area. No masses appreciated.  MSK: No open wounds and no bruising. No CVAT bilaterally.  Neuro: Following commands, speaking in full sentences, moving extremities spontaneously  Psych: Appropriate mood, cooperative

## 2022-12-02 NOTE — ED PROVIDER NOTE - NS ED ROS FT
GENERAL: No fever or chills  EYES: No change in vision  HEENT: No trouble swallowing or speaking  CARDIAC: No chest pain  PULMONARY: No cough or SOB  GI: +nausea, vomiting  : No changes in urination  SKIN: No rashes  NEURO: +syncope  MSK: No joint pain  Otherwise as HPI or negative.

## 2022-12-02 NOTE — H&P ADULT - HISTORY OF PRESENT ILLNESS
ROSE STRAUSS is a 57y Male with PMH significant for *** presenting for ***    ED Course:  Vital Signs on Presentation:  Temp: *** F ( *** C) | BP: *** mmHg | HR: *** | RR: *** | SpO2: ***  Physical Exam:  Initial Labs:  Imaging:  Medications Given:    PCP:     57y Male with pmhx of HTN, DM2 c/b DKA, neuropathy, chronic pain on Methadone & opioids for cervical spine and back injury, MI, Afib on AC p/w near syncopal episode. Per EMS, pt was at home sitting on couch. Got up and felt lightheaded, "blacked out" for a few seconds, and then was able to sit back down on the couch, felt like he was about to pass out. Says he's had multiple episodes of near syncope in the last month that has been progressively getting worse. Of note, patient states that since his MI and DKA episode earlier this year, he has not been feeling his usual self. Denies any pre-syncopal symptoms, photophobia, dizziness, or hot flashes. Because of his chronic syncopal episodes, he has always been slow to rise from a seated position. Also has multiple episodes of NBNB vomiting for the last 4 days. Denies CP, SOB, palpitations. Pt takes Novolog, Jardiance, and Lantus, denies taking more or less of these meds recently. Currently, taking 12-16 units admelog pre-meal and 30 units Lantus at night time.  Denies any changes to dosing of his DM2 meds. Per EMS, BP was in 70s/50s so pt was given IVF. Pt still feeling lightheaded. Denies current fever, cp, sob, abd pain, nausea, diarrhea, melena, BRBPR, urinary symptoms. NKDA. No recent travel.     In the ED, patient was found to be severely hypoglycemic. BG in 40's, recevied D50 x 3 with minimal improvement in blood sugars and subsequently started on D10/0.45%NS gtt at 100 cc/hour with stable improvement. MICU consulted for severe hypoglycemia likely in setting of exogenous insulin use with concurrent constant emesis.

## 2022-12-02 NOTE — H&P ADULT - ASSESSMENT
#Neuro:  AAOx3   #Syncope  - EKG with no evidence of arrhythmias   - will need CT Head to rule out anatomic cause of multiple episodes of near syncope  - TTE to address cardiogenic cause of syncopal episodes  - orthostatic BPs    #Diabetic Neuropathy  - on -- at home     #Chronic Pain  - on methadone and opioids for history of cervical spine and back injury  - will h    #CV:  #Atrial Fibrillation  - maintained on clopidogrel at home   - EKG in ED with NSR; no evidence of arrhythmia at this time  - continuous telemetry monitoring    #History og Myocardial Infarction   - no complaints of ACS at this time  - troponin wnl  - on clopidogrel at home; would favor continuing therapy at this time with CHADsVasc 3    #HTN  - maintained on --- at home for BP control  - on admission, low BPs 70/50s s/p IVF  - monitor off hypertensive medications     #Resp:  - no active issues at this time  - maintaining adequate O2 sat on RA     #GI:  - regular diet with q1 POCT checks     #Renal:  #JAXON   - presenting with Cr 1.84 (baseline 0.62 5/2022) indicative of JAXON   - BUN/Cr 21 indicative of prerenal azotemia likely in the setting of low volume state   - s/p 2L IVF in ED for volume resuscitation  - trend BMP  - will collect urine studies   - no evidence of obstruction noted on CT AP    #HAGMA  - delta/delta 1.2 indicative of pure HAGMA in the setting of lactate 4.2 likely in the setting of dehydration vs hypoperfusion  - continue to monitor  - trend lactate for resolution    #Heme:  #Normocytic Anemia  - iron studies with evidence iron deficiency likely with component of chronic disease  - continue to monitor     #ID:  - no active issues    #Endo:  #Hypoglycemia 2/2 Insulin Overdose  - on 30u lantus, 12-16u admelog, metformin and jardiance at home  - on D10 drip   - history of DKA --  - check serum cortisol, anti-JOHN, proinsulin, C peptide in AM   - pending TFTs   - CT AP with no evidence of pancreatic mass     #Skin:  - no active issues    #Social/Ethics:  - Code status     #Neuro:  AAOx3   #Syncopal Episode   - EKG with no evidence of arrhythmias   - will need CT Head to rule out anatomic cause of multiple episodes of near syncope  - TTE to address cardiogenic cause of syncopal episodes  - rule out metabolic causes of syncope, likely secondary to exogenous insulin use  - check orthostatic BPs    #Diabetic Neuropathy  - on Lyrica at home     #Chronic Pain  - on methadone and opioids for history of cervical spine and back injury  - check I-STOP     #CV:  #Atrial Fibrillation  - maintained on eliquis at home   - EKG in ED with NSR; no evidence of arrhythmia at this time  - continuous telemetry monitoring    #History of Myocardial Infarction   - no evidence of ACS at this time, history of PCI earlier this year  - continue antiplatelet therapy + AC at this time  - troponin wnl  - on clopidogrel at home; would favor continuing therapy at this time with CHADsVasc 3    #HTN  - maintained on --- at home for BP control  - on admission, low BPs 70/50s s/p IVF  - monitor off hypertensive medications     #Resp:  - no active issues at this time  - maintaining adequate O2 sat on RA     #GI:  - regular diet with q1 POCT checks     #Renal:  #JAXON   - presenting with Cr 1.84 (baseline 0.62 5/2022) indicative of JAXON   - BUN/Cr 21 indicative of prerenal azotemia likely in the setting of low volume state   - s/p 2L IVF in ED for volume resuscitation  - trend BMP  - will collect urine studies   - no evidence of obstruction noted on CT AP    #HAGMA 2/2 lactic acidosis  - delta/delta 1.2 indicative of pure HAGMA in the setting of lactate 4.2 likely in the setting of dehydration vs hypoperfusion  - continue to monitor  - trend lactate for resolution     #Heme:  #Normocytic Anemia  - iron studies with evidence iron deficiency likely with component of chronic disease  - continue to monitor CBC / hemoglobin at this time  - no evidence of overt bleeding     #ID:  - no active issues    #Endo:  #Hypoglycemia 2/2 Insulin Overdose  - on 30u lantus, 12-16u admelog, metformin and jardiance at home  - on D10 / 1/2 NS  drip at 100 cc/hr  - history of DKA --  - check serum cortisol, anti-JOHN, proinsulin, C peptide in AM   - check TFTs   - CT AP with no evidence of pancreatic mass     #Skin:  - no active issues    #Social/Ethics:  - Code status     #Neuro:  AAOx3   #Syncopal Episode   - EKG with no evidence of arrhythmias   - will need CT Head to rule out anatomic cause of multiple episodes of near syncope  - TTE to address cardiogenic cause of syncopal episodes  - rule out metabolic causes of syncope, likely secondary to exogenous insulin use  - check orthostatic BPs  - history of AMS w MR ?L vertebral artery stenosis     #Diabetic Neuropathy  - c/w home Lyrica 150    #Chronic Pain  - on methadone and opioids for history of cervical spine and back injury  - check I-STOP     #CV:  #Atrial Fibrillation  - [id number] 3, HASBLED 1  - maintained on eliquis 5 BID and coreg 25 BID  at home; c/w home medications  - EKG in ED with NSR; no evidence of arrhythmia at this time  - continuous telemetry monitoring    #History of MI w PCI in 3/2022 s/p LCX POBA  - TTE 3/2: EF 48%, hypokinesis of basal inferior wall, inferolateral wall, stage I diastolic dysfunction   - no evidence of ACS at this time, history of PCI earlier this year  - [id number] 3  - continue plavix 75 mg daily, atorvastatin 80mg daily, eliquis 5mg BID, coreg 25mg BID  - troponin wnl  - monitor on telemetry.    #HTN  - maintained on coreg 25 BID and lisinopril 10 at home for BP control  - on admission, low BPs 70/50s s/p 2L IVF  - monitor off hypertensive medications     #Resp:  - no active issues at this time  - maintaining adequate O2 sat on RA     #GI:  - regular diet with q1 POCT checks     #Renal:  #JAXON   - presenting with Cr 1.84 (baseline 0.62 5/2022) indicative of JAXON   - BUN/Cr 21 indicative of prerenal azotemia likely in the setting of low volume state   - s/p 2L IVF in ED for volume resuscitation  - trend BMP  - will collect urine studies   - no evidence of obstruction noted on CT AP    #HAGMA 2/2 lactic acidosis  - delta/delta 1.2 indicative of pure HAGMA in the setting of lactate 4.2 likely in the setting of dehydration vs hypoperfusion  - continue to monitor  - trend lactate for resolution     #Heme:  #Normocytic Anemia  - iron studies with evidence iron deficiency likely with component of chronic disease  - continue to monitor CBC / hemoglobin at this time  - no evidence of overt bleeding     #ID:  - no active issues    #Endo:  #Hypoglycemia 2/2 Insulin Overdose  - on 30u lantus, 12-16u admelog, metformin and jardiance at home  - on D10 / 1/2 NS  drip at 100 cc/hr  - history of DKA during previous hospitalization with low C peptide  - check serum cortisol, anti-JOHN, proinsulin, C peptide in AM   - check TFTs   - CT AP with no evidence of pancreatic mass   - strict q1 FS; can stop D10/1/2 NS drip if FS start to rise    #Skin:  - no active issues    #Social/Ethics:  - Code status     57y Male with pmhx of HTN, DM2 c/b DKA, neuropathy, chronic pain on Methadone & opioids for cervical spine and back injury, MI, Afib on AC p/w near syncopal episode found to be hypotensive and hypoglycemic on presentation likely in the setting of extensive diabetic regimen c/b JAXON with HAGMA and lactic acidosis in the setting of N/V and metformin use.     #Neuro:  AAOx3   #Syncopal Episode   - EKG with no evidence of arrhythmias   - will need CT Head to rule out anatomic cause of multiple episodes of near syncope  - TTE to address cardiogenic cause of syncopal episodes  - rule out metabolic causes of syncope, likely secondary to exogenous insulin use  - check orthostatic BPs  - history of AMS w MR ?L vertebral artery stenosis     #Diabetic Neuropathy  - c/w home Lyrica 150    #Chronic Pain  - on methadone and opioids for history of cervical spine and back injury  - check I-STOP     #CV:  #Atrial Fibrillation  - [id number] 3, HASBLED 1  - maintained on eliquis 5 BID and coreg 25 BID  at home; c/w home medications  - EKG in ED with NSR; no evidence of arrhythmia at this time  - continuous telemetry monitoring    #History of MI w PCI in 3/2022 s/p LCX POBA  - TTE 3/2: EF 48%, hypokinesis of basal inferior wall, inferolateral wall, stage I diastolic dysfunction   - no evidence of ACS at this time, history of PCI earlier this year  - [id number] 3  - continue plavix 75 mg daily, atorvastatin 80mg daily, eliquis 5mg BID, coreg 25mg BID  - troponin wnl  - monitor on telemetry.    #HTN  - maintained on coreg 25 BID and lisinopril 10 at home for BP control  - on admission, low BPs 70/50s s/p 2L IVF  - monitor off hypertensive medications     #Resp:  - no active issues at this time  - maintaining adequate O2 sat on RA     #GI:  - regular diet with q1 POCT checks     #Renal:  #JAXON   - presenting with Cr 1.84 (baseline 0.62 5/2022) indicative of JAXON   - BUN/Cr 21 indicative of prerenal azotemia likely in the setting of low volume state   - s/p 2L IVF in ED for volume resuscitation  - trend BMP  - will collect urine studies   - no evidence of obstruction noted on CT AP    #HAGMA 2/2 lactic acidosis  - delta/delta 1.2 indicative of pure HAGMA in the setting of lactate 4.2 likely in the setting of dehydration vs hypoperfusion  - continue to monitor  - trend lactate for resolution     #Heme:  #Normocytic Anemia  - iron studies with evidence iron deficiency likely with component of chronic disease  - continue to monitor CBC / hemoglobin at this time  - no evidence of overt bleeding     #ID:  - no active issues    #Endo:  #Hypoglycemia 2/2 Insulin Overdose  - on 30u lantus, 12-16u admelog, metformin and jardiance at home  - on D10 / 1/2 NS  drip at 100 cc/hr  - history of DKA during previous hospitalization with low C peptide  - check serum cortisol, anti-JOHN, proinsulin, C peptide in AM   - check TFTs   - CT AP with no evidence of pancreatic mass   - strict q1 FS; can stop D10/1/2 NS drip if FS start to rise    #Skin:  - no active issues    #Social/Ethics:  - Code status

## 2022-12-02 NOTE — ED PROVIDER NOTE - OBJECTIVE STATEMENT
The patient is a 57y Male with pmhx of HTN, DM2 c/b DKA, neuropathy, chronic pain on Methadone & opioids for cervical spine and back injury, MI, Afib on AC p/w near syncopal episode. Per EMS, pt was at home sitting on couch. Got up and felt lightheaded, "blacked out" for a few seconds, and then was able to sit back down on the couch, felt like he was about to pass out. Says he's had multiple episodes of near syncope in the last month. Also has multiple episodes of NBNB vomiting for the last 4 days. Denies CP, SOB, palpitations. Pt takes Novolog, Jardiance, and Lantus, denies taking more or less of these meds recently. Denies any changes to dosing of his DM2 meds. Per EMS, BP was in 70s/50s so pt was given IVF. Pt still feeling lightheaded. Denies current fever, cp, sob, abd pain, nausea, diarrhea, melena, BRBPR, urinary symptoms. NKDA.

## 2022-12-02 NOTE — H&P ADULT - NSHPREVIEWOFSYSTEMS_GEN_ALL_CORE
REVIEW OF SYSTEMS:    Constitutional: No fever, chills, night sweats + fatigue ,  	Eyes:  No eye pain, visual disturbances, or discharge  	ENMT:  No neck pain  	Cardiac:  No chest pain, palpitations, no leg swelling  	Respiratory:  No cough, SOB  	GI:  + nausea, vomiting, abdominal pain,   	:  no dysuria, hematuria, or incontinence  	MS:  No back pain.  	Neuro:  +syncope, lightheadedness  	Skin:  No skin rash  Except as documented in the HPI,  all other systems are negative.

## 2022-12-02 NOTE — ED ADULT NURSE NOTE - OBJECTIVE STATEMENT
Pt is a 57y M c/o near syncopal epidoses and vomiting. Pt states he feels like passing out, like the room is "dimming" and that he feels like he is "blacking out". Pt states he took a few steps about an hour prior to arrival and had near syncopal episode, did not fall down, "caught' himself, denies trauma. Pt FS in ED 49. Pt given d50. Pt PMH DM, HTN, MI. Pt is A&Ox3, breathing unlabored and spontaneous, abd soft nontender nondistended, full ROM of all extremities. Pt resting in stretcher, placed on cardiac monitor, IV access obtained, bed in lowest position, educated on call bell, aware of plan of care. Comfort and safety measures maintained.

## 2022-12-02 NOTE — H&P ADULT - NSHPLABSRESULTS_GEN_ALL_CORE
Labs:  CAPILLARY BLOOD GLUCOSE      POCT Blood Glucose.: 139 mg/dL (02 Dec 2022 23:10)  POCT Blood Glucose.: 139 mg/dL (02 Dec 2022 22:47)  POCT Blood Glucose.: 70 mg/dL (02 Dec 2022 22:10)  POCT Blood Glucose.: 82 mg/dL (02 Dec 2022 21:34)  POCT Blood Glucose.: 103 mg/dL (02 Dec 2022 21:17)  POCT Blood Glucose.: 104 mg/dL (02 Dec 2022 20:59)  POCT Blood Glucose.: 76 mg/dL (02 Dec 2022 20:34)  POCT Blood Glucose.: 96 mg/dL (02 Dec 2022 20:19)  POCT Blood Glucose.: 139 mg/dL (02 Dec 2022 20:00)  POCT Blood Glucose.: 49 mg/dL (02 Dec 2022 19:42)                          11.9   6.97  )-----------( 392      ( 02 Dec 2022 20:23 )             39.1       Auto Neutrophil %: 60.7 % (12-02-22 @ 20:23)  Auto Immature Granulocyte %: 0.3 % (12-02-22 @ 20:23)    12-02    138  |  103  |  39<H>  ----------------------------<  43<LL>  4.9   |  17<L>  |  1.84<H>      Calcium, Total Serum: 9.4 mg/dL (12-02-22 @ 20:23)      LFTs:             7.0  | 0.2  | 8        ------------------[83      ( 02 Dec 2022 20:23 )  3.8  | x    | 14          Lipase:22     Amylase:x         Blood Gas Venous - Lactate: 5.7 mmol/L (12-02-22 @ 22:20)  Blood Gas Venous - Lactate: 4.2 mmol/L (12-02-22 @ 19:42)      Coags:     17.1   ----< 1.48    ( 02 Dec 2022 20:23 )     32.8            Serum Pro-Brain Natriuretic Peptide: 86 pg/mL (12-02-22 @ 20:23)

## 2022-12-02 NOTE — ED PROVIDER NOTE - CLINICAL SUMMARY MEDICAL DECISION MAKING FREE TEXT BOX
Yared Car MD (PGY-3): The patient is a 57y Male with pmhx of HTN, DM2 c/b DKA, neuropathy, chronic pain on Methadone & opioids for cervical spine and back injury, MI, Afib on AC p/w near syncopal episode. DDx includes ACS, hypoglycemia from insulinoma, thyroid dysfunction, adrenal insufficieny, anemia, electrolyte derangement. ekg, cxr, ctap, labs, decadron, levothyroxine, IVF, dextrose. Most likely will be admitted. Yared Car MD (PGY-3): The patient is a 57y Male with pmhx of HTN, DM2 c/b DKA, neuropathy, chronic pain on Methadone & opioids for cervical spine and back injury, MI, Afib on AC p/w near syncopal episode. DDx includes ACS, hypoglycemia from insulinoma, thyroid dysfunction, adrenal insufficiency, anemia, electrolyte derangement. ekg, cxr, ctap, labs, decadron, levothyroxine, IVF, dextrose. Most likely will be admitted.

## 2022-12-02 NOTE — ED PROVIDER NOTE - ATTENDING CONTRIBUTION TO CARE
I, Constantine Johansen, performed a history and physical exam of the patient and discussed their management with the resident and/or advanced care provider. I reviewed the resident and/or advanced care provider's note and agree with the documented findings and plan of care. I was present and available for all procedures.    The patient is a 57y Male with pmhx of HTN, DM2 c/b DKA, neuropathy, chronic pain on Methadone & opioids for cervical spine and back injury, MI, Afib on AC p/w near syncopal episode. Per EMS, pt was at home sitting on couch. Got up and felt lightheaded, "blacked out" for a few seconds, and then was able to sit back down on the couch, felt like he was about to pass out. Says he's had multiple episodes of near syncope in the last month. Also has multiple episodes of NBNB vomiting for the last 4 days. Denies CP, SOB, palpitations. Pt takes Novolog, Jardiance, and Lantus, denies taking more or less of these meds recently. Denies any changes to dosing of his DM2 meds. Per EMS, BP was in 70s/50s so pt was given IVF. Pt still feeling lightheaded. Denies current fever, cp, sob, abd pain, nausea, diarrhea, melena, BRBPR, urinary symptoms. NKDA.    Well appearing and in NAD, head normal appearing atraumatic, trachea midline, no respiratory distress, lungs cta bilaterally, rrr no murmurs, soft NT ND abdomen, no visible extremity deformities, Alert and oriented, non focal neuro exam, skin warm and dry, normal affect and mood no leg swelling or jvd no c/t/l spine ttp midline no cva ttp     Hypoglycemia with near syncope concerning for insulin overdose unlikely exogenous overdose for external purposes otherwise eval with CT abdomen pelvis to rule out mass insulinoma otherwise continue fingerstick monitoring dextrose as needed possible dextrose drip syncope unlikely cardiogenic unlikely CVA PE pneumothorax dissection pneumonia screening labs urine chest x-ray rule out other infectious metabolic etiologies of weakness unlikely toxin in nature.  Disposition to admit possible ICU evaluation if sugars unable to continue to control

## 2022-12-02 NOTE — H&P ADULT - ATTENDING COMMENTS
57M Hx T2DM, DM Neuropathy, DKA, Chronic A.Fib on AC, Cervical and Lumbar Spinal Injuries, Chronic Pain Syndrome on Methadone and Opioids, , multiple Pre-Syncope Episodes, Hypo/Hyperglycemia on Jardiance, Novolog and Lantus Insulin p/w ED N/V x 4 days p/w ED Hypotension 70/50 mmHg and Hypoglycemia 40s s/p D50 x 3 Amps, Dexamethasone and eventually started on IV D101/2ND @ 100 cc/Hr.   - A&Ox 3 and FC x 4 not in acute distress   - Hypotension per EMS resolved after 2 Li IV Fluid   - Persistent Hypoglycemia likely aggravated by N/V in Exogenous Insulin and Jardiance  - Continue IV D10 Gtt and FSSC Q1Hr coverage titration and CMPs Q4-6Hr   - Pre-Syncope likely Metabolic causes with CTH, EKG, Santo negative   - Lactic Acidosis 5.7 in setting of Metformin, Hypotension, HAG-MA and JAXON f/u   - S' Insulin, C-Peptides, Pro-Insulin   - Closely monitor I&O and Renal Function   - Hold Insulin till AM, Endocrine consult and f/u   - DVT Prophylaxis per ICU Routine   - [facility] - Full Code 57M Hx T2DM, DM Neuropathy, DKA, Chronic A.Fib on AC, Cervical and Lumbar Spinal Injuries, Chronic Pain Syndrome on Methadone and Opioids, , multiple Pre-Syncope Episodes, Hypo/Hyperglycemia on Jardiance, Novolog and Lantus Insulin p/w ED N/V x 4 days p/w ED Hypotension 70/50 mmHg and Hypoglycemia 40s s/p D50 x 3 Amps, Dexamethasone and eventually started on IV D101/2ND @ 100 cc/Hr.   - A&Ox 3 and FC x 4 not in acute distress   - Hypotension per EMS resolved after 2 Li IV Fluid   - Persistent Hypoglycemia likely aggravated by N/V in Exogenous Insulin and Jardiance  - Continue IV D10 Gtt and FSSC Q1Hr coverage titration and CMPs Q4-6Hr   - Pre-Syncope likely Metabolic causes with CTH, EKG, Santo negative   - Lactic Acidosis 5.7 in setting of Metformin, Hypotension, HAG-MA and JAXON f/u   - S' Insulin, C-Peptides, Pro-Insulin   - Closely monitor I&O and Renal Function   - Hold Insulin till AM, Endocrine consult and f/u   - DVT Prophylaxis per ICU Routine   - [facility] - Full Code     Patient seen and examined with ICU Resident/Fellow at bedside after lab data, medical records and radiology reports reviewed. I have read and agreeable in general with resident's Documented Note, Assessment and Management Plan which reflected my opinions from bedside round and discussion.   Total Critical Care Time = 45 Min excluding teaching and procedure activity. 57M Hx T2DM, DM Neuropathy, DKA, Chronic A.Fib on AC, Cervical and Lumbar Spinal Injuries, Chronic Pain Syndrome on Methadone and Opioids, , multiple Pre-Syncope Episodes, Hypo/Hyperglycemia on Jardiance, Novolog and Lantus Insulin p/w ED N/V x 4 days p/w ED Hypotension 70/50 mmHg and Hypoglycemia 40s s/p D50 x 3 Amps, Dexamethasone and eventually started on IV D101/2ND @ 100 cc/Hr.   - A&Ox 3 and FC x 4 not in acute distress   - Hypotension per EMS resolved after 2 Li IV Fluid   - Persistent Hypoglycemia likely aggravated by N/V in Exogenous Insulin and Jardiance  - Continue IV D10 Gtt and FSSC Q1Hr coverage titration and CMPs Q4-6Hr   - Pre-Syncope likely Metabolic causes with CTH, EKG, Santo negative   - Lactic Acidosis 5.7 in setting of Metformin, Hypotension, HAG-MA and JAXON f/u   - S' Insulin, C-Peptides, Pro-Insulin, Lactate, [place] virus, RSV, RVP    - Closely monitor I&O and Renal Function   - Hold Insulin till AM, Endocrine consult and f/u   - DVT Prophylaxis per ICU Routine   - [facility] - Full Code     Patient seen and examined with ICU Resident/Fellow at bedside after lab data, medical records and radiology reports reviewed. I have read and agreeable in general with resident's Documented Note, Assessment and Management Plan which reflected my opinions from bedside round and discussion.   Total Critical Care Time = 45 Min excluding teaching and procedure activity.

## 2022-12-03 LAB
A1C WITH ESTIMATED AVERAGE GLUCOSE RESULT: 8.1 % — HIGH (ref 4–5.6)
ALBUMIN SERPL ELPH-MCNC: 3.7 G/DL — SIGNIFICANT CHANGE UP (ref 3.3–5)
ALBUMIN SERPL ELPH-MCNC: 3.8 G/DL — SIGNIFICANT CHANGE UP (ref 3.3–5)
ALP SERPL-CCNC: 78 U/L — SIGNIFICANT CHANGE UP (ref 40–120)
ALP SERPL-CCNC: 79 U/L — SIGNIFICANT CHANGE UP (ref 40–120)
ALT FLD-CCNC: 11 U/L — SIGNIFICANT CHANGE UP (ref 10–45)
ALT FLD-CCNC: 14 U/L — SIGNIFICANT CHANGE UP (ref 10–45)
ANION GAP SERPL CALC-SCNC: 12 MMOL/L — SIGNIFICANT CHANGE UP (ref 5–17)
ANION GAP SERPL CALC-SCNC: 14 MMOL/L — SIGNIFICANT CHANGE UP (ref 5–17)
AST SERPL-CCNC: 7 U/L — LOW (ref 10–40)
AST SERPL-CCNC: 8 U/L — LOW (ref 10–40)
B-OH-BUTYR SERPL-SCNC: 0.2 MMOL/L — SIGNIFICANT CHANGE UP
BASE EXCESS BLDV CALC-SCNC: -0.1 MMOL/L — SIGNIFICANT CHANGE UP (ref -2–3)
BASE EXCESS BLDV CALC-SCNC: -2.5 MMOL/L — LOW (ref -2–3)
BILIRUB SERPL-MCNC: 0.2 MG/DL — SIGNIFICANT CHANGE UP (ref 0.2–1.2)
BILIRUB SERPL-MCNC: 0.2 MG/DL — SIGNIFICANT CHANGE UP (ref 0.2–1.2)
BLOOD GAS VENOUS - CREATININE: SIGNIFICANT CHANGE UP MG/DL (ref 0.5–1.3)
BUN SERPL-MCNC: 31 MG/DL — HIGH (ref 7–23)
BUN SERPL-MCNC: 34 MG/DL — HIGH (ref 7–23)
C PEPTIDE SERPL-MCNC: 0.9 NG/ML — LOW (ref 1.1–4.4)
CA-I SERPL-SCNC: 1.2 MMOL/L — SIGNIFICANT CHANGE UP (ref 1.15–1.33)
CA-I SERPL-SCNC: 1.23 MMOL/L — SIGNIFICANT CHANGE UP (ref 1.15–1.33)
CALCIUM SERPL-MCNC: 9.2 MG/DL — SIGNIFICANT CHANGE UP (ref 8.4–10.5)
CALCIUM SERPL-MCNC: 9.4 MG/DL — SIGNIFICANT CHANGE UP (ref 8.4–10.5)
CHLORIDE BLDV-SCNC: 101 MMOL/L — SIGNIFICANT CHANGE UP (ref 96–108)
CHLORIDE BLDV-SCNC: 102 MMOL/L — SIGNIFICANT CHANGE UP (ref 96–108)
CHLORIDE SERPL-SCNC: 101 MMOL/L — SIGNIFICANT CHANGE UP (ref 96–108)
CHLORIDE SERPL-SCNC: 102 MMOL/L — SIGNIFICANT CHANGE UP (ref 96–108)
CO2 BLDV-SCNC: 24 MMOL/L — SIGNIFICANT CHANGE UP (ref 22–26)
CO2 BLDV-SCNC: 25 MMOL/L — SIGNIFICANT CHANGE UP (ref 22–26)
CO2 SERPL-SCNC: 20 MMOL/L — LOW (ref 22–31)
CO2 SERPL-SCNC: 22 MMOL/L — SIGNIFICANT CHANGE UP (ref 22–31)
CREAT SERPL-MCNC: 0.96 MG/DL — SIGNIFICANT CHANGE UP (ref 0.5–1.3)
CREAT SERPL-MCNC: 1.46 MG/DL — HIGH (ref 0.5–1.3)
EGFR: 56 ML/MIN/1.73M2 — LOW
EGFR: 93 ML/MIN/1.73M2 — SIGNIFICANT CHANGE UP
ESTIMATED AVERAGE GLUCOSE: 186 MG/DL — HIGH (ref 68–114)
GAS PNL BLDV: 133 MMOL/L — LOW (ref 136–145)
GAS PNL BLDV: 133 MMOL/L — LOW (ref 136–145)
GAS PNL BLDV: SIGNIFICANT CHANGE UP
GLUCOSE BLDC GLUCOMTR-MCNC: 165 MG/DL — HIGH (ref 70–99)
GLUCOSE BLDC GLUCOMTR-MCNC: 165 MG/DL — HIGH (ref 70–99)
GLUCOSE BLDC GLUCOMTR-MCNC: 167 MG/DL — HIGH (ref 70–99)
GLUCOSE BLDC GLUCOMTR-MCNC: 180 MG/DL — HIGH (ref 70–99)
GLUCOSE BLDC GLUCOMTR-MCNC: 206 MG/DL — HIGH (ref 70–99)
GLUCOSE BLDC GLUCOMTR-MCNC: 206 MG/DL — HIGH (ref 70–99)
GLUCOSE BLDC GLUCOMTR-MCNC: 209 MG/DL — HIGH (ref 70–99)
GLUCOSE BLDC GLUCOMTR-MCNC: 209 MG/DL — HIGH (ref 70–99)
GLUCOSE BLDC GLUCOMTR-MCNC: 214 MG/DL — HIGH (ref 70–99)
GLUCOSE BLDC GLUCOMTR-MCNC: 224 MG/DL — HIGH (ref 70–99)
GLUCOSE BLDC GLUCOMTR-MCNC: 244 MG/DL — HIGH (ref 70–99)
GLUCOSE BLDC GLUCOMTR-MCNC: 252 MG/DL — HIGH (ref 70–99)
GLUCOSE BLDC GLUCOMTR-MCNC: 254 MG/DL — HIGH (ref 70–99)
GLUCOSE BLDC GLUCOMTR-MCNC: 260 MG/DL — HIGH (ref 70–99)
GLUCOSE BLDC GLUCOMTR-MCNC: 261 MG/DL — HIGH (ref 70–99)
GLUCOSE BLDC GLUCOMTR-MCNC: 263 MG/DL — HIGH (ref 70–99)
GLUCOSE BLDC GLUCOMTR-MCNC: 276 MG/DL — HIGH (ref 70–99)
GLUCOSE BLDV-MCNC: 216 MG/DL — HIGH (ref 70–99)
GLUCOSE BLDV-MCNC: 243 MG/DL — HIGH (ref 70–99)
GLUCOSE SERPL-MCNC: 221 MG/DL — HIGH (ref 70–99)
GLUCOSE SERPL-MCNC: 232 MG/DL — HIGH (ref 70–99)
HCO3 BLDV-SCNC: 22 MMOL/L — SIGNIFICANT CHANGE UP (ref 22–29)
HCO3 BLDV-SCNC: 24 MMOL/L — SIGNIFICANT CHANGE UP (ref 22–29)
HCT VFR BLD CALC: 36.8 % — LOW (ref 39–50)
HCT VFR BLDA CALC: 35 % — LOW (ref 39–51)
HCT VFR BLDA CALC: 35 % — LOW (ref 39–51)
HGB BLD CALC-MCNC: 11.5 G/DL — LOW (ref 12.6–17.4)
HGB BLD CALC-MCNC: 11.6 G/DL — LOW (ref 12.6–17.4)
HGB BLD-MCNC: 11.3 G/DL — LOW (ref 13–17)
HOROWITZ INDEX BLDV+IHG-RTO: 21 — SIGNIFICANT CHANGE UP
LACTATE BLDV-MCNC: 1.1 MMOL/L — SIGNIFICANT CHANGE UP (ref 0.5–2)
LACTATE BLDV-MCNC: 2.9 MMOL/L — HIGH (ref 0.5–2)
MAGNESIUM SERPL-MCNC: 1.4 MG/DL — LOW (ref 1.6–2.6)
MAGNESIUM SERPL-MCNC: 1.7 MG/DL — SIGNIFICANT CHANGE UP (ref 1.6–2.6)
MCHC RBC-ENTMCNC: 25.9 PG — LOW (ref 27–34)
MCHC RBC-ENTMCNC: 30.7 GM/DL — LOW (ref 32–36)
MCV RBC AUTO: 84.4 FL — SIGNIFICANT CHANGE UP (ref 80–100)
NRBC # BLD: 0 /100 WBCS — SIGNIFICANT CHANGE UP (ref 0–0)
PCO2 BLDV: 36 MMHG — LOW (ref 42–55)
PCO2 BLDV: 39 MMHG — LOW (ref 42–55)
PH BLDV: 7.37 — SIGNIFICANT CHANGE UP (ref 7.32–7.43)
PH BLDV: 7.43 — SIGNIFICANT CHANGE UP (ref 7.32–7.43)
PHOSPHATE SERPL-MCNC: 1.8 MG/DL — LOW (ref 2.5–4.5)
PHOSPHATE SERPL-MCNC: 3.8 MG/DL — SIGNIFICANT CHANGE UP (ref 2.5–4.5)
PLATELET # BLD AUTO: 335 K/UL — SIGNIFICANT CHANGE UP (ref 150–400)
PO2 BLDV: 53 MMHG — HIGH (ref 25–45)
PO2 BLDV: 73 MMHG — HIGH (ref 25–45)
POTASSIUM BLDV-SCNC: 4.4 MMOL/L — SIGNIFICANT CHANGE UP (ref 3.5–5.1)
POTASSIUM BLDV-SCNC: 4.5 MMOL/L — SIGNIFICANT CHANGE UP (ref 3.5–5.1)
POTASSIUM SERPL-MCNC: 4.4 MMOL/L — SIGNIFICANT CHANGE UP (ref 3.5–5.3)
POTASSIUM SERPL-MCNC: 4.7 MMOL/L — SIGNIFICANT CHANGE UP (ref 3.5–5.3)
POTASSIUM SERPL-SCNC: 4.4 MMOL/L — SIGNIFICANT CHANGE UP (ref 3.5–5.3)
POTASSIUM SERPL-SCNC: 4.7 MMOL/L — SIGNIFICANT CHANGE UP (ref 3.5–5.3)
PROT SERPL-MCNC: 6.5 G/DL — SIGNIFICANT CHANGE UP (ref 6–8.3)
PROT SERPL-MCNC: 6.6 G/DL — SIGNIFICANT CHANGE UP (ref 6–8.3)
RBC # BLD: 4.36 M/UL — SIGNIFICANT CHANGE UP (ref 4.2–5.8)
RBC # FLD: 14.8 % — HIGH (ref 10.3–14.5)
SAO2 % BLDV: 86.6 % — SIGNIFICANT CHANGE UP (ref 67–88)
SAO2 % BLDV: 95.7 % — HIGH (ref 67–88)
SODIUM SERPL-SCNC: 135 MMOL/L — SIGNIFICANT CHANGE UP (ref 135–145)
SODIUM SERPL-SCNC: 136 MMOL/L — SIGNIFICANT CHANGE UP (ref 135–145)
TSH SERPL-MCNC: 2.46 UIU/ML — SIGNIFICANT CHANGE UP (ref 0.27–4.2)
WBC # BLD: 8.48 K/UL — SIGNIFICANT CHANGE UP (ref 3.8–10.5)
WBC # FLD AUTO: 8.48 K/UL — SIGNIFICANT CHANGE UP (ref 3.8–10.5)

## 2022-12-03 PROCEDURE — 70450 CT HEAD/BRAIN W/O DYE: CPT | Mod: 26

## 2022-12-03 PROCEDURE — 99292 CRITICAL CARE ADDL 30 MIN: CPT

## 2022-12-03 PROCEDURE — 99223 1ST HOSP IP/OBS HIGH 75: CPT

## 2022-12-03 RX ORDER — INSULIN LISPRO 100/ML
VIAL (ML) SUBCUTANEOUS AT BEDTIME
Refills: 0 | Status: DISCONTINUED | OUTPATIENT
Start: 2022-12-03 | End: 2022-12-07

## 2022-12-03 RX ORDER — INSULIN LISPRO 100/ML
VIAL (ML) SUBCUTANEOUS
Refills: 0 | Status: DISCONTINUED | OUTPATIENT
Start: 2022-12-03 | End: 2022-12-03

## 2022-12-03 RX ORDER — CLOPIDOGREL BISULFATE 75 MG/1
75 TABLET, FILM COATED ORAL DAILY
Refills: 0 | Status: DISCONTINUED | OUTPATIENT
Start: 2022-12-03 | End: 2022-12-07

## 2022-12-03 RX ORDER — INFLUENZA VIRUS VACCINE 15; 15; 15; 15 UG/.5ML; UG/.5ML; UG/.5ML; UG/.5ML
0.5 SUSPENSION INTRAMUSCULAR ONCE
Refills: 0 | Status: DISCONTINUED | OUTPATIENT
Start: 2022-12-03 | End: 2022-12-07

## 2022-12-03 RX ORDER — HYDROMORPHONE HYDROCHLORIDE 2 MG/ML
4 INJECTION INTRAMUSCULAR; INTRAVENOUS; SUBCUTANEOUS
Refills: 0 | Status: DISCONTINUED | OUTPATIENT
Start: 2022-12-03 | End: 2022-12-03

## 2022-12-03 RX ORDER — INSULIN LISPRO 100/ML
5 VIAL (ML) SUBCUTANEOUS
Refills: 0 | Status: DISCONTINUED | OUTPATIENT
Start: 2022-12-03 | End: 2022-12-05

## 2022-12-03 RX ORDER — MAGNESIUM SULFATE 500 MG/ML
2 VIAL (ML) INJECTION ONCE
Refills: 0 | Status: COMPLETED | OUTPATIENT
Start: 2022-12-03 | End: 2022-12-03

## 2022-12-03 RX ORDER — ACETAMINOPHEN 500 MG
975 TABLET ORAL ONCE
Refills: 0 | Status: COMPLETED | OUTPATIENT
Start: 2022-12-03 | End: 2022-12-03

## 2022-12-03 RX ORDER — HYDROMORPHONE HYDROCHLORIDE 2 MG/ML
4 INJECTION INTRAMUSCULAR; INTRAVENOUS; SUBCUTANEOUS
Refills: 0 | Status: DISCONTINUED | OUTPATIENT
Start: 2022-12-03 | End: 2022-12-07

## 2022-12-03 RX ORDER — METHADONE HYDROCHLORIDE 40 MG/1
5 TABLET ORAL EVERY 8 HOURS
Refills: 0 | Status: DISCONTINUED | OUTPATIENT
Start: 2022-12-03 | End: 2022-12-07

## 2022-12-03 RX ORDER — METHADONE HYDROCHLORIDE 40 MG/1
5 TABLET ORAL
Refills: 0 | Status: DISCONTINUED | OUTPATIENT
Start: 2022-12-03 | End: 2022-12-03

## 2022-12-03 RX ORDER — SODIUM CHLORIDE 9 MG/ML
1000 INJECTION, SOLUTION INTRAVENOUS
Refills: 0 | Status: DISCONTINUED | OUTPATIENT
Start: 2022-12-03 | End: 2022-12-03

## 2022-12-03 RX ORDER — ATORVASTATIN CALCIUM 80 MG/1
80 TABLET, FILM COATED ORAL AT BEDTIME
Refills: 0 | Status: DISCONTINUED | OUTPATIENT
Start: 2022-12-03 | End: 2022-12-07

## 2022-12-03 RX ORDER — HYDROMORPHONE HYDROCHLORIDE 2 MG/ML
4 INJECTION INTRAMUSCULAR; INTRAVENOUS; SUBCUTANEOUS ONCE
Refills: 0 | Status: DISCONTINUED | OUTPATIENT
Start: 2022-12-03 | End: 2022-12-03

## 2022-12-03 RX ORDER — INSULIN LISPRO 100/ML
VIAL (ML) SUBCUTANEOUS AT BEDTIME
Refills: 0 | Status: DISCONTINUED | OUTPATIENT
Start: 2022-12-03 | End: 2022-12-03

## 2022-12-03 RX ORDER — APIXABAN 2.5 MG/1
5 TABLET, FILM COATED ORAL EVERY 12 HOURS
Refills: 0 | Status: DISCONTINUED | OUTPATIENT
Start: 2022-12-03 | End: 2022-12-07

## 2022-12-03 RX ORDER — INSULIN LISPRO 100/ML
VIAL (ML) SUBCUTANEOUS
Refills: 0 | Status: DISCONTINUED | OUTPATIENT
Start: 2022-12-03 | End: 2022-12-07

## 2022-12-03 RX ORDER — INSULIN GLARGINE 100 [IU]/ML
15 INJECTION, SOLUTION SUBCUTANEOUS EVERY MORNING
Refills: 0 | Status: DISCONTINUED | OUTPATIENT
Start: 2022-12-03 | End: 2022-12-05

## 2022-12-03 RX ADMIN — SODIUM CHLORIDE 100 MILLILITER(S): 9 INJECTION, SOLUTION INTRAVENOUS at 02:33

## 2022-12-03 RX ADMIN — Medication 975 MILLIGRAM(S): at 01:43

## 2022-12-03 RX ADMIN — HYDROMORPHONE HYDROCHLORIDE 4 MILLIGRAM(S): 2 INJECTION INTRAMUSCULAR; INTRAVENOUS; SUBCUTANEOUS at 03:04

## 2022-12-03 RX ADMIN — INSULIN GLARGINE 15 UNIT(S): 100 INJECTION, SOLUTION SUBCUTANEOUS at 15:23

## 2022-12-03 RX ADMIN — HYDROMORPHONE HYDROCHLORIDE 4 MILLIGRAM(S): 2 INJECTION INTRAMUSCULAR; INTRAVENOUS; SUBCUTANEOUS at 10:40

## 2022-12-03 RX ADMIN — Medication 1: at 17:11

## 2022-12-03 RX ADMIN — HYDROMORPHONE HYDROCHLORIDE 4 MILLIGRAM(S): 2 INJECTION INTRAMUSCULAR; INTRAVENOUS; SUBCUTANEOUS at 17:10

## 2022-12-03 RX ADMIN — Medication 975 MILLIGRAM(S): at 02:25

## 2022-12-03 RX ADMIN — ATORVASTATIN CALCIUM 80 MILLIGRAM(S): 80 TABLET, FILM COATED ORAL at 21:18

## 2022-12-03 RX ADMIN — Medication 3: at 10:41

## 2022-12-03 RX ADMIN — APIXABAN 5 MILLIGRAM(S): 2.5 TABLET, FILM COATED ORAL at 17:10

## 2022-12-03 RX ADMIN — APIXABAN 5 MILLIGRAM(S): 2.5 TABLET, FILM COATED ORAL at 06:13

## 2022-12-03 RX ADMIN — HYDROMORPHONE HYDROCHLORIDE 4 MILLIGRAM(S): 2 INJECTION INTRAMUSCULAR; INTRAVENOUS; SUBCUTANEOUS at 02:34

## 2022-12-03 RX ADMIN — SODIUM CHLORIDE 100 MILLILITER(S): 9 INJECTION, SOLUTION INTRAVENOUS at 06:13

## 2022-12-03 RX ADMIN — Medication 150 MILLIGRAM(S): at 21:19

## 2022-12-03 RX ADMIN — CLOPIDOGREL BISULFATE 75 MILLIGRAM(S): 75 TABLET, FILM COATED ORAL at 10:40

## 2022-12-03 RX ADMIN — Medication 85 MILLIMOLE(S): at 06:18

## 2022-12-03 RX ADMIN — METHADONE HYDROCHLORIDE 5 MILLIGRAM(S): 40 TABLET ORAL at 13:38

## 2022-12-03 RX ADMIN — CHLORHEXIDINE GLUCONATE 1 APPLICATION(S): 213 SOLUTION TOPICAL at 06:13

## 2022-12-03 RX ADMIN — METHADONE HYDROCHLORIDE 5 MILLIGRAM(S): 40 TABLET ORAL at 21:18

## 2022-12-03 RX ADMIN — Medication 5 UNIT(S): at 12:32

## 2022-12-03 RX ADMIN — Medication 25 GRAM(S): at 13:38

## 2022-12-03 RX ADMIN — Medication 5 UNIT(S): at 17:10

## 2022-12-03 RX ADMIN — HYDROMORPHONE HYDROCHLORIDE 4 MILLIGRAM(S): 2 INJECTION INTRAMUSCULAR; INTRAVENOUS; SUBCUTANEOUS at 11:10

## 2022-12-03 NOTE — CONSULT NOTE ADULT - ASSESSMENT
57y Male with pmhx of HTN, DM2 c/b DKA, neuropathy, chronic pain on Methadone & opioids for cervical spine and back injury, MI, Afib on AC p/w near syncopal episode in the setting of hypoglycemia  pt reports multiple episodes of near syncope at home and hypoglycemia in the setting of insulin use  Pt takes Novolog, Jardiance, and Lantus, Currently, taking 12-16 units admelog pre-meal and 36 units Lantus at night time.  has free style cora at home   follows with Dr. Pope endocrine     pt reports multiple episodes of hypoglcyemia at home.   please note pt also with hx fo DKA in march 2022              1. DM  unclear why pt has multiple epidodes of hypoglycemia at home  d/w pt we will observe on lower doses of insulin inpt (he is running high) and with hx of severe DKA - he can not go without insulin   High risk patient with high level decision making, at high risk of worsening microvascular and macrovascular complications.  Endocrine team consulted for uncontrolled diabetes. Patient is high risk with high level decision making due to uncontrolled diabetes which places patient at high risk for cardiovascular and cerebrovascular events. Patient with lability of glucose requiring close monitoring and insulin adjustments.      While inpatient  please obtain A1c   BG target 100-40 mg/dl, remains above goal, though can not be aggressive in day 1 after hypoglycemia   - Lantus  15  units qam  - Admelog to 5 units SC Premeal/TIDAC   - Admelog  Low dose Correction Scale Premeal & seperate low dose  Correction Scale Bedtime   - Hypoglycemia protocol in place   - Carb Consistent Diet   - Nutrition consult   -please obtain Cpeptide       dc planning  1) basal bolus  2) stop jardiance ? pending cpeptide given hx of DKA  3) followup with his outpt endocrine         2 HTN  goal BP in DM <130/80  c/w lisinopril   outpt mc/cr ratio      3. HLD  c/w lipitor 80mg    d/w ICU NP CHAPITO    71 minutes spent on total encounter; more than 50% of the visit was spent counseling and / or coordinating care by the attending physician.  The necessity of the time spent during the encounter on this date of service was due to:   reviewing prior medical records, labs and care coordination.    Lindsey Daniels MD  Attending Physician   Department of Endocrinology, Diabetes and Metabolism     weekdays: 9am to 5pm: email [facility] or [facility] and or TEAMS     Nights and weekends: [phone]  Please note that this patient may be followed by a different provider tomorrow.   If no answer or after hours, please contact [phone].  For final dc reccomendations, please call [phone] or page the endocrine fellow on call.

## 2022-12-03 NOTE — CHART NOTE - NSCHARTNOTEFT_GEN_A_CORE
Discussion with patient today with reference to his lifestyle. 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 The pt further stated that he does not always eat enough, has difficulty cooking for himself, and often eats just a salad with some protein for a fast meal. Endocrine has been consulted, a nutrition consult ordered, PT and OT ordered. The undersigned discussed speaking to perhaps psyche with reference to his depression and stated that he just started seeing a Psychiatric NP, started zoloft and clozapine now x 1 month. Since in the MICU his BP has improved, BG improved, lactic acidosis / AG, renal function now normalized, and is eating well.  Social Work consult ordered- perhaps the patient would benefit from some home assistance and perhaps meal prep aid. The pt has greatly improved and is stable for transfer to the medical floor.

## 2022-12-03 NOTE — CHART NOTE - NSCHARTNOTEFT_GEN_A_CORE
MICU Transfer Note    Transfer from: MICU    Transfer to: ( x ) Medicine    (  ) Telemetry     (   ) RCU        (    ) Palliative         (   ) Stroke Unit          (   ) __________________    Accepting physician: Dr. Aldo Kumar      MICU COURSE:   57y Male with pmhx of HTN, DM2 c/b DKA, neuropathy,  MI, Afib on AC, HTN, and chronic pain on Methadone, Dilaudid, and Lyrica for cervical spine and back injury s/p C2-6 fusion, and LUE nerve transplant and repair. The pt was admitted for a near syncopal episode, was very lightheaded did not have any LOC, was able to sit down on the couch safely. The pt stated that he has had increased  multiple episodes of near syncope which have been ongoing and have gotten worse shich his MI and DKA hospitalization in March 2022. On admission the pt was found to be hypotensive 70's/50's, hypoglycemic with a BG of 49 , in +AGMA / lactic Acidosis, and in JAXON. IVF bolus's were administered, maintenance fluid, restarted on a diabetic regimen, and eating well now. An Endocrine consult has been made, medications held Lisinopril, Coreg, Amlodipine.  ,    . Per EMS, pt was at home sitting on couch. Got up and felt lightheaded, "blacked out" for a few seconds, and then was able to sit back down on the couch, felt like he was about to pass out. Says he's had multiple episodes of near syncope in the last month that has been progressively getting worse. Of note, patient states that since his MI and DKA episode earlier this year, he has not been feeling his usual self. Denies any pre-syncopal symptoms, photophobia, dizziness, or hot flashes. Because of his chronic syncopal episodes, he has always been slow to rise from a seated position. Also has multiple episodes of NBNB vomiting for the last 4 days. Denies CP, SOB, palpitations. Pt takes Novolog, Jardiance, and Lantus, denies taking more or less of these meds recently. Currently, taking 12-16 units admelog pre-meal and 30 units Lantus at night time.  Denies any changes to dosing of his DM2 meds. Per EMS, BP was in 70s/50s so pt was given IVF. Pt still feeling lightheaded. Denies current fever, cp, sob, abd pain, nausea, diarrhea, melena, BRBPR, urinary symptoms. NKDA. No recent travel.     Vital Signs Last 24 Hrs  T(C): 36.7 (03 Dec 2022 12:00), Max: 37.1 (02 Dec 2022 20:14)  T(F): 98 (03 Dec 2022 12:00), Max: 98.8 (02 Dec 2022 20:14)  HR: 73 (03 Dec 2022 15:00) (60 - 84)  BP: 122/61 (03 Dec 2022 15:00) (82/51 - 151/79)  BP(mean): 85 (03 Dec 2022 15:00) (61 - 110)  RR: 15 (03 Dec 2022 15:00) (11 - 26)  SpO2: 95% (03 Dec 2022 15:00) (92% - 100%)    Parameters below as of 03 Dec 2022 01:01  Patient On (Oxygen Delivery Method): room air      I&O's Summary    02 Dec 2022 07:01  -  03 Dec 2022 07:00  --------------------------------------------------------  IN: 783.3 mL / OUT: 700 mL / NET: 83.3 mL    03 Dec 2022 07:01  -  03 Dec 2022 15:58  --------------------------------------------------------  IN: 1540 mL / OUT: 1450 mL / NET: 90 mL        MEDICATIONS  (STANDING):  apixaban 5 milliGRAM(s) Oral every 12 hours  atorvastatin 80 milliGRAM(s) Oral at bedtime  chlorhexidine 4% Liquid 1 Application(s) Topical <User Schedule>  clopidogrel Tablet 75 milliGRAM(s) Oral daily  dextrose 5%. 1000 milliLiter(s) (50 mL/Hr) IV Continuous <Continuous>  dextrose 5%. 1000 milliLiter(s) (100 mL/Hr) IV Continuous <Continuous>  dextrose 50% Injectable 25 Gram(s) IV Push once  dextrose 50% Injectable 12.5 Gram(s) IV Push once  dextrose 50% Injectable 25 Gram(s) IV Push once  glucagon  Injectable 1 milliGRAM(s) IntraMuscular once  HYDROmorphone   Tablet 4 milliGRAM(s) Oral four times a day  influenza   Vaccine 0.5 milliLiter(s) IntraMuscular once  insulin glargine Injectable (LANTUS) 15 Unit(s) SubCutaneous every morning  insulin lispro (ADMELOG) corrective regimen sliding scale   SubCutaneous three times a day before meals  insulin lispro (ADMELOG) corrective regimen sliding scale   SubCutaneous at bedtime  insulin lispro Injectable (ADMELOG) 5 Unit(s) SubCutaneous before breakfast  insulin lispro Injectable (ADMELOG) 5 Unit(s) SubCutaneous before lunch  insulin lispro Injectable (ADMELOG) 5 Unit(s) SubCutaneous before dinner  methadone    Tablet 5 milliGRAM(s) Oral every 8 hours    MEDICATIONS  (PRN):  dextrose Oral Gel 15 Gram(s) Oral once PRN Blood Glucose LESS THAN 70 milliGRAM(s)/deciliter        LABS                                            11.3                  Neurophils% (auto):   x      (12-03 @ 02:13):    8.48 )-----------(335          Lymphocytes% (auto):  x                                             36.8                   Eosinphils% (auto):   x        Manual%: Neutrophils x    ; Lymphocytes x    ; Eosinophils x    ; Bands%: x    ; Blasts x                                    135    |  101    |  31                  Calcium: 9.2   / iCa: x      (12-03 @ 12:43)    ----------------------------<  232       Magnesium: 1.4                              4.4     |  22     |  0.96             Phosphorous: 3.8      TPro  6.6    /  Alb  3.7    /  TBili  0.2    /  DBili  x      /  AST  7      /  ALT  14     /  AlkPhos  78     03 Dec 2022 12:43    ( 12-02 @ 20:23 )   PT: 17.1 sec;   INR: 1.48 ratio  aPTT: 32.8 sec          ASSESSMENT & PLAN:             For Followup: MICU Transfer Note    Transfer from: MICU    Transfer to: ( x ) Medicine    (  ) Telemetry     (   ) RCU        (    ) Palliative         (   ) Stroke Unit          (   ) __________________    Accepting physician: Dr. Aldo Kumar      MICU COURSE:   57y Male with pmhx of HTN, DM2 c/b DKA, neuropathy,  MI, Afib on AC, HTN, and chronic pain on Methadone, Dilaudid, and Lyrica for cervical spine and back injury s/p C2-6 fusion, and LUE nerve transplant and repair. The pt was admitted for a near syncopal episode, was very lightheaded did not have any LOC, was able to sit down on the couch safely. The pt stated that he has had increased  multiple episodes of near syncope which have been ongoing and have gotten worse shich his MI and DKA hospitalization in March 2022. On admission the pt was found to be hypotensive 70's/50's, hypoglycemic with a BG of 49 , in +AGMA / lactic Acidosis, and in JAXON. IVF bolus's were administered, maintenance fluid, restarted on a diabetic regimen, and eating well now. An Endocrine consult has been made, pt is tolerating a diet and on a Insulin regime: lantus 15U (1/2 of home dose), pre meal 5mg, ISSS low, medications held Lisinopril, Coreg, Amlodipine since admission. Lyrica in the setting of JAXON, of note pt stated that he just started zoloft and Clonazepam. Ct of the brain was negative. The pt is hemodynamically stable, BG stabilized, denies dizziness, and is stable for transfer to the medical floor.       Vital Signs Last 24 Hrs  T(C): 36.7 (03 Dec 2022 12:00), Max: 37.1 (02 Dec 2022 20:14)  T(F): 98 (03 Dec 2022 12:00), Max: 98.8 (02 Dec 2022 20:14)  HR: 73 (03 Dec 2022 15:00) (60 - 84)  BP: 122/61 (03 Dec 2022 15:00) (82/51 - 151/79)  BP(mean): 85 (03 Dec 2022 15:00) (61 - 110)  RR: 15 (03 Dec 2022 15:00) (11 - 26)  SpO2: 95% (03 Dec 2022 15:00) (92% - 100%)    Parameters below as of 03 Dec 2022 01:01  Patient On (Oxygen Delivery Method): room air      I&O's Summary    02 Dec 2022 07:01  -  03 Dec 2022 07:00  --------------------------------------------------------  IN: 783.3 mL / OUT: 700 mL / NET: 83.3 mL    03 Dec 2022 07:01  -  03 Dec 2022 15:58  --------------------------------------------------------  IN: 1540 mL / OUT: 1450 mL / NET: 90 mL        MEDICATIONS  (STANDING):  apixaban 5 milliGRAM(s) Oral every 12 hours  atorvastatin 80 milliGRAM(s) Oral at bedtime  chlorhexidine 4% Liquid 1 Application(s) Topical <User Schedule>  clopidogrel Tablet 75 milliGRAM(s) Oral daily  dextrose 5%. 1000 milliLiter(s) (50 mL/Hr) IV Continuous <Continuous>  dextrose 5%. 1000 milliLiter(s) (100 mL/Hr) IV Continuous <Continuous>  dextrose 50% Injectable 25 Gram(s) IV Push once  dextrose 50% Injectable 12.5 Gram(s) IV Push once  dextrose 50% Injectable 25 Gram(s) IV Push once  glucagon  Injectable 1 milliGRAM(s) IntraMuscular once  HYDROmorphone   Tablet 4 milliGRAM(s) Oral four times a day  influenza   Vaccine 0.5 milliLiter(s) IntraMuscular once  insulin glargine Injectable (LANTUS) 15 Unit(s) SubCutaneous every morning  insulin lispro (ADMELOG) corrective regimen sliding scale   SubCutaneous three times a day before meals  insulin lispro (ADMELOG) corrective regimen sliding scale   SubCutaneous at bedtime  insulin lispro Injectable (ADMELOG) 5 Unit(s) SubCutaneous before breakfast  insulin lispro Injectable (ADMELOG) 5 Unit(s) SubCutaneous before lunch  insulin lispro Injectable (ADMELOG) 5 Unit(s) SubCutaneous before dinner  methadone    Tablet 5 milliGRAM(s) Oral every 8 hours    MEDICATIONS  (PRN):  dextrose Oral Gel 15 Gram(s) Oral once PRN Blood Glucose LESS THAN 70 milliGRAM(s)/deciliter      LABS                                        11.3                  Neurophils% (auto):   x     (12-03 @ 02:13):    8.48 )-----------(335          Lymphocytes% (auto):  x                                             36.8                   Eosinphils% (auto):   x        Manual%: Neutrophils x    ; Lymphocytes x    ; Eosinophils x    ; Bands%: x    ; Blasts x                                    135    |  101    |  31                  Calcium: 9.2   / iCa: x      (12-03 @ 12:43)    ----------------------------<  232       Magnesium: 1.4                              4.4     |  22     |  0.96             Phosphorous: 3.8      TPro  6.6    /  Alb  3.7    /  TBili  0.2    /  DBili  x      /  AST  7      /  ALT  14     /  AlkPhos  78     03 Dec 2022 12:43    ( 12-02 @ 20:23 )   PT: 17.1 sec;   INR: 1.48 ratio  aPTT: 32.8 sec      ASSESSMENT & PLAN:   57yoM w a hx of DM, DKA, HTN, MI 3/22, chronic pain s/p work injury () s/p C2-6 fusion, LUE nerve transplant.     -Hypoglycemia resolved- in insulin Lantus 15U (1/2 of home dose), Pre meal 5U, ISS low dose- would increase as / if needed  -f/u Endocrine  -hold Metformin and Jardiance   -Nutrition consult ordered - pt has not been eating well is depressed and having difficulty preparing food 2/2 LUE disability  -Pain management - c/w home dose of Metadone 5mg q6H, dilaudid 4mg PO q 6H- consider changing to PRN, Lyrica 150mg tid- consider restarting  -Can f/u pt pain management doctor Dr. Pham - [place]   - anti HTN meds on hold- slowly re-intoduce meds- pt may had been taking [id number] qd, amlodipine 5mg qd, and coreg 25mg bid  -c/w plavix and eliquis  -restart zoloft 100mg (has been taking 200mg total at bedtime)- restart at lower dose, restart Clonazapam 0.5mg am and 1mg hs- slowly re-introduce - pt endorsed depression- stated that he has started seeing a Psychiatric NP and was recently  started on medications  - social work consult ordered- pt needs assistance at home with services - food prep perhaps a food delivery, and generalized help  -PT consult  -OT Consult- pt having difficulty with ADL's at home 2/2 LUE disability- pt would benefit from aggressive OT to be continued as an outpatient  -Orthostatic BP dialy       For Followup:  Endocrine  diabetic regimen  PT / OT  Social Work  Psyche Consult inpatient if pt agrees- stated that he was already seeing someone- f/u reconsideration  Slowly re-introduce anti htn's  Restart Lyrica  Restart Zoloft and Clonazepam MICU Transfer Note    Transfer from: MICU    Transfer to: ( x ) Medicine    (  ) Telemetry     (   ) RCU        (    ) Palliative         (   ) Stroke Unit          (   ) __________________    Accepting physician: Dr. Aldo Kumar      MICU COURSE:   57y Male PMH HTN, DM2 c/b DKA, neuropathy,  MI, Afib on AC, HTN, and chronic pain on Methadone, Dilaudid, and Lyrica for cervical spine and back injury s/p C2-6 fusion, and LUE nerve transplant and repair. The pt was admitted for a near syncopal episode, was very lightheaded did not have any LOC, was able to sit down on the couch safely. The pt stated that he has had increased  multiple episodes of near syncope which have been ongoing and have gotten worse shich his MI and DKA hospitalization in March 2022. On admission the pt was found to be hypotensive 70's/50's, hypoglycemic with a BG of 49 , in +AGMA / lactic Acidosis, and in JAXON. Multiple IVF boluses were administered, maintenance fluid, restarted on a diabetic regimen, and eating well now. Supsect hypotension and hypoglycemia in the setting of continued use of multiple DM2 and cardiac medications with poor diet   An Endocrine consult has been made, pt is tolerating a diet and on a Insulin regime: lantus 15U (1/2 of home dose), pre meal 5mg, ISSS low, medications held Lisinopril, Coreg, Amlodipine since admission. Lyrica in the setting of JAXON, of note pt stated that he just started zoloft and Clonazepam. Ct of the brain was negative. The pt is hemodynamically stable, BG stabilized, denies dizziness, and is stable for transfer to the medical floor.       Vital Signs Last 24 Hrs  T(C): 36.7 (03 Dec 2022 12:00), Max: 37.1 (02 Dec 2022 20:14)  T(F): 98 (03 Dec 2022 12:00), Max: 98.8 (02 Dec 2022 20:14)  HR: 73 (03 Dec 2022 15:00) (60 - 84)  BP: 122/61 (03 Dec 2022 15:00) (82/51 - 151/79)  BP(mean): 85 (03 Dec 2022 15:00) (61 - 110)  RR: 15 (03 Dec 2022 15:00) (11 - 26)  SpO2: 95% (03 Dec 2022 15:00) (92% - 100%)    Parameters below as of 03 Dec 2022 01:01  Patient On (Oxygen Delivery Method): room air      I&O's Summary    02 Dec 2022 07:01  -  03 Dec 2022 07:00  --------------------------------------------------------  IN: 783.3 mL / OUT: 700 mL / NET: 83.3 mL    03 Dec 2022 07:01  -  03 Dec 2022 15:58  --------------------------------------------------------  IN: 1540 mL / OUT: 1450 mL / NET: 90 mL        MEDICATIONS  (STANDING):  apixaban 5 milliGRAM(s) Oral every 12 hours  atorvastatin 80 milliGRAM(s) Oral at bedtime  chlorhexidine 4% Liquid 1 Application(s) Topical <User Schedule>  clopidogrel Tablet 75 milliGRAM(s) Oral daily  dextrose 5%. 1000 milliLiter(s) (50 mL/Hr) IV Continuous <Continuous>  dextrose 5%. 1000 milliLiter(s) (100 mL/Hr) IV Continuous <Continuous>  dextrose 50% Injectable 25 Gram(s) IV Push once  dextrose 50% Injectable 12.5 Gram(s) IV Push once  dextrose 50% Injectable 25 Gram(s) IV Push once  glucagon  Injectable 1 milliGRAM(s) IntraMuscular once  HYDROmorphone   Tablet 4 milliGRAM(s) Oral four times a day  influenza   Vaccine 0.5 milliLiter(s) IntraMuscular once  insulin glargine Injectable (LANTUS) 15 Unit(s) SubCutaneous every morning  insulin lispro (ADMELOG) corrective regimen sliding scale   SubCutaneous three times a day before meals  insulin lispro (ADMELOG) corrective regimen sliding scale   SubCutaneous at bedtime  insulin lispro Injectable (ADMELOG) 5 Unit(s) SubCutaneous before breakfast  insulin lispro Injectable (ADMELOG) 5 Unit(s) SubCutaneous before lunch  insulin lispro Injectable (ADMELOG) 5 Unit(s) SubCutaneous before dinner  methadone    Tablet 5 milliGRAM(s) Oral every 8 hours    MEDICATIONS  (PRN):  dextrose Oral Gel 15 Gram(s) Oral once PRN Blood Glucose LESS THAN 70 milliGRAM(s)/deciliter      LABS                                        11.3                  Neurophils% (auto):   x     (12-03 @ 02:13):    8.48 )-----------(335          Lymphocytes% (auto):  x                                             36.8                   Eosinphils% (auto):   x        Manual%: Neutrophils x    ; Lymphocytes x    ; Eosinophils x    ; Bands%: x    ; Blasts x                                    135    |  101    |  31                  Calcium: 9.2   / iCa: x      (12-03 @ 12:43)    ----------------------------<  232       Magnesium: 1.4                              4.4     |  22     |  0.96             Phosphorous: 3.8      TPro  6.6    /  Alb  3.7    /  TBili  0.2    /  DBili  x      /  AST  7      /  ALT  14     /  AlkPhos  78     03 Dec 2022 12:43    ( 12-02 @ 20:23 )   PT: 17.1 sec;   INR: 1.48 ratio  aPTT: 32.8 sec      ASSESSMENT & PLAN:   57yoM w a hx of DM, DKA, HTN, MI 3/22, chronic pain s/p work injury () s/p C2-6 fusion, LUE nerve transplant.         57y Male with pmhx of HTN, DM2 c/b DKA, neuropathy, chronic pain on Methadone & opioids for cervical spine and back injury s/p fusion C2-6 along with LUE nerve transplant, MI, Afib on AC p/w near syncopal episode found to be hypotensive and hypoglycemic on presentation likely in the setting of extensive diabetic regimen, not eating, depression, overuse of anti HTN along with pain  medications c/b JAXON with HAGMA and lactic acidosis in the setting of N/V and metformin use. The pt has greatly improved- +AGMA resolved, RF now normalized, BG greatly improved, remains hemodynamically stable w/o re-start of pt home anti HTN.    #Neuro: Near Syncopal Episode may be multi factorial in the setting of Hypoglycemia and Hypotension may be 2/2 anti htn, anti depressant, pain medication and diminished PO diet intake in the setting of continued diabetic regimen.  -Orthostatic BP;s   - EKG with no evidence of arrhythmias   - CT of the brain negative- c/w neuro checks as per unit routine  -consider ioana carotid duplex / official echo    #Chronic Pain 2/2 spinal / LUE injury s/p C2-6 fusion w LUE nerve transplant- now with impaired use of LUE and rendering chronic back / LUE pain and neuropathy  - on methadone, Lyrica , Dilaudid at home ATC  - f/u  I-STOP   -f/u pain management provider in [place] Dr. Pham    #Depression in the setting of disability, difficulty w ADL's, chronic pain - pt states that he started seeing an outpt psychiatric NP  -discuss with pt seeing inhouse psyche to assist with medication regimen- pt stated that he has been taking zoloft 200mg HS along with Clonapam 1mg HS  -Zoloft and Clonazepam has been on hold- assess to restart at lower doing in the setting of his near syncopal episode at home    #CV: Hx Afib, MI s/p PCI 3/2022 s/p LCX POBA - [id number] 3, HASBLED 1  - c/w Eliquis and Plavix eliquis 5 BID   -Coreg still on hold-pt is somewhat near bradycardic - HR 56-68 at the highest  -Lisinopril and amlodipine still on hold in the setting of BP's of 107/67 - 115/75    #Resp: no active issues  - maintaining adequate O2 sat on RA     #GI: CT of the abd neg / no GI issues  - regular carb steady diet with snacks  -bowel regimen    #Renal: JAXON / ATN resolved  -f/u u/o and renal funct  - no evidence of obstruction noted on CT abd / neg    #Hypoglycemia / HAGMA 2/2 lactic acidosis resolved - delta/delta 1.2 indicative of pure HAGMA in the setting of lactate 4.2 likely in the setting of dehydration vs hypoperfusion  hx of DM , DKA on po anti diabetic meds and Insulin @ home- hgB a!C 8.1 / W beta hydroxy 0,2  -f/u Endocrine  -Nutrition consult ordered  -c/w present steady carb w night snack diet  -c/w Lantus 15U, pre-meal 5U, and low ISS      #Social/Ethics:  - Full Code status  -Social Work Eval- pt is needing assistance at home with food prep / access, having difficulty w ADL's  -PT consult- extension cervical fusion  -OT Consult- needs extensive therapy with LUE - now with disability impinging on his adls'and compounding his depression  -OOB    For Follow-up:  -Endocrine for further recommendations  -Psych   - ? Restart Zoloft  - Still holding antihypertensives  -f/u pain management provider in [place] Dr. Pham

## 2022-12-03 NOTE — CONSULT NOTE ADULT - SUBJECTIVE AND OBJECTIVE BOX
Reason For Consult: DM    HPI:   57y Male with pmhx of HTN, DM2 c/b DKA, neuropathy, chronic pain on Methadone & opioids for cervical spine and back injury, MI, Afib on AC p/w near syncopal episode. Per EMS, pt was at home sitting on couch. Got up and felt lightheaded, "blacked out" for a few seconds, and then was able to sit back down on the couch, felt like he was about to pass out. Says he's had multiple episodes of near syncope in the last month that has been progressively getting worse. Of note, patient states that since his MI and DKA episode earlier this year, he has not been feeling his usual self.   Denies any pre-syncopal symptoms, photophobia, dizziness, or hot flashes. Because of his chronic syncopal episodes, he has always been slow to rise from a seated position. Also has multiple episodes of NBNB vomiting for the last 4 days. Denies CP, SOB, palpitations.   Denies any changes to dosing of his DM2 meds. Per EMS, BP was in 70s/50s so pt was given IVF. Pt still feeling lightheaded. Denies current fever, cp, sob, abd pain, nausea, diarrhea, melena, BRBPR, urinary symptoms. NKDA. No recent travel.     In the ED, patient was found to be severely hypoglycemic. BG in 40's, recevied D50 x 3 with minimal improvement in blood sugars and subsequently started on D10/0.45%NS gtt at 100 cc/hour with stable improvement. MICU consulted for severe hypoglycemia likely in setting of exogenous insulin use with concurrent constant emesis.        endocrine called for DM assitance   Pt takes Novolog, Jardiance, and Lantus, denies taking more or less of these meds recently. Currently, taking 12-16 units admelog pre-meal and 36 units Lantus at night time.  he has hx of severe DKA in march 2022- had a low cpeptide   he follows with Dr. Pope   he has free style cora for BG monitoring   he states he tries to monitor his diet   he does not exercise  states he is UTD with optho  he notes lately that BG difficult to control   glucose has been low more than hyperglycemia  states he may be eating less after he takes premeal insulin   no a1c on admission    glucose now imrovong to >200s, taking PO      PAST MEDICAL & SURGICAL HISTORY:  Hypertension      Diabetes mellitus      Gastric ulcer      Spinal stenosis      H/O spinal cord compression      Spondylosis      H/O radiculopathy      H/O cervical spine surgery      History of back surgery      S/P cervical spinal fusion          FAMILY HISTORY:  FH: hypertension (Father)    FH: diabetes mellitus (Mother)        Social History:  no illitics       Outpatient Medications:  Home Medications:   * Patient Currently Takes Medications as of 11-Mar-2022 11:14 documented in Structured Notes  · 	NovoLOG FlexPen 100 units/mL injectable solution: 12 unit(s) injectable 3 times a day (before meals)   · 	Insulin Pen Needles, 4mm: 1 application subcutaneously 4 times a day. ** Use with insulin pen **   · 	atorvastatin 80 mg oral tablet: 1 tab(s) orally once a day (at bedtime)  · 	clobetasol 0.05% topical ointment: 1 application topically 2 times a day  · 	carvedilol 25 mg oral tablet: 1 tab(s) orally every 12 hours  · 	clopidogrel 75 mg oral tablet: 1 tab(s) orally once a day  · 	pantoprazole 40 mg oral delayed release tablet: 1 tab(s) orally 2 times a day  · 	apixaban 5 mg oral tablet: 1 tab(s) orally every 12 hours  · 	methadone 5 mg oral tablet: orally 4 times a day, As Needed  · 	lisinopril 10 mg oral tablet: 1 tab(s) orally once a day  · 	HYDROmorphone 4 mg oral tablet: 1 tab(s) orally every 6 hours  · 	Vitamin D2 50,000 intl units (1.25 mg) oral capsule:   · 	Lyrica 150 mg oral capsule: 1 cap(s) orally 3 times a day      MEDICATIONS  (STANDING):  apixaban 5 milliGRAM(s) Oral every 12 hours  atorvastatin 80 milliGRAM(s) Oral at bedtime  chlorhexidine 4% Liquid 1 Application(s) Topical <User Schedule>  clopidogrel Tablet 75 milliGRAM(s) Oral daily  dextrose 5%. 1000 milliLiter(s) (50 mL/Hr) IV Continuous <Continuous>  dextrose 5%. 1000 milliLiter(s) (100 mL/Hr) IV Continuous <Continuous>  dextrose 50% Injectable 25 Gram(s) IV Push once  dextrose 50% Injectable 12.5 Gram(s) IV Push once  dextrose 50% Injectable 25 Gram(s) IV Push once  glucagon  Injectable 1 milliGRAM(s) IntraMuscular once  HYDROmorphone   Tablet 4 milliGRAM(s) Oral four times a day  influenza   Vaccine 0.5 milliLiter(s) IntraMuscular once  insulin glargine Injectable (LANTUS) 15 Unit(s) SubCutaneous every morning  insulin lispro (ADMELOG) corrective regimen sliding scale   SubCutaneous four times a day before meals  insulin lispro Injectable (ADMELOG) 5 Unit(s) SubCutaneous before breakfast  insulin lispro Injectable (ADMELOG) 5 Unit(s) SubCutaneous before lunch  insulin lispro Injectable (ADMELOG) 5 Unit(s) SubCutaneous before dinner  methadone    Tablet 5 milliGRAM(s) Oral every 8 hours    MEDICATIONS  (PRN):  dextrose Oral Gel 15 Gram(s) Oral once PRN Blood Glucose LESS THAN 70 milliGRAM(s)/deciliter      Allergies    No Known Allergies    Intolerances      Review of Systems:  Constitutional: No fever  Eyes: No blurry vision  Neuro: No tremors  HEENT: No pain  Cardiovascular: No chest pain, palpitations  Respiratory: No SOB, no cough  GI: No nausea, vomiting, abdominal pain  : No dysuria  Skin: no rash  Psych: no depression  Endocrine: no polyuria, polydipsia  Hem/lymph: no swelling  Osteoporosis: no fractures    ALL OTHER SYSTEMS REVIEWED AND NEGATIVE      PHYSICAL EXAM:  VITALS: T(C): 36.7 (12-03-22 @ 12:00)  T(F): 98 (12-03-22 @ 12:00), Max: 98.8 (12-02-22 @ 20:14)  HR: 76 (12-03-22 @ 13:00) (60 - 84)  BP: 137/81 (12-03-22 @ 13:00) (82/51 - 151/79)  RR:  (11 - 26)  SpO2:  (92% - 100%)  Wt(kg): --  GENERAL: NAD, well-groomed, well-developed  EYES: No proptosis, no lid lag, anicteric  RESPIRATORY: Clear to auscultation bilaterally; No rales, rhonchi, wheezing, or rubs  CARDIOVASCULAR: Regular rate and rhythm; No murmurs; no peripheral edema  GI: Soft, nontender, non distended, normal bowel sounds  SKIN: Dry, intact, No rashes or lesions  MUSCULOSKELETAL: Full range of motion, normal strength  NEURO: sensation intact, extraocular movements intact, no tremor, normal reflexes  PSYCH: Alert and oriented x 3, normal affect, normal mood      POCT Blood Glucose.: 206 mg/dL (12-03-22 @ 13:24)  POCT Blood Glucose.: 252 mg/dL (12-03-22 @ 12:02)  POCT Blood Glucose.: 244 mg/dL (12-03-22 @ 11:04)  POCT Blood Glucose.: 263 mg/dL (12-03-22 @ 10:11)  POCT Blood Glucose.: 260 mg/dL (12-03-22 @ 09:08)  POCT Blood Glucose.: 206 mg/dL (12-03-22 @ 08:23)  POCT Blood Glucose.: 261 mg/dL (12-03-22 @ 07:14)  POCT Blood Glucose.: 214 mg/dL (12-03-22 @ 06:15)  POCT Blood Glucose.: 224 mg/dL (12-03-22 @ 05:11)  POCT Blood Glucose.: 276 mg/dL (12-03-22 @ 04:06)  POCT Blood Glucose.: 254 mg/dL (12-03-22 @ 03:10)  POCT Blood Glucose.: 209 mg/dL (12-03-22 @ 02:09)  POCT Blood Glucose.: 167 mg/dL (12-03-22 @ 01:12)  POCT Blood Glucose.: 165 mg/dL (12-03-22 @ 00:25)  POCT Blood Glucose.: 149 mg/dL (12-02-22 @ 23:46)  POCT Blood Glucose.: 139 mg/dL (12-02-22 @ 23:10)  POCT Blood Glucose.: 139 mg/dL (12-02-22 @ 22:47)  POCT Blood Glucose.: 70 mg/dL (12-02-22 @ 22:10)  POCT Blood Glucose.: 82 mg/dL (12-02-22 @ 21:34)  POCT Blood Glucose.: 103 mg/dL (12-02-22 @ 21:17)  POCT Blood Glucose.: 104 mg/dL (12-02-22 @ 20:59)  POCT Blood Glucose.: 76 mg/dL (12-02-22 @ 20:34)  POCT Blood Glucose.: 96 mg/dL (12-02-22 @ 20:19)  POCT Blood Glucose.: 139 mg/dL (12-02-22 @ 20:00)  POCT Blood Glucose.: 49 mg/dL (12-02-22 @ 19:42)                            11.3   8.48  )-----------( 335      ( 03 Dec 2022 02:13 )             36.8       12-03    135  |  101  |  31<H>  ----------------------------<  232<H>  4.4   |  22  |  0.96    eGFR: 93    Ca    9.2      12-03  Mg     1.4     12-03  Phos  3.8     12-03    TPro  6.6  /  Alb  3.7  /  TBili  0.2  /  DBili  x   /  AST  7<L>  /  ALT  14  /  AlkPhos  78  12-03      Thyroid Function Tests:  12-02 @ 20:23 TSH 2.46 FreeT4 -- T3 79 Anti TPO -- Anti Thyroglobulin Ab -- TSI --              Radiology:

## 2022-12-03 NOTE — PROGRESS NOTE ADULT - SUBJECTIVE AND OBJECTIVE BOX
Emily Dunne  PGY-1 Resident Physician   Pager [phone]/ [phone]    Patient is a 57y old  Male who presents with a chief complaint of hypoglycemia (02 Dec 2022 23:32)      SUBJECTIVE / OVERNIGHT EVENTS:  Patient seen and evaluated at bedside.  Overnight, D10 / 1/2NS changed to D5 / 1/2NS due to rising FS.       Vital Signs Last 24 Hrs  T(C): 36.6 (03 Dec 2022 04:00), Max: 37.1 (02 Dec 2022 20:14)  T(F): 97.9 (03 Dec 2022 04:00), Max: 98.8 (02 Dec 2022 20:14)  HR: 75 (03 Dec 2022 05:00) (60 - 84)  BP: 139/66 (03 Dec 2022 05:00) (82/51 - 140/71)  BP(mean): 95 (03 Dec 2022 05:00) (61 - 99)  RR: 14 (03 Dec 2022 05:00) (12 - 26)  SpO2: 94% (03 Dec 2022 05:00) (94% - 100%)    Parameters below as of 03 Dec 2022 01:01  Patient On (Oxygen Delivery Method): room air        PHYSICAL EXAM:  GENERAL: NAD, well-developed  CHEST/LUNG: Clear to auscultation bilaterally; No wheeze  HEART: Regular rate and rhythm; Normal S1 S2, No murmurs, rubs, or gallops  ABDOMEN: Soft, Nontender, Nondistended; Bowel sounds present  EXTREMITIES:  2+ Peripheral Pulses, No clubbing, cyanosis, or edema  PSYCH: AAOx3    LABS:                        11.3   8.48  )-----------( 335      ( 03 Dec 2022 02:13 )             36.8     Hgb Trend: 11.3<--, 11.9<--  12-03    136  |  102  |  34<H>  ----------------------------<  221<H>  4.7   |  20<L>  |  1.46<H>    Ca    9.4      03 Dec 2022 02:13  Phos  1.8     12-03  Mg     1.7     12-03    TPro  6.5  /  Alb  3.8  /  TBili  0.2  /  DBili  x   /  AST  8<L>  /  ALT  11  /  AlkPhos  79  12-03    Creatinine Trend: 1.46<--, 1.84<--  LIVER FUNCTIONS - ( 03 Dec 2022 02:13 )  Alb: 3.8 g/dL / Pro: 6.5 g/dL / ALK PHOS: 79 U/L / ALT: 11 U/L / AST: 8 U/L / GGT: x           PT/INR - ( 02 Dec 2022 20:23 )   PT: 17.1 sec;   INR: 1.48 ratio         PTT - ( 02 Dec 2022 20:23 )  PTT:32.8 sec      Urinalysis Basic - ( 02 Dec 2022 23:16 )    Color: Light Yellow / Appearance: Clear / S.026 / pH: x  Gluc: x / Ketone: Negative  / Bili: Negative / Urobili: Negative   Blood: x / Protein: Trace / Nitrite: Negative   Leuk Esterase: Negative / RBC: x / WBC x   Sq Epi: x / Non Sq Epi: x / Bacteria: x

## 2022-12-03 NOTE — PROGRESS NOTE ADULT - ASSESSMENT
57y Male with pmhx of HTN, DM2 c/b DKA, neuropathy, chronic pain on Methadone & opioids for cervical spine and back injury, MI, Afib on AC p/w near syncopal episode found to be hypotensive and hypoglycemic on presentation likely in the setting of extensive diabetic regimen c/b JAXON with HAGMA and lactic acidosis in the setting of N/V and metformin use.     #Neuro:  AAOx3   #Syncopal Episode   - EKG with no evidence of arrhythmias   - will need CT Head to rule out anatomic cause of multiple episodes of near syncope  - TTE to address cardiogenic cause of syncopal episodes  - rule out metabolic causes of syncope, likely secondary to exogenous insulin use  - check orthostatic BPs  - history of AMS w MR ?L vertebral artery stenosis     #Diabetic Neuropathy  - c/w home Lyrica 150    #Chronic Pain  - on methadone and opioids for history of cervical spine and back injury  - check I-STOP     #CV:  #Atrial Fibrillation  - [id number] 3, HASBLED 1  - maintained on eliquis 5 BID and coreg 25 BID  at home; c/w home medications  - EKG in ED with NSR; no evidence of arrhythmia at this time  - continuous telemetry monitoring    #History of MI w PCI in 3/2022 s/p LCX POBA  - TTE 3/2: EF 48%, hypokinesis of basal inferior wall, inferolateral wall, stage I diastolic dysfunction   - no evidence of ACS at this time, history of PCI earlier this year  - [id number] 3  - continue plavix 75 mg daily, atorvastatin 80mg daily, eliquis 5mg BID, coreg 25mg BID  - troponin wnl  - monitor on telemetry.    #HTN  - maintained on coreg 25 BID and lisinopril 10 at home for BP control  - on admission, low BPs 70/50s s/p 2L IVF  - monitor off hypertensive medications     #Resp:  - no active issues at this time  - maintaining adequate O2 sat on RA     #GI:  - regular diet with q1 POCT checks     #Renal:  #JAXON   - presenting with Cr 1.84 (baseline 0.62 5/2022) indicative of JAXON   - BUN/Cr 21 indicative of prerenal azotemia likely in the setting of low volume state   - s/p 2L IVF in ED for volume resuscitation  - trend BMP  - will collect urine studies   - no evidence of obstruction noted on CT AP    #HAGMA 2/2 lactic acidosis  - delta/delta 1.2 indicative of pure HAGMA in the setting of lactate 4.2 likely in the setting of dehydration vs hypoperfusion  - continue to monitor  - trend lactate for resolution     #Heme:  #Normocytic Anemia  - iron studies with evidence iron deficiency likely with component of chronic disease  - continue to monitor CBC / hemoglobin at this time  - no evidence of overt bleeding     #ID:  - no active issues    #Endo:  #Hypoglycemia 2/2 Insulin Overdose  - on 30u lantus, 12-16u admelog, metformin and jardiance at home  - on D10 / 1/2 NS  drip at 100 cc/hr  - history of DKA during previous hospitalization with low C peptide  - check serum cortisol, anti-JOHN, proinsulin, C peptide in AM   - check TFTs   - CT AP with no evidence of pancreatic mass   - strict q1 FS; can stop D10/1/2 NS drip if FS start to rise    #Skin:  - no active issues    #Social/Ethics:  - Code status

## 2022-12-03 NOTE — PROGRESS NOTE ADULT - ATTENDING COMMENTS
Patient seen and examined. Patient critically ill requiring frequent bedside visits with therapy change. Patient is a 57F with a history of DM2, Afib, chronic pain in the setting of spinal stenosis, and prior hospitalization in March 2022 with DKA and MI requiring PCI on multiple DM2 medications with poor diet who presents with encephalopathy and weakness. He was found to be hypotensive and hypoglycemic. He was admitted to MICU in this setting.    1. Endocrine - hypoglycemia has improved with D10 and now de-escalated to D5-1/2NS. Will start PO diet and continue to monitor FS  - Hold Metformin, Jardiance, and Trulicity.   - Given history of DKA and difficult to control sugars will consult endocrinology and start low dose insulin to prevent DKA  2. Renal - acute kidney injury in setting of hypotension and ATN. Now improving with IVF.  - Followup repeat labs and if worsens will check urine studies  3. Cardiovascular - troponin negative and no events on telemetry  - Hypotension likely in setting of hypovolemia and medications - improved with IVF. Hold antihypertensive medications for now and will restart Coreg first pending improvement in renal function for ACE inhibitor.   - Continue Plavix  4. Neuro - lightheadedness likely in setting of orthostasis - check orthostatics  - Followup CTH - though no acute neurologic findings on exam this AM  5. Chronic Pain - monitor need for medications and restart Methadone  - May benefit from  evaluation given family concern that he has untreated depression  6. DVT proph - on AC for Afib    Discussed with patient and brother at bedside this AM during rounds. All questions answered. Other family members have called MICU and spoken with MICU NP during the day.

## 2022-12-03 NOTE — CHART NOTE - NSCHARTNOTEFT_GEN_A_CORE
Search Terms: Kyle Mast, 1965Search Date: 12/03/2022 00:35:59 AM  Searching on behalf of: [address] [place] [address]  The Drug Utilization Report below displays all of the controlled substance prescriptions, if any, that your patient has filled in the last twelve months. The information displayed on this report is compiled from pharmacy submissions to the Department, and accurately reflects the information as submitted by the pharmacies.    This report was requested by: Emily Dunne | Reference #: [id number]    Others' Prescriptions  Patient Name: Kyle MastBirth Date: 1965  Address: [address] [place] [id number]: Male  Rx Written	Rx Dispensed	Drug	Quantity	Days Supply	Prescriber Name	Prescriber Magalis #	Payment Method	Dispenser  11/04/2022	11/18/2022	methadone hcl 5 mg tablet	120	30	StamaJuaquin saha	[id number]	[facility] #[id number]  11/15/2022	11/15/2022	clonazepam 1 mg tablet	30	30	Aleisha Sutton P	[id number]	Medicare	[facility] #[id number]  11/15/2022	11/15/2022	clonazepam 0.5 mg tablet	30	30	Aleisha Sutton P	[id number]	Medicare	[facility] #[id number]  11/04/2022	11/08/2022	hydromorphone 4 mg tablet	120	30	StamatoJuaquin kat	[id number]	[facility] #[id number]  11/04/2022	11/08/2022	pregabalin 150 mg capsule	90	30	StamaJuaquin saha	[id number]	[facility] #[id number]  11/08/2022	11/08/2022	clonazepam 1 mg tablet	7	7	Aleisha Sutton P	[id number]	Medicare	[facility] #[id number]  11/08/2022	11/08/2022	clonazepam 0.5 mg tablet	7	7	Aleisha Sutton P	[id number]	Medicare	[facility] #[id number]  10/03/2022	10/19/2022	methadone hcl 5 mg tablet	120	30	StamaJuaquin saha	[id number]	[facility] #[id number]  10/03/2022	10/10/2022	hydromorphone 4 mg tablet	120	30	StamaJuaquin saha	[id number]	[facility] #[id number]  10/04/2022	10/06/2022	clonazepam 1 mg tablet	30	30	Aleisha Sutton P	[id number]	Medicare	[facility] #[id number]  08/31/2022	10/04/2022	pregabalin 150 mg capsule	90	30	Stamatos, Juaquin	[id number]	[facility] #[id number]  10/04/2022	10/04/2022	clonazepam 0.5 mg tablet	30	30	Aleisha Sutton P	[id number]	Medicare	[facility] #[id number]  08/31/2022	09/20/2022	methadone hcl 5 mg tablet	120	30	StamatosJuaquin	[id number]	[facility] #[id number]  08/31/2022	09/09/2022	hydromorphone 4 mg tablet	120	30	Stamatos, Juaquin	[id number]	[facility] #[id number]  09/08/2022	09/08/2022	clonazepam 1 mg tablet	30	30	Aleisha Sutton P	[id number]	Medicare	[facility] #[id number]  08/03/2022	08/30/2022	pregabalin 150 mg capsule	90	30	StamatosJuaquin	[id number]	[facility] #[id number]  08/03/2022	08/24/2022	methadone hcl 5 mg tablet	114	29	StamatosJuaquin	[id number]	[facility] #[id number]  08/03/2022	08/12/2022	hydromorphone 4 mg tablet	120	30	StamatosJuaquin	[id number]	[facility] #[id number]  07/06/2022	07/26/2022	pregabalin 150 mg capsule	90	30	StamatosJuaquin	[id number]	[facility] #[id number]  07/06/2022	07/26/2022	methadone hcl 5 mg tablet	120	30	StamatosJuqauin	[id number]	[facility] #[id number]  07/06/2022	07/15/2022	hydromorphone 4 mg tablet	120	30	Stamatos, Juaquin	[id number]	[facility] #[id number]  06/06/2022	06/28/2022	methadone hcl 5 mg tablet	120	30	StamatosJuaquin	[id number]	[facility] #[id number]  06/06/2022	06/16/2022	hydromorphone 4 mg tablet	120	30	Stamatos, Juaquin	[id number]	[facility] #[id number]  06/06/2022	06/16/2022	pregabalin 150 mg capsule	90	30	Stamatos, Juaquin	[id number]	[facility] #[id number]  05/09/2022	05/31/2022	methadone hcl 5 mg tablet	120	30	Stamatos, Juaquin	[id number]	[facility] #[id number]  05/09/2022	05/19/2022	hydromorphone 4 mg tablet	120	30	Stamatos, Juaquin	[id number]	[facility] #[id number]  05/09/2022	05/19/2022	pregabalin 150 mg capsule	90	30	Stamatos, Juaquin	[id number]	[facility] #[id number]  04/11/2022	05/02/2022	methadone hcl 5 mg tablet	120	30	Stamatos, Juaquin	[id number]	[facility] #[id number]  04/11/2022	04/18/2022	pregabalin 150 mg capsule	90	30	Stamatos, Juaquin	[id number]	[facility] #[id number]  04/11/2022	04/18/2022	hydromorphone 4 mg tablet	120	30	Stamatos, Juaquin	[id number]	[facility] #[id number]  03/16/2022	04/05/2022	methadone hcl 5 mg tablet	120	30	Stamatos, Juaquin	[id number]	[facility] #[id number]  03/16/2022	03/22/2022	hydromorphone 4 mg tablet	120	30	Stamatos, Juaquin	[id number]	[facility] #[id number]  02/16/2022	03/07/2022	methadone hcl 5 mg tablet	120	30	Stamatos, Juaquin	[id number]	[facility] #[id number]  02/16/2022	02/17/2022	pregabalin 150 mg capsule	90	30	Stamatos, Juaquin	[id number]	[facility] #[id number]  02/16/2022	02/17/2022	hydromorphone 4 mg tablet	120	30	Stamatos, Juaquin	[id number]	[facility] #[id number]  01/18/2022	02/01/2022	methadone hcl 5 mg tablet	120	30	Vale Robin	[id number]	[facility] #[id number]  01/18/2022	01/19/2022	hydromorphone 4 mg tablet	120	30	Vale Robin	[id number]	[facility] #[id number]  01/18/2022	01/19/2022	pregabalin 150 mg capsule	90	30	Vale Robin	[id number]	[facility] #[id number]  12/20/2021	01/04/2022	methadone hcl 5 mg tablet	120	30	Juaquin Pham	[id number]	[facility] #[id number]  12/20/2021	12/22/2021	hydromorphone 4 mg tablet	120	30	Juaquin Pham	[id number]	[facility] #[id number]  12/06/2021	12/06/2021	methadone hcl 5 mg tablet	120	30	Juaquin Pham	[id number]	[facility] #[id number]

## 2022-12-04 LAB
ANION GAP SERPL CALC-SCNC: 12 MMOL/L — SIGNIFICANT CHANGE UP (ref 5–17)
BUN SERPL-MCNC: 26 MG/DL — HIGH (ref 7–23)
CALCIUM SERPL-MCNC: 9.4 MG/DL — SIGNIFICANT CHANGE UP (ref 8.4–10.5)
CHLORIDE SERPL-SCNC: 100 MMOL/L — SIGNIFICANT CHANGE UP (ref 96–108)
CO2 SERPL-SCNC: 24 MMOL/L — SIGNIFICANT CHANGE UP (ref 22–31)
CREAT SERPL-MCNC: 0.94 MG/DL — SIGNIFICANT CHANGE UP (ref 0.5–1.3)
CULTURE RESULTS: SIGNIFICANT CHANGE UP
EGFR: 96 ML/MIN/1.73M2 — SIGNIFICANT CHANGE UP
GLUCOSE BLDC GLUCOMTR-MCNC: 139 MG/DL — HIGH (ref 70–99)
GLUCOSE BLDC GLUCOMTR-MCNC: 168 MG/DL — HIGH (ref 70–99)
GLUCOSE BLDC GLUCOMTR-MCNC: 180 MG/DL — HIGH (ref 70–99)
GLUCOSE BLDC GLUCOMTR-MCNC: 192 MG/DL — HIGH (ref 70–99)
GLUCOSE BLDC GLUCOMTR-MCNC: 193 MG/DL — HIGH (ref 70–99)
GLUCOSE SERPL-MCNC: 148 MG/DL — HIGH (ref 70–99)
HCT VFR BLD CALC: 35.8 % — LOW (ref 39–50)
HGB BLD-MCNC: 11.6 G/DL — LOW (ref 13–17)
MAGNESIUM SERPL-MCNC: 1.7 MG/DL — SIGNIFICANT CHANGE UP (ref 1.6–2.6)
MCHC RBC-ENTMCNC: 26 PG — LOW (ref 27–34)
MCHC RBC-ENTMCNC: 32.4 GM/DL — SIGNIFICANT CHANGE UP (ref 32–36)
MCV RBC AUTO: 80.3 FL — SIGNIFICANT CHANGE UP (ref 80–100)
NRBC # BLD: 0 /100 WBCS — SIGNIFICANT CHANGE UP (ref 0–0)
PHOSPHATE SERPL-MCNC: 2.5 MG/DL — SIGNIFICANT CHANGE UP (ref 2.5–4.5)
PLATELET # BLD AUTO: 391 K/UL — SIGNIFICANT CHANGE UP (ref 150–400)
POTASSIUM SERPL-MCNC: 4.1 MMOL/L — SIGNIFICANT CHANGE UP (ref 3.5–5.3)
POTASSIUM SERPL-SCNC: 4.1 MMOL/L — SIGNIFICANT CHANGE UP (ref 3.5–5.3)
RBC # BLD: 4.46 M/UL — SIGNIFICANT CHANGE UP (ref 4.2–5.8)
RBC # FLD: 14.7 % — HIGH (ref 10.3–14.5)
SODIUM SERPL-SCNC: 136 MMOL/L — SIGNIFICANT CHANGE UP (ref 135–145)
SPECIMEN SOURCE: SIGNIFICANT CHANGE UP
WBC # BLD: 8.66 K/UL — SIGNIFICANT CHANGE UP (ref 3.8–10.5)
WBC # FLD AUTO: 8.66 K/UL — SIGNIFICANT CHANGE UP (ref 3.8–10.5)

## 2022-12-04 PROCEDURE — 99233 SBSQ HOSP IP/OBS HIGH 50: CPT | Mod: GC

## 2022-12-04 RX ORDER — MAGNESIUM SULFATE 500 MG/ML
2 VIAL (ML) INJECTION ONCE
Refills: 0 | Status: COMPLETED | OUTPATIENT
Start: 2022-12-04 | End: 2022-12-04

## 2022-12-04 RX ORDER — CLONAZEPAM 1 MG
1 TABLET ORAL AT BEDTIME
Refills: 0 | Status: DISCONTINUED | OUTPATIENT
Start: 2022-12-04 | End: 2022-12-07

## 2022-12-04 RX ADMIN — Medication 150 MILLIGRAM(S): at 17:10

## 2022-12-04 RX ADMIN — HYDROMORPHONE HYDROCHLORIDE 4 MILLIGRAM(S): 2 INJECTION INTRAMUSCULAR; INTRAVENOUS; SUBCUTANEOUS at 19:55

## 2022-12-04 RX ADMIN — CHLORHEXIDINE GLUCONATE 1 APPLICATION(S): 213 SOLUTION TOPICAL at 06:03

## 2022-12-04 RX ADMIN — Medication 5 UNIT(S): at 13:00

## 2022-12-04 RX ADMIN — INSULIN GLARGINE 15 UNIT(S): 100 INJECTION, SOLUTION SUBCUTANEOUS at 09:05

## 2022-12-04 RX ADMIN — ATORVASTATIN CALCIUM 80 MILLIGRAM(S): 80 TABLET, FILM COATED ORAL at 21:49

## 2022-12-04 RX ADMIN — HYDROMORPHONE HYDROCHLORIDE 4 MILLIGRAM(S): 2 INJECTION INTRAMUSCULAR; INTRAVENOUS; SUBCUTANEOUS at 19:25

## 2022-12-04 RX ADMIN — Medication 25 GRAM(S): at 03:56

## 2022-12-04 RX ADMIN — APIXABAN 5 MILLIGRAM(S): 2.5 TABLET, FILM COATED ORAL at 17:10

## 2022-12-04 RX ADMIN — METHADONE HYDROCHLORIDE 5 MILLIGRAM(S): 40 TABLET ORAL at 21:49

## 2022-12-04 RX ADMIN — APIXABAN 5 MILLIGRAM(S): 2.5 TABLET, FILM COATED ORAL at 05:57

## 2022-12-04 RX ADMIN — HYDROMORPHONE HYDROCHLORIDE 4 MILLIGRAM(S): 2 INJECTION INTRAMUSCULAR; INTRAVENOUS; SUBCUTANEOUS at 00:30

## 2022-12-04 RX ADMIN — CLOPIDOGREL BISULFATE 75 MILLIGRAM(S): 75 TABLET, FILM COATED ORAL at 11:11

## 2022-12-04 RX ADMIN — Medication 1 MILLIGRAM(S): at 21:49

## 2022-12-04 RX ADMIN — Medication 150 MILLIGRAM(S): at 05:57

## 2022-12-04 RX ADMIN — Medication 5 UNIT(S): at 09:06

## 2022-12-04 RX ADMIN — HYDROMORPHONE HYDROCHLORIDE 4 MILLIGRAM(S): 2 INJECTION INTRAMUSCULAR; INTRAVENOUS; SUBCUTANEOUS at 08:17

## 2022-12-04 RX ADMIN — Medication 5 UNIT(S): at 17:10

## 2022-12-04 RX ADMIN — Medication 1: at 13:00

## 2022-12-04 RX ADMIN — HYDROMORPHONE HYDROCHLORIDE 4 MILLIGRAM(S): 2 INJECTION INTRAMUSCULAR; INTRAVENOUS; SUBCUTANEOUS at 08:47

## 2022-12-04 RX ADMIN — METHADONE HYDROCHLORIDE 5 MILLIGRAM(S): 40 TABLET ORAL at 05:57

## 2022-12-04 RX ADMIN — Medication 1: at 17:10

## 2022-12-04 RX ADMIN — HYDROMORPHONE HYDROCHLORIDE 4 MILLIGRAM(S): 2 INJECTION INTRAMUSCULAR; INTRAVENOUS; SUBCUTANEOUS at 01:00

## 2022-12-04 RX ADMIN — Medication 1: at 09:07

## 2022-12-04 RX ADMIN — METHADONE HYDROCHLORIDE 5 MILLIGRAM(S): 40 TABLET ORAL at 13:00

## 2022-12-04 NOTE — OCCUPATIONAL THERAPY INITIAL EVALUATION ADULT - MANUAL MUSCLE TESTING RESULTS, REHAB EVAL
LUE: shoulder 3-/5, elbow flex 3-/5, elbow ext 3/5, forearm pronation 3/5, forearm supination 2+/5, wrist ext 3/5, wrist flex 3-/5, digit flex./ext 3+/5

## 2022-12-04 NOTE — PHYSICAL THERAPY INITIAL EVALUATION ADULT - STRENGTHENING, PT EVAL
GOAL: Patient will demonstrate a 1/3 increase in strength where deficient to assist with performing functional mobility and ADLs in 3 weeks

## 2022-12-04 NOTE — PROGRESS NOTE ADULT - SUBJECTIVE AND OBJECTIVE BOX
CHIEF COMPLAINT: Hypoglycemia 40's and Near Syncopal episode    Interval Events: BG greatly improved, remains hemodynamically stable- cardiovascular meds still being held /67 - 115/75, Lyrica restarted at 150 q 12h (hm dose Q8H).     REVIEW OF SYSTEMS:  Constitutional: [ ] fevers [ ] chills [ ] weight loss [ ] weight gain  HEENT: [ ] dry eyes [ ] eye irritation [ ] postnasal drip [ ] nasal congestion  CV: [ ] chest pain [ ] orthopnea [ ] palpitations [ ] murmur  Resp: [ ] cough [ ] shortness of breath [ ] dyspnea [ ] wheezing [ ] sputum [ ] hemoptysis  GI: [ ] nausea [ ] vomiting [ ] diarrhea [ ] constipation [ ] abd pain [ ] dysphagia   : [ ] dysuria [ ] nocturia [ ] hematuria [ ] increased urinary frequency  Musculoskeletal: [ ] back pain [ ] myalgias [ ] arthralgias [ ] fracture  Skin: [ ] rash [ ] itch  Neurological: [ ] headache [ ] dizziness [ ] syncope [ ] weakness [ ] numbness  Psychiatric: [ ] anxiety [ ] depression  Endocrine: [ ] diabetes [ ] thyroid problem  Hematologic/Lymphatic: [ ] anemia [ ] bleeding problem  Allergic/Immunologic: [ ] itchy eyes [ ] nasal discharge [ ] hives [ ] angioedema  [ ] All other systems negative  [ ] Unable to assess ROS because ________    OBJECTIVE:  ICU Vital Signs Last 24 Hrs  T(C): 36.4 (04 Dec 2022 04:00), Max: 36.8 (03 Dec 2022 16:00)  T(F): 97.5 (04 Dec 2022 04:00), Max: 98.3 (03 Dec 2022 23:00)  HR: 59 (04 Dec 2022 07:00) (56 - 79)  BP: 104/59 (04 Dec 2022 07:00) (104/59 - 151/79)  BP(mean): 76 (04 Dec 2022 07:00) (76 - 110)  ABP: --  ABP(mean): --  RR: 17 (04 Dec 2022 07:00) (10 - 26)  SpO2: 96% (04 Dec 2022 07:00) (94% - 98%)    O2 Parameters below as of 03 Dec 2022 20:00  Patient On (Oxygen Delivery Method): room air            I & O's     @ 07:  -   @ 07:00  --------------------------------------------------------  IN: 2240 mL / OUT: 3450 mL / NET: -1210 mL      CAPILLARY BLOOD GLUCOSE      POCT Blood Glucose.: 139 mg/dL (04 Dec 2022 03:54)      PHYSICAL EXAM:  General:   HEENT:   Lymph Nodes:  Neck:   Respiratory:   Cardiovascular:   Abdomen:   Extremities:   Skin:   Neurological:  Psychiatry:    LINES:    HOSPITAL MEDICATIONS:  MEDICATIONS  (STANDING):  apixaban 5 milliGRAM(s) Oral every 12 hours  atorvastatin 80 milliGRAM(s) Oral at bedtime  chlorhexidine 4% Liquid 1 Application(s) Topical <User Schedule>  clopidogrel Tablet 75 milliGRAM(s) Oral daily  dextrose 5%. 1000 milliLiter(s) (50 mL/Hr) IV Continuous <Continuous>  dextrose 5%. 1000 milliLiter(s) (100 mL/Hr) IV Continuous <Continuous>  dextrose 50% Injectable 25 Gram(s) IV Push once  dextrose 50% Injectable 12.5 Gram(s) IV Push once  dextrose 50% Injectable 25 Gram(s) IV Push once  glucagon  Injectable 1 milliGRAM(s) IntraMuscular once  influenza   Vaccine 0.5 milliLiter(s) IntraMuscular once  insulin glargine Injectable (LANTUS) 15 Unit(s) SubCutaneous every morning  insulin lispro (ADMELOG) corrective regimen sliding scale   SubCutaneous three times a day before meals  insulin lispro (ADMELOG) corrective regimen sliding scale   SubCutaneous at bedtime  insulin lispro Injectable (ADMELOG) 5 Unit(s) SubCutaneous before breakfast  insulin lispro Injectable (ADMELOG) 5 Unit(s) SubCutaneous before lunch  insulin lispro Injectable (ADMELOG) 5 Unit(s) SubCutaneous before dinner  methadone    Tablet 5 milliGRAM(s) Oral every 8 hours  pregabalin 150 milliGRAM(s) Oral every 12 hours    MEDICATIONS  (PRN):  dextrose Oral Gel 15 Gram(s) Oral once PRN Blood Glucose LESS THAN 70 milliGRAM(s)/deciliter  HYDROmorphone   Tablet 4 milliGRAM(s) Oral four times a day PRN Moderate Pain (4 - 6)      LABS:                        11.6   8.66  )-----------( 391      ( 03 Dec 2022 23:42 )             35.8     Hgb Trend: 11.6<--, 11.3<--, 11.9<--  1203    136  |  100  |  26<H>  ----------------------------<  148<H>  4.1   |  24  |  0.94    Ca    9.4      03 Dec 2022 23:42  Phos  2.5       Mg     1.7         TPro  6.6  /  Alb  3.7  /  TBili  0.2  /  DBili  x   /  AST  7<L>  /  ALT  14  /  AlkPhos  78      Creatinine Trend: 0.94<--, 0.96<--, 1.46<--, 1.84<--  PT/INR - ( 02 Dec 2022 20:23 )   PT: 17.1 sec;   INR: 1.48 ratio         PTT - ( 02 Dec 2022 20:23 )  PTT:32.8 sec  Urinalysis Basic - ( 02 Dec 2022 23:16 )    Color: Light Yellow / Appearance: Clear / S.026 / pH: x  Gluc: x / Ketone: Negative  / Bili: Negative / Urobili: Negative   Blood: x / Protein: Trace / Nitrite: Negative   Leuk Esterase: Negative / RBC: x / WBC x   Sq Epi: x / Non Sq Epi: x / Bacteria: x        Venous Blood Gas:   @ 12:26  7.43/36/73/24/95.7  VBG Lactate: 1.1  Venous Blood Gas:   @ 02:00  7.37/39/53/22/86.6  VBG Lactate: 2.9  Venous Blood Gas:   @ 22:20  7.29/45/29/22/42.9  VBG Lactate: 5.7  Venous Blood Gas:   @ 19:42  7.27/52/31/24/38.2  VBG Lactate: 4.2      MICROBIOLOGY:     RADIOLOGY:  [ ] Reviewed and interpreted by me    EKG: CHIEF COMPLAINT: Hypoglycemia 40's and Near Syncopal episode    Interval Events: BG greatly improved, remains hemodynamically stable- cardiovascular meds still being held /67 - 115/75, Lyrica restarted at 150 q 12h (hm dose Q8H).       MICU COURSE:   57y Male with pmhx of HTN, DM2 c/b DKA, neuropathy,  MI, Afib on AC, HTN, and chronic pain on Methadone, Dilaudid, and Lyrica for cervical spine and back injury s/p C2-6 fusion, and LUE nerve transplant and repair. The pt was admitted for a near syncopal episode, was very lightheaded did not have any LOC, was able to sit down on the couch safely. The pt stated that he has had increased  multiple episodes of near syncope which have been ongoing and have gotten worse shich his MI and DKA hospitalization in 2022. On admission the pt was found to be hypotensive 70's/50's, hypoglycemic with a BG of 49 , in +AGMA / lactic Acidosis, and in JAXON. IVF bolus's were administered, maintenance fluid, restarted on a diabetic regimen, and eating well now. An Endocrine consult has been made, pt is tolerating a diet and on a Insulin regime: lantus 15U (1/2 of home dose), pre meal 5mg, ISSS low, medications held Lisinopril, Coreg, Amlodipine since admission. Lyrica in the setting of JAXON, of note pt stated that he just started zoloft and Clonazepam. Ct of the brain and abdomen were negative. The pt is hemodynamically stable, BG stabilized, denies dizziness, and is stable for transfer to the medical floor.       REVIEW OF SYSTEMS:  Constitutional: [ ] fevers [ ] chills [ ] weight loss [ ] weight gain  HEENT: [ ] dry eyes [ ] eye irritation [ ] postnasal drip [ ] nasal congestion  CV: [ ] chest pain [ ] orthopnea [ ] palpitations [ ] murmur  Resp: [ ] cough [ ] shortness of breath [ ] dyspnea [ ] wheezing [ ] sputum [ ] hemoptysis  GI: [ ] nausea [ ] vomiting [ ] diarrhea [ ] constipation [ ] abd pain [ ] dysphagia   : [ ] dysuria [ ] nocturia [ ] hematuria [ ] increased urinary frequency  Musculoskeletal: [ ] back pain [ ] myalgias [ ] arthralgias [ ] fracture  Skin: [ ] rash [ ] itch  Neurological: [ ] headache [ ] dizziness [ ] syncope [ ] weakness [ ] numbness  Psychiatric: [ ] anxiety [ ] depression  Endocrine: [ ] diabetes [ ] thyroid problem  Hematologic/Lymphatic: [ ] anemia [ ] bleeding problem  Allergic/Immunologic: [ ] itchy eyes [ ] nasal discharge [ ] hives [ ] angioedema  [ ] All other systems negative  [ ] Unable to assess ROS because ________    OBJECTIVE:  ICU Vital Signs Last 24 Hrs  T(C): 36.4 (04 Dec 2022 04:00), Max: 36.8 (03 Dec 2022 16:00)  T(F): 97.5 (04 Dec 2022 04:00), Max: 98.3 (03 Dec 2022 23:00)  HR: 59 (04 Dec 2022 07:00) (56 - 79)  BP: 104/59 (04 Dec 2022 07:00) (104/59 - 151/79)  BP(mean): 76 (04 Dec 2022 07:00) (76 - 110)  ABP: --  ABP(mean): --  RR: 17 (04 Dec 2022 07:00) (10 - 26)  SpO2: 96% (04 Dec 2022 07:00) (94% - 98%)    O2 Parameters below as of 03 Dec 2022 20:00  Patient On (Oxygen Delivery Method): room air            I & O's    - @ 07:01  -  12-04 @ 07:00  --------------------------------------------------------  IN: 2240 mL / OUT: 3450 mL / NET: -1210 mL      CAPILLARY BLOOD GLUCOSE      POCT Blood Glucose.: 139 mg/dL (04 Dec 2022 03:54)      PHYSICAL EXAM:  General:   HEENT:   Lymph Nodes:  Neck:   Respiratory:   Cardiovascular:   Abdomen:   Extremities:   Skin:   Neurological:  Psychiatry:    LINES:    HOSPITAL MEDICATIONS:  MEDICATIONS  (STANDING):  apixaban 5 milliGRAM(s) Oral every 12 hours  atorvastatin 80 milliGRAM(s) Oral at bedtime  chlorhexidine 4% Liquid 1 Application(s) Topical <User Schedule>  clopidogrel Tablet 75 milliGRAM(s) Oral daily  dextrose 5%. 1000 milliLiter(s) (50 mL/Hr) IV Continuous <Continuous>  dextrose 5%. 1000 milliLiter(s) (100 mL/Hr) IV Continuous <Continuous>  dextrose 50% Injectable 25 Gram(s) IV Push once  dextrose 50% Injectable 12.5 Gram(s) IV Push once  dextrose 50% Injectable 25 Gram(s) IV Push once  glucagon  Injectable 1 milliGRAM(s) IntraMuscular once  influenza   Vaccine 0.5 milliLiter(s) IntraMuscular once  insulin glargine Injectable (LANTUS) 15 Unit(s) SubCutaneous every morning  insulin lispro (ADMELOG) corrective regimen sliding scale   SubCutaneous three times a day before meals  insulin lispro (ADMELOG) corrective regimen sliding scale   SubCutaneous at bedtime  insulin lispro Injectable (ADMELOG) 5 Unit(s) SubCutaneous before breakfast  insulin lispro Injectable (ADMELOG) 5 Unit(s) SubCutaneous before lunch  insulin lispro Injectable (ADMELOG) 5 Unit(s) SubCutaneous before dinner  methadone    Tablet 5 milliGRAM(s) Oral every 8 hours  pregabalin 150 milliGRAM(s) Oral every 12 hours    MEDICATIONS  (PRN):  dextrose Oral Gel 15 Gram(s) Oral once PRN Blood Glucose LESS THAN 70 milliGRAM(s)/deciliter  HYDROmorphone   Tablet 4 milliGRAM(s) Oral four times a day PRN Moderate Pain (4 - 6)      LABS:                        11.6   8.66  )-----------( 391      ( 03 Dec 2022 23:42 )             35.8     Hgb Trend: 11.6<--, 11.3<--, 11.9<--  12    136  |  100  |  26<H>  ----------------------------<  148<H>  4.1   |  24  |  0.94    Ca    9.4      03 Dec 2022 23:42  Phos  2.5       Mg     1.7         TPro  6.6  /  Alb  3.7  /  TBili  0.2  /  DBili  x   /  AST  7<L>  /  ALT  14  /  AlkPhos  78      Creatinine Trend: 0.94<--, 0.96<--, 1.46<--, 1.84<--  PT/INR - ( 02 Dec 2022 20:23 )   PT: 17.1 sec;   INR: 1.48 ratio         PTT - ( 02 Dec 2022 20:23 )  PTT:32.8 sec  Urinalysis Basic - ( 02 Dec 2022 23:16 )    Color: Light Yellow / Appearance: Clear / S.026 / pH: x  Gluc: x / Ketone: Negative  / Bili: Negative / Urobili: Negative   Blood: x / Protein: Trace / Nitrite: Negative   Leuk Esterase: Negative / RBC: x / WBC x   Sq Epi: x / Non Sq Epi: x / Bacteria: x        Venous Blood Gas:   @ 12:26  7.43/36/73/24/95.7  VBG Lactate: 1.1  Venous Blood Gas:   @ 02:00  7.37/39/53/22/86.6  VBG Lactate: 2.9  Venous Blood Gas:   @ 22:20  7.29/45/29/22/42.9  VBG Lactate: 5.7  Venous Blood Gas:   @ 19:42  7.27/52/31/24/38.2  VBG Lactate: 4.2      MICROBIOLOGY:     RADIOLOGY:  [ ] Reviewed and interpreted by me    EKG: CHIEF COMPLAINT: Hypoglycemia 40's and Near Syncopal episode    Interval Events: BG greatly improved, remains hemodynamically stable- cardiovascular meds still being held /67 - 115/75, Lyrica restarted at 150 q 12h (hm dose Q8H).       MICU COURSE:   57y Male with pmhx of HTN, DM2 c/b DKA, neuropathy,  MI, Afib on AC, HTN, and chronic pain on Methadone, Dilaudid, and Lyrica for cervical spine and back injury s/p C2-6 fusion, and LUE nerve transplant and repair. The pt was admitted for a near syncopal episode, was very lightheaded did not have any LOC, was able to sit down on the couch safely. The pt stated that he has had increased  multiple episodes of near syncope which have been ongoing and have gotten worse shich his MI and DKA hospitalization in 2022. On admission the pt was found to be hypotensive 70's/50's, hypoglycemic with a BG of 49 , in +AGMA / lactic Acidosis, and in JAXON. IVF bolus's were administered, maintenance fluid, restarted on a diabetic regimen, and eating well now. An Endocrine consult has been made, pt is tolerating a diet and on a Insulin regime: lantus 15U (1/2 of home dose), pre meal 5mg, ISSS low, medications held Lisinopril, Coreg, Amlodipine since admission. Lyrica in the setting of JAXON, of note pt stated that he just started zoloft and Clonazepam. Ct of the brain and abdomen were negative. The pt is hemodynamically stable, BG stabilized, denies dizziness, and is stable for transfer to the medical floor.       REVIEW OF SYSTEMS:  Constitutional: [ ] fevers [ ] chills [ ] weight loss [ ] weight gain (x)denies  HEENT: [ ] dry eyes [ ] eye irritation [ ] postnasal drip [ ] nasal congestion  (x)denies  CV: [ ] chest pain [ ] orthopnea [ ] palpitations [ ] murmur  (x)denies  Resp: [ ] cough [ ] shortness of breath [ ] dyspnea [ ] wheezing [ ] sputum [ ] hemoptysis  (x)denies  GI: [ ] nausea [ ] vomiting [ ] diarrhea [ ] constipation [ ] abd pain [ ] dysphagia  (x)denies  : [ ] dysuria [ ] nocturia [ ] hematuria [ ] increased urinary frequency  Musculoskeletal: [ ] back pain [ ] myalgias [ ] arthralgias [ ] fracture (x)denies  Skin: [ ] rash [ ] itch (x)denies  Neurological: [ ] headache [ ] dizziness [ ] syncope [ ] weakness [ ] numbness  (x)denies  Psychiatric: [ ] anxiety [x ] depression  Endocrine: [ ] diabetes [ ] thyroid problem (x)denies  Hematologic/Lymphatic: [ ] anemia [ ] bleeding problem (x)denies  Allergic/Immunologic: [ ] itchy eyes [ ] nasal discharge [ ] hives [ ] angioedema (x)denies  [x ] All other systems negative    OBJECTIVE:  ICU Vital Signs Last 24 Hrs  T(C): 36.4 (04 Dec 2022 04:00), Max: 36.8 (03 Dec 2022 16:00)  T(F): 97.5 (04 Dec 2022 04:00), Max: 98.3 (03 Dec 2022 23:00)  HR: 59 (04 Dec 2022 07:00) (56 - 79)  BP: 104/59 (04 Dec 2022 07:00) (104/59 - 151/79)  BP(mean): 76 (04 Dec 2022 07:00) (76 - 110)  ABP: --  ABP(mean): --  RR: 17 (04 Dec 2022 07:00) (10 - 26)  SpO2: 96% (04 Dec 2022 07:00) (94% - 98%)    O2 Parameters below as of 03 Dec 2022 20:00  Patient On (Oxygen Delivery Method): room air      I & O's   @ 07:01  -   @ 07:00  --------------------------------------------------------  IN: 2240 mL / OUT: 3450 mL / NET: -1210 mL      CAPILLARY BLOOD GLUCOSE  POCT Blood Glucose.: 139 mg/dL (04 Dec 2022 03:54)      PHYSICAL EXAM:  General:   HEENT:   Lymph Nodes:  Neck:   Respiratory:   Cardiovascular:   Abdomen:   Extremities:   Skin:   Neurological:  Psychiatry:    LINES:    HOSPITAL MEDICATIONS:  MEDICATIONS  (STANDING):  apixaban 5 milliGRAM(s) Oral every 12 hours  atorvastatin 80 milliGRAM(s) Oral at bedtime  chlorhexidine 4% Liquid 1 Application(s) Topical <User Schedule>  clopidogrel Tablet 75 milliGRAM(s) Oral daily  dextrose 5%. 1000 milliLiter(s) (50 mL/Hr) IV Continuous <Continuous>  dextrose 5%. 1000 milliLiter(s) (100 mL/Hr) IV Continuous <Continuous>  dextrose 50% Injectable 25 Gram(s) IV Push once  dextrose 50% Injectable 12.5 Gram(s) IV Push once  dextrose 50% Injectable 25 Gram(s) IV Push once  glucagon  Injectable 1 milliGRAM(s) IntraMuscular once  influenza   Vaccine 0.5 milliLiter(s) IntraMuscular once  insulin glargine Injectable (LANTUS) 15 Unit(s) SubCutaneous every morning  insulin lispro (ADMELOG) corrective regimen sliding scale   SubCutaneous three times a day before meals  insulin lispro (ADMELOG) corrective regimen sliding scale   SubCutaneous at bedtime  insulin lispro Injectable (ADMELOG) 5 Unit(s) SubCutaneous before breakfast  insulin lispro Injectable (ADMELOG) 5 Unit(s) SubCutaneous before lunch  insulin lispro Injectable (ADMELOG) 5 Unit(s) SubCutaneous before dinner  methadone    Tablet 5 milliGRAM(s) Oral every 8 hours  pregabalin 150 milliGRAM(s) Oral every 12 hours    MEDICATIONS  (PRN):  dextrose Oral Gel 15 Gram(s) Oral once PRN Blood Glucose LESS THAN 70 milliGRAM(s)/deciliter  HYDROmorphone   Tablet 4 milliGRAM(s) Oral four times a day PRN Moderate Pain (4 - 6)      LABS:                        11.6   8.66  )-----------( 391      ( 03 Dec 2022 23:42 )             35.8     Hgb Trend: 11.6<--, 11.3<--, 11.9<--  12-03    136  |  100  |  26<H>  ----------------------------<  148<H>  4.1   |  24  |  0.94    Ca    9.4      03 Dec 2022 23:42  Phos  2.5     12  Mg     1.7         TPro  6.6  /  Alb  3.7  /  TBili  0.2  /  DBili  x   /  AST  7<L>  /  ALT  14  /  AlkPhos  78      Creatinine Trend: 0.94<--, 0.96<--, 1.46<--, 1.84<--  PT/INR - ( 02 Dec 2022 20:23 )   PT: 17.1 sec;   INR: 1.48 ratio         PTT - ( 02 Dec 2022 20:23 )  PTT:32.8 sec  Urinalysis Basic - ( 02 Dec 2022 23:16 )    Color: Light Yellow / Appearance: Clear / S.026 / pH: x  Gluc: x / Ketone: Negative  / Bili: Negative / Urobili: Negative   Blood: x / Protein: Trace / Nitrite: Negative   Leuk Esterase: Negative / RBC: x / WBC x   Sq Epi: x / Non Sq Epi: x / Bacteria: x        Venous Blood Gas:   @ 12:26  7.43/36/73/24/95.7  VBG Lactate: 1.1  Venous Blood Gas:   @ 02:00  7.37/39/53/22/86.6  VBG Lactate: 2.9  Venous Blood Gas:   @ 22:20  7.29/45/29/22/42.9  VBG Lactate: 5.7  Venous Blood Gas:   @ 19:42  7.27/52/31/24/38.2  VBG Lactate: 4.2      MICROBIOLOGY:     RADIOLOGY:  < from: CT Head No Cont (22 @ 10:19) >    ACC: [id number] EXAM:  CT BRAIN                          PROCEDURE DATE:  2022          INTERPRETATION:  EXAM: CT head without contrast    INDICATION: Syncope    TECHNIQUE: CT examination of the head performed without contrast.   Multiple axialimages obtained from skull base to vertex.   Sagittal/coronal reformatted images obtained from axial data set. Images   reviewed in soft tissue and bone windows.      COMPARISON: 2022 MRI brain. 2022 CT head    FINDINGS:    There ispartially imaged fixation hardware of the upper cervical spine   on  radiograph.    Ventricles and sulci are normal in size and configuration for patient's   age. No hydrocephalus. No extra-axial fluid collection identified.    No acute intracranial hemorrhage identified. Supratentorial white matter   is unremarkable in attenuation. Gray-white differentiation is preserved   without CT evidence for acute transcortical infarction. There is no   significant midline shift, mass effect or herniation.    Sellar and suprasellar region are unremarkable in appearance.    Visualized paranasal sinuses and mastoid air cells are well aerated. No   significant cerebellar tonsillar herniation.    No displaced calvarial fractures are identified. No suspicious expansile   or destructive calvarial lesion identified. No significant scalp soft   tissue swelling identified.          IMPRESSION:    No acute intracranial hemorrhage, hydrocephalus or extra-axial fluid   collection.  No CT evidence for acutetranscortical infarction. Please note that MR   imaging is a more sensitive imaging modality for detection of acute   infarction and may be obtained as clinically warranted.    If clinicians have any questions/need for clarification regarding this   report, Dr. Moncho Aldridge can be best reached via the patient secure   hospital email system    < end of copied text >  < from: CT Abdomen and Pelvis w/ IV Cont (22 @ 21:56) >    ACC: [id number] EXAM:  CT ABDOMEN AND PELVIS IC                          PROCEDURE DATE:  2022          INTERPRETATION:  CLINICAL INFORMATION: Epigastric pain and   lightheadedness.    COMPARISON: CT abdomen pelvis 2021.    CONTRAST/COMPLICATIONS:  IV Contrast: Omnipaque 350  90 cc administered   10 cc discarded  Oral Contrast: NONE  Complications: None reported at time of study completion    PROCEDURE:  CT of the Abdomen and Pelvis was performed.  Sagittal and coronal reformats were performed.    FINDINGS:  LOWER CHEST: Dependent areas of subsegmental atelectasis with unchanged   right basilar peripheral calcifications. Mild coronary artery   calcifications.    LIVER: Within normal limits.  BILE DUCTS: Normal caliber.  GALLBLADDER: Contracted.  SPLEEN: Within normal limits.  PANCREAS: Fatty atrophy. No evidence of a pancreatic mass. Pancreas is   unchanged in appearance compared to prior exam from 2021.  ADRENALS: Within normal limits.  KIDNEYS/URETERS: Kidneys enhance symmetrically without hydronephrosis or   focal renal parenchymal lesion.    BLADDER: Within normal limits.  REPRODUCTIVE ORGANS: Prostate within normal limits.    BOWEL: No bowel obstruction or overt bowel wall thickening. Appendix is   normal.. 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  PERITONEUM: No ascites, pneumoperitoneum, or loculated collection.   Scattered nonspecific subcentimeter mesenteric root lymph nodes.  VESSELS: Mild atherosclerotic calcification of the iliac tree. Normal   caliber abdominal aorta.  RETROPERITONEUM/LYMPH NODES: No lymphadenopathy.  ABDOMINAL WALL: Tiny fat-containing umbilical hernia.  BONES: Degenerative changes of the spine.    IMPRESSION:  No evidence of pancreatic mass as clinically questioned. Pancreas is   unchanged in appearance compared to prior exam from 2021. If   clinical concern persists, consider contrast-enhanced MRI of the abdomen.          EKG:

## 2022-12-04 NOTE — PHYSICAL THERAPY INITIAL EVALUATION ADULT - PERTINENT HX OF CURRENT PROBLEM, REHAB EVAL
57y Male with pmhx of HTN, DM2 c/b DKA, neuropathy, chronic pain on Methadone & opioids for cervical spine and back injury, MI, Afib on AC p/w near syncopal episode. Per EMS, pt was at home sitting on couch. Got up and felt lightheaded, "blacked out" for a few seconds, and then was able to sit back down on the couch, felt like he was about to pass out. Says he's had multiple episodes of near syncope in the last month that has been progressively getting worse. Of note, patient states that since his MI and DKA episode earlier this year, he has not been feeling his usual self. Denies any pre-syncopal symptoms, photophobia, dizziness, or hot flashes. Because of his chronic syncopal episodes, he has always been slow to rise from a seated position. Also has multiple episodes of NBNB vomiting for the last 4 days. Denies CP, SOB, palpitations. Pt takes Novolog, Jardiance, and Lantus, denies taking more or less of these meds recently. Currently, taking 12-16 units admelog pre-meal and 30 units Lantus at night time.  Denies any changes to dosing of his DM2 meds. Per EMS, BP was in 70s/50s so pt was given IVF. Pt still feeling lightheaded. Denies current fever, cp, sob, abd pain, nausea, diarrhea, melena, BRBPR, urinary symptoms. NKDA. No recent travel. In the ED, patient was found to be severely hypoglycemic. BG in 40's, recevied D50 x 3 with minimal improvement in blood sugars and subsequently started on D10/0.45%NS gtt at 100 cc/hour with stable improvement. MICU consulted for severe hypoglycemia likely in setting of exogenous insulin use with concurrent constant emesis.

## 2022-12-04 NOTE — PHYSICAL THERAPY INITIAL EVALUATION ADULT - ACTIVE RANGE OF MOTION EXAMINATION, REHAB EVAL
L UE has limited AROM shoulder flexion to about 30 degrees and limited supination/Right LE Active ROM was WFL (within functional limits)/bilateral  lower extremity Active ROM was WFL (within functional limits)/deficits as listed below

## 2022-12-04 NOTE — PROGRESS NOTE ADULT - TIME BILLING
review of records/results, medical evaluation and management, and discussion with medical team and patient

## 2022-12-04 NOTE — PHYSICAL THERAPY INITIAL EVALUATION ADULT - IMPAIRMENTS CONTRIBUTING TO GAIT DEVIATIONS, PT EVAL
impaired balance/decreased strength Notification Instructions: Patient will be notified of biopsy results. However, patient instructed to call the office if not contacted within 2 weeks.

## 2022-12-04 NOTE — PROGRESS NOTE ADULT - NS ATTEND AMEND GEN_ALL_CORE FT
Patient seen and examined. Patient is a 57F with a history of DM2, Afib, chronic pain in the setting of spinal stenosis, and prior hospitalization in March 2022 with DKA and MI requiring PCI on multiple DM2 medications with poor diet who presents with encephalopathy and weakness. He was found to be hypotensive and hypoglycemic. He was admitted to MICU in this setting.    1. Endocrine - hypoglycemia resolved and tolerating PO diet. Continue insulin as per Endo  - Hold Metformin, Jardiance, and Trulicity.   2. Renal - acute kidney injury in setting of hypotension and ATN. Improved  3. Cardiovascular - troponin negative and no events on telemetry  - Hypotension likely in setting of hypovolemia and medications - improved with IVF. Hold antihypertensive medications for now as he is normotensive off medications. Given his CAD he will likely need a resumption of some of his medications if HTN recurs.  - Continue Plavix for CAD  4. Neuro - lightheadedness likely in setting of orthostasis - check orthostatics daily.   - Symptoms slightly improved after receiving IVF  - CTH negative  5. Chronic Pain - slowly restart home medications. Istop noted  - May benefit from  evaluation given family concern that he has untreated depression - he is amenable to this   6. DVT proph - on AC for Afib  7. PT and OT evaluation  - Has significant limitation of LUE     Patient is medically stable for transfer to the medical floors for further monitoring and care. Discussed with patient at bedside and all questions answered.

## 2022-12-04 NOTE — PHYSICAL THERAPY INITIAL EVALUATION ADULT - BALANCE TRAINING, PT EVAL
GOAL: Patient will demonstrate an increase in static/dynamic balance in sitting/standing where deficient by at least 1 grade to facilitate greater independence during functional mobility and ADL's in 3 weeks

## 2022-12-04 NOTE — OCCUPATIONAL THERAPY INITIAL EVALUATION ADULT - PERTINENT HX OF CURRENT PROBLEM, REHAB EVAL
57y Male with pmhx of HTN, DM2 c/b DKA, neuropathy,  MI, Afib on AC, HTN, and chronic pain on Methadone, Dilaudid, and Lyrica for cervical spine and back injury s/p C2-6 fusion, and LUE nerve transplant and repair. The pt was admitted for a near syncopal episode, was very lightheaded did not have any LOC, was able to sit down on the couch safely. The pt stated that he has had increased  multiple episodes of near syncope which have been ongoing and have gotten worse shich his MI and DKA hospitalization in March 2022. On admission the pt was found to be hypotensive 70's/50's, hypoglycemic with a BG of 49 , in +AGMA / lactic Acidosis, and in JAXON. IVF bolus's were administered, maintenance fluid, restarted on a diabetic regimen, and eating well now. An Endocrine consult has been made, pt is tolerating a diet and on a Insulin regime: lantus 15U (1/2 of home dose), pre meal 5mg, ISSS low, medications held Lisinopril, Coreg, Amlodipine since admission. Lyrica in the setting of JAXON, of note pt stated that he just started zoloft and Clonazepam. Ct of the brain and abdomen were negative. The pt is hemodynamically stable, BG stabilized, denies dizziness, and is stable for transfer to the medical floor.

## 2022-12-04 NOTE — PHYSICAL THERAPY INITIAL EVALUATION ADULT - ADDITIONAL COMMENTS
Pt reports living in a cottage style house with a few steps to enter and house is one level. Pt lives with girlfriend. PTA pt was independent in all ADLS with no assistive device.

## 2022-12-04 NOTE — PROGRESS NOTE ADULT - ASSESSMENT
Assessment and Plan:   · Assessment	  57y Male with pmhx of HTN, DM2 c/b DKA, neuropathy, chronic pain on Methadone & opioids for cervical spine and back injury s/p fusion C2-6 along with LUE nerve transplant, MI, Afib on AC p/w near syncopal episode found to be hypotensive and hypoglycemic on presentation likely in the setting of extensive diabetic regimen, not eating, depression, overuse of anti HTN along with pain  medications c/b JAXON with HAGMA and lactic acidosis in the setting of N/V and metformin use. The pt has greatly improved- +AGMA resolved, RF now normalized, BG greatly improved, remains hemodynamically stable w/o re-start of pt home anti HTN.    #Neuro:  AAOx3   #Syncopal Episode   - EKG with no evidence of arrhythmias   - will need CT Head to rule out anatomic cause of multiple episodes of near syncope  - TTE to address cardiogenic cause of syncopal episodes  - rule out metabolic causes of syncope, likely secondary to exogenous insulin use  - check orthostatic BPs  - history of AMS w MR ?L vertebral artery stenosis     #Diabetic Neuropathy  - c/w home Lyrica 150    #Chronic Pain  - on methadone and opioids for history of cervical spine and back injury  - check I-STOP     #CV:  #Atrial Fibrillation  - [id number] 3, HASBLED 1  - maintained on eliquis 5 BID and coreg 25 BID  at home; c/w home medications  - EKG in ED with NSR; no evidence of arrhythmia at this time  - continuous telemetry monitoring    #History of MI w PCI in 3/2022 s/p LCX POBA  - TTE 3/2: EF 48%, hypokinesis of basal inferior wall, inferolateral wall, stage I diastolic dysfunction   - no evidence of ACS at this time, history of PCI earlier this year  - [id number] 3  - continue plavix 75 mg daily, atorvastatin 80mg daily, eliquis 5mg BID, coreg 25mg BID  - troponin wnl  - monitor on telemetry.    #HTN  - maintained on coreg 25 BID and lisinopril 10 at home for BP control  - on admission, low BPs 70/50s s/p 2L IVF  - monitor off hypertensive medications     #Resp:  - no active issues at this time  - maintaining adequate O2 sat on RA     #GI:  - regular diet with q1 POCT checks     #Renal:  #JAXON   - presenting with Cr 1.84 (baseline 0.62 5/2022) indicative of JAXON   - BUN/Cr 21 indicative of prerenal azotemia likely in the setting of low volume state   - s/p 2L IVF in ED for volume resuscitation  - trend BMP  - will collect urine studies   - no evidence of obstruction noted on CT AP    #HAGMA 2/2 lactic acidosis  - delta/delta 1.2 indicative of pure HAGMA in the setting of lactate 4.2 likely in the setting of dehydration vs hypoperfusion  - continue to monitor  - trend lactate for resolution     #Heme:  #Normocytic Anemia  - iron studies with evidence iron deficiency likely with component of chronic disease  - continue to monitor CBC / hemoglobin at this time  - no evidence of overt bleeding     #ID:  - no active issues    #Endo:  #Hypoglycemia 2/2 Insulin Overdose  - on 30u lantus, 12-16u admelog, metformin and jardiance at home  - on D10 / 1/2 NS  drip at 100 cc/hr  - history of DKA during previous hospitalization with low C peptide  - check serum cortisol, anti-JOHN, proinsulin, C peptide in AM   - check TFTs   - CT AP with no evidence of pancreatic mass   - strict q1 FS; can stop D10/1/2 NS drip if FS start to rise    #Skin:  - no active issues    #Social/Ethics:  - Code status             Assessment and Plan:   · Assessment	  57y Male with pmhx of HTN, DM2 c/b DKA, neuropathy, chronic pain on Methadone & opioids for cervical spine and back injury s/p fusion C2-6 along with LUE nerve transplant, MI, Afib on AC p/w near syncopal episode found to be hypotensive and hypoglycemic on presentation likely in the setting of extensive diabetic regimen, not eating, depression, overuse of anti HTN along with pain  medications c/b JAXON with HAGMA and lactic acidosis in the setting of N/V and metformin use. The pt has greatly improved- +AGMA resolved, RF now normalized, BG greatly improved, remains hemodynamically stable w/o re-start of pt home anti HTN.    #Neuro: Near Syncopal Episode may be multi factorial in the setting of Hypoglycemia and Hypotension may be 2/2 anti htn, anti depressant, pain medication and diminished PO diet intake in the setting of continued diabetic regimen.  -Orthostatic BP;s   - EKG with no evidence of arrhythmias   - CT of the brain negative- c/w neuro checks as per unit routine  -consider ioana carotid duplex / official echo    #Chronic Pain 2/2 spinal / LUE injury s/p C2-6 fusion w LUE nerve transplant- now with impaired use of LUE and rendering chronic back / LUE pain and neuropathy  - on methadone, Lyrica , Dilaudid at home ATC  - f/u  I-STOP   -f/u pain management provider in [place] Dr. Pham    #Depression in the setting of disability, difficulty w ADL's, chronic pain - pt states that he started seeing an outpt psychiatric NP  -discuss with pt seeing inhouse psyche to assist with medication regimen- pt stated that he has been taking zoloft 200mg HS along with Clonapam 1mg HS  -Zoloft and Clonazepam has been on hold- assess to restart at lower doing in the setting of his near syncopal episode at home    #CV: Hx Afib, MI s/p PCI 3/2022 s/p LCX POBA - [id number] 3, HASBLED 1  - c/w Eliquis and Plavix eliquis 5 BID   -Coreg still on hold-pt is somewhat near bradycardic - HR 56-68 at the highest  -Lisinopril and amlodipine still on hold in the setting of BP's of 107/67 - 115/75    #Resp: no active issues  - maintaining adequate O2 sat on RA     #GI: CT of the abd neg / no GI issues  - regular carb steady diet with snacks  -bowel regimen    #Renal: JAXON / ATN resolved  -f/u u/o and renal funct  - no evidence of obstruction noted on CT abd / neg    #Hypoglycemia / HAGMA 2/2 lactic acidosis resolved - delta/delta 1.2 indicative of pure HAGMA in the setting of lactate 4.2 likely in the setting of dehydration vs hypoperfusion  hx of DM , DKA on po anti diabetic meds and Insulin @ home- hgB a!C 8.1 / W beta hydroxy 0,2  -f/u Endocrine  -Nutrition consult ordered  -c/w present steady carb w night snack diet  -c/w Lantus 15U, pre-meal 5U, and low ISS      #Social/Ethics:  - Full Code status  -Social Work Eval- pt is needing assistance at home with food prep / access, having difficulty w ADL's  -PT consult- extension cervical fusion  -OT Consult- needs extensive therapy with LUE - now with disability impinging on his adls'and compounding his depression  -OOB    On BB for medicine

## 2022-12-05 DIAGNOSIS — F32.9 MAJOR DEPRESSIVE DISORDER, SINGLE EPISODE, UNSPECIFIED: ICD-10-CM

## 2022-12-05 DIAGNOSIS — R52 PAIN, UNSPECIFIED: ICD-10-CM

## 2022-12-05 DIAGNOSIS — E11.65 TYPE 2 DIABETES MELLITUS WITH HYPERGLYCEMIA: ICD-10-CM

## 2022-12-05 DIAGNOSIS — I10 ESSENTIAL (PRIMARY) HYPERTENSION: ICD-10-CM

## 2022-12-05 DIAGNOSIS — E78.5 HYPERLIPIDEMIA, UNSPECIFIED: ICD-10-CM

## 2022-12-05 DIAGNOSIS — I25.2 OLD MYOCARDIAL INFARCTION: ICD-10-CM

## 2022-12-05 DIAGNOSIS — Z29.9 ENCOUNTER FOR PROPHYLACTIC MEASURES, UNSPECIFIED: ICD-10-CM

## 2022-12-05 DIAGNOSIS — R55 SYNCOPE AND COLLAPSE: ICD-10-CM

## 2022-12-05 DIAGNOSIS — E11.9 TYPE 2 DIABETES MELLITUS WITHOUT COMPLICATIONS: ICD-10-CM

## 2022-12-05 DIAGNOSIS — F43.10 POST-TRAUMATIC STRESS DISORDER, UNSPECIFIED: ICD-10-CM

## 2022-12-05 LAB
ALBUMIN SERPL ELPH-MCNC: 4 G/DL — SIGNIFICANT CHANGE UP (ref 3.3–5)
ALP SERPL-CCNC: 75 U/L — SIGNIFICANT CHANGE UP (ref 40–120)
ALT FLD-CCNC: 11 U/L — SIGNIFICANT CHANGE UP (ref 10–45)
ANION GAP SERPL CALC-SCNC: 11 MMOL/L — SIGNIFICANT CHANGE UP (ref 5–17)
AST SERPL-CCNC: 6 U/L — LOW (ref 10–40)
BASOPHILS # BLD AUTO: 0.04 K/UL — SIGNIFICANT CHANGE UP (ref 0–0.2)
BASOPHILS NFR BLD AUTO: 0.5 % — SIGNIFICANT CHANGE UP (ref 0–2)
BILIRUB SERPL-MCNC: 0.2 MG/DL — SIGNIFICANT CHANGE UP (ref 0.2–1.2)
BUN SERPL-MCNC: 22 MG/DL — SIGNIFICANT CHANGE UP (ref 7–23)
CALCIUM SERPL-MCNC: 9.6 MG/DL — SIGNIFICANT CHANGE UP (ref 8.4–10.5)
CHLORIDE SERPL-SCNC: 102 MMOL/L — SIGNIFICANT CHANGE UP (ref 96–108)
CO2 SERPL-SCNC: 25 MMOL/L — SIGNIFICANT CHANGE UP (ref 22–31)
CREAT SERPL-MCNC: 0.92 MG/DL — SIGNIFICANT CHANGE UP (ref 0.5–1.3)
EGFR: 98 ML/MIN/1.73M2 — SIGNIFICANT CHANGE UP
EOSINOPHIL # BLD AUTO: 0.12 K/UL — SIGNIFICANT CHANGE UP (ref 0–0.5)
EOSINOPHIL NFR BLD AUTO: 1.6 % — SIGNIFICANT CHANGE UP (ref 0–6)
GLUCOSE BLDC GLUCOMTR-MCNC: 148 MG/DL — HIGH (ref 70–99)
GLUCOSE BLDC GLUCOMTR-MCNC: 148 MG/DL — HIGH (ref 70–99)
GLUCOSE BLDC GLUCOMTR-MCNC: 152 MG/DL — HIGH (ref 70–99)
GLUCOSE BLDC GLUCOMTR-MCNC: 174 MG/DL — HIGH (ref 70–99)
GLUCOSE SERPL-MCNC: 164 MG/DL — HIGH (ref 70–99)
HCT VFR BLD CALC: 39.2 % — SIGNIFICANT CHANGE UP (ref 39–50)
HGB BLD-MCNC: 12.5 G/DL — LOW (ref 13–17)
IMM GRANULOCYTES NFR BLD AUTO: 0.3 % — SIGNIFICANT CHANGE UP (ref 0–0.9)
LYMPHOCYTES # BLD AUTO: 2.53 K/UL — SIGNIFICANT CHANGE UP (ref 1–3.3)
LYMPHOCYTES # BLD AUTO: 34 % — SIGNIFICANT CHANGE UP (ref 13–44)
MAGNESIUM SERPL-MCNC: 1.5 MG/DL — LOW (ref 1.6–2.6)
MCHC RBC-ENTMCNC: 26.6 PG — LOW (ref 27–34)
MCHC RBC-ENTMCNC: 31.9 GM/DL — LOW (ref 32–36)
MCV RBC AUTO: 83.4 FL — SIGNIFICANT CHANGE UP (ref 80–100)
MONOCYTES # BLD AUTO: 0.62 K/UL — SIGNIFICANT CHANGE UP (ref 0–0.9)
MONOCYTES NFR BLD AUTO: 8.3 % — SIGNIFICANT CHANGE UP (ref 2–14)
NEUTROPHILS # BLD AUTO: 4.11 K/UL — SIGNIFICANT CHANGE UP (ref 1.8–7.4)
NEUTROPHILS NFR BLD AUTO: 55.3 % — SIGNIFICANT CHANGE UP (ref 43–77)
NRBC # BLD: 0 /100 WBCS — SIGNIFICANT CHANGE UP (ref 0–0)
PHOSPHATE SERPL-MCNC: 3.2 MG/DL — SIGNIFICANT CHANGE UP (ref 2.5–4.5)
PLATELET # BLD AUTO: 367 K/UL — SIGNIFICANT CHANGE UP (ref 150–400)
POTASSIUM SERPL-MCNC: 4.1 MMOL/L — SIGNIFICANT CHANGE UP (ref 3.5–5.3)
POTASSIUM SERPL-SCNC: 4.1 MMOL/L — SIGNIFICANT CHANGE UP (ref 3.5–5.3)
PROINSULIN SERPL-MCNC: 2.2 PMOL/L — SIGNIFICANT CHANGE UP (ref 0–10)
PROT SERPL-MCNC: 6.6 G/DL — SIGNIFICANT CHANGE UP (ref 6–8.3)
RBC # BLD: 4.7 M/UL — SIGNIFICANT CHANGE UP (ref 4.2–5.8)
RBC # FLD: 15.1 % — HIGH (ref 10.3–14.5)
SODIUM SERPL-SCNC: 138 MMOL/L — SIGNIFICANT CHANGE UP (ref 135–145)
WBC # BLD: 7.44 K/UL — SIGNIFICANT CHANGE UP (ref 3.8–10.5)
WBC # FLD AUTO: 7.44 K/UL — SIGNIFICANT CHANGE UP (ref 3.8–10.5)

## 2022-12-05 PROCEDURE — 99222 1ST HOSP IP/OBS MODERATE 55: CPT | Mod: GC

## 2022-12-05 PROCEDURE — 99223 1ST HOSP IP/OBS HIGH 75: CPT

## 2022-12-05 PROCEDURE — 99232 SBSQ HOSP IP/OBS MODERATE 35: CPT

## 2022-12-05 PROCEDURE — 12345: CPT | Mod: NC

## 2022-12-05 RX ORDER — SERTRALINE 25 MG/1
25 TABLET, FILM COATED ORAL DAILY
Refills: 0 | Status: DISCONTINUED | OUTPATIENT
Start: 2022-12-05 | End: 2022-12-07

## 2022-12-05 RX ORDER — INSULIN LISPRO 100/ML
6 VIAL (ML) SUBCUTANEOUS
Refills: 0 | Status: DISCONTINUED | OUTPATIENT
Start: 2022-12-06 | End: 2022-12-07

## 2022-12-05 RX ORDER — CLONAZEPAM 1 MG
0.5 TABLET ORAL DAILY
Refills: 0 | Status: DISCONTINUED | OUTPATIENT
Start: 2022-12-05 | End: 2022-12-07

## 2022-12-05 RX ORDER — MAGNESIUM SULFATE 500 MG/ML
1 VIAL (ML) INJECTION ONCE
Refills: 0 | Status: COMPLETED | OUTPATIENT
Start: 2022-12-05 | End: 2022-12-05

## 2022-12-05 RX ORDER — INSULIN GLARGINE 100 [IU]/ML
18 INJECTION, SOLUTION SUBCUTANEOUS EVERY MORNING
Refills: 0 | Status: DISCONTINUED | OUTPATIENT
Start: 2022-12-06 | End: 2022-12-07

## 2022-12-05 RX ORDER — INSULIN LISPRO 100/ML
6 VIAL (ML) SUBCUTANEOUS
Refills: 0 | Status: DISCONTINUED | OUTPATIENT
Start: 2022-12-05 | End: 2022-12-07

## 2022-12-05 RX ORDER — CARVEDILOL PHOSPHATE 80 MG/1
3.12 CAPSULE, EXTENDED RELEASE ORAL EVERY 12 HOURS
Refills: 0 | Status: DISCONTINUED | OUTPATIENT
Start: 2022-12-05 | End: 2022-12-07

## 2022-12-05 RX ADMIN — HYDROMORPHONE HYDROCHLORIDE 4 MILLIGRAM(S): 2 INJECTION INTRAMUSCULAR; INTRAVENOUS; SUBCUTANEOUS at 05:27

## 2022-12-05 RX ADMIN — CLOPIDOGREL BISULFATE 75 MILLIGRAM(S): 75 TABLET, FILM COATED ORAL at 12:14

## 2022-12-05 RX ADMIN — ATORVASTATIN CALCIUM 80 MILLIGRAM(S): 80 TABLET, FILM COATED ORAL at 21:40

## 2022-12-05 RX ADMIN — APIXABAN 5 MILLIGRAM(S): 2.5 TABLET, FILM COATED ORAL at 05:24

## 2022-12-05 RX ADMIN — HYDROMORPHONE HYDROCHLORIDE 4 MILLIGRAM(S): 2 INJECTION INTRAMUSCULAR; INTRAVENOUS; SUBCUTANEOUS at 21:17

## 2022-12-05 RX ADMIN — HYDROMORPHONE HYDROCHLORIDE 4 MILLIGRAM(S): 2 INJECTION INTRAMUSCULAR; INTRAVENOUS; SUBCUTANEOUS at 22:17

## 2022-12-05 RX ADMIN — Medication 0.5 MILLIGRAM(S): at 16:29

## 2022-12-05 RX ADMIN — Medication 1: at 08:46

## 2022-12-05 RX ADMIN — METHADONE HYDROCHLORIDE 5 MILLIGRAM(S): 40 TABLET ORAL at 13:34

## 2022-12-05 RX ADMIN — HYDROMORPHONE HYDROCHLORIDE 4 MILLIGRAM(S): 2 INJECTION INTRAMUSCULAR; INTRAVENOUS; SUBCUTANEOUS at 13:14

## 2022-12-05 RX ADMIN — Medication 150 MILLIGRAM(S): at 05:24

## 2022-12-05 RX ADMIN — CHLORHEXIDINE GLUCONATE 1 APPLICATION(S): 213 SOLUTION TOPICAL at 08:47

## 2022-12-05 RX ADMIN — HYDROMORPHONE HYDROCHLORIDE 4 MILLIGRAM(S): 2 INJECTION INTRAMUSCULAR; INTRAVENOUS; SUBCUTANEOUS at 12:14

## 2022-12-05 RX ADMIN — HYDROMORPHONE HYDROCHLORIDE 4 MILLIGRAM(S): 2 INJECTION INTRAMUSCULAR; INTRAVENOUS; SUBCUTANEOUS at 05:57

## 2022-12-05 RX ADMIN — Medication 150 MILLIGRAM(S): at 17:40

## 2022-12-05 RX ADMIN — INSULIN GLARGINE 15 UNIT(S): 100 INJECTION, SOLUTION SUBCUTANEOUS at 08:59

## 2022-12-05 RX ADMIN — SERTRALINE 25 MILLIGRAM(S): 25 TABLET, FILM COATED ORAL at 12:14

## 2022-12-05 RX ADMIN — APIXABAN 5 MILLIGRAM(S): 2.5 TABLET, FILM COATED ORAL at 17:39

## 2022-12-05 RX ADMIN — Medication 1 MILLIGRAM(S): at 21:41

## 2022-12-05 RX ADMIN — Medication 6 UNIT(S): at 12:47

## 2022-12-05 RX ADMIN — Medication 5 UNIT(S): at 08:46

## 2022-12-05 RX ADMIN — CARVEDILOL PHOSPHATE 3.12 MILLIGRAM(S): 80 CAPSULE, EXTENDED RELEASE ORAL at 17:41

## 2022-12-05 RX ADMIN — Medication 1: at 12:47

## 2022-12-05 RX ADMIN — METHADONE HYDROCHLORIDE 5 MILLIGRAM(S): 40 TABLET ORAL at 05:24

## 2022-12-05 RX ADMIN — Medication 100 GRAM(S): at 08:59

## 2022-12-05 RX ADMIN — Medication 6 UNIT(S): at 17:38

## 2022-12-05 RX ADMIN — METHADONE HYDROCHLORIDE 5 MILLIGRAM(S): 40 TABLET ORAL at 21:40

## 2022-12-05 NOTE — BH CONSULTATION LIAISON ASSESSMENT NOTE - NSBHATTESTCOMMENTATTENDFT_PSY_A_CORE
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 PPx includes depressions and PTSD. Other PMH includes HTN, DM2 c/b DKA, neuropathy, chronic pain on Methadone & opioids for cervical spine and back injury, MI, Afib on AC. Psychiatry consulted for concern for worsening depression and medication management upon discharge.    I have seen and evaluated this patient myself. Chart, labs, meds reviewed. I agree with resident's assessment and plan. Counseling and support provided.

## 2022-12-05 NOTE — DIETITIAN INITIAL EVALUATION ADULT - REASON FOR ADMISSION
Syncope and collapse    Per EMR, patient is a 58 y/o male with PMH including HTN, T2DM w/ h/o DKA, neuropathy, chronic pain on methadone & opioids for cervical spine and back injury, h/o MI, AFib on AC. Patient presents to [facility] for near syncopal episode. Patient reporting he has had multiple episodes of near syncope in the last month that has been progressively getting worse. Patient also endorsing multiple episodes of NBNB vomiting for the last 4 days PTA as well.  Patient stating since MI and DKA episode earlier this year, he has not been feeling his usual self. Patient found to be hypotensive and severely hypoglycemic on admission; patient was started on D10 IVF. Endocrinology consulted for blood glucose management.

## 2022-12-05 NOTE — PROGRESS NOTE ADULT - SUBJECTIVE AND OBJECTIVE BOX
MEDICINE ATTENDING TRANSFER ACCEPTANCE NOTE    MOHAN GUMEKEYONA  Division of Hospital Medicine  Pager [phone]  If no response or off hours, page [phone]    HPI:  57M PMH HTN, DM2 c/b DKA, neuropathy, chronic pain on Methadone & opioids for cervical spine and back injury, MI, Afib on AC p/w near syncopal episode. Per EMS, pt was at home sitting on couch. Got up and felt lightheaded, "blacked out" for a few seconds, and then was able to sit back down on the couch, felt like he was about to pass out. Says he's had multiple episodes of near syncope in the last month that has been progressively getting worse. Of note, patient states that since his MI and DKA episode earlier this year, he has not been feeling his usual self. Denies any pre-syncopal symptoms, photophobia, dizziness, or hot flashes. Because of his chronic syncopal episodes, he has always been slow to rise from a seated position. Also has multiple episodes of NBNB vomiting for the last 4 days. Denies CP, SOB, palpitations. Pt takes Novolog, Jardiance, and Lantus, denies taking more or less of these meds recently. Currently, taking 12-16 units admelog pre-meal and 30 units Lantus at night time.  Denies any changes to dosing of his DM2 meds. Per EMS, BP was in 70s/50s so pt was given IVF. Pt still feeling lightheaded. Denies current fever, cp, sob, abd pain, nausea, diarrhea, melena, BRBPR, urinary symptoms. NKDA. No recent travel.     In the ED, patient was found to be severely hypoglycemic. BG in 40's, recevied D50 x 3 with minimal improvement in blood sugars and subsequently started on D10/0.45%NS gtt at 100 cc/hour with stable improvement. MICU consulted for severe hypoglycemia likely in setting of exogenous insulin use with concurrent constant emesis.  (02 Dec 2022 23:32)    MICU course  Pt had reported having  multiple episodes of near syncope which have been ongoing and have gotten worse since his DKA hospitalization c/b MI in March 2022 On admission the pt was found to be hypotensive 70's/50's, hypoglycemic with a BG of 49 , in +AGMA / lactic Acidosis, and in JAXON. Multiple IVF boluses were administered, maintenance fluid, restarted on a diabetic regimen, and eating well now. Suspect hypotension and hypoglycemia in the setting of continued use of multiple DM2 and cardiac medications with poor PO intake.  Endocrine consult had been placed.Pt tolerating PO diet  and adjustments to his regimen were made including  lantus 15U (1/2 of home dose), pre meal 5mg, ISSS low, medications held Lisinopril, Coreg, Amlodipine since admission. Lyrica in the setting of JAXON, of note pt stated that he just started zoloft and Clonazepam. Ct of the brain was negative. Pt with stable vitals and BG wnl deemed  stable for transfer to the medical floor.       SUBJECTIVE:   Pt only complaint was feeling weak. He reports appetite has improved. He expressed concerns about his home diabetic regimen and hypertensive meds that potentially led to this admission and would like them to be appropriately addressed/adjusted prior to his discharge.     Review of Systems:   CONSTITUTIONAL: No fever, weight loss, + fatigue  EYES: No eye pain, visual disturbances, or discharge  ENMT:  No difficulty hearing, tinnitus, vertigo; No sinus or throat pain  NECK: No pain or stiffness  RESPIRATORY: No cough, wheezing, chills or hemoptysis; No shortness of breath  CARDIOVASCULAR: No chest pain, palpitations, dizziness, or leg swelling  GASTROINTESTINAL: No abdominal or epigastric pain. No nausea, vomiting, or hematemesis; No diarrhea or constipation. No melena or hematochezia.  GENITOURINARY: No dysuria, frequency, hematuria, or incontinence  NEUROLOGICAL: No headaches, memory loss, loss of strength, numbness, or tremors  SKIN: No itching, burning, rashes, or lesions   LYMPH NODES: No enlarged glands  ENDOCRINE: No heat or cold intolerance; No hair loss  MUSCULOSKELETAL: No joint pain or swelling; No muscle, back, or extremity pain  PSYCHIATRIC: No depression, anxiety, mood swings, or difficulty sleeping  HEME/LYMPH: No easy bruising, or bleeding gums      Home Medications:  amLODIPine 5 mg oral tablet: 5 tab(s) orally once a day (03 Dec 2022 15:42)  clonazePAM 0.5 mg oral tablet: 0.5  orally once a day, in the am (03 Dec 2022 15:42)  clonazePAM 1 mg oral tablet: 1 milligram(s) orally once a day (at bedtime) (03 Dec 2022 15:42)  HYDROmorphone 4 mg oral tablet: 1 tab(s) orally every 6 hours (03 Dec 2022 14:25)  insulin glargine: 30 unit(s) subcutaneous once a day (03 Dec 2022 15:42)  Jardiance 25 mg oral tablet: 1 tab(s) orally once a day (in the morning) (03 Dec 2022 15:42)  lisinopril 10 mg oral tablet: 1 tab(s) orally once a day (03 Dec 2022 14:25)  Lyrica 150 mg oral capsule: 1 cap(s) orally 3 times a day (03 Dec 2022 14:25)  metFORMIN 850 mg oral tablet: orally 2 times a day (03 Dec 2022 15:42)  methadone 5 mg oral tablet: orally 4 times a day, As Needed (03 Dec 2022 14:25)  Vitamin D2 50,000 intl units (1.25 mg) oral capsule:  (03 Dec 2022 14:25)  Zoloft 100 mg oral tablet: 1 tab(s) orally 2 times a day (03 Dec 2022 15:42)      PAST MEDICAL & SURGICAL HISTORY:  Hypertension      Diabetes mellitus      Gastric ulcer      Spinal stenosis      H/O spinal cord compression      Spondylosis      H/O radiculopathy      H/O cervical spine surgery      History of back surgery      S/P cervical spinal fusion        Allergies    No Known Allergies    Intolerances        Social History:     FAMILY HISTORY:  FH: hypertension (Father)    FH: diabetes mellitus (Mother)       Vital Signs Last 24 Hrs  T(C): 36.6 (05 Dec 2022 04:45), Max: 36.6 (05 Dec 2022 04:45)  T(F): 97.9 (05 Dec 2022 04:45), Max: 97.9 (05 Dec 2022 04:45)  HR: 65 (05 Dec 2022 04:45) (56 - 85)  BP: 116/74 (05 Dec 2022 04:45) (97/53 - 141/88)  BP(mean): 84 (04 Dec 2022 18:46) (68 - 98)  RR: 18 (05 Dec 2022 04:45) (9 - 24)  SpO2: 95% (05 Dec 2022 04:45) (92% - 97%)    Parameters below as of 05 Dec 2022 04:45  Patient On (Oxygen Delivery Method): room air      CAPILLARY BLOOD GLUCOSE      POCT Blood Glucose.: 193 mg/dL (04 Dec 2022 21:19)  POCT Blood Glucose.: 192 mg/dL (04 Dec 2022 17:08)  POCT Blood Glucose.: 180 mg/dL (04 Dec 2022 12:55)  POCT Blood Glucose.: 168 mg/dL (04 Dec 2022 09:02)        LABS:                        11.6   8.66  )-----------( 391      ( 03 Dec 2022 23:42 )             35.8     12-03    136  |  100  |  26<H>  ----------------------------<  148<H>  4.1   |  24  |  0.94    Ca    9.4      03 Dec 2022 23:42  Phos  2.5     12-03  Mg     1.7     12-03    TPro  6.6  /  Alb  3.7  /  TBili  0.2  /  DBili  x   /  AST  7<L>  /  ALT  14  /  AlkPhos  78  12-03              Culture - Urine (collected 02 Dec 2022 23:16)  Source: Clean Catch Clean Catch (Midstream)  Final Report (04 Dec 2022 13:07):    <10,000 CFU/mL Normal Urogenital Grecia        MEDICATIONS  (STANDING):  apixaban 5 milliGRAM(s) Oral every 12 hours  atorvastatin 80 milliGRAM(s) Oral at bedtime  chlorhexidine 4% Liquid 1 Application(s) Topical <User Schedule>  clonazePAM  Tablet 1 milliGRAM(s) Oral at bedtime  clopidogrel Tablet 75 milliGRAM(s) Oral daily  dextrose 5%. 1000 milliLiter(s) (50 mL/Hr) IV Continuous <Continuous>  dextrose 5%. 1000 milliLiter(s) (100 mL/Hr) IV Continuous <Continuous>  dextrose 50% Injectable 25 Gram(s) IV Push once  dextrose 50% Injectable 12.5 Gram(s) IV Push once  dextrose 50% Injectable 25 Gram(s) IV Push once  glucagon  Injectable 1 milliGRAM(s) IntraMuscular once  influenza   Vaccine 0.5 milliLiter(s) IntraMuscular once  insulin glargine Injectable (LANTUS) 15 Unit(s) SubCutaneous every morning  insulin lispro (ADMELOG) corrective regimen sliding scale   SubCutaneous three times a day before meals  insulin lispro (ADMELOG) corrective regimen sliding scale   SubCutaneous at bedtime  insulin lispro Injectable (ADMELOG) 5 Unit(s) SubCutaneous before breakfast  insulin lispro Injectable (ADMELOG) 5 Unit(s) SubCutaneous before lunch  insulin lispro Injectable (ADMELOG) 5 Unit(s) SubCutaneous before dinner  methadone    Tablet 5 milliGRAM(s) Oral every 8 hours  pregabalin 150 milliGRAM(s) Oral every 12 hours    MEDICATIONS  (PRN):  dextrose Oral Gel 15 Gram(s) Oral once PRN Blood Glucose LESS THAN 70 milliGRAM(s)/deciliter  HYDROmorphone   Tablet 4 milliGRAM(s) Oral four times a day PRN Moderate Pain (4 - 6)

## 2022-12-05 NOTE — DIETITIAN INITIAL EVALUATION ADULT - OTHER INFO
NKFA per patient. Patient weighing in same weight range of low to mid 250lbs as he was on 3/2022 admission, no weight changes since then reported.

## 2022-12-05 NOTE — DIETITIAN INITIAL EVALUATION ADULT - PERTINENT LABORATORY DATA
12-05    138  |  102  |  22  ----------------------------<  164<H>  4.1   |  25  |  0.92    Ca    9.6      05 Dec 2022 07:01  Phos  3.2     12-05  Mg     1.5     12-05    TPro  6.6  /  Alb  4.0  /  TBili  0.2  /  DBili  x   /  AST  6<L>  /  ALT  11  /  AlkPhos  75  12-05  POCT Blood Glucose.: 174 mg/dL (12-05-22 @ 12:31)  A1C with Estimated Average Glucose Result: 8.1 % (12-03-22 @ 12:43)  A1C with Estimated Average Glucose Result: 11.2 % (03-02-22 @ 01:48)

## 2022-12-05 NOTE — PROGRESS NOTE ADULT - SUBJECTIVE AND OBJECTIVE BOX
Diabetes Follow up note:    Chief complaint: T2DM    Interval Hx: BG values 150-190s over past 24 hours. Pt seen at bedside. Reports has been taking Metformin/Jardiance/[id number] w/meals/Lantus 36QAM prior to admission. Reports having frequent lows at home, monitoring glucose w/cora. Follows outpt endo Dr Buitrago. Reports tolerating POs. Typically eats 2 meals a day at home (breakfast and dinner).     Review of Systems:  General: denies pain  GI: Tolerating POs. Denies N/V/D/Abd pain  CV: Denies CP/SOB  ENDO: No S&Sx of hypoglycemia    MEDS:  atorvastatin 80 milliGRAM(s) Oral at bedtime    insulin lispro (ADMELOG) corrective regimen sliding scale   SubCutaneous three times a day before meals  insulin lispro (ADMELOG) corrective regimen sliding scale   SubCutaneous at bedtime  insulin lispro Injectable (ADMELOG) 6 Unit(s) SubCutaneous before lunch  insulin lispro Injectable (ADMELOG) 6 Unit(s) SubCutaneous before dinner      Allergies    No Known Allergies        PE:  General: Male lying in bed. NAD  Vital Signs Last 24 Hrs  T(C): 36.5 (05 Dec 2022 12:14), Max: 36.6 (05 Dec 2022 04:45)  T(F): 97.7 (05 Dec 2022 12:14), Max: 97.9 (05 Dec 2022 04:45)  HR: 77 (05 Dec 2022 12:14) (56 - 85)  BP: 146/87 (05 Dec 2022 12:14) (102/66 - 146/87)  BP(mean): 84 (04 Dec 2022 18:46) (75 - 86)  RR: 18 (05 Dec 2022 12:14) (9 - 24)  SpO2: 95% (05 Dec 2022 12:14) (92% - 96%)    Parameters below as of 05 Dec 2022 12:14  Patient On (Oxygen Delivery Method): room air      Abd: Soft, NT,ND,   Extremities: Warm  Neuro: A&O X3    LABS:  POCT Blood Glucose.: 174 mg/dL (12-05-22 @ 12:31)  POCT Blood Glucose.: 152 mg/dL (12-05-22 @ 08:06)  POCT Blood Glucose.: 193 mg/dL (12-04-22 @ 21:19)  POCT Blood Glucose.: 192 mg/dL (12-04-22 @ 17:08)  POCT Blood Glucose.: 180 mg/dL (12-04-22 @ 12:55)  POCT Blood Glucose.: 168 mg/dL (12-04-22 @ 09:02)  POCT Blood Glucose.: 139 mg/dL (12-04-22 @ 03:54)  POCT Blood Glucose.: 165 mg/dL (12-03-22 @ 21:22)  POCT Blood Glucose.: 180 mg/dL (12-03-22 @ 17:09)  POCT Blood Glucose.: 209 mg/dL (12-03-22 @ 14:09)  POCT Blood Glucose.: 206 mg/dL (12-03-22 @ 13:24)  POCT Blood Glucose.: 252 mg/dL (12-03-22 @ 12:02)  POCT Blood Glucose.: 244 mg/dL (12-03-22 @ 11:04)  POCT Blood Glucose.: 263 mg/dL (12-03-22 @ 10:11)  POCT Blood Glucose.: 260 mg/dL (12-03-22 @ 09:08)  POCT Blood Glucose.: 206 mg/dL (12-03-22 @ 08:23)  POCT Blood Glucose.: 261 mg/dL (12-03-22 @ 07:14)  POCT Blood Glucose.: 214 mg/dL (12-03-22 @ 06:15)  POCT Blood Glucose.: 224 mg/dL (12-03-22 @ 05:11)  POCT Blood Glucose.: 276 mg/dL (12-03-22 @ 04:06)  POCT Blood Glucose.: 254 mg/dL (12-03-22 @ 03:10)  POCT Blood Glucose.: 209 mg/dL (12-03-22 @ 02:09)  POCT Blood Glucose.: 167 mg/dL (12-03-22 @ 01:12)  POCT Blood Glucose.: 165 mg/dL (12-03-22 @ 00:25)  POCT Blood Glucose.: 149 mg/dL (12-02-22 @ 23:46)  POCT Blood Glucose.: 139 mg/dL (12-02-22 @ 23:10)  POCT Blood Glucose.: 139 mg/dL (12-02-22 @ 22:47)  POCT Blood Glucose.: 70 mg/dL (12-02-22 @ 22:10)  POCT Blood Glucose.: 82 mg/dL (12-02-22 @ 21:34)  POCT Blood Glucose.: 103 mg/dL (12-02-22 @ 21:17)  POCT Blood Glucose.: 104 mg/dL (12-02-22 @ 20:59)  POCT Blood Glucose.: 76 mg/dL (12-02-22 @ 20:34)  POCT Blood Glucose.: 96 mg/dL (12-02-22 @ 20:19)  POCT Blood Glucose.: 139 mg/dL (12-02-22 @ 20:00)  POCT Blood Glucose.: 49 mg/dL (12-02-22 @ 19:42)                            12.5   7.44  )-----------( 367      ( 05 Dec 2022 07:01 )             39.2       12-05    138  |  102  |  22  ----------------------------<  164<H>  4.1   |  25  |  0.92    eGFR: 98    Ca    9.6      12-05  Mg     1.5     12-05  Phos  3.2     12-05    TPro  6.6  /  Alb  4.0  /  TBili  0.2  /  DBili  x   /  AST  6<L>  /  ALT  11  /  AlkPhos  75  12-05      Thyroid Function Tests:  12-02 @ 20:23 TSH 2.46 FreeT4 -- T3 79 Anti TPO -- Anti Thyroglobulin Ab -- TSI --      A1C with Estimated Average Glucose Result: 8.1 % (12-03-22 @ 12:43)  A1C with Estimated Average Glucose Result: 11.2 % (03-02-22 @ 01:48)      C-Peptide, Serum: 0.9 ng/mL (12-02 @ 23:58)      Contact number: lou [phone] or [phone]

## 2022-12-05 NOTE — DIETITIAN INITIAL EVALUATION ADULT - ADD RECOMMEND
- continue current diet as ordered of: consistent carbohydrate diet  - encourage PO intake, protein source with each meal and carbohydrate source with each meal as tolerated  - hypomagnesemic today: supplementation noted, continue to monitor electrolyte trends closely and supplement as appropriate  - monitor adequacy of PO intake, weight trend, electrolytes, labs, blood glucose levels
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
## 2022-12-05 NOTE — PROGRESS NOTE ADULT - ASSESSMENT
57y Male with pmhx of HTN, DM2 c/b DKA, neuropathy, chronic pain on Methadone & opioids for cervical spine and back injury s/p fusion C2-6 along with LUE nerve transplant, MI, Afib on AC p/w near syncopal episode found to be hypotensive and hypoglycemic on presentation likely in the setting of extensive diabetic regimen, not eating, depression, overuse of anti HTN along with pain  medications c/b JAXON with HAGMA and lactic acidosis in the setting of N/V and metformin use. The pt has greatly improved- +AGMA resolved, RF now normalized, BG greatly improved, remains hemodynamically stable w/o re-start of pt home anti HTN.

## 2022-12-05 NOTE — DIETITIAN INITIAL EVALUATION ADULT - PERTINENT MEDS FT
MEDICATIONS  (STANDING):  apixaban 5 milliGRAM(s) Oral every 12 hours  atorvastatin 80 milliGRAM(s) Oral at bedtime  carvedilol 3.125 milliGRAM(s) Oral every 12 hours  chlorhexidine 4% Liquid 1 Application(s) Topical <User Schedule>  clonazePAM  Tablet 1 milliGRAM(s) Oral at bedtime  clopidogrel Tablet 75 milliGRAM(s) Oral daily  dextrose 5%. 1000 milliLiter(s) (50 mL/Hr) IV Continuous <Continuous>  dextrose 5%. 1000 milliLiter(s) (100 mL/Hr) IV Continuous <Continuous>  dextrose 50% Injectable 25 Gram(s) IV Push once  dextrose 50% Injectable 12.5 Gram(s) IV Push once  dextrose 50% Injectable 25 Gram(s) IV Push once  glucagon  Injectable 1 milliGRAM(s) IntraMuscular once  influenza   Vaccine 0.5 milliLiter(s) IntraMuscular once  insulin lispro (ADMELOG) corrective regimen sliding scale   SubCutaneous three times a day before meals  insulin lispro (ADMELOG) corrective regimen sliding scale   SubCutaneous at bedtime  insulin lispro Injectable (ADMELOG) 6 Unit(s) SubCutaneous before lunch  insulin lispro Injectable (ADMELOG) 6 Unit(s) SubCutaneous before dinner  methadone    Tablet 5 milliGRAM(s) Oral every 8 hours  pregabalin 150 milliGRAM(s) Oral every 12 hours  sertraline 25 milliGRAM(s) Oral daily    MEDICATIONS  (PRN):  dextrose Oral Gel 15 Gram(s) Oral once PRN Blood Glucose LESS THAN 70 milliGRAM(s)/deciliter  HYDROmorphone   Tablet 4 milliGRAM(s) Oral four times a day PRN Moderate Pain (4 - 6)

## 2022-12-05 NOTE — DIETITIAN INITIAL EVALUATION ADULT - NSFNSADHERENCEPTAFT_GEN_A_CORE
Patient reports upon his last d/c (3/2022), he was on a better track with following a DM diet, but eventually was not adhering as well.

## 2022-12-05 NOTE — BH CONSULTATION LIAISON ASSESSMENT NOTE - CURRENT MEDICATION
MEDICATIONS  (STANDING):  apixaban 5 milliGRAM(s) Oral every 12 hours  atorvastatin 80 milliGRAM(s) Oral at bedtime  carvedilol 3.125 milliGRAM(s) Oral every 12 hours  chlorhexidine 4% Liquid 1 Application(s) Topical <User Schedule>  clonazePAM  Tablet 1 milliGRAM(s) Oral at bedtime  clopidogrel Tablet 75 milliGRAM(s) Oral daily  dextrose 5%. 1000 milliLiter(s) (50 mL/Hr) IV Continuous <Continuous>  dextrose 5%. 1000 milliLiter(s) (100 mL/Hr) IV Continuous <Continuous>  dextrose 50% Injectable 25 Gram(s) IV Push once  dextrose 50% Injectable 12.5 Gram(s) IV Push once  dextrose 50% Injectable 25 Gram(s) IV Push once  glucagon  Injectable 1 milliGRAM(s) IntraMuscular once  influenza   Vaccine 0.5 milliLiter(s) IntraMuscular once  insulin glargine Injectable (LANTUS) 15 Unit(s) SubCutaneous every morning  insulin lispro (ADMELOG) corrective regimen sliding scale   SubCutaneous three times a day before meals  insulin lispro (ADMELOG) corrective regimen sliding scale   SubCutaneous at bedtime  insulin lispro Injectable (ADMELOG) 5 Unit(s) SubCutaneous before breakfast  insulin lispro Injectable (ADMELOG) 5 Unit(s) SubCutaneous before lunch  insulin lispro Injectable (ADMELOG) 5 Unit(s) SubCutaneous before dinner  methadone    Tablet 5 milliGRAM(s) Oral every 8 hours  pregabalin 150 milliGRAM(s) Oral every 12 hours  sertraline 25 milliGRAM(s) Oral daily    MEDICATIONS  (PRN):  dextrose Oral Gel 15 Gram(s) Oral once PRN Blood Glucose LESS THAN 70 milliGRAM(s)/deciliter  HYDROmorphone   Tablet 4 milliGRAM(s) Oral four times a day PRN Moderate Pain (4 - 6)

## 2022-12-05 NOTE — CHART NOTE - NSCHARTNOTEFT_GEN_A_CORE
57y Male with pmhx of HTN, DM2 c/b DKA, neuropathy, chronic pain on Methadone & opioids for cervical spine and back injury s/p fusion C2-6 along with LUE nerve transplant, MI, Afib on AC p/w near syncopal episode found to be hypotensive and hypoglycemic on presentation likely in the setting of extensive diabetic regimen, not eating, depression, overuse of anti HTN along with pain  medications c/b JAXON with HAGMA and lactic acidosis in the setting of N/V and metformin use. The pt has greatly improved- +AGMA resolved, RF now normalized, BG greatly improved, remains hemodynamically stable w/o re-start of pt home anti HTN.    Patient seen and examined at bedside. States that he feels well denies any CP, SOB, abd pain and n/v. No acute events overnight       Near syncope likely multifactorial hypoglycemia and Hypotension i/s/o decreased PO intake with continued use of diabetes meds and  htn polypharmacy respectively. antidepressant and opioid use  - dc'ed tele, no arrythmia noted   -Orthostatic hypotention resolved s/p IVF   - CT of the brain negative- c/w neuro checks as per unit routine    T2DM (type 2 diabetes mellitus) (pt had been on n 30u lantus, 12-16u admelog, metformin and jardiance at home)  -endocrine consulted in MICU, cont to follow recs  -c/w lantus 18u qhs  -c/w lispro 6u TID  -AISS.    Hypertension: at home takes amlodipine 10mg, coreg 25 BID and lisinopril 10 at home for BP control  -continue to hold  home anti-HTN meds  -can resume Coreg 3.125BID today     History of MI (myocardial infarction). (History of MI w PCI in 3/2022 s/p LCX POBA)  -c/w  plavix 75 mg daily, atorvastatin 80mg daily, eliquis 5mg BID,    Chronic generalized pain:   --c/w methadone, Lyrica , Dilaudid    JAXON resolved     Major depression. (had been on zoloft and clonazepam at home and held on admission)  -consider psych consulted  - restarted zoloft 25mg daily and Clonazepam 1mg HS     d/w Medicine ACP Jillian

## 2022-12-05 NOTE — DIETITIAN INITIAL EVALUATION ADULT - ORAL INTAKE PTA/DIET HISTORY
Patient reports his PO intake was at his baseline prior to 4 days of emesis (patient's routine currently is something small in the AM then dinner in the evening). Denied chewing/swallowing impairment or any current nausea/vomiting on admission. Patient states he has a glucose sensor he uses at home and follows his outpatient endocrinologist for blood glucose management.

## 2022-12-05 NOTE — DIETITIAN INITIAL EVALUATION ADULT - LAB (SPECIFY)
Monitor daily labs, electrolytes (prior hypophosphatemia noted, recurrent hypomagnesemia on admission including today, continue to supplement as appropriate (received supplement today noted) and monitor trend closely), blood glucose levels (severely hypoglycemic on admission, DM)

## 2022-12-05 NOTE — DIETITIAN INITIAL EVALUATION ADULT - PERSON TAUGHT/METHOD
DM diet principles, maintaining adequate PO intake/eating routine for prevention of hypoglycemia/verbal instruction/written material/teach back - (Patient repeats in own words)/patient instructed

## 2022-12-05 NOTE — BH CONSULTATION LIAISON ASSESSMENT NOTE - NSBHCHARTREVIEWLAB_PSY_A_CORE FT
12.5   7.44  )-----------( 367      ( 05 Dec 2022 07:01 )             39.2     12-05    138  |  102  |  22  ----------------------------<  164<H>  4.1   |  25  |  0.92    Ca    9.6      05 Dec 2022 07:01  Phos  3.2     12-05  Mg     1.5     12-05    TPro  6.6  /  Alb  4.0  /  TBili  0.2  /  DBili  x   /  AST  6<L>  /  ALT  11  /  AlkPhos  75  12-05

## 2022-12-05 NOTE — BH CONSULTATION LIAISON ASSESSMENT NOTE - RISK ASSESSMENT
Risk factors: chronic pain    Protective factors: no current SIIP/HIIP, no h/o SA/SIB, no h/o psych admissions, no active substance abuse, no psychosis, dependent children, domiciled, positive therapeutic relationship, engaged in treatment, compliant with treatment.     Overall, pt is a low risk of harm to self/others and does not meet criteria for psychiatric admission.
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
## 2022-12-05 NOTE — BH CONSULTATION LIAISON ASSESSMENT NOTE - DESCRIPTION
-Lives alone (sometimes girlfriend lives there)  -Has 2 children that are his dependent (they do not live with him)  -  -On disability (was a )

## 2022-12-05 NOTE — PROGRESS NOTE ADULT - ASSESSMENT
57y Male with pmhx of HTN, DM2 (A1C8.1%)  c/b DKA, neuropathy, chronic pain on Methadone & opioids for cervical spine and back injury, MI, Afib on AC p/w near syncopal episode in the setting of hypoglycemia. Tolerating POs w/BG values stable on current insulin regimen. Discussed likely now requiring less insulin based on his frequent lows at home and much lower requirements while inpatient. Tolerating POs. Discussed adjustment of basal/bolus regimen upon discharge to improve A1C/glycemic control. BG goal (100-180mg/dl).

## 2022-12-05 NOTE — BH CONSULTATION LIAISON ASSESSMENT NOTE - NSBHCHARTREVIEWVS_PSY_A_CORE FT
Vital Signs Last 24 Hrs  T(C): 36.5 (05 Dec 2022 08:48), Max: 36.6 (05 Dec 2022 04:45)  T(F): 97.7 (05 Dec 2022 08:48), Max: 97.9 (05 Dec 2022 04:45)  HR: 62 (05 Dec 2022 08:48) (56 - 85)  BP: 135/84 (05 Dec 2022 08:48) (97/53 - 141/88)  BP(mean): 84 (04 Dec 2022 18:46) (68 - 86)  RR: 18 (05 Dec 2022 08:48) (9 - 24)  SpO2: 95% (05 Dec 2022 08:48) (92% - 96%)    Parameters below as of 05 Dec 2022 08:48  Patient On (Oxygen Delivery Method): room air

## 2022-12-06 ENCOUNTER — TRANSCRIPTION ENCOUNTER (OUTPATIENT)
Age: 57
End: 2022-12-06

## 2022-12-06 LAB
ANION GAP SERPL CALC-SCNC: 13 MMOL/L — SIGNIFICANT CHANGE UP (ref 5–17)
BUN SERPL-MCNC: 22 MG/DL — SIGNIFICANT CHANGE UP (ref 7–23)
CALCIUM SERPL-MCNC: 9.4 MG/DL — SIGNIFICANT CHANGE UP (ref 8.4–10.5)
CHLORIDE SERPL-SCNC: 101 MMOL/L — SIGNIFICANT CHANGE UP (ref 96–108)
CO2 SERPL-SCNC: 24 MMOL/L — SIGNIFICANT CHANGE UP (ref 22–31)
CREAT SERPL-MCNC: 0.84 MG/DL — SIGNIFICANT CHANGE UP (ref 0.5–1.3)
EGFR: 103 ML/MIN/1.73M2 — SIGNIFICANT CHANGE UP
GLUCOSE BLDC GLUCOMTR-MCNC: 160 MG/DL — HIGH (ref 70–99)
GLUCOSE BLDC GLUCOMTR-MCNC: 181 MG/DL — HIGH (ref 70–99)
GLUCOSE BLDC GLUCOMTR-MCNC: 182 MG/DL — HIGH (ref 70–99)
GLUCOSE BLDC GLUCOMTR-MCNC: 182 MG/DL — HIGH (ref 70–99)
GLUCOSE BLDC GLUCOMTR-MCNC: 240 MG/DL — HIGH (ref 70–99)
GLUCOSE SERPL-MCNC: 186 MG/DL — HIGH (ref 70–99)
HCT VFR BLD CALC: 36.7 % — LOW (ref 39–50)
HGB BLD-MCNC: 11.4 G/DL — LOW (ref 13–17)
MAGNESIUM SERPL-MCNC: 1.4 MG/DL — LOW (ref 1.6–2.6)
MCHC RBC-ENTMCNC: 26 PG — LOW (ref 27–34)
MCHC RBC-ENTMCNC: 31.1 GM/DL — LOW (ref 32–36)
MCV RBC AUTO: 83.6 FL — SIGNIFICANT CHANGE UP (ref 80–100)
NRBC # BLD: 0 /100 WBCS — SIGNIFICANT CHANGE UP (ref 0–0)
PLATELET # BLD AUTO: 359 K/UL — SIGNIFICANT CHANGE UP (ref 150–400)
POTASSIUM SERPL-MCNC: 4.1 MMOL/L — SIGNIFICANT CHANGE UP (ref 3.5–5.3)
POTASSIUM SERPL-SCNC: 4.1 MMOL/L — SIGNIFICANT CHANGE UP (ref 3.5–5.3)
RBC # BLD: 4.39 M/UL — SIGNIFICANT CHANGE UP (ref 4.2–5.8)
RBC # FLD: 15 % — HIGH (ref 10.3–14.5)
SODIUM SERPL-SCNC: 138 MMOL/L — SIGNIFICANT CHANGE UP (ref 135–145)
WBC # BLD: 8.58 K/UL — SIGNIFICANT CHANGE UP (ref 3.8–10.5)
WBC # FLD AUTO: 8.58 K/UL — SIGNIFICANT CHANGE UP (ref 3.8–10.5)

## 2022-12-06 PROCEDURE — 99231 SBSQ HOSP IP/OBS SF/LOW 25: CPT

## 2022-12-06 PROCEDURE — 99232 SBSQ HOSP IP/OBS MODERATE 35: CPT

## 2022-12-06 RX ORDER — MAGNESIUM SULFATE 500 MG/ML
2 VIAL (ML) INJECTION ONCE
Refills: 0 | Status: COMPLETED | OUTPATIENT
Start: 2022-12-06 | End: 2022-12-06

## 2022-12-06 RX ADMIN — HYDROMORPHONE HYDROCHLORIDE 4 MILLIGRAM(S): 2 INJECTION INTRAMUSCULAR; INTRAVENOUS; SUBCUTANEOUS at 13:04

## 2022-12-06 RX ADMIN — METHADONE HYDROCHLORIDE 5 MILLIGRAM(S): 40 TABLET ORAL at 08:30

## 2022-12-06 RX ADMIN — Medication 6 UNIT(S): at 17:29

## 2022-12-06 RX ADMIN — Medication 1: at 08:29

## 2022-12-06 RX ADMIN — HYDROMORPHONE HYDROCHLORIDE 4 MILLIGRAM(S): 2 INJECTION INTRAMUSCULAR; INTRAVENOUS; SUBCUTANEOUS at 05:46

## 2022-12-06 RX ADMIN — CARVEDILOL PHOSPHATE 3.12 MILLIGRAM(S): 80 CAPSULE, EXTENDED RELEASE ORAL at 05:46

## 2022-12-06 RX ADMIN — INSULIN GLARGINE 18 UNIT(S): 100 INJECTION, SOLUTION SUBCUTANEOUS at 08:30

## 2022-12-06 RX ADMIN — Medication 0.5 MILLIGRAM(S): at 10:54

## 2022-12-06 RX ADMIN — Medication 25 GRAM(S): at 10:20

## 2022-12-06 RX ADMIN — HYDROMORPHONE HYDROCHLORIDE 4 MILLIGRAM(S): 2 INJECTION INTRAMUSCULAR; INTRAVENOUS; SUBCUTANEOUS at 18:42

## 2022-12-06 RX ADMIN — APIXABAN 5 MILLIGRAM(S): 2.5 TABLET, FILM COATED ORAL at 17:29

## 2022-12-06 RX ADMIN — Medication 6 UNIT(S): at 13:00

## 2022-12-06 RX ADMIN — Medication 1: at 13:00

## 2022-12-06 RX ADMIN — Medication 1: at 17:29

## 2022-12-06 RX ADMIN — CARVEDILOL PHOSPHATE 3.12 MILLIGRAM(S): 80 CAPSULE, EXTENDED RELEASE ORAL at 17:29

## 2022-12-06 RX ADMIN — Medication 150 MILLIGRAM(S): at 17:29

## 2022-12-06 RX ADMIN — METHADONE HYDROCHLORIDE 5 MILLIGRAM(S): 40 TABLET ORAL at 15:54

## 2022-12-06 RX ADMIN — METHADONE HYDROCHLORIDE 5 MILLIGRAM(S): 40 TABLET ORAL at 21:02

## 2022-12-06 RX ADMIN — SERTRALINE 25 MILLIGRAM(S): 25 TABLET, FILM COATED ORAL at 13:01

## 2022-12-06 RX ADMIN — Medication 6 UNIT(S): at 08:29

## 2022-12-06 RX ADMIN — Medication 1 MILLIGRAM(S): at 21:03

## 2022-12-06 RX ADMIN — ATORVASTATIN CALCIUM 80 MILLIGRAM(S): 80 TABLET, FILM COATED ORAL at 21:02

## 2022-12-06 RX ADMIN — Medication 150 MILLIGRAM(S): at 05:46

## 2022-12-06 RX ADMIN — APIXABAN 5 MILLIGRAM(S): 2.5 TABLET, FILM COATED ORAL at 05:47

## 2022-12-06 RX ADMIN — CLOPIDOGREL BISULFATE 75 MILLIGRAM(S): 75 TABLET, FILM COATED ORAL at 13:01

## 2022-12-06 NOTE — PROGRESS NOTE ADULT - NSPROGADDITIONALINFOA_GEN_ALL_CORE
26 minutes spent on the development of plan of care/coordination of care/glycemic control through review of labs, blood glucose values and vital signs.
d/w Medicine ACP Arleen

## 2022-12-06 NOTE — DISCHARGE NOTE NURSING/CASE MANAGEMENT/SOCIAL WORK - NSDCVIVACCINE_GEN_ALL_CORE_FT
Tdap; 24-Oct-2021 12:15; Jahaira Ruiz (RN); [employer]; [id number] (Exp. Date: 29-Jul-2023); IntraMuscular; Deltoid Right.; 0.5 milliLiter(s); VIS (VIS Published: 09-May-2013, VIS Presented: 24-Oct-2021);

## 2022-12-06 NOTE — PROGRESS NOTE ADULT - PROBLEM SELECTOR PLAN 7
-Diet: DASH/TLC/CC  -DVT ppx: Eliquis BID    Patient is medically stable for d/c home today. will be given 24 hr d/c notice.   time spent 40 min
-Diet: DASH/TLC/CC  -DVT ppx: Eliquis BID

## 2022-12-06 NOTE — PROGRESS NOTE ADULT - PROBLEM SELECTOR PLAN 2
goal BP in DM <130/80  c/w lisinopril   outpt mc/cr ratio
-endocrine consulted in MICU, cont to follow recs  -c/w lantus 18u qhs  -c/w lispro 6u TID  -AISS.  - Per Endo will be d/c'ed home on insulin + metformin
-pt had been on n 30u lantus, 12-16u admelog, metformin and jardiance at home  -c/b hypoglycemia d/t poor PO intake while taking DM meds  -continue to monitor FS closely  TID HS  -endocrine consulted in MICU, cont to follow recs  -c/w lantus 15u qhs  -c/w lispro 5u TID  -AISS

## 2022-12-06 NOTE — PROGRESS NOTE ADULT - ASSESSMENT
Patient is a 57 M with HTN, DM2 c/b DKA, neuropathy, chronic pain on Methadone & opioids for cervical spine and back injury, MI, Afib on AC p/w near syncopal episode in the setting of hypoglycemia. Endocrinology consulted for diabetes management.     1.  T2DM  - Most recent Hemoglobin A1C 8.1  c peptide 0.9  - Current FS ranges from 180-240  - Current diet: CHO  - Please monitor blood glucose values TID AC & QHS while eating regular meals and Q6H while NPO  - Blood glucose goals pre-meal less than 140 mg/dL and random blood glucose less than 180 mg/dL  - Recommendations:  - Continue with insulin Glargine 18 units  daily   - Continue with  insulin Lispro 6 units TID with meals, hold if NPO or if eating less than 50% of meals  - Continue with low dose correctional scale TID with meals  - Continue with low dose correctional scale QHS    Discharge planning:   - Patient will follow up with his endocrinologist Dr. Pope endocrine outpatient   - Home DM medications: - 12-16 units admelog pre-meal and 36 units Lantus at night time. Metformin and Jardiance   - Discharge DM medications: Lantus 18 units daily and Admelog 6-8 units (6 units with smaller meals and 8 units with larger meals). No jardiance in the setting of prior DKA. Can resume Metformin 850 mg BID   - Continue with the use of  CGM as outpatient     2. HTN  - BP goal 130/80  - Manage per primary team     3. HLD  -Continue with atorvastatin 80 mg daily   - Manage as outpatient       Thank you for the consult. Discussed with the primary team attending who is at the bedside. Contact via pager or [employer] Teams during business hours. For follow up questions, discharge recommendations, or new consults please call answering service at [phone] (weekdays), [phone] (nights/weekends). For nonurgent matters, please email  [email or web address].        Damaris Lomeli MD  Department of Endocrinology, Diabetes and metabolism   Pager [phone]

## 2022-12-06 NOTE — PROGRESS NOTE ADULT - SUBJECTIVE AND OBJECTIVE BOX
Patient is a 57y old  Male who presents with a chief complaint of Syncope and collapse      SUBJECTIVE / OVERNIGHT EVENTS: Patient seen and examined at bedside. states he feels well denies any CP, SOB, abd pain and n/v. No acute events overnight.     ROS:  All other review of systems negative    Allergies    No Known Allergies    Intolerances        MEDICATIONS  (STANDING):  apixaban 5 milliGRAM(s) Oral every 12 hours  atorvastatin 80 milliGRAM(s) Oral at bedtime  carvedilol 3.125 milliGRAM(s) Oral every 12 hours  chlorhexidine 4% Liquid 1 Application(s) Topical <User Schedule>  clonazePAM  Tablet 1 milliGRAM(s) Oral at bedtime  clopidogrel Tablet 75 milliGRAM(s) Oral daily  dextrose 5%. 1000 milliLiter(s) (50 mL/Hr) IV Continuous <Continuous>  dextrose 5%. 1000 milliLiter(s) (100 mL/Hr) IV Continuous <Continuous>  dextrose 50% Injectable 25 Gram(s) IV Push once  dextrose 50% Injectable 12.5 Gram(s) IV Push once  dextrose 50% Injectable 25 Gram(s) IV Push once  glucagon  Injectable 1 milliGRAM(s) IntraMuscular once  influenza   Vaccine 0.5 milliLiter(s) IntraMuscular once  insulin glargine Injectable (LANTUS) 18 Unit(s) SubCutaneous every morning  insulin lispro (ADMELOG) corrective regimen sliding scale   SubCutaneous three times a day before meals  insulin lispro (ADMELOG) corrective regimen sliding scale   SubCutaneous at bedtime  insulin lispro Injectable (ADMELOG) 6 Unit(s) SubCutaneous before breakfast  insulin lispro Injectable (ADMELOG) 6 Unit(s) SubCutaneous before lunch  insulin lispro Injectable (ADMELOG) 6 Unit(s) SubCutaneous before dinner  methadone    Tablet 5 milliGRAM(s) Oral every 8 hours  pregabalin 150 milliGRAM(s) Oral every 12 hours  sertraline 25 milliGRAM(s) Oral daily    MEDICATIONS  (PRN):  clonazePAM  Tablet 0.5 milliGRAM(s) Oral daily PRN anxiety  dextrose Oral Gel 15 Gram(s) Oral once PRN Blood Glucose LESS THAN 70 milliGRAM(s)/deciliter  HYDROmorphone   Tablet 4 milliGRAM(s) Oral four times a day PRN Moderate Pain (4 - 6)      Vital Signs Last 24 Hrs  T(C): 36.5 (06 Dec 2022 12:00), Max: 36.6 (05 Dec 2022 16:51)  T(F): 97.7 (06 Dec 2022 12:00), Max: 97.9 (06 Dec 2022 00:06)  HR: 76 (06 Dec 2022 12:00) (69 - 81)  BP: 137/89 (06 Dec 2022 12:00) (107/67 - 143/86)  BP(mean): --  RR: 18 (06 Dec 2022 12:00) (18 - 18)  SpO2: 97% (06 Dec 2022 12:00) (94% - 98%)    Parameters below as of 06 Dec 2022 12:00  Patient On (Oxygen Delivery Method): room air      CAPILLARY BLOOD GLUCOSE      POCT Blood Glucose.: 182 mg/dL (06 Dec 2022 12:24)  POCT Blood Glucose.: 240 mg/dL (06 Dec 2022 10:50)  POCT Blood Glucose.: 181 mg/dL (06 Dec 2022 08:14)  POCT Blood Glucose.: 148 mg/dL (05 Dec 2022 21:15)  POCT Blood Glucose.: 148 mg/dL (05 Dec 2022 17:04)    I&O's Summary    05 Dec 2022 07:01  -  06 Dec 2022 07:00  --------------------------------------------------------  IN: 480 mL / OUT: 650 mL / NET: -170 mL    06 Dec 2022 07:01  -  06 Dec 2022 12:48  --------------------------------------------------------  IN: 240 mL / OUT: 0 mL / NET: 240 mL        PHYSICAL EXAM:  GENERAL: NAD, well-developed  HEAD:  Atraumatic, Normocephalic  EYES: EOMI, PERRLA, conjunctiva and sclera clear  NECK: Supple, No JVD  CHEST/LUNG: Clear to auscultation bilaterally; No wheeze  HEART: Regular rate and rhythm; No murmurs, rubs, or gallops  ABDOMEN: Soft, Nontender, Nondistended; Bowel sounds present  EXTREMITIES:  2+ Peripheral Pulses, No clubbing, cyanosis, or edema  NEUROLOGY: AAOx3, non-focal  PSYCH: calm  SKIN: No rashes or lesions    LABS:                        11.4   8.58  )-----------( 359      ( 06 Dec 2022 07:29 )             36.7     12-06    138  |  101  |  22  ----------------------------<  186<H>  4.1   |  24  |  0.84    Ca    9.4      06 Dec 2022 07:24  Phos  3.2     12-05  Mg     1.4     12-06    TPro  6.6  /  Alb  4.0  /  TBili  0.2  /  DBili  x   /  AST  6<L>  /  ALT  11  /  AlkPhos  75  12-05              RADIOLOGY & ADDITIONAL TESTS:      Care Discussed with Consultants/Other Providers: Medicine ACP and Endo

## 2022-12-06 NOTE — PROGRESS NOTE ADULT - PROBLEM SELECTOR PLAN 6
-had been on zoloft and clonazepam at home and held on admission  -consider psych consult in regards to appropriate regimen given initial presentation
zoloft 25mg daily and Clonazepam 1mg HS   started on clonazepam PRN while inpatient per psych rec  Pt to follow up outpt psych 12/13

## 2022-12-06 NOTE — PROGRESS NOTE ADULT - SUBJECTIVE AND OBJECTIVE BOX
HPI:      Patient is a 57 M with HTN, DM2 c/b DKA, neuropathy, chronic pain on Methadone & opioids for cervical spine and back injury, MI, Afib on AC p/w near syncopal episode in the setting of hypoglycemia. Endocrinology consulted for diabetes management.     Home diabetes medications:   - 12-16 units admelog pre-meal and 36 units Lantus at night time.    Inpatient diabetes medications:   - Glargine 18 units daily   - Admelog 6 units TIDAC   Most recent HbA1C: 8.1        INTERVAL HPI/OVERNIGHT EVENTS:  SSeen at the bedside. Patient is worried that if he is discharged today. He wouldnt have enough help at home. Glucose ranges from 180-240.   Currently denies polydipsia, polyuria , visual changes, numbness in feet.      Review of systems:   CONSTITUTIONAL:  Feels well, good appetite  CARDIOVASCULAR:  Negative for chest pain or palpitations  RESPIRATORY:  Negative for cough, or SOB   GASTROINTESTINAL:  Negative for nausea, vomiting, or abdominal pain  GENITOURINARY:  Negative frequency, urgency or dysuria     CAPILLARY BLOOD GLUCOSE      POCT Blood Glucose.: 182 mg/dL (06 Dec 2022 12:24)  POCT Blood Glucose.: 240 mg/dL (06 Dec 2022 10:50)  POCT Blood Glucose.: 181 mg/dL (06 Dec 2022 08:14)  POCT Blood Glucose.: 148 mg/dL (05 Dec 2022 21:15)  POCT Blood Glucose.: 148 mg/dL (05 Dec 2022 17:04)       MEDICATIONS  (STANDING):  apixaban 5 milliGRAM(s) Oral every 12 hours  atorvastatin 80 milliGRAM(s) Oral at bedtime  carvedilol 3.125 milliGRAM(s) Oral every 12 hours  chlorhexidine 4% Liquid 1 Application(s) Topical <User Schedule>  clonazePAM  Tablet 1 milliGRAM(s) Oral at bedtime  clopidogrel Tablet 75 milliGRAM(s) Oral daily  dextrose 5%. 1000 milliLiter(s) (50 mL/Hr) IV Continuous <Continuous>  dextrose 5%. 1000 milliLiter(s) (100 mL/Hr) IV Continuous <Continuous>  dextrose 50% Injectable 25 Gram(s) IV Push once  dextrose 50% Injectable 12.5 Gram(s) IV Push once  dextrose 50% Injectable 25 Gram(s) IV Push once  glucagon  Injectable 1 milliGRAM(s) IntraMuscular once  influenza   Vaccine 0.5 milliLiter(s) IntraMuscular once  insulin glargine Injectable (LANTUS) 18 Unit(s) SubCutaneous every morning  insulin lispro (ADMELOG) corrective regimen sliding scale   SubCutaneous three times a day before meals  insulin lispro (ADMELOG) corrective regimen sliding scale   SubCutaneous at bedtime  insulin lispro Injectable (ADMELOG) 6 Unit(s) SubCutaneous before breakfast  insulin lispro Injectable (ADMELOG) 6 Unit(s) SubCutaneous before lunch  insulin lispro Injectable (ADMELOG) 6 Unit(s) SubCutaneous before dinner  methadone    Tablet 5 milliGRAM(s) Oral every 8 hours  pregabalin 150 milliGRAM(s) Oral every 12 hours  sertraline 25 milliGRAM(s) Oral daily    MEDICATIONS  (PRN):  clonazePAM  Tablet 0.5 milliGRAM(s) Oral daily PRN anxiety  dextrose Oral Gel 15 Gram(s) Oral once PRN Blood Glucose LESS THAN 70 milliGRAM(s)/deciliter  HYDROmorphone   Tablet 4 milliGRAM(s) Oral four times a day PRN Moderate Pain (4 - 6)      PHYSICAL EXAM  Vital Signs Last 24 Hrs  T(C): 36.5 (06 Dec 2022 12:00), Max: 36.6 (05 Dec 2022 16:51)  T(F): 97.7 (06 Dec 2022 12:00), Max: 97.9 (06 Dec 2022 00:06)  HR: 76 (06 Dec 2022 12:00) (69 - 81)  BP: 137/89 (06 Dec 2022 12:00) (107/67 - 143/86)  BP(mean): --  RR: 18 (06 Dec 2022 12:00) (18 - 18)  SpO2: 97% (06 Dec 2022 12:00) (94% - 98%)    Parameters below as of 06 Dec 2022 12:00  Patient On (Oxygen Delivery Method): room air        GENERAL: male laying in bed in NAD  RESPIRATORY: nonlabored breathing, no accessory muscle use  Extremities: Warm, no edema in all 4 exts   NEURO: A&O X3    LABS:                        11.4   8.58  )-----------( 359      ( 06 Dec 2022 07:29 )             36.7     12-06    138  |  101  |  22  ----------------------------<  186<H>  4.1   |  24  |  0.84    Ca    9.4      06 Dec 2022 07:24  Phos  3.2     12-05  Mg     1.4     12-06    TPro  6.6  /  Alb  4.0  /  TBili  0.2  /  DBili  x   /  AST  6<L>  /  ALT  11  /  AlkPhos  75  12-05        Thyroid Stimulating Hormone, Serum: 2.46 uIU/mL (12-02 @ 20:23)  Thyroid Stimulating Hormone, Serum: 0.43 uIU/mL (03-01 @ 23:21)

## 2022-12-06 NOTE — PROGRESS NOTE ADULT - PROBLEM SELECTOR PLAN 5
-c/w methadone, Lyrica , Dilaudid  -pain management f/u outpt
-c/w methadone, Lyrica , Dilaudid  -pain management f/u outpt

## 2022-12-06 NOTE — PROGRESS NOTE ADULT - PROBLEM SELECTOR PLAN 4
-History of MI w PCI in 3/2022 s/p LCX POBA  -c/w  plavix 75 mg daily, atorvastatin 80mg daily, eliquis 5mg BID,
-History of MI w PCI in 3/2022 s/p LCX POBA  -c/w  plavix 75 mg daily, atorvastatin 80mg daily, eliquis 5mg BID,

## 2022-12-06 NOTE — PROGRESS NOTE ADULT - PROBLEM SELECTOR PLAN 1
-test BG AC/HS  -Increase Lantus 18 units QAM  -Increase Admelog 6 units Ac meals  -c/w Admelog low correction scale Ac and low HS scale  -cons carb diet  -C-peptide still on low side indicating some insulin reserve. Discussed results w/pt, will stay on basal/bolus regimen w/adjustment of doses.   Dispo:  Basal/bolus w/dose adjustments + Jardiance 25mg daily and Metformin 850mg BID  Pt would benefit from Glucagon-Can prescribe Baqsimi on discharge given hx of severe lows. Pt lives alone but reports girlfriend is often with him  follows with Dr. Pope endocrine
- dc'ed tele, no arrythmia noted   -Orthostatic hypotension resolved s/p IVF   - CT of the brain negative- c/w neuro checks as per unit
-now resolved s/p multiple fluid boluses, improved PO intake and holding of antiHTN/diabetic regimens  -likely multi -factorial d/t Hypoglycemia and Hypotension i/s/o decreased PO intake with continued use of diabetes meds and  htn polypharmacy respectively. antidepressant and opioid use  -c/w tele monitoring; EKG so far w/o e/o arrhythmias   -+Orthostatic BP;s   - CT of the brain negative- c/w neuro checks as per unit routine  - can consider ioana carotid duplex / official echo

## 2022-12-06 NOTE — DISCHARGE NOTE NURSING/CASE MANAGEMENT/SOCIAL WORK - PATIENT PORTAL LINK FT
You can access the FollowMyHealth Patient Portal offered by [facility] by registering at the following website: [email or web address]. By joining [employer]’s FollowMyHealth portal, you will also be able to view your health information using other applications (apps) compatible with our system.

## 2022-12-07 ENCOUNTER — TRANSCRIPTION ENCOUNTER (OUTPATIENT)
Age: 57
End: 2022-12-07

## 2022-12-07 VITALS — WEIGHT: 238.1 LBS

## 2022-12-07 LAB
GAD65 AB SER-MCNC: 0 NMOL/L — SIGNIFICANT CHANGE UP
GLUCOSE BLDC GLUCOMTR-MCNC: 185 MG/DL — HIGH (ref 70–99)

## 2022-12-07 PROCEDURE — 82010 KETONE BODYS QUAN: CPT

## 2022-12-07 PROCEDURE — 99232 SBSQ HOSP IP/OBS MODERATE 35: CPT

## 2022-12-07 PROCEDURE — 83036 HEMOGLOBIN GLYCOSYLATED A1C: CPT

## 2022-12-07 PROCEDURE — 97116 GAIT TRAINING THERAPY: CPT

## 2022-12-07 PROCEDURE — 82330 ASSAY OF CALCIUM: CPT

## 2022-12-07 PROCEDURE — 96374 THER/PROPH/DIAG INJ IV PUSH: CPT

## 2022-12-07 PROCEDURE — 85730 THROMBOPLASTIN TIME PARTIAL: CPT

## 2022-12-07 PROCEDURE — 99239 HOSP IP/OBS DSCHRG MGMT >30: CPT

## 2022-12-07 PROCEDURE — 83880 ASSAY OF NATRIURETIC PEPTIDE: CPT

## 2022-12-07 PROCEDURE — 99285 EMERGENCY DEPT VISIT HI MDM: CPT | Mod: 25

## 2022-12-07 PROCEDURE — 84681 ASSAY OF C-PEPTIDE: CPT

## 2022-12-07 PROCEDURE — 80048 BASIC METABOLIC PNL TOTAL CA: CPT

## 2022-12-07 PROCEDURE — 82435 ASSAY OF BLOOD CHLORIDE: CPT

## 2022-12-07 PROCEDURE — 81003 URINALYSIS AUTO W/O SCOPE: CPT

## 2022-12-07 PROCEDURE — 82962 GLUCOSE BLOOD TEST: CPT

## 2022-12-07 PROCEDURE — 83690 ASSAY OF LIPASE: CPT

## 2022-12-07 PROCEDURE — 96376 TX/PRO/DX INJ SAME DRUG ADON: CPT

## 2022-12-07 PROCEDURE — 83735 ASSAY OF MAGNESIUM: CPT

## 2022-12-07 PROCEDURE — 84436 ASSAY OF TOTAL THYROXINE: CPT

## 2022-12-07 PROCEDURE — 97165 OT EVAL LOW COMPLEX 30 MIN: CPT

## 2022-12-07 PROCEDURE — 82533 TOTAL CORTISOL: CPT

## 2022-12-07 PROCEDURE — 85025 COMPLETE CBC W/AUTO DIFF WBC: CPT

## 2022-12-07 PROCEDURE — 84443 ASSAY THYROID STIM HORMONE: CPT

## 2022-12-07 PROCEDURE — 80053 COMPREHEN METABOLIC PANEL: CPT

## 2022-12-07 PROCEDURE — 82565 ASSAY OF CREATININE: CPT

## 2022-12-07 PROCEDURE — 70450 CT HEAD/BRAIN W/O DYE: CPT | Mod: QQ

## 2022-12-07 PROCEDURE — 84484 ASSAY OF TROPONIN QUANT: CPT

## 2022-12-07 PROCEDURE — 85610 PROTHROMBIN TIME: CPT

## 2022-12-07 PROCEDURE — 71045 X-RAY EXAM CHEST 1 VIEW: CPT

## 2022-12-07 PROCEDURE — 86341 ISLET CELL ANTIBODY: CPT

## 2022-12-07 PROCEDURE — 87086 URINE CULTURE/COLONY COUNT: CPT

## 2022-12-07 PROCEDURE — 84295 ASSAY OF SERUM SODIUM: CPT

## 2022-12-07 PROCEDURE — 82803 BLOOD GASES ANY COMBINATION: CPT

## 2022-12-07 PROCEDURE — 85014 HEMATOCRIT: CPT

## 2022-12-07 PROCEDURE — 96375 TX/PRO/DX INJ NEW DRUG ADDON: CPT

## 2022-12-07 PROCEDURE — 84100 ASSAY OF PHOSPHORUS: CPT

## 2022-12-07 PROCEDURE — 85027 COMPLETE CBC AUTOMATED: CPT

## 2022-12-07 PROCEDURE — 84132 ASSAY OF SERUM POTASSIUM: CPT

## 2022-12-07 PROCEDURE — 36415 COLL VENOUS BLD VENIPUNCTURE: CPT

## 2022-12-07 PROCEDURE — 0225U NFCT DS DNA&RNA 21 SARSCOV2: CPT

## 2022-12-07 PROCEDURE — 83605 ASSAY OF LACTIC ACID: CPT

## 2022-12-07 PROCEDURE — 74177 CT ABD & PELVIS W/CONTRAST: CPT | Mod: QQ

## 2022-12-07 PROCEDURE — 82947 ASSAY GLUCOSE BLOOD QUANT: CPT

## 2022-12-07 PROCEDURE — 97161 PT EVAL LOW COMPLEX 20 MIN: CPT

## 2022-12-07 PROCEDURE — 84480 ASSAY TRIIODOTHYRONINE (T3): CPT

## 2022-12-07 PROCEDURE — 85018 HEMOGLOBIN: CPT

## 2022-12-07 PROCEDURE — 84206 ASSAY OF PROINSULIN: CPT

## 2022-12-07 RX ORDER — EMPAGLIFLOZIN 10 MG/1
1 TABLET, FILM COATED ORAL
Qty: 0 | Refills: 0 | DISCHARGE

## 2022-12-07 RX ORDER — METHADONE HYDROCHLORIDE 40 MG/1
0 TABLET ORAL
Qty: 0 | Refills: 0 | DISCHARGE

## 2022-12-07 RX ORDER — SERTRALINE 25 MG/1
1 TABLET, FILM COATED ORAL
Qty: 0 | Refills: 0 | DISCHARGE

## 2022-12-07 RX ORDER — SERTRALINE 25 MG/1
1 TABLET, FILM COATED ORAL
Qty: 0 | Refills: 0 | DISCHARGE
Start: 2022-12-07

## 2022-12-07 RX ORDER — LANOLIN ALCOHOL/MO/W.PET/CERES
5 CREAM (GRAM) TOPICAL ONCE
Refills: 0 | Status: DISCONTINUED | OUTPATIENT
Start: 2022-12-07 | End: 2022-12-07

## 2022-12-07 RX ORDER — CLOPIDOGREL BISULFATE 75 MG/1
1 TABLET, FILM COATED ORAL
Qty: 0 | Refills: 0 | DISCHARGE
Start: 2022-12-07

## 2022-12-07 RX ORDER — INSULIN GLARGINE 100 [IU]/ML
18 INJECTION, SOLUTION SUBCUTANEOUS
Qty: 0 | Refills: 0 | DISCHARGE
Start: 2022-12-07

## 2022-12-07 RX ORDER — ATORVASTATIN CALCIUM 80 MG/1
1 TABLET, FILM COATED ORAL
Qty: 0 | Refills: 0 | DISCHARGE
Start: 2022-12-07

## 2022-12-07 RX ORDER — INSULIN ASPART 100 [IU]/ML
6 INJECTION, SOLUTION SUBCUTANEOUS
Qty: 1 | Refills: 0
Start: 2022-12-07 | End: 2023-01-05

## 2022-12-07 RX ORDER — CARVEDILOL PHOSPHATE 80 MG/1
1 CAPSULE, EXTENDED RELEASE ORAL
Qty: 0 | Refills: 0 | DISCHARGE
Start: 2022-12-07

## 2022-12-07 RX ORDER — LANOLIN ALCOHOL/MO/W.PET/CERES
5 CREAM (GRAM) TOPICAL AT BEDTIME
Refills: 0 | Status: DISCONTINUED | OUTPATIENT
Start: 2022-12-07 | End: 2022-12-07

## 2022-12-07 RX ORDER — HYDROMORPHONE HYDROCHLORIDE 2 MG/ML
1 INJECTION INTRAMUSCULAR; INTRAVENOUS; SUBCUTANEOUS
Qty: 0 | Refills: 0 | DISCHARGE
Start: 2022-12-07

## 2022-12-07 RX ORDER — HYDROMORPHONE HYDROCHLORIDE 2 MG/ML
1 INJECTION INTRAMUSCULAR; INTRAVENOUS; SUBCUTANEOUS
Qty: 0 | Refills: 0 | DISCHARGE

## 2022-12-07 RX ORDER — CLONAZEPAM 1 MG
0.5 TABLET ORAL
Qty: 0 | Refills: 0 | DISCHARGE

## 2022-12-07 RX ORDER — CLONAZEPAM 1 MG
1 TABLET ORAL
Qty: 0 | Refills: 0 | DISCHARGE
Start: 2022-12-07

## 2022-12-07 RX ORDER — AMLODIPINE BESYLATE 2.5 MG/1
5 TABLET ORAL
Qty: 0 | Refills: 0 | DISCHARGE

## 2022-12-07 RX ORDER — METHADONE HYDROCHLORIDE 40 MG/1
1 TABLET ORAL
Qty: 0 | Refills: 0 | DISCHARGE
Start: 2022-12-07

## 2022-12-07 RX ORDER — LISINOPRIL 2.5 MG/1
1 TABLET ORAL
Qty: 0 | Refills: 0 | DISCHARGE

## 2022-12-07 RX ORDER — APIXABAN 2.5 MG/1
1 TABLET, FILM COATED ORAL
Qty: 0 | Refills: 0 | DISCHARGE
Start: 2022-12-07

## 2022-12-07 RX ADMIN — CHLORHEXIDINE GLUCONATE 1 APPLICATION(S): 213 SOLUTION TOPICAL at 05:33

## 2022-12-07 RX ADMIN — APIXABAN 5 MILLIGRAM(S): 2.5 TABLET, FILM COATED ORAL at 05:30

## 2022-12-07 RX ADMIN — HYDROMORPHONE HYDROCHLORIDE 4 MILLIGRAM(S): 2 INJECTION INTRAMUSCULAR; INTRAVENOUS; SUBCUTANEOUS at 06:33

## 2022-12-07 RX ADMIN — HYDROMORPHONE HYDROCHLORIDE 4 MILLIGRAM(S): 2 INJECTION INTRAMUSCULAR; INTRAVENOUS; SUBCUTANEOUS at 01:36

## 2022-12-07 RX ADMIN — Medication 150 MILLIGRAM(S): at 05:30

## 2022-12-07 RX ADMIN — HYDROMORPHONE HYDROCHLORIDE 4 MILLIGRAM(S): 2 INJECTION INTRAMUSCULAR; INTRAVENOUS; SUBCUTANEOUS at 00:36

## 2022-12-07 RX ADMIN — INSULIN GLARGINE 18 UNIT(S): 100 INJECTION, SOLUTION SUBCUTANEOUS at 08:45

## 2022-12-07 RX ADMIN — METHADONE HYDROCHLORIDE 5 MILLIGRAM(S): 40 TABLET ORAL at 05:30

## 2022-12-07 RX ADMIN — Medication 6 UNIT(S): at 08:44

## 2022-12-07 RX ADMIN — CARVEDILOL PHOSPHATE 3.12 MILLIGRAM(S): 80 CAPSULE, EXTENDED RELEASE ORAL at 05:29

## 2022-12-07 RX ADMIN — Medication 0.5 MILLIGRAM(S): at 08:16

## 2022-12-07 RX ADMIN — Medication 1: at 08:44

## 2022-12-07 NOTE — DISCHARGE NOTE PROVIDER - HOSPITAL COURSE
p/w near syncopal episode found to be hypotensive and hypoglycemic on presentation likely in the setting of extensive diabetic regimen, not eating, depression, overuse of anti HTN along with pain  medications c/b JAXON with HAGMA and lactic acidosis in the setting of N/V and metformin use. The pt has greatly improved- +AGMA resolved, RF now normalized, BG greatly improved, remains hemodynamically stable w/o re-start of pt home anti HTN.         Problem/Plan - 1:  ·  Problem: Near syncope.   ·  Plan: - dc'ed tele, no arrythmia noted   -Orthostatic hypotension resolved s/p IVF   - CT of the brain negative- c/w neuro checks as per unit.     Problem/Plan - 2:  ·  Problem: T2DM (type 2 diabetes mellitus).   ·  Plan: -endocrine consulted in MICU, cont to follow recs  -c/w lantus 18u qhs  -c/w lispro 6u TID  -AISS.  - Per Endo will be d/c'ed home on insulin + metformin.     Problem/Plan - 3:  ·  Problem: Hypertension.   ·  Plan: at home takes amlodipine 10mg, coreg 25 BID and lisinopril 10 at home for BP control  -continue to hold  home anti-HTN meds  -c/w Coreg 3.125BID.     Problem/Plan - 4:  ·  Problem: History of MI (myocardial infarction).   ·  Plan: -History of MI w PCI in 3/2022 s/p LCX POBA  -c/w  plavix 75 mg daily, atorvastatin 80mg daily, eliquis 5mg BID,     Problem/Plan - 5:  ·  Problem: Chronic generalized pain.   ·  Plan: -c/w methadone, Lyrica , Dilaudid  -pain management f/u outpt.     Problem/Plan - 6:  ·  Problem: Major depression.   ·  Plan: zoloft 25mg daily and Clonazepam 1mg HS   started on clonazepam PRN while inpatient per psych rec  Pt to follow up outpt psych 12/13.     Problem/Plan - 7:  ·  Problem: Prophylactic measure.   ·  Plan: -Diet: DASH/TLC/CC  -DVT ppx: Eliquis BID    Patient is medically stable for d/c home today. will be given 24 hr d/c notice.   time spent 40 min.           57y Male with pmhx of HTN, DM2 c/b DKA, neuropathy, chronic pain on Methadone & opioids for cervical spine and back injury s/p fusion C2-6 along with LUE nerve transplant, MI, Afib on AC p/w near syncopal episode found to be hypotensive and hypoglycemic on presentation likely in the setting of extensive diabetic regimen, not eating, depression, overuse of anti HTN along with pain  medications c/b JAXON with HAGMA and lactic acidosis in the setting of N/V and metformin use. The pt has greatly improved- +AGMA resolved, RF now normalized, BG greatly improved, remains hemodynamically stable     Patient seen and examined at bedside. states that he feels well denies any CP, SOB, abd pain and n/v. No acute events overnight     - dc'ed tele, no arrythmia noted   -Orthostatic hypotension resolved s/p IVF   - CT of the brain negative  -endocrine consulted will be d/c'ed home on their rec  - at home takes amlodipine 10mg, coreg 25 BID and lisinopril 10 at home for BP control  -continue to hold  home anti-HTN meds, c/w Coreg 3.125 BID on d/c  -History of MI w PCI in 3/2022 s/p LCX POBA: c/w plavix 75 mg daily, atorvastatin 80mg daily,   - c/w eliquis 5mg BID for Afib  - Chronic generalized pain: -c/w methadone, Lyrica f/u outpt pain management    - Major depression: Plan: zoloft 25mg daily and Clonazepam 1mg HS, seen by psych inpatient, Pt to follow up outpt psych 12/13.    Patient is medically stable for d/c home today with home care.

## 2022-12-07 NOTE — PROGRESS NOTE ADULT - PROBLEM SELECTOR PROBLEM 2
T2DM (type 2 diabetes mellitus)
Hypertension
T2DM (type 2 diabetes mellitus)

## 2022-12-07 NOTE — DISCHARGE NOTE PROVIDER - PROVIDER TOKENS
FREE:[LAST:[kaflin],FIRST:[doctor],PHONE:[(   )    -],FAX:[(   )    -],ADDRESS:[[facility]],FOLLOWUP:[2 weeks]],PROVIDER:[[id number]],FOLLOWUP:[1 week]],FREE:[LAST:[powell],FIRST:[amina],PHONE:[(   )    -],FAX:[(   )    -],ADDRESS:[[facility]],FOLLOWUP:[2 weeks]]

## 2022-12-07 NOTE — DISCHARGE NOTE PROVIDER - NSDCMRMEDTOKEN_GEN_ALL_CORE_FT
apixaban 5 mg oral tablet: 1 tab(s) orally every 12 hours  atorvastatin 80 mg oral tablet: 1 tab(s) orally once a day (at bedtime)  carvedilol 3.125 mg oral tablet: 1 tab(s) orally every 12 hours  clobetasol 0.05% topical ointment: 1 application topically 2 times a day  clonazePAM 0.5 mg oral tablet: 0.5  orally once a day, in the am  clonazePAM 0.5 mg oral tablet: 1 tab(s) orally once a day, As needed, anxiety  clonazePAM 1 mg oral tablet: 1 milligram(s) orally once a day (at bedtime)  clopidogrel 75 mg oral tablet: 1 tab(s) orally once a day  HYDROmorphone 4 mg oral tablet: 1 tab(s) orally 4 times a day, As needed, Moderate Pain (4 - 6)  insulin glargine 100 units/mL subcutaneous solution: 18 unit(s) subcutaneous   metFORMIN 850 mg oral tablet: orally 2 times a day  methadone 5 mg oral tablet: 1 tab(s) orally every 8 hours  NovoLOG FlexPen 100 units/mL injectable solution: 6 unit(s) injectable 3 times a day (before meals)   pantoprazole 40 mg oral delayed release tablet: 1 tab(s) orally 2 times a day  pregabalin 150 mg oral capsule: 1 cap(s) orally every 12 hours  sertraline 25 mg oral tablet: 1 tab(s) orally once a day  Vitamin D2 50,000 intl units (1.25 mg) oral capsule:    apixaban 5 mg oral tablet: 1 tab(s) orally every 12 hours  atorvastatin 80 mg oral tablet: 1 tab(s) orally once a day (at bedtime)  carvedilol 3.125 mg oral tablet: 1 tab(s) orally every 12 hours  clobetasol 0.05% topical ointment: 1 application topically 2 times a day  clonazePAM 1 mg oral tablet: 1 milligram(s) orally once a day (at bedtime)  clopidogrel 75 mg oral tablet: 1 tab(s) orally once a day  HYDROmorphone 4 mg oral tablet: 1 tab(s) orally 4 times a day, As needed, Moderate Pain (4 - 6)  insulin glargine 100 units/mL subcutaneous solution: 18 unit(s) subcutaneous once a day (at bedtime)  metFORMIN 850 mg oral tablet: orally 2 times a day  methadone 5 mg oral tablet: 1 tab(s) orally every 8 hours  NovoLOG FlexPen 100 units/mL injectable solution: 6 unit(s) injectable 3 times a day (before meals)   pantoprazole 40 mg oral delayed release tablet: 1 tab(s) orally 2 times a day  pregabalin 150 mg oral capsule: 1 cap(s) orally every 12 hours  sertraline 25 mg oral tablet: 1 tab(s) orally once a day  Vitamin D2 50,000 intl units (1.25 mg) oral capsule:

## 2022-12-07 NOTE — PROGRESS NOTE ADULT - SUBJECTIVE AND OBJECTIVE BOX
HPI:      Patient is a 57 M with HTN, DM2 c/b DKA, neuropathy, chronic pain on Methadone & opioids for cervical spine and back injury, MI, Afib on AC p/w near syncopal episode in the setting of hypoglycemia. Endocrinology consulted for diabetes management.     Home diabetes medications:   - 12-16 units admelog pre-meal and 36 units Lantus at night time.    Inpatient diabetes medications:   - Glargine 18 units daily   - Admelog 6 units TIDAC   Most recent HbA1C: 8.1    INTERVAL HPI/OVERNIGHT EVENTS:  Seen at the bedside. Patient is going to be discharged today. We discussed the changes in insulin dosing, he will follow up with outpatient endocrinologist. Waiting for his girlfriend to pick him up.       Review of systems:   CONSTITUTIONAL:  Feels well, good appetite  CARDIOVASCULAR:  Negative for chest pain or palpitations  RESPIRATORY:  Negative for cough, or SOB   GASTROINTESTINAL:  Negative for nausea, vomiting, or abdominal pain  GENITOURINARY:  Negative frequency, urgency or dysuria     CAPILLARY BLOOD GLUCOSE    POCT Blood Glucose.: 185 mg/dL (07 Dec 2022 08:18)  POCT Blood Glucose.: 160 mg/dL (06 Dec 2022 20:52)  POCT Blood Glucose.: 182 mg/dL (06 Dec 2022 16:58)  POCT Blood Glucose.: 182 mg/dL (06 Dec 2022 12:24)  POCT Blood Glucose.: 182 mg/dL (06 Dec 2022 12:24)  POCT Blood Glucose.: 240 mg/dL (06 Dec 2022 10:50)  POCT Blood Glucose.: 181 mg/dL (06 Dec 2022 08:14)  POCT Blood Glucose.: 148 mg/dL (05 Dec 2022 21:15)  POCT Blood Glucose.: 148 mg/dL (05 Dec 2022 17:04)       MEDICATIONS  (STANDING):  apixaban 5 milliGRAM(s) Oral every 12 hours  atorvastatin 80 milliGRAM(s) Oral at bedtime  carvedilol 3.125 milliGRAM(s) Oral every 12 hours  chlorhexidine 4% Liquid 1 Application(s) Topical <User Schedule>  clonazePAM  Tablet 1 milliGRAM(s) Oral at bedtime  clopidogrel Tablet 75 milliGRAM(s) Oral daily  dextrose 5%. 1000 milliLiter(s) (50 mL/Hr) IV Continuous <Continuous>  dextrose 5%. 1000 milliLiter(s) (100 mL/Hr) IV Continuous <Continuous>  dextrose 50% Injectable 25 Gram(s) IV Push once  dextrose 50% Injectable 12.5 Gram(s) IV Push once  dextrose 50% Injectable 25 Gram(s) IV Push once  glucagon  Injectable 1 milliGRAM(s) IntraMuscular once  influenza   Vaccine 0.5 milliLiter(s) IntraMuscular once  insulin glargine Injectable (LANTUS) 18 Unit(s) SubCutaneous every morning  insulin lispro (ADMELOG) corrective regimen sliding scale   SubCutaneous three times a day before meals  insulin lispro (ADMELOG) corrective regimen sliding scale   SubCutaneous at bedtime  insulin lispro Injectable (ADMELOG) 6 Unit(s) SubCutaneous before breakfast  insulin lispro Injectable (ADMELOG) 6 Unit(s) SubCutaneous before lunch  insulin lispro Injectable (ADMELOG) 6 Unit(s) SubCutaneous before dinner  methadone    Tablet 5 milliGRAM(s) Oral every 8 hours  pregabalin 150 milliGRAM(s) Oral every 12 hours  sertraline 25 milliGRAM(s) Oral daily    MEDICATIONS  (PRN):  clonazePAM  Tablet 0.5 milliGRAM(s) Oral daily PRN anxiety  dextrose Oral Gel 15 Gram(s) Oral once PRN Blood Glucose LESS THAN 70 milliGRAM(s)/deciliter  HYDROmorphone   Tablet 4 milliGRAM(s) Oral four times a day PRN Moderate Pain (4 - 6)      PHYSICAL EXAM  Vital Signs Last 24 Hrs  T(C): 36.4 (07 Dec 2022 05:25), Max: 36.6 (06 Dec 2022 16:10)  T(F): 97.6 (07 Dec 2022 05:25), Max: 97.9 (06 Dec 2022 16:10)  HR: 72 (07 Dec 2022 05:25) (71 - 78)  BP: 131/84 (07 Dec 2022 05:25) (117/76 - 142/75)  BP(mean): --  RR: 18 (07 Dec 2022 05:25) (18 - 20)  SpO2: 95% (07 Dec 2022 05:25) (95% - 98%)    Parameters below as of 07 Dec 2022 05:25  Patient On (Oxygen Delivery Method): room air      GENERAL: male laying in bed in NAD  RESPIRATORY: nonlabored breathing, no accessory muscle use  Extremities: Warm, no edema in all 4 exts   NEURO: A&O X3    LABS:            12-06    138  |  101  |  22  ----------------------------<  186<H>  4.1   |  24  |  0.84    Ca    9.4      06 Dec 2022 07:24  Mg     1.4     12-06        TPro  6.6  /  Alb  4.0  /  TBili  0.2  /  DBili  x   /  AST  6<L>  /  ALT  11  /  AlkPhos  75  12-05        Thyroid Stimulating Hormone, Serum: 2.46 uIU/mL (12-02 @ 20:23)  Thyroid Stimulating Hormone, Serum: 0.43 uIU/mL (03-01 @ 23:21)

## 2022-12-07 NOTE — PROGRESS NOTE ADULT - ASSESSMENT
Patient is a 57 M with HTN, DM2 c/b DKA, neuropathy, chronic pain on Methadone & opioids for cervical spine and back injury, MI, Afib on AC p/w near syncopal episode in the setting of hypoglycemia. Endocrinology consulted for diabetes management.     1.  T2DM  - Most recent Hemoglobin A1C 8.1  c peptide 0.9--> suggestive of diminished pancreatic reserve   - Current FS ranges from 180-240  - Current diet: CHO  - Please monitor blood glucose values TID AC & QHS while eating regular meals and Q6H while NPO  - Blood glucose goals pre-meal less than 140 mg/dL and random blood glucose less than 180 mg/dL  - Recommendations:  - Continue with insulin Glargine 18 units  daily   - Continue with  insulin Lispro 6 units TID with meals, hold if NPO or if eating less than 50% of meals  - Continue with low dose correctional scale TID with meals  - Continue with low dose correctional scale QHS    Discharge planning:   - Patient will follow up with his endocrinologist Dr. Pope endocrine outpatient   - Home DM medications: - 12-16 units admelog pre-meal and 36 units Lantus at night time. Metformin and Jardiance   - Discharge DM medications: Lantus 18 units daily and Admelog 6-8 units (6 units with smaller meals and 8 units with larger meals). No jardiance in the setting of prior DKA. Can resume Metformin 850 mg BID   - Discussed the changes in patient's diabetes medications with him. He expresses understanding. He does insulin injection himself, we went over treatment of hypoglycemia.   - Continue with the use of CGM as outpatient     2. HTN  - BP goal 130/80  - Manage per primary team     3. HLD  -Continue with atorvastatin 80 mg daily   - Manage as outpatient       Thank you for the consult.  Contact via pager or [employer] Teams during business hours. For follow up questions, discharge recommendations, or new consults please call answering service at [phone] (weekdays), [phone] (nights/weekends). For nonurgent matters, please email  [email or web address].        Damaris Lomeli MD  Department of Endocrinology, Diabetes and metabolism   Pager [phone]

## 2022-12-07 NOTE — DISCHARGE NOTE PROVIDER - CARE PROVIDER_API CALL
doctor sumeet  [facility]  Phone: (   )    -  Fax: (   )    -  Follow Up Time: 2 weeks    Juaquin Pham)  Anesthesiology; Pain Medicine  [address]  [place] [address]  Phone: [phone]  Fax: [phone]  Follow Up Time: 1 week    amina powell  [facility]  Phone: (   )    -  Fax: (   )    -  Follow Up Time: 2 weeks

## 2022-12-07 NOTE — PROGRESS NOTE ADULT - PROBLEM SELECTOR PROBLEM 1
T2DM (type 2 diabetes mellitus)
T2DM (type 2 diabetes mellitus)
Type 2 diabetes mellitus with hyperglycemia
Near syncope
Near syncope

## 2022-12-07 NOTE — CHART NOTE - NSCHARTNOTEFT_GEN_A_CORE
57y Male with pmhx of HTN, DM2 c/b DKA, neuropathy, chronic pain on Methadone & opioids for cervical spine and back injury s/p fusion C2-6 along with LUE nerve transplant, MI, Afib on AC p/w near syncopal episode found to be hypotensive and hypoglycemic on presentation likely in the setting of extensive diabetic regimen, not eating, depression, overuse of anti HTN along with pain  medications c/b JAXON with HAGMA and lactic acidosis in the setting of N/V and metformin use. The pt has greatly improved- +AGMA resolved, RF now normalized, BG greatly improved, remains hemodynamically stable     Patient seen and examined at bedside. states that he feels well denies any CP, SOB, abd pain and n/v. No acute events overnight     - dc'ed tele, no arrythmia noted   -Orthostatic hypotension resolved s/p IVF   - CT of the brain negative  -endocrine consulted will be d/c'ed home on their rec  - at home takes amlodipine 10mg, coreg 25 BID and lisinopril 10 at home for BP control  -continue to hold  home anti-HTN meds, c/w Coreg 3.125 BID on d/c  -History of MI w PCI in 3/2022 s/p LCX POBA: c/w plavix 75 mg daily, atorvastatin 80mg daily,   - c/w eliquis 5mg BID for Afib  - Chronic generalized pain: -c/w methadone, Lyrica f/u outpt pain management    - Major depression: Plan: zoloft 25mg daily and Clonazepam 1mg HS, seen by psych inpatient, Pt to follow up outpt psych 12/13.    Patient is medically stable for d/c home today with home care. time spent 34 selina  d/w Medicine THOMAS Reyes

## 2022-12-07 NOTE — DISCHARGE NOTE PROVIDER - NSDCCPCAREPLAN_GEN_ALL_CORE_FT
PRINCIPAL DISCHARGE DIAGNOSIS  Diagnosis: Syncope  Assessment and Plan of Treatment: near syncope due to hypoglycemia      SECONDARY DISCHARGE DIAGNOSES  Diagnosis: Hypoglycemia  Assessment and Plan of Treatment:      PRINCIPAL DISCHARGE DIAGNOSIS  Diagnosis: Syncope  Assessment and Plan of Treatment: near syncope due to hypoglycemia  Insulin doses adjusted gy inpatient Endocrinology  F/U outpt. Endocrinology  HgA1C this admission.  Make sure you get your HgA1c checked every three months.  If you take oral diabetes medications, check your blood glucose two times a day.  If you take insulin, check your blood glucose before meals and at bedtime.  It's important not to skip any meals.  Keep a log of your blood glucose results and always take it with you to your doctor appointments.  Keep a list of your current medications including injectables and over the counter medications and bring this medication list with you to all your doctor appointments.  If you have not seen your ophthalmologist this year call for appointment.  Check your feet daily for redness, sores, or openings. Do not self treat. If no improvement in two days call your primary care physician for an appointment.  Low blood sugar (hypoglycemia) is a blood sugar below 70mg/dl. Check your blood sugar if you feel signs/symptoms of hypoglycemia. If your blood sugar is below 70 take 15 grams of carbohydrates (ex 4 oz of apple juice, 3-4 glucose tablets, or 4-6 oz of regular soda) wait 15 minutes and repeat blood sugar to make sure it comes up above 70.  If your blood sugar is above 70 and you are due for a meal, have a meal.  If you are not due for a meal have a snack.  This snack helps keeps your blood sugar at a safe range.        SECONDARY DISCHARGE DIAGNOSES  Diagnosis: CAD (coronary artery disease)  Assessment and Plan of Treatment: Coronary artery disease is a condition where the arteries the supply the heart muscle get clogges with fatty deposits & puts you at risk for a heart attack  Call your doctor if you have any new pain, pressure, or discomfort in the center of your chest, pain, tingling or discomfort in arms, back, neck, jaw, or stomach, shortness of breath, nausea, vomiting, burping or heartburn, sweating, cold and clammy skin, racing or abnormal heartbeat for more than 10 minutes or if they keep coming & going.  Call 911 and do not tr to get to hospital by care  You can help yourself with lefestyle changes (quitting smoking if you smoke), eat lots of fruits & vegetables & low fat dairy products, not a lot of meat & fatty foods, walk or some form of physical activity most days of the week, lose weight if you are overweight  Take your cardiac medication as prescribed to lower cholesterol, to lower blood pressure, aspirin to prevent blood clots, and diabetes control  Make sure to keep appointments with doctor for cardiac follow up care

## 2022-12-11 LAB
CORTICOSTEROID BINDING GLOBULIN RESULT: 1.9 MG/DL — SIGNIFICANT CHANGE UP
CORTIS F/TOTAL MFR SERPL: <2.9 % — SIGNIFICANT CHANGE UP
CORTIS SERPL-MCNC: <1 UG/DL — LOW
CORTISOL, FREE RESULT: <0.03 UG/DL — LOW

## 2022-12-23 ENCOUNTER — NON-APPOINTMENT (OUTPATIENT)
Age: 57
End: 2022-12-23

## 2022-12-23 DIAGNOSIS — Z86.79 PERSONAL HISTORY OF OTHER DISEASES OF THE CIRCULATORY SYSTEM: ICD-10-CM

## 2022-12-23 DIAGNOSIS — Z86.39 PERSONAL HISTORY OF OTHER ENDOCRINE, NUTRITIONAL AND METABOLIC DISEASE: ICD-10-CM

## 2022-12-23 DIAGNOSIS — G54.0 BRACHIAL PLEXUS DISORDERS: ICD-10-CM

## 2022-12-23 DIAGNOSIS — G90.522 COMPLEX REGIONAL PAIN SYNDROME I OF LEFT LOWER LIMB: ICD-10-CM

## 2022-12-23 DIAGNOSIS — M47.812 SPONDYLOSIS W/OUT MYELOPATHY OR RADICULOPATHY, CERVICAL REGION: ICD-10-CM

## 2022-12-23 DIAGNOSIS — G89.29 OTHER CHRONIC PAIN: ICD-10-CM

## 2022-12-23 DIAGNOSIS — G90.512 COMPLEX REGIONAL PAIN SYNDROME I OF LEFT UPPER LIMB: ICD-10-CM

## 2022-12-23 DIAGNOSIS — Z78.9 OTHER SPECIFIED HEALTH STATUS: ICD-10-CM

## 2023-01-05 ENCOUNTER — NON-APPOINTMENT (OUTPATIENT)
Age: 58
End: 2023-01-05

## 2023-01-05 DIAGNOSIS — U07.1 COVID-19: ICD-10-CM

## 2023-01-06 ENCOUNTER — APPOINTMENT (OUTPATIENT)
Dept: CARDIOLOGY | Facility: CLINIC | Age: 58
End: 2023-01-06

## 2023-01-09 ENCOUNTER — INPATIENT (INPATIENT)
Facility: HOSPITAL | Age: 58
LOS: 4 days | Discharge: HOME | DRG: 637 | End: 2023-01-14
Attending: INTERNAL MEDICINE | Admitting: INTERNAL MEDICINE
Payer: MEDICARE

## 2023-01-09 ENCOUNTER — APPOINTMENT (OUTPATIENT)
Dept: PAIN MANAGEMENT | Facility: CLINIC | Age: 58
End: 2023-01-09

## 2023-01-09 VITALS
DIASTOLIC BLOOD PRESSURE: 105 MMHG | RESPIRATION RATE: 18 BRPM | OXYGEN SATURATION: 99 % | HEART RATE: 126 BPM | HEIGHT: 74 IN | WEIGHT: 229.94 LBS | SYSTOLIC BLOOD PRESSURE: 164 MMHG | TEMPERATURE: 97 F

## 2023-01-09 DIAGNOSIS — Z98.890 OTHER SPECIFIED POSTPROCEDURAL STATES: Chronic | ICD-10-CM

## 2023-01-09 DIAGNOSIS — Z98.1 ARTHRODESIS STATUS: Chronic | ICD-10-CM

## 2023-01-09 DIAGNOSIS — E11.10 TYPE 2 DIABETES MELLITUS WITH KETOACIDOSIS WITHOUT COMA: ICD-10-CM

## 2023-01-09 LAB
A1C WITH ESTIMATED AVERAGE GLUCOSE RESULT: 7.7 % — HIGH (ref 4–5.6)
ACETONE SERPL-MCNC: ABNORMAL
ALBUMIN SERPL ELPH-MCNC: 4.1 G/DL — SIGNIFICANT CHANGE UP (ref 3.3–5)
ALP SERPL-CCNC: 112 U/L — SIGNIFICANT CHANGE UP (ref 40–120)
ALT FLD-CCNC: 17 U/L — SIGNIFICANT CHANGE UP (ref 10–45)
ANION GAP SERPL CALC-SCNC: 17 MMOL/L — SIGNIFICANT CHANGE UP (ref 5–17)
ANION GAP SERPL CALC-SCNC: 22 MMOL/L — HIGH (ref 5–17)
ANION GAP SERPL CALC-SCNC: 26 MMOL/L — HIGH (ref 5–17)
APTT BLD: 29.5 SEC — SIGNIFICANT CHANGE UP (ref 27.5–35.5)
AST SERPL-CCNC: 18 U/L — SIGNIFICANT CHANGE UP (ref 10–40)
BASE EXCESS BLDV CALC-SCNC: -20.6 MMOL/L — LOW (ref -2–3)
BASOPHILS # BLD AUTO: 0.03 K/UL — SIGNIFICANT CHANGE UP (ref 0–0.2)
BASOPHILS NFR BLD AUTO: 0.4 % — SIGNIFICANT CHANGE UP (ref 0–2)
BILIRUB SERPL-MCNC: 0.8 MG/DL — SIGNIFICANT CHANGE UP (ref 0.2–1.2)
BUN SERPL-MCNC: 17 MG/DL — SIGNIFICANT CHANGE UP (ref 7–23)
BUN SERPL-MCNC: 17 MG/DL — SIGNIFICANT CHANGE UP (ref 7–23)
BUN SERPL-MCNC: 19 MG/DL — SIGNIFICANT CHANGE UP (ref 7–23)
CALCIUM SERPL-MCNC: 10.3 MG/DL — SIGNIFICANT CHANGE UP (ref 8.4–10.5)
CALCIUM SERPL-MCNC: 9.2 MG/DL — SIGNIFICANT CHANGE UP (ref 8.4–10.5)
CALCIUM SERPL-MCNC: 9.3 MG/DL — SIGNIFICANT CHANGE UP (ref 8.4–10.5)
CHLORIDE SERPL-SCNC: 103 MMOL/L — SIGNIFICANT CHANGE UP (ref 96–108)
CHLORIDE SERPL-SCNC: 90 MMOL/L — LOW (ref 96–108)
CHLORIDE SERPL-SCNC: 98 MMOL/L — SIGNIFICANT CHANGE UP (ref 96–108)
CK MB BLD-MCNC: 3 % — SIGNIFICANT CHANGE UP (ref 0–3.5)
CK MB CFR SERPL CALC: 1 NG/ML — SIGNIFICANT CHANGE UP (ref 0.5–10)
CK SERPL-CCNC: 33 U/L — SIGNIFICANT CHANGE UP (ref 30–200)
CO2 BLDV-SCNC: 8 MMOL/L — LOW (ref 22–26)
CO2 SERPL-SCNC: 11 MMOL/L — LOW (ref 22–31)
CO2 SERPL-SCNC: 16 MMOL/L — LOW (ref 22–31)
CO2 SERPL-SCNC: 8 MMOL/L — CRITICAL LOW (ref 22–31)
CREAT SERPL-MCNC: 1 MG/DL — SIGNIFICANT CHANGE UP (ref 0.5–1.3)
CREAT SERPL-MCNC: 1.04 MG/DL — SIGNIFICANT CHANGE UP (ref 0.5–1.3)
CREAT SERPL-MCNC: 1.04 MG/DL — SIGNIFICANT CHANGE UP (ref 0.5–1.3)
D DIMER BLD IA.RAPID-MCNC: 295 NG/ML DDU — HIGH
EGFR: 85 ML/MIN/1.73M2 — SIGNIFICANT CHANGE UP
EGFR: 85 ML/MIN/1.73M2 — SIGNIFICANT CHANGE UP
EGFR: 89 ML/MIN/1.73M2 — SIGNIFICANT CHANGE UP
EOSINOPHIL # BLD AUTO: 0 K/UL — SIGNIFICANT CHANGE UP (ref 0–0.5)
EOSINOPHIL NFR BLD AUTO: 0 % — SIGNIFICANT CHANGE UP (ref 0–6)
ESTIMATED AVERAGE GLUCOSE: 174 MG/DL — HIGH (ref 68–114)
FIBRINOGEN PPP-MCNC: 553 MG/DL — HIGH (ref 340–550)
FLUAV AG NPH QL: SIGNIFICANT CHANGE UP
FLUBV AG NPH QL: SIGNIFICANT CHANGE UP
GAS PNL BLDV: SIGNIFICANT CHANGE UP
GLUCOSE BLDC GLUCOMTR-MCNC: 143 MG/DL — HIGH (ref 70–99)
GLUCOSE BLDC GLUCOMTR-MCNC: 153 MG/DL — HIGH (ref 70–99)
GLUCOSE BLDC GLUCOMTR-MCNC: 154 MG/DL — HIGH (ref 70–99)
GLUCOSE BLDC GLUCOMTR-MCNC: 159 MG/DL — HIGH (ref 70–99)
GLUCOSE BLDC GLUCOMTR-MCNC: 163 MG/DL — HIGH (ref 70–99)
GLUCOSE BLDC GLUCOMTR-MCNC: 171 MG/DL — HIGH (ref 70–99)
GLUCOSE BLDC GLUCOMTR-MCNC: 173 MG/DL — HIGH (ref 70–99)
GLUCOSE BLDC GLUCOMTR-MCNC: 190 MG/DL — HIGH (ref 70–99)
GLUCOSE BLDC GLUCOMTR-MCNC: 235 MG/DL — HIGH (ref 70–99)
GLUCOSE BLDC GLUCOMTR-MCNC: 288 MG/DL — HIGH (ref 70–99)
GLUCOSE BLDC GLUCOMTR-MCNC: 351 MG/DL — HIGH (ref 70–99)
GLUCOSE SERPL-MCNC: 193 MG/DL — HIGH (ref 70–99)
GLUCOSE SERPL-MCNC: 194 MG/DL — HIGH (ref 70–99)
GLUCOSE SERPL-MCNC: 372 MG/DL — HIGH (ref 70–99)
HCO3 BLDV-SCNC: 8 MMOL/L — CRITICAL LOW (ref 22–29)
HCT VFR BLD CALC: 51.2 % — HIGH (ref 39–50)
HGB BLD-MCNC: 16.8 G/DL — SIGNIFICANT CHANGE UP (ref 13–17)
IMM GRANULOCYTES NFR BLD AUTO: 0.4 % — SIGNIFICANT CHANGE UP (ref 0–0.9)
INR BLD: 1.21 RATIO — HIGH (ref 0.88–1.16)
LACTATE SERPL-SCNC: 2.4 MMOL/L — HIGH (ref 0.7–2)
LYMPHOCYTES # BLD AUTO: 1.72 K/UL — SIGNIFICANT CHANGE UP (ref 1–3.3)
LYMPHOCYTES # BLD AUTO: 25.3 % — SIGNIFICANT CHANGE UP (ref 13–44)
MAGNESIUM SERPL-MCNC: 1.5 MG/DL — LOW (ref 1.6–2.6)
MAGNESIUM SERPL-MCNC: 1.7 MG/DL — SIGNIFICANT CHANGE UP (ref 1.6–2.6)
MCHC RBC-ENTMCNC: 26.3 PG — LOW (ref 27–34)
MCHC RBC-ENTMCNC: 32.8 GM/DL — SIGNIFICANT CHANGE UP (ref 32–36)
MCV RBC AUTO: 80.3 FL — SIGNIFICANT CHANGE UP (ref 80–100)
MONOCYTES # BLD AUTO: 0.55 K/UL — SIGNIFICANT CHANGE UP (ref 0–0.9)
MONOCYTES NFR BLD AUTO: 8.1 % — SIGNIFICANT CHANGE UP (ref 2–14)
NEUTROPHILS # BLD AUTO: 4.47 K/UL — SIGNIFICANT CHANGE UP (ref 1.8–7.4)
NEUTROPHILS NFR BLD AUTO: 65.8 % — SIGNIFICANT CHANGE UP (ref 43–77)
NRBC # BLD: 0 /100 WBCS — SIGNIFICANT CHANGE UP (ref 0–0)
PCO2 BLDV: 20 MMHG — LOW (ref 42–55)
PH BLDV: 7.19 — LOW (ref 7.32–7.43)
PHOSPHATE SERPL-MCNC: 1.2 MG/DL — LOW (ref 2.5–4.5)
PHOSPHATE SERPL-MCNC: 1.3 MG/DL — LOW (ref 2.5–4.5)
PLATELET # BLD AUTO: 550 K/UL — HIGH (ref 150–400)
PO2 BLDV: 55 MMHG — HIGH (ref 25–45)
POTASSIUM SERPL-MCNC: 3.1 MMOL/L — LOW (ref 3.5–5.3)
POTASSIUM SERPL-MCNC: 3.4 MMOL/L — LOW (ref 3.5–5.3)
POTASSIUM SERPL-MCNC: 5.2 MMOL/L — SIGNIFICANT CHANGE UP (ref 3.5–5.3)
POTASSIUM SERPL-SCNC: 3.1 MMOL/L — LOW (ref 3.5–5.3)
POTASSIUM SERPL-SCNC: 3.4 MMOL/L — LOW (ref 3.5–5.3)
POTASSIUM SERPL-SCNC: 5.2 MMOL/L — SIGNIFICANT CHANGE UP (ref 3.5–5.3)
PROCALCITONIN SERPL-MCNC: 0.08 NG/ML — SIGNIFICANT CHANGE UP
PROT SERPL-MCNC: 8.8 G/DL — HIGH (ref 6–8.3)
PROTHROM AB SERPL-ACNC: 14.1 SEC — HIGH (ref 10.5–13.4)
RBC # BLD: 6.38 M/UL — HIGH (ref 4.2–5.8)
RBC # FLD: 17.2 % — HIGH (ref 10.3–14.5)
RSV RNA NPH QL NAA+NON-PROBE: SIGNIFICANT CHANGE UP
SAO2 % BLDV: 84 % — SIGNIFICANT CHANGE UP (ref 67–88)
SARS-COV-2 RNA SPEC QL NAA+PROBE: DETECTED
SODIUM SERPL-SCNC: 124 MMOL/L — LOW (ref 135–145)
SODIUM SERPL-SCNC: 131 MMOL/L — LOW (ref 135–145)
SODIUM SERPL-SCNC: 136 MMOL/L — SIGNIFICANT CHANGE UP (ref 135–145)
TROPONIN I, HIGH SENSITIVITY RESULT: 11.9 NG/L — SIGNIFICANT CHANGE UP
TROPONIN I, HIGH SENSITIVITY RESULT: 18.9 NG/L — SIGNIFICANT CHANGE UP
WBC # BLD: 6.8 K/UL — SIGNIFICANT CHANGE UP (ref 3.8–10.5)
WBC # FLD AUTO: 6.8 K/UL — SIGNIFICANT CHANGE UP (ref 3.8–10.5)

## 2023-01-09 PROCEDURE — 71045 X-RAY EXAM CHEST 1 VIEW: CPT | Mod: 26

## 2023-01-09 PROCEDURE — 93010 ELECTROCARDIOGRAM REPORT: CPT

## 2023-01-09 PROCEDURE — 99285 EMERGENCY DEPT VISIT HI MDM: CPT | Mod: FS,CS

## 2023-01-09 RX ORDER — INSULIN HUMAN 100 [IU]/ML
10 INJECTION, SOLUTION SUBCUTANEOUS ONCE
Refills: 0 | Status: COMPLETED | OUTPATIENT
Start: 2023-01-09 | End: 2023-01-09

## 2023-01-09 RX ORDER — PANTOPRAZOLE SODIUM 20 MG/1
40 TABLET, DELAYED RELEASE ORAL DAILY
Refills: 0 | Status: DISCONTINUED | OUTPATIENT
Start: 2023-01-09 | End: 2023-01-11

## 2023-01-09 RX ORDER — ACETAMINOPHEN 500 MG
1000 TABLET ORAL ONCE
Refills: 0 | Status: COMPLETED | OUTPATIENT
Start: 2023-01-09 | End: 2023-01-09

## 2023-01-09 RX ORDER — POTASSIUM CHLORIDE 20 MEQ
10 PACKET (EA) ORAL
Refills: 0 | Status: COMPLETED | OUTPATIENT
Start: 2023-01-09 | End: 2023-01-09

## 2023-01-09 RX ORDER — METHADONE HYDROCHLORIDE 40 MG/1
5 TABLET ORAL EVERY 8 HOURS
Refills: 0 | Status: DISCONTINUED | OUTPATIENT
Start: 2023-01-09 | End: 2023-01-14

## 2023-01-09 RX ORDER — SODIUM CHLORIDE 9 MG/ML
2000 INJECTION INTRAMUSCULAR; INTRAVENOUS; SUBCUTANEOUS ONCE
Refills: 0 | Status: COMPLETED | OUTPATIENT
Start: 2023-01-09 | End: 2023-01-09

## 2023-01-09 RX ORDER — MAGNESIUM SULFATE 500 MG/ML
2 VIAL (ML) INJECTION
Refills: 0 | Status: COMPLETED | OUTPATIENT
Start: 2023-01-09 | End: 2023-01-09

## 2023-01-09 RX ORDER — POTASSIUM PHOSPHATE, MONOBASIC POTASSIUM PHOSPHATE, DIBASIC 236; 224 MG/ML; MG/ML
30 INJECTION, SOLUTION INTRAVENOUS ONCE
Refills: 0 | Status: COMPLETED | OUTPATIENT
Start: 2023-01-09 | End: 2023-01-09

## 2023-01-09 RX ORDER — CARVEDILOL PHOSPHATE 80 MG/1
3.12 CAPSULE, EXTENDED RELEASE ORAL EVERY 12 HOURS
Refills: 0 | Status: DISCONTINUED | OUTPATIENT
Start: 2023-01-09 | End: 2023-01-14

## 2023-01-09 RX ORDER — SERTRALINE 25 MG/1
25 TABLET, FILM COATED ORAL DAILY
Refills: 0 | Status: DISCONTINUED | OUTPATIENT
Start: 2023-01-09 | End: 2023-01-14

## 2023-01-09 RX ORDER — CHLORHEXIDINE GLUCONATE 213 G/1000ML
1 SOLUTION TOPICAL
Refills: 0 | Status: DISCONTINUED | OUTPATIENT
Start: 2023-01-09 | End: 2023-01-11

## 2023-01-09 RX ORDER — CLOPIDOGREL BISULFATE 75 MG/1
75 TABLET, FILM COATED ORAL DAILY
Refills: 0 | Status: DISCONTINUED | OUTPATIENT
Start: 2023-01-09 | End: 2023-01-14

## 2023-01-09 RX ORDER — HYDROMORPHONE HYDROCHLORIDE 2 MG/ML
1 INJECTION INTRAMUSCULAR; INTRAVENOUS; SUBCUTANEOUS ONCE
Refills: 0 | Status: DISCONTINUED | OUTPATIENT
Start: 2023-01-09 | End: 2023-01-09

## 2023-01-09 RX ORDER — INFLUENZA VIRUS VACCINE 15; 15; 15; 15 UG/.5ML; UG/.5ML; UG/.5ML; UG/.5ML
0.5 SUSPENSION INTRAMUSCULAR ONCE
Refills: 0 | Status: COMPLETED | OUTPATIENT
Start: 2023-01-09 | End: 2023-01-09

## 2023-01-09 RX ORDER — REMDESIVIR 5 MG/ML
100 INJECTION INTRAVENOUS EVERY 24 HOURS
Refills: 0 | Status: DISCONTINUED | OUTPATIENT
Start: 2023-01-10 | End: 2023-01-10

## 2023-01-09 RX ORDER — REMDESIVIR 5 MG/ML
200 INJECTION INTRAVENOUS EVERY 24 HOURS
Refills: 0 | Status: COMPLETED | OUTPATIENT
Start: 2023-01-09 | End: 2023-01-09

## 2023-01-09 RX ORDER — APIXABAN 2.5 MG/1
5 TABLET, FILM COATED ORAL EVERY 12 HOURS
Refills: 0 | Status: DISCONTINUED | OUTPATIENT
Start: 2023-01-09 | End: 2023-01-14

## 2023-01-09 RX ORDER — INSULIN HUMAN 100 [IU]/ML
1 INJECTION, SOLUTION SUBCUTANEOUS
Qty: 100 | Refills: 0 | Status: DISCONTINUED | OUTPATIENT
Start: 2023-01-09 | End: 2023-01-10

## 2023-01-09 RX ORDER — ONDANSETRON 8 MG/1
4 TABLET, FILM COATED ORAL ONCE
Refills: 0 | Status: COMPLETED | OUTPATIENT
Start: 2023-01-09 | End: 2023-01-09

## 2023-01-09 RX ORDER — SODIUM CHLORIDE 9 MG/ML
1000 INJECTION, SOLUTION INTRAVENOUS
Refills: 0 | Status: DISCONTINUED | OUTPATIENT
Start: 2023-01-09 | End: 2023-01-10

## 2023-01-09 RX ORDER — FAMOTIDINE 10 MG/ML
20 INJECTION INTRAVENOUS ONCE
Refills: 0 | Status: COMPLETED | OUTPATIENT
Start: 2023-01-09 | End: 2023-01-09

## 2023-01-09 RX ORDER — PANTOPRAZOLE SODIUM 20 MG/1
40 TABLET, DELAYED RELEASE ORAL DAILY
Refills: 0 | Status: DISCONTINUED | OUTPATIENT
Start: 2023-01-09 | End: 2023-01-09

## 2023-01-09 RX ORDER — SODIUM CHLORIDE 9 MG/ML
1000 INJECTION, SOLUTION INTRAVENOUS ONCE
Refills: 0 | Status: COMPLETED | OUTPATIENT
Start: 2023-01-09 | End: 2023-01-09

## 2023-01-09 RX ORDER — HYDROMORPHONE HYDROCHLORIDE 2 MG/ML
1 INJECTION INTRAMUSCULAR; INTRAVENOUS; SUBCUTANEOUS EVERY 4 HOURS
Refills: 0 | Status: DISCONTINUED | OUTPATIENT
Start: 2023-01-09 | End: 2023-01-10

## 2023-01-09 RX ORDER — REMDESIVIR 5 MG/ML
INJECTION INTRAVENOUS
Refills: 0 | Status: DISCONTINUED | OUTPATIENT
Start: 2023-01-09 | End: 2023-01-10

## 2023-01-09 RX ORDER — INSULIN HUMAN 100 [IU]/ML
8 INJECTION, SOLUTION SUBCUTANEOUS ONCE
Refills: 0 | Status: DISCONTINUED | OUTPATIENT
Start: 2023-01-09 | End: 2023-01-09

## 2023-01-09 RX ADMIN — Medication 400 MILLIGRAM(S): at 20:55

## 2023-01-09 RX ADMIN — Medication 100 MILLIEQUIVALENT(S): at 22:43

## 2023-01-09 RX ADMIN — SODIUM CHLORIDE 2000 MILLILITER(S): 9 INJECTION INTRAMUSCULAR; INTRAVENOUS; SUBCUTANEOUS at 11:14

## 2023-01-09 RX ADMIN — Medication 400 MILLIGRAM(S): at 17:45

## 2023-01-09 RX ADMIN — FAMOTIDINE 100 MILLIGRAM(S): 10 INJECTION INTRAVENOUS at 11:14

## 2023-01-09 RX ADMIN — REMDESIVIR 200 MILLIGRAM(S): 5 INJECTION INTRAVENOUS at 16:49

## 2023-01-09 RX ADMIN — PANTOPRAZOLE SODIUM 40 MILLIGRAM(S): 20 TABLET, DELAYED RELEASE ORAL at 21:39

## 2023-01-09 RX ADMIN — POTASSIUM PHOSPHATE, MONOBASIC POTASSIUM PHOSPHATE, DIBASIC 83.33 MILLIMOLE(S): 236; 224 INJECTION, SOLUTION INTRAVENOUS at 21:20

## 2023-01-09 RX ADMIN — HYDROMORPHONE HYDROCHLORIDE 1 MILLIGRAM(S): 2 INJECTION INTRAMUSCULAR; INTRAVENOUS; SUBCUTANEOUS at 20:53

## 2023-01-09 RX ADMIN — Medication 100 MILLIEQUIVALENT(S): at 21:20

## 2023-01-09 RX ADMIN — Medication 25 GRAM(S): at 21:37

## 2023-01-09 RX ADMIN — INSULIN HUMAN 10 UNIT(S)/HR: 100 INJECTION, SOLUTION SUBCUTANEOUS at 13:56

## 2023-01-09 RX ADMIN — HYDROMORPHONE HYDROCHLORIDE 1 MILLIGRAM(S): 2 INJECTION INTRAMUSCULAR; INTRAVENOUS; SUBCUTANEOUS at 16:45

## 2023-01-09 RX ADMIN — HYDROMORPHONE HYDROCHLORIDE 1 MILLIGRAM(S): 2 INJECTION INTRAMUSCULAR; INTRAVENOUS; SUBCUTANEOUS at 21:15

## 2023-01-09 RX ADMIN — SODIUM CHLORIDE 1000 MILLILITER(S): 9 INJECTION, SOLUTION INTRAVENOUS at 13:26

## 2023-01-09 RX ADMIN — HYDROMORPHONE HYDROCHLORIDE 1 MILLIGRAM(S): 2 INJECTION INTRAMUSCULAR; INTRAVENOUS; SUBCUTANEOUS at 17:00

## 2023-01-09 RX ADMIN — ONDANSETRON 4 MILLIGRAM(S): 8 TABLET, FILM COATED ORAL at 11:13

## 2023-01-09 RX ADMIN — Medication 100 MILLIEQUIVALENT(S): at 16:48

## 2023-01-09 RX ADMIN — SODIUM CHLORIDE 125 MILLILITER(S): 9 INJECTION, SOLUTION INTRAVENOUS at 15:03

## 2023-01-09 RX ADMIN — Medication 1000 MILLIGRAM(S): at 18:00

## 2023-01-09 RX ADMIN — Medication 25 GRAM(S): at 19:56

## 2023-01-09 RX ADMIN — Medication 1000 MILLIGRAM(S): at 21:30

## 2023-01-09 RX ADMIN — INSULIN HUMAN 10 UNIT(S): 100 INJECTION, SOLUTION SUBCUTANEOUS at 13:25

## 2023-01-09 RX ADMIN — SODIUM CHLORIDE 175 MILLILITER(S): 9 INJECTION, SOLUTION INTRAVENOUS at 16:31

## 2023-01-09 NOTE — ED PROVIDER NOTE - NS ED ATTENDING STATEMENT MOD
This was a shared visit with the TAYLER. I reviewed and verified the documentation and independently performed the documented:

## 2023-01-09 NOTE — H&P ADULT - HISTORY OF PRESENT ILLNESS
Pt is admitted to ICU, full note to follow.  Mr. Mast is a 57 year old male with a PMH of HTN, MI s/p PCI, afib on eliquis, DMII with prior DKA (metformin / insulin), chronic pain on methadone & opioids s/p C2-6 fusion / nerve transplant for SCC, and neuropathy who presented to  ED earlier today on 1/9 with c/o fatigue and worsening SOB x4 days, anorexia, N/V and inability to take his meds both PO and antidiabetic agents with known COVID+ status from ~1 week prior. While in ED pt noted to be tachycardic and tachypneic, labs consistent with DKA. ICU team consulted. Mr. Mast is a 57 year old male with a PMH of HTN, MI s/p PCI, afib on eliquis, DMII with prior DKA (metformin / insulin), chronic pain on methadone & opioids s/p C2-6 fusion / nerve transplant for SCC, neuropathy and gastric ulcer who presented to  ED earlier today on 1/9 with c/o fatigue and worsening SOB x4 days, anorexia, N/V and inability to take his meds both PO and antidiabetic agents with known COVID+ status from ~1 week prior. While in ED pt noted to be tachycardic and tachypneic, labs consistent with DKA. ICU team consulted.

## 2023-01-09 NOTE — ED ADULT NURSE NOTE - OBJECTIVE STATEMENT
Assumed pt care for a 57 yr old male alert and oriented x4 complaining of dyspnea with exertion. Pt reports recently tested positive for COVID, since then has been very nauseous and has not been able to walk far without feeling short of breath. Pt reports he has not been able to take his medications or tolerate anything PO. Pt denies any further complaints. Awaiting further disposition.

## 2023-01-09 NOTE — PATIENT PROFILE ADULT - NSPROGENPREVTRANSF_GEN_A_NUR
Continue: Besivance (besifloxacin): drops,suspension: 0.6% 1 drop three times a day as directed into right eye 09- no

## 2023-01-09 NOTE — ED PROVIDER NOTE - ATTENDING APP SHARED VISIT CONTRIBUTION OF CARE
Gerardo with [facility]. 57y Male with pmhx of HTN, DM2 c/b DKA, neuropathy, chronic pain on Methadone & opioids for cervical spine and back injury s/p fusion C2-6 along with LUE nerve transplant, MI, Afib on Eliquis presents to the ED with complaints of shortness of breath x4 days, fatigue, no appetite and not being able to take his medication for the past week due to nausea and vomiting.  Patient recently tested COVID-positive last week.  Reports he also has not been able to take his diabetes medications. PE as noted above. +tachycardia, +tachypnea.  Labs pending. CXR pending, reassess. pt is vaccinated for covid    12:13pm: Labs reviewed AG is 26, bicarb of 8, moderate acetone in the blood, metabolic acidosis on VBG.  Patient given insulin and fluids ICU consulted will admit    I performed a face to face bedside interview with patient regarding history of present illness, review of symptoms and past medical history. I completed an independent physical exam.  I have discussed the patient's plan of care with Physician Assistant (PA). I agree with note as stated above, having amended the EMR as needed to reflect my findings.   This includes History of Present Illness, HIV, Past Medical/Surgical/Family/Social History, Allergies and Home Medications, Review of Systems, Physical Exam, and any Progress Notes during the time I functioned as the attending physician for this patient.

## 2023-01-09 NOTE — ED PROVIDER NOTE - CARE PLAN
Principal Discharge DX:	DKA, type 2  Secondary Diagnosis:	2019 novel coronavirus disease (COVID-19)   1

## 2023-01-09 NOTE — H&P ADULT - NSHPLABSRESULTS_GEN_ALL_CORE
CBC Full  -  ( 09 Jan 2023 11:00 )  WBC Count : 6.80 K/uL  RBC Count : 6.38 M/uL  Hemoglobin : 16.8 g/dL  Hematocrit : 51.2 %  Platelet Count - Automated : 550 K/uL  Mean Cell Volume : 80.3 fl  Mean Cell Hemoglobin : 26.3 pg  Mean Cell Hemoglobin Concentration : 32.8 gm/dL  Auto Neutrophil # : 4.47 K/uL  Auto Lymphocyte # : 1.72 K/uL  Auto Monocyte # : 0.55 K/uL  Auto Eosinophil # : 0.00 K/uL  Auto Basophil # : 0.03 K/uL  Auto Neutrophil % : 65.8 %  Auto Lymphocyte % : 25.3 %  Auto Monocyte % : 8.1 %  Auto Eosinophil % : 0.0 %  Auto Basophil % : 0.4 %        124<L>  |  90<L>  |  19  ----------------------------<  372<H>  5.2   |  8<LL>  |  1.04    Ca    10.3      09 Jan 2023 11:00    TPro  8.8<H>  /  Alb  4.1  /  TBili  0.8  /  DBili  x   /  AST  18  /  ALT  17  /  AlkPhos  112  01-09    LIVER FUNCTIONS - ( 09 Jan 2023 11:00 )  Alb: 4.1 g/dL / Pro: 8.8 g/dL / ALK PHOS: 112 U/L / ALT: 17 U/L / AST: 18 U/L / GGT: x             CAPILLARY BLOOD GLUCOSE  POCT Blood Glucose.: 288 mg/dL (09 Jan 2023 13:50)  POCT Blood Glucose.: 351 mg/dL (09 Jan 2023 13:23)  POCT Blood Glucose.: 346 mg/dL (09 Jan 2023 10:45)      PT/INR - ( 09 Jan 2023 11:00 )   PT: 14.1 sec;   INR: 1.21 ratio     PTT - ( 09 Jan 2023 11:00 )  PTT:29.5 sec      ABG - 7.19 / 55 / 8 / 20 / 84%    Acetone, Serum (01.09.23 @ 11:00)    Acetone, Serum: Moderate    Flu With COVID-19 By TOMEKA (01.09.23 @ 11:00)    SARS-CoV-2 Result: Detected

## 2023-01-09 NOTE — ED ADULT TRIAGE NOTE - CHIEF COMPLAINT QUOTE
BIBA from home, Covid positive for 1 week, general malaise, shortness of breath. History or DM and HTN and MI in 3/22

## 2023-01-09 NOTE — PATIENT PROFILE ADULT - FALL HARM RISK - HARM RISK INTERVENTIONS

## 2023-01-09 NOTE — ED PROVIDER NOTE - PHYSICAL EXAMINATION
no
Gen: ill appearing in NAD.  AAOx3  Eyes: PERRLA  ENT: oral mucosa dry   Head: atraumatic  Heart: s1/s2,  +tachycardia   Lung: CTA b/l, no wheezing/rhonchi or rales. + tachypnea but no hypoxia  Abd: soft, + mild upper abd TTP,  no rebound or guarding,  Msk: no pedal edema or calf pain,  Neuro: patient moving all extremity equally, no focal neuro deficits noted  Skin: Normal for race.  Psych: Alert and oriented

## 2023-01-09 NOTE — ED ADULT NURSE NOTE - SUICIDE SCREENING QUESTION 3
10/08/21 2000   C-SSRS (Frequent Screen)   2. Have you actually had any thoughts of killing yourself? No   6. Have you done anything, started to do anything, or prepared to do anything to end your life? No   Suicide Evaluation Negative screen= no ideation, behaviors or history   Nursing Suicide Assessment Note - Inpatient    Current assessment:    Current C-SSRS score: (P) Negative screen= no ideation, behaviors or history      Protective Factors / Reason for Living: Social supports, Responsibility to children, Ability to cope with frustration, Ability to cope with stress    Interventions:   · Implemented the Safety / Recovery Plan    Other Interventions Implemented:  Pt denies SI, HI, AVH and does not endorse plan or intent. Pt agrees to seek staff if in distress, 15 min safety checks in place.    No

## 2023-01-09 NOTE — ED PROVIDER NOTE - CLINICAL SUMMARY MEDICAL DECISION MAKING FREE TEXT BOX
57y Male with pmhx of HTN, DM2 c/b DKA, neuropathy, chronic pain on Methadone & opioids for cervical spine and back injury s/p fusion C2-6 along with LUE nerve transplant, MI, Afib on Eliquis presents to the ED with complaints of shortness of breath x4 days, fatigue, no appetite and not being able to take his medication for the past week due to nausea and vomiting.  Patient recently tested COVID-positive last week.  Reports he also has not been able to take his diabetes medications. PE as noted above. +tachycardia, +tachypnea.  Labs pending. CXR pending, reassess. pt is vaccinated for covid 57y Male with pmhx of HTN, DM2 c/b DKA, neuropathy, chronic pain on Methadone & opioids for cervical spine and back injury s/p fusion C2-6 along with LUE nerve transplant, MI, Afib on Eliquis presents to the ED with complaints of shortness of breath x4 days, fatigue, no appetite and not being able to take his medication for the past week due to nausea and vomiting.  Patient recently tested COVID-positive last week.  Reports he also has not been able to take his diabetes medications. PE as noted above. +tachycardia, +tachypnea.  Labs pending. CXR pending, reassess. pt is vaccinated for covid    12:13pm: Labs reviewed AG is 26, bicarb of 8, moderate acetone in the blood, metabolic acidosis on VBG.  Patient given insulin and fluids ICU consulted will admit

## 2023-01-09 NOTE — H&P ADULT - ASSESSMENT
57 year old male with a PMH of HTN, MI s/p PCI, afib on eliquis, DMII with prior DKA, chronic pain on methadone & opioids s/p C2-6 fusion / nerve transplant for SCC, and neuropathy presenting to ICU for DKA management and +COVID-19. Plan as below:     57 year old male with a PMH of HTN, MI s/p PCI, afib on eliquis, DMII with prior DKA, chronic pain on methadone & opioids s/p C2-6 fusion / nerve transplant for SCC, neuropathy, and gastric ulcer presenting to ICU for DKA management and +COVID-19. Plan as below:      --admit to ICU, monitor vitals, tele rhythm, neuro status, o2 sat  --s/p 1 liters NS, + additional LR; assess pt response, consider additional IVF if needed  --s/p insulin ivp x1 and initiation of gtt per protocol, follow nomogram  --q4-6h bmp, aggressive electrolyte repletion; abg prn  --sending covid markers, remdesivir ivpb, holding decadron temporarily given hyperglycemia and pt is without respiratory symptoms, SOB is likely 2/2 compensation for MA; o2 supp via nc prn  --transition insulin gtt to lantus and iss when AG closes; pt is not insulin naive and he is also on metformin, however holding given inpatient icu status  --maintain home medications, including eliquis, plavix, antidepressants, methadone, ppi, bb  --NPO for now, will use zofran prn   --hgba1c, and endocrine  --screen for other infectious etiology, procal, lactate; cxr without significant pneumonia to my eyes  --monitor u/o, bun/creatinine, avoid nephrotoxic agents for now  --dvt prophylaxis, pt already on eliquis; gi on ppi at home     57 year old male with a PMH of HTN, MI s/p PCI, afib on eliquis, DMII with prior DKA, chronic pain on methadone & opioids s/p C2-6 fusion / nerve transplant for SCC, neuropathy, and gastric ulcer presenting to ICU for DKA management and +COVID-19. Plan as below:      --admit to ICU, monitor vitals, tele rhythm, neuro status, o2 sat  --s/p 2  liters NS, + additional LR; assess pt response, consider additional IVF if needed  --s/p insulin ivp x1 and initiation of gtt per protocol, follow nomogram  --q4-6h bmp, aggressive electrolyte repletion; abg prn  --sending covid markers, remdesivir ivpb, holding decadron temporarily given hyperglycemia and pt is without respiratory symptoms, SOB is likely 2/2 compensation for MA; o2 supp via nc prn  --transition insulin gtt to lantus and iss when AG closes; pt is not insulin naive and he is also on metformin, however holding given inpatient icu status  --maintain home medications, including eliquis, plavix, antidepressants, methadone, ppi, bb  --NPO for now, will use zofran prn   --hgba1c, and endocrine  --screen for other infectious etiology, procal, lactate; cxr without significant pneumonia to my eyes  --monitor u/o, bun/creatinine, avoid nephrotoxic agents for now  --dvt prophylaxis, pt already on eliquis; gi on ppi at home     57 year old male with a PMH of HTN, MI s/p PCI, afib on eliquis, DMII with prior DKA, chronic pain on methadone & opioids s/p C2-6 fusion / nerve transplant for SCC, neuropathy, and gastric ulcer presenting to ICU for DKA management and +COVID-19. Plan as below:      --admit to ICU, monitor vitals, tele rhythm, neuro status, o2 sat  --s/p 2  liters NS, + additional LR; assess pt response, consider additional IVF if needed  --s/p insulin ivp x1 and initiation of gtt per protocol, follow nomogram  --q4-6h bmp, aggressive electrolyte repletion; abg prn  --sending covid markers, remdesivir ivpb, holding decadron temporarily given hyperglycemia and pt is without respiratory symptoms, SOB is likely 2/2 compensation for MA; o2 supp via nc prn  --transition insulin gtt to lantus and iss when AG closes; pt is not insulin naive and he is also on metformin, however holding given inpatient icu status  --maintain home medications, including eliquis, plavix, antidepressants, methadone, ppi, bb  --NPO for now, will use zofran prn   --hgba1c, and endocrine consult  --screen for other infectious etiology, procal, lactate; cxr without significant pneumonia to my eyes  --monitor u/o, bun/creatinine, avoid nephrotoxic agents for now  --dvt prophylaxis, pt already on eliquis; gi on ppi at home  --pt education / emotional support   57 year old male with a PMH of HTN, MI s/p PCI, afib on eliquis, DMII with prior DKA, chronic pain on methadone & opioids s/p C2-6 fusion / nerve transplant for SCC, neuropathy, and gastric ulcer presenting to ICU for DKA management and +COVID-19. Plan as below:    Problem list  1) DKA in patient with DM2  2) COVID Positive  3) underlying HTN, CAD s/p PCI, Afib on eliquis. chronic pain     --admit to ICU, monitor vitals, tele rhythm, neuro status, o2 sat  --s/p 2  liters NS, + additional LR; assess pt response, consider additional IVF if needed  --s/p insulin ivp x1 and initiation of gtt per protocol, follow nomogram  --q4-6h bmp, aggressive electrolyte repletion; abg prn  --sending covid markers, remdesivir ivpb, holding decadron temporarily given hyperglycemia and pt is without respiratory symptoms, SOB is likely 2/2 compensation for MA; o2 supp via nc prn  --transition insulin gtt to lantus and iss when AG closes; pt is not insulin naive and he is also on metformin, however holding given inpatient icu status  --maintain home medications, including eliquis, plavix, antidepressants, methadone, ppi, bb  --NPO for now, will use zofran prn   --hgba1c, and endocrine consult  --screen for other infectious etiology, procal, lactate; cxr without significant pneumonia to my eyes  --monitor u/o, bun/creatinine, avoid nephrotoxic agents for now  --dvt prophylaxis, pt already on eliquis; gi on ppi at home  --pt education / emotional support

## 2023-01-09 NOTE — H&P ADULT - NSHPSOCIALHISTORY_GEN_ALL_CORE
Patient works for  office, Patient works for [employer] office of [place] and lives in a cottage. He denies any drug/alcohol use.

## 2023-01-09 NOTE — H&P ADULT - NSHPPHYSICALEXAM_GEN_ALL_CORE
T(C): 36.1 (01-09-23 @ 10:25), Max: 36.1 (01-09-23 @ 10:25)  HR: 126 (01-09-23 @ 13:50) (126 - 126)  BP: 164/104 (01-09-23 @ 13:50) (164/104 - 164/105)  RR: 22 (01-09-23 @ 13:50) (18 - 22)  SpO2: 100% (01-09-23 @ 13:50) (99% - 100%)    CONSTITUTIONAL: Well groomed, no apparent distress  EYES: PERRLA and symmetric, EOMI, No conjunctival or scleral injection, non-icteric  ENMT: Oral mucosa with moist membranes. Normal dentition; no pharyngeal injection or exudates             NECK: Supple, symmetric and without tracheal deviation   RESP: No respiratory distress, no use of accessory muscles; CTA b/l, no WRR  CV: RRR, +S1S2, no MRG; no JVD; no peripheral edema  GI: Soft, NT, ND, no rebound, no guarding; no palpable masses; no hepatosplenomegaly; no hernia palpated  LYMPH: No cervical LAD or tenderness; no axillary LAD or tenderness; no inguinal LAD or tenderness  MSK: Normal gait; No digital clubbing or cyanosis; examination of the (head/neck/spine/ribs/pelvis, RUE, LUE, RLE, LLE) without misalignment,            Normal ROM without pain, no spinal tenderness, normal muscle strength/tone  SKIN: No rashes or ulcers noted; no subcutaneous nodules or induration palpable  NEURO: CN II-XII intact; normal reflexes in upper and lower extremities, sensation intact in upper and lower extremities b/l to light touch   PSYCH: Appropriate insight/judgment; A+O x 3, mood and affect appropriate, recent/remote memory intact T(C): 36.1 (01-09-23 @ 10:25), Max: 36.1 (01-09-23 @ 10:25)  HR: 126 (01-09-23 @ 13:50) (126 - 126)  BP: 164/104 (01-09-23 @ 13:50) (164/104 - 164/105)  RR: 22 (01-09-23 @ 13:50) (18 - 22)  SpO2: 100% (01-09-23 @ 13:50) (99% - 100%)    CONSTITUTIONAL: Appears sick, not in distress  EYES: PERRLA and symmetric, EOMI, No conjunctival or scleral injection, non-icteric  ENMT: dry mucosa, intact  RESP: No respiratory distress, no use of accessory muscles; CTA b/l; mild inc wob noted  CV: RRR, +S1S2, no MRG; no JVD; no peripheral edema  GI: Soft, NT, ND, no rebound, no guarding  MSK: COBOS x4, normal ROM to extremities noted  SKIN: No rashes or ulcers noted  NEURO: CN II-XII intact  PSYCH: Appropriate insight/judgment; A+O x 3, mood and affect appropriate, recent/remote memory intact

## 2023-01-10 ENCOUNTER — NON-APPOINTMENT (OUTPATIENT)
Age: 58
End: 2023-01-10

## 2023-01-10 LAB
ALBUMIN SERPL ELPH-MCNC: 3 G/DL — LOW (ref 3.3–5)
ALP SERPL-CCNC: 80 U/L — SIGNIFICANT CHANGE UP (ref 40–120)
ALT FLD-CCNC: 14 U/L — SIGNIFICANT CHANGE UP (ref 10–45)
ANION GAP SERPL CALC-SCNC: 11 MMOL/L — SIGNIFICANT CHANGE UP (ref 5–17)
AST SERPL-CCNC: <5 U/L — LOW (ref 10–40)
BILIRUB SERPL-MCNC: 0.6 MG/DL — SIGNIFICANT CHANGE UP (ref 0.2–1.2)
BUN SERPL-MCNC: 12 MG/DL — SIGNIFICANT CHANGE UP (ref 7–23)
CALCIUM SERPL-MCNC: 8.7 MG/DL — SIGNIFICANT CHANGE UP (ref 8.4–10.5)
CHLORIDE SERPL-SCNC: 105 MMOL/L — SIGNIFICANT CHANGE UP (ref 96–108)
CO2 SERPL-SCNC: 20 MMOL/L — LOW (ref 22–31)
CREAT SERPL-MCNC: 0.75 MG/DL — SIGNIFICANT CHANGE UP (ref 0.5–1.3)
CRP SERPL-MCNC: 3 MG/L — SIGNIFICANT CHANGE UP
EGFR: 107 ML/MIN/1.73M2 — SIGNIFICANT CHANGE UP
GLUCOSE BLDC GLUCOMTR-MCNC: 136 MG/DL — HIGH (ref 70–99)
GLUCOSE BLDC GLUCOMTR-MCNC: 145 MG/DL — HIGH (ref 70–99)
GLUCOSE BLDC GLUCOMTR-MCNC: 156 MG/DL — HIGH (ref 70–99)
GLUCOSE BLDC GLUCOMTR-MCNC: 159 MG/DL — HIGH (ref 70–99)
GLUCOSE BLDC GLUCOMTR-MCNC: 164 MG/DL — HIGH (ref 70–99)
GLUCOSE BLDC GLUCOMTR-MCNC: 167 MG/DL — HIGH (ref 70–99)
GLUCOSE BLDC GLUCOMTR-MCNC: 182 MG/DL — HIGH (ref 70–99)
GLUCOSE BLDC GLUCOMTR-MCNC: 183 MG/DL — HIGH (ref 70–99)
GLUCOSE BLDC GLUCOMTR-MCNC: 195 MG/DL — HIGH (ref 70–99)
GLUCOSE BLDC GLUCOMTR-MCNC: 197 MG/DL — HIGH (ref 70–99)
GLUCOSE BLDC GLUCOMTR-MCNC: 198 MG/DL — HIGH (ref 70–99)
GLUCOSE BLDC GLUCOMTR-MCNC: 202 MG/DL — HIGH (ref 70–99)
GLUCOSE BLDC GLUCOMTR-MCNC: 221 MG/DL — HIGH (ref 70–99)
GLUCOSE BLDC GLUCOMTR-MCNC: 222 MG/DL — HIGH (ref 70–99)
GLUCOSE BLDC GLUCOMTR-MCNC: 245 MG/DL — HIGH (ref 70–99)
GLUCOSE SERPL-MCNC: 213 MG/DL — HIGH (ref 70–99)
HCT VFR BLD CALC: 36.1 % — LOW (ref 39–50)
HGB BLD-MCNC: 12 G/DL — LOW (ref 13–17)
LDH SERPL L TO P-CCNC: 183 U/L — SIGNIFICANT CHANGE UP (ref 50–242)
MAGNESIUM SERPL-MCNC: 1.8 MG/DL — SIGNIFICANT CHANGE UP (ref 1.6–2.6)
MCHC RBC-ENTMCNC: 26.5 PG — LOW (ref 27–34)
MCHC RBC-ENTMCNC: 33.2 GM/DL — SIGNIFICANT CHANGE UP (ref 32–36)
MCV RBC AUTO: 79.7 FL — LOW (ref 80–100)
NRBC # BLD: 0 /100 WBCS — SIGNIFICANT CHANGE UP (ref 0–0)
PHOSPHATE SERPL-MCNC: 1.5 MG/DL — LOW (ref 2.5–4.5)
PLATELET # BLD AUTO: 345 K/UL — SIGNIFICANT CHANGE UP (ref 150–400)
POTASSIUM SERPL-MCNC: 3.4 MMOL/L — LOW (ref 3.5–5.3)
POTASSIUM SERPL-SCNC: 3.4 MMOL/L — LOW (ref 3.5–5.3)
PROT SERPL-MCNC: 6 G/DL — SIGNIFICANT CHANGE UP (ref 6–8.3)
RBC # BLD: 4.53 M/UL — SIGNIFICANT CHANGE UP (ref 4.2–5.8)
RBC # FLD: 16.5 % — HIGH (ref 10.3–14.5)
SODIUM SERPL-SCNC: 136 MMOL/L — SIGNIFICANT CHANGE UP (ref 135–145)
WBC # BLD: 5.66 K/UL — SIGNIFICANT CHANGE UP (ref 3.8–10.5)
WBC # FLD AUTO: 5.66 K/UL — SIGNIFICANT CHANGE UP (ref 3.8–10.5)

## 2023-01-10 RX ORDER — POTASSIUM PHOSPHATE, MONOBASIC POTASSIUM PHOSPHATE, DIBASIC 236; 224 MG/ML; MG/ML
30 INJECTION, SOLUTION INTRAVENOUS ONCE
Refills: 0 | Status: COMPLETED | OUTPATIENT
Start: 2023-01-10 | End: 2023-01-10

## 2023-01-10 RX ORDER — HYDROMORPHONE HYDROCHLORIDE 2 MG/ML
2 INJECTION INTRAMUSCULAR; INTRAVENOUS; SUBCUTANEOUS EVERY 6 HOURS
Refills: 0 | Status: DISCONTINUED | OUTPATIENT
Start: 2023-01-10 | End: 2023-01-10

## 2023-01-10 RX ORDER — MAGNESIUM SULFATE 500 MG/ML
2 VIAL (ML) INJECTION ONCE
Refills: 0 | Status: COMPLETED | OUTPATIENT
Start: 2023-01-10 | End: 2023-01-10

## 2023-01-10 RX ORDER — HYDROMORPHONE HYDROCHLORIDE 2 MG/ML
2 INJECTION INTRAMUSCULAR; INTRAVENOUS; SUBCUTANEOUS ONCE
Refills: 0 | Status: DISCONTINUED | OUTPATIENT
Start: 2023-01-10 | End: 2023-01-10

## 2023-01-10 RX ORDER — INSULIN GLARGINE 100 [IU]/ML
18 INJECTION, SOLUTION SUBCUTANEOUS EVERY MORNING
Refills: 0 | Status: ACTIVE | OUTPATIENT
Start: 2023-01-11 | End: 2023-12-10

## 2023-01-10 RX ORDER — ONDANSETRON 8 MG/1
4 TABLET, FILM COATED ORAL EVERY 6 HOURS
Refills: 0 | Status: DISCONTINUED | OUTPATIENT
Start: 2023-01-10 | End: 2023-01-14

## 2023-01-10 RX ORDER — HYDROMORPHONE HYDROCHLORIDE 2 MG/ML
4 INJECTION INTRAMUSCULAR; INTRAVENOUS; SUBCUTANEOUS EVERY 6 HOURS
Refills: 0 | Status: DISCONTINUED | OUTPATIENT
Start: 2023-01-10 | End: 2023-01-14

## 2023-01-10 RX ORDER — INSULIN LISPRO 100/ML
4 VIAL (ML) SUBCUTANEOUS
Refills: 0 | Status: ACTIVE | OUTPATIENT
Start: 2023-01-10 | End: 2023-12-09

## 2023-01-10 RX ORDER — HYDROMORPHONE HYDROCHLORIDE 2 MG/ML
1 INJECTION INTRAMUSCULAR; INTRAVENOUS; SUBCUTANEOUS ONCE
Refills: 0 | Status: DISCONTINUED | OUTPATIENT
Start: 2023-01-10 | End: 2023-01-10

## 2023-01-10 RX ORDER — INSULIN LISPRO 100/ML
VIAL (ML) SUBCUTANEOUS
Refills: 0 | Status: DISCONTINUED | OUTPATIENT
Start: 2023-01-10 | End: 2023-01-14

## 2023-01-10 RX ORDER — HYDROMORPHONE HYDROCHLORIDE 2 MG/ML
4 INJECTION INTRAMUSCULAR; INTRAVENOUS; SUBCUTANEOUS ONCE
Refills: 0 | Status: DISCONTINUED | OUTPATIENT
Start: 2023-01-10 | End: 2023-01-10

## 2023-01-10 RX ORDER — POTASSIUM CHLORIDE 20 MEQ
40 PACKET (EA) ORAL ONCE
Refills: 0 | Status: COMPLETED | OUTPATIENT
Start: 2023-01-10 | End: 2023-01-10

## 2023-01-10 RX ORDER — INSULIN GLARGINE 100 [IU]/ML
20 INJECTION, SOLUTION SUBCUTANEOUS ONCE
Refills: 0 | Status: COMPLETED | OUTPATIENT
Start: 2023-01-10 | End: 2023-01-10

## 2023-01-10 RX ADMIN — HYDROMORPHONE HYDROCHLORIDE 2 MILLIGRAM(S): 2 INJECTION INTRAMUSCULAR; INTRAVENOUS; SUBCUTANEOUS at 17:23

## 2023-01-10 RX ADMIN — Medication 25 GRAM(S): at 08:40

## 2023-01-10 RX ADMIN — HYDROMORPHONE HYDROCHLORIDE 1 MILLIGRAM(S): 2 INJECTION INTRAMUSCULAR; INTRAVENOUS; SUBCUTANEOUS at 14:01

## 2023-01-10 RX ADMIN — HYDROMORPHONE HYDROCHLORIDE 2 MILLIGRAM(S): 2 INJECTION INTRAMUSCULAR; INTRAVENOUS; SUBCUTANEOUS at 17:08

## 2023-01-10 RX ADMIN — HYDROMORPHONE HYDROCHLORIDE 1 MILLIGRAM(S): 2 INJECTION INTRAMUSCULAR; INTRAVENOUS; SUBCUTANEOUS at 01:30

## 2023-01-10 RX ADMIN — CHLORHEXIDINE GLUCONATE 1 APPLICATION(S): 213 SOLUTION TOPICAL at 05:16

## 2023-01-10 RX ADMIN — APIXABAN 5 MILLIGRAM(S): 2.5 TABLET, FILM COATED ORAL at 20:49

## 2023-01-10 RX ADMIN — APIXABAN 5 MILLIGRAM(S): 2.5 TABLET, FILM COATED ORAL at 10:44

## 2023-01-10 RX ADMIN — METHADONE HYDROCHLORIDE 5 MILLIGRAM(S): 40 TABLET ORAL at 22:36

## 2023-01-10 RX ADMIN — HYDROMORPHONE HYDROCHLORIDE 1 MILLIGRAM(S): 2 INJECTION INTRAMUSCULAR; INTRAVENOUS; SUBCUTANEOUS at 05:35

## 2023-01-10 RX ADMIN — HYDROMORPHONE HYDROCHLORIDE 2 MILLIGRAM(S): 2 INJECTION INTRAMUSCULAR; INTRAVENOUS; SUBCUTANEOUS at 17:59

## 2023-01-10 RX ADMIN — HYDROMORPHONE HYDROCHLORIDE 1 MILLIGRAM(S): 2 INJECTION INTRAMUSCULAR; INTRAVENOUS; SUBCUTANEOUS at 14:15

## 2023-01-10 RX ADMIN — POTASSIUM PHOSPHATE, MONOBASIC POTASSIUM PHOSPHATE, DIBASIC 83.33 MILLIMOLE(S): 236; 224 INJECTION, SOLUTION INTRAVENOUS at 10:44

## 2023-01-10 RX ADMIN — INSULIN HUMAN 10 UNIT(S)/HR: 100 INJECTION, SOLUTION SUBCUTANEOUS at 00:58

## 2023-01-10 RX ADMIN — ONDANSETRON 4 MILLIGRAM(S): 8 TABLET, FILM COATED ORAL at 05:40

## 2023-01-10 RX ADMIN — Medication 1 DROP(S): at 20:48

## 2023-01-10 RX ADMIN — HYDROMORPHONE HYDROCHLORIDE 1 MILLIGRAM(S): 2 INJECTION INTRAMUSCULAR; INTRAVENOUS; SUBCUTANEOUS at 09:15

## 2023-01-10 RX ADMIN — Medication 4: at 11:47

## 2023-01-10 RX ADMIN — HYDROMORPHONE HYDROCHLORIDE 1 MILLIGRAM(S): 2 INJECTION INTRAMUSCULAR; INTRAVENOUS; SUBCUTANEOUS at 09:00

## 2023-01-10 RX ADMIN — INSULIN HUMAN 2 UNIT(S)/HR: 100 INJECTION, SOLUTION SUBCUTANEOUS at 01:13

## 2023-01-10 RX ADMIN — Medication 4: at 22:33

## 2023-01-10 RX ADMIN — HYDROMORPHONE HYDROCHLORIDE 1 MILLIGRAM(S): 2 INJECTION INTRAMUSCULAR; INTRAVENOUS; SUBCUTANEOUS at 05:16

## 2023-01-10 RX ADMIN — Medication 4 UNIT(S): at 16:10

## 2023-01-10 RX ADMIN — INSULIN GLARGINE 20 UNIT(S): 100 INJECTION, SOLUTION SUBCUTANEOUS at 07:05

## 2023-01-10 RX ADMIN — Medication 4 UNIT(S): at 11:47

## 2023-01-10 RX ADMIN — CARVEDILOL PHOSPHATE 3.12 MILLIGRAM(S): 80 CAPSULE, EXTENDED RELEASE ORAL at 10:44

## 2023-01-10 RX ADMIN — Medication 150 MILLIGRAM(S): at 17:08

## 2023-01-10 RX ADMIN — HYDROMORPHONE HYDROCHLORIDE 1 MILLIGRAM(S): 2 INJECTION INTRAMUSCULAR; INTRAVENOUS; SUBCUTANEOUS at 01:13

## 2023-01-10 RX ADMIN — Medication 2: at 16:10

## 2023-01-10 RX ADMIN — INSULIN HUMAN 1 UNIT(S)/HR: 100 INJECTION, SOLUTION SUBCUTANEOUS at 02:06

## 2023-01-10 RX ADMIN — CARVEDILOL PHOSPHATE 3.12 MILLIGRAM(S): 80 CAPSULE, EXTENDED RELEASE ORAL at 20:49

## 2023-01-10 RX ADMIN — PANTOPRAZOLE SODIUM 40 MILLIGRAM(S): 20 TABLET, DELAYED RELEASE ORAL at 11:27

## 2023-01-10 NOTE — ADVANCED PRACTICE NURSE CONSULT - ASSESSMENT
HPI (per chart) 57 year old male with a PMH of HTN, MI s/p PCI, afib on eliquis, T2DM with prior DKA (Jardiance/metformin / insulin), chronic pain on methadone & opioids s/p C2-6 fusion / nerve transplant for SCC, neuropathy and gastric ulcer who presented to  ED on 1/9 with c/o fatigue and worsening SOB x4 days, anorexia, N/V and inability to take his meds including antidiabetic agents oral and injectables with known COVID+ status from ~1 week prior. While in ED pt noted to be tachycardic and tachypneic, labs consistent with DKA. ICU team consulted. Insulin Infusion started 0n 1/9 at 1500 and discontinued at 9 am on 1/10/22. Serum Carbon Dioxide 20 at 05:30 on 1/10/2023. Lantus 20 units SC given at 0700 on 1/10/2022.    HgbA1c- 7.7 (1/9/23)  eGFR- 107 (1/10/22)    Home Diabetes medication Regimen-  Lantus 18 units daily  Novolog 6 units before meals three times/day  Metformin 850 mg orally twice daily with meals  Jardiance 25 mg orally once a day     Endocrinology- Dr Jackie Buitrago - does not recall last visit    Educated pt on sick day management including increased BG monitoring, holding Jardiance and metformin if not tolerating oral intake, pushing oral fluids hourly and adding oral fluids with sugar every 2 hours- if no solid food intake, come to ER for IVH if no fluid intake in 8 hours or BG > 250 mg/dL, or symptomatic.  Pt is knowledgeable regarding T2DM disease process, progression, management A1c goal- 7.0- 7.5. BG goals (100- 140), and use of FS Tono 2 Continuous Glucose Monitoring System. Pt has FS Tono CGM sensor on upper left arm- pt thinks it has 5-7 day wear left. Advised pt he could bring in his phone and scan while in hospital, but POCT required AC/HS and before any treatment of BG while in hospital.     Educated pt on s/s of hyperglycemia and Tx and S/S of hypoglycemia and tx per 15/15 rule. Pt validated education via teach back.    HPI (per chart) 57 year old male with a PMH of HTN, MI s/p PCI, afib on eliquis, T2DM with prior DKA (Jardiance/metformin / insulin), chronic pain on methadone & opioids s/p C2-6 fusion / nerve transplant for SCC, neuropathy and gastric ulcer who presented to  ED on 1/9 with c/o fatigue and worsening SOB x4 days, anorexia, N/V and inability to take his meds including antidiabetic agents oral and injectables with known COVID+ status from ~1 week prior. While in ED pt noted to be tachycardic and tachypneic, labs consistent with DKA. ICU team consulted. Insulin Infusion started 0n 1/9 at 1500 and discontinued at 9 am on 1/10/22. Serum Carbon Dioxide 20 and Anion Gap 11- closed at 05:30 on 1/10/2023. Lantus 20 units SC given at 0700 on 1/10/2022.    HgbA1c- 7.7 (1/9/23)  eGFR- 107 (1/10/22)    Home Diabetes medication Regimen-  Lantus 18 units daily  Novolog 6 units before meals three times/day  Metformin 850 mg orally twice daily with meals  Jardiance 25 mg orally once a day     Endocrinology- Dr Jackie Buitrago - does not recall last visit    Educated pt on sick day management including increased BG monitoring, holding Jardiance and metformin if not tolerating oral intake, pushing oral fluids hourly and adding oral fluids with sugar every 2 hours- if no solid food intake, come to ER for IVH if no fluid intake in 8 hours or BG > 250 mg/dL, or symptomatic.  Pt is knowledgeable regarding T2DM disease process, progression, management A1c goal- 7.0- 7.5. BG goals (100- 140), and use of FS Tono 2 Continuous Glucose Monitoring System. Pt has FS Tono CGM sensor on upper left arm- pt thinks it has 5-7 day wear left. Advised pt he could bring in his phone and scan while in hospital, but POCT required AC/HS and before any treatment of BG while in hospital.     Educated pt on s/s of hyperglycemia and Tx and S/S of hypoglycemia and tx per 15/15 rule. Pt validated education via teach back.

## 2023-01-11 LAB
ALBUMIN SERPL ELPH-MCNC: 3 G/DL — LOW (ref 3.3–5)
ALP SERPL-CCNC: 84 U/L — SIGNIFICANT CHANGE UP (ref 40–120)
ALT FLD-CCNC: 11 U/L — SIGNIFICANT CHANGE UP (ref 10–45)
ANION GAP SERPL CALC-SCNC: 12 MMOL/L — SIGNIFICANT CHANGE UP (ref 5–17)
AST SERPL-CCNC: 6 U/L — LOW (ref 10–40)
BILIRUB SERPL-MCNC: 0.6 MG/DL — SIGNIFICANT CHANGE UP (ref 0.2–1.2)
BUN SERPL-MCNC: 8 MG/DL — SIGNIFICANT CHANGE UP (ref 7–23)
CALCIUM SERPL-MCNC: 8.9 MG/DL — SIGNIFICANT CHANGE UP (ref 8.4–10.5)
CHLORIDE SERPL-SCNC: 103 MMOL/L — SIGNIFICANT CHANGE UP (ref 96–108)
CO2 SERPL-SCNC: 24 MMOL/L — SIGNIFICANT CHANGE UP (ref 22–31)
CREAT SERPL-MCNC: 0.55 MG/DL — SIGNIFICANT CHANGE UP (ref 0.5–1.3)
EGFR: 117 ML/MIN/1.73M2 — SIGNIFICANT CHANGE UP
GLUCOSE BLDC GLUCOMTR-MCNC: 211 MG/DL — HIGH (ref 70–99)
GLUCOSE BLDC GLUCOMTR-MCNC: 213 MG/DL — HIGH (ref 70–99)
GLUCOSE BLDC GLUCOMTR-MCNC: 222 MG/DL — HIGH (ref 70–99)
GLUCOSE BLDC GLUCOMTR-MCNC: 243 MG/DL — HIGH (ref 70–99)
GLUCOSE SERPL-MCNC: 197 MG/DL — HIGH (ref 70–99)
HCT VFR BLD CALC: 34.4 % — LOW (ref 39–50)
HGB BLD-MCNC: 11.6 G/DL — LOW (ref 13–17)
MAGNESIUM SERPL-MCNC: 1.6 MG/DL — SIGNIFICANT CHANGE UP (ref 1.6–2.6)
MCHC RBC-ENTMCNC: 26.8 PG — LOW (ref 27–34)
MCHC RBC-ENTMCNC: 33.7 GM/DL — SIGNIFICANT CHANGE UP (ref 32–36)
MCV RBC AUTO: 79.4 FL — LOW (ref 80–100)
NRBC # BLD: 0 /100 WBCS — SIGNIFICANT CHANGE UP (ref 0–0)
PHOSPHATE SERPL-MCNC: 1.8 MG/DL — LOW (ref 2.5–4.5)
PLATELET # BLD AUTO: 332 K/UL — SIGNIFICANT CHANGE UP (ref 150–400)
POTASSIUM SERPL-MCNC: 2.9 MMOL/L — CRITICAL LOW (ref 3.5–5.3)
POTASSIUM SERPL-SCNC: 2.9 MMOL/L — CRITICAL LOW (ref 3.5–5.3)
PROT SERPL-MCNC: 5.8 G/DL — LOW (ref 6–8.3)
RBC # BLD: 4.33 M/UL — SIGNIFICANT CHANGE UP (ref 4.2–5.8)
RBC # FLD: 16.2 % — HIGH (ref 10.3–14.5)
SODIUM SERPL-SCNC: 139 MMOL/L — SIGNIFICANT CHANGE UP (ref 135–145)
WBC # BLD: 5.57 K/UL — SIGNIFICANT CHANGE UP (ref 3.8–10.5)
WBC # FLD AUTO: 5.57 K/UL — SIGNIFICANT CHANGE UP (ref 3.8–10.5)

## 2023-01-11 RX ORDER — MAGNESIUM SULFATE 500 MG/ML
2 VIAL (ML) INJECTION ONCE
Refills: 0 | Status: COMPLETED | OUTPATIENT
Start: 2023-01-11 | End: 2023-01-11

## 2023-01-11 RX ORDER — PANTOPRAZOLE SODIUM 20 MG/1
40 TABLET, DELAYED RELEASE ORAL
Refills: 0 | Status: DISCONTINUED | OUTPATIENT
Start: 2023-01-11 | End: 2023-01-14

## 2023-01-11 RX ORDER — POTASSIUM CHLORIDE 20 MEQ
40 PACKET (EA) ORAL EVERY 4 HOURS
Refills: 0 | Status: COMPLETED | OUTPATIENT
Start: 2023-01-11 | End: 2023-01-11

## 2023-01-11 RX ADMIN — HYDROMORPHONE HYDROCHLORIDE 4 MILLIGRAM(S): 2 INJECTION INTRAMUSCULAR; INTRAVENOUS; SUBCUTANEOUS at 00:45

## 2023-01-11 RX ADMIN — Medication 4: at 12:43

## 2023-01-11 RX ADMIN — CARVEDILOL PHOSPHATE 3.12 MILLIGRAM(S): 80 CAPSULE, EXTENDED RELEASE ORAL at 17:21

## 2023-01-11 RX ADMIN — HYDROMORPHONE HYDROCHLORIDE 4 MILLIGRAM(S): 2 INJECTION INTRAMUSCULAR; INTRAVENOUS; SUBCUTANEOUS at 20:36

## 2023-01-11 RX ADMIN — HYDROMORPHONE HYDROCHLORIDE 4 MILLIGRAM(S): 2 INJECTION INTRAMUSCULAR; INTRAVENOUS; SUBCUTANEOUS at 12:32

## 2023-01-11 RX ADMIN — Medication 25 GRAM(S): at 12:33

## 2023-01-11 RX ADMIN — SERTRALINE 25 MILLIGRAM(S): 25 TABLET, FILM COATED ORAL at 12:34

## 2023-01-11 RX ADMIN — Medication 150 MILLIGRAM(S): at 05:16

## 2023-01-11 RX ADMIN — Medication 4: at 17:20

## 2023-01-11 RX ADMIN — Medication 1 DROP(S): at 05:21

## 2023-01-11 RX ADMIN — CHLORHEXIDINE GLUCONATE 1 APPLICATION(S): 213 SOLUTION TOPICAL at 05:17

## 2023-01-11 RX ADMIN — Medication 4: at 08:01

## 2023-01-11 RX ADMIN — CLOPIDOGREL BISULFATE 75 MILLIGRAM(S): 75 TABLET, FILM COATED ORAL at 12:33

## 2023-01-11 RX ADMIN — HYDROMORPHONE HYDROCHLORIDE 4 MILLIGRAM(S): 2 INJECTION INTRAMUSCULAR; INTRAVENOUS; SUBCUTANEOUS at 19:36

## 2023-01-11 RX ADMIN — HYDROMORPHONE HYDROCHLORIDE 4 MILLIGRAM(S): 2 INJECTION INTRAMUSCULAR; INTRAVENOUS; SUBCUTANEOUS at 05:45

## 2023-01-11 RX ADMIN — Medication 40 MILLIEQUIVALENT(S): at 17:21

## 2023-01-11 RX ADMIN — HYDROMORPHONE HYDROCHLORIDE 4 MILLIGRAM(S): 2 INJECTION INTRAMUSCULAR; INTRAVENOUS; SUBCUTANEOUS at 00:15

## 2023-01-11 RX ADMIN — Medication 40 MILLIEQUIVALENT(S): at 13:10

## 2023-01-11 RX ADMIN — APIXABAN 5 MILLIGRAM(S): 2.5 TABLET, FILM COATED ORAL at 17:21

## 2023-01-11 RX ADMIN — METHADONE HYDROCHLORIDE 5 MILLIGRAM(S): 40 TABLET ORAL at 05:16

## 2023-01-11 RX ADMIN — Medication 4 UNIT(S): at 12:43

## 2023-01-11 RX ADMIN — METHADONE HYDROCHLORIDE 5 MILLIGRAM(S): 40 TABLET ORAL at 21:04

## 2023-01-11 RX ADMIN — Medication 4 UNIT(S): at 17:20

## 2023-01-11 RX ADMIN — APIXABAN 5 MILLIGRAM(S): 2.5 TABLET, FILM COATED ORAL at 07:51

## 2023-01-11 RX ADMIN — Medication 4: at 21:05

## 2023-01-11 RX ADMIN — HYDROMORPHONE HYDROCHLORIDE 4 MILLIGRAM(S): 2 INJECTION INTRAMUSCULAR; INTRAVENOUS; SUBCUTANEOUS at 05:16

## 2023-01-11 RX ADMIN — HYDROMORPHONE HYDROCHLORIDE 4 MILLIGRAM(S): 2 INJECTION INTRAMUSCULAR; INTRAVENOUS; SUBCUTANEOUS at 13:02

## 2023-01-11 RX ADMIN — Medication 1 DROP(S): at 21:04

## 2023-01-11 RX ADMIN — CARVEDILOL PHOSPHATE 3.12 MILLIGRAM(S): 80 CAPSULE, EXTENDED RELEASE ORAL at 07:52

## 2023-01-11 RX ADMIN — Medication 40 MILLIEQUIVALENT(S): at 21:04

## 2023-01-11 RX ADMIN — Medication 4 UNIT(S): at 08:00

## 2023-01-11 RX ADMIN — INSULIN GLARGINE 18 UNIT(S): 100 INJECTION, SOLUTION SUBCUTANEOUS at 08:02

## 2023-01-11 RX ADMIN — METHADONE HYDROCHLORIDE 5 MILLIGRAM(S): 40 TABLET ORAL at 15:35

## 2023-01-11 RX ADMIN — Medication 150 MILLIGRAM(S): at 17:20

## 2023-01-11 NOTE — DIETITIAN INITIAL EVALUATION ADULT - PERTINENT LABORATORY DATA
01-11    139  |  103  |  8   ----------------------------<  197<H>  2.9<LL>   |  24  |  0.55    Ca    8.9      11 Jan 2023 06:35  Phos  1.8     01-11  Mg     1.6     01-11    TPro  5.8<L>  /  Alb  3.0<L>  /  TBili  0.6  /  DBili  x   /  AST  6<L>  /  ALT  11  /  AlkPhos  84  01-11  POCT Blood Glucose.: 211 mg/dL (01-11-23 @ 07:53)  A1C with Estimated Average Glucose Result: 7.7 % (01-09-23 @ 13:05)  A1C with Estimated Average Glucose Result: 8.1 % (12-03-22 @ 12:43)  A1C with Estimated Average Glucose Result: 11.2 % (03-02-22 @ 01:48)

## 2023-01-11 NOTE — DIETITIAN INITIAL EVALUATION ADULT - OTHER INFO
57 year old male with a PMH of HTN, MI s/p PCI, afib on eliquis, DMII with prior DKA (metformin / insulin), chronic pain on methadone & opioids s/p C2-6 fusion / nerve transplant for SCC, neuropathy and gastric ulcer who presented to  ED earlier today on 1/9 with c/o fatigue and worsening SOB x4 days, anorexia, N/V and inability to take his meds both PO and antidiabetic agents with known COVID+ status from ~1 week prior.Patient admitted with DKA , insulin infusion initiated however anion gap closed , advanced to po diet with insulin regimen  Appetite is reported improved , no n/v noted , POCT reviewed . skinintact. (+) BM (1/10)

## 2023-01-11 NOTE — DIETITIAN INITIAL EVALUATION ADULT - PERTINENT MEDS FT
MEDICATIONS  (STANDING):  apixaban 5 milliGRAM(s) Oral every 12 hours  artificial tears (preservative free) Ophthalmic Solution 1 Drop(s) Both EYES every 6 hours  carvedilol 3.125 milliGRAM(s) Oral every 12 hours  chlorhexidine 2% Cloths 1 Application(s) Topical <User Schedule>  clopidogrel Tablet 75 milliGRAM(s) Oral daily  insulin glargine Injectable (LANTUS) 18 Unit(s) SubCutaneous every morning  insulin lispro (ADMELOG) corrective regimen sliding scale   SubCutaneous Before meals and at bedtime  insulin lispro Injectable (ADMELOG) 4 Unit(s) SubCutaneous three times a day before meals  methadone    Tablet 5 milliGRAM(s) Oral every 8 hours  pantoprazole    Tablet 40 milliGRAM(s) Oral before breakfast  pregabalin 150 milliGRAM(s) Oral every 12 hours  sertraline 25 milliGRAM(s) Oral daily    MEDICATIONS  (PRN):  HYDROmorphone   Tablet 4 milliGRAM(s) Oral every 6 hours PRN Severe Pain (7 - 10)  ondansetron Injectable 4 milliGRAM(s) IV Push every 6 hours PRN Nausea and/or Vomiting

## 2023-01-12 LAB
ALBUMIN SERPL ELPH-MCNC: 3 G/DL — LOW (ref 3.3–5)
ALP SERPL-CCNC: 88 U/L — SIGNIFICANT CHANGE UP (ref 40–120)
ALT FLD-CCNC: 17 U/L — SIGNIFICANT CHANGE UP (ref 10–45)
ANION GAP SERPL CALC-SCNC: 8 MMOL/L — SIGNIFICANT CHANGE UP (ref 5–17)
AST SERPL-CCNC: 9 U/L — LOW (ref 10–40)
BILIRUB SERPL-MCNC: 0.4 MG/DL — SIGNIFICANT CHANGE UP (ref 0.2–1.2)
BUN SERPL-MCNC: 10 MG/DL — SIGNIFICANT CHANGE UP (ref 7–23)
CALCIUM SERPL-MCNC: 9.2 MG/DL — SIGNIFICANT CHANGE UP (ref 8.4–10.5)
CHLORIDE SERPL-SCNC: 105 MMOL/L — SIGNIFICANT CHANGE UP (ref 96–108)
CO2 SERPL-SCNC: 25 MMOL/L — SIGNIFICANT CHANGE UP (ref 22–31)
CREAT SERPL-MCNC: 0.66 MG/DL — SIGNIFICANT CHANGE UP (ref 0.5–1.3)
EGFR: 111 ML/MIN/1.73M2 — SIGNIFICANT CHANGE UP
GLUCOSE BLDC GLUCOMTR-MCNC: 198 MG/DL — HIGH (ref 70–99)
GLUCOSE BLDC GLUCOMTR-MCNC: 205 MG/DL — HIGH (ref 70–99)
GLUCOSE BLDC GLUCOMTR-MCNC: 256 MG/DL — HIGH (ref 70–99)
GLUCOSE BLDC GLUCOMTR-MCNC: 317 MG/DL — HIGH (ref 70–99)
GLUCOSE SERPL-MCNC: 263 MG/DL — HIGH (ref 70–99)
HCT VFR BLD CALC: 33.9 % — LOW (ref 39–50)
HGB BLD-MCNC: 11.4 G/DL — LOW (ref 13–17)
MCHC RBC-ENTMCNC: 27 PG — SIGNIFICANT CHANGE UP (ref 27–34)
MCHC RBC-ENTMCNC: 33.6 GM/DL — SIGNIFICANT CHANGE UP (ref 32–36)
MCV RBC AUTO: 80.1 FL — SIGNIFICANT CHANGE UP (ref 80–100)
NRBC # BLD: 0 /100 WBCS — SIGNIFICANT CHANGE UP (ref 0–0)
PLATELET # BLD AUTO: 291 K/UL — SIGNIFICANT CHANGE UP (ref 150–400)
POTASSIUM SERPL-MCNC: 3.4 MMOL/L — LOW (ref 3.5–5.3)
POTASSIUM SERPL-SCNC: 3.4 MMOL/L — LOW (ref 3.5–5.3)
PROT SERPL-MCNC: 6 G/DL — SIGNIFICANT CHANGE UP (ref 6–8.3)
RBC # BLD: 4.23 M/UL — SIGNIFICANT CHANGE UP (ref 4.2–5.8)
RBC # FLD: 16.4 % — HIGH (ref 10.3–14.5)
SODIUM SERPL-SCNC: 138 MMOL/L — SIGNIFICANT CHANGE UP (ref 135–145)
WBC # BLD: 6.1 K/UL — SIGNIFICANT CHANGE UP (ref 3.8–10.5)
WBC # FLD AUTO: 6.1 K/UL — SIGNIFICANT CHANGE UP (ref 3.8–10.5)

## 2023-01-12 PROCEDURE — 99233 SBSQ HOSP IP/OBS HIGH 50: CPT

## 2023-01-12 RX ORDER — INSULIN GLARGINE 100 [IU]/ML
22 INJECTION, SOLUTION SUBCUTANEOUS AT BEDTIME
Refills: 0 | Status: DISCONTINUED | OUTPATIENT
Start: 2023-01-12 | End: 2023-01-14

## 2023-01-12 RX ORDER — POTASSIUM CHLORIDE 20 MEQ
40 PACKET (EA) ORAL EVERY 4 HOURS
Refills: 0 | Status: COMPLETED | OUTPATIENT
Start: 2023-01-12 | End: 2023-01-12

## 2023-01-12 RX ORDER — INSULIN LISPRO 100/ML
6 VIAL (ML) SUBCUTANEOUS
Refills: 0 | Status: DISCONTINUED | OUTPATIENT
Start: 2023-01-12 | End: 2023-01-14

## 2023-01-12 RX ADMIN — Medication 4 UNIT(S): at 07:55

## 2023-01-12 RX ADMIN — HYDROMORPHONE HYDROCHLORIDE 4 MILLIGRAM(S): 2 INJECTION INTRAMUSCULAR; INTRAVENOUS; SUBCUTANEOUS at 02:09

## 2023-01-12 RX ADMIN — Medication 150 MILLIGRAM(S): at 18:48

## 2023-01-12 RX ADMIN — Medication 1 DROP(S): at 18:47

## 2023-01-12 RX ADMIN — HYDROMORPHONE HYDROCHLORIDE 4 MILLIGRAM(S): 2 INJECTION INTRAMUSCULAR; INTRAVENOUS; SUBCUTANEOUS at 15:35

## 2023-01-12 RX ADMIN — INSULIN GLARGINE 18 UNIT(S): 100 INJECTION, SOLUTION SUBCUTANEOUS at 07:56

## 2023-01-12 RX ADMIN — APIXABAN 5 MILLIGRAM(S): 2.5 TABLET, FILM COATED ORAL at 18:56

## 2023-01-12 RX ADMIN — Medication 6: at 07:55

## 2023-01-12 RX ADMIN — HYDROMORPHONE HYDROCHLORIDE 4 MILLIGRAM(S): 2 INJECTION INTRAMUSCULAR; INTRAVENOUS; SUBCUTANEOUS at 21:17

## 2023-01-12 RX ADMIN — Medication 6 UNIT(S): at 17:02

## 2023-01-12 RX ADMIN — Medication 8: at 17:03

## 2023-01-12 RX ADMIN — METHADONE HYDROCHLORIDE 5 MILLIGRAM(S): 40 TABLET ORAL at 21:17

## 2023-01-12 RX ADMIN — METHADONE HYDROCHLORIDE 5 MILLIGRAM(S): 40 TABLET ORAL at 05:36

## 2023-01-12 RX ADMIN — Medication 1 DROP(S): at 05:37

## 2023-01-12 RX ADMIN — INSULIN GLARGINE 22 UNIT(S): 100 INJECTION, SOLUTION SUBCUTANEOUS at 21:41

## 2023-01-12 RX ADMIN — Medication 6 UNIT(S): at 12:50

## 2023-01-12 RX ADMIN — Medication 40 MILLIEQUIVALENT(S): at 14:55

## 2023-01-12 RX ADMIN — HYDROMORPHONE HYDROCHLORIDE 4 MILLIGRAM(S): 2 INJECTION INTRAMUSCULAR; INTRAVENOUS; SUBCUTANEOUS at 08:07

## 2023-01-12 RX ADMIN — HYDROMORPHONE HYDROCHLORIDE 4 MILLIGRAM(S): 2 INJECTION INTRAMUSCULAR; INTRAVENOUS; SUBCUTANEOUS at 21:47

## 2023-01-12 RX ADMIN — PANTOPRAZOLE SODIUM 40 MILLIGRAM(S): 20 TABLET, DELAYED RELEASE ORAL at 05:43

## 2023-01-12 RX ADMIN — Medication 40 MILLIEQUIVALENT(S): at 18:47

## 2023-01-12 RX ADMIN — METHADONE HYDROCHLORIDE 5 MILLIGRAM(S): 40 TABLET ORAL at 14:55

## 2023-01-12 RX ADMIN — CARVEDILOL PHOSPHATE 3.12 MILLIGRAM(S): 80 CAPSULE, EXTENDED RELEASE ORAL at 05:36

## 2023-01-12 RX ADMIN — Medication 150 MILLIGRAM(S): at 05:36

## 2023-01-12 RX ADMIN — Medication 4: at 21:21

## 2023-01-12 RX ADMIN — HYDROMORPHONE HYDROCHLORIDE 4 MILLIGRAM(S): 2 INJECTION INTRAMUSCULAR; INTRAVENOUS; SUBCUTANEOUS at 02:46

## 2023-01-12 RX ADMIN — HYDROMORPHONE HYDROCHLORIDE 4 MILLIGRAM(S): 2 INJECTION INTRAMUSCULAR; INTRAVENOUS; SUBCUTANEOUS at 08:30

## 2023-01-12 RX ADMIN — CARVEDILOL PHOSPHATE 3.12 MILLIGRAM(S): 80 CAPSULE, EXTENDED RELEASE ORAL at 18:55

## 2023-01-12 RX ADMIN — APIXABAN 5 MILLIGRAM(S): 2.5 TABLET, FILM COATED ORAL at 05:36

## 2023-01-12 RX ADMIN — HYDROMORPHONE HYDROCHLORIDE 4 MILLIGRAM(S): 2 INJECTION INTRAMUSCULAR; INTRAVENOUS; SUBCUTANEOUS at 15:04

## 2023-01-12 NOTE — PROGRESS NOTE ADULT - ASSESSMENT
This is a 58 y/o M with HTN, MI s/p PCI, Afib on eliquis, T2DM with prior DKA, chronic pain on methadone & opioids s/p C2-6 fusion / nerve transplant for SCC, neuropathy, and gastric ulcer presenting to ICU for DKA requiring insulin gtt management and +COVID-19 infection. PTA stopped taking his diabetic meds and went into DKA. Medically optimized and transferred to medical floors.     DKA in patient with DM2  Hypokalemia  -DKA resolved  -Diabetes nurse educator appreciated  -Increase lantus to 22un qhs, admelog 6un qAC, ISS  -Monitor and replete electrolytes as indicated    COVID-19 infection  -Isolation precautions per protocol  -Off remdesivir, non hypoxic on room air    HTN  Afib  CAD s/p PCI  -Continue coreg, eliquis, plavix    Chronic pain   -Cont home meds, methadone    Mood  -Continue zoloft     GI ppx - PPI    Dispo planning

## 2023-01-12 NOTE — ADVANCED PRACTICE NURSE CONSULT - RECOMMEDATIONS
BG Goal 140- 180 mg/dL  Lantus 18 units subcutaneously every morning.  Ademlog 4 units before meals three times/day until consuming at least 75 % or meals than increase to 6 units AC  Moderate Ademolg correction scale before meals  Moderate Admelog correction scale at HS.    Discharge Recommendations: Pt states he has needed diabetes supplies and insulin at home   Lantus Solorstar Pen 100 units/ml ( on favorite list) - take 18 units subcutaneously daily  Novolog Flexpen 100 units/ml- take 6 units subcutaneously before meal three times/day  Resume Metformin 850 mg tablets, orally twice daily with meals when food and oral intake returned to baseline.  Resume Jardiance 25 mg tablets, orally daily when food and oral intake returned to baseline.   FS Tono 2 CGM for BG monitoring system- has supply at home  Follow up with Endocrinology- Dr Jackie Buitrago  within 2 weeks and obtain formal sick day plan.     
BG Goal 100- 180 mg/dL  Increase Lantus from 18 to 22 units subcutaneously every morning.  Increase Ademlog from 4 to 6 units before meals three times/day.  C/w Moderate Ademolg correction scale before meals  Order Moderate Admelog correction scale at HS separate from AC scale.    Discharge Recommendations: Pt states he has needed diabetes supplies and insulin at home   Lantus Solorstar Pen 100 units/ml ( on favorite list) - take 22 units subcutaneously daily- dose per needs at time of discharge  Novolog Flexpen 100 units/ml- take 6 units subcutaneously before meal three times/day  Resume Metformin 850 mg tablets, orally twice daily with meals when food and oral intake returned to baseline.  Resume Jardiance 25 mg tablets, orally daily when food and oral intake returned to baseline.   JEVON Tono 2 CGM for BG monitoring system- has supply at home  Follow up with Endocrinology- Dr Jackie Buitrago  within 2 weeks and obtain formal sick day plan.

## 2023-01-13 ENCOUNTER — TRANSCRIPTION ENCOUNTER (OUTPATIENT)
Age: 58
End: 2023-01-13

## 2023-01-13 ENCOUNTER — APPOINTMENT (OUTPATIENT)
Dept: PAIN MANAGEMENT | Facility: CLINIC | Age: 58
End: 2023-01-13
Payer: OTHER MISCELLANEOUS

## 2023-01-13 VITALS — BODY MASS INDEX: 29.52 KG/M2 | HEIGHT: 74 IN | WEIGHT: 230 LBS

## 2023-01-13 LAB
ALBUMIN SERPL ELPH-MCNC: 2.8 G/DL — LOW (ref 3.3–5)
ALP SERPL-CCNC: 81 U/L — SIGNIFICANT CHANGE UP (ref 40–120)
ALT FLD-CCNC: 18 U/L — SIGNIFICANT CHANGE UP (ref 10–45)
ANION GAP SERPL CALC-SCNC: 8 MMOL/L — SIGNIFICANT CHANGE UP (ref 5–17)
AST SERPL-CCNC: 11 U/L — SIGNIFICANT CHANGE UP (ref 10–40)
BILIRUB SERPL-MCNC: 0.3 MG/DL — SIGNIFICANT CHANGE UP (ref 0.2–1.2)
BUN SERPL-MCNC: 11 MG/DL — SIGNIFICANT CHANGE UP (ref 7–23)
CALCIUM SERPL-MCNC: 9.3 MG/DL — SIGNIFICANT CHANGE UP (ref 8.4–10.5)
CHLORIDE SERPL-SCNC: 107 MMOL/L — SIGNIFICANT CHANGE UP (ref 96–108)
CO2 SERPL-SCNC: 27 MMOL/L — SIGNIFICANT CHANGE UP (ref 22–31)
CREAT SERPL-MCNC: 0.55 MG/DL — SIGNIFICANT CHANGE UP (ref 0.5–1.3)
EGFR: 117 ML/MIN/1.73M2 — SIGNIFICANT CHANGE UP
GLUCOSE BLDC GLUCOMTR-MCNC: 191 MG/DL — HIGH (ref 70–99)
GLUCOSE BLDC GLUCOMTR-MCNC: 197 MG/DL — HIGH (ref 70–99)
GLUCOSE BLDC GLUCOMTR-MCNC: 199 MG/DL — HIGH (ref 70–99)
GLUCOSE BLDC GLUCOMTR-MCNC: 246 MG/DL — HIGH (ref 70–99)
GLUCOSE SERPL-MCNC: 152 MG/DL — HIGH (ref 70–99)
HCT VFR BLD CALC: 34.1 % — LOW (ref 39–50)
HGB BLD-MCNC: 11 G/DL — LOW (ref 13–17)
MCHC RBC-ENTMCNC: 26.1 PG — LOW (ref 27–34)
MCHC RBC-ENTMCNC: 32.3 GM/DL — SIGNIFICANT CHANGE UP (ref 32–36)
MCV RBC AUTO: 81 FL — SIGNIFICANT CHANGE UP (ref 80–100)
NRBC # BLD: 0 /100 WBCS — SIGNIFICANT CHANGE UP (ref 0–0)
PLATELET # BLD AUTO: 310 K/UL — SIGNIFICANT CHANGE UP (ref 150–400)
POTASSIUM SERPL-MCNC: 3.8 MMOL/L — SIGNIFICANT CHANGE UP (ref 3.5–5.3)
POTASSIUM SERPL-SCNC: 3.8 MMOL/L — SIGNIFICANT CHANGE UP (ref 3.5–5.3)
PROT SERPL-MCNC: 5.6 G/DL — LOW (ref 6–8.3)
RBC # BLD: 4.21 M/UL — SIGNIFICANT CHANGE UP (ref 4.2–5.8)
RBC # FLD: 16.8 % — HIGH (ref 10.3–14.5)
SODIUM SERPL-SCNC: 142 MMOL/L — SIGNIFICANT CHANGE UP (ref 135–145)
WBC # BLD: 6.71 K/UL — SIGNIFICANT CHANGE UP (ref 3.8–10.5)
WBC # FLD AUTO: 6.71 K/UL — SIGNIFICANT CHANGE UP (ref 3.8–10.5)

## 2023-01-13 PROCEDURE — 99232 SBSQ HOSP IP/OBS MODERATE 35: CPT

## 2023-01-13 PROCEDURE — 99213 OFFICE O/P EST LOW 20 MIN: CPT | Mod: 95

## 2023-01-13 RX ORDER — INSULIN GLARGINE 100 [IU]/ML
22 INJECTION, SOLUTION SUBCUTANEOUS
Qty: 3 | Refills: 0
Start: 2023-01-13 | End: 2023-02-11

## 2023-01-13 RX ORDER — INSULIN ASPART 100 [IU]/ML
0 INJECTION, SOLUTION SUBCUTANEOUS
Qty: 3 | Refills: 0 | DISCHARGE
Start: 2023-01-13 | End: 2023-02-11

## 2023-01-13 RX ORDER — CLONAZEPAM 1 MG
1 TABLET ORAL
Qty: 0 | Refills: 0 | DISCHARGE

## 2023-01-13 RX ORDER — INSULIN ASPART 100 [IU]/ML
6 INJECTION, SOLUTION SUBCUTANEOUS
Qty: 3 | Refills: 0
Start: 2023-01-13 | End: 2023-02-11

## 2023-01-13 RX ORDER — EMPAGLIFLOZIN 10 MG/1
1 TABLET, FILM COATED ORAL
Qty: 30 | Refills: 0
Start: 2023-01-13 | End: 2023-02-11

## 2023-01-13 RX ORDER — METFORMIN HYDROCHLORIDE 850 MG/1
1 TABLET ORAL
Qty: 60 | Refills: 0
Start: 2023-01-13 | End: 2023-02-11

## 2023-01-13 RX ORDER — METFORMIN HYDROCHLORIDE 850 MG/1
0 TABLET ORAL
Qty: 0 | Refills: 0 | DISCHARGE

## 2023-01-13 RX ADMIN — Medication 1 DROP(S): at 23:19

## 2023-01-13 RX ADMIN — Medication 150 MILLIGRAM(S): at 19:46

## 2023-01-13 RX ADMIN — Medication 150 MILLIGRAM(S): at 06:59

## 2023-01-13 RX ADMIN — CARVEDILOL PHOSPHATE 3.12 MILLIGRAM(S): 80 CAPSULE, EXTENDED RELEASE ORAL at 19:45

## 2023-01-13 RX ADMIN — CLOPIDOGREL BISULFATE 75 MILLIGRAM(S): 75 TABLET, FILM COATED ORAL at 12:29

## 2023-01-13 RX ADMIN — HYDROMORPHONE HYDROCHLORIDE 4 MILLIGRAM(S): 2 INJECTION INTRAMUSCULAR; INTRAVENOUS; SUBCUTANEOUS at 07:00

## 2023-01-13 RX ADMIN — CARVEDILOL PHOSPHATE 3.12 MILLIGRAM(S): 80 CAPSULE, EXTENDED RELEASE ORAL at 12:43

## 2023-01-13 RX ADMIN — SERTRALINE 25 MILLIGRAM(S): 25 TABLET, FILM COATED ORAL at 12:30

## 2023-01-13 RX ADMIN — HYDROMORPHONE HYDROCHLORIDE 4 MILLIGRAM(S): 2 INJECTION INTRAMUSCULAR; INTRAVENOUS; SUBCUTANEOUS at 14:00

## 2023-01-13 RX ADMIN — HYDROMORPHONE HYDROCHLORIDE 4 MILLIGRAM(S): 2 INJECTION INTRAMUSCULAR; INTRAVENOUS; SUBCUTANEOUS at 07:45

## 2023-01-13 RX ADMIN — HYDROMORPHONE HYDROCHLORIDE 4 MILLIGRAM(S): 2 INJECTION INTRAMUSCULAR; INTRAVENOUS; SUBCUTANEOUS at 13:45

## 2023-01-13 RX ADMIN — Medication 2: at 22:49

## 2023-01-13 RX ADMIN — APIXABAN 5 MILLIGRAM(S): 2.5 TABLET, FILM COATED ORAL at 19:45

## 2023-01-13 RX ADMIN — INSULIN GLARGINE 22 UNIT(S): 100 INJECTION, SOLUTION SUBCUTANEOUS at 22:48

## 2023-01-13 RX ADMIN — Medication 2: at 09:23

## 2023-01-13 RX ADMIN — Medication 1 DROP(S): at 07:00

## 2023-01-13 RX ADMIN — APIXABAN 5 MILLIGRAM(S): 2.5 TABLET, FILM COATED ORAL at 06:58

## 2023-01-13 RX ADMIN — Medication 4: at 17:03

## 2023-01-13 RX ADMIN — METHADONE HYDROCHLORIDE 5 MILLIGRAM(S): 40 TABLET ORAL at 13:45

## 2023-01-13 RX ADMIN — Medication 1 DROP(S): at 12:32

## 2023-01-13 RX ADMIN — Medication 1 DROP(S): at 19:44

## 2023-01-13 RX ADMIN — Medication 6 UNIT(S): at 12:40

## 2023-01-13 RX ADMIN — Medication 6 UNIT(S): at 17:02

## 2023-01-13 RX ADMIN — PANTOPRAZOLE SODIUM 40 MILLIGRAM(S): 20 TABLET, DELAYED RELEASE ORAL at 07:00

## 2023-01-13 RX ADMIN — METHADONE HYDROCHLORIDE 5 MILLIGRAM(S): 40 TABLET ORAL at 06:58

## 2023-01-13 RX ADMIN — Medication 2: at 12:40

## 2023-01-13 RX ADMIN — METHADONE HYDROCHLORIDE 5 MILLIGRAM(S): 40 TABLET ORAL at 22:48

## 2023-01-13 RX ADMIN — Medication 6 UNIT(S): at 09:24

## 2023-01-13 RX ADMIN — HYDROMORPHONE HYDROCHLORIDE 4 MILLIGRAM(S): 2 INJECTION INTRAMUSCULAR; INTRAVENOUS; SUBCUTANEOUS at 23:20

## 2023-01-13 NOTE — PROGRESS NOTE ADULT - SUBJECTIVE AND OBJECTIVE BOX
INTERVAL HPI/OVERNIGHT EVENTS: laying in bed comfortable       Antimicrobial:  remdesivir  IVPB   IV Intermittent   remdesivir  IVPB 100 milliGRAM(s) IV Intermittent every 24 hours    Cardiovascular:  carvedilol 3.125 milliGRAM(s) Oral every 12 hours    Pulmonary:    Hematalogic:  apixaban 5 milliGRAM(s) Oral every 12 hours  clopidogrel Tablet 75 milliGRAM(s) Oral daily    Other:  chlorhexidine 2% Cloths 1 Application(s) Topical <User Schedule>  insulin lispro (ADMELOG) corrective regimen sliding scale   SubCutaneous Before meals and at bedtime  insulin lispro Injectable (ADMELOG) 4 Unit(s) SubCutaneous three times a day before meals  methadone    Tablet 5 milliGRAM(s) Oral every 8 hours  ondansetron Injectable 4 milliGRAM(s) IV Push every 6 hours PRN  pantoprazole  Injectable 40 milliGRAM(s) IV Push daily  pregabalin 150 milliGRAM(s) Oral every 12 hours  sertraline 25 milliGRAM(s) Oral daily      Drug Dosing Weight  Height (cm): 188 (09 Jan 2023 10:25)  Weight (kg): 99.7 (09 Jan 2023 12:44)  BMI (kg/m2): 28.2 (09 Jan 2023 12:44)  BSA (m2): 2.26 (09 Jan 2023 12:44)    CENTRAL LINE: [ ] YES [ ] NO  LOCATION:   DATE INSERTED:    ZHANG: [ ] YES [ ] NO    DATE INSERTED:    A-LINE:  [ ] YES [ ] NO  LOCATION:   DATE INSERTED:    PMH/Social Hx/Fam Hx -reviewed admission note, no change since admission  PAST MEDICAL & SURGICAL HISTORY:  Hypertension      Diabetes mellitus      Gastric ulcer      Spinal stenosis      H/O spinal cord compression      Spondylosis      H/O radiculopathy      H/O cervical spine surgery      History of back surgery      S/P cervical spinal fusion          T(C): 36.7 (01-10-23 @ 08:00), Max: 36.8 (01-10-23 @ 03:00)  HR: 83 (01-10-23 @ 10:00)  BP: 161/90 (01-10-23 @ 10:00)  BP(mean): 110 (01-10-23 @ 10:00)  ABP: --  ABP(mean): --  RR: 12 (01-10-23 @ 10:00)  SpO2: 100% (01-10-23 @ 10:00)  Wt(kg): --          01-09 @ 07:01  -  01-10 @ 07:00  --------------------------------------------------------  IN: 4147.4 mL / OUT: 900 mL / NET: 3247.4 mL            PHYSICAL EXAM:    GENERAL: No signs of distress, comfortable  HEAD: Atraumatic, Normocephalic  EYES: EOMI, PERRLA  ENMT: No erythema, exudates, or enlargement, Moist mucous membranes  NECK: Supple, normal appearance, No JVD; [  ] central line (if applicable)  CHEST/LUNG: No chest deformity, fair bilateral air entry; No rales, rhonchi, wheezing; crackles  HEART: Regular rate and rhythm; No murmurs, rubs, or gallops;   ABDOMEN: Soft, Nontender, Nondistended; Bowel sounds present  EXTREMITIES:  + Peripheral Pulses, No clubbing, cyanosis, or edema  NERVOUS SYSTEM: awake and alert x 3, follows commands, upper and lower extremities  LYMPH: No lymphadenopathy noted  SKIN: No rashes or lesions; good turgor, warm, dry      LABS:  CBC Full  -  ( [address] 2023 05:30 )  WBC Count : 5.66 K/uL  RBC Count : 4.53 M/uL  Hemoglobin : 12.0 g/dL  Hematocrit : 36.1 %  Platelet Count - Automated : 345 K/uL  Mean Cell Volume : 79.7 fl  Mean Cell Hemoglobin : 26.5 pg  Mean Cell Hemoglobin Concentration : 33.2 gm/dL  Auto Neutrophil # : x  Auto Lymphocyte # : x  Auto Monocyte # : x  Auto Eosinophil # : x  Auto Basophil # : x  Auto Neutrophil % : x  Auto Lymphocyte % : x  Auto Monocyte % : x  Auto Eosinophil % : x  Auto Basophil % : x    01-10    136  |  105  |  12  ----------------------------<  213<H>  3.4<L>   |  20<L>  |  0.75    Ca    8.7      [address] 2023 05:30  Phos  1.5     01-10  Mg     1.8     01-10    TPro  6.0  /  Alb  3.0<L>  /  TBili  0.6  /  DBili  x   /  AST  <5<L>  /  ALT  14  /  AlkPhos  80  01-10    PT/INR - ( 09 Jan 2023 11:00 )   PT: 14.1 sec;   INR: 1.21 ratio         PTT - ( 09 Jan 2023 11:00 )  PTT:29.5 sec        RADIOLOGY & ADDITIONAL STUDIES REVIEWED           IMPRESSION:  PAST MEDICAL & SURGICAL HISTORY:  Hypertension      Diabetes mellitus      Gastric ulcer      Spinal stenosis      H/O spinal cord compression      Spondylosis      H/O radiculopathy      H/O cervical spine surgery      History of back surgery      S/P cervical spinal fusion       p/w         IMP:  This is a 57 year old year man with HTN, MI s/p PCI, afib on eliquis, DMII with prior DKA, chronic pain on methadone & opioids s/p C2-6 fusion / nerve transplant for SCC, neuropathy, and gastric ulcer presenting to ICU for DKA requiring insulin gtt management and +COVID-19 infection . Pat stopped taking his diabetic meds and went into DKA     Problem list  1) DKA in patient with DM2  2) COVID-19 infection   3) underlying HTN, CAD s/p PCI, Afib on eliquis. chronic pain       Plan     - No sedation   - Pain management   - Transition off insulin gtt to lantus   - Continue to monitor FS q1h   - Isolation : contact and air borne   - D/C remdesivir , pt is not hypoxic and tolerating room air   - Continue Eliquis   - DVT GI prophy   - Advise compliance with meds and diet         
INTERVAL HPI/OVERNIGHT EVENTS:  pat is laying in bed more comfortable       Antimicrobial:    Cardiovascular:  carvedilol 3.125 milliGRAM(s) Oral every 12 hours    Pulmonary:    Hematalogic:  apixaban 5 milliGRAM(s) Oral every 12 hours  clopidogrel Tablet 75 milliGRAM(s) Oral daily    Other:  artificial tears (preservative free) Ophthalmic Solution 1 Drop(s) Both EYES every 6 hours  HYDROmorphone   Tablet 4 milliGRAM(s) Oral every 6 hours PRN  insulin glargine Injectable (LANTUS) 18 Unit(s) SubCutaneous every morning  insulin lispro (ADMELOG) corrective regimen sliding scale   SubCutaneous Before meals and at bedtime  insulin lispro Injectable (ADMELOG) 4 Unit(s) SubCutaneous three times a day before meals  magnesium sulfate  IVPB 2 Gram(s) IV Intermittent once  methadone    Tablet 5 milliGRAM(s) Oral every 8 hours  ondansetron Injectable 4 milliGRAM(s) IV Push every 6 hours PRN  pantoprazole    Tablet 40 milliGRAM(s) Oral before breakfast  potassium chloride    Tablet ER 40 milliEquivalent(s) Oral every 4 hours  pregabalin 150 milliGRAM(s) Oral every 12 hours  sertraline 25 milliGRAM(s) Oral daily      Drug Dosing Weight  Height (cm): 188 (09 Jan 2023 10:25)  Weight (kg): 99.7 (09 Jan 2023 12:44)  BMI (kg/m2): 28.2 (09 Jan 2023 12:44)  BSA (m2): 2.26 (09 Jan 2023 12:44)    CENTRAL LINE: [ ] YES [ ] NO  LOCATION:   DATE INSERTED:    ZHANG: [ ] YES [ ] NO    DATE INSERTED:    A-LINE:  [ ] YES [ ] NO  LOCATION:   DATE INSERTED:    PMH/Social Hx/Fam Hx -reviewed admission note, no change since admission  PAST MEDICAL & SURGICAL HISTORY:  Hypertension      Diabetes mellitus      Gastric ulcer      Spinal stenosis      H/O spinal cord compression      Spondylosis      H/O radiculopathy      H/O cervical spine surgery      History of back surgery      S/P cervical spinal fusion          T(C): 36.7 (01-11-23 @ 09:00), Max: 37 (01-10-23 @ 19:00)  HR: 82 (01-11-23 @ 10:00)  BP: 104/49 (01-11-23 @ 10:00)  BP(mean): 67 (01-11-23 @ 10:00)  ABP: --  ABP(mean): --  RR: 14 (01-11-23 @ 10:00)  SpO2: 96% (01-11-23 @ 10:00)  Wt(kg): --          01-10 @ 07:01  -  01-11 @ 07:00  --------------------------------------------------------  IN: 956 mL / OUT: 1000 mL / NET: -44 mL            PHYSICAL EXAM:    GENERAL: No signs of distress, comfortable  HEAD: Atraumatic, Normocephalic  EYES: EOMI, PERRLA  ENMT: No erythema, exudates, or enlargement, Moist mucous membranes  NECK: Supple, normal appearance, No JVD; [  ] central line (if applicable)  CHEST/LUNG: No chest deformity, fair bilateral air entry; No rales, rhonchi, wheezing; crackles  HEART: Regular rate and rhythm; No murmurs, rubs, or gallops;   ABDOMEN: Soft, Nontender, Nondistended; Bowel sounds present  EXTREMITIES:  + Peripheral Pulses, No clubbing, cyanosis, or edema  NERVOUS SYSTEM: awake and alert x 3, follows commands, upper and lower extremities  LYMPH: No lymphadenopathy noted  SKIN: No rashes or lesions; good turgor, warm, dry      LABS:  CBC Full  -  ( 11 Jan 2023 06:35 )  WBC Count : 5.57 K/uL  RBC Count : 4.33 M/uL  Hemoglobin : 11.6 g/dL  Hematocrit : 34.4 %  Platelet Count - Automated : 332 K/uL  Mean Cell Volume : 79.4 fl  Mean Cell Hemoglobin : 26.8 pg  Mean Cell Hemoglobin Concentration : 33.7 gm/dL  Auto Neutrophil # : x  Auto Lymphocyte # : x  Auto Monocyte # : x  Auto Eosinophil # : x  Auto Basophil # : x  Auto Neutrophil % : x  Auto Lymphocyte % : x  Auto Monocyte % : x  Auto Eosinophil % : x  Auto Basophil % : x    01-11    139  |  103  |  8   ----------------------------<  197<H>  2.9<LL>   |  24  |  0.55    Ca    8.9      11 Jan 2023 06:35  Phos  1.8     01-11  Mg     1.6     01-11    TPro  5.8<L>  /  Alb  3.0<L>  /  TBili  0.6  /  DBili  x   /  AST  6<L>  /  ALT  11  /  AlkPhos  84  01-11            RADIOLOGY & ADDITIONAL STUDIES REVIEWED         IMPRESSION:  PAST MEDICAL & SURGICAL HISTORY:  Hypertension      Diabetes mellitus      Gastric ulcer      Spinal stenosis      H/O spinal cord compression      Spondylosis      H/O radiculopathy      H/O cervical spine surgery      History of back surgery      S/P cervical spinal fusion       p/w                 INTERVAL HPI/OVERNIGHT EVENTS:       Antimicrobial:    Cardiovascular:  carvedilol 3.125 milliGRAM(s) Oral every 12 hours    Pulmonary:    Hematalogic:  apixaban 5 milliGRAM(s) Oral every 12 hours  clopidogrel Tablet 75 milliGRAM(s) Oral daily    Other:  artificial tears (preservative free) Ophthalmic Solution 1 Drop(s) Both EYES every 6 hours  HYDROmorphone   Tablet 4 milliGRAM(s) Oral every 6 hours PRN  insulin glargine Injectable (LANTUS) 18 Unit(s) SubCutaneous every morning  insulin lispro (ADMELOG) corrective regimen sliding scale   SubCutaneous Before meals and at bedtime  insulin lispro Injectable (ADMELOG) 4 Unit(s) SubCutaneous three times a day before meals  magnesium sulfate  IVPB 2 Gram(s) IV Intermittent once  methadone    Tablet 5 milliGRAM(s) Oral every 8 hours  ondansetron Injectable 4 milliGRAM(s) IV Push every 6 hours PRN  pantoprazole    Tablet 40 milliGRAM(s) Oral before breakfast  potassium chloride    Tablet ER 40 milliEquivalent(s) Oral every 4 hours  pregabalin 150 milliGRAM(s) Oral every 12 hours  sertraline 25 milliGRAM(s) Oral daily      Drug Dosing Weight  Height (cm): 188 (09 Jan 2023 10:25)  Weight (kg): 99.7 (09 Jan 2023 12:44)  BMI (kg/m2): 28.2 (09 Jan 2023 12:44)  BSA (m2): 2.26 (09 Jan 2023 12:44)    CENTRAL LINE: [ ] YES [ ] NO  LOCATION:   DATE INSERTED:    ZHANG: [ ] YES [ ] NO    DATE INSERTED:    A-LINE:  [ ] YES [ ] NO  LOCATION:   DATE INSERTED:    [facility] [place]/Social Hx/Fam Hx -reviewed admission note, no change since admission  PAST MEDICAL & SURGICAL HISTORY:  Hypertension      Diabetes mellitus      Gastric ulcer      Spinal stenosis      H/O spinal cord compression      Spondylosis      H/O radiculopathy      H/O cervical spine surgery      History of back surgery      S/P cervical spinal fusion          T(C): 36.7 (01-11-23 @ 09:00), Max: 37 (01-10-23 @ 19:00)  HR: 82 (01-11-23 @ 10:00)  BP: 104/49 (01-11-23 @ 10:00)  BP(mean): 67 (01-11-23 @ 10:00)  ABP: --  ABP(mean): --  RR: 14 (01-11-23 @ 10:00)  SpO2: 96% (01-11-23 @ 10:00)  Wt(kg): --          01-10 @ 07:01  -  01-11 @ 07:00  --------------------------------------------------------  IN: 956 mL / OUT: 1000 mL / NET: -44 mL            PHYSICAL EXAM:    GENERAL: No signs of distress, comfortable  HEAD: Atraumatic, Normocephalic  EYES: EOMI, PERRLA  ENMT: No erythema, exudates, or enlargement, Moist mucous membranes  NECK: Supple, normal appearance, No JVD; [  ] central line (if applicable)  CHEST/LUNG: No chest deformity, fair bilateral air entry; No rales, rhonchi, wheezing; crackles  HEART: Regular rate and rhythm; No murmurs, rubs, or gallops;   ABDOMEN: Soft, Nontender, Nondistended; Bowel sounds present  EXTREMITIES:  + Peripheral Pulses, No clubbing, cyanosis, or edema  NERVOUS SYSTEM: awake and alert x 3, follows commands, upper and lower extremities  LYMPH: No lymphadenopathy noted  SKIN: No rashes or lesions; good turgor, warm, dry      LABS:  CBC Full  -  ( 11 Jan 2023 06:35 )  WBC Count : 5.57 K/uL  RBC Count : 4.33 M/uL  Hemoglobin : 11.6 g/dL  Hematocrit : 34.4 %  Platelet Count - Automated : 332 K/uL  Mean Cell Volume : 79.4 fl  Mean Cell Hemoglobin : 26.8 pg  Mean Cell Hemoglobin Concentration : 33.7 gm/dL  Auto Neutrophil # : x  Auto Lymphocyte # : x  Auto Monocyte # : x  Auto Eosinophil # : x  Auto Basophil # : x  Auto Neutrophil % : x  Auto Lymphocyte % : x  Auto Monocyte % : x  Auto Eosinophil % : x  Auto Basophil % : x    01-11    139  |  103  |  8   ----------------------------<  197<H>  2.9<LL>   |  24  |  0.55    Ca    8.9      11 Jan 2023 06:35  Phos  1.8     01-11  Mg     1.6     01-11    TPro  5.8<L>  /  Alb  3.0<L>  /  TBili  0.6  /  DBili  x   /  AST  6<L>  /  ALT  11  /  AlkPhos  84  01-11            RADIOLOGY & ADDITIONAL STUDIES REVIEWED         IMPRESSION:  PAST MEDICAL & SURGICAL HISTORY:  Hypertension      Diabetes mellitus      Gastric ulcer      Spinal stenosis      H/O spinal cord compression      Spondylosis      H/O radiculopathy      H/O cervical spine surgery      History of back surgery      S/P cervical spinal fusion       p/w               IMP:  This is a 57 year old year man with HTN, MI s/p PCI, afib on eliquis, DMII with prior DKA, chronic pain on methadone & opioids s/p C2-6 fusion / nerve transplant for SCC, neuropathy, and gastric ulcer presenting to ICU for DKA requiring insulin gtt management and +COVID-19 infection . Pat stopped taking his diabetic meds and went into DKA     Problem list  1) DKA in patient with DM2  2) COVID-19 infection   3) underlying HTN, CAD s/p PCI, Afib on eliquis. chronic pain       Plan     - No sedation   - Pain is better control with current pain meds ( this is also home dose )   - DKA resolved and on lantus   - Continue to monitor FS  AC and HS with coverage   - Supp electrolyte    - Isolation : contact and air borne   - D/C remdesivir , pt is not hypoxic and tolerating room air   - Continue Eliquis   - DVT GI prophy   - Advise compliance with meds and diet   - Transfer to medicine     discussed on rounds               
Patient is a 57y old  Male who presents with a chief complaint of DKA, + COVID (11 Jan 2023 11:00)      Patient seen and examined at bedside. denies headache, fever, chills, cp, sob, n/v, abd pain. anxious about dispo planning, med regimen. emotional support provided.      ALLERGIES:  No Known Allergies    MEDICATIONS  (STANDING):  apixaban 5 milliGRAM(s) Oral every 12 hours  artificial tears (preservative free) Ophthalmic Solution 1 Drop(s) Both EYES every 6 hours  carvedilol 3.125 milliGRAM(s) Oral every 12 hours  clopidogrel Tablet 75 milliGRAM(s) Oral daily  insulin glargine Injectable (LANTUS) 18 Unit(s) SubCutaneous every morning  insulin lispro (ADMELOG) corrective regimen sliding scale   SubCutaneous Before meals and at bedtime  insulin lispro Injectable (ADMELOG) 4 Unit(s) SubCutaneous three times a day before meals  methadone    Tablet 5 milliGRAM(s) Oral every 8 hours  pantoprazole    Tablet 40 milliGRAM(s) Oral before breakfast  pregabalin 150 milliGRAM(s) Oral every 12 hours  sertraline 25 milliGRAM(s) Oral daily    MEDICATIONS  (PRN):  HYDROmorphone   Tablet 4 milliGRAM(s) Oral every 6 hours PRN Severe Pain (7 - 10)  ondansetron Injectable 4 milliGRAM(s) IV Push every 6 hours PRN Nausea and/or Vomiting    Vital Signs Last 24 Hrs  T(F): 98.4 (12 Jan 2023 05:40), Max: 98.8 (11 Jan 2023 19:55)  HR: 86 (12 Jan 2023 05:40) (86 - 90)  BP: 141/72 (12 Jan 2023 05:40) (139/85 - 151/84)  RR: 16 (12 Jan 2023 05:40) (16 - 16)  SpO2: 100% (12 Jan 2023 05:40) (98% - 100%)  I&O's Summary    11 Jan 2023 07:01  -  12 Jan 2023 07:00  --------------------------------------------------------  IN: 700 mL / OUT: 0 mL / NET: 700 mL      BMI (kg/m2): 28.2 (01-09-23 @ 12:44)  PHYSICAL EXAM:  General: NAD, A/O x 3  ENT: MMM, no scleral icterus  Neck: Supple, No JVD  Lungs: Clear to auscultation bilaterally, no wheezes, rales, rhonchi  Cardio: RRR, S1/S2  Abdomen: Soft, Nontender, Nondistended; Bowel sounds present  Extremities: No calf tenderness, No pitting edema    LABS:                        11.4   6.10  )-----------( 291      ( 12 Jan 2023 09:20 )             33.9       01-12    138  |  105  |  10  ----------------------------<  263  3.4   |  25  |  0.66    Ca    9.2      12 Jan 2023 09:20  Phos  1.8     01-11  Mg     1.6     01-11    TPro  6.0  /  Alb  3.0  /  TBili  0.4  /  DBili  x   /  AST  9   /  ALT  17  /  AlkPhos  88  01-12       PT/INR - ( 09 Jan 2023 11:00 )   PT: 14.1 sec;   INR: 1.21 ratio         PTT - ( 09 Jan 2023 11:00 )  PTT:29.5 sec   Lactate, Blood: 2.4 mmol/L (01-09 @ 13:05)    CARDIAC MARKERS ( 09 Jan 2023 15:50 )  x     / 18.9 ng/L / 33 U/L / x     / 1.0 ng/mL  CARDIAC MARKERS ( 09 Jan 2023 11:00 )  x     / 11.9 ng/L / x     / x     / x              11:18 - VBG - pH: 7.19  | pCO2: 20    | pO2: 55    | Lactate:                  POCT Blood Glucose.: 256 mg/dL (12 Jan 2023 07:53)  POCT Blood Glucose.: 213 mg/dL (11 Jan 2023 21:04)  POCT Blood Glucose.: 243 mg/dL (11 Jan 2023 17:17)  POCT Blood Glucose.: 222 mg/dL (11 Jan 2023 12:39)              RADIOLOGY & ADDITIONAL TESTS: reviewed    Care Discussed with Consultants/Other Providers:   
Patient is a 57y old  Male who presents with a chief complaint of DKA, + COVID (12 Jan 2023 10:51)      Patient seen and examined at bedside. continues to feel anxious about discharge, otherwise no acute medical complaints.     ALLERGIES:  No Known Allergies    MEDICATIONS  (STANDING):  apixaban 5 milliGRAM(s) Oral every 12 hours  artificial tears (preservative free) Ophthalmic Solution 1 Drop(s) Both EYES every 6 hours  carvedilol 3.125 milliGRAM(s) Oral every 12 hours  clopidogrel Tablet 75 milliGRAM(s) Oral daily  insulin glargine Injectable (LANTUS) 22 Unit(s) SubCutaneous at bedtime  insulin lispro (ADMELOG) corrective regimen sliding scale   SubCutaneous Before meals and at bedtime  insulin lispro Injectable (ADMELOG) 6 Unit(s) SubCutaneous three times a day before meals  methadone    Tablet 5 milliGRAM(s) Oral every 8 hours  pantoprazole    Tablet 40 milliGRAM(s) Oral before breakfast  pregabalin 150 milliGRAM(s) Oral every 12 hours  sertraline 25 milliGRAM(s) Oral daily    MEDICATIONS  (PRN):  HYDROmorphone   Tablet 4 milliGRAM(s) Oral every 6 hours PRN Severe Pain (7 - 10)  ondansetron Injectable 4 milliGRAM(s) IV Push every 6 hours PRN Nausea and/or Vomiting    Vital Signs Last 24 Hrs  T(F): 97.8 (13 Jan 2023 05:00), Max: 98.5 (12 Jan 2023 21:00)  HR: 77 (13 Jan 2023 05:00) (77 - 92)  BP: 141/85 (13 Jan 2023 05:00) (128/86 - 144/83)  RR: 16 (13 Jan 2023 05:00) (16 - 16)  SpO2: 97% (13 Jan 2023 05:00) (95% - 97%)  I&O's Summary    12 Jan 2023 07:01  -  13 Jan 2023 07:00  --------------------------------------------------------  IN: 500 mL / OUT: 0 mL / NET: 500 mL      BMI (kg/m2): 28.2 (01-09-23 @ 12:44)  PHYSICAL EXAM:  General: NAD, A/O x 3  ENT: MMM, no scleral icterus  Neck: Supple, No JVD  Lungs: Clear to auscultation bilaterally, no wheezes, rales, rhonchi  Cardio: RRR, S1/S2  Abdomen: Soft, Nontender, Nondistended; Bowel sounds present  Extremities: No calf tenderness, No pitting edema      LABS:                        11.0   6.71  )-----------( 310      ( 13 Jan 2023 06:20 )             34.1       01-13    142  |  107  |  11  ----------------------------<  152  3.8   |  27  |  0.55    Ca    9.3      13 Jan 2023 06:20  Phos  1.8     01-11  Mg     1.6     01-11    TPro  5.6  /  Alb  2.8  /  TBili  0.3  /  DBili  x   /  AST  11  /  ALT  18  /  AlkPhos  81  01-13                              POCT Blood Glucose.: 199 mg/dL (13 Jan 2023 12:38)  POCT Blood Glucose.: 197 mg/dL (13 Jan 2023 09:20)  POCT Blood Glucose.: 205 mg/dL (12 Jan 2023 21:18)  POCT Blood Glucose.: 317 mg/dL (12 Jan 2023 17:01)  POCT Blood Glucose.: 198 mg/dL (12 Jan 2023 12:49)              RADIOLOGY & ADDITIONAL TESTS:    Care Discussed with Consultants/Other Providers:

## 2023-01-13 NOTE — DISCHARGE NOTE PROVIDER - NSDCCPCAREPLAN_GEN_ALL_CORE_FT
PRINCIPAL DISCHARGE DIAGNOSIS  Diagnosis: DKA, type 2  Assessment and Plan of Treatment: Please take all medications as previously instructed. Follow up with Endocrinology- Dr Jackie Buitrago  within 2 weeks and obtain formal sick day plan.         SECONDARY DISCHARGE DIAGNOSES  Diagnosis: 2019 novel coronavirus disease (COVID-19)  Assessment and Plan of Treatment:

## 2023-01-13 NOTE — DISCHARGE NOTE PROVIDER - CARE PROVIDER_API CALL
Celine Pacheco (NP; RN)  NP in [facility]  [address] [place] [address]  Phone: [phone]  Fax: [phone]  Follow Up Time:

## 2023-01-13 NOTE — DISCHARGE NOTE PROVIDER - NSDCFUSCHEDAPPT_GEN_ALL_CORE_FT
Starla Johnson  [facility]  ENDOCRIN [address] [facility]  Scheduled Appointment: 01/26/2023    Celine Pacheco  [facility]  ONCPAINMGT [address]  Scheduled Appointment: 02/10/2023     [facility]  ONCPAINT [address]  Scheduled Appointment: 03/10/2023

## 2023-01-13 NOTE — PROGRESS NOTE ADULT - ASSESSMENT
This is a 58 y/o M with HTN, MI s/p PCI, Afib on eliquis, T2DM with prior DKA, chronic pain on methadone & opioids s/p C2-6 fusion / nerve transplant for SCC, neuropathy, and gastric ulcer presenting to ICU for DKA requiring insulin gtt management and +COVID-19 infection. PTA stopped taking his diabetic meds and went into DKA. Medically optimized and transferred to medical floors.     DKA in patient with DM2  Hypokalemia  -DKA resolved  -Diabetes nurse educator appreciated - discharge plan as follows:  Pt states he has needed diabetes supplies and insulin at home   Lantus Solorstar Pen 100 units/ml ( on favorite list) - take 22 units subcutaneously daily- dose per needs at time of discharge  Novolog Flexpen 100 units/ml- take 6 units subcutaneously before meal three times/day  Resume Metformin 850 mg tablets, orally twice daily with meals when food and oral intake returned to baseline.  Resume Jardiance 25 mg tablets, orally daily when food and oral intake returned to baseline.   FS Tono 2 CGM for BG monitoring system- has supply at home  Follow up with Endocrinology- Dr Jackie Buitrago  within 2 weeks and obtain formal sick day plan.     COVID-19 infection  -Isolation precautions per protocol  -Off remdesivir, non hypoxic on room air    HTN  Afib  CAD s/p PCI  -Continue coreg, eliquis, plavix    Chronic pain   -Cont home meds, methadone    Mood  -Continue zoloft     GI ppx - PPI    Medically optimized for discharge home today

## 2023-01-13 NOTE — DISCHARGE NOTE PROVIDER - NSDCMRMEDTOKEN_GEN_ALL_CORE_FT
apixaban 5 mg oral tablet: 1 tab(s) orally every 12 hours  atorvastatin 80 mg oral tablet: 1 tab(s) orally once a day (at bedtime)  carvedilol 3.125 mg oral tablet: 1 tab(s) orally every 12 hours  clopidogrel 75 mg oral tablet: 1 tab(s) orally once a day  empagliflozin 25 mg oral tablet: 1 tab(s) orally once a day   glucose tablets: Follow instructions on bottle when sugar is low.  HYDROmorphone 4 mg oral tablet: 1 tab(s) orally 4 times a day, As needed, Moderate Pain (4 - 6)  Lantus Solostar Pen 100 units/mL subcutaneous solution: 22 unit(s) subcutaneous once a day   metFORMIN 850 mg oral tablet: 1 tab(s) orally twice daily with meals  methadone 5 mg oral tablet: 1 tab(s) orally 4 times a day  NovoLOG FlexPen 100 units/mL injectable solution: 6 unit(s) subcutaneous 3 times a day (with meals)   pantoprazole 40 mg oral delayed release tablet: 1 tab(s) orally 2 times a day  pregabalin 150 mg oral capsule: 1 cap(s) orally every 12 hours  sertraline 25 mg oral tablet: 1 tab(s) orally once a day  Vitamin D2 50,000 intl units (1.25 mg) oral capsule:

## 2023-01-13 NOTE — DISCHARGE NOTE PROVIDER - HOSPITAL COURSE
Hospital Course  HPI:  Mr. Mast is a 57 year old male with a PMH of HTN, MI s/p PCI, afib on eliquis, DMII with prior DKA (metformin / insulin), chronic pain on methadone & opioids s/p C2-6 fusion / nerve transplant for SCC, neuropathy and gastric ulcer who presented to  ED earlier today on 1/9 with c/o fatigue and worsening SOB x4 days, anorexia, N/V and inability to take his meds both PO and antidiabetic agents with known COVID+ status from ~1 week prior. While in ED pt noted to be tachycardic and tachypneic, labs consistent with DKA. ICU team consulted. (09 Jan 2023 12:15)    Patient admitted to ICU for DKA, medically optimized and transferred to medical floors in stable condition. Also with COVID-19, non hypoxic, off remdesivir.     Diabetes nurse educator following throughout hospital course with discharge recommendation:   Lantus Solorstar Pen 100 units/ml ( on favorite list) - take 22 units subcutaneously daily  Novolog Flexpen 100 units/ml- take 6 units subcutaneously before meal three times/day  Resume Metformin 850 mg tablets, orally twice daily with meals when food and oral intake returned to baseline.  Resume Jardiance 25 mg tablets, orally daily when food and oral intake returned to baseline.   FS Tono 2 CGM for BG monitoring system- has supply at home    Patient remained afebrile and hemodynamically stable for safe discharge home with close follow up.     Discharging Provider: Idania Ledbetter DO  Contact Info: Cell [phone] - Please call with any questions or concerns.    Time Spent: 45 minutes

## 2023-01-14 ENCOUNTER — TRANSCRIPTION ENCOUNTER (OUTPATIENT)
Age: 58
End: 2023-01-14

## 2023-01-14 VITALS
HEART RATE: 70 BPM | SYSTOLIC BLOOD PRESSURE: 117 MMHG | TEMPERATURE: 98 F | OXYGEN SATURATION: 97 % | RESPIRATION RATE: 17 BRPM

## 2023-01-14 LAB
GLUCOSE BLDC GLUCOMTR-MCNC: 199 MG/DL — HIGH (ref 70–99)
GLUCOSE BLDC GLUCOMTR-MCNC: 226 MG/DL — HIGH (ref 70–99)

## 2023-01-14 PROCEDURE — 84145 PROCALCITONIN (PCT): CPT

## 2023-01-14 PROCEDURE — 85025 COMPLETE CBC W/AUTO DIFF WBC: CPT

## 2023-01-14 PROCEDURE — 82803 BLOOD GASES ANY COMBINATION: CPT

## 2023-01-14 PROCEDURE — 99239 HOSP IP/OBS DSCHRG MGMT >30: CPT

## 2023-01-14 PROCEDURE — 82962 GLUCOSE BLOOD TEST: CPT

## 2023-01-14 PROCEDURE — 93005 ELECTROCARDIOGRAM TRACING: CPT

## 2023-01-14 PROCEDURE — 85379 FIBRIN DEGRADATION QUANT: CPT

## 2023-01-14 PROCEDURE — 85730 THROMBOPLASTIN TIME PARTIAL: CPT

## 2023-01-14 PROCEDURE — 96375 TX/PRO/DX INJ NEW DRUG ADDON: CPT

## 2023-01-14 PROCEDURE — 83735 ASSAY OF MAGNESIUM: CPT

## 2023-01-14 PROCEDURE — 96374 THER/PROPH/DIAG INJ IV PUSH: CPT

## 2023-01-14 PROCEDURE — 85384 FIBRINOGEN ACTIVITY: CPT

## 2023-01-14 PROCEDURE — 86140 C-REACTIVE PROTEIN: CPT

## 2023-01-14 PROCEDURE — 84100 ASSAY OF PHOSPHORUS: CPT

## 2023-01-14 PROCEDURE — 82550 ASSAY OF CK (CPK): CPT

## 2023-01-14 PROCEDURE — 80048 BASIC METABOLIC PNL TOTAL CA: CPT

## 2023-01-14 PROCEDURE — 82553 CREATINE MB FRACTION: CPT

## 2023-01-14 PROCEDURE — 82009 KETONE BODYS QUAL: CPT

## 2023-01-14 PROCEDURE — 85027 COMPLETE CBC AUTOMATED: CPT

## 2023-01-14 PROCEDURE — 83605 ASSAY OF LACTIC ACID: CPT

## 2023-01-14 PROCEDURE — 36415 COLL VENOUS BLD VENIPUNCTURE: CPT

## 2023-01-14 PROCEDURE — 84484 ASSAY OF TROPONIN QUANT: CPT

## 2023-01-14 PROCEDURE — 99285 EMERGENCY DEPT VISIT HI MDM: CPT | Mod: 25

## 2023-01-14 PROCEDURE — 83036 HEMOGLOBIN GLYCOSYLATED A1C: CPT

## 2023-01-14 PROCEDURE — 87637 SARSCOV2&INF A&B&RSV AMP PRB: CPT

## 2023-01-14 PROCEDURE — 83615 LACTATE (LD) (LDH) ENZYME: CPT

## 2023-01-14 PROCEDURE — 80053 COMPREHEN METABOLIC PANEL: CPT

## 2023-01-14 PROCEDURE — 85610 PROTHROMBIN TIME: CPT

## 2023-01-14 PROCEDURE — 71045 X-RAY EXAM CHEST 1 VIEW: CPT

## 2023-01-14 RX ADMIN — Medication 2: at 12:59

## 2023-01-14 RX ADMIN — Medication 6 UNIT(S): at 08:46

## 2023-01-14 RX ADMIN — CLOPIDOGREL BISULFATE 75 MILLIGRAM(S): 75 TABLET, FILM COATED ORAL at 13:04

## 2023-01-14 RX ADMIN — PANTOPRAZOLE SODIUM 40 MILLIGRAM(S): 20 TABLET, DELAYED RELEASE ORAL at 06:46

## 2023-01-14 RX ADMIN — CARVEDILOL PHOSPHATE 3.12 MILLIGRAM(S): 80 CAPSULE, EXTENDED RELEASE ORAL at 06:43

## 2023-01-14 RX ADMIN — HYDROMORPHONE HYDROCHLORIDE 4 MILLIGRAM(S): 2 INJECTION INTRAMUSCULAR; INTRAVENOUS; SUBCUTANEOUS at 14:07

## 2023-01-14 RX ADMIN — Medication 150 MILLIGRAM(S): at 06:42

## 2023-01-14 RX ADMIN — METHADONE HYDROCHLORIDE 5 MILLIGRAM(S): 40 TABLET ORAL at 14:07

## 2023-01-14 RX ADMIN — HYDROMORPHONE HYDROCHLORIDE 4 MILLIGRAM(S): 2 INJECTION INTRAMUSCULAR; INTRAVENOUS; SUBCUTANEOUS at 07:24

## 2023-01-14 RX ADMIN — SERTRALINE 25 MILLIGRAM(S): 25 TABLET, FILM COATED ORAL at 13:13

## 2023-01-14 RX ADMIN — Medication 6 UNIT(S): at 12:59

## 2023-01-14 RX ADMIN — Medication 4: at 08:45

## 2023-01-14 RX ADMIN — HYDROMORPHONE HYDROCHLORIDE 4 MILLIGRAM(S): 2 INJECTION INTRAMUSCULAR; INTRAVENOUS; SUBCUTANEOUS at 00:52

## 2023-01-14 RX ADMIN — Medication 1 DROP(S): at 13:04

## 2023-01-14 RX ADMIN — METHADONE HYDROCHLORIDE 5 MILLIGRAM(S): 40 TABLET ORAL at 06:42

## 2023-01-14 RX ADMIN — HYDROMORPHONE HYDROCHLORIDE 4 MILLIGRAM(S): 2 INJECTION INTRAMUSCULAR; INTRAVENOUS; SUBCUTANEOUS at 06:53

## 2023-01-14 RX ADMIN — APIXABAN 5 MILLIGRAM(S): 2.5 TABLET, FILM COATED ORAL at 06:44

## 2023-01-14 RX ADMIN — Medication 1 DROP(S): at 06:43

## 2023-01-14 NOTE — DISCHARGE NOTE NURSING/CASE MANAGEMENT/SOCIAL WORK - NSDCVIVACCINE_GEN_ALL_CORE_FT
Tdap; 24-Oct-2021 12:15; Jahaira Ruiz (RN); [employer]; [id number] (Exp. Date: 29-Jul-2023); IntraMuscular; Deltoid Right.; 0.5 milliLiter(s); VIS (VIS Published: 09-May-2013, VIS Presented: 24-Oct-2021);

## 2023-01-14 NOTE — DISCHARGE NOTE NURSING/CASE MANAGEMENT/SOCIAL WORK - NSDCPEFALRISK_GEN_ALL_CORE
For information on Fall & Injury Prevention, visit: [email or web address] OR  [email or web address] OR  https://www.cdc.gov/steadi/patient.html

## 2023-01-14 NOTE — DISCHARGE NOTE NURSING/CASE MANAGEMENT/SOCIAL WORK - PATIENT PORTAL LINK FT
You can access the FollowMyHealth Patient Portal offered by [facility] by registering at the following website: [email or web address]. By joining [employer]’s FollowMyHealth portal, you will also be able to view your health information using other applications (apps) compatible with our system.

## 2023-01-15 ENCOUNTER — RX RENEWAL (OUTPATIENT)
Age: 58
End: 2023-01-15

## 2023-01-15 NOTE — ASSESSMENT
[FreeTextEntry1] : Interim history\par The patient is presently hospitalized with ketoacidosis and is expected to be discharged today.. There has no new pains, injuries, or complaints and no new issues. The use of medications appears appropriate and there are no aberrant behaviors noted. Side effects to current medications are denied. Average pain score for the month is ?? out of ten. The patient's current medications are documented to the best of their ability. THe  was obtained and reviewed prior to the visit, and any discrepancies were discussed with the patient.\par Objective information\par Since the last visit there are no additional radiologic studies, labs, or pain complaints. There are no changes in the patient's physical status.\par Plan\par The patient was given refill of their medication at their current level and will return to the office as needed for follow-up.  Patient's medications to be sent once he confirms that he has been discharged from the hospital.  The patient is showing no aberrant behavior or evidence of diversion. Opioid contract and opioid risk assessment on chart.  reviewed and any discrepancy discussed with patent. Applicable urine toxicology reviewed and recorded in the patient's electronic record. Urine toxicology is ordered for patient per office protocol or patient's risk assessment.  Patient will follow-up in 1 month unless new issues arise the patient has returned earlier.\par

## 2023-01-15 NOTE — HISTORY OF PRESENT ILLNESS
[Neck] : neck [Left Arm] : left arm [9] : 9 [5] : 5 [Burning] : burning [Dull/Aching] : dull/aching [Shooting] : shooting [Constant] : constant [Household chores] : household chores [Leisure] : leisure [Sleep] : sleep [Meds] : meds [Disabled] : Work status: disabled [Home] : at home, [unfilled] , at the time of the visit. [Medical Office: ([facility])___] : at the medical office located in  [Verbal consent obtained from patient] : the patient, [unfilled] [] : Patient is currently playing sports: no [FreeTextEntry1] : LEFT HAND [FreeTextEntry6] : ELECTRIC SHOCK LIKE [FreeTextEntry7] : DOWN ARM [de-identified] : TOO MUCH ACTIVITY [de-identified] : 2022

## 2023-01-24 ENCOUNTER — NON-APPOINTMENT (OUTPATIENT)
Age: 58
End: 2023-01-24

## 2023-01-26 ENCOUNTER — APPOINTMENT (OUTPATIENT)
Dept: ENDOCRINOLOGY | Facility: CLINIC | Age: 58
End: 2023-01-26

## 2023-02-03 ENCOUNTER — EMERGENCY (EMERGENCY)
Facility: HOSPITAL | Age: 58
LOS: 1 days | Discharge: HOME | End: 2023-02-03
Attending: EMERGENCY MEDICINE | Admitting: EMERGENCY MEDICINE
Payer: MEDICARE

## 2023-02-03 VITALS
HEIGHT: 74 IN | HEART RATE: 77 BPM | TEMPERATURE: 97 F | RESPIRATION RATE: 17 BRPM | OXYGEN SATURATION: 97 % | WEIGHT: 238.1 LBS | SYSTOLIC BLOOD PRESSURE: 116 MMHG | DIASTOLIC BLOOD PRESSURE: 73 MMHG

## 2023-02-03 VITALS
TEMPERATURE: 98 F | HEART RATE: 71 BPM | OXYGEN SATURATION: 98 % | DIASTOLIC BLOOD PRESSURE: 67 MMHG | RESPIRATION RATE: 18 BRPM | SYSTOLIC BLOOD PRESSURE: 112 MMHG

## 2023-02-03 DIAGNOSIS — Z98.1 ARTHRODESIS STATUS: Chronic | ICD-10-CM

## 2023-02-03 DIAGNOSIS — Z98.890 OTHER SPECIFIED POSTPROCEDURAL STATES: Chronic | ICD-10-CM

## 2023-02-03 LAB
ALBUMIN SERPL ELPH-MCNC: 3.2 G/DL — LOW (ref 3.3–5)
ALP SERPL-CCNC: 82 U/L — SIGNIFICANT CHANGE UP (ref 40–120)
ALT FLD-CCNC: 27 U/L — SIGNIFICANT CHANGE UP (ref 10–45)
ANION GAP SERPL CALC-SCNC: 12 MMOL/L — SIGNIFICANT CHANGE UP (ref 5–17)
APPEARANCE UR: CLEAR — SIGNIFICANT CHANGE UP
AST SERPL-CCNC: 14 U/L — SIGNIFICANT CHANGE UP (ref 10–40)
BASOPHILS # BLD AUTO: 0.05 K/UL — SIGNIFICANT CHANGE UP (ref 0–0.2)
BASOPHILS NFR BLD AUTO: 0.7 % — SIGNIFICANT CHANGE UP (ref 0–2)
BILIRUB SERPL-MCNC: 0.2 MG/DL — SIGNIFICANT CHANGE UP (ref 0.2–1.2)
BILIRUB UR-MCNC: NEGATIVE — SIGNIFICANT CHANGE UP
BUN SERPL-MCNC: 14 MG/DL — SIGNIFICANT CHANGE UP (ref 7–23)
CALCIUM SERPL-MCNC: 9.2 MG/DL — SIGNIFICANT CHANGE UP (ref 8.4–10.5)
CHLORIDE SERPL-SCNC: 103 MMOL/L — SIGNIFICANT CHANGE UP (ref 96–108)
CO2 SERPL-SCNC: 26 MMOL/L — SIGNIFICANT CHANGE UP (ref 22–31)
COLOR SPEC: YELLOW — SIGNIFICANT CHANGE UP
CREAT SERPL-MCNC: 0.82 MG/DL — SIGNIFICANT CHANGE UP (ref 0.5–1.3)
DIFF PNL FLD: NEGATIVE — SIGNIFICANT CHANGE UP
EGFR: 104 ML/MIN/1.73M2 — SIGNIFICANT CHANGE UP
EOSINOPHIL # BLD AUTO: 0.18 K/UL — SIGNIFICANT CHANGE UP (ref 0–0.5)
EOSINOPHIL NFR BLD AUTO: 2.5 % — SIGNIFICANT CHANGE UP (ref 0–6)
GLUCOSE SERPL-MCNC: 252 MG/DL — HIGH (ref 70–99)
GLUCOSE UR QL: 1000 MG/DL
HCT VFR BLD CALC: 37.3 % — LOW (ref 39–50)
HGB BLD-MCNC: 11.5 G/DL — LOW (ref 13–17)
IMM GRANULOCYTES NFR BLD AUTO: 0.3 % — SIGNIFICANT CHANGE UP (ref 0–0.9)
KETONES UR-MCNC: NEGATIVE — SIGNIFICANT CHANGE UP
LACTATE SERPL-SCNC: 1.7 MMOL/L — SIGNIFICANT CHANGE UP (ref 0.7–2)
LEUKOCYTE ESTERASE UR-ACNC: NEGATIVE — SIGNIFICANT CHANGE UP
LYMPHOCYTES # BLD AUTO: 1.67 K/UL — SIGNIFICANT CHANGE UP (ref 1–3.3)
LYMPHOCYTES # BLD AUTO: 23.6 % — SIGNIFICANT CHANGE UP (ref 13–44)
MCHC RBC-ENTMCNC: 26.8 PG — LOW (ref 27–34)
MCHC RBC-ENTMCNC: 30.8 GM/DL — LOW (ref 32–36)
MCV RBC AUTO: 86.9 FL — SIGNIFICANT CHANGE UP (ref 80–100)
MONOCYTES # BLD AUTO: 0.56 K/UL — SIGNIFICANT CHANGE UP (ref 0–0.9)
MONOCYTES NFR BLD AUTO: 7.9 % — SIGNIFICANT CHANGE UP (ref 2–14)
NEUTROPHILS # BLD AUTO: 4.59 K/UL — SIGNIFICANT CHANGE UP (ref 1.8–7.4)
NEUTROPHILS NFR BLD AUTO: 65 % — SIGNIFICANT CHANGE UP (ref 43–77)
NITRITE UR-MCNC: NEGATIVE — SIGNIFICANT CHANGE UP
NRBC # BLD: 0 /100 WBCS — SIGNIFICANT CHANGE UP (ref 0–0)
PH UR: 5 — SIGNIFICANT CHANGE UP (ref 5–8)
PLATELET # BLD AUTO: 338 K/UL — SIGNIFICANT CHANGE UP (ref 150–400)
POTASSIUM SERPL-MCNC: 4.3 MMOL/L — SIGNIFICANT CHANGE UP (ref 3.5–5.3)
POTASSIUM SERPL-SCNC: 4.3 MMOL/L — SIGNIFICANT CHANGE UP (ref 3.5–5.3)
PROT SERPL-MCNC: 6.6 G/DL — SIGNIFICANT CHANGE UP (ref 6–8.3)
PROT UR-MCNC: NEGATIVE — SIGNIFICANT CHANGE UP
RBC # BLD: 4.29 M/UL — SIGNIFICANT CHANGE UP (ref 4.2–5.8)
RBC # FLD: 15.9 % — HIGH (ref 10.3–14.5)
SODIUM SERPL-SCNC: 141 MMOL/L — SIGNIFICANT CHANGE UP (ref 135–145)
SP GR SPEC: 1.01 — SIGNIFICANT CHANGE UP (ref 1.01–1.02)
UROBILINOGEN FLD QL: NEGATIVE — SIGNIFICANT CHANGE UP
WBC # BLD: 7.07 K/UL — SIGNIFICANT CHANGE UP (ref 3.8–10.5)
WBC # FLD AUTO: 7.07 K/UL — SIGNIFICANT CHANGE UP (ref 3.8–10.5)

## 2023-02-03 PROCEDURE — 87040 BLOOD CULTURE FOR BACTERIA: CPT

## 2023-02-03 PROCEDURE — 80053 COMPREHEN METABOLIC PANEL: CPT

## 2023-02-03 PROCEDURE — 36415 COLL VENOUS BLD VENIPUNCTURE: CPT

## 2023-02-03 PROCEDURE — 83605 ASSAY OF LACTIC ACID: CPT

## 2023-02-03 PROCEDURE — 93005 ELECTROCARDIOGRAM TRACING: CPT

## 2023-02-03 PROCEDURE — 93010 ELECTROCARDIOGRAM REPORT: CPT

## 2023-02-03 PROCEDURE — 99284 EMERGENCY DEPT VISIT MOD MDM: CPT

## 2023-02-03 PROCEDURE — 71045 X-RAY EXAM CHEST 1 VIEW: CPT | Mod: 26

## 2023-02-03 PROCEDURE — 85025 COMPLETE CBC W/AUTO DIFF WBC: CPT

## 2023-02-03 PROCEDURE — 99285 EMERGENCY DEPT VISIT HI MDM: CPT | Mod: 25

## 2023-02-03 PROCEDURE — 71045 X-RAY EXAM CHEST 1 VIEW: CPT

## 2023-02-03 PROCEDURE — 87086 URINE CULTURE/COLONY COUNT: CPT

## 2023-02-03 NOTE — ED PROVIDER NOTE - PHYSICAL EXAMINATION
Vitals: I have reviewed the patients vital signs  General: nontoxic appearing  HEENT: Atraumatic, normocephalic, airway patent  Eyes: EOMI, tracking appropriately  Neck: no tracheal deviation  Chest/Lungs: no trauma, symmetric chest rise, speaking in complete sentences,  no resp distress  Heart: skin and extremities well perfused, regular rate and rhythm  Neuro: A+Ox3, appears non focal  MSK: no deformities  Skin: no cyanosis, no jaundice   Psych:  Normal mood and affect

## 2023-02-03 NOTE — ED PROVIDER NOTE - NSFOLLOWUPINSTRUCTIONS_ED_ALL_ED_FT
Your work-up.  The emerged from today demonstrated no significant signs of infection.  We did draw a number of new blood cultures.  The results of these will be available within the next 24 hours.  If at any point 1 becomes positive you will receive a phone call from us after which you will need to return to the emergency department to receive IV antibiotics.  If you are to develop fever chills or any other concerns please return the emergency department immediately as well

## 2023-02-03 NOTE — ED ADULT NURSE NOTE - CHIEF COMPLAINT QUOTE
Sent in by [facility] [place] for + blood cultures from 2 days ago. pt also with c/o dizziness and feeling week x 1 month. pt was called last night and told to go back to ER but was too tired so came today.

## 2023-02-03 NOTE — ED PROVIDER NOTE - OBJECTIVE STATEMENT
57-year-old male with a past medical history of diabetes complicated by multiple episodes of DKA, coronary artery disease, spinal stenosis presents emerged department today with reported positive blood cultures from outside hospital.  The patient went to [facility] on January 31 at 6 PM.  He was at his counselor when they took his blood pressure and found it to be low.  When he went there they did a bunch of laboratory studies as well as a chest x-ray and they noted that he was most likely dehydrated.  He got IV fluids and was discharged home.  Since his discharge he has been still feeling rather poor however last night they did call him to tell him that they had had positive blood cultures.  This morning he says he still feels poor however has had no fevers no weakness.  His wife in the room says that he has had some discoloration.  He has no other specific symptoms at this time.

## 2023-02-03 NOTE — ED PROVIDER NOTE - PATIENT PORTAL LINK FT
You can access the FollowMyHealth Patient Portal offered by [facility] by registering at the following website: [email or web address]. By joining [employer]’s FollowMyHealth portal, you will also be able to view your health information using other applications (apps) compatible with our system.

## 2023-02-03 NOTE — ED PROVIDER NOTE - PROGRESS NOTE DETAILS
Yes patient laboratory studies show no white count with no shift.  Vital signs not consistent with SIRS.  Patient overall looks very well.  Discussed with the hospitalist and there really is no indication for admission at this time.  I decided To hold antibiotics because the patient does look well and the 4 culture bottles are currently pending.  I discussed the need to return should they come back positive.

## 2023-02-03 NOTE — ED ADULT NURSE NOTE - OBJECTIVE STATEMENT
Assumed pt care for a 57 yr old male complaining of positive cultures. Assumed pt care for a 57 yr old male complaining of positive cultures. Pt denies any fever cough chills. Pt reports he just hasn't been well for the last month. IV established. labs collected and sent awaiting further disposition.

## 2023-02-03 NOTE — ED ADULT TRIAGE NOTE - CHIEF COMPLAINT QUOTE
Sent in by [facility] for + blood cultures from 2 days ago. pt also with c/o dizziness and feeling week. pt was called last night and told to go back to ER but was too tired so came today. Sent in by [place] for + blood cultures from 2 days ago. pt also with c/o dizziness and feeling week x 1 month. pt was called last night and told to go back to ER but was too tired so came today.

## 2023-02-03 NOTE — ED PROVIDER NOTE - CLINICAL SUMMARY MEDICAL DECISION MAKING FREE TEXT BOX
Patient with a past medical history of diabetes hypertension coronary disease presenting today with reported positive blood cultures from outpatient facility.  I did review his laboratory studies from his [place] visit and they showed no significant laboratory abnormalities.  I called over and spoke with the physician assistant at [facility] ER emergency department and she was able to fax me the results of the blood cultures.  1 bottle showed no growth.  The second bottle showed gram-positive cocci in clusters which was further differentiated as Staphylococcus coag negative.  Based on his overall presentation he does not appear to be septic at this time.  This bacteria that has been isolated is most often a contaminant.  We will redraw all cultures here in the emergency department.  Will discuss the necessity to admit versus watchful waiting based on the rest of his laboratory studies.

## 2023-02-04 LAB
CULTURE RESULTS: SIGNIFICANT CHANGE UP
SPECIMEN SOURCE: SIGNIFICANT CHANGE UP

## 2023-02-10 ENCOUNTER — APPOINTMENT (OUTPATIENT)
Dept: PAIN MANAGEMENT | Facility: CLINIC | Age: 58
End: 2023-02-10
Payer: OTHER MISCELLANEOUS

## 2023-02-10 VITALS — WEIGHT: 238 LBS | HEIGHT: 74 IN | BODY MASS INDEX: 30.54 KG/M2

## 2023-02-10 PROCEDURE — 99213 OFFICE O/P EST LOW 20 MIN: CPT | Mod: ACP,95

## 2023-02-10 NOTE — REASON FOR VISIT
[Follow-Up Visit] : a follow-up pain management visit [Home] : at home, [unfilled] , at the time of the visit. [Medical Office: ([facility])___] : at the medical office located in  [Patient] : the patient [Self] : self [FreeTextEntry2] : Neck pain

## 2023-02-10 NOTE — HISTORY OF PRESENT ILLNESS
[Neck] : neck [Left Arm] : left arm [Work related] : work related [Gradual] : gradual [7] : 7 [5] : 5 [Dull/Aching] : dull/aching [Radiating] : radiating [Shooting] : shooting [Tingling] : tingling [Constant] : constant [Household chores] : household chores [Work] : work [Sleep] : sleep [Rest] : rest [Meds] : meds [Sitting] : sitting [Standing] : standing [Walking] : walking [Bending forward] : bending forward [Disabled] : Work status: disabled [Steroid] : Steroid [FreeTextEntry1] : States chronic neck and back  pain manageable with current medication regimen.  He tries to maintain a functional ADL but has had numerous medical issues to deal with recently.  Last  UDT was  7- and consistent with prescribed medications.  [] : Post Surgical Visit: no [FreeTextEntry3] : 02/16/2017 [FreeTextEntry7] : NECK DOWN LEFT SIDE FINGERS NUMBNESS/ TINGLING  [de-identified] : LIFTING , PROLONGED ACTIVITY  [de-identified] :   [de-identified] : LUMBAR  [de-identified] : EPIDURAL  [TWNoteComboBox1] : 60%

## 2023-02-10 NOTE — DISCUSSION/SUMMARY
[Medication Risks Reviewed] : Medication risks reviewed [de-identified] : Prescriptions renewed. Opioid agreement/obtained on chart [place]  reviewed and appropriate. SOAPP-R completed on chart. The patient's medications are documented to the best of their ability. Quality of life and functional ability improved on medications. The patient is showing no aberrant behavior or evidence of diversion. The patient was advised not to use narcotic medication while operating an automobile or heavy machinery due to potential sedation or dizziness. The patient was educated to the risks associated with potential opioid dependence and addiction. Urine toxicology screens as per office protocol. Use of multimodal analgesia used prn.\par Follow up one month.

## 2023-02-15 ENCOUNTER — APPOINTMENT (OUTPATIENT)
Dept: FAMILY MEDICINE | Facility: CLINIC | Age: 58
End: 2023-02-15

## 2023-03-06 ENCOUNTER — RX RENEWAL (OUTPATIENT)
Age: 58
End: 2023-03-06

## 2023-03-10 ENCOUNTER — APPOINTMENT (OUTPATIENT)
Dept: PAIN MANAGEMENT | Facility: CLINIC | Age: 58
End: 2023-03-10
Payer: MEDICARE

## 2023-03-10 PROCEDURE — 99213 OFFICE O/P EST LOW 20 MIN: CPT | Mod: 95

## 2023-03-10 RX ORDER — PREGABALIN 150 MG/1
150 CAPSULE ORAL 3 TIMES DAILY
Qty: 90 | Refills: 0 | Status: DISCONTINUED | COMMUNITY
Start: 2018-01-24 | End: 2023-03-10

## 2023-03-10 NOTE — DISCUSSION/SUMMARY
[Medication Risks Reviewed] : Medication risks reviewed [de-identified] : Prescriptions renewed. Opioid agreement/obtained on chart [place]  reviewed and appropriate. SOAPP-R completed on chart. The patient's medications are documented to the best of their ability. Quality of life and functional ability improved on medications. The patient is showing no aberrant behavior or evidence of diversion. The patient was advised not to use narcotic medication while operating an automobile or heavy machinery due to potential sedation or dizziness. The patient was educated to the risks associated with potential opioid dependence and addiction. Urine toxicology screens as per office protocol. Use of multimodal analgesia used prn.\par Follow up one month.

## 2023-03-10 NOTE — HISTORY OF PRESENT ILLNESS
[Neck] : neck [Left Arm] : left arm [9] : 9 [5] : 5 [Burning] : burning [Dull/Aching] : dull/aching [Shooting] : shooting [Constant] : constant [Household chores] : household chores [Leisure] : leisure [Sleep] : sleep [Meds] : meds [Disabled] : Work status: disabled [] : Patient is currently playing sports: no [FreeTextEntry1] : LEFT HAND [FreeTextEntry6] : ELECTRIC SHOCK LIKE [FreeTextEntry7] : DOWN ARM [de-identified] : TOO MUCH ACTIVITY [de-identified] : 2022

## 2023-03-12 ENCOUNTER — RX RENEWAL (OUTPATIENT)
Age: 58
End: 2023-03-12

## 2023-03-13 ENCOUNTER — RX RENEWAL (OUTPATIENT)
Age: 58
End: 2023-03-13

## 2023-03-25 ENCOUNTER — NON-APPOINTMENT (OUTPATIENT)
Age: 58
End: 2023-03-25

## 2023-03-26 ENCOUNTER — NON-APPOINTMENT (OUTPATIENT)
Age: 58
End: 2023-03-26

## 2023-03-27 ENCOUNTER — RX RENEWAL (OUTPATIENT)
Age: 58
End: 2023-03-27

## 2023-03-29 ENCOUNTER — INPATIENT (INPATIENT)
Facility: HOSPITAL | Age: 58
LOS: 4 days | Discharge: HOME | DRG: 179 | End: 2023-04-03
Attending: INTERNAL MEDICINE | Admitting: INTERNAL MEDICINE
Payer: MEDICARE

## 2023-03-29 VITALS
HEIGHT: 74 IN | OXYGEN SATURATION: 97 % | RESPIRATION RATE: 17 BRPM | WEIGHT: 248.02 LBS | TEMPERATURE: 98 F | SYSTOLIC BLOOD PRESSURE: 95 MMHG | DIASTOLIC BLOOD PRESSURE: 63 MMHG | HEART RATE: 75 BPM

## 2023-03-29 DIAGNOSIS — Z98.890 OTHER SPECIFIED POSTPROCEDURAL STATES: Chronic | ICD-10-CM

## 2023-03-29 DIAGNOSIS — Z98.1 ARTHRODESIS STATUS: Chronic | ICD-10-CM

## 2023-03-29 DIAGNOSIS — U07.1 COVID-19: ICD-10-CM

## 2023-03-29 LAB
ALBUMIN SERPL ELPH-MCNC: 3.1 G/DL — LOW (ref 3.3–5)
ALP SERPL-CCNC: 105 U/L — SIGNIFICANT CHANGE UP (ref 40–120)
ALT FLD-CCNC: 34 U/L — SIGNIFICANT CHANGE UP (ref 10–45)
ANION GAP SERPL CALC-SCNC: 8 MMOL/L — SIGNIFICANT CHANGE UP (ref 5–17)
APAP SERPL-MCNC: <1 UG/ML — LOW (ref 10–30)
APTT BLD: 19.5 SEC — LOW (ref 27.5–35.5)
AST SERPL-CCNC: 14 U/L — SIGNIFICANT CHANGE UP (ref 10–40)
BASOPHILS # BLD AUTO: 0.04 K/UL — SIGNIFICANT CHANGE UP (ref 0–0.2)
BASOPHILS NFR BLD AUTO: 0.5 % — SIGNIFICANT CHANGE UP (ref 0–2)
BILIRUB SERPL-MCNC: 0.2 MG/DL — SIGNIFICANT CHANGE UP (ref 0.2–1.2)
BLD GP AB SCN SERPL QL: SIGNIFICANT CHANGE UP
BUN SERPL-MCNC: 18 MG/DL — SIGNIFICANT CHANGE UP (ref 7–23)
CALCIUM SERPL-MCNC: 9.1 MG/DL — SIGNIFICANT CHANGE UP (ref 8.4–10.5)
CHLORIDE SERPL-SCNC: 104 MMOL/L — SIGNIFICANT CHANGE UP (ref 96–108)
CO2 SERPL-SCNC: 24 MMOL/L — SIGNIFICANT CHANGE UP (ref 22–31)
CREAT SERPL-MCNC: 0.93 MG/DL — SIGNIFICANT CHANGE UP (ref 0.5–1.3)
EGFR: 96 ML/MIN/1.73M2 — SIGNIFICANT CHANGE UP
EOSINOPHIL # BLD AUTO: 0.19 K/UL — SIGNIFICANT CHANGE UP (ref 0–0.5)
EOSINOPHIL NFR BLD AUTO: 2.4 % — SIGNIFICANT CHANGE UP (ref 0–6)
GLUCOSE BLDC GLUCOMTR-MCNC: 208 MG/DL — HIGH (ref 70–99)
GLUCOSE SERPL-MCNC: 227 MG/DL — HIGH (ref 70–99)
HCT VFR BLD CALC: 40 % — SIGNIFICANT CHANGE UP (ref 39–50)
HGB BLD-MCNC: 12.3 G/DL — LOW (ref 13–17)
IMM GRANULOCYTES NFR BLD AUTO: 0.3 % — SIGNIFICANT CHANGE UP (ref 0–0.9)
INR BLD: 1.05 RATIO — SIGNIFICANT CHANGE UP (ref 0.88–1.16)
LACTATE SERPL-SCNC: 1.8 MMOL/L — SIGNIFICANT CHANGE UP (ref 0.7–2)
LACTATE SERPL-SCNC: 2.5 MMOL/L — HIGH (ref 0.7–2)
LYMPHOCYTES # BLD AUTO: 1.95 K/UL — SIGNIFICANT CHANGE UP (ref 1–3.3)
LYMPHOCYTES # BLD AUTO: 25 % — SIGNIFICANT CHANGE UP (ref 13–44)
MCHC RBC-ENTMCNC: 25.9 PG — LOW (ref 27–34)
MCHC RBC-ENTMCNC: 30.8 GM/DL — LOW (ref 32–36)
MCV RBC AUTO: 84.4 FL — SIGNIFICANT CHANGE UP (ref 80–100)
MONOCYTES # BLD AUTO: 0.69 K/UL — SIGNIFICANT CHANGE UP (ref 0–0.9)
MONOCYTES NFR BLD AUTO: 8.9 % — SIGNIFICANT CHANGE UP (ref 2–14)
NEUTROPHILS # BLD AUTO: 4.9 K/UL — SIGNIFICANT CHANGE UP (ref 1.8–7.4)
NEUTROPHILS NFR BLD AUTO: 62.9 % — SIGNIFICANT CHANGE UP (ref 43–77)
NRBC # BLD: 0 /100 WBCS — SIGNIFICANT CHANGE UP (ref 0–0)
PLATELET # BLD AUTO: 303 K/UL — SIGNIFICANT CHANGE UP (ref 150–400)
POTASSIUM SERPL-MCNC: 4.7 MMOL/L — SIGNIFICANT CHANGE UP (ref 3.5–5.3)
POTASSIUM SERPL-SCNC: 4.7 MMOL/L — SIGNIFICANT CHANGE UP (ref 3.5–5.3)
PROT SERPL-MCNC: 7 G/DL — SIGNIFICANT CHANGE UP (ref 6–8.3)
PROTHROM AB SERPL-ACNC: 12.2 SEC — SIGNIFICANT CHANGE UP (ref 10.5–13.4)
RBC # BLD: 4.74 M/UL — SIGNIFICANT CHANGE UP (ref 4.2–5.8)
RBC # FLD: 14.9 % — HIGH (ref 10.3–14.5)
SARS-COV-2 RNA SPEC QL NAA+PROBE: DETECTED
SODIUM SERPL-SCNC: 136 MMOL/L — SIGNIFICANT CHANGE UP (ref 135–145)
TROPONIN I, HIGH SENSITIVITY RESULT: 4.4 NG/L — SIGNIFICANT CHANGE UP
WBC # BLD: 7.79 K/UL — SIGNIFICANT CHANGE UP (ref 3.8–10.5)
WBC # FLD AUTO: 7.79 K/UL — SIGNIFICANT CHANGE UP (ref 3.8–10.5)

## 2023-03-29 PROCEDURE — 99285 EMERGENCY DEPT VISIT HI MDM: CPT | Mod: CS

## 2023-03-29 PROCEDURE — 70450 CT HEAD/BRAIN W/O DYE: CPT | Mod: 26,MH

## 2023-03-29 PROCEDURE — 99223 1ST HOSP IP/OBS HIGH 75: CPT

## 2023-03-29 PROCEDURE — 71045 X-RAY EXAM CHEST 1 VIEW: CPT | Mod: 26

## 2023-03-29 PROCEDURE — 93010 ELECTROCARDIOGRAM REPORT: CPT

## 2023-03-29 RX ORDER — PANTOPRAZOLE SODIUM 20 MG/1
40 TABLET, DELAYED RELEASE ORAL
Refills: 0 | Status: DISCONTINUED | OUTPATIENT
Start: 2023-03-29 | End: 2023-03-30

## 2023-03-29 RX ORDER — ACETAMINOPHEN 500 MG
650 TABLET ORAL EVERY 6 HOURS
Refills: 0 | Status: DISCONTINUED | OUTPATIENT
Start: 2023-03-29 | End: 2023-04-03

## 2023-03-29 RX ORDER — ENOXAPARIN SODIUM 100 MG/ML
40 INJECTION SUBCUTANEOUS EVERY 24 HOURS
Refills: 0 | Status: DISCONTINUED | OUTPATIENT
Start: 2023-03-29 | End: 2023-03-30

## 2023-03-30 LAB
A1C WITH ESTIMATED AVERAGE GLUCOSE RESULT: 8.1 % — HIGH (ref 4–5.6)
ALBUMIN SERPL ELPH-MCNC: 2.7 G/DL — LOW (ref 3.3–5)
ALP SERPL-CCNC: 86 U/L — SIGNIFICANT CHANGE UP (ref 40–120)
ALT FLD-CCNC: 27 U/L — SIGNIFICANT CHANGE UP (ref 10–45)
ANION GAP SERPL CALC-SCNC: 10 MMOL/L — SIGNIFICANT CHANGE UP (ref 5–17)
APPEARANCE UR: CLEAR — SIGNIFICANT CHANGE UP
AST SERPL-CCNC: 12 U/L — SIGNIFICANT CHANGE UP (ref 10–40)
BACTERIA # UR AUTO: NEGATIVE /HPF — SIGNIFICANT CHANGE UP
BASOPHILS # BLD AUTO: 0.05 K/UL — SIGNIFICANT CHANGE UP (ref 0–0.2)
BASOPHILS NFR BLD AUTO: 0.6 % — SIGNIFICANT CHANGE UP (ref 0–2)
BILIRUB SERPL-MCNC: 0.2 MG/DL — SIGNIFICANT CHANGE UP (ref 0.2–1.2)
BILIRUB UR-MCNC: NEGATIVE — SIGNIFICANT CHANGE UP
BUN SERPL-MCNC: 16 MG/DL — SIGNIFICANT CHANGE UP (ref 7–23)
CALCIUM SERPL-MCNC: 8.7 MG/DL — SIGNIFICANT CHANGE UP (ref 8.4–10.5)
CHLORIDE SERPL-SCNC: 106 MMOL/L — SIGNIFICANT CHANGE UP (ref 96–108)
CK SERPL-CCNC: 97 U/L — SIGNIFICANT CHANGE UP (ref 30–200)
CO2 SERPL-SCNC: 25 MMOL/L — SIGNIFICANT CHANGE UP (ref 22–31)
COLOR SPEC: YELLOW — SIGNIFICANT CHANGE UP
COVID-19 NUCLEOCAPSID GAM AB INTERP: POSITIVE
COVID-19 NUCLEOCAPSID TOTAL GAM ANTIBODY RESULT: 12.2 INDEX — HIGH
CREAT SERPL-MCNC: 0.76 MG/DL — SIGNIFICANT CHANGE UP (ref 0.5–1.3)
CRP SERPL-MCNC: 4 MG/L — SIGNIFICANT CHANGE UP
D DIMER BLD IA.RAPID-MCNC: <150 NG/ML DDU — SIGNIFICANT CHANGE UP
DIFF PNL FLD: NEGATIVE — SIGNIFICANT CHANGE UP
EGFR: 106 ML/MIN/1.73M2 — SIGNIFICANT CHANGE UP
EOSINOPHIL # BLD AUTO: 0.21 K/UL — SIGNIFICANT CHANGE UP (ref 0–0.5)
EOSINOPHIL NFR BLD AUTO: 2.7 % — SIGNIFICANT CHANGE UP (ref 0–6)
EPI CELLS # UR: SIGNIFICANT CHANGE UP
ESTIMATED AVERAGE GLUCOSE: 186 MG/DL — HIGH (ref 68–114)
FERRITIN SERPL-MCNC: 28 NG/ML — LOW (ref 30–400)
GLUCOSE BLDC GLUCOMTR-MCNC: 114 MG/DL — HIGH (ref 70–99)
GLUCOSE BLDC GLUCOMTR-MCNC: 134 MG/DL — HIGH (ref 70–99)
GLUCOSE BLDC GLUCOMTR-MCNC: 137 MG/DL — HIGH (ref 70–99)
GLUCOSE BLDC GLUCOMTR-MCNC: 150 MG/DL — HIGH (ref 70–99)
GLUCOSE BLDC GLUCOMTR-MCNC: 162 MG/DL — HIGH (ref 70–99)
GLUCOSE BLDC GLUCOMTR-MCNC: 65 MG/DL — LOW (ref 70–99)
GLUCOSE BLDC GLUCOMTR-MCNC: 76 MG/DL — SIGNIFICANT CHANGE UP (ref 70–99)
GLUCOSE BLDC GLUCOMTR-MCNC: 92 MG/DL — SIGNIFICANT CHANGE UP (ref 70–99)
GLUCOSE SERPL-MCNC: 108 MG/DL — HIGH (ref 70–99)
GLUCOSE SERPL-MCNC: 78 MG/DL — SIGNIFICANT CHANGE UP (ref 70–99)
GLUCOSE UR QL: 1000 MG/DL
HCT VFR BLD CALC: 37.3 % — LOW (ref 39–50)
HGB BLD-MCNC: 11.6 G/DL — LOW (ref 13–17)
IMM GRANULOCYTES NFR BLD AUTO: 0.3 % — SIGNIFICANT CHANGE UP (ref 0–0.9)
KETONES UR-MCNC: NEGATIVE — SIGNIFICANT CHANGE UP
LDH SERPL L TO P-CCNC: 317 U/L — HIGH (ref 50–242)
LEUKOCYTE ESTERASE UR-ACNC: NEGATIVE — SIGNIFICANT CHANGE UP
LYMPHOCYTES # BLD AUTO: 2.18 K/UL — SIGNIFICANT CHANGE UP (ref 1–3.3)
LYMPHOCYTES # BLD AUTO: 27.6 % — SIGNIFICANT CHANGE UP (ref 13–44)
MCHC RBC-ENTMCNC: 26.2 PG — LOW (ref 27–34)
MCHC RBC-ENTMCNC: 31.1 GM/DL — LOW (ref 32–36)
MCV RBC AUTO: 84.2 FL — SIGNIFICANT CHANGE UP (ref 80–100)
MONOCYTES # BLD AUTO: 0.69 K/UL — SIGNIFICANT CHANGE UP (ref 0–0.9)
MONOCYTES NFR BLD AUTO: 8.7 % — SIGNIFICANT CHANGE UP (ref 2–14)
NEUTROPHILS # BLD AUTO: 4.76 K/UL — SIGNIFICANT CHANGE UP (ref 1.8–7.4)
NEUTROPHILS NFR BLD AUTO: 60.1 % — SIGNIFICANT CHANGE UP (ref 43–77)
NITRITE UR-MCNC: NEGATIVE — SIGNIFICANT CHANGE UP
NRBC # BLD: 0 /100 WBCS — SIGNIFICANT CHANGE UP (ref 0–0)
NT-PROBNP SERPL-SCNC: 62 PG/ML — SIGNIFICANT CHANGE UP (ref 0–300)
PH UR: 5 — SIGNIFICANT CHANGE UP (ref 5–8)
PLATELET # BLD AUTO: 268 K/UL — SIGNIFICANT CHANGE UP (ref 150–400)
POTASSIUM SERPL-MCNC: 4.4 MMOL/L — SIGNIFICANT CHANGE UP (ref 3.5–5.3)
POTASSIUM SERPL-SCNC: 4.4 MMOL/L — SIGNIFICANT CHANGE UP (ref 3.5–5.3)
PROT SERPL-MCNC: 6.1 G/DL — SIGNIFICANT CHANGE UP (ref 6–8.3)
PROT UR-MCNC: NEGATIVE — SIGNIFICANT CHANGE UP
RBC # BLD: 4.43 M/UL — SIGNIFICANT CHANGE UP (ref 4.2–5.8)
RBC # FLD: 14.8 % — HIGH (ref 10.3–14.5)
RBC CASTS # UR COMP ASSIST: SIGNIFICANT CHANGE UP /HPF (ref 0–4)
SARS-COV-2 IGG+IGM SERPL QL IA: 12.2 INDEX — HIGH
SARS-COV-2 IGG+IGM SERPL QL IA: POSITIVE
SODIUM SERPL-SCNC: 141 MMOL/L — SIGNIFICANT CHANGE UP (ref 135–145)
SP GR SPEC: 1.01 — SIGNIFICANT CHANGE UP (ref 1.01–1.02)
UROBILINOGEN FLD QL: NEGATIVE — SIGNIFICANT CHANGE UP
WBC # BLD: 7.91 K/UL — SIGNIFICANT CHANGE UP (ref 3.8–10.5)
WBC # FLD AUTO: 7.91 K/UL — SIGNIFICANT CHANGE UP (ref 3.8–10.5)
WBC UR QL: SIGNIFICANT CHANGE UP /HPF (ref 0–5)

## 2023-03-30 RX ORDER — INSULIN LISPRO 100/ML
VIAL (ML) SUBCUTANEOUS
Refills: 0 | Status: DISCONTINUED | OUTPATIENT
Start: 2023-03-30 | End: 2023-04-03

## 2023-03-30 RX ORDER — GLUCAGON INJECTION, SOLUTION 0.5 MG/.1ML
1 INJECTION, SOLUTION SUBCUTANEOUS ONCE
Refills: 0 | Status: DISCONTINUED | OUTPATIENT
Start: 2023-03-30 | End: 2023-04-03

## 2023-03-30 RX ORDER — DEXTROSE 50 % IN WATER 50 %
12.5 SYRINGE (ML) INTRAVENOUS ONCE
Refills: 0 | Status: DISCONTINUED | OUTPATIENT
Start: 2023-03-30 | End: 2023-04-03

## 2023-03-30 RX ORDER — DEXTROSE 50 % IN WATER 50 %
25 SYRINGE (ML) INTRAVENOUS ONCE
Refills: 0 | Status: DISCONTINUED | OUTPATIENT
Start: 2023-03-30 | End: 2023-04-03

## 2023-03-30 RX ORDER — SODIUM CHLORIDE 9 MG/ML
1000 INJECTION, SOLUTION INTRAVENOUS
Refills: 0 | Status: DISCONTINUED | OUTPATIENT
Start: 2023-03-30 | End: 2023-04-03

## 2023-03-30 RX ORDER — APIXABAN 2.5 MG/1
5 TABLET, FILM COATED ORAL EVERY 12 HOURS
Refills: 0 | Status: DISCONTINUED | OUTPATIENT
Start: 2023-03-30 | End: 2023-04-03

## 2023-03-30 RX ORDER — INSULIN GLARGINE 100 [IU]/ML
10 INJECTION, SOLUTION SUBCUTANEOUS EVERY MORNING
Refills: 0 | Status: DISCONTINUED | OUTPATIENT
Start: 2023-03-30 | End: 2023-03-31

## 2023-03-30 RX ORDER — SERTRALINE 25 MG/1
100 TABLET, FILM COATED ORAL DAILY
Refills: 0 | Status: DISCONTINUED | OUTPATIENT
Start: 2023-03-30 | End: 2023-04-03

## 2023-03-30 RX ORDER — CLOPIDOGREL BISULFATE 75 MG/1
75 TABLET, FILM COATED ORAL DAILY
Refills: 0 | Status: DISCONTINUED | OUTPATIENT
Start: 2023-03-30 | End: 2023-04-03

## 2023-03-30 RX ORDER — CLONAZEPAM 1 MG
1 TABLET ORAL AT BEDTIME
Refills: 0 | Status: DISCONTINUED | OUTPATIENT
Start: 2023-03-30 | End: 2023-04-03

## 2023-03-30 RX ORDER — INSULIN LISPRO 100/ML
2 VIAL (ML) SUBCUTANEOUS
Refills: 0 | Status: DISCONTINUED | OUTPATIENT
Start: 2023-03-30 | End: 2023-03-31

## 2023-03-30 RX ORDER — METHADONE HYDROCHLORIDE 40 MG/1
5 TABLET ORAL
Refills: 0 | Status: DISCONTINUED | OUTPATIENT
Start: 2023-03-30 | End: 2023-04-03

## 2023-03-30 RX ORDER — CHOLECALCIFEROL (VITAMIN D3) 125 MCG
1000 CAPSULE ORAL DAILY
Refills: 0 | Status: DISCONTINUED | OUTPATIENT
Start: 2023-03-30 | End: 2023-04-03

## 2023-03-30 RX ORDER — ATORVASTATIN CALCIUM 80 MG/1
80 TABLET, FILM COATED ORAL AT BEDTIME
Refills: 0 | Status: DISCONTINUED | OUTPATIENT
Start: 2023-03-30 | End: 2023-04-03

## 2023-03-30 RX ORDER — ERGOCALCIFEROL 1.25 MG/1
0 CAPSULE ORAL
Qty: 0 | Refills: 0 | DISCHARGE

## 2023-03-30 RX ORDER — SODIUM CHLORIDE 9 MG/ML
1000 INJECTION INTRAMUSCULAR; INTRAVENOUS; SUBCUTANEOUS
Refills: 0 | Status: DISCONTINUED | OUTPATIENT
Start: 2023-03-30 | End: 2023-04-03

## 2023-03-30 RX ORDER — REMDESIVIR 5 MG/ML
INJECTION INTRAVENOUS
Refills: 0 | Status: DISCONTINUED | OUTPATIENT
Start: 2023-03-30 | End: 2023-03-30

## 2023-03-30 RX ORDER — INSULIN LISPRO 100/ML
6 VIAL (ML) SUBCUTANEOUS
Refills: 0 | Status: DISCONTINUED | OUTPATIENT
Start: 2023-03-30 | End: 2023-03-30

## 2023-03-30 RX ORDER — METHADONE HYDROCHLORIDE 40 MG/1
1 TABLET ORAL
Qty: 0 | Refills: 0 | DISCHARGE

## 2023-03-30 RX ORDER — REMDESIVIR 5 MG/ML
200 INJECTION INTRAVENOUS EVERY 24 HOURS
Refills: 0 | Status: COMPLETED | OUTPATIENT
Start: 2023-03-30 | End: 2023-03-30

## 2023-03-30 RX ORDER — CLONAZEPAM 1 MG
0.5 TABLET ORAL DAILY
Refills: 0 | Status: DISCONTINUED | OUTPATIENT
Start: 2023-03-30 | End: 2023-04-03

## 2023-03-30 RX ORDER — ASCORBIC ACID 60 MG
500 TABLET,CHEWABLE ORAL DAILY
Refills: 0 | Status: DISCONTINUED | OUTPATIENT
Start: 2023-03-30 | End: 2023-04-03

## 2023-03-30 RX ORDER — ZINC SULFATE TAB 220 MG (50 MG ZINC EQUIVALENT) 220 (50 ZN) MG
220 TAB ORAL DAILY
Refills: 0 | Status: DISCONTINUED | OUTPATIENT
Start: 2023-03-30 | End: 2023-04-03

## 2023-03-30 RX ORDER — INSULIN LISPRO 100/ML
VIAL (ML) SUBCUTANEOUS AT BEDTIME
Refills: 0 | Status: DISCONTINUED | OUTPATIENT
Start: 2023-03-30 | End: 2023-04-03

## 2023-03-30 RX ORDER — PANTOPRAZOLE SODIUM 20 MG/1
40 TABLET, DELAYED RELEASE ORAL
Refills: 0 | Status: DISCONTINUED | OUTPATIENT
Start: 2023-03-30 | End: 2023-04-03

## 2023-03-30 RX ORDER — HYDROMORPHONE HYDROCHLORIDE 2 MG/ML
4 INJECTION INTRAMUSCULAR; INTRAVENOUS; SUBCUTANEOUS
Refills: 0 | Status: DISCONTINUED | OUTPATIENT
Start: 2023-03-30 | End: 2023-04-03

## 2023-03-30 RX ORDER — INSULIN GLARGINE 100 [IU]/ML
22 INJECTION, SOLUTION SUBCUTANEOUS EVERY MORNING
Refills: 0 | Status: DISCONTINUED | OUTPATIENT
Start: 2023-03-30 | End: 2023-03-30

## 2023-03-30 RX ORDER — DEXTROSE 50 % IN WATER 50 %
15 SYRINGE (ML) INTRAVENOUS ONCE
Refills: 0 | Status: DISCONTINUED | OUTPATIENT
Start: 2023-03-30 | End: 2023-04-03

## 2023-03-30 RX ADMIN — Medication 150 MILLIGRAM(S): at 21:53

## 2023-03-30 RX ADMIN — METHADONE HYDROCHLORIDE 5 MILLIGRAM(S): 40 TABLET ORAL at 17:28

## 2023-03-30 RX ADMIN — HYDROMORPHONE HYDROCHLORIDE 4 MILLIGRAM(S): 2 INJECTION INTRAMUSCULAR; INTRAVENOUS; SUBCUTANEOUS at 23:05

## 2023-03-30 RX ADMIN — HYDROMORPHONE HYDROCHLORIDE 4 MILLIGRAM(S): 2 INJECTION INTRAMUSCULAR; INTRAVENOUS; SUBCUTANEOUS at 11:18

## 2023-03-30 RX ADMIN — METHADONE HYDROCHLORIDE 5 MILLIGRAM(S): 40 TABLET ORAL at 05:44

## 2023-03-30 RX ADMIN — HYDROMORPHONE HYDROCHLORIDE 4 MILLIGRAM(S): 2 INJECTION INTRAMUSCULAR; INTRAVENOUS; SUBCUTANEOUS at 12:00

## 2023-03-30 RX ADMIN — Medication 1000 UNIT(S): at 11:26

## 2023-03-30 RX ADMIN — HYDROMORPHONE HYDROCHLORIDE 4 MILLIGRAM(S): 2 INJECTION INTRAMUSCULAR; INTRAVENOUS; SUBCUTANEOUS at 05:51

## 2023-03-30 RX ADMIN — HYDROMORPHONE HYDROCHLORIDE 4 MILLIGRAM(S): 2 INJECTION INTRAMUSCULAR; INTRAVENOUS; SUBCUTANEOUS at 18:00

## 2023-03-30 RX ADMIN — SODIUM CHLORIDE 100 MILLILITER(S): 9 INJECTION INTRAMUSCULAR; INTRAVENOUS; SUBCUTANEOUS at 02:00

## 2023-03-30 RX ADMIN — HYDROMORPHONE HYDROCHLORIDE 4 MILLIGRAM(S): 2 INJECTION INTRAMUSCULAR; INTRAVENOUS; SUBCUTANEOUS at 23:35

## 2023-03-30 RX ADMIN — PANTOPRAZOLE SODIUM 40 MILLIGRAM(S): 20 TABLET, DELAYED RELEASE ORAL at 05:42

## 2023-03-30 RX ADMIN — CLOPIDOGREL BISULFATE 75 MILLIGRAM(S): 75 TABLET, FILM COATED ORAL at 11:19

## 2023-03-30 RX ADMIN — METHADONE HYDROCHLORIDE 5 MILLIGRAM(S): 40 TABLET ORAL at 11:19

## 2023-03-30 RX ADMIN — Medication 500 MILLIGRAM(S): at 11:26

## 2023-03-30 RX ADMIN — HYDROMORPHONE HYDROCHLORIDE 4 MILLIGRAM(S): 2 INJECTION INTRAMUSCULAR; INTRAVENOUS; SUBCUTANEOUS at 17:27

## 2023-03-30 RX ADMIN — REMDESIVIR 200 MILLIGRAM(S): 5 INJECTION INTRAVENOUS at 03:20

## 2023-03-30 RX ADMIN — PANTOPRAZOLE SODIUM 40 MILLIGRAM(S): 20 TABLET, DELAYED RELEASE ORAL at 17:29

## 2023-03-30 RX ADMIN — APIXABAN 5 MILLIGRAM(S): 2.5 TABLET, FILM COATED ORAL at 06:55

## 2023-03-30 RX ADMIN — Medication 150 MILLIGRAM(S): at 05:43

## 2023-03-30 RX ADMIN — SERTRALINE 100 MILLIGRAM(S): 25 TABLET, FILM COATED ORAL at 11:25

## 2023-03-30 RX ADMIN — ZINC SULFATE TAB 220 MG (50 MG ZINC EQUIVALENT) 220 MILLIGRAM(S): 220 (50 ZN) TAB at 11:25

## 2023-03-30 RX ADMIN — ATORVASTATIN CALCIUM 80 MILLIGRAM(S): 80 TABLET, FILM COATED ORAL at 21:53

## 2023-03-30 RX ADMIN — Medication 150 MILLIGRAM(S): at 13:07

## 2023-03-30 RX ADMIN — Medication 1 MILLIGRAM(S): at 21:53

## 2023-03-30 RX ADMIN — APIXABAN 5 MILLIGRAM(S): 2.5 TABLET, FILM COATED ORAL at 17:29

## 2023-03-30 RX ADMIN — METHADONE HYDROCHLORIDE 5 MILLIGRAM(S): 40 TABLET ORAL at 23:05

## 2023-03-30 NOTE — PATIENT PROFILE ADULT - CONTRAINDICATIONS & PRECAUTIONS (SELECT ALL THAT APPLY)
Impression: Presence of intraocular lens: Z96.1 Bilateral. Plan: Stable and well centered.  Obs Patient/surrogate refused vaccine...

## 2023-03-30 NOTE — H&P ADULT - HISTORY OF PRESENT ILLNESS
57 M hx of HTN, HLD, CAD/MI/PCI, T2DM on insulin/metformin with hx of DKA, afib on Eliquis, chronic pain syndrome (s/p C2-6 fusion) pw dizziness and weakness. Pt related for the past week with some episodes of dizziness with noted low BP with systolic in the 80s. Noted decreased appetite for the past week. Denied any fevers, chills, cough, SOB, CP, palps, NVD, dysuria. But did experience some urinary frequency and urgency.  No hematemesis, melena or BRBPR. Tonight after dinner felt more dizzy than usual as he stood up and BP was 79/49 and called 911. In ED afebrile P; 75 BP: 95/63 sat 97% on RA. WBC; 4.7 63%N. lact: 2.5 repeat 1.8 cr: 0.93 b trop: 4.4. CT head neg. s/p 2 L in ED with improvement in BP: 100s but remains orthostatic and symptomatic when rising. Took all insulin doses today.

## 2023-03-30 NOTE — PATIENT PROFILE ADULT - FALL HARM RISK - HARM RISK INTERVENTIONS
Communicate Risk of Fall with Harm to all staff/Reinforce activity limits and safety measures with patient and family/Review medications for side effects contributing to fall risk/Sit up slowly, dangle for a short time, stand at bedside before walking/Tailored Fall Risk Interventions/Visual Cue: Yellow wristband and red socks/Bed in lowest position, wheels locked, appropriate side rails in place/Call bell, personal items and telephone in reach/Instruct patient to call for assistance before getting out of bed or chair/Non-slip footwear when patient is out of bed/[place] to call system/Physically safe environment - no spills, clutter or unnecessary equipment/Purposeful Proactive Rounding/Room/bathroom lighting operational, light cord in reach

## 2023-03-30 NOTE — DIETITIAN INITIAL EVALUATION ADULT - ADD RECOMMEND
1. Recommend continue current nutrition plan of care as tolerated   2. Provide ongoing diet education as needed   3. Monitor daily PO intakes, GI tolerance, labs, weights, skin integrity, & BM regularity

## 2023-03-30 NOTE — PATIENT PROFILE ADULT - PATIENT'S SEXUAL ORIENTATION
Normal vision: sees adequately in most situations; can see medication labels, newsprint
Heterosexual

## 2023-03-30 NOTE — H&P ADULT - NSHPPHYSICALEXAM_GEN_ALL_CORE
Vital Signs Last 24 Hrs  T(C): --  T(F): --  HR: --  BP: --  BP(mean): --  RR: --  SpO2: --      Daily     Daily   CAPILLARY BLOOD GLUCOSE      POCT Blood Glucose.: 114 mg/dL (30 Mar 2023 00:36)    I&O's Summary      GENERAL: NAD  HEAD:  Normocephalic  EYES: EOMI, PERRLA, conjunctiva and sclera clear  ENMT: No tonsillar erythema, exudates, or enlargement; Moist mucous membranes, No lesions  NECK: Supple, No JVD, no bruit, normal thyroid  NERVOUS SYSTEM:  Alert & Oriented X3, Good concentration; grossly  Motor Strength 5/5 B/L upper and lower extremities; DTRs 2+ intact and symmetric  CHEST/LUNG: Clear to auscultation bilaterally; No rales, rhonchi, wheezing, or rubs  HEART: Regular rate and rhythm; No murmurs, rubs, or gallops  ABDOMEN: Soft, Nontender, Nondistended; Bowel sounds present  EXTREMITIES:  2+ Peripheral Pulses, No clubbing, cyanosis, or edema  LYMPH: No lymphadenopathy noted  SKIN: No rashes or lesions

## 2023-03-30 NOTE — DIETITIAN INITIAL EVALUATION ADULT - NS FNS DIET ORDER
Diet, Consistent Carbohydrate w/Evening Snack:   DASH/TLC {Sodium & Cholesterol Restricted} (03-29-23 @ 23:53)

## 2023-03-30 NOTE — DIETITIAN INITIAL EVALUATION ADULT - PERTINENT LABORATORY DATA
03-30    141  |  106  |  16  ----------------------------<  78  4.4   |  25  |  0.76    Ca    8.7      30 Mar 2023 06:45    TPro  6.1  /  Alb  2.7<L>  /  TBili  0.2  /  DBili  x   /  AST  12  /  ALT  27  /  AlkPhos  86  03-30  POCT Blood Glucose.: 76 mg/dL (03-30-23 @ 08:06)  A1C with Estimated Average Glucose Result: 7.7 % (01-09-23 @ 13:05)  A1C with Estimated Average Glucose Result: 8.1 % (12-03-22 @ 12:43)

## 2023-03-30 NOTE — DIETITIAN INITIAL EVALUATION ADULT - PERTINENT MEDS FT
MEDICATIONS  (STANDING):  apixaban 5 milliGRAM(s) Oral every 12 hours  ascorbic acid 500 milliGRAM(s) Oral daily  atorvastatin 80 milliGRAM(s) Oral at bedtime  cholecalciferol 1000 Unit(s) Oral daily  clonazePAM  Tablet 1 milliGRAM(s) Oral at bedtime  clopidogrel Tablet 75 milliGRAM(s) Oral daily  dextrose 5%. 1000 milliLiter(s) (100 mL/Hr) IV Continuous <Continuous>  dextrose 5%. 1000 milliLiter(s) (50 mL/Hr) IV Continuous <Continuous>  dextrose 50% Injectable 25 Gram(s) IV Push once  dextrose 50% Injectable 12.5 Gram(s) IV Push once  dextrose 50% Injectable 25 Gram(s) IV Push once  glucagon  Injectable 1 milliGRAM(s) IntraMuscular once  HYDROmorphone   Tablet 4 milliGRAM(s) Oral four times a day  insulin glargine Injectable (LANTUS) 22 Unit(s) SubCutaneous every morning  insulin lispro (ADMELOG) corrective regimen sliding scale   SubCutaneous three times a day before meals  insulin lispro (ADMELOG) corrective regimen sliding scale   SubCutaneous at bedtime  insulin lispro Injectable (ADMELOG) 6 Unit(s) SubCutaneous before breakfast  insulin lispro Injectable (ADMELOG) 6 Unit(s) SubCutaneous before lunch  insulin lispro Injectable (ADMELOG) 6 Unit(s) SubCutaneous before dinner  methadone    Tablet 5 milliGRAM(s) Oral four times a day  pantoprazole    Tablet 40 milliGRAM(s) Oral two times a day  pregabalin 150 milliGRAM(s) Oral three times a day  remdesivir  IVPB   IV Intermittent   sertraline 100 milliGRAM(s) Oral daily  sodium chloride 0.9%. 1000 milliLiter(s) (100 mL/Hr) IV Continuous <Continuous>  zinc sulfate 220 milliGRAM(s) Oral daily    MEDICATIONS  (PRN):  acetaminophen     Tablet .. 650 milliGRAM(s) Oral every 6 hours PRN Temp greater or equal to 38C (100.4F), Mild Pain (1 - 3)  clonazePAM  Tablet 0.5 milliGRAM(s) Oral daily PRN anxiety  dextrose Oral Gel 15 Gram(s) Oral once PRN Blood Glucose LESS THAN 70 milliGRAM(s)/deciliter

## 2023-03-30 NOTE — H&P ADULT - NSHPREVIEWOFSYSTEMS_GEN_ALL_CORE
CONSTITUTIONAL: No fever, weight loss, + fatigue  EYES: No eye pain, visual disturbances, or discharge  ENMT:  No difficulty hearing, tinnitus, vertigo; No sinus or throat pain  NECK: No pain or stiffness  RESPIRATORY: No cough, wheezing, chills or hemoptysis; No shortness of breath  CARDIOVASCULAR: No chest pain, palpitations, ++dizziness, noor leg swelling  GASTROINTESTINAL: No abdominal or epigastric pain. No nausea, vomiting, or hematemesis; No diarrhea or constipation. No melena or hematochezia.  GENITOURINARY: No dysuria, + frequency, no hematuria, or incontinence  NEUROLOGICAL: No headaches, memory loss, loss of strength, numbness, or tremors  SKIN: No itching, burning, rashes, or lesions   LYMPH NODES: No enlarged glands  ENDOCRINE: No heat or cold intolerance; No hair loss  MUSCULOSKELETAL: No joint pain or swelling; No muscle, back, or extremity pain  PSYCHIATRIC: No depression, anxiety, mood swings, or difficulty sleeping  HEME/LYMPH: No easy bruising, or bleeding gums  ALLERY AND IMMUNOLOGIC: No hives or eczema    IMPROVE VTE Individual Risk Assessment          RISK                                                          Points  [  ] Previous VTE                                                3  [  ] Thrombophilia                                             2  [  ] Lower limb paralysis                                   2        (unable to hold up >15 seconds)    [  ] Current Cancer                                             2         (within 6 months)  [  ] Immobilization > 24 hrs                              1  [  ] ICU/CCU stay > 24 hours                             1  [  ] Age > 60                                                         1    IMPROVE VTE Score:         [     0    ]    Total Risk Factor Score:    0 - 1:   Consider IPC  >2 - 3:  Thromboprophylaxis required (enoxaparin or SQ heparin)        >4:   High Risk: Thromboprophylaxis required (enoxaparin or SQ heparin), optional add IPC  **If CONTRAINDICATION to enoxaparin or SQ heparin, USE IPCs**

## 2023-03-30 NOTE — DIETITIAN INITIAL EVALUATION ADULT - ORAL INTAKE PTA/DIET HISTORY
Patient reports decreased appetite last 2-3 days prior to admission. Reports consuming 2 meals and 2 snacks per day. Patient reports taking metformin and insulin at home prior to admission.    No height or weight noted. Per patient, height is 6'2" and UBW: 245. Denies any recent weight changes.

## 2023-03-30 NOTE — CHART NOTE - NSCHARTNOTEFT_GEN_A_CORE
This report was requested by: Misty Lara | Reference #: [id number]    You have not added a MAGALIS number. Keeping your MAGALIS number(s) up to date on the My MAGALIS # page will enable the separation of your prescriptions from others in the search results.    Others' Prescriptions  Patient Name: Kyle MastBirth Date: 1965  Address: [address] [place] [id number]: Male  Rx Written	Rx Dispensed	Drug	Quantity	Days Supply	Prescriber Name	Prescriber Magalis #	Payment Method	Dispenser  03/10/2023	03/20/2023	methadone hcl 5 mg tablet	120	30	Trimarco, Celine NP	[id number]	[facility] #[id number]  03/10/2023	03/18/2023	hydromorphone 4 mg tablet	120	30	Trimarco, Celine NP	[id number]	[facility] #[id number]  03/10/2023	03/18/2023	pregabalin 150 mg capsule	90	30	Trimarco, Celine NP	[id number]	[facility] #[id number]  03/09/2023	03/09/2023	clonazepam 1 mg tablet	30	30	Aleisha Sutton P	[id number]	Medicare	[facility] #[id number]  03/09/2023	03/09/2023	clonazepam 0.5 mg tablet	30	30	Aleisha Sutton P	[id number]	Medicare	[facility] #[id number]  02/10/2023	02/15/2023	hydromorphone 4 mg tablet	120	30	Trimarco, Celine NP	[id number]	[facility] #[id number]  02/10/2023	02/15/2023	methadone hcl 5 mg tablet	120	30	Trimarco, Celine NP	[id number]	[facility] #[id number]  02/10/2023	02/14/2023	pregabalin 150 mg capsule	90	30	Trimarco, Celine NP	[id number]	[facility] #[id number]  01/31/2023	01/31/2023	clonazepam 1 mg tablet	30	30	Aleisha Sutton P	[id number]	Medicare	[facility] #[id number]  01/31/2023	01/31/2023	clonazepam 0.5 mg tablet	30	30	Aleisha Sutton P	[id number]	Medicare	[facility] #[id number]  01/13/2023	01/18/2023	hydromorphone 4 mg tablet	119	30	Stamatos, Juaquin	[id number]	[facility] #[id number]  01/13/2023	01/18/2023	methadone hcl 5 mg tablet	120	30	Stamatos, Juaquin	[id number]	[facility] #[id number]  01/13/2023	01/18/2023	pregabalin 150 mg capsule	90	30	Stamatos, Juaquin	[id number]	[facility] #[id number]  12/07/2022	12/18/2022	methadone hcl 5 mg tablet	120	30	Stamatos, Juaquin	[id number]	[facility] #[id number]  12/13/2022	12/14/2022	clonazepam 1 mg tablet	30	30	Aleisha Sutton P	[id number]	Medicare	[facility] #[id number]  12/13/2022	12/14/2022	clonazepam 0.5 mg tablet	30	30	Aleisha Sutton P	[id number]	Medicare	[facility] #[id number]  12/07/2022	12/09/2022	pregabalin 150 mg capsule	90	30	Stamatos, Juaquin	[id number]	[facility] #[id number]  12/07/2022	12/09/2022	hydromorphone 4 mg tablet	120	30	StamatosJuaquin	[id number]	[facility] #[id number]  11/04/2022	11/18/2022	methadone hcl 5 mg tablet	120	30	Stamatos, Juaquin	[id number]	[facility] #[id number]  11/15/2022	11/15/2022	clonazepam 1 mg tablet	30	30	Aleisha Sutton P	[id number]	Medicare	[facility] #[id number]  11/15/2022	11/15/2022	clonazepam 0.5 mg tablet	30	30	Aleisha Sutton P	[id number]	Medicare	[facility] #[id number]  11/04/2022	11/08/2022	hydromorphone 4 mg tablet	120	30	Stamatos, Juaquin	[id number]	[facility] #[id number]  11/04/2022	11/08/2022	pregabalin 150 mg capsule	90	30	StamatosJuaquin	[id number]	[facility] #[id number]  11/08/2022	11/08/2022	clonazepam 1 mg tablet	7	7	Aleisha Sutton P	[id number]	Medicare	[facility] #[id number]  11/08/2022	11/08/2022	clonazepam 0.5 mg tablet	7	7	Aleisha Sutton P	[id number]	Medicare	[facility] #[id number]  10/03/2022	10/19/2022	methadone hcl 5 mg tablet	120	30	StamatosJuaquin	[id number]	[facility] #[id number]  10/03/2022	10/10/2022	hydromorphone 4 mg tablet	120	30	Stamatos, Juaquin	[id number]	[facility] #[id number]  10/04/2022	10/06/2022	clonazepam 1 mg tablet	30	30	Aleisha Sutton P	[id number]	Medicare	[facility] #[id number]  08/31/2022	10/04/2022	pregabalin 150 mg capsule	90	30	StamatosJuaquin	[id number]	[facility] #[id number]  10/04/2022	10/04/2022	clonazepam 0.5 mg tablet	30	30	Aleisha Sutton P	[id number]	Medicare	[facility] #[id number]  08/31/2022	09/20/2022	methadone hcl 5 mg tablet	120	30	StamatosJuaquin	[id number]	[facility] #[id number]  08/31/2022	09/09/2022	hydromorphone 4 mg tablet	120	30	StamatosJuaquin	[id number]	[facility] #[id number]  09/08/2022	09/08/2022	clonazepam 1 mg tablet	30	30	Aleisha Sutton P	[id number]	Medicare	[facility] #[id number]  08/03/2022	08/30/2022	pregabalin 150 mg capsule	90	30	StamatosJuaquin	[id number]	[facility] #[id number]  08/03/2022	08/24/2022	methadone hcl 5 mg tablet	114	29	StamatoJuaquin kat	[id number]	[facility] #[id number]  08/03/2022	08/12/2022	hydromorphone 4 mg tablet	120	30	StamatosJuaquin	[id number]	[facility] #[id number]  07/06/2022	07/26/2022	pregabalin 150 mg capsule	90	30	Stamatos, Juaquin	[id number]	[facility] #[id number]  07/06/2022	07/26/2022	methadone hcl 5 mg tablet	120	30	Stamatos, Juaquin	[id number]	[facility] #[id number]  07/06/2022	07/15/2022	hydromorphone 4 mg tablet	120	30	Stamatos, Juaquin	[id number]	[facility] #[id number]  06/06/2022	06/28/2022	methadone hcl 5 mg tablet	120	30	Stamatos, Juaquin	[id number]	[facility] #[id number]  06/06/2022	06/16/2022	hydromorphone 4 mg tablet	120	30	Stamatos, Juaquin	[id number]	[facility] #[id number]  06/06/2022	06/16/2022	pregabalin 150 mg capsule	90	30	Stamatos, Juaquin	[id number]	[facility] #[id number]  05/09/2022	05/31/2022	methadone hcl 5 mg tablet	120	30	Stamatos, Juaquin	[id number]	[facility] #[id number]  05/09/2022	05/19/2022	hydromorphone 4 mg tablet	120	30	Stamatos, Juaquin	[id number]	[facility] #[id number]  05/09/2022	05/19/2022	pregabalin 150 mg capsule	90	30	Stamatos, Juaquin	[id number]	[facility] #[id number]  04/11/2022	05/02/2022	methadone hcl 5 mg tablet	120	30	Stamatos, Juaquin	[id number]	[facility] #[id number]  04/11/2022	04/18/2022	pregabalin 150 mg capsule	90	30	Stamatos, Juaquin	[id number]	[facility] #[id number]  04/11/2022	04/18/2022	hydromorphone 4 mg tablet	120	30	Stamatos, Juaquin	[id number]	[facility] #[id number]  03/16/2022	04/05/2022	methadone hcl 5 mg tablet	120	30	Stamatos, Juaquin	[id number]	[facility] #[id number]

## 2023-03-30 NOTE — DIETITIAN INITIAL EVALUATION ADULT - OTHER INFO
57 M hx of HTN, HLD, CAD/MI/PCI, T2DM on insulin/metformin with hx of DKA, afib on Eliquis, chronic pain syndrome (s/p C2-6 fusion) pw dizziness and weakness. Pt related for the past week with some episodes of dizziness with noted low BP with systolic in the 80s. Noted decreased appetite for the past week. Denied any fevers, chills, cough, SOB, CP, palps, NVD, dysuria. But did experience some urinary frequency and urgency.  No hematemesis, melena or BRBPR. Tonight after dinner felt more dizzy than usual as he stood up and BP was 79/49 and called 911. In ED afebrile P; 75 BP: 95/63 sat 97% on RA. WBC; 4.7 63%N. lact: 2.5 repeat 1.8 cr: 0.93 b trop: 4.4. CT head neg. s/p 2 L in ED with improvement in BP: 100s but remains orthostatic and symptomatic when rising. Took all insulin doses today.     Patient reports improved appetite at this time. Consuming % of meals as per nursing flow sheets. Denies any chewing/swallowing difficulties. No known food allergies/intolerances. Reports last BM to be yesterday 3/30/23. No edema. Ecchymotic skin noted at sacrum. Hx of DM. A1C: 7.7 (23). POCT & insulin adjustment reviewed. Discussed CCHO diet and CHO counting. Patient aware and receptive to diet education. Education material provided. Food preferences obtained. Recommend continue current nutrition plan of care as tolerated.

## 2023-03-31 ENCOUNTER — TRANSCRIPTION ENCOUNTER (OUTPATIENT)
Age: 58
End: 2023-03-31

## 2023-03-31 LAB
ANION GAP SERPL CALC-SCNC: 9 MMOL/L — SIGNIFICANT CHANGE UP (ref 5–17)
BASOPHILS # BLD AUTO: 0.04 K/UL — SIGNIFICANT CHANGE UP (ref 0–0.2)
BASOPHILS NFR BLD AUTO: 0.6 % — SIGNIFICANT CHANGE UP (ref 0–2)
BUN SERPL-MCNC: 17 MG/DL — SIGNIFICANT CHANGE UP (ref 7–23)
CALCIUM SERPL-MCNC: 9.3 MG/DL — SIGNIFICANT CHANGE UP (ref 8.4–10.5)
CHLORIDE SERPL-SCNC: 105 MMOL/L — SIGNIFICANT CHANGE UP (ref 96–108)
CO2 SERPL-SCNC: 28 MMOL/L — SIGNIFICANT CHANGE UP (ref 22–31)
CREAT SERPL-MCNC: 0.86 MG/DL — SIGNIFICANT CHANGE UP (ref 0.5–1.3)
CULTURE RESULTS: SIGNIFICANT CHANGE UP
EGFR: 102 ML/MIN/1.73M2 — SIGNIFICANT CHANGE UP
EOSINOPHIL # BLD AUTO: 0.19 K/UL — SIGNIFICANT CHANGE UP (ref 0–0.5)
EOSINOPHIL NFR BLD AUTO: 3 % — SIGNIFICANT CHANGE UP (ref 0–6)
GLUCOSE BLDC GLUCOMTR-MCNC: 123 MG/DL — HIGH (ref 70–99)
GLUCOSE BLDC GLUCOMTR-MCNC: 150 MG/DL — HIGH (ref 70–99)
GLUCOSE BLDC GLUCOMTR-MCNC: 156 MG/DL — HIGH (ref 70–99)
GLUCOSE BLDC GLUCOMTR-MCNC: 156 MG/DL — HIGH (ref 70–99)
GLUCOSE SERPL-MCNC: 124 MG/DL — HIGH (ref 70–99)
HCT VFR BLD CALC: 36.9 % — LOW (ref 39–50)
HGB BLD-MCNC: 11.7 G/DL — LOW (ref 13–17)
IMM GRANULOCYTES NFR BLD AUTO: 0.2 % — SIGNIFICANT CHANGE UP (ref 0–0.9)
LYMPHOCYTES # BLD AUTO: 2.05 K/UL — SIGNIFICANT CHANGE UP (ref 1–3.3)
LYMPHOCYTES # BLD AUTO: 32.1 % — SIGNIFICANT CHANGE UP (ref 13–44)
MCHC RBC-ENTMCNC: 25.6 PG — LOW (ref 27–34)
MCHC RBC-ENTMCNC: 31.7 GM/DL — LOW (ref 32–36)
MCV RBC AUTO: 80.7 FL — SIGNIFICANT CHANGE UP (ref 80–100)
MONOCYTES # BLD AUTO: 0.59 K/UL — SIGNIFICANT CHANGE UP (ref 0–0.9)
MONOCYTES NFR BLD AUTO: 9.2 % — SIGNIFICANT CHANGE UP (ref 2–14)
NEUTROPHILS # BLD AUTO: 3.51 K/UL — SIGNIFICANT CHANGE UP (ref 1.8–7.4)
NEUTROPHILS NFR BLD AUTO: 54.9 % — SIGNIFICANT CHANGE UP (ref 43–77)
NRBC # BLD: 0 /100 WBCS — SIGNIFICANT CHANGE UP (ref 0–0)
PLATELET # BLD AUTO: 278 K/UL — SIGNIFICANT CHANGE UP (ref 150–400)
POTASSIUM SERPL-MCNC: 4.1 MMOL/L — SIGNIFICANT CHANGE UP (ref 3.5–5.3)
POTASSIUM SERPL-SCNC: 4.1 MMOL/L — SIGNIFICANT CHANGE UP (ref 3.5–5.3)
RAPID RVP RESULT: DETECTED
RBC # BLD: 4.57 M/UL — SIGNIFICANT CHANGE UP (ref 4.2–5.8)
RBC # FLD: 14.7 % — HIGH (ref 10.3–14.5)
SARS-COV-2 RNA SPEC QL NAA+PROBE: DETECTED
SODIUM SERPL-SCNC: 142 MMOL/L — SIGNIFICANT CHANGE UP (ref 135–145)
SPECIMEN SOURCE: SIGNIFICANT CHANGE UP
WBC # BLD: 6.39 K/UL — SIGNIFICANT CHANGE UP (ref 3.8–10.5)
WBC # FLD AUTO: 6.39 K/UL — SIGNIFICANT CHANGE UP (ref 3.8–10.5)

## 2023-03-31 PROCEDURE — 93010 ELECTROCARDIOGRAM REPORT: CPT

## 2023-03-31 PROCEDURE — 99223 1ST HOSP IP/OBS HIGH 75: CPT

## 2023-03-31 PROCEDURE — 99233 SBSQ HOSP IP/OBS HIGH 50: CPT

## 2023-03-31 RX ORDER — TRAZODONE HCL 50 MG
150 TABLET ORAL AT BEDTIME
Refills: 0 | Status: DISCONTINUED | OUTPATIENT
Start: 2023-03-31 | End: 2023-04-03

## 2023-03-31 RX ADMIN — METHADONE HYDROCHLORIDE 5 MILLIGRAM(S): 40 TABLET ORAL at 05:56

## 2023-03-31 RX ADMIN — APIXABAN 5 MILLIGRAM(S): 2.5 TABLET, FILM COATED ORAL at 17:46

## 2023-03-31 RX ADMIN — HYDROMORPHONE HYDROCHLORIDE 4 MILLIGRAM(S): 2 INJECTION INTRAMUSCULAR; INTRAVENOUS; SUBCUTANEOUS at 12:00

## 2023-03-31 RX ADMIN — HYDROMORPHONE HYDROCHLORIDE 4 MILLIGRAM(S): 2 INJECTION INTRAMUSCULAR; INTRAVENOUS; SUBCUTANEOUS at 11:07

## 2023-03-31 RX ADMIN — HYDROMORPHONE HYDROCHLORIDE 4 MILLIGRAM(S): 2 INJECTION INTRAMUSCULAR; INTRAVENOUS; SUBCUTANEOUS at 05:57

## 2023-03-31 RX ADMIN — Medication 150 MILLIGRAM(S): at 23:26

## 2023-03-31 RX ADMIN — HYDROMORPHONE HYDROCHLORIDE 4 MILLIGRAM(S): 2 INJECTION INTRAMUSCULAR; INTRAVENOUS; SUBCUTANEOUS at 23:26

## 2023-03-31 RX ADMIN — Medication 500 MILLIGRAM(S): at 11:03

## 2023-03-31 RX ADMIN — Medication 150 MILLIGRAM(S): at 11:08

## 2023-03-31 RX ADMIN — APIXABAN 5 MILLIGRAM(S): 2.5 TABLET, FILM COATED ORAL at 05:56

## 2023-03-31 RX ADMIN — Medication 1 MILLIGRAM(S): at 21:06

## 2023-03-31 RX ADMIN — HYDROMORPHONE HYDROCHLORIDE 4 MILLIGRAM(S): 2 INJECTION INTRAMUSCULAR; INTRAVENOUS; SUBCUTANEOUS at 17:46

## 2023-03-31 RX ADMIN — Medication 150 MILLIGRAM(S): at 21:06

## 2023-03-31 RX ADMIN — METHADONE HYDROCHLORIDE 5 MILLIGRAM(S): 40 TABLET ORAL at 17:46

## 2023-03-31 RX ADMIN — Medication 150 MILLIGRAM(S): at 05:56

## 2023-03-31 RX ADMIN — HYDROMORPHONE HYDROCHLORIDE 4 MILLIGRAM(S): 2 INJECTION INTRAMUSCULAR; INTRAVENOUS; SUBCUTANEOUS at 06:23

## 2023-03-31 RX ADMIN — PANTOPRAZOLE SODIUM 40 MILLIGRAM(S): 20 TABLET, DELAYED RELEASE ORAL at 05:56

## 2023-03-31 RX ADMIN — Medication 1000 UNIT(S): at 11:03

## 2023-03-31 RX ADMIN — INSULIN GLARGINE 10 UNIT(S): 100 INJECTION, SOLUTION SUBCUTANEOUS at 07:30

## 2023-03-31 RX ADMIN — Medication 1: at 16:36

## 2023-03-31 RX ADMIN — ZINC SULFATE TAB 220 MG (50 MG ZINC EQUIVALENT) 220 MILLIGRAM(S): 220 (50 ZN) TAB at 11:03

## 2023-03-31 RX ADMIN — ATORVASTATIN CALCIUM 80 MILLIGRAM(S): 80 TABLET, FILM COATED ORAL at 21:06

## 2023-03-31 RX ADMIN — METHADONE HYDROCHLORIDE 5 MILLIGRAM(S): 40 TABLET ORAL at 23:26

## 2023-03-31 RX ADMIN — HYDROMORPHONE HYDROCHLORIDE 4 MILLIGRAM(S): 2 INJECTION INTRAMUSCULAR; INTRAVENOUS; SUBCUTANEOUS at 18:45

## 2023-03-31 RX ADMIN — Medication 1: at 11:16

## 2023-03-31 RX ADMIN — PANTOPRAZOLE SODIUM 40 MILLIGRAM(S): 20 TABLET, DELAYED RELEASE ORAL at 17:46

## 2023-03-31 RX ADMIN — METHADONE HYDROCHLORIDE 5 MILLIGRAM(S): 40 TABLET ORAL at 11:07

## 2023-03-31 RX ADMIN — SERTRALINE 100 MILLIGRAM(S): 25 TABLET, FILM COATED ORAL at 11:03

## 2023-03-31 RX ADMIN — CLOPIDOGREL BISULFATE 75 MILLIGRAM(S): 75 TABLET, FILM COATED ORAL at 11:03

## 2023-03-31 RX ADMIN — Medication 2 UNIT(S): at 07:31

## 2023-03-31 NOTE — CONSULT NOTE ADULT - ASSESSMENT
57 year old man admitted with dizziness and weakness with low BP and orthostasis.  He is also noted to be COVID+  Cardiac history includes ST elevation MI March 2022 with subsequent LCx stent.  Post MI, mild LV dysfunction.  + PAF on anticoagulation   Medical issues include chronic pain syndrome on opiates, DM with prior DKA.  HTN and HLD   He does admit to poor PO intake .  Presentation possibly related to dehydration in setting of being COVID +     Plan  - hold carvedilol for now   - encourage PO  - check echo   - check orthostatic vitals   - continue a/c and clopidogrel   - continue high intensity statin   - COVID and diabetes and chronic pain as per Medicine     discussed with Medicine team and with patient

## 2023-03-31 NOTE — PHYSICAL THERAPY INITIAL EVALUATION ADULT - NSACTIVITYREC_GEN_A_PT
pt can benefit from skilled outpatient PT. pt educated on safety for safe d/c home with medical resolution of hypotensive episodes. reports desire to go home.

## 2023-03-31 NOTE — PHYSICAL THERAPY INITIAL EVALUATION ADULT - MANUAL MUSCLE TESTING RESULTS, REHAB EVAL
Left UE / LE grossly 4+/5 t/o. R LE / UE grossly 5/5. chronic presentation per patitent subjective 2/2 neck sx/grossly assessed due to

## 2023-03-31 NOTE — PROGRESS NOTE ADULT - ASSESSMENT
57 M hx of HTN, HLD, CAD/MI/PCI, T2DM on insulin/metformin with hx of DKA, afib on Eliquis, chronic pain syndrome (s/p C2-6 fusion) pw dizziness and weakness with hypotension and +COVID    # COVID positive  -pt had COVID infection in Jan 2023.   -no acute respiratory symptoms. COVID test positive likely reflecting remnant viral materials  -monitor     #Hypotension   #Orthostatic dizziness   had poor appetite recently  hold Coreg for now.   s/p IVF  troponin negative     #T2DM on insulin with hx of DKA  cont lantus 22 qam  Lispro 6 u tid.     #chronic pain syndrome  cont methadone and dilaudid.  see chart note for istop.     #anxiety  cont zoloft.  cont klonopin  see chart note for istop.  57 M hx of HTN, HLD, CAD/MI/PCI, T2DM on insulin/metformin with hx of DKA, afib on Eliquis, chronic pain syndrome (s/p C2-6 fusion) pw dizziness and weakness with hypotension and +COVID    # COVID positive  -pt had COVID infection in Jan 2023.   -no acute respiratory symptoms. COVID test positive likely reflecting remnant viral materials  -monitor     #Hypotension - resolved  #Orthostatic dizziness   lying , sitting , standing   had poor appetite recently  continue to hold coreg  cardiology consult  check echo  Abd binder and JOSE stocking   s/p IVF  troponin negative     #T2DM on insulin with hx of DKA  FS on lower side  lantus lowered to 10unit qAM  DC admelog  ISS      #chronic pain syndrome  cont methadone and Dilaudid  cont lyrica  see chart note for istop.     #anxiety  cont zoloft.  cont klonopin  see chart note for istop.

## 2023-03-31 NOTE — DISCHARGE NOTE NURSING/CASE MANAGEMENT/SOCIAL WORK - PATIENT PORTAL LINK FT
You can access the FollowMyHealth Patient Portal offered by [facility] by registering at the following website: [email or web address]. By joining [employer]’s FollowMyHealth portal, you will also be able to view your health information using other applications (apps) compatible with our system.

## 2023-03-31 NOTE — PHYSICAL THERAPY INITIAL EVALUATION ADULT - PERTINENT HX OF CURRENT PROBLEM, REHAB EVAL
57 M hx of HTN, HLD, CAD/MI/PCI, T2DM on insulin/metformin with hx of DKA, afib on Eliquis, chronic pain syndrome (s/p C2-6 fusion) pw dizziness and weakness. Pt related for the past week with some episodes of dizziness with noted low BP with systolic in the 80s. Noted decreased appetite for the past week. Denied any fevers, chills, cough, SOB, CP, palps, NVD, dysuria. But did experience some urinary frequency and urgency.  No hematemesis, melena or BRBPR. Tonight after dinner felt more dizzy than usual as he stood up and BP was 79/49 and called 911.

## 2023-03-31 NOTE — PHYSICAL THERAPY INITIAL EVALUATION ADULT - GROSSLY INTACT, SENSORY
pt reports dullness of sensation in B feet 2/2 diabetes despite intact touch sensation/Grossly Intact

## 2023-03-31 NOTE — DISCHARGE NOTE NURSING/CASE MANAGEMENT/SOCIAL WORK - NSDCVIVACCINE_GEN_ALL_CORE_FT
Tdap; 24-Oct-2021 12:15; Jahaira Ruiz (RN); [employer]; [id number] (Exp. Date: 29-Jul-2023); IntraMuscular; Deltoid Right.; 0.5 milliLiter(s); VIS (VIS Published: 09-May-2013, VIS Presented: 24-Oct-2021);

## 2023-03-31 NOTE — DISCHARGE NOTE NURSING/CASE MANAGEMENT/SOCIAL WORK - NSDCPEFALRISK_GEN_ALL_CORE
For information on Fall & Injury Prevention, visit: [email or web address] OR  [email or web address] OR  https://www.cdc.gov/steadi/patient.html

## 2023-03-31 NOTE — PROGRESS NOTE ADULT - SUBJECTIVE AND OBJECTIVE BOX
Patient is a 57y old  Male who presents with a chief complaint of hypotension, COVID (31 Mar 2023 13:38)      Subjective and overnight events:  Patient seen and examined at bedside. still feeling orthostatic today. however, orthostatic VS seems to be improving. no fever, chills, sob, cp, abd pain.    ALLERGIES:  No Known Allergies    MEDICATIONS  (STANDING):  apixaban 5 milliGRAM(s) Oral every 12 hours  ascorbic acid 500 milliGRAM(s) Oral daily  atorvastatin 80 milliGRAM(s) Oral at bedtime  cholecalciferol 1000 Unit(s) Oral daily  clonazePAM  Tablet 1 milliGRAM(s) Oral at bedtime  clopidogrel Tablet 75 milliGRAM(s) Oral daily  dextrose 5%. 1000 milliLiter(s) (100 mL/Hr) IV Continuous <Continuous>  dextrose 5%. 1000 milliLiter(s) (50 mL/Hr) IV Continuous <Continuous>  dextrose 50% Injectable 25 Gram(s) IV Push once  dextrose 50% Injectable 12.5 Gram(s) IV Push once  dextrose 50% Injectable 25 Gram(s) IV Push once  glucagon  Injectable 1 milliGRAM(s) IntraMuscular once  HYDROmorphone   Tablet 4 milliGRAM(s) Oral four times a day  insulin lispro (ADMELOG) corrective regimen sliding scale   SubCutaneous three times a day before meals  insulin lispro (ADMELOG) corrective regimen sliding scale   SubCutaneous at bedtime  methadone    Tablet 5 milliGRAM(s) Oral four times a day  pantoprazole    Tablet 40 milliGRAM(s) Oral two times a day  pregabalin 150 milliGRAM(s) Oral three times a day  sertraline 100 milliGRAM(s) Oral daily  sodium chloride 0.9%. 1000 milliLiter(s) (100 mL/Hr) IV Continuous <Continuous>  traZODone 150 milliGRAM(s) Oral at bedtime  zinc sulfate 220 milliGRAM(s) Oral daily    MEDICATIONS  (PRN):  acetaminophen     Tablet .. 650 milliGRAM(s) Oral every 6 hours PRN Temp greater or equal to 38C (100.4F), Mild Pain (1 - 3)  clonazePAM  Tablet 0.5 milliGRAM(s) Oral daily PRN anxiety  dextrose Oral Gel 15 Gram(s) Oral once PRN Blood Glucose LESS THAN 70 milliGRAM(s)/deciliter    Vital Signs Last 24 Hrs  T(F): 97.6 (31 Mar 2023 05:51), Max: 98.3 (30 Mar 2023 15:46)  HR: 66 (31 Mar 2023 05:51) (66 - 73)  BP: 144/81 (31 Mar 2023 05:51) (132/80 - 144/81)  RR: 16 (31 Mar 2023 05:51) (16 - 17)  SpO2: 95% (31 Mar 2023 05:51) (94% - 97%)  I&O's Summary    30 Mar 2023 07:01  -  31 Mar 2023 07:00  --------------------------------------------------------  IN: 2860 mL / OUT: 1400 mL / NET: 1460 mL      PHYSICAL EXAM:  General: NAD, A/O x 3  ENT: MMM  Neck: Supple, No JVD  Lungs: Clear to auscultation bilaterally  Cardio: RRR, S1/S2, No murmurs  Abdomen: Soft, Nontender, Nondistended; Bowel sounds present  Extremities: No calf tenderness, No pitting edema    LABS:                        11.7   6.39  )-----------( 278      ( 31 Mar 2023 06:16 )             36.9     03-    142  |  105  |  17  ----------------------------<  124  4.1   |  28  |  0.86    Ca    9.3      31 Mar 2023 06:16    TPro  6.1  /  Alb  2.7  /  TBili  0.2  /  DBili  x   /  AST  12  /  ALT  27  /  AlkPhos  86  03-30      PT/INR - ( 29 Mar 2023 20:30 )   PT: 12.2 sec;   INR: 1.05 ratio         PTT - ( 29 Mar 2023 20:30 )  PTT:19.5 sec  Lactate, Blood: 1.8 mmol/L ( @ 21:32)  Lactate, Blood: 2.5 mmol/L ( @ 20:50)    CARDIAC MARKERS ( 30 Mar 2023 06:45 )  x     / x     / 97 U/L / x     / x      CARDIAC MARKERS ( 29 Mar 2023 20:30 )  x     / 4.4 ng/L / x     / x     / x                POCT Blood Glucose.: 156 mg/dL (31 Mar 2023 11:14)  POCT Blood Glucose.: 123 mg/dL (31 Mar 2023 07:29)  POCT Blood Glucose.: 134 mg/dL (30 Mar 2023 21:52)  POCT Blood Glucose.: 137 mg/dL (30 Mar 2023 17:31)  POCT Blood Glucose.: 162 mg/dL (30 Mar 2023 17:25)      Urinalysis Basic - ( 30 Mar 2023 01:00 )    Color: Yellow / Appearance: Clear / S.015 / pH: x  Gluc: x / Ketone: Negative  / Bili: Negative / Urobili: Negative   Blood: x / Protein: Negative / Nitrite: Negative   Leuk Esterase: Negative / RBC: 0-4 /HPF / WBC 0-2 /HPF   Sq Epi: x / Non Sq Epi: Neg.-Few / Bacteria: Negative /HPF        Culture - Urine (collected 30 Mar 2023 00:35)  Source: Clean Catch Clean Catch (Midstream)  Final Report (31 Mar 2023 08:20):    <10,000 CFU/mL Normal Urogenital Grecia      COVID-19 PCR: Detected (23 @ 20:30)      RADIOLOGY & ADDITIONAL TESTS:    Care Discussed with Consultants/Other Providers:

## 2023-04-01 LAB
GLUCOSE BLDC GLUCOMTR-MCNC: 130 MG/DL — HIGH (ref 70–99)
GLUCOSE BLDC GLUCOMTR-MCNC: 195 MG/DL — HIGH (ref 70–99)
GLUCOSE BLDC GLUCOMTR-MCNC: 200 MG/DL — HIGH (ref 70–99)
GLUCOSE BLDC GLUCOMTR-MCNC: 248 MG/DL — HIGH (ref 70–99)

## 2023-04-01 RX ORDER — INSULIN LISPRO 100/ML
2 VIAL (ML) SUBCUTANEOUS
Refills: 0 | Status: DISCONTINUED | OUTPATIENT
Start: 2023-04-01 | End: 2023-04-03

## 2023-04-01 RX ADMIN — Medication 2: at 17:11

## 2023-04-01 RX ADMIN — CLOPIDOGREL BISULFATE 75 MILLIGRAM(S): 75 TABLET, FILM COATED ORAL at 12:06

## 2023-04-01 RX ADMIN — Medication 1 MILLIGRAM(S): at 21:44

## 2023-04-01 RX ADMIN — HYDROMORPHONE HYDROCHLORIDE 4 MILLIGRAM(S): 2 INJECTION INTRAMUSCULAR; INTRAVENOUS; SUBCUTANEOUS at 23:07

## 2023-04-01 RX ADMIN — HYDROMORPHONE HYDROCHLORIDE 4 MILLIGRAM(S): 2 INJECTION INTRAMUSCULAR; INTRAVENOUS; SUBCUTANEOUS at 00:29

## 2023-04-01 RX ADMIN — Medication 1000 UNIT(S): at 12:07

## 2023-04-01 RX ADMIN — ZINC SULFATE TAB 220 MG (50 MG ZINC EQUIVALENT) 220 MILLIGRAM(S): 220 (50 ZN) TAB at 12:07

## 2023-04-01 RX ADMIN — METHADONE HYDROCHLORIDE 5 MILLIGRAM(S): 40 TABLET ORAL at 23:07

## 2023-04-01 RX ADMIN — Medication 2 UNIT(S): at 17:10

## 2023-04-01 RX ADMIN — Medication 500 MILLIGRAM(S): at 12:06

## 2023-04-01 RX ADMIN — Medication 150 MILLIGRAM(S): at 17:09

## 2023-04-01 RX ADMIN — HYDROMORPHONE HYDROCHLORIDE 4 MILLIGRAM(S): 2 INJECTION INTRAMUSCULAR; INTRAVENOUS; SUBCUTANEOUS at 12:12

## 2023-04-01 RX ADMIN — HYDROMORPHONE HYDROCHLORIDE 4 MILLIGRAM(S): 2 INJECTION INTRAMUSCULAR; INTRAVENOUS; SUBCUTANEOUS at 17:09

## 2023-04-01 RX ADMIN — ATORVASTATIN CALCIUM 80 MILLIGRAM(S): 80 TABLET, FILM COATED ORAL at 21:43

## 2023-04-01 RX ADMIN — SERTRALINE 100 MILLIGRAM(S): 25 TABLET, FILM COATED ORAL at 12:06

## 2023-04-01 RX ADMIN — Medication 150 MILLIGRAM(S): at 21:43

## 2023-04-01 RX ADMIN — HYDROMORPHONE HYDROCHLORIDE 4 MILLIGRAM(S): 2 INJECTION INTRAMUSCULAR; INTRAVENOUS; SUBCUTANEOUS at 06:40

## 2023-04-01 RX ADMIN — PANTOPRAZOLE SODIUM 40 MILLIGRAM(S): 20 TABLET, DELAYED RELEASE ORAL at 17:09

## 2023-04-01 RX ADMIN — Medication 150 MILLIGRAM(S): at 05:41

## 2023-04-01 RX ADMIN — APIXABAN 5 MILLIGRAM(S): 2.5 TABLET, FILM COATED ORAL at 05:40

## 2023-04-01 RX ADMIN — METHADONE HYDROCHLORIDE 5 MILLIGRAM(S): 40 TABLET ORAL at 17:09

## 2023-04-01 RX ADMIN — Medication 1: at 12:10

## 2023-04-01 RX ADMIN — APIXABAN 5 MILLIGRAM(S): 2.5 TABLET, FILM COATED ORAL at 17:09

## 2023-04-01 RX ADMIN — METHADONE HYDROCHLORIDE 5 MILLIGRAM(S): 40 TABLET ORAL at 05:40

## 2023-04-01 RX ADMIN — PANTOPRAZOLE SODIUM 40 MILLIGRAM(S): 20 TABLET, DELAYED RELEASE ORAL at 05:41

## 2023-04-01 RX ADMIN — HYDROMORPHONE HYDROCHLORIDE 4 MILLIGRAM(S): 2 INJECTION INTRAMUSCULAR; INTRAVENOUS; SUBCUTANEOUS at 05:41

## 2023-04-01 RX ADMIN — HYDROMORPHONE HYDROCHLORIDE 4 MILLIGRAM(S): 2 INJECTION INTRAMUSCULAR; INTRAVENOUS; SUBCUTANEOUS at 12:42

## 2023-04-01 RX ADMIN — METHADONE HYDROCHLORIDE 5 MILLIGRAM(S): 40 TABLET ORAL at 12:12

## 2023-04-01 RX ADMIN — Medication 0.5 MILLIGRAM(S): at 17:13

## 2023-04-01 RX ADMIN — HYDROMORPHONE HYDROCHLORIDE 4 MILLIGRAM(S): 2 INJECTION INTRAMUSCULAR; INTRAVENOUS; SUBCUTANEOUS at 17:39

## 2023-04-02 LAB
GLUCOSE BLDC GLUCOMTR-MCNC: 150 MG/DL — HIGH (ref 70–99)
GLUCOSE BLDC GLUCOMTR-MCNC: 152 MG/DL — HIGH (ref 70–99)
GLUCOSE BLDC GLUCOMTR-MCNC: 158 MG/DL — HIGH (ref 70–99)
GLUCOSE BLDC GLUCOMTR-MCNC: 193 MG/DL — HIGH (ref 70–99)

## 2023-04-02 PROCEDURE — 99223 1ST HOSP IP/OBS HIGH 75: CPT | Mod: FS

## 2023-04-02 PROCEDURE — 99233 SBSQ HOSP IP/OBS HIGH 50: CPT

## 2023-04-02 PROCEDURE — 93306 TTE W/DOPPLER COMPLETE: CPT | Mod: 26

## 2023-04-02 RX ORDER — MIDODRINE HYDROCHLORIDE 2.5 MG/1
5 TABLET ORAL
Refills: 0 | Status: DISCONTINUED | OUTPATIENT
Start: 2023-04-02 | End: 2023-04-03

## 2023-04-02 RX ADMIN — Medication 1: at 17:03

## 2023-04-02 RX ADMIN — MIDODRINE HYDROCHLORIDE 5 MILLIGRAM(S): 2.5 TABLET ORAL at 15:08

## 2023-04-02 RX ADMIN — Medication 2 UNIT(S): at 17:02

## 2023-04-02 RX ADMIN — CLOPIDOGREL BISULFATE 75 MILLIGRAM(S): 75 TABLET, FILM COATED ORAL at 12:18

## 2023-04-02 RX ADMIN — Medication 150 MILLIGRAM(S): at 21:31

## 2023-04-02 RX ADMIN — ATORVASTATIN CALCIUM 80 MILLIGRAM(S): 80 TABLET, FILM COATED ORAL at 21:31

## 2023-04-02 RX ADMIN — MIDODRINE HYDROCHLORIDE 5 MILLIGRAM(S): 2.5 TABLET ORAL at 12:16

## 2023-04-02 RX ADMIN — METHADONE HYDROCHLORIDE 5 MILLIGRAM(S): 40 TABLET ORAL at 12:16

## 2023-04-02 RX ADMIN — PANTOPRAZOLE SODIUM 40 MILLIGRAM(S): 20 TABLET, DELAYED RELEASE ORAL at 17:01

## 2023-04-02 RX ADMIN — APIXABAN 5 MILLIGRAM(S): 2.5 TABLET, FILM COATED ORAL at 05:28

## 2023-04-02 RX ADMIN — Medication 500 MILLIGRAM(S): at 12:16

## 2023-04-02 RX ADMIN — Medication 0.5 MILLIGRAM(S): at 15:11

## 2023-04-02 RX ADMIN — Medication 1: at 08:13

## 2023-04-02 RX ADMIN — Medication 150 MILLIGRAM(S): at 05:28

## 2023-04-02 RX ADMIN — Medication 2 UNIT(S): at 12:21

## 2023-04-02 RX ADMIN — HYDROMORPHONE HYDROCHLORIDE 4 MILLIGRAM(S): 2 INJECTION INTRAMUSCULAR; INTRAVENOUS; SUBCUTANEOUS at 17:00

## 2023-04-02 RX ADMIN — HYDROMORPHONE HYDROCHLORIDE 4 MILLIGRAM(S): 2 INJECTION INTRAMUSCULAR; INTRAVENOUS; SUBCUTANEOUS at 12:15

## 2023-04-02 RX ADMIN — Medication 1 MILLIGRAM(S): at 21:31

## 2023-04-02 RX ADMIN — HYDROMORPHONE HYDROCHLORIDE 4 MILLIGRAM(S): 2 INJECTION INTRAMUSCULAR; INTRAVENOUS; SUBCUTANEOUS at 05:28

## 2023-04-02 RX ADMIN — METHADONE HYDROCHLORIDE 5 MILLIGRAM(S): 40 TABLET ORAL at 05:28

## 2023-04-02 RX ADMIN — HYDROMORPHONE HYDROCHLORIDE 4 MILLIGRAM(S): 2 INJECTION INTRAMUSCULAR; INTRAVENOUS; SUBCUTANEOUS at 06:24

## 2023-04-02 RX ADMIN — Medication 2 UNIT(S): at 08:14

## 2023-04-02 RX ADMIN — APIXABAN 5 MILLIGRAM(S): 2.5 TABLET, FILM COATED ORAL at 17:01

## 2023-04-02 RX ADMIN — METHADONE HYDROCHLORIDE 5 MILLIGRAM(S): 40 TABLET ORAL at 23:05

## 2023-04-02 RX ADMIN — ZINC SULFATE TAB 220 MG (50 MG ZINC EQUIVALENT) 220 MILLIGRAM(S): 220 (50 ZN) TAB at 12:17

## 2023-04-02 RX ADMIN — HYDROMORPHONE HYDROCHLORIDE 4 MILLIGRAM(S): 2 INJECTION INTRAMUSCULAR; INTRAVENOUS; SUBCUTANEOUS at 00:07

## 2023-04-02 RX ADMIN — HYDROMORPHONE HYDROCHLORIDE 4 MILLIGRAM(S): 2 INJECTION INTRAMUSCULAR; INTRAVENOUS; SUBCUTANEOUS at 12:45

## 2023-04-02 RX ADMIN — HYDROMORPHONE HYDROCHLORIDE 4 MILLIGRAM(S): 2 INJECTION INTRAMUSCULAR; INTRAVENOUS; SUBCUTANEOUS at 19:19

## 2023-04-02 RX ADMIN — HYDROMORPHONE HYDROCHLORIDE 4 MILLIGRAM(S): 2 INJECTION INTRAMUSCULAR; INTRAVENOUS; SUBCUTANEOUS at 23:05

## 2023-04-02 RX ADMIN — METHADONE HYDROCHLORIDE 5 MILLIGRAM(S): 40 TABLET ORAL at 17:01

## 2023-04-02 RX ADMIN — PANTOPRAZOLE SODIUM 40 MILLIGRAM(S): 20 TABLET, DELAYED RELEASE ORAL at 05:28

## 2023-04-02 RX ADMIN — SERTRALINE 100 MILLIGRAM(S): 25 TABLET, FILM COATED ORAL at 12:17

## 2023-04-02 RX ADMIN — Medication 150 MILLIGRAM(S): at 15:08

## 2023-04-02 RX ADMIN — Medication 1000 UNIT(S): at 12:17

## 2023-04-02 NOTE — PROGRESS NOTE ADULT - SUBJECTIVE AND OBJECTIVE BOX
Patient is a 57y old  Male who presents with a chief complaint of hypotension, COVID (02 Apr 2023 09:25)      Subjective and overnight events:  Patient seen and examined at bedside. +orthostatic dizziness mildly improved. no fever, chills, sob, cp, abd pain. +blurry visions    ALLERGIES:  No Known Allergies    MEDICATIONS  (STANDING):  apixaban 5 milliGRAM(s) Oral every 12 hours  ascorbic acid 500 milliGRAM(s) Oral daily  atorvastatin 80 milliGRAM(s) Oral at bedtime  cholecalciferol 1000 Unit(s) Oral daily  clonazePAM  Tablet 1 milliGRAM(s) Oral at bedtime  clopidogrel Tablet 75 milliGRAM(s) Oral daily  dextrose 5%. 1000 milliLiter(s) (50 mL/Hr) IV Continuous <Continuous>  dextrose 5%. 1000 milliLiter(s) (100 mL/Hr) IV Continuous <Continuous>  dextrose 50% Injectable 25 Gram(s) IV Push once  dextrose 50% Injectable 12.5 Gram(s) IV Push once  dextrose 50% Injectable 25 Gram(s) IV Push once  glucagon  Injectable 1 milliGRAM(s) IntraMuscular once  HYDROmorphone   Tablet 4 milliGRAM(s) Oral four times a day  insulin lispro (ADMELOG) corrective regimen sliding scale   SubCutaneous three times a day before meals  insulin lispro (ADMELOG) corrective regimen sliding scale   SubCutaneous at bedtime  insulin lispro Injectable (ADMELOG) 2 Unit(s) SubCutaneous three times a day before meals  methadone    Tablet 5 milliGRAM(s) Oral four times a day  pantoprazole    Tablet 40 milliGRAM(s) Oral two times a day  pregabalin 150 milliGRAM(s) Oral three times a day  sertraline 100 milliGRAM(s) Oral daily  sodium chloride 0.9%. 1000 milliLiter(s) (100 mL/Hr) IV Continuous <Continuous>  traZODone 150 milliGRAM(s) Oral at bedtime  zinc sulfate 220 milliGRAM(s) Oral daily    MEDICATIONS  (PRN):  acetaminophen     Tablet .. 650 milliGRAM(s) Oral every 6 hours PRN Temp greater or equal to 38C (100.4F), Mild Pain (1 - 3)  clonazePAM  Tablet 0.5 milliGRAM(s) Oral daily PRN anxiety  dextrose Oral Gel 15 Gram(s) Oral once PRN Blood Glucose LESS THAN 70 milliGRAM(s)/deciliter    Vital Signs Last 24 Hrs  T(F): 98.4 (02 Apr 2023 06:31), Max: 98.4 (02 Apr 2023 06:31)  HR: 82 (02 Apr 2023 10:36) (68 - 106)  BP: 106/73 (02 Apr 2023 10:36) (97/61 - 125/70)  RR: 18 (02 Apr 2023 10:36) (16 - 18)  SpO2: 96% (02 Apr 2023 10:36) (93% - 98%)  I&O's Summary    01 Apr 2023 07:01  -  02 Apr 2023 07:00  --------------------------------------------------------  IN: 0 mL / OUT: 400 mL / NET: -400 mL    02 Apr 2023 07:01  -  02 Apr 2023 11:01  --------------------------------------------------------  IN: 920 mL / OUT: 1550 mL / NET: -630 mL      PHYSICAL EXAM:  General: NAD, A/O x 3  ENT: MMM  Neck: Supple, No JVD  Lungs: Clear to auscultation bilaterally  Cardio: RRR, S1/S2, No murmurs  Abdomen: Soft, Nontender, Nondistended; Bowel sounds present  Extremities: No calf tenderness, No pitting edema    LABS:                        11.7   6.39  )-----------( 278      ( 31 Mar 2023 06:16 )             36.9     03-31    142  |  105  |  17  ----------------------------<  124  4.1   |  28  |  0.86    Ca    9.3      31 Mar 2023 06:16      POCT Blood Glucose.: 152 mg/dL (02 Apr 2023 08:11)  POCT Blood Glucose.: 195 mg/dL (01 Apr 2023 21:42)  POCT Blood Glucose.: 248 mg/dL (01 Apr 2023 17:03)  POCT Blood Glucose.: 200 mg/dL (01 Apr 2023 12:08)      Culture - Urine (collected 30 Mar 2023 00:35)  Source: Clean Catch Clean Catch (Midstream)  Final Report (31 Mar 2023 08:20):    <10,000 CFU/mL Normal Urogenital Grecia      COVID-19 PCR: Detected (03-29-23 @ 20:30)      RADIOLOGY & ADDITIONAL TESTS:    Care Discussed with Consultants/Other Providers:    Patient is a 57y old  Male who presents with a chief complaint of hypotension, COVID (02 Apr 2023 09:25)      Subjective and overnight events:  Patient seen and examined at bedside. +cont to report orthostatic dizziness. no fever, chills, sob, cp, abd pain. +blurry visions    ALLERGIES:  No Known Allergies    MEDICATIONS  (STANDING):  apixaban 5 milliGRAM(s) Oral every 12 hours  ascorbic acid 500 milliGRAM(s) Oral daily  atorvastatin 80 milliGRAM(s) Oral at bedtime  cholecalciferol 1000 Unit(s) Oral daily  clonazePAM  Tablet 1 milliGRAM(s) Oral at bedtime  clopidogrel Tablet 75 milliGRAM(s) Oral daily  dextrose 5%. 1000 milliLiter(s) (50 mL/Hr) IV Continuous <Continuous>  dextrose 5%. 1000 milliLiter(s) (100 mL/Hr) IV Continuous <Continuous>  dextrose 50% Injectable 25 Gram(s) IV Push once  dextrose 50% Injectable 12.5 Gram(s) IV Push once  dextrose 50% Injectable 25 Gram(s) IV Push once  glucagon  Injectable 1 milliGRAM(s) IntraMuscular once  HYDROmorphone   Tablet 4 milliGRAM(s) Oral four times a day  insulin lispro (ADMELOG) corrective regimen sliding scale   SubCutaneous three times a day before meals  insulin lispro (ADMELOG) corrective regimen sliding scale   SubCutaneous at bedtime  insulin lispro Injectable (ADMELOG) 2 Unit(s) SubCutaneous three times a day before meals  methadone    Tablet 5 milliGRAM(s) Oral four times a day  pantoprazole    Tablet 40 milliGRAM(s) Oral two times a day  pregabalin 150 milliGRAM(s) Oral three times a day  sertraline 100 milliGRAM(s) Oral daily  sodium chloride 0.9%. 1000 milliLiter(s) (100 mL/Hr) IV Continuous <Continuous>  traZODone 150 milliGRAM(s) Oral at bedtime  zinc sulfate 220 milliGRAM(s) Oral daily    MEDICATIONS  (PRN):  acetaminophen     Tablet .. 650 milliGRAM(s) Oral every 6 hours PRN Temp greater or equal to 38C (100.4F), Mild Pain (1 - 3)  clonazePAM  Tablet 0.5 milliGRAM(s) Oral daily PRN anxiety  dextrose Oral Gel 15 Gram(s) Oral once PRN Blood Glucose LESS THAN 70 milliGRAM(s)/deciliter    Vital Signs Last 24 Hrs  T(F): 98.4 (02 Apr 2023 06:31), Max: 98.4 (02 Apr 2023 06:31)  HR: 82 (02 Apr 2023 10:36) (68 - 106)  BP: 106/73 (02 Apr 2023 10:36) (97/61 - 125/70)  RR: 18 (02 Apr 2023 10:36) (16 - 18)  SpO2: 96% (02 Apr 2023 10:36) (93% - 98%)  I&O's Summary    01 Apr 2023 07:01  -  02 Apr 2023 07:00  --------------------------------------------------------  IN: 0 mL / OUT: 400 mL / NET: -400 mL    02 Apr 2023 07:01  -  02 Apr 2023 11:01  --------------------------------------------------------  IN: 920 mL / OUT: 1550 mL / NET: -630 mL      PHYSICAL EXAM:  General: NAD, A/O x 3  ENT: MMM  Neck: Supple, No JVD  Lungs: Clear to auscultation bilaterally  Cardio: RRR, S1/S2, No murmurs  Abdomen: Soft, Nontender, Nondistended; Bowel sounds present  Extremities: No calf tenderness, No pitting edema    LABS:                        11.7   6.39  )-----------( 278      ( 31 Mar 2023 06:16 )             36.9     03-31    142  |  105  |  17  ----------------------------<  124  4.1   |  28  |  0.86    Ca    9.3      31 Mar 2023 06:16      POCT Blood Glucose.: 152 mg/dL (02 Apr 2023 08:11)  POCT Blood Glucose.: 195 mg/dL (01 Apr 2023 21:42)  POCT Blood Glucose.: 248 mg/dL (01 Apr 2023 17:03)  POCT Blood Glucose.: 200 mg/dL (01 Apr 2023 12:08)      Culture - Urine (collected 30 Mar 2023 00:35)  Source: Clean Catch Clean Catch (Midstream)  Final Report (31 Mar 2023 08:20):    <10,000 CFU/mL Normal Urogenital Grecia      COVID-19 PCR: Detected (03-29-23 @ 20:30)      RADIOLOGY & ADDITIONAL TESTS:    Care Discussed with Consultants/Other Providers:    Patient is a 57y old  Male who presents with a chief complaint of hypotension, COVID (02 Apr 2023 09:25)      Subjective and overnight events:  Patient seen and examined at bedside. +cont to report orthostatic dizziness. no fever, chills, sob, cp, abd pain. +intermittent blurry visions that started the day of admission    ALLERGIES:  No Known Allergies    MEDICATIONS  (STANDING):  apixaban 5 milliGRAM(s) Oral every 12 hours  ascorbic acid 500 milliGRAM(s) Oral daily  atorvastatin 80 milliGRAM(s) Oral at bedtime  cholecalciferol 1000 Unit(s) Oral daily  clonazePAM  Tablet 1 milliGRAM(s) Oral at bedtime  clopidogrel Tablet 75 milliGRAM(s) Oral daily  dextrose 5%. 1000 milliLiter(s) (50 mL/Hr) IV Continuous <Continuous>  dextrose 5%. 1000 milliLiter(s) (100 mL/Hr) IV Continuous <Continuous>  dextrose 50% Injectable 25 Gram(s) IV Push once  dextrose 50% Injectable 12.5 Gram(s) IV Push once  dextrose 50% Injectable 25 Gram(s) IV Push once  glucagon  Injectable 1 milliGRAM(s) IntraMuscular once  HYDROmorphone   Tablet 4 milliGRAM(s) Oral four times a day  insulin lispro (ADMELOG) corrective regimen sliding scale   SubCutaneous three times a day before meals  insulin lispro (ADMELOG) corrective regimen sliding scale   SubCutaneous at bedtime  insulin lispro Injectable (ADMELOG) 2 Unit(s) SubCutaneous three times a day before meals  methadone    Tablet 5 milliGRAM(s) Oral four times a day  pantoprazole    Tablet 40 milliGRAM(s) Oral two times a day  pregabalin 150 milliGRAM(s) Oral three times a day  sertraline 100 milliGRAM(s) Oral daily  sodium chloride 0.9%. 1000 milliLiter(s) (100 mL/Hr) IV Continuous <Continuous>  traZODone 150 milliGRAM(s) Oral at bedtime  zinc sulfate 220 milliGRAM(s) Oral daily    MEDICATIONS  (PRN):  acetaminophen     Tablet .. 650 milliGRAM(s) Oral every 6 hours PRN Temp greater or equal to 38C (100.4F), Mild Pain (1 - 3)  clonazePAM  Tablet 0.5 milliGRAM(s) Oral daily PRN anxiety  dextrose Oral Gel 15 Gram(s) Oral once PRN Blood Glucose LESS THAN 70 milliGRAM(s)/deciliter    Vital Signs Last 24 Hrs  T(F): 98.4 (02 Apr 2023 06:31), Max: 98.4 (02 Apr 2023 06:31)  HR: 82 (02 Apr 2023 10:36) (68 - 106)  BP: 106/73 (02 Apr 2023 10:36) (97/61 - 125/70)  RR: 18 (02 Apr 2023 10:36) (16 - 18)  SpO2: 96% (02 Apr 2023 10:36) (93% - 98%)  I&O's Summary    01 Apr 2023 07:01  -  02 Apr 2023 07:00  --------------------------------------------------------  IN: 0 mL / OUT: 400 mL / NET: -400 mL    02 Apr 2023 07:01  -  02 Apr 2023 11:01  --------------------------------------------------------  IN: 920 mL / OUT: 1550 mL / NET: -630 mL      PHYSICAL EXAM:  General: NAD, A/O x 3  ENT: MMM  Neck: Supple, No JVD  Lungs: Clear to auscultation bilaterally  Cardio: RRR, S1/S2, No murmurs  Abdomen: Soft, Nontender, Nondistended; Bowel sounds present  Extremities: No calf tenderness, No pitting edema    LABS:                        11.7   6.39  )-----------( 278      ( 31 Mar 2023 06:16 )             36.9     03-31    142  |  105  |  17  ----------------------------<  124  4.1   |  28  |  0.86    Ca    9.3      31 Mar 2023 06:16      POCT Blood Glucose.: 152 mg/dL (02 Apr 2023 08:11)  POCT Blood Glucose.: 195 mg/dL (01 Apr 2023 21:42)  POCT Blood Glucose.: 248 mg/dL (01 Apr 2023 17:03)  POCT Blood Glucose.: 200 mg/dL (01 Apr 2023 12:08)      Culture - Urine (collected 30 Mar 2023 00:35)  Source: Clean Catch Clean Catch (Midstream)  Final Report (31 Mar 2023 08:20):    <10,000 CFU/mL Normal Urogenital Grecia      COVID-19 PCR: Detected (03-29-23 @ 20:30)      RADIOLOGY & ADDITIONAL TESTS:    Care Discussed with Consultants/Other Providers:

## 2023-04-02 NOTE — PROGRESS NOTE ADULT - ASSESSMENT
57 M hx of HTN, HLD, CAD/MI/PCI, T2DM on insulin/metformin with hx of DKA, afib on Eliquis, chronic pain syndrome (s/p C2-6 fusion) pw dizziness and weakness with hypotension and +COVID    # COVID positive  -pt had COVID infection in Jan 2023.   -no acute respiratory symptoms. COVID test positive likely reflecting remnant viral materials  -monitor     #Hypotension - resolved  #Orthostatic dizziness   #Blurry vision  lying BP 97/61, sitting 117/80, standing 101/72  cardiology consult appreciated, hold coreg  check echo  Abd dianne and JOSE stocking   s/p IVF  troponin negative   seen by neurology, f/u opthalmology as outpatient     #T2DM on insulin  FS on lower side  Hba1c: 8.1  lantus lowered to 10unit qAM  admelog 2unit qAC  ISS    #CAD s/p MI and PCI  -plavix and statin    #A fib on Eliquis  -HR controlled  -off coreg    #chronic pain syndrome  cont methadone and Dilaudid  cont lyrica  see chart note for istop.     #anxiety  cont zoloft.  cont klonopin  see chart note for istop.     dvt ppx: on Eliquis 57 M hx of HTN, HLD, CAD/MI/PCI, T2DM on insulin/metformin with hx of DKA, afib on Eliquis, chronic pain syndrome (s/p C2-6 fusion) pw dizziness and weakness with hypotension and +COVID    # COVID positive  -pt had COVID infection in Jan 2023.   -no acute respiratory symptoms. COVID test positive likely reflecting remnant viral materials  -monitor     #Hypotension - resolved  #Orthostatic dizziness due to autonomic dysfunction from DM?  #Blurry vision  lying BP 97/61, sitting 117/80, standing 101/72  Trial of midodrine 5mg BID  cardiology consult appreciated, hold coreg  check echo  Abd dianne and JOSE stocking   s/p IVF  troponin negative   seen by neurology, f/u opthalmology as outpatient     #T2DM on insulin  FS on lower side  Hba1c: 8.1  lantus lowered to 10unit qAM  admelog 2unit qAC  ISS    #CAD s/p MI and PCI  -plavix and statin    #A fib on Eliquis  -HR controlled  -off coreg    #chronic pain syndrome  cont methadone and Dilaudid  cont lyrica  see chart note for istop.     #anxiety  cont zoloft.  cont klonopin  see chart note for istop.     dvt ppx: on Eliquis 57 M hx of HTN, HLD, CAD/MI/PCI, T2DM on insulin/metformin with hx of DKA, afib on Eliquis, chronic pain syndrome (s/p C2-6 fusion) pw dizziness and weakness with hypotension and +COVID    # COVID positive  -pt had COVID infection in Jan 2023.   -no acute respiratory symptoms and sating well on room air. COVID test positive likely reflecting remnant viral materials  -monitor     #Hypotension - resolved  #Orthostatic dizziness due to autonomic dysfunction from DM?  #Blurry vision  lying BP 97/61, sitting 117/80, standing 101/72  Trial of midodrine 5mg BID  cardiology consult appreciated, hold coreg  check echo  Abd binder and JOSE stocking   s/p IVF  troponin negative   seen by neurology, f/u opthalmology as outpatient     #T2DM on insulin  FS on lower side  Hba1c: 8.1  lantus lowered to 10unit qAM  admelog 2unit qAC  ISS    #CAD s/p MI and PCI  -plavix and statin    #A fib on Eliquis  -HR controlled  -off coreg    #chronic pain syndrome  cont methadone and Dilaudid  cont lyrica  see chart note for istop.     #anxiety  cont zoloft.  cont klonopin  see chart note for istop.     dvt ppx: on Eliquis

## 2023-04-02 NOTE — CONSULT NOTE ADULT - SUBJECTIVE AND OBJECTIVE BOX
[facility] Consultation    CHIEF COMPLAINT: Patient is a 57y old  Male who presents with a chief complaint of Infection due to severe acute respiratory syndrome coronavirus 2 (SARS-CoV-2)   (30 Mar 2023 11:17)  asked to see patient regarding orthostasis  patient seen and examined  history from patient ... good reliability and review of chart       HPI:  57 M hx of HTN, HLD, CAD/MI/PCI, T2DM on insulin/metformin with hx of DKA, afib on Eliquis, chronic pain syndrome (s/p C2-6 fusion) pw dizziness and weakness. Pt related for the past week with some episodes of dizziness with noted low BP with systolic in the 80s. Noted decreased appetite for the past week. Denied any fevers, chills, cough, SOB, CP, palps, NVD, dysuria. But did experience some urinary frequency and urgency.  No hematemesis, melena or BRBPR. Tonight after dinner felt more dizzy than usual as he stood up and BP was 79/49 and called 911. In ED afebrile P; 75 BP: 95/63 sat 97% on RA. WBC; 4.7 63%N. lact: 2.5 repeat 1.8 cr: 0.93 b trop: 4.4. CT head neg. s/p 2 L in ED with improvement in BP: 100s but remains orthostatic and symptomatic when rising. Took all insulin doses today.  (30 Mar 2023 00:36)    As above.  Patient currently in bed... complains of chronic neck and back pain.  No chest pain or shortness of breath . No syncope.  No edema.  No orthopnea or PND     PAST MEDICAL & SURGICAL HISTORY:  CAD s/p MI and coronary intervention     Hypertension      Diabetes mellitus      Gastric ulcer      Spinal stenosis      H/O spinal cord compression      Spondylosis      H/O radiculopathy      H/O cervical spine surgery      History of back surgery      S/P cervical spinal fusion          SOCIAL HISTORY: disabled from work.  Non smoker.  No alcohol.      FAMILY HISTORY: FAMILY HISTORY:  FH: hypertension (Father)    FH: diabetes mellitus (Mother)    Home Medications:  apixaban 5 mg oral tablet: 1 tab(s) orally every 12 hours (30 Mar 2023 15:11)  atorvastatin 80 mg oral tablet: 1 tab(s) orally once a day (at bedtime) (30 Mar 2023 15:11)  carvedilol 25 mg oral tablet: 1 tab(s) orally 2 times a day (30 Mar 2023 14:57)  cholecalciferol 125 mcg (5000 intl units) oral capsule: 1 cap(s) orally once a day (30 Mar 2023 15:46)  clonazePAM 0.5 mg oral tablet: 1 tab(s) orally once a day (at bedtime) as needed for  anxiety (30 Mar 2023 15:06)  clonazePAM 1 mg oral tablet: 1 tab(s) orally once a day (30 Mar 2023 15:06)  clopidogrel 75 mg oral tablet: 1 tab(s) orally once a day (30 Mar 2023 15:11)  Foltanx oral tablet: 1 tab(s) orally once a day (30 Mar 2023 15:07)  HYDROmorphone 4 mg oral tablet: 1 tab(s) orally 4 times a day as needed for  moderate pain (31 Mar 2023 12:15)  Levomefolate DHA oral capsule: 1 cap(s) orally once a day (30 Mar 2023 15:53)  Lyrica 150 mg oral capsule: 1 cap(s) orally 3 times a day (31 Mar 2023 12:16)  methadone 5 mg oral tablet: 1 tab(s) orally 4 times a day (31 Mar 2023 12:14)  pantoprazole 40 mg oral delayed release tablet: 1 tab(s) orally once a day (30 Mar 2023 15:01)  sertraline 100 mg oral tablet: 2 tab(s) orally once a day (at bedtime) (30 Mar 2023 15:03)  traZODone 150 mg oral tablet: 1 tab(s) orally once a day (at bedtime) (30 Mar 2023 15:09)      MEDICATIONS  (STANDING):  apixaban 5 milliGRAM(s) Oral every 12 hours  ascorbic acid 500 milliGRAM(s) Oral daily  atorvastatin 80 milliGRAM(s) Oral at bedtime  cholecalciferol 1000 Unit(s) Oral daily  clonazePAM  Tablet 1 milliGRAM(s) Oral at bedtime  clopidogrel Tablet 75 milliGRAM(s) Oral daily  dextrose 5%. 1000 milliLiter(s) (100 mL/Hr) IV Continuous <Continuous>  dextrose 5%. 1000 milliLiter(s) (50 mL/Hr) IV Continuous <Continuous>  dextrose 50% Injectable 25 Gram(s) IV Push once  dextrose 50% Injectable 12.5 Gram(s) IV Push once  dextrose 50% Injectable 25 Gram(s) IV Push once  glucagon  Injectable 1 milliGRAM(s) IntraMuscular once  HYDROmorphone   Tablet 4 milliGRAM(s) Oral four times a day  insulin lispro (ADMELOG) corrective regimen sliding scale   SubCutaneous three times a day before meals  insulin lispro (ADMELOG) corrective regimen sliding scale   SubCutaneous at bedtime  methadone    Tablet 5 milliGRAM(s) Oral four times a day  pantoprazole    Tablet 40 milliGRAM(s) Oral two times a day  pregabalin 150 milliGRAM(s) Oral three times a day  sertraline 100 milliGRAM(s) Oral daily  sodium chloride 0.9%. 1000 milliLiter(s) (100 mL/Hr) IV Continuous <Continuous>  traZODone 150 milliGRAM(s) Oral at bedtime  zinc sulfate 220 milliGRAM(s) Oral daily    MEDICATIONS  (PRN):  acetaminophen     Tablet .. 650 milliGRAM(s) Oral every 6 hours PRN Temp greater or equal to 38C (100.4F), Mild Pain (1 - 3)  clonazePAM  Tablet 0.5 milliGRAM(s) Oral daily PRN anxiety  dextrose Oral Gel 15 Gram(s) Oral once PRN Blood Glucose LESS THAN 70 milliGRAM(s)/deciliter      Allergies    No Known Allergies    Intolerances        REVIEW OF SYSTEMS:    CONSTITUTIONAL: weakness   EYES: No visual changes, No diplopia  ENMT: No throat pain , No exudate  NECK: No pain or stiffness  RESPIRATORY: No cough, wheezing, hemoptysis; No shortness of breath  CARDIOVASCULAR: No chest pain or chest pressure.  No shortness of breath or dyspnea on exertion.  No palpitations.  No edema, no orthopnea.   GASTROINTESTINAL: No abdominal pain. No nausea, vomiting, or hematemesis; No diarrhea or constipation. No melena or hematochezia.  GENITOURINARY: No dysuria, frequency or hematuria  NEUROLOGICAL: No numbness or weakness  SKIN: No itching or rash  All other review of systems is negative unless indicated above    VITAL SIGNS:   Vital Signs Last 24 Hrs  T(C): 36.4 (31 Mar 2023 05:51), Max: 36.8 (30 Mar 2023 15:46)  T(F): 97.6 (31 Mar 2023 05:51), Max: 98.3 (30 Mar 2023 15:46)  HR: 66 (31 Mar 2023 05:51) (66 - 73)  BP: 144/81 (31 Mar 2023 05:51) (132/80 - 144/81)  BP(mean): --  RR: 16 (31 Mar 2023 05:51) (16 - 17)  SpO2: 95% (31 Mar 2023 05:51) (94% - 97%)    Parameters below as of 31 Mar 2023 05:51  Patient On (Oxygen Delivery Method): room air        I&O's Summary    30 Mar 2023 07:01  -  31 Mar 2023 07:00  --------------------------------------------------------  IN: 2860 mL / OUT: 1400 mL / NET: 1460 mL        PHYSICAL EXAM:    Constitutional: NAD, awake and alert, well-developed  Eyes:  EOMI,  Pupils round, no lesions  ENMT: no exudate or erythema  Pulmonary: Non-labored, breath sounds are clear bilaterally, No wheezing, rales or rhonchi  Cardiovascular: PMI not palpable non-displaced Regular S1 and S2 l/Vl systolic murmur   Gastrointestinal: Bowel Sounds present, soft, nontender.   Lymph: No peripheral edema. No cervical lymphadenopathy.  Neurological:  left upper extremity weakness   Skin: No rashes. Changes of chronic venous stasis. No cyanosis.  Psych:  Mood & affect appropriate    LABS: All Labs Reviewed:                        11.7   6.39  )-----------( 278      ( 31 Mar 2023 06:16 )             36.9                         11.6   7.91  )-----------( 268      ( 30 Mar 2023 06:45 )             37.3                         12.3   7.79  )-----------( 303      ( 29 Mar 2023 20:30 )             40.0     31 Mar 2023 06:16    142    |  105    |  17     ----------------------------<  124    4.1     |  28     |  0.86   30 Mar 2023 06:45    141    |  106    |  16     ----------------------------<  78     4.4     |  25     |  0.76   30 Mar 2023 01:30    x      |  x      |  x      ----------------------------<  108    x       |  x      |  x        Ca    9.3        31 Mar 2023 06:16  Ca    8.7        30 Mar 2023 06:45  Ca    9.1        29 Mar 2023 20:30    TPro  6.1    /  Alb  2.7    /  TBili  0.2    /  DBili  x      /  AST  12     /  ALT  27     /  AlkPhos  86     30 Mar 2023 06:45  TPro  7.0    /  Alb  3.1    /  TBili  0.2    /  DBili  x      /  AST  14     /  ALT  34     /  AlkPhos  105    29 Mar 2023 20:30    PT/INR - ( 29 Mar 2023 20:30 )   PT: 12.2 sec;   INR: 1.05 ratio         PTT - ( 29 Mar 2023 20:30 )  PTT:19.5 sec  CARDIAC MARKERS ( 30 Mar 2023 06:45 )  x     / x     / 97 U/L / x     / x        < from: 12 Lead ECG (23 @ 19:50) >  Diagnosis Line Normal sinus rhythm  Minimal voltage criteria for LVH, may be normal variant ( R in aVL )  Nonspecific T wave abnormality  Abnormal ECG  When compared with ECG of 2023 13:17,  No significant change was found    < end of copied text >  < from: Transthoracic Echocardiogram (22 @ 09:48) >  1. Normal left ventricular internal dimensions and wall  thicknesses.  2. Mild left ventricular systolic dysfunction.  Hypokinesis of the basal inferior wall, inferolateral wall.  3. Mild diastolic dysfunction (Stage I).  *** Compared with echocardiogram of 3/1/2022, no  significant changes noted.    < end of copied text >  < from: Limited Transthoracic Echo (w/Cont) (22 @ 13:06) >   Endocardial visualization enhanced with intravenous  injection of Ultrasonic Enhancing Agent (Definity). Overall  preserved left ventricular ejection fraction.  Very limited  images as patient supine with tachypnea and elevated HR.  The basal  inferolateral wall, and the mid inferior wall  are hypokinetic. The basal inferior wall is akinetic. No  pericardial effusion.  *** No previous Echo exam. Dr. Bautista at bedside.    < end of copied text >  < from: Cardiac Catheterization (22 @ 10:14) >    LM   Left main artery: Angiography shows no disease.      LAD   Proximal left anterior descending: There is a 30 % stenosis. Mid left  anterior descending: There is a 40 % stenosis. First  diagonal: The segment is small.Angiography shows minor  irregularities.    CX   First obtuse marginal: The segment is average to large size.  Angiography shows minor irregularities. Distal circumflex: The  segment is average sized. Thrombosis is present. There is a 90 %  thrombotic stenosis. LETITIA Flow 1.    RCA   Right coronary artery: The segment is average sized. Angiography shows  minor irregularities.    Ramus   Proximal ramus intermedius: Angiography shows minor irregularities.      X-Ray:   Diagnostic Flouro time:       5 min.                       Exam record  DAP:  Interventional Flouro time:   6 min.                       Exam fluoro  DAP:  Total Flouro Time:            11.0 min.                    Exam total  DAP:    Diagnostic Physician Signature:     Electronically signed by Huy Ahumada MD on 2022 at 02:15 PM     Interventional Findings:     Interventional Details   Distal circumflex: This was a 90 % thrombotic stenosis. The lesion  length was 30 mm. This was an ACC/AHA Non-High/Non C  lesion for intervention. This is the culprit lesion. This lesion is  not a chronic total occlusion. This lesion was not previously  dilated. Guidewire crossing was successful.      A successful Balloon angioplasty was performed using a 6FR JL 4.0  LAUNCHER, a BMW UNIVERSAL 190, a SAPPHIRE 2.5 X  15, and a ALEKSANDER [id number].      < end of copied text >    
Patient is a 57y old  Male who presents with a chief complaint of hypotension, COVID (02 Apr 2023 07:30)      CC: intermittent blurred vision, weakness    HPI:  57 M hx of HTN, HLD, CAD/MI/PCI, T2DM on insulin/metformin with hx of DKA, afib on Eliquis, chronic pain syndrome (s/p C2-6 fusion) pw dizziness and weakness. Pt related for the past week with some episodes of dizziness with noted low BP with systolic in the 80s. Noted decreased appetite for the past week. Denied any fevers, chills, cough, SOB, CP, palps, NVD, dysuria. But did experience some urinary frequency and urgency.  No hematemesis, melena or BRBPR. Tonight after dinner felt more dizzy than usual as he stood up and BP was 79/49 and called 911.     Patient feels better today.  Was having intermittent blurred vision while watching TV yesterday.  No diplopia, no eye pain, no headache.     Head CT - normal    CRP = 4     PAST MEDICAL & SURGICAL HISTORY:  Hypertension  Diabetes mellitus  Gastric ulcer  Spinal stenosis  H/O spinal cord compression  Spondylosis  H/O radiculopathy  H/O cervical spine surgery  History of back surgery  S/P cervical spinal fusion    FAMILY HISTORY:  FH: hypertension (Father)  FH: diabetes mellitus (Mother)    Social Hx:  Nonsmoker, no drug or alcohol use    MEDICATIONS  (STANDING):  apixaban 5 milliGRAM(s) Oral every 12 hours  ascorbic acid 500 milliGRAM(s) Oral daily  atorvastatin 80 milliGRAM(s) Oral at bedtime  cholecalciferol 1000 Unit(s) Oral daily  clonazePAM  Tablet 1 milliGRAM(s) Oral at bedtime  clopidogrel Tablet 75 milliGRAM(s) Oral daily  dextrose 5%. 1000 milliLiter(s) (50 mL/Hr) IV Continuous <Continuous>  dextrose 5%. 1000 milliLiter(s) (100 mL/Hr) IV Continuous <Continuous>  dextrose 50% Injectable 25 Gram(s) IV Push once  dextrose 50% Injectable 12.5 Gram(s) IV Push once  dextrose 50% Injectable 25 Gram(s) IV Push once  glucagon  Injectable 1 milliGRAM(s) IntraMuscular once  HYDROmorphone   Tablet 4 milliGRAM(s) Oral four times a day  insulin lispro (ADMELOG) corrective regimen sliding scale   SubCutaneous three times a day before meals  insulin lispro (ADMELOG) corrective regimen sliding scale   SubCutaneous at bedtime  insulin lispro Injectable (ADMELOG) 2 Unit(s) SubCutaneous three times a day before meals  methadone    Tablet 5 milliGRAM(s) Oral four times a day  pantoprazole    Tablet 40 milliGRAM(s) Oral two times a day  pregabalin 150 milliGRAM(s) Oral three times a day  sertraline 100 milliGRAM(s) Oral daily  sodium chloride 0.9%. 1000 milliLiter(s) (100 mL/Hr) IV Continuous <Continuous>  traZODone 150 milliGRAM(s) Oral at bedtime  zinc sulfate 220 milliGRAM(s) Oral daily     Allergies  No Known Allergies    ROS: Pertinent positives in HPI, all other ROS were reviewed and are negative.      Vital Signs Last 24 Hrs  T(C): 36.9 (02 Apr 2023 06:31), Max: 36.9 (02 Apr 2023 06:31)  T(F): 98.4 (02 Apr 2023 06:31), Max: 98.4 (02 Apr 2023 06:31)  HR: 68 (02 Apr 2023 06:31) (68 - 72)  BP: 112/66 (02 Apr 2023 06:31) (112/66 - 125/70)  BP(mean): --  RR: 16 (02 Apr 2023 06:31) (16 - 17)  SpO2: 96% (02 Apr 2023 06:31) (93% - 96%)    Parameters below as of 02 Apr 2023 06:31  Patient On (Oxygen Delivery Method): room air      Neurological exam:  HF: A x O x 3. Appropriately interactive, normal affect. Speech fluent, No Aphasia or paraphasic errors. Naming /repetition intact   CN: RAMAKRISHNA, EOMI, VFF, facial sensation normal, no NLFD, tongue midline, Palate moves equally, SCM equal bilaterally  Motor: +left arm atrophy and weakness 4-/5 throughout with pronator drift (chronic), Strength 5/5 in all other extremities   Sens: Impaired LT/vibration in toes b/l  Reflexes: Symmetric. BJ 1+, BR 1+, KJ 1+, AJ 1+, downgoing toes b/l  Coord:  No FNFA, dysmetria, NEREYDA intact     NIHSS: 1      A: 58 yo man with generalized weakness and intermittent blurred vision yesterday, feeling better today.   No new focality on exam to suggest an acute cerebrovascular event.  Eye exam within normal limits today.  Generalized weakness likely related to orthostatis and COVID    Recommend:  1. Monitor symptoms, and consult ophthalmology in outpatient follow up if symptoms persist.  2. Re-consult if symptoms worsen or fail to improve.    Scar Medina MD  Neurology Attending Physician

## 2023-04-03 ENCOUNTER — TRANSCRIPTION ENCOUNTER (OUTPATIENT)
Age: 58
End: 2023-04-03

## 2023-04-03 VITALS
OXYGEN SATURATION: 97 % | RESPIRATION RATE: 16 BRPM | HEART RATE: 72 BPM | SYSTOLIC BLOOD PRESSURE: 107 MMHG | TEMPERATURE: 98 F | DIASTOLIC BLOOD PRESSURE: 66 MMHG

## 2023-04-03 LAB
ANION GAP SERPL CALC-SCNC: 10 MMOL/L — SIGNIFICANT CHANGE UP (ref 5–17)
BASOPHILS # BLD AUTO: 0.05 K/UL — SIGNIFICANT CHANGE UP (ref 0–0.2)
BASOPHILS NFR BLD AUTO: 0.7 % — SIGNIFICANT CHANGE UP (ref 0–2)
BUN SERPL-MCNC: 21 MG/DL — SIGNIFICANT CHANGE UP (ref 7–23)
CALCIUM SERPL-MCNC: 9.4 MG/DL — SIGNIFICANT CHANGE UP (ref 8.4–10.5)
CHLORIDE SERPL-SCNC: 104 MMOL/L — SIGNIFICANT CHANGE UP (ref 96–108)
CO2 SERPL-SCNC: 27 MMOL/L — SIGNIFICANT CHANGE UP (ref 22–31)
CREAT SERPL-MCNC: 0.84 MG/DL — SIGNIFICANT CHANGE UP (ref 0.5–1.3)
EGFR: 103 ML/MIN/1.73M2 — SIGNIFICANT CHANGE UP
EOSINOPHIL # BLD AUTO: 0.17 K/UL — SIGNIFICANT CHANGE UP (ref 0–0.5)
EOSINOPHIL NFR BLD AUTO: 2.2 % — SIGNIFICANT CHANGE UP (ref 0–6)
FOLATE SERPL-MCNC: >20 NG/ML — SIGNIFICANT CHANGE UP
GLUCOSE BLDC GLUCOMTR-MCNC: 150 MG/DL — HIGH (ref 70–99)
GLUCOSE BLDC GLUCOMTR-MCNC: 167 MG/DL — HIGH (ref 70–99)
GLUCOSE SERPL-MCNC: 175 MG/DL — HIGH (ref 70–99)
HCT VFR BLD CALC: 36.8 % — LOW (ref 39–50)
HGB BLD-MCNC: 11.8 G/DL — LOW (ref 13–17)
IMM GRANULOCYTES NFR BLD AUTO: 0.3 % — SIGNIFICANT CHANGE UP (ref 0–0.9)
LYMPHOCYTES # BLD AUTO: 2.12 K/UL — SIGNIFICANT CHANGE UP (ref 1–3.3)
LYMPHOCYTES # BLD AUTO: 27.9 % — SIGNIFICANT CHANGE UP (ref 13–44)
MCHC RBC-ENTMCNC: 25.9 PG — LOW (ref 27–34)
MCHC RBC-ENTMCNC: 32.1 GM/DL — SIGNIFICANT CHANGE UP (ref 32–36)
MCV RBC AUTO: 80.7 FL — SIGNIFICANT CHANGE UP (ref 80–100)
MONOCYTES # BLD AUTO: 0.66 K/UL — SIGNIFICANT CHANGE UP (ref 0–0.9)
MONOCYTES NFR BLD AUTO: 8.7 % — SIGNIFICANT CHANGE UP (ref 2–14)
NEUTROPHILS # BLD AUTO: 4.58 K/UL — SIGNIFICANT CHANGE UP (ref 1.8–7.4)
NEUTROPHILS NFR BLD AUTO: 60.2 % — SIGNIFICANT CHANGE UP (ref 43–77)
NRBC # BLD: 0 /100 WBCS — SIGNIFICANT CHANGE UP (ref 0–0)
PLATELET # BLD AUTO: 302 K/UL — SIGNIFICANT CHANGE UP (ref 150–400)
POTASSIUM SERPL-MCNC: 3.8 MMOL/L — SIGNIFICANT CHANGE UP (ref 3.5–5.3)
POTASSIUM SERPL-SCNC: 3.8 MMOL/L — SIGNIFICANT CHANGE UP (ref 3.5–5.3)
RBC # BLD: 4.56 M/UL — SIGNIFICANT CHANGE UP (ref 4.2–5.8)
RBC # FLD: 14.8 % — HIGH (ref 10.3–14.5)
SODIUM SERPL-SCNC: 141 MMOL/L — SIGNIFICANT CHANGE UP (ref 135–145)
VIT B12 SERPL-MCNC: 677 PG/ML — SIGNIFICANT CHANGE UP (ref 232–1245)
WBC # BLD: 7.6 K/UL — SIGNIFICANT CHANGE UP (ref 3.8–10.5)
WBC # FLD AUTO: 7.6 K/UL — SIGNIFICANT CHANGE UP (ref 3.8–10.5)

## 2023-04-03 PROCEDURE — 99239 HOSP IP/OBS DSCHRG MGMT >30: CPT

## 2023-04-03 RX ORDER — MIDODRINE HYDROCHLORIDE 2.5 MG/1
1 TABLET ORAL
Qty: 60 | Refills: 0
Start: 2023-04-03 | End: 2023-05-02

## 2023-04-03 RX ORDER — ACETAMINOPHEN 500 MG
2 TABLET ORAL
Qty: 0 | Refills: 0 | DISCHARGE
Start: 2023-04-03

## 2023-04-03 RX ORDER — CARVEDILOL PHOSPHATE 80 MG/1
1 CAPSULE, EXTENDED RELEASE ORAL
Refills: 0 | DISCHARGE

## 2023-04-03 RX ADMIN — SERTRALINE 100 MILLIGRAM(S): 25 TABLET, FILM COATED ORAL at 11:42

## 2023-04-03 RX ADMIN — Medication 1000 UNIT(S): at 11:42

## 2023-04-03 RX ADMIN — Medication 2 UNIT(S): at 07:55

## 2023-04-03 RX ADMIN — APIXABAN 5 MILLIGRAM(S): 2.5 TABLET, FILM COATED ORAL at 05:11

## 2023-04-03 RX ADMIN — Medication 500 MILLIGRAM(S): at 11:42

## 2023-04-03 RX ADMIN — ZINC SULFATE TAB 220 MG (50 MG ZINC EQUIVALENT) 220 MILLIGRAM(S): 220 (50 ZN) TAB at 11:42

## 2023-04-03 RX ADMIN — Medication 650 MILLIGRAM(S): at 07:55

## 2023-04-03 RX ADMIN — Medication 150 MILLIGRAM(S): at 05:11

## 2023-04-03 RX ADMIN — PANTOPRAZOLE SODIUM 40 MILLIGRAM(S): 20 TABLET, DELAYED RELEASE ORAL at 05:10

## 2023-04-03 RX ADMIN — Medication 2 UNIT(S): at 11:41

## 2023-04-03 RX ADMIN — CLOPIDOGREL BISULFATE 75 MILLIGRAM(S): 75 TABLET, FILM COATED ORAL at 11:42

## 2023-04-03 RX ADMIN — HYDROMORPHONE HYDROCHLORIDE 4 MILLIGRAM(S): 2 INJECTION INTRAMUSCULAR; INTRAVENOUS; SUBCUTANEOUS at 06:11

## 2023-04-03 RX ADMIN — METHADONE HYDROCHLORIDE 5 MILLIGRAM(S): 40 TABLET ORAL at 05:11

## 2023-04-03 RX ADMIN — HYDROMORPHONE HYDROCHLORIDE 4 MILLIGRAM(S): 2 INJECTION INTRAMUSCULAR; INTRAVENOUS; SUBCUTANEOUS at 00:05

## 2023-04-03 RX ADMIN — Medication 0.5 MILLIGRAM(S): at 11:41

## 2023-04-03 RX ADMIN — HYDROMORPHONE HYDROCHLORIDE 4 MILLIGRAM(S): 2 INJECTION INTRAMUSCULAR; INTRAVENOUS; SUBCUTANEOUS at 05:11

## 2023-04-03 RX ADMIN — METHADONE HYDROCHLORIDE 5 MILLIGRAM(S): 40 TABLET ORAL at 11:41

## 2023-04-03 RX ADMIN — MIDODRINE HYDROCHLORIDE 5 MILLIGRAM(S): 2.5 TABLET ORAL at 05:10

## 2023-04-03 RX ADMIN — Medication 1: at 11:40

## 2023-04-03 RX ADMIN — HYDROMORPHONE HYDROCHLORIDE 4 MILLIGRAM(S): 2 INJECTION INTRAMUSCULAR; INTRAVENOUS; SUBCUTANEOUS at 11:41

## 2023-04-03 NOTE — DISCHARGE NOTE PROVIDER - NSDCMRMEDTOKEN_GEN_ALL_CORE_FT
acetaminophen 325 mg oral tablet: 2 tab(s) orally every 6 hours As needed Temp greater or equal to 38C (100.4F), Mild Pain (1 - 3)  apixaban 5 mg oral tablet: 1 tab(s) orally every 12 hours  atorvastatin 80 mg oral tablet: 1 tab(s) orally once a day (at bedtime)  carvedilol 25 mg oral tablet: 1 tab(s) orally 2 times a day  cholecalciferol 125 mcg (5000 intl units) oral capsule: 1 cap(s) orally once a day  clonazePAM 0.5 mg oral tablet: 1 tab(s) orally once a day (at bedtime) as needed for  anxiety  clonazePAM 1 mg oral tablet: 1 tab(s) orally once a day  clopidogrel 75 mg oral tablet: 1 tab(s) orally once a day  empagliflozin 25 mg oral tablet: 1 tab(s) orally once a day   Foltanx oral tablet: 1 tab(s) orally once a day  HYDROmorphone 4 mg oral tablet: 1 tab(s) orally 4 times a day as needed for  moderate pain  Lantus Solostar Pen 100 units/mL subcutaneous solution: 22 unit(s) subcutaneous once a day   Levomefolate DHA oral capsule: 1 cap(s) orally once a day  Lyrica 150 mg oral capsule: 1 cap(s) orally 3 times a day  metFORMIN 850 mg oral tablet: 1 tab(s) orally twice daily with meals  methadone 5 mg oral tablet: 1 tab(s) orally 4 times a day  NovoLOG FlexPen 100 units/mL injectable solution: 6 unit(s) subcutaneous 3 times a day (with meals)   pantoprazole 40 mg oral delayed release tablet: 1 tab(s) orally once a day  sertraline 100 mg oral tablet: 2 tab(s) orally once a day (at bedtime)  traZODone 150 mg oral tablet: 1 tab(s) orally once a day (at bedtime)   acetaminophen 325 mg oral tablet: 2 tab(s) orally every 6 hours As needed Temp greater or equal to 38C (100.4F), Mild Pain (1 - 3)  apixaban 5 mg oral tablet: 1 tab(s) orally every 12 hours  atorvastatin 80 mg oral tablet: 1 tab(s) orally once a day (at bedtime)  cholecalciferol 125 mcg (5000 intl units) oral capsule: 1 cap(s) orally once a day  clonazePAM 0.5 mg oral tablet: 1 tab(s) orally once a day (at bedtime) as needed for  anxiety  clonazePAM 1 mg oral tablet: 1 tab(s) orally once a day  clopidogrel 75 mg oral tablet: 1 tab(s) orally once a day  empagliflozin 25 mg oral tablet: 1 tab(s) orally once a day   Foltanx oral tablet: 1 tab(s) orally once a day  HYDROmorphone 4 mg oral tablet: 1 tab(s) orally 4 times a day as needed for  moderate pain  Lantus Solostar Pen 100 units/mL subcutaneous solution: 10 unit(s) subcutaneous once a day  Levomefolate DHA oral capsule: 1 cap(s) orally once a day  Lyrica 150 mg oral capsule: 1 cap(s) orally 3 times a day  metFORMIN 850 mg oral tablet: 1 tab(s) orally twice daily with meals  methadone 5 mg oral tablet: 1 tab(s) orally 4 times a day  midodrine 5 mg oral tablet: 1 tab(s) orally 2 times a day  NovoLOG FlexPen 100 units/mL injectable solution: 6 unit(s) subcutaneous 3 times a day (with meals)   pantoprazole 40 mg oral delayed release tablet: 1 tab(s) orally once a day  Physical therapy: Physical therapy three times a week  sertraline 100 mg oral tablet: 2 tab(s) orally once a day (at bedtime)  traZODone 150 mg oral tablet: 1 tab(s) orally once a day (at bedtime)

## 2023-04-03 NOTE — PROGRESS NOTE ADULT - SUBJECTIVE AND OBJECTIVE BOX
Patient is a 57y old  Male who presents with a chief complaint of hypotension, COVID (03 Apr 2023 11:05)      Subjective and overnight events:  Patient seen and examined at bedside. + feeling dizzy on standing but tolerable. walked around in the room. no fever, chills, sob, cp, abd pain.    ALLERGIES:  No Known Allergies    MEDICATIONS  (STANDING):  apixaban 5 milliGRAM(s) Oral every 12 hours  ascorbic acid 500 milliGRAM(s) Oral daily  atorvastatin 80 milliGRAM(s) Oral at bedtime  cholecalciferol 1000 Unit(s) Oral daily  clonazePAM  Tablet 1 milliGRAM(s) Oral at bedtime  clopidogrel Tablet 75 milliGRAM(s) Oral daily  dextrose 5%. 1000 milliLiter(s) (100 mL/Hr) IV Continuous <Continuous>  dextrose 5%. 1000 milliLiter(s) (50 mL/Hr) IV Continuous <Continuous>  dextrose 50% Injectable 25 Gram(s) IV Push once  dextrose 50% Injectable 12.5 Gram(s) IV Push once  dextrose 50% Injectable 25 Gram(s) IV Push once  glucagon  Injectable 1 milliGRAM(s) IntraMuscular once  HYDROmorphone   Tablet 4 milliGRAM(s) Oral four times a day  insulin lispro (ADMELOG) corrective regimen sliding scale   SubCutaneous three times a day before meals  insulin lispro (ADMELOG) corrective regimen sliding scale   SubCutaneous at bedtime  insulin lispro Injectable (ADMELOG) 2 Unit(s) SubCutaneous three times a day before meals  methadone    Tablet 5 milliGRAM(s) Oral four times a day  midodrine. 5 milliGRAM(s) Oral <User Schedule>  pantoprazole    Tablet 40 milliGRAM(s) Oral two times a day  pregabalin 150 milliGRAM(s) Oral three times a day  sertraline 100 milliGRAM(s) Oral daily  sodium chloride 0.9%. 1000 milliLiter(s) (100 mL/Hr) IV Continuous <Continuous>  traZODone 150 milliGRAM(s) Oral at bedtime  zinc sulfate 220 milliGRAM(s) Oral daily    MEDICATIONS  (PRN):  acetaminophen     Tablet .. 650 milliGRAM(s) Oral every 6 hours PRN Temp greater or equal to 38C (100.4F), Mild Pain (1 - 3)  clonazePAM  Tablet 0.5 milliGRAM(s) Oral daily PRN anxiety  dextrose Oral Gel 15 Gram(s) Oral once PRN Blood Glucose LESS THAN 70 milliGRAM(s)/deciliter    Vital Signs Last 24 Hrs  T(F): 97.9 (03 Apr 2023 05:17), Max: 97.9 (03 Apr 2023 05:17)  HR: 72 (03 Apr 2023 05:17) (71 - 75)  BP: 107/66 (03 Apr 2023 05:17) (106/70 - 116/75)  RR: 16 (03 Apr 2023 05:17) (16 - 18)  SpO2: 97% (03 Apr 2023 05:17) (94% - 97%)  I&O's Summary    02 Apr 2023 07:01  -  03 Apr 2023 07:00  --------------------------------------------------------  IN: 920 mL / OUT: 1550 mL / NET: -630 mL      PHYSICAL EXAM:  General: NAD, A/O x 3  ENT: MMM  Neck: Supple, No JVD  Lungs: Clear to auscultation bilaterally  Cardio: RRR, S1/S2, No murmurs  Abdomen: Soft, Nontender, Nondistended; Bowel sounds present  Extremities: No calf tenderness, No pitting edema    LABS:                        11.8   7.60  )-----------( 302      ( 03 Apr 2023 07:16 )             36.8     04-03    141  |  104  |  21  ----------------------------<  175  3.8   |  27  |  0.84    Ca    9.4      03 Apr 2023 07:16                              POCT Blood Glucose.: 150 mg/dL (03 Apr 2023 07:53)  POCT Blood Glucose.: 158 mg/dL (02 Apr 2023 21:30)  POCT Blood Glucose.: 193 mg/dL (02 Apr 2023 16:53)  POCT Blood Glucose.: 150 mg/dL (02 Apr 2023 12:19)          Culture - Urine (collected 30 Mar 2023 00:35)  Source: Clean Catch Clean Catch (Midstream)  Final Report (31 Mar 2023 08:20):    <10,000 CFU/mL Normal Urogenital Grecia      COVID-19 PCR: Detected (03-29-23 @ 20:30)      RADIOLOGY & ADDITIONAL TESTS:    Care Discussed with Consultants/Other Providers:

## 2023-04-03 NOTE — DISCHARGE NOTE PROVIDER - NSDCCPCAREPLAN_GEN_ALL_CORE_FT
PRINCIPAL DISCHARGE DIAGNOSIS  Diagnosis: Orthostatic dizziness  Assessment and Plan of Treatment: started on trial of midodrine. please follow up with PMD and cardiology.

## 2023-04-03 NOTE — DISCHARGE NOTE PROVIDER - NSDCFUSCHEDAPPT_GEN_ALL_CORE_FT
[facility]  ON[address]  Scheduled Appointment: 04/04/2023    [facility] [address] [facility] [place]  Scheduled Appointment: 04/13/2023

## 2023-04-03 NOTE — DISCHARGE NOTE PROVIDER - CARE PROVIDER_API CALL
Clint Menon)  [facility] [address] [place] [address]  Phone: [phone]  Fax: [phone]  Follow Up Time:

## 2023-04-03 NOTE — PROGRESS NOTE ADULT - ASSESSMENT
57 M hx of HTN, HLD, CAD/MI/PCI, T2DM on insulin/metformin with hx of DKA, afib on Eliquis, chronic pain syndrome (s/p C2-6 fusion) pw dizziness and weakness with hypotension and +COVID    # COVID positive  -pt had COVID infection in Jan 2023.   -no acute respiratory symptoms and sating well on room air. COVID test positive likely reflecting remnant viral materials  -no need for remdesivir or decadron   -monitor     #Hypotension likely due to dehydration- resolved  #Orthostatic dizziness due to autonomic dysfunction from DM?  #Blurry vision  lying BP 97/61, sitting 117/80, standing 101/72 on 4/2  Trial of midodrine 5mg BID  cardiology consult appreciated, hold coreg  Echo showed 60-65%, grade I DD, mildly enlarged LA  Abd dianne and JOSE stocking   s/p IVF  troponin negative   seen by neurology, f/u opthalmology as outpatient     #T2DM on insulin  Hba1c: 8.1  lantus lowered to 10unit qAM  admelog 2unit qAC  ISS  FS currently controlled    #CAD s/p MI and PCI  -plavix and statin    #A fib on Eliquis  -HR controlled  -off coreg    #chronic pain syndrome  cont methadone and Dilaudid  cont lyrica  see chart note for istop.     #anxiety  cont zoloft.  cont klonopin  see chart note for istop.     dvt ppx: on Eliquis

## 2023-04-03 NOTE — DISCHARGE NOTE PROVIDER - HOSPITAL COURSE
57 M hx of HTN, HLD, CAD/MI/PCI, T2DM on insulin/metformin with hx of DKA, afib on Eliquis, chronic pain syndrome (s/p C2-6 fusion) pw dizziness and weakness with hypotension and +COVID    #COVID positive  -pt had COVID infection in Jan 2023.   -no acute respiratory symptoms and sating well on room air. COVID test positive likely reflecting remnant viral materials  -no need for remdesivir or decadron   -monitor     #Hypotension likely due to dehydration- resolved  #Orthostatic dizziness due to autonomic dysfunction from DM?  #Blurry vision  orthostatic VS improved   lying /74 sitting /84 standing /72 on day of discharge 4/3  started on midodrine 5mg BID  cardiology consult appreciated, hold coreg  Echo showed 60-65%, grade I DD, mildly enlarged LA  Abd dianne and JOSE stocking   s/p IVF  troponin negative   seen by neurology, f/u opthalmology as outpatient     discharge provider: Addi Bud [phone] please call with any questions

## 2023-04-07 ENCOUNTER — APPOINTMENT (OUTPATIENT)
Dept: PAIN MANAGEMENT | Facility: CLINIC | Age: 58
End: 2023-04-07
Payer: OTHER MISCELLANEOUS

## 2023-04-07 VITALS — BODY MASS INDEX: 43.79 KG/M2 | WEIGHT: 238 LBS | HEIGHT: 62 IN

## 2023-04-07 PROCEDURE — 99213 OFFICE O/P EST LOW 20 MIN: CPT | Mod: ACP

## 2023-04-07 RX ORDER — HYDROMORPHONE HYDROCHLORIDE 4 MG/1
4 TABLET ORAL
Qty: 120 | Refills: 0 | Status: DISCONTINUED | COMMUNITY
Start: 2023-03-10 | End: 2023-04-07

## 2023-04-07 RX ORDER — HYDROMORPHONE HYDROCHLORIDE 4 MG/1
4 TABLET ORAL EVERY 6 HOURS
Qty: 120 | Refills: 0 | Status: DISCONTINUED | COMMUNITY
Start: 1900-01-01 | End: 2023-04-07

## 2023-04-07 RX ORDER — METHADONE HYDROCHLORIDE 5 MG/1
5 TABLET ORAL EVERY 6 HOURS
Qty: 120 | Refills: 0 | Status: DISCONTINUED | COMMUNITY
Start: 2018-01-24 | End: 2023-04-07

## 2023-04-07 NOTE — REASON FOR VISIT
[Home] : at home, [unfilled] , at the time of the visit. [Medical Office: ([facility])___] : at the medical office located in  [Patient] : the patient [Self] : self [Follow-Up Visit] : a follow-up pain management visit [FreeTextEntry2] : Neck and back pain

## 2023-04-07 NOTE — HISTORY OF PRESENT ILLNESS
[9] : 9 [Neck] : neck [Left Arm] : left arm [5] : 5 [Burning] : burning [Dull/Aching] : dull/aching [Shooting] : shooting [Constant] : constant [Household chores] : household chores [Leisure] : leisure [Sleep] : sleep [Meds] : meds [Disabled] : Work status: disabled [7] : 7 [de-identified] : 04/06/2023- PATIENT STATES ---- PHYSICAL THERAPY IN-------  3X A WEEK  AND REPORT'S WORSEN  IMPROVEMENT WITH PAIN.\par PT STATES -------- HOME STRETCHING PROGRAM AT HOME 4X A WEEK  AND REPORT'S MILD IMPROVEMENT WITH PAIN.  \par  [] : Patient is currently playing sports: no [FreeTextEntry1] : LEFT HAND [FreeTextEntry6] : ELECTRIC SHOCK LIKE [FreeTextEntry7] : DOWN ARM [de-identified] : TOO MUCH ACTIVITY [de-identified] : 2022

## 2023-04-07 NOTE — HISTORY OF PRESENT ILLNESS
[9] : 9 [Neck] : neck [Left Arm] : left arm [5] : 5 [Burning] : burning [Dull/Aching] : dull/aching [Shooting] : shooting [Constant] : constant [Household chores] : household chores [Leisure] : leisure [Sleep] : sleep [Meds] : meds [Disabled] : Work status: disabled [7] : 7 [de-identified] : 04/06/2023- PATIENT STATES ---- PHYSICAL THERAPY IN-------  3X A WEEK  AND REPORT'S WORSEN  IMPROVEMENT WITH PAIN.\par PT STATES -------- HOME STRETCHING PROGRAM AT HOME 4X A WEEK  AND REPORT'S MILD IMPROVEMENT WITH PAIN.  \par  [] : Patient is currently playing sports: no [FreeTextEntry1] : LEFT HAND [FreeTextEntry6] : ELECTRIC SHOCK LIKE [FreeTextEntry7] : DOWN ARM [de-identified] : TOO MUCH ACTIVITY [de-identified] : 2022

## 2023-04-07 NOTE — REASON FOR VISIT
[Home] : at home, [unfilled] , at the time of the visit. [Medical Office: ([facility])___] : at the medical office located in  [Patient] : the patient [Self] : self [Follow-Up Visit] : a follow-up pain management visit [FreeTextEntry2] : Neck and back pain

## 2023-04-13 ENCOUNTER — APPOINTMENT (OUTPATIENT)
Dept: FAMILY MEDICINE | Facility: CLINIC | Age: 58
End: 2023-04-13

## 2023-04-17 ENCOUNTER — RX RENEWAL (OUTPATIENT)
Age: 58
End: 2023-04-17

## 2023-04-20 ENCOUNTER — APPOINTMENT (OUTPATIENT)
Dept: FAMILY MEDICINE | Facility: CLINIC | Age: 58
End: 2023-04-20
Payer: MEDICARE

## 2023-04-20 ENCOUNTER — NON-APPOINTMENT (OUTPATIENT)
Age: 58
End: 2023-04-20

## 2023-04-20 VITALS
HEART RATE: 78 BPM | BODY MASS INDEX: 30.42 KG/M2 | OXYGEN SATURATION: 95 % | WEIGHT: 237 LBS | DIASTOLIC BLOOD PRESSURE: 81 MMHG | SYSTOLIC BLOOD PRESSURE: 123 MMHG | RESPIRATION RATE: 16 BRPM | HEIGHT: 74 IN | TEMPERATURE: 98.1 F

## 2023-04-20 DIAGNOSIS — Z80.1 FAMILY HISTORY OF MALIGNANT NEOPLASM OF TRACHEA, BRONCHUS AND LUNG: ICD-10-CM

## 2023-04-20 DIAGNOSIS — E78.00 PURE HYPERCHOLESTEROLEMIA, UNSPECIFIED: ICD-10-CM

## 2023-04-20 DIAGNOSIS — R79.89 OTHER SPECIFIED ABNORMAL FINDINGS OF BLOOD CHEMISTRY: ICD-10-CM

## 2023-04-20 DIAGNOSIS — Z83.3 FAMILY HISTORY OF DIABETES MELLITUS: ICD-10-CM

## 2023-04-20 PROCEDURE — 80307 DRUG TEST PRSMV CHEM ANLYZR: CPT

## 2023-04-20 PROCEDURE — 85379 FIBRIN DEGRADATION QUANT: CPT

## 2023-04-20 PROCEDURE — 86850 RBC ANTIBODY SCREEN: CPT

## 2023-04-20 PROCEDURE — 86140 C-REACTIVE PROTEIN: CPT

## 2023-04-20 PROCEDURE — 80048 BASIC METABOLIC PNL TOTAL CA: CPT

## 2023-04-20 PROCEDURE — 82962 GLUCOSE BLOOD TEST: CPT

## 2023-04-20 PROCEDURE — 83605 ASSAY OF LACTIC ACID: CPT

## 2023-04-20 PROCEDURE — 0225U NFCT DS DNA&RNA 21 SARSCOV2: CPT

## 2023-04-20 PROCEDURE — 93005 ELECTROCARDIOGRAM TRACING: CPT

## 2023-04-20 PROCEDURE — 36415 COLL VENOUS BLD VENIPUNCTURE: CPT

## 2023-04-20 PROCEDURE — 82728 ASSAY OF FERRITIN: CPT

## 2023-04-20 PROCEDURE — 82746 ASSAY OF FOLIC ACID SERUM: CPT

## 2023-04-20 PROCEDURE — G0439: CPT

## 2023-04-20 PROCEDURE — 85610 PROTHROMBIN TIME: CPT

## 2023-04-20 PROCEDURE — 80053 COMPREHEN METABOLIC PANEL: CPT

## 2023-04-20 PROCEDURE — 81001 URINALYSIS AUTO W/SCOPE: CPT

## 2023-04-20 PROCEDURE — 82607 VITAMIN B-12: CPT

## 2023-04-20 PROCEDURE — 93306 TTE W/DOPPLER COMPLETE: CPT

## 2023-04-20 PROCEDURE — 86901 BLOOD TYPING SEROLOGIC RH(D): CPT

## 2023-04-20 PROCEDURE — 84484 ASSAY OF TROPONIN QUANT: CPT

## 2023-04-20 PROCEDURE — 86900 BLOOD TYPING SEROLOGIC ABO: CPT

## 2023-04-20 PROCEDURE — 87086 URINE CULTURE/COLONY COUNT: CPT

## 2023-04-20 PROCEDURE — 85025 COMPLETE CBC W/AUTO DIFF WBC: CPT

## 2023-04-20 PROCEDURE — 70450 CT HEAD/BRAIN W/O DYE: CPT

## 2023-04-20 PROCEDURE — 82947 ASSAY GLUCOSE BLOOD QUANT: CPT

## 2023-04-20 PROCEDURE — 71045 X-RAY EXAM CHEST 1 VIEW: CPT

## 2023-04-20 PROCEDURE — 97161 PT EVAL LOW COMPLEX 20 MIN: CPT

## 2023-04-20 PROCEDURE — 86769 SARS-COV-2 COVID-19 ANTIBODY: CPT

## 2023-04-20 PROCEDURE — 82550 ASSAY OF CK (CPK): CPT

## 2023-04-20 PROCEDURE — 85730 THROMBOPLASTIN TIME PARTIAL: CPT

## 2023-04-20 PROCEDURE — 99285 EMERGENCY DEPT VISIT HI MDM: CPT | Mod: 25

## 2023-04-20 PROCEDURE — 83036 HEMOGLOBIN GLYCOSYLATED A1C: CPT

## 2023-04-20 PROCEDURE — 83880 ASSAY OF NATRIURETIC PEPTIDE: CPT

## 2023-04-20 PROCEDURE — 83615 LACTATE (LD) (LDH) ENZYME: CPT

## 2023-04-20 PROCEDURE — 87635 SARS-COV-2 COVID-19 AMP PRB: CPT

## 2023-04-24 NOTE — HISTORY OF PRESENT ILLNESS
[de-identified] : 57 year old male presents for annual health maintenance and physical examination. PMH of inferior wall STEMI as well as numerous episodes of DKA and difficulty with regulation of blood sugars due to both his MI as well as when he had a bout of Covid-19. He was previously on 4 different antihypertensives and states his BP eventually dropped to 77/49 in March 2023 and he now has discontinued all his BP meds. BP is normotensive today. He takes Eliquis as prescribed. He has a low ferritin. Previously had a colonoscopy last year, 1 polyp, follow-up in 4 years recommended. He is worried about inflammatory and autoimmune causes for his issues.

## 2023-05-05 ENCOUNTER — APPOINTMENT (OUTPATIENT)
Dept: PAIN MANAGEMENT | Facility: CLINIC | Age: 58
End: 2023-05-05
Payer: OTHER MISCELLANEOUS

## 2023-05-05 VITALS — BODY MASS INDEX: 30.42 KG/M2 | HEIGHT: 74 IN | WEIGHT: 237 LBS

## 2023-05-05 PROCEDURE — 99213 OFFICE O/P EST LOW 20 MIN: CPT | Mod: ACP,95

## 2023-05-05 RX ORDER — HYDROMORPHONE HYDROCHLORIDE 4 MG/1
4 TABLET ORAL
Qty: 120 | Refills: 0 | Status: DISCONTINUED | COMMUNITY
Start: 2023-02-10 | End: 2023-05-05

## 2023-05-05 RX ORDER — METHADONE HYDROCHLORIDE 5 MG/1
5 TABLET ORAL TWICE DAILY
Qty: 120 | Refills: 0 | Status: DISCONTINUED | COMMUNITY
Start: 2023-02-10 | End: 2023-05-05

## 2023-05-05 RX ORDER — PREGABALIN 150 MG/1
150 CAPSULE ORAL 3 TIMES DAILY
Qty: 90 | Refills: 0 | Status: DISCONTINUED | COMMUNITY
Start: 2023-02-10 | End: 2023-05-05

## 2023-05-05 NOTE — DISCUSSION/SUMMARY
[Medication Risks Reviewed] : Medication risks reviewed [de-identified] : Prescriptions renewed. Opioid agreement/obtained on chart [place]  reviewed and appropriate. SOAPP-R completed on chart. The patient's medications are documented to the best of their ability. Quality of life and functional ability improved on medications. The patient is showing no aberrant behavior or evidence of diversion. The patient was advised not to use narcotic medication while operating an automobile or heavy machinery due to potential sedation or dizziness. The patient was educated to the risks associated with potential opioid dependence and addiction. Urine toxicology screens as per office protocol. Use of multimodal analgesia used prn.\par Follow up one month.

## 2023-05-05 NOTE — REASON FOR VISIT
[Follow-Up Visit] : a follow-up pain management visit [Home] : at home, [unfilled] , at the time of the visit. [Medical Office: ([facility])___] : at the medical office located in  [Patient] : the patient [Self] : self [FreeTextEntry2] : MED RENEWAL

## 2023-05-05 NOTE — HISTORY OF PRESENT ILLNESS
[Neck] : neck [Left Arm] : left arm [Work related] : work related [Gradual] : gradual [7] : 7 [5] : 5 [Dull/Aching] : dull/aching [Radiating] : radiating [Shooting] : shooting [Tingling] : tingling [Constant] : constant [Household chores] : household chores [Work] : work [Sleep] : sleep [Rest] : rest [Meds] : meds [Sitting] : sitting [Standing] : standing [Walking] : walking [Bending forward] : bending forward [Disabled] : Work status: disabled [Steroid] : Steroid [FreeTextEntry1] : Chronic  pain manageable on current regimen. He is able to maintain a functional ADL.  Last UDT 4- and consistent with prescribed medications.  [] : Post Surgical Visit: no [FreeTextEntry3] : 02/16/2017 [FreeTextEntry7] : NECK DOWN LEFT SIDE FINGERS NUMBNESS/ TINGLING  [de-identified] : LIFTING , PROLONGED ACTIVITY  [de-identified] : NO HOME EXERCISES / STRETCHES  [de-identified] :   [de-identified] : LUMBAR  [de-identified] : EPIDURAL  [TWNoteComboBox1] : 60%

## 2023-05-30 ENCOUNTER — NON-APPOINTMENT (OUTPATIENT)
Age: 58
End: 2023-05-30

## 2023-06-12 ENCOUNTER — APPOINTMENT (OUTPATIENT)
Dept: PAIN MANAGEMENT | Facility: CLINIC | Age: 58
End: 2023-06-12
Payer: OTHER MISCELLANEOUS

## 2023-06-12 VITALS — BODY MASS INDEX: 30.42 KG/M2 | HEIGHT: 74 IN | WEIGHT: 237 LBS

## 2023-06-12 PROCEDURE — 99213 OFFICE O/P EST LOW 20 MIN: CPT | Mod: ACP,95

## 2023-06-12 RX ORDER — PREGABALIN 150 MG/1
150 CAPSULE ORAL
Qty: 90 | Refills: 0 | Status: DISCONTINUED | COMMUNITY
Start: 2023-05-05 | End: 2023-06-12

## 2023-06-12 RX ORDER — METHADONE HYDROCHLORIDE 5 MG/1
5 TABLET ORAL
Qty: 120 | Refills: 0 | Status: DISCONTINUED | COMMUNITY
Start: 2023-03-10 | End: 2023-06-12

## 2023-06-12 RX ORDER — PREGABALIN 150 MG/1
150 CAPSULE ORAL 3 TIMES DAILY
Qty: 90 | Refills: 0 | Status: DISCONTINUED | COMMUNITY
Start: 2023-03-10 | End: 2023-06-12

## 2023-06-12 NOTE — HISTORY OF PRESENT ILLNESS
[Neck] : neck [Left Arm] : left arm [Work related] : work related [Gradual] : gradual [7] : 7 [5] : 5 [Dull/Aching] : dull/aching [Radiating] : radiating [Shooting] : shooting [Tingling] : tingling [Constant] : constant [Household chores] : household chores [Work] : work [Sleep] : sleep [Rest] : rest [Meds] : meds [Sitting] : sitting [Standing] : standing [Walking] : walking [Bending forward] : bending forward [Disabled] : Work status: disabled [Steroid] : Steroid [FreeTextEntry1] : States chronic neck and back pain manageable. He maintains a functional ADL. Meds effective. Last UDT was 4- and consistent with prescribed medications.  [] : Post Surgical Visit: no [FreeTextEntry3] : 02/16/2017 [FreeTextEntry7] : NECK DOWN LEFT ARM FINGERS NUMBNESS/ TINGLING  [de-identified] : LIFTING , PROLONGED ACTIVITY  [de-identified] :   [de-identified] :  HOME EXERCISES / STRETCHES AS TOLERATED  [TWNoteComboBox1] : False

## 2023-06-12 NOTE — REASON FOR VISIT
[Follow-Up Visit] : a follow-up pain management visit [Home] : at home, [unfilled] , at the time of the visit. [Medical Office: ([facility])___] : at the medical office located in  [Patient] : the patient [Self] : self [FreeTextEntry2] : WC FOLLOW UP MED RENEWAL

## 2023-06-12 NOTE — DISCUSSION/SUMMARY
[Medication Risks Reviewed] : Medication risks reviewed [de-identified] : Prescriptions renewed. Opioid agreement/obtained on chart [place]  reviewed and appropriate. SOAPP-R completed on chart. The patient's medications are documented to the best of their ability. Quality of life and functional ability improved on medications. The patient is showing no aberrant behavior or evidence of diversion. The patient was advised not to use narcotic medication while operating an automobile or heavy machinery due to potential sedation or dizziness. The patient was educated to the risks associated with potential opioid dependence and addiction. Urine toxicology screens as per office protocol. Use of multimodal analgesia used prn.\par Follow up one month.

## 2023-06-15 ENCOUNTER — RX RENEWAL (OUTPATIENT)
Age: 58
End: 2023-06-15

## 2023-06-15 RX ORDER — METHADONE HYDROCHLORIDE 5 MG/1
5 TABLET ORAL
Qty: 120 | Refills: 0 | Status: DISCONTINUED | COMMUNITY
Start: 2023-05-05 | End: 2023-06-15

## 2023-06-15 RX ORDER — HYDROMORPHONE HYDROCHLORIDE 4 MG/1
4 TABLET ORAL
Qty: 120 | Refills: 0 | Status: DISCONTINUED | COMMUNITY
Start: 2023-05-05 | End: 2023-06-15

## 2023-06-21 ENCOUNTER — RX RENEWAL (OUTPATIENT)
Age: 58
End: 2023-06-21

## 2023-07-03 ENCOUNTER — APPOINTMENT (OUTPATIENT)
Dept: PAIN MANAGEMENT | Facility: CLINIC | Age: 58
End: 2023-07-03
Payer: OTHER MISCELLANEOUS

## 2023-07-03 VITALS — WEIGHT: 237 LBS | HEIGHT: 74 IN | BODY MASS INDEX: 30.42 KG/M2

## 2023-07-03 PROCEDURE — 99213 OFFICE O/P EST LOW 20 MIN: CPT | Mod: ACP,95

## 2023-07-03 NOTE — HISTORY OF PRESENT ILLNESS
[Neck] : neck [Left Arm] : left arm [Work related] : work related [Gradual] : gradual [7] : 7 [5] : 5 [Burning] : burning [Dull/Aching] : dull/aching [Radiating] : radiating [Shooting] : shooting [Stabbing] : stabbing [Throbbing] : throbbing [Tingling] : tingling [Constant] : constant [Household chores] : household chores [Work] : work [Sleep] : sleep [Rest] : rest [Meds] : meds [Sitting] : sitting [Standing] : standing [Walking] : walking [Bending forward] : bending forward [Disabled] : Work status: disabled [Steroid] : Steroid [de-identified] : Chronic neck  and back pain manageable on current pain medication regimen. He maintains a functional ADL. Last UDT was 4- and consistent with prescribed medication.\par  [] : Post Surgical Visit: no [FreeTextEntry3] : 02/16/2017 [FreeTextEntry7] : NECK DOWN LEFT ARM FINGERS NUMBNESS/ TINGLING  [de-identified] : LIFTING , PROLONGED ACTIVITY  [de-identified] : AS OF 2023-07-03 HOME EXERCISES / STRETCHES AS TOLERATED  [de-identified] :

## 2023-07-03 NOTE — DISCUSSION/SUMMARY
[Medication Risks Reviewed] : Medication risks reviewed [de-identified] : Prescriptions renewed. Opioid [place]/obtained on chart [place]  reviewed and appropriate. SOAPP-R completed on chart. The patient's medications are documented to the best of their ability. Quality of life and functional ability improved on medications. The patient is showing no aberrant behavior or evidence of diversion. The patient was advised not to use narcotic medication while operating an automobile or heavy machinery due to potential sedation or dizziness. The patient was educated to the risks associated with potential opioid dependence and addiction. Urine toxicology screens as per office protocol. Use of multimodal analgesia used prn.\par Follow up one month.\par

## 2023-07-03 NOTE — REASON FOR VISIT
[Home] : at home, [unfilled] , at the time of the visit. [Medical Office: ([facility])___] : at the medical office located in  [Patient] : the patient [Self] : self [FreeTextEntry2] : MED RENEWAL

## 2023-07-13 ENCOUNTER — RX RENEWAL (OUTPATIENT)
Age: 58
End: 2023-07-13

## 2023-07-25 ENCOUNTER — RX RENEWAL (OUTPATIENT)
Age: 58
End: 2023-07-25

## 2023-07-27 ENCOUNTER — APPOINTMENT (OUTPATIENT)
Dept: FAMILY MEDICINE | Facility: CLINIC | Age: 58
End: 2023-07-27
Payer: MEDICARE

## 2023-07-27 VITALS
BODY MASS INDEX: 31.57 KG/M2 | WEIGHT: 246 LBS | HEART RATE: 84 BPM | DIASTOLIC BLOOD PRESSURE: 74 MMHG | TEMPERATURE: 97.6 F | RESPIRATION RATE: 16 BRPM | OXYGEN SATURATION: 99 % | HEIGHT: 74 IN | SYSTOLIC BLOOD PRESSURE: 107 MMHG

## 2023-07-27 DIAGNOSIS — R74.8 ABNORMAL LEVELS OF OTHER SERUM ENZYMES: ICD-10-CM

## 2023-07-27 DIAGNOSIS — I10 ESSENTIAL (PRIMARY) HYPERTENSION: ICD-10-CM

## 2023-07-27 DIAGNOSIS — H43.393 OTHER VITREOUS OPACITIES, BILATERAL: ICD-10-CM

## 2023-07-27 DIAGNOSIS — Z12.11 ENCOUNTER FOR SCREENING FOR MALIGNANT NEOPLASM OF COLON: ICD-10-CM

## 2023-07-27 PROCEDURE — 99214 OFFICE O/P EST MOD 30 MIN: CPT

## 2023-07-27 RX ORDER — APIXABAN 5 MG/1
5 TABLET, FILM COATED ORAL
Qty: 60 | Refills: 0 | Status: DISCONTINUED | COMMUNITY
Start: 2022-03-10 | End: 2023-07-27

## 2023-07-27 RX ORDER — INSULIN GLARGINE 100 [IU]/ML
100 INJECTION, SOLUTION SUBCUTANEOUS
Qty: 3 | Refills: 3 | Status: ACTIVE | COMMUNITY
Start: 2020-12-01

## 2023-07-27 RX ORDER — NIRMATRELVIR AND RITONAVIR 300-100 MG
20 X 150 MG & KIT ORAL
Qty: 30 | Refills: 0 | Status: DISCONTINUED | COMMUNITY
Start: 2023-01-05 | End: 2023-07-27

## 2023-07-27 RX ORDER — EMPAGLIFLOZIN 10 MG/1
10 TABLET, FILM COATED ORAL DAILY
Qty: 90 | Refills: 3 | Status: ACTIVE | COMMUNITY
Start: 1900-01-01 | End: 1900-01-01

## 2023-07-27 RX ORDER — LISINOPRIL 10 MG/1
10 TABLET ORAL
Refills: 0 | Status: DISCONTINUED | COMMUNITY
End: 2023-07-27

## 2023-07-27 RX ORDER — ATORVASTATIN CALCIUM 80 MG/1
80 TABLET, FILM COATED ORAL
Qty: 30 | Refills: 0 | Status: DISCONTINUED | COMMUNITY
Start: 2022-03-10 | End: 2023-07-27

## 2023-07-27 RX ORDER — CHLORHEXIDINE GLUCONATE, 0.12% ORAL RINSE 1.2 MG/ML
0.12 SOLUTION DENTAL
Qty: 473 | Refills: 0 | Status: DISCONTINUED | COMMUNITY
Start: 2020-08-16 | End: 2023-07-27

## 2023-07-27 RX ORDER — NYSTATIN 100000 [USP'U]/G
100000 POWDER TOPICAL 3 TIMES DAILY
Qty: 2 | Refills: 5 | Status: DISCONTINUED | COMMUNITY
Start: 2021-08-12 | End: 2023-07-27

## 2023-07-27 RX ORDER — NEBIVOLOL 2.5 MG/1
2.5 TABLET ORAL
Qty: 90 | Refills: 1 | Status: DISCONTINUED | COMMUNITY
Start: 2022-01-21 | End: 2023-07-27

## 2023-07-27 RX ORDER — DULOXETINE HYDROCHLORIDE 30 MG/1
CAPSULE, DELAYED RELEASE ORAL
Refills: 0 | Status: DISCONTINUED | COMMUNITY
End: 2023-07-27

## 2023-07-27 RX ORDER — DULAGLUTIDE 0.75 MG/.5ML
0.75 INJECTION, SOLUTION SUBCUTANEOUS WEEKLY
Refills: 0 | Status: DISCONTINUED | COMMUNITY
End: 2023-07-27

## 2023-07-27 RX ORDER — INSULIN ASPART 100 [IU]/ML
INJECTION, SOLUTION INTRAVENOUS; SUBCUTANEOUS
Refills: 0 | Status: DISCONTINUED | COMMUNITY
End: 2023-07-27

## 2023-07-27 RX ORDER — BETAMETHASONE DIPROPIONATE 0.5 MG/G
0.05 CREAM TOPICAL
Qty: 45 | Refills: 5 | Status: DISCONTINUED | COMMUNITY
Start: 2021-09-02 | End: 2023-07-27

## 2023-07-27 RX ORDER — AMLODIPINE BESYLATE 2.5 MG/1
2.5 TABLET ORAL DAILY
Refills: 0 | Status: DISCONTINUED | COMMUNITY
End: 2023-07-27

## 2023-07-27 RX ORDER — DULOXETINE HYDROCHLORIDE 60 MG/1
60 CAPSULE, DELAYED RELEASE PELLETS ORAL
Qty: 180 | Refills: 0 | Status: DISCONTINUED | COMMUNITY
Start: 2020-11-02 | End: 2023-07-27

## 2023-07-27 RX ORDER — QUETIAPINE FUMARATE 200 MG/1
200 TABLET ORAL
Qty: 30 | Refills: 0 | Status: DISCONTINUED | COMMUNITY
Start: 2022-02-17 | End: 2023-07-27

## 2023-07-27 RX ORDER — CARVEDILOL 25 MG/1
25 TABLET, FILM COATED ORAL
Qty: 60 | Refills: 0 | Status: DISCONTINUED | COMMUNITY
Start: 2022-03-10 | End: 2023-07-27

## 2023-07-27 RX ORDER — CLOPIDOGREL BISULFATE 75 MG/1
75 TABLET, FILM COATED ORAL
Qty: 30 | Refills: 4 | Status: DISCONTINUED | COMMUNITY
Start: 2023-03-26 | End: 2023-07-27

## 2023-07-27 RX ORDER — PANTOPRAZOLE SODIUM 40 MG/1
40 TABLET, DELAYED RELEASE ORAL
Refills: 0 | Status: DISCONTINUED | COMMUNITY
End: 2023-07-27

## 2023-07-27 RX ORDER — AMLODIPINE BESYLATE 5 MG/1
5 TABLET ORAL DAILY
Qty: 30 | Refills: 5 | Status: DISCONTINUED | COMMUNITY
Start: 2019-06-20 | End: 2023-07-27

## 2023-07-27 RX ORDER — INSULIN GLARGINE 100 [IU]/ML
100 INJECTION, SOLUTION SUBCUTANEOUS
Qty: 6 | Refills: 5 | Status: DISCONTINUED | COMMUNITY
Start: 2019-10-10 | End: 2023-07-27

## 2023-07-27 RX ORDER — QUETIAPINE 50 MG/1
50 TABLET, FILM COATED ORAL AT BEDTIME
Refills: 0 | Status: DISCONTINUED | COMMUNITY
End: 2023-07-27

## 2023-07-27 RX ORDER — IPRATROPIUM BROMIDE 42 UG/1
0.06 SPRAY NASAL
Qty: 15 | Refills: 0 | Status: DISCONTINUED | COMMUNITY
Start: 2017-10-31 | End: 2023-07-27

## 2023-07-27 RX ORDER — LISINOPRIL 10 MG/1
10 TABLET ORAL
Qty: 90 | Refills: 2 | Status: DISCONTINUED | COMMUNITY
Start: 2018-09-07 | End: 2023-07-27

## 2023-07-27 RX ORDER — HYDROMORPHONE HYDROCHLORIDE 2 MG/1
2 TABLET ORAL
Qty: 60 | Refills: 0 | Status: DISCONTINUED | COMMUNITY
Start: 2017-12-23 | End: 2023-07-27

## 2023-07-27 RX ORDER — QUETIAPINE FUMARATE 100 MG/1
100 TABLET ORAL
Qty: 30 | Refills: 0 | Status: DISCONTINUED | COMMUNITY
Start: 2020-11-30 | End: 2023-07-27

## 2023-07-27 RX ORDER — ECONAZOLE NITRATE 10 MG/G
1 CREAM TOPICAL TWICE DAILY
Qty: 1 | Refills: 5 | Status: DISCONTINUED | COMMUNITY
Start: 2021-08-12 | End: 2023-07-27

## 2023-07-27 RX ORDER — PREGABALIN 150 MG/1
150 CAPSULE ORAL
Refills: 0 | Status: DISCONTINUED | COMMUNITY
End: 2023-07-27

## 2023-07-27 RX ORDER — LISINOPRIL 10 MG/1
10 TABLET ORAL DAILY
Qty: 90 | Refills: 0 | Status: DISCONTINUED | COMMUNITY
Start: 2022-04-09 | End: 2023-07-27

## 2023-07-28 PROBLEM — R74.8 ELEVATED ALKALINE PHOSPHATASE LEVEL: Status: ACTIVE | Noted: 2023-07-28

## 2023-07-28 PROBLEM — H43.393 VITREOUS FLOATERS OF BOTH EYES: Status: ACTIVE | Noted: 2023-07-27

## 2023-07-28 NOTE — PHYSICAL EXAM
[Normal Sclera/Conjunctiva] : normal sclera/conjunctiva [PERRL] : pupils equal round and reactive to light [EOMI] : extraocular movements intact [de-identified] : Full visual fields, ophthalmoscope exam unremarkable - difficult to see without midriatic aide

## 2023-07-28 NOTE — HISTORY OF PRESENT ILLNESS
[FreeTextEntry8] : 57 year old male presents for evaluation of spots in his eyes. He has noticed floaters, R>L while resting. They are intermittent but daily. He denies visual field deficit, blurry vision, ocular pain, pain with EOM, or photosensitivity. He is asking for a cardiology and endocrinology referral, as well as GI referral for colonoscopy. He says it has been about 3 years since his last colonoscopy, not 1 year as stated last time, and that he should return in "4 years", which would mean he is due for another one coming up. His lab work showed hemoglobin of 11.3, iron sat % of 12%.

## 2023-07-28 NOTE — REVIEW OF SYSTEMS
[Discharge] : no discharge [Pain] : no pain [Redness] : no redness [Dryness] : no dryness [Vision Problems] : vision problems [Itching] : no itching [Negative] : Musculoskeletal

## 2023-08-02 ENCOUNTER — APPOINTMENT (OUTPATIENT)
Dept: PAIN MANAGEMENT | Facility: CLINIC | Age: 58
End: 2023-08-02
Payer: OTHER MISCELLANEOUS

## 2023-08-02 VITALS — WEIGHT: 246 LBS | BODY MASS INDEX: 31.57 KG/M2 | HEIGHT: 74 IN

## 2023-08-02 PROCEDURE — 99213 OFFICE O/P EST LOW 20 MIN: CPT | Mod: ACP,95

## 2023-08-02 NOTE — HISTORY OF PRESENT ILLNESS
[Neck] : neck [Left Arm] : left arm [Work related] : work related [Gradual] : gradual [7] : 7 [5] : 5 [Burning] : burning [Dull/Aching] : dull/aching [Radiating] : radiating [Sharp] : sharp [Shooting] : shooting [Stabbing] : stabbing [Throbbing] : throbbing [Tingling] : tingling [Constant] : constant [Household chores] : household chores [Work] : work [Sleep] : sleep [Rest] : rest [Meds] : meds [Sitting] : sitting [Standing] : standing [Walking] : walking [Bending forward] : bending forward [Disabled] : Work status: disabled [Steroid] : Steroid [de-identified] : Chronic neck and back pain managed with current pain medication regimen. He maintains a funcional ADL. Last UDT was 4- and consistent with prescribed medication.  [] : Post Surgical Visit: no [FreeTextEntry3] : 02/16/2017 [FreeTextEntry7] : NECK DOWN LEFT ARM FINGERS NUMBNESS & pins and needles  [de-identified] : LIFTING , PROLONGED ACTIVITY  [de-identified] : AS OF 2023-08-02 HOME EXERCISES / STRETCHES AS TOLERATED  [de-identified] :

## 2023-08-02 NOTE — DISCUSSION/SUMMARY
[Medication Risks Reviewed] : Medication risks reviewed [de-identified] : Prescriptions renewed. Op[place]obtained [place] and appropriate. SOAPP-R completed on chart. The patient's medications are documented to the best of their ability. Quality of life and functional ability improved on medications. The patient is showing no aberrant behavior or evidence of diversion. The patient was advised not to use narcotic medication while operating an automobile or heavy machinery due to potential sedation or dizziness. The patient was educated to the risks associated with potential opioid dependence and addiction. Urine toxicology screens as per office protocol. Use of multimodal analgesia used prn. Follow up one month.

## 2023-08-07 ENCOUNTER — RX RENEWAL (OUTPATIENT)
Age: 58
End: 2023-08-07

## 2023-08-22 ENCOUNTER — RX RENEWAL (OUTPATIENT)
Age: 58
End: 2023-08-22

## 2023-09-05 ENCOUNTER — APPOINTMENT (OUTPATIENT)
Dept: PAIN MANAGEMENT | Facility: CLINIC | Age: 58
End: 2023-09-05
Payer: OTHER MISCELLANEOUS

## 2023-09-05 PROCEDURE — 99213 OFFICE O/P EST LOW 20 MIN: CPT | Mod: ACP

## 2023-09-05 NOTE — DISCUSSION/SUMMARY
[Medication Risks Reviewed] : Medication risks reviewed [de-identified] : Prescriptions renewed. Op[place]obtained [place] and appropriate. SOAPP-R completed on chart. The patient's medications are documented to the best of their ability. Quality of life and functional ability improved on medications. The patient is showing no aberrant behavior or evidence of diversion. The patient was advised not to use narcotic medication while operating an automobile or heavy machinery due to potential sedation or dizziness. The patient was educated to the risks associated with potential opioid dependence and addiction. Urine toxicology screens as per office protocol. Use of multimodal analgesia used prn. Follow up one month.

## 2023-09-05 NOTE — HISTORY OF PRESENT ILLNESS
[Neck] : neck [Left Arm] : left arm [Work related] : work related [Gradual] : gradual [7] : 7 [5] : 5 [Burning] : burning [Dull/Aching] : dull/aching [Radiating] : radiating [Sharp] : sharp [Shooting] : shooting [Stabbing] : stabbing [Throbbing] : throbbing [Tingling] : tingling [Constant] : constant [Household chores] : household chores [Work] : work [Sleep] : sleep [Rest] : rest [Meds] : meds [Sitting] : sitting [Standing] : standing [Walking] : walking [Bending forward] : bending forward [Disabled] : Work status: disabled [Steroid] : Steroid [FreeTextEntry1] : Chronic neck and back pain managed with current medication regimen. UDT performed today in office and last 4- consistent with prescribed medications. [] : Post Surgical Visit: no [FreeTextEntry3] : 02/16/2017 [FreeTextEntry7] : NECK DOWN LEFT ARM FINGERS NUMBNESS & pins and needles  [de-identified] : LIFTING , PROLONGED ACTIVITY  [de-identified] : AS OF 2023-09-05 HOME EXERCISES / STRETCHES AS TOLERATED  [de-identified] :

## 2023-10-04 ENCOUNTER — INPATIENT (INPATIENT)
Facility: HOSPITAL | Age: 58
LOS: 2 days | Discharge: HOME | DRG: 392 | End: 2023-10-07
Attending: INTERNAL MEDICINE | Admitting: STUDENT IN AN ORGANIZED HEALTH CARE EDUCATION/TRAINING PROGRAM
Payer: MEDICARE

## 2023-10-04 ENCOUNTER — APPOINTMENT (OUTPATIENT)
Dept: PAIN MANAGEMENT | Facility: CLINIC | Age: 58
End: 2023-10-04

## 2023-10-04 VITALS
HEART RATE: 86 BPM | WEIGHT: 199.96 LBS | OXYGEN SATURATION: 97 % | RESPIRATION RATE: 19 BRPM | DIASTOLIC BLOOD PRESSURE: 86 MMHG | SYSTOLIC BLOOD PRESSURE: 181 MMHG | TEMPERATURE: 98 F

## 2023-10-04 DIAGNOSIS — Z98.890 OTHER SPECIFIED POSTPROCEDURAL STATES: Chronic | ICD-10-CM

## 2023-10-04 DIAGNOSIS — Z98.1 ARTHRODESIS STATUS: Chronic | ICD-10-CM

## 2023-10-04 DIAGNOSIS — K52.9 NONINFECTIVE GASTROENTERITIS AND COLITIS, UNSPECIFIED: ICD-10-CM

## 2023-10-04 LAB
ALBUMIN SERPL ELPH-MCNC: 3.5 G/DL — SIGNIFICANT CHANGE UP (ref 3.3–5)
ALP SERPL-CCNC: 98 U/L — SIGNIFICANT CHANGE UP (ref 40–120)
ALT FLD-CCNC: 24 U/L — SIGNIFICANT CHANGE UP (ref 10–45)
ANION GAP SERPL CALC-SCNC: 16 MMOL/L — SIGNIFICANT CHANGE UP (ref 5–17)
AST SERPL-CCNC: 7 U/L — LOW (ref 10–40)
BASOPHILS # BLD AUTO: 0.03 K/UL — SIGNIFICANT CHANGE UP (ref 0–0.2)
BASOPHILS NFR BLD AUTO: 0.2 % — SIGNIFICANT CHANGE UP (ref 0–2)
BILIRUB SERPL-MCNC: 0.6 MG/DL — SIGNIFICANT CHANGE UP (ref 0.2–1.2)
BUN SERPL-MCNC: 11 MG/DL — SIGNIFICANT CHANGE UP (ref 7–23)
CALCIUM SERPL-MCNC: 9.1 MG/DL — SIGNIFICANT CHANGE UP (ref 8.4–10.5)
CHLORIDE SERPL-SCNC: 98 MMOL/L — SIGNIFICANT CHANGE UP (ref 96–108)
CO2 SERPL-SCNC: 20 MMOL/L — LOW (ref 22–31)
CREAT SERPL-MCNC: 0.85 MG/DL — SIGNIFICANT CHANGE UP (ref 0.5–1.3)
EGFR: 102 ML/MIN/1.73M2 — SIGNIFICANT CHANGE UP
EOSINOPHIL # BLD AUTO: 0.01 K/UL — SIGNIFICANT CHANGE UP (ref 0–0.5)
EOSINOPHIL NFR BLD AUTO: 0.1 % — SIGNIFICANT CHANGE UP (ref 0–6)
GLUCOSE BLDC GLUCOMTR-MCNC: 250 MG/DL — HIGH (ref 70–99)
GLUCOSE BLDC GLUCOMTR-MCNC: 262 MG/DL — HIGH (ref 70–99)
GLUCOSE SERPL-MCNC: 306 MG/DL — HIGH (ref 70–99)
HCT VFR BLD CALC: 40.3 % — SIGNIFICANT CHANGE UP (ref 39–50)
HGB BLD-MCNC: 12.9 G/DL — LOW (ref 13–17)
IMM GRANULOCYTES NFR BLD AUTO: 0.6 % — SIGNIFICANT CHANGE UP (ref 0–0.9)
LIDOCAIN IGE QN: 13 U/L — LOW (ref 16–77)
LYMPHOCYTES # BLD AUTO: 1.61 K/UL — SIGNIFICANT CHANGE UP (ref 1–3.3)
LYMPHOCYTES # BLD AUTO: 13.2 % — SIGNIFICANT CHANGE UP (ref 13–44)
MCHC RBC-ENTMCNC: 26.1 PG — LOW (ref 27–34)
MCHC RBC-ENTMCNC: 32 GM/DL — SIGNIFICANT CHANGE UP (ref 32–36)
MCV RBC AUTO: 81.4 FL — SIGNIFICANT CHANGE UP (ref 80–100)
MONOCYTES # BLD AUTO: 0.64 K/UL — SIGNIFICANT CHANGE UP (ref 0–0.9)
MONOCYTES NFR BLD AUTO: 5.3 % — SIGNIFICANT CHANGE UP (ref 2–14)
NEUTROPHILS # BLD AUTO: 9.8 K/UL — HIGH (ref 1.8–7.4)
NEUTROPHILS NFR BLD AUTO: 80.6 % — HIGH (ref 43–77)
NRBC # BLD: 0 /100 WBCS — SIGNIFICANT CHANGE UP (ref 0–0)
PLATELET # BLD AUTO: 397 K/UL — SIGNIFICANT CHANGE UP (ref 150–400)
POTASSIUM SERPL-MCNC: 3.4 MMOL/L — LOW (ref 3.5–5.3)
POTASSIUM SERPL-SCNC: 3.4 MMOL/L — LOW (ref 3.5–5.3)
PROT SERPL-MCNC: 7.2 G/DL — SIGNIFICANT CHANGE UP (ref 6–8.3)
RBC # BLD: 4.95 M/UL — SIGNIFICANT CHANGE UP (ref 4.2–5.8)
RBC # FLD: 15.5 % — HIGH (ref 10.3–14.5)
SODIUM SERPL-SCNC: 134 MMOL/L — LOW (ref 135–145)
TROPONIN I, HIGH SENSITIVITY RESULT: 25.3 NG/L — SIGNIFICANT CHANGE UP
TROPONIN I, HIGH SENSITIVITY RESULT: 26.1 NG/L — SIGNIFICANT CHANGE UP
WBC # BLD: 12.16 K/UL — HIGH (ref 3.8–10.5)
WBC # FLD AUTO: 12.16 K/UL — HIGH (ref 3.8–10.5)

## 2023-10-04 PROCEDURE — 71045 X-RAY EXAM CHEST 1 VIEW: CPT | Mod: 26

## 2023-10-04 PROCEDURE — 93010 ELECTROCARDIOGRAM REPORT: CPT

## 2023-10-04 PROCEDURE — 99285 EMERGENCY DEPT VISIT HI MDM: CPT

## 2023-10-04 PROCEDURE — 99223 1ST HOSP IP/OBS HIGH 75: CPT

## 2023-10-04 PROCEDURE — 74177 CT ABD & PELVIS W/CONTRAST: CPT | Mod: 26,MA

## 2023-10-04 RX ORDER — INSULIN HUMAN 100 [IU]/ML
4 INJECTION, SOLUTION SUBCUTANEOUS ONCE
Refills: 0 | Status: COMPLETED | OUTPATIENT
Start: 2023-10-04 | End: 2023-10-04

## 2023-10-04 RX ORDER — INSULIN LISPRO 100/ML
VIAL (ML) SUBCUTANEOUS AT BEDTIME
Refills: 0 | Status: DISCONTINUED | OUTPATIENT
Start: 2023-10-04 | End: 2023-10-07

## 2023-10-04 RX ORDER — DEXTROSE 50 % IN WATER 50 %
15 SYRINGE (ML) INTRAVENOUS ONCE
Refills: 0 | Status: DISCONTINUED | OUTPATIENT
Start: 2023-10-04 | End: 2023-10-07

## 2023-10-04 RX ORDER — SODIUM CHLORIDE 9 MG/ML
1000 INJECTION INTRAMUSCULAR; INTRAVENOUS; SUBCUTANEOUS
Refills: 0 | Status: DISCONTINUED | OUTPATIENT
Start: 2023-10-04 | End: 2023-10-07

## 2023-10-04 RX ORDER — GLUCAGON INJECTION, SOLUTION 0.5 MG/.1ML
1 INJECTION, SOLUTION SUBCUTANEOUS ONCE
Refills: 0 | Status: DISCONTINUED | OUTPATIENT
Start: 2023-10-04 | End: 2023-10-07

## 2023-10-04 RX ORDER — SODIUM CHLORIDE 9 MG/ML
1000 INJECTION INTRAMUSCULAR; INTRAVENOUS; SUBCUTANEOUS ONCE
Refills: 0 | Status: COMPLETED | OUTPATIENT
Start: 2023-10-04 | End: 2023-10-04

## 2023-10-04 RX ORDER — METHADONE HYDROCHLORIDE 40 MG/1
10 TABLET ORAL ONCE
Refills: 0 | Status: DISCONTINUED | OUTPATIENT
Start: 2023-10-04 | End: 2023-10-04

## 2023-10-04 RX ORDER — SODIUM CHLORIDE 9 MG/ML
1000 INJECTION, SOLUTION INTRAVENOUS
Refills: 0 | Status: DISCONTINUED | OUTPATIENT
Start: 2023-10-04 | End: 2023-10-07

## 2023-10-04 RX ORDER — DEXTROSE 50 % IN WATER 50 %
12.5 SYRINGE (ML) INTRAVENOUS ONCE
Refills: 0 | Status: DISCONTINUED | OUTPATIENT
Start: 2023-10-04 | End: 2023-10-07

## 2023-10-04 RX ORDER — ATORVASTATIN CALCIUM 80 MG/1
80 TABLET, FILM COATED ORAL AT BEDTIME
Refills: 0 | Status: DISCONTINUED | OUTPATIENT
Start: 2023-10-04 | End: 2023-10-07

## 2023-10-04 RX ORDER — HYDROMORPHONE HYDROCHLORIDE 2 MG/ML
1 INJECTION INTRAMUSCULAR; INTRAVENOUS; SUBCUTANEOUS
Refills: 0 | Status: DISCONTINUED | OUTPATIENT
Start: 2023-10-04 | End: 2023-10-04

## 2023-10-04 RX ORDER — HYDROMORPHONE HYDROCHLORIDE 2 MG/ML
4 INJECTION INTRAMUSCULAR; INTRAVENOUS; SUBCUTANEOUS
Refills: 0 | Status: DISCONTINUED | OUTPATIENT
Start: 2023-10-04 | End: 2023-10-04

## 2023-10-04 RX ORDER — ACETAMINOPHEN 500 MG
1000 TABLET ORAL ONCE
Refills: 0 | Status: COMPLETED | OUTPATIENT
Start: 2023-10-04 | End: 2023-10-04

## 2023-10-04 RX ORDER — HYDROMORPHONE HYDROCHLORIDE 2 MG/ML
2 INJECTION INTRAMUSCULAR; INTRAVENOUS; SUBCUTANEOUS ONCE
Refills: 0 | Status: DISCONTINUED | OUTPATIENT
Start: 2023-10-04 | End: 2023-10-04

## 2023-10-04 RX ORDER — CLONAZEPAM 1 MG
1 TABLET ORAL AT BEDTIME
Refills: 0 | Status: DISCONTINUED | OUTPATIENT
Start: 2023-10-04 | End: 2023-10-07

## 2023-10-04 RX ORDER — CARVEDILOL PHOSPHATE 80 MG/1
3.12 CAPSULE, EXTENDED RELEASE ORAL EVERY 12 HOURS
Refills: 0 | Status: DISCONTINUED | OUTPATIENT
Start: 2023-10-04 | End: 2023-10-07

## 2023-10-04 RX ORDER — ONDANSETRON 8 MG/1
4 TABLET, FILM COATED ORAL EVERY 8 HOURS
Refills: 0 | Status: DISCONTINUED | OUTPATIENT
Start: 2023-10-04 | End: 2023-10-07

## 2023-10-04 RX ORDER — FAMOTIDINE 10 MG/ML
20 INJECTION INTRAVENOUS ONCE
Refills: 0 | Status: COMPLETED | OUTPATIENT
Start: 2023-10-04 | End: 2023-10-04

## 2023-10-04 RX ORDER — SERTRALINE 25 MG/1
100 TABLET, FILM COATED ORAL DAILY
Refills: 0 | Status: DISCONTINUED | OUTPATIENT
Start: 2023-10-04 | End: 2023-10-07

## 2023-10-04 RX ORDER — DEXTROSE 50 % IN WATER 50 %
25 SYRINGE (ML) INTRAVENOUS ONCE
Refills: 0 | Status: DISCONTINUED | OUTPATIENT
Start: 2023-10-04 | End: 2023-10-07

## 2023-10-04 RX ORDER — INSULIN LISPRO 100/ML
VIAL (ML) SUBCUTANEOUS
Refills: 0 | Status: DISCONTINUED | OUTPATIENT
Start: 2023-10-04 | End: 2023-10-07

## 2023-10-04 RX ORDER — ONDANSETRON 8 MG/1
4 TABLET, FILM COATED ORAL ONCE
Refills: 0 | Status: COMPLETED | OUTPATIENT
Start: 2023-10-04 | End: 2023-10-04

## 2023-10-04 RX ORDER — APIXABAN 2.5 MG/1
5 TABLET, FILM COATED ORAL EVERY 12 HOURS
Refills: 0 | Status: DISCONTINUED | OUTPATIENT
Start: 2023-10-04 | End: 2023-10-05

## 2023-10-04 RX ORDER — TRAZODONE HCL 50 MG
150 TABLET ORAL AT BEDTIME
Refills: 0 | Status: DISCONTINUED | OUTPATIENT
Start: 2023-10-04 | End: 2023-10-07

## 2023-10-04 RX ORDER — CLONAZEPAM 1 MG
1 TABLET ORAL AT BEDTIME
Refills: 0 | Status: DISCONTINUED | OUTPATIENT
Start: 2023-10-04 | End: 2023-10-04

## 2023-10-04 RX ORDER — METHADONE HYDROCHLORIDE 40 MG/1
5 TABLET ORAL
Refills: 0 | Status: DISCONTINUED | OUTPATIENT
Start: 2023-10-04 | End: 2023-10-07

## 2023-10-04 RX ORDER — HYDROMORPHONE HYDROCHLORIDE 2 MG/ML
2 INJECTION INTRAMUSCULAR; INTRAVENOUS; SUBCUTANEOUS EVERY 4 HOURS
Refills: 0 | Status: DISCONTINUED | OUTPATIENT
Start: 2023-10-04 | End: 2023-10-07

## 2023-10-04 RX ADMIN — SODIUM CHLORIDE 1000 MILLILITER(S): 9 INJECTION INTRAMUSCULAR; INTRAVENOUS; SUBCUTANEOUS at 13:46

## 2023-10-04 RX ADMIN — SODIUM CHLORIDE 1000 MILLILITER(S): 9 INJECTION INTRAMUSCULAR; INTRAVENOUS; SUBCUTANEOUS at 13:59

## 2023-10-04 RX ADMIN — Medication 400 MILLIGRAM(S): at 13:13

## 2023-10-04 RX ADMIN — FAMOTIDINE 20 MILLIGRAM(S): 10 INJECTION INTRAVENOUS at 14:14

## 2023-10-04 RX ADMIN — HYDROMORPHONE HYDROCHLORIDE 2 MILLIGRAM(S): 2 INJECTION INTRAMUSCULAR; INTRAVENOUS; SUBCUTANEOUS at 19:52

## 2023-10-04 RX ADMIN — FAMOTIDINE 100 MILLIGRAM(S): 10 INJECTION INTRAVENOUS at 12:59

## 2023-10-04 RX ADMIN — SODIUM CHLORIDE 100 MILLILITER(S): 9 INJECTION INTRAMUSCULAR; INTRAVENOUS; SUBCUTANEOUS at 19:54

## 2023-10-04 RX ADMIN — SODIUM CHLORIDE 1000 MILLILITER(S): 9 INJECTION INTRAMUSCULAR; INTRAVENOUS; SUBCUTANEOUS at 12:46

## 2023-10-04 RX ADMIN — SODIUM CHLORIDE 1000 MILLILITER(S): 9 INJECTION INTRAMUSCULAR; INTRAVENOUS; SUBCUTANEOUS at 12:59

## 2023-10-04 RX ADMIN — ONDANSETRON 4 MILLIGRAM(S): 8 TABLET, FILM COATED ORAL at 12:46

## 2023-10-04 RX ADMIN — Medication 1000 MILLIGRAM(S): at 13:28

## 2023-10-04 RX ADMIN — HYDROMORPHONE HYDROCHLORIDE 2 MILLIGRAM(S): 2 INJECTION INTRAMUSCULAR; INTRAVENOUS; SUBCUTANEOUS at 13:14

## 2023-10-04 RX ADMIN — Medication 2: at 22:00

## 2023-10-04 RX ADMIN — INSULIN HUMAN 4 UNIT(S): 100 INJECTION, SOLUTION SUBCUTANEOUS at 13:13

## 2023-10-04 RX ADMIN — HYDROMORPHONE HYDROCHLORIDE 2 MILLIGRAM(S): 2 INJECTION INTRAMUSCULAR; INTRAVENOUS; SUBCUTANEOUS at 13:29

## 2023-10-04 RX ADMIN — Medication 1 MILLIGRAM(S): at 22:23

## 2023-10-04 NOTE — ED PROVIDER NOTE - OBJECTIVE STATEMENT
58-year-old male with a past history of coronary artery disease atrial fibrillation depression on Eliquis chief complaint nausea vomiting for 4 days generalized weakness dehydration patient also complaining of chest pain patient has a history of coronary stents

## 2023-10-04 NOTE — ED ADULT NURSE NOTE - NSFALLHARMRISKINTERV_ED_ALL_ED

## 2023-10-04 NOTE — ED PROVIDER NOTE - CARE PLAN
Goal:	Acute abdominal pain nausea vomiting dehydration   Principal Discharge DX:	Gastroenteritis, acute  Goal:	Acute abdominal pain nausea vomiting dehydration, Unable to tolerate p.o.  Secondary Diagnosis:	Acute dehydration   1

## 2023-10-04 NOTE — H&P ADULT - NS ATTEND AMEND GEN_ALL_CORE FT
58 Male with PMH HTN, HLD, CAD/MI (no stents), T2DM on insulin/metformin/jardiance with hx of DKA, Afib on Eliquis, Chronic Pain Syndrome with multiple neck surgeries (C2-C7 fusion) on dilaudid and methadone presents with abdominal pain with nausea and vomiting    #?gastroenteritis  - CT negative without acute pathology  - has not had BM in 3-4 days but passing flatus, possible stool workup when has BM  - trial clear liquid diet for now  - follow up COVID/RVP    #chest pain  - history of MI in past- states was asymptomatic when he had it  - trop negative x2, EKG without acute changes  - cardio consulted

## 2023-10-04 NOTE — PATIENT PROFILE ADULT - FUNCTIONAL ASSESSMENT - DAILY ACTIVITY SECTION LABEL
[facility]  History and Physical    [facility]    Godfrey Kiser MRN# [id number]   Age: 10-hour old YOB: 2020     Date of Admission:  2022 10:21 PM    Primary Care Physician   Primary care provider: No Ref-Primary, Physician    Pregnancy History   The details of the mother's pregnancy are as follows:  OBSTETRIC HISTORY:  Information for the patient's mother:  Javy Kiser [[id number]]   33 year old     EDC:   Information for the patient's mother:  Javy Kiser [[id number]]   Estimated Date of Delivery: 3/2/22     Information for the patient's mother:  Javy Kiser [[id number]]     OB History    Para Term  AB Living   1 1 0 1 0 1   SAB IAB Ectopic Multiple Live Births   0 0 0 0 1      # Outcome Date GA Lbr Leonel/2nd Weight Sex Delivery Anes PTL Lv   1  22 35w0d 02:05 / 02:16 2.7 kg (5 lb 15.2 oz) M  EPI Y MARGARITO      Name: ELEANORSHYANNEGODFREY      Apgar1: 8  Apgar5: 9        Mother was inpatient with Premature Rupture of membranes, was given betamethasone  Vacuum X 2     Prenatal Labs:   Information for the patient's mother:  Javy Kiser [[id number]]     Lab Results   Component Value Date    ABO A 2021    RH Neg 2021    AS Positive (A) 2022    HGB 10.2 (L) 2022        Prenatal Ultrasound:  Information for the patient's mother:  Javy Kiser [[id number]]     Results for orders placed or performed during the hospital encounter of 22   Maternal Fetal BPP Single    Narrative            BPP  ---------------------------------------------------------------------------------------------------------  Pat. Name: JAVY KISER       Study Date:  2022 12:36pm  Pat. NO:  [id number]        Referring  MD: FRED ESPARZA  Site:  [facility]       Sonographer: Flor Aguilar,  RDMS  :  1987        Age:   35  ---------------------------------------------------------------------------------------------------------    INDICATION  ---------------------------------------------------------------------------------------------------------   Premature Rupture of Membranes (PPROM). INPATIENT. Maternal antiphospholipid antibodies.      METHOD  ---------------------------------------------------------------------------------------------------------  SH ANTEPARTUM inpatient exam. Transabdominal ultrasound examination. View: Sufficient      PREGNANCY  ---------------------------------------------------------------------------------------------------------  Caruso pregnancy. Number of fetuses: 1      DATING  ---------------------------------------------------------------------------------------------------------                                           Date                                Details                                                                                      Gest. age                      LESLIE  LMP                                  2021                                                                                                                         34 w + 5 d                     2022  Prior assessment               2021                         GA: 12 w + 0 d                                                                          34 w + 6 d                     2022  Assigned dating                  Dating performed on 10/4/2021, based on the LMP                                                              34 w + 5 d                     2022      GENERAL EVALUATION  ---------------------------------------------------------------------------------------------------------  Cardiac activity present.  bpm.  Fetal movements visualized.  Presentation cephalic.  Placenta anterior .  Umbilical cord previously studied.      AMNIOTIC FLUID  ASSESSMENT  ---------------------------------------------------------------------------------------------------------  Amount of AF: normal  MVP 8.7 cm. DORA 21.8 cm. Q1 6.4 cm, Q2 8.7 cm, Q3 0.0 cm, Q4 6.8 cm      BIOPHYSICAL PROFILE  ---------------------------------------------------------------------------------------------------------  2: Fetal breathing movements  2: Gross body movements  2: Fetal tone  2: Amniotic fluid volume  8/8 Biophysical profile score  Interpretation: normal      RECOMMENDATION  ---------------------------------------------------------------------------------------------------------  See inpatient consultation for recommendations regarding care today. She is a good candidate for expectant management up to 37 weeks if inpatient, with reassuring  assessment of patient and fetus, and if her GBS returns negative.    She will have BPP twice weekly with MFM. Growth every 3-4 weeks (last done 1/14 at primary OB office).  Plan to start prophylactic anticoagulation if hospitalized for > 48 hours.        Impression    IMPRESSION  ---------------------------------------------------------------------------------------------------------  1) Caruso intrauterine pregnancy at 34w 5d gestational age.  2) The BPP is reassuring at 8/8.  3) The amniotic fluid volume appeared normal.  4) Cephalic lie  5) Posterior placenta.       US Lower Extremity Venous Duplex Right    Narrative    EXAM: US LOWER EXTREMITY VENOUS DUPLEX RIGHT  LOCATION: [facility]  DATE/TIME: 2022 10:04 PM    INDICATION: Right leg swelling and pain, pregnancy  COMPARISON: None.  TECHNIQUE: Venous Duplex ultrasound of the right lower extremity with and without compression, augmentation and duplex. Color flow and spectral Doppler with waveform analysis performed.    FINDINGS: Exam includes the common femoral, femoral, popliteal, and contralateral common femoral veins as well as segmentally visualized deep  "calf veins and greater saphenous vein.     RIGHT: No deep vein thrombosis. No superficial thrombophlebitis. No popliteal cyst.      Impression    IMPRESSION:  No deep venous thrombosis in the right lower extremity.        GBS Status:   Information for the patient's mother:  Meaghan Kiser [[id number]]   No results found for: GBS     negative    Maternal History    Information for the patient's mother:  Meaghan Kiser [[id number]]     Patient Active Problem List   Diagnosis     Encounter for triage in pregnant patient     Indication for care in labor or delivery      Mother is on zoloft        Family History - [place]   Information for the patient's mother:  Meaghan Kiser [[id number]]   History reviewed. No pertinent family history.       Social History -    First child  Father is a Urologist    Birth History     Male-Meaghan Kiser was born at 2020 10:21 PM by      Infant Resuscitation Needed: no    Birth History     Birth     Length: 49.5 cm (1' 7.5\")     Weight: 2.7 kg (5 lb 15.2 oz)     HC 33.7 cm (13.25\")     Apgar     One: 8     Five: 9     Gestation Age: 35 wks       The NICU staff was present during birth.    Immunization History   Immunization History   Administered Date(s) Administered     Hep B, Peds or Adolescent 2022        Physical Exam   Vital Signs:  Patient Vitals for the past 24 hrs:   Temp Temp src Pulse Resp SpO2 Height Weight   22 0815 98  F (36.7  C) Axillary 146 52 97 % -- --   22 0500 97.6  F (36.4  C) Axillary 130 58 -- -- --   22 0052 98  F (36.7  C) Axillary 148 56 99 % -- --   22 2355 99.4  F (37.4  C) Axillary 158 58 99 % -- --   22 2325 99.6  F (37.6  C) Axillary 160 58 99 % -- --   22 2255 99.8  F (37.7  C) Axillary 160 60 100 % -- --   22 98.8  F (37.1  C) Axillary 164 60 98 % -- --   22 -- -- -- -- -- 0.495 m (1' 7.5\") 2.7 kg (5 lb 15.2 oz)      Measurements:  Weight: 5 lb 15.2 oz (2700 g)  " "  Length: 19.5\"    Head circumference: 33.7 cm      General:  alert and normally responsive  Skin:  no abnormal markings; normal color without significant rash.  No jaundice  Head/Neck:  normal anterior and posterior fontanelle, intact scalp; Neck without masses  Some molding and bruising noted  Eyes:  normal red reflex, clear conjunctiva  Ears/Nose/Mouth:  intact canals, patent nares, mouth normal  Thorax:  normal contour, clavicles intact  Lungs:  clear, no retractions, no increased work of breathing  Heart:  normal rate, rhythm.  No murmurs.  Normal femoral pulses.  Abdomen:  soft without mass, tenderness, organomegaly, hernia.  Umbilicus normal.  Genitalia:  normal male external genitalia with testes descended bilaterally  Anus:  patent  Trunk/spine:  straight, intact  Muskuloskeletal:  Normal Rodriguez and Ortolani maneuvers.  intact without deformity.  Normal digits.  Neurologic:  normal, symmetric tone and strength.  normal reflexes.    Data    Results for orders placed or performed during the hospital encounter of 01/26/22 (from the past 24 hour(s))   Blood gas cord arterial   Result Value Ref Range    pH Cord Blood Arterial 7.26 7.16 - 7.39    pCO2 Cord Blood Arterial 62 35 - 71 mm Hg    pO2 Cord Blood Arterial 15 3 - 33 mm Hg    Bicarbonate Cord Blood Arterial 28 (H) 16 - 24 mmol/L    Base Excess Cord Arterial -1.0 -9.6 - 2.0 mmol/L   Blood gas cord venous   Result Value Ref Range    pH Cord Blood Venous 7.29 7.21 - 7.45    pCO2 Cord Blood Venous 58 (H) 27 - 57 mm Hg    pO2 Cord Blood Venous 17 (L) 21 - 37 mm Hg    Bicarbonate Cord Blood Venous 27 (H) 16 - 24 mmol/L    Base Excess/Deficit (+/-) -1.0 -8.1 - 1.9 mmol/L   Cord Blood - Hold   Result Value Ref Range    Hold Specimen [facility]    Cord blood study   Result Value Ref Range    ABO/RH(D) A POS     GOLD Anti-IgG NEG Negative    ABORH REPEAT A POS     SPECIMEN EXPIRATION DATE [id number]    Glucose by meter   Result Value Ref Range    GLUCOSE BY METER POCT " 28 (LL) 40 - 99 mg/dL   Glucose by meter   Result Value Ref Range    GLUCOSE BY METER POCT 68 40 - 99 mg/dL   Glucose by meter   Result Value Ref Range    GLUCOSE BY METER POCT 69 40 - 99 mg/dL   Glucose by meter   Result Value Ref Range    GLUCOSE BY METER POCT 62 40 - 99 mg/dL       Assessment & Plan   Male-Meaghan Kiser is a Late  (34-36 6/7 weeks gestation)  appropriate for gestational age male  , doing well.   -Normal  care  -Anticipatory guidance given  -Encourage exclusive breastfeeding - currently using donor milk supplementation. Glucoses have improved  -Hearing screen and first hepatitis B vaccine prior to discharge per orders  -Circumcision discussed with parents, including risks and benefits.  Parents do wish to proceed - We will see how Brayden is doing over the next 24 hrs and decide on in hospital vs Clinic circ at that time    Ramona Dent   .

## 2023-10-04 NOTE — H&P ADULT - ASSESSMENT
58 Male with PMH HTN, HLD, CAD/MI (no stents), T2DM on insulin/metformin/jardiance with hx of DKA, Afib on Eliquis, Chronic Pain Syndrome with multiple neck surgeries (C2-C7 fusion) on dilaudid and methadone presents with abdominal pain with nausea and vomiting.     Possible Gastroenteritis  pt with mostly epigastric pain, nausea and vomiting, cannot tolerate oral  CT AP with IV contrast with diverticulosis  Will trial CLD for now, advance as tolerated  Maintain IVF, Zofran prn  Monitor labs    Chest Pain, unspecified  CAD, Hx MI  low suspicion for cardiac in [place]  Tn negative x 2, EKG without acute ischemic changes  given history will get cardiac evaluation  Continue statin therapy   58 Male with PMH HTN, HLD, CAD/MI (no stents), T2DM on insulin/metformin/jardiance with hx of DKA, Afib on Eliquis, Chronic Pain Syndrome with multiple neck surgeries (C2-C7 fusion) on dilaudid and methadone presents with abdominal pain with nausea and vomiting.     Possible Gastroenteritis  pt with mostly epigastric pain, nausea and vomiting, cannot tolerate oral  CT AP with IV contrast with diverticulosis  Will trial CLD for now, advance as tolerated  Maintain IVF, Zofran prn  Monitor labs, follow up RVP    Chest Pain, unspecified  CAD, Hx MI  low suspicion for cardiac in natura  Tn negative x 2, EKG without acute ischemic changes  given history will get cardiac evaluation  Continue statin therapy, coreg 3.125 BID    T2DM on Insulin  pt reports taking metformin, jardiance and insulin at home  will order accuchecks with ISS for now as diet is being advanced  Adjust insulin requirements based on necessity, goal BS < 180  Follow up A1c    Afib on Eliquis  continue eliquis  pt reports taking coreg  HR currently controlled    Chronic Pain Syndrome 2/2 multiple neck surgeries  C2-C7 Fusion  Continue lyrica, methadone and dilaudid  pt reports not being able to tolerate methadone at this time  will review ISTOP (see separate note)    Anxiety  pt reports taking zoloft    DVT PPx  eliquis           58 Male with PMH HTN, HLD, CAD/MI (no stents), T2DM on insulin/metformin/jardiance with hx of DKA, Afib on Eliquis, Chronic Pain Syndrome with multiple neck surgeries (C2-C7 fusion) on dilaudid and methadone presents with abdominal pain with nausea and vomiting.     Possible Gastroenteritis  pt with mostly epigastric pain, nausea and vomiting, cannot tolerate oral  CT AP with IV contrast with diverticulosis  Will trial CLD for now, advance as tolerated  Maintain IVF, Zofran prn  Monitor labs, follow up RVP    Chest Pain, unspecified  CAD, Hx MI  low suspicion for cardiac in natura  Tn negative x 2, EKG without acute ischemic changes  given history will get cardiac evaluation  Continue statin therapy, coreg 3.125 BID    T2DM on Insulin  pt reports taking metformin, jardiance and insulin at home  will order accuchecks with ISS for now as diet is being advanced  Adjust insulin requirements based on necessity, goal BS < 180  Follow up A1c    Afib on Eliquis  continue eliquis  pt reports taking coreg  HR currently controlled    Chronic Pain Syndrome 2/2 multiple neck surgeries  C2-C7 Fusion  Continue lyrica, methadone and dilaudid  pt reports not being able to tolerate methadone at this time  will review ISTOP (see separate note)    Anxiety  pt reports taking zoloft and klonopin 1 mg QHS    DVT PPx  Eliquis           58 Male with PMH HTN, HLD, CAD/MI (no stents), T2DM on insulin/metformin/jardiance with hx of DKA, Afib on Eliquis, Chronic Pain Syndrome with multiple neck surgeries (C2-C7 fusion) on dilaudid and methadone presents with abdominal pain with nausea and vomiting.     Possible Gastroenteritis  pt with mostly epigastric pain, nausea and vomiting, cannot tolerate oral  CT AP with IV contrast with diverticulosis  Will trial CLD for now, advance as tolerated  Maintain IVF, Zofran prn  Monitor labs, follow up RVP    Chest Pain, unspecified  CAD, Hx MI  low suspicion for cardiac in nat[place]  Tn negative x 2, EKG without acute ischemic changes  given history will get cardiac evaluation  Continue statin therapy, coreg 3.125 BID    T2DM on Insulin  pt reports taking metformin, jardiance and insulin at home  will order accuchecks with ISS for now as diet is being advanced  Adjust insulin requirements based on necessity, goal BS < 180  Follow up A1c    Afib on Eliquis  continue eliquis  pt reports taking coreg  HR currently controlled    Chronic Pain Syndrome 2/2 multiple neck surgeries  C2-C7 Fusion  Continue lyrica, methadone and dilaudid  pt reports not being able to tolerate methadone at this time  will review ISTOP (see separate note)  placed on IV Dilaudid for now until tolerates PO then place back on home Dilaudid 4mg four times a day    Anxiety  pt reports taking zoloft and klonopin 1 mg QHS    DVT PPx  Eliquis

## 2023-10-04 NOTE — ED PROVIDER NOTE - PLAN OF CARE
Acute abdominal pain nausea vomiting dehydration Acute abdominal pain nausea vomiting dehydration, Unable to tolerate p.o.

## 2023-10-04 NOTE — CHART NOTE - NSCHARTNOTEFT_GEN_A_CORE
I STOP Reference number [id number]    PDI	Current Rx	Drug Type	Rx Written	Rx Dispensed	Drug	Quantity	Days Supply	Prescriber Name	Prescriber EBONI #	Payment Method	Dispenser  A	Y	B	09/28/2023	09/29/2023	clonazepam 1 mg tablet	30	30	Aleisha Sutton P	[id number]	Medicare	[id number]  A	Y	B	09/28/2023	09/29/2023	clonazepam 0.5 mg tablet	30	30	Aleisha Sutton P	[id number]	Medicare	[id number]  A	Y		09/05/2023	09/13/2023	pregabalin 150 mg capsule	90	30	TrimarcCeline gates NP	[id number]	[facility] #[id number]  A	Y	O	09/05/2023	09/13/2023	methadone hcl 5 mg tablet	120	30	TrimarcCeline gates NP	[id number]	[facility] #[id number]  A	Y	O	09/05/2023	09/08/2023	hydromorphone 4 mg tablet	120	30	TrimarcCeline gates NP	[id number]	[facility] #[id number]  A	N	B	08/31/2023	08/31/2023	clonazepam 0.5 mg tablet	30	30	Aleisha Sutton P	[id number]	Medicare	[id number]  A	N	B	08/31/2023	08/31/2023	clonazepam 1 mg tablet	30	30	Aleisha Sutton P	[id number]	Medicare	[facility][id number]  A	N	O	08/02/2023	08/17/2023	methadone hcl 5 mg tablet	120	30	TrimarcCeline gates NP	[id number]	[facility] #[id number]  A	N		08/02/2023	08/16/2023	pregabalin 150 mg capsule	90	30	TrimarcCeline gates NP	[id number]	Medicare	[id number]  A	N	O	08/02/2023	08/09/2023	hydromorphone 4 mg tablet	120	30	Trimarckody, Celine MANCILLA	[id number]	[facility] #[id number]  A	N	B	08/01/2023	08/01/2023	clonazepam 1 mg tablet	30	30	Aleisha Sutton P	[id number]	Medicare	[id number]  A	N	B	08/01/2023	08/01/2023	clonazepam 0.5 mg tablet	30	30	Aleisha Sutton P	[id number]	Medicare	[id number]  A	N		07/03/2023	07/17/2023	pregabalin 150 mg capsule	90	30	Trimarco, Celine MANCILLA	[id number]	Medicare	[facility][id number]  A	N	O	07/03/2023	07/17/2023	methadone hcl 5 mg tablet	120	30	Trimarco, Celine NP	[id number]	[facility] #[id number]  A	N	O	07/03/2023	07/10/2023	hydromorphone 4 mg tablet	120	30	Trimarco, Celine NP	[id number]	[facility] #[id number]  A	N	B	06/30/2023	07/03/2023	clonazepam 1 mg tablet	30	30	Aleisha Sutton P	[id number]	Medicare	[facility][id number]  A	N	B	06/30/2023	07/03/2023	clonazepam 0.5 mg tablet	30	30	Aleisha Sutton P	[id number]	Medicare	[id number]  A	N	O	06/12/2023	06/17/2023	methadone hcl 5 mg tablet	120	30	Trimarco, Celine MANCILLA	[id number]	[facility] #[id number]  A	N		06/12/2023	06/17/2023	pregabalin 150 mg capsule	90	30	Trimarco, Celine MANCILLA	[id number]	[facility] #[id number]  A	N	O	06/15/2023	06/15/2023	hydromorphone 4 mg tablet	100	25	Trimarco, Celine NP	[id number]	[facility] #[id number]  A	N	B	06/06/2023	06/06/2023	clonazepam 1 mg tablet	30	30	Aleisha Sutton P	[id number]	Medicare	[id number]  A	N	B	06/06/2023	06/06/2023	clonazepam 0.5 mg tablet	30	30	Aleisha Sutton P	[id number]	Medicare	[id number]  A	N	O	05/05/2023	05/19/2023	methadone hcl 5 mg tablet	120	30	Trimarco, Celine NP	[id number]	[facility] #[id number]  A	N		05/05/2023	05/19/2023	pregabalin 150 mg capsule	90	30	Trimarco, Celine MANCILLA	[id number]	[facility] #[id number]  A	N	O	05/05/2023	05/15/2023	hydromorphone 4 mg tablet	120	30	TrimCeline archer NP	[id number]	[facility] #[id number]  A	N	B	05/10/2023	05/10/2023	clonazepam 1 mg tablet	30	30	Aleisha Sutton P P	[id number]	Medicare	[id number]  A	N	B	05/10/2023	05/10/2023	clonazepam 0.5 mg tablet	30	30	Aleisha Sutton P P	[id number]	Medicare	[facility][id number]  A	N	O	04/07/2023	04/19/2023	methadone hcl 5 mg tablet	120	30	StamaJuaquin saha	[id number]	[facility] #[id number]  A	N	O	04/07/2023	04/16/2023	hydromorphone 4 mg tablet	120	30	ClaudiaJuaquin saha	[id number]	[facility] #[id number]  A	N		04/07/2023	04/16/2023	pregabalin 150 mg capsule	90	30	ArgentinamaJuaquin saha	[id number]	[facility] #[id number]  A	N	B	04/05/2023	04/06/2023	clonazepam 1 mg tablet	30	30	Aleisha Sutton P P	[id number]	Medicare	[facility][id number]  A	N	B	04/05/2023	04/06/2023	clonazepam 0.5 mg tablet	30	30	Aleisha Sutton P P	[id number]	Medicare	[facility][id number]  A	N	O	03/10/2023	03/20/2023	methadone hcl 5 mg tablet	120	30	TrimarcoCeline NP	[id number]	[facility] #[id number]  A	N	O	03/10/2023	03/18/2023	hydromorphone 4 mg tablet	120	30	TrimarcCeline gates NP	[id number]	[facility] #[id number]  A	N		03/10/2023	03/18/2023	pregabalin 150 mg capsule	90	30	Trimarco, Celine NP	[id number]	[facility] #[id number]  A	N	B	03/09/2023	03/09/2023	clonazepam 1 mg tablet	30	30	Aleisha Sutton P	[id number]	Medicare	[id number]  A	N	B	03/09/2023	03/09/2023	clonazepam 0.5 mg tablet	30	30	Aleisha Sutton P P	[id number]	Medicare	[facility][id number]  A	N	O	02/10/2023	02/15/2023	hydromorphone 4 mg tablet	120	30	Celine Pacheco NP	[id number]	[facility] #[id number]  A	N	O	02/10/2023	02/15/2023	methadone hcl 5 mg tablet	120	30	TrimarcoCeline NP	[id number]	[facility] #[id number]  A	N		02/10/2023	02/14/2023	pregabalin 150 mg capsule	90	30	TrimeCline archer NP	[id number]	[facility] #[id number]  A	N	B	01/31/2023	01/31/2023	clonazepam 1 mg tablet	30	30	Aleisha Sutton P P	[id number]	Medicare	[facility][id number]  A	N	B	01/31/2023	01/31/2023	clonazepam 0.5 mg tablet	30	30	Aleisha Sutton P P	[id number]	Medicare	[facility][id number]  A	N	O	01/13/2023	01/18/2023	hydromorphone 4 mg tablet	119	30	StamaJuaquin saha	[id number]	[facility] #[id number]  A	N	O	01/13/2023	01/18/2023	methadone hcl 5 mg tablet	120	30	StamatosJuaquin	[id number]	[facility] #[id number]  A	N		01/13/2023	01/18/2023	pregabalin 150 mg capsule	90	30	StavalentinJuaquin kat	[id number]	[facility] #[id number]  A	N	O	12/07/2022	12/18/2022	methadone hcl 5 mg tablet	120	30	StamaJuaquin saha	[id number]	[facility] #[id number]  A	N	B	12/13/2022	12/14/2022	clonazepam 1 mg tablet	30	30	Aleisha Sutton P	[id number]	Medicare	[facility][id number]  A	N	B	12/13/2022	12/14/2022	clonazepam 0.5 mg tablet	30	30	Aleisha Sutton P	[id number]	Medicare	[facility][id number]  A	N		12/07/2022	12/09/2022	pregabalin 150 mg capsule	90	30	Stamatos, Juaquin	[id number]	[facility] #[id number]  A	N	O	12/07/2022	12/09/2022	hydromorphone 4 mg tablet	120	30	Stamatos, Juaquin	[id number]	[facility] #[id number]  A	N	O	11/04/2022	11/18/2022	methadone hcl 5 mg tablet	120	30	Stamatos, Juaquin	[id number]	[facility] #[id number]  A	N	B	11/15/2022	11/15/2022	clonazepam 1 mg tablet	30	30	Aleisha Sutton P P	[id number]	Medicare	[id number]  A	N	B	11/15/2022	11/15/2022	clonazepam 0.5 mg tablet	30	30	Aleisha Sutton P P	[id number]	Medicare	[facility][id number]  A	N	O	11/04/2022	11/08/2022	hydromorphone 4 mg tablet	120	30	Stamatos, Juaquin	[id number]	[facility] #[id number]  A	N		11/04/2022	11/08/2022	pregabalin 150 mg capsule	90	30	Stamatos, Juaquin	[id number]	[facility] #[id number]  A	N	B	11/08/2022	11/08/2022	clonazepam 1 mg tablet	7	7	Aleisha Sutton P P	[id number]	Medicare	[facility][id number]  A	N	B	11/08/2022	11/08/2022	clonazepam 0.5 mg tablet	7	7	Aleisha Sutton P P	[id number]	Medicare	[facility] #[id number]  A	N	O	10/03/2022	10/19/2022	methadone hcl 5 mg tablet	120	30	Stamatos, Juaquin	[id number]	[facility] #[id number]  A	N	O	10/03/2022	10/10/2022	hydromorphone 4 mg tablet	120	30	Stamatos, Juaquin	[id number]	[facility] #[id number]  A	N	B	10/04/2022	10/06/2022	clonazepam 1 mg tablet	30	30	Aleisha Sutton P P	[id number]	Medicare	[facility][id number]

## 2023-10-04 NOTE — ED ADULT TRIAGE NOTE - CADM TRG TX PRIOR TO ARRIVAL
----- Message from Lydia Rodriguez MD sent at 11/13/2020  1:15 PM CST -----  Regarding: FW: ct contrast ?  Please call Mr. Ribeiro to reassure him that we have discussed with his nephrologist Dr. Potter who confirmed that it is okay that he gets CT chest abdomen pelvis without IV contrast but with oral contrast (without IV contrast he will not need any special fluids).  Please be sure he is ordered for this correct scan and I can call him with the results.  This can should be done over the next couple weeks please.  ----- Message -----  From: Brenton Jacobson MD  Sent: 11/13/2020  11:27 AM CST  To: Lydia Rodriguez MD  Subject: RE: ct contrast ?                                oral contrast should not be an issue at all. if we need to give IV contrast then we will need 250 cc IVF before and after and more closer monitoring     thanks    Avel Jacobson  ----- Message -----  From: Lydia Rodriguez MD  Sent: 11/13/2020  11:15 AM CST  To: Brenton Jacobson MD, #  Subject: ct contrast ?                                    Sorry to bother you I believe I asked you already about this patient he is letter concerns about kidney dysfunction with contrast and scans.       I would like to order repeat scans to follow-up on pulmonary nodules and as surveillance for his colon cancer.  I was hoping to get a CT chest abdomen pelvis without IV contrast but with oral contrast.  Would oral can not trust be a problem for him or require any pre/post hydration?    Thanks in advance,  Lydia         fluids/IV

## 2023-10-04 NOTE — PATIENT PROFILE ADULT - FALL HARM RISK - HARM RISK INTERVENTIONS

## 2023-10-04 NOTE — ED PROVIDER NOTE - CLINICAL SUMMARY MEDICAL DECISION MAKING FREE TEXT BOX
58-year-old male with a past history of coronary artery disease atrial fibrillation depression on Eliquis chief complaint nausea vomiting for 4 days generalized weakness dehydration patient also complaining of chest pain patient has a history of coronary stents  EKG normal sinus rhythm LV[facility] ordered IV fluids Pepcid Zofran troponins 58-year-old male with a past history of coronary artery disease atrial fibrillation depression on Eliquis chief complaint nausea vomiting for 4 days generalized weakness dehydration patient also complaining of chest pain patient has a history of coronary stents  EKG normal sinus rhythm LV[facility] ordered IV fluids Pepcid Zofran troponins  Patient treated with Pepcid Zofran IV fluids CT abdomen done negative patient unable to tolerate p.o. plan to admit the patient for inability to tolerate p.o. inability to take his medications and fluids

## 2023-10-04 NOTE — H&P ADULT - NSHPREVIEWOFSYSTEMS_GEN_ALL_CORE
REVIEW OF SYSTEMS:  CONSTITUTIONAL: No fever, weight loss, or fatigue  EYES: No eye pain, visual disturbances, or discharge  ENMT:  No difficulty hearing, tinnitus, vertigo; No sinus or throat pain  NECK: No pain or stiffness  BREASTS: No pain, masses, or nipple discharge  RESPIRATORY: No cough, wheezing, chills or hemoptysis; No shortness of breath  CARDIOVASCULAR: No chest pain, palpitations, dizziness, or leg swelling  GASTROINTESTINAL: +abdominal or epigastric pain. +nausea, vomiting, or hematemesis; No diarrhea or constipation. No melena or hematochezia.  GENITOURINARY: No dysuria, frequency, hematuria, or incontinence  NEUROLOGICAL: No headaches, memory loss, loss of strength, numbness, or tremors  SKIN: No itching, burning, rashes, or lesions   LYMPH NODES: No enlarged glands  ENDOCRINE: No heat or cold intolerance; No hair loss  MUSCULOSKELETAL: No joint pain or swelling; No muscle, back, or extremity pain  PSYCHIATRIC: No depression, anxiety, mood swings, or difficulty sleeping  HEME/LYMPH: No easy bruising, or bleeding gums  ALLERY AND IMMUNOLOGIC: No hives or eczema    ALL ROS REVIEWED AND NORMAL EXCEPT AS STATED ABOVE

## 2023-10-04 NOTE — ED PROVIDER NOTE - PHYSICAL EXAMINATION
General:     NAD, well-nourished, well-appearing  Head:     NC/AT, EOMI, oral mucosa moist  Neck:     trachea midline  Lungs:     CTA b/l, no w/r/r  CVS:     S1S2, RRR, no m/g/r  Abd:     + hyperactive BS, s/ Positive epigastric and right lower quadrant tenderness/nd, no organomegaly  Ext:    2+ radial and pedal pulses, no c/c/e  Neuro: AAOx3, no sensory/motor deficits

## 2023-10-04 NOTE — H&P ADULT - NSHPLABSRESULTS_GEN_ALL_CORE
12.9   12.16 )-----------( 397      ( 04 Oct 2023 12:40 )             40.3       10-04    134  |  98  |  11  ----------------------------<  306  3.4   |  20  |  0.85    Ca    9.1      04 Oct 2023 12:40    TPro  7.2  /  Alb  3.5  /  TBili  0.6  /  DBili  x   /  AST  7   /  ALT  24  /  AlkPhos  98  10-04      CARDIAC MARKERS ( 04 Oct 2023 14:30 )  x     / 25.3 ng/L / x     / x     / x      CARDIAC MARKERS ( 04 Oct 2023 12:40 )  x     / 26.1 ng/L / x     / x     / x                  Urinalysis Basic - ( 04 Oct 2023 12:40 )    Color: x / Appearance: x / SG: x / pH: x  Gluc: 306 mg/dL / Ketone: x  / Bili: x / Urobili: x   Blood: x / Protein: x / Nitrite: x   Leuk Esterase: x / RBC: x / WBC x   Sq Epi: x / Non Sq Epi: x / Bacteria: x    < from: CT Abdomen and Pelvis w/ IV Cont (10.04.23 @ 15:36) >    IMPRESSION:  No bowel obstruction or grossly thickened bowel wall. Colonic   diverticulosis without evidence for diverticulitis. Appendix within   normal limits.    Splenomegaly.    --- End of Report ---            WINSTON FREIRE MD; Attending Radiologist  This document has been electronically signed. Oct  4 2023  4:29PM    < end of copied text > 12.9   12.16 )-----------( 397      ( 04 Oct 2023 12:40 )             40.3       10-04    134  |  98  |  11  ----------------------------<  306  3.4   |  20  |  0.85    Ca    9.1      04 Oct 2023 12:40    TPro  7.2  /  Alb  3.5  /  TBili  0.6  /  DBili  x   /  AST  7   /  ALT  24  /  AlkPhos  98  10-04      CARDIAC MARKERS ( 04 Oct 2023 14:30 )  x     / 25.3 ng/L / x     / x     / x      CARDIAC MARKERS ( 04 Oct 2023 12:40 )  x     / 26.1 ng/L / x     / x     / x                  Urinalysis Basic - ( 04 Oct 2023 12:40 )    Color: x / Appearance: x / SG: x / pH: x  Gluc: 306 mg/dL / Ketone: x  / Bili: x / Urobili: x   Blood: x / Protein: x / Nitrite: x   Leuk Esterase: x / RBC: x / WBC x   Sq Epi: x / Non Sq Epi: x / Bacteria: x    < from: CT Abdomen and Pelvis w/ IV Cont (10.04.23 @ 15:36) >    IMPRESSION:  No bowel obstruction or grossly thickened bowel wall. Colonic   diverticulosis without evidence for diverticulitis. Appendix within   normal limits.    Splenomegaly.    --- End of Report ---            WINSTON FREIRE MD; Attending Radiologist  This document has been electronically signed. Oct  4 2023  4:29PM    < end of copied text >    EKG with NSR, mod LVH @ 72 bpm (reviewed by myself)

## 2023-10-04 NOTE — H&P ADULT - HISTORY OF PRESENT ILLNESS
58 Male with PMH HTN, HLD, CAD/MI (no stents), T2DM on insulin/metformin/jardiance with hx of DKA, Afib on Eliquis, Chronic Pain Syndrome with multiple neck surgeries (C2-C7 fusion) on dilaudid and methadone presents with abdominal pain with nausea and vomiting.  Pt reports for last four days has had excruciating abdominal pain with associated nausea and vomiting.  Reports inability to keep anything down.  Reports diffuse abdominal pain described as cramping, inability to tolerate anything oral.  Reports vomiting multiple times the last four days.  Also reports episodes of chest pain, described as sharp, non radiating.  Denies recent sick contacts, no diarrhea, no fever, no shortness of breath.  Reports living at home with girlfriend and she has been well.      In ED CTAP done that showed diverticulosis, no explanation for above symtoms  Troponin are negative x 2, EKG with NSR, mod LVH @ 72

## 2023-10-04 NOTE — H&P ADULT - NSHPPHYSICALEXAM_GEN_ALL_CORE
T(C): 36.8 (10-04-23 @ 12:26), Max: 36.8 (10-04-23 @ 12:26)  HR: 82 (10-04-23 @ 15:00) (82 - 86)  BP: 145/85 (10-04-23 @ 15:00) (145/85 - 181/86)  RR: 18 (10-04-23 @ 15:00) (18 - 19)  SpO2: 98% (10-04-23 @ 15:00) (97% - 98%)  Wt(kg): --Vital Signs Last 24 Hrs  T(C): 36.8 (04 Oct 2023 12:26), Max: 36.8 (04 Oct 2023 12:26)  T(F): 98.2 (04 Oct 2023 12:26), Max: 98.2 (04 Oct 2023 12:26)  HR: 82 (04 Oct 2023 15:00) (82 - 86)  BP: 145/85 (04 Oct 2023 15:00) (145/85 - 181/86)  BP(mean): --  RR: 18 (04 Oct 2023 15:00) (18 - 19)  SpO2: 98% (04 Oct 2023 15:00) (97% - 98%)    Parameters below as of 04 Oct 2023 15:00  Patient On (Oxygen Delivery Method): room air        PHYSICAL EXAM:  GENERAL: NAD, well-groomed, well-developed  HEAD:  Atraumatic, Normocephalic  EYES: EOMI, PERRLA, conjunctiva and sclera clear  ENMT: No tonsillar erythema, exudates, or enlargement; Moist mucous membranes, Good dentition, No lesions  NECK: Supple, No JVD, Normal thyroid  NERVOUS SYSTEM:  Alert & Oriented X3, Good concentration; Motor Strength 5/5 B/L upper and lower extremities; DTRs 2+ intact and symmetric  CHEST/LUNG: Clear to percussion bilaterally; No rales, rhonchi, wheezing, or rubs  HEART: Regular rate and rhythm; No murmurs, rubs, or gallops  ABDOMEN: Soft, +epigastric tenderness, Obese; Bowel sounds present  EXTREMITIES:  2+ Peripheral Pulses, No clubbing, cyanosis, or edema  LYMPH: No lymphadenopathy noted  SKIN: No rashes or lesions

## 2023-10-04 NOTE — PATIENT PROFILE ADULT - MONEY FOR FOOD
Chief complaint:   Chief Complaint   Patient presents with   • Referral     urology   • Anticoagulation     4.3 today hx PES on 7.5mg coumadin       Vitals:  Visit Vitals  /80   Pulse 85   Temp 98 °F (36.7 °C) (Oral)   Wt 125 kg   SpO2 97%   BMI 37.36 kg/m²       HISTORY OF PRESENT ILLNESS     HPI  49 year old male with hypertension   Requests referral to urology  Had problems and h/o multiple penile surgeries since a child last sugery at age 9 said blood veesls are prominent  intip of penis  Had blood came out with semen two months ago said hes partner was too rough with oral sex and no blood in semen since than  Shoulders tingle works on the computer for 12 hours ago  No weakness  Father had mi ay age 26 years had cabg cva patient wants referral to cardiology  Abuses crack cocaine and pot  Drinks alcohol once a week 4 beer  Other significant problems:  Patient Active Problem List    Diagnosis Date Noted   • Essential hypertension 12/08/2017     Priority: Low   • Family history of early CAD 12/08/2017     Priority: Low   • Lipid screening 12/08/2017     Priority: Low   • Drug abuse and dependence (CMS/[facility]) 12/08/2017     Priority: Low   • Bilateral pulmonary embolism (CMS/[facility]) 08/11/2014     Priority: Low       PAST MEDICAL, FAMILY AND SOCIAL HISTORY     Medications:  Current Outpatient Prescriptions   Medication   • hydrochlorothiazide (HYDRODIURIL) 25 MG tablet   • cyclobenzaprine (FLEXERIL) 10 MG tablet   • vitamin - therapeutic multivitamins w/minerals (CENTRUM SILVER,THERA-M) TABS   • warfarin (COUMADIN) 6 MG tablet   • metoPROLOL succinate (TOPROL-XL) 25 MG 24 hr tablet   • sennosides 15 MG chewable tablet   • dextromethorphan-guaifenesin (ROBITUSSIN COLD COUGH+ CHEST)  MG/5ML syrup     No current facility-administered medications for this visit.        Allergies:  ALLERGIES:  No Known Allergies    Past Medical  History/Surgeries:  Past Medical History:   Diagnosis Date   • Blood clot associated  with vein wall inflammation        Past Surgical History:   Procedure Laterality Date   • APPENDECTOMY         Family History:  Family History   Problem Relation Age of Onset   • Heart disease Father    • Broken Bones Mother    • Blood Disease Mother      possbile \"blood clot\"       Social History:  Social History   Substance Use Topics   • Smoking status: Never Smoker   • Smokeless tobacco: Never Used   • Alcohol use Yes      Comment: 2-3 4 packs of beer a week        REVIEW OF SYSTEMS     Review of Systems   Constitutional: Negative.    HENT: Negative.    Eyes: Negative.    Respiratory: Negative.    Cardiovascular: Negative.    Gastrointestinal: Negative.    Endocrine: Negative.    Genitourinary: Negative for decreased urine volume, difficulty urinating, dysuria, genital sores, penile pain, penile swelling, scrotal swelling, testicular pain and urgency.   Musculoskeletal: Negative.    Allergic/Immunologic: Negative.    Neurological: Negative.    Hematological: Negative.        PHYSICAL EXAM     Physical Exam   Constitutional: He is oriented to person, place, and time. He appears well-developed and well-nourished. No distress.   HENT:   Head: Normocephalic and atraumatic.   Eyes: Conjunctivae and EOM are normal. Pupils are equal, round, and reactive to light.   Neck: Normal range of motion. Neck supple. No thyromegaly present.   Cardiovascular: Normal rate, regular rhythm and intact distal pulses.    Abdominal: Soft. Bowel sounds are normal. He exhibits no distension. There is no tenderness. There is no rebound and no guarding.   Musculoskeletal: He exhibits no edema or tenderness.   Lymphadenopathy:     He has no cervical adenopathy.   Neurological: He is alert and oriented to person, place, and time.   Skin: He is not diaphoretic. No erythema.       ASSESSMENT/PLAN     On coumadin 7.5 mg daily    inr elevated 4.3   hold today and tomorrow  On Sunday 6 mg po daily inr on 12/11/17  ASSESSMENT:  1. Essential  hypertension    2. Abnormality of penis    3. Bilateral pulmonary embolism (CMS/HCC)    4. Elevated INR    5. Family history of early CAD    6. Lipid screening    7. Drug abuse and dependence (CMS/HCC)    declined genital /penis exam said it is embarrassing will see urologist  Not to work continually on computer with mouse   prper ergonomic chair and desk  PLAN:  Orders Placed This Encounter   • Lipid Panel with Reflex   • SERVICE TO UROLOGY   • SERVICE TO CARDIOLOGY   • SERVICE TO BEHAVIORAL HEALTH   • warfarin (COUMADIN) 6 MG tablet   • hydrochlorothiazide (HYDRODIURIL) 25 MG tablet   • metoPROLOL succinate (TOPROL-XL) 25 MG 24 hr tablet     tp quit etoh drugs  adeverese effects of caoaine on cardiovascular system including chest pain mi  Adverse effects reviewed  Return in about 4 weeks (around 1/5/2018), or if symptoms worsen or fail to improve.     no

## 2023-10-04 NOTE — H&P ADULT - NSHPSOCIALHISTORY_GEN_ALL_CORE
patient reports living at home with girlfriend  Denies alcohol, drug or nicotine use  mother is alive age 79 with DM2, dementia  father is alive age 81 with dementia

## 2023-10-04 NOTE — ED ADULT NURSE NOTE - OBJECTIVE STATEMENT
Patient came from home by EMS with complaint of chest pain and nausea. Patient states to be feeling nauseous for the past three days and states to have not been able to take his medications or take any food. Patient denies any fever, chills, headache, dizziness or syncope. PMH of HTN and DM.

## 2023-10-05 DIAGNOSIS — D64.9 ANEMIA, UNSPECIFIED: ICD-10-CM

## 2023-10-05 DIAGNOSIS — R11.2 NAUSEA WITH VOMITING, UNSPECIFIED: ICD-10-CM

## 2023-10-05 LAB
A1C WITH ESTIMATED AVERAGE GLUCOSE RESULT: 9.5 % — HIGH (ref 4–5.6)
ANION GAP SERPL CALC-SCNC: 8 MMOL/L — SIGNIFICANT CHANGE UP (ref 5–17)
BUN SERPL-MCNC: 9 MG/DL — SIGNIFICANT CHANGE UP (ref 7–23)
CALCIUM SERPL-MCNC: 8.6 MG/DL — SIGNIFICANT CHANGE UP (ref 8.4–10.5)
CHLORIDE SERPL-SCNC: 106 MMOL/L — SIGNIFICANT CHANGE UP (ref 96–108)
CO2 SERPL-SCNC: 25 MMOL/L — SIGNIFICANT CHANGE UP (ref 22–31)
CREAT SERPL-MCNC: 0.73 MG/DL — SIGNIFICANT CHANGE UP (ref 0.5–1.3)
EGFR: 107 ML/MIN/1.73M2 — SIGNIFICANT CHANGE UP
ESTIMATED AVERAGE GLUCOSE: 226 MG/DL — HIGH (ref 68–114)
FERRITIN SERPL-MCNC: 41 NG/ML — SIGNIFICANT CHANGE UP (ref 30–400)
GLUCOSE BLDC GLUCOMTR-MCNC: 175 MG/DL — HIGH (ref 70–99)
GLUCOSE BLDC GLUCOMTR-MCNC: 180 MG/DL — HIGH (ref 70–99)
GLUCOSE BLDC GLUCOMTR-MCNC: 206 MG/DL — HIGH (ref 70–99)
GLUCOSE BLDC GLUCOMTR-MCNC: 254 MG/DL — HIGH (ref 70–99)
GLUCOSE SERPL-MCNC: 203 MG/DL — HIGH (ref 70–99)
HCT VFR BLD CALC: 35.5 % — LOW (ref 39–50)
HGB BLD-MCNC: 11.4 G/DL — LOW (ref 13–17)
IRON SATN MFR SERPL: 20 % — SIGNIFICANT CHANGE UP (ref 16–55)
IRON SATN MFR SERPL: 57 UG/DL — SIGNIFICANT CHANGE UP (ref 45–165)
MAGNESIUM SERPL-MCNC: 1.7 MG/DL — SIGNIFICANT CHANGE UP (ref 1.6–2.6)
MCHC RBC-ENTMCNC: 26.3 PG — LOW (ref 27–34)
MCHC RBC-ENTMCNC: 32.1 GM/DL — SIGNIFICANT CHANGE UP (ref 32–36)
MCV RBC AUTO: 82 FL — SIGNIFICANT CHANGE UP (ref 80–100)
NRBC # BLD: 0 /100 WBCS — SIGNIFICANT CHANGE UP (ref 0–0)
PHOSPHATE SERPL-MCNC: 2.5 MG/DL — SIGNIFICANT CHANGE UP (ref 2.5–4.5)
PLATELET # BLD AUTO: 307 K/UL — SIGNIFICANT CHANGE UP (ref 150–400)
POTASSIUM SERPL-MCNC: 3.6 MMOL/L — SIGNIFICANT CHANGE UP (ref 3.5–5.3)
POTASSIUM SERPL-SCNC: 3.6 MMOL/L — SIGNIFICANT CHANGE UP (ref 3.5–5.3)
RAPID RVP RESULT: SIGNIFICANT CHANGE UP
RBC # BLD: 4.33 M/UL — SIGNIFICANT CHANGE UP (ref 4.2–5.8)
RBC # FLD: 15.4 % — HIGH (ref 10.3–14.5)
SARS-COV-2 RNA SPEC QL NAA+PROBE: SIGNIFICANT CHANGE UP
SODIUM SERPL-SCNC: 139 MMOL/L — SIGNIFICANT CHANGE UP (ref 135–145)
TIBC SERPL-MCNC: 286 UG/DL — SIGNIFICANT CHANGE UP (ref 220–430)
TSH SERPL-MCNC: 3.19 UIU/ML — SIGNIFICANT CHANGE UP (ref 0.36–3.74)
UIBC SERPL-MCNC: 230 UG/DL — SIGNIFICANT CHANGE UP (ref 110–370)
WBC # BLD: 8.7 K/UL — SIGNIFICANT CHANGE UP (ref 3.8–10.5)
WBC # FLD AUTO: 8.7 K/UL — SIGNIFICANT CHANGE UP (ref 3.8–10.5)

## 2023-10-05 PROCEDURE — 99232 SBSQ HOSP IP/OBS MODERATE 35: CPT

## 2023-10-05 PROCEDURE — 99223 1ST HOSP IP/OBS HIGH 75: CPT | Mod: FS

## 2023-10-05 RX ORDER — PANTOPRAZOLE SODIUM 20 MG/1
40 TABLET, DELAYED RELEASE ORAL
Refills: 0 | Status: DISCONTINUED | OUTPATIENT
Start: 2023-10-05 | End: 2023-10-05

## 2023-10-05 RX ORDER — ENOXAPARIN SODIUM 100 MG/ML
100 INJECTION SUBCUTANEOUS ONCE
Refills: 0 | Status: COMPLETED | OUTPATIENT
Start: 2023-10-05 | End: 2023-10-06

## 2023-10-05 RX ORDER — PANTOPRAZOLE SODIUM 20 MG/1
40 TABLET, DELAYED RELEASE ORAL EVERY 12 HOURS
Refills: 0 | Status: DISCONTINUED | OUTPATIENT
Start: 2023-10-05 | End: 2023-10-07

## 2023-10-05 RX ADMIN — Medication 4: at 12:43

## 2023-10-05 RX ADMIN — ONDANSETRON 4 MILLIGRAM(S): 8 TABLET, FILM COATED ORAL at 11:39

## 2023-10-05 RX ADMIN — HYDROMORPHONE HYDROCHLORIDE 2 MILLIGRAM(S): 2 INJECTION INTRAMUSCULAR; INTRAVENOUS; SUBCUTANEOUS at 13:49

## 2023-10-05 RX ADMIN — Medication 1 MILLIGRAM(S): at 21:48

## 2023-10-05 RX ADMIN — Medication 2: at 08:37

## 2023-10-05 RX ADMIN — HYDROMORPHONE HYDROCHLORIDE 2 MILLIGRAM(S): 2 INJECTION INTRAMUSCULAR; INTRAVENOUS; SUBCUTANEOUS at 18:32

## 2023-10-05 RX ADMIN — HYDROMORPHONE HYDROCHLORIDE 2 MILLIGRAM(S): 2 INJECTION INTRAMUSCULAR; INTRAVENOUS; SUBCUTANEOUS at 23:28

## 2023-10-05 RX ADMIN — HYDROMORPHONE HYDROCHLORIDE 2 MILLIGRAM(S): 2 INJECTION INTRAMUSCULAR; INTRAVENOUS; SUBCUTANEOUS at 18:17

## 2023-10-05 RX ADMIN — Medication 2: at 21:47

## 2023-10-05 RX ADMIN — Medication 2: at 17:21

## 2023-10-05 RX ADMIN — HYDROMORPHONE HYDROCHLORIDE 2 MILLIGRAM(S): 2 INJECTION INTRAMUSCULAR; INTRAVENOUS; SUBCUTANEOUS at 08:36

## 2023-10-05 RX ADMIN — HYDROMORPHONE HYDROCHLORIDE 2 MILLIGRAM(S): 2 INJECTION INTRAMUSCULAR; INTRAVENOUS; SUBCUTANEOUS at 15:15

## 2023-10-05 RX ADMIN — HYDROMORPHONE HYDROCHLORIDE 2 MILLIGRAM(S): 2 INJECTION INTRAMUSCULAR; INTRAVENOUS; SUBCUTANEOUS at 09:20

## 2023-10-05 RX ADMIN — HYDROMORPHONE HYDROCHLORIDE 2 MILLIGRAM(S): 2 INJECTION INTRAMUSCULAR; INTRAVENOUS; SUBCUTANEOUS at 01:58

## 2023-10-05 RX ADMIN — SODIUM CHLORIDE 100 MILLILITER(S): 9 INJECTION INTRAMUSCULAR; INTRAVENOUS; SUBCUTANEOUS at 16:05

## 2023-10-05 RX ADMIN — PANTOPRAZOLE SODIUM 40 MILLIGRAM(S): 20 TABLET, DELAYED RELEASE ORAL at 11:14

## 2023-10-05 RX ADMIN — PANTOPRAZOLE SODIUM 40 MILLIGRAM(S): 20 TABLET, DELAYED RELEASE ORAL at 21:48

## 2023-10-05 NOTE — CONSULT NOTE ADULT - PROBLEM SELECTOR RECOMMENDATION 9
Nausea, vomiting possible with Esophagitis or gastroparesis   Continue PPI BID  Avoid NSAIDs   Keep patient NPO after mid night   EGD tomorrow  Hold off Eliquis

## 2023-10-05 NOTE — CONSULT NOTE ADULT - CONSULT REASON
nausea, abd pain, unable to tolerate PO, h/o iron deficiency anemia Nausea, abd pain, unable to tolerate PO, h/o iron deficiency anemia

## 2023-10-05 NOTE — CONSULT NOTE ADULT - PROBLEM SELECTOR RECOMMENDATION 2
Possible multifactorial   Monitor H&H  Keep active T&S  Transfuse if Hb less than 7  Plan for EGD tomorrow  If normal will plan colonoscopy Monday Possible multifactorial   Monitor H&H  Keep active T&S  Transfuse if Hb less than 7  Plan for EGD tomorrow, pending Anesthesia review.  (If normal will consider colonoscopy Monday)

## 2023-10-05 NOTE — CONSULT NOTE ADULT - ASSESSMENT
58 Male with PMH HTN, HLD, CAD/MI (no stents), T2DM on insulin /metformin/ Jardiance with hx of DKA, Afib on Eliquis, Chronic Pain Syndrome with multiple neck surgeries (C2-C7 fusion) on Dilaudid and methadone presents with abdominal pain with nausea and vomiting.    GI Consultation for nausea, abd pain, unable to tolerate PO, h/o iron deficiency anemia. He never had EGD & had colonoscopy 4 years before and as per patient the results were normal.   He is on Eliquis for Afib but did not took any medication for past 5 days.

## 2023-10-05 NOTE — CONSULT NOTE ADULT - SUBJECTIVE AND OBJECTIVE BOX
INTERVAL HPI/OVERNIGHT EVENTS:  HPI:  58 Male with PMH HTN, HLD, CAD/MI (no stents), T2DM on insulin/metformin/jardiance with hx of DKA, Afib on Eliquis, Chronic Pain Syndrome with multiple neck surgeries (C2-C7 fusion) on dilaudid and methadone presents with abdominal pain with nausea and vomiting.  Pt reports for last four days has had excruciating abdominal pain with associated nausea and vomiting.  Reports inability to keep anything down.  Reports diffuse abdominal pain described as cramping, inability to tolerate anything oral.  Reports vomiting multiple times the last four days.  Also reports episodes of chest pain, described as sharp, non radiating.  Denies recent sick contacts, no diarrhea, no fever, no shortness of breath.  Reports living at home with girlfriend and she has been well.      In ED CTAP done that showed diverticulosis, no explanation for above symtoms  Troponin are negative x 2, EKG with NSR, mod LVH @ 72 (04 Oct 2023 18:04)    GI Consultation for nausea, abd pain, unable to tolerate PO, h/o iron deficiency anemia. Patient seen examied at bed side. He has epigastric discomfort abdominal pain, nausea, vomiting , unable to eat any thing even medication for past 4-5 days. Denies diarrhea, no bowel movement for 3-4 days .     MEDICATIONS  (STANDING):  apixaban 5 milliGRAM(s) Oral every 12 hours  atorvastatin 80 milliGRAM(s) Oral at bedtime  carvedilol 3.125 milliGRAM(s) Oral every 12 hours  clonazePAM Oral Disintegrating Tablet 1 milliGRAM(s) Oral at bedtime  dextrose 5%. 1000 milliLiter(s) (100 mL/Hr) IV Continuous <Continuous>  dextrose 5%. 1000 milliLiter(s) (50 mL/Hr) IV Continuous <Continuous>  dextrose 50% Injectable 12.5 Gram(s) IV Push once  dextrose 50% Injectable 25 Gram(s) IV Push once  dextrose 50% Injectable 25 Gram(s) IV Push once  glucagon  Injectable 1 milliGRAM(s) IntraMuscular once  insulin lispro (ADMELOG) corrective regimen sliding scale   SubCutaneous at bedtime  insulin lispro (ADMELOG) corrective regimen sliding scale   SubCutaneous three times a day before meals  methadone    Tablet 5 milliGRAM(s) Oral four times a day  pantoprazole  Injectable 40 milliGRAM(s) IV Push two times a day  pregabalin 150 milliGRAM(s) Oral three times a day  sertraline 100 milliGRAM(s) Oral daily  sodium chloride 0.9%. 1000 milliLiter(s) (100 mL/Hr) IV Continuous <Continuous>  traZODone 150 milliGRAM(s) Oral at bedtime    MEDICATIONS  (PRN):  aluminum hydroxide/magnesium hydroxide/simethicone Suspension 30 milliLiter(s) Oral every 4 hours PRN Dyspepsia  dextrose Oral Gel 15 Gram(s) Oral once PRN Blood Glucose LESS THAN 70 milliGRAM(s)/deciliter  HYDROmorphone  Injectable 2 milliGRAM(s) IV Push every 4 hours PRN Severe Pain (7 - 10)  ondansetron Injectable 4 milliGRAM(s) IV Push every 8 hours PRN Nausea and/or Vomiting      Allergies    No Known Allergies    Intolerances        PAST MEDICAL & SURGICAL HISTORY:  Hypertension      Diabetes mellitus      Gastric ulcer      Spinal stenosis      H/O spinal cord compression      Spondylosis      H/O radiculopathy      H/O cervical spine surgery      History of back surgery      S/P cervical spinal fusion      REVIEW OF SYSTEMS  See HPI     PHYSICAL EXAM:   Vital Signs:  Vital Signs Last 24 Hrs  T(C): 36.7 (05 Oct 2023 12:27), Max: 36.7 (05 Oct 2023 12:27)  T(F): 98 (05 Oct 2023 12:27), Max: 98 (05 Oct 2023 12:27)  HR: 63 (05 Oct 2023 12:27) (61 - 82)  BP: 156/85 (05 Oct 2023 12:27) (127/85 - 156/85)  BP(mean): --  RR: 18 (05 Oct 2023 12:27) (18 - 18)  SpO2: 95% (05 Oct 2023 12:27) (94% - 98%)    Parameters below as of 05 Oct 2023 12:27  Patient On (Oxygen Delivery Method): room air      Daily Height in cm: 187.96 (04 Oct 2023 20:55)    Daily Weight in k.8 (05 Oct 2023 05:24)I&O's Summary    04 Oct 2023 07:01  -  05 Oct 2023 07:00  --------------------------------------------------------  IN: 0 mL / OUT: 800 mL / NET: -800 mL    05 Oct 2023 07:01  -  05 Oct 2023 13:44  --------------------------------------------------------  IN: 100 mL / OUT: 300 mL / NET: -200 mL        GENERAL:  Appears stated age  HEENT:  NC/AT,  conjunctivae clear and pink  CHEST:  Full & symmetric excursion  HEART:  Regular rhythm, S1, S2  ABDOMEN:  Soft, non-tender, non-distended, normoactive bowel sounds  EXTREMITIES:  no cyanosis, clubbing or edema  SKIN:  No rash/warm/dry  NEURO:  Alert, oriented      LABS:                        11.4   8.70  )-----------( 307      ( 05 Oct 2023 07:50 )             35.5     10-    139  |  106  |  9   ----------------------------<  203<H>  3.6   |  25  |  0.73    Ca    8.6      05 Oct 2023 07:50  Phos  2.5     10-  Mg     1.7     10-    TPro  7.2  /  Alb  3.5  /  TBili  0.6  /  DBili  x   /  AST  7<L>  /  ALT  24  /  AlkPhos  98  10      Urinalysis Basic - ( 05 Oct 2023 07:50 )    Color: x / Appearance: x / SG: x / pH: x  Gluc: 203 mg/dL / Ketone: x  / Bili: x / Urobili: x   Blood: x / Protein: x / Nitrite: x   Leuk Esterase: x / RBC: x / WBC x   Sq Epi: x / Non Sq Epi: x / Bacteria: x      amylase   lipaseLipase: 13 U/L (10-04 @ 12:40)    RADIOLOGY & ADDITIONAL TESTS:   INTERVAL HPI/OVERNIGHT EVENTS:  HPI:  58 Male with PMH HTN, HLD, CAD/MI (no stents), T2DM on insulin/metformin/jardiance with hx of DKA, Afib on Eliquis, Chronic Pain Syndrome with multiple neck surgeries (C2-C7 fusion) on dilaudid and methadone presents with abdominal pain with nausea and vomiting.  Pt reports for last four days has had excruciating abdominal pain with associated nausea and vomiting.  Reports inability to keep anything down.  Reports diffuse abdominal pain described as cramping, inability to tolerate anything oral.  Reports vomiting multiple times the last four days.  Also reports episodes of chest pain, described as sharp, non radiating.  Denies recent sick contacts, no diarrhea, no fever, no shortness of breath.  Reports living at home with girlfriend and she has been well.      In ED CTAP done that showed diverticulosis, no explanation for above symtoms  Troponin are negative x 2, EKG with NSR, mod LVH @ 72 (04 Oct 2023 18:04)    GI Consultation for nausea, abd pain, unable to tolerate PO, h/o iron deficiency anemia. Patient seen examined at bed side. He has epigastric discomfort abdominal pain, nausea, vomiting , unable to eat any thing even medication for past 4-5 days. Denies diarrhea, no bowel movement for 3-4 days .He never had EGD & had colonoscopy 4 years before and as per patient the results were normal.    MEDICATIONS  (STANDING):  apixaban 5 milliGRAM(s) Oral every 12 hours  atorvastatin 80 milliGRAM(s) Oral at bedtime  carvedilol 3.125 milliGRAM(s) Oral every 12 hours  clonazePAM Oral Disintegrating Tablet 1 milliGRAM(s) Oral at bedtime  dextrose 5%. 1000 milliLiter(s) (100 mL/Hr) IV Continuous <Continuous>  dextrose 5%. 1000 milliLiter(s) (50 mL/Hr) IV Continuous <Continuous>  dextrose 50% Injectable 12.5 Gram(s) IV Push once  dextrose 50% Injectable 25 Gram(s) IV Push once  dextrose 50% Injectable 25 Gram(s) IV Push once  glucagon  Injectable 1 milliGRAM(s) IntraMuscular once  insulin lispro (ADMELOG) corrective regimen sliding scale   SubCutaneous at bedtime  insulin lispro (ADMELOG) corrective regimen sliding scale   SubCutaneous three times a day before meals  methadone    Tablet 5 milliGRAM(s) Oral four times a day  pantoprazole  Injectable 40 milliGRAM(s) IV Push two times a day  pregabalin 150 milliGRAM(s) Oral three times a day  sertraline 100 milliGRAM(s) Oral daily  sodium chloride 0.9%. 1000 milliLiter(s) (100 mL/Hr) IV Continuous <Continuous>  traZODone 150 milliGRAM(s) Oral at bedtime    MEDICATIONS  (PRN):  aluminum hydroxide/magnesium hydroxide/simethicone Suspension 30 milliLiter(s) Oral every 4 hours PRN Dyspepsia  dextrose Oral Gel 15 Gram(s) Oral once PRN Blood Glucose LESS THAN 70 milliGRAM(s)/deciliter  HYDROmorphone  Injectable 2 milliGRAM(s) IV Push every 4 hours PRN Severe Pain (7 - 10)  ondansetron Injectable 4 milliGRAM(s) IV Push every 8 hours PRN Nausea and/or Vomiting      Allergies    No Known Allergies    Intolerances        PAST MEDICAL & SURGICAL HISTORY:  Hypertension      Diabetes mellitus      Gastric ulcer      Spinal stenosis      H/O spinal cord compression      Spondylosis      H/O radiculopathy      H/O cervical spine surgery      History of back surgery      S/P cervical spinal fusion      REVIEW OF SYSTEMS  See HPI     PHYSICAL EXAM:   Vital Signs:  Vital Signs Last 24 Hrs  T(C): 36.7 (05 Oct 2023 12:27), Max: 36.7 (05 Oct 2023 12:27)  T(F): 98 (05 Oct 2023 12:27), Max: 98 (05 Oct 2023 12:27)  HR: 63 (05 Oct 2023 12:27) (61 - 82)  BP: 156/85 (05 Oct 2023 12:27) (127/85 - 156/85)  BP(mean): --  RR: 18 (05 Oct 2023 12:27) (18 - 18)  SpO2: 95% (05 Oct 2023 12:27) (94% - 98%)    Parameters below as of 05 Oct 2023 12:27  Patient On (Oxygen Delivery Method): room air      Daily Height in cm: 187.96 (04 Oct 2023 20:55)    Daily Weight in k.8 (05 Oct 2023 05:24)I&O's Summary    04 Oct 2023 07:01  -  05 Oct 2023 07:00  --------------------------------------------------------  IN: 0 mL / OUT: 800 mL / NET: -800 mL    05 Oct 2023 07:01  -  05 Oct 2023 13:44  --------------------------------------------------------  IN: 100 mL / OUT: 300 mL / NET: -200 mL        GENERAL:  Appears stated age  HEENT:  NC/AT,  conjunctivae clear and pink  CHEST:  Full & symmetric excursion  HEART:  Regular rhythm, S1, S2  ABDOMEN:  Soft, non-tender, non-distended, normoactive bowel sounds  EXTREMITIES:  no cyanosis, clubbing or edema  SKIN:  No rash/warm/dry  NEURO:  Alert, oriented      LABS:                        11.4   8.70  )-----------( 307      ( 05 Oct 2023 07:50 )             35.5     10-    139  |  106  |  9   ----------------------------<  203<H>  3.6   |  25  |  0.73    Ca    8.6      05 Oct 2023 07:50  Phos  2.5     10-05  Mg     1.7     10-    TPro  7.2  /  Alb  3.5  /  TBili  0.6  /  DBili  x   /  AST  7<L>  /  ALT  24  /  AlkPhos  98  10      Urinalysis Basic - ( 05 Oct 2023 07:50 )    Color: x / Appearance: x / SG: x / pH: x  Gluc: 203 mg/dL / Ketone: x  / Bili: x / Urobili: x   Blood: x / Protein: x / Nitrite: x   Leuk Esterase: x / RBC: x / WBC x   Sq Epi: x / Non Sq Epi: x / Bacteria: x      amylase   lipaseLipase: 13 U/L (10-04 @ 12:40)    RADIOLOGY & ADDITIONAL TESTS:

## 2023-10-05 NOTE — CONSULT NOTE ADULT - NS ATTEND AMEND GEN_ALL_CORE FT
Await anesthesia input re risk assessment, consider cardiology consult... tentative plan for egd, pending course.

## 2023-10-05 NOTE — PROGRESS NOTE ADULT - SUBJECTIVE AND OBJECTIVE BOX
HPI:  58 Male with PMH HTN, HLD, CAD/MI (no stents), T2DM on insulin/metformin/jardiance with hx of DKA, Afib on Eliquis, Chronic Pain Syndrome with multiple neck surgeries (C2-C7 fusion) on dilaudid and methadone presents with abdominal pain with nausea and vomiting.  Pt reports for last four days has had excruciating crampy abdominal pain with associated nausea and vomiting.  Reports inability to keep anything down and vomiting multiple times in the last four days.  Also reports episodes of chest pain, described as sharp, non radiating.  Denies recent sick contacts, no diarrhea, no fever, no shortness of breath.  Reports living at home with girlfriend and she has been well.      In ED CT A/P done that showed diverticulosis, no explanation for above symptoms. Troponin are negative x 2, EKG with NSR, mod LVH @ 72      Patient is a 58y old  Male who presents with a chief complaint of abd pain     Subjective: Patient was seen and examined this morning at bedside.   Overnight events: unable to tolerate PO intake    REVIEW OF SYSTEMS:  CONSTITUTIONAL: No weakness, fevers or chills  EYES/ENT: No visual changes;  No vertigo or throat pain   NECK: No pain or stiffness  RESPIRATORY: No cough, wheezing, hemoptysis; No shortness of breath  CARDIOVASCULAR: No chest pain or palpitations  GASTROINTESTINAL: +abdominal pain, +nausea, +vomiting; No diarrhea or constipation.   GENITOURINARY: No dysuria, frequency or hematuria  NEUROLOGICAL: No numbness or weakness  SKIN: No itching, burning, rashes, or lesions       Vital Signs Last 24 Hrs  T(C): 36.4 (05 Oct 2023 05:24), Max: 36.8 (04 Oct 2023 12:26)  T(F): 97.5 (05 Oct 2023 05:24), Max: 98.2 (04 Oct 2023 12:26)  HR: 67 (05 Oct 2023 05:24) (67 - 86)  BP: 148/82 (05 Oct 2023 05:24) (127/85 - 181/86)  BP(mean): --  RR: 18 (05 Oct 2023 05:24) (18 - 19)  SpO2: 95% (05 Oct 2023 05:24) (94% - 98%)    Parameters below as of 05 Oct 2023 05:24  Patient On (Oxygen Delivery Method): room air        PHYSICAL EXAM  Constitutional: Pt lying in bed, awake and alert, NAD  HEENT: EOMI, normocephalic, moist mucous membranes  Neck: Soft and supple,   Respiratory: CTABL, No wheezing, rales or rhonchi  Cardiovascular: S1S2+, RRR, no M/G/R  Gastrointestinal: BS+, soft, obese abdomen, epigastric tenderness, nondistended, no guarding, no rebound  Extremities: No calf pain or edema  Vascular: Peripheral pulses present  Neurological: AAOx3, no focal deficits  Musculoskeletal: Normal muscle tone, no atrophy, no rigidity, no contractions  Skin: No significant new skin lesions or rashes    MEDICATIONS:  MEDICATIONS  (STANDING):  apixaban 5 milliGRAM(s) Oral every 12 hours  atorvastatin 80 milliGRAM(s) Oral at bedtime  carvedilol 3.125 milliGRAM(s) Oral every 12 hours  clonazePAM Oral Disintegrating Tablet 1 milliGRAM(s) Oral at bedtime  dextrose 5%. 1000 milliLiter(s) (100 mL/Hr) IV Continuous <Continuous>  dextrose 5%. 1000 milliLiter(s) (50 mL/Hr) IV Continuous <Continuous>  dextrose 50% Injectable 12.5 Gram(s) IV Push once  dextrose 50% Injectable 25 Gram(s) IV Push once  dextrose 50% Injectable 25 Gram(s) IV Push once  glucagon  Injectable 1 milliGRAM(s) IntraMuscular once  insulin lispro (ADMELOG) corrective regimen sliding scale   SubCutaneous at bedtime  insulin lispro (ADMELOG) corrective regimen sliding scale   SubCutaneous three times a day before meals  methadone    Tablet 5 milliGRAM(s) Oral four times a day  pregabalin 150 milliGRAM(s) Oral three times a day  sertraline 100 milliGRAM(s) Oral daily  sodium chloride 0.9%. 1000 milliLiter(s) (100 mL/Hr) IV Continuous <Continuous>  traZODone 150 milliGRAM(s) Oral at bedtime    MEDICATIONS  (PRN):  aluminum hydroxide/magnesium hydroxide/simethicone Suspension 30 milliLiter(s) Oral every 4 hours PRN Dyspepsia  dextrose Oral Gel 15 Gram(s) Oral once PRN Blood Glucose LESS THAN 70 milliGRAM(s)/deciliter  HYDROmorphone  Injectable 2 milliGRAM(s) IV Push every 4 hours PRN Severe Pain (7 - 10)  ondansetron Injectable 4 milliGRAM(s) IV Push every 8 hours PRN Nausea and/or Vomiting      LABS: All Labs Reviewed:                        12.9   12.16 )-----------( 397      ( 04 Oct 2023 12:40 )             40.3     10-04    134<L>  |  98  |  11  ----------------------------<  306<H>  3.4<L>   |  20<L>  |  0.85    Ca    9.1      04 Oct 2023 12:40    TPro  7.2  /  Alb  3.5  /  TBili  0.6  /  DBili  x   /  AST  7<L>  /  ALT  24  /  AlkPhos  98  10-04          CAPILLARY BLOOD GLUCOSE      POCT Blood Glucose.: 262 mg/dL (04 Oct 2023 21:11)  POCT Blood Glucose.: 250 mg/dL (04 Oct 2023 13:00)        RADIOLOGY/EKG:   HPI:  58 Male with PMH HTN, HLD, CAD/MI (no stents but had a ballon), T2DM on insulin/metformin/jardiance with hx of DKA, Afib on Eliquis, h/o iron deficiency, Chronic Pain Syndrome with multiple neck surgeries (C2-C7 fusion) on dilaudid and methadone presents with abdominal pain with nausea and vomiting.  Pt reports for last four days has had excruciating crampy abdominal pain with associated nausea and vomiting.  Reports inability to keep anything down and vomiting (unable to determine if they were bloody) multiple times in the last four days.  Also reports episodes of chest pain, described as sharp, non radiating, reproduced by palpation. LBM was 4 days ago, non-bloody or mucous. Denies recent sick contacts, no diarrhea, no fever, no shortness of breath, no recent travels, no sick contacts and no new/raw food.  Reports living at home with girlfriend and she has been well.      In ED CT A/P done that showed diverticulosis, no explanation for above symptoms. Troponin are negative x 2, EKG with NSR, mod LVH @ 72      Patient is a 58y old  Male who presents with a chief complaint of abdominal pain     Subjective: Patient was seen and examined this morning at bedside. Pt reports nausea, but no vomiting or bowel movements. Pt states his last colonoscopy showed benign polyp. No chest pain currently but Pt refers Chest pain may be due to his constant retching in past 4 days.  Overnight events: unable to tolerate PO intake    REVIEW OF SYSTEMS:  CONSTITUTIONAL: No weakness, fevers or chills  EYES/ENT: No visual changes;  No vertigo or throat pain   NECK: No pain or stiffness  RESPIRATORY: No cough, wheezing, hemoptysis; No shortness of breath  CARDIOVASCULAR: No chest pain or palpitations  GASTROINTESTINAL: +abdominal pain, +nausea, ; No vomiting, diarrhea or constipation.   GENITOURINARY: No dysuria, frequency or hematuria  NEUROLOGICAL: No numbness or weakness  SKIN: No itching, burning, rashes, or lesions       Vital Signs Last 24 Hrs  T(C): 36.4 (05 Oct 2023 05:24), Max: 36.8 (04 Oct 2023 12:26)  T(F): 97.5 (05 Oct 2023 05:24), Max: 98.2 (04 Oct 2023 12:26)  HR: 67 (05 Oct 2023 05:24) (67 - 86)  BP: 148/82 (05 Oct 2023 05:24) (127/85 - 181/86)  BP(mean): --  RR: 18 (05 Oct 2023 05:24) (18 - 19)  SpO2: 95% (05 Oct 2023 05:24) (94% - 98%)    Parameters below as of 05 Oct 2023 05:24  Patient On (Oxygen Delivery Method): room air        PHYSICAL EXAM  Constitutional: Pt lying in bed, awake and alert, NAD  HEENT: EOMI, normocephalic, moist mucous membranes  Neck: Soft and supple,   Respiratory: CTABL, No wheezing, rales or rhonchi  Cardiovascular: S1S2+, RRR, no M/G/R, reproducible chest pain with palpation of Left anterior chest  Gastrointestinal: BS+, soft, obese abdomen, epigastric tenderness, nondistended, no guarding, no rebound  Extremities: No calf pain or edema  Vascular: Peripheral pulses present  Neurological: AAOx3, no focal deficits  Musculoskeletal: Normal muscle tone, no atrophy, no rigidity, no contractions  Skin: No significant new skin lesions or rashes    MEDICATIONS:  MEDICATIONS  (STANDING):  apixaban 5 milliGRAM(s) Oral every 12 hours  atorvastatin 80 milliGRAM(s) Oral at bedtime  carvedilol 3.125 milliGRAM(s) Oral every 12 hours  clonazePAM Oral Disintegrating Tablet 1 milliGRAM(s) Oral at bedtime  dextrose 5%. 1000 milliLiter(s) (100 mL/Hr) IV Continuous <Continuous>  dextrose 5%. 1000 milliLiter(s) (50 mL/Hr) IV Continuous <Continuous>  dextrose 50% Injectable 12.5 Gram(s) IV Push once  dextrose 50% Injectable 25 Gram(s) IV Push once  dextrose 50% Injectable 25 Gram(s) IV Push once  glucagon  Injectable 1 milliGRAM(s) IntraMuscular once  insulin lispro (ADMELOG) corrective regimen sliding scale   SubCutaneous at bedtime  insulin lispro (ADMELOG) corrective regimen sliding scale   SubCutaneous three times a day before meals  methadone    Tablet 5 milliGRAM(s) Oral four times a day  pregabalin 150 milliGRAM(s) Oral three times a day  sertraline 100 milliGRAM(s) Oral daily  sodium chloride 0.9%. 1000 milliLiter(s) (100 mL/Hr) IV Continuous <Continuous>  traZODone 150 milliGRAM(s) Oral at bedtime    MEDICATIONS  (PRN):  aluminum hydroxide/magnesium hydroxide/simethicone Suspension 30 milliLiter(s) Oral every 4 hours PRN Dyspepsia  dextrose Oral Gel 15 Gram(s) Oral once PRN Blood Glucose LESS THAN 70 milliGRAM(s)/deciliter  HYDROmorphone  Injectable 2 milliGRAM(s) IV Push every 4 hours PRN Severe Pain (7 - 10)  ondansetron Injectable 4 milliGRAM(s) IV Push every 8 hours PRN Nausea and/or Vomiting      LABS: All Labs Reviewed:                           11.4   8.70  )-----------( 307      ( 05 Oct 2023 07:50 )             35.5     10-05    139  |  106  |  9   ----------------------------<  203<H>  3.6   |  25  |  0.73    Ca    8.6      05 Oct 2023 07:50  Phos  2.5     10-05  Mg     1.7     10-05    TPro  7.2  /  Alb  3.5  /  TBili  0.6  /  DBili  x   /  AST  7<L>  /  ALT  24  /  AlkPhos  98  10-04    LIVER FUNCTIONS - ( 04 Oct 2023 12:40 )  Alb: 3.5 g/dL / Pro: 7.2 g/dL / ALK PHOS: 98 U/L / ALT: 24 U/L / AST: 7 U/L / GGT: x             Urinalysis Basic - ( 05 Oct 2023 07:50 )    Color: x / Appearance: x / SG: x / pH: x  Gluc: 203 mg/dL / Ketone: x  / Bili: x / Urobili: x   Blood: x / Protein: x / Nitrite: x   Leuk Esterase: x / RBC: x / WBC x   Sq Epi: x / Non Sq Epi: x / Bacteria: x          RADIOLOGY/EKG:   HPI:  58 Male with PMH HTN, HLD, CAD/MI (no stents but had a ballon), T2DM on insulin/metformin/jardiance with hx of DKA, Afib on Eliquis, h/o iron deficiency, Chronic Pain Syndrome with multiple neck surgeries (C2-C7 fusion) on dilaudid and methadone presents with abdominal pain with nausea and vomiting.  Pt reports for last four days has had excruciating crampy abdominal pain with associated nausea and vomiting.  Reports inability to keep anything down and vomiting (unable to determine if they were bloody) multiple times in the last four days.  Also reports episodes of chest pain, described as sharp, non radiating, reproduced by palpation. LBM was 4 days ago, non-bloody or mucous. Denies recent sick contacts, no diarrhea, no fever, no shortness of breath, no recent travels, no sick contacts and no new/raw food.  Reports living at home with girlfriend and she has been well.      In ED CT A/P done that showed diverticulosis, no explanation for above symptoms. Troponin are negative x 2, EKG with NSR, mod LVH @ 72      Patient is a 58y old  Male who presents with a chief complaint of abdominal pain     Subjective: Patient was seen and examined this morning at bedside. Pt reports nausea, but no vomiting or bowel movements. Pt states his last colonoscopy showed benign polyp. No chest pain currently but Pt refers Chest pain may be due to his constant retching in past 4 days.  Overnight events: unable to tolerate PO intake    REVIEW OF SYSTEMS:  CONSTITUTIONAL: No weakness, fevers or chills  EYES/ENT: No visual changes;  No vertigo or throat pain   NECK: No pain or stiffness  RESPIRATORY: No cough, wheezing, hemoptysis; No shortness of breath  CARDIOVASCULAR: No chest pain or palpitations  GASTROINTESTINAL: +abdominal pain, +nausea, ; No vomiting, diarrhea or constipation.   GENITOURINARY: No dysuria, frequency or hematuria  NEUROLOGICAL: No numbness or weakness  SKIN: No itching, burning, rashes, or lesions       Vital Signs Last 24 Hrs  T(C): 36.4 (05 Oct 2023 05:24), Max: 36.8 (04 Oct 2023 12:26)  T(F): 97.5 (05 Oct 2023 05:24), Max: 98.2 (04 Oct 2023 12:26)  HR: 67 (05 Oct 2023 05:24) (67 - 86)  BP: 148/82 (05 Oct 2023 05:24) (127/85 - 181/86)  BP(mean): --  RR: 18 (05 Oct 2023 05:24) (18 - 19)  SpO2: 95% (05 Oct 2023 05:24) (94% - 98%)    Parameters below as of 05 Oct 2023 05:24  Patient On (Oxygen Delivery Method): room air        PHYSICAL EXAM  Constitutional: Pt lying in bed, awake and alert, NAD  HEENT: EOMI, normocephalic, moist mucous membranes  Neck: Soft and supple,   Respiratory: CTABL, No wheezing, rales or rhonchi  Cardiovascular: S1S2+, RRR, no M/G/R, reproducible chest pain with palpation of Left anterior chest  Gastrointestinal: BS+, soft, obese abdomen, epigastric tenderness, nondistended, no guarding, no rebound  Extremities: No calf pain or edema  Vascular: Peripheral pulses present  Neurological: AAOx3, no focal deficits  Musculoskeletal: Normal muscle tone, no atrophy, no rigidity, no contractions  Skin: No significant new skin lesions or rashes    MEDICATIONS:  MEDICATIONS  (STANDING):  apixaban 5 milliGRAM(s) Oral every 12 hours  atorvastatin 80 milliGRAM(s) Oral at bedtime  carvedilol 3.125 milliGRAM(s) Oral every 12 hours  clonazePAM Oral Disintegrating Tablet 1 milliGRAM(s) Oral at bedtime  dextrose 5%. 1000 milliLiter(s) (100 mL/Hr) IV Continuous <Continuous>  dextrose 5%. 1000 milliLiter(s) (50 mL/Hr) IV Continuous <Continuous>  dextrose 50% Injectable 12.5 Gram(s) IV Push once  dextrose 50% Injectable 25 Gram(s) IV Push once  dextrose 50% Injectable 25 Gram(s) IV Push once  glucagon  Injectable 1 milliGRAM(s) IntraMuscular once  insulin lispro (ADMELOG) corrective regimen sliding scale   SubCutaneous at bedtime  insulin lispro (ADMELOG) corrective regimen sliding scale   SubCutaneous three times a day before meals  methadone    Tablet 5 milliGRAM(s) Oral four times a day  pregabalin 150 milliGRAM(s) Oral three times a day  sertraline 100 milliGRAM(s) Oral daily  sodium chloride 0.9%. 1000 milliLiter(s) (100 mL/Hr) IV Continuous <Continuous>  traZODone 150 milliGRAM(s) Oral at bedtime    MEDICATIONS  (PRN):  aluminum hydroxide/magnesium hydroxide/simethicone Suspension 30 milliLiter(s) Oral every 4 hours PRN Dyspepsia  dextrose Oral Gel 15 Gram(s) Oral once PRN Blood Glucose LESS THAN 70 milliGRAM(s)/deciliter  HYDROmorphone  Injectable 2 milliGRAM(s) IV Push every 4 hours PRN Severe Pain (7 - 10)  ondansetron Injectable 4 milliGRAM(s) IV Push every 8 hours PRN Nausea and/or Vomiting      LABS: All Labs Reviewed:                           11.4   8.70  )-----------( 307      ( 05 Oct 2023 07:50 )             35.5     10-05    139  |  106  |  9   ----------------------------<  203<H>  3.6   |  25  |  0.73    Ca    8.6      05 Oct 2023 07:50  Phos  2.5     10-05  Mg     1.7     10-05    TPro  7.2  /  Alb  3.5  /  TBili  0.6  /  DBili  x   /  AST  7<L>  /  ALT  24  /  AlkPhos  98  10-04    LIVER FUNCTIONS - ( 04 Oct 2023 12:40 )  Alb: 3.5 g/dL / Pro: 7.2 g/dL / ALK PHOS: 98 U/L / ALT: 24 U/L / AST: 7 U/L / GGT: x             Urinalysis Basic - ( 05 Oct 2023 07:50 )    Color: x / Appearance: x / SG: x / pH: x  Gluc: 203 mg/dL / Ketone: x  / Bili: x / Urobili: x   Blood: x / Protein: x / Nitrite: x   Leuk Esterase: x / RBC: x / WBC x   Sq Epi: x / Non Sq Epi: x / Bacteria: x          RADIOLOGY/EKG:    CT Abdomen and Pelvis w/ IV Cont (10.04.23 @ 15:36) >    IMPRESSION:  No bowel obstruction or grossly thickened bowel wall. Colonic   diverticulosis without evidence for diverticulitis. Appendix within   normal limits.    Splenomegaly.         HPI:  58 Male with PMH HTN, HLD, CAD/MI (no stents but had a ballon), T2DM on insulin/metformin/jardiance with hx of DKA, Afib on Eliquis, h/o iron deficiency, Chronic Pain Syndrome with multiple neck surgeries (C2-C7 fusion) on dilaudid and methadone presents with abdominal pain with nausea and vomiting.  Pt reports for last four days has had excruciating crampy abdominal pain with associated nausea and vomiting.  Reports inability to keep anything down and vomiting (unable to determine if they were bloody) multiple times in the last four days.  Also reports episodes of chest pain, described as sharp, non radiating, reproduced by palpation. LBM was 4 days ago, non-bloody or mucous. Denies recent sick contacts, no diarrhea, no fever, no shortness of breath, no recent travels, no sick contacts and no new/raw food.  Reports living at home with girlfriend and she has been well.      In ED CT A/P done that showed diverticulosis, no explanation for above symptoms. Troponin are negative x 2, EKG with NSR, mod LVH @ 72      Patient is a 58y old  Male who presents with a chief complaint of abdominal pain     Subjective: Patient was seen and examined this morning at bedside. Pt reports nausea, but no vomiting or bowel movements. Pt states his last colonoscopy showed benign polyp. No chest pain currently but Pt refers Chest pain may be due to his constant retching in past 4 days.  Overnight events: unable to tolerate PO intake    REVIEW OF SYSTEMS:  CONSTITUTIONAL: No weakness, fevers or chills  EYES/ENT: No visual changes;  No vertigo or throat pain   NECK: No pain or stiffness  RESPIRATORY: No cough, wheezing, hemoptysis; No shortness of breath  CARDIOVASCULAR: No chest pain or palpitations  GASTROINTESTINAL: +abdominal pain, +nausea, ; No vomiting, diarrhea or constipation.   GENITOURINARY: No dysuria, frequency or hematuria  NEUROLOGICAL: No numbness or weakness  SKIN: No itching, burning, rashes, or lesions       Vital Signs Last 24 Hrs  T(C): 36.4 (05 Oct 2023 05:24), Max: 36.8 (04 Oct 2023 12:26)  T(F): 97.5 (05 Oct 2023 05:24), Max: 98.2 (04 Oct 2023 12:26)  HR: 67 (05 Oct 2023 05:24) (67 - 86)  BP: 148/82 (05 Oct 2023 05:24) (127/85 - 181/86)  BP(mean): --  RR: 18 (05 Oct 2023 05:24) (18 - 19)  SpO2: 95% (05 Oct 2023 05:24) (94% - 98%)    Parameters below as of 05 Oct 2023 05:24  Patient On (Oxygen Delivery Method): room air        PHYSICAL EXAM  Constitutional: Pt lying in bed, awake and alert, NAD  HEENT: EOMI, normocephalic, moist mucous membranes  Neck: Soft and supple,   Respiratory: CTABL, No wheezing, rales or rhonchi  Cardiovascular: S1S2+, RRR, no M/G/R, reproducible chest pain with palpation of Left anterior chest  Gastrointestinal: BS+, soft, obese abdomen, epigastric tenderness, nondistended, no guarding, no rebound  Extremities: No calf pain or edema  Vascular: Peripheral pulses present  Neurological: AAOx3, no focal deficits  Musculoskeletal: Normal muscle tone, no atrophy, no rigidity, no contractions  Skin: No significant new skin lesions or rashes    MEDICATIONS:  MEDICATIONS  (STANDING):  apixaban 5 milliGRAM(s) Oral every 12 hours  atorvastatin 80 milliGRAM(s) Oral at bedtime  carvedilol 3.125 milliGRAM(s) Oral every 12 hours  clonazePAM Oral Disintegrating Tablet 1 milliGRAM(s) Oral at bedtime  dextrose 5%. 1000 milliLiter(s) (100 mL/Hr) IV Continuous <Continuous>  dextrose 5%. 1000 milliLiter(s) (50 mL/Hr) IV Continuous <Continuous>  dextrose 50% Injectable 12.5 Gram(s) IV Push once  dextrose 50% Injectable 25 Gram(s) IV Push once  dextrose 50% Injectable 25 Gram(s) IV Push once  glucagon  Injectable 1 milliGRAM(s) IntraMuscular once  insulin lispro (ADMELOG) corrective regimen sliding scale   SubCutaneous at bedtime  insulin lispro (ADMELOG) corrective regimen sliding scale   SubCutaneous three times a day before meals  methadone    Tablet 5 milliGRAM(s) Oral four times a day  pregabalin 150 milliGRAM(s) Oral three times a day  sertraline 100 milliGRAM(s) Oral daily  sodium chloride 0.9%. 1000 milliLiter(s) (100 mL/Hr) IV Continuous <Continuous>  traZODone 150 milliGRAM(s) Oral at bedtime    MEDICATIONS  (PRN):  aluminum hydroxide/magnesium hydroxide/simethicone Suspension 30 milliLiter(s) Oral every 4 hours PRN Dyspepsia  dextrose Oral Gel 15 Gram(s) Oral once PRN Blood Glucose LESS THAN 70 milliGRAM(s)/deciliter  HYDROmorphone  Injectable 2 milliGRAM(s) IV Push every 4 hours PRN Severe Pain (7 - 10)  ondansetron Injectable 4 milliGRAM(s) IV Push every 8 hours PRN Nausea and/or Vomiting      LABS: All Labs Reviewed:                           11.4   8.70  )-----------( 307      ( 05 Oct 2023 07:50 )             35.5     10-05    139  |  106  |  9   ----------------------------<  203<H>  3.6   |  25  |  0.73    Ca    8.6      05 Oct 2023 07:50  Phos  2.5     10-05  Mg     1.7     10-05    TPro  7.2  /  Alb  3.5  /  TBili  0.6  /  DBili  x   /  AST  7<L>  /  ALT  24  /  AlkPhos  98  10-04    LIVER FUNCTIONS - ( 04 Oct 2023 12:40 )  Alb: 3.5 g/dL / Pro: 7.2 g/dL / ALK PHOS: 98 U/L / ALT: 24 U/L / AST: 7 U/L / GGT: x             Urinalysis Basic - ( 05 Oct 2023 07:50 )    Color: x / Appearance: x / SG: x / pH: x  Gluc: 203 mg/dL / Ketone: x  / Bili: x / Urobili: x   Blood: x / Protein: x / Nitrite: x   Leuk Esterase: x / RBC: x / WBC x   Sq Epi: x / Non Sq Epi: x / Bacteria: x          RADIOLOGY/EKG:    CT Abdomen and Pelvis w/ IV Cont (10.04.23 @ 15:36) >    IMPRESSION:  No bowel obstruction or grossly thickened bowel wall. Colonic   diverticulosis without evidence for diverticulitis. Appendix within   normal limits.    Splenomegaly.      Cardiac Cath 3/2022  Conclusions:   STEMI due to thrombotic occlusion of small caliber LPL branches  arising from medium-caliber distal LCx supplying a relatively    small territory, successfully treated with angioplasty.  Mild  nonobstructive coronary artery disease elsewhere in co-dominant  circulation.

## 2023-10-05 NOTE — PROGRESS NOTE ADULT - ASSESSMENT
58 Male with PMH HTN, HLD, CAD/MI (no stents), T2DM on insulin/metformin/jardiance with hx of DKA, Afib on Eliquis, Chronic Pain Syndrome with multiple neck surgeries (C2-C7 fusion) on dilaudid and methadone presents with abdominal pain with nausea and vomiting.     #Possible Gastroenteritis  -pt with mostly epigastric pain, nausea and vomiting, cannot tolerate oral  -CT AP with IV contrast with diverticulosis  -clear liquid diet and advance as tolerated  -c/w IVF, Zofran prn  -Monitor labs, follow up RVP    #Chest Pain, unspecified  #CAD, Hx MI  -low suspicion for cardiac in nature  -Trop negative x 2, EKG without acute ischemic changes  -Cardio consult appreciated  -c/w statin, coreg 3.125 BID    #T2DM on Insulin  -pt reports taking metformin, jardiance and insulin at home  -will order accuchecks with ISS for now as diet is being advanced  -Adjust insulin requirements based on necessity, goal BS < 180  -Follow up A1c    #Afib on Eliquis  -continue eliquis and coreg  -HR currently controlled  -continue to monitor    #Chronic Pain Syndrome 2/2 multiple neck surgeries  #C2-C7 Fusion  -Continue lyrica, methadone and dilaudid  -pt reports not being able to tolerate methadone at this time  -will review ISTOP (see separate note)  -placed on IV Dilaudid for now until tolerates PO then place back on home Dilaudid 4mg four times a day    #Anxiety  -c/w home zoloft and klonopin 1 mg QHS    #DVT PPx  -on Eliquis      Case was discussed with attending, Dr. Carpenter     58 Male with PMH HTN, HLD, CAD/MI (no stents), T2DM on insulin/metformin/jardiance with hx of DKA, Afib on Eliquis, Chronic Pain Syndrome with multiple neck surgeries (C2-C7 fusion) on dilaudid and methadone presents with abdominal pain with nausea and vomiting.     #Possible Gastroenteritis  -pt with mostly epigastric pain, nausea and vomiting, cannot tolerate oral  -CT AP with IV contrast with diverticulosis  -clear liquid diet and advance as tolerated  -start PPI IV bid  -c/w IVF, Zofran prn  -GI consulted  -Monitor labs, follow up RVP    #Chest Pain, unspecified  #CAD, Hx MI  -chest pain reproducible with palpation, likely musculoskeletal   -Trop negative x 2, EKG without acute ischemic changes  -c/w statin, coreg 3.125 BID    #anemia  -Hgb 12.9 on admission  -hgb 11.4, hct 35.5 today, downtrending  -no active bleeding  -f/u iron studies  -monitor h/h    #T2DM on Insulin  -pt reports taking metformin, jardiance and insulin at home  -will order accuchecks with ISS for now as diet is being advanced  -Adjust insulin requirements based on necessity, goal BS < 180  -Follow up A1c    #Afib on Eliquis  -continue eliquis and coreg  -HR currently controlled  -continue to monitor    #Chronic Pain Syndrome 2/2 multiple neck surgeries  #C2-C7 Fusion  -Continue lyrica, methadone and dilaudid  -pt reports not being able to tolerate methadone at this time  -will review ISTOP (see separate note)  -placed on IV Dilaudid for now until tolerates PO then place back on home Dilaudid 4mg four times a day    #Anxiety  -c/w home zoloft and klonopin 1 mg QHS    #DVT PPx  -on Eliquis      Case was discussed with attending, Dr. Carpenter     58 Male with PMH HTN, HLD, CAD/MI (no stents), T2DM on insulin/metformin/jardiance with hx of DKA, Afib on Eliquis, Chronic Pain Syndrome with multiple neck surgeries (C2-C7 fusion) on dilaudid and methadone presents with abdominal pain with nausea and vomiting.     #Possible Gastroenteritis  -pt with mostly epigastric pain, nausea and vomiting, cannot tolerate oral  -CT AP with IV contrast with diverticulosis  -clear liquid diet and advance as tolerated  -start PPI IV bid  -c/w IVF, Zofran prn  -GI consulted  -Monitor labs, follow up RVP  -NPO after midnight for EGD tmr    #Chest Pain, unspecified  #CAD, Hx MI  -chest pain reproducible with palpation, likely musculoskeletal   -Trop negative x 2, EKG without acute ischemic changes  -c/w statin, coreg 3.125 BID    #anemia  -Hgb 12.9 on admission  -hgb 11.4, hct 35.5 today, downtrending  -no active bleeding  -transfuse if Hgb < 7  -f/u iron studies  -monitor h/h    #T2DM on Insulin  -pt reports taking metformin, jardiance and insulin at home  -will order accuchecks with ISS for now as diet is being advanced  -Adjust insulin requirements based on necessity, goal BS < 180  -Follow up A1c    #Afib on Eliquis  -continue eliquis and coreg  -HR currently controlled  -continue to monitor    #Chronic Pain Syndrome 2/2 multiple neck surgeries  #C2-C7 Fusion  -Continue lyrica, methadone and dilaudid  -pt reports not being able to tolerate methadone at this time  -will review ISTOP (see separate note)  -placed on IV Dilaudid for now until tolerates PO then place back on home Dilaudid 4mg four times a day    #Anxiety  -c/w home zoloft and klonopin 1 mg QHS    #DVT PPx  -on Eliquis      Case was discussed with attending, Dr. Carpenter     58 Male with PMH HTN, HLD, CAD/MI (no stents), T2DM on insulin/metformin/jardiance with hx of DKA, Afib on Eliquis, Chronic Pain Syndrome with multiple neck surgeries (C2-C7 fusion) on dilaudid and methadone presents with abdominal pain with nausea and vomiting.     #Possible Gastroenteritis  -pt with mostly epigastric pain, nausea and vomiting, cannot tolerate oral  -CT AP with IV contrast showed no acute pathology   -clear liquid diet and advance as tolerated  -start PPI BID   -c/w IVF, Zofran prn  -GI consulted appreciated, possible EGD pending course  -Monitor labs, replete lytes as needed   -NPO after midnight for EGD tmr    #Chest Pain, unspecified  #CAD, Hx MI  -chest pain reproducible with palpation, likely musculoskeletal   -history of STEMI due to thrombotic occlusion of small caliber LPL branches arising from medium-caliber distal LCx supplying a relatively small territory, successfully treated with angioplasty in 3/2022  -Trop negative x 2, EKG without acute ischemic changes  -Echo 4/2023 showed EF 60-65%, grade 1 diastolic dysfunction  -Repeat Echo ordered  -c/w statin, coreg 3.125 BID    #Chronic anemia  -Hgb 12.9 on admission  -hgb 11.4, hct 35.5 today, downtrending  -no active bleeding  -transfuse if Hgb < 7  -f/u iron studies  -monitor h/h    #T2DM on Insulin  -HbA1C 9.5%   -pt reports taking metformin, jardiance and insulin at home  -will order accuchecks with ISS for now - intake remains poor due to nausea  -Adjust insulin requirements based on necessity, goal BS < 180    #Afib on Eliquis  -CHADsVASc 3  -Holding Eliquis pending possible EGD  -Continue Coreg   -HR currently controlled  -continue to monitor    #Chronic Pain Syndrome 2/2 multiple neck surgeries  #C2-C7 Fusion  -Continue lyrica, methadone and dilaudid  -pt reports not being able to tolerate methadone at this time  -will review ISTOP (see separate note)  -placed on IV Dilaudid for now until tolerates PO then place back on home Dilaudid 4mg four times a day    #Anxiety  -c/w home zoloft and klonopin 1 mg QHS    #DVT PPx  -Eliquis on hold pending possible EGD      Case was discussed with attending, Dr. Carpenter

## 2023-10-05 NOTE — PROGRESS NOTE ADULT - ATTENDING COMMENTS
Krystian this is a 58 year old  male with past medical history of HTN, HLD, CAD/MI (no stents), T2DM on insulin/metformin/jardiance with hx of DKA, Afib on Eliquis, GERD, Chronic Pain Syndrome with multiple neck surgeries (C2-C7 fusion) on dilaudid and methadone presents with 4 day history of abdominal pain with nausea and vomiting. Last BM 4 days ago but is passing flatus. Also endorses left sided chest pain that was non-radiating and without associated shortness of breath/LH/dizziness/palpitation on day prior to admission in setting of multiple episodes of vomiting. Pain has since resolved. No fever/chills or sick contact/recent travel. Denies eating anything out of the ordinary/uncooked. Has had similar episodes before every several months – unknown trigger.     CT abd/pelvis showed no acute pathology. Labs on admission notable for WBC 12.2, Na 134, K 3.4 and glucose 306. LFT/lipase WNL. Trops negative x 2. EKG showed no acute changes.     This morning he continues to have nausea and epigastric pain. Exam is notable for epigastric discomfort on deep palpation, no rebound/guarding. BS x 4 quadrants. Left sided chest wall pain reproducible with palpation.     Labs/imaging reviewed.     Agree with plan.   N/V/epigastric pain secondary to gastroenteritis vs. gastritis. GI consult appreciated, plan for possible EGD pending clinical course and anesthesia evaluation. PPI BID   Supportive care, IVF, antiemetics PRN   Left chest wall pain reproducible on exam, suspect musculoskeletal in nature. Trops negative/EKG TWI in inferior leads which is unchanged from previous. Will obtain echo.   Holding eliquis pending possible EGD. Patient with CHADsVASc 3, will give Lovenox SC tonight. Krystian this is a 58 year old  male with past medical history of HTN, HLD, CAD/MI (no stents), T2DM on insulin/metformin/jardiance with hx of DKA, Afib on Eliquis, GERD, Chronic Pain Syndrome with multiple neck surgeries (C2-C7 fusion) on dilaudid and methadone presents with 4 day history of abdominal pain with nausea and vomiting. Last BM 4 days ago but is passing flatus. Also endorses left sided chest pain that was non-radiating and without associated shortness of breath/LH/dizziness/palpitation on day prior to admission in setting of multiple episodes of vomiting. Pain has since resolved. No fever/chills or sick contact/recent travel. Denies eating anything out of the ordinary/uncooked. Has had similar episodes before every several months – unknown trigger.     CT abd/pelvis showed no acute pathology. Labs on admission notable for WBC 12.2, Na 134, K 3.4 and glucose 306. LFT/lipase WNL. Trops negative x 2. EKG showed no acute changes.     This morning he continues to have nausea and epigastric pain. Exam is notable for epigastric discomfort on deep palpation, no rebound/guarding. BS x 4 quadrants. Left sided chest wall pain reproducible with palpation.     Labs/imaging reviewed.     Agree with plan.   N/V/epigastric pain secondary to gastroenteritis vs. gastritis. GI consult appreciated, plan for possible EGD pending clinical course and anesthesia evaluation. PPI BID   Supportive care, IVF, antiemetics PRN   Left chest wall pain reproducible on exam, suspect musculoskeletal in nature. Trops negative/EKG TWI in inferior leads which is unchanged from previous. Will obtain echo.   Holding eliquis pending possible EGD. Patient with CHADsVASc 3, will give Lovenox SC tonight, risks and benefits discussed with patient at length, he is in agreement.

## 2023-10-06 ENCOUNTER — RESULT REVIEW (OUTPATIENT)
Age: 58
End: 2023-10-06

## 2023-10-06 ENCOUNTER — APPOINTMENT (OUTPATIENT)
Dept: PAIN MANAGEMENT | Facility: CLINIC | Age: 58
End: 2023-10-06

## 2023-10-06 ENCOUNTER — TRANSCRIPTION ENCOUNTER (OUTPATIENT)
Age: 58
End: 2023-10-06

## 2023-10-06 LAB
ANION GAP SERPL CALC-SCNC: 8 MMOL/L — SIGNIFICANT CHANGE UP (ref 5–17)
BUN SERPL-MCNC: 6 MG/DL — LOW (ref 7–23)
CALCIUM SERPL-MCNC: 8.6 MG/DL — SIGNIFICANT CHANGE UP (ref 8.4–10.5)
CHLORIDE SERPL-SCNC: 103 MMOL/L — SIGNIFICANT CHANGE UP (ref 96–108)
CO2 SERPL-SCNC: 28 MMOL/L — SIGNIFICANT CHANGE UP (ref 22–31)
CREAT SERPL-MCNC: 0.66 MG/DL — SIGNIFICANT CHANGE UP (ref 0.5–1.3)
EGFR: 110 ML/MIN/1.73M2 — SIGNIFICANT CHANGE UP
GLUCOSE BLDC GLUCOMTR-MCNC: 174 MG/DL — HIGH (ref 70–99)
GLUCOSE BLDC GLUCOMTR-MCNC: 188 MG/DL — HIGH (ref 70–99)
GLUCOSE BLDC GLUCOMTR-MCNC: 196 MG/DL — HIGH (ref 70–99)
GLUCOSE BLDC GLUCOMTR-MCNC: 266 MG/DL — HIGH (ref 70–99)
GLUCOSE SERPL-MCNC: 198 MG/DL — HIGH (ref 70–99)
HCT VFR BLD CALC: 36.9 % — LOW (ref 39–50)
HGB BLD-MCNC: 11.8 G/DL — LOW (ref 13–17)
MAGNESIUM SERPL-MCNC: 1.4 MG/DL — LOW (ref 1.6–2.6)
MCHC RBC-ENTMCNC: 26.1 PG — LOW (ref 27–34)
MCHC RBC-ENTMCNC: 32 GM/DL — SIGNIFICANT CHANGE UP (ref 32–36)
MCV RBC AUTO: 81.6 FL — SIGNIFICANT CHANGE UP (ref 80–100)
NRBC # BLD: 0 /100 WBCS — SIGNIFICANT CHANGE UP (ref 0–0)
PHOSPHATE SERPL-MCNC: 3 MG/DL — SIGNIFICANT CHANGE UP (ref 2.5–4.5)
PLATELET # BLD AUTO: 303 K/UL — SIGNIFICANT CHANGE UP (ref 150–400)
POTASSIUM SERPL-MCNC: 3.7 MMOL/L — SIGNIFICANT CHANGE UP (ref 3.5–5.3)
POTASSIUM SERPL-SCNC: 3.7 MMOL/L — SIGNIFICANT CHANGE UP (ref 3.5–5.3)
RBC # BLD: 4.52 M/UL — SIGNIFICANT CHANGE UP (ref 4.2–5.8)
RBC # FLD: 15.2 % — HIGH (ref 10.3–14.5)
SODIUM SERPL-SCNC: 139 MMOL/L — SIGNIFICANT CHANGE UP (ref 135–145)
WBC # BLD: 7.71 K/UL — SIGNIFICANT CHANGE UP (ref 3.8–10.5)
WBC # FLD AUTO: 7.71 K/UL — SIGNIFICANT CHANGE UP (ref 3.8–10.5)

## 2023-10-06 PROCEDURE — 99232 SBSQ HOSP IP/OBS MODERATE 35: CPT

## 2023-10-06 PROCEDURE — 88305 TISSUE EXAM BY PATHOLOGIST: CPT | Mod: 26

## 2023-10-06 PROCEDURE — 93306 TTE W/DOPPLER COMPLETE: CPT | Mod: 26

## 2023-10-06 PROCEDURE — 88312 SPECIAL STAINS GROUP 1: CPT | Mod: 26

## 2023-10-06 PROCEDURE — 43239 EGD BIOPSY SINGLE/MULTIPLE: CPT | Mod: 59

## 2023-10-06 PROCEDURE — 99233 SBSQ HOSP IP/OBS HIGH 50: CPT | Mod: FS,25

## 2023-10-06 RX ORDER — ACETAMINOPHEN 500 MG
1000 TABLET ORAL ONCE
Refills: 0 | Status: DISCONTINUED | OUTPATIENT
Start: 2023-10-06 | End: 2023-10-06

## 2023-10-06 RX ORDER — HYDROMORPHONE HYDROCHLORIDE 2 MG/ML
0.25 INJECTION INTRAMUSCULAR; INTRAVENOUS; SUBCUTANEOUS EVERY 4 HOURS
Refills: 0 | Status: DISCONTINUED | OUTPATIENT
Start: 2023-10-06 | End: 2023-10-07

## 2023-10-06 RX ORDER — ACETAMINOPHEN 500 MG
1000 TABLET ORAL ONCE
Refills: 0 | Status: COMPLETED | OUTPATIENT
Start: 2023-10-06 | End: 2023-10-07

## 2023-10-06 RX ORDER — MAGNESIUM SULFATE 500 MG/ML
2 VIAL (ML) INJECTION ONCE
Refills: 0 | Status: COMPLETED | OUTPATIENT
Start: 2023-10-06 | End: 2023-10-06

## 2023-10-06 RX ORDER — METOCLOPRAMIDE HCL 10 MG
10 TABLET ORAL THREE TIMES A DAY
Refills: 0 | Status: DISCONTINUED | OUTPATIENT
Start: 2023-10-06 | End: 2023-10-07

## 2023-10-06 RX ORDER — CARVEDILOL PHOSPHATE 80 MG/1
1 CAPSULE, EXTENDED RELEASE ORAL
Refills: 0 | DISCHARGE

## 2023-10-06 RX ADMIN — Medication 2: at 12:40

## 2023-10-06 RX ADMIN — Medication 2: at 17:41

## 2023-10-06 RX ADMIN — Medication 2: at 08:24

## 2023-10-06 RX ADMIN — HYDROMORPHONE HYDROCHLORIDE 2 MILLIGRAM(S): 2 INJECTION INTRAMUSCULAR; INTRAVENOUS; SUBCUTANEOUS at 22:20

## 2023-10-06 RX ADMIN — HYDROMORPHONE HYDROCHLORIDE 2 MILLIGRAM(S): 2 INJECTION INTRAMUSCULAR; INTRAVENOUS; SUBCUTANEOUS at 17:21

## 2023-10-06 RX ADMIN — HYDROMORPHONE HYDROCHLORIDE 2 MILLIGRAM(S): 2 INJECTION INTRAMUSCULAR; INTRAVENOUS; SUBCUTANEOUS at 06:00

## 2023-10-06 RX ADMIN — HYDROMORPHONE HYDROCHLORIDE 2 MILLIGRAM(S): 2 INJECTION INTRAMUSCULAR; INTRAVENOUS; SUBCUTANEOUS at 09:05

## 2023-10-06 RX ADMIN — HYDROMORPHONE HYDROCHLORIDE 2 MILLIGRAM(S): 2 INJECTION INTRAMUSCULAR; INTRAVENOUS; SUBCUTANEOUS at 21:28

## 2023-10-06 RX ADMIN — HYDROMORPHONE HYDROCHLORIDE 2 MILLIGRAM(S): 2 INJECTION INTRAMUSCULAR; INTRAVENOUS; SUBCUTANEOUS at 05:16

## 2023-10-06 RX ADMIN — HYDROMORPHONE HYDROCHLORIDE 2 MILLIGRAM(S): 2 INJECTION INTRAMUSCULAR; INTRAVENOUS; SUBCUTANEOUS at 13:18

## 2023-10-06 RX ADMIN — ENOXAPARIN SODIUM 100 MILLIGRAM(S): 100 INJECTION SUBCUTANEOUS at 03:07

## 2023-10-06 RX ADMIN — HYDROMORPHONE HYDROCHLORIDE 2 MILLIGRAM(S): 2 INJECTION INTRAMUSCULAR; INTRAVENOUS; SUBCUTANEOUS at 13:03

## 2023-10-06 RX ADMIN — HYDROMORPHONE HYDROCHLORIDE 2 MILLIGRAM(S): 2 INJECTION INTRAMUSCULAR; INTRAVENOUS; SUBCUTANEOUS at 10:00

## 2023-10-06 RX ADMIN — Medication 10 MILLIGRAM(S): at 17:46

## 2023-10-06 RX ADMIN — HYDROMORPHONE HYDROCHLORIDE 2 MILLIGRAM(S): 2 INJECTION INTRAMUSCULAR; INTRAVENOUS; SUBCUTANEOUS at 17:06

## 2023-10-06 RX ADMIN — Medication 2: at 21:28

## 2023-10-06 RX ADMIN — Medication 1 MILLIGRAM(S): at 21:18

## 2023-10-06 RX ADMIN — PANTOPRAZOLE SODIUM 40 MILLIGRAM(S): 20 TABLET, DELAYED RELEASE ORAL at 09:12

## 2023-10-06 RX ADMIN — Medication 25 GRAM(S): at 09:12

## 2023-10-06 RX ADMIN — HYDROMORPHONE HYDROCHLORIDE 2 MILLIGRAM(S): 2 INJECTION INTRAMUSCULAR; INTRAVENOUS; SUBCUTANEOUS at 00:20

## 2023-10-06 RX ADMIN — HYDROMORPHONE HYDROCHLORIDE 0.25 MILLIGRAM(S): 2 INJECTION INTRAMUSCULAR; INTRAVENOUS; SUBCUTANEOUS at 20:40

## 2023-10-06 RX ADMIN — HYDROMORPHONE HYDROCHLORIDE 0.25 MILLIGRAM(S): 2 INJECTION INTRAMUSCULAR; INTRAVENOUS; SUBCUTANEOUS at 19:50

## 2023-10-06 RX ADMIN — PANTOPRAZOLE SODIUM 40 MILLIGRAM(S): 20 TABLET, DELAYED RELEASE ORAL at 21:18

## 2023-10-06 NOTE — PROGRESS NOTE ADULT - SUBJECTIVE AND OBJECTIVE BOX
INTERVAL HPI/ OVERNIGHT EVENTS:  Patient seen examined at bed side, He is NPO for procedure today. Has epigastric discomfort. Denies nausea, vomiting diarrhea or constipation.     MEDICATIONS  (STANDING):  acetaminophen   IVPB .. 1000 milliGRAM(s) IV Intermittent once  atorvastatin 80 milliGRAM(s) Oral at bedtime  carvedilol 3.125 milliGRAM(s) Oral every 12 hours  clonazePAM Oral Disintegrating Tablet 1 milliGRAM(s) Oral at bedtime  dextrose 5%. 1000 milliLiter(s) (100 mL/Hr) IV Continuous <Continuous>  dextrose 5%. 1000 milliLiter(s) (50 mL/Hr) IV Continuous <Continuous>  dextrose 50% Injectable 12.5 Gram(s) IV Push once  dextrose 50% Injectable 25 Gram(s) IV Push once  dextrose 50% Injectable 25 Gram(s) IV Push once  glucagon  Injectable 1 milliGRAM(s) IntraMuscular once  insulin lispro (ADMELOG) corrective regimen sliding scale   SubCutaneous at bedtime  insulin lispro (ADMELOG) corrective regimen sliding scale   SubCutaneous three times a day before meals  methadone    Tablet 5 milliGRAM(s) Oral four times a day  pantoprazole  Injectable 40 milliGRAM(s) IV Push every 12 hours  pregabalin 150 milliGRAM(s) Oral three times a day  sertraline 100 milliGRAM(s) Oral daily  sodium chloride 0.9%. 1000 milliLiter(s) (100 mL/Hr) IV Continuous <Continuous>  traZODone 150 milliGRAM(s) Oral at bedtime    MEDICATIONS  (PRN):  aluminum hydroxide/magnesium hydroxide/simethicone Suspension 30 milliLiter(s) Oral every 4 hours PRN Dyspepsia  dextrose Oral Gel 15 Gram(s) Oral once PRN Blood Glucose LESS THAN 70 milliGRAM(s)/deciliter  HYDROmorphone  Injectable 2 milliGRAM(s) IV Push every 4 hours PRN Severe Pain (7 - 10)  ondansetron Injectable 4 milliGRAM(s) IV Push every 8 hours PRN Nausea and/or Vomiting      Allergies    No Known Allergies    Intolerances        PAST MEDICAL & SURGICAL HISTORY:  Hypertension      Diabetes mellitus      Gastric ulcer      Spinal stenosis      H/O spinal cord compression      Spondylosis      H/O radiculopathy      H/O cervical spine surgery      History of back surgery      S/P cervical spinal fusion      REVIEW OF SYSTEMS	  See HPI     PHYSICAL EXAM:   Vital Signs:  Vital Signs Last 24 Hrs  T(C): 36.4 (06 Oct 2023 05:44), Max: 36.7 (05 Oct 2023 12:27)  T(F): 97.6 (06 Oct 2023 05:44), Max: 98 (05 Oct 2023 12:27)  HR: 71 (06 Oct 2023 05:44) (57 - 71)  BP: 165/89 (06 Oct 2023 05:44) (155/84 - 165/89)  BP(mean): --  RR: 17 (06 Oct 2023 05:44) (17 - 18)  SpO2: 95% (06 Oct 2023 05:44) (95% - 96%)    Parameters below as of 06 Oct 2023 05:44  Patient On (Oxygen Delivery Method): room air      Daily     Daily Weight in k.8 (06 Oct 2023 05:44)I&O's Summary    05 Oct 2023 07:01  -  06 Oct 2023 07:00  --------------------------------------------------------  IN: 300 mL / OUT: 1900 mL / NET: -1600 mL    06 Oct 2023 07:01  -  06 Oct 2023 12:09  --------------------------------------------------------  IN: 0 mL / OUT: 400 mL / NET: -400 mL        GENERAL:  Appears stated age  HEENT:  NC/AT,  conjunctivae clear and pink  CHEST:  Full & symmetric excursion  HEART:  Regular rhythm, S1, S2  ABDOMEN:  Soft, epigastric tender, non-distended, normoactive bowel sounds  EXTREMITIES:  No cyanosis, clubbing or edema.  SKIN:  No rash/warm/dry  NEURO:  Alert, oriented       LABS:                        11.8   7.71  )-----------( 303      ( 06 Oct 2023 07:03 )             36.9     10-06    139  |  103  |  6<L>  ----------------------------<  198<H>  3.7   |  28  |  0.66    Ca    8.6      06 Oct 2023 07:03  Phos  3.0     10  Mg     1.4     10-06    TPro  7.2  /  Alb  3.5  /  TBili  0.6  /  DBili  x   /  AST  7<L>  /  ALT  24  /  AlkPhos  98  10-04      Urinalysis Basic - ( 06 Oct 2023 07:03 )    Color: x / Appearance: x / SG: x / pH: x  Gluc: 198 mg/dL / Ketone: x  / Bili: x / Urobili: x   Blood: x / Protein: x / Nitrite: x   Leuk Esterase: x / RBC: x / WBC x   Sq Epi: x / Non Sq Epi: x / Bacteria: x      amylase   Lipase : 13 U/L (10-04 @ 12:40)    RADIOLOGY & ADDITIONAL TESTS:   Detail Level: Detailed

## 2023-10-06 NOTE — DIETITIAN INITIAL EVALUATION ADULT - ADD RECOMMEND
1. Resume clear liquids then advance to consistent carbohydrate + glucerna qd as able   2. Glycemic management per diabetes specialist

## 2023-10-06 NOTE — DIETITIAN INITIAL EVALUATION ADULT - PERTINENT MEDS FT
MEDICATIONS  (STANDING):  atorvastatin 80 milliGRAM(s) Oral at bedtime  carvedilol 3.125 milliGRAM(s) Oral every 12 hours  clonazePAM Oral Disintegrating Tablet 1 milliGRAM(s) Oral at bedtime  dextrose 5%. 1000 milliLiter(s) (100 mL/Hr) IV Continuous <Continuous>  dextrose 5%. 1000 milliLiter(s) (50 mL/Hr) IV Continuous <Continuous>  dextrose 50% Injectable 12.5 Gram(s) IV Push once  dextrose 50% Injectable 25 Gram(s) IV Push once  dextrose 50% Injectable 25 Gram(s) IV Push once  glucagon  Injectable 1 milliGRAM(s) IntraMuscular once  insulin lispro (ADMELOG) corrective regimen sliding scale   SubCutaneous three times a day before meals  insulin lispro (ADMELOG) corrective regimen sliding scale   SubCutaneous at bedtime  methadone    Tablet 5 milliGRAM(s) Oral four times a day  pantoprazole  Injectable 40 milliGRAM(s) IV Push every 12 hours  pregabalin 150 milliGRAM(s) Oral three times a day  sertraline 100 milliGRAM(s) Oral daily  sodium chloride 0.9%. 1000 milliLiter(s) (100 mL/Hr) IV Continuous <Continuous>  traZODone 150 milliGRAM(s) Oral at bedtime    MEDICATIONS  (PRN):  acetaminophen   IVPB .. 1000 milliGRAM(s) IV Intermittent once PRN Moderate Pain (4 - 6)  aluminum hydroxide/magnesium hydroxide/simethicone Suspension 30 milliLiter(s) Oral every 4 hours PRN Dyspepsia  dextrose Oral Gel 15 Gram(s) Oral once PRN Blood Glucose LESS THAN 70 milliGRAM(s)/deciliter  HYDROmorphone  Injectable 2 milliGRAM(s) IV Push every 4 hours PRN Severe Pain (7 - 10)  ondansetron Injectable 4 milliGRAM(s) IV Push every 8 hours PRN Nausea and/or Vomiting

## 2023-10-06 NOTE — PROGRESS NOTE ADULT - ASSESSMENT
58 Male with PMH HTN, HLD, CAD/MI (no stents), T2DM on insulin /metformin/ Jardiance with hx of DKA, Afib on Eliquis, Chronic Pain Syndrome with multiple neck surgeries (C2-C7 fusion) on Dilaudid and methadone presents with abdominal pain with nausea and vomiting.    GI Consultation for nausea, abd pain, unable to tolerate PO, h/o iron deficiency anemia. He never had EGD & had colonoscopy 4 years before and as per patient the results were normal.   He is on Eliquis for Afib but did not took any medication for past 5 days.    For EGD today

## 2023-10-06 NOTE — DIETITIAN INITIAL EVALUATION ADULT - PERSON TAUGHT/METHOD
consistent carbohydrate (more frequent, balanced meals- smaller meals)/verbal instruction/teach back - (Patient repeats in own words)/patient instructed

## 2023-10-06 NOTE — DIETITIAN INITIAL EVALUATION ADULT - HEIGHT FOR BMI (FEET)
calorie 1076-1166kcal (MSJ x 1.2-1.3 AF)  protein 56-61g (1-1.1g/kg CBW) CKD considered  fluid 1ml/kcal or per LIP
6

## 2023-10-06 NOTE — DIETITIAN INITIAL EVALUATION ADULT - PERTINENT LABORATORY DATA
10-06    139  |  103  |  6<L>  ----------------------------<  198<H>  3.7   |  28  |  0.66    Ca    8.6      06 Oct 2023 07:03  Phos  3.0     10-06  Mg     1.4     10-06    POCT Blood Glucose.: 188 mg/dL (10-06-23 @ 11:53)  A1C with Estimated Average Glucose Result: 9.5 % (10-05-23 @ 07:50)  A1C with Estimated Average Glucose Result: 8.1 % (03-30-23 @ 06:45)  A1C with Estimated Average Glucose Result: 7.7 % (01-09-23 @ 13:05)

## 2023-10-06 NOTE — DIETITIAN INITIAL EVALUATION ADULT - ORAL INTAKE PTA/DIET HISTORY
Pt endorses minimal intake over the last 4-5 days since onset of abdominal pain. Pt reports hx intentional weight loss in the past (> 100lbs from 325 to 222lbs): low carb, high protein, increased vegetable. States that he typically eats 1-2 meals/day. NKFA. Hx DMII, A1c 9.5% on insulin/metformin/jardiance. No chewing/swallowing issues.

## 2023-10-06 NOTE — DIETITIAN INITIAL EVALUATION ADULT - OTHER INFO
Patient is a 58y old  Male w/ PMH of HTN, HLD, CAD/MI (no stents but had a balloon), T2DM on insulin/metformin/jardiance with hx of DKA, Afib on Eliquis, h/o iron deficiency, Chronic Pain Syndrome with multiple neck surgeries (C2-C7 fusion) on dilaudid and methadone, who presents with a chief complaint of abdominal pain.   Pt currently NPO for EGD now. Unable to tolerate clear liquids- states that he took a few sips of broth and ate some lemon ice last night. Reports UBW 244lbs, current weight 255.2lbs. +BM today. Recommend resume clear liquids upon return from EGD & then advance to consistent carbohydrate, DASH/TLC + glucerna qd as tolerated. No skin breakdown or edema.

## 2023-10-06 NOTE — PROGRESS NOTE ADULT - ATTENDING COMMENTS
Patient seen and examined at bedside. No acute overnight events.  Patient reports improvement of epigastric pain this morning, still with nausea but was able to tolerate some broth yesterday.  He denies chest pain/shortness of breath/palpitation.   Repeat echo showed EF 55-60%.   He is pending EGD today.  Continue to hold eliquis pending above.  Possible colonoscopy if EGD normal per GI.

## 2023-10-06 NOTE — CHART NOTE - NSCHARTNOTEFT_GEN_A_CORE
Informed by RN that Pt needed more pain management as he is unable to tolerate his PO meds (dilaudid, methadone and lyrica) for chronic pain syndrome. Went to bedside and discussed plans for pain management. Pt states he feels relief for 2 hours after his IV dilaudid 2mg q4h before he starts having pain again. Pt was amenable to having a smaller dose of dilaudid in between to manage his pain. Will start dilaudid 0.25mg q4h in between his dilaudid 2mg doses and monitor pain relief. May adjust dose as necessary. Pt was educated about risks of opioids such as increase tolerance, addiction, decreased resp drive and constipation. Pt expressed understanding and wanted to proceed with the plan as above until he could start tolerating PO intake and restart his home meds.

## 2023-10-06 NOTE — DIETITIAN INITIAL EVALUATION ADULT - NS FNS DIET ORDER
Diet, NPO after Midnight:      NPO Start Date: 05-Oct-2023,   NPO Start Time: 23:59  Except Medications (10-05-23 @ 16:12)

## 2023-10-06 NOTE — PROGRESS NOTE ADULT - ASSESSMENT
58 Male with PMH HTN, HLD, CAD/MI (no stents), T2DM on insulin/metformin/jardiance with hx of DKA, Afib on Eliquis, Chronic Pain Syndrome with multiple neck surgeries (C2-C7 fusion) on dilaudid and methadone presents with abdominal pain with nausea and vomiting.     #Possible Gastroenteritis  -pt with mostly epigastric pain, nausea and vomiting, cannot tolerate oral  -CT AP with IV contrast showed no acute pathology   -clear liquid diet and advance as tolerated  -c/w PPI BID   -c/w IVF, Zofran prn  -GI consulted appreciated  -Monitor labs, replete lytes as needed   -NPO for EGD, can restart diet post-procedure    #Chest Pain, unspecified  #CAD, Hx MI  -chest pain reproducible with palpation, likely musculoskeletal   -history of STEMI due to thrombotic occlusion of small caliber LPL branches arising from medium-caliber distal LCx supplying a relatively small territory, successfully treated with angioplasty in 3/2022  -Trop negative x 2, EKG without acute ischemic changes  -Echo 4/2023 showed EF 60-65%, grade 1 diastolic dysfunction  -Repeat Echo 10/06/23 showed EF 55-60%  -c/w statin, coreg 3.125 BID    #Chronic anemia  -Hgb 12.9 on admission  -h/h stable, no active bleeding  -transfuse if Hgb < 7  -f/u iron studies  -monitor h/h    #T2DM on Insulin  -HbA1C 9.5%   -pt reports taking metformin, jardiance and insulin at home  -will order accuchecks with ISS for now - intake remains poor due to nausea  -Adjust insulin requirements based on necessity, goal BS < 180    #Afib on Eliquis  -stable, rate controlled  -CHADsVASc 3  -s/p 1 dose lovenox   -Holding Eliquis pending EGD  -Continue Coreg   -continue to monitor    #Chronic Pain Syndrome 2/2 multiple neck surgeries  #C2-C7 Fusion  -Continue lyrica, methadone and dilaudid  -pt reports not being able to tolerate methadone at this time  -will review ISTOP (see separate note)  -placed on IV Dilaudid for now until tolerates PO then place back on home Dilaudid 4mg four times a day    #Anxiety  -c/w home zoloft and klonopin 1 mg QHS    #DVT PPx  -Eliquis on hold for EGD      Case was discussed with attending, Dr. Carpenter     58 Male with PMH HTN, HLD, CAD/MI (no stents), T2DM on insulin/metformin/jardiance with hx of DKA, Afib on Eliquis, Chronic Pain Syndrome with multiple neck surgeries (C2-C7 fusion) on dilaudid and methadone presents with abdominal pain with nausea and vomiting.     #Possible Gastroenteritis vs. Gastritis  -pt with mostly epigastric pain, nausea and vomiting, cannot tolerate oral  -CT AP with IV contrast showed no acute pathology   -clear liquid diet and advance as tolerated  -c/w PPI BID   -c/w IVF, Zofran prn  -GI consulted appreciated  -Monitor labs, replete lytes as needed   -NPO for EGD, can restart diet post-procedure    #Chest Pain, unspecified  #CAD, Hx MI  -chest pain reproducible with palpation, likely musculoskeletal   -history of STEMI due to thrombotic occlusion of small caliber LPL branches arising from medium-caliber distal LCx supplying a relatively small territory, successfully treated with angioplasty in 3/2022  -Trop negative x 2, EKG without acute ischemic changes  -Echo 4/2023 showed EF 60-65%, grade 1 diastolic dysfunction  -Repeat Echo 10/06/23 showed EF 55-60%  -c/w statin, coreg 3.125 BID    #Chronic anemia  -Hgb 12.9 on admission  -h/h stable, no active bleeding  -transfuse if Hgb < 7  -f/u iron studies  -monitor h/h    #T2DM on Insulin  -HbA1C 9.5%   -pt reports taking metformin, jardiance and insulin at home  -will order accuchecks with ISS for now - intake remains poor due to nausea  -Adjust insulin requirements based on necessity, goal BS < 180    #Afib on Eliquis  -stable, rate controlled  -CHADsVASc 3  -s/p 1 dose lovenox 10/5  -Holding Eliquis pending EGD  -Continue Coreg   -continue to monitor    #Chronic Pain Syndrome 2/2 multiple neck surgeries  #C2-C7 Fusion  -Continue lyrica, methadone and dilaudid  -pt reports not being able to tolerate methadone at this time  -will review ISTOP (see separate note)  -placed on IV Dilaudid for now until tolerates PO then place back on home Dilaudid 4mg four times a day    #Anxiety  -c/w home zoloft and klonopin 1 mg QHS    #DVT PPx  -Eliquis on hold for EGD      Case was discussed with attending, Dr. Carpenter

## 2023-10-06 NOTE — DIETITIAN INITIAL EVALUATION ADULT - DIET TYPE
advance as tolerated/DASH/TLC (sodium and cholesterol restricted diet)/consistent carbohydrate (evening snack)

## 2023-10-07 ENCOUNTER — TRANSCRIPTION ENCOUNTER (OUTPATIENT)
Age: 58
End: 2023-10-07

## 2023-10-07 VITALS
RESPIRATION RATE: 17 BRPM | SYSTOLIC BLOOD PRESSURE: 128 MMHG | DIASTOLIC BLOOD PRESSURE: 70 MMHG | OXYGEN SATURATION: 97 % | HEART RATE: 75 BPM | TEMPERATURE: 98 F

## 2023-10-07 LAB
ANION GAP SERPL CALC-SCNC: 7 MMOL/L — SIGNIFICANT CHANGE UP (ref 5–17)
BUN SERPL-MCNC: 6 MG/DL — LOW (ref 7–23)
CALCIUM SERPL-MCNC: 8.4 MG/DL — SIGNIFICANT CHANGE UP (ref 8.4–10.5)
CHLORIDE SERPL-SCNC: 102 MMOL/L — SIGNIFICANT CHANGE UP (ref 96–108)
CO2 SERPL-SCNC: 27 MMOL/L — SIGNIFICANT CHANGE UP (ref 22–31)
CREAT SERPL-MCNC: 0.64 MG/DL — SIGNIFICANT CHANGE UP (ref 0.5–1.3)
EGFR: 111 ML/MIN/1.73M2 — SIGNIFICANT CHANGE UP
GLUCOSE BLDC GLUCOMTR-MCNC: 206 MG/DL — HIGH (ref 70–99)
GLUCOSE BLDC GLUCOMTR-MCNC: 208 MG/DL — HIGH (ref 70–99)
GLUCOSE SERPL-MCNC: 198 MG/DL — HIGH (ref 70–99)
HCT VFR BLD CALC: 36.5 % — LOW (ref 39–50)
HGB BLD-MCNC: 11.7 G/DL — LOW (ref 13–17)
MCHC RBC-ENTMCNC: 26.1 PG — LOW (ref 27–34)
MCHC RBC-ENTMCNC: 32.1 GM/DL — SIGNIFICANT CHANGE UP (ref 32–36)
MCV RBC AUTO: 81.3 FL — SIGNIFICANT CHANGE UP (ref 80–100)
NRBC # BLD: 0 /100 WBCS — SIGNIFICANT CHANGE UP (ref 0–0)
PLATELET # BLD AUTO: 316 K/UL — SIGNIFICANT CHANGE UP (ref 150–400)
POTASSIUM SERPL-MCNC: 3.3 MMOL/L — LOW (ref 3.5–5.3)
POTASSIUM SERPL-SCNC: 3.3 MMOL/L — LOW (ref 3.5–5.3)
RBC # BLD: 4.49 M/UL — SIGNIFICANT CHANGE UP (ref 4.2–5.8)
RBC # FLD: 15.1 % — HIGH (ref 10.3–14.5)
SODIUM SERPL-SCNC: 136 MMOL/L — SIGNIFICANT CHANGE UP (ref 135–145)
WBC # BLD: 8.1 K/UL — SIGNIFICANT CHANGE UP (ref 3.8–10.5)
WBC # FLD AUTO: 8.1 K/UL — SIGNIFICANT CHANGE UP (ref 3.8–10.5)

## 2023-10-07 PROCEDURE — 99233 SBSQ HOSP IP/OBS HIGH 50: CPT | Mod: FS

## 2023-10-07 PROCEDURE — 99239 HOSP IP/OBS DSCHRG MGMT >30: CPT

## 2023-10-07 RX ORDER — CLONAZEPAM 1 MG
0.5 TABLET ORAL DAILY
Refills: 0 | Status: DISCONTINUED | OUTPATIENT
Start: 2023-10-07 | End: 2023-10-07

## 2023-10-07 RX ORDER — METOCLOPRAMIDE HCL 10 MG
1 TABLET ORAL
Qty: 90 | Refills: 0
Start: 2023-10-07 | End: 2023-11-05

## 2023-10-07 RX ORDER — HYDROMORPHONE HYDROCHLORIDE 2 MG/ML
4 INJECTION INTRAMUSCULAR; INTRAVENOUS; SUBCUTANEOUS
Refills: 0 | Status: DISCONTINUED | OUTPATIENT
Start: 2023-10-07 | End: 2023-10-07

## 2023-10-07 RX ORDER — ONDANSETRON 8 MG/1
1 TABLET, FILM COATED ORAL
Qty: 15 | Refills: 0
Start: 2023-10-07

## 2023-10-07 RX ORDER — POTASSIUM CHLORIDE 20 MEQ
40 PACKET (EA) ORAL ONCE
Refills: 0 | Status: COMPLETED | OUTPATIENT
Start: 2023-10-07 | End: 2023-10-07

## 2023-10-07 RX ORDER — METOCLOPRAMIDE HCL 10 MG
10 TABLET ORAL
Refills: 0 | Status: DISCONTINUED | OUTPATIENT
Start: 2023-10-07 | End: 2023-10-07

## 2023-10-07 RX ORDER — APIXABAN 2.5 MG/1
5 TABLET, FILM COATED ORAL EVERY 12 HOURS
Refills: 0 | Status: DISCONTINUED | OUTPATIENT
Start: 2023-10-07 | End: 2023-10-07

## 2023-10-07 RX ORDER — CHOLECALCIFEROL (VITAMIN D3) 125 MCG
1 CAPSULE ORAL
Refills: 0 | DISCHARGE

## 2023-10-07 RX ADMIN — HYDROMORPHONE HYDROCHLORIDE 0.25 MILLIGRAM(S): 2 INJECTION INTRAMUSCULAR; INTRAVENOUS; SUBCUTANEOUS at 07:56

## 2023-10-07 RX ADMIN — Medication 4: at 08:51

## 2023-10-07 RX ADMIN — HYDROMORPHONE HYDROCHLORIDE 2 MILLIGRAM(S): 2 INJECTION INTRAMUSCULAR; INTRAVENOUS; SUBCUTANEOUS at 09:42

## 2023-10-07 RX ADMIN — HYDROMORPHONE HYDROCHLORIDE 2 MILLIGRAM(S): 2 INJECTION INTRAMUSCULAR; INTRAVENOUS; SUBCUTANEOUS at 02:28

## 2023-10-07 RX ADMIN — HYDROMORPHONE HYDROCHLORIDE 0.25 MILLIGRAM(S): 2 INJECTION INTRAMUSCULAR; INTRAVENOUS; SUBCUTANEOUS at 07:41

## 2023-10-07 RX ADMIN — Medication 4: at 12:47

## 2023-10-07 RX ADMIN — METHADONE HYDROCHLORIDE 5 MILLIGRAM(S): 40 TABLET ORAL at 11:44

## 2023-10-07 RX ADMIN — Medication 1 DROP(S): at 11:44

## 2023-10-07 RX ADMIN — Medication 10 MILLIGRAM(S): at 12:47

## 2023-10-07 RX ADMIN — Medication 10 MILLIGRAM(S): at 08:52

## 2023-10-07 RX ADMIN — CARVEDILOL PHOSPHATE 3.12 MILLIGRAM(S): 80 CAPSULE, EXTENDED RELEASE ORAL at 05:41

## 2023-10-07 RX ADMIN — Medication 400 MILLIGRAM(S): at 11:45

## 2023-10-07 RX ADMIN — HYDROMORPHONE HYDROCHLORIDE 2 MILLIGRAM(S): 2 INJECTION INTRAMUSCULAR; INTRAVENOUS; SUBCUTANEOUS at 09:57

## 2023-10-07 RX ADMIN — SODIUM CHLORIDE 100 MILLILITER(S): 9 INJECTION INTRAMUSCULAR; INTRAVENOUS; SUBCUTANEOUS at 07:42

## 2023-10-07 RX ADMIN — HYDROMORPHONE HYDROCHLORIDE 4 MILLIGRAM(S): 2 INJECTION INTRAMUSCULAR; INTRAVENOUS; SUBCUTANEOUS at 12:47

## 2023-10-07 RX ADMIN — HYDROMORPHONE HYDROCHLORIDE 2 MILLIGRAM(S): 2 INJECTION INTRAMUSCULAR; INTRAVENOUS; SUBCUTANEOUS at 01:29

## 2023-10-07 RX ADMIN — HYDROMORPHONE HYDROCHLORIDE 2 MILLIGRAM(S): 2 INJECTION INTRAMUSCULAR; INTRAVENOUS; SUBCUTANEOUS at 05:41

## 2023-10-07 RX ADMIN — Medication 150 MILLIGRAM(S): at 13:36

## 2023-10-07 RX ADMIN — HYDROMORPHONE HYDROCHLORIDE 4 MILLIGRAM(S): 2 INJECTION INTRAMUSCULAR; INTRAVENOUS; SUBCUTANEOUS at 13:47

## 2023-10-07 RX ADMIN — HYDROMORPHONE HYDROCHLORIDE 2 MILLIGRAM(S): 2 INJECTION INTRAMUSCULAR; INTRAVENOUS; SUBCUTANEOUS at 06:40

## 2023-10-07 RX ADMIN — Medication 40 MILLIEQUIVALENT(S): at 09:45

## 2023-10-07 RX ADMIN — SERTRALINE 100 MILLIGRAM(S): 25 TABLET, FILM COATED ORAL at 11:46

## 2023-10-07 RX ADMIN — PANTOPRAZOLE SODIUM 40 MILLIGRAM(S): 20 TABLET, DELAYED RELEASE ORAL at 09:43

## 2023-10-07 NOTE — DISCHARGE NOTE PROVIDER - NSDCMRMEDTOKEN_GEN_ALL_CORE_FT
acetaminophen 325 mg oral tablet: 2 tab(s) orally every 6 hours As needed Temp greater or equal to 38C (100.4F), Mild Pain (1 - 3)  apixaban 5 mg oral tablet: 1 tab(s) orally every 12 hours  atorvastatin 80 mg oral tablet: 1 tab(s) orally once a day (at bedtime)  clonazePAM 0.5 mg oral tablet: 1 tab(s) orally once a day as needed for  anxiety  clonazePAM 1 mg oral tablet: 1 tab(s) orally once a day (at bedtime)  Coreg 25 mg oral tablet: 1 tab(s) orally 2 times a day  empagliflozin 25 mg oral tablet: 1 tab(s) orally once a day   HYDROmorphone 4 mg oral tablet: 1 tab(s) orally 4 times a day as needed for  moderate pain  Lanlily Solphilar Pen 100 units/mL subcutaneous solution: 55 unit(s) subcutaneous once a day  Levomefolate DHA oral capsule: 1 cap(s) orally once a day  Lyrica 150 mg oral capsule: 1 cap(s) orally 3 times a day  metFORMIN 1000 mg oral tablet: 1 tab(s) orally 2 times a day  methadone 5 mg oral tablet: 1 tab(s) orally 4 times a day  metoclopramide 10 mg oral tablet: 1 tab(s) orally 3 times a day (before meals)  NovoLOG FlexPen 100 units/mL injectable solution: subcutaneous 3 times a day (with meals) 16-20 units  pantoprazole 40 mg oral delayed release tablet: 1 tab(s) orally once a day  sertraline 100 mg oral tablet: 2 tab(s) orally once a day (at bedtime)  tiZANidine 4 mg oral tablet: 1 tab(s) orally 3 times a day as needed for  muscle spasm  traZODone 150 mg oral tablet: 1 tab(s) orally once a day (at bedtime)   acetaminophen 325 mg oral tablet: 2 tab(s) orally every 6 hours As needed Temp greater or equal to 38C (100.4F), Mild Pain (1 - 3)  apixaban 5 mg oral tablet: 1 tab(s) orally every 12 hours  atorvastatin 80 mg oral tablet: 1 tab(s) orally once a day (at bedtime)  clonazePAM 0.5 mg oral tablet: 1 tab(s) orally once a day as needed for  anxiety  clonazePAM 1 mg oral tablet: 1 tab(s) orally once a day (at bedtime)  Coreg 25 mg oral tablet: 1 tab(s) orally 2 times a day  empagliflozin 25 mg oral tablet: 1 tab(s) orally once a day   HYDROmorphone 4 mg oral tablet: 1 tab(s) orally 4 times a day as needed for  moderate pain  Lantus Solostar Pen 100 units/mL subcutaneous solution: 26 unit(s) subcutaneous once a day  Levomefolate DHA oral capsule: 1 cap(s) orally once a day  Lyrica 150 mg oral capsule: 1 cap(s) orally 3 times a day  metFORMIN 1000 mg oral tablet: 1 tab(s) orally 2 times a day  methadone 5 mg oral tablet: 1 tab(s) orally 4 times a day  NovoLOG FlexPen 100 units/mL injectable solution: subcutaneous 3 times a day (with meals) 16-20 units  ondansetron 4 mg oral tablet: 1 tab(s) orally every 8 hours as needed for  nausea  pantoprazole 40 mg oral delayed release tablet: 1 tab(s) orally once a day  sertraline 100 mg oral tablet: 2 tab(s) orally once a day (at bedtime)  tiZANidine 4 mg oral tablet: 1 tab(s) orally 3 times a day as needed for  muscle spasm  traZODone 150 mg oral tablet: 1 tab(s) orally once a day (at bedtime)

## 2023-10-07 NOTE — PROGRESS NOTE ADULT - ASSESSMENT
58 Male with PMH HTN, HLD, CAD/MI (no stents), T2DM on insulin /metformin/ Jardiance with hx of DKA, Afib on Eliquis, Chronic Pain Syndrome with multiple neck surgeries (C2-C7 fusion) on Dilaudid and methadone presents with abdominal pain with nausea and vomiting.  GI was as to consult for nausea, abd pain, unable to tolerate PO, h/o iron deficiency anemia. He never had EGD & had colonoscopy 4 years before and as per patient the results were normal.   Patient seen and examined at bedside. No overnight events reported. Patient denies Vomiting, states he has some nausea. +BM last night normal as per patient. states abdominal pain has improved.     EGD completed yesterday Impression:  Irregular mucosa in the gastroesophageal junction. (Biopsy).  Erythema in the antrum and stomach body compatible with non-erosive gastritis (Biopsy).  Normal mucosa in the duodenal bulb and second part of the duodenum. (Biopsy).      H/H 11.7/36.5 stable, will continue to monitor

## 2023-10-07 NOTE — DISCHARGE NOTE NURSING/CASE MANAGEMENT/SOCIAL WORK - NSDCPEFALRISK_GEN_ALL_CORE
For information on Fall & Injury Prevention, visit: [email or web address] OR  [email or web address] OR  https://www.cdc.gov/steadi/patient.html

## 2023-10-07 NOTE — PROGRESS NOTE ADULT - PROBLEM SELECTOR PLAN 2
Possible multifactorial   Monitor H&H  Keep active T&S  Transfuse if Hb less than 7  EGD today, pending Anesthesia review.  (If normal will consider colonoscopy Monday).
Possible multifactorial   Monitor H&H Stable since last bloodwork  Keep active T&S  Transfuse if Hb less than 7    Colonoscopy can be done as outpatient unless clinically indicated during hospital stay.

## 2023-10-07 NOTE — DISCHARGE NOTE PROVIDER - CARE PROVIDER_API CALL
Scott Liang  Gastroenterology  [address], [address]  [place] [address]  Phone: [phone]  Fax: [phone]  Established Patient  Follow Up Time: 2 weeks

## 2023-10-07 NOTE — DISCHARGE NOTE PROVIDER - NSDCCPCAREPLAN_GEN_ALL_CORE_FT
PRINCIPAL DISCHARGE DIAGNOSIS  Diagnosis: Nausea & vomiting  Assessment and Plan of Treatment: You were admitted for management of nausea and vomiting likely due a condition called gastroparesis. Gastroparesis is a condition that causes food to move more slowly than normal from the stomach to the intestines. Gastroparesis is not caused by blockage. Often, the cause may not be known. It may be caused by damage to a nerve that controls muscles used to move food to your small intestines.  You were prescribed a new medication called Reglan which should be taken before each of your meals to assist with the nausea.   What are the signs and symptoms of gastroparesis?  Nausea and vomiting  Feeling full sooner than normal or after eating less than usual  Abdominal bloating and pain  Weight loss or poor nutrition  Frequent changes in blood sugar  Please follow up with your primary care physician and Gastroenterologist for further management of this conditions and your other conditions.     PRINCIPAL DISCHARGE DIAGNOSIS  Diagnosis: Nausea & vomiting  Assessment and Plan of Treatment: You were admitted for management of nausea, vomiting and abodminal pain. CT scan of your abdomen did not show any acute findings. You underwent endoscopy with GI which showed non-erosive gastritis. You will be discharged on medication for the gastritis and also to help with symptoms of nasuea as needed. Your symptoms have improved but will need outpatient follow up with GI for further work up.   Please follow up with your primary care physician and Gastroenterologist for further management and work up.

## 2023-10-07 NOTE — DISCHARGE NOTE PROVIDER - NSDCFUSCHEDAPPT_GEN_ALL_CORE_FT
[facility]  [facility] [address]  Scheduled Appointment: 10/09/2023    [facility]  [facility] [address]  Scheduled Appointment: 12/04/2023

## 2023-10-07 NOTE — PROGRESS NOTE ADULT - SUBJECTIVE AND OBJECTIVE BOX
INTERVAL HPI/OVERNIGHT EVENTS:  HPI:  58 Male with PMH HTN, HLD, CAD/MI (no stents), T2DM on insulin/metformin/jardiance with hx of DKA, Afib on Eliquis, Chronic Pain Syndrome with multiple neck surgeries (C2-C7 fusion) on dilaudid and methadone presents with abdominal pain with nausea and vomiting.  Pt reports for last four days has had excruciating abdominal pain with associated nausea and vomiting.  Reports inability to keep anything down.  Reports diffuse abdominal pain described as cramping, inability to tolerate anything oral.  Reports vomiting multiple times the last four days.  Also reports episodes of chest pain, described as sharp, non radiating.  Denies recent sick contacts, no diarrhea, no fever, no shortness of breath.  Reports living at home with girlfriend and she has been well.  In ED CTAP done that showed diverticulosis, no explanation for above symtoms Troponin are negative x 2, EKG with NSR, mod LVH @ 72 (04 Oct 2023 18:04)        GI Note: Patient seen and examined at bedside. No overnight events reported. Patient denies Vomiting, states he has some nausea. +BM last night normal as per patient. states abdominal pain has improved.     MEDICATIONS  (STANDING):  atorvastatin 80 milliGRAM(s) Oral at bedtime  carvedilol 3.125 milliGRAM(s) Oral every 12 hours  clonazePAM Oral Disintegrating Tablet 1 milliGRAM(s) Oral at bedtime  dextrose 5%. 1000 milliLiter(s) (100 mL/Hr) IV Continuous <Continuous>  dextrose 5%. 1000 milliLiter(s) (50 mL/Hr) IV Continuous <Continuous>  dextrose 50% Injectable 12.5 Gram(s) IV Push once  dextrose 50% Injectable 25 Gram(s) IV Push once  dextrose 50% Injectable 25 Gram(s) IV Push once  glucagon  Injectable 1 milliGRAM(s) IntraMuscular once  insulin lispro (ADMELOG) corrective regimen sliding scale   SubCutaneous three times a day before meals  insulin lispro (ADMELOG) corrective regimen sliding scale   SubCutaneous at bedtime  methadone    Tablet 5 milliGRAM(s) Oral four times a day  metoclopramide   Syrup 10 milliGRAM(s) Oral <User Schedule>  pantoprazole  Injectable 40 milliGRAM(s) IV Push every 12 hours  potassium chloride    Tablet ER 40 milliEquivalent(s) Oral once  pregabalin 150 milliGRAM(s) Oral three times a day  sertraline 100 milliGRAM(s) Oral daily  sodium chloride 0.9%. 1000 milliLiter(s) (100 mL/Hr) IV Continuous <Continuous>  traZODone 150 milliGRAM(s) Oral at bedtime    MEDICATIONS  (PRN):  acetaminophen   IVPB .. 1000 milliGRAM(s) IV Intermittent once PRN Moderate Pain (4 - 6)  aluminum hydroxide/magnesium hydroxide/simethicone Suspension 30 milliLiter(s) Oral every 4 hours PRN Dyspepsia  artificial tears (preservative free) Ophthalmic Solution 1 Drop(s) Both EYES three times a day PRN Dry Eyes  dextrose Oral Gel 15 Gram(s) Oral once PRN Blood Glucose LESS THAN 70 milliGRAM(s)/deciliter  HYDROmorphone  Injectable 0.25 milliGRAM(s) IV Push every 4 hours PRN Moderate Pain (4 - 6)  HYDROmorphone  Injectable 2 milliGRAM(s) IV Push every 4 hours PRN Severe Pain (7 - 10)  ondansetron Injectable 4 milliGRAM(s) IV Push every 8 hours PRN Nausea and/or Vomiting      Allergies    No Known Allergies    Intolerances        PAST MEDICAL & SURGICAL HISTORY:  Hypertension      Diabetes mellitus      Gastric ulcer      Spinal stenosis      H/O spinal cord compression      Spondylosis      H/O radiculopathy      H/O cervical spine surgery      History of back surgery      S/P cervical spinal fusion          REVIEW OF SYSTEMS    Negative unless indicated in HPI        PHYSICAL EXAM:   Vital Signs:  Vital Signs Last 24 Hrs  T(C): 36.7 (07 Oct 2023 05:00), Max: 36.8 (06 Oct 2023 20:52)  T(F): 98 (07 Oct 2023 05:00), Max: 98.2 (06 Oct 2023 20:52)  HR: 70 (07 Oct 2023 08:59) (66 - 76)  BP: 163/87 (07 Oct 2023 08:59) (153/91 - 180/95)  BP(mean): 124 (07 Oct 2023 05:00) (124 - 124)  RR: 17 (07 Oct 2023 05:00) (17 - 18)  SpO2: 97% (07 Oct 2023 05:00) (95% - 97%)    Parameters below as of 07 Oct 2023 05:00  Patient On (Oxygen Delivery Method): room air      Daily     Daily Weight in k.8 (07 Oct 2023 05:00)I&O's Summary    06 Oct 2023 07:01  -  07 Oct 2023 07:00  --------------------------------------------------------  IN: 200 mL / OUT: 403 mL / NET: -203 mL        GENERAL:  Appears stated age, well-groomed, well-nourished, no distress  HEENT:  NC/AT,  conjunctivae clear and pink,  CHEST:  Full & symmetric excursion, no increased effort, breath sounds clear  HEART:  Regular rhythm, S1, S2,   ABDOMEN:  Soft, non-tender, non-distended, normoactive bowel sounds,   EXTEREMITIES:  no clubbing or edema  SKIN:  warm/dry  NEURO:  Alert, oriented x3      LABS:                        11.7   8.10  )-----------( 316      ( 07 Oct 2023 05:56 )             36.5     10-07    136  |  102  |  6<L>  ----------------------------<  198<H>  3.3<L>   |  27  |  0.64    Ca    8.4      07 Oct 2023 05:56  Phos  3.0     10-06  Mg     1.4     10-06        Urinalysis Basic - ( 07 Oct 2023 05:56 )    Color: x / Appearance: x / SG: x / pH: x  Gluc: 198 mg/dL / Ketone: x  / Bili: x / Urobili: x   Blood: x / Protein: x / Nitrite: x   Leuk Esterase: x / RBC: x / WBC x   Sq Epi: x / Non Sq Epi: x / Bacteria: x      amylase   Lipase: 13 U/L (10-04 @ 12:40)    RADIOLOGY & ADDITIONAL TESTS:  < from: EGD (10.06.23 @ 14:00) >  EGD Report        Date: 10/6/2023 2:00 PM        Patient Name: ROSE STRAUSS        MRN: [id number]        Account Number:        [id number]        Gender: Male         (age): 1965 (58)        Instrument(s):        [id number] - GIF [id number])        Attending/Fellow:        Scott Liang DO        Technician:        Katelyn Thorne RN        Procedure Room #:        Procedure Room 1        Referring Physician:        BENNETT PINA        [address] [place] [id number]        [phone] (phone)        PCP:        BENNETT PINA        Anesthesia Provider:        Natalie Olguin CRNA        Nurse(s):        Patricia Calero RN        ASA Class:        P2 - 10/6/2023 3:00 PM Natalie Olguin CRNA        Administered Medications:        As per Anesthesiology Record        Indications:        Nausea and vomitin.01 - R11.2        Procedure:        The procedure, indications, preparation and potential complications were    explained to the patient, who indicated understanding and signed the    corresponding consent forms. MAC was administered by anesthesiologist.    Continuous pulse oximetry and blood pressure monitoring were used throughout the    procedure. Supplemental oxygen was used. Patient was placed in the left lateral    decubitus position. The endoscope was introduced through the mouth and advanced    under direct visualization until the second part of the duodenum was reached.    The Z-line was noted at 41 centimeters. Patient tolerance to the procedure was    good. The procedure was not difficult. Blood loss was minimal.        Limitations/Complications:        There were no apparent limitations or complications        Findings:        Esophagus Mucosa Localized irregular mucosa of the mucosa with no bleeding was    noted in the gastroesophageal junction.Multiple cold forceps biopsies were    performed for histology.        Stomach Mucosa Patchy erythema of the mucosa with no bleeding was noted in the    antrum and stomach body. These findings are compatible with non-erosive    gastritis. Retroflexed view normal.Multiple cold forceps biopsies were performed    for histology in the antrum and stomach body.    Duodenum Mucosa Normal mucosa was noted in the duodenal bulb and second part of    the duodenum.Multiple cold forceps biopsies were performed for histology in the    second part of the duodenum.        Impressions:        Irregular mucosa in the gastroesophageal junction. (Biopsy).        Erythema in the antrum and stomach body compatible with non-erosive gastritis.    (Biopsy).        Normal mucosa in the duodenal bulb and second part of the duodenum. (Biopsy).  Plan:    Advance diet as tolerated        Avoid NSAIDS for 10 days        Return to floor for further management        Await pathology results.        Continue current medical regimen. Consider treatment for gastroparesis    (secondary to DM +/- opioid use).    Scott Liang DO  Version 1, Electronically signed on 10/6/2023 4:12:14 PM by Scott Liang DO    < end of copied text >

## 2023-10-07 NOTE — DISCHARGE NOTE PROVIDER - ATTENDING DISCHARGE PHYSICAL EXAMINATION:
T(C): 36.6 (10-07-23 @ 11:50), Max: 36.8 (10-06-23 @ 20:52)  HR: 75 (10-07-23 @ 11:50) (67 - 76)  BP: 128/70 (10-07-23 @ 11:50) (128/70 - 180/95)  RR: 17 (10-07-23 @ 11:50) (17 - 18)  SpO2: 97% (10-07-23 @ 11:50) (95% - 97%)  Wt(kg): --Vital Signs Last 24 Hrs  T(C): 36.6 (07 Oct 2023 11:50), Max: 36.8 (06 Oct 2023 20:52)  T(F): 97.8 (07 Oct 2023 11:50), Max: 98.2 (06 Oct 2023 20:52)  HR: 75 (07 Oct 2023 11:50) (67 - 76)  BP: 128/70 (07 Oct 2023 11:50) (128/70 - 180/95)  BP(mean): 124 (07 Oct 2023 05:00) (124 - 124)  RR: 17 (07 Oct 2023 11:50) (17 - 18)  SpO2: 97% (07 Oct 2023 11:50) (95% - 97%)    Parameters below as of 07 Oct 2023 11:50  Patient On (Oxygen Delivery Method): room air    PHYSICAL EXAM:  GENERAL: NAD, well appearing  HEENT: NCAT  NECK: Supple, No JVD  CHEST/LUNG: Clear to percussion bilaterally; No rales, rhonchi, wheezing  HEART: Regular rate and rhythm; No murmurs  ABDOMEN: Soft, Non-distended, mild epigastric discomfort on palpation, +BS, no rebound/guarding  MUSCULOSKELETAL/EXTREMITIES:  2+ Peripheral Pulses, No LE edema  SKIN: No rashes or lesions  PSYCH: Appropriate affect  NEURO: AAO x 3, nonfocal

## 2023-10-07 NOTE — DISCHARGE NOTE NURSING/CASE MANAGEMENT/SOCIAL WORK - PATIENT PORTAL LINK FT
You can access the FollowMyHealth Patient Portal offered by [facility] by registering at the following website: [email or web address] By joining [employer]’s FollowMyHealth portal, you will also be able to view your health information using other applications (apps) compatible with our system.

## 2023-10-07 NOTE — DISCHARGE NOTE PROVIDER - NSDCCAREPROVSEEN_GEN_ALL_CORE_FT
Dmitri, Althea Leal, Grover Liang, Scott Duong, Yen Wolf, Edinson Short, Priscila Woodward, Roxi Gresham, Herminio Carpenter, Luis Miguel Jimenez, Susan Mendez, Tarik Lane, Jaylen

## 2023-10-07 NOTE — DISCHARGE NOTE PROVIDER - HOSPITAL COURSE
58 Male with PMH HTN, HLD, CAD/MI (no stents), T2DM on insulin/metformin/jardiance with hx of DKA, Afib on Eliquis, Chronic Pain Syndrome with multiple neck surgeries (C2-C7 fusion) on dilaudid and methadone presented to [facility] 10/4/23 with abdominal pain with nausea and vomiting.  Pt reports for last four days has had excruciating abdominal pain with associated nausea and vomiting.  Reports inability to keep anything down.  Reports diffuse abdominal pain described as cramping, inability to tolerate anything oral, vomiting multiple times the last four days.  Also reports episodes of chest pain, described as sharp, non radiating.  Denies recent sick contacts, no diarrhea, no fever, no shortness of breath. Reports living at home with girlfriend and she has been well. In ED CTAP done that showed diverticulosis, no explanation for above symtoms. Troponin are negative x 2, EKG with NSR, mod LVH @ 72. Bloodwork unremarkable. He was intitially admitted for intractable nausea/vomiting/pain possible gastroenteritis and chest pain workup. He was evalauted by GI and EGD was performed 10/6/23: biopsied irregular GE junction mucosa, non-erosive gastritis, and normal duodum. Avoid NSAIDs for 10 days. Consider treatment for gastroparesis (DM2, opioid use). Suspicion moderate for gastroparesis, patient was started on reglan 10mg liquid TID but requested tablet form instead. He was restarted on solid diet and his home medications with much improvement compared to admission. He is optimized for discharge home with follow up from his PCP Dr. Irizarry as well as Gastroenterology.      ICU Vital Signs Last 24 Hrs  T(C): 36.6 (07 Oct 2023 11:50), Max: 36.8 (06 Oct 2023 20:52)  T(F): 97.8 (07 Oct 2023 11:50), Max: 98.2 (06 Oct 2023 20:52)  HR: 75 (07 Oct 2023 11:50) (66 - 76)  BP: 128/70 (07 Oct 2023 11:50) (128/70 - 180/95)  BP(mean): 124 (07 Oct 2023 05:00) (124 - 124)  RR: 17 (07 Oct 2023 11:50) (17 - 18)  SpO2: 97% (07 Oct 2023 11:50) (95% - 97%)      REVIEW OF SYSTEMS:  CONSTITUTIONAL: No weakness, fevers or chills  EYES/ENT: No visual changes;  No vertigo or throat pain   NECK: No pain or stiffness  RESPIRATORY: No cough, wheezing, hemoptysis; No shortness of breath  CARDIOVASCULAR: No chest pain or palpitations  GASTROINTESTINAL: +abdominal pain, +nausea, ; No vomiting, diarrhea or constipation.   GENITOURINARY: No dysuria, frequency or hematuria  NEUROLOGICAL: No numbness or weakness  SKIN: No itching, burning, rashes, or lesions     PHYSICAL EXAM  Constitutional: Pt lying in bed, awake and alert, NAD  HEENT: EOMI, normocephalic, moist mucous membranes  Neck: Soft and supple,   Respiratory: CTABL, No wheezing, rales or rhonchi  Cardiovascular: S1S2+, RRR, no M/G/R, reproducible chest pain with palpation of Left anterior chest  Gastrointestinal: BS+, soft, obese abdomen, nontender, nondistended, no guarding, no rebound  Extremities: No calf pain or edema  Vascular: Peripheral pulses present  Neurological: AAOx3, no focal deficits  Musculoskeletal: Normal muscle tone, no atrophy, no rigidity, no contractions  Skin: No new skin lesions or rashes         58 Male with PMH HTN, HLD, CAD/MI (no stents), T2DM on insulin/metformin/jardiance with hx of DKA, Afib on Eliquis, Chronic Pain Syndrome with multiple neck surgeries (C2-C7 fusion) on dilaudid and methadone presented to [facility] 10/4/23 with abdominal pain with nausea and vomiting.  Pt reports for last four days has had excruciating abdominal pain with associated nausea and vomiting.  Reports inability to keep anything down.  Reports diffuse abdominal pain described as cramping, inability to tolerate anything oral, vomiting multiple times the last four days.  Also reports episodes of chest pain, described as sharp, non radiating.  Denies recent sick contacts, no diarrhea, no fever, no shortness of breath. Reports living at home with girlfriend and she has been well. In ED CTAP done showed no acute findings. He was ruled out for ACS with serial Troponins which were negative x 2, EKG with NSR, mod LVH @ 72. Echo done showed normal LV systolic function with EF 55-60%.  Blood work unremarkable. Intractable nausea/vomiting/pain possibly 2/2 gastroenteritis vs. gastritis. He was evaluated by GI and EGD was performed 10/6/23: biopsied irregular GE junction mucosa, non-erosive gastritis, and normal duodenum. GI recommended to avoid NSAIDs. He was restarted on solid diet and his home medications with improvement. He is optimized for discharge home with follow up from his PCP Dr. Irizarry as well as Gastroenterology for further workup.     ICU Vital Signs Last 24 Hrs  T(C): 36.6 (07 Oct 2023 11:50), Max: 36.8 (06 Oct 2023 20:52)  T(F): 97.8 (07 Oct 2023 11:50), Max: 98.2 (06 Oct 2023 20:52)  HR: 75 (07 Oct 2023 11:50) (66 - 76)  BP: 128/70 (07 Oct 2023 11:50) (128/70 - 180/95)  BP(mean): 124 (07 Oct 2023 05:00) (124 - 124)  RR: 17 (07 Oct 2023 11:50) (17 - 18)  SpO2: 97% (07 Oct 2023 11:50) (95% - 97%)    REVIEW OF SYSTEMS:  CONSTITUTIONAL: No weakness, fevers or chills  EYES/ENT: No visual changes;  No vertigo or throat pain   NECK: No pain or stiffness  RESPIRATORY: No cough, wheezing, hemoptysis; No shortness of breath  CARDIOVASCULAR: No chest pain or palpitations  GASTROINTESTINAL: +abdominal pain, +nausea, ; No vomiting, diarrhea or constipation.   GENITOURINARY: No dysuria, frequency or hematuria  NEUROLOGICAL: No numbness or weakness  SKIN: No itching, burning, rashes, or lesions     PHYSICAL EXAM  Constitutional: Pt lying in bed, awake and alert, NAD  HEENT: EOMI, normocephalic, moist mucous membranes  Neck: Soft and supple,   Respiratory: CTABL, No wheezing, rales or rhonchi  Cardiovascular: S1S2+, RRR, no M/G/R, reproducible chest pain with palpation of Left anterior chest  Gastrointestinal: BS+, soft, obese abdomen, nontender, nondistended, no guarding, no rebound  Extremities: No calf pain or edema  Vascular: Peripheral pulses present  Neurological: AAOx3, no focal deficits  Musculoskeletal: Normal muscle tone, no atrophy, no rigidity, no contractions  Skin: No new skin lesions or rashes

## 2023-10-07 NOTE — PROGRESS NOTE ADULT - PROBLEM SELECTOR PLAN 1
Nausea, vomiting possible with Esophagitis or gastroparesis   Continue PPI BID  Avoid NSAIDs   Keep patient NPO   EGD today   Hold off Eliquis.
Nausea, vomiting based on EGD non-erosive gastritis   Continue PPI BID  Avoid NSAIDs   Advance diet as tolerated

## 2023-10-09 ENCOUNTER — APPOINTMENT (OUTPATIENT)
Dept: PAIN MANAGEMENT | Facility: CLINIC | Age: 58
End: 2023-10-09
Payer: OTHER MISCELLANEOUS

## 2023-10-09 VITALS — WEIGHT: 246 LBS | BODY MASS INDEX: 31.57 KG/M2 | HEIGHT: 74 IN

## 2023-10-09 PROCEDURE — 99213 OFFICE O/P EST LOW 20 MIN: CPT | Mod: ACP,95

## 2023-10-10 LAB — SURGICAL PATHOLOGY STUDY: SIGNIFICANT CHANGE UP

## 2023-10-19 ENCOUNTER — APPOINTMENT (OUTPATIENT)
Dept: FAMILY MEDICINE | Facility: HOSPITAL | Age: 58
End: 2023-10-19

## 2023-10-23 ENCOUNTER — RX RENEWAL (OUTPATIENT)
Age: 58
End: 2023-10-23

## 2023-10-24 ENCOUNTER — NON-APPOINTMENT (OUTPATIENT)
Age: 58
End: 2023-10-24

## 2023-10-26 NOTE — PROGRESS NOTE ADULT - NUTRITIONAL ASSESSMENT
This patient has been assessed with a concern for Malnutrition and has been determined to have a diagnosis/diagnoses of Moderate protein-calorie malnutrition.    This patient is being managed with:   Diet DASH/TLC-  Sodium & Cholesterol Restricted  Consistent Carbohydrate {No Snacks} (CSTCHO)  Entered: Mar  7 2022  5:34AM    
yes
Diet, DASH/TLC:   Sodium & Cholesterol Restricted  Consistent Carbohydrate {No Snacks} (CSTCHO) (03-07-22 @ 05:34) [Active]  Needs RD consult
Seen by nutritionist 3/7: moderate protein calorie malnutrition-   Diet, DASH/TLC:   Sodium & Cholesterol Restricted  Consistent Carbohydrate {No Snacks} (CSTCHO)
Diet, DASH/TLC:   Sodium & Cholesterol Restricted  Consistent Carbohydrate {No Snacks} (CSTCHO) (03-07-22 @ 05:34) [Active]
Diet, DASH/TLC:   Sodium & Cholesterol Restricted  Consistent Carbohydrate {No Snacks} (CSTCHO) (03-07-22 @ 05:34) [Active]  Appreciated RD consult
This patient has been assessed with a concern for Malnutrition and has been determined to have a diagnosis/diagnoses of Moderate protein-calorie malnutrition.    This patient is being managed with:   Diet DASH/TLC-  Sodium & Cholesterol Restricted  Consistent Carbohydrate {No Snacks} (CSTCHO)  Entered: Mar  7 2022  5:34AM    
This patient has been assessed with a concern for Malnutrition and has been determined to have a diagnosis/diagnoses of Moderate protein-calorie malnutrition.    This patient is being managed with:   Diet DASH/TLC-  Sodium & Cholesterol Restricted  Consistent Carbohydrate {No Snacks} (CSTCHO)  Entered: Mar  7 2022  5:34AM

## 2023-10-30 ENCOUNTER — EMERGENCY (EMERGENCY)
Facility: HOSPITAL | Age: 58
LOS: 1 days | Discharge: HOME | End: 2023-10-30
Attending: EMERGENCY MEDICINE | Admitting: EMERGENCY MEDICINE
Payer: MEDICARE

## 2023-10-30 ENCOUNTER — APPOINTMENT (OUTPATIENT)
Dept: FAMILY MEDICINE | Facility: CLINIC | Age: 58
End: 2023-10-30

## 2023-10-30 VITALS
RESPIRATION RATE: 18 BRPM | HEART RATE: 72 BPM | SYSTOLIC BLOOD PRESSURE: 131 MMHG | DIASTOLIC BLOOD PRESSURE: 81 MMHG | OXYGEN SATURATION: 98 %

## 2023-10-30 VITALS
OXYGEN SATURATION: 99 % | HEIGHT: 74 IN | SYSTOLIC BLOOD PRESSURE: 135 MMHG | HEART RATE: 76 BPM | RESPIRATION RATE: 17 BRPM | DIASTOLIC BLOOD PRESSURE: 70 MMHG | TEMPERATURE: 98 F | WEIGHT: 248.9 LBS

## 2023-10-30 DIAGNOSIS — Z98.890 OTHER SPECIFIED POSTPROCEDURAL STATES: Chronic | ICD-10-CM

## 2023-10-30 DIAGNOSIS — Z98.1 ARTHRODESIS STATUS: Chronic | ICD-10-CM

## 2023-10-30 LAB
ALBUMIN SERPL ELPH-MCNC: 2.7 G/DL — LOW (ref 3.3–5)
ALBUMIN SERPL ELPH-MCNC: 2.7 G/DL — LOW (ref 3.3–5)
ALP SERPL-CCNC: 94 U/L — SIGNIFICANT CHANGE UP (ref 40–120)
ALP SERPL-CCNC: 94 U/L — SIGNIFICANT CHANGE UP (ref 40–120)
ALT FLD-CCNC: 22 U/L — SIGNIFICANT CHANGE UP (ref 10–45)
ALT FLD-CCNC: 22 U/L — SIGNIFICANT CHANGE UP (ref 10–45)
ANION GAP SERPL CALC-SCNC: 7 MMOL/L — SIGNIFICANT CHANGE UP (ref 5–17)
ANION GAP SERPL CALC-SCNC: 7 MMOL/L — SIGNIFICANT CHANGE UP (ref 5–17)
APPEARANCE UR: CLEAR — SIGNIFICANT CHANGE UP
APPEARANCE UR: CLEAR — SIGNIFICANT CHANGE UP
AST SERPL-CCNC: 16 U/L — SIGNIFICANT CHANGE UP (ref 10–40)
AST SERPL-CCNC: 16 U/L — SIGNIFICANT CHANGE UP (ref 10–40)
B-OH-BUTYR SERPL-SCNC: 0.2 MMOL/L — SIGNIFICANT CHANGE UP
B-OH-BUTYR SERPL-SCNC: 0.2 MMOL/L — SIGNIFICANT CHANGE UP
BASE EXCESS BLDV CALC-SCNC: 5.3 MMOL/L — HIGH (ref -2–3)
BASE EXCESS BLDV CALC-SCNC: 5.3 MMOL/L — HIGH (ref -2–3)
BASOPHILS # BLD AUTO: 0.03 K/UL — SIGNIFICANT CHANGE UP (ref 0–0.2)
BASOPHILS # BLD AUTO: 0.03 K/UL — SIGNIFICANT CHANGE UP (ref 0–0.2)
BASOPHILS NFR BLD AUTO: 0.5 % — SIGNIFICANT CHANGE UP (ref 0–2)
BASOPHILS NFR BLD AUTO: 0.5 % — SIGNIFICANT CHANGE UP (ref 0–2)
BILIRUB SERPL-MCNC: 0.4 MG/DL — SIGNIFICANT CHANGE UP (ref 0.2–1.2)
BILIRUB SERPL-MCNC: 0.4 MG/DL — SIGNIFICANT CHANGE UP (ref 0.2–1.2)
BILIRUB UR-MCNC: NEGATIVE — SIGNIFICANT CHANGE UP
BILIRUB UR-MCNC: NEGATIVE — SIGNIFICANT CHANGE UP
BLOOD GAS COMMENTS, VENOUS: SIGNIFICANT CHANGE UP
BLOOD GAS COMMENTS, VENOUS: SIGNIFICANT CHANGE UP
BUN SERPL-MCNC: 11 MG/DL — SIGNIFICANT CHANGE UP (ref 7–23)
BUN SERPL-MCNC: 11 MG/DL — SIGNIFICANT CHANGE UP (ref 7–23)
CALCIUM SERPL-MCNC: 8.1 MG/DL — LOW (ref 8.4–10.5)
CALCIUM SERPL-MCNC: 8.1 MG/DL — LOW (ref 8.4–10.5)
CHLORIDE SERPL-SCNC: 100 MMOL/L — SIGNIFICANT CHANGE UP (ref 96–108)
CHLORIDE SERPL-SCNC: 100 MMOL/L — SIGNIFICANT CHANGE UP (ref 96–108)
CO2 BLDV-SCNC: 32 MMOL/L — HIGH (ref 22–26)
CO2 BLDV-SCNC: 32 MMOL/L — HIGH (ref 22–26)
CO2 SERPL-SCNC: 25 MMOL/L — SIGNIFICANT CHANGE UP (ref 22–31)
CO2 SERPL-SCNC: 25 MMOL/L — SIGNIFICANT CHANGE UP (ref 22–31)
COLOR SPEC: YELLOW — SIGNIFICANT CHANGE UP
COLOR SPEC: YELLOW — SIGNIFICANT CHANGE UP
CREAT SERPL-MCNC: 0.74 MG/DL — SIGNIFICANT CHANGE UP (ref 0.5–1.3)
CREAT SERPL-MCNC: 0.74 MG/DL — SIGNIFICANT CHANGE UP (ref 0.5–1.3)
DIFF PNL FLD: NEGATIVE — SIGNIFICANT CHANGE UP
DIFF PNL FLD: NEGATIVE — SIGNIFICANT CHANGE UP
EGFR: 106 ML/MIN/1.73M2 — SIGNIFICANT CHANGE UP
EGFR: 106 ML/MIN/1.73M2 — SIGNIFICANT CHANGE UP
EOSINOPHIL # BLD AUTO: 0.14 K/UL — SIGNIFICANT CHANGE UP (ref 0–0.5)
EOSINOPHIL # BLD AUTO: 0.14 K/UL — SIGNIFICANT CHANGE UP (ref 0–0.5)
EOSINOPHIL NFR BLD AUTO: 2.4 % — SIGNIFICANT CHANGE UP (ref 0–6)
EOSINOPHIL NFR BLD AUTO: 2.4 % — SIGNIFICANT CHANGE UP (ref 0–6)
GAS PNL BLDV: SIGNIFICANT CHANGE UP
GAS PNL BLDV: SIGNIFICANT CHANGE UP
GLUCOSE BLDC GLUCOMTR-MCNC: 362 MG/DL — HIGH (ref 70–99)
GLUCOSE BLDC GLUCOMTR-MCNC: 362 MG/DL — HIGH (ref 70–99)
GLUCOSE BLDC GLUCOMTR-MCNC: 403 MG/DL — HIGH (ref 70–99)
GLUCOSE BLDC GLUCOMTR-MCNC: 403 MG/DL — HIGH (ref 70–99)
GLUCOSE BLDC GLUCOMTR-MCNC: 539 MG/DL — CRITICAL HIGH (ref 70–99)
GLUCOSE BLDC GLUCOMTR-MCNC: 539 MG/DL — CRITICAL HIGH (ref 70–99)
GLUCOSE BLDC GLUCOMTR-MCNC: 570 MG/DL — CRITICAL HIGH (ref 70–99)
GLUCOSE BLDC GLUCOMTR-MCNC: 570 MG/DL — CRITICAL HIGH (ref 70–99)
GLUCOSE SERPL-MCNC: 498 MG/DL — CRITICAL HIGH (ref 70–99)
GLUCOSE SERPL-MCNC: 498 MG/DL — CRITICAL HIGH (ref 70–99)
GLUCOSE UR QL: >=1000 MG/DL
GLUCOSE UR QL: >=1000 MG/DL
HCO3 BLDV-SCNC: 30 MMOL/L — HIGH (ref 22–29)
HCO3 BLDV-SCNC: 30 MMOL/L — HIGH (ref 22–29)
HCT VFR BLD CALC: 37.5 % — LOW (ref 39–50)
HCT VFR BLD CALC: 37.5 % — LOW (ref 39–50)
HGB BLD-MCNC: 11.8 G/DL — LOW (ref 13–17)
HGB BLD-MCNC: 11.8 G/DL — LOW (ref 13–17)
IMM GRANULOCYTES NFR BLD AUTO: 0.3 % — SIGNIFICANT CHANGE UP (ref 0–0.9)
IMM GRANULOCYTES NFR BLD AUTO: 0.3 % — SIGNIFICANT CHANGE UP (ref 0–0.9)
KETONES UR-MCNC: NEGATIVE MG/DL — SIGNIFICANT CHANGE UP
KETONES UR-MCNC: NEGATIVE MG/DL — SIGNIFICANT CHANGE UP
LEUKOCYTE ESTERASE UR-ACNC: NEGATIVE — SIGNIFICANT CHANGE UP
LEUKOCYTE ESTERASE UR-ACNC: NEGATIVE — SIGNIFICANT CHANGE UP
LYMPHOCYTES # BLD AUTO: 1.76 K/UL — SIGNIFICANT CHANGE UP (ref 1–3.3)
LYMPHOCYTES # BLD AUTO: 1.76 K/UL — SIGNIFICANT CHANGE UP (ref 1–3.3)
LYMPHOCYTES # BLD AUTO: 30.2 % — SIGNIFICANT CHANGE UP (ref 13–44)
LYMPHOCYTES # BLD AUTO: 30.2 % — SIGNIFICANT CHANGE UP (ref 13–44)
MAGNESIUM SERPL-MCNC: 1.6 MG/DL — SIGNIFICANT CHANGE UP (ref 1.6–2.6)
MAGNESIUM SERPL-MCNC: 1.6 MG/DL — SIGNIFICANT CHANGE UP (ref 1.6–2.6)
MCHC RBC-ENTMCNC: 26.2 PG — LOW (ref 27–34)
MCHC RBC-ENTMCNC: 26.2 PG — LOW (ref 27–34)
MCHC RBC-ENTMCNC: 31.5 GM/DL — LOW (ref 32–36)
MCHC RBC-ENTMCNC: 31.5 GM/DL — LOW (ref 32–36)
MCV RBC AUTO: 83.3 FL — SIGNIFICANT CHANGE UP (ref 80–100)
MCV RBC AUTO: 83.3 FL — SIGNIFICANT CHANGE UP (ref 80–100)
MONOCYTES # BLD AUTO: 0.46 K/UL — SIGNIFICANT CHANGE UP (ref 0–0.9)
MONOCYTES # BLD AUTO: 0.46 K/UL — SIGNIFICANT CHANGE UP (ref 0–0.9)
MONOCYTES NFR BLD AUTO: 7.9 % — SIGNIFICANT CHANGE UP (ref 2–14)
MONOCYTES NFR BLD AUTO: 7.9 % — SIGNIFICANT CHANGE UP (ref 2–14)
NEUTROPHILS # BLD AUTO: 3.41 K/UL — SIGNIFICANT CHANGE UP (ref 1.8–7.4)
NEUTROPHILS # BLD AUTO: 3.41 K/UL — SIGNIFICANT CHANGE UP (ref 1.8–7.4)
NEUTROPHILS NFR BLD AUTO: 58.7 % — SIGNIFICANT CHANGE UP (ref 43–77)
NEUTROPHILS NFR BLD AUTO: 58.7 % — SIGNIFICANT CHANGE UP (ref 43–77)
NITRITE UR-MCNC: NEGATIVE — SIGNIFICANT CHANGE UP
NITRITE UR-MCNC: NEGATIVE — SIGNIFICANT CHANGE UP
NRBC # BLD: 0 /100 WBCS — SIGNIFICANT CHANGE UP (ref 0–0)
NRBC # BLD: 0 /100 WBCS — SIGNIFICANT CHANGE UP (ref 0–0)
PCO2 BLDV: 50 MMHG — SIGNIFICANT CHANGE UP (ref 42–55)
PCO2 BLDV: 50 MMHG — SIGNIFICANT CHANGE UP (ref 42–55)
PH BLDV: 7.39 — SIGNIFICANT CHANGE UP (ref 7.32–7.43)
PH BLDV: 7.39 — SIGNIFICANT CHANGE UP (ref 7.32–7.43)
PH UR: 6.5 — SIGNIFICANT CHANGE UP (ref 5–8)
PH UR: 6.5 — SIGNIFICANT CHANGE UP (ref 5–8)
PLATELET # BLD AUTO: 289 K/UL — SIGNIFICANT CHANGE UP (ref 150–400)
PLATELET # BLD AUTO: 289 K/UL — SIGNIFICANT CHANGE UP (ref 150–400)
PO2 BLDV: 36 MMHG — SIGNIFICANT CHANGE UP (ref 25–45)
PO2 BLDV: 36 MMHG — SIGNIFICANT CHANGE UP (ref 25–45)
POTASSIUM SERPL-MCNC: 4.5 MMOL/L — SIGNIFICANT CHANGE UP (ref 3.5–5.3)
POTASSIUM SERPL-MCNC: 4.5 MMOL/L — SIGNIFICANT CHANGE UP (ref 3.5–5.3)
POTASSIUM SERPL-SCNC: 4.5 MMOL/L — SIGNIFICANT CHANGE UP (ref 3.5–5.3)
POTASSIUM SERPL-SCNC: 4.5 MMOL/L — SIGNIFICANT CHANGE UP (ref 3.5–5.3)
PROT SERPL-MCNC: 6.1 G/DL — SIGNIFICANT CHANGE UP (ref 6–8.3)
PROT SERPL-MCNC: 6.1 G/DL — SIGNIFICANT CHANGE UP (ref 6–8.3)
PROT UR-MCNC: NEGATIVE MG/DL — SIGNIFICANT CHANGE UP
PROT UR-MCNC: NEGATIVE MG/DL — SIGNIFICANT CHANGE UP
RBC # BLD: 4.5 M/UL — SIGNIFICANT CHANGE UP (ref 4.2–5.8)
RBC # BLD: 4.5 M/UL — SIGNIFICANT CHANGE UP (ref 4.2–5.8)
RBC # FLD: 14.7 % — HIGH (ref 10.3–14.5)
RBC # FLD: 14.7 % — HIGH (ref 10.3–14.5)
SAO2 % BLDV: 51.6 % — LOW (ref 67–88)
SAO2 % BLDV: 51.6 % — LOW (ref 67–88)
SODIUM SERPL-SCNC: 132 MMOL/L — LOW (ref 135–145)
SODIUM SERPL-SCNC: 132 MMOL/L — LOW (ref 135–145)
SP GR SPEC: 1.03 — HIGH (ref 1–1.03)
SP GR SPEC: 1.03 — HIGH (ref 1–1.03)
UROBILINOGEN FLD QL: 0.2 MG/DL — SIGNIFICANT CHANGE UP (ref 0.2–1)
UROBILINOGEN FLD QL: 0.2 MG/DL — SIGNIFICANT CHANGE UP (ref 0.2–1)
WBC # BLD: 5.82 K/UL — SIGNIFICANT CHANGE UP (ref 3.8–10.5)
WBC # BLD: 5.82 K/UL — SIGNIFICANT CHANGE UP (ref 3.8–10.5)
WBC # FLD AUTO: 5.82 K/UL — SIGNIFICANT CHANGE UP (ref 3.8–10.5)
WBC # FLD AUTO: 5.82 K/UL — SIGNIFICANT CHANGE UP (ref 3.8–10.5)

## 2023-10-30 PROCEDURE — 82962 GLUCOSE BLOOD TEST: CPT

## 2023-10-30 PROCEDURE — 99284 EMERGENCY DEPT VISIT MOD MDM: CPT | Mod: 25

## 2023-10-30 PROCEDURE — 96374 THER/PROPH/DIAG INJ IV PUSH: CPT

## 2023-10-30 PROCEDURE — 83735 ASSAY OF MAGNESIUM: CPT

## 2023-10-30 PROCEDURE — 96361 HYDRATE IV INFUSION ADD-ON: CPT

## 2023-10-30 PROCEDURE — 82803 BLOOD GASES ANY COMBINATION: CPT

## 2023-10-30 PROCEDURE — 82010 KETONE BODYS QUAN: CPT

## 2023-10-30 PROCEDURE — 99284 EMERGENCY DEPT VISIT MOD MDM: CPT

## 2023-10-30 PROCEDURE — 80053 COMPREHEN METABOLIC PANEL: CPT

## 2023-10-30 PROCEDURE — 85025 COMPLETE CBC W/AUTO DIFF WBC: CPT

## 2023-10-30 PROCEDURE — 36415 COLL VENOUS BLD VENIPUNCTURE: CPT

## 2023-10-30 RX ORDER — SODIUM CHLORIDE 9 MG/ML
1000 INJECTION INTRAMUSCULAR; INTRAVENOUS; SUBCUTANEOUS ONCE
Refills: 0 | Status: COMPLETED | OUTPATIENT
Start: 2023-10-30 | End: 2023-10-30

## 2023-10-30 RX ORDER — INSULIN ASPART 100 [IU]/ML
12 INJECTION, SOLUTION SUBCUTANEOUS
Qty: 4 | Refills: 5
Start: 2023-10-30

## 2023-10-30 RX ORDER — INSULIN HUMAN 100 [IU]/ML
6 INJECTION, SOLUTION SUBCUTANEOUS ONCE
Refills: 0 | Status: COMPLETED | OUTPATIENT
Start: 2023-10-30 | End: 2023-10-30

## 2023-10-30 RX ORDER — INSULIN HUMAN 100 [IU]/ML
8 INJECTION, SOLUTION SUBCUTANEOUS ONCE
Refills: 0 | Status: DISCONTINUED | OUTPATIENT
Start: 2023-10-30 | End: 2023-10-30

## 2023-10-30 RX ORDER — INSULIN LISPRO 100/ML
6 VIAL (ML) SUBCUTANEOUS ONCE
Refills: 0 | Status: COMPLETED | OUTPATIENT
Start: 2023-10-30 | End: 2023-10-30

## 2023-10-30 RX ADMIN — Medication 6 UNIT(S): at 07:01

## 2023-10-30 RX ADMIN — INSULIN HUMAN 6 UNIT(S): 100 INJECTION, SOLUTION SUBCUTANEOUS at 05:38

## 2023-10-30 RX ADMIN — SODIUM CHLORIDE 1000 MILLILITER(S): 9 INJECTION INTRAMUSCULAR; INTRAVENOUS; SUBCUTANEOUS at 04:49

## 2023-10-30 RX ADMIN — SODIUM CHLORIDE 1000 MILLILITER(S): 9 INJECTION INTRAMUSCULAR; INTRAVENOUS; SUBCUTANEOUS at 05:49

## 2023-10-30 NOTE — ED PROVIDER NOTE - NSICDXPASTMEDICALHX_GEN_ALL_CORE_FT
PAST MEDICAL HISTORY:  Diabetes mellitus     Gastric ulcer     H/O radiculopathy     H/O spinal cord compression     Hypertension     Spinal stenosis     Spondylosis      negative

## 2023-10-30 NOTE — ED ADULT TRIAGE NOTE - CHIEF COMPLAINT QUOTE
Patient brought ot ED via EMS for high blood sugar. PAtient states he felt lightheaded and slightly dizzy 1 hour prior, checked his sugar and it was in 500's. History of diabetes. No vomiting, diarrhea or SOB.  Alert and oriented x 4. Patient brought to ED via EMS for high blood sugar. Patient states he felt lightheaded and slightly dizzy 1 hour prior, checked his sugar and it was in 500's. History of diabetes. No vomiting, diarrhea or SOB.  Alert and oriented x 4.

## 2023-10-30 NOTE — ED PROVIDER NOTE - OBJECTIVE STATEMENT
pt calls 911 bc of very elevated blood sugar tonight, over 500. pt states that he ran out of his novolog yesterday afternoon so hasn't been able to give himself any insulin. pt denies any n/v. pt was admitted to this hospital for abd pain and vomiting 2/2 to gastroparesis and was dc'd about 2 weeks ago. pt currently denies any others sx except for some "dizziness".

## 2023-10-30 NOTE — ED ADULT NURSE NOTE - NSFALLUNIVINTERV_ED_ALL_ED
Bed/Stretcher in lowest position, wheels locked, appropriate side rails in place/Call bell, personal items and telephone in reach/Instruct patient to call for assistance before getting out of bed/chair/stretcher/Non-slip footwear applied when patient is off stretcher/[place] to call system/Physically safe environment - no spills, clutter or unnecessary equipment/Purposeful proactive rounding/Room/bathroom lighting operational, light cord in reach

## 2023-10-30 NOTE — ED ADULT NURSE NOTE - CHIEF COMPLAINT QUOTE
Patient brought to ED via EMS for high blood sugar. Patient states he felt lightheaded and slightly dizzy 1 hour prior, checked his sugar and it was in 500's. History of diabetes. No vomiting, diarrhea or SOB.  Alert and oriented x 4.

## 2023-10-30 NOTE — ED PROVIDER NOTE - CLINICAL SUMMARY MEDICAL DECISION MAKING FREE TEXT BOX
pt with elevated blood glucose to the 500s. pt with elevated blood glucose to the 500s bc patient ran out of his novolog yesterday afternoon. pt blood sugar trending down after insulin, pt stable for dc.

## 2023-10-30 NOTE — ED ADULT NURSE NOTE - OBJECTIVE STATEMENT
Pt presents to the ER complaining of elevated blood sugar of over 500. Pt states that he ran out of Novolog yesterday afternoon. Hx of diabetes. Pt states he is experiencing some dizziness. A&OX4. Pt denies SOB, chest pain, fever, nausea, vomiting, headache.

## 2023-10-30 NOTE — ED ADULT NURSE NOTE - IS THE PATIENT ABLE TO BE SCREENED?
[facility] PROF OFFICE BLDG  [place] [facility], [address]  [address]  [address]  [facility] [address]  [phone]    10/26/17    PLAN OF CARE    We thank you so much for choosing [facility] and we are so excited to work with you throughout your entire fertility journey.  Below is a summary of your plan of care tailored specifically to your needs.     If you have any questions or concerns at any point, please feel free to contact us at [phone] or email us through [employer].      Again, our team of experts are looking forward to helping you achieve the little miracle you desire.    1. Triston -  Call day 1 of your next menstrual period for testing to be done on day 2, 3 or 4.  If day 1 falls on a Friday after 3 p.m., or over the weekend, call first thing Monday morning and plan to be seen on that day.    2. [place] -  When you call with day 1 of your menstrual cycle, we will also schedule a HSG (Hysterosalpingogram) to be done some time during days 5-10 of your cycle.  This test is done to make sure the fallopian tubes are open. You will be prescribed antibiotics prior to this procedure.    3. Triston - Have a Progesterone level drawn on November 2, 2017    4. Triston - Discontinue Spironolactone therapy to reduce the chance of birth defect in a future pregnancy.    5.  Zhen - Schedule a lab draw to test for Chromosomes.    Once all of your testing is complete, make an appointment to sit down with Dr. Lomax to discuss your test results and further plan of care.                                   Kellie CHACON     Yes

## 2023-10-30 NOTE — ED ADULT NURSE NOTE - PATIENT'S SEXUAL ORIENTATION
Hospitalist Progress Note    Patient: Maria Antonia Beaver MRN: [id number]  SSN: [id number]    YOB: 1943  Age: 72 y.o. Sex: male      Admit Date: 4/16/2017    LOS: 3 days     Subjective:     Seen at bedside. Remains agitated and removing IV lines. VS are stable and he is able to eat and drink. CK level an renal function is improving. Objective:     Vitals:    04/19/17 0041 04/19/17 0407 04/19/17 0724 04/19/17 1129   BP: 142/73 132/64 130/69 137/69   Pulse: 66 74 62 61   Resp: 18 16 16 16   Temp: 97.8 °F (36.6 °C) 97.5 °F (36.4 °C) 96.9 °F (36.1 °C)    SpO2: 95%  100% 99%   Weight:       Height:            Intake and Output: Not accurate    Physical Exam:   GENERAL: alert   EYE: conjunctivae/corneas clear. PERRL. THROAT & NECK: normal and no erythema or exudates noted. LUNG: clear to auscultation bilaterally  HEART: regular rate and rhythm, S1S2, no murmur, no JVD  ABDOMEN: soft, non-tender, non-distended. Bowel sounds normal.   EXTREMITIES:  No edema, 2+ pedal/radial pulses bilaterally  SKIN: no rash or abnormalities  NEUROLOGIC: Drowsy, Ox1 (person). Cranial nerves 2-12 grossly intact.     Lab/Data Review:  BMP:   Lab Results   Component Value Date/Time     (H) 04/19/2017 05:25 AM    K 4.1 04/19/2017 05:25 AM     (H) 04/19/2017 05:25 AM    CO2 19 (L) 04/19/2017 05:25 AM    AGAP 11 04/19/2017 05:25 AM    GLU 58 (L) 04/19/2017 05:25 AM    BUN 20 04/19/2017 05:25 AM    CREA 1.67 (H) 04/19/2017 05:25 AM    GFRAA 52 (L) 04/19/2017 05:25 AM    GFRNA 43 (L) 04/19/2017 05:25 AM     CBC:   Lab Results   Component Value Date/Time    WBC 7.7 04/19/2017 05:25 AM    HGB 10.3 (L) 04/19/2017 05:25 AM    HCT 31.5 (L) 04/19/2017 05:25 AM     04/19/2017 05:25 AM     All Cardiac Markers in the last 24 hours:   Lab Results   Component Value Date/Time    CPK 1669 (H) 04/19/2017 05:25 AM     Recent Glucose Results:   Lab Results   Component Value Date/Time    GLU 58 (L) 04/19/2017 05:25 AM GLU 61 (L) 04/18/2017 08:32 PM     COAGS:   No results found for: APTT, PTP, INR     Imaging:      Assessment:     Principal Problem:    Acute blood loss anemia (4/16/2017)    Active Problems:    Dementia (11/25/2016)      Hypernatremia (11/25/2016)      History of alcohol abuse (6/13/2014)      Overview: FORMERLY DRANK  1-2 12oz. Beers daily            NONE 2014      GI bleed (6/14/2014)      Anemia (11/25/2016)      Overview: ADM 11/25/16:  HGB: 7.6.   BASELINE 9      Hypothermia (4/16/2017)      Acute renal failure (ARF) ([facility] [address] (4/16/2017)      Rhabdomyolysis (4/17/2017)        Plan:     - Continue D5W - Sodium level improving   - Check CK on a daily basis   - TSH was low, will reduce synthroid to 75 mcg per day   - Creatinine improving  - Hgb stable - no GI intervention at this time      Signed By: Shiv Castle MD     April 19, 2017 Heterosexual

## 2023-10-30 NOTE — ED PROVIDER NOTE - NSFOLLOWUPINSTRUCTIONS_ED_ALL_ED_FT
-- You should update your primary care physician on your Emergency Department visit and follow up with them.  If you do not have a physician or have difficulty following up, please call: [phone] to obtain a [facility] doctor or specialist who can provide follow up.    -- Return to the ER for worsening or persistent symptoms, and/or ANY NEW OR CONCERNING SYMPTOMS.

## 2023-10-31 ENCOUNTER — NON-APPOINTMENT (OUTPATIENT)
Age: 58
End: 2023-10-31

## 2023-11-06 ENCOUNTER — APPOINTMENT (OUTPATIENT)
Dept: PAIN MANAGEMENT | Facility: CLINIC | Age: 58
End: 2023-11-06
Payer: OTHER MISCELLANEOUS

## 2023-11-06 VITALS — BODY MASS INDEX: 31.57 KG/M2 | HEIGHT: 74 IN | WEIGHT: 246 LBS

## 2023-11-06 PROCEDURE — 99214 OFFICE O/P EST MOD 30 MIN: CPT | Mod: ACP,95

## 2023-11-13 PROCEDURE — 82728 ASSAY OF FERRITIN: CPT

## 2023-11-13 PROCEDURE — 83036 HEMOGLOBIN GLYCOSYLATED A1C: CPT

## 2023-11-13 PROCEDURE — 83735 ASSAY OF MAGNESIUM: CPT

## 2023-11-13 PROCEDURE — 85025 COMPLETE CBC W/AUTO DIFF WBC: CPT

## 2023-11-13 PROCEDURE — 80053 COMPREHEN METABOLIC PANEL: CPT

## 2023-11-13 PROCEDURE — 93306 TTE W/DOPPLER COMPLETE: CPT

## 2023-11-13 PROCEDURE — 80048 BASIC METABOLIC PNL TOTAL CA: CPT

## 2023-11-13 PROCEDURE — 84100 ASSAY OF PHOSPHORUS: CPT

## 2023-11-13 PROCEDURE — 85027 COMPLETE CBC AUTOMATED: CPT

## 2023-11-13 PROCEDURE — 83690 ASSAY OF LIPASE: CPT

## 2023-11-13 PROCEDURE — 74177 CT ABD & PELVIS W/CONTRAST: CPT | Mod: MA

## 2023-11-13 PROCEDURE — 82962 GLUCOSE BLOOD TEST: CPT

## 2023-11-13 PROCEDURE — 88305 TISSUE EXAM BY PATHOLOGIST: CPT

## 2023-11-13 PROCEDURE — 0225U NFCT DS DNA&RNA 21 SARSCOV2: CPT

## 2023-11-13 PROCEDURE — 96365 THER/PROPH/DIAG IV INF INIT: CPT

## 2023-11-13 PROCEDURE — 84443 ASSAY THYROID STIM HORMONE: CPT

## 2023-11-13 PROCEDURE — 88312 SPECIAL STAINS GROUP 1: CPT

## 2023-11-13 PROCEDURE — 36415 COLL VENOUS BLD VENIPUNCTURE: CPT

## 2023-11-13 PROCEDURE — 84484 ASSAY OF TROPONIN QUANT: CPT

## 2023-11-13 PROCEDURE — 83540 ASSAY OF IRON: CPT

## 2023-11-13 PROCEDURE — 71045 X-RAY EXAM CHEST 1 VIEW: CPT

## 2023-11-13 PROCEDURE — 99285 EMERGENCY DEPT VISIT HI MDM: CPT

## 2023-11-13 PROCEDURE — 96375 TX/PRO/DX INJ NEW DRUG ADDON: CPT

## 2023-11-13 PROCEDURE — 93005 ELECTROCARDIOGRAM TRACING: CPT

## 2023-11-13 PROCEDURE — 83550 IRON BINDING TEST: CPT

## 2023-11-15 NOTE — DISCHARGE NOTE NURSING/CASE MANAGEMENT/SOCIAL WORK - NSDCCRSPACEROBJ_GEN_ALL_CORE_FT
RN brought patient back from triage and changed out to blue scrubs and wanded by security. Patient belongings placed in locker 7, patient has 2 bags. Pt aox4 upon assessment. Urine sent to lab. Patient placed in bed D and sitter initiated.       Josse Nicole, LORI  11/15/23 22 S Saniya Cruz RN  11/15/23 2136 .

## 2023-11-24 ENCOUNTER — NON-APPOINTMENT (OUTPATIENT)
Age: 58
End: 2023-11-24

## 2023-11-25 ENCOUNTER — INPATIENT (INPATIENT)
Facility: HOSPITAL | Age: 58
LOS: 3 days | Discharge: HOME | DRG: 74 | End: 2023-11-29
Attending: INTERNAL MEDICINE | Admitting: INTERNAL MEDICINE
Payer: MEDICARE

## 2023-11-25 VITALS
DIASTOLIC BLOOD PRESSURE: 92 MMHG | RESPIRATION RATE: 18 BRPM | OXYGEN SATURATION: 96 % | HEART RATE: 78 BPM | SYSTOLIC BLOOD PRESSURE: 132 MMHG | WEIGHT: 242.95 LBS | HEIGHT: 74 IN | TEMPERATURE: 98 F

## 2023-11-25 DIAGNOSIS — Z98.890 OTHER SPECIFIED POSTPROCEDURAL STATES: Chronic | ICD-10-CM

## 2023-11-25 DIAGNOSIS — E16.2 HYPOGLYCEMIA, UNSPECIFIED: ICD-10-CM

## 2023-11-25 DIAGNOSIS — R11.2 NAUSEA WITH VOMITING, UNSPECIFIED: ICD-10-CM

## 2023-11-25 DIAGNOSIS — Z98.1 ARTHRODESIS STATUS: Chronic | ICD-10-CM

## 2023-11-25 DIAGNOSIS — E83.42 HYPOMAGNESEMIA: ICD-10-CM

## 2023-11-25 DIAGNOSIS — Z29.9 ENCOUNTER FOR PROPHYLACTIC MEASURES, UNSPECIFIED: ICD-10-CM

## 2023-11-25 LAB
ALBUMIN SERPL ELPH-MCNC: 4 G/DL — SIGNIFICANT CHANGE UP (ref 3.3–5)
ALBUMIN SERPL ELPH-MCNC: 4 G/DL — SIGNIFICANT CHANGE UP (ref 3.3–5)
ALP SERPL-CCNC: 88 U/L — SIGNIFICANT CHANGE UP (ref 30–120)
ALP SERPL-CCNC: 88 U/L — SIGNIFICANT CHANGE UP (ref 30–120)
ALT FLD-CCNC: 27 U/L — SIGNIFICANT CHANGE UP (ref 10–60)
ALT FLD-CCNC: 27 U/L — SIGNIFICANT CHANGE UP (ref 10–60)
ANION GAP SERPL CALC-SCNC: 13 MMOL/L — SIGNIFICANT CHANGE UP (ref 5–17)
ANION GAP SERPL CALC-SCNC: 13 MMOL/L — SIGNIFICANT CHANGE UP (ref 5–17)
AST SERPL-CCNC: 7 U/L — LOW (ref 10–40)
AST SERPL-CCNC: 7 U/L — LOW (ref 10–40)
BASOPHILS # BLD AUTO: 0.05 K/UL — SIGNIFICANT CHANGE UP (ref 0–0.2)
BASOPHILS # BLD AUTO: 0.05 K/UL — SIGNIFICANT CHANGE UP (ref 0–0.2)
BASOPHILS NFR BLD AUTO: 0.5 % — SIGNIFICANT CHANGE UP (ref 0–2)
BASOPHILS NFR BLD AUTO: 0.5 % — SIGNIFICANT CHANGE UP (ref 0–2)
BILIRUB SERPL-MCNC: 0.3 MG/DL — SIGNIFICANT CHANGE UP (ref 0.2–1.2)
BILIRUB SERPL-MCNC: 0.3 MG/DL — SIGNIFICANT CHANGE UP (ref 0.2–1.2)
BUN SERPL-MCNC: 15 MG/DL — SIGNIFICANT CHANGE UP (ref 7–23)
BUN SERPL-MCNC: 15 MG/DL — SIGNIFICANT CHANGE UP (ref 7–23)
CALCIUM SERPL-MCNC: 10 MG/DL — SIGNIFICANT CHANGE UP (ref 8.4–10.5)
CALCIUM SERPL-MCNC: 10 MG/DL — SIGNIFICANT CHANGE UP (ref 8.4–10.5)
CHLORIDE SERPL-SCNC: 102 MMOL/L — SIGNIFICANT CHANGE UP (ref 96–108)
CHLORIDE SERPL-SCNC: 102 MMOL/L — SIGNIFICANT CHANGE UP (ref 96–108)
CO2 SERPL-SCNC: 25 MMOL/L — SIGNIFICANT CHANGE UP (ref 22–31)
CO2 SERPL-SCNC: 25 MMOL/L — SIGNIFICANT CHANGE UP (ref 22–31)
CREAT SERPL-MCNC: 0.85 MG/DL — SIGNIFICANT CHANGE UP (ref 0.5–1.3)
CREAT SERPL-MCNC: 0.85 MG/DL — SIGNIFICANT CHANGE UP (ref 0.5–1.3)
EGFR: 102 ML/MIN/1.73M2 — SIGNIFICANT CHANGE UP
EGFR: 102 ML/MIN/1.73M2 — SIGNIFICANT CHANGE UP
EOSINOPHIL # BLD AUTO: 0.2 K/UL — SIGNIFICANT CHANGE UP (ref 0–0.5)
EOSINOPHIL # BLD AUTO: 0.2 K/UL — SIGNIFICANT CHANGE UP (ref 0–0.5)
EOSINOPHIL NFR BLD AUTO: 2 % — SIGNIFICANT CHANGE UP (ref 0–6)
EOSINOPHIL NFR BLD AUTO: 2 % — SIGNIFICANT CHANGE UP (ref 0–6)
GLUCOSE SERPL-MCNC: 52 MG/DL — CRITICAL LOW (ref 70–99)
GLUCOSE SERPL-MCNC: 52 MG/DL — CRITICAL LOW (ref 70–99)
HCT VFR BLD CALC: 41.2 % — SIGNIFICANT CHANGE UP (ref 39–50)
HCT VFR BLD CALC: 41.2 % — SIGNIFICANT CHANGE UP (ref 39–50)
HGB BLD-MCNC: 13.2 G/DL — SIGNIFICANT CHANGE UP (ref 13–17)
HGB BLD-MCNC: 13.2 G/DL — SIGNIFICANT CHANGE UP (ref 13–17)
IMM GRANULOCYTES NFR BLD AUTO: 0.2 % — SIGNIFICANT CHANGE UP (ref 0–0.9)
IMM GRANULOCYTES NFR BLD AUTO: 0.2 % — SIGNIFICANT CHANGE UP (ref 0–0.9)
LIDOCAIN IGE QN: 19 U/L — SIGNIFICANT CHANGE UP (ref 16–77)
LIDOCAIN IGE QN: 19 U/L — SIGNIFICANT CHANGE UP (ref 16–77)
LYMPHOCYTES # BLD AUTO: 2.66 K/UL — SIGNIFICANT CHANGE UP (ref 1–3.3)
LYMPHOCYTES # BLD AUTO: 2.66 K/UL — SIGNIFICANT CHANGE UP (ref 1–3.3)
LYMPHOCYTES # BLD AUTO: 26.5 % — SIGNIFICANT CHANGE UP (ref 13–44)
LYMPHOCYTES # BLD AUTO: 26.5 % — SIGNIFICANT CHANGE UP (ref 13–44)
MAGNESIUM SERPL-MCNC: 1.5 MG/DL — LOW (ref 1.6–2.6)
MAGNESIUM SERPL-MCNC: 1.5 MG/DL — LOW (ref 1.6–2.6)
MCHC RBC-ENTMCNC: 26.6 PG — LOW (ref 27–34)
MCHC RBC-ENTMCNC: 26.6 PG — LOW (ref 27–34)
MCHC RBC-ENTMCNC: 32 GM/DL — SIGNIFICANT CHANGE UP (ref 32–36)
MCHC RBC-ENTMCNC: 32 GM/DL — SIGNIFICANT CHANGE UP (ref 32–36)
MCV RBC AUTO: 82.9 FL — SIGNIFICANT CHANGE UP (ref 80–100)
MCV RBC AUTO: 82.9 FL — SIGNIFICANT CHANGE UP (ref 80–100)
MONOCYTES # BLD AUTO: 0.88 K/UL — SIGNIFICANT CHANGE UP (ref 0–0.9)
MONOCYTES # BLD AUTO: 0.88 K/UL — SIGNIFICANT CHANGE UP (ref 0–0.9)
MONOCYTES NFR BLD AUTO: 8.8 % — SIGNIFICANT CHANGE UP (ref 2–14)
MONOCYTES NFR BLD AUTO: 8.8 % — SIGNIFICANT CHANGE UP (ref 2–14)
NEUTROPHILS # BLD AUTO: 6.23 K/UL — SIGNIFICANT CHANGE UP (ref 1.8–7.4)
NEUTROPHILS # BLD AUTO: 6.23 K/UL — SIGNIFICANT CHANGE UP (ref 1.8–7.4)
NEUTROPHILS NFR BLD AUTO: 62 % — SIGNIFICANT CHANGE UP (ref 43–77)
NEUTROPHILS NFR BLD AUTO: 62 % — SIGNIFICANT CHANGE UP (ref 43–77)
NRBC # BLD: 0 /100 WBCS — SIGNIFICANT CHANGE UP (ref 0–0)
NRBC # BLD: 0 /100 WBCS — SIGNIFICANT CHANGE UP (ref 0–0)
PHOSPHATE SERPL-MCNC: 3.4 MG/DL — SIGNIFICANT CHANGE UP (ref 2.5–4.5)
PHOSPHATE SERPL-MCNC: 3.4 MG/DL — SIGNIFICANT CHANGE UP (ref 2.5–4.5)
PLATELET # BLD AUTO: 428 K/UL — HIGH (ref 150–400)
PLATELET # BLD AUTO: 428 K/UL — HIGH (ref 150–400)
POTASSIUM SERPL-MCNC: 3.5 MMOL/L — SIGNIFICANT CHANGE UP (ref 3.5–5.3)
POTASSIUM SERPL-MCNC: 3.5 MMOL/L — SIGNIFICANT CHANGE UP (ref 3.5–5.3)
POTASSIUM SERPL-SCNC: 3.5 MMOL/L — SIGNIFICANT CHANGE UP (ref 3.5–5.3)
POTASSIUM SERPL-SCNC: 3.5 MMOL/L — SIGNIFICANT CHANGE UP (ref 3.5–5.3)
PROT SERPL-MCNC: 7.8 G/DL — SIGNIFICANT CHANGE UP (ref 6–8.3)
PROT SERPL-MCNC: 7.8 G/DL — SIGNIFICANT CHANGE UP (ref 6–8.3)
RBC # BLD: 4.97 M/UL — SIGNIFICANT CHANGE UP (ref 4.2–5.8)
RBC # BLD: 4.97 M/UL — SIGNIFICANT CHANGE UP (ref 4.2–5.8)
RBC # FLD: 15.6 % — HIGH (ref 10.3–14.5)
RBC # FLD: 15.6 % — HIGH (ref 10.3–14.5)
SODIUM SERPL-SCNC: 140 MMOL/L — SIGNIFICANT CHANGE UP (ref 135–145)
SODIUM SERPL-SCNC: 140 MMOL/L — SIGNIFICANT CHANGE UP (ref 135–145)
WBC # BLD: 10.04 K/UL — SIGNIFICANT CHANGE UP (ref 3.8–10.5)
WBC # BLD: 10.04 K/UL — SIGNIFICANT CHANGE UP (ref 3.8–10.5)
WBC # FLD AUTO: 10.04 K/UL — SIGNIFICANT CHANGE UP (ref 3.8–10.5)
WBC # FLD AUTO: 10.04 K/UL — SIGNIFICANT CHANGE UP (ref 3.8–10.5)

## 2023-11-25 PROCEDURE — 93010 ELECTROCARDIOGRAM REPORT: CPT

## 2023-11-25 PROCEDURE — 99223 1ST HOSP IP/OBS HIGH 75: CPT | Mod: AI

## 2023-11-25 PROCEDURE — 99285 EMERGENCY DEPT VISIT HI MDM: CPT | Mod: FS

## 2023-11-25 PROCEDURE — 71045 X-RAY EXAM CHEST 1 VIEW: CPT | Mod: 26

## 2023-11-25 RX ORDER — SODIUM CHLORIDE 9 MG/ML
1000 INJECTION, SOLUTION INTRAVENOUS
Refills: 0 | Status: DISCONTINUED | OUTPATIENT
Start: 2023-11-25 | End: 2023-11-29

## 2023-11-25 RX ORDER — PANTOPRAZOLE SODIUM 20 MG/1
40 TABLET, DELAYED RELEASE ORAL
Refills: 0 | Status: DISCONTINUED | OUTPATIENT
Start: 2023-11-25 | End: 2023-11-28

## 2023-11-25 RX ORDER — TRAZODONE HCL 50 MG
150 TABLET ORAL AT BEDTIME
Refills: 0 | Status: DISCONTINUED | OUTPATIENT
Start: 2023-11-25 | End: 2023-11-29

## 2023-11-25 RX ORDER — ONDANSETRON 8 MG/1
4 TABLET, FILM COATED ORAL ONCE
Refills: 0 | Status: COMPLETED | OUTPATIENT
Start: 2023-11-25 | End: 2023-11-25

## 2023-11-25 RX ORDER — DEXTROSE 50 % IN WATER 50 %
50 SYRINGE (ML) INTRAVENOUS ONCE
Refills: 0 | Status: COMPLETED | OUTPATIENT
Start: 2023-11-25 | End: 2023-11-25

## 2023-11-25 RX ORDER — ATORVASTATIN CALCIUM 80 MG/1
80 TABLET, FILM COATED ORAL AT BEDTIME
Refills: 0 | Status: DISCONTINUED | OUTPATIENT
Start: 2023-11-25 | End: 2023-11-29

## 2023-11-25 RX ORDER — DEXTROSE 50 % IN WATER 50 %
15 SYRINGE (ML) INTRAVENOUS ONCE
Refills: 0 | Status: DISCONTINUED | OUTPATIENT
Start: 2023-11-25 | End: 2023-11-29

## 2023-11-25 RX ORDER — ACETAMINOPHEN 500 MG
650 TABLET ORAL EVERY 6 HOURS
Refills: 0 | Status: DISCONTINUED | OUTPATIENT
Start: 2023-11-25 | End: 2023-11-29

## 2023-11-25 RX ORDER — GLUCAGON INJECTION, SOLUTION 0.5 MG/.1ML
1 INJECTION, SOLUTION SUBCUTANEOUS ONCE
Refills: 0 | Status: DISCONTINUED | OUTPATIENT
Start: 2023-11-25 | End: 2023-11-29

## 2023-11-25 RX ORDER — DEXTROSE 50 % IN WATER 50 %
25 SYRINGE (ML) INTRAVENOUS ONCE
Refills: 0 | Status: DISCONTINUED | OUTPATIENT
Start: 2023-11-25 | End: 2023-11-29

## 2023-11-25 RX ORDER — SODIUM CHLORIDE 9 MG/ML
1000 INJECTION, SOLUTION INTRAVENOUS
Refills: 0 | Status: DISCONTINUED | OUTPATIENT
Start: 2023-11-25 | End: 2023-11-28

## 2023-11-25 RX ORDER — APIXABAN 2.5 MG/1
5 TABLET, FILM COATED ORAL EVERY 12 HOURS
Refills: 0 | Status: DISCONTINUED | OUTPATIENT
Start: 2023-11-25 | End: 2023-11-29

## 2023-11-25 RX ORDER — HYDROMORPHONE HYDROCHLORIDE 2 MG/ML
1 INJECTION INTRAMUSCULAR; INTRAVENOUS; SUBCUTANEOUS ONCE
Refills: 0 | Status: DISCONTINUED | OUTPATIENT
Start: 2023-11-25 | End: 2023-11-25

## 2023-11-25 RX ORDER — DEXTROSE 50 % IN WATER 50 %
12.5 SYRINGE (ML) INTRAVENOUS ONCE
Refills: 0 | Status: DISCONTINUED | OUTPATIENT
Start: 2023-11-25 | End: 2023-11-29

## 2023-11-25 RX ORDER — MAGNESIUM SULFATE 500 MG/ML
2 VIAL (ML) INJECTION ONCE
Refills: 0 | Status: COMPLETED | OUTPATIENT
Start: 2023-11-25 | End: 2023-11-25

## 2023-11-25 RX ORDER — CLONAZEPAM 1 MG
0.5 TABLET ORAL DAILY
Refills: 0 | Status: DISCONTINUED | OUTPATIENT
Start: 2023-11-25 | End: 2023-11-28

## 2023-11-25 RX ORDER — ONDANSETRON 8 MG/1
4 TABLET, FILM COATED ORAL EVERY 6 HOURS
Refills: 0 | Status: DISCONTINUED | OUTPATIENT
Start: 2023-11-25 | End: 2023-11-29

## 2023-11-25 RX ORDER — MAGNESIUM SULFATE 500 MG/ML
1 VIAL (ML) INJECTION ONCE
Refills: 0 | Status: COMPLETED | OUTPATIENT
Start: 2023-11-25 | End: 2023-11-25

## 2023-11-25 RX ORDER — SODIUM CHLORIDE 9 MG/ML
1000 INJECTION INTRAMUSCULAR; INTRAVENOUS; SUBCUTANEOUS ONCE
Refills: 0 | Status: COMPLETED | OUTPATIENT
Start: 2023-11-25 | End: 2023-11-25

## 2023-11-25 RX ORDER — FAMOTIDINE 10 MG/ML
20 INJECTION INTRAVENOUS ONCE
Refills: 0 | Status: COMPLETED | OUTPATIENT
Start: 2023-11-25 | End: 2023-11-25

## 2023-11-25 RX ORDER — HYDROMORPHONE HYDROCHLORIDE 2 MG/ML
0.5 INJECTION INTRAMUSCULAR; INTRAVENOUS; SUBCUTANEOUS EVERY 4 HOURS
Refills: 0 | Status: DISCONTINUED | OUTPATIENT
Start: 2023-11-25 | End: 2023-11-26

## 2023-11-25 RX ORDER — CLONAZEPAM 1 MG
1 TABLET ORAL AT BEDTIME
Refills: 0 | Status: DISCONTINUED | OUTPATIENT
Start: 2023-11-25 | End: 2023-11-29

## 2023-11-25 RX ORDER — CARVEDILOL PHOSPHATE 80 MG/1
25 CAPSULE, EXTENDED RELEASE ORAL EVERY 12 HOURS
Refills: 0 | Status: DISCONTINUED | OUTPATIENT
Start: 2023-11-25 | End: 2023-11-29

## 2023-11-25 RX ADMIN — Medication 50 MILLILITER(S): at 20:05

## 2023-11-25 RX ADMIN — FAMOTIDINE 20 MILLIGRAM(S): 10 INJECTION INTRAVENOUS at 20:07

## 2023-11-25 RX ADMIN — HYDROMORPHONE HYDROCHLORIDE 1 MILLIGRAM(S): 2 INJECTION INTRAMUSCULAR; INTRAVENOUS; SUBCUTANEOUS at 23:48

## 2023-11-25 RX ADMIN — Medication 50 MILLILITER(S): at 18:58

## 2023-11-25 RX ADMIN — Medication 25 GRAM(S): at 22:07

## 2023-11-25 RX ADMIN — SODIUM CHLORIDE 125 MILLILITER(S): 9 INJECTION, SOLUTION INTRAVENOUS at 22:08

## 2023-11-25 RX ADMIN — HYDROMORPHONE HYDROCHLORIDE 0.5 MILLIGRAM(S): 2 INJECTION INTRAMUSCULAR; INTRAVENOUS; SUBCUTANEOUS at 22:20

## 2023-11-25 RX ADMIN — SODIUM CHLORIDE 1000 MILLILITER(S): 9 INJECTION INTRAMUSCULAR; INTRAVENOUS; SUBCUTANEOUS at 19:16

## 2023-11-25 RX ADMIN — Medication 1 GRAM(S): at 21:08

## 2023-11-25 RX ADMIN — Medication 100 GRAM(S): at 20:08

## 2023-11-25 RX ADMIN — ONDANSETRON 4 MILLIGRAM(S): 8 TABLET, FILM COATED ORAL at 19:16

## 2023-11-25 RX ADMIN — SODIUM CHLORIDE 125 MILLILITER(S): 9 INJECTION, SOLUTION INTRAVENOUS at 20:05

## 2023-11-25 RX ADMIN — HYDROMORPHONE HYDROCHLORIDE 0.5 MILLIGRAM(S): 2 INJECTION INTRAMUSCULAR; INTRAVENOUS; SUBCUTANEOUS at 22:50

## 2023-11-25 RX ADMIN — SODIUM CHLORIDE 1000 MILLILITER(S): 9 INJECTION INTRAMUSCULAR; INTRAVENOUS; SUBCUTANEOUS at 20:16

## 2023-11-25 NOTE — H&P ADULT - ASSESSMENT
57 y/o M with PMH of HTN, DM2, Dyslipidemia, CAD s/p MI s/p PTCA, PAD, COVID-19, Chronic Gastritis, Gastroparesis, GERD, Spinal Stenosis with radiculopathy, Chronic Back Pain opioid dependant, BPH, Obesity, Anxiety, and Depression presented with persistently low blood sugar with repeated vomiting.

## 2023-11-25 NOTE — H&P ADULT - PROBLEM SELECTOR PLAN 1
persistent, patient had Metformin, Jardiance & Insulin Lantus this am, persistent, patient had Metformin, Jardiance & Insulin Lantus this am, in addition to the action of the weekly Mounjaro dose, hold all of his DM meds, started him on continuous D5W infusion at 125 ml/h, monitor POC glucose Q 2h, Patient still has his own glucose sensor in his left arm & keeps checking his glucose every 15 min but his results are always about 20 mg/dl lower than the simultaneous hospital glucometer' s results, Patient's last known glycated hemoglobin was 9.5% about 7 weeks ago, recheck in am with estimated average serum glucose level, Endocrinology consult with Dr. Julio mena was called.

## 2023-11-25 NOTE — H&P ADULT - CLICK TO LAUNCH ORM
Refill request for prozac was sent to Dr Farmer. I denied the request and reordered through the patient's PCP, Dr Richey. Send to [facility].   
.

## 2023-11-25 NOTE — H&P ADULT - HISTORY OF PRESENT ILLNESS
This is a 57 y/o M with PMH of HTN, DM2, Dyslipidemia, CAD s/p MI s/p PTCA, PAD, COVID-19, Chronic Gastritis, Gastroparesis, GERD, Spinal Stenosis with radiculopathy, Chronic Back Pain opioid dependant, BPH, Obesity, Anxiety, and Depression who presented with persistently low blood sugar with repeated vomiting starting today afternoon. Patient's blood sugar was high in the 500's about 2 months ago, so he was admitted to [facility], then after discharge he was seen by his endocrinologist who started him on a weekly sub Q medication (Mounjaro) that he received its 1st lazo on Monday (5 days ago), and since then his blood glucose is in the low hundreds, but today it was in the upper 40's & low 50's for the whole day, not responding to orange juice, then became worse when he started vomiting, vomited 3 times this afternoon, so he drove to the hospital after dropping his son at the train station. At the ED his sugar was 52 mg/dl, improved to 76 mg/dl after D50 ampule IVP X1 then down again to 47 mg/dl. Of note that he got Lantus 26 units sub Q, in addition to 10 mg of Empagliflozin, and 1000 mg of Metformin this am.

## 2023-11-25 NOTE — ED ADULT NURSE NOTE - OBJECTIVE STATEMENT
57yo male walked into ED, pt c/o "my sugar is 48". pt suffers from gastroparesis and is unable to tolerate fluids or solids at time.s pt denies pain/distress.

## 2023-11-25 NOTE — ED ADULT NURSE REASSESSMENT NOTE - NS ED NURSE REASSESS COMMENT FT1
Pt repeat FS 47, RUI tinajero made aware. awaiting for orders. Pt requesting for IV dilaudid, stated "I don't need help with my sugar, I need IV diaudid" uRi tinajero and dr hayes made aware. will give pepcid at this time

## 2023-11-25 NOTE — ED ADULT TRIAGE NOTE - BMI (KG/M2)
31.2 poor hand-eye coordination. needs assistance with tray set-up/change in mental status/other (specify)

## 2023-11-25 NOTE — ED PROVIDER NOTE - CARE PLAN
Patient is here to get second Depo-Provera shot. She was late we will do pregnancy test. Last Depo-Provera was on March 20. She denies sexual activity. She was having very irregular periods that were painful with nausea. She reports that her periods are very light now, almost nonexistent. She does not want to do the Depo-Provera and again she feels like it gained weight. She has gained 8 pounds since March. Mother would like her to still get the shot. I discussed with her that this is not a huge weight gain and we will monitor her weight.   Principal Discharge DX:	Hypoglycemia  Secondary Diagnosis:	Gastroparesis  Secondary Diagnosis:	Nausea and vomiting   1

## 2023-11-25 NOTE — ED PROVIDER NOTE - PROGRESS NOTE DETAILS
Patient stable.  Vitals stable.  No vomiting in emergency room.  Blood sugar checked at 1921 and was 76 after amp of D50 given.  Was able to drink some apple juice.  Repeat blood sugar at 1954 was 47.  Additional amp of D50 given and will start 0.9 dextrose half-normal saline at 125 cc an hour.  pepcid given in ED. magnesium ordered. Spoke with Dr. Harp, kindly accepts patient for admission.

## 2023-11-25 NOTE — ED ADULT NURSE NOTE - NSFALLUNIVINTERV_ED_ALL_ED
Bed/Stretcher in lowest position, wheels locked, appropriate side rails in place/Call bell, personal items and telephone in reach/Instruct patient to call for assistance before getting out of bed/chair/stretcher/Non-slip footwear applied when patient is off stretcher/[place] to call system/Physically safe environment - no spills, clutter or unnecessary equipment/Purposeful proactive rounding/Room/bathroom lighting operational, light cord in reach

## 2023-11-25 NOTE — ED PROVIDER NOTE - OBJECTIVE STATEMENT
58-year-old male with history of spondylosis, spinal stenosis, history of gastric ulcer, diabetes mellitus, hypertension, gastroparesis presents to emergency room with low blood sugar.  Patient has a history of gastroparesis and reports having a hard time keeping down food.  Was admitted for 4 days at [facility] 3 weeks ago since he was unable to keep food down.  Was given IV medications and transition to oral foods and meds.  Patient also was seen October 30th at [facility] for elevated blood sugar over the 500s.  States he ran out of NovoLog the day before.  Patient states he was seen by endocrinologist 2 weeks ago.  Difficult time regulating his sugars.  Was started on Mounjaro and took first dose 5 days ago.  States that sugars have been on the lower side since then, in the low 100s.  Reports having a hard time eating anything today due to his chronic gastroparesis.  Started to feel weak and sweaty.  Took his blood sugar and was 48.  Drank some orange juice and then had to drive his son to the train station which is nearby this hospital.  States he came to this hospital after he dropped off his son.  Denies any fever or recent illnesses.  Reports vomited 3 times today.  Patient reports chronic abdominal pain, states pain not any worse than his usual pain today.  Patient takes Lyrica, denies hitting, hydromorphone, and methadone on a daily basis for his chronic neck and back pain. No previous abd surgeries   PCP [facility]   Endocrinology Jackie Buitrago

## 2023-11-25 NOTE — H&P ADULT - NSHPLABSRESULTS_GEN_ALL_CORE
-                          13.2   10.04 )-----------( 428      ( 25 Nov 2023 19:13 )             41.2       25 Nov 2023 19:13    140    |  102    |  15     ----------------------------<  52     3.5     |  25     |  0.85     Ca    10.0       25 Nov 2023 19:13  Phos  3.4       25 Nov 2023 19:13  Mg     1.5       25 Nov 2023 19:13    TPro  7.8    /  Alb  4.0    /  TBili  0.3    /  DBili  x      /  AST  7      /  ALT  27     /  AlkPhos  88     25 Nov 2023 19:13    LIVER FUNCTIONS - ( 25 Nov 2023 19:13 )  Alb: 4.0 g/dL / Pro: 7.8 g/dL / ALK PHOS: 88 U/L / ALT: 27 U/L / AST: 7 U/L / GGT: x             CAPILLARY BLOOD GLUCOSE  POCT Blood Glucose.: 73 mg/dL (25 Nov 2023 21:39)  POCT Blood Glucose.: 99 mg/dL (25 Nov 2023 20:51)  POCT Blood Glucose.: 47 mg/dL (25 Nov 2023 19:54)  POCT Blood Glucose.: 76 mg/dL (25 Nov 2023 19:21)  POCT Blood Glucose.: 50 mg/dL (25 Nov 2023 18:43)  POCT Blood Glucose.: 51 mg/dL (25 Nov 2023 18:41)      Urinalysis Basic - ( 25 Nov 2023 19:13 )  Color: x / Appearance: x / SG: x / pH: x  Gluc: 52 mg/dL / Ketone: x  / Bili: x / Urobili: x   Blood: x / Protein: x / Nitrite: x   Leuk Esterase: x / RBC: x / WBC x   Sq Epi: x / Non Sq Epi: x / Bacteria: x -                          13.2   10.04 )-----------( 428      ( 25 Nov 2023 19:13 )             41.2       25 Nov 2023 19:13    140    |  102    |  15     ----------------------------<  52     3.5     |  25     |  0.85     Ca    10.0       25 Nov 2023 19:13  Phos  3.4       25 Nov 2023 19:13  Mg     1.5       25 Nov 2023 19:13    TPro  7.8    /  Alb  4.0    /  TBili  0.3    /  DBili  x      /  AST  7      /  ALT  27     /  AlkPhos  88     25 Nov 2023 19:13    LIVER FUNCTIONS - ( 25 Nov 2023 19:13 )  Alb: 4.0 g/dL / Pro: 7.8 g/dL / ALK PHOS: 88 U/L / ALT: 27 U/L / AST: 7 U/L / GGT: x             CAPILLARY BLOOD GLUCOSE  POCT Blood Glucose.: 73 mg/dL (25 Nov 2023 21:39)  POCT Blood Glucose.: 99 mg/dL (25 Nov 2023 20:51)  POCT Blood Glucose.: 47 mg/dL (25 Nov 2023 19:54)  POCT Blood Glucose.: 76 mg/dL (25 Nov 2023 19:21)  POCT Blood Glucose.: 50 mg/dL (25 Nov 2023 18:43)  POCT Blood Glucose.: 51 mg/dL (25 Nov 2023 18:41)      Urinalysis Basic - ( 25 Nov 2023 19:13 )  Color: x / Appearance: x / SG: x / pH: x  Gluc: 52 mg/dL / Ketone: x  / Bili: x / Urobili: x   Blood: x / Protein: x / Nitrite: x   Leuk Esterase: x / RBC: x / WBC x   Sq Epi: x / Non Sq Epi: x / Bacteria: x      Lipase (11.25.23 @ 19:13)   Lipase: 19: New Lipase reference range as of 08/24/2023.   New Ranges: 16-77 U/L   Old Range:  U/L U/L        CXR:    As per my review shows cervical spine hardware, enlarged cardiac shadow size, clear lung fields B/L, no pulmonary infiltrates, pleural effusion, or pneumothorax. Pending official report.         EKG:    As per my review shows SR at 70/min, normal MI & QTc intervals, LVH, normal QRS duration & axis (- 15), with normal transition, nonspecific ST-T abnormality.      -

## 2023-11-25 NOTE — PATIENT PROFILE ADULT - FALL HARM RISK - UNIVERSAL INTERVENTIONS
Bed in lowest position, wheels locked, appropriate side rails in place/Call bell, personal items and telephone in reach/Instruct patient to call for assistance before getting out of bed or chair/Non-slip footwear when patient is out of bed/[place] to call system/Physically safe environment - no spills, clutter or unnecessary equipment/Purposeful Proactive Rounding/Room/bathroom lighting operational, light cord in reach

## 2023-11-25 NOTE — H&P ADULT - NSHPPHYSICALEXAM_GEN_ALL_CORE
-    Vital Signs Last 24 Hrs  T(C): 36.7 (25 Nov 2023 21:42), Max: 36.7 (25 Nov 2023 21:42)  T(F): 98 (25 Nov 2023 21:42), Max: 98 (25 Nov 2023 21:42)  HR: 68 (25 Nov 2023 21:42) (68 - 78)  BP: 112/68 (25 Nov 2023 21:42) (112/68 - 132/92)  BP(mean): --  RR: 18 (25 Nov 2023 21:42) (18 - 18)  SpO2: 98% (25 Nov 2023 21:42) (96% - 98%)    Parameters below as of 25 Nov 2023 21:42  Patient On (Oxygen Delivery Method): room air        PHYSICAL EXAM:  		  GENERAL: NAD, well-groomed, well-developed.  HEAD:  Atraumatic, Norm cephalic.  EYES: PERRLA, conjunctiva clear.  ENMT: no nasal discharge, MMM.   NECK: Supple, No JVD.  NERVOUS SYSTEM:  Alert & oriented X3, neurologically intact grossly.  CHEST/LUNG: Good air entry B/L, no rales, rhonchi, or wheezing.  HEART: Normal S1 & S2, no murmurs, or extra sounds.  ABDOMEN: Soft, non-tender, obese non-distended; bowel sounds present, no palpable masses or organomegaly.  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  VASCULAR: 2+ radial, brachial pulses equal B/L, no carotid bruits.  SKIN: No rashes or lesions, scars on the back & front of the neck.  PSYCH: normal affect & behavior.

## 2023-11-25 NOTE — ED PROVIDER NOTE - CLINICAL SUMMARY MEDICAL DECISION MAKING FREE TEXT BOX
58-year-old male with significant past medical history for chronic neck and back pain, diabetes on metformin and insulin presents to the ED with recurrent gastroparesis, difficulty tolerating p.o. secondary to pain, hypoglycemia.  Patient states that he has not been eating and drinking well secondary to gastroparesis and was found to have a blood sugar in the 40s at home.  After orange juice patient drove son to the train station and came to the ED with a blood sugar in the 50s.  Labs check electrolytes, IV fluids for rehydration, monitor blood sugars.

## 2023-11-25 NOTE — H&P ADULT - NSHPREVIEWOFSYSTEMS_GEN_ALL_CORE
-    CONSTITUTIONAL: No fever or chills.  EYES: No eye pain, visual disturbances, or discharge.  ENMT:  No difficulty hearing, vertigo, sinus or throat pain.  NECK: No pain or stiffness.	  RESPIRATORY: No cough, wheezing, or hemoptysis; No shortness of breath.  CARDIOVASCULAR: No chest pain, palpitations, dizziness, or leg swelling.  GASTROINTESTINAL: No abdominal pain, no current nausea or vomiting since arrived to the ED, no hematemesis; No diarrhea or Change in bowel habits. No melena or hematochezia.  GENITOURINARY: No dysuria, frequency, hematuria, or incontinence.  NEUROLOGICAL: No headaches, focal muscle weakness, numbness, or tremors.  SKIN: No itching, burning or rashes.  MUSCULOSKELETAL: No joint swelling or pain, (+) upper & lower back pain.  PSYCHIATRIC: No depression, anxiety, or agitation.  HEME/LYMPH: No easy bruising, bleeding gums, or nose bleed.  ALLERGY AND IMMUNOLOGIC: No hives or eczema.

## 2023-11-25 NOTE — ED ADULT NURSE REASSESSMENT NOTE - NS ED NURSE REASSESS COMMENT FT1
Pt given apple juice.   requesting for IV Dilaudid for his neck, back pain, stated he takes methadone, hydromorphone at home. [address] and dr hayes made aware, PA will talk to the pt.

## 2023-11-25 NOTE — H&P ADULT - PROBLEM SELECTOR PLAN 3
possibly related to gastroparesis, patient didn't vomit since arrived to the hospital but reporting nausea, tolerated apple juice X2 and a sandwich so far, patient is on 2 SSRIs among his home meds, his EKG on admission showed normal QTc interval, started him on IVP Zofran Q 6h PRN, on continuous telemonitoring.

## 2023-11-25 NOTE — ED PROVIDER NOTE - DIFFERENTIAL DIAGNOSIS
Differentials include but not limited to hypoglycemia, electrolyte abnormality, dehydration, gastroparesis Differential Diagnosis

## 2023-11-26 LAB
A1C WITH ESTIMATED AVERAGE GLUCOSE RESULT: 8.6 % — HIGH (ref 4–5.6)
A1C WITH ESTIMATED AVERAGE GLUCOSE RESULT: 8.6 % — HIGH (ref 4–5.6)
ANION GAP SERPL CALC-SCNC: 12 MMOL/L — SIGNIFICANT CHANGE UP (ref 5–17)
ANION GAP SERPL CALC-SCNC: 12 MMOL/L — SIGNIFICANT CHANGE UP (ref 5–17)
BASOPHILS # BLD AUTO: 0.04 K/UL — SIGNIFICANT CHANGE UP (ref 0–0.2)
BASOPHILS # BLD AUTO: 0.04 K/UL — SIGNIFICANT CHANGE UP (ref 0–0.2)
BASOPHILS NFR BLD AUTO: 0.6 % — SIGNIFICANT CHANGE UP (ref 0–2)
BASOPHILS NFR BLD AUTO: 0.6 % — SIGNIFICANT CHANGE UP (ref 0–2)
BUN SERPL-MCNC: 11 MG/DL — SIGNIFICANT CHANGE UP (ref 7–23)
BUN SERPL-MCNC: 11 MG/DL — SIGNIFICANT CHANGE UP (ref 7–23)
CALCIUM SERPL-MCNC: 9.2 MG/DL — SIGNIFICANT CHANGE UP (ref 8.4–10.5)
CALCIUM SERPL-MCNC: 9.2 MG/DL — SIGNIFICANT CHANGE UP (ref 8.4–10.5)
CHLORIDE SERPL-SCNC: 103 MMOL/L — SIGNIFICANT CHANGE UP (ref 96–108)
CHLORIDE SERPL-SCNC: 103 MMOL/L — SIGNIFICANT CHANGE UP (ref 96–108)
CO2 SERPL-SCNC: 25 MMOL/L — SIGNIFICANT CHANGE UP (ref 22–31)
CO2 SERPL-SCNC: 25 MMOL/L — SIGNIFICANT CHANGE UP (ref 22–31)
CREAT SERPL-MCNC: 0.65 MG/DL — SIGNIFICANT CHANGE UP (ref 0.5–1.3)
CREAT SERPL-MCNC: 0.65 MG/DL — SIGNIFICANT CHANGE UP (ref 0.5–1.3)
EGFR: 111 ML/MIN/1.73M2 — SIGNIFICANT CHANGE UP
EGFR: 111 ML/MIN/1.73M2 — SIGNIFICANT CHANGE UP
EOSINOPHIL # BLD AUTO: 0.15 K/UL — SIGNIFICANT CHANGE UP (ref 0–0.5)
EOSINOPHIL # BLD AUTO: 0.15 K/UL — SIGNIFICANT CHANGE UP (ref 0–0.5)
EOSINOPHIL NFR BLD AUTO: 2.1 % — SIGNIFICANT CHANGE UP (ref 0–6)
EOSINOPHIL NFR BLD AUTO: 2.1 % — SIGNIFICANT CHANGE UP (ref 0–6)
ESTIMATED AVERAGE GLUCOSE: 200 MG/DL — HIGH (ref 68–114)
ESTIMATED AVERAGE GLUCOSE: 200 MG/DL — HIGH (ref 68–114)
GLUCOSE SERPL-MCNC: 149 MG/DL — HIGH (ref 70–99)
GLUCOSE SERPL-MCNC: 149 MG/DL — HIGH (ref 70–99)
HCT VFR BLD CALC: 38.2 % — LOW (ref 39–50)
HCT VFR BLD CALC: 38.2 % — LOW (ref 39–50)
HGB BLD-MCNC: 12.2 G/DL — LOW (ref 13–17)
HGB BLD-MCNC: 12.2 G/DL — LOW (ref 13–17)
IMM GRANULOCYTES NFR BLD AUTO: 0.3 % — SIGNIFICANT CHANGE UP (ref 0–0.9)
IMM GRANULOCYTES NFR BLD AUTO: 0.3 % — SIGNIFICANT CHANGE UP (ref 0–0.9)
LYMPHOCYTES # BLD AUTO: 2.38 K/UL — SIGNIFICANT CHANGE UP (ref 1–3.3)
LYMPHOCYTES # BLD AUTO: 2.38 K/UL — SIGNIFICANT CHANGE UP (ref 1–3.3)
LYMPHOCYTES # BLD AUTO: 33.5 % — SIGNIFICANT CHANGE UP (ref 13–44)
LYMPHOCYTES # BLD AUTO: 33.5 % — SIGNIFICANT CHANGE UP (ref 13–44)
MAGNESIUM SERPL-MCNC: 2.3 MG/DL — SIGNIFICANT CHANGE UP (ref 1.6–2.6)
MAGNESIUM SERPL-MCNC: 2.3 MG/DL — SIGNIFICANT CHANGE UP (ref 1.6–2.6)
MCHC RBC-ENTMCNC: 26.7 PG — LOW (ref 27–34)
MCHC RBC-ENTMCNC: 26.7 PG — LOW (ref 27–34)
MCHC RBC-ENTMCNC: 31.9 GM/DL — LOW (ref 32–36)
MCHC RBC-ENTMCNC: 31.9 GM/DL — LOW (ref 32–36)
MCV RBC AUTO: 83.6 FL — SIGNIFICANT CHANGE UP (ref 80–100)
MCV RBC AUTO: 83.6 FL — SIGNIFICANT CHANGE UP (ref 80–100)
MONOCYTES # BLD AUTO: 0.54 K/UL — SIGNIFICANT CHANGE UP (ref 0–0.9)
MONOCYTES # BLD AUTO: 0.54 K/UL — SIGNIFICANT CHANGE UP (ref 0–0.9)
MONOCYTES NFR BLD AUTO: 7.6 % — SIGNIFICANT CHANGE UP (ref 2–14)
MONOCYTES NFR BLD AUTO: 7.6 % — SIGNIFICANT CHANGE UP (ref 2–14)
NEUTROPHILS # BLD AUTO: 3.98 K/UL — SIGNIFICANT CHANGE UP (ref 1.8–7.4)
NEUTROPHILS # BLD AUTO: 3.98 K/UL — SIGNIFICANT CHANGE UP (ref 1.8–7.4)
NEUTROPHILS NFR BLD AUTO: 55.9 % — SIGNIFICANT CHANGE UP (ref 43–77)
NEUTROPHILS NFR BLD AUTO: 55.9 % — SIGNIFICANT CHANGE UP (ref 43–77)
NRBC # BLD: 0 /100 WBCS — SIGNIFICANT CHANGE UP (ref 0–0)
NRBC # BLD: 0 /100 WBCS — SIGNIFICANT CHANGE UP (ref 0–0)
PLATELET # BLD AUTO: 280 K/UL — SIGNIFICANT CHANGE UP (ref 150–400)
PLATELET # BLD AUTO: 280 K/UL — SIGNIFICANT CHANGE UP (ref 150–400)
POTASSIUM SERPL-MCNC: 3.7 MMOL/L — SIGNIFICANT CHANGE UP (ref 3.5–5.3)
POTASSIUM SERPL-MCNC: 3.7 MMOL/L — SIGNIFICANT CHANGE UP (ref 3.5–5.3)
POTASSIUM SERPL-SCNC: 3.7 MMOL/L — SIGNIFICANT CHANGE UP (ref 3.5–5.3)
POTASSIUM SERPL-SCNC: 3.7 MMOL/L — SIGNIFICANT CHANGE UP (ref 3.5–5.3)
RBC # BLD: 4.57 M/UL — SIGNIFICANT CHANGE UP (ref 4.2–5.8)
RBC # BLD: 4.57 M/UL — SIGNIFICANT CHANGE UP (ref 4.2–5.8)
RBC # FLD: 15.6 % — HIGH (ref 10.3–14.5)
RBC # FLD: 15.6 % — HIGH (ref 10.3–14.5)
SODIUM SERPL-SCNC: 140 MMOL/L — SIGNIFICANT CHANGE UP (ref 135–145)
SODIUM SERPL-SCNC: 140 MMOL/L — SIGNIFICANT CHANGE UP (ref 135–145)
WBC # BLD: 7.11 K/UL — SIGNIFICANT CHANGE UP (ref 3.8–10.5)
WBC # BLD: 7.11 K/UL — SIGNIFICANT CHANGE UP (ref 3.8–10.5)
WBC # FLD AUTO: 7.11 K/UL — SIGNIFICANT CHANGE UP (ref 3.8–10.5)
WBC # FLD AUTO: 7.11 K/UL — SIGNIFICANT CHANGE UP (ref 3.8–10.5)

## 2023-11-26 PROCEDURE — 99232 SBSQ HOSP IP/OBS MODERATE 35: CPT

## 2023-11-26 RX ORDER — METHADONE HYDROCHLORIDE 40 MG/1
5 TABLET ORAL
Refills: 0 | Status: DISCONTINUED | OUTPATIENT
Start: 2023-11-26 | End: 2023-11-26

## 2023-11-26 RX ORDER — HYDROMORPHONE HYDROCHLORIDE 2 MG/ML
0.5 INJECTION INTRAMUSCULAR; INTRAVENOUS; SUBCUTANEOUS
Refills: 0 | Status: DISCONTINUED | OUTPATIENT
Start: 2023-11-26 | End: 2023-11-26

## 2023-11-26 RX ORDER — HYDROMORPHONE HYDROCHLORIDE 2 MG/ML
4 INJECTION INTRAMUSCULAR; INTRAVENOUS; SUBCUTANEOUS
Refills: 0 | Status: DISCONTINUED | OUTPATIENT
Start: 2023-11-26 | End: 2023-11-29

## 2023-11-26 RX ORDER — HYDROMORPHONE HYDROCHLORIDE 2 MG/ML
1 INJECTION INTRAMUSCULAR; INTRAVENOUS; SUBCUTANEOUS EVERY 4 HOURS
Refills: 0 | Status: DISCONTINUED | OUTPATIENT
Start: 2023-11-26 | End: 2023-11-27

## 2023-11-26 RX ORDER — HYDROMORPHONE HYDROCHLORIDE 2 MG/ML
1 INJECTION INTRAMUSCULAR; INTRAVENOUS; SUBCUTANEOUS EVERY 4 HOURS
Refills: 0 | Status: DISCONTINUED | OUTPATIENT
Start: 2023-11-26 | End: 2023-11-26

## 2023-11-26 RX ORDER — METOCLOPRAMIDE HCL 10 MG
10 TABLET ORAL EVERY 6 HOURS
Refills: 0 | Status: COMPLETED | OUTPATIENT
Start: 2023-11-26 | End: 2023-11-27

## 2023-11-26 RX ORDER — INSULIN LISPRO 100/ML
VIAL (ML) SUBCUTANEOUS
Refills: 0 | Status: DISCONTINUED | OUTPATIENT
Start: 2023-11-26 | End: 2023-11-29

## 2023-11-26 RX ORDER — INSULIN GLARGINE 100 [IU]/ML
18 INJECTION, SOLUTION SUBCUTANEOUS EVERY MORNING
Refills: 0 | Status: DISCONTINUED | OUTPATIENT
Start: 2023-11-27 | End: 2023-11-29

## 2023-11-26 RX ORDER — INSULIN GLARGINE 100 [IU]/ML
15 INJECTION, SOLUTION SUBCUTANEOUS ONCE
Refills: 0 | Status: COMPLETED | OUTPATIENT
Start: 2023-11-26 | End: 2023-11-26

## 2023-11-26 RX ORDER — METHADONE HYDROCHLORIDE 40 MG/1
5 TABLET ORAL
Refills: 0 | Status: DISCONTINUED | OUTPATIENT
Start: 2023-11-26 | End: 2023-11-29

## 2023-11-26 RX ADMIN — METHADONE HYDROCHLORIDE 5 MILLIGRAM(S): 40 TABLET ORAL at 17:19

## 2023-11-26 RX ADMIN — INSULIN GLARGINE 15 UNIT(S): 100 INJECTION, SOLUTION SUBCUTANEOUS at 11:33

## 2023-11-26 RX ADMIN — HYDROMORPHONE HYDROCHLORIDE 1 MILLIGRAM(S): 2 INJECTION INTRAMUSCULAR; INTRAVENOUS; SUBCUTANEOUS at 08:41

## 2023-11-26 RX ADMIN — HYDROMORPHONE HYDROCHLORIDE 1 MILLIGRAM(S): 2 INJECTION INTRAMUSCULAR; INTRAVENOUS; SUBCUTANEOUS at 04:20

## 2023-11-26 RX ADMIN — HYDROMORPHONE HYDROCHLORIDE 1 MILLIGRAM(S): 2 INJECTION INTRAMUSCULAR; INTRAVENOUS; SUBCUTANEOUS at 04:06

## 2023-11-26 RX ADMIN — ATORVASTATIN CALCIUM 80 MILLIGRAM(S): 80 TABLET, FILM COATED ORAL at 22:32

## 2023-11-26 RX ADMIN — HYDROMORPHONE HYDROCHLORIDE 4 MILLIGRAM(S): 2 INJECTION INTRAMUSCULAR; INTRAVENOUS; SUBCUTANEOUS at 23:44

## 2023-11-26 RX ADMIN — HYDROMORPHONE HYDROCHLORIDE 1 MILLIGRAM(S): 2 INJECTION INTRAMUSCULAR; INTRAVENOUS; SUBCUTANEOUS at 16:05

## 2023-11-26 RX ADMIN — Medication 10 MILLIGRAM(S): at 12:49

## 2023-11-26 RX ADMIN — Medication 10 MILLIGRAM(S): at 23:45

## 2023-11-26 RX ADMIN — HYDROMORPHONE HYDROCHLORIDE 1 MILLIGRAM(S): 2 INJECTION INTRAMUSCULAR; INTRAVENOUS; SUBCUTANEOUS at 00:05

## 2023-11-26 RX ADMIN — Medication 150 MILLIGRAM(S): at 22:31

## 2023-11-26 RX ADMIN — SODIUM CHLORIDE 125 MILLILITER(S): 9 INJECTION, SOLUTION INTRAVENOUS at 09:24

## 2023-11-26 RX ADMIN — APIXABAN 5 MILLIGRAM(S): 2.5 TABLET, FILM COATED ORAL at 09:28

## 2023-11-26 RX ADMIN — HYDROMORPHONE HYDROCHLORIDE 1 MILLIGRAM(S): 2 INJECTION INTRAMUSCULAR; INTRAVENOUS; SUBCUTANEOUS at 16:20

## 2023-11-26 RX ADMIN — HYDROMORPHONE HYDROCHLORIDE 1 MILLIGRAM(S): 2 INJECTION INTRAMUSCULAR; INTRAVENOUS; SUBCUTANEOUS at 20:40

## 2023-11-26 RX ADMIN — HYDROMORPHONE HYDROCHLORIDE 1 MILLIGRAM(S): 2 INJECTION INTRAMUSCULAR; INTRAVENOUS; SUBCUTANEOUS at 08:56

## 2023-11-26 RX ADMIN — Medication 150 MILLIGRAM(S): at 22:00

## 2023-11-26 RX ADMIN — Medication 150 MILLIGRAM(S): at 14:13

## 2023-11-26 RX ADMIN — HYDROMORPHONE HYDROCHLORIDE 0.5 MILLIGRAM(S): 2 INJECTION INTRAMUSCULAR; INTRAVENOUS; SUBCUTANEOUS at 13:12

## 2023-11-26 RX ADMIN — METHADONE HYDROCHLORIDE 5 MILLIGRAM(S): 40 TABLET ORAL at 13:11

## 2023-11-26 RX ADMIN — Medication 2: at 12:43

## 2023-11-26 RX ADMIN — Medication 10 MILLIGRAM(S): at 17:20

## 2023-11-26 RX ADMIN — APIXABAN 5 MILLIGRAM(S): 2.5 TABLET, FILM COATED ORAL at 22:32

## 2023-11-26 RX ADMIN — HYDROMORPHONE HYDROCHLORIDE 1 MILLIGRAM(S): 2 INJECTION INTRAMUSCULAR; INTRAVENOUS; SUBCUTANEOUS at 20:25

## 2023-11-26 RX ADMIN — CARVEDILOL PHOSPHATE 25 MILLIGRAM(S): 80 CAPSULE, EXTENDED RELEASE ORAL at 22:32

## 2023-11-26 RX ADMIN — CARVEDILOL PHOSPHATE 25 MILLIGRAM(S): 80 CAPSULE, EXTENDED RELEASE ORAL at 09:30

## 2023-11-26 RX ADMIN — Medication 1: at 17:10

## 2023-11-26 RX ADMIN — PANTOPRAZOLE SODIUM 40 MILLIGRAM(S): 20 TABLET, DELAYED RELEASE ORAL at 09:29

## 2023-11-26 RX ADMIN — Medication 1 MILLIGRAM(S): at 22:32

## 2023-11-26 RX ADMIN — SODIUM CHLORIDE 125 MILLILITER(S): 9 INJECTION, SOLUTION INTRAVENOUS at 09:27

## 2023-11-26 RX ADMIN — HYDROMORPHONE HYDROCHLORIDE 0.5 MILLIGRAM(S): 2 INJECTION INTRAMUSCULAR; INTRAVENOUS; SUBCUTANEOUS at 13:27

## 2023-11-26 RX ADMIN — SODIUM CHLORIDE 125 MILLILITER(S): 9 INJECTION, SOLUTION INTRAVENOUS at 20:26

## 2023-11-26 NOTE — PROGRESS NOTE ADULT - SUBJECTIVE AND OBJECTIVE BOX
Patient is a 58y old  Male who presents with a chief complaint of Low blood sugar, unable to tolerate PO. (25 Nov 2023 21:46)      INTERVAL HPI/OVERNIGHT EVENTS:  Patient seen and examined in am, no episodes of vomiting overnight, FS now improved, complaining of pain, had been on methadone and oral dilaudid previously as outpatient    ROS reviewed and is otherwise negative        Vital Signs Last 24 Hrs  T(C): 36.3 (26 Nov 2023 08:16), Max: 37 (26 Nov 2023 03:15)  T(F): 97.3 (26 Nov 2023 08:16), Max: 98.6 (26 Nov 2023 03:15)  HR: 73 (26 Nov 2023 08:16) (64 - 78)  BP: 142/86 (26 Nov 2023 08:16) (112/68 - 142/86)  BP(mean): 86 (25 Nov 2023 22:46) (86 - 86)  RR: 16 (26 Nov 2023 08:16) (16 - 18)  SpO2: 97% (26 Nov 2023 08:16) (96% - 98%)    Parameters below as of 26 Nov 2023 08:16  Patient On (Oxygen Delivery Method): room air        PHYSICAL EXAM:  GENERAL: NAD, adult male, well developed  HEAD:  Atraumatic, Normocephalic  EYES: EOMI, PERRLA  ENMT: Moist mucous membranes , No lesions; No tonsillar erythema,   NECK: Supple, No JVD   NERVOUS SYSTEM:  Alert & Oriented X3, Good concentration; All 4 extremities mobile   CHEST/LUNG: Clear to auscultation bilaterally; No rales, rhonchi, wheezing, or rubs  HEART: Regular rate and rhythm; No murmurs, rubs, or gallops  ABDOMEN: Soft, mild tenderness diffusely, Nondistended; Bowel sounds present  EXTREMITIES:  + Pulses, No  edema  SKIN: No rashes or lesions    MEDICATIONS  (STANDING):  apixaban 5 milliGRAM(s) Oral every 12 hours  atorvastatin 80 milliGRAM(s) Oral at bedtime  carvedilol 25 milliGRAM(s) Oral every 12 hours  clonazePAM  Tablet 1 milliGRAM(s) Oral at bedtime  dextrose 5% + sodium chloride 0.45%. 1000 milliLiter(s) (125 mL/Hr) IV Continuous <Continuous>  dextrose 5%. 1000 milliLiter(s) (125 mL/Hr) IV Continuous <Continuous>  dextrose 5%. 1000 milliLiter(s) (100 mL/Hr) IV Continuous <Continuous>  dextrose 5%. 1000 milliLiter(s) (50 mL/Hr) IV Continuous <Continuous>  dextrose 50% Injectable 12.5 Gram(s) IV Push once  dextrose 50% Injectable 25 Gram(s) IV Push once  dextrose 50% Injectable 25 Gram(s) IV Push once  glucagon  Injectable 1 milliGRAM(s) IntraMuscular once  insulin lispro (ADMELOG) corrective regimen sliding scale   SubCutaneous three times a day before meals  methadone    Tablet 5 milliGRAM(s) Oral four times a day  metoclopramide Injectable 10 milliGRAM(s) IV Push every 6 hours  multivitamin 1 Tablet(s) Oral daily  pantoprazole    Tablet 40 milliGRAM(s) Oral before breakfast  pregabalin 150 milliGRAM(s) Oral three times a day  traZODone 150 milliGRAM(s) Oral at bedtime    MEDICATIONS  (PRN):  acetaminophen     Tablet .. 650 milliGRAM(s) Oral every 6 hours PRN Mild Pain (1 - 3)  clonazePAM  Tablet 0.5 milliGRAM(s) Oral daily PRN for anxiety  dextrose Oral Gel 15 Gram(s) Oral once PRN Blood Glucose LESS THAN 70 milliGRAM(s)/deciliter  HYDROmorphone   Tablet 4 milliGRAM(s) Oral four times a day PRN Severe Pain (7 - 10)  HYDROmorphone  Injectable 0.5 milliGRAM(s) IV Push every 3 hours PRN breakthrough pain 7-10  ondansetron Injectable 4 milliGRAM(s) IV Push every 6 hours PRN Nausea and/or Vomiting      Allergies    No Known Allergies    Intolerances          LABS:                        12.2   7.11  )-----------( 280      ( 26 Nov 2023 06:32 )             38.2     26 Nov 2023 06:32    140    |  103    |  11     ----------------------------<  149    3.7     |  25     |  0.65     Ca    9.2        26 Nov 2023 06:32  Phos  3.4       25 Nov 2023 19:13  Mg     2.3       26 Nov 2023 06:32    TPro  7.8    /  Alb  4.0    /  TBili  0.3    /  DBili  x      /  AST  7      /  ALT  27     /  AlkPhos  88     25 Nov 2023 19:13      Urinalysis Basic - ( 26 Nov 2023 06:32 )    Color: x / Appearance: x / SG: x / pH: x  Gluc: 149 mg/dL / Ketone: x  / Bili: x / Urobili: x   Blood: x / Protein: x / Nitrite: x   Leuk Esterase: x / RBC: x / WBC x   Sq Epi: x / Non Sq Epi: x / Bacteria: x      CAPILLARY BLOOD GLUCOSE      POCT Blood Glucose.: 216 mg/dL (26 Nov 2023 12:35)  POCT Blood Glucose.: 198 mg/dL (26 Nov 2023 11:27)  POCT Blood Glucose.: 147 mg/dL (26 Nov 2023 07:12)  POCT Blood Glucose.: 180 mg/dL (26 Nov 2023 05:07)  POCT Blood Glucose.: 156 mg/dL (26 Nov 2023 03:09)  POCT Blood Glucose.: 91 mg/dL (26 Nov 2023 01:01)  POCT Blood Glucose.: 88 mg/dL (25 Nov 2023 22:47)  POCT Blood Glucose.: 73 mg/dL (25 Nov 2023 21:39)  POCT Blood Glucose.: 99 mg/dL (25 Nov 2023 20:51)  POCT Blood Glucose.: 47 mg/dL (25 Nov 2023 19:54)  POCT Blood Glucose.: 76 mg/dL (25 Nov 2023 19:21)  POCT Blood Glucose.: 50 mg/dL (25 Nov 2023 18:43)  POCT Blood Glucose.: 51 mg/dL (25 Nov 2023 18:41)      RADIOLOGY & ADDITIONAL TESTS:        Care Discussed with Consultants/Other Providers:    Advanced Directives: [ ] DNR  [ ] No feeding tube  [ ] MOLST in chart  [ ] MOLST completed today  [ ] Unknown

## 2023-11-27 PROCEDURE — 76700 US EXAM ABDOM COMPLETE: CPT | Mod: 26

## 2023-11-27 PROCEDURE — 99232 SBSQ HOSP IP/OBS MODERATE 35: CPT

## 2023-11-27 RX ORDER — DIAZEPAM 5 MG
5 TABLET ORAL ONCE
Refills: 0 | Status: DISCONTINUED | OUTPATIENT
Start: 2023-11-27 | End: 2023-11-27

## 2023-11-27 RX ORDER — INSULIN LISPRO 100/ML
1 VIAL (ML) SUBCUTANEOUS ONCE
Refills: 0 | Status: COMPLETED | OUTPATIENT
Start: 2023-11-27 | End: 2023-11-27

## 2023-11-27 RX ORDER — INSULIN LISPRO 100/ML
3 VIAL (ML) SUBCUTANEOUS
Refills: 0 | Status: DISCONTINUED | OUTPATIENT
Start: 2023-11-27 | End: 2023-11-29

## 2023-11-27 RX ORDER — HYDROMORPHONE HYDROCHLORIDE 2 MG/ML
0.5 INJECTION INTRAMUSCULAR; INTRAVENOUS; SUBCUTANEOUS EVERY 6 HOURS
Refills: 0 | Status: DISCONTINUED | OUTPATIENT
Start: 2023-11-27 | End: 2023-11-28

## 2023-11-27 RX ADMIN — Medication 150 MILLIGRAM(S): at 13:43

## 2023-11-27 RX ADMIN — Medication 150 MILLIGRAM(S): at 21:40

## 2023-11-27 RX ADMIN — HYDROMORPHONE HYDROCHLORIDE 0.5 MILLIGRAM(S): 2 INJECTION INTRAMUSCULAR; INTRAVENOUS; SUBCUTANEOUS at 16:55

## 2023-11-27 RX ADMIN — Medication 150 MILLIGRAM(S): at 09:14

## 2023-11-27 RX ADMIN — HYDROMORPHONE HYDROCHLORIDE 4 MILLIGRAM(S): 2 INJECTION INTRAMUSCULAR; INTRAVENOUS; SUBCUTANEOUS at 23:36

## 2023-11-27 RX ADMIN — APIXABAN 5 MILLIGRAM(S): 2.5 TABLET, FILM COATED ORAL at 21:39

## 2023-11-27 RX ADMIN — METHADONE HYDROCHLORIDE 5 MILLIGRAM(S): 40 TABLET ORAL at 00:47

## 2023-11-27 RX ADMIN — METHADONE HYDROCHLORIDE 5 MILLIGRAM(S): 40 TABLET ORAL at 05:31

## 2023-11-27 RX ADMIN — HYDROMORPHONE HYDROCHLORIDE 4 MILLIGRAM(S): 2 INJECTION INTRAMUSCULAR; INTRAVENOUS; SUBCUTANEOUS at 22:58

## 2023-11-27 RX ADMIN — HYDROMORPHONE HYDROCHLORIDE 1 MILLIGRAM(S): 2 INJECTION INTRAMUSCULAR; INTRAVENOUS; SUBCUTANEOUS at 00:52

## 2023-11-27 RX ADMIN — Medication 2: at 12:21

## 2023-11-27 RX ADMIN — APIXABAN 5 MILLIGRAM(S): 2.5 TABLET, FILM COATED ORAL at 09:34

## 2023-11-27 RX ADMIN — HYDROMORPHONE HYDROCHLORIDE 1 MILLIGRAM(S): 2 INJECTION INTRAMUSCULAR; INTRAVENOUS; SUBCUTANEOUS at 05:31

## 2023-11-27 RX ADMIN — HYDROMORPHONE HYDROCHLORIDE 1 MILLIGRAM(S): 2 INJECTION INTRAMUSCULAR; INTRAVENOUS; SUBCUTANEOUS at 05:44

## 2023-11-27 RX ADMIN — CARVEDILOL PHOSPHATE 25 MILLIGRAM(S): 80 CAPSULE, EXTENDED RELEASE ORAL at 21:40

## 2023-11-27 RX ADMIN — HYDROMORPHONE HYDROCHLORIDE 1 MILLIGRAM(S): 2 INJECTION INTRAMUSCULAR; INTRAVENOUS; SUBCUTANEOUS at 10:36

## 2023-11-27 RX ADMIN — Medication 2: at 16:44

## 2023-11-27 RX ADMIN — SODIUM CHLORIDE 125 MILLILITER(S): 9 INJECTION, SOLUTION INTRAVENOUS at 20:25

## 2023-11-27 RX ADMIN — PANTOPRAZOLE SODIUM 40 MILLIGRAM(S): 20 TABLET, DELAYED RELEASE ORAL at 05:33

## 2023-11-27 RX ADMIN — METHADONE HYDROCHLORIDE 5 MILLIGRAM(S): 40 TABLET ORAL at 17:04

## 2023-11-27 RX ADMIN — Medication 1 TABLET(S): at 09:35

## 2023-11-27 RX ADMIN — METHADONE HYDROCHLORIDE 5 MILLIGRAM(S): 40 TABLET ORAL at 12:29

## 2023-11-27 RX ADMIN — SODIUM CHLORIDE 125 MILLILITER(S): 9 INJECTION, SOLUTION INTRAVENOUS at 17:05

## 2023-11-27 RX ADMIN — Medication 5 MILLIGRAM(S): at 20:25

## 2023-11-27 RX ADMIN — HYDROMORPHONE HYDROCHLORIDE 1 MILLIGRAM(S): 2 INJECTION INTRAMUSCULAR; INTRAVENOUS; SUBCUTANEOUS at 10:51

## 2023-11-27 RX ADMIN — HYDROMORPHONE HYDROCHLORIDE 0.5 MILLIGRAM(S): 2 INJECTION INTRAMUSCULAR; INTRAVENOUS; SUBCUTANEOUS at 17:10

## 2023-11-27 RX ADMIN — Medication 10 MILLIGRAM(S): at 05:31

## 2023-11-27 RX ADMIN — ATORVASTATIN CALCIUM 80 MILLIGRAM(S): 80 TABLET, FILM COATED ORAL at 21:39

## 2023-11-27 RX ADMIN — SODIUM CHLORIDE 125 MILLILITER(S): 9 INJECTION, SOLUTION INTRAVENOUS at 00:54

## 2023-11-27 RX ADMIN — HYDROMORPHONE HYDROCHLORIDE 4 MILLIGRAM(S): 2 INJECTION INTRAMUSCULAR; INTRAVENOUS; SUBCUTANEOUS at 09:35

## 2023-11-27 RX ADMIN — Medication 1: at 07:54

## 2023-11-27 RX ADMIN — Medication 1 UNIT(S): at 22:04

## 2023-11-27 RX ADMIN — HYDROMORPHONE HYDROCHLORIDE 4 MILLIGRAM(S): 2 INJECTION INTRAMUSCULAR; INTRAVENOUS; SUBCUTANEOUS at 16:00

## 2023-11-27 RX ADMIN — Medication 1 MILLIGRAM(S): at 21:40

## 2023-11-27 RX ADMIN — HYDROMORPHONE HYDROCHLORIDE 4 MILLIGRAM(S): 2 INJECTION INTRAMUSCULAR; INTRAVENOUS; SUBCUTANEOUS at 10:10

## 2023-11-27 RX ADMIN — SODIUM CHLORIDE 125 MILLILITER(S): 9 INJECTION, SOLUTION INTRAVENOUS at 09:31

## 2023-11-27 RX ADMIN — HYDROMORPHONE HYDROCHLORIDE 4 MILLIGRAM(S): 2 INJECTION INTRAMUSCULAR; INTRAVENOUS; SUBCUTANEOUS at 00:44

## 2023-11-27 RX ADMIN — HYDROMORPHONE HYDROCHLORIDE 4 MILLIGRAM(S): 2 INJECTION INTRAMUSCULAR; INTRAVENOUS; SUBCUTANEOUS at 15:38

## 2023-11-27 RX ADMIN — CARVEDILOL PHOSPHATE 25 MILLIGRAM(S): 80 CAPSULE, EXTENDED RELEASE ORAL at 09:34

## 2023-11-27 RX ADMIN — Medication 150 MILLIGRAM(S): at 21:39

## 2023-11-27 RX ADMIN — HYDROMORPHONE HYDROCHLORIDE 1 MILLIGRAM(S): 2 INJECTION INTRAMUSCULAR; INTRAVENOUS; SUBCUTANEOUS at 01:03

## 2023-11-27 RX ADMIN — INSULIN GLARGINE 18 UNIT(S): 100 INJECTION, SOLUTION SUBCUTANEOUS at 07:54

## 2023-11-27 NOTE — DIETITIAN INITIAL EVALUATION ADULT - SIGNS/SYMPTOMS
AEB pt states he is unaware of what to eat for gastroparesis and seeks education at this time AEB A1c of 8.6%

## 2023-11-27 NOTE — CONSULT NOTE ADULT - SUBJECTIVE AND OBJECTIVE BOX
seen and examined full note to follow   Chief Complaint:  Patient is a 58y old  Male who presents with a chief complaint of Low blood sugar, unable to tolerate PO. (27 Nov 2023 10:04)      HPI: 58 M PMH of HTN, DM2, Dyslipidemia, CAD s/p MI s/p PTCA, PAD, COVID-19, Chronic Gastritis, Gastroparesis, GERD, Spinal Stenosis with radiculopathy, Chronic Back Pain opioid dependant, BPH, Obesity, Anxiety, and Depression admitted with low blood sugar, inability to tolerate PO. Had similar episode a few months ago where he was admitted at [facility]. Reports during that admission he was tole he likely had gastroparesis, however has never had a gastric emptying study. Tolerating PO today, and has had no vomiting. Does report some RUQ abdominal pain. Has had endoscopy in the past, uncertain when. previous colonoscopy was 5 years ago which he reports was "normal". Pt denies headache, dizziness, chest pain, palpitations, cough, SOB, diarrhea, urinary symptoms, fevers, chills, weakness at this time.     Allergies:  No Known Allergies    Medications:  acetaminophen     Tablet .. 650 milliGRAM(s) Oral every 6 hours PRN  apixaban 5 milliGRAM(s) Oral every 12 hours  atorvastatin 80 milliGRAM(s) Oral at bedtime  carvedilol 25 milliGRAM(s) Oral every 12 hours  clonazePAM  Tablet 0.5 milliGRAM(s) Oral daily PRN  clonazePAM  Tablet 1 milliGRAM(s) Oral at bedtime  dextrose 5% + sodium chloride 0.45%. 1000 milliLiter(s) IV Continuous <Continuous>  dextrose 5%. 1000 milliLiter(s) IV Continuous <Continuous>  dextrose 5%. 1000 milliLiter(s) IV Continuous <Continuous>  dextrose 5%. 1000 milliLiter(s) IV Continuous <Continuous>  dextrose 50% Injectable 12.5 Gram(s) IV Push once  dextrose 50% Injectable 25 Gram(s) IV Push once  dextrose 50% Injectable 25 Gram(s) IV Push once  dextrose Oral Gel 15 Gram(s) Oral once PRN  glucagon  Injectable 1 milliGRAM(s) IntraMuscular once  HYDROmorphone   Tablet 4 milliGRAM(s) Oral four times a day PRN  HYDROmorphone  Injectable 1 milliGRAM(s) IV Push every 4 hours PRN  insulin glargine Injectable (LANTUS) 18 Unit(s) SubCutaneous every morning  insulin lispro (ADMELOG) corrective regimen sliding scale   SubCutaneous three times a day before meals  methadone    Tablet 5 milliGRAM(s) Oral four times a day  multivitamin 1 Tablet(s) Oral daily  ondansetron Injectable 4 milliGRAM(s) IV Push every 6 hours PRN  pantoprazole    Tablet 40 milliGRAM(s) Oral before breakfast  pregabalin 150 milliGRAM(s) Oral three times a day  traZODone 150 milliGRAM(s) Oral at bedtime      PMHX/PSHX:  Hypertension    Diabetes mellitus    Gastric ulcer    Spinal stenosis    H/O spinal cord compression    Spondylosis    H/O radiculopathy    No significant past surgical history    No significant past surgical history    H/O cervical spine surgery    History of back surgery    S/P cervical spinal fusion        Family history:  FH: hypertension (Father)    FH: diabetes mellitus (Mother)        Social History:     ROS:     General:  No wt loss, fevers, chills, night sweats, fatigue,   Eyes:  Good vision, no reported pain  ENT:  No sore throat, pain, runny nose, dysphagia  CV:  No pain, palpitations, hypo/hypertension  Resp:  No dyspnea, cough, tachypnea, wheezing  GI: see HPI  :  No pain, bleeding, incontinence, nocturia  Muscle:  No pain, weakness  Neuro:  No weakness, tingling, memory problems  Psych:  No fatigue, insomnia, mood problems, depression  Endocrine:  No polyuria, polydipsia, cold/heat intolerance  Heme:  No petechiae, ecchymosis, easy bruisability  Skin:  No rash, tattoos, scars, edema      PHYSICAL EXAM:   Vital Signs:  Vital Signs Last 24 Hrs  T(C): 36.5 (27 Nov 2023 07:30), Max: 36.9 (26 Nov 2023 23:30)  T(F): 97.7 (27 Nov 2023 07:30), Max: 98.4 (26 Nov 2023 23:30)  HR: 66 (27 Nov 2023 09:30) (61 - 69)  BP: 127/80 (27 Nov 2023 09:30) (102/67 - 145/84)  BP(mean): --  RR: 16 (27 Nov 2023 09:30) (16 - 16)  SpO2: 96% (27 Nov 2023 09:30) (94% - 96%)    Parameters below as of 27 Nov 2023 09:30  Patient On (Oxygen Delivery Method): room air      Daily     Daily     GENERAL:  Appears stated age, well-groomed, well-nourished, no distress  HEENT:  NC/AT,  conjunctivae clear and pink, no thyromegaly, nodules, adenopathy, no JVD, sclera -anicteric  CHEST:  Full & symmetric excursion, no increased effort, breath sounds clear  HEART:  Regular rhythm, S1, S2, no murmur/rub/S3/S4, no abdominal bruit, no edema  ABDOMEN:  Soft, +RUQ tender, non-distended, normoactive bowel sounds,  no masses ,no hepatosplenomegaly no signs of chronic liver disease  EXTREMITIES  no cyanosis, clubbing or edema  SKIN:  No rash/erythema/ecchymoses/petechiae/wounds/abscess/warm/dry  NEURO:  Alert, oriented, no asterixis, no tremor, no encephalopathy    LABS:                        12.2   7.11  )-----------( 280      ( 26 Nov 2023 06:32 )             38.2     11-26    140  |  103  |  11  ----------------------------<  149<H>  3.7   |  25  |  0.65    Ca    9.2      26 Nov 2023 06:32  Phos  3.4     11-25  Mg     2.3     11-26    TPro  7.8  /  Alb  4.0  /  TBili  0.3  /  DBili  x   /  AST  7<L>  /  ALT  27  /  AlkPhos  88  11-25    LIVER FUNCTIONS - ( 25 Nov 2023 19:13 )  Alb: 4.0 g/dL / Pro: 7.8 g/dL / ALK PHOS: 88 U/L / ALT: 27 U/L / AST: 7 U/L / GGT: x             Urinalysis Basic - ( 26 Nov 2023 06:32 )    Color: x / Appearance: x / SG: x / pH: x  Gluc: 149 mg/dL / Ketone: x  / Bili: x / Urobili: x   Blood: x / Protein: x / Nitrite: x   Leuk Esterase: x / RBC: x / WBC x   Sq Epi: x / Non Sq Epi: x / Bacteria: x          Imaging:

## 2023-11-27 NOTE — DIETITIAN INITIAL EVALUATION ADULT - ETIOLOGY
related to incomplete prior education on diet for gastroparesis in conjunction with T2DM (gastroparesis) related to uncontrolled diabetes

## 2023-11-27 NOTE — CARE COORDINATION ASSESSMENT. - NSCAREPROVIDERS_GEN_ALL_CORE_FT
CARE PROVIDERS:  Accepting Physician: Raad Harp  Admitting: Raad Harp  Attending: Raad Harp  Case Management: Santaromana, Anna  Consultant: Rosy Parada  Consultant: Perlman, Craig  Consultant: Raúl Francois ED ACP: Maria Ines Long  ED Attending: Lara Martinez ED Nurse: Rina Coreas  Infection Control: Princess Aggarwal  Nurse: Ana Ferrell  Nurse: Jackelin Lopez  Ordered: Physician, Ordering  Ordered: ServiceAccount, SCMMLM  PCA/Nursing Assistant: Emilia Lane  Primary Team: Dario Moody  Primary Team: Irving Thorpe  Radiology Technician: Karla Hussein  Registered Dietitian: Vidhya Parada  : Isabella Vann  Team: KALYAN  Hospitalists, Team

## 2023-11-27 NOTE — DIETITIAN INITIAL EVALUATION ADULT - REASON FOR ADMISSION
Hypoglycemia    Per H&P, "This is a 57 y/o M with PMH of HTN, DM2, Dyslipidemia, CAD s/p MI s/p PTCA, PAD, COVID-19, Chronic Gastritis, Gastroparesis, GERD, Spinal Stenosis with radiculopathy, Chronic Back Pain opioid dependant, BPH, Obesity, Anxiety, and Depression who presented with persistently low blood sugar with repeated vomiting starting today afternoon. Patient's blood sugar was high in the 500's about 2 months ago, so he was admitted to [facility], then after discharge he was seen by his endocrinologist who started him on a weekly sub Q medication (Mounjaro) that he received its 1st lazo on Monday (5 days ago), and since then his blood glucose is in the low hundreds, but today it was in the upper 40's & low 50's for the whole day, not responding to orange juice, then became worse when he started vomiting, vomited 3 times this afternoon, so he drove to the hospital after dropping his son at the train station. At the ED his sugar was 52 mg/dl, improved to 76 mg/dl after D50 ampule IVP X1 then down again to 47 mg/dl. Of note that he got Lantus 26 units sub Q, in addition to 10 mg of Empagliflozin, and 1000 mg of Metformin this am."

## 2023-11-27 NOTE — DIETITIAN INITIAL EVALUATION ADULT - ORAL INTAKE PTA/DIET HISTORY
No wt changes per HIE. PTA, pt  PTA, pt was trying to consume a diet lower in sugar/carbs. However, due to ongoing gastroparesis, new medication monjauro, pt appetite is decreased from his usual appetite a few months ago by about 50%, so he usually eats one meal per day, always skips breakfast and naturally eating fewe carbs. Usual intake consist of either salad with chicken or chicken cutlets with mashed potatoes, cooked vegetable, and sometimes snacks on raw veggies. He cooks for himself and lives alone, so he would like to have more prepared healthy meals in the freezer. For DM PTA was taking novolog, lantus, metformin, and most recently monjauro (2 weeks ago). He feels the monjauro in conjunction w the other meds caused BG to drop drastically in 1 week and has some juices at home for hypoglycemic episodes. He cannot recall UBW but No wt changes per HIE noted. Any weight loss is likely attributed to the DM meds and curbed appetite. No vitamins/supplements.

## 2023-11-27 NOTE — PROGRESS NOTE ADULT - SUBJECTIVE AND OBJECTIVE BOX
Patient is a 58y old  Male who presents with a chief complaint of Low blood sugar, unable to tolerate PO. (27 Nov 2023 10:04)      INTERVAL HPI/OVERNIGHT EVENTS:  Patient seen and examined in am, tolerating meals, still complaining of some pain, no vomiting, dizziness    ROS reviewed and is otherwise negative        Vital Signs Last 24 Hrs  T(C): 36.5 (27 Nov 2023 07:30), Max: 36.9 (26 Nov 2023 23:30)  T(F): 97.7 (27 Nov 2023 07:30), Max: 98.4 (26 Nov 2023 23:30)  HR: 66 (27 Nov 2023 09:30) (61 - 69)  BP: 127/80 (27 Nov 2023 09:30) (102/67 - 145/84)  BP(mean): --  RR: 16 (27 Nov 2023 09:30) (16 - 16)  SpO2: 96% (27 Nov 2023 09:30) (94% - 96%)    Parameters below as of 27 Nov 2023 09:30  Patient On (Oxygen Delivery Method): room air        PHYSICAL EXAM:  GENERAL: NAD, adult male, well developed  HEAD:  Atraumatic, Normocephalic  EYES: EOMI, PERRLA  ENMT: Moist mucous membranes , No lesions; No tonsillar erythema,   NECK: Supple, No JVD   NERVOUS SYSTEM:  Alert & Oriented X3, Good concentration; All 4 extremities mobile   CHEST/LUNG: Clear to auscultation bilaterally; No rales, rhonchi, wheezing, or rubs  HEART: Regular rate and rhythm; No murmurs, rubs, or gallops  ABDOMEN: Soft, mild tenderness diffusely, Nondistended; Bowel sounds present  EXTREMITIES:  + Pulses, No  edema  SKIN: No rashes or lesions  MEDICATIONS  (STANDING):  apixaban 5 milliGRAM(s) Oral every 12 hours  atorvastatin 80 milliGRAM(s) Oral at bedtime  carvedilol 25 milliGRAM(s) Oral every 12 hours  clonazePAM  Tablet 1 milliGRAM(s) Oral at bedtime  dextrose 5% + sodium chloride 0.45%. 1000 milliLiter(s) (125 mL/Hr) IV Continuous <Continuous>  dextrose 5%. 1000 milliLiter(s) (125 mL/Hr) IV Continuous <Continuous>  dextrose 5%. 1000 milliLiter(s) (100 mL/Hr) IV Continuous <Continuous>  dextrose 5%. 1000 milliLiter(s) (50 mL/Hr) IV Continuous <Continuous>  dextrose 50% Injectable 12.5 Gram(s) IV Push once  dextrose 50% Injectable 25 Gram(s) IV Push once  dextrose 50% Injectable 25 Gram(s) IV Push once  glucagon  Injectable 1 milliGRAM(s) IntraMuscular once  insulin glargine Injectable (LANTUS) 18 Unit(s) SubCutaneous every morning  insulin lispro (ADMELOG) corrective regimen sliding scale   SubCutaneous three times a day before meals  insulin lispro Injectable (ADMELOG) 3 Unit(s) SubCutaneous three times a day before meals  methadone    Tablet 5 milliGRAM(s) Oral four times a day  multivitamin 1 Tablet(s) Oral daily  pantoprazole    Tablet 40 milliGRAM(s) Oral before breakfast  pregabalin 150 milliGRAM(s) Oral three times a day  traZODone 150 milliGRAM(s) Oral at bedtime    MEDICATIONS  (PRN):  acetaminophen     Tablet .. 650 milliGRAM(s) Oral every 6 hours PRN Mild Pain (1 - 3)  clonazePAM  Tablet 0.5 milliGRAM(s) Oral daily PRN for anxiety  dextrose Oral Gel 15 Gram(s) Oral once PRN Blood Glucose LESS THAN 70 milliGRAM(s)/deciliter  HYDROmorphone   Tablet 4 milliGRAM(s) Oral four times a day PRN Severe Pain (7 - 10)  HYDROmorphone  Injectable 0.5 milliGRAM(s) IV Push every 6 hours PRN breakthrough pain  ondansetron Injectable 4 milliGRAM(s) IV Push every 6 hours PRN Nausea and/or Vomiting      Allergies    No Known Allergies    Intolerances          LABS:      Ca    9.2        26 Nov 2023 06:32        Urinalysis Basic - ( 26 Nov 2023 06:32 )    Color: x / Appearance: x / SG: x / pH: x  Gluc: 149 mg/dL / Ketone: x  / Bili: x / Urobili: x   Blood: x / Protein: x / Nitrite: x   Leuk Esterase: x / RBC: x / WBC x   Sq Epi: x / Non Sq Epi: x / Bacteria: x      CAPILLARY BLOOD GLUCOSE      POCT Blood Glucose.: 246 mg/dL (27 Nov 2023 12:05)  POCT Blood Glucose.: 189 mg/dL (27 Nov 2023 07:26)  POCT Blood Glucose.: 140 mg/dL (26 Nov 2023 21:04)  POCT Blood Glucose.: 163 mg/dL (26 Nov 2023 16:31)      RADIOLOGY & ADDITIONAL TESTS:        Care Discussed with Consultants/Other Providers:    Advanced Directives: [ ] DNR  [ ] No feeding tube  [ ] MOLST in chart  [ ] MOLST completed today  [ ] Unknown   Patient is a 58y old  Male who presents with a chief complaint of Low blood sugar, unable to tolerate PO. (27 Nov 2023 10:04)      INTERVAL HPI/OVERNIGHT EVENTS:  Patient seen and examined in am, tolerating meals, still complaining of some pain, no vomiting, dizziness    ROS reviewed and is otherwise negative        Vital Signs Last 24 Hrs  T(C): 36.5 (27 Nov 2023 07:30), Max: 36.9 (26 Nov 2023 23:30)  T(F): 97.7 (27 Nov 2023 07:30), Max: 98.4 (26 Nov 2023 23:30)  HR: 66 (27 Nov 2023 09:30) (61 - 69)  BP: 127/80 (27 Nov 2023 09:30) (102/67 - 145/84)  BP(mean): --  RR: 16 (27 Nov 2023 09:30) (16 - 16)  SpO2: 96% (27 Nov 2023 09:30) (94% - 96%)    Parameters below as of 27 Nov 2023 09:30  Patient On (Oxygen Delivery Method): room air        PHYSICAL EXAM:  GENERAL: NAD, adult male, well developed  HEAD:  Atraumatic, Normocephalic  EYES: EOMI, PERRLA  ENMT: Moist mucous membranes , No lesions; No tonsillar erythema,   NECK: Supple, No JVD   NERVOUS SYSTEM:  Alert & Oriented X3, Good concentration; All 4 extremities mobile   CHEST/LUNG: Clear to auscultation bilaterally; No rales, rhonchi, wheezing, or rubs  HEART: Regular rate and rhythm; No murmurs, rubs, or gallops  ABDOMEN: Soft, mild tenderness diffusely, Nondistended; Bowel sounds present  EXTREMITIES:  + Pulses, No  edema  SKIN: No rashes or lesions    MEDICATIONS  (STANDING):  apixaban 5 milliGRAM(s) Oral every 12 hours  atorvastatin 80 milliGRAM(s) Oral at bedtime  carvedilol 25 milliGRAM(s) Oral every 12 hours  clonazePAM  Tablet 1 milliGRAM(s) Oral at bedtime  dextrose 5% + sodium chloride 0.45%. 1000 milliLiter(s) (125 mL/Hr) IV Continuous <Continuous>  dextrose 5%. 1000 milliLiter(s) (125 mL/Hr) IV Continuous <Continuous>  dextrose 5%. 1000 milliLiter(s) (100 mL/Hr) IV Continuous <Continuous>  dextrose 5%. 1000 milliLiter(s) (50 mL/Hr) IV Continuous <Continuous>  dextrose 50% Injectable 12.5 Gram(s) IV Push once  dextrose 50% Injectable 25 Gram(s) IV Push once  dextrose 50% Injectable 25 Gram(s) IV Push once  glucagon  Injectable 1 milliGRAM(s) IntraMuscular once  insulin glargine Injectable (LANTUS) 18 Unit(s) SubCutaneous every morning  insulin lispro (ADMELOG) corrective regimen sliding scale   SubCutaneous three times a day before meals  insulin lispro Injectable (ADMELOG) 3 Unit(s) SubCutaneous three times a day before meals  methadone    Tablet 5 milliGRAM(s) Oral four times a day  multivitamin 1 Tablet(s) Oral daily  pantoprazole    Tablet 40 milliGRAM(s) Oral before breakfast  pregabalin 150 milliGRAM(s) Oral three times a day  traZODone 150 milliGRAM(s) Oral at bedtime    MEDICATIONS  (PRN):  acetaminophen     Tablet .. 650 milliGRAM(s) Oral every 6 hours PRN Mild Pain (1 - 3)  clonazePAM  Tablet 0.5 milliGRAM(s) Oral daily PRN for anxiety  dextrose Oral Gel 15 Gram(s) Oral once PRN Blood Glucose LESS THAN 70 milliGRAM(s)/deciliter  HYDROmorphone   Tablet 4 milliGRAM(s) Oral four times a day PRN Severe Pain (7 - 10)  HYDROmorphone  Injectable 0.5 milliGRAM(s) IV Push every 6 hours PRN breakthrough pain  ondansetron Injectable 4 milliGRAM(s) IV Push every 6 hours PRN Nausea and/or Vomiting      Allergies    No Known Allergies    Intolerances          LABS:      Ca    9.2        26 Nov 2023 06:32        Urinalysis Basic - ( 26 Nov 2023 06:32 )    Color: x / Appearance: x / SG: x / pH: x  Gluc: 149 mg/dL / Ketone: x  / Bili: x / Urobili: x   Blood: x / Protein: x / Nitrite: x   Leuk Esterase: x / RBC: x / WBC x   Sq Epi: x / Non Sq Epi: x / Bacteria: x      CAPILLARY BLOOD GLUCOSE      POCT Blood Glucose.: 246 mg/dL (27 Nov 2023 12:05)  POCT Blood Glucose.: 189 mg/dL (27 Nov 2023 07:26)  POCT Blood Glucose.: 140 mg/dL (26 Nov 2023 21:04)  POCT Blood Glucose.: 163 mg/dL (26 Nov 2023 16:31)      RADIOLOGY & ADDITIONAL TESTS:        Care Discussed with Consultants/Other Providers:    Advanced Directives: [ ] DNR  [ ] No feeding tube  [ ] MOLST in chart  [ ] MOLST completed today  [ ] Unknown

## 2023-11-27 NOTE — DIETITIAN INITIAL EVALUATION ADULT - PERTINENT MEDS FT
MEDICATIONS  (STANDING):  apixaban 5 milliGRAM(s) Oral every 12 hours  atorvastatin 80 milliGRAM(s) Oral at bedtime  carvedilol 25 milliGRAM(s) Oral every 12 hours  clonazePAM  Tablet 1 milliGRAM(s) Oral at bedtime  dextrose 5% + sodium chloride 0.45%. 1000 milliLiter(s) (125 mL/Hr) IV Continuous <Continuous>  dextrose 5%. 1000 milliLiter(s) (50 mL/Hr) IV Continuous <Continuous>  dextrose 5%. 1000 milliLiter(s) (125 mL/Hr) IV Continuous <Continuous>  dextrose 5%. 1000 milliLiter(s) (100 mL/Hr) IV Continuous <Continuous>  dextrose 50% Injectable 25 Gram(s) IV Push once  dextrose 50% Injectable 25 Gram(s) IV Push once  dextrose 50% Injectable 12.5 Gram(s) IV Push once  glucagon  Injectable 1 milliGRAM(s) IntraMuscular once  insulin glargine Injectable (LANTUS) 18 Unit(s) SubCutaneous every morning  insulin lispro (ADMELOG) corrective regimen sliding scale   SubCutaneous three times a day before meals  insulin lispro Injectable (ADMELOG) 3 Unit(s) SubCutaneous three times a day before meals  methadone    Tablet 5 milliGRAM(s) Oral four times a day  multivitamin 1 Tablet(s) Oral daily  pantoprazole    Tablet 40 milliGRAM(s) Oral before breakfast  pregabalin 150 milliGRAM(s) Oral three times a day  traZODone 150 milliGRAM(s) Oral at bedtime    MEDICATIONS  (PRN):  acetaminophen     Tablet .. 650 milliGRAM(s) Oral every 6 hours PRN Mild Pain (1 - 3)  clonazePAM  Tablet 0.5 milliGRAM(s) Oral daily PRN for anxiety  dextrose Oral Gel 15 Gram(s) Oral once PRN Blood Glucose LESS THAN 70 milliGRAM(s)/deciliter  HYDROmorphone   Tablet 4 milliGRAM(s) Oral four times a day PRN Severe Pain (7 - 10)  HYDROmorphone  Injectable 0.5 milliGRAM(s) IV Push every 6 hours PRN breakthrough pain  ondansetron Injectable 4 milliGRAM(s) IV Push every 6 hours PRN Nausea and/or Vomiting

## 2023-11-27 NOTE — DIETITIAN INITIAL EVALUATION ADULT - PROBLEM SELECTOR PLAN 1
persistent, patient had Metformin, Jardiance & Insulin Lantus this am, in addition to the action of the weekly Mounjaro dose, hold all of his DM meds, started him on continuous D5W infusion at 125 ml/h, monitor POC glucose Q 2h, Patient still has his own glucose sensor in his left arm & keeps checking his glucose every 15 min but his results are always about 20 mg/dl lower than the simultaneous hospital glucometer' s results, Patient's last known glycated hemoglobin was 9.5% about 7 weeks ago, recheck in am with estimated average serum glucose level, Endocrinology consult with Dr. Julio mena was called.

## 2023-11-27 NOTE — DIETITIAN INITIAL EVALUATION ADULT - ADD RECOMMEND
1) Continue DASH CCHO diet; honor food preferences as able  2) Reassess heart healthy consistent carb edu as able (Per NCM handouts) 1) Continue DASH CCHO diet; honor food preferences as able  2) Trial Glucerna (8 oz, 220 kcal, 10 g protein) QD in setting of poor appetite  3) Reassess heart healthy consistent carb edu as appropriate (Per NCM handouts)

## 2023-11-27 NOTE — CARE COORDINATION ASSESSMENT. - NSPASTMEDSURGHISTORY_GEN_ALL_CORE_FT
PAST MEDICAL & SURGICAL HISTORY:  Gastric ulcer      Diabetes mellitus      Hypertension      H/O radiculopathy      Spondylosis      H/O spinal cord compression      Spinal stenosis      S/P cervical spinal fusion      History of back surgery      H/O cervical spine surgery

## 2023-11-27 NOTE — DIETITIAN INITIAL EVALUATION ADULT - PROBLEM SELECTOR PLAN 2
no related EKG changes, magnesium was supplemented earlier with 1 gm of magnesium sulfate IVPB X1 by ED team, gave an additional 2 gm IVPB X1 dose, recheck serum magnesium level in am, started on continuous cardiac monitoring.

## 2023-11-27 NOTE — DIETITIAN INITIAL EVALUATION ADULT - NS FNS DIET ORDER
Diet, Regular:   Consistent Carbohydrate {No Snacks}  DASH/TLC {Sodium & Cholesterol Restricted} (11-26-23 @ 21:39)

## 2023-11-27 NOTE — CARE COORDINATION ASSESSMENT. - OTHER PERTINENT DISCHARGE PLANNING INFORMATION:
SAUMYA met with this 58 year old male admitted from home with hypoglycemia. Per patient he was started on new medicaiton for diabetes and blood surgar dropped. He has a PCP part of family practice at [place]. His local pharmacy is [facility]. He lives with girlfriend in a private home, SW received consults for + depression screen, he has had depression for a long term per patient, sees therapist 3 x per month and NP for medication management. Injured at work ([employer] department) is on disability and receives 3/4 pension. I reviewed sw name role availability and addressed consult for bill payment water food insecurity. He reports that they go out for many meals and he has money to pay for rent, bills and utilities. Support provided and patient stated he plans to call utilities to see about arranging payment plans. Plan for home when medically optimized. [facility]. His pain management MD is Vero.  SAUMYA met with this 58 year old male admitted from home with hypoglycemia. Per patient he was started on new medicaiton for diabetes and blood surgar dropped. He has a PCP part of family practice at [place]. His local pharmacy is [facility]. He lives with girlfriend in a private home, SW received consults for + depression screen, he has had depression for a long term per patient, sees therapist 3 x per month and NP for medication management. Injured at work ([employer] department) is on disability and receives 3/4 pension. I reviewed sw name role availability and addressed consult for bill payment water food insecurity. He reports that they go out for many meals and he has money to pay for rent, bills and utilities. Support provided and patient stated he plans to call utilities to see about arranging payment plans. Plan for home when medically optimized. His local pharmacy is [facility]. His pain management MD is Vero. I updated nurse and provided support will

## 2023-11-27 NOTE — CONSULT NOTE ADULT - SUBJECTIVE AND OBJECTIVE BOX
Patient is a 58y old  Male who presents with a chief complaint of Low blood sugar, unable to tolerate PO. (26 Nov 2023 12:39)      Reason For Consult: hypoglycemia    HPI:  This is a 57 y/o M with PMH of HTN, DM2, Dyslipidemia, CAD s/p MI s/p PTCA, PAD, COVID-19, Chronic Gastritis, Gastroparesis, GERD, Spinal Stenosis with radiculopathy, Chronic Back Pain opioid dependant, BPH, Obesity, Anxiety, and Depression who presented with persistently low blood sugar with repeated vomiting starting today afternoon. Patient's blood sugar was high in the 500's about 2 months ago, so he was admitted to [facility], then after discharge he was seen by his endocrinologist who started him on a weekly sub Q medication (Mounjaro) that he received its 1st lazo on Monday (5 days ago), and since then his blood glucose is in the low hundreds, but today it was in the upper 40's & low 50's for the whole day, not responding to orange juice, then became worse when he started vomiting, vomited 3 times this afternoon, so he drove to the hospital after dropping his son at the train station. At the ED his sugar was 52 mg/dl, improved to 76 mg/dl after D50 ampule IVP X1 then down again to 47 mg/dl. Of note that he got Lantus 26 units sub Q, in addition to 10 mg of Empagliflozin, and 1000 mg of Metformin this am. (25 Nov 2023 21:46)      PAST MEDICAL & SURGICAL HISTORY:  Hypertension      Diabetes mellitus      Gastric ulcer      Spinal stenosis      H/O spinal cord compression      Spondylosis      H/O radiculopathy      H/O cervical spine surgery      History of back surgery      S/P cervical spinal fusion          FAMILY HISTORY:  FH: hypertension (Father)    FH: diabetes mellitus (Mother)          Social History:    MEDICATIONS  (STANDING):  apixaban 5 milliGRAM(s) Oral every 12 hours  atorvastatin 80 milliGRAM(s) Oral at bedtime  carvedilol 25 milliGRAM(s) Oral every 12 hours  clonazePAM  Tablet 1 milliGRAM(s) Oral at bedtime  dextrose 5% + sodium chloride 0.45%. 1000 milliLiter(s) (125 mL/Hr) IV Continuous <Continuous>  dextrose 5%. 1000 milliLiter(s) (125 mL/Hr) IV Continuous <Continuous>  dextrose 5%. 1000 milliLiter(s) (100 mL/Hr) IV Continuous <Continuous>  dextrose 5%. 1000 milliLiter(s) (50 mL/Hr) IV Continuous <Continuous>  dextrose 50% Injectable 12.5 Gram(s) IV Push once  dextrose 50% Injectable 25 Gram(s) IV Push once  dextrose 50% Injectable 25 Gram(s) IV Push once  glucagon  Injectable 1 milliGRAM(s) IntraMuscular once  insulin glargine Injectable (LANTUS) 18 Unit(s) SubCutaneous every morning  insulin lispro (ADMELOG) corrective regimen sliding scale   SubCutaneous three times a day before meals  methadone    Tablet 5 milliGRAM(s) Oral four times a day  multivitamin 1 Tablet(s) Oral daily  pantoprazole    Tablet 40 milliGRAM(s) Oral before breakfast  pregabalin 150 milliGRAM(s) Oral three times a day  traZODone 150 milliGRAM(s) Oral at bedtime    MEDICATIONS  (PRN):  acetaminophen     Tablet .. 650 milliGRAM(s) Oral every 6 hours PRN Mild Pain (1 - 3)  clonazePAM  Tablet 0.5 milliGRAM(s) Oral daily PRN for anxiety  dextrose Oral Gel 15 Gram(s) Oral once PRN Blood Glucose LESS THAN 70 milliGRAM(s)/deciliter  HYDROmorphone   Tablet 4 milliGRAM(s) Oral four times a day PRN Severe Pain (7 - 10)  HYDROmorphone  Injectable 1 milliGRAM(s) IV Push every 4 hours PRN breakthrough pain 7-10  ondansetron Injectable 4 milliGRAM(s) IV Push every 6 hours PRN Nausea and/or Vomiting        T(C): 36.5 (11-27-23 @ 07:30), Max: 36.9 (11-26-23 @ 23:30)  HR: 61 (11-27-23 @ 07:30) (61 - 69)  BP: 102/67 (11-27-23 @ 07:30) (102/67 - 145/84)  RR: 16 (11-27-23 @ 07:30) (16 - 16)  SpO2: 95% (11-27-23 @ 07:30) (94% - 95%)  Wt(kg): --    PHYSICAL EXAM:  GENERAL: NAD, well-groomed, well-developed  HEAD:  Atraumatic, Normocephalic  NECK: Supple, No JVD, Normal thyroid  CHEST/LUNG: Clear to percussion bilaterally; No rales, rhonchi, wheezing, or rubs  HEART: Regular rate and rhythm; No murmurs, rubs, or gallops  ABDOMEN: Soft, Nontender, Nondistended; Bowel sounds present  EXTREMITIES:  2+ Peripheral Pulses, No clubbing, cyanosis, or edema  SKIN: No rashes or lesions    CAPILLARY BLOOD GLUCOSE      POCT Blood Glucose.: 189 mg/dL (27 Nov 2023 07:26)  POCT Blood Glucose.: 140 mg/dL (26 Nov 2023 21:04)  POCT Blood Glucose.: 163 mg/dL (26 Nov 2023 16:31)  POCT Blood Glucose.: 216 mg/dL (26 Nov 2023 12:35)  POCT Blood Glucose.: 198 mg/dL (26 Nov 2023 11:27)                            12.2   7.11  )-----------( 280      ( 26 Nov 2023 06:32 )             38.2       CMP:  11-26 @ 06:32  SGPT --  Albumin --   Alk Phos --   Anion Gap 12   SGOT --   Total Bili --   BUN 11   Calcium Total 9.2   CO2 25   Chloride 103   Creatinine 0.65   eGFR if AA --   eGFR if non AA --   Glucose 149   Potassium 3.7   Protein --   Sodium 140      Thyroid Function Tests:      Diabetes Tests:       Radiology:

## 2023-11-27 NOTE — DIETITIAN INITIAL EVALUATION ADULT - OTHER INFO
Visited patient in room, presents with  appetite/po intake of DASH/consistent carb diet, consuming >% of meals. Denies n/v/d/c, last BM  . No reported difficulty chewing or swallowing. NKFA. Reported UBW #, current adm weight #, weight appears to be stable, will continue to monitor weight trends as able.     Pertinent labs/meds reviewed. Pt receiving IV dex 5% Nacl since 11/25; insulin in house: 3 units lispro + sliding scale TID before meals, 18 units lantus qAM; -246; A1c 8.6%    Education provided at this time for MNT per NCM for hypoglycemia and "heart healthy consistent carb". RD to remain available per protocol.   Visited patient in room, presents with fair appetite/po intake of DASH/consistent carb diet, consuming 25>% of meals today. He attempted a few bites at breakfast but had nausea/gas, and after eating about 25-50% of lunch tray today he had a BM and feels a bit better. Denies n/v/d/c at this time. No reported difficulty chewing or swallowing. NKFA. Current adm weight 110kg, weight appears to be stable, will continue to monitor weight trends as able.     Pertinent labs/meds reviewed. Pt receiving IV dex 5% Nacl since 11/25; insulin in house: 3 units lispro + sliding scale TID before meals, 18 units lantus qAM; -246; A1c 8.6%    Education provided at this time for MNT per NCM for gastroparesis and "heart healthy consistent carb". Discussed Carbohydrate Consistent diet including sources of carbohydrates, portion sizes, pairing protein with carbohydrates, limiting sugar sweetened beverages in diet and the importance of consistent eating pattern to help optimize glycemic control. Encouraged pt to make choices that are appropriate for gastroparesis that also aid in blood glucose management (e.g. 2 slice of white sourdough at lunch instead of whole wheat). Discussed foods that aggravate gastroparesis and how to avoid symptoms by having smaller more frequent meals. RD to remain available per protocol.

## 2023-11-27 NOTE — CONSULT NOTE ADULT - ASSESSMENT
? gastropaesis vs ?biliary etiology    Obtain Gastric Emptying Study  Obtain abdominal US  Monitor LFTs/Electrolytes  PPI daily   Maalox PRN  Antiemetics PRN  PO as tolerated  Maintain Hydration  Will eventually need outpatient EGD/Colon  Will follow    Discussed with Dr. Ye.

## 2023-11-27 NOTE — DIETITIAN INITIAL EVALUATION ADULT - PERTINENT LABORATORY DATA
11-26    140  |  103  |  11  ----------------------------<  149<H>  3.7   |  25  |  0.65    Ca    9.2      26 Nov 2023 06:32  Phos  3.4     11-25  Mg     2.3     11-26    TPro  7.8  /  Alb  4.0  /  TBili  0.3  /  DBili  x   /  AST  7<L>  /  ALT  27  /  AlkPhos  88  11-25  POCT Blood Glucose.: 246 mg/dL (11-27-23 @ 12:05)  A1C with Estimated Average Glucose Result: 8.6 % (11-26-23 @ 06:32)  A1C with Estimated Average Glucose Result: 9.5 % (10-05-23 @ 07:50)  A1C with Estimated Average Glucose Result: 8.1 % (03-30-23 @ 06:45)

## 2023-11-28 ENCOUNTER — TRANSCRIPTION ENCOUNTER (OUTPATIENT)
Age: 58
End: 2023-11-28

## 2023-11-28 DIAGNOSIS — E11.9 TYPE 2 DIABETES MELLITUS WITHOUT COMPLICATIONS: ICD-10-CM

## 2023-11-28 LAB
24R-OH-CALCIDIOL SERPL-MCNC: 24.9 NG/ML — LOW (ref 30–80)
24R-OH-CALCIDIOL SERPL-MCNC: 24.9 NG/ML — LOW (ref 30–80)
CERULOPLASMIN SERPL-MCNC: 26 MG/DL — SIGNIFICANT CHANGE UP (ref 15–30)
CERULOPLASMIN SERPL-MCNC: 26 MG/DL — SIGNIFICANT CHANGE UP (ref 15–30)
FERRITIN SERPL-MCNC: 39 NG/ML — SIGNIFICANT CHANGE UP (ref 30–400)
FERRITIN SERPL-MCNC: 39 NG/ML — SIGNIFICANT CHANGE UP (ref 30–400)
FOLATE SERPL-MCNC: >20 NG/ML — SIGNIFICANT CHANGE UP
FOLATE SERPL-MCNC: >20 NG/ML — SIGNIFICANT CHANGE UP
GGT SERPL-CCNC: 17 U/L — SIGNIFICANT CHANGE UP (ref 9–50)
GGT SERPL-CCNC: 17 U/L — SIGNIFICANT CHANGE UP (ref 9–50)
HAV IGM SER-ACNC: SIGNIFICANT CHANGE UP
HAV IGM SER-ACNC: SIGNIFICANT CHANGE UP
HBV CORE IGM SER-ACNC: SIGNIFICANT CHANGE UP
HBV CORE IGM SER-ACNC: SIGNIFICANT CHANGE UP
HBV SURFACE AG SER-ACNC: SIGNIFICANT CHANGE UP
HBV SURFACE AG SER-ACNC: SIGNIFICANT CHANGE UP
HCV AB S/CO SERPL IA: 0.04 S/CO — SIGNIFICANT CHANGE UP (ref 0–0.99)
HCV AB S/CO SERPL IA: 0.04 S/CO — SIGNIFICANT CHANGE UP (ref 0–0.99)
HCV AB SERPL-IMP: SIGNIFICANT CHANGE UP
HCV AB SERPL-IMP: SIGNIFICANT CHANGE UP
INR BLD: 1.11 RATIO — SIGNIFICANT CHANGE UP (ref 0.85–1.18)
INR BLD: 1.11 RATIO — SIGNIFICANT CHANGE UP (ref 0.85–1.18)
IRON SATN MFR SERPL: 12 % — LOW (ref 16–55)
IRON SATN MFR SERPL: 12 % — LOW (ref 16–55)
IRON SATN MFR SERPL: 41 UG/DL — LOW (ref 45–165)
IRON SATN MFR SERPL: 41 UG/DL — LOW (ref 45–165)
PROTHROM AB SERPL-ACNC: 12.1 SEC — SIGNIFICANT CHANGE UP (ref 9.5–13)
PROTHROM AB SERPL-ACNC: 12.1 SEC — SIGNIFICANT CHANGE UP (ref 9.5–13)
TIBC SERPL-MCNC: 343 UG/DL — SIGNIFICANT CHANGE UP (ref 220–430)
TIBC SERPL-MCNC: 343 UG/DL — SIGNIFICANT CHANGE UP (ref 220–430)
UIBC SERPL-MCNC: 302 UG/DL — SIGNIFICANT CHANGE UP (ref 110–370)
UIBC SERPL-MCNC: 302 UG/DL — SIGNIFICANT CHANGE UP (ref 110–370)

## 2023-11-28 PROCEDURE — 70150 X-RAY EXAM OF FACIAL BONES: CPT | Mod: 26

## 2023-11-28 PROCEDURE — 99239 HOSP IP/OBS DSCHRG MGMT >30: CPT

## 2023-11-28 PROCEDURE — 99222 1ST HOSP IP/OBS MODERATE 55: CPT

## 2023-11-28 RX ORDER — DIAZEPAM 5 MG
5 TABLET ORAL THREE TIMES A DAY
Refills: 0 | Status: DISCONTINUED | OUTPATIENT
Start: 2023-11-28 | End: 2023-11-29

## 2023-11-28 RX ORDER — METOCLOPRAMIDE HCL 10 MG
1 TABLET ORAL
Qty: 28 | Refills: 0
Start: 2023-11-28 | End: 2023-12-04

## 2023-11-28 RX ORDER — METOCLOPRAMIDE HCL 10 MG
5 TABLET ORAL EVERY 6 HOURS
Refills: 0 | Status: DISCONTINUED | OUTPATIENT
Start: 2023-11-28 | End: 2023-11-29

## 2023-11-28 RX ORDER — SODIUM CHLORIDE 0.65 %
1 AEROSOL, SPRAY (ML) NASAL
Refills: 0 | Status: DISCONTINUED | OUTPATIENT
Start: 2023-11-28 | End: 2023-11-29

## 2023-11-28 RX ORDER — TIZANIDINE 4 MG/1
4 TABLET ORAL THREE TIMES A DAY
Refills: 0 | Status: DISCONTINUED | OUTPATIENT
Start: 2023-11-28 | End: 2023-11-29

## 2023-11-28 RX ORDER — INSULIN GLARGINE 100 [IU]/ML
26 INJECTION, SOLUTION SUBCUTANEOUS
Refills: 0 | DISCHARGE

## 2023-11-28 RX ORDER — PANTOPRAZOLE SODIUM 20 MG/1
40 TABLET, DELAYED RELEASE ORAL
Refills: 0 | Status: DISCONTINUED | OUTPATIENT
Start: 2023-11-28 | End: 2023-11-29

## 2023-11-28 RX ORDER — CYCLOBENZAPRINE HYDROCHLORIDE 10 MG/1
5 TABLET, FILM COATED ORAL THREE TIMES A DAY
Refills: 0 | Status: DISCONTINUED | OUTPATIENT
Start: 2023-11-28 | End: 2023-11-28

## 2023-11-28 RX ORDER — POLYETHYLENE GLYCOL 3350 17 G/17G
17 POWDER, FOR SOLUTION ORAL ONCE
Refills: 0 | Status: COMPLETED | OUTPATIENT
Start: 2023-11-28 | End: 2023-11-28

## 2023-11-28 RX ORDER — METOCLOPRAMIDE HCL 10 MG
5 TABLET ORAL ONCE
Refills: 0 | Status: DISCONTINUED | OUTPATIENT
Start: 2023-11-28 | End: 2023-11-29

## 2023-11-28 RX ADMIN — HYDROMORPHONE HYDROCHLORIDE 4 MILLIGRAM(S): 2 INJECTION INTRAMUSCULAR; INTRAVENOUS; SUBCUTANEOUS at 23:10

## 2023-11-28 RX ADMIN — Medication 150 MILLIGRAM(S): at 21:49

## 2023-11-28 RX ADMIN — HYDROMORPHONE HYDROCHLORIDE 4 MILLIGRAM(S): 2 INJECTION INTRAMUSCULAR; INTRAVENOUS; SUBCUTANEOUS at 09:07

## 2023-11-28 RX ADMIN — PANTOPRAZOLE SODIUM 40 MILLIGRAM(S): 20 TABLET, DELAYED RELEASE ORAL at 05:41

## 2023-11-28 RX ADMIN — METHADONE HYDROCHLORIDE 5 MILLIGRAM(S): 40 TABLET ORAL at 00:03

## 2023-11-28 RX ADMIN — HYDROMORPHONE HYDROCHLORIDE 0.5 MILLIGRAM(S): 2 INJECTION INTRAMUSCULAR; INTRAVENOUS; SUBCUTANEOUS at 11:10

## 2023-11-28 RX ADMIN — Medication 1: at 17:17

## 2023-11-28 RX ADMIN — Medication 1 MILLIGRAM(S): at 21:49

## 2023-11-28 RX ADMIN — APIXABAN 5 MILLIGRAM(S): 2.5 TABLET, FILM COATED ORAL at 18:07

## 2023-11-28 RX ADMIN — Medication 150 MILLIGRAM(S): at 05:41

## 2023-11-28 RX ADMIN — HYDROMORPHONE HYDROCHLORIDE 0.5 MILLIGRAM(S): 2 INJECTION INTRAMUSCULAR; INTRAVENOUS; SUBCUTANEOUS at 02:12

## 2023-11-28 RX ADMIN — Medication 150 MILLIGRAM(S): at 15:25

## 2023-11-28 RX ADMIN — HYDROMORPHONE HYDROCHLORIDE 4 MILLIGRAM(S): 2 INJECTION INTRAMUSCULAR; INTRAVENOUS; SUBCUTANEOUS at 16:22

## 2023-11-28 RX ADMIN — APIXABAN 5 MILLIGRAM(S): 2.5 TABLET, FILM COATED ORAL at 05:41

## 2023-11-28 RX ADMIN — CARVEDILOL PHOSPHATE 25 MILLIGRAM(S): 80 CAPSULE, EXTENDED RELEASE ORAL at 05:41

## 2023-11-28 RX ADMIN — Medication 1 SPRAY(S): at 23:10

## 2023-11-28 RX ADMIN — METHADONE HYDROCHLORIDE 5 MILLIGRAM(S): 40 TABLET ORAL at 12:30

## 2023-11-28 RX ADMIN — Medication 5 MILLIGRAM(S): at 14:25

## 2023-11-28 RX ADMIN — METHADONE HYDROCHLORIDE 5 MILLIGRAM(S): 40 TABLET ORAL at 05:45

## 2023-11-28 RX ADMIN — Medication 1: at 07:53

## 2023-11-28 RX ADMIN — HYDROMORPHONE HYDROCHLORIDE 4 MILLIGRAM(S): 2 INJECTION INTRAMUSCULAR; INTRAVENOUS; SUBCUTANEOUS at 08:37

## 2023-11-28 RX ADMIN — PANTOPRAZOLE SODIUM 40 MILLIGRAM(S): 20 TABLET, DELAYED RELEASE ORAL at 18:07

## 2023-11-28 RX ADMIN — Medication 150 MILLIGRAM(S): at 21:48

## 2023-11-28 RX ADMIN — HYDROMORPHONE HYDROCHLORIDE 4 MILLIGRAM(S): 2 INJECTION INTRAMUSCULAR; INTRAVENOUS; SUBCUTANEOUS at 15:50

## 2023-11-28 RX ADMIN — POLYETHYLENE GLYCOL 3350 17 GRAM(S): 17 POWDER, FOR SOLUTION ORAL at 12:32

## 2023-11-28 RX ADMIN — CARVEDILOL PHOSPHATE 25 MILLIGRAM(S): 80 CAPSULE, EXTENDED RELEASE ORAL at 18:07

## 2023-11-28 RX ADMIN — INSULIN GLARGINE 18 UNIT(S): 100 INJECTION, SOLUTION SUBCUTANEOUS at 07:52

## 2023-11-28 RX ADMIN — HYDROMORPHONE HYDROCHLORIDE 0.5 MILLIGRAM(S): 2 INJECTION INTRAMUSCULAR; INTRAVENOUS; SUBCUTANEOUS at 10:40

## 2023-11-28 RX ADMIN — Medication 3 UNIT(S): at 17:17

## 2023-11-28 RX ADMIN — Medication 3 UNIT(S): at 07:54

## 2023-11-28 RX ADMIN — ATORVASTATIN CALCIUM 80 MILLIGRAM(S): 80 TABLET, FILM COATED ORAL at 21:48

## 2023-11-28 RX ADMIN — HYDROMORPHONE HYDROCHLORIDE 0.5 MILLIGRAM(S): 2 INJECTION INTRAMUSCULAR; INTRAVENOUS; SUBCUTANEOUS at 02:27

## 2023-11-28 RX ADMIN — Medication 3 UNIT(S): at 12:44

## 2023-11-28 RX ADMIN — METHADONE HYDROCHLORIDE 5 MILLIGRAM(S): 40 TABLET ORAL at 18:07

## 2023-11-28 RX ADMIN — Medication 0.5 MILLIGRAM(S): at 12:44

## 2023-11-28 RX ADMIN — Medication 2: at 12:30

## 2023-11-28 NOTE — PROGRESS NOTE ADULT - SUBJECTIVE AND OBJECTIVE BOX
INTERVAL HPI/OVERNIGHT EVENTS:  HPI:  No new overnight event.  Has some nausea and constipation this AM.  Tolerating diet, however expresses poor appetite today.    Allergies    No Known Allergies    Intolerances      General:  No wt loss, fevers, chills, night sweats, fatigue,   Eyes:  Good vision, no reported pain  ENT:  No sore throat, pain, runny nose, dysphagia  CV:  No pain, palpitations, hypo/hypertension  Resp:  No dyspnea, cough, tachypnea, wheezing  GI: see HPI  :  No pain, bleeding, incontinence, nocturia  Muscle:  No pain, weakness  Neuro:  No weakness, tingling, memory problems  Psych:  No fatigue, insomnia, mood problems, depression  Endocrine:  No polyuria, polydipsia, cold/heat intolerance  Heme:  No petechiae, ecchymosis, easy bruisability  Skin:  No rash, tattoos, scars, edema      PHYSICAL EXAM:   Vital Signs:  Vital Signs Last 24 Hrs  T(C): 36.7 (28 Nov 2023 07:54), Max: 37.3 (27 Nov 2023 15:54)  T(F): 98 (28 Nov 2023 07:54), Max: 99.2 (27 Nov 2023 15:54)  HR: 87 (28 Nov 2023 07:54) (66 - 87)  BP: 149/91 (28 Nov 2023 07:54) (125/84 - 149/91)  BP(mean): --  RR: 16 (28 Nov 2023 07:54) (16 - 16)  SpO2: 98% (28 Nov 2023 07:54) (94% - 98%)    Parameters below as of 28 Nov 2023 07:54  Patient On (Oxygen Delivery Method): room air      Daily     Daily I&O's Summary    27 Nov 2023 07:01  -  28 Nov 2023 07:00  --------------------------------------------------------  IN: 0 mL / OUT: 600 mL / NET: -600 mL        GENERAL:  Appears stated age, well-groomed, well-nourished, no distress  HEENT:  NC/AT,  conjunctivae clear and pink, no thyromegaly, nodules, adenopathy, no JVD, sclera -anicteric  CHEST:  Full & symmetric excursion, no increased effort, breath sounds clear  HEART:  Regular rhythm, S1, S2, no murmur/rub/S3/S4, no abdominal bruit, no edema  ABDOMEN:  Soft, mild epigastric/lower abdominal tenderness, non-distended, normoactive bowel sounds,  no masses ,no hepatosplenomegaly no signs of chronic liver disease  EXTREMITIES  no cyanosis, clubbing or edema  SKIN:  No rash/erythema/ecchymoses/petechiae/wounds/abscess/warm/dry  NEURO:  Alert, oriented, no asterixis, no tremor, no encephalopathy      LABS:              amylase   lipaseLipase: 19 U/L (11-25 @ 19:13)    RADIOLOGY & ADDITIONAL TESTS:

## 2023-11-28 NOTE — CONSULT NOTE ADULT - SUBJECTIVE AND OBJECTIVE BOX
Patient is a 58y old  Male who presents with a chief complaint of Low blood sugar, unable to tolerate PO. (28 Nov 2023 09:18)    Type 2 DM DX 7 years, known complications: gastroparesis and frequent hypoglycemia, no DKA/HSS. managed by  Endocrine Dr. Jackie Buitrago, Last seen 1 month ago, Rx home Metformin 1000mg BID, Jardaince 25mg daily, Lantus 26 units @ HS and novolog 6-16 units premeal. started mounjaro 2.5mg subq injection weekly (mondays) about 1 month ago. pt does all injections independently. uses freestyle cora CGM, reports readings have been in low 100s, recently <90, has not used novolog since starting mounjaro. reports only eating 1 meal daily, usually dinner. explained need for consistent meals rec 6 small meals daily every 2-3 hours ~30gr carbs + protein to avoid exacerbating gastroparesis. reviewed CC diet and carb identification, portion control, balanced plate method, provided diabetes meal planning guide.  explained risk of hypoglceymia w/ jardiance, lantus and concurrent use of GLP1 RA, spoke w/ dr craig perlman, reccomend to stop all insulin on discharge, continue w/ orals and mounjaro until f/u w/ Iftikhar. verbal education and handouts provided, questions answered.  diabetes education provided- A1c measure and BG targets  fasting, <180 2 hours postmeal. medication MOA and considerations/side effects, inhospital BGM frequency and insulin administration, s/s of hyperglycemia/hypoglycemia and management, glycemic control and preventing complications, consistent carb diet, balanced plate method, consistent meal planning. sick day management, provider f/u  Hx HLD, CAD, MI, PAD, gastroparesis    HPI:  This is a 59 y/o M with PMH of HTN, DM2, Dyslipidemia, CAD s/p MI s/p PTCA, PAD, COVID-19, Chronic Gastritis, Gastroparesis, GERD, Spinal Stenosis with radiculopathy, Chronic Back Pain opioid dependant, BPH, Obesity, Anxiety, and Depression who presented with persistently low blood sugar with repeated vomiting starting today afternoon. Patient's blood sugar was high in the 500's about 2 months ago, so he was admitted to [facility], then after discharge he was seen by his endocrinologist who started him on a weekly sub Q medication (Mounjaro) that he received its 1st lazo on Monday (5 days ago), and since then his blood glucose is in the low hundreds, but today it was in the upper 40's & low 50's for the whole day, not responding to orange juice, then became worse when he started vomiting, vomited 3 times this afternoon, so he drove to the hospital after dropping his son at the train station. At the ED his sugar was 52 mg/dl, improved to 76 mg/dl after D50 ampule IVP X1 then down again to 47 mg/dl. Of note that he got Lantus 26 units sub Q, in addition to 10 mg of Empagliflozin, and 1000 mg of Metformin this am. (25 Nov 2023 21:46)      PAST MEDICAL & SURGICAL HISTORY:  Hypertension      Diabetes mellitus      Gastric ulcer      Spinal stenosis      H/O spinal cord compression      Spondylosis      H/O radiculopathy      H/O cervical spine surgery      History of back surgery      S/P cervical spinal fusion      Allergies    No Known Allergies    Intolerances        MEDICATIONS  (STANDING):  apixaban 5 milliGRAM(s) Oral every 12 hours  atorvastatin 80 milliGRAM(s) Oral at bedtime  carvedilol 25 milliGRAM(s) Oral every 12 hours  clonazePAM  Tablet 1 milliGRAM(s) Oral at bedtime  dextrose 5% + sodium chloride 0.45%. 1000 milliLiter(s) (125 mL/Hr) IV Continuous <Continuous>  dextrose 5%. 1000 milliLiter(s) (125 mL/Hr) IV Continuous <Continuous>  dextrose 5%. 1000 milliLiter(s) (100 mL/Hr) IV Continuous <Continuous>  dextrose 5%. 1000 milliLiter(s) (50 mL/Hr) IV Continuous <Continuous>  dextrose 50% Injectable 12.5 Gram(s) IV Push once  dextrose 50% Injectable 25 Gram(s) IV Push once  dextrose 50% Injectable 25 Gram(s) IV Push once  glucagon  Injectable 1 milliGRAM(s) IntraMuscular once  insulin glargine Injectable (LANTUS) 18 Unit(s) SubCutaneous every morning  insulin lispro (ADMELOG) corrective regimen sliding scale   SubCutaneous three times a day before meals  insulin lispro Injectable (ADMELOG) 3 Unit(s) SubCutaneous three times a day before meals  methadone    Tablet 5 milliGRAM(s) Oral four times a day  metoclopramide 5 milliGRAM(s) Oral once  multivitamin 1 Tablet(s) Oral daily  pantoprazole    Tablet 40 milliGRAM(s) Oral two times a day  polyethylene glycol 3350 17 Gram(s) Oral once  pregabalin 150 milliGRAM(s) Oral three times a day  traZODone 150 milliGRAM(s) Oral at bedtime

## 2023-11-28 NOTE — DISCHARGE NOTE NURSING/CASE MANAGEMENT/SOCIAL WORK - PATIENT PORTAL LINK FT
You can access the FollowMyHealth Patient Portal offered by [facility] by registering at the following website: [email or web address]. By joining [employer]’s FollowMyHealth portal, you will also be able to view your health information using other applications (apps) compatible with our system.

## 2023-11-28 NOTE — DISCHARGE NOTE PROVIDER - ATTENDING DISCHARGE PHYSICAL EXAMINATION:
Vital Signs Last 24 Hrs  T(C): 36.7 (28 Nov 2023 07:54), Max: 37.3 (27 Nov 2023 15:54)  T(F): 98 (28 Nov 2023 07:54), Max: 99.2 (27 Nov 2023 15:54)  HR: 87 (28 Nov 2023 07:54) (69 - 87)  BP: 149/91 (28 Nov 2023 07:54) (125/84 - 149/91)  BP(mean): --  RR: 16 (28 Nov 2023 07:54) (16 - 16)  SpO2: 98% (28 Nov 2023 07:54) (94% - 98%)    Parameters below as of 28 Nov 2023 07:54  Patient On (Oxygen Delivery Method): room air    VSS  CONSTITUTIONAL: adult male, NAD  SKIN: Skin exam is warm and dry, no acute rash.  HEAD: Normocephalic; atraumatic.  EYES: PERRL, EOM intact; conjunctiva and sclera clear.  ENT: No nasal discharge; airway clear. TMs clear.  CARD: S1, S2 normal; no murmurs, gallops, or rubs. Regular rate and rhythm.  RESP: No wheezes, rales or rhonchi.  ABD: Normal bowel sounds; soft; non-distended; minimal tenderness, no guarding; no hepatosplenomegaly.  EXT: Normal ROM. No clubbing, cyanosis or edema.  LYMPH: No acute cervical adenopathy.  NEURO: Alert, oriented. Grossly unremarkable. No focal deficits.       PHYSICAL EXAM:  Vital Signs Last 24 Hrs  T(C): 36.5 (29 Nov 2023 08:00), Max: 37 (28 Nov 2023 23:30)  T(F): 97.7 (29 Nov 2023 08:00), Max: 98.6 (28 Nov 2023 23:30)  HR: 79 (29 Nov 2023 08:00) (73 - 80)  BP: 112/73 (29 Nov 2023 08:00) (100/67 - 118/74)  BP(mean): --  RR: 16 (29 Nov 2023 08:00) (16 - 16)  SpO2: 96% (29 Nov 2023 08:00) (94% - 96%)    Parameters below as of 29 Nov 2023 08:00  Patient On (Oxygen Delivery Method): room air        GENERAL: NAD  HEAD:  Atraumatic, Normocephalic  ENMT: Noam Mucous Membranes  NECK: Supple, No JVD, Normal thyroid  HEART: Regular rate and rhythm; No murmurs, rubs, or gallops  CHEST/LUNG: Clear to auscultation bilaterally; No rales, rhonchi, wheezing, or rubs  ABDOMEN: Soft, Nontender, Nondistended; Bowel sounds present  EXTREMITIES:  2+ Peripheral Pulses, No clubbing, cyanosis, or edema  SKIN: No rashes or lesions

## 2023-11-28 NOTE — CONSULT NOTE ADULT - REASON FOR ADMISSION
Low blood sugar, unable to tolerate PO.

## 2023-11-28 NOTE — DISCHARGE NOTE PROVIDER - HOSPITAL COURSE
59 y/o M with PMH of HTN, DM2, Dyslipidemia, CAD s/p MI s/p PTCA, PAD, COVID-19, Chronic Gastritis, Gastroparesis, GERD, Spinal Stenosis with radiculopathy, Chronic Back Pain opioid dependant, BPH, Obesity, Anxiety, and Depression presented with persistently low blood sugar with repeated vomiting. Patient was admitted for  the following      Hypoglycemia. - 2/2 poor oral intake, lantus and mounjaro use  - FS  normalized, was seen by endocrinology, treated with insulin during hospital stay but recommend removing insulin on DC, continue metformin, mounjaro, jardiance on DC.   - tolerating diabetic diet    hypomagnesemia - repleted    Nausea and vomiting.   2/2 gastroparesis, improved with metoclopramide  - giving short course of metoclopramide on DC, risks and possible adverse effects including extrapyramidal effects montioned, advised to stop if occurring.   - no further vomiting for past 36 hours, seen by GI  - had US abdomen showing fatty liver, recommend outpatient MRI  - follow up with GI as outpatient in 1-2 weeks     Chronic back pain - restarted home methadone, oral dilaudid prn, tolerating regimen

## 2023-11-28 NOTE — CASE MANAGEMENT PROGRESS NOTE - NSCMPROGRESSNOTE_GEN_ALL_CORE
CM met with patient to discuss transition of care to home. Patient stated that he would like [facility] for [facility] for medication and diagnosis management.  CM explained home care expectations, process, insurance provisions and home safety with good understanding.   Offered list of [facility] and pt. chose [facility] home care agency.  Provided discharge resources folder. CM made referral accordingly. Patient stated that he does not feel well to day to go home. IMM was given and reviewed by SW and patient stated that he understands. Patient will follow up with his endocrinologist as instructed Dr Jackie Buitrago [phone].

## 2023-11-28 NOTE — DISCHARGE NOTE PROVIDER - CARE PROVIDER_API CALL
Jackie Buitrago  Endocrinology/Metab/Diabetes  [address], [address]  [place] [address]  Phone: [phone]  Fax: [phone]  Established Patient  Follow Up Time: 2 weeks    Janusz Ye  Gastroenterology  [address] [place] [address]  Phone: [phone]  Fax: [phone]  Follow Up Time: 1 week

## 2023-11-28 NOTE — DISCHARGE NOTE NURSING/CASE MANAGEMENT/SOCIAL WORK - NSDCPEFALRISK_GEN_ALL_CORE
For information on Fall & Injury Prevention, visit: [email or web address] OR  [email or web address] OR  https://www.cdc.gov/steadi/patient.html

## 2023-11-28 NOTE — PHYSICAL THERAPY INITIAL EVALUATION ADULT - ADDITIONAL COMMENTS
Pt resides in [facility] with girlfriend. Pt states 3STE +HR, denies stairs within. Pt has cane. Pt reports was independent prior to admission.

## 2023-11-28 NOTE — DISCHARGE NOTE PROVIDER - NSDCMRMEDTOKEN_GEN_ALL_CORE_FT
apixaban 5 mg oral tablet: 1 tab(s) orally every 12 hours  atorvastatin 80 mg oral tablet: 1 tab(s) orally once a day (at bedtime)  clonazePAM 0.5 mg oral tablet: 1 tab(s) orally once a day as needed for  anxiety  clonazePAM 1 mg oral tablet: 1 tab(s) orally once a day (at bedtime)  Coreg 25 mg oral tablet: 1 tab(s) orally 2 times a day  HYDROmorphone 4 mg oral tablet: 1 tab(s) orally 4 times a day as needed for  moderate pain  Levomefolate DHA oral capsule: 1 cap(s) orally once a day  Lyrica 150 mg oral capsule: 1 cap(s) orally 3 times a day  metFORMIN 1000 mg oral tablet: 1 tab(s) orally 2 times a day  methadone 5 mg oral tablet: 1 tab(s) orally 4 times a day  metoclopramide 5 mg oral tablet: 1 tab(s) orally 4 times a day as needed for  nausea  ondansetron 4 mg oral tablet: 1 tab(s) orally every 8 hours as needed for  nausea  pantoprazole 40 mg oral delayed release tablet: 1 tab(s) orally once a day  sertraline 100 mg oral tablet: 2 tab(s) orally once a day (at bedtime)  tiZANidine 4 mg oral tablet: 1 tab(s) orally 3 times a day as needed for  muscle spasm  traZODone 150 mg oral tablet: 1 tab(s) orally once a day (at bedtime)

## 2023-11-28 NOTE — DISCHARGE NOTE PROVIDER - PROVIDER TOKENS
PROVIDER:[TOKEN:[[id number]],FOLLOWUP:[2 weeks],ESTABLISHEDPATIENT:[T]],PROVIDER:[TOKEN:[[id number]],FOLLOWUP:[1 week]]

## 2023-11-28 NOTE — DISCHARGE NOTE PROVIDER - NSDCCPCAREPLAN_GEN_ALL_CORE_FT
PRINCIPAL DISCHARGE DIAGNOSIS  Diagnosis: Hypoglycemia  Assessment and Plan of Treatment: 2/2 vomiting, insulin use, stop insulin on DC, follow up with outpatient endocrinologist, PCP in 1-2 weeks      SECONDARY DISCHARGE DIAGNOSES  Diagnosis: Gastroparesis  Assessment and Plan of Treatment: follow up with GI in 1-2 weeks    Diagnosis: Nausea and vomiting  Assessment and Plan of Treatment:

## 2023-11-28 NOTE — DISCHARGE NOTE NURSING/CASE MANAGEMENT/SOCIAL WORK - NSDCVIVACCINE_GEN_ALL_CORE_FT
Tdap; 24-Oct-2021 12:15; Jahaira Ruiz (RN); [employer]; [id number] (Exp. Date: 29-Jul-2023); IntraMuscular; Deltoid Right.; 0.5 milliLiter(s); VIS (VIS Published: 09-May-2013, VIS Presented: 24-Oct-2021);

## 2023-11-28 NOTE — PATIENT CHOICE NOTE. - NSPTCHOICESTATE_GEN_ALL_CORE

## 2023-11-28 NOTE — PROGRESS NOTE ADULT - SUBJECTIVE AND OBJECTIVE BOX
Patient is a 58y old  Male who presents with a chief complaint of Low blood sugar, unable to tolerate PO. (28 Nov 2023 10:16)      INTERVAL HPI/OVERNIGHT EVENTS:  Patient seen and examined in am, tolerating meals, still complaining of  abdominal pain, patient had BM yesterday. fingersticks stable    ROS reviewed and is otherwise negative        Vital Signs Last 24 Hrs  T(C): 36.7 (28 Nov 2023 07:54), Max: 37.3 (27 Nov 2023 15:54)  T(F): 98 (28 Nov 2023 07:54), Max: 99.2 (27 Nov 2023 15:54)  HR: 87 (28 Nov 2023 07:54) (69 - 87)  BP: 149/91 (28 Nov 2023 07:54) (125/84 - 149/91)  BP(mean): --  RR: 16 (28 Nov 2023 07:54) (16 - 16)  SpO2: 98% (28 Nov 2023 07:54) (94% - 98%)    Parameters below as of 28 Nov 2023 07:54  Patient On (Oxygen Delivery Method): room air        PHYSICAL EXAM:  GENERAL: NAD, well-groomed,    HEAD:  Atraumatic, Normocephalic  EYES: EOMI, PERRLA  ENMT: Moist mucous membranes, No lesions  NECK: Supple, No JVD, Normal thyroid  NERVOUS SYSTEM:  Alert & Oriented X3, Good concentration; CN 2-12 grossly intact  CHEST/LUNG: Clear to auscultation bilaterally; No rales, rhonchi, wheezing, or rubs  HEART: Regular rate and rhythm; No murmurs, rubs, or gallops  ABDOMEN: Soft, nondistended, minimal tenderness, BS+  EXTREMITIES:  + Pulses, No clubbing, cyanosis, or edema  SKIN: No rashes or lesions    MEDICATIONS  (STANDING):  apixaban 5 milliGRAM(s) Oral every 12 hours  atorvastatin 80 milliGRAM(s) Oral at bedtime  carvedilol 25 milliGRAM(s) Oral every 12 hours  clonazePAM  Tablet 1 milliGRAM(s) Oral at bedtime  dextrose 5% + sodium chloride 0.45%. 1000 milliLiter(s) (125 mL/Hr) IV Continuous <Continuous>  dextrose 5%. 1000 milliLiter(s) (50 mL/Hr) IV Continuous <Continuous>  dextrose 5%. 1000 milliLiter(s) (125 mL/Hr) IV Continuous <Continuous>  dextrose 5%. 1000 milliLiter(s) (100 mL/Hr) IV Continuous <Continuous>  dextrose 50% Injectable 12.5 Gram(s) IV Push once  dextrose 50% Injectable 25 Gram(s) IV Push once  dextrose 50% Injectable 25 Gram(s) IV Push once  glucagon  Injectable 1 milliGRAM(s) IntraMuscular once  insulin glargine Injectable (LANTUS) 18 Unit(s) SubCutaneous every morning  insulin lispro (ADMELOG) corrective regimen sliding scale   SubCutaneous three times a day before meals  insulin lispro Injectable (ADMELOG) 3 Unit(s) SubCutaneous three times a day before meals  methadone    Tablet 5 milliGRAM(s) Oral four times a day  metoclopramide 5 milliGRAM(s) Oral once  multivitamin 1 Tablet(s) Oral daily  pantoprazole    Tablet 40 milliGRAM(s) Oral two times a day  pregabalin 150 milliGRAM(s) Oral three times a day  traZODone 150 milliGRAM(s) Oral at bedtime    MEDICATIONS  (PRN):  acetaminophen     Tablet .. 650 milliGRAM(s) Oral every 6 hours PRN Mild Pain (1 - 3)  clonazePAM  Tablet 0.5 milliGRAM(s) Oral daily PRN for anxiety  dextrose Oral Gel 15 Gram(s) Oral once PRN Blood Glucose LESS THAN 70 milliGRAM(s)/deciliter  HYDROmorphone   Tablet 4 milliGRAM(s) Oral four times a day PRN Severe Pain (7 - 10)  metoclopramide 5 milliGRAM(s) Oral every 6 hours PRN nausea, vomiting  ondansetron Injectable 4 milliGRAM(s) IV Push every 6 hours PRN Nausea and/or Vomiting      Allergies    No Known Allergies    Intolerances          LABS:          PT/INR - ( 28 Nov 2023 12:00 )   PT: 12.1 sec;   INR: 1.11 ratio             CAPILLARY BLOOD GLUCOSE      POCT Blood Glucose.: 201 mg/dL (28 Nov 2023 11:53)  POCT Blood Glucose.: 199 mg/dL (28 Nov 2023 07:32)  POCT Blood Glucose.: 210 mg/dL (27 Nov 2023 21:27)  POCT Blood Glucose.: 211 mg/dL (27 Nov 2023 16:40)      RADIOLOGY & ADDITIONAL TESTS:        Care Discussed with Consultants/Other Providers:    Advanced Directives: [ ] DNR  [ ] No feeding tube  [ ] MOLST in chart  [ ] MOLST completed today  [ ] Unknown   Epidermal Autograft Text: The defect edges were debeveled with a #15 scalpel blade.  Given the location of the defect, shape of the defect and the proximity to free margins an epidermal autograft was deemed most appropriate.  Using a sterile surgical marker, the primary defect shape was transferred to the donor site. The epidermal graft was then harvested.  The skin graft was then placed in the primary defect and oriented appropriately.

## 2023-11-28 NOTE — CONSULT NOTE ADULT - PROBLEM SELECTOR RECOMMENDATION 9
advised to resume metformin, jardiance, mounjaro upon d/c  advised to d/c all insulin  pt to f/u with dr. Jackie Buitrago, endocrinology for f/u mngmt  bg numbers now improved
Type 2 A1c 8.6% adm hypoglycemia  Recommend endocrine-Perlman onconsult  FU appt: TBA  DSC recommendations: return to home d/c all insulin, c/w metformin, jardiance, mounjaro and glucose monitoring, lifestyle and diet modifications  diabetes education provided as documented above  Diabetes support info and cell # [phone] given   Goal 100-180 mg/dL; 140-180 mg/dL in critical care areas

## 2023-11-28 NOTE — DISCHARGE NOTE PROVIDER - NSDCFUSCHEDAPPT_GEN_ALL_CORE_FT
Scott Liang  [facility]  GASTRO [address]  Scheduled Appointment: 12/04/2023    Celine Pacheco  [facility]  ONCPAINMGT [address]  Scheduled Appointment: 12/05/2023

## 2023-11-28 NOTE — DISCHARGE NOTE NURSING/CASE MANAGEMENT/SOCIAL WORK - NSSCNAMETXT_GEN_ALL_CORE
[facility] care agency [phone] will reach out to you within 24-72 hours of your discharge to schedule home care visit/eval appointment with you. Please call agency for any queries regarding home care services

## 2023-11-28 NOTE — CHART NOTE - NSCHARTNOTEFT_GEN_A_CORE
Notified by nursing patient reports hurting his nose hitting it on the door jamb when walking to the bathroom due to reported muscle spasm. Trace amount of blood on nares, no active bleeding, no feel of stepoff or instability of nasal bridge. Getting facial xray to rule out fracture, requesting ENT eval. PT eval ordered, advised to call for assistance if getting out of bed.

## 2023-11-28 NOTE — DISCHARGE NOTE NURSING/CASE MANAGEMENT/SOCIAL WORK - NSDCPETBCESMAN_GEN_ALL_CORE
If you are a smoker, it is important for your health to stop smoking. Please be aware that second hand smoke is also harmful.
home

## 2023-11-28 NOTE — CONSULT NOTE ADULT - CONSULT REASON
nausea, vomiting
hypoglycemia
58y A1C with Estimated Average Glucose Result: 8.6 % (11-26-23 @ 06:32)  A1C with Estimated Average Glucose Result: 9.5 % (10-05-23 @ 07:50)   diabetes mellitus uncontrolled type 2

## 2023-11-28 NOTE — SOCIAL WORK PROGRESS NOTE - NSSWPROGRESSNOTE_GEN_ALL_CORE
MD feels patient can transition home today. Patient does not feel ready today. I reviewed IMM and he signed copy. Plan for home

## 2023-11-28 NOTE — PHYSICAL THERAPY INITIAL EVALUATION ADULT - PERTINENT HX OF CURRENT PROBLEM, REHAB EVAL
as per chart - This is a 59 y/o M with PMH of HTN, DM2, Dyslipidemia, CAD s/p MI s/p PTCA, PAD, COVID-19, Chronic Gastritis, Gastroparesis, GERD, Spinal Stenosis with radiculopathy, Chronic Back Pain opioid dependant, BPH, Obesity, Anxiety, and Depression who presented with persistently low blood sugar with repeated vomiting starting today afternoon. Patient's blood sugar was high in the 500's about 2 months ago, so he was admitted to [facility], then after discharge he was seen by his endocrinologist who started him on a weekly sub Q medication (Mounjaro) that he received its 1st lazo on Monday (5 days ago), and since then his blood glucose is in the low hundreds, but today it was in the upper 40's & low 50's for the whole day, not responding to orange juice, then became worse when he started vomiting, vomited 3 times this afternoon, so he drove to the hospital after dropping his son at the train station. At the ED his sugar was 52 mg/dl, improved to 76 mg/dl after D50 ampule IVP X1 then down again to 47 mg/dl. Of note that he got Lantus 26 units sub Q, in addition to 10 mg of Empagliflozin, and 1000 mg of Metformin this am.

## 2023-11-28 NOTE — PHYSICAL THERAPY INITIAL EVALUATION ADULT - NSACTIVITYREC_GEN_A_PT
Pt independent in bed mobility, transfers, amb without AD; pt discharged from PT services, no skilled PT needs.

## 2023-11-28 NOTE — CONSULT NOTE ADULT - ASSESSMENT
Physical Exam:   Vital Signs Last 24 Hrs  T(C): 36.7 (28 Nov 2023 07:54), Max: 37.3 (27 Nov 2023 15:54)  T(F): 98 (28 Nov 2023 07:54), Max: 99.2 (27 Nov 2023 15:54)  HR: 87 (28 Nov 2023 07:54) (69 - 87)  BP: 149/91 (28 Nov 2023 07:54) (125/84 - 149/91)  BP(mean): --  RR: 16 (28 Nov 2023 07:54) (16 - 16)  SpO2: 98% (28 Nov 2023 07:54) (94% - 98%)    Parameters below as of 28 Nov 2023 07:54  Patient On (Oxygen Delivery Method): room air      CAPILLARY BLOOD GLUCOSE      POCT Blood Glucose.: 199 mg/dL (28 Nov 2023 07:32)  POCT Blood Glucose.: 210 mg/dL (27 Nov 2023 21:27)  POCT Blood Glucose.: 211 mg/dL (27 Nov 2023 16:40)  POCT Blood Glucose.: 246 mg/dL (27 Nov 2023 12:05)      Cholesterol, Serum: 113 mg/dL (05.19.21 @ 08:36)     HDL Cholesterol, Serum: 22 mg/dL (05.19.21 @ 08:36)     LDL Cholesterol Calculated: 66 mg/dL (05.19.21 @ 08:36)     DIET: CC  >50%

## 2023-11-29 VITALS
SYSTOLIC BLOOD PRESSURE: 112 MMHG | DIASTOLIC BLOOD PRESSURE: 73 MMHG | TEMPERATURE: 98 F | RESPIRATION RATE: 16 BRPM | HEART RATE: 79 BPM | OXYGEN SATURATION: 96 %

## 2023-11-29 LAB
ANA TITR SER: NEGATIVE — SIGNIFICANT CHANGE UP
ANA TITR SER: NEGATIVE — SIGNIFICANT CHANGE UP
MITOCHONDRIA AB SER-ACNC: SIGNIFICANT CHANGE UP
MITOCHONDRIA AB SER-ACNC: SIGNIFICANT CHANGE UP
SMOOTH MUSCLE AB SER-ACNC: SIGNIFICANT CHANGE UP
SMOOTH MUSCLE AB SER-ACNC: SIGNIFICANT CHANGE UP

## 2023-11-29 PROCEDURE — 99239 HOSP IP/OBS DSCHRG MGMT >30: CPT

## 2023-11-29 RX ORDER — LACTULOSE 10 G/15ML
15 SOLUTION ORAL ONCE
Refills: 0 | Status: DISCONTINUED | OUTPATIENT
Start: 2023-11-29 | End: 2023-11-29

## 2023-11-29 RX ORDER — CHLORHEXIDINE GLUCONATE 4 %
325 (65 FE) LIQUID (ML) TOPICAL
Qty: 40 | Refills: 3 | Status: ACTIVE | COMMUNITY
Start: 2023-07-27 | End: 1900-01-01

## 2023-11-29 RX ADMIN — PANTOPRAZOLE SODIUM 40 MILLIGRAM(S): 20 TABLET, DELAYED RELEASE ORAL at 06:09

## 2023-11-29 RX ADMIN — Medication 1 TABLET(S): at 12:13

## 2023-11-29 RX ADMIN — HYDROMORPHONE HYDROCHLORIDE 4 MILLIGRAM(S): 2 INJECTION INTRAMUSCULAR; INTRAVENOUS; SUBCUTANEOUS at 00:02

## 2023-11-29 RX ADMIN — METHADONE HYDROCHLORIDE 5 MILLIGRAM(S): 40 TABLET ORAL at 00:53

## 2023-11-29 RX ADMIN — Medication 5 MILLIGRAM(S): at 10:44

## 2023-11-29 RX ADMIN — Medication 150 MILLIGRAM(S): at 06:09

## 2023-11-29 RX ADMIN — Medication 2: at 12:13

## 2023-11-29 RX ADMIN — HYDROMORPHONE HYDROCHLORIDE 4 MILLIGRAM(S): 2 INJECTION INTRAMUSCULAR; INTRAVENOUS; SUBCUTANEOUS at 09:00

## 2023-11-29 RX ADMIN — METHADONE HYDROCHLORIDE 5 MILLIGRAM(S): 40 TABLET ORAL at 06:09

## 2023-11-29 RX ADMIN — Medication 150 MILLIGRAM(S): at 12:12

## 2023-11-29 RX ADMIN — METHADONE HYDROCHLORIDE 5 MILLIGRAM(S): 40 TABLET ORAL at 12:13

## 2023-11-29 RX ADMIN — Medication 1: at 08:05

## 2023-11-29 RX ADMIN — APIXABAN 5 MILLIGRAM(S): 2.5 TABLET, FILM COATED ORAL at 06:09

## 2023-11-29 RX ADMIN — Medication 3 UNIT(S): at 08:05

## 2023-11-29 RX ADMIN — Medication 10 MILLIGRAM(S): at 10:44

## 2023-11-29 RX ADMIN — Medication 5 MILLIGRAM(S): at 01:55

## 2023-11-29 RX ADMIN — HYDROMORPHONE HYDROCHLORIDE 4 MILLIGRAM(S): 2 INJECTION INTRAMUSCULAR; INTRAVENOUS; SUBCUTANEOUS at 08:12

## 2023-11-29 RX ADMIN — INSULIN GLARGINE 18 UNIT(S): 100 INJECTION, SOLUTION SUBCUTANEOUS at 08:04

## 2023-11-29 RX ADMIN — Medication 3 UNIT(S): at 12:13

## 2023-11-29 NOTE — PROGRESS NOTE ADULT - REASON FOR ADMISSION
Low blood sugar, unable to tolerate PO.

## 2023-11-29 NOTE — PROGRESS NOTE ADULT - SUBJECTIVE AND OBJECTIVE BOX
INTERVAL HPI/OVERNIGHT EVENTS:  No new overnight event.  No N/V/D.  Tolerating diet.  some distention    Allergies    No Known Allergies    Intolerances    General:  No wt loss, fevers, chills, night sweats, fatigue,   Eyes:  Good vision, no reported pain  ENT:  No sore throat, pain, runny nose, dysphagia  CV:  No pain, palpitations, hypo/hypertension  Resp:  No dyspnea, cough, tachypnea, wheezing  GI:  No pain, No nausea, No vomiting, No diarrhea, No constipation, No weight loss, No fever, No pruritis, No rectal bleeding, No tarry stools, No dysphagia,  :  No pain, bleeding, incontinence, nocturia  Muscle:  No pain, weakness  Neuro:  No weakness, tingling, memory problems  Psych:  No fatigue, insomnia, mood problems, depression  Endocrine:  No polyuria, polydipsia, cold/heat intolerance  Heme:  No petechiae, ecchymosis, easy bruisability  Skin:  No rash, tattoos, scars, edema      PHYSICAL EXAM:   Vital Signs:  Vital Signs Last 24 Hrs  T(C): 36.5 (29 Nov 2023 08:00), Max: 37 (28 Nov 2023 23:30)  T(F): 97.7 (29 Nov 2023 08:00), Max: 98.6 (28 Nov 2023 23:30)  HR: 79 (29 Nov 2023 08:00) (73 - 80)  BP: 112/73 (29 Nov 2023 08:00) (100/67 - 118/74)  BP(mean): --  RR: 16 (29 Nov 2023 08:00) (16 - 16)  SpO2: 96% (29 Nov 2023 08:00) (94% - 96%)    Parameters below as of 29 Nov 2023 08:00  Patient On (Oxygen Delivery Method): room air      Daily Height in cm: 187.96 (28 Nov 2023 09:54)    Daily I&O's Summary      GENERAL:  Appears stated age, well-groomed, well-nourished, no distress  HEENT:  NC/AT,  conjunctivae clear and pink, no thyromegaly, nodules, adenopathy, no JVD, sclera -anicteric  CHEST:  Full & symmetric excursion, no increased effort, breath sounds clear  HEART:  Regular rhythm, S1, S2, no murmur/rub/S3/S4, no abdominal bruit, no edema  ABDOMEN:  Soft, non-tender, non-distended, normoactive bowel sounds,  no masses ,no hepato-splenomegaly, no signs of chronic liver disease  EXTEREMITIES:  no cyanosis,clubbing or edema  SKIN:  No rash/erythema/ecchymoses/petechiae/wounds/abscess/warm/dry  NEURO:  Alert, oriented, no asterixis, no tremor, no encephalopathy      LABS:          PT/INR - ( 28 Nov 2023 12:00 )   PT: 12.1 sec;   INR: 1.11 ratio             amylase   lipaseLipase: 19 U/L (11-25 @ 19:13)    RADIOLOGY & ADDITIONAL TESTS:

## 2023-11-29 NOTE — CASE MANAGEMENT PROGRESS NOTE - NSCMPROGRESSNOTE_GEN_ALL_CORE
Patient cleared for discharge home today with [facility] agency [phone]  [facility] 11/30/2023. Patient is agreeable with discharge today and family will  patient and transport home.

## 2023-11-29 NOTE — PROGRESS NOTE ADULT - ASSESSMENT
59 y/o M with PMH of HTN, DM2, Dyslipidemia, CAD s/p MI s/p PTCA, PAD, COVID-19, Chronic Gastritis, Gastroparesis, GERD, Spinal Stenosis with radiculopathy, Chronic Back Pain opioid dependant, BPH, Obesity, Anxiety, and Depression presented with persistently low blood sugar with repeated vomiting.      Hypoglycemia. - 2/2 poor oral intake, lantus and mounjaro use  - FS now elevated  · starting lantus 15 units + correction scale  - diabetic diet,      Problem/Plan - 2:  ·  Problem: Hypomagnesemia.   ·  Plan: no related EKG changes, magnesium was supplemented earlier with 1 gm of magnesium sulfate IVPB X1 by ED team, gave an additional 2 gm IVPB X1 dose, recheck serum magnesium level in am, started on continuous cardiac monitoring.    Nausea and vomiting.   likely related to  gastroparesis, giving standing reglan 10 QID x 1 day, on consistent carb diet,     Chronic back pain - restarting home methadone, oral dilaudid, IV dilaudid changed to 0.5 mg for breakthrough    dvt ppx - eliquis
57 y/o M with PMH of HTN, DM2, Dyslipidemia, CAD s/p MI s/p PTCA, PAD, COVID-19, Chronic Gastritis, Gastroparesis, GERD, Spinal Stenosis with radiculopathy, Chronic Back Pain opioid dependant, BPH, Obesity, Anxiety, and Depression presented with persistently low blood sugar with repeated vomiting.      Hypoglycemia. - 2/2 poor oral intake, lantus and mounjaro use  - FS improved  · lantus 18units, 3 units standing lispro+ correction scale  - resume on metformin, mounjaro, jardiance on DC  - diabetic diet,       Nausea and vomiting.   2/2 gastroparesis, improved, GI eval noted, follow up with GI as outpatient    Chronic back pain - restarting home methadone, oral dilaudid prn    dvt ppx - eliquis    Dispo:  stable for DC, see DCS for more info. time spent on dc 40 minutes 
57 y/o M with PMH of HTN, DM2, Dyslipidemia, CAD s/p MI s/p PTCA, PAD, COVID-19, Chronic Gastritis, Gastroparesis, GERD, Spinal Stenosis with radiculopathy, Chronic Back Pain opioid dependant, BPH, Obesity, Anxiety, and Depression presented with persistently low blood sugar with repeated vomiting.      Hypoglycemia. - 2/2 poor oral intake, lantus and mounjaro use  - FS improved  · starting lantus 18units, adding 3 units standing lispro+ correction scale  - diabetic diet,     hypomagnesemia    Nausea and vomiting.   2/2 gastroparesis, improved, GI eval requested    Chronic back pain - restarting home methadone, oral dilaudid, IV dilaudid changed to 0.5 mg for breakthrough    dvt ppx - eliquis    Dispo: if tolerating meals DC home tomorrow
likely gastroparesis 2/2 diabetes    Unable to obtain Gastric Emptying Study, as patient is unable to come off of pain medications x 2 days due to chronic pain  abdominal US shows, Fatty infiltration of liver, Borderline splenomegaly, No cholelithiasis, acute cholecystitis, or biliary ductal dilatation.    Monitor LFTs/Electrolytes  PPI BID  Maalox PRN  Antiemetics PRN  Add Miralax daily  PO as tolerated  Maintain Hydration  Will eventually need outpatient EGD/Colon   Will need MRI outpatient to further assess fatty liver  Avoid all non-essential hepatotoxic drugs	  Check viral hepatitis panel  Check RADHA, ASMA, AMA, AFP, Alpha -1- Antitrypsin level, Ceruloplasmin, Iron studies   Diet and weight control discussed    Will follow    Discussed with Dr. Ye. 
likely gastroparesis 2/2 diabetes    Unable to obtain Gastric Emptying Study, as patient is unable to come off of pain medications x 2 days due to chronic pain  abdominal US shows, Fatty infiltration of liver, Borderline splenomegaly, No cholelithiasis, acute cholecystitis, or biliary ductal dilatation.    Monitor LFTs/Electrolytes  PPI BID  Maalox PRN  Antiemetics PRN  Add Miralax daily  PO as tolerated  Maintain Hydration  Will eventually need outpatient EGD/Colon   Will need MRI outpatient to further assess fatty liver  Avoid all non-essential hepatotoxic drugs	  Check viral hepatitis panel  Check RADHA, ASMA, AMA, AFP, Alpha -1- Antitrypsin level, Ceruloplasmin, Iron studies   Diet and weight control discussed    Will follow    Discussed with Dr. Ye. 
normal...

## 2023-12-01 ENCOUNTER — NON-APPOINTMENT (OUTPATIENT)
Age: 58
End: 2023-12-01

## 2023-12-01 LAB
ALPHA-1-FETOPROTEIN-L3: SIGNIFICANT CHANGE UP % (ref 0–9.9)
ALPHA-1-FETOPROTEIN-L3: SIGNIFICANT CHANGE UP % (ref 0–9.9)
ALPHA-1-FETOPROTEIN: 1 NG/ML — SIGNIFICANT CHANGE UP (ref 0–8.4)
ALPHA-1-FETOPROTEIN: 1 NG/ML — SIGNIFICANT CHANGE UP (ref 0–8.4)

## 2023-12-04 ENCOUNTER — APPOINTMENT (OUTPATIENT)
Dept: FAMILY MEDICINE | Facility: CLINIC | Age: 58
End: 2023-12-04

## 2023-12-04 ENCOUNTER — APPOINTMENT (OUTPATIENT)
Dept: PAIN MANAGEMENT | Facility: CLINIC | Age: 58
End: 2023-12-04

## 2023-12-04 ENCOUNTER — APPOINTMENT (OUTPATIENT)
Dept: GASTROENTEROLOGY | Facility: CLINIC | Age: 58
End: 2023-12-04

## 2023-12-05 ENCOUNTER — APPOINTMENT (OUTPATIENT)
Dept: PAIN MANAGEMENT | Facility: CLINIC | Age: 58
End: 2023-12-05

## 2023-12-06 ENCOUNTER — APPOINTMENT (OUTPATIENT)
Dept: PAIN MANAGEMENT | Facility: CLINIC | Age: 58
End: 2023-12-06
Payer: OTHER MISCELLANEOUS

## 2023-12-06 VITALS — WEIGHT: 246 LBS | BODY MASS INDEX: 31.57 KG/M2 | HEIGHT: 74 IN

## 2023-12-06 DIAGNOSIS — M96.1 POSTLAMINECTOMY SYNDROME, NOT ELSEWHERE CLASSIFIED: ICD-10-CM

## 2023-12-06 PROCEDURE — 99213 OFFICE O/P EST LOW 20 MIN: CPT | Mod: ACP,95

## 2023-12-06 RX ORDER — PREGABALIN 150 MG/1
150 CAPSULE ORAL 3 TIMES DAILY
Qty: 90 | Refills: 0 | Status: ACTIVE | COMMUNITY
Start: 2023-06-12 | End: 1900-01-01

## 2023-12-13 PROCEDURE — 83036 HEMOGLOBIN GLYCOSYLATED A1C: CPT

## 2023-12-13 PROCEDURE — 80048 BASIC METABOLIC PNL TOTAL CA: CPT

## 2023-12-13 PROCEDURE — 82728 ASSAY OF FERRITIN: CPT

## 2023-12-13 PROCEDURE — 83540 ASSAY OF IRON: CPT

## 2023-12-13 PROCEDURE — 99285 EMERGENCY DEPT VISIT HI MDM: CPT | Mod: 25

## 2023-12-13 PROCEDURE — 86381 MITOCHONDRIAL ANTIBODY EACH: CPT

## 2023-12-13 PROCEDURE — 82962 GLUCOSE BLOOD TEST: CPT

## 2023-12-13 PROCEDURE — 76700 US EXAM ABDOM COMPLETE: CPT

## 2023-12-13 PROCEDURE — 84100 ASSAY OF PHOSPHORUS: CPT

## 2023-12-13 PROCEDURE — 86038 ANTINUCLEAR ANTIBODIES: CPT

## 2023-12-13 PROCEDURE — 70150 X-RAY EXAM OF FACIAL BONES: CPT

## 2023-12-13 PROCEDURE — 82977 ASSAY OF GGT: CPT

## 2023-12-13 PROCEDURE — 82306 VITAMIN D 25 HYDROXY: CPT

## 2023-12-13 PROCEDURE — 85025 COMPLETE CBC W/AUTO DIFF WBC: CPT

## 2023-12-13 PROCEDURE — 80074 ACUTE HEPATITIS PANEL: CPT

## 2023-12-13 PROCEDURE — 83690 ASSAY OF LIPASE: CPT

## 2023-12-13 PROCEDURE — 86255 FLUORESCENT ANTIBODY SCREEN: CPT

## 2023-12-13 PROCEDURE — 83550 IRON BINDING TEST: CPT

## 2023-12-13 PROCEDURE — 97161 PT EVAL LOW COMPLEX 20 MIN: CPT

## 2023-12-13 PROCEDURE — 82390 ASSAY OF CERULOPLASMIN: CPT

## 2023-12-13 PROCEDURE — 93005 ELECTROCARDIOGRAM TRACING: CPT

## 2023-12-13 PROCEDURE — 71045 X-RAY EXAM CHEST 1 VIEW: CPT

## 2023-12-13 PROCEDURE — 36415 COLL VENOUS BLD VENIPUNCTURE: CPT

## 2023-12-13 PROCEDURE — 82107 ALPHA-FETOPROTEIN L3: CPT

## 2023-12-13 PROCEDURE — 96375 TX/PRO/DX INJ NEW DRUG ADDON: CPT

## 2023-12-13 PROCEDURE — 80053 COMPREHEN METABOLIC PANEL: CPT

## 2023-12-13 PROCEDURE — 96374 THER/PROPH/DIAG INJ IV PUSH: CPT

## 2023-12-13 PROCEDURE — 83735 ASSAY OF MAGNESIUM: CPT

## 2023-12-13 PROCEDURE — 85610 PROTHROMBIN TIME: CPT

## 2023-12-13 PROCEDURE — 82746 ASSAY OF FOLIC ACID SERUM: CPT

## 2024-01-04 NOTE — PATIENT PROFILE ADULT - DO YOU LACK THE NECESSARY SUPPORT TO HELP YOU COPE WITH LIFE CHALLENGES?
Anesthesia Pre-Procedure Evaluation    Patient: Jim Willingham   MRN: [id number] : 1955        Preoperative Diagnosis: Myopathy [G72.9]   Procedure : Procedure(s):  TRANSPLANT, HEART, RECIPIENT     Jim Willingham is a 68 year old male with past medical history including history of Gastric bypass, COPD history, history of heart transplant on tacrolimus & mycophenolate for history of idiopathic cardiomyopathy, who presents to the emergency department tonight with evidence of obstruction in his prior RNYGB      He states that starting around 11 PM yesterday, shortly after eating some salami, he start developing upper abdominal pain.  He states he was diagnosed with bronchitis and has had improvement with his symptoms over the last 1 week.  He notes he occasionally drinks alcohol had a beer a few days ago for a football game does not drink heavily.    He will need an arterial line and RSI intubation, and isoproterenol and norepinephrine    Past Medical History:   Diagnosis Date    Bariatric surgery status     Benign essential hypertension 2017    Bilateral carpal tunnel syndrome 2020    Cardiomyopathy, unspecified (H) 2017    CKD (chronic kidney disease) stage 3, GFR 30-59 ml/min (H) 2017    COPD (chronic obstructive pulmonary disease) (H) 2020    Depression 2017    Diabetes mellitus (H)     Leigh-Barr virus viremia 2022    Generalized osteoarthritis 2023    Gouty arthropathy, chronic, without tophi 2020    H/O gastric bypass 2017    History of type 2 diabetes mellitus 2023    Hyperlipidemia LDL goal <70 2022    ICD (implantable cardioverter-defibrillator), biventricular, in situ 2017    IFG (impaired fasting glucose) 2023    LVAD (left ventricular assist device) present (H)     Major depression, recurrent, chronic (H24) 2020    Mild anemia 2022    NICM (nonischemic cardiomyopathy) (H)/ EF 20% 2017     ECHO: LVEDd. 7.66 cm, Restrictive pattern , Severe mitral valve regurgitation    CECILIA (obstructive sleep apnea) 05/11/2017    Paroxysmal atrial fibrillation (H) 05/11/2017    Paroxysmal VT (H) 05/11/2017    Peripheral polyneuropathy 03/28/2023    Pulmonary cavitary lesion 11/18/2021    Rotator cuff tear arthropathy of both shoulders 11/02/2020    Type 2 diabetes mellitus with diabetic polyneuropathy (H) 03/18/2022    Uncomplicated asthma     Vitamin B12 deficiency (non anemic) 05/11/2017      Past Surgical History:   Procedure Laterality Date    ANESTHESIA CARDIOVERSION N/A 05/11/2020    Procedure: ANESTHESIA, FOR CARDIOVERSION @1100;  Surgeon: GENERIC ANESTHESIA PROVIDER;  Location: [facility]    BRONCHOSCOPY (RIGID OR FLEXIBLE), DIAGNOSTIC N/A 8/30/2021    Procedure: BRONCHOSCOPY, WITH BRONCHOALVEOLAR LAVAGE;  Surgeon: Perlman, David Morris, MD;  Location: [facility] GI    CV CORONARY ANGIOGRAM N/A 5/17/2022    Procedure: Coronary Angiogram;  Surgeon: Jeffrey Gibson MD;  [facility]    CV CORONARY ANGIOGRAM N/A 5/23/2023    Procedure: Coronary Angiogram;  Surgeon: Scout Robins MD;  [facility]    CV HEART BIOPSY N/A 5/13/2021    Procedure: Heart Biopsy;  Surgeon: Scout Robins MD;  [facility]    CV HEART BIOPSY N/A 5/20/2021    Procedure: Heart Biopsy;  Surgeon: Jeffrey Gibson MD;  [facility]    CV HEART BIOPSY N/A 5/27/2021    Procedure: Heart Biopsy;  Surgeon: Jac Dover MD;  [facility]    CV HEART BIOPSY N/A 6/7/2021    Procedure: CV HEART BIOPSY;  Surgeon: Jeffrey Gibson MD;  [facility]    CV HEART BIOPSY N/A 6/21/2021    Procedure: CV HEART BIOPSY;  Surgeon: Scout Robins MD;  [facility]    CV HEART BIOPSY N/A 7/5/2021    Procedure: CV HEART BIOPSY;  Surgeon: Paige  Jeffrey Rogers MD;  [facility]    CV HEART BIOPSY N/A 7/16/2021    Procedure: Heart Biopsy;  Surgeon: Amadeo Art MD;  [facility]    CV HEART BIOPSY N/A 8/5/2021    Procedure: Heart Biopsy;  Surgeon: Jeffrey Gibson MD;  [facility]    CV HEART BIOPSY N/A 8/31/2021    Procedure: Heart Cath Heart Biopsy;  Surgeon: Jeffrey Gibson MD;  [facility]    CV HEART BIOPSY N/A 9/21/2021    Procedure: CV HEART BIOPSY;  Surgeon: Jeffrey Gibson MD;  [facility]    CV HEART BIOPSY N/A 10/5/2021    Procedure: CV HEART BIOPSY;  Surgeon: Jeffrey Gibson MD;  [facility]    CV HEART BIOPSY N/A 11/4/2021    Procedure: CV HEART BIOPSY;  Surgeon: Nicola Seth MD;  [facility]    CV HEART BIOPSY N/A 5/17/2022    Procedure: Heart Biopsy;  Surgeon: Jeffrey Gibson MD;  [facility]    CV HEART BIOPSY N/A 5/23/2023    Procedure: Heart Biopsy;  Surgeon: Scout Robins MD;  Location:  HEART CARDIAC CATH LAB    CV RIGHT HEART CATH MEASUREMENTS RECORDED N/A 07/24/2019    Procedure: CV RIGHT HEART CATH;  Surgeon: Renu Sears MD;  [facility]    CV RIGHT HEART CATH MEASUREMENTS RECORDED N/A 08/05/2020    Procedure: CV RIGHT HEART CATH;  Surgeon: Nicola Seth MD;  [facility]    CV RIGHT HEART CATH MEASUREMENTS RECORDED N/A 01/07/2021    Procedure: CV RIGHT HEART CATH;  Surgeon: Jac Dover MD;  [facility]    CV RIGHT HEART CATH MEASUREMENTS RECORDED N/A 02/23/2021    Procedure: Heart Cath Right Heart Cath;  Surgeon: Jeffrey Gibson MD;  Location:  HEART CARDIAC CATH LAB    CV RIGHT HEART CATH MEASUREMENTS RECORDED N/A 03/23/2021    Procedure: Heart  Cath Right Heart Cath. request for 3/23;  Surgeon: Jeffrey Gibson MD;  Location:  HEART CARDIAC CATH LAB    CV RIGHT HEART CATH MEASUREMENTS RECORDED N/A 5/13/2021    Procedure: Right Heart Cath;  Surgeon: Scout Robins MD;  Location:  HEART CARDIAC CATH LAB    CV RIGHT HEART CATH MEASUREMENTS RECORDED N/A 5/20/2021    Procedure: Right Heart Cath;  Surgeon: Jeffrey Gibson MD;  Location:  HEART CARDIAC CATH LAB    CV RIGHT HEART CATH MEASUREMENTS RECORDED N/A 5/27/2021    Procedure: Right Heart Cath;  Surgeon: Jac Dover MD;  Location:  HEART CARDIAC CATH LAB    CV RIGHT HEART CATH MEASUREMENTS RECORDED N/A 6/7/2021    Procedure: CV RIGHT HEART CATH;  Surgeon: Jeffrey Gibson MD;  Location:  HEART CARDIAC CATH LAB    CV RIGHT HEART CATH MEASUREMENTS RECORDED N/A 6/21/2021    Procedure: CV RIGHT HEART CATH;  Surgeon: Scout Robins MD;  Location:  HEART CARDIAC CATH LAB    CV RIGHT HEART CATH MEASUREMENTS RECORDED N/A 7/5/2021    Procedure: CV RIGHT HEART CATH;  Surgeon: Jeffrey Gibson MD;  Location:  HEART CARDIAC CATH LAB    CV RIGHT HEART CATH MEASUREMENTS RECORDED N/A 7/16/2021    Procedure: Right Heart Cath;  Surgeon: Amadeo Art MD;  Location:  HEART CARDIAC CATH LAB    CV RIGHT HEART CATH MEASUREMENTS RECORDED N/A 8/5/2021    Procedure: CV RIGHT HEART CATH;  Surgeon: Jeffrey Gibson MD;  Location:  HEART CARDIAC CATH LAB    CV RIGHT HEART CATH MEASUREMENTS RECORDED N/A 8/31/2021    Procedure: Heart Cath Right Heart Cath;  Surgeon: Jeffrey Gibson MD;  Location:  HEART CARDIAC CATH LAB    CV RIGHT HEART CATH MEASUREMENTS RECORDED N/A 9/21/2021    Procedure: CV RIGHT HEART CATH;  Surgeon: Jeffrey Gibson MD;  Location:  HEART CARDIAC CATH LAB    CV RIGHT HEART CATH MEASUREMENTS RECORDED N/A 10/5/2021    Procedure: CV RIGHT HEART CATH;  Surgeon:  Jeffrey Gibson MD;  Location:  HEART CARDIAC CATH LAB    CV RIGHT HEART CATH MEASUREMENTS RECORDED N/A 11/4/2021    Procedure: CV RIGHT HEART CATH;  Surgeon: Nicola Seth MD;  Location:  HEART CARDIAC CATH LAB    CV RIGHT HEART CATH MEASUREMENTS RECORDED N/A 5/17/2022    Procedure: Right Heart Catheterization;  Surgeon: Jeffrey Gibson MD;  Location:  HEART CARDIAC CATH LAB    CV RIGHT HEART CATH MEASUREMENTS RECORDED N/A 5/23/2023    Procedure: Right Heart Catheterization;  Surgeon: Scout Robins MD;  Location:  HEART CARDIAC CATH LAB    GI SURGERY  2003    [facility]    INSERT VENTRICULAR ASSIST DEVICE LEFT (HEARTMATE II) N/A 06/19/2017    Procedure: INSERT VENTRICULAR ASSIST DEVICE LEFT (HEARTMATE II);  Median Sternotomy Heartmate II Left Ventricular Assist Device Insertion on Pump Oxygenator;  Surgeon: Ronnie Quigley MD;  Location:  OR    IR CHEST TUBE PLACEMENT NON-TUNNELED RIGHT  7/11/2021    ORTHOPEDIC SURGERY  1994    right knee wired    PICC DOUBLE LUMEN PLACEMENT Right 09/23/2020    5FR PICC DL. Length-43cm (1cm out).    PICC INSERTION - DOUBLE LUMEN Right 05/09/2021    IK/BRACH    RELEASE CARPAL TUNNEL BILATERAL Bilateral 02/18/2021    Procedure: Bilateral carpal tunnel release;  Surgeon: Jermaine Brand MD;  Location:  OR    TRANSPLANT HEART RECIPIENT N/A 05/05/2021    Procedure: Redo median sternotomy, lysis of adhesions, heart transplant recipient, on cardiopulmonary bypass, intraoperative transesophageal echocardiogram per anesthesia, Implantable Cardioverter Defibrillator (ICD) removal;  Surgeon: Ronnie Quigley MD;  Location:  OR      Allergies   Allergen Reactions    Grass Shortness Of Breath    Ace Inhibitors Cough    Cats     Dust Mites Other (See Comments)     Asthma    Mold Other (See Comments)     Asthma    Penicillins Other (See Comments)     Unknown - childhood exposure    Tolerated Zosyn 9/18-9/20/2020    Per patient report he has tolerated  amoxicillin    Sulfa Antibiotics Other (See Comments) and Unknown     Unknown childhood reaction      Social History     Tobacco Use    Smoking status: Former     Types: Cigarettes     Quit date:      Years since quittin.0     Passive exposure: Never    Smokeless tobacco: Never   Substance Use Topics    Alcohol use: No      Wt Readings from Last 1 Encounters:   24 83.9 kg (185 lb)        Anesthesia Evaluation            ROS/MED HX  ENT/Pulmonary:     (+) sleep apnea,    CECILIA risk factors,                 asthma    COPD,              Neurologic:       Cardiovascular: Comment: S/p ICD Medtronic, Bivent, non-dependant (no longer in situ).   S/p LVAD placement  S/p heart transplant      (+)  hypertension- -   -  - -      CHF etiology: NICM                 dysrhythmias,         Previous cardiac testing   Echo: Date: 2021 Results:  Cardiac MRI :  The LV is normal in cavity size and wall thickness. The global systolic function is normal. The LVEF is  63%. There are no regional wall motion abnormalities.  The RV is normal in cavity size. The global systolic function is normal. The RVEF is 50%.     Stress Test:  Date: Results:    ECG Reviewed:  Date: Results:    Cath:  Date: Results:   (-) pacemaker and pacemaker   METS/Exercise Tolerance:     Hematologic:       Musculoskeletal: Comment: BL carpal tunnel   Gout arthropathy      GI/Hepatic: Comment: S/p gastric bypass      Renal/Genitourinary:     (+) renal disease, type: CRI, Pt does not require dialysis,           Endo:     (+)  type II DM,   Using insulin, - not using insulin pump.         Obesity,       Psychiatric/Substance Use:  - neg psychiatric ROS     Infectious Disease:  - neg infectious disease ROS     Malignancy:  - neg malignancy ROS     Other:  - neg other ROS          Physical Exam    Airway        Mallampati: I   TM distance: > 3 FB   Neck ROM: full   Mouth opening: > 3 cm    Respiratory Devices and Support         Dental  no notable  dental history     (+) Modest Abnormalities - crowns, retainers, 1 or 2 missing teeth      Cardiovascular   cardiovascular exam normal          Pulmonary   pulmonary exam normal                OUTSIDE LABS:  CBC:   Lab Results   Component Value Date    WBC 9.7 01/03/2024    WBC 4.2 07/20/2023    HGB 12.4 (L) 01/03/2024    HGB 12.8 (L) 07/20/2023    HCT 39.3 (L) 01/03/2024    HCT 39.9 (L) 07/20/2023     01/03/2024     07/20/2023     BMP:   Lab Results   Component Value Date     01/03/2024     07/20/2023    POTASSIUM 4.7 01/03/2024    POTASSIUM 4.3 07/20/2023    CHLORIDE 107 01/03/2024    CHLORIDE 108 (H) 07/20/2023    CO2 20 (L) 01/03/2024    CO2 18 (L) 07/20/2023    BUN 30.1 (H) 01/03/2024    BUN 28.3 (H) 07/20/2023    CR 1.75 (H) 01/03/2024    CR 1.75 (H) 07/20/2023     (H) 01/03/2024     (H) 07/20/2023     COAGS:   Lab Results   Component Value Date    PTT 34 12/15/2021    INR 1.25 (H) 12/15/2021    FIBR 203 05/06/2021     POC:   Lab Results   Component Value Date     (H) 07/11/2021     HEPATIC:   Lab Results   Component Value Date    ALBUMIN 4.2 01/03/2024    PROTTOTAL 6.0 (L) 01/03/2024    ALT 31 01/03/2024    AST 32 01/03/2024    ALKPHOS 137 01/03/2024    BILITOTAL 0.7 01/03/2024    MIGUEL <10 (L) 12/15/2021     OTHER:   Lab Results   Component Value Date    PH 7.40 08/24/2021    LACT 0.9 01/03/2024    A1C 5.4 03/28/2023    QAMAR 7.6 (L) 01/03/2024    PHOS 4.0 05/23/2023    MAG 2.2 05/23/2023    LIPASE 19 01/03/2024    AMYLASE 49 05/26/2021    TSH 2.07 05/23/2023    .0 (H) 08/27/2021    SED 72 (H) 07/08/2021       Anesthesia Plan    ASA Status:  4    NPO Status:  ELEVATED Aspiration Risk/Unknown    Anesthesia Type: General.     - Airway: ETT   Induction: RSI.   Maintenance: Balanced.   Techniques and Equipment:     - Airway: Video-Laryngoscope     - Lines/Monitors: 2nd IV, Arterial Line, BIS            CHUN Relative CI: s/p gastric bypass.     - Blood: PRBC,  T&S     - Drips/Meds: Norepi, Epinephrine, Steroid Stress Dose     Consents    Anesthesia Plan(s) and associated risks, benefits, and realistic alternatives discussed. Questions answered and patient/representative(s) expressed understanding.     - Discussed: Risks, Benefits and Alternatives for BOTH SEDATION and the PROCEDURE were discussed     - Discussed with:  Patient      - Extended Intubation/Ventilatory Support Discussed: Yes.      - Patient is DNR/DNI Status: No     Use of blood products discussed: Yes.     - Discussed with: Patient.     Postoperative Care    Pain management: IV analgesics.   PONV prophylaxis: Ondansetron (or other 5HT-3), Dexamethasone or Solumedrol     Comments:                Parviz Whelan MD   no

## 2024-01-05 ENCOUNTER — APPOINTMENT (OUTPATIENT)
Dept: PAIN MANAGEMENT | Facility: CLINIC | Age: 59
End: 2024-01-05
Payer: OTHER MISCELLANEOUS

## 2024-01-05 PROCEDURE — 99213 OFFICE O/P EST LOW 20 MIN: CPT | Mod: 95

## 2024-01-05 NOTE — HISTORY OF PRESENT ILLNESS
[Neck] : neck [Left Arm] : left arm [Work related] : work related [7] : 7 [Constant] : constant [Leisure] : leisure [Sleep] : sleep [Meds] : meds [Heat] : heat [Extending back] : extending back [Disabled] : Work status: disabled [Home] : at home, [unfilled] , at the time of the visit. [Medical Office: ([facility])___] : at the medical office located in  [Verbal consent obtained from patient] : the patient, [unfilled] [] : no [FreeTextEntry1] : nerve pain [FreeTextEntry3] : 02.16.17 [de-identified] : 2 YEARS AGO [TWNoteComboBox1] : 70%

## 2024-01-05 NOTE — ASSESSMENT
[FreeTextEntry1] : Interim history The patient is tolerating their medications without problems. There has no new pains, injuries, or complaints and no new issues. The use of medications appears appropriate and there are no aberrant behaviors noted. Side effects to current medications are denied. Average pain score for the month is 6 out of ten. The patient's current medications are documented to the best of their ability. THe  was obtained and reviewed prior to the visit, and any discrepancies were discussed with the patient. Objective information Since the last visit there are no additional radiologic studies, labs, or pain complaints. There are no changes in the patient's physical status. Plan The patient was given refill of their medication at their current level and will return to the office as needed for follow-up. The patient is showing no aberrant behavior or evidence of diversion. Opioid contract and opioid risk assessment on chart.  reviewed and any discrepancy discussed with patent. Applicable urine toxicology reviewed and recorded in the patient's electronic record. Urine toxicology is ordered for patient per office protocol or patient's risk assessment.  Patient will follow-up in 1 month unless new issues arise the patient has returned earlier.

## 2024-01-08 ENCOUNTER — APPOINTMENT (OUTPATIENT)
Dept: FAMILY MEDICINE | Facility: CLINIC | Age: 59
End: 2024-01-08

## 2024-01-12 NOTE — DISCHARGE NOTE PROVIDER - NSDCQMSTAIRS_GEN_ALL_CORE
Adalberto Kasper ordered and sent to pharmacy on file because Flovent was discontinued. Epic isn't showing if it's covered or not. Will wait until pharmacy processes to see if next step is needed.
Fall with Harm Risk
No

## 2024-01-22 ENCOUNTER — RX RENEWAL (OUTPATIENT)
Age: 59
End: 2024-01-22

## 2024-01-22 RX ORDER — TIZANIDINE HYDROCHLORIDE 4 MG/1
4 CAPSULE ORAL
Qty: 270 | Refills: 1 | Status: ACTIVE | COMMUNITY
Start: 2017-10-12 | End: 1900-01-01

## 2024-02-02 ENCOUNTER — APPOINTMENT (OUTPATIENT)
Dept: PAIN MANAGEMENT | Facility: CLINIC | Age: 59
End: 2024-02-02
Payer: OTHER MISCELLANEOUS

## 2024-02-02 PROCEDURE — 99213 OFFICE O/P EST LOW 20 MIN: CPT

## 2024-02-02 NOTE — HISTORY OF PRESENT ILLNESS
[Neck] : neck [Left Arm] : left arm [7] : 7 [5] : 5 [Burning] : burning [Dull/Aching] : dull/aching [Shooting] : shooting [Constant] : constant [Household chores] : household chores [Leisure] : leisure [Sleep] : sleep [Meds] : meds [Disabled] : Work status: disabled [Home] : at home, [unfilled] , at the time of the visit. [Medical Office: ([facility])___] : at the medical office located in  [Verbal consent obtained from patient] : the patient, [unfilled] [] : Patient is currently playing sports: no [FreeTextEntry1] : LEFT HAND [FreeTextEntry6] : ELECTRIC SHOCK LIKE [FreeTextEntry7] : DOWN ARM [de-identified] : TOO MUCH ACTIVITY [de-identified] : 2022

## 2024-02-15 ENCOUNTER — RX RENEWAL (OUTPATIENT)
Age: 59
End: 2024-02-15

## 2024-02-15 ENCOUNTER — APPOINTMENT (OUTPATIENT)
Dept: ORTHOPEDIC SURGERY | Facility: CLINIC | Age: 59
End: 2024-02-15

## 2024-02-19 ENCOUNTER — EMERGENCY (EMERGENCY)
Facility: HOSPITAL | Age: 59
LOS: 1 days | Discharge: HOME | End: 2024-02-19
Attending: EMERGENCY MEDICINE | Admitting: EMERGENCY MEDICINE
Payer: MEDICARE

## 2024-02-19 VITALS
WEIGHT: 246.92 LBS | RESPIRATION RATE: 18 BRPM | SYSTOLIC BLOOD PRESSURE: 175 MMHG | HEART RATE: 66 BPM | OXYGEN SATURATION: 99 % | DIASTOLIC BLOOD PRESSURE: 109 MMHG | TEMPERATURE: 98 F

## 2024-02-19 VITALS
SYSTOLIC BLOOD PRESSURE: 116 MMHG | HEART RATE: 84 BPM | OXYGEN SATURATION: 99 % | DIASTOLIC BLOOD PRESSURE: 72 MMHG | RESPIRATION RATE: 18 BRPM

## 2024-02-19 DIAGNOSIS — Z98.890 OTHER SPECIFIED POSTPROCEDURAL STATES: Chronic | ICD-10-CM

## 2024-02-19 DIAGNOSIS — Z98.1 ARTHRODESIS STATUS: Chronic | ICD-10-CM

## 2024-02-19 LAB
ALBUMIN SERPL ELPH-MCNC: 3.8 G/DL — SIGNIFICANT CHANGE UP (ref 3.3–5)
ALP SERPL-CCNC: 119 U/L — SIGNIFICANT CHANGE UP (ref 40–120)
ALT FLD-CCNC: 30 U/L — SIGNIFICANT CHANGE UP (ref 10–45)
ANION GAP SERPL CALC-SCNC: 16 MMOL/L — SIGNIFICANT CHANGE UP (ref 5–17)
AST SERPL-CCNC: 17 U/L — SIGNIFICANT CHANGE UP (ref 10–40)
BASOPHILS # BLD AUTO: 0.07 K/UL — SIGNIFICANT CHANGE UP (ref 0–0.2)
BASOPHILS NFR BLD AUTO: 0.6 % — SIGNIFICANT CHANGE UP (ref 0–2)
BILIRUB SERPL-MCNC: 0.6 MG/DL — SIGNIFICANT CHANGE UP (ref 0.2–1.2)
BUN SERPL-MCNC: 13 MG/DL — SIGNIFICANT CHANGE UP (ref 7–23)
CALCIUM SERPL-MCNC: 9.9 MG/DL — SIGNIFICANT CHANGE UP (ref 8.4–10.5)
CHLORIDE SERPL-SCNC: 101 MMOL/L — SIGNIFICANT CHANGE UP (ref 96–108)
CO2 SERPL-SCNC: 20 MMOL/L — LOW (ref 22–31)
CREAT SERPL-MCNC: 0.7 MG/DL — SIGNIFICANT CHANGE UP (ref 0.5–1.3)
EGFR: 108 ML/MIN/1.73M2 — SIGNIFICANT CHANGE UP
EOSINOPHIL # BLD AUTO: 0.09 K/UL — SIGNIFICANT CHANGE UP (ref 0–0.5)
EOSINOPHIL NFR BLD AUTO: 0.7 % — SIGNIFICANT CHANGE UP (ref 0–6)
FLUAV AG NPH QL: SIGNIFICANT CHANGE UP
FLUBV AG NPH QL: SIGNIFICANT CHANGE UP
GLUCOSE BLDC GLUCOMTR-MCNC: 79 MG/DL — SIGNIFICANT CHANGE UP (ref 70–99)
GLUCOSE SERPL-MCNC: 84 MG/DL — SIGNIFICANT CHANGE UP (ref 70–99)
HCT VFR BLD CALC: 43.9 % — SIGNIFICANT CHANGE UP (ref 39–50)
HGB BLD-MCNC: 14.8 G/DL — SIGNIFICANT CHANGE UP (ref 13–17)
IMM GRANULOCYTES NFR BLD AUTO: 0.4 % — SIGNIFICANT CHANGE UP (ref 0–0.9)
LACTATE SERPL-SCNC: 0.9 MMOL/L — SIGNIFICANT CHANGE UP (ref 0.7–2)
LACTATE SERPL-SCNC: 3.6 MMOL/L — HIGH (ref 0.7–2)
LIDOCAIN IGE QN: 16 U/L — SIGNIFICANT CHANGE UP (ref 16–77)
LYMPHOCYTES # BLD AUTO: 2.44 K/UL — SIGNIFICANT CHANGE UP (ref 1–3.3)
LYMPHOCYTES # BLD AUTO: 20.2 % — SIGNIFICANT CHANGE UP (ref 13–44)
MCHC RBC-ENTMCNC: 27.5 PG — SIGNIFICANT CHANGE UP (ref 27–34)
MCHC RBC-ENTMCNC: 33.7 GM/DL — SIGNIFICANT CHANGE UP (ref 32–36)
MCV RBC AUTO: 81.6 FL — SIGNIFICANT CHANGE UP (ref 80–100)
MONOCYTES # BLD AUTO: 0.82 K/UL — SIGNIFICANT CHANGE UP (ref 0–0.9)
MONOCYTES NFR BLD AUTO: 6.8 % — SIGNIFICANT CHANGE UP (ref 2–14)
NEUTROPHILS # BLD AUTO: 8.6 K/UL — HIGH (ref 1.8–7.4)
NEUTROPHILS NFR BLD AUTO: 71.3 % — SIGNIFICANT CHANGE UP (ref 43–77)
NRBC # BLD: 0 /100 WBCS — SIGNIFICANT CHANGE UP (ref 0–0)
PLATELET # BLD AUTO: 387 K/UL — SIGNIFICANT CHANGE UP (ref 150–400)
POTASSIUM SERPL-MCNC: 4.7 MMOL/L — SIGNIFICANT CHANGE UP (ref 3.5–5.3)
POTASSIUM SERPL-SCNC: 4.7 MMOL/L — SIGNIFICANT CHANGE UP (ref 3.5–5.3)
PROT SERPL-MCNC: 8.1 G/DL — SIGNIFICANT CHANGE UP (ref 6–8.3)
RBC # BLD: 5.38 M/UL — SIGNIFICANT CHANGE UP (ref 4.2–5.8)
RBC # FLD: 14.6 % — HIGH (ref 10.3–14.5)
RSV RNA NPH QL NAA+NON-PROBE: SIGNIFICANT CHANGE UP
SARS-COV-2 RNA SPEC QL NAA+PROBE: SIGNIFICANT CHANGE UP
SODIUM SERPL-SCNC: 137 MMOL/L — SIGNIFICANT CHANGE UP (ref 135–145)
TROPONIN I, HIGH SENSITIVITY RESULT: 4.5 NG/L — SIGNIFICANT CHANGE UP
TROPONIN I, HIGH SENSITIVITY RESULT: 6.1 NG/L — SIGNIFICANT CHANGE UP
WBC # BLD: 12.07 K/UL — HIGH (ref 3.8–10.5)
WBC # FLD AUTO: 12.07 K/UL — HIGH (ref 3.8–10.5)

## 2024-02-19 PROCEDURE — 96375 TX/PRO/DX INJ NEW DRUG ADDON: CPT

## 2024-02-19 PROCEDURE — 99285 EMERGENCY DEPT VISIT HI MDM: CPT | Mod: 25

## 2024-02-19 PROCEDURE — 93010 ELECTROCARDIOGRAM REPORT: CPT

## 2024-02-19 PROCEDURE — 99285 EMERGENCY DEPT VISIT HI MDM: CPT

## 2024-02-19 PROCEDURE — 71045 X-RAY EXAM CHEST 1 VIEW: CPT | Mod: 26

## 2024-02-19 PROCEDURE — 74177 CT ABD & PELVIS W/CONTRAST: CPT | Mod: 26,MA

## 2024-02-19 PROCEDURE — 87637 SARSCOV2&INF A&B&RSV AMP PRB: CPT

## 2024-02-19 PROCEDURE — 80053 COMPREHEN METABOLIC PANEL: CPT

## 2024-02-19 PROCEDURE — 71045 X-RAY EXAM CHEST 1 VIEW: CPT

## 2024-02-19 PROCEDURE — 83690 ASSAY OF LIPASE: CPT

## 2024-02-19 PROCEDURE — 85025 COMPLETE CBC W/AUTO DIFF WBC: CPT

## 2024-02-19 PROCEDURE — 36415 COLL VENOUS BLD VENIPUNCTURE: CPT

## 2024-02-19 PROCEDURE — 74177 CT ABD & PELVIS W/CONTRAST: CPT | Mod: MA

## 2024-02-19 PROCEDURE — 82962 GLUCOSE BLOOD TEST: CPT

## 2024-02-19 PROCEDURE — 84484 ASSAY OF TROPONIN QUANT: CPT

## 2024-02-19 PROCEDURE — 96365 THER/PROPH/DIAG IV INF INIT: CPT | Mod: XU

## 2024-02-19 PROCEDURE — 83605 ASSAY OF LACTIC ACID: CPT

## 2024-02-19 PROCEDURE — 93005 ELECTROCARDIOGRAM TRACING: CPT

## 2024-02-19 RX ORDER — SODIUM CHLORIDE 9 MG/ML
1000 INJECTION INTRAMUSCULAR; INTRAVENOUS; SUBCUTANEOUS ONCE
Refills: 0 | Status: COMPLETED | OUTPATIENT
Start: 2024-02-19 | End: 2024-02-19

## 2024-02-19 RX ORDER — FAMOTIDINE 10 MG/ML
20 INJECTION INTRAVENOUS ONCE
Refills: 0 | Status: COMPLETED | OUTPATIENT
Start: 2024-02-19 | End: 2024-02-19

## 2024-02-19 RX ORDER — METOCLOPRAMIDE HCL 10 MG
10 TABLET ORAL ONCE
Refills: 0 | Status: COMPLETED | OUTPATIENT
Start: 2024-02-19 | End: 2024-02-19

## 2024-02-19 RX ORDER — ONDANSETRON 8 MG/1
4 TABLET, FILM COATED ORAL ONCE
Refills: 0 | Status: COMPLETED | OUTPATIENT
Start: 2024-02-19 | End: 2024-02-19

## 2024-02-19 RX ORDER — HYDROMORPHONE HYDROCHLORIDE 2 MG/ML
2 INJECTION INTRAMUSCULAR; INTRAVENOUS; SUBCUTANEOUS ONCE
Refills: 0 | Status: DISCONTINUED | OUTPATIENT
Start: 2024-02-19 | End: 2024-02-19

## 2024-02-19 RX ORDER — MORPHINE SULFATE 50 MG/1
8 CAPSULE, EXTENDED RELEASE ORAL ONCE
Refills: 0 | Status: DISCONTINUED | OUTPATIENT
Start: 2024-02-19 | End: 2024-02-19

## 2024-02-19 RX ADMIN — MORPHINE SULFATE 8 MILLIGRAM(S): 50 CAPSULE, EXTENDED RELEASE ORAL at 19:12

## 2024-02-19 RX ADMIN — SODIUM CHLORIDE 1000 MILLILITER(S): 9 INJECTION INTRAMUSCULAR; INTRAVENOUS; SUBCUTANEOUS at 19:09

## 2024-02-19 RX ADMIN — HYDROMORPHONE HYDROCHLORIDE 2 MILLIGRAM(S): 2 INJECTION INTRAMUSCULAR; INTRAVENOUS; SUBCUTANEOUS at 19:46

## 2024-02-19 RX ADMIN — FAMOTIDINE 100 MILLIGRAM(S): 10 INJECTION INTRAVENOUS at 19:09

## 2024-02-19 RX ADMIN — Medication 10 MILLIGRAM(S): at 21:18

## 2024-02-19 RX ADMIN — FAMOTIDINE 20 MILLIGRAM(S): 10 INJECTION INTRAVENOUS at 19:39

## 2024-02-19 RX ADMIN — MORPHINE SULFATE 8 MILLIGRAM(S): 50 CAPSULE, EXTENDED RELEASE ORAL at 19:42

## 2024-02-19 RX ADMIN — ONDANSETRON 4 MILLIGRAM(S): 8 TABLET, FILM COATED ORAL at 19:13

## 2024-02-19 RX ADMIN — SODIUM CHLORIDE 1000 MILLILITER(S): 9 INJECTION INTRAMUSCULAR; INTRAVENOUS; SUBCUTANEOUS at 20:09

## 2024-02-19 RX ADMIN — HYDROMORPHONE HYDROCHLORIDE 2 MILLIGRAM(S): 2 INJECTION INTRAMUSCULAR; INTRAVENOUS; SUBCUTANEOUS at 20:16

## 2024-02-19 NOTE — ED PROVIDER NOTE - CLINICAL SUMMARY MEDICAL DECISION MAKING FREE TEXT BOX
57 y/o M with PMH of HTN, DM2, Dyslipidemia, CAD s/p MI s/p PTCA, PAD, COVID-19, Chronic Gastritis, Gastroparesis, GERD, Spinal Stenosis with radiculopathy, Chronic Back Pain opioid dependant, BPH, Obesity, Anxiety, and Depression presented with reports of persistently low blood sugar with repeated vomiting. Pt reporting pain and SOB. Last BM yesterday. +flatus.  +chest pain.  Exam as stated. EKG without acute findings. Vitals normal. Pt said he was hypoglycemic and glucose normal.   Morphine 8mg, Pepcid, Zofran fluids given.     Pt states morphine did nothing. Only caused some tingling in fingers, gone now. Asking for Dilaudid 1 or 2mg IV push.     Labs reviewed. No acute findings ; although lactate >3. Will repeat.   Troponin normal. Will repeat.     CT result with no acute finding to explain pt symptomatology. CXR prelim neg.     Advised pt that if lactate improving, troponin stable, will dc with request to f/u with PCP and / or GI. 57 y/o M with PMH of HTN, DM2, Dyslipidemia, CAD s/p MI s/p PTCA, PAD, COVID-19, Chronic Gastritis, Gastroparesis, GERD, Spinal Stenosis with radiculopathy, Chronic Back Pain opioid dependant, BPH, Obesity, Anxiety, and Depression presented with reports of persistently low blood sugar with repeated vomiting. Pt reporting pain and SOB. Last BM yesterday. +flatus.  +chest pain.  Exam as stated. EKG without acute findings. Vitals normal. Pt said he was hypoglycemic and glucose normal.   Morphine 8mg, Pepcid, Zofran fluids given.     Pt states morphine did nothing. Only caused some tingling in fingers, gone now. Asking for Dilaudid 1 or 2mg IV push.     Labs reviewed. No acute findings ; although lactate >3. Will repeat.   Troponin normal. Will repeat.     CT result with no acute finding to explain pt symptomatology. CXR prelim neg.   Pt appears improved. No further retching. Vitals stable.   Advised pt that if lactate improving, troponin stable, will dc with request to f/u with PCP and / or GI.    Labs improved.

## 2024-02-19 NOTE — ED PROVIDER NOTE - OBJECTIVE STATEMENT
59 y/o M with PMH of HTN, DM2, Dyslipidemia, CAD s/p MI s/p PTCA, PAD, COVID-19, Chronic Gastritis, Gastroparesis, GERD, Spinal Stenosis with radiculopathy, Chronic Back Pain opioid dependant, BPH, Obesity, Anxiety, and Depression presented with reports of persistently low blood sugar with repeated vomiting. Pt reporting pain and SOB. Last BM yesterday. +flatus.  +chest pain.

## 2024-02-19 NOTE — ED PROVIDER NOTE - PHYSICAL EXAMINATION
General:    pt actively heaving and/or vomiting.   Eyes: PERRL, white sclera  Lungs:     CTA b/l  CVS:     RRR  Abd:     +BS, soft, diffusely tender. Nondistended.   Ext:   no deformities or edema.    Skin: no rash  Neuro: AAOx3, no sensory/motor deficits

## 2024-02-19 NOTE — ED ADULT NURSE NOTE - OBJECTIVE STATEMENT
Pt states he has severe abd pain from gastroparesis along with n/v all day.  Pt unable to keep meds down.

## 2024-02-19 NOTE — ED PROVIDER NOTE - PATIENT PORTAL LINK FT
You can access the FollowMyHealth Patient Portal offered by [facility] by registering at the following website: [email or web address]. By joining [facility]’s FollowMyHealth portal, you will also be able to view your health information using other applications (apps) compatible with our system.

## 2024-02-19 NOTE — ED PROVIDER NOTE - NSFOLLOWUPINSTRUCTIONS_ED_ALL_ED_FT
Gastroparesis    Gastroparesis is a condition in which food takes longer than normal to empty from the stomach. This condition is also known as delayed gastric emptying. It is usually a long-term (chronic) condition.    There is no cure, but there are treatments and things that you can do at home to help relieve symptoms. Treating the underlying condition that causes gastroparesis can also help relieve symptoms.    What are the causes?  In many cases, the cause of this condition is not known. Possible causes include:  A hormone (endocrine) disorder, such as hypothyroidism or diabetes.  A nervous system disease, such as Parkinson's disease or multiple sclerosis.  Cancer, infection, or surgery that affects the stomach or vagus nerve. The vagus nerve runs from your chest, through your neck, and to the lower part of your brain.  A connective tissue disorder, such as scleroderma.  Certain medicines.  What increases the risk?  You are more likely to develop this condition if:  You have certain disorders or diseases. These may include:  An endocrine disorder.  An eating disorder.  Amyloidosis.  Scleroderma.  Parkinson's disease.  Multiple sclerosis.  Cancer or infection of the stomach or the vagus nerve.  You have had surgery on your stomach or vagus nerve.  You take certain medicines.  You are female.  What are the signs or symptoms?  Symptoms of this condition include:  Feeling full after eating very little or a loss of appetite.  Nausea, vomiting, or heartburn.  Bloating of your abdomen.  Inconsistent blood sugar (glucose) levels on blood tests.  Unexplained weight loss.  Acid from the stomach coming up into the esophagus (gastroesophageal reflux).  Sudden tightening (spasm) of the stomach, which can be painful.  Symptoms may come and go. Some people may not notice any symptoms.    How is this diagnosed?  This condition is diagnosed with tests, such as:  Tests that check how long it takes food to move through the stomach and intestines. These tests include:  Upper gastrointestinal (GI) series. For this test, you drink a liquid that shows up well on X-rays, and then X-rays are taken of your intestines.  Gastric emptying scintigraphy. For this test, you eat food that contains a small amount of radioactive material, and then scans are taken.  Wireless capsule GI monitoring system. For this test, you swallow a pill (capsule) that records information about how foods and fluid move through your stomach.  Gastric manometry. For this test, a tube is passed down your throat and into your stomach to measure electrical and muscular activity.  Endoscopy. For this test, a long, thin tube with a camera and light on the end is passed down your throat and into your stomach to check for problems in your stomach lining.  Ultrasound. This test uses sound waves to create images of the inside of your body. This can help rule out gallbladder disease or pancreatitis as a cause of your symptoms.  How is this treated?  There is no cure for this condition, but treatment and home care may relieve symptoms. Treatment may include:  Treating the underlying cause.  Managing your symptoms by making changes to your diet and exercise habits.  Taking medicines to control nausea and vomiting and to stimulate stomach muscles.  Getting food through a feeding tube in the hospital. This may be done in severe cases.  Having surgery to insert a device called a gastric electrical stimulator into your body. This device helps improve stomach emptying and control nausea and vomiting.  Follow these instructions at home:  Take over-the-counter and prescription medicines only as told by your health care provider.  Follow instructions from your health care provider about eating or drinking restrictions. Your health care provider may recommend that you:  Eat smaller meals more often.  Eat low-fat foods.  Eat low-fiber forms of high-fiber foods. For example, eat cooked vegetables instead of raw vegetables.  Have only liquid foods instead of solid foods. Liquid foods are easier to digest.  Drink enough fluid to keep your urine pale yellow.  Exercise as often as told by your health care provider.  Keep all follow-up visits. This is important.  Contact a health care provider if you:  Notice that your symptoms do not improve with treatment.  Have new symptoms.  Get help right away if you:  Have severe pain in your abdomen that does not improve with treatment.  Have nausea that is severe or does not go away.  Vomit every time you drink fluids.  Summary  Gastroparesis is a long-term (chronic) condition in which food takes longer than normal to empty from the stomach.  Symptoms include nausea, vomiting, heartburn, bloating of your abdomen, and loss of appetite.  Eating smaller portions, low-fat foods, and low-fiber forms of high-fiber foods may help you manage your symptoms.  Get help right away if you have severe pain in your abdomen.  This information is not intended to replace advice given to you by your health care provider. Make sure you discuss any questions you have with your health care provider.      Chest Pain    Chest pain can be caused by many different conditions which may or may not be dangerous. Causes include heartburn, lung infections, heart attack, blood clot in lungs, skin infections, strain or damage to muscle, cartilage, or bones, etc. In addition to a history and physical examination, an electrocardiogram (ECG) or other lab tests may have been performed to determine the cause of your chest pain. Follow up with your primary care provider or with a cardiologist as instructed.     SEEK IMMEDIATE MEDICAL CARE IF YOU HAVE ANY OF THE FOLLOWING SYMPTOMS: worsening chest pain, coughing up blood, unexplained back/neck/jaw pain, severe abdominal pain, dizziness or lightheadedness, fainting, shortness of breath, sweaty or clammy skin, vomiting, or racing heart beat. These symptoms may represent a serious problem that is an emergency. Do not wait to see if the symptoms will go away. Get medical help right away. Call 911 and do not drive yourself to the hospital.

## 2024-02-19 NOTE — ED ADULT TRIAGE NOTE - ARRIVAL INFO ADDITIONAL COMMENTS
Patient arrives from home via EMS for vomiting, abdominal pain, chest pain. History of neck fractures, gastroparesis, DM2, and gastric nerve issues. Patient's own blood sugar monitor reading in 50s, triage fingerstick 84. Hypertension noted, patient unable to tolerate PO meds for BP. 12 lead en route NSR. History of heart attack around one year ago. EKG being completed.

## 2024-02-20 ENCOUNTER — EMERGENCY (EMERGENCY)
Facility: HOSPITAL | Age: 59
LOS: 1 days | Discharge: HOME | End: 2024-02-20
Attending: EMERGENCY MEDICINE | Admitting: EMERGENCY MEDICINE
Payer: MEDICARE

## 2024-02-20 VITALS
SYSTOLIC BLOOD PRESSURE: 201 MMHG | TEMPERATURE: 98 F | WEIGHT: 274.03 LBS | OXYGEN SATURATION: 100 % | DIASTOLIC BLOOD PRESSURE: 84 MMHG | HEART RATE: 71 BPM | HEIGHT: 74 IN | RESPIRATION RATE: 18 BRPM

## 2024-02-20 VITALS
SYSTOLIC BLOOD PRESSURE: 154 MMHG | HEART RATE: 65 BPM | OXYGEN SATURATION: 97 % | RESPIRATION RATE: 16 BRPM | DIASTOLIC BLOOD PRESSURE: 80 MMHG

## 2024-02-20 DIAGNOSIS — Z98.890 OTHER SPECIFIED POSTPROCEDURAL STATES: Chronic | ICD-10-CM

## 2024-02-20 DIAGNOSIS — Z98.1 ARTHRODESIS STATUS: Chronic | ICD-10-CM

## 2024-02-20 LAB
ALBUMIN SERPL ELPH-MCNC: 3.9 G/DL — SIGNIFICANT CHANGE UP (ref 3.3–5)
ALP SERPL-CCNC: 110 U/L — SIGNIFICANT CHANGE UP (ref 40–120)
ALT FLD-CCNC: 26 U/L — SIGNIFICANT CHANGE UP (ref 10–45)
ANION GAP SERPL CALC-SCNC: 18 MMOL/L — HIGH (ref 5–17)
AST SERPL-CCNC: 6 U/L — LOW (ref 10–40)
BASOPHILS # BLD AUTO: 0.03 K/UL — SIGNIFICANT CHANGE UP (ref 0–0.2)
BASOPHILS NFR BLD AUTO: 0.3 % — SIGNIFICANT CHANGE UP (ref 0–2)
BILIRUB SERPL-MCNC: 0.6 MG/DL — SIGNIFICANT CHANGE UP (ref 0.2–1.2)
BUN SERPL-MCNC: 14 MG/DL — SIGNIFICANT CHANGE UP (ref 7–23)
CALCIUM SERPL-MCNC: 10 MG/DL — SIGNIFICANT CHANGE UP (ref 8.4–10.5)
CHLORIDE SERPL-SCNC: 102 MMOL/L — SIGNIFICANT CHANGE UP (ref 96–108)
CO2 SERPL-SCNC: 24 MMOL/L — SIGNIFICANT CHANGE UP (ref 22–31)
CREAT SERPL-MCNC: 1.12 MG/DL — SIGNIFICANT CHANGE UP (ref 0.5–1.3)
EGFR: 78 ML/MIN/1.73M2 — SIGNIFICANT CHANGE UP
EOSINOPHIL # BLD AUTO: 0.01 K/UL — SIGNIFICANT CHANGE UP (ref 0–0.5)
EOSINOPHIL NFR BLD AUTO: 0.1 % — SIGNIFICANT CHANGE UP (ref 0–6)
GLUCOSE SERPL-MCNC: 226 MG/DL — HIGH (ref 70–99)
HCT VFR BLD CALC: 43.9 % — SIGNIFICANT CHANGE UP (ref 39–50)
HGB BLD-MCNC: 14.9 G/DL — SIGNIFICANT CHANGE UP (ref 13–17)
IMM GRANULOCYTES NFR BLD AUTO: 0.5 % — SIGNIFICANT CHANGE UP (ref 0–0.9)
LIDOCAIN IGE QN: 17 U/L — SIGNIFICANT CHANGE UP (ref 16–77)
LYMPHOCYTES # BLD AUTO: 0.99 K/UL — LOW (ref 1–3.3)
LYMPHOCYTES # BLD AUTO: 10 % — LOW (ref 13–44)
MCHC RBC-ENTMCNC: 27.5 PG — SIGNIFICANT CHANGE UP (ref 27–34)
MCHC RBC-ENTMCNC: 33.9 GM/DL — SIGNIFICANT CHANGE UP (ref 32–36)
MCV RBC AUTO: 81 FL — SIGNIFICANT CHANGE UP (ref 80–100)
MONOCYTES # BLD AUTO: 0.27 K/UL — SIGNIFICANT CHANGE UP (ref 0–0.9)
MONOCYTES NFR BLD AUTO: 2.7 % — SIGNIFICANT CHANGE UP (ref 2–14)
NEUTROPHILS # BLD AUTO: 8.59 K/UL — HIGH (ref 1.8–7.4)
NEUTROPHILS NFR BLD AUTO: 86.4 % — HIGH (ref 43–77)
NRBC # BLD: 0 /100 WBCS — SIGNIFICANT CHANGE UP (ref 0–0)
PLATELET # BLD AUTO: 431 K/UL — HIGH (ref 150–400)
POTASSIUM SERPL-MCNC: 4 MMOL/L — SIGNIFICANT CHANGE UP (ref 3.5–5.3)
POTASSIUM SERPL-SCNC: 4 MMOL/L — SIGNIFICANT CHANGE UP (ref 3.5–5.3)
PROT SERPL-MCNC: 8 G/DL — SIGNIFICANT CHANGE UP (ref 6–8.3)
RBC # BLD: 5.42 M/UL — SIGNIFICANT CHANGE UP (ref 4.2–5.8)
RBC # FLD: 14.6 % — HIGH (ref 10.3–14.5)
SODIUM SERPL-SCNC: 144 MMOL/L — SIGNIFICANT CHANGE UP (ref 135–145)
WBC # BLD: 9.94 K/UL — SIGNIFICANT CHANGE UP (ref 3.8–10.5)
WBC # FLD AUTO: 9.94 K/UL — SIGNIFICANT CHANGE UP (ref 3.8–10.5)

## 2024-02-20 PROCEDURE — 83690 ASSAY OF LIPASE: CPT

## 2024-02-20 PROCEDURE — 96376 TX/PRO/DX INJ SAME DRUG ADON: CPT

## 2024-02-20 PROCEDURE — 99284 EMERGENCY DEPT VISIT MOD MDM: CPT

## 2024-02-20 PROCEDURE — 85025 COMPLETE CBC W/AUTO DIFF WBC: CPT

## 2024-02-20 PROCEDURE — 74176 CT ABD & PELVIS W/O CONTRAST: CPT | Mod: 26,MA

## 2024-02-20 PROCEDURE — 36415 COLL VENOUS BLD VENIPUNCTURE: CPT

## 2024-02-20 PROCEDURE — 99284 EMERGENCY DEPT VISIT MOD MDM: CPT | Mod: 25

## 2024-02-20 PROCEDURE — 96375 TX/PRO/DX INJ NEW DRUG ADDON: CPT

## 2024-02-20 PROCEDURE — 80053 COMPREHEN METABOLIC PANEL: CPT

## 2024-02-20 PROCEDURE — 96374 THER/PROPH/DIAG INJ IV PUSH: CPT

## 2024-02-20 PROCEDURE — 74176 CT ABD & PELVIS W/O CONTRAST: CPT | Mod: MA

## 2024-02-20 RX ORDER — ACETAMINOPHEN 500 MG
1000 TABLET ORAL ONCE
Refills: 0 | Status: COMPLETED | OUTPATIENT
Start: 2024-02-20 | End: 2024-02-20

## 2024-02-20 RX ORDER — KETOROLAC TROMETHAMINE 30 MG/ML
15 SYRINGE (ML) INJECTION ONCE
Refills: 0 | Status: DISCONTINUED | OUTPATIENT
Start: 2024-02-20 | End: 2024-02-20

## 2024-02-20 RX ORDER — SODIUM CHLORIDE 9 MG/ML
1000 INJECTION INTRAMUSCULAR; INTRAVENOUS; SUBCUTANEOUS ONCE
Refills: 0 | Status: COMPLETED | OUTPATIENT
Start: 2024-02-20 | End: 2024-02-20

## 2024-02-20 RX ORDER — PANTOPRAZOLE SODIUM 20 MG/1
1 TABLET, DELAYED RELEASE ORAL
Qty: 30 | Refills: 0
Start: 2024-02-20 | End: 2024-03-20

## 2024-02-20 RX ORDER — LIDOCAINE 4 G/100G
1 CREAM TOPICAL ONCE
Refills: 0 | Status: COMPLETED | OUTPATIENT
Start: 2024-02-20 | End: 2024-02-20

## 2024-02-20 RX ORDER — METOCLOPRAMIDE HCL 10 MG
10 TABLET ORAL ONCE
Refills: 0 | Status: COMPLETED | OUTPATIENT
Start: 2024-02-20 | End: 2024-02-20

## 2024-02-20 RX ORDER — HYDROMORPHONE HYDROCHLORIDE 2 MG/ML
4 INJECTION INTRAMUSCULAR; INTRAVENOUS; SUBCUTANEOUS ONCE
Refills: 0 | Status: DISCONTINUED | OUTPATIENT
Start: 2024-02-20 | End: 2024-02-20

## 2024-02-20 RX ORDER — ONDANSETRON 8 MG/1
4 TABLET, FILM COATED ORAL ONCE
Refills: 0 | Status: COMPLETED | OUTPATIENT
Start: 2024-02-20 | End: 2024-02-20

## 2024-02-20 RX ORDER — FAMOTIDINE 10 MG/ML
20 INJECTION INTRAVENOUS ONCE
Refills: 0 | Status: COMPLETED | OUTPATIENT
Start: 2024-02-20 | End: 2024-02-20

## 2024-02-20 RX ADMIN — FAMOTIDINE 100 MILLIGRAM(S): 10 INJECTION INTRAVENOUS at 13:51

## 2024-02-20 RX ADMIN — HYDROMORPHONE HYDROCHLORIDE 4 MILLIGRAM(S): 2 INJECTION INTRAMUSCULAR; INTRAVENOUS; SUBCUTANEOUS at 18:07

## 2024-02-20 RX ADMIN — SODIUM CHLORIDE 1000 MILLILITER(S): 9 INJECTION INTRAMUSCULAR; INTRAVENOUS; SUBCUTANEOUS at 13:51

## 2024-02-20 RX ADMIN — Medication 104 MILLIGRAM(S): at 14:35

## 2024-02-20 RX ADMIN — LIDOCAINE 1 PATCH: 4 CREAM TOPICAL at 14:33

## 2024-02-20 RX ADMIN — Medication 15 MILLIGRAM(S): at 14:50

## 2024-02-20 RX ADMIN — Medication 1000 MILLIGRAM(S): at 15:04

## 2024-02-20 RX ADMIN — ONDANSETRON 4 MILLIGRAM(S): 8 TABLET, FILM COATED ORAL at 13:51

## 2024-02-20 RX ADMIN — SODIUM CHLORIDE 1000 MILLILITER(S): 9 INJECTION INTRAMUSCULAR; INTRAVENOUS; SUBCUTANEOUS at 14:34

## 2024-02-20 RX ADMIN — Medication 400 MILLIGRAM(S): at 14:34

## 2024-02-20 RX ADMIN — Medication 15 MILLIGRAM(S): at 14:35

## 2024-02-20 RX ADMIN — ONDANSETRON 4 MILLIGRAM(S): 8 TABLET, FILM COATED ORAL at 16:45

## 2024-02-20 RX ADMIN — HYDROMORPHONE HYDROCHLORIDE 4 MILLIGRAM(S): 2 INJECTION INTRAMUSCULAR; INTRAVENOUS; SUBCUTANEOUS at 17:07

## 2024-02-20 NOTE — ED PROVIDER NOTE - PATIENT PORTAL LINK FT
You can access the FollowMyHealth Patient Portal offered by [facility] by registering at the following website: [email or web address]. By joining [facility]’s FollowMyHealth portal, you will also be able to view your health information using other applications (apps) compatible with our system.

## 2024-02-20 NOTE — ED PROVIDER NOTE - OBJECTIVE STATEMENT
59 y/o M with PMH of HTN, DM2, Dyslipidemia, CAD s/p MI s/p PTCA, PAD, COVID-19, Chronic Gastritis, Gastroparesis, GERD, Spinal Stenosis with radiculopathy, Chronic Back Pain opioid dependant, BPH, Obesity, Anxiety, and Depression presents c diffuse abd pain  c 3 days, vomited x3/day.  No fever.  Chills.   Simiilar episode about 8-9 months ago admitted to [facility].  pain management tg Pham.

## 2024-02-20 NOTE — ED PROVIDER NOTE - PROGRESS NOTE DETAILS
All results were explained to patient and/or family and a copy of all available results given.  Pt drank and took PO dilaudid s complication.  Patient was asleep.

## 2024-02-20 NOTE — ED ADULT NURSE NOTE - HIV OFFER
Patient tolerated procedure well without complication, hemostasis was obtained   Previously Declined (within the last year)

## 2024-02-20 NOTE — ED ADULT NURSE NOTE - OBJECTIVE STATEMENT
Patient came from home with complaint of vomiting and abdominal pain since last night. pt was seen here yesterday for same complaint with CT w/up negative, pt was given fluids, Dilaudid, zofran and dc'ed. pt reports hx of gastroparesis. denies any cp, sob, fevers.

## 2024-02-20 NOTE — ED PROVIDER NOTE - CARE PROVIDER_API CALL
Edinson Wolf  Gastroenterology  [address] [place] [address], [address]  [place] [address]  Phone: [phone]  Fax: [phone]  Follow Up Time: 1-3 Days

## 2024-02-20 NOTE — ED ADULT NURSE NOTE - NSFALLUNIVINTERV_ED_ALL_ED
Bed/Stretcher in lowest position, wheels locked, appropriate side rails in place/Call bell, personal items and telephone in reach/Instruct patient to call for assistance before getting out of bed/chair/stretcher/Non-slip footwear applied when patient is off stretcher/[place] to call system/Physically safe environment - no spills, clutter or unnecessary equipment/Purposeful proactive rounding/Room/bathroom lighting operational, light cord in reach

## 2024-02-23 ENCOUNTER — APPOINTMENT (OUTPATIENT)
Dept: PAIN MANAGEMENT | Facility: CLINIC | Age: 59
End: 2024-02-23
Payer: OTHER MISCELLANEOUS

## 2024-02-23 PROCEDURE — 99213 OFFICE O/P EST LOW 20 MIN: CPT

## 2024-02-23 RX ORDER — NALOXONE HYDROCHLORIDE 4 MG/.1ML
4 SPRAY NASAL
Qty: 1 | Refills: 0 | Status: ACTIVE | COMMUNITY
Start: 2023-09-05 | End: 1900-01-01

## 2024-02-23 NOTE — HISTORY OF PRESENT ILLNESS
[Neck] : neck [Left Arm] : left arm [7] : 7 [5] : 5 [Burning] : burning [Dull/Aching] : dull/aching [Shooting] : shooting [Constant] : constant [Household chores] : household chores [Leisure] : leisure [Sleep] : sleep [Meds] : meds [Disabled] : Work status: disabled [Home] : at home, [unfilled] , at the time of the visit. [Medical Office: ([facility])___] : at the medical office located in  [Verbal consent obtained from patient] : the patient, [unfilled] [] : Patient is currently playing sports: no [FreeTextEntry1] : LEFT HAND [FreeTextEntry6] : ELECTRIC SHOCK LIKE [FreeTextEntry7] : DOWN ARM [de-identified] : TOO MUCH ACTIVITY [de-identified] : 2022

## 2024-02-23 NOTE — ASSESSMENT
[FreeTextEntry1] : Interim history The patient is tolerating their medications without problems.  The patient was in the emergency room with a diagnosis of gastroparesis and received IV narcotics and part of the treatment.  He feels that with his diabetes is diet related issue because of the gastroparesis.. The use of medications appears appropriate and there are no aberrant behaviors noted. Side effects to current medications are denied. Average pain score for the month is ?? out of ten. The patient's current medications are documented to the best of their ability. THe  was obtained and reviewed prior to the visit, and any discrepancies were discussed with the patient. Objective information Since the last visit there are no additional radiologic studies, labs, or pain complaints. There are no changes in the patient's physical status. Plan The patient was given refill of their medication at their current level and will return to the office as needed for follow-up. The patient is showing no aberrant behavior or evidence of diversion. Opioid contract and opioid risk assessment on chart.  reviewed and any discrepancy discussed with patent. Applicable urine toxicology reviewed and recorded in the patient's electronic record. Urine toxicology is ordered for patient per office protocol or patient's risk assessment.  Patient will follow-up in 1 month unless new issues arise the patient has returned earlier.  Patient will have an appoint with a gastroenterologist to try to rule out the reason for this gastroparesisShe.

## 2024-02-27 ENCOUNTER — APPOINTMENT (OUTPATIENT)
Dept: ORTHOPEDIC SURGERY | Facility: CLINIC | Age: 59
End: 2024-02-27

## 2024-03-22 LAB
25(OH)D3 SERPL-MCNC: 31.3 NG/ML
ALBUMIN SERPL ELPH-MCNC: 4.3 G/DL
ALP BLD-CCNC: 130 U/L
ALT SERPL-CCNC: 24 U/L
ANA SER IF-ACNC: NEGATIVE
ANION GAP SERPL CALC-SCNC: 17 MMOL/L
AST SERPL-CCNC: 8 U/L
BILIRUB SERPL-MCNC: 0.3 MG/DL
BUN SERPL-MCNC: 15 MG/DL
CALCIUM SERPL-MCNC: 9.7 MG/DL
CHLORIDE SERPL-SCNC: 103 MMOL/L
CHOLEST SERPL-MCNC: 150 MG/DL
CO2 SERPL-SCNC: 22 MMOL/L
CREAT SERPL-MCNC: 0.94 MG/DL
CREAT SPEC-SCNC: 61 MG/DL
CRP SERPL-MCNC: 6 MG/L
EGFR: 96 ML/MIN/1.73M2
ERYTHROCYTE [SEDIMENTATION RATE] IN BLOOD BY WESTERGREN METHOD: 17 MM/HR
ESTIMATED AVERAGE GLUCOSE: 206 MG/DL
FERRITIN SERPL-MCNC: 25 NG/ML
GLUCOSE SERPL-MCNC: 245 MG/DL
HBA1C MFR BLD HPLC: 8.8 %
HDLC SERPL-MCNC: 45 MG/DL
IRON SATN MFR SERPL: 12 %
IRON SERPL-MCNC: 47 UG/DL
LDLC SERPL CALC-MCNC: 65 MG/DL
MICROALBUMIN 24H UR DL<=1MG/L-MCNC: <1.2 MG/DL
MICROALBUMIN/CREAT 24H UR-RTO: NORMAL MG/G
NONHDLC SERPL-MCNC: 105 MG/DL
POTASSIUM SERPL-SCNC: 4 MMOL/L
PROT SERPL-MCNC: 6.6 G/DL
PSA SERPL-MCNC: 0.2 NG/ML
RHEUMATOID FACT SER QL: <10 IU/ML
SODIUM SERPL-SCNC: 142 MMOL/L
TIBC SERPL-MCNC: 391 UG/DL
TRANSFERRIN SERPL-MCNC: 329 MG/DL
TRIGL SERPL-MCNC: 197 MG/DL
TSH SERPL-ACNC: 2.13 UIU/ML
UIBC SERPL-MCNC: 344 UG/DL

## 2024-04-01 ENCOUNTER — APPOINTMENT (OUTPATIENT)
Dept: PAIN MANAGEMENT | Facility: CLINIC | Age: 59
End: 2024-04-01
Payer: OTHER MISCELLANEOUS

## 2024-04-01 VITALS — HEIGHT: 74 IN | BODY MASS INDEX: 31.57 KG/M2 | WEIGHT: 246 LBS

## 2024-04-01 PROCEDURE — 99213 OFFICE O/P EST LOW 20 MIN: CPT

## 2024-04-01 RX ORDER — HYDROMORPHONE HYDROCHLORIDE 4 MG/1
4 TABLET ORAL
Qty: 120 | Refills: 0 | Status: ACTIVE | COMMUNITY
Start: 2023-06-12 | End: 1900-01-01

## 2024-04-01 NOTE — HISTORY OF PRESENT ILLNESS
[Neck] : neck [Left Arm] : left arm [7] : 7 [5] : 5 [Burning] : burning [Dull/Aching] : dull/aching [Shooting] : shooting [Constant] : constant [Leisure] : leisure [Household chores] : household chores [Sleep] : sleep [Meds] : meds [Disabled] : Work status: disabled [] : Patient is currently playing sports: no [FreeTextEntry1] : LEFT HAND [FreeTextEntry6] : ELECTRIC SHOCK LIKE [FreeTextEntry7] : DOWN ARM [de-identified] : TOO MUCH ACTIVITY [de-identified] : 2022

## 2024-04-08 ENCOUNTER — APPOINTMENT (OUTPATIENT)
Dept: FAMILY MEDICINE | Facility: CLINIC | Age: 59
End: 2024-04-08

## 2024-04-15 ENCOUNTER — APPOINTMENT (OUTPATIENT)
Dept: FAMILY MEDICINE | Facility: CLINIC | Age: 59
End: 2024-04-15
Payer: MEDICARE

## 2024-04-15 VITALS
HEART RATE: 78 BPM | SYSTOLIC BLOOD PRESSURE: 100 MMHG | DIASTOLIC BLOOD PRESSURE: 67 MMHG | TEMPERATURE: 98.6 F | HEIGHT: 74 IN | WEIGHT: 245 LBS | OXYGEN SATURATION: 98 % | RESPIRATION RATE: 16 BRPM | BODY MASS INDEX: 31.44 KG/M2

## 2024-04-15 DIAGNOSIS — E11.9 TYPE 2 DIABETES MELLITUS W/OUT COMPLICATIONS: ICD-10-CM

## 2024-04-15 DIAGNOSIS — D50.9 IRON DEFICIENCY ANEMIA, UNSPECIFIED: ICD-10-CM

## 2024-04-15 DIAGNOSIS — I25.10 ATHEROSCLEROTIC HEART DISEASE OF NATIVE CORONARY ARTERY W/OUT ANGINA PECTORIS: ICD-10-CM

## 2024-04-15 DIAGNOSIS — K21.00 GASTRO-ESOPHAGEAL REFLUX DISEASE WITH ESOPHAGITIS, WITHOUT BLEEDING: ICD-10-CM

## 2024-04-15 PROCEDURE — G0439: CPT

## 2024-04-15 RX ORDER — CYCLOBENZAPRINE HYDROCHLORIDE 10 MG/1
10 TABLET, FILM COATED ORAL
Qty: 30 | Refills: 1 | Status: ACTIVE | COMMUNITY
Start: 2024-04-15 | End: 1900-01-01

## 2024-04-15 RX ORDER — TADALAFIL 5 MG/1
5 TABLET ORAL
Qty: 30 | Refills: 0 | Status: ACTIVE | COMMUNITY
Start: 2019-12-02 | End: 1900-01-01

## 2024-04-15 RX ORDER — APIXABAN 5 MG/1
5 TABLET, FILM COATED ORAL
Qty: 180 | Refills: 3 | Status: ACTIVE | COMMUNITY
Start: 2022-07-30 | End: 1900-01-01

## 2024-04-15 RX ORDER — ATORVASTATIN CALCIUM 80 MG/1
80 TABLET, FILM COATED ORAL
Qty: 90 | Refills: 3 | Status: ACTIVE | COMMUNITY
Start: 2022-04-09 | End: 1900-01-01

## 2024-04-15 RX ORDER — CARVEDILOL 25 MG/1
25 TABLET, FILM COATED ORAL
Qty: 60 | Refills: 3 | Status: ACTIVE | COMMUNITY
Start: 2023-10-25 | End: 1900-01-01

## 2024-04-15 RX ORDER — MULTIVIT-MIN/FOLIC/VIT K/LYCOP 400-300MCG
1000 TABLET ORAL DAILY
Qty: 90 | Refills: 3 | Status: ACTIVE | COMMUNITY
Start: 2023-07-27 | End: 1900-01-01

## 2024-04-16 PROBLEM — D50.9 IRON DEFICIENCY ANEMIA, UNSPECIFIED IRON DEFICIENCY ANEMIA TYPE: Status: ACTIVE | Noted: 2023-07-27

## 2024-04-16 PROBLEM — I25.10 CAD IN NATIVE ARTERY: Status: ACTIVE | Noted: 2022-03-14

## 2024-04-16 PROBLEM — K21.00 GASTROESOPHAGEAL REFLUX DISEASE WITH ESOPHAGITIS: Status: ACTIVE | Noted: 2018-09-18

## 2024-04-16 NOTE — HISTORY OF PRESENT ILLNESS
[de-identified] : 58-year-old male presents for annual health maintenance and physical examination. He discusses flare-ups of gastric issues with certain foods, low blood sugar episodes, back pain, shooting pains in legs and groin area. His shooting pains are characterized as moderate to severe. His gastric issues flare with fried or heavy foods. MRI of the lumbar spine in 2021 showing mild degenerative changes, disc bulging, herniations, and hypertrophy. Recent MRI of the entire spine at [facility] showing worsening of findings. Neck vessel study in March 2022 showing no carotid artery stenosis.  Noteworthy that during encounter, blood sugar spike-alarm went off measuring at 251 - patient had NO food or drink today

## 2024-04-16 NOTE — HISTORY OF PRESENT ILLNESS
[de-identified] : 58-year-old male presents for annual health maintenance and physical examination. He discusses flare-ups of gastric issues with certain foods, low blood sugar episodes, back pain, shooting pains in legs and groin area. His shooting pains are characterized as moderate to severe. His gastric issues flare with fried or heavy foods. MRI of the lumbar spine in 2021 showing mild degenerative changes, disc bulging, herniations, and hypertrophy. Recent MRI of the entire spine at [facility] showing worsening of findings. Neck vessel study in March 2022 showing no carotid artery stenosis.  Noteworthy that during encounter, blood sugar spike-alarm went off measuring at 251 - patient had NO food or drink today

## 2024-04-16 NOTE — PHYSICAL EXAM
[Normal] : no joint swelling and grossly normal strength and tone [de-identified] : Tenderness on palpation of the lower back, especially on the paraspinal areas bilaterally. No radiculopathy on palpation, but wrapping pain radiating to the groin area noted

## 2024-04-16 NOTE — PHYSICAL EXAM
[Normal] : no joint swelling and grossly normal strength and tone [de-identified] : Tenderness on palpation of the lower back, especially on the paraspinal areas bilaterally. No radiculopathy on palpation, but wrapping pain radiating to the groin area noted

## 2024-04-16 NOTE — REVIEW OF SYSTEMS
[Abdominal Pain] : abdominal pain [Nausea] : no nausea [Constipation] : no constipation [Diarrhea] : no diarrhea [Vomiting] : no vomiting [Heartburn] : heartburn [Melena] : no melena [Back Pain] : back pain [Negative] : Respiratory

## 2024-04-18 NOTE — ED ADULT TRIAGE NOTE - CHIEF COMPLAINT QUOTE
Caller: Khalida Gilliland    Relationship: Self    Best call back number:     [phone] (Mobile)       What is the best time to reach you: ANY     Who are you requesting to speak with (clinical staff, provider,  specific staff member): CLINICAL     Do you know the name of the person who called: N/A     What was the call regarding: WANTS TO KNOW THE STATUS OF THE AUTHORIZATION FOR PREGABLIN      Is it okay if the provider responds through MyChart:NO   
Vomiting, abdominal pain

## 2024-04-22 NOTE — PHYSICAL THERAPY INITIAL EVALUATION ADULT - LEVEL OF INDEPENDENCE: SUPINE/SIT, REHAB EVAL
12 MONTH WELL CHILD EXAM      Simone is a 12 m.o.male     History given by Mother    CONCERNS/QUESTIONS: Yes   Occasional bleeding with teeth brushing     IMMUNIZATION: up to date and documented     NUTRITION, ELIMINATION, SLEEP, SOCIAL      NUTRITION HISTORY:   Formula, approximately 20 oz daily   Vegetables? Yes  Fruits? Yes  Meats? Yes  Juice? Rarely   Water? Yes  Milk? Yes, Type: cow, 10 oz per day    ELIMINATION:   Has ample  wet diapers per day and BM is soft.     SLEEP PATTERN:   Night time feedings: Yes, rarely   Sleeps through the night? Yes  Sleeps in crib? Yes  Sleeps with parent?  No    SOCIAL HISTORY:   The patient lives at home with patient, mother, father, and does not attend day care. Has 1 siblings.  Smokers at home? No     HISTORY     Patient's medications, allergies, past medical, surgical, social and family histories were reviewed and updated as appropriate.    History reviewed. No pertinent past medical history.  Patient Active Problem List    Diagnosis Date Noted    [place] infant of 39 completed weeks of gestation 2023     No past surgical history on file.  History reviewed. No pertinent family history.  Current Outpatient Medications   Medication Sig Dispense Refill    Pediatric Vitamin ACD-Fl (MULTIVITAMIN SELECT/FLUORIDE) 0.25 MG/ML Solution Take 1 Drop by mouth every day. 50 mL 0     No current facility-administered medications for this visit.     No Known Allergies    REVIEW OF SYSTEMS     Constitutional: Afebrile, good appetite, alert.  HENT: No abnormal head shape, No congestion, no nasal drainage.  Eyes: Negative for any discharge in eyes, appears to focus, not cross eyed.  Respiratory: Negative for any difficulty breathing or noisy breathing.   Cardiovascular: Negative for changes in color/ activity.   Gastrointestinal: Negative for any vomiting or excessive spitting up, constipation or blood in stool.  Genitourinary: ample amount of wet diapers.   Musculoskeletal: Negative  "for any sign of arm pain or leg pain with movement.   Skin: Negative for rash or skin infection.  Neurological: Negative for any weakness or decrease in strength.     Psychiatric/Behavioral: Appropriate for age.     DEVELOPMENTAL SURVEILLANCE      Walks? No  [place] Objects? Yes  Uses cup? Yes  Object permanence? Yes  Stands alone? Yes  Cruises? Yes  Pincer grasp? Yes  Pat-a-cake? Yes  Specific ma-ma, da-da? Yes   food and feed self? Yes    SCREENINGS     LEAD ASSESSMENT and ANEMIA ASSESSMENT: Have placed lab order    ORAL HEALTH:   Primary water source is deficient in fluoride? yes  Oral Fluoride Supplementation recommended? yes  Cleaning teeth twice a day, daily oral fluoride? yes  Established dental home?Yes    ARE SELECTIVE SCREENING INDICATED WITH SPECIFIC RISK CONDITIONS: ie Blood pressure indicated? Dyslipidemia indicated ? : No    TB RISK ASSESMENT:   Has child been diagnosed with AIDS? Has family member had a positive TB test? Travel to high risk country? No    OBJECTIVE      Pulse 128   Temp 36.7 °C (98 °F)   Resp 32   Ht 0.826 m (2' 8.5\")   Wt 11.7 kg (25 lb 11.2 oz)   HC 48.3 cm (19\")   BMI 17.11 kg/m²   Length - >99 %ile (Z= 2.44) based on WHO (Boys, 0-2 years) Length-for-age data based on Length recorded on 4/22/2024.  Weight - 94 %ile (Z= 1.57) based on WHO (Boys, 0-2 years) weight-for-age data using vitals from 4/22/2024.  HC - 94 %ile (Z= 1.53) based on WHO (Boys, 0-2 years) head circumference-for-age based on Head Circumference recorded on 4/22/2024.    GENERAL: This is an alert, active child in no distress.   HEAD: Normocephalic, atraumatic. Anterior fontanelle is open, soft and flat.   EYES: PERRL, positive red reflex bilaterally. No conjunctival infection or discharge.   EARS: TM’s are transparent with good landmarks. Canals are patent.  NOSE: Nares are patent and free of congestion.  MOUTH: Dentition appears normal without significant decay. Upper lip frenulum extending into " gumline  THROAT: Oropharynx has no lesions, moist mucus membranes. Pharynx without erythema, tonsils normal.  NECK: Supple, no lymphadenopathy or masses.   HEART: Regular rate and rhythm without murmur. Brachial and femoral pulses are 2+ and equal.   LUNGS: Clear bilaterally to auscultation, no wheezes or rhonchi. No retractions, nasal flaring, or distress noted.  ABDOMEN: Normal bowel sounds, soft and non-tender without hepatomegaly or splenomegaly or masses.   GENITALIA: Normal male genitalia. .   MUSCULOSKELETAL: Hips have normal range of motion with negative Hurt and Ortolani. Spine is straight. Extremities are without abnormalities. Moves all extremities well and symmetrically with normal tone.    NEURO: Active, alert, oriented per age.    SKIN: Intact without significant rash or birthmarks. Skin is warm, dry, and pink.     ASSESSMENT AND PLAN     1. Well Child Exam:  Healthy 12 m.o.  old with good growth and development.   Anticipatory guidance was reviewed and age appropriate Bright Futures handout provided.  2. Return to clinic for 15 month well child exam or as needed.  3. Immunizations given today: HIB, PCV 20, MMR, and Hep A.  4. Vaccine Information statements given for each vaccine if administered. Discussed benefits and side effects of each vaccine given with patient/family and answered all patient/family questions.   5. Establish Dental home and have twice yearly dental exams.  6. Safety Priority: Car safety seats, poisoning, sun protection, firearm safety, safe home environment.      supervision

## 2024-04-29 ENCOUNTER — APPOINTMENT (OUTPATIENT)
Dept: PAIN MANAGEMENT | Facility: CLINIC | Age: 59
End: 2024-04-29

## 2024-04-29 NOTE — HISTORY OF PRESENT ILLNESS
[] : Patient is currently playing sports: no [FreeTextEntry1] : LEFT HAND [FreeTextEntry6] : ELECTRIC SHOCK LIKE [FreeTextEntry7] : DOWN ARM [de-identified] : TOO MUCH ACTIVITY [de-identified] : 2022

## 2024-05-01 ENCOUNTER — APPOINTMENT (OUTPATIENT)
Dept: PAIN MANAGEMENT | Facility: CLINIC | Age: 59
End: 2024-05-01

## 2024-05-01 ENCOUNTER — APPOINTMENT (OUTPATIENT)
Dept: ORTHOPEDIC SURGERY | Facility: CLINIC | Age: 59
End: 2024-05-01
Payer: MEDICARE

## 2024-05-01 VITALS — WEIGHT: 245 LBS | BODY MASS INDEX: 31.44 KG/M2 | HEIGHT: 74 IN

## 2024-05-01 PROCEDURE — 72170 X-RAY EXAM OF PELVIS: CPT

## 2024-05-01 PROCEDURE — 72110 X-RAY EXAM L-2 SPINE 4/>VWS: CPT

## 2024-05-01 PROCEDURE — 99204 OFFICE O/P NEW MOD 45 MIN: CPT

## 2024-05-01 NOTE — DISCUSSION/SUMMARY
[de-identified] : X-rays reviewed and discussed. MRI reviewed from [facility] portal.  Discussed surgical intervention. He states he is no longer able to manage his pain.  Surgical intervention would involve L4-5, one level fusion.  It is expected for his symptoms to improve but it is unlikely for all his back pain to resolve. He understood.  Discussed the surgical options of ALIF vs XLIF vs TLIF.  Patient will think about the surgical options and do some research.  RTO 4-6 weeks.

## 2024-05-01 NOTE — DATA REVIEWED
[MRI] : MRI [Cervical Spine] : cervical spine [Thoracic Spine] : thoracic spine [Lumbar Spine] : lumbar spine [I independently reviewed and interpreted images and report] : I independently reviewed and interpreted images and report [FreeTextEntry1] : [facility] 3/30/2024:   Edema of the L4-L5 endplates and associated disc space without discrete epidural collection. Findings may be related to active degenerative change though underlying infection is not excluded given disc edema.   Degenerative change as detailed above.   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

## 2024-05-01 NOTE — HISTORY OF PRESENT ILLNESS
[Lower back] : lower back [8] : 8 [7] : 7 [Dull/Aching] : dull/aching [Localized] : localized [Frequent] : frequent [Meds] : meds [Nothing helps with pain getting better] : Nothing helps with pain getting better [Sitting] : sitting [Standing] : standing [de-identified] : 58 year old male presenting with lower back pain for many years. He has been under that care of another ortho and pain management. Pain management has not been successful in managing his pain. He has done extended PT. he has had previous epidural without relief. No injury/trauma. Comes in with imaging from [facility]. He has had previous neck fusion C4-C7.  [] : no [FreeTextEntry5] : 59yo M here for L-spine eval. Has had pain for years (sports injury) but pain has worsened this last year.  S/p 4 C-spine surgeries/nerve transplant.

## 2024-05-03 ENCOUNTER — APPOINTMENT (OUTPATIENT)
Dept: PAIN MANAGEMENT | Facility: CLINIC | Age: 59
End: 2024-05-03
Payer: MEDICARE

## 2024-05-03 PROCEDURE — 99213 OFFICE O/P EST LOW 20 MIN: CPT

## 2024-05-03 NOTE — HISTORY OF PRESENT ILLNESS
[Neck] : neck [Left Arm] : left arm [7] : 7 [5] : 5 [Burning] : burning [Dull/Aching] : dull/aching [Shooting] : shooting [Constant] : constant [Household chores] : household chores [Leisure] : leisure [Sleep] : sleep [Meds] : meds [Disabled] : Work status: disabled [Home] : at home, [unfilled] , at the time of the visit. [Medical Office: ([facility])___] : at the medical office located in  [Verbal consent obtained from patient] : the patient, [unfilled] [] : Patient is currently playing sports: no [FreeTextEntry1] : LEFT HAND [FreeTextEntry6] : ELECTRIC SHOCK LIKE [FreeTextEntry7] : DOWN ARM [de-identified] : TOO MUCH ACTIVITY [de-identified] : 2022

## 2024-05-10 ENCOUNTER — RX CHANGE (OUTPATIENT)
Age: 59
End: 2024-05-10

## 2024-05-10 RX ORDER — PANTOPRAZOLE 40 MG/1
40 TABLET, DELAYED RELEASE ORAL
Qty: 30 | Refills: 3 | Status: ACTIVE | COMMUNITY
Start: 2024-05-10 | End: 1900-01-01

## 2024-05-10 RX ORDER — PANTOPRAZOLE 40 MG/1
40 TABLET, DELAYED RELEASE ORAL
Qty: 30 | Refills: 3 | Status: DISCONTINUED | COMMUNITY
Start: 2022-04-09 | End: 2024-05-10

## 2024-05-16 ENCOUNTER — APPOINTMENT (OUTPATIENT)
Dept: ORTHOPEDIC SURGERY | Facility: CLINIC | Age: 59
End: 2024-05-16
Payer: MEDICARE

## 2024-05-16 VITALS — BODY MASS INDEX: 31.44 KG/M2 | WEIGHT: 245 LBS | HEIGHT: 74 IN

## 2024-05-16 DIAGNOSIS — G89.4 CHRONIC PAIN SYNDROME: ICD-10-CM

## 2024-05-16 PROCEDURE — 99214 OFFICE O/P EST MOD 30 MIN: CPT

## 2024-05-21 PROBLEM — G89.4 CHRONIC PAIN SYNDROME: Status: ACTIVE | Noted: 2022-12-23

## 2024-05-21 NOTE — HISTORY OF PRESENT ILLNESS
[Lower back] : lower back [8] : 8 [7] : 7 [Dull/Aching] : dull/aching [Localized] : localized [Frequent] : frequent [Meds] : meds [Nothing helps with pain getting better] : Nothing helps with pain getting better [Sitting] : sitting [Standing] : standing [de-identified] : 5/16/24: Follow Up Lower back. No changes since last visit. Would like to discuss sx options once again.  5/1/24: 58 year old male presenting with lower back pain for many years. He has been under that care of another ortho and pain management. Pain management has not been successful in managing his pain. He has done extended PT. he has had previous epidural without relief. No injury/trauma. Comes in with imaging from [facility]. He has had previous neck fusion C4-C7.  [] : no

## 2024-05-21 NOTE — ASSESSMENT
[FreeTextEntry1] : 57 y/o M with chronic low back pain for many years, managing with extensive conservative care, including PT, meds & ANTHONY with minimal to no relief. Tried to review MRI @[facility] (portal unavailable at the moment), on x-rays there's a collapse at L4-5, will re eval images and confirm surgical planning. Previously discussed one level fusion ALIF vs XLIF.   - Kyle would like to pursue surgery,  The risks and benefits of surgery have been discussed. Risks include but are not limited to bleeding, infection, reaction to anesthesia, injury to blood vessels and nerves, malunion, nonunion, DVT, PE, necessity of repeat surgery, CF leak/repair, chronic pain, loss of limb and death. The patient understands the risks and agrees with the surgical plan. All questions have been answered.

## 2024-05-21 NOTE — IMAGING
[de-identified] : L Spine Inspection: No defects or deformities Palpation: No tenderness or spasms in b/l lumbar paraspinal musculature ROM: Full with stiffness Strength: 5/5 b/l hip flexors, knee extensors, ankle dorsiflexors, EHL, ankle plantarflexors Neuro: Sensation LT I Negative b/l SLR Toe and heal walk intact Gait non antalgic

## 2024-05-21 NOTE — DATA REVIEWED
[MRI] : MRI [Cervical Spine] : cervical spine [Thoracic Spine] : thoracic spine [Lumbar Spine] : lumbar spine [I independently reviewed and interpreted images and report] : I independently reviewed and interpreted images and report [FreeTextEntry1] : [facility] 3/30/2024:   Edema of the L4-L5 endplates and associated disc space without discrete epidural collection. Findings may be related to active degenerative change though underlying infection is not excluded given disc edema.   Degenerative change as detailed above.   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

## 2024-05-23 DIAGNOSIS — M54.50 LOW BACK PAIN, UNSPECIFIED: ICD-10-CM

## 2024-05-24 ENCOUNTER — APPOINTMENT (OUTPATIENT)
Dept: MRI IMAGING | Facility: CLINIC | Age: 59
End: 2024-05-24
Payer: MEDICARE

## 2024-05-24 ENCOUNTER — LABORATORY RESULT (OUTPATIENT)
Age: 59
End: 2024-05-24

## 2024-05-24 PROCEDURE — 72158 MRI LUMBAR SPINE W/O & W/DYE: CPT | Mod: MH

## 2024-05-28 ENCOUNTER — INPATIENT (INPATIENT)
Facility: HOSPITAL | Age: 59
LOS: 9 days | Discharge: HOME | DRG: 914 | End: 2024-06-07
Attending: STUDENT IN AN ORGANIZED HEALTH CARE EDUCATION/TRAINING PROGRAM
Payer: MEDICARE

## 2024-05-28 VITALS
DIASTOLIC BLOOD PRESSURE: 71 MMHG | TEMPERATURE: 98 F | HEART RATE: 90 BPM | WEIGHT: 248.02 LBS | SYSTOLIC BLOOD PRESSURE: 104 MMHG | HEIGHT: 74 IN | OXYGEN SATURATION: 100 % | RESPIRATION RATE: 18 BRPM

## 2024-05-28 DIAGNOSIS — Z98.890 OTHER SPECIFIED POSTPROCEDURAL STATES: Chronic | ICD-10-CM

## 2024-05-28 DIAGNOSIS — Z98.1 ARTHRODESIS STATUS: Chronic | ICD-10-CM

## 2024-05-28 DIAGNOSIS — S09.90XA UNSPECIFIED INJURY OF HEAD, INITIAL ENCOUNTER: ICD-10-CM

## 2024-05-28 LAB
ALBUMIN SERPL ELPH-MCNC: 3.2 G/DL — LOW (ref 3.3–5)
ALP SERPL-CCNC: 89 U/L — SIGNIFICANT CHANGE UP (ref 40–120)
ALT FLD-CCNC: 36 U/L — SIGNIFICANT CHANGE UP (ref 12–78)
ANION GAP SERPL CALC-SCNC: 4 MMOL/L — LOW (ref 5–17)
AST SERPL-CCNC: 14 U/L — LOW (ref 15–37)
BILIRUB SERPL-MCNC: 0.5 MG/DL — SIGNIFICANT CHANGE UP (ref 0.2–1.2)
BUN SERPL-MCNC: 14 MG/DL — SIGNIFICANT CHANGE UP (ref 7–23)
CALCIUM SERPL-MCNC: 8.9 MG/DL — SIGNIFICANT CHANGE UP (ref 8.5–10.1)
CHLORIDE SERPL-SCNC: 108 MMOL/L — SIGNIFICANT CHANGE UP (ref 96–108)
CO2 SERPL-SCNC: 27 MMOL/L — SIGNIFICANT CHANGE UP (ref 22–31)
CREAT SERPL-MCNC: 0.68 MG/DL — SIGNIFICANT CHANGE UP (ref 0.5–1.3)
EGFR: 109 ML/MIN/1.73M2 — SIGNIFICANT CHANGE UP
GLUCOSE SERPL-MCNC: 92 MG/DL — SIGNIFICANT CHANGE UP (ref 70–99)
HCT VFR BLD CALC: 39.7 % — SIGNIFICANT CHANGE UP (ref 39–50)
HGB BLD-MCNC: 12.5 G/DL — LOW (ref 13–17)
MCHC RBC-ENTMCNC: 27.5 PG — SIGNIFICANT CHANGE UP (ref 27–34)
MCHC RBC-ENTMCNC: 31.5 GM/DL — LOW (ref 32–36)
MCV RBC AUTO: 87.3 FL — SIGNIFICANT CHANGE UP (ref 80–100)
NRBC # BLD: 0 /100 WBCS — SIGNIFICANT CHANGE UP (ref 0–0)
PLATELET # BLD AUTO: 328 K/UL — SIGNIFICANT CHANGE UP (ref 150–400)
POTASSIUM SERPL-MCNC: 4.3 MMOL/L — SIGNIFICANT CHANGE UP (ref 3.5–5.3)
POTASSIUM SERPL-SCNC: 4.3 MMOL/L — SIGNIFICANT CHANGE UP (ref 3.5–5.3)
PROT SERPL-MCNC: 6.7 G/DL — SIGNIFICANT CHANGE UP (ref 6–8.3)
RBC # BLD: 4.55 M/UL — SIGNIFICANT CHANGE UP (ref 4.2–5.8)
RBC # FLD: 15 % — HIGH (ref 10.3–14.5)
SODIUM SERPL-SCNC: 139 MMOL/L — SIGNIFICANT CHANGE UP (ref 135–145)
WBC # BLD: 8.5 K/UL — SIGNIFICANT CHANGE UP (ref 3.8–10.5)
WBC # FLD AUTO: 8.5 K/UL — SIGNIFICANT CHANGE UP (ref 3.8–10.5)

## 2024-05-28 PROCEDURE — 99223 1ST HOSP IP/OBS HIGH 75: CPT

## 2024-05-28 PROCEDURE — 72125 CT NECK SPINE W/O DYE: CPT | Mod: 26,MC

## 2024-05-28 PROCEDURE — 71100 X-RAY EXAM RIBS UNI 2 VIEWS: CPT | Mod: 26

## 2024-05-28 PROCEDURE — 99285 EMERGENCY DEPT VISIT HI MDM: CPT | Mod: FS

## 2024-05-28 PROCEDURE — 70450 CT HEAD/BRAIN W/O DYE: CPT | Mod: 26,MC

## 2024-05-28 RX ORDER — LIDOCAINE 4 G/100G
1 CREAM TOPICAL ONCE
Refills: 0 | Status: COMPLETED | OUTPATIENT
Start: 2024-05-28 | End: 2024-05-28

## 2024-05-28 RX ORDER — ONDANSETRON 8 MG/1
4 TABLET, FILM COATED ORAL ONCE
Refills: 0 | Status: COMPLETED | OUTPATIENT
Start: 2024-05-28 | End: 2024-05-28

## 2024-05-28 RX ORDER — HYDROMORPHONE HYDROCHLORIDE 2 MG/ML
1 INJECTION INTRAMUSCULAR; INTRAVENOUS; SUBCUTANEOUS ONCE
Refills: 0 | Status: DISCONTINUED | OUTPATIENT
Start: 2024-05-28 | End: 2024-05-28

## 2024-05-28 RX ORDER — KETOROLAC TROMETHAMINE 30 MG/ML
30 SYRINGE (ML) INJECTION ONCE
Refills: 0 | Status: DISCONTINUED | OUTPATIENT
Start: 2024-05-28 | End: 2024-05-28

## 2024-05-28 RX ORDER — METHOCARBAMOL 500 MG/1
1000 TABLET, FILM COATED ORAL ONCE
Refills: 0 | Status: COMPLETED | OUTPATIENT
Start: 2024-05-28 | End: 2024-05-28

## 2024-05-28 RX ORDER — TETANUS TOXOID, REDUCED DIPHTHERIA TOXOID AND ACELLULAR PERTUSSIS VACCINE, ADSORBED 5; 2.5; 8; 8; 2.5 [IU]/.5ML; [IU]/.5ML; UG/.5ML; UG/.5ML; UG/.5ML
0.5 SUSPENSION INTRAMUSCULAR ONCE
Refills: 0 | Status: COMPLETED | OUTPATIENT
Start: 2024-05-28 | End: 2024-05-28

## 2024-05-28 RX ORDER — SODIUM CHLORIDE 9 MG/ML
1000 INJECTION INTRAMUSCULAR; INTRAVENOUS; SUBCUTANEOUS ONCE
Refills: 0 | Status: COMPLETED | OUTPATIENT
Start: 2024-05-28 | End: 2024-05-28

## 2024-05-28 RX ORDER — ACETAMINOPHEN 500 MG
1000 TABLET ORAL ONCE
Refills: 0 | Status: COMPLETED | OUTPATIENT
Start: 2024-05-28 | End: 2024-05-28

## 2024-05-28 RX ORDER — HYDROMORPHONE HYDROCHLORIDE 2 MG/ML
0.5 INJECTION INTRAMUSCULAR; INTRAVENOUS; SUBCUTANEOUS ONCE
Refills: 0 | Status: DISCONTINUED | OUTPATIENT
Start: 2024-05-28 | End: 2024-05-28

## 2024-05-28 RX ADMIN — HYDROMORPHONE HYDROCHLORIDE 1 MILLIGRAM(S): 2 INJECTION INTRAMUSCULAR; INTRAVENOUS; SUBCUTANEOUS at 19:28

## 2024-05-28 RX ADMIN — Medication 400 MILLIGRAM(S): at 15:41

## 2024-05-28 RX ADMIN — Medication 1000 MILLIGRAM(S): at 17:39

## 2024-05-28 RX ADMIN — METHOCARBAMOL 1000 MILLIGRAM(S): 500 TABLET, FILM COATED ORAL at 15:41

## 2024-05-28 RX ADMIN — ONDANSETRON 4 MILLIGRAM(S): 8 TABLET, FILM COATED ORAL at 14:15

## 2024-05-28 RX ADMIN — SODIUM CHLORIDE 1000 MILLILITER(S): 9 INJECTION INTRAMUSCULAR; INTRAVENOUS; SUBCUTANEOUS at 17:39

## 2024-05-28 RX ADMIN — HYDROMORPHONE HYDROCHLORIDE 1 MILLIGRAM(S): 2 INJECTION INTRAMUSCULAR; INTRAVENOUS; SUBCUTANEOUS at 16:11

## 2024-05-28 RX ADMIN — HYDROMORPHONE HYDROCHLORIDE 1 MILLIGRAM(S): 2 INJECTION INTRAMUSCULAR; INTRAVENOUS; SUBCUTANEOUS at 14:45

## 2024-05-28 RX ADMIN — HYDROMORPHONE HYDROCHLORIDE 0.5 MILLIGRAM(S): 2 INJECTION INTRAMUSCULAR; INTRAVENOUS; SUBCUTANEOUS at 22:36

## 2024-05-28 RX ADMIN — TETANUS TOXOID, REDUCED DIPHTHERIA TOXOID AND ACELLULAR PERTUSSIS VACCINE, ADSORBED 0.5 MILLILITER(S): 5; 2.5; 8; 8; 2.5 SUSPENSION INTRAMUSCULAR at 15:45

## 2024-05-28 RX ADMIN — LIDOCAINE 1 PATCH: 4 CREAM TOPICAL at 15:42

## 2024-05-28 RX ADMIN — LIDOCAINE 1 PATCH: 4 CREAM TOPICAL at 19:29

## 2024-05-28 RX ADMIN — HYDROMORPHONE HYDROCHLORIDE 1 MILLIGRAM(S): 2 INJECTION INTRAMUSCULAR; INTRAVENOUS; SUBCUTANEOUS at 15:41

## 2024-05-28 RX ADMIN — HYDROMORPHONE HYDROCHLORIDE 1 MILLIGRAM(S): 2 INJECTION INTRAMUSCULAR; INTRAVENOUS; SUBCUTANEOUS at 14:15

## 2024-05-28 NOTE — ED ADULT NURSE NOTE - NSFALLHARMRISKINTERV_ED_ALL_ED

## 2024-05-28 NOTE — ED PROVIDER NOTE - CLINICAL SUMMARY MEDICAL DECISION MAKING FREE TEXT BOX
fall with neck pain- differential diagnosis inclusive of strain/sprain, fracture. XR, CT, pain meds, re-eval.

## 2024-05-28 NOTE — ED ADULT TRIAGE NOTE - CHIEF COMPLAINT QUOTE
pt to triage a&ox4 to ED s/p fall while walking dog 2 days ago falling onto L side and hit head. takes eliquis. was unable to get up for 10 min. states he feels "foggy". moving all extremities equally. speech clear and logical. no facial droop/arm drift noted

## 2024-05-28 NOTE — ED PROVIDER NOTE - PATIENT PORTAL LINK FT
You can access the FollowMyHealth Patient Portal offered by [facility] by registering at the following website: [email or web address]. By joining [facility]’s FollowMyHealth portal, you will also be able to view your health information using other applications (apps) compatible with our system.

## 2024-05-28 NOTE — ED PROVIDER NOTE - NSFOLLOWUPINSTRUCTIONS_ED_ALL_ED_FT
Incentive spirometer as demonstrated  Continue home medication  Follow up with your primary doctor  / pain management in 1-2 days.  Return to the ED immediately for new or worsening symptoms as we discussed.      English    Head Injury, Adult  Three rear views of the head showing how quick, sudden head movements injure the brain.  There are many types of head injuries. Head injuries can be as minor as a small bump, or they can be a serious medical issue. More severe head injuries include:  A jarring injury to the brain (concussion).  A bruise (contusion) of the brain. This means there is bleeding in the brain that can cause swelling.  A cracked skull (skull fracture).  Bleeding in the brain that collects, clots, and forms a bump (hematoma).  After a head injury, most problems occur within the first 24 hours, but side effects may occur up to 7–10 days after the injury. It is important to watch your condition for any changes. You may need to be observed in the emergency department or urgent care, or you may have to stay in the hospital.    What are the causes?  There are many causes of a head injury. Serious head injuries may be caused by car crashes, bicycle or motorcycle crashes, sports injuries, falls, or being struck by an object.    What are the symptoms?  Symptoms of a head injury include a contusion, bump, or bleeding at the site of the injury. Other physical symptoms may include:  Headache.  Nausea or vomiting.  Dizziness.  Blurred or double vision.  Sensitivity to bright lights or loud noises.  Feeling tired.  Trouble waking up.  Severe symptoms such as:  Weakness or numbness on one side of the body.  Slurred speech or swallowing problems.  Loss of consciousness.  Seizures.  Mental symptoms may include:  Irritability.  Confusion and memory problems.  Poor attention and concentration.  Changes in eating or sleeping habits.  Anxiety or depression.  How is this diagnosed?  This condition is diagnosed based on your symptoms and a physical exam. You may also have imaging tests done, such as a CT scan or an MRI.    How is this treated?  Treatment for this condition depends on the severity and type of injury you have. The main goal of treatment is to prevent complications and allow the brain time to heal.    Mild head injury    If you have a mild head injury, you may be sent home, and treatment may include:  Observation. A responsible adult should stay with you for 24 hours after your injury and check on you often.  Physical rest.  Brain rest.  Pain medicines.  Severe head injury    If you have a severe head injury, treatment may include:  Close observation. You may have to stay in the hospital and have:  Frequent physical exams.  Frequent checks of how your brain and nervous system are working.  Your blood pressure and oxygen levels checked.  Medicines to relieve pain, prevent seizures, and decrease brain swelling.  Airway protection and breathing support. This may include using a ventilator.  Monitoring and managing swelling inside the brain.  Brain surgery. Surgery may include:  Removing a collection of blood or blood clots.  Stopping the bleeding.  Removing a part of the skull to make room for the brain to swell.  Follow these instructions at home:  Activity    Rest. Avoid activities that are hard or tiring.  Make sure you get enough sleep.  Let your brain rest by limiting activities that take a lot of thought or attention, such as:  Watching TV.  Playing memory games and doing puzzles.  Job-related work or homework.  Working on the computer, using social media, and texting.  Avoid activities that could cause another head injury, such as playing sports, until your health care provider approves.  Ask your provider when it is safe for you to return to your regular activities, such as work or school.  Ask your provider when you can drive, ride a bicycle, or use machinery. Your ability to react may be slower after a brain injury. Do not do these activities if you are dizzy.  Lifestyle    A sign telling the reader not to drink beer, wine, or hard liquor.  Do not drink alcohol until your provider approves. Do not use drugs. Alcohol and certain drugs may slow your recovery and can put you at risk of further injury.  If it is hard to remember things, write them down.  If you are easily distracted, try to do one thing at a time.  Talk with family members or close friends when making important decisions.  Tell your friends, family, a trusted colleague, and  about your injury, symptoms, and restrictions. Ask them to watch for any problems that are new or get worse.  General instructions    Take over-the-counter and prescription medicines only as told by your provider.  Have a responsible adult stay with you for 24 hours after your head injury. They should watch you for any changes in your symptoms and be ready to get help right away.  Keep all follow-up visits to make sure your needs are being met and catch any new problems early.  How is this prevented?  Avoiding another brain injury is very important. In rare cases, another injury can lead to permanent brain damage, brain swelling, or death. The risk of this is greatest during the first 7–10 days after a head injury. To avoid injuries:  Improve your balance and strength to avoid falls.  Wear a seat belt when you are in a moving vehicle.  Wear a helmet when riding a bicycle, skiing, or doing any other sport that has a risk of injury.  Take safety measures in your home to prevent falls, such as:  Removing clutter and tripping hazards.  Using grab bars in bathrooms and handrails by stairs.  Placing non-slip mats on floors and in bathtubs.  Improving lighting in dim areas.  Where to find more information  Brain Injury Association: biausa.org  Contact a health care provider if:  You have headaches that do not go away.  You have dizziness that does not go away.  You have double vision or vision changes that do not go away.  You have difficulty sleeping.  You have changes in your mood.  You have new symptoms.  Get help right away if:  You have sudden:  Severe headache.  Severe vomiting.  Unequal pupil size. One is bigger than the other.  Vision problems.  Confusion or irritability.  You have a seizure.  Your symptoms get worse.  You have clear or bloody fluid coming from your nose or ears.  These symptoms may be an emergency. Get help right away. Call 911.  Do not wait to see if the symptoms will go away.  Do not drive yourself to the hospital.  This information is not intended to replace advice given to you by your health care provider. Make sure you discuss any questions you have with your health care provider.    Document Revised: 10/05/2023 Document Reviewed: 10/05/2023  [employer] Patient Education © 2024 [employer]  [employer] logo  Terms and Conditions  Privacy Policy  Editorial Policy  All content on this site: Copyright © 2024 Elsevier, its licensors, and contributors. All rights are reserved, including those for text and data mining, AI training, and similar technologies. For all open access content, the Creative Commons licensing terms apply.  Cookies are used by this site. To decline or learn more, visit our Cookies page.  [employer]

## 2024-05-28 NOTE — ED ADULT NURSE NOTE - BREATHING, MLM
Spontaneous, unlabored and symmetrical Has The Cancer Been Biopsied Before?: has been previously biopsied Who Is Your Referring Provider?: MACARIO Chadwick M.D When Was Your Biopsy?: 07-10-18 Body Location Override (Optional): Left forearm Accession (Optional): [id number] Has The Cancer Been Biopsied Before?: has not been previously biopsied Who Is Your Referring Provider?: CARLEEN Lockett MD Body Location Override (Optional): left superior forearm

## 2024-05-28 NOTE — ED ADULT NURSE NOTE - NS ED NURSE LEVEL OF CONSCIOUSNESS AFFECT
Patient called to request PCP to look at her PFT results and advise. So she can move forward with pulmonology appointment if needed. Results appear to be in the Media tab. ACM will route to provider for recommendations.
Calm

## 2024-05-28 NOTE — ED PROVIDER NOTE - PROGRESS NOTE DETAILS
pt reports improvement and is requesting dc. incentive spirometer instructions given. Discussed the results of all diagnostic testing in ED and copies of all reports given.   Pt was given an opportunity to have all questions answered to satisfaction.  Discussed the importance of prompt, close medical follow-up. ED return precautions discussed at length.  Pt verbalizes agreement and understanding of plan and ED return precautions. Pt well appearing, stable for DC home. No emergent concerns at this time. pt hypotensive. will give 1L IVF and reassess patient with intractable pain. will admit.

## 2024-05-28 NOTE — ED PROVIDER NOTE - OBJECTIVE STATEMENT
58-year-old male past medical history diabetes, hypertension, chronic neck pain status post cervical spinal fusion, on Eliquis presents emergency department complaining of acute on chronic neck pain status post mechanical fall/head trauma 2 days ago.  Pt states that as he was leaving his house to walk his dog, his dog excitedly ran out, causing him to trip and fall onto his left side.  Patient denies LOC, but states he was unable to get up for about 10 minutes, felt foggy.  Patient reports that since the fall his chronic neck pain has been significantly worse.  Patient followed by pain management, states he takes Dilaudid, (on backorder, so currently taking morphine), and tizanidine.  Patient states his home medication has not given him any relief.  Patient denies acute numbness or tingling, headache, dizziness, chest pain

## 2024-05-28 NOTE — ED PROVIDER NOTE - ATTENDING APP SHARED VISIT CONTRIBUTION OF CARE
This was a shared visit with TAYLER. I reviewed and verified the documentation and independently performed the documented MDM.

## 2024-05-28 NOTE — ED ADULT NURSE NOTE - OBJECTIVE STATEMENT
patient comes in for a s/p fall while walking dog 2 days ago. patient fell onto L side and hit head. patient takes eliquis. patient was unable to get up for 10 min. patient states he feels "foggy".

## 2024-05-28 NOTE — ED PROVIDER NOTE - ENMT, MLM
superficial abrasions left face; Airway patent, Nasal mucosa clear. Mouth with normal mucosa. Throat has no vesicles, no oropharyngeal exudates and uvula is midline.

## 2024-05-28 NOTE — ED PROVIDER NOTE - NSPTACCESSSVCSAPPT_ED_ALL_ED
Pt is A&Ox4. VSS, afebrile and denies any pain. SPO2 maintained on room air. Continuous pulse ox. Denies any SOB. Non productive cough. Tele/NS. Plavix and xarelto. Reg diet. Denies any n/v/d. Voids. Up with Standby. IV rocephin and Iv solumedrol. Safety precautions in place. Pt is updated with plan of care.    Problem: Diabetes/Glucose Control  Goal: Glucose maintained within prescribed range  Description: INTERVENTIONS:  - Monitor Blood Glucose as ordered  - Assess for signs and symptoms of hyperglycemia and hypoglycemia  - Administer ordered medications to maintain glucose within target range  - Assess barriers to adequate nutritional intake and initiate nutrition consult as needed  - Instruct patient on self management of diabetes  Outcome: Progressing     Problem: Patient/Family Goals  Goal: Patient/Family Long Term Goal  Description: Patient's Long Term Goal: To be dsicahrged    Interventions:  - Follow MD orders  - See additional Care Plan goals for specific interventions  Outcome: Progressing  Goal: Patient/Family Short Term Goal  Description: Patient's Short Term Goal:   04/09NOC: get rest and breathe better  4/11 AM: O2 walk  4/1 noc: get some rest    Interventions:   - steroids  -nebs  -ABx's  - See additional Care Plan goals for specific interventions  Outcome: Progressing      Specialty Care (immediate)...

## 2024-05-29 ENCOUNTER — APPOINTMENT (OUTPATIENT)
Dept: PAIN MANAGEMENT | Facility: CLINIC | Age: 59
End: 2024-05-29

## 2024-05-29 DIAGNOSIS — E11.65 TYPE 2 DIABETES MELLITUS WITH HYPERGLYCEMIA: ICD-10-CM

## 2024-05-29 DIAGNOSIS — Z79.899 OTHER LONG TERM (CURRENT) DRUG THERAPY: ICD-10-CM

## 2024-05-29 DIAGNOSIS — I10 ESSENTIAL (PRIMARY) HYPERTENSION: ICD-10-CM

## 2024-05-29 DIAGNOSIS — E11.9 TYPE 2 DIABETES MELLITUS WITHOUT COMPLICATIONS: ICD-10-CM
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
## 2024-05-29 DIAGNOSIS — Z98.1 ARTHRODESIS STATUS: ICD-10-CM

## 2024-05-29 DIAGNOSIS — Z29.9 ENCOUNTER FOR PROPHYLACTIC MEASURES, UNSPECIFIED: ICD-10-CM

## 2024-05-29 DIAGNOSIS — E78.5 HYPERLIPIDEMIA, UNSPECIFIED: ICD-10-CM

## 2024-05-29 LAB
A1C WITH ESTIMATED AVERAGE GLUCOSE RESULT: 9.4 % — HIGH (ref 4–5.6)
ALBUMIN SERPL ELPH-MCNC: 3 G/DL — LOW (ref 3.3–5)
ALP SERPL-CCNC: 83 U/L — SIGNIFICANT CHANGE UP (ref 40–120)
ALT FLD-CCNC: 36 U/L — SIGNIFICANT CHANGE UP (ref 12–78)
ANION GAP SERPL CALC-SCNC: 4 MMOL/L — LOW (ref 5–17)
AST SERPL-CCNC: 14 U/L — LOW (ref 15–37)
BILIRUB SERPL-MCNC: 0.4 MG/DL — SIGNIFICANT CHANGE UP (ref 0.2–1.2)
BUN SERPL-MCNC: 13 MG/DL — SIGNIFICANT CHANGE UP (ref 7–23)
CALCIUM SERPL-MCNC: 8.9 MG/DL — SIGNIFICANT CHANGE UP (ref 8.5–10.1)
CHLORIDE SERPL-SCNC: 108 MMOL/L — SIGNIFICANT CHANGE UP (ref 96–108)
CHOLEST SERPL-MCNC: 187 MG/DL — SIGNIFICANT CHANGE UP
CO2 SERPL-SCNC: 28 MMOL/L — SIGNIFICANT CHANGE UP (ref 22–31)
CREAT SERPL-MCNC: 0.84 MG/DL — SIGNIFICANT CHANGE UP (ref 0.5–1.3)
EGFR: 102 ML/MIN/1.73M2 — SIGNIFICANT CHANGE UP
ESTIMATED AVERAGE GLUCOSE: 223 MG/DL — HIGH (ref 68–114)
GLUCOSE SERPL-MCNC: 128 MG/DL — HIGH (ref 70–99)
HCT VFR BLD CALC: 39.7 % — SIGNIFICANT CHANGE UP (ref 39–50)
HDLC SERPL-MCNC: 45 MG/DL — SIGNIFICANT CHANGE UP
HGB BLD-MCNC: 12.6 G/DL — LOW (ref 13–17)
LIPID PNL WITH DIRECT LDL SERPL: 107 MG/DL — HIGH
MCHC RBC-ENTMCNC: 27.7 PG — SIGNIFICANT CHANGE UP (ref 27–34)
MCHC RBC-ENTMCNC: 31.7 GM/DL — LOW (ref 32–36)
MCV RBC AUTO: 87.3 FL — SIGNIFICANT CHANGE UP (ref 80–100)
NON HDL CHOLESTEROL: 142 MG/DL — HIGH
NRBC # BLD: 0 /100 WBCS — SIGNIFICANT CHANGE UP (ref 0–0)
PLATELET # BLD AUTO: 293 K/UL — SIGNIFICANT CHANGE UP (ref 150–400)
POTASSIUM SERPL-MCNC: 4.2 MMOL/L — SIGNIFICANT CHANGE UP (ref 3.5–5.3)
POTASSIUM SERPL-SCNC: 4.2 MMOL/L — SIGNIFICANT CHANGE UP (ref 3.5–5.3)
PROT SERPL-MCNC: 6.6 G/DL — SIGNIFICANT CHANGE UP (ref 6–8.3)
RBC # BLD: 4.55 M/UL — SIGNIFICANT CHANGE UP (ref 4.2–5.8)
RBC # FLD: 15.2 % — HIGH (ref 10.3–14.5)
SODIUM SERPL-SCNC: 140 MMOL/L — SIGNIFICANT CHANGE UP (ref 135–145)
TRIGL SERPL-MCNC: 203 MG/DL — HIGH
WBC # BLD: 7.74 K/UL — SIGNIFICANT CHANGE UP (ref 3.8–10.5)
WBC # FLD AUTO: 7.74 K/UL — SIGNIFICANT CHANGE UP (ref 3.8–10.5)

## 2024-05-29 PROCEDURE — 99232 SBSQ HOSP IP/OBS MODERATE 35: CPT

## 2024-05-29 PROCEDURE — 99221 1ST HOSP IP/OBS SF/LOW 40: CPT

## 2024-05-29 RX ORDER — APIXABAN 2.5 MG/1
5 TABLET, FILM COATED ORAL
Refills: 0 | Status: DISCONTINUED | OUTPATIENT
Start: 2024-05-29 | End: 2024-06-07

## 2024-05-29 RX ORDER — HYDROMORPHONE HYDROCHLORIDE 2 MG/ML
8 INJECTION INTRAMUSCULAR; INTRAVENOUS; SUBCUTANEOUS EVERY 6 HOURS
Refills: 0 | Status: DISCONTINUED | OUTPATIENT
Start: 2024-05-29 | End: 2024-05-29

## 2024-05-29 RX ORDER — SENNA PLUS 8.6 MG/1
2 TABLET ORAL AT BEDTIME
Refills: 0 | Status: DISCONTINUED | OUTPATIENT
Start: 2024-05-29 | End: 2024-06-07

## 2024-05-29 RX ORDER — LIDOCAINE 4 G/100G
1 CREAM TOPICAL DAILY
Refills: 0 | Status: DISCONTINUED | OUTPATIENT
Start: 2024-05-29 | End: 2024-06-07

## 2024-05-29 RX ORDER — CLONAZEPAM 1 MG
1 TABLET ORAL
Refills: 0 | DISCHARGE

## 2024-05-29 RX ORDER — HYDROMORPHONE HYDROCHLORIDE 2 MG/ML
8 INJECTION INTRAMUSCULAR; INTRAVENOUS; SUBCUTANEOUS
Refills: 0 | Status: DISCONTINUED | OUTPATIENT
Start: 2024-05-29 | End: 2024-05-29

## 2024-05-29 RX ORDER — SERTRALINE 25 MG/1
200 TABLET, FILM COATED ORAL DAILY
Refills: 0 | Status: DISCONTINUED | OUTPATIENT
Start: 2024-05-29 | End: 2024-06-07

## 2024-05-29 RX ORDER — CLONAZEPAM 1 MG
1 TABLET ORAL AT BEDTIME
Refills: 0 | Status: DISCONTINUED | OUTPATIENT
Start: 2024-05-29 | End: 2024-06-05

## 2024-05-29 RX ORDER — DEXTROSE 50 % IN WATER 50 %
15 SYRINGE (ML) INTRAVENOUS ONCE
Refills: 0 | Status: DISCONTINUED | OUTPATIENT
Start: 2024-05-29 | End: 2024-06-07

## 2024-05-29 RX ORDER — HYDROMORPHONE HYDROCHLORIDE 2 MG/ML
0.5 INJECTION INTRAMUSCULAR; INTRAVENOUS; SUBCUTANEOUS EVERY 4 HOURS
Refills: 0 | Status: DISCONTINUED | OUTPATIENT
Start: 2024-05-29 | End: 2024-05-30

## 2024-05-29 RX ORDER — CARVEDILOL PHOSPHATE 80 MG/1
25 CAPSULE, EXTENDED RELEASE ORAL EVERY 12 HOURS
Refills: 0 | Status: DISCONTINUED | OUTPATIENT
Start: 2024-05-29 | End: 2024-06-07

## 2024-05-29 RX ORDER — TRAZODONE HCL 50 MG
150 TABLET ORAL AT BEDTIME
Refills: 0 | Status: DISCONTINUED | OUTPATIENT
Start: 2024-05-29 | End: 2024-06-07

## 2024-05-29 RX ORDER — ATORVASTATIN CALCIUM 80 MG/1
80 TABLET, FILM COATED ORAL AT BEDTIME
Refills: 0 | Status: DISCONTINUED | OUTPATIENT
Start: 2024-05-29 | End: 2024-06-07

## 2024-05-29 RX ORDER — POLYETHYLENE GLYCOL 3350 17 G/17G
17 POWDER, FOR SOLUTION ORAL DAILY
Refills: 0 | Status: DISCONTINUED | OUTPATIENT
Start: 2024-05-29 | End: 2024-06-07

## 2024-05-29 RX ORDER — ACETAMINOPHEN 500 MG
1000 TABLET ORAL ONCE
Refills: 0 | Status: COMPLETED | OUTPATIENT
Start: 2024-05-29 | End: 2024-05-29

## 2024-05-29 RX ORDER — PANTOPRAZOLE SODIUM 20 MG/1
40 TABLET, DELAYED RELEASE ORAL
Refills: 0 | Status: DISCONTINUED | OUTPATIENT
Start: 2024-05-29 | End: 2024-06-07

## 2024-05-29 RX ORDER — INSULIN LISPRO 100/ML
VIAL (ML) SUBCUTANEOUS AT BEDTIME
Refills: 0 | Status: DISCONTINUED | OUTPATIENT
Start: 2024-05-29 | End: 2024-05-31

## 2024-05-29 RX ORDER — TIZANIDINE 4 MG/1
4 TABLET ORAL EVERY 8 HOURS
Refills: 0 | Status: DISCONTINUED | OUTPATIENT
Start: 2024-05-29 | End: 2024-06-07

## 2024-05-29 RX ORDER — GABAPENTIN 400 MG/1
100 CAPSULE ORAL EVERY 8 HOURS
Refills: 0 | Status: DISCONTINUED | OUTPATIENT
Start: 2024-05-29 | End: 2024-06-06

## 2024-05-29 RX ORDER — HYDROMORPHONE HYDROCHLORIDE 2 MG/ML
1 INJECTION INTRAMUSCULAR; INTRAVENOUS; SUBCUTANEOUS
Refills: 0 | DISCHARGE

## 2024-05-29 RX ORDER — LANOLIN ALCOHOL/MO/W.PET/CERES
3 CREAM (GRAM) TOPICAL AT BEDTIME
Refills: 0 | Status: DISCONTINUED | OUTPATIENT
Start: 2024-05-29 | End: 2024-06-07

## 2024-05-29 RX ORDER — INSULIN LISPRO 100/ML
VIAL (ML) SUBCUTANEOUS
Refills: 0 | Status: DISCONTINUED | OUTPATIENT
Start: 2024-05-29 | End: 2024-05-31

## 2024-05-29 RX ORDER — SODIUM CHLORIDE 9 MG/ML
1000 INJECTION, SOLUTION INTRAVENOUS
Refills: 0 | Status: DISCONTINUED | OUTPATIENT
Start: 2024-05-29 | End: 2024-06-07

## 2024-05-29 RX ORDER — DEXTROSE 50 % IN WATER 50 %
25 SYRINGE (ML) INTRAVENOUS ONCE
Refills: 0 | Status: DISCONTINUED | OUTPATIENT
Start: 2024-05-29 | End: 2024-06-07

## 2024-05-29 RX ORDER — DEXTROSE 10 % IN WATER 10 %
125 INTRAVENOUS SOLUTION INTRAVENOUS ONCE
Refills: 0 | Status: DISCONTINUED | OUTPATIENT
Start: 2024-05-29 | End: 2024-06-07

## 2024-05-29 RX ORDER — HYDROMORPHONE HYDROCHLORIDE 2 MG/ML
6 INJECTION INTRAMUSCULAR; INTRAVENOUS; SUBCUTANEOUS EVERY 4 HOURS
Refills: 0 | Status: DISCONTINUED | OUTPATIENT
Start: 2024-05-29 | End: 2024-05-30

## 2024-05-29 RX ORDER — ACETAMINOPHEN 500 MG
1000 TABLET ORAL EVERY 8 HOURS
Refills: 0 | Status: DISCONTINUED | OUTPATIENT
Start: 2024-05-29 | End: 2024-06-07

## 2024-05-29 RX ORDER — INSULIN GLARGINE 100 [IU]/ML
21 INJECTION, SOLUTION SUBCUTANEOUS EVERY MORNING
Refills: 0 | Status: DISCONTINUED | OUTPATIENT
Start: 2024-05-29 | End: 2024-06-02

## 2024-05-29 RX ORDER — METFORMIN HYDROCHLORIDE 850 MG/1
1 TABLET ORAL
Refills: 0 | DISCHARGE

## 2024-05-29 RX ORDER — HYDROMORPHONE HYDROCHLORIDE 2 MG/ML
2 INJECTION INTRAMUSCULAR; INTRAVENOUS; SUBCUTANEOUS EVERY 4 HOURS
Refills: 0 | Status: DISCONTINUED | OUTPATIENT
Start: 2024-05-29 | End: 2024-05-29

## 2024-05-29 RX ORDER — ONDANSETRON 8 MG/1
4 TABLET, FILM COATED ORAL EVERY 8 HOURS
Refills: 0 | Status: DISCONTINUED | OUTPATIENT
Start: 2024-05-29 | End: 2024-06-07

## 2024-05-29 RX ORDER — NALOXONE HYDROCHLORIDE 4 MG/.1ML
0.4 SPRAY NASAL ONCE
Refills: 0 | Status: DISCONTINUED | OUTPATIENT
Start: 2024-05-29 | End: 2024-06-07

## 2024-05-29 RX ORDER — GLUCAGON INJECTION, SOLUTION 0.5 MG/.1ML
1 INJECTION, SOLUTION SUBCUTANEOUS ONCE
Refills: 0 | Status: DISCONTINUED | OUTPATIENT
Start: 2024-05-29 | End: 2024-06-07

## 2024-05-29 RX ORDER — DEXTROSE 50 % IN WATER 50 %
12.5 SYRINGE (ML) INTRAVENOUS ONCE
Refills: 0 | Status: DISCONTINUED | OUTPATIENT
Start: 2024-05-29 | End: 2024-06-07

## 2024-05-29 RX ORDER — HYDROMORPHONE HYDROCHLORIDE 2 MG/ML
1 INJECTION INTRAMUSCULAR; INTRAVENOUS; SUBCUTANEOUS EVERY 4 HOURS
Refills: 0 | Status: DISCONTINUED | OUTPATIENT
Start: 2024-05-29 | End: 2024-05-29

## 2024-05-29 RX ORDER — HYDROMORPHONE HYDROCHLORIDE 2 MG/ML
0.2 INJECTION INTRAMUSCULAR; INTRAVENOUS; SUBCUTANEOUS ONCE
Refills: 0 | Status: DISCONTINUED | OUTPATIENT
Start: 2024-05-29 | End: 2024-05-29

## 2024-05-29 RX ADMIN — HYDROMORPHONE HYDROCHLORIDE 1 MILLIGRAM(S): 2 INJECTION INTRAMUSCULAR; INTRAVENOUS; SUBCUTANEOUS at 15:30

## 2024-05-29 RX ADMIN — Medication 1000 MILLIGRAM(S): at 21:04

## 2024-05-29 RX ADMIN — Medication 1000 MILLIGRAM(S): at 13:09

## 2024-05-29 RX ADMIN — APIXABAN 5 MILLIGRAM(S): 2.5 TABLET, FILM COATED ORAL at 06:14

## 2024-05-29 RX ADMIN — INSULIN GLARGINE 21 UNIT(S): 100 INJECTION, SOLUTION SUBCUTANEOUS at 08:05

## 2024-05-29 RX ADMIN — SERTRALINE 200 MILLIGRAM(S): 25 TABLET, FILM COATED ORAL at 11:09

## 2024-05-29 RX ADMIN — HYDROMORPHONE HYDROCHLORIDE 1 MILLIGRAM(S): 2 INJECTION INTRAMUSCULAR; INTRAVENOUS; SUBCUTANEOUS at 11:00

## 2024-05-29 RX ADMIN — Medication 150 MILLIGRAM(S): at 13:09

## 2024-05-29 RX ADMIN — Medication 1000 MILLIGRAM(S): at 04:57

## 2024-05-29 RX ADMIN — HYDROMORPHONE HYDROCHLORIDE 0.2 MILLIGRAM(S): 2 INJECTION INTRAMUSCULAR; INTRAVENOUS; SUBCUTANEOUS at 06:27

## 2024-05-29 RX ADMIN — Medication 150 MILLIGRAM(S): at 06:13

## 2024-05-29 RX ADMIN — Medication 150 MILLIGRAM(S): at 21:04

## 2024-05-29 RX ADMIN — HYDROMORPHONE HYDROCHLORIDE 8 MILLIGRAM(S): 2 INJECTION INTRAMUSCULAR; INTRAVENOUS; SUBCUTANEOUS at 08:53

## 2024-05-29 RX ADMIN — ATORVASTATIN CALCIUM 80 MILLIGRAM(S): 80 TABLET, FILM COATED ORAL at 21:04

## 2024-05-29 RX ADMIN — HYDROMORPHONE HYDROCHLORIDE 1 MILLIGRAM(S): 2 INJECTION INTRAMUSCULAR; INTRAVENOUS; SUBCUTANEOUS at 10:41

## 2024-05-29 RX ADMIN — Medication 150 MILLIGRAM(S): at 21:05

## 2024-05-29 RX ADMIN — Medication 400 MILLIGRAM(S): at 04:27

## 2024-05-29 RX ADMIN — CARVEDILOL PHOSPHATE 25 MILLIGRAM(S): 80 CAPSULE, EXTENDED RELEASE ORAL at 06:16

## 2024-05-29 RX ADMIN — Medication 1000 MILLIGRAM(S): at 22:10

## 2024-05-29 RX ADMIN — HYDROMORPHONE HYDROCHLORIDE 0.5 MILLIGRAM(S): 2 INJECTION INTRAMUSCULAR; INTRAVENOUS; SUBCUTANEOUS at 23:44

## 2024-05-29 RX ADMIN — HYDROMORPHONE HYDROCHLORIDE 2 MILLIGRAM(S): 2 INJECTION INTRAMUSCULAR; INTRAVENOUS; SUBCUTANEOUS at 22:10

## 2024-05-29 RX ADMIN — HYDROMORPHONE HYDROCHLORIDE 8 MILLIGRAM(S): 2 INJECTION INTRAMUSCULAR; INTRAVENOUS; SUBCUTANEOUS at 03:04

## 2024-05-29 RX ADMIN — HYDROMORPHONE HYDROCHLORIDE 0.5 MILLIGRAM(S): 2 INJECTION INTRAMUSCULAR; INTRAVENOUS; SUBCUTANEOUS at 22:53

## 2024-05-29 RX ADMIN — GABAPENTIN 100 MILLIGRAM(S): 400 CAPSULE ORAL at 13:09

## 2024-05-29 RX ADMIN — HYDROMORPHONE HYDROCHLORIDE 0.2 MILLIGRAM(S): 2 INJECTION INTRAMUSCULAR; INTRAVENOUS; SUBCUTANEOUS at 06:12

## 2024-05-29 RX ADMIN — Medication 1: at 17:16

## 2024-05-29 RX ADMIN — SENNA PLUS 2 TABLET(S): 8.6 TABLET ORAL at 21:05

## 2024-05-29 RX ADMIN — PANTOPRAZOLE SODIUM 40 MILLIGRAM(S): 20 TABLET, DELAYED RELEASE ORAL at 06:14

## 2024-05-29 RX ADMIN — CARVEDILOL PHOSPHATE 25 MILLIGRAM(S): 80 CAPSULE, EXTENDED RELEASE ORAL at 17:16

## 2024-05-29 RX ADMIN — APIXABAN 5 MILLIGRAM(S): 2.5 TABLET, FILM COATED ORAL at 17:16

## 2024-05-29 RX ADMIN — GABAPENTIN 100 MILLIGRAM(S): 400 CAPSULE ORAL at 21:04

## 2024-05-29 RX ADMIN — POLYETHYLENE GLYCOL 3350 17 GRAM(S): 17 POWDER, FOR SOLUTION ORAL at 11:09

## 2024-05-29 RX ADMIN — LIDOCAINE 1 PATCH: 4 CREAM TOPICAL at 11:08

## 2024-05-29 RX ADMIN — HYDROMORPHONE HYDROCHLORIDE 1 MILLIGRAM(S): 2 INJECTION INTRAMUSCULAR; INTRAVENOUS; SUBCUTANEOUS at 15:00

## 2024-05-29 RX ADMIN — HYDROMORPHONE HYDROCHLORIDE 2 MILLIGRAM(S): 2 INJECTION INTRAMUSCULAR; INTRAVENOUS; SUBCUTANEOUS at 21:04

## 2024-05-29 RX ADMIN — Medication 1 MILLIGRAM(S): at 21:03

## 2024-05-29 NOTE — CONSULT NOTE ADULT - SUBJECTIVE AND OBJECTIVE BOX
Patient is a 58y old  Male who presents with a chief complaint of head & neck pain s/p fall (29 May 2024 01:34)      Reason For Consult: dm2 uncontrolled    HPI:  59yo M PMH DM, HTN, HLD, CRPS, S/p C/S Fusion, on Eliquis, presents with acute on chronic neck pain s/p mechanical fall w/head trauma 2 days ago. Pt states he was attempting to take his dog for a walk 2 days ago, when dog ran out while pt holding leash, pt's foot caught in door, resulting in fall with head strike to left side head/face. Denies loss of consciousness at any point, but states pain immediately following was so severe he was unable to get up for 10 minutes. States he currently has 10/10 intensity pain without palliative or provocative factors in his neck (radiating down L arm), and upper middle back, which is worse with inspiration. States radiation down L arm is baseline, however intensity of pain is not. States he has numbness, tingling, motor weakness, and cold sensation of L arm & hand, which is also baseline. Patient states he had been intermittently vomiting after the fall up until last episode was 4PM yesterday. Vomitus was always only gastric contents, never contained blood or bile. Denies fevers/chills, cp, palpitations, sob, cough, nausea, diarrhea, constipation, abdominal pain, dysuria, dyschezia, hematuria, hematochezia.    ED Course:  Vitals: T 98, HR 67, /71, RR 18, SpO2 100% ORA  Pertinent Labs: no pertinent abnl labs  EKG: NSR with marked sinus arrhythmia. 78 BMP. QTc 430.  Given: Ofirmev x1, TDaP x1, 1mg IV Dilaudid x3, 0.5 mg IV dilaudid x1, Lidocaine patch x1, Robaxin x1, Zofran x1, NS Bolus 1L x1    Imaging:  CT Head No Cont: No evidence acute hemorrhage, mass, or mass effect.  CT C/S No Cont: Postoperative changes and degenerative changes C2-C7. No changes, postop hardware adequately placed. No acute fracture. (29 May 2024 01:34)      PAST MEDICAL & SURGICAL HISTORY:  Hypertension      Diabetes mellitus      Gastric ulcer      Spinal stenosis      H/O spinal cord compression      Spondylosis      H/O radiculopathy      H/O cervical spine surgery      History of back surgery      S/P cervical spinal fusion          FAMILY HISTORY:  FH: hypertension (Father)    FH: diabetes mellitus (Mother)          Social History:    MEDICATIONS  (STANDING):  acetaminophen     Tablet .. 1000 milliGRAM(s) Oral every 8 hours  apixaban 5 milliGRAM(s) Oral two times a day  atorvastatin 80 milliGRAM(s) Oral at bedtime  carvedilol 25 milliGRAM(s) Oral every 12 hours  clonazePAM  Tablet 1 milliGRAM(s) Oral at bedtime  dextrose 10% Bolus 125 milliLiter(s) IV Bolus once  dextrose 5%. 1000 milliLiter(s) (100 mL/Hr) IV Continuous <Continuous>  dextrose 5%. 1000 milliLiter(s) (50 mL/Hr) IV Continuous <Continuous>  dextrose 50% Injectable 25 Gram(s) IV Push once  dextrose 50% Injectable 12.5 Gram(s) IV Push once  gabapentin 100 milliGRAM(s) Oral every 8 hours  glucagon  Injectable 1 milliGRAM(s) IntraMuscular once  insulin glargine Injectable (LANTUS) 21 Unit(s) SubCutaneous every morning  insulin lispro (ADMELOG) corrective regimen sliding scale   SubCutaneous at bedtime  insulin lispro (ADMELOG) corrective regimen sliding scale   SubCutaneous three times a day before meals  lidocaine   4% Patch 1 Patch Transdermal daily  naloxone Injectable 0.4 milliGRAM(s) IV Push once  pantoprazole    Tablet 40 milliGRAM(s) Oral before breakfast  polyethylene glycol 3350 17 Gram(s) Oral daily  pregabalin 150 milliGRAM(s) Oral three times a day  senna 2 Tablet(s) Oral at bedtime  sertraline 200 milliGRAM(s) Oral daily  traZODone 150 milliGRAM(s) Oral at bedtime    MEDICATIONS  (PRN):  aluminum hydroxide/magnesium hydroxide/simethicone Suspension 30 milliLiter(s) Oral every 4 hours PRN Dyspepsia  bisacodyl 5 milliGRAM(s) Oral daily PRN Constipation  dextrose Oral Gel 15 Gram(s) Oral once PRN Blood Glucose LESS THAN 70 milliGRAM(s)/deciliter  HYDROmorphone  Injectable 0.5 milliGRAM(s) IV Push every 4 hours PRN Moderate Pain (4 - 6)  HYDROmorphone  Injectable 1 milliGRAM(s) IV Push every 4 hours PRN Severe Pain (7 - 10)  melatonin 3 milliGRAM(s) Oral at bedtime PRN Insomnia  ondansetron Injectable 4 milliGRAM(s) IV Push every 8 hours PRN Nausea and/or Vomiting  tiZANidine 4 milliGRAM(s) Oral every 8 hours PRN Muscle Spasm        T(C): 36.5 (05-29-24 @ 06:14), Max: 36.7 (05-28-24 @ 13:02)  HR: 69 (05-29-24 @ 06:14) (67 - 90)  BP: 143/85 (05-29-24 @ 06:14) (95/58 - 143/85)  RR: 18 (05-29-24 @ 06:14) (18 - 18)  SpO2: 94% (05-29-24 @ 06:14) (94% - 100%)  Wt(kg): --    PHYSICAL EXAM:  GENERAL: NAD, well-groomed, well-developed  HEAD:  Atraumatic, Normocephalic  NECK: Supple, No JVD, Normal thyroid  CHEST/LUNG: Clear to percussion bilaterally; No rales, rhonchi, wheezing, or rubs  HEART: Regular rate and rhythm; No murmurs, rubs, or gallops  ABDOMEN: Soft, Nontender, Nondistended; Bowel sounds present  EXTREMITIES:  2+ Peripheral Pulses, No clubbing, cyanosis, or edema  SKIN: No rashes or lesions    CAPILLARY BLOOD GLUCOSE      POCT Blood Glucose.: 112 mg/dL (29 May 2024 07:35)  POCT Blood Glucose.: 124 mg/dL (29 May 2024 02:41)  POCT Blood Glucose.: 85 mg/dL (28 May 2024 20:24)                            12.6   7.74  )-----------( 293      ( 29 May 2024 05:10 )             39.7       CMP:  05-29 @ 05:10  SGPT 36  Albumin 3.0   Alk Phos 83   Anion Gap 4   SGOT 14   Total Bili 0.4   BUN 13   Calcium Total 8.9   CO2 28   Chloride 108   Creatinine 0.84   eGFR if AA --   eGFR if non AA --   Glucose 128   Potassium 4.2   Protein 6.6   Sodium 140      Thyroid Function Tests:      Diabetes Tests:       Radiology:

## 2024-05-29 NOTE — OCCUPATIONAL THERAPY INITIAL EVALUATION ADULT - REHAB POTENTIAL, OT EVAL
Pt is functionally independent with ADLs/functional mobility and does not require inpatient OT./none

## 2024-05-29 NOTE — PATIENT PROFILE ADULT - NSTOBACCONEVERSMOKERY/N_GEN_A
Medicare Wellness Visit  Plan for Preventive Care    A good way for you to stay healthy is to use preventive care.  Medicare covers many services that can help you stay healthy.* The goal of these services is to find any health problems as quickly as possible. Finding problems early can help make them easier to treat.  Your personal plan below lists the services you may need and when they are due.      Health Maintenance Summary       Shingles Vaccine (1 of 2)  Overdue - never done    Lung Cancer Screening (Yearly)  Overdue since 12/7/2019    COVID-19 Vaccine (4 - Pfizer series)  Overdue since 2/14/2022    Medicare Advantage- Medicare Wellness Visit (Yearly - January to December)  Overdue since 1/1/2023    Influenza Vaccine (1)  Due since 9/1/2023    Depression Screening (Yearly)  Next due on 10/9/2024    Colorectal Cancer Screen- (Colonoscopy - Every 10 Years)  Next due on 10/13/2024    DTaP/Tdap/Td Vaccine (3 - Td or Tdap)  Next due on 8/28/2027    Pneumococcal Vaccine 65+   Completed    Hepatitis C Screening   Completed    Abdominal Aortic Aneurysm (AAA) Screening   Completed    Meningococcal Vaccine   Aged Out    Hepatitis B Vaccine (For Physician/APC Discussion)   Aged Out    HPV Vaccine   Aged Out             Preventive Care for Women and Men    Heart Screenings (Cardiovascular):  Blood tests are used to check your cholesterol, lipid and triglyceride levels. High levels can increase your risk for heart disease and stroke. High levels can be treated with medications, diet and exercise. Lowering your levels can help keep your heart and blood vessels healthy.  Your provider will order these tests if they are needed.    An ultrasound is done to see if you have an abdominal aortic aneurysm (AAA).  This is an enlargement of one of the main blood vessels that delivers blood to the body.   In the [place], 9,000 deaths are caused by AAA.  You may not even know you have this problem and as many as 1 in 3 people  will have a serious problem if it is not treated.  Early diagnosis allows for more effective treatment and cure.  If you have a family history of AAA or are a male age 65-75 who has smoked, you are at higher risk of an AAA.  Your provider can order this test, if needed.    Colorectal Screening:  There are many tests that are used to check for cancer of your colon and rectum. You and your provider should discuss what test is best for you and when to have it done.  Options include:  Screening Colonoscopy: exam of the entire colon, seen through a flexible lighted tube.  Flexible Sigmoidoscopy: exam of the last third (sigmoid portion) of the colon and rectum, seen through a flexible lighted tube.  Cologuard DNA stool test: a sample of your stool is used to screen for cancer and unseen blood in your stool.  Fecal Occult Blood Test: a sample of your stool is studied to find any unseen blood    Flu Shot:  An immunization that helps to prevent influenza (the flu). You should get this every year. The best time to get the shot is in the fall.    Pneumococcal Shot:  Vaccines help prevent pneumococcal disease, which is any type of illness caused by Streptococcus pneumoniae bacteria. There are two kinds of pneumococcal vaccines available in the [place]:   Pneumococcal conjugate vaccines (PCV20 or Epmoxrh20®)  Pneumococcal polysaccharide vaccine (PPSV23 or Hmzqhxyix87®)  For those who have never received any pneumococcal conjugate vaccine, CDC recommends PVC20 for adults 65 years or older and adults 19 through 64 years old with certain medical conditions or risk factors.   For those who have previously received PCV13, this should be followed by a dose of PPSV23.     Hepatitis B Shot:  An immunization that helps to protect people from getting Hepatitis B. Hepatitis B is a virus that spreads through contact with infected blood or body fluids. Many people with the virus do not have symptoms.  The virus can lead to serious  problems, such as liver disease. Some people are at higher risk than others. Your doctor will tell you if you need this shot.     Diabetes Screening:  A test to measure sugar (glucose) in your blood is called a fasting blood sugar. Fasting means you cannot have food or drink for at least 8 hours before the test. This test can detect diabetes long before you may notice symptoms.    Glaucoma Screening:  Glaucoma screening is performed by your eye doctor. The test measures the fluid pressure inside your eyes to determine if you have glaucoma.     Hepatitis C Screening:  A blood test to see if you have the hepatitis C virus.  Hepatitis C attacks the liver and is a major cause of chronic liver disease.  Medicare will cover a single screening for all adults born between 1945 & 1965, or high risk patients (people who have injected illegal drugs or people who have had blood transfusions).  High risk patients who continue to inject illegal drugs can be screened for Hepatitis C every year.    Smoking and Tobacco-Use Cessation Counseling:  Tobacco is the single greatest cause of disease and early death in our country today. Medication and counseling together can increase a person’s chance of quitting for good.   Medicare covers two quitting attempts per year, with four counseling sessions per attempt (eight sessions in a 12 month period)    Preventive Screening tests for Women    Screening Mammograms and Breast Exams:  An x-ray of your breasts to check for breast cancer before you or your doctor may be able to feel it.  If breast cancer is found early it can usually be treated with success.    Pelvic Exams and Pap Tests:  An exam to check for cervical and vaginal cancer. A Pap test is a lab test in which cells are taken from your cervix and sent to the lab to look for signs of cervical cancer. If cancer of the cervix is found early, chances for a cure are good. Testing can generally end at age 65, or if a woman has a  hysterectomy for a benign condition. Your provider may recommend more frequent testing if certain abnormal results are found.    Bone Mass Measurements:  A painless x-ray of your bone density to see if you are at risk for a broken bone. Bone density refers to the thickness of bones or how tightly the bone tissue is packed.    Preventive Screening tests for Men    Prostate Screening:  Should you have a prostate cancer test (PSA)?  It is up to you to decide if you want a prostate cancer test. Talk to your clinician to find out if the test is right for you.  Things for you to consider and talk about should include:  Benefits and harms of the test  Your family history  How your race/ethnicity may influence the test  If the test may impact other medical conditions you have  Your values on screenings and treatments    *Medicare pays for many preventive services to keep you healthy. For some of these services, you might have to pay a deductible, coinsurance, and / or copayment.  The amounts vary depending on the type of services you need and the kind of Medicare health plan you have.    For further details on screenings offered by Medicare please visit: https://www.medicare.gov/coverage/preventive-screening-services         Katty Spence  SENIOR HEALTH CARE COORDINATOR  [address] [place] [id number] [phone]        Dr. Jose Arredondo  DERMATOLOGIST  [address] [place] [address] [phone]     No

## 2024-05-29 NOTE — PHYSICAL THERAPY INITIAL EVALUATION ADULT - NSPTDISCHREC_GEN_A_CORE
discussed OP PT, patient declined, states that he prefers to resume pain mgmt at this time and will f/u with PT when ready/No skilled PT needs

## 2024-05-29 NOTE — OCCUPATIONAL THERAPY INITIAL EVALUATION ADULT - ADDITIONAL COMMENTS
Pt lives in a private home with bedroom/bathroom on 1 level. Pt lives alone but reports girlfriend stays and can assist PRN. Bathroom has a tub. Pt was independent with ADLs with increased time (reports difficulty buttoning, tucking in shirt, fastening belt). Pt was independent with functional mobility without AD.

## 2024-05-29 NOTE — H&P ADULT - NSHPPHYSICALEXAM_GEN_ALL_CORE
T(C): 36.6 (05-29-24 @ 01:00), Max: 36.7 (05-28-24 @ 13:02)  HR: 80 (05-29-24 @ 01:00) (67 - 90)  BP: 127/87 (05-29-24 @ 01:00) (95/58 - 127/87)  RR: 18 (05-29-24 @ 01:00) (18 - 18)  SpO2: 96% (05-29-24 @ 01:00) (96% - 100%)    GENERAL: patient appears well, no acute distress, appropriately interactive  EYES: EOMI, PERRL, pupils appear normal size, sclera clear, no exudates  NECK: C-Collar in place. Paraspinal cervical hypertonicity, tenderness to palpation  LUNGS: CTA B/L, no w/r/r  HEART: soft S1/S2, regular rate and rhythm, no murmurs noted,  GASTROINTESTINAL: abdomen is soft, nontender, nondistended, normoactive bowel sounds  MUSCULOSKELETAL: Diffusely 5/5 throughout, with exception to LUE grossly 3-4/5  NEUROLOGIC: awake, alert, oriented x3, good muscle tone in all 4 extremities

## 2024-05-29 NOTE — PHYSICAL THERAPY INITIAL EVALUATION ADULT - MANUAL MUSCLE TESTING RESULTS, REHAB EVAL
(B) UE and (B) LE >3+/5 upon functional assessment against gravity. (L) UE weakness with decreased ROM noted

## 2024-05-29 NOTE — CONSULT NOTE ADULT - SUBJECTIVE AND OBJECTIVE BOX
Patient is a 58y old  Male who presents with a chief complaint of head & neck pain s/p fall (29 May 2024 11:42)    Type:2 DX 6-7 years. no known complications. Endocrine: Iftikhar/ [facility]. Last seen: 2 mos ago. Rx home: lantus 26 units AM; Humalog sliding scale based on BG 5-10 units AC x3 meals; jardiance 25g daily; metformin 1000 mg once a day. Mounjaro 2.5 mg weekly (completed 4 doses).  Hx DKA/HHS and MI 2 years ago, coma as per patient x 4 days. Glucometer checks- has as backup- uses [employer] 2 with cell phone- reviewed time in range green only 50% of the time- educated the need for more insulin- recommended lantus 30 units and lispro 10 units AC x3 meals, denies any needs. diabetes education provided verbally and handouts.   Hx ASCVD, CKD, HF    HPI:  57yo M PMH DM, HTN, HLD, CRPS, S/p C/S Fusion, on Eliquis, presents with acute on chronic neck pain s/p mechanical fall w/head trauma 2 days ago. Pt states he was attempting to take his dog for a walk 2 days ago, when dog ran out while pt holding leash, pt's foot caught in door, resulting in fall with head strike to left side head/face. Denies loss of consciousness at any point, but states pain immediately following was so severe he was unable to get up for 10 minutes. States he currently has 10/10 intensity pain without palliative or provocative factors in his neck (radiating down L arm), and upper middle back, which is worse with inspiration. States radiation down L arm is baseline, however intensity of pain is not. States he has numbness, tingling, motor weakness, and cold sensation of L arm & hand, which is also baseline. Patient states he had been intermittently vomiting after the fall up until last episode was 4PM yesterday. Vomitus was always only gastric contents, never contained blood or bile. Denies fevers/chills, cp, palpitations, sob, cough, nausea, diarrhea, constipation, abdominal pain, dysuria, dyschezia, hematuria, hematochezia.    ED Course:  Vitals: T 98, HR 67, /71, RR 18, SpO2 100% ORA  Pertinent Labs: no pertinent abnl labs  EKG: NSR with marked sinus arrhythmia. 78 BMP. QTc 430.  Given: Ofirmev x1, TDaP x1, 1mg IV Dilaudid x3, 0.5 mg IV dilaudid x1, Lidocaine patch x1, Robaxin x1, Zofran x1, NS Bolus 1L x1    Imaging:  CT Head No Cont: No evidence acute hemorrhage, mass, or mass effect.  CT C/S No Cont: Postoperative changes and degenerative changes C2-C7. No changes, postop hardware adequately placed. No acute fracture. (29 May 2024 01:34)      PAST MEDICAL & SURGICAL HISTORY:  Hypertension      Diabetes mellitus      Gastric ulcer      Spinal stenosis      H/O spinal cord compression      Spondylosis      H/O radiculopathy      H/O cervical spine surgery      History of back surgery      S/P cervical spinal fusion          Allergies    No Known Allergies    Intolerances        MEDICATIONS  (STANDING):  acetaminophen     Tablet .. 1000 milliGRAM(s) Oral every 8 hours  apixaban 5 milliGRAM(s) Oral two times a day  atorvastatin 80 milliGRAM(s) Oral at bedtime  carvedilol 25 milliGRAM(s) Oral every 12 hours  clonazePAM  Tablet 1 milliGRAM(s) Oral at bedtime  dextrose 10% Bolus 125 milliLiter(s) IV Bolus once  dextrose 5%. 1000 milliLiter(s) (100 mL/Hr) IV Continuous <Continuous>  dextrose 5%. 1000 milliLiter(s) (50 mL/Hr) IV Continuous <Continuous>  dextrose 50% Injectable 25 Gram(s) IV Push once  dextrose 50% Injectable 12.5 Gram(s) IV Push once  gabapentin 100 milliGRAM(s) Oral every 8 hours  glucagon  Injectable 1 milliGRAM(s) IntraMuscular once  insulin glargine Injectable (LANTUS) 21 Unit(s) SubCutaneous every morning  insulin lispro (ADMELOG) corrective regimen sliding scale   SubCutaneous at bedtime  insulin lispro (ADMELOG) corrective regimen sliding scale   SubCutaneous three times a day before meals  lidocaine   4% Patch 1 Patch Transdermal daily  naloxone Injectable 0.4 milliGRAM(s) IV Push once  pantoprazole    Tablet 40 milliGRAM(s) Oral before breakfast  polyethylene glycol 3350 17 Gram(s) Oral daily  pregabalin 150 milliGRAM(s) Oral three times a day  senna 2 Tablet(s) Oral at bedtime  sertraline 200 milliGRAM(s) Oral daily  traZODone 150 milliGRAM(s) Oral at bedtime       Patient is a 58y old  Male who presents with a chief complaint of head & neck pain s/p fall (29 May 2024 11:42)    Type:2 DX 6-7 years. no known complications. Endocrine: Iftikhar/ [facility]en. Last seen: 2 mos ago. Rx home: Lantus 26 units AM; Fiasp sliding scale based on BG 5-10 units AC x3 meals; Jardiance 25g daily; metformin 1000 mg once a day. Mounjaro 2.5 mg weekly (completed 4 doses).  Hx DKA/HHS and MI 2 years ago, coma as per patient x 4 days. Glucometer checks- has as backup- uses [employer] with cell phone- reviewed time in range green only 50% of the time- educated the need for more insulin- recommended Lantus 30 units and Fiasp 10 units AC x3 meals, denies any needs. diabetes education provided verbally and handouts.   Hx ASCVD, CKD, HF    HPI:  59yo M PMH DM, HTN, HLD, CRPS, S/p C/S Fusion, on Eliquis, presents with acute on chronic neck pain s/p mechanical fall w/head trauma 2 days ago. Pt states he was attempting to take his dog for a walk 2 days ago, when dog ran out while pt holding leash, pt's foot caught in door, resulting in fall with head strike to left side head/face. Denies loss of consciousness at any point, but states pain immediately following was so severe he was unable to get up for 10 minutes. States he currently has 10/10 intensity pain without palliative or provocative factors in his neck (radiating down L arm), and upper middle back, which is worse with inspiration. States radiation down L arm is baseline, however intensity of pain is not. States he has numbness, tingling, motor weakness, and cold sensation of L arm & hand, which is also baseline. Patient states he had been intermittently vomiting after the fall up until last episode was 4PM yesterday. Vomitus was always only gastric contents, never contained blood or bile. Denies fevers/chills, cp, palpitations, sob, cough, nausea, diarrhea, constipation, abdominal pain, dysuria, dyschezia, hematuria, hematochezia.    ED Course:  Vitals: T 98, HR 67, /71, RR 18, SpO2 100% ORA  Pertinent Labs: no pertinent abnl labs  EKG: NSR with marked sinus arrhythmia. 78 BMP. QTc 430.  Given: Ofirmev x1, TDaP x1, 1mg IV Dilaudid x3, 0.5 mg IV dilaudid x1, Lidocaine patch x1, Robaxin x1, Zofran x1, NS Bolus 1L x1    Imaging:  CT Head No Cont: No evidence acute hemorrhage, mass, or mass effect.  CT C/S No Cont: Postoperative changes and degenerative changes C2-C7. No changes, postop hardware adequately placed. No acute fracture. (29 May 2024 01:34)      PAST MEDICAL & SURGICAL HISTORY:  Hypertension      Diabetes mellitus      Gastric ulcer      Spinal stenosis      H/O spinal cord compression      Spondylosis      H/O radiculopathy      H/O cervical spine surgery      History of back surgery      S/P cervical spinal fusion          Allergies    No Known Allergies    Intolerances        MEDICATIONS  (STANDING):  acetaminophen     Tablet .. 1000 milliGRAM(s) Oral every 8 hours  apixaban 5 milliGRAM(s) Oral two times a day  atorvastatin 80 milliGRAM(s) Oral at bedtime  carvedilol 25 milliGRAM(s) Oral every 12 hours  clonazePAM  Tablet 1 milliGRAM(s) Oral at bedtime  dextrose 10% Bolus 125 milliLiter(s) IV Bolus once  dextrose 5%. 1000 milliLiter(s) (100 mL/Hr) IV Continuous <Continuous>  dextrose 5%. 1000 milliLiter(s) (50 mL/Hr) IV Continuous <Continuous>  dextrose 50% Injectable 25 Gram(s) IV Push once  dextrose 50% Injectable 12.5 Gram(s) IV Push once  gabapentin 100 milliGRAM(s) Oral every 8 hours  glucagon  Injectable 1 milliGRAM(s) IntraMuscular once  insulin glargine Injectable (LANTUS) 21 Unit(s) SubCutaneous every morning  insulin lispro (ADMELOG) corrective regimen sliding scale   SubCutaneous at bedtime  insulin lispro (ADMELOG) corrective regimen sliding scale   SubCutaneous three times a day before meals  lidocaine   4% Patch 1 Patch Transdermal daily  naloxone Injectable 0.4 milliGRAM(s) IV Push once  pantoprazole    Tablet 40 milliGRAM(s) Oral before breakfast  polyethylene glycol 3350 17 Gram(s) Oral daily  pregabalin 150 milliGRAM(s) Oral three times a day  senna 2 Tablet(s) Oral at bedtime  sertraline 200 milliGRAM(s) Oral daily  traZODone 150 milliGRAM(s) Oral at bedtime

## 2024-05-29 NOTE — PROGRESS NOTE ADULT - PROBLEM SELECTOR PLAN 1
- S/p mechanical fall with +HS 2 days ago, now with severe neck, upper back pain  - CT Head No Cont: No evidence acute hemorrhage, mass, or mass effect.  - CT C/S No Cont: Postoperative changes and degenerative changes C2-C7. No changes, postop hardware adequately placed. No acute fracture.  - PT Consult - rec outpatient PT  - Patient on multiple controlled substance via outpt pain management  - C/w home tizanidine, Lyrica  - Oral opiate regimen as below  - Discussed with pain management Dr. Baird, patient evaluated on 5/29 with plan to increase opioids after reviewing istop entries

## 2024-05-29 NOTE — CONSULT NOTE ADULT - SUBJECTIVE AND OBJECTIVE BOX
Physical Medicine and Rehabilitation Initial Evaluation    Patients acute care records reviewed and are summarized as follows:     Patient is a 58y Male who is admitted to acute care for a fall during which the patient sustained head and neck trauma. Since then the patient has been complaining of neck and left upper extremity pain as well as low back pain. patient describes pain as 10 out of 10, dull and achy, sharp and radiating at times into the left upper extremity. The low back pain is similar in nature however there is no radiation down to the lower extremities. There is no accompanying bowel or bladder incontinence, no peripheral numbness or tingling in the lower extremities, no weight loss, no night sweats.    Radiological studies reviewed, including CT of the cervical spine which reveals degenerative changes in postoperative changes.    Medical studies/laboratory studies reviewed, includin-29-24 @ 05:10    140  |  108  |  13             --------------------------< 128<H>     4.2  |  28  | 0.84    eGFR AA: --  eGFR N-AA: --    Calcium: 8.9  Phosphorus: --  Magnesium: --    AST: 14<L>    ALT: 36  AlkPhos: 83  Protein: 6.6  Albumin: 3.0<L>  TBili: 0.4  D-Bili: --      The patient was seen and examined at bedside.     ROS:  Constitutional: Denies fevers or chills  MSK: LUE pain, neck pain, low back pain    PAST MEDICAL & SURGICAL HISTORY:  Hypertension      Diabetes mellitus      Gastric ulcer      Spinal stenosis      H/O spinal cord compression      Spondylosis      H/O radiculopathy      H/O cervical spine surgery      History of back surgery      S/P cervical spinal fusion    Medications:   acetaminophen     Tablet .. 1000 milliGRAM(s) Oral every 8 hours  aluminum hydroxide/magnesium hydroxide/simethicone Suspension 30 milliLiter(s) Oral every 4 hours PRN  apixaban 5 milliGRAM(s) Oral two times a day  atorvastatin 80 milliGRAM(s) Oral at bedtime  bisacodyl 5 milliGRAM(s) Oral daily PRN  carvedilol 25 milliGRAM(s) Oral every 12 hours  clonazePAM  Tablet 1 milliGRAM(s) Oral at bedtime  dextrose 10% Bolus 125 milliLiter(s) IV Bolus once  dextrose 5%. 1000 milliLiter(s) IV Continuous <Continuous>  dextrose 5%. 1000 milliLiter(s) IV Continuous <Continuous>  dextrose 50% Injectable 25 Gram(s) IV Push once  dextrose 50% Injectable 12.5 Gram(s) IV Push once  dextrose Oral Gel 15 Gram(s) Oral once PRN  gabapentin 100 milliGRAM(s) Oral every 8 hours  glucagon  Injectable 1 milliGRAM(s) IntraMuscular once  HYDROmorphone   Tablet 6 milliGRAM(s) Oral every 4 hours  HYDROmorphone  Injectable 0.5 milliGRAM(s) IV Push every 4 hours PRN  insulin glargine Injectable (LANTUS) 21 Unit(s) SubCutaneous every morning  insulin lispro (ADMELOG) corrective regimen sliding scale   SubCutaneous at bedtime  insulin lispro (ADMELOG) corrective regimen sliding scale   SubCutaneous three times a day before meals  lidocaine   4% Patch 1 Patch Transdermal daily  melatonin 3 milliGRAM(s) Oral at bedtime PRN  naloxone Injectable 0.4 milliGRAM(s) IV Push once  ondansetron Injectable 4 milliGRAM(s) IV Push every 8 hours PRN  pantoprazole    Tablet 40 milliGRAM(s) Oral before breakfast  polyethylene glycol 3350 17 Gram(s) Oral daily  pregabalin 150 milliGRAM(s) Oral three times a day  senna 2 Tablet(s) Oral at bedtime  sertraline 200 milliGRAM(s) Oral daily  tiZANidine 4 milliGRAM(s) Oral every 8 hours PRN  traZODone 150 milliGRAM(s) Oral at bedtime      Physical Exam:   Vitals: T(C): 36.9 (24 @ 20:49), Max: 36.9 (24 @ 20:49)  HR: 74 (24 @ 20:49) (67 - 80)  BP: 136/82 (24 @ 20:49) (127/87 - 143/85)  RR: 18 (24 @ 20:49) (18 - 18)  SpO2: 93% (24 @ 20:49) (93% - 96%)    Constitutional: Gen: In no acute distress, cooperative with exam and questioning   Neuro: LUE with paresthesiae and impaired sensation with palpation along dermatomes -- does not seem to fit a consistent dermatomal or peripheral nerve distribution pattern  MSK: ttp and hypertonicity to the paraspinal musculature from c spine to L spine. negative hoffmans, positive facet loading, negative SLR  Psychiatric: Awake alert fully oriented

## 2024-05-29 NOTE — CARE COORDINATION ASSESSMENT. - OTHER PERTINENT DISCHARGE PLANNING INFORMATION:
Met with patient at bedside to discuss the role of case management with verbalized understanding.   Patient presents from S/P fall while walking dog.  Now with increased pain and possible pain management consult.  Patient could benefit from PT/OT eval.  WIll remain available.

## 2024-05-29 NOTE — H&P ADULT - PROBLEM SELECTOR PLAN 2
- S/p cervical fusion with "nerve transplant," CRPS of L UE  - I-Stop in chart, patient takes 1mg clonazepam qD, 5mg methadone q6h, Morphine 15 mg IR q6h at home  - Daily MME on outpatient regimen = 154  - Will initiate patient on 8 mg hydromorphone PO, 4 times daily for equivalence  - Day team to consult pain management for any further dose equivalence increase  - Narcan conditional order

## 2024-05-29 NOTE — H&P ADULT - HISTORY OF PRESENT ILLNESS
57yo M PMH DM, HTN, HLD, CRPS, S/p C/S Fusion, on Eliquis, presents with acute on chronic neck pain s/p mechanical fall w/head trauma 2 days ago. Pt states he was attempting to take his dog for a walk 2 days ago, when dog ran out while pt holding leash, pt's foot caught in door, resulting in fall with head strike to left side head/face. Denies loss of consciousness at any point, but states pain immediately following was so severe he was unable to get up for 10 minutes. States he currently has 10/10 intensity pain without palliative or provocative factors in his neck (radiating down L arm), and upper middle back, which is worse with inspiration. States radiation down L arm is baseline, however intensity of pain is not. States he has numbness, tingling, motor weakness, and cold sensation of L arm & hand, which is also baseline. Patient states he had been intermittently vomiting after the fall up until last episode was 4PM yesterday. Vomitus was always only gastric contents, never contained blood or bile. Denies fevers/chills, cp, palpitations, sob, cough, nausea, diarrhea, constipation, abdominal pain, dysuria, dyschezia, hematuria, hematochezia.    ED Course:  Vitals: T 98, HR 67, /71, RR 18, SpO2 100% ORA  Pertinent Labs: no pertinent abnl labs  EKG: NSR with marked sinus arrhythmia. 78 BMP. QTc 430.  Given: Ofirmev x1, TDaP x1, 1mg IV Dilaudid x3, 0.5 mg IV dilaudid x1, Lidocaine patch x1, Robaxin x1, Zofran x1, NS Bolus 1L x1    Imaging:  CT Head No Cont: No evidence acute hemorrhage, mass, or mass effect.  CT C/S No Cont: Postoperative changes and degenerative changes C2-C7. No changes, postop hardware adequately placed. No acute fracture.

## 2024-05-29 NOTE — PROVIDER CONTACT NOTE (OTHER) - REASON
Informed provider pt complaining of pain 9/10, and asked provider when pt should receive oral 6mg dose

## 2024-05-29 NOTE — PHYSICAL THERAPY INITIAL EVALUATION ADULT - ADDITIONAL COMMENTS
Patient reports that he lives in a private house, 3 [address], bed/bath on main level, lives alone however girlfriend stays with him occasionally. Independent with ADLs/ambulation at baseline, requires increased time due to hx of (L) UE weakness.

## 2024-05-29 NOTE — H&P ADULT - ASSESSMENT
57yo M PMH DM, HTN, HLD, CRPS, S/p C/S Fusion, on Eliquis, presents with acute on chronic neck pain s/p mechanical fall w/head trauma 2 days ago. admitted for pain control.

## 2024-05-29 NOTE — PHARMACOTHERAPY INTERVENTION NOTE - COMMENTS
The Drug Utilization Report below displays all of the controlled substance prescriptions, if any, that your patient has filled in the last twelve months. The information displayed on this report is compiled from pharmacy submissions to the Department, and accurately reflects the information as submitted by the pharmacies.    This report was requested by: Malia Rivero | Reference #: [id number]    Prescription Information  PDI	Current Rx	Drug Type	Rx Written	Rx Dispensed	Drug	Quantity	Days Supply	Prescriber Name	Prescriber EBONI #	Payment Method	Dispenser  A	Y	B	05/24/2024	05/24/2024	clonazepam 1 mg tablet	30	30	Aleisha Sutton P	[id number]	Medicare	[id number]  A	Y	O	05/03/2024	05/09/2024	morphine sulfate ir 15 mg tab	120	30	Stamatos, Juaquin	[id number]	[facility] #[id number]  A	Y	O	05/03/2024	05/03/2024	methadone hcl 5 mg tablet	120	30	Stamatos, Juaquin	[id number]	[facility] #[id number]  A	Y	B	04/24/2024	04/29/2024	clonazepam 0.5 mg tablet	30	30	Aleisha Sutton P	[id number]	Medicare	[id number]  A	N	B	04/24/2024	04/25/2024	clonazepam 1 mg tablet	30	30	Aleisha Sutton P	[id number]	Medicare	[id number]  A	N	O	04/09/2024	04/11/2024	morphine sulfate ir 15 mg tab	120	30	Stamatos, Juaquin	[id number]	[facility] #[id number]  A	N	O	04/01/2024	04/05/2024	methadone hcl 5 mg tablet	120	30	Stamatos, Juaquin	[id number]	[facility] #[id number]  A	N	B	03/20/2024	03/27/2024	clonazepam 1 mg tablet	30	30	Aleisha Sutton P P	[id number]	Medicare	[id number]  A	N	B	03/20/2024	03/27/2024	clonazepam 0.5 mg tablet	30	30	Aleisha Sutton P P	[id number]	Medicare	[id number]  A	N	O	02/23/2024	03/04/2024	methadone hcl 5 mg tablet	120	30	StamatosJuaquin	[id number]	[facility] #[id number]  A	N	O	02/23/2024	03/01/2024	hydromorphone 4 mg tablet	120	30	Stamatos, Juaquin	[id number]	[facility] #[id number]  A	N	B	02/23/2024	02/26/2024	clonazepam 0.5 mg tablet	30	30	Aleisha Sutton P P	[id number]	Medicare	[id number]  A	N	B	02/23/2024	02/26/2024	clonazepam 1 mg tablet	30	30	Aleisha Sutton P P	[id number]	Medicare	[id number]  A	N	O	02/02/2024	02/05/2024	methadone hcl 5 mg tablet	120	30	Stamatos, Juaquin	[id number]	[facility] #[id number]  A	N	O	02/02/2024	02/02/2024	hydromorphone 4 mg tablet	120	30	Stamatos, Juaquin	[id number]	[facility] #[id number]  A	N	B	01/30/2024	01/30/2024	clonazepam 1 mg tablet	30	30	Aleisha Sutton P P	[id number]	Medicare	[id number]  A	N	B	01/29/2024	01/30/2024	clonazepam 0.5 mg tablet	30	30	Aleisha Sutton P P	[id number]	Medicare	[id number]  A	N	O	01/05/2024	01/08/2024	methadone hcl 5 mg tablet	120	30	StamatosJuaquin	[id number]	[facility] #[id number]  A	N	O	01/05/2024	01/05/2024	hydromorphone 4 mg tablet	120	30	StamatosJuaquin	[id number]	[facility] #[id number]

## 2024-05-29 NOTE — H&P ADULT - PROBLEM SELECTOR PLAN 1
- S/p mechanical fall with +HS 2 days ago, now with severe neck, upper back pain  - CT Head No Cont: No evidence acute hemorrhage, mass, or mass effect.  - CT C/S No Cont: Postoperative changes and degenerative changes C2-C7. No changes, postop hardware adequately placed. No acute fracture.  - F/u Rib XR official read  - PT Consult  - Patient on multiple controlled substance via outpt pain management, day team to consult pain management  - C/w home tizanidine, Lyrica  - Oral opiate regimen as below

## 2024-05-29 NOTE — H&P ADULT - PROBLEM SELECTOR PLAN 3
- Hx T2DM, on home sliding scale (dose unknown), metformin, and 26U morning Lantus  - C/w Lantus 21U Kaleb, CATARINA, FS AC & HS

## 2024-05-29 NOTE — PATIENT PROFILE ADULT - FALL HARM RISK - HARM RISK INTERVENTIONS

## 2024-05-29 NOTE — CARE COORDINATION ASSESSMENT. - NSCAREPROVIDERS_GEN_ALL_CORE_FT
CARE PROVIDERS:  Accepting Physician: Dylan Sy  Access Services: Bindu Kc  Administration: Glenn Valdez  Administration: Radu Fajardo  Administration: Jil Osorio  Administration: Layla Owens  Administration: Scarlett Babb  Admitting: Doctor, Unknown  Attending: Doctor, Unknown  Case Management: Jay Jimenez  Covering Team: Gary Trotter  Covering Team: Arnold Engel  ED ACP: Ritika Nichols  ED Attending: David Coker  ED Nurse: Rosie Ronquillo  Emergency Medicine: Ritika Nichols  Nurse: Osmin Brooks  Nurse: Layla Mccarty  Ordered: ServiceAccount, [facility]  Outpatient Provider: Janusz Ye  Override: Jose Luis Malhotra  Override: Layla Mccarty  PCA/Nursing Assistant: Viviana Mancilla  Primary Team: Dylan Sy  Primary Team: Seth De Leon  Registered Dietitian: Jewell Lin  : Bria Sheppard// Supp. Assoc.: Genoveva Hinkle

## 2024-05-29 NOTE — PROGRESS NOTE ADULT - PROBLEM SELECTOR PLAN 2
- S/p cervical fusion with "nerve transplant," CRPS of L UE  - I-Stop in chart, patient takes 1mg clonazepam qD, 5mg methadone q6h, Morphine 15 mg IR q6h at home  - Daily MME on outpatient regimen = 154  - Narcan conditional order  - Discussed with pain management Dr. Baird, patient evaluated on 5/29 with plan to increase opioids after reviewing istop entries

## 2024-05-29 NOTE — ED ADULT NURSE REASSESSMENT NOTE - NS ED NURSE REASSESS COMMENT FT1
1910:  TASK "RN:  vitals recorded.  bp remains soft, but improved.  the patient is requesting another dose of Dilaudid.  MD at bedside.  it was explained that we can give Toradol, but to give another dose of Dilaudid is going to run us into same concerns as before, which includes hypotension and then return of pain in 1-2 hours.  He became upset when MD offered Toradol, stating he has used it several times in past and it "does not do shit".  It was then determined that the patient will be admitted for pain control and pain management (he does not like his current pain management specialist).  labs were collected and sent.  will medicate as ordered.  wily curiel.
Patient awake at this time fingerstick checked ; requested for pain medication informed Primary nurse and called admitting MD
Pt in bed at this time, awaiting medical bed.  [place] collar in place.  Abrasions noted to left side of face.  Denies any chest pain or SOB.  No n/v/d.  Pain management.  Maintain comfort.

## 2024-05-29 NOTE — CONSULT NOTE ADULT - RESPIRATORY
POC reviewed with patient. AAOx3. Stable on room air. No any complaints verbalized during shift. Patient can get up to the bathroom with 1 X assistance and walker. Had 2 bowel movements. Swallows pills whole. Patient is on pure wick. Mepilex placed on sacrum for prevention.No injuries, falls, or trauma occurred during shift. Purposeful rounding completed. Bed low and locked with side rails up x 3 and call light within reach.      Problem: Adult Inpatient Plan of Care  Goal: Plan of Care Review  Outcome: Ongoing, Progressing  Goal: Patient-Specific Goal (Individualized)  Outcome: Ongoing, Progressing  Goal: Absence of Hospital-Acquired Illness or Injury  Outcome: Ongoing, Progressing  Goal: Optimal Comfort and Wellbeing  Outcome: Ongoing, Progressing  Goal: Readiness for Transition of Care  Outcome: Ongoing, Progressing     Problem: Infection  Goal: Absence of Infection Signs and Symptoms  Outcome: Ongoing, Progressing     Problem: Fall Injury Risk  Goal: Absence of Fall and Fall-Related Injury  Outcome: Ongoing, Progressing     Problem: Skin Injury Risk Increased  Goal: Skin Health and Integrity  Outcome: Ongoing, Progressing      normal/clear to auscultation bilaterally/no wheezes/no rales/no rhonchi

## 2024-05-29 NOTE — ED ADULT NURSE REASSESSMENT NOTE - NSFALLHARMRISKINTERV_ED_ALL_ED

## 2024-05-29 NOTE — CARE COORDINATION ASSESSMENT. - REFERRED BY
Case management consult noted for managing medical conditions at home.  Will monitor for transition needs and remain available./nurse

## 2024-05-29 NOTE — SOCIAL WORK PROGRESS NOTE - NSSWPROGRESSNOTE_GEN_ALL_CORE
SW consult reviewed and addressed. SW met with pt at bedside to discuss consult. Pt requested information on food/ rent resources through Medicaid. SW reviewed chart and pt has medicare. Per pt, he can afford rent but was inquiring about additional financial income. SW advised that DSS would be able to review finances and determine pt's eligibility for additional income. Pt not interested in DSS services at this time. SW discussed local food pantries and pt also declining at this time. SW discussed mental health services. Pt reports that he is under care of psychiatrist. 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 SW offered support and provided list of in network providers. SW to follow and remain available for any needs.

## 2024-05-29 NOTE — OCCUPATIONAL THERAPY INITIAL EVALUATION ADULT - NSOTDISCHREC_GEN_A_CORE
Outpatient hand therapy to address pain management, FMC, decreased sensation, and weakness in LUE. SAUMYA Fraser made aware and to f/u with /Outpatient OT

## 2024-05-29 NOTE — PROVIDER CONTACT NOTE (OTHER) - ACTION/TREATMENT ORDERED:
Provider stated ok for pt to receive moderate prn dose now for pts severe pain and to reschedule the 6mg dilaudid PO dose to 1AM.

## 2024-05-29 NOTE — PHYSICAL THERAPY INITIAL EVALUATION ADULT - ACTIVE RANGE OF MOTION EXAMINATION, REHAB EVAL
(L) UE weakness with decreased ROM noted/bilateral upper extremity Active ROM was WFL (within functional limits)/bilateral  lower extremity Active ROM was WFL (within functional limits)

## 2024-05-29 NOTE — CONSULT NOTE ADULT - ASSESSMENT
A/P 58y year old Male with low back pain, CRPS of the LUE, cervicalgia, lumbar and cervical spondylosis    Patient's istop was reviewed Reference #: [id number]    recently, patient has received prescriptions for morphine IR 15 mg four times daily, methadone 5 mg four times daily, hydromorphone 4 milligrams tablet four times daily, each of these prescriptions about one month duration over the past three months or so.    Daily morphine equivalent seem to be roughly 60 MME along with the patient's methadone 5 mg tablet.    Patient complains of persistent 10 out of 10 pain not adequately managed with current regimen which as far as opioids go consists of 0.5 to 1.0 mg hydromorphone intravenously every four hours as needed which amounts to about 60 MME if taken every four hours if the 1 mg is utilized each time, wwhich he seems to be doing on review of medication administration record.     Initially today, I had ordered 2 mg oral hydromorphone in addition to his IV dosing to slowly increase his daily MME, but I was contacted by nursing stating the patient did not feel this was adequate. revised regimen to 6 mg hydromorphone oral and discontinued the 1 mg IV dosing, leaving behind 0.5 mg as needed, to address any pain beyond the oral regimen.    Will monitor for the time being. would like to avoid increasing intravenous medication as titration to oral regimen as discharge approaches tends to be difficult particularly in patients that have been on opioids long-standing.     Primary team notes are reviewed  Laboratory studies reviewed including those mentioned earlier/above  Discussed management/coordinated care with primary team/referring provider.  High risk of morbidity from treatment with: schedule two narcotic pain medications in excess of 60 MME daily    79 minutes spent during patient encounter:    ¦ preparing to see the patient   ¦ performing a medically appropriate examination and/or evaluation   ¦ counseling and educating the patient/family/caregiver regarding topical, oral, IV, interventional based options for his pain  ¦ ordering medications, tests, or procedures   ¦ referring and communicating with other health care professionals  ¦ documenting clinical information in the electronic or other health record   
Physical Exam:   Vital Signs Last 24 Hrs  T(C): 36.3 (29 May 2024 12:46), Max: 36.7 (28 May 2024 19:10)  T(F): 97.4 (29 May 2024 12:46), Max: 98 (28 May 2024 19:10)  HR: 67 (29 May 2024 12:46) (67 - 80)  BP: 128/82 (29 May 2024 12:46) (95/58 - 143/85)  BP(mean): --  RR: 18 (29 May 2024 12:46) (18 - 18)  SpO2: 95% (29 May 2024 12:46) (94% - 97%)    Parameters below as of 29 May 2024 12:46  Patient On (Oxygen Delivery Method): room air             CAPILLARY BLOOD GLUCOSE      POCT Blood Glucose.: 149 mg/dL (29 May 2024 11:51)  POCT Blood Glucose.: 112 mg/dL (29 May 2024 07:35)  POCT Blood Glucose.: 124 mg/dL (29 May 2024 02:41)  POCT Blood Glucose.: 85 mg/dL (28 May 2024 20:24)      Cholesterol, Serum: 113 mg/dL (05.19.21 @ 08:36)     HDL Cholesterol, Serum: 22 mg/dL (05.19.21 @ 08:36)     LDL Cholesterol Calculated: 66 mg/dL (05.19.21 @ 08:36)     DIET: CC  >50%

## 2024-05-29 NOTE — CONSULT NOTE ADULT - PROBLEM SELECTOR RECOMMENDATION 9
Type 2 A1c 9.4%   Recommend endocrine-Perlman on consult  Lantus 21 units HS; low ac and low hs corrective  FU appt: Iftikhar DANIEL recommendations: return to home, lantus 30 units daily; lispro 10 units AC x3 meals; mounjaro 2.5mg weekly; metformin 1000 mg x2 daily; jardiance 24 mg daily regimen and CGM/ glucose monitoring  diabetes education provided  Diabetes support info and cell # [phone] given   Goal 100-180 mg/dL; 140-180 mg/dL in critical care areas
cont current mdii  cont cons cho diet  goal bg 100-180 in hosp setting

## 2024-05-29 NOTE — CONSULT NOTE ADULT - CONSULT REASON
dm2 uncontrolled
Pain mgmt
58y A1C with Estimated Average Glucose Result: 9.4 % (05-29-24 @ 05:10)   diabetes mellitus uncontrolled type 2

## 2024-05-29 NOTE — H&P ADULT - NSHPREVIEWOFSYSTEMS_GEN_ALL_CORE
CONSTITUTIONAL: No weakness, fevers or chills  HEENT:  +HA, +Neck pain. No visual changes, no sore throat  RESPIRATORY: No cough, wheezing, hemoptysis; No shortness of breath  CARDIOVASCULAR: No chest pain or palpitations  GASTROINTESTINAL: No abdominal pain, present n/v, or hematemesis; No diarrhea or constipation. No melena or hematochezia.  GENITOURINARY: No dysuria, frequency or hematuria  NEUROLOGICAL: +L arm motor weakness. No dizziness.  MSK: + Neck & central upper back pain with radiation down L arm  SKIN: No itching, burning, rashes, or lesions

## 2024-05-29 NOTE — H&P ADULT - NSHPSOCIALHISTORY_GEN_ALL_CORE
Tobacco: smokes pipe tobacco roughly once q3 weeks  EtOH: denies  Recreational drugs: denies  Lives with: girlfriend  Ambulation: intermittent cane usage  ADLs: independent  Dietary restrictions: none

## 2024-05-29 NOTE — OCCUPATIONAL THERAPY INITIAL EVALUATION ADULT - RANGE OF MOTION EXAMINATION, UPPER EXTREMITY
except left shoulder/elbow approx 1/4 range AROM. Pt reports chronic numbness LUE. WFL BUE opposition x5 digits with increased time for precision. Decreased lateral pinch strength left hand compared to right/bilateral UE Active ROM was WFL  (within functional limits)

## 2024-05-29 NOTE — OCCUPATIONAL THERAPY INITIAL EVALUATION ADULT - MANUAL MUSCLE TESTING RESULTS, REHAB EVAL
pain, at least 3+/5 throughout RUE; 3-/5 left shoulder and elbow, 3+/5 distal LUE/grossly assessed due to

## 2024-05-29 NOTE — PATIENT PROFILE ADULT - NSPROIMPLANTSMEDDEV_GEN_A_NUR
Problem: Knowledge Deficit  Goal: Knowledge of the prescribed therapeutic regimen will improve  Outcome: PROGRESSING AS EXPECTED     Problem: Skin Integrity  Goal: Risk for impaired skin integrity will decrease  Outcome: PROGRESSING AS EXPECTED     Problem: Respiratory:  Goal: Respiratory status will improve  Outcome: PROGRESSING SLOWER THAN EXPECTED  Note: Pt is requiring a higher oxygen demand. Baseline is 3L. Pt is on 10L mask.      hx neck fusion; titanium

## 2024-05-29 NOTE — OCCUPATIONAL THERAPY INITIAL EVALUATION ADULT - PERTINENT HX OF CURRENT PROBLEM, REHAB EVAL
57 y/o male with PMH of cervical fusion sx in past (x 4 with last one 2 years ago) with Lt sided residual motor deficit following up with pain control and not happy being on the same regiment as far as pain is concern presented to ED to be evaluated for mechanical fall at home yesterday landing on Lt side of the face while playing with dog. CT head/C spine: no acute findings.

## 2024-05-29 NOTE — H&P ADULT - ATTENDING COMMENTS
59 y/o male with PMH of cervical fusion sx in past (x 4 with last one 2 years ago) with Lt sided residual motor deficit following up with pain control and not happy being on the same regiment as far as pain is concern presented to ED to be evaluated for mechanical fall at home yesterday landing on Lt side of the face while playing with dog.   CT head/C spine: no acute findings  despite getting 1 mg IV Dilaudid, 30 mg ketorolac  pain persists and wanting to be seen my pain management    plan:  -C/w home muscle relaxer  -haven't able to take Dilaudid as its on back order. takes morphine and  tizanidine for pain at home  -pain management consult  -denies any new focal neurological deficit since fall  -PT/OT

## 2024-05-30 LAB
A1C WITH ESTIMATED AVERAGE GLUCOSE RESULT: 9.3 % — HIGH (ref 4–5.6)
ALBUMIN SERPL ELPH-MCNC: 3 G/DL — LOW (ref 3.3–5)
ALP SERPL-CCNC: 84 U/L — SIGNIFICANT CHANGE UP (ref 40–120)
ALT FLD-CCNC: 32 U/L — SIGNIFICANT CHANGE UP (ref 12–78)
ANION GAP SERPL CALC-SCNC: 3 MMOL/L — LOW (ref 5–17)
AST SERPL-CCNC: 6 U/L — LOW (ref 15–37)
BILIRUB SERPL-MCNC: 0.3 MG/DL — SIGNIFICANT CHANGE UP (ref 0.2–1.2)
BUN SERPL-MCNC: 10 MG/DL — SIGNIFICANT CHANGE UP (ref 7–23)
CALCIUM SERPL-MCNC: 9.2 MG/DL — SIGNIFICANT CHANGE UP (ref 8.5–10.1)
CHLORIDE SERPL-SCNC: 105 MMOL/L — SIGNIFICANT CHANGE UP (ref 96–108)
CHOLEST SERPL-MCNC: 156 MG/DL — SIGNIFICANT CHANGE UP
CO2 SERPL-SCNC: 32 MMOL/L — HIGH (ref 22–31)
CREAT SERPL-MCNC: 0.75 MG/DL — SIGNIFICANT CHANGE UP (ref 0.5–1.3)
EGFR: 106 ML/MIN/1.73M2 — SIGNIFICANT CHANGE UP
ESTIMATED AVERAGE GLUCOSE: 220 MG/DL — HIGH (ref 68–114)
GLUCOSE SERPL-MCNC: 258 MG/DL — HIGH (ref 70–99)
HCT VFR BLD CALC: 40.1 % — SIGNIFICANT CHANGE UP (ref 39–50)
HDLC SERPL-MCNC: 34 MG/DL — LOW
HGB BLD-MCNC: 12.8 G/DL — LOW (ref 13–17)
LIPID PNL WITH DIRECT LDL SERPL: 87 MG/DL — SIGNIFICANT CHANGE UP
MCHC RBC-ENTMCNC: 27.8 PG — SIGNIFICANT CHANGE UP (ref 27–34)
MCHC RBC-ENTMCNC: 31.9 GM/DL — LOW (ref 32–36)
MCV RBC AUTO: 87.2 FL — SIGNIFICANT CHANGE UP (ref 80–100)
NON HDL CHOLESTEROL: 122 MG/DL — SIGNIFICANT CHANGE UP
NRBC # BLD: 0 /100 WBCS — SIGNIFICANT CHANGE UP (ref 0–0)
PLATELET # BLD AUTO: 266 K/UL — SIGNIFICANT CHANGE UP (ref 150–400)
POTASSIUM SERPL-MCNC: 4 MMOL/L — SIGNIFICANT CHANGE UP (ref 3.5–5.3)
POTASSIUM SERPL-SCNC: 4 MMOL/L — SIGNIFICANT CHANGE UP (ref 3.5–5.3)
PROT SERPL-MCNC: 6.6 G/DL — SIGNIFICANT CHANGE UP (ref 6–8.3)
RBC # BLD: 4.6 M/UL — SIGNIFICANT CHANGE UP (ref 4.2–5.8)
RBC # FLD: 14.7 % — HIGH (ref 10.3–14.5)
SODIUM SERPL-SCNC: 140 MMOL/L — SIGNIFICANT CHANGE UP (ref 135–145)
TRIGL SERPL-MCNC: 206 MG/DL — HIGH
WBC # BLD: 5.61 K/UL — SIGNIFICANT CHANGE UP (ref 3.8–10.5)
WBC # FLD AUTO: 5.61 K/UL — SIGNIFICANT CHANGE UP (ref 3.8–10.5)

## 2024-05-30 PROCEDURE — 99232 SBSQ HOSP IP/OBS MODERATE 35: CPT

## 2024-05-30 RX ORDER — CHOLECALCIFEROL (VITAMIN D3) 125 MCG
1 CAPSULE ORAL
Refills: 0 | DISCHARGE

## 2024-05-30 RX ORDER — HYDROMORPHONE HYDROCHLORIDE 2 MG/ML
1 INJECTION INTRAMUSCULAR; INTRAVENOUS; SUBCUTANEOUS EVERY 4 HOURS
Refills: 0 | Status: DISCONTINUED | OUTPATIENT
Start: 2024-05-30 | End: 2024-05-30

## 2024-05-30 RX ORDER — FERROUS SULFATE 325(65) MG
1 TABLET ORAL
Refills: 0 | DISCHARGE

## 2024-05-30 RX ORDER — TIZANIDINE 4 MG/1
1 TABLET ORAL
Refills: 0 | DISCHARGE

## 2024-05-30 RX ORDER — INSULIN ASPART 100 [IU]/ML
0 INJECTION, SOLUTION SUBCUTANEOUS
Refills: 0 | DISCHARGE

## 2024-05-30 RX ORDER — MORPHINE SULFATE 50 MG/1
1 CAPSULE, EXTENDED RELEASE ORAL
Refills: 0 | DISCHARGE

## 2024-05-30 RX ORDER — HYDROMORPHONE HYDROCHLORIDE 2 MG/ML
8 INJECTION INTRAMUSCULAR; INTRAVENOUS; SUBCUTANEOUS EVERY 6 HOURS
Refills: 0 | Status: COMPLETED | OUTPATIENT
Start: 2024-05-31 | End: 2024-06-07

## 2024-05-30 RX ORDER — CLONAZEPAM 1 MG
1 TABLET ORAL
Refills: 0 | DISCHARGE

## 2024-05-30 RX ORDER — PANTOPRAZOLE SODIUM 20 MG/1
1 TABLET, DELAYED RELEASE ORAL
Refills: 0 | DISCHARGE

## 2024-05-30 RX ORDER — METFORMIN HYDROCHLORIDE 850 MG/1
1 TABLET ORAL
Refills: 0 | DISCHARGE

## 2024-05-30 RX ORDER — CARVEDILOL PHOSPHATE 80 MG/1
1 CAPSULE, EXTENDED RELEASE ORAL
Refills: 0 | DISCHARGE

## 2024-05-30 RX ORDER — TIRZEPATIDE 15 MG/.5ML
2.5 INJECTION, SOLUTION SUBCUTANEOUS
Refills: 0 | DISCHARGE

## 2024-05-30 RX ORDER — HYDROMORPHONE HYDROCHLORIDE 2 MG/ML
8 INJECTION INTRAMUSCULAR; INTRAVENOUS; SUBCUTANEOUS EVERY 6 HOURS
Refills: 0 | Status: DISCONTINUED | OUTPATIENT
Start: 2024-05-30 | End: 2024-05-30

## 2024-05-30 RX ORDER — INSULIN LISPRO 100/ML
5 VIAL (ML) SUBCUTANEOUS
Refills: 0 | Status: DISCONTINUED | OUTPATIENT
Start: 2024-05-30 | End: 2024-05-31

## 2024-05-30 RX ORDER — HYDROMORPHONE HYDROCHLORIDE 2 MG/ML
0.5 INJECTION INTRAMUSCULAR; INTRAVENOUS; SUBCUTANEOUS EVERY 6 HOURS
Refills: 0 | Status: DISCONTINUED | OUTPATIENT
Start: 2024-05-30 | End: 2024-05-30

## 2024-05-30 RX ORDER — HYDROMORPHONE HYDROCHLORIDE 2 MG/ML
0.5 INJECTION INTRAMUSCULAR; INTRAVENOUS; SUBCUTANEOUS EVERY 6 HOURS
Refills: 0 | Status: DISCONTINUED | OUTPATIENT
Start: 2024-05-30 | End: 2024-06-04

## 2024-05-30 RX ORDER — METHADONE HYDROCHLORIDE 40 MG/1
1 TABLET ORAL
Refills: 0 | DISCHARGE

## 2024-05-30 RX ORDER — INSULIN ASPART 100 [IU]/ML
6 INJECTION, SOLUTION SUBCUTANEOUS
Refills: 0 | DISCHARGE

## 2024-05-30 RX ORDER — EMPAGLIFLOZIN 10 MG/1
1 TABLET, FILM COATED ORAL
Refills: 0 | DISCHARGE

## 2024-05-30 RX ADMIN — APIXABAN 5 MILLIGRAM(S): 2.5 TABLET, FILM COATED ORAL at 06:37

## 2024-05-30 RX ADMIN — INSULIN GLARGINE 21 UNIT(S): 100 INJECTION, SOLUTION SUBCUTANEOUS at 07:58

## 2024-05-30 RX ADMIN — Medication 1000 MILLIGRAM(S): at 06:37

## 2024-05-30 RX ADMIN — HYDROMORPHONE HYDROCHLORIDE 6 MILLIGRAM(S): 2 INJECTION INTRAMUSCULAR; INTRAVENOUS; SUBCUTANEOUS at 01:20

## 2024-05-30 RX ADMIN — HYDROMORPHONE HYDROCHLORIDE 1 MILLIGRAM(S): 2 INJECTION INTRAMUSCULAR; INTRAVENOUS; SUBCUTANEOUS at 12:32

## 2024-05-30 RX ADMIN — Medication 5 UNIT(S): at 16:48

## 2024-05-30 RX ADMIN — Medication 1000 MILLIGRAM(S): at 22:26

## 2024-05-30 RX ADMIN — HYDROMORPHONE HYDROCHLORIDE 1 MILLIGRAM(S): 2 INJECTION INTRAMUSCULAR; INTRAVENOUS; SUBCUTANEOUS at 18:30

## 2024-05-30 RX ADMIN — HYDROMORPHONE HYDROCHLORIDE 6 MILLIGRAM(S): 2 INJECTION INTRAMUSCULAR; INTRAVENOUS; SUBCUTANEOUS at 18:00

## 2024-05-30 RX ADMIN — Medication 150 MILLIGRAM(S): at 06:38

## 2024-05-30 RX ADMIN — HYDROMORPHONE HYDROCHLORIDE 1 MILLIGRAM(S): 2 INJECTION INTRAMUSCULAR; INTRAVENOUS; SUBCUTANEOUS at 13:25

## 2024-05-30 RX ADMIN — HYDROMORPHONE HYDROCHLORIDE 6 MILLIGRAM(S): 2 INJECTION INTRAMUSCULAR; INTRAVENOUS; SUBCUTANEOUS at 21:50

## 2024-05-30 RX ADMIN — HYDROMORPHONE HYDROCHLORIDE 6 MILLIGRAM(S): 2 INJECTION INTRAMUSCULAR; INTRAVENOUS; SUBCUTANEOUS at 06:02

## 2024-05-30 RX ADMIN — ATORVASTATIN CALCIUM 80 MILLIGRAM(S): 80 TABLET, FILM COATED ORAL at 22:22

## 2024-05-30 RX ADMIN — POLYETHYLENE GLYCOL 3350 17 GRAM(S): 17 POWDER, FOR SOLUTION ORAL at 11:11

## 2024-05-30 RX ADMIN — HYDROMORPHONE HYDROCHLORIDE 6 MILLIGRAM(S): 2 INJECTION INTRAMUSCULAR; INTRAVENOUS; SUBCUTANEOUS at 13:21

## 2024-05-30 RX ADMIN — CARVEDILOL PHOSPHATE 25 MILLIGRAM(S): 80 CAPSULE, EXTENDED RELEASE ORAL at 17:10

## 2024-05-30 RX ADMIN — Medication 2: at 12:06

## 2024-05-30 RX ADMIN — Medication 150 MILLIGRAM(S): at 22:29

## 2024-05-30 RX ADMIN — HYDROMORPHONE HYDROCHLORIDE 6 MILLIGRAM(S): 2 INJECTION INTRAMUSCULAR; INTRAVENOUS; SUBCUTANEOUS at 17:34

## 2024-05-30 RX ADMIN — Medication 5 UNIT(S): at 12:06

## 2024-05-30 RX ADMIN — Medication 1000 MILLIGRAM(S): at 22:22

## 2024-05-30 RX ADMIN — HYDROMORPHONE HYDROCHLORIDE 6 MILLIGRAM(S): 2 INJECTION INTRAMUSCULAR; INTRAVENOUS; SUBCUTANEOUS at 21:10

## 2024-05-30 RX ADMIN — HYDROMORPHONE HYDROCHLORIDE 0.5 MILLIGRAM(S): 2 INJECTION INTRAMUSCULAR; INTRAVENOUS; SUBCUTANEOUS at 08:45

## 2024-05-30 RX ADMIN — PANTOPRAZOLE SODIUM 40 MILLIGRAM(S): 20 TABLET, DELAYED RELEASE ORAL at 06:37

## 2024-05-30 RX ADMIN — HYDROMORPHONE HYDROCHLORIDE 0.5 MILLIGRAM(S): 2 INJECTION INTRAMUSCULAR; INTRAVENOUS; SUBCUTANEOUS at 08:12

## 2024-05-30 RX ADMIN — Medication 150 MILLIGRAM(S): at 13:22

## 2024-05-30 RX ADMIN — TIZANIDINE 4 MILLIGRAM(S): 4 TABLET ORAL at 06:45

## 2024-05-30 RX ADMIN — HYDROMORPHONE HYDROCHLORIDE 1 MILLIGRAM(S): 2 INJECTION INTRAMUSCULAR; INTRAVENOUS; SUBCUTANEOUS at 17:10

## 2024-05-30 RX ADMIN — Medication 1 MILLIGRAM(S): at 22:22

## 2024-05-30 RX ADMIN — APIXABAN 5 MILLIGRAM(S): 2.5 TABLET, FILM COATED ORAL at 17:10

## 2024-05-30 RX ADMIN — GABAPENTIN 100 MILLIGRAM(S): 400 CAPSULE ORAL at 06:38

## 2024-05-30 RX ADMIN — GABAPENTIN 100 MILLIGRAM(S): 400 CAPSULE ORAL at 13:22

## 2024-05-30 RX ADMIN — Medication 3: at 07:58

## 2024-05-30 RX ADMIN — Medication 1000 MILLIGRAM(S): at 13:22

## 2024-05-30 RX ADMIN — Medication 150 MILLIGRAM(S): at 22:22

## 2024-05-30 RX ADMIN — GABAPENTIN 100 MILLIGRAM(S): 400 CAPSULE ORAL at 22:21

## 2024-05-30 RX ADMIN — SERTRALINE 200 MILLIGRAM(S): 25 TABLET, FILM COATED ORAL at 11:10

## 2024-05-30 RX ADMIN — HYDROMORPHONE HYDROCHLORIDE 6 MILLIGRAM(S): 2 INJECTION INTRAMUSCULAR; INTRAVENOUS; SUBCUTANEOUS at 08:59

## 2024-05-30 RX ADMIN — Medication 2: at 16:48

## 2024-05-30 RX ADMIN — HYDROMORPHONE HYDROCHLORIDE 0.5 MILLIGRAM(S): 2 INJECTION INTRAMUSCULAR; INTRAVENOUS; SUBCUTANEOUS at 23:30

## 2024-05-30 NOTE — CASE MANAGEMENT PROGRESS NOTE - NSCMPROGRESSNOTE_GEN_ALL_CORE
Patient discussed in rounds; remains admitted for pain management. Anticipated DC plan is for home when medically stable. He will need a script for outpatient OT upon DC. CM remains available for transitional care coordination; will continue to follow.

## 2024-05-30 NOTE — CHART NOTE - NSCHARTNOTEFT_GEN_A_CORE
Called by RN regarding pts pain medication regimen. Pt received 6mg PO standing dilaudid at 2110, orders just placed by pain management team for 8mg standing dilaudid to replace 6mg dosing - however order now timed to be given at midnight. Will retime 8mg PO dose for 0310 5/31 so pt will receive 6 hours after last dose.    RN also states pt discussed with pain management team that IV dilaudid will be for breakthrough pain, currently ordered for severe. Discussed with Dr. Baird, will re-order with breakthrough pain instructions.    RN to please call with any changes.

## 2024-05-30 NOTE — PROGRESS NOTE ADULT - PROBLEM SELECTOR PLAN 1
- S/p mechanical fall with +HS 2 days ago, now with severe neck, upper back pain  - CT Head No Cont: No evidence acute hemorrhage, mass, or mass effect.  - CT C/S No Cont: Postoperative changes and degenerative changes C2-C7. No changes, postop hardware adequately placed. No acute fracture.  - F/u Rib XR official read  - PT Consult  - Patient on multiple controlled substance via outpt pain management, day team to consult pain management  - C/w home tizanidine, Lyrica  - Oral opiate regimen as below,   appreicated physiatrsit eval  will incrase dilaudid to 1 mg ivp q4h prn

## 2024-05-31 PROCEDURE — 99232 SBSQ HOSP IP/OBS MODERATE 35: CPT

## 2024-05-31 RX ORDER — INSULIN LISPRO 100/ML
VIAL (ML) SUBCUTANEOUS AT BEDTIME
Refills: 0 | Status: DISCONTINUED | OUTPATIENT
Start: 2024-05-31 | End: 2024-06-06

## 2024-05-31 RX ORDER — INSULIN LISPRO 100/ML
VIAL (ML) SUBCUTANEOUS
Refills: 0 | Status: DISCONTINUED | OUTPATIENT
Start: 2024-05-31 | End: 2024-06-06

## 2024-05-31 RX ORDER — INSULIN LISPRO 100/ML
7 VIAL (ML) SUBCUTANEOUS
Refills: 0 | Status: DISCONTINUED | OUTPATIENT
Start: 2024-05-31 | End: 2024-06-02

## 2024-05-31 RX ADMIN — CARVEDILOL PHOSPHATE 25 MILLIGRAM(S): 80 CAPSULE, EXTENDED RELEASE ORAL at 17:10

## 2024-05-31 RX ADMIN — CARVEDILOL PHOSPHATE 25 MILLIGRAM(S): 80 CAPSULE, EXTENDED RELEASE ORAL at 06:02

## 2024-05-31 RX ADMIN — APIXABAN 5 MILLIGRAM(S): 2.5 TABLET, FILM COATED ORAL at 17:10

## 2024-05-31 RX ADMIN — HYDROMORPHONE HYDROCHLORIDE 0.5 MILLIGRAM(S): 2 INJECTION INTRAMUSCULAR; INTRAVENOUS; SUBCUTANEOUS at 19:50

## 2024-05-31 RX ADMIN — Medication 2: at 07:45

## 2024-05-31 RX ADMIN — Medication 1000 MILLIGRAM(S): at 06:02

## 2024-05-31 RX ADMIN — HYDROMORPHONE HYDROCHLORIDE 0.5 MILLIGRAM(S): 2 INJECTION INTRAMUSCULAR; INTRAVENOUS; SUBCUTANEOUS at 18:51

## 2024-05-31 RX ADMIN — SERTRALINE 200 MILLIGRAM(S): 25 TABLET, FILM COATED ORAL at 11:21

## 2024-05-31 RX ADMIN — HYDROMORPHONE HYDROCHLORIDE 0.5 MILLIGRAM(S): 2 INJECTION INTRAMUSCULAR; INTRAVENOUS; SUBCUTANEOUS at 12:37

## 2024-05-31 RX ADMIN — Medication 1 MILLIGRAM(S): at 21:22

## 2024-05-31 RX ADMIN — Medication 1000 MILLIGRAM(S): at 22:16

## 2024-05-31 RX ADMIN — HYDROMORPHONE HYDROCHLORIDE 0.5 MILLIGRAM(S): 2 INJECTION INTRAMUSCULAR; INTRAVENOUS; SUBCUTANEOUS at 06:25

## 2024-05-31 RX ADMIN — HYDROMORPHONE HYDROCHLORIDE 8 MILLIGRAM(S): 2 INJECTION INTRAMUSCULAR; INTRAVENOUS; SUBCUTANEOUS at 03:02

## 2024-05-31 RX ADMIN — Medication 150 MILLIGRAM(S): at 06:02

## 2024-05-31 RX ADMIN — PANTOPRAZOLE SODIUM 40 MILLIGRAM(S): 20 TABLET, DELAYED RELEASE ORAL at 06:02

## 2024-05-31 RX ADMIN — Medication 1000 MILLIGRAM(S): at 21:22

## 2024-05-31 RX ADMIN — INSULIN GLARGINE 21 UNIT(S): 100 INJECTION, SOLUTION SUBCUTANEOUS at 07:45

## 2024-05-31 RX ADMIN — Medication 4: at 16:58

## 2024-05-31 RX ADMIN — GABAPENTIN 100 MILLIGRAM(S): 400 CAPSULE ORAL at 13:20

## 2024-05-31 RX ADMIN — SENNA PLUS 2 TABLET(S): 8.6 TABLET ORAL at 21:22

## 2024-05-31 RX ADMIN — HYDROMORPHONE HYDROCHLORIDE 8 MILLIGRAM(S): 2 INJECTION INTRAMUSCULAR; INTRAVENOUS; SUBCUTANEOUS at 10:05

## 2024-05-31 RX ADMIN — HYDROMORPHONE HYDROCHLORIDE 8 MILLIGRAM(S): 2 INJECTION INTRAMUSCULAR; INTRAVENOUS; SUBCUTANEOUS at 15:16

## 2024-05-31 RX ADMIN — Medication 150 MILLIGRAM(S): at 21:21

## 2024-05-31 RX ADMIN — GABAPENTIN 100 MILLIGRAM(S): 400 CAPSULE ORAL at 21:21

## 2024-05-31 RX ADMIN — GABAPENTIN 100 MILLIGRAM(S): 400 CAPSULE ORAL at 06:02

## 2024-05-31 RX ADMIN — HYDROMORPHONE HYDROCHLORIDE 0.5 MILLIGRAM(S): 2 INJECTION INTRAMUSCULAR; INTRAVENOUS; SUBCUTANEOUS at 06:55

## 2024-05-31 RX ADMIN — HYDROMORPHONE HYDROCHLORIDE 0.5 MILLIGRAM(S): 2 INJECTION INTRAMUSCULAR; INTRAVENOUS; SUBCUTANEOUS at 13:15

## 2024-05-31 RX ADMIN — Medication 150 MILLIGRAM(S): at 13:20

## 2024-05-31 RX ADMIN — POLYETHYLENE GLYCOL 3350 17 GRAM(S): 17 POWDER, FOR SOLUTION ORAL at 11:21

## 2024-05-31 RX ADMIN — Medication 1000 MILLIGRAM(S): at 13:20

## 2024-05-31 RX ADMIN — Medication 1000 MILLIGRAM(S): at 06:55

## 2024-05-31 RX ADMIN — ATORVASTATIN CALCIUM 80 MILLIGRAM(S): 80 TABLET, FILM COATED ORAL at 21:21

## 2024-05-31 RX ADMIN — TIZANIDINE 4 MILLIGRAM(S): 4 TABLET ORAL at 20:22

## 2024-05-31 RX ADMIN — Medication 7 UNIT(S): at 16:57

## 2024-05-31 RX ADMIN — Medication 5 UNIT(S): at 07:45

## 2024-05-31 RX ADMIN — APIXABAN 5 MILLIGRAM(S): 2.5 TABLET, FILM COATED ORAL at 06:02

## 2024-05-31 RX ADMIN — Medication 4: at 12:08

## 2024-05-31 RX ADMIN — HYDROMORPHONE HYDROCHLORIDE 8 MILLIGRAM(S): 2 INJECTION INTRAMUSCULAR; INTRAVENOUS; SUBCUTANEOUS at 21:22

## 2024-05-31 RX ADMIN — HYDROMORPHONE HYDROCHLORIDE 8 MILLIGRAM(S): 2 INJECTION INTRAMUSCULAR; INTRAVENOUS; SUBCUTANEOUS at 22:16

## 2024-05-31 RX ADMIN — Medication 150 MILLIGRAM(S): at 21:22

## 2024-05-31 RX ADMIN — Medication 7 UNIT(S): at 12:08

## 2024-05-31 RX ADMIN — HYDROMORPHONE HYDROCHLORIDE 0.5 MILLIGRAM(S): 2 INJECTION INTRAMUSCULAR; INTRAVENOUS; SUBCUTANEOUS at 00:23

## 2024-05-31 NOTE — PROGRESS NOTE ADULT - PROBLEM SELECTOR PLAN 2
- S/p cervical fusion with "nerve transplant," CRPS of L UE  - I-Stop in chart, patient takes 1mg clonazepam qD, 5mg methadone q6h, Morphine 15 mg IR q6h at home (per pharmacy pt takes Clonazepam 0.5 mg in am, 1 mg in afternoon, will adjust the dose)  - Daily MME on outpatient regimen = 154  - Will initiate patient on 8 mg hydromorphone PO, 4 times daily for equivalence  -Will  consult pain management tomorrow for any further dose equivalence increase  - Narcan conditional order

## 2024-05-31 NOTE — PROGRESS NOTE ADULT - PROBLEM SELECTOR PLAN 1
- S/p mechanical fall with +HS 2 days ago, now with severe neck, upper back pain  - CT Head No Cont: No evidence acute hemorrhage, mass, or mass effect.  - CT C/S No Cont: Postoperative changes and degenerative changes C2-C7. No changes, postop hardware adequately placed. No acute fracture.  - F/u Rib XR official read  - PT Consult  - Patient on multiple controlled substance via outpt pain management, day team to consult pain management  - C/w home tizanidine, Lyrica  - Oral opiate regimen as below,   appreicated physiatrsit eval  cont  dilaudid to 1 mg ivp q4h prn

## 2024-05-31 NOTE — CASE MANAGEMENT PROGRESS NOTE - NSCMPROGRESSNOTE_GEN_ALL_CORE
Discussed patient in interdisciplinary rounds.  Pain regimen adjusted and monitoring effectiveness.  Will remain available.

## 2024-05-31 NOTE — PHARMACOTHERAPY INTERVENTION NOTE - COMMENTS
Med rec completed by pharmacy representative. Discussed w/ Dr. Lopez who will review and and update per clinical discretion.

## 2024-05-31 NOTE — PROGRESS NOTE ADULT - PROBLEM SELECTOR PLAN 1
cont lantus 21 units qam  change mod dose admelog corrective scale coverage qac/qhs  increase admelog 7 units 3x/day before meals  cont cons cho diet  goal bg 100-180 in hosp setting

## 2024-06-01 LAB
ANION GAP SERPL CALC-SCNC: 3 MMOL/L — LOW (ref 5–17)
BUN SERPL-MCNC: 8 MG/DL — SIGNIFICANT CHANGE UP (ref 7–23)
CALCIUM SERPL-MCNC: 9.3 MG/DL — SIGNIFICANT CHANGE UP (ref 8.5–10.1)
CHLORIDE SERPL-SCNC: 104 MMOL/L — SIGNIFICANT CHANGE UP (ref 96–108)
CO2 SERPL-SCNC: 34 MMOL/L — HIGH (ref 22–31)
CREAT SERPL-MCNC: 0.83 MG/DL — SIGNIFICANT CHANGE UP (ref 0.5–1.3)
EGFR: 103 ML/MIN/1.73M2 — SIGNIFICANT CHANGE UP
GLUCOSE SERPL-MCNC: 267 MG/DL — HIGH (ref 70–99)
HCT VFR BLD CALC: 40.1 % — SIGNIFICANT CHANGE UP (ref 39–50)
HGB BLD-MCNC: 12.8 G/DL — LOW (ref 13–17)
MCHC RBC-ENTMCNC: 27.7 PG — SIGNIFICANT CHANGE UP (ref 27–34)
MCHC RBC-ENTMCNC: 31.9 GM/DL — LOW (ref 32–36)
MCV RBC AUTO: 86.8 FL — SIGNIFICANT CHANGE UP (ref 80–100)
NRBC # BLD: 0 /100 WBCS — SIGNIFICANT CHANGE UP (ref 0–0)
PLATELET # BLD AUTO: 245 K/UL — SIGNIFICANT CHANGE UP (ref 150–400)
POTASSIUM SERPL-MCNC: 4.5 MMOL/L — SIGNIFICANT CHANGE UP (ref 3.5–5.3)
POTASSIUM SERPL-SCNC: 4.5 MMOL/L — SIGNIFICANT CHANGE UP (ref 3.5–5.3)
RBC # BLD: 4.62 M/UL — SIGNIFICANT CHANGE UP (ref 4.2–5.8)
RBC # FLD: 14.4 % — SIGNIFICANT CHANGE UP (ref 10.3–14.5)
SODIUM SERPL-SCNC: 141 MMOL/L — SIGNIFICANT CHANGE UP (ref 135–145)
WBC # BLD: 6.97 K/UL — SIGNIFICANT CHANGE UP (ref 3.8–10.5)
WBC # FLD AUTO: 6.97 K/UL — SIGNIFICANT CHANGE UP (ref 3.8–10.5)

## 2024-06-01 PROCEDURE — 99232 SBSQ HOSP IP/OBS MODERATE 35: CPT

## 2024-06-01 RX ORDER — HYDROMORPHONE HYDROCHLORIDE 2 MG/ML
0.5 INJECTION INTRAMUSCULAR; INTRAVENOUS; SUBCUTANEOUS ONCE
Refills: 0 | Status: DISCONTINUED | OUTPATIENT
Start: 2024-06-01 | End: 2024-06-01

## 2024-06-01 RX ADMIN — HYDROMORPHONE HYDROCHLORIDE 0.5 MILLIGRAM(S): 2 INJECTION INTRAMUSCULAR; INTRAVENOUS; SUBCUTANEOUS at 23:15

## 2024-06-01 RX ADMIN — APIXABAN 5 MILLIGRAM(S): 2.5 TABLET, FILM COATED ORAL at 06:36

## 2024-06-01 RX ADMIN — GABAPENTIN 100 MILLIGRAM(S): 400 CAPSULE ORAL at 06:36

## 2024-06-01 RX ADMIN — Medication 150 MILLIGRAM(S): at 21:37

## 2024-06-01 RX ADMIN — HYDROMORPHONE HYDROCHLORIDE 0.5 MILLIGRAM(S): 2 INJECTION INTRAMUSCULAR; INTRAVENOUS; SUBCUTANEOUS at 23:30

## 2024-06-01 RX ADMIN — ATORVASTATIN CALCIUM 80 MILLIGRAM(S): 80 TABLET, FILM COATED ORAL at 21:37

## 2024-06-01 RX ADMIN — TIZANIDINE 4 MILLIGRAM(S): 4 TABLET ORAL at 22:24

## 2024-06-01 RX ADMIN — Medication 7 UNIT(S): at 12:16

## 2024-06-01 RX ADMIN — Medication 150 MILLIGRAM(S): at 13:36

## 2024-06-01 RX ADMIN — Medication 6: at 07:54

## 2024-06-01 RX ADMIN — POLYETHYLENE GLYCOL 3350 17 GRAM(S): 17 POWDER, FOR SOLUTION ORAL at 11:45

## 2024-06-01 RX ADMIN — Medication 150 MILLIGRAM(S): at 21:38

## 2024-06-01 RX ADMIN — HYDROMORPHONE HYDROCHLORIDE 8 MILLIGRAM(S): 2 INJECTION INTRAMUSCULAR; INTRAVENOUS; SUBCUTANEOUS at 16:24

## 2024-06-01 RX ADMIN — Medication 1000 MILLIGRAM(S): at 15:23

## 2024-06-01 RX ADMIN — PANTOPRAZOLE SODIUM 40 MILLIGRAM(S): 20 TABLET, DELAYED RELEASE ORAL at 06:35

## 2024-06-01 RX ADMIN — APIXABAN 5 MILLIGRAM(S): 2.5 TABLET, FILM COATED ORAL at 17:05

## 2024-06-01 RX ADMIN — CARVEDILOL PHOSPHATE 25 MILLIGRAM(S): 80 CAPSULE, EXTENDED RELEASE ORAL at 06:35

## 2024-06-01 RX ADMIN — HYDROMORPHONE HYDROCHLORIDE 0.5 MILLIGRAM(S): 2 INJECTION INTRAMUSCULAR; INTRAVENOUS; SUBCUTANEOUS at 08:00

## 2024-06-01 RX ADMIN — Medication 150 MILLIGRAM(S): at 06:36

## 2024-06-01 RX ADMIN — LIDOCAINE 1 PATCH: 4 CREAM TOPICAL at 19:40

## 2024-06-01 RX ADMIN — Medication 1000 MILLIGRAM(S): at 13:36

## 2024-06-01 RX ADMIN — GABAPENTIN 100 MILLIGRAM(S): 400 CAPSULE ORAL at 13:36

## 2024-06-01 RX ADMIN — HYDROMORPHONE HYDROCHLORIDE 0.5 MILLIGRAM(S): 2 INJECTION INTRAMUSCULAR; INTRAVENOUS; SUBCUTANEOUS at 07:00

## 2024-06-01 RX ADMIN — SERTRALINE 200 MILLIGRAM(S): 25 TABLET, FILM COATED ORAL at 11:45

## 2024-06-01 RX ADMIN — HYDROMORPHONE HYDROCHLORIDE 0.5 MILLIGRAM(S): 2 INJECTION INTRAMUSCULAR; INTRAVENOUS; SUBCUTANEOUS at 18:23

## 2024-06-01 RX ADMIN — HYDROMORPHONE HYDROCHLORIDE 0.5 MILLIGRAM(S): 2 INJECTION INTRAMUSCULAR; INTRAVENOUS; SUBCUTANEOUS at 12:15

## 2024-06-01 RX ADMIN — INSULIN GLARGINE 21 UNIT(S): 100 INJECTION, SOLUTION SUBCUTANEOUS at 07:55

## 2024-06-01 RX ADMIN — HYDROMORPHONE HYDROCHLORIDE 8 MILLIGRAM(S): 2 INJECTION INTRAMUSCULAR; INTRAVENOUS; SUBCUTANEOUS at 15:34

## 2024-06-01 RX ADMIN — HYDROMORPHONE HYDROCHLORIDE 8 MILLIGRAM(S): 2 INJECTION INTRAMUSCULAR; INTRAVENOUS; SUBCUTANEOUS at 20:51

## 2024-06-01 RX ADMIN — GABAPENTIN 100 MILLIGRAM(S): 400 CAPSULE ORAL at 21:37

## 2024-06-01 RX ADMIN — HYDROMORPHONE HYDROCHLORIDE 8 MILLIGRAM(S): 2 INJECTION INTRAMUSCULAR; INTRAVENOUS; SUBCUTANEOUS at 03:53

## 2024-06-01 RX ADMIN — Medication 6: at 12:32

## 2024-06-01 RX ADMIN — HYDROMORPHONE HYDROCHLORIDE 8 MILLIGRAM(S): 2 INJECTION INTRAMUSCULAR; INTRAVENOUS; SUBCUTANEOUS at 09:14

## 2024-06-01 RX ADMIN — HYDROMORPHONE HYDROCHLORIDE 0.5 MILLIGRAM(S): 2 INJECTION INTRAMUSCULAR; INTRAVENOUS; SUBCUTANEOUS at 01:42

## 2024-06-01 RX ADMIN — Medication 1000 MILLIGRAM(S): at 22:07

## 2024-06-01 RX ADMIN — Medication 4: at 22:28

## 2024-06-01 RX ADMIN — HYDROMORPHONE HYDROCHLORIDE 8 MILLIGRAM(S): 2 INJECTION INTRAMUSCULAR; INTRAVENOUS; SUBCUTANEOUS at 10:50

## 2024-06-01 RX ADMIN — Medication 7 UNIT(S): at 07:54

## 2024-06-01 RX ADMIN — LIDOCAINE 1 PATCH: 4 CREAM TOPICAL at 23:24

## 2024-06-01 RX ADMIN — Medication 1000 MILLIGRAM(S): at 06:35

## 2024-06-01 RX ADMIN — HYDROMORPHONE HYDROCHLORIDE 0.5 MILLIGRAM(S): 2 INJECTION INTRAMUSCULAR; INTRAVENOUS; SUBCUTANEOUS at 00:58

## 2024-06-01 RX ADMIN — Medication 1000 MILLIGRAM(S): at 21:37

## 2024-06-01 RX ADMIN — Medication 6: at 17:04

## 2024-06-01 RX ADMIN — HYDROMORPHONE HYDROCHLORIDE 0.5 MILLIGRAM(S): 2 INJECTION INTRAMUSCULAR; INTRAVENOUS; SUBCUTANEOUS at 15:23

## 2024-06-01 RX ADMIN — CARVEDILOL PHOSPHATE 25 MILLIGRAM(S): 80 CAPSULE, EXTENDED RELEASE ORAL at 17:06

## 2024-06-01 RX ADMIN — LIDOCAINE 1 PATCH: 4 CREAM TOPICAL at 11:46

## 2024-06-01 RX ADMIN — Medication 7 UNIT(S): at 17:03

## 2024-06-01 RX ADMIN — Medication 1 MILLIGRAM(S): at 21:37

## 2024-06-01 RX ADMIN — HYDROMORPHONE HYDROCHLORIDE 8 MILLIGRAM(S): 2 INJECTION INTRAMUSCULAR; INTRAVENOUS; SUBCUTANEOUS at 20:21

## 2024-06-01 NOTE — PROGRESS NOTE ADULT - PROBLEM SELECTOR PLAN 3
- Hx T2DM, on home sliding scale (dose unknown), metformin, and 26U morning Lantus  - continue mod dose admelog corrective scale coverage qac/qhs  - continue admelog 7 units 3x/day before meals and lantus 21 units qam

## 2024-06-01 NOTE — PROGRESS NOTE ADULT - PROBLEM SELECTOR PLAN 1
- S/p mechanical fall with +HS 2 days ago, now with severe neck, upper back pain  - CT Head No Cont: No evidence acute hemorrhage, mass, or mass effect.  - CT C/S No Cont: Postoperative changes and degenerative changes C2-C7. No changes, postop hardware adequately placed. No acute fracture.  - Rib XR - No acute radiographic findings in particular no fracture or pneumothorax. Denies chest pain, shortness of breath  - PT Consult - outpatient OT  - Patient on multiple controlled substance via outpt pain management, day team to consult pain management  - C/w home tizanidine, Lyrica  - Oral opiate regimen as below,   - Discussed with Physiatry (Dr. Baird) on 6/1 - patient is on quite more than double the pain medication that he usually is on at home. Usual home dose is Dilaudid 4 mg q6h or equivalent dose of morphine. He is on Dilaudid 8 mg q6h oral now + IV Dilaudid PRN. He does not recommend increasing PRN Dilaudid - S/p mechanical fall with +HS 2 days ago, now with severe neck, upper back pain  - CT Head No Cont: No evidence acute hemorrhage, mass, or mass effect.  - CT C/S No Cont: Postoperative changes and degenerative changes C2-C7. No changes, postop hardware adequately placed. No acute fracture.  - Rib XR - No acute radiographic findings in particular no fracture or pneumothorax. Denies chest pain, shortness of breath  - Patient on multiple controlled substance via outpt pain management  - C/w home tizanidine, Lyrica  - Oral opiate regimen as ordered  - Discussed with Physiatry (Dr. Baird) on 6/1 - patient is on quite more than double the pain medication that he usually is on at home. Usual home dose is Dilaudid 4 mg q6h or equivalent dose of morphine. He is on Dilaudid 8 mg q6h oral now + IV Dilaudid PRN. He does not recommend increasing PRN Dilaudid  - PT Consult - outpatient OT

## 2024-06-01 NOTE — PROGRESS NOTE ADULT - PROBLEM SELECTOR PLAN 2
- S/p cervical fusion with "nerve transplant," CRPS of L UE  - I-Stop in chart, patient takes 1mg clonazepam qD, 5mg methadone q6h, Morphine 15 mg IR q6h at home (per pharmacy pt takes Clonazepam 0.5 mg in am, 1 mg in afternoon, will adjust the dose)  - Daily MME on outpatient regimen = 154  - Physiatry (Dr. Baird) consulted, recs appreciated  - Narcan conditional order - S/p cervical fusion with "nerve transplant," CRPS of L UE  - I-Stop in chart, patient takes 1mg clonazepam qD, 5mg methadone q6h, Morphine 15 mg IR q6h at home (per pharmacy pt takes Clonazepam 0.5 mg in am, 1 mg in afternoon, will adjust the dose)  - Daily MME on outpatient regimen = 154  - Patient request MRI thoracic/lumbar spine. F/u MRI  - Physiatry (Dr. Baird) consulted, recs appreciated  - Narcan conditional order

## 2024-06-02 ENCOUNTER — TRANSCRIPTION ENCOUNTER (OUTPATIENT)
Age: 59
End: 2024-06-02

## 2024-06-02 DIAGNOSIS — I48.91 UNSPECIFIED ATRIAL FIBRILLATION: ICD-10-CM

## 2024-06-02 DIAGNOSIS — G89.4 CHRONIC PAIN SYNDROME: ICD-10-CM

## 2024-06-02 LAB
ANION GAP SERPL CALC-SCNC: 4 MMOL/L — LOW (ref 5–17)
BUN SERPL-MCNC: 12 MG/DL — SIGNIFICANT CHANGE UP (ref 7–23)
CALCIUM SERPL-MCNC: 9.2 MG/DL — SIGNIFICANT CHANGE UP (ref 8.5–10.1)
CHLORIDE SERPL-SCNC: 104 MMOL/L — SIGNIFICANT CHANGE UP (ref 96–108)
CO2 SERPL-SCNC: 31 MMOL/L — SIGNIFICANT CHANGE UP (ref 22–31)
CREAT SERPL-MCNC: 0.77 MG/DL — SIGNIFICANT CHANGE UP (ref 0.5–1.3)
EGFR: 105 ML/MIN/1.73M2 — SIGNIFICANT CHANGE UP
GLUCOSE SERPL-MCNC: 310 MG/DL — HIGH (ref 70–99)
HCT VFR BLD CALC: 39.2 % — SIGNIFICANT CHANGE UP (ref 39–50)
HGB BLD-MCNC: 12.5 G/DL — LOW (ref 13–17)
MCHC RBC-ENTMCNC: 27.6 PG — SIGNIFICANT CHANGE UP (ref 27–34)
MCHC RBC-ENTMCNC: 31.9 GM/DL — LOW (ref 32–36)
MCV RBC AUTO: 86.5 FL — SIGNIFICANT CHANGE UP (ref 80–100)
NRBC # BLD: 0 /100 WBCS — SIGNIFICANT CHANGE UP (ref 0–0)
PLATELET # BLD AUTO: 247 K/UL — SIGNIFICANT CHANGE UP (ref 150–400)
POTASSIUM SERPL-MCNC: 4 MMOL/L — SIGNIFICANT CHANGE UP (ref 3.5–5.3)
POTASSIUM SERPL-SCNC: 4 MMOL/L — SIGNIFICANT CHANGE UP (ref 3.5–5.3)
RBC # BLD: 4.53 M/UL — SIGNIFICANT CHANGE UP (ref 4.2–5.8)
RBC # FLD: 14.6 % — HIGH (ref 10.3–14.5)
SODIUM SERPL-SCNC: 139 MMOL/L — SIGNIFICANT CHANGE UP (ref 135–145)
WBC # BLD: 7.35 K/UL — SIGNIFICANT CHANGE UP (ref 3.8–10.5)
WBC # FLD AUTO: 7.35 K/UL — SIGNIFICANT CHANGE UP (ref 3.8–10.5)

## 2024-06-02 PROCEDURE — 99232 SBSQ HOSP IP/OBS MODERATE 35: CPT

## 2024-06-02 RX ORDER — HYDROMORPHONE HYDROCHLORIDE 2 MG/ML
0.5 INJECTION INTRAMUSCULAR; INTRAVENOUS; SUBCUTANEOUS ONCE
Refills: 0 | Status: DISCONTINUED | OUTPATIENT
Start: 2024-06-02 | End: 2024-06-02

## 2024-06-02 RX ORDER — INSULIN LISPRO 100/ML
10 VIAL (ML) SUBCUTANEOUS
Refills: 0 | Status: DISCONTINUED | OUTPATIENT
Start: 2024-06-02 | End: 2024-06-04

## 2024-06-02 RX ORDER — INSULIN GLARGINE 100 [IU]/ML
30 INJECTION, SOLUTION SUBCUTANEOUS EVERY MORNING
Refills: 0 | Status: DISCONTINUED | OUTPATIENT
Start: 2024-06-03 | End: 2024-06-04

## 2024-06-02 RX ADMIN — Medication 150 MILLIGRAM(S): at 21:41

## 2024-06-02 RX ADMIN — HYDROMORPHONE HYDROCHLORIDE 8 MILLIGRAM(S): 2 INJECTION INTRAMUSCULAR; INTRAVENOUS; SUBCUTANEOUS at 05:46

## 2024-06-02 RX ADMIN — CARVEDILOL PHOSPHATE 25 MILLIGRAM(S): 80 CAPSULE, EXTENDED RELEASE ORAL at 05:19

## 2024-06-02 RX ADMIN — HYDROMORPHONE HYDROCHLORIDE 0.5 MILLIGRAM(S): 2 INJECTION INTRAMUSCULAR; INTRAVENOUS; SUBCUTANEOUS at 05:37

## 2024-06-02 RX ADMIN — HYDROMORPHONE HYDROCHLORIDE 0.5 MILLIGRAM(S): 2 INJECTION INTRAMUSCULAR; INTRAVENOUS; SUBCUTANEOUS at 05:22

## 2024-06-02 RX ADMIN — Medication 150 MILLIGRAM(S): at 14:24

## 2024-06-02 RX ADMIN — Medication 1000 MILLIGRAM(S): at 22:11

## 2024-06-02 RX ADMIN — Medication 4: at 17:16

## 2024-06-02 RX ADMIN — HYDROMORPHONE HYDROCHLORIDE 0.5 MILLIGRAM(S): 2 INJECTION INTRAMUSCULAR; INTRAVENOUS; SUBCUTANEOUS at 12:15

## 2024-06-02 RX ADMIN — Medication 1000 MILLIGRAM(S): at 14:24

## 2024-06-02 RX ADMIN — INSULIN GLARGINE 21 UNIT(S): 100 INJECTION, SOLUTION SUBCUTANEOUS at 08:28

## 2024-06-02 RX ADMIN — Medication 150 MILLIGRAM(S): at 05:17

## 2024-06-02 RX ADMIN — ATORVASTATIN CALCIUM 80 MILLIGRAM(S): 80 TABLET, FILM COATED ORAL at 21:41

## 2024-06-02 RX ADMIN — HYDROMORPHONE HYDROCHLORIDE 0.5 MILLIGRAM(S): 2 INJECTION INTRAMUSCULAR; INTRAVENOUS; SUBCUTANEOUS at 15:30

## 2024-06-02 RX ADMIN — HYDROMORPHONE HYDROCHLORIDE 8 MILLIGRAM(S): 2 INJECTION INTRAMUSCULAR; INTRAVENOUS; SUBCUTANEOUS at 11:44

## 2024-06-02 RX ADMIN — HYDROMORPHONE HYDROCHLORIDE 8 MILLIGRAM(S): 2 INJECTION INTRAMUSCULAR; INTRAVENOUS; SUBCUTANEOUS at 17:18

## 2024-06-02 RX ADMIN — APIXABAN 5 MILLIGRAM(S): 2.5 TABLET, FILM COATED ORAL at 17:18

## 2024-06-02 RX ADMIN — HYDROMORPHONE HYDROCHLORIDE 0.5 MILLIGRAM(S): 2 INJECTION INTRAMUSCULAR; INTRAVENOUS; SUBCUTANEOUS at 00:11

## 2024-06-02 RX ADMIN — HYDROMORPHONE HYDROCHLORIDE 0.5 MILLIGRAM(S): 2 INJECTION INTRAMUSCULAR; INTRAVENOUS; SUBCUTANEOUS at 23:44

## 2024-06-02 RX ADMIN — HYDROMORPHONE HYDROCHLORIDE 0.5 MILLIGRAM(S): 2 INJECTION INTRAMUSCULAR; INTRAVENOUS; SUBCUTANEOUS at 00:26

## 2024-06-02 RX ADMIN — HYDROMORPHONE HYDROCHLORIDE 8 MILLIGRAM(S): 2 INJECTION INTRAMUSCULAR; INTRAVENOUS; SUBCUTANEOUS at 12:15

## 2024-06-02 RX ADMIN — Medication 6: at 11:43

## 2024-06-02 RX ADMIN — HYDROMORPHONE HYDROCHLORIDE 0.5 MILLIGRAM(S): 2 INJECTION INTRAMUSCULAR; INTRAVENOUS; SUBCUTANEOUS at 23:16

## 2024-06-02 RX ADMIN — Medication 6: at 08:29

## 2024-06-02 RX ADMIN — HYDROMORPHONE HYDROCHLORIDE 8 MILLIGRAM(S): 2 INJECTION INTRAMUSCULAR; INTRAVENOUS; SUBCUTANEOUS at 22:11

## 2024-06-02 RX ADMIN — GABAPENTIN 100 MILLIGRAM(S): 400 CAPSULE ORAL at 21:41

## 2024-06-02 RX ADMIN — HYDROMORPHONE HYDROCHLORIDE 0.5 MILLIGRAM(S): 2 INJECTION INTRAMUSCULAR; INTRAVENOUS; SUBCUTANEOUS at 14:57

## 2024-06-02 RX ADMIN — HYDROMORPHONE HYDROCHLORIDE 8 MILLIGRAM(S): 2 INJECTION INTRAMUSCULAR; INTRAVENOUS; SUBCUTANEOUS at 17:50

## 2024-06-02 RX ADMIN — Medication 7 UNIT(S): at 08:29

## 2024-06-02 RX ADMIN — HYDROMORPHONE HYDROCHLORIDE 0.5 MILLIGRAM(S): 2 INJECTION INTRAMUSCULAR; INTRAVENOUS; SUBCUTANEOUS at 23:31

## 2024-06-02 RX ADMIN — HYDROMORPHONE HYDROCHLORIDE 0.5 MILLIGRAM(S): 2 INJECTION INTRAMUSCULAR; INTRAVENOUS; SUBCUTANEOUS at 19:00

## 2024-06-02 RX ADMIN — HYDROMORPHONE HYDROCHLORIDE 8 MILLIGRAM(S): 2 INJECTION INTRAMUSCULAR; INTRAVENOUS; SUBCUTANEOUS at 05:20

## 2024-06-02 RX ADMIN — Medication 7 UNIT(S): at 11:43

## 2024-06-02 RX ADMIN — HYDROMORPHONE HYDROCHLORIDE 0.5 MILLIGRAM(S): 2 INJECTION INTRAMUSCULAR; INTRAVENOUS; SUBCUTANEOUS at 11:44

## 2024-06-02 RX ADMIN — APIXABAN 5 MILLIGRAM(S): 2.5 TABLET, FILM COATED ORAL at 05:16

## 2024-06-02 RX ADMIN — Medication 1000 MILLIGRAM(S): at 05:46

## 2024-06-02 RX ADMIN — SERTRALINE 200 MILLIGRAM(S): 25 TABLET, FILM COATED ORAL at 11:42

## 2024-06-02 RX ADMIN — Medication 1000 MILLIGRAM(S): at 05:16

## 2024-06-02 RX ADMIN — GABAPENTIN 100 MILLIGRAM(S): 400 CAPSULE ORAL at 05:17

## 2024-06-02 RX ADMIN — CARVEDILOL PHOSPHATE 25 MILLIGRAM(S): 80 CAPSULE, EXTENDED RELEASE ORAL at 17:18

## 2024-06-02 RX ADMIN — HYDROMORPHONE HYDROCHLORIDE 0.5 MILLIGRAM(S): 2 INJECTION INTRAMUSCULAR; INTRAVENOUS; SUBCUTANEOUS at 19:15

## 2024-06-02 RX ADMIN — Medication 4: at 21:43

## 2024-06-02 RX ADMIN — GABAPENTIN 100 MILLIGRAM(S): 400 CAPSULE ORAL at 14:24

## 2024-06-02 RX ADMIN — Medication 1000 MILLIGRAM(S): at 21:41

## 2024-06-02 RX ADMIN — Medication 1 MILLIGRAM(S): at 21:41

## 2024-06-02 RX ADMIN — Medication 10 UNIT(S): at 17:16

## 2024-06-02 RX ADMIN — HYDROMORPHONE HYDROCHLORIDE 0.5 MILLIGRAM(S): 2 INJECTION INTRAMUSCULAR; INTRAVENOUS; SUBCUTANEOUS at 23:59

## 2024-06-02 RX ADMIN — PANTOPRAZOLE SODIUM 40 MILLIGRAM(S): 20 TABLET, DELAYED RELEASE ORAL at 05:16

## 2024-06-02 NOTE — DIETITIAN INITIAL EVALUATION ADULT - OTHER INFO
59 y/o male adm with head and neck pain s/p fall. PMH spinal stenosis, h/o spinal cord compression, radiculopathy, gastric ulcer, DM type 2, HTN. Pt visited at bedside this afternoon. Pt reports that his appetite is good, tolerating the diet. Reviewed The Plate Method/consistent carb diet for pt. Pt reports that his wt fluctuates between 245-255#. Lowest wt 225#. He achieved that by skipping meals and only eating 1 meal per day. Pt takes rapid/long acting insulin, jardiance, metformin and mounjaro at home. Uses cora 2 (admits that he doesn't have a back up meter). Explained to pt that it is important since he takes insulin to always have a back up meter, he agrees. A1c on diagnosis 6 yrs ago was 15.5. Current A1c 9.3, reviewed goal of under 7.0 with pt. Pt checks blood sugars prior to all meals. Pt will f/u with endo after d/c.

## 2024-06-02 NOTE — CASE MANAGEMENT PROGRESS NOTE - NSCMPROGRESSNOTE_GEN_ALL_CORE
Weekend CM task noted. Pending MRI. Anticipated DC plan for home with outpatient rx for OT when medically stable. CM remains available, will continue to follow.

## 2024-06-02 NOTE — DISCHARGE NOTE PROVIDER - CARE PROVIDER_API CALL
Juaquin Pham [place]  Anesthesiology  [address] [place] [address]  Phone: [phone]  Fax: [phone]  Follow Up Time:     Perlman, Craig Douglas  Internal Medicine  [address] [facility] [facility] [address]  [place] [address]  Phone: [phone]  Fax: [phone]  Follow Up Time:

## 2024-06-02 NOTE — PROGRESS NOTE ADULT - PROBLEM SELECTOR PLAN 3
Hx of multiple neck surgeries (C2-C7 fusion) on dilaudid and methadone  - Pain regimen as above  - Physiatry (Dr. Baird) consulted, recs appreciated

## 2024-06-02 NOTE — DIETITIAN INITIAL EVALUATION ADULT - PERTINENT MEDS FT
MEDICATIONS  (STANDING):  acetaminophen     Tablet .. 1000 milliGRAM(s) Oral every 8 hours  apixaban 5 milliGRAM(s) Oral two times a day  atorvastatin 80 milliGRAM(s) Oral at bedtime  carvedilol 25 milliGRAM(s) Oral every 12 hours  clonazePAM  Tablet 1 milliGRAM(s) Oral at bedtime  dextrose 10% Bolus 125 milliLiter(s) IV Bolus once  dextrose 5%. 1000 milliLiter(s) (100 mL/Hr) IV Continuous <Continuous>  dextrose 5%. 1000 milliLiter(s) (50 mL/Hr) IV Continuous <Continuous>  dextrose 50% Injectable 25 Gram(s) IV Push once  dextrose 50% Injectable 12.5 Gram(s) IV Push once  gabapentin 100 milliGRAM(s) Oral every 8 hours  glucagon  Injectable 1 milliGRAM(s) IntraMuscular once  HYDROmorphone   Tablet 8 milliGRAM(s) Oral every 6 hours  insulin lispro (ADMELOG) corrective regimen sliding scale   SubCutaneous three times a day before meals  insulin lispro (ADMELOG) corrective regimen sliding scale   SubCutaneous at bedtime  insulin lispro Injectable (ADMELOG) 10 Unit(s) SubCutaneous three times a day before meals  lidocaine   4% Patch 1 Patch Transdermal daily  naloxone Injectable 0.4 milliGRAM(s) IV Push once  pantoprazole    Tablet 40 milliGRAM(s) Oral before breakfast  polyethylene glycol 3350 17 Gram(s) Oral daily  pregabalin 150 milliGRAM(s) Oral three times a day  senna 2 Tablet(s) Oral at bedtime  sertraline 200 milliGRAM(s) Oral daily  traZODone 150 milliGRAM(s) Oral at bedtime    MEDICATIONS  (PRN):  aluminum hydroxide/magnesium hydroxide/simethicone Suspension 30 milliLiter(s) Oral every 4 hours PRN Dyspepsia  bisacodyl 5 milliGRAM(s) Oral daily PRN Constipation  dextrose Oral Gel 15 Gram(s) Oral once PRN Blood Glucose LESS THAN 70 milliGRAM(s)/deciliter  HYDROmorphone  Injectable 0.5 milliGRAM(s) IV Push every 6 hours PRN Severe Pain (7 - 10)  melatonin 3 milliGRAM(s) Oral at bedtime PRN Insomnia  ondansetron Injectable 4 milliGRAM(s) IV Push every 8 hours PRN Nausea and/or Vomiting  tiZANidine 4 milliGRAM(s) Oral every 8 hours PRN Muscle Spasm

## 2024-06-02 NOTE — PROGRESS NOTE ADULT - PROBLEM SELECTOR PLAN 2
- S/p cervical fusion with "nerve transplant," CRPS of L UE  - I-Stop in chart, patient takes 1mg clonazepam qD, 5mg methadone q6h, Morphine 15 mg IR q6h at home (per pharmacy pt takes Clonazepam 0.5 mg in am, 1 mg in afternoon, will adjust the dose)  - Daily MME on outpatient regimen = 154  - Patient request MRI thoracic/lumbar spine. Patient had MRI lumbar spine on 5/24. F/u MRI  - Physiatry (Dr. Baird) consulted, recs appreciated  - Narcan conditional order - S/p cervical fusion with "nerve transplant," CRPS of L UE  - I-Stop in chart, patient takes 1mg clonazepam qD, 5mg methadone q6h, Morphine 15 mg IR q6h at home (per pharmacy pt takes Clonazepam 0.5 mg in am, 1 mg in afternoon, will adjust the dose)  - Daily MME on outpatient regimen = 154  - Patient request MRI thoracic/lumbar spine. Patient had MRI lumbar spine outpatient on 5/24/24. F/u MRI thoracic spine  - Physiatry (Dr. Baird) consulted, recs appreciated  - Narcan conditional order

## 2024-06-02 NOTE — DIETITIAN INITIAL EVALUATION ADULT - PERTINENT LABORATORY DATA
06-02    139  |  104  |  12  ----------------------------<  310<H>  4.0   |  31  |  0.77    Ca    9.2      02 Jun 2024 07:19    POCT Blood Glucose.: 257 mg/dL (06-02-24 @ 11:28)  A1C with Estimated Average Glucose Result: 9.3 % (05-30-24 @ 08:18)  A1C with Estimated Average Glucose Result: 9.4 % (05-29-24 @ 05:10)  A1C with Estimated Average Glucose Result: 8.6 % (11-26-23 @ 06:32)

## 2024-06-02 NOTE — DISCHARGE NOTE PROVIDER - NSDCMRMEDTOKEN_GEN_ALL_CORE_FT
apixaban 5 mg oral tablet: 1 tab(s) orally every 12 hours  atorvastatin 80 mg oral tablet: 1 tab(s) orally once a day (at bedtime)  carvedilol 25 mg oral tablet: 1 tab(s) orally 2 times a day  cholecalciferol 50 mcg (2000 intl units) oral tablet: 1 tab(s) orally once a day  clonazePAM 0.5 mg oral tablet: 1 tab(s) orally in the morning 2 tab(s) orally in the afternoon  ferrous sulfate 325 mg (65 mg elemental iron) oral tablet: 1 tab(s) orally Monday, Wednesday, and Friday  Fiasp 100 units/mL injectable solution: sliding scale  HYDROmorphone 4 mg oral tablet: 1 tab(s) orally 4 times a day as needed for pain  Jardiance 25 mg oral tablet: 1 tab(s) orally once a day  Lantus 100 units/mL subcutaneous solution: 55 unit(s) subcutaneous once a day  metFORMIN 1000 mg oral tablet, extended release: 1 tab(s) orally 2 times a day  methadone 5 mg oral tablet: 1 tab(s) orally 4 times a day  morphine 15 mg oral tablet: 1 tab(s) orally every 6 hours  Mounjaro 2.5 mg/0.5 mL subcutaneous solution: 2.5 milligram(s) subcutaneously once a week Monday  pantoprazole 40 mg oral delayed release tablet: 1 tab(s) orally once a day  sertraline 100 mg oral tablet: 2 tab(s) orally once a day (at bedtime)  tadalafil 5 mg oral tablet: 1 tab(s) orally once a day  tiZANidine 4 mg oral tablet: 1 tab(s) orally 3 times a day as needed  traZODone 150 mg oral tablet: 1 tab(s) orally once a day (at bedtime)   acetaminophen 500 mg oral tablet: 2 tab(s) orally every 8 hours  apixaban 5 mg oral tablet: 1 tab(s) orally 2 times a day  atorvastatin 80 mg oral tablet: 1 tab(s) orally once a day (at bedtime)  carvedilol 25 mg oral tablet: 1 tab(s) orally every 12 hours  cholecalciferol 50 mcg (2000 intl units) oral tablet: 1 tab(s) orally once a day  clonazePAM 1 mg oral tablet: 1 tab(s) orally once a day (at bedtime)  ferrous sulfate 325 mg (65 mg elemental iron) oral tablet: 1 tab(s) orally Monday, Wednesday, and Friday  Fiasp 100 units/mL injectable solution: sliding scale  HYDROmorphone 8 mg oral tablet: 1 tab(s) orally every 6 hours MDD: 32mg  Jardiance 25 mg oral tablet: 1 tab(s) orally once a day  Lantus 100 units/mL subcutaneous solution: 50 unit(s) subcutaneous once a day in the morning  lidocaine 4% topical film: Apply topically to affected area once a day  metFORMIN 1000 mg oral tablet, extended release: 1 tab(s) orally 2 times a day  methadone 5 mg oral tablet: 1 tab(s) orally 4 times a day  methocarbamol 750 mg oral tablet: 1 tab(s) orally 2 times a day  Mounjaro 2.5 mg/0.5 mL subcutaneous solution: 2.5 milligram(s) subcutaneously once a week Monday  Narcan 4 mg/0.1 mL nasal spray: 1 spray(s) nasal once  pantoprazole 40 mg oral delayed release tablet: 1 tab(s) orally once a day (before a meal)  polyethylene glycol 3350 oral powder for reconstitution: 17 gram(s) orally once a day  pregabalin 150 mg oral capsule: 1 cap(s) orally 3 times a day MDD: 450mg  senna leaf extract oral tablet: 2 tab(s) orally once a day (at bedtime)  sertraline 100 mg oral tablet: 2 tab(s) orally once a day  tiZANidine 4 mg oral tablet: 1 tab(s) orally every 8 hours as needed for Muscle Spasm  traZODone 150 mg oral tablet: 1 tab(s) orally once a day (at bedtime)

## 2024-06-02 NOTE — DIETITIAN INITIAL EVALUATION ADULT - NUTRITIONGOAL OUTCOME1
Pt will consume a diet with a consistent amount of carbohydrates/limiting simple carbs while utilizing The Plate Method

## 2024-06-02 NOTE — DISCHARGE NOTE PROVIDER - HOSPITAL COURSE
ADMISSION H+P:    HPI:  59yo M PMH DM, HTN, HLD, CRPS, S/p C/S Fusion, on Eliquis, presents with acute on chronic neck pain s/p mechanical fall w/head trauma 2 days ago. Pt states he was attempting to take his dog for a walk 2 days ago, when dog ran out while pt holding leash, pt's foot caught in door, resulting in fall with head strike to left side head/face. Denies loss of consciousness at any point, but states pain immediately following was so severe he was unable to get up for 10 minutes. States he currently has 10/10 intensity pain without palliative or provocative factors in his neck (radiating down L arm), and upper middle back, which is worse with inspiration. States radiation down L arm is baseline, however intensity of pain is not. States he has numbness, tingling, motor weakness, and cold sensation of L arm & hand, which is also baseline. Patient states he had been intermittently vomiting after the fall up until last episode was 4PM yesterday. Vomitus was always only gastric contents, never contained blood or bile. Denies fevers/chills, cp, palpitations, sob, cough, nausea, diarrhea, constipation, abdominal pain, dysuria, dyschezia, hematuria, hematochezia.    ED Course:  Vitals: T 98, HR 67, /71, RR 18, SpO2 100% ORA  Pertinent Labs: no pertinent abnl labs  EKG: NSR with marked sinus arrhythmia. 78 BMP. QTc 430.  Given: Ofirmev x1, TDaP x1, 1mg IV Dilaudid x3, 0.5 mg IV dilaudid x1, Lidocaine patch x1, Robaxin x1, Zofran x1, NS Bolus 1L x1    Imaging:  CT Head No Cont: No evidence acute hemorrhage, mass, or mass effect.  CT C/S No Cont: Postoperative changes and degenerative changes C2-C7. No changes, postop hardware adequately placed. No acute fracture. (29 May 2024 01:34)      ---  HOSPITAL COURSE:     Patient was medically optimized and improved clinically throughout hospital course. Patient seen and examined on day of discharge.    Vital Signs  T(C): 36.7 (02 Jun 2024 11:45), Max: 36.7 (02 Jun 2024 11:45)  T(F): 98 (02 Jun 2024 11:45), Max: 98 (02 Jun 2024 11:45)  HR: 79 (02 Jun 2024 11:45) (77 - 79)  BP: 133/79 (02 Jun 2024 11:45) (120/81 - 133/79)  RR: 18 (02 Jun 2024 11:45) (17 - 18)  SpO2: 94% (02 Jun 2024 11:45) (93% - 94%)    Physical Exam:  General: well-developed, well-nourished, NAD  HEENT: normocephalic, atraumatic, EOMI, moist mucous membranes   Neck: supple, non-tender, no masses  Neurology: AAOx3, sensation intact  Respiratory: clear to auscultation bilaterally; no wheezes, rhonchi, or rales  CV: regular rate and rhythm, soft S1/S2, no murmurs, rubs, or gallops  Abdominal: soft, non-tender, non-distended, bowel sounds present  Extremities: no clubbing, cyanosis, or edema; palpable peripheral pulses  Musculoskeletal: no joint erythema or warmth, no joint swelling   Skin: warm, dry, normal color    Patient is medically stable for discharge to ____ with outpatient follow up.  ---  CONSULTANTS:     ---  TIME SPENT:   I, the attending physician, was physically present for the key portions of the evaluation and management (E/M) service provided. The total amount of time spent reviewing the hospital course, laboratory values, imaging findings, assessing/counseling the patient, discussing with consultant physicians, social work, nursing staff was -- minutes.     ---  FINAL DISCHARGE DIAGNOSIS LIST:  Please see last daily progress note for final discharge diagnoses    ---  Primary care provider was made aware of plan for discharge:  [    ] NO     [     ] YES       ADMISSION H+P:    HPI:  59yo M PMH DM, HTN, HLD, CRPS, S/p C/S Fusion, on Eliquis, presents with acute on chronic neck pain s/p mechanical fall w/head trauma 2 days ago. Pt states he was attempting to take his dog for a walk 2 days ago, when dog ran out while pt holding leash, pt's foot caught in door, resulting in fall with head strike to left side head/face. Denies loss of consciousness at any point, but states pain immediately following was so severe he was unable to get up for 10 minutes. States he currently has 10/10 intensity pain without palliative or provocative factors in his neck (radiating down L arm), and upper middle back, which is worse with inspiration. States radiation down L arm is baseline, however intensity of pain is not. States he has numbness, tingling, motor weakness, and cold sensation of L arm & hand, which is also baseline. Patient states he had been intermittently vomiting after the fall up until last episode was 4PM yesterday. Vomitus was always only gastric contents, never contained blood or bile. Denies fevers/chills, cp, palpitations, sob, cough, nausea, diarrhea, constipation, abdominal pain, dysuria, dyschezia, hematuria, hematochezia.    ED Course:  Vitals: T 98, HR 67, /71, RR 18, SpO2 100% ORA  Pertinent Labs: no pertinent abnl labs  EKG: NSR with marked sinus arrhythmia. 78 BMP. QTc 430.  Given: Ofirmev x1, TDaP x1, 1mg IV Dilaudid x3, 0.5 mg IV dilaudid x1, Lidocaine patch x1, Robaxin x1, Zofran x1, NS Bolus 1L x1    Imaging:  CT Head No Cont: No evidence acute hemorrhage, mass, or mass effect.  CT C/S No Cont: Postoperative changes and degenerative changes C2-C7. No changes, postop hardware adequately placed. No acute fracture. (29 May 2024 01:34)      ---  HOSPITAL COURSE: Patient admitted s/p fall for pain management. Discussed with Physiatry (Dr. Baird), patient optimized for discharge on PO Dilaudid and home Methadone and outpatient follow up. MRI thoracic spine- No evidence of thoracic spine fracture, traumatic malalignment or suspicious osseous lesion. Minor thoracic spine degenerative changes. No clinically significant spinal canal stenosis or neural foraminal narrowing thoracic spine. No thoracic cord compression, cord edema or other cord signal abnormality. Patient ambulating without assistance on unit prior to discharge. No signs or symptoms of pain or discomfort on examination. Patient evaluated by endocrinology and advised to follow up outpatient for continued surveillance.     Patient was medically optimized and improved clinically throughout hospital course. Patient seen and examined on day of discharge.    Vital Signs  T(C): 36.7 (02 Jun 2024 11:45), Max: 36.7 (02 Jun 2024 11:45)  T(F): 98 (02 Jun 2024 11:45), Max: 98 (02 Jun 2024 11:45)  HR: 79 (02 Jun 2024 11:45) (77 - 79)  BP: 133/79 (02 Jun 2024 11:45) (120/81 - 133/79)  RR: 18 (02 Jun 2024 11:45) (17 - 18)  SpO2: 94% (02 Jun 2024 11:45) (93% - 94%)    Physical Exam:  General: well-developed, well-nourished, NAD  HEENT: normocephalic, atraumatic, EOMI, moist mucous membranes   Neck: supple, non-tender, no masses  Neurology: AAOx3, sensation intact  Respiratory: clear to auscultation bilaterally; no wheezes, rhonchi, or rales  CV: regular rate and rhythm, soft S1/S2, no murmurs, rubs, or gallops  Abdominal: soft, non-tender, non-distended, bowel sounds present  Extremities: no clubbing, cyanosis, or edema; palpable peripheral pulses  Musculoskeletal: no joint erythema or warmth, no joint swelling   Skin: warm, dry, normal color    Patient is medically stable for discharge to ____ with outpatient follow up.  ---  CONSULTANTS:     ---  TIME SPENT:   I, the attending physician, was physically present for the key portions of the evaluation and management (E/M) service provided. The total amount of time spent reviewing the hospital course, laboratory values, imaging findings, assessing/counseling the patient, discussing with consultant physicians, social work, nursing staff was -- minutes.     ---  FINAL DISCHARGE DIAGNOSIS LIST:  Please see last daily progress note for final discharge diagnoses    ---  Primary care provider was made aware of plan for discharge:  [    ] NO     [     ] YES

## 2024-06-02 NOTE — DISCHARGE NOTE PROVIDER - NSDCFUSCHEDAPPT_GEN_ALL_CORE_FT
[facility]  [facility] [address]  Scheduled Appointment: 06/08/2024     [facility]  [facility] [address]  Scheduled Appointment: 06/08/2024    Juaquin Pham  [facility]  [facility] [address]  Scheduled Appointment: 06/12/2024

## 2024-06-02 NOTE — DISCHARGE NOTE PROVIDER - NSDCCPCAREPLAN_GEN_ALL_CORE_FT
PRINCIPAL DISCHARGE DIAGNOSIS  Diagnosis: Cervical strain  Assessment and Plan of Treatment: Follow up with your pain management team for further management.      SECONDARY DISCHARGE DIAGNOSES  Diagnosis: DM (diabetes mellitus)  Assessment and Plan of Treatment: Follow up with your endocrinologist (Dr. Perlman) within 1 week of discharge.

## 2024-06-02 NOTE — PROGRESS NOTE ADULT - PROBLEM SELECTOR PLAN 1
- S/p mechanical fall with +HS 2 days ago, now with severe neck, upper back pain  - CT Head No Cont: No evidence acute hemorrhage, mass, or mass effect.  - CT C/S No Cont: Postoperative changes and degenerative changes C2-C7. No changes, postop hardware adequately placed. No acute fracture.  - Rib XR - No acute radiographic findings in particular no fracture or pneumothorax. Denies chest pain, shortness of breath  - Patient on multiple controlled substance via outpt pain management  - C/w home tizanidine, Lyrica  - Oral opiate regimen as ordered  - Patient request MRI thoracic/lumbar spine. F/u MRI  - Discussed with Physiatry (Dr. Baird) on 6/1 - patient is on quite more than double the pain medication that he usually is on at home. Usual home dose is Dilaudid 4 mg q6h or equivalent dose of morphine. He is on Dilaudid 8 mg q6h oral now + IV Dilaudid PRN. He does not recommend increasing PRN Dilaudid  - PT Consult - outpatient OT - S/p mechanical fall with +HS 2 days ago, now with severe neck, upper back pain  - CT Head No Cont: No evidence acute hemorrhage, mass, or mass effect.  - CT C/S No Cont: Postoperative changes and degenerative changes C2-C7. No changes, postop hardware adequately placed. No acute fracture.  - Rib XR - No acute radiographic findings in particular no fracture or pneumothorax. Denies chest pain, shortness of breath  - Patient on multiple controlled substance via outpt pain management  - C/w home tizanidine, Lyrica  - Oral opiate regimen as ordered  - Patient request MRI thoracic/lumbar spine. Patient had MRI lumbar spine outpatient on 5/24/24. F/u MRI thoracic spine  - Discussed with Physiatry (Dr. Baird) on 6/1 - patient is on quite more than double the pain medication that he usually is on at home. Usual home dose is Dilaudid 4 mg q6h or equivalent dose of morphine. He is on Dilaudid 8 mg q6h oral now + IV Dilaudid PRN. He does not recommend increasing PRN Dilaudid  - PT Consult - outpatient OT

## 2024-06-02 NOTE — DIETITIAN INITIAL EVALUATION ADULT - PROBLEM SELECTOR PLAN 6
- Patient does not have explicit medication list, however does remember most, which can be corroborated with surescripts and I-stop  - Patient's girlfriend to visit in the morning and bring in definitive list

## 2024-06-02 NOTE — PROGRESS NOTE ADULT - PROBLEM SELECTOR PLAN 1
increase lantus 30 units qam  increase admelog 10 units 3x/day before meals  cont mod dose admelog corrective scale coverage qac/qhs  cont cons cho diet  goal bg 100-180 in hosp setting

## 2024-06-03 LAB
ANION GAP SERPL CALC-SCNC: 4 MMOL/L — LOW (ref 5–17)
BUN SERPL-MCNC: 13 MG/DL — SIGNIFICANT CHANGE UP (ref 7–23)
CALCIUM SERPL-MCNC: 9 MG/DL — SIGNIFICANT CHANGE UP (ref 8.5–10.1)
CHLORIDE SERPL-SCNC: 105 MMOL/L — SIGNIFICANT CHANGE UP (ref 96–108)
CO2 SERPL-SCNC: 29 MMOL/L — SIGNIFICANT CHANGE UP (ref 22–31)
CREAT SERPL-MCNC: 0.74 MG/DL — SIGNIFICANT CHANGE UP (ref 0.5–1.3)
EGFR: 106 ML/MIN/1.73M2 — SIGNIFICANT CHANGE UP
GLUCOSE SERPL-MCNC: 341 MG/DL — HIGH (ref 70–99)
HCT VFR BLD CALC: 36.9 % — LOW (ref 39–50)
HGB BLD-MCNC: 11.8 G/DL — LOW (ref 13–17)
MCHC RBC-ENTMCNC: 27.6 PG — SIGNIFICANT CHANGE UP (ref 27–34)
MCHC RBC-ENTMCNC: 32 GM/DL — SIGNIFICANT CHANGE UP (ref 32–36)
MCV RBC AUTO: 86.4 FL — SIGNIFICANT CHANGE UP (ref 80–100)
NRBC # BLD: 0 /100 WBCS — SIGNIFICANT CHANGE UP (ref 0–0)
PLATELET # BLD AUTO: 223 K/UL — SIGNIFICANT CHANGE UP (ref 150–400)
POTASSIUM SERPL-MCNC: 4.3 MMOL/L — SIGNIFICANT CHANGE UP (ref 3.5–5.3)
POTASSIUM SERPL-SCNC: 4.3 MMOL/L — SIGNIFICANT CHANGE UP (ref 3.5–5.3)
RBC # BLD: 4.27 M/UL — SIGNIFICANT CHANGE UP (ref 4.2–5.8)
RBC # FLD: 14.7 % — HIGH (ref 10.3–14.5)
SODIUM SERPL-SCNC: 138 MMOL/L — SIGNIFICANT CHANGE UP (ref 135–145)
WBC # BLD: 6.35 K/UL — SIGNIFICANT CHANGE UP (ref 3.8–10.5)
WBC # FLD AUTO: 6.35 K/UL — SIGNIFICANT CHANGE UP (ref 3.8–10.5)

## 2024-06-03 PROCEDURE — 99233 SBSQ HOSP IP/OBS HIGH 50: CPT

## 2024-06-03 PROCEDURE — 72146 MRI CHEST SPINE W/O DYE: CPT | Mod: 26

## 2024-06-03 RX ORDER — HYDROMORPHONE HYDROCHLORIDE 2 MG/ML
0.5 INJECTION INTRAMUSCULAR; INTRAVENOUS; SUBCUTANEOUS ONCE
Refills: 0 | Status: DISCONTINUED | OUTPATIENT
Start: 2024-06-03 | End: 2024-06-03

## 2024-06-03 RX ORDER — HYDROMORPHONE HYDROCHLORIDE 2 MG/ML
1 INJECTION INTRAMUSCULAR; INTRAVENOUS; SUBCUTANEOUS ONCE
Refills: 0 | Status: DISCONTINUED | OUTPATIENT
Start: 2024-06-03 | End: 2024-06-03

## 2024-06-03 RX ADMIN — HYDROMORPHONE HYDROCHLORIDE 8 MILLIGRAM(S): 2 INJECTION INTRAMUSCULAR; INTRAVENOUS; SUBCUTANEOUS at 18:47

## 2024-06-03 RX ADMIN — APIXABAN 5 MILLIGRAM(S): 2.5 TABLET, FILM COATED ORAL at 18:47

## 2024-06-03 RX ADMIN — GABAPENTIN 100 MILLIGRAM(S): 400 CAPSULE ORAL at 05:57

## 2024-06-03 RX ADMIN — HYDROMORPHONE HYDROCHLORIDE 8 MILLIGRAM(S): 2 INJECTION INTRAMUSCULAR; INTRAVENOUS; SUBCUTANEOUS at 19:15

## 2024-06-03 RX ADMIN — HYDROMORPHONE HYDROCHLORIDE 8 MILLIGRAM(S): 2 INJECTION INTRAMUSCULAR; INTRAVENOUS; SUBCUTANEOUS at 05:57

## 2024-06-03 RX ADMIN — HYDROMORPHONE HYDROCHLORIDE 8 MILLIGRAM(S): 2 INJECTION INTRAMUSCULAR; INTRAVENOUS; SUBCUTANEOUS at 06:30

## 2024-06-03 RX ADMIN — Medication 1000 MILLIGRAM(S): at 14:31

## 2024-06-03 RX ADMIN — HYDROMORPHONE HYDROCHLORIDE 0.5 MILLIGRAM(S): 2 INJECTION INTRAMUSCULAR; INTRAVENOUS; SUBCUTANEOUS at 09:18

## 2024-06-03 RX ADMIN — SERTRALINE 200 MILLIGRAM(S): 25 TABLET, FILM COATED ORAL at 12:01

## 2024-06-03 RX ADMIN — HYDROMORPHONE HYDROCHLORIDE 0.5 MILLIGRAM(S): 2 INJECTION INTRAMUSCULAR; INTRAVENOUS; SUBCUTANEOUS at 21:03

## 2024-06-03 RX ADMIN — HYDROMORPHONE HYDROCHLORIDE 8 MILLIGRAM(S): 2 INJECTION INTRAMUSCULAR; INTRAVENOUS; SUBCUTANEOUS at 12:31

## 2024-06-03 RX ADMIN — Medication 1000 MILLIGRAM(S): at 06:30

## 2024-06-03 RX ADMIN — PANTOPRAZOLE SODIUM 40 MILLIGRAM(S): 20 TABLET, DELAYED RELEASE ORAL at 05:56

## 2024-06-03 RX ADMIN — HYDROMORPHONE HYDROCHLORIDE 0.5 MILLIGRAM(S): 2 INJECTION INTRAMUSCULAR; INTRAVENOUS; SUBCUTANEOUS at 22:25

## 2024-06-03 RX ADMIN — Medication 4: at 16:59

## 2024-06-03 RX ADMIN — TIZANIDINE 4 MILLIGRAM(S): 4 TABLET ORAL at 02:59

## 2024-06-03 RX ADMIN — Medication 1000 MILLIGRAM(S): at 14:01

## 2024-06-03 RX ADMIN — Medication 150 MILLIGRAM(S): at 05:57

## 2024-06-03 RX ADMIN — Medication 1 MILLIGRAM(S): at 21:03

## 2024-06-03 RX ADMIN — HYDROMORPHONE HYDROCHLORIDE 1 MILLIGRAM(S): 2 INJECTION INTRAMUSCULAR; INTRAVENOUS; SUBCUTANEOUS at 03:30

## 2024-06-03 RX ADMIN — HYDROMORPHONE HYDROCHLORIDE 0.5 MILLIGRAM(S): 2 INJECTION INTRAMUSCULAR; INTRAVENOUS; SUBCUTANEOUS at 23:10

## 2024-06-03 RX ADMIN — Medication 1000 MILLIGRAM(S): at 05:56

## 2024-06-03 RX ADMIN — HYDROMORPHONE HYDROCHLORIDE 8 MILLIGRAM(S): 2 INJECTION INTRAMUSCULAR; INTRAVENOUS; SUBCUTANEOUS at 12:01

## 2024-06-03 RX ADMIN — GABAPENTIN 100 MILLIGRAM(S): 400 CAPSULE ORAL at 14:01

## 2024-06-03 RX ADMIN — CARVEDILOL PHOSPHATE 25 MILLIGRAM(S): 80 CAPSULE, EXTENDED RELEASE ORAL at 18:47

## 2024-06-03 RX ADMIN — Medication 150 MILLIGRAM(S): at 14:01

## 2024-06-03 RX ADMIN — Medication 150 MILLIGRAM(S): at 21:03

## 2024-06-03 RX ADMIN — HYDROMORPHONE HYDROCHLORIDE 0.5 MILLIGRAM(S): 2 INJECTION INTRAMUSCULAR; INTRAVENOUS; SUBCUTANEOUS at 15:45

## 2024-06-03 RX ADMIN — ATORVASTATIN CALCIUM 80 MILLIGRAM(S): 80 TABLET, FILM COATED ORAL at 21:04

## 2024-06-03 RX ADMIN — Medication 8: at 08:16

## 2024-06-03 RX ADMIN — Medication 150 MILLIGRAM(S): at 21:04

## 2024-06-03 RX ADMIN — HYDROMORPHONE HYDROCHLORIDE 0.5 MILLIGRAM(S): 2 INJECTION INTRAMUSCULAR; INTRAVENOUS; SUBCUTANEOUS at 18:45

## 2024-06-03 RX ADMIN — HYDROMORPHONE HYDROCHLORIDE 0.5 MILLIGRAM(S): 2 INJECTION INTRAMUSCULAR; INTRAVENOUS; SUBCUTANEOUS at 16:15

## 2024-06-03 RX ADMIN — HYDROMORPHONE HYDROCHLORIDE 0.5 MILLIGRAM(S): 2 INJECTION INTRAMUSCULAR; INTRAVENOUS; SUBCUTANEOUS at 09:48

## 2024-06-03 RX ADMIN — Medication 6: at 12:00

## 2024-06-03 RX ADMIN — HYDROMORPHONE HYDROCHLORIDE 0.5 MILLIGRAM(S): 2 INJECTION INTRAMUSCULAR; INTRAVENOUS; SUBCUTANEOUS at 22:23

## 2024-06-03 RX ADMIN — INSULIN GLARGINE 30 UNIT(S): 100 INJECTION, SOLUTION SUBCUTANEOUS at 08:16

## 2024-06-03 RX ADMIN — HYDROMORPHONE HYDROCHLORIDE 0.5 MILLIGRAM(S): 2 INJECTION INTRAMUSCULAR; INTRAVENOUS; SUBCUTANEOUS at 19:15

## 2024-06-03 RX ADMIN — Medication 1 MILLIGRAM(S): at 16:30

## 2024-06-03 RX ADMIN — APIXABAN 5 MILLIGRAM(S): 2.5 TABLET, FILM COATED ORAL at 05:57

## 2024-06-03 RX ADMIN — CARVEDILOL PHOSPHATE 25 MILLIGRAM(S): 80 CAPSULE, EXTENDED RELEASE ORAL at 05:56

## 2024-06-03 RX ADMIN — Medication 10 UNIT(S): at 12:00

## 2024-06-03 RX ADMIN — Medication 1000 MILLIGRAM(S): at 21:04

## 2024-06-03 RX ADMIN — Medication 4: at 21:55

## 2024-06-03 RX ADMIN — HYDROMORPHONE HYDROCHLORIDE 1 MILLIGRAM(S): 2 INJECTION INTRAMUSCULAR; INTRAVENOUS; SUBCUTANEOUS at 03:15

## 2024-06-03 RX ADMIN — Medication 10 UNIT(S): at 16:59

## 2024-06-03 RX ADMIN — Medication 10 UNIT(S): at 08:17

## 2024-06-03 RX ADMIN — GABAPENTIN 100 MILLIGRAM(S): 400 CAPSULE ORAL at 21:04

## 2024-06-03 NOTE — PROVIDER CONTACT NOTE (OTHER) - REASON
Informed provider pt complaining of pain 9/10 and also pt reports Left arm and L hand numbness and tingling, states it is not new

## 2024-06-03 NOTE — PROGRESS NOTE ADULT - PROBLEM SELECTOR PLAN 2
- S/p cervical fusion with "nerve transplant," CRPS of L UE  - I-Stop in chart, patient takes 1mg clonazepam qD, 5mg methadone q6h, Morphine 15 mg IR q6h at home (per pharmacy pt takes Clonazepam 0.5 mg in am, 1 mg in afternoon, will adjust the dose)  - Daily MME on outpatient regimen = 154  - Patient request MRI thoracic/lumbar spine. Patient had MRI lumbar spine outpatient on 5/24/24. F/u MRI thoracic spine  - Physiatry (Dr. Baird) consulted, recs appreciated  - Narcan conditional order

## 2024-06-03 NOTE — PROGRESS NOTE ADULT - PROBLEM SELECTOR PLAN 1
- S/p mechanical fall with +HS PTA, now with severe neck, upper back pain  - CT Head No Cont: No evidence acute hemorrhage, mass, or mass effect.  - CT C/S No Cont: Postoperative changes and degenerative changes C2-C7. No changes, postop hardware adequately placed. No acute fracture.  - Rib XR - No acute radiographic findings in particular no fracture or pneumothorax. Denies chest pain, shortness of breath  - Patient on multiple controlled substance via outpt pain management  - C/w home tizanidine, Lyrica  - Oral opiate regimen as ordered  - Patient request MRI thoracic/lumbar spine. Patient had MRI lumbar spine outpatient on 5/24/24. F/u MRI thoracic spine  - Discussed with Physiatry (Dr. Baird) on 6/3 - patient is on double the pain medication that he usually is on at home. Usual home dose is Dilaudid 4 mg q6h or equivalent dose of morphine. He is on Dilaudid 8 mg q6h oral now + IV Dilaudid PRN. He does not recommend increasing PRN IV Dilaudid  - PT Consult - outpatient OT

## 2024-06-04 LAB
ANION GAP SERPL CALC-SCNC: 7 MMOL/L — SIGNIFICANT CHANGE UP (ref 5–17)
BUN SERPL-MCNC: 12 MG/DL — SIGNIFICANT CHANGE UP (ref 7–23)
CALCIUM SERPL-MCNC: 9.2 MG/DL — SIGNIFICANT CHANGE UP (ref 8.5–10.1)
CHLORIDE SERPL-SCNC: 104 MMOL/L — SIGNIFICANT CHANGE UP (ref 96–108)
CO2 SERPL-SCNC: 28 MMOL/L — SIGNIFICANT CHANGE UP (ref 22–31)
CREAT SERPL-MCNC: 0.79 MG/DL — SIGNIFICANT CHANGE UP (ref 0.5–1.3)
EGFR: 104 ML/MIN/1.73M2 — SIGNIFICANT CHANGE UP
GLUCOSE SERPL-MCNC: 359 MG/DL — HIGH (ref 70–99)
HCT VFR BLD CALC: 38 % — LOW (ref 39–50)
HGB BLD-MCNC: 12.2 G/DL — LOW (ref 13–17)
MCHC RBC-ENTMCNC: 27.5 PG — SIGNIFICANT CHANGE UP (ref 27–34)
MCHC RBC-ENTMCNC: 32.1 GM/DL — SIGNIFICANT CHANGE UP (ref 32–36)
MCV RBC AUTO: 85.6 FL — SIGNIFICANT CHANGE UP (ref 80–100)
NRBC # BLD: 0 /100 WBCS — SIGNIFICANT CHANGE UP (ref 0–0)
PLATELET # BLD AUTO: 214 K/UL — SIGNIFICANT CHANGE UP (ref 150–400)
POTASSIUM SERPL-MCNC: 3.8 MMOL/L — SIGNIFICANT CHANGE UP (ref 3.5–5.3)
POTASSIUM SERPL-SCNC: 3.8 MMOL/L — SIGNIFICANT CHANGE UP (ref 3.5–5.3)
RBC # BLD: 4.44 M/UL — SIGNIFICANT CHANGE UP (ref 4.2–5.8)
RBC # FLD: 14.8 % — HIGH (ref 10.3–14.5)
SODIUM SERPL-SCNC: 139 MMOL/L — SIGNIFICANT CHANGE UP (ref 135–145)
WBC # BLD: 6.41 K/UL — SIGNIFICANT CHANGE UP (ref 3.8–10.5)
WBC # FLD AUTO: 6.41 K/UL — SIGNIFICANT CHANGE UP (ref 3.8–10.5)

## 2024-06-04 PROCEDURE — 99233 SBSQ HOSP IP/OBS HIGH 50: CPT

## 2024-06-04 RX ORDER — INSULIN GLARGINE 100 [IU]/ML
10 INJECTION, SOLUTION SUBCUTANEOUS ONCE
Refills: 0 | Status: COMPLETED | OUTPATIENT
Start: 2024-06-04 | End: 2024-06-04

## 2024-06-04 RX ORDER — HYDROMORPHONE HYDROCHLORIDE 2 MG/ML
0.5 INJECTION INTRAMUSCULAR; INTRAVENOUS; SUBCUTANEOUS ONCE
Refills: 0 | Status: DISCONTINUED | OUTPATIENT
Start: 2024-06-04 | End: 2024-06-04

## 2024-06-04 RX ORDER — INSULIN GLARGINE 100 [IU]/ML
40 INJECTION, SOLUTION SUBCUTANEOUS EVERY MORNING
Refills: 0 | Status: DISCONTINUED | OUTPATIENT
Start: 2024-06-05 | End: 2024-06-05

## 2024-06-04 RX ORDER — INSULIN GLARGINE 100 [IU]/ML
40 INJECTION, SOLUTION SUBCUTANEOUS ONCE
Refills: 0 | Status: DISCONTINUED | OUTPATIENT
Start: 2024-06-04 | End: 2024-06-04

## 2024-06-04 RX ORDER — HYDROMORPHONE HYDROCHLORIDE 2 MG/ML
0.5 INJECTION INTRAMUSCULAR; INTRAVENOUS; SUBCUTANEOUS EVERY 6 HOURS
Refills: 0 | Status: DISCONTINUED | OUTPATIENT
Start: 2024-06-04 | End: 2024-06-07

## 2024-06-04 RX ORDER — METHADONE HYDROCHLORIDE 40 MG/1
5 TABLET ORAL
Refills: 0 | Status: DISCONTINUED | OUTPATIENT
Start: 2024-06-04 | End: 2024-06-07

## 2024-06-04 RX ORDER — INSULIN LISPRO 100/ML
13 VIAL (ML) SUBCUTANEOUS
Refills: 0 | Status: DISCONTINUED | OUTPATIENT
Start: 2024-06-04 | End: 2024-06-05

## 2024-06-04 RX ADMIN — Medication 13 UNIT(S): at 17:14

## 2024-06-04 RX ADMIN — HYDROMORPHONE HYDROCHLORIDE 0.5 MILLIGRAM(S): 2 INJECTION INTRAMUSCULAR; INTRAVENOUS; SUBCUTANEOUS at 08:54

## 2024-06-04 RX ADMIN — CARVEDILOL PHOSPHATE 25 MILLIGRAM(S): 80 CAPSULE, EXTENDED RELEASE ORAL at 18:03

## 2024-06-04 RX ADMIN — Medication 1 MILLIGRAM(S): at 21:46

## 2024-06-04 RX ADMIN — APIXABAN 5 MILLIGRAM(S): 2.5 TABLET, FILM COATED ORAL at 06:52

## 2024-06-04 RX ADMIN — METHADONE HYDROCHLORIDE 5 MILLIGRAM(S): 40 TABLET ORAL at 18:04

## 2024-06-04 RX ADMIN — INSULIN GLARGINE 30 UNIT(S): 100 INJECTION, SOLUTION SUBCUTANEOUS at 08:06

## 2024-06-04 RX ADMIN — HYDROMORPHONE HYDROCHLORIDE 0.5 MILLIGRAM(S): 2 INJECTION INTRAMUSCULAR; INTRAVENOUS; SUBCUTANEOUS at 22:51

## 2024-06-04 RX ADMIN — Medication 10 UNIT(S): at 08:05

## 2024-06-04 RX ADMIN — Medication 150 MILLIGRAM(S): at 13:26

## 2024-06-04 RX ADMIN — HYDROMORPHONE HYDROCHLORIDE 0.5 MILLIGRAM(S): 2 INJECTION INTRAMUSCULAR; INTRAVENOUS; SUBCUTANEOUS at 22:09

## 2024-06-04 RX ADMIN — Medication 1000 MILLIGRAM(S): at 06:53

## 2024-06-04 RX ADMIN — Medication 150 MILLIGRAM(S): at 21:47

## 2024-06-04 RX ADMIN — HYDROMORPHONE HYDROCHLORIDE 0.5 MILLIGRAM(S): 2 INJECTION INTRAMUSCULAR; INTRAVENOUS; SUBCUTANEOUS at 09:54

## 2024-06-04 RX ADMIN — HYDROMORPHONE HYDROCHLORIDE 8 MILLIGRAM(S): 2 INJECTION INTRAMUSCULAR; INTRAVENOUS; SUBCUTANEOUS at 11:39

## 2024-06-04 RX ADMIN — Medication 1000 MILLIGRAM(S): at 21:46

## 2024-06-04 RX ADMIN — Medication 150 MILLIGRAM(S): at 06:53

## 2024-06-04 RX ADMIN — APIXABAN 5 MILLIGRAM(S): 2.5 TABLET, FILM COATED ORAL at 18:03

## 2024-06-04 RX ADMIN — Medication 4: at 17:14

## 2024-06-04 RX ADMIN — HYDROMORPHONE HYDROCHLORIDE 8 MILLIGRAM(S): 2 INJECTION INTRAMUSCULAR; INTRAVENOUS; SUBCUTANEOUS at 06:52

## 2024-06-04 RX ADMIN — SERTRALINE 200 MILLIGRAM(S): 25 TABLET, FILM COATED ORAL at 11:40

## 2024-06-04 RX ADMIN — ATORVASTATIN CALCIUM 80 MILLIGRAM(S): 80 TABLET, FILM COATED ORAL at 21:46

## 2024-06-04 RX ADMIN — Medication 13 UNIT(S): at 12:20

## 2024-06-04 RX ADMIN — CARVEDILOL PHOSPHATE 25 MILLIGRAM(S): 80 CAPSULE, EXTENDED RELEASE ORAL at 07:00

## 2024-06-04 RX ADMIN — HYDROMORPHONE HYDROCHLORIDE 8 MILLIGRAM(S): 2 INJECTION INTRAMUSCULAR; INTRAVENOUS; SUBCUTANEOUS at 18:03

## 2024-06-04 RX ADMIN — INSULIN GLARGINE 10 UNIT(S): 100 INJECTION, SOLUTION SUBCUTANEOUS at 08:43

## 2024-06-04 RX ADMIN — Medication 150 MILLIGRAM(S): at 21:46

## 2024-06-04 RX ADMIN — Medication 1000 MILLIGRAM(S): at 13:25

## 2024-06-04 RX ADMIN — HYDROMORPHONE HYDROCHLORIDE 0.5 MILLIGRAM(S): 2 INJECTION INTRAMUSCULAR; INTRAVENOUS; SUBCUTANEOUS at 21:45

## 2024-06-04 RX ADMIN — Medication 6: at 12:20

## 2024-06-04 RX ADMIN — PANTOPRAZOLE SODIUM 40 MILLIGRAM(S): 20 TABLET, DELAYED RELEASE ORAL at 06:52

## 2024-06-04 RX ADMIN — HYDROMORPHONE HYDROCHLORIDE 0.5 MILLIGRAM(S): 2 INJECTION INTRAMUSCULAR; INTRAVENOUS; SUBCUTANEOUS at 16:26

## 2024-06-04 RX ADMIN — Medication 8: at 08:05

## 2024-06-04 RX ADMIN — GABAPENTIN 100 MILLIGRAM(S): 400 CAPSULE ORAL at 06:52

## 2024-06-04 RX ADMIN — GABAPENTIN 100 MILLIGRAM(S): 400 CAPSULE ORAL at 21:46

## 2024-06-04 RX ADMIN — GABAPENTIN 100 MILLIGRAM(S): 400 CAPSULE ORAL at 13:25

## 2024-06-04 RX ADMIN — HYDROMORPHONE HYDROCHLORIDE 8 MILLIGRAM(S): 2 INJECTION INTRAMUSCULAR; INTRAVENOUS; SUBCUTANEOUS at 00:48

## 2024-06-04 RX ADMIN — HYDROMORPHONE HYDROCHLORIDE 8 MILLIGRAM(S): 2 INJECTION INTRAMUSCULAR; INTRAVENOUS; SUBCUTANEOUS at 12:30

## 2024-06-04 RX ADMIN — HYDROMORPHONE HYDROCHLORIDE 0.5 MILLIGRAM(S): 2 INJECTION INTRAMUSCULAR; INTRAVENOUS; SUBCUTANEOUS at 15:26

## 2024-06-04 RX ADMIN — Medication 1000 MILLIGRAM(S): at 14:25

## 2024-06-04 NOTE — PROGRESS NOTE ADULT - PROBLEM SELECTOR PLAN 1
increase lantus 40 units qam  increase admelog 13 units 3x/day before meals  cont mod dose admelog corrective scale coverage qac/qhs  cont cons cho diet  goal bg 100-180 in hosp setting

## 2024-06-04 NOTE — PROGRESS NOTE ADULT - PROBLEM SELECTOR PLAN 1
- S/p mechanical fall with +HS PTA, now with severe neck, upper back pain  - CT Head No Cont: No evidence acute hemorrhage, mass, or mass effect.  - CT C/S No Cont: Postoperative changes and degenerative changes C2-C7. No changes, postop hardware adequately placed. No acute fracture.  - Rib XR - No acute radiographic findings in particular no fracture or pneumothorax. Denies chest pain, shortness of breath  - Patient on multiple controlled substance via outpt pain management  - C/w home tizanidine, Lyrica  - Oral opiate regimen as ordered  - Patient request MRI thoracic/lumbar spine. Patient had MRI lumbar spine outpatient on 5/24/24. F/u MRI thoracic spine- No evidence of thoracic spine fracture, traumatic malalignment or suspicious osseous lesion. Minor thoracic spine degenerative changes. No clinically significant spinal canal stenosis or neural foraminal narrowing thoracic spine. No thoracic cord compression, cord edema or other cord signal abnormality  - Discussed with Physiatry (Dr. Baird) on 6/4 - He does not recommend increasing PRN IV Dilaudid. Recommend restarting home methadone 5mg QID to see if it helps improve patient's pain. Patient states he follows up with Dr. Pham (pain management) as outpatient who provides the methadone, but states he would like a recommendation for a new pain management doctor.  - PT Consult - outpatient OT

## 2024-06-04 NOTE — PHARMACOTHERAPY INTERVENTION NOTE - COMMENTS
Patient Demographic Information (PDI)       PDI	First Name	Last Name	Birth Date	Gender	Street Address	[facility] Code    BETH Mast	1965	Male	[address]	[facility]	[id number]    Prescription Information        PDI	Current Rx	Drug Type	Rx Written	Rx Dispensed	Drug	Quantity	Days Supply	Prescriber Name	Prescriber EBONI #	Payment Method	Dispenser  A	Y	B	05/24/2024	05/24/2024	clonazepam 1 mg tablet	30	30	Aleisha Sutton P	[id number]	Medicare	[id number]  A	Y	O	05/03/2024	05/09/2024	morphine sulfate ir 15 mg tab	120	30	StamatosJuaquin	[id number]	[facility] #[id number]  A	N	O	05/03/2024	05/03/2024	methadone hcl 5 mg tablet	120	30	Stamatos, Juaquin	[id number]	[facility] #[id number]  A	N	B	04/24/2024	04/29/2024	clonazepam 0.5 mg tablet	30	30	Aleisha Sutton P	[id number]	Medicare	[id number]  A	N	B	04/24/2024	04/25/2024	clonazepam 1 mg tablet	30	30	Aleisha Sutton P	[id number]	Medicare	[id number]  A	N	O	04/09/2024	04/11/2024	morphine sulfate ir 15 mg tab	120	30	StamatosJuaquin	[id number]	[facility] #[id number]  A	N	O	04/01/2024	04/05/2024	methadone hcl 5 mg tablet	120	30	StamatosJuaquin	[id number]	[facility] #[id number]  A	N	B	03/20/2024	03/27/2024	clonazepam 1 mg tablet	30	30	Aleisha Sutton P	[id number]	Medicare	[id number]  A	N	B	03/20/2024	03/27/2024	clonazepam 0.5 mg tablet	30	30	Aleisha Sutton P	[id number]	Medicare	[id number]  A	N	O	02/23/2024	03/04/2024	methadone hcl 5 mg tablet	120	30	StamatoJuaquin kat	[id number]	[facility] #[id number]  A	N	O	02/23/2024	03/01/2024	hydromorphone 4 mg tablet	120	30	StamatoJuaquin kat	[id number]	[facility] #[id number]  A	N	B	02/23/2024	02/26/2024	clonazepam 0.5 mg tablet	30	30	Aleisha Sutton P P	[id number]	Medicare	[id number]  A	N	B	02/23/2024	02/26/2024	clonazepam 1 mg tablet	30	30	Aleisha Sutton P P	[id number]	Medicare	[id number]  A	N	O	02/02/2024	02/05/2024	methadone hcl 5 mg tablet	120	30	Stamatos, Juaquin	[id number]	[facility] #[id number]  A	N	O	02/02/2024	02/02/2024	hydromorphone 4 mg tablet	120	30	StamatosJuaquin	[id number]	[facility] #[id number]  A	N	B	01/29/2024	01/30/2024	clonazepam 0.5 mg tablet	30	30	Aleisha Sutton P P	[id number]	Medicare	[id number]  A	N	B	01/30/2024	01/30/2024	clonazepam 1 mg tablet	30	30	Aleisha Sutton P P	[id number]	Medicare	[id number]  A	N	O	01/05/2024	01/08/2024	methadone hcl 5 mg tablet	120	30	StamatosJuaquin	[id number]	[facility] #[id number]  A	N	O	01/05/2024	01/05/2024	hydromorphone 4 mg tablet	120	30	StamatosJuaquin	[id number]	[facility] #[id number]  A	N	B	12/21/2023	12/29/2023	clonazepam 1 mg tablet	30	30	Aleisha Sutton P P	[id number]	Medicare	[id number]  A	N	B	12/21/2023	12/23/2023	clonazepam 0.5 mg tablet	30	30	Aleisha Sutton P P	[id number]	Medicare	[facility][id number]  A	N	O	12/06/2023	12/11/2023	methadone hcl 5 mg tablet	120	30	TrimCeline archer NP	[id number]	[facility] #[id number]  A	N	O	12/06/2023	12/07/2023	hydromorphone 4 mg tablet	120	30	TrimarcCeline gates NP	[id number]	[facility] #[id number]  A	N	B	11/22/2023	11/29/2023	clonazepam 1 mg tablet	30	30	Aleisha Sutton P	[id number]	Medicare	[id number]  A	N	B	11/22/2023	11/24/2023	clonazepam 0.5 mg tablet	30	30	Aleisha Sutton P	[id number]	Medicare	[id number]  A	N		11/06/2023	11/13/2023	pregabalin 150 mg capsule	90	30	Trimarco, Celine NP	[id number]	[facility] #[id number]  A	N	O	11/06/2023	11/13/2023	methadone hcl 5 mg tablet	120	30	Trimarco, Celine NP	[id number]	[facility] #[id number]  A	N	O	11/06/2023	11/08/2023	hydromorphone 4 mg tablet	120	30	Trimarco, Celine NP	[id number]	[facility] #[id number]  A	N	B	10/27/2023	10/28/2023	clonazepam 1 mg tablet	30	30	Aleisha Sutton P	[id number]	Medicare	[id number]  A	N	B	10/27/2023	10/28/2023	clonazepam 0.5 mg tablet	30	30	Aleisha Sutton P	[id number]	Medicare	[facility][id number]  A	N	O	10/09/2023	10/17/2023	methadone hcl 5 mg tablet	120	30	Trimarco, Celine NP	[id number]	[facility] #[id number]  A	N	O	10/09/2023	10/10/2023	hydromorphone 4 mg tablet	120	30	Trimarco, Celine NP	[id number]	[facility] #[id number]  A	N	B	09/28/2023	09/29/2023	clonazepam 1 mg tablet	30	30	Aleisha Sutton P	[id number]	Medicare	[facility][id number]  A	N	B	09/28/2023	09/29/2023	clonazepam 0.5 mg tablet	30	30	Aleisha Sutton P	[id number]	Medicare	[id number]  A	N		09/05/2023	09/13/2023	pregabalin 150 mg capsule	90	30	Trimarco, Celine NP	[id number]	[facility] #[id number]  A	N	O	09/05/2023	09/13/2023	methadone hcl 5 mg tablet	120	30	TrimarcoJonnathanna NP	[id number]	[facility] #[id number]  A	N	O	09/05/2023	09/08/2023	hydromorphone 4 mg tablet	120	30	Trimginetteo, Celine NP	[id number]	[facility] #[id number]  A	N	B	08/31/2023	08/31/2023	clonazepam 0.5 mg tablet	30	30	Aleisha Sutton P P	[id number]	Medicare	[id number]  A	N	B	08/31/2023	08/31/2023	clonazepam 1 mg tablet	30	30	Aleisha Sutton P P	[id number]	Medicare	[id number]  A	N	O	08/02/2023	08/17/2023	methadone hcl 5 mg tablet	120	30	Trimarco, Celine NP	[id number]	[facility] #[id number]  A	N		08/02/2023	08/16/2023	pregabalin 150 mg capsule	90	30	TrimarcoCeline NP	[id number]	Medicare	[facility][id number]  A	N	O	08/02/2023	08/09/2023	hydromorphone 4 mg tablet	120	30	Trimarco, Celine NP	[id number]	[facility] #[id number]  A	N	B	08/01/2023	08/01/2023	clonazepam 1 mg tablet	30	30	Aleisha Sutton P P	[id number]	Medicare	[id number]  A	N	B	08/01/2023	08/01/2023	clonazepam 0.5 mg tablet	30	30	Aleisha Sutton P P	[id number]	Medicare	[facility][id number]  A	N		07/03/2023	07/17/2023	pregabalin 150 mg capsule	90	30	TrimarcoCeline NP	[id number]	Medicare	[id number]  A	N	O	07/03/2023	07/17/2023	methadone hcl 5 mg tablet	120	30	TrimginetteoCeline NP	[id number]	[facility] #[id number]  A	N	O	07/03/2023	07/10/2023	hydromorphone 4 mg tablet	120	30	TrimarcoCeline NP	[id number]	[facility] #[id number]  A	N	B	06/30/2023	07/03/2023	clonazepam 1 mg tablet	30	30	Aleisha Sutton P	[id number]	Medicare	[id number]  A	N	B	06/30/2023	07/03/2023	clonazepam 0.5 mg tablet	30	30	Aleisha Sutton P	[id number]	Medicare	[id number]  A	N	O	06/12/2023	06/17/2023	methadone hcl 5 mg tablet	120	30	TrimCeline archer NP	[id number]	[facility] #[id number]  A	N		06/12/2023	06/17/2023	pregabalin 150 mg capsule	90	30	TrimarcoCeline NP	[id number]	[facility] #[id number]  A	N	O	06/15/2023	06/15/2023	hydromorphone 4 mg tablet	100	25	Trimarckody, Celine NP	[id number]	[facility] #[id number]  A	N	B	06/06/2023	06/06/2023	clonazepam 1 mg tablet	30	30	Aleisha Sutton P	[id number]	Medicare	[id number]  A	N	B	06/06/2023	06/06/2023	clonazepam 0.5 mg tablet	30	30	Aleisha Sutton P	[id number]	Medicare	[id number]

## 2024-06-04 NOTE — PROGRESS NOTE ADULT - TIME BILLING
as above
as above
I personally conducted a physical examination of the patient. I personally gathered the patient's history. I personally discussed the plan of care with the patient. The questions and concerns were addressed to the best of my ability. The patient is in agreement with the listed treatment plan.

## 2024-06-04 NOTE — PHYSICAL THERAPY INITIAL EVALUATION ADULT - ADDITIONAL COMMENTS
Detail Level: Detailed Size Of Lesion In Cm (Optional): 0 pt independent prior. [address] private apartment. x 1 railing.

## 2024-06-04 NOTE — CASE MANAGEMENT PROGRESS NOTE - NSCMPROGRESSNOTE_GEN_ALL_CORE
MRI resulted. CM met with patient to discuss likely DC tomorrow. Patient is requesting to see pain management. Dr. Hoffman aware. Pt is independent with ADL's and PT provided pt w/ cane. Pt is ambulating in hallways. Pt states he lives with his girlfriend and she will transport pt home. CM will continue to follow.    MRI resulted. CM met with patient to discuss likely DC tomorrow. Patient is requesting to see pain management. Dr. Hoffman aware. Pt is independent with ADL's and PT provided pt w/ cane. PT = no needs. No new needs identified. Pt is ambulating in hallways. Pt states he lives with his girlfriend and she will transport pt home. CM will continue to follow.

## 2024-06-05 LAB
ANION GAP SERPL CALC-SCNC: 5 MMOL/L — SIGNIFICANT CHANGE UP (ref 5–17)
BUN SERPL-MCNC: 14 MG/DL — SIGNIFICANT CHANGE UP (ref 7–23)
CALCIUM SERPL-MCNC: 9 MG/DL — SIGNIFICANT CHANGE UP (ref 8.5–10.1)
CHLORIDE SERPL-SCNC: 103 MMOL/L — SIGNIFICANT CHANGE UP (ref 96–108)
CO2 SERPL-SCNC: 30 MMOL/L — SIGNIFICANT CHANGE UP (ref 22–31)
CREAT SERPL-MCNC: 0.8 MG/DL — SIGNIFICANT CHANGE UP (ref 0.5–1.3)
EGFR: 104 ML/MIN/1.73M2 — SIGNIFICANT CHANGE UP
GLUCOSE SERPL-MCNC: 379 MG/DL — HIGH (ref 70–99)
HCT VFR BLD CALC: 36.9 % — LOW (ref 39–50)
HGB BLD-MCNC: 11.9 G/DL — LOW (ref 13–17)
MCHC RBC-ENTMCNC: 27.8 PG — SIGNIFICANT CHANGE UP (ref 27–34)
MCHC RBC-ENTMCNC: 32.2 GM/DL — SIGNIFICANT CHANGE UP (ref 32–36)
MCV RBC AUTO: 86.2 FL — SIGNIFICANT CHANGE UP (ref 80–100)
NRBC # BLD: 0 /100 WBCS — SIGNIFICANT CHANGE UP (ref 0–0)
PLATELET # BLD AUTO: 210 K/UL — SIGNIFICANT CHANGE UP (ref 150–400)
POTASSIUM SERPL-MCNC: 4.1 MMOL/L — SIGNIFICANT CHANGE UP (ref 3.5–5.3)
POTASSIUM SERPL-SCNC: 4.1 MMOL/L — SIGNIFICANT CHANGE UP (ref 3.5–5.3)
RBC # BLD: 4.28 M/UL — SIGNIFICANT CHANGE UP (ref 4.2–5.8)
RBC # FLD: 14.6 % — HIGH (ref 10.3–14.5)
SODIUM SERPL-SCNC: 138 MMOL/L — SIGNIFICANT CHANGE UP (ref 135–145)
WBC # BLD: 6.4 K/UL — SIGNIFICANT CHANGE UP (ref 3.8–10.5)
WBC # FLD AUTO: 6.4 K/UL — SIGNIFICANT CHANGE UP (ref 3.8–10.5)

## 2024-06-05 PROCEDURE — 99232 SBSQ HOSP IP/OBS MODERATE 35: CPT

## 2024-06-05 RX ORDER — INSULIN GLARGINE 100 [IU]/ML
45 INJECTION, SOLUTION SUBCUTANEOUS EVERY MORNING
Refills: 0 | Status: DISCONTINUED | OUTPATIENT
Start: 2024-06-05 | End: 2024-06-06

## 2024-06-05 RX ORDER — INSULIN LISPRO 100/ML
16 VIAL (ML) SUBCUTANEOUS
Refills: 0 | Status: DISCONTINUED | OUTPATIENT
Start: 2024-06-05 | End: 2024-06-07

## 2024-06-05 RX ORDER — METHOCARBAMOL 500 MG/1
750 TABLET, FILM COATED ORAL
Refills: 0 | Status: DISCONTINUED | OUTPATIENT
Start: 2024-06-05 | End: 2024-06-07

## 2024-06-05 RX ADMIN — APIXABAN 5 MILLIGRAM(S): 2.5 TABLET, FILM COATED ORAL at 06:34

## 2024-06-05 RX ADMIN — GABAPENTIN 100 MILLIGRAM(S): 400 CAPSULE ORAL at 06:33

## 2024-06-05 RX ADMIN — INSULIN GLARGINE 40 UNIT(S): 100 INJECTION, SOLUTION SUBCUTANEOUS at 07:54

## 2024-06-05 RX ADMIN — PANTOPRAZOLE SODIUM 40 MILLIGRAM(S): 20 TABLET, DELAYED RELEASE ORAL at 06:34

## 2024-06-05 RX ADMIN — Medication 3 MILLIGRAM(S): at 21:35

## 2024-06-05 RX ADMIN — CARVEDILOL PHOSPHATE 25 MILLIGRAM(S): 80 CAPSULE, EXTENDED RELEASE ORAL at 06:34

## 2024-06-05 RX ADMIN — Medication 150 MILLIGRAM(S): at 21:34

## 2024-06-05 RX ADMIN — METHADONE HYDROCHLORIDE 5 MILLIGRAM(S): 40 TABLET ORAL at 23:24

## 2024-06-05 RX ADMIN — Medication 1000 MILLIGRAM(S): at 21:35

## 2024-06-05 RX ADMIN — HYDROMORPHONE HYDROCHLORIDE 8 MILLIGRAM(S): 2 INJECTION INTRAMUSCULAR; INTRAVENOUS; SUBCUTANEOUS at 11:28

## 2024-06-05 RX ADMIN — Medication 4: at 11:37

## 2024-06-05 RX ADMIN — METHADONE HYDROCHLORIDE 5 MILLIGRAM(S): 40 TABLET ORAL at 11:28

## 2024-06-05 RX ADMIN — METHADONE HYDROCHLORIDE 5 MILLIGRAM(S): 40 TABLET ORAL at 06:33

## 2024-06-05 RX ADMIN — HYDROMORPHONE HYDROCHLORIDE 8 MILLIGRAM(S): 2 INJECTION INTRAMUSCULAR; INTRAVENOUS; SUBCUTANEOUS at 00:28

## 2024-06-05 RX ADMIN — CARVEDILOL PHOSPHATE 25 MILLIGRAM(S): 80 CAPSULE, EXTENDED RELEASE ORAL at 17:25

## 2024-06-05 RX ADMIN — LIDOCAINE 1 PATCH: 4 CREAM TOPICAL at 11:30

## 2024-06-05 RX ADMIN — HYDROMORPHONE HYDROCHLORIDE 8 MILLIGRAM(S): 2 INJECTION INTRAMUSCULAR; INTRAVENOUS; SUBCUTANEOUS at 22:21

## 2024-06-05 RX ADMIN — Medication 150 MILLIGRAM(S): at 06:34

## 2024-06-05 RX ADMIN — HYDROMORPHONE HYDROCHLORIDE 8 MILLIGRAM(S): 2 INJECTION INTRAMUSCULAR; INTRAVENOUS; SUBCUTANEOUS at 17:25

## 2024-06-05 RX ADMIN — HYDROMORPHONE HYDROCHLORIDE 0.5 MILLIGRAM(S): 2 INJECTION INTRAMUSCULAR; INTRAVENOUS; SUBCUTANEOUS at 21:36

## 2024-06-05 RX ADMIN — HYDROMORPHONE HYDROCHLORIDE 0.5 MILLIGRAM(S): 2 INJECTION INTRAMUSCULAR; INTRAVENOUS; SUBCUTANEOUS at 00:15

## 2024-06-05 RX ADMIN — ATORVASTATIN CALCIUM 80 MILLIGRAM(S): 80 TABLET, FILM COATED ORAL at 21:35

## 2024-06-05 RX ADMIN — Medication 1 MILLIGRAM(S): at 21:35

## 2024-06-05 RX ADMIN — HYDROMORPHONE HYDROCHLORIDE 8 MILLIGRAM(S): 2 INJECTION INTRAMUSCULAR; INTRAVENOUS; SUBCUTANEOUS at 23:00

## 2024-06-05 RX ADMIN — METHADONE HYDROCHLORIDE 5 MILLIGRAM(S): 40 TABLET ORAL at 00:28

## 2024-06-05 RX ADMIN — HYDROMORPHONE HYDROCHLORIDE 0.5 MILLIGRAM(S): 2 INJECTION INTRAMUSCULAR; INTRAVENOUS; SUBCUTANEOUS at 20:46

## 2024-06-05 RX ADMIN — LIDOCAINE 1 PATCH: 4 CREAM TOPICAL at 19:36

## 2024-06-05 RX ADMIN — METHADONE HYDROCHLORIDE 5 MILLIGRAM(S): 40 TABLET ORAL at 17:25

## 2024-06-05 RX ADMIN — HYDROMORPHONE HYDROCHLORIDE 0.5 MILLIGRAM(S): 2 INJECTION INTRAMUSCULAR; INTRAVENOUS; SUBCUTANEOUS at 14:50

## 2024-06-05 RX ADMIN — GABAPENTIN 100 MILLIGRAM(S): 400 CAPSULE ORAL at 13:55

## 2024-06-05 RX ADMIN — Medication 1000 MILLIGRAM(S): at 06:33

## 2024-06-05 RX ADMIN — Medication 1000 MILLIGRAM(S): at 22:35

## 2024-06-05 RX ADMIN — Medication 1000 MILLIGRAM(S): at 13:55

## 2024-06-05 RX ADMIN — HYDROMORPHONE HYDROCHLORIDE 0.5 MILLIGRAM(S): 2 INJECTION INTRAMUSCULAR; INTRAVENOUS; SUBCUTANEOUS at 08:58

## 2024-06-05 RX ADMIN — SERTRALINE 200 MILLIGRAM(S): 25 TABLET, FILM COATED ORAL at 11:28

## 2024-06-05 RX ADMIN — Medication 13 UNIT(S): at 07:55

## 2024-06-05 RX ADMIN — Medication 150 MILLIGRAM(S): at 13:55

## 2024-06-05 RX ADMIN — Medication 10: at 07:54

## 2024-06-05 RX ADMIN — APIXABAN 5 MILLIGRAM(S): 2.5 TABLET, FILM COATED ORAL at 17:25

## 2024-06-05 RX ADMIN — HYDROMORPHONE HYDROCHLORIDE 0.5 MILLIGRAM(S): 2 INJECTION INTRAMUSCULAR; INTRAVENOUS; SUBCUTANEOUS at 07:59

## 2024-06-05 RX ADMIN — HYDROMORPHONE HYDROCHLORIDE 0.5 MILLIGRAM(S): 2 INJECTION INTRAMUSCULAR; INTRAVENOUS; SUBCUTANEOUS at 14:49

## 2024-06-05 RX ADMIN — GABAPENTIN 100 MILLIGRAM(S): 400 CAPSULE ORAL at 21:35

## 2024-06-05 RX ADMIN — HYDROMORPHONE HYDROCHLORIDE 8 MILLIGRAM(S): 2 INJECTION INTRAMUSCULAR; INTRAVENOUS; SUBCUTANEOUS at 06:33

## 2024-06-05 RX ADMIN — Medication 16 UNIT(S): at 11:37

## 2024-06-05 RX ADMIN — Medication 16 UNIT(S): at 17:13

## 2024-06-05 RX ADMIN — METHOCARBAMOL 750 MILLIGRAM(S): 500 TABLET, FILM COATED ORAL at 17:25

## 2024-06-05 RX ADMIN — Medication 4: at 17:13

## 2024-06-05 NOTE — PROGRESS NOTE ADULT - PROBLEM SELECTOR PLAN 1
- S/p mechanical fall with +HS PTA, now with severe neck, upper back pain  - CT Head No Cont: No evidence acute hemorrhage, mass, or mass effect.  - CT C/S No Cont: Postoperative changes and degenerative changes C2-C7. No changes, postop hardware adequately placed. No acute fracture.  - Rib XR - No acute radiographic findings in particular no fracture or pneumothorax. Denies chest pain, shortness of breath  - Patient on multiple controlled substance via outpt pain management  - C/w home tizanidine, Lyrica  - Oral opiate regimen as ordered  - Patient request MRI thoracic/lumbar spine. Patient had MRI lumbar spine outpatient on 5/24/24. F/u MRI thoracic spine- No evidence of thoracic spine fracture, traumatic malalignment or suspicious osseous lesion. Minor thoracic spine degenerative changes. No clinically significant spinal canal stenosis or neural foraminal narrowing thoracic spine. No thoracic cord compression, cord edema or other cord signal abnormality  - Discussed with Physiatry (Dr. Baird) on 6/4 - He does not recommend increasing PRN IV Dilaudid. Recommend restarting home methadone 5mg QID to see if it helps improve patient's pain. Patient states he follows up with Dr. Pham (pain management) as outpatient who provides the methadone, but states he would like a recommendation for a new pain management doctor.  - Trial Methocarbamol 750 BID given reported persistent pain, uptitrate as needed   - PT Consult - outpatient OT

## 2024-06-05 NOTE — PROGRESS NOTE ADULT - PROBLEM SELECTOR PLAN 2
- S/p cervical fusion with "nerve transplant," CRPS of L UE  - I-Stop in chart, patient takes 1mg clonazepam qD, 5mg methadone q6h, Morphine 15 mg IR q6h at home (per pharmacy pt takes Clonazepam 0.5 mg in am, 1 mg in afternoon, will adjust the dose)  - Daily MME on outpatient regimen = 154  - Patient request MRI thoracic/lumbar spine. Patient had MRI lumbar spine outpatient on 5/24/24. F/u MRI thoracic spine- results noted  - Physiatry (Dr. Baird) consulted, recs appreciated  - Narcan conditional order

## 2024-06-06 LAB
HCT VFR BLD CALC: 36.6 % — LOW (ref 39–50)
HGB BLD-MCNC: 12 G/DL — LOW (ref 13–17)
MCHC RBC-ENTMCNC: 28.2 PG — SIGNIFICANT CHANGE UP (ref 27–34)
MCHC RBC-ENTMCNC: 32.8 GM/DL — SIGNIFICANT CHANGE UP (ref 32–36)
MCV RBC AUTO: 85.9 FL — SIGNIFICANT CHANGE UP (ref 80–100)
NRBC # BLD: 0 /100 WBCS — SIGNIFICANT CHANGE UP (ref 0–0)
PLATELET # BLD AUTO: 210 K/UL — SIGNIFICANT CHANGE UP (ref 150–400)
RBC # BLD: 4.26 M/UL — SIGNIFICANT CHANGE UP (ref 4.2–5.8)
RBC # FLD: 14.8 % — HIGH (ref 10.3–14.5)
WBC # BLD: 6.08 K/UL — SIGNIFICANT CHANGE UP (ref 3.8–10.5)
WBC # FLD AUTO: 6.08 K/UL — SIGNIFICANT CHANGE UP (ref 3.8–10.5)

## 2024-06-06 PROCEDURE — 99232 SBSQ HOSP IP/OBS MODERATE 35: CPT

## 2024-06-06 RX ORDER — INSULIN LISPRO 100/ML
VIAL (ML) SUBCUTANEOUS AT BEDTIME
Refills: 0 | Status: DISCONTINUED | OUTPATIENT
Start: 2024-06-06 | End: 2024-06-07

## 2024-06-06 RX ORDER — INSULIN LISPRO 100/ML
VIAL (ML) SUBCUTANEOUS
Refills: 0 | Status: DISCONTINUED | OUTPATIENT
Start: 2024-06-06 | End: 2024-06-07

## 2024-06-06 RX ORDER — HYDROMORPHONE HYDROCHLORIDE 2 MG/ML
0.5 INJECTION INTRAMUSCULAR; INTRAVENOUS; SUBCUTANEOUS ONCE
Refills: 0 | Status: DISCONTINUED | OUTPATIENT
Start: 2024-06-06 | End: 2024-06-06

## 2024-06-06 RX ORDER — INSULIN GLARGINE 100 [IU]/ML
50 INJECTION, SOLUTION SUBCUTANEOUS EVERY MORNING
Refills: 0 | Status: DISCONTINUED | OUTPATIENT
Start: 2024-06-07 | End: 2024-06-07

## 2024-06-06 RX ORDER — CLONAZEPAM 1 MG
1 TABLET ORAL AT BEDTIME
Refills: 0 | Status: DISCONTINUED | OUTPATIENT
Start: 2024-06-06 | End: 2024-06-07

## 2024-06-06 RX ADMIN — HYDROMORPHONE HYDROCHLORIDE 8 MILLIGRAM(S): 2 INJECTION INTRAMUSCULAR; INTRAVENOUS; SUBCUTANEOUS at 22:55

## 2024-06-06 RX ADMIN — METHADONE HYDROCHLORIDE 5 MILLIGRAM(S): 40 TABLET ORAL at 12:10

## 2024-06-06 RX ADMIN — Medication 9: at 17:19

## 2024-06-06 RX ADMIN — HYDROMORPHONE HYDROCHLORIDE 0.5 MILLIGRAM(S): 2 INJECTION INTRAMUSCULAR; INTRAVENOUS; SUBCUTANEOUS at 13:11

## 2024-06-06 RX ADMIN — CARVEDILOL PHOSPHATE 25 MILLIGRAM(S): 80 CAPSULE, EXTENDED RELEASE ORAL at 17:52

## 2024-06-06 RX ADMIN — Medication 6: at 07:42

## 2024-06-06 RX ADMIN — HYDROMORPHONE HYDROCHLORIDE 0.5 MILLIGRAM(S): 2 INJECTION INTRAMUSCULAR; INTRAVENOUS; SUBCUTANEOUS at 06:22

## 2024-06-06 RX ADMIN — Medication 16 UNIT(S): at 12:09

## 2024-06-06 RX ADMIN — Medication 1000 MILLIGRAM(S): at 06:13

## 2024-06-06 RX ADMIN — Medication 16 UNIT(S): at 07:42

## 2024-06-06 RX ADMIN — Medication 150 MILLIGRAM(S): at 22:08

## 2024-06-06 RX ADMIN — Medication 3 MILLIGRAM(S): at 22:11

## 2024-06-06 RX ADMIN — GABAPENTIN 100 MILLIGRAM(S): 400 CAPSULE ORAL at 13:41

## 2024-06-06 RX ADMIN — HYDROMORPHONE HYDROCHLORIDE 8 MILLIGRAM(S): 2 INJECTION INTRAMUSCULAR; INTRAVENOUS; SUBCUTANEOUS at 22:11

## 2024-06-06 RX ADMIN — Medication 1 MILLIGRAM(S): at 22:08

## 2024-06-06 RX ADMIN — SERTRALINE 200 MILLIGRAM(S): 25 TABLET, FILM COATED ORAL at 12:11

## 2024-06-06 RX ADMIN — HYDROMORPHONE HYDROCHLORIDE 8 MILLIGRAM(S): 2 INJECTION INTRAMUSCULAR; INTRAVENOUS; SUBCUTANEOUS at 12:10

## 2024-06-06 RX ADMIN — HYDROMORPHONE HYDROCHLORIDE 0.5 MILLIGRAM(S): 2 INJECTION INTRAMUSCULAR; INTRAVENOUS; SUBCUTANEOUS at 20:50

## 2024-06-06 RX ADMIN — HYDROMORPHONE HYDROCHLORIDE 8 MILLIGRAM(S): 2 INJECTION INTRAMUSCULAR; INTRAVENOUS; SUBCUTANEOUS at 06:59

## 2024-06-06 RX ADMIN — Medication 150 MILLIGRAM(S): at 22:10

## 2024-06-06 RX ADMIN — Medication 150 MILLIGRAM(S): at 14:21

## 2024-06-06 RX ADMIN — PANTOPRAZOLE SODIUM 40 MILLIGRAM(S): 20 TABLET, DELAYED RELEASE ORAL at 06:13

## 2024-06-06 RX ADMIN — Medication 1000 MILLIGRAM(S): at 06:59

## 2024-06-06 RX ADMIN — LIDOCAINE 1 PATCH: 4 CREAM TOPICAL at 19:37

## 2024-06-06 RX ADMIN — METHOCARBAMOL 750 MILLIGRAM(S): 500 TABLET, FILM COATED ORAL at 06:13

## 2024-06-06 RX ADMIN — LIDOCAINE 1 PATCH: 4 CREAM TOPICAL at 00:28

## 2024-06-06 RX ADMIN — APIXABAN 5 MILLIGRAM(S): 2.5 TABLET, FILM COATED ORAL at 17:52

## 2024-06-06 RX ADMIN — HYDROMORPHONE HYDROCHLORIDE 0.5 MILLIGRAM(S): 2 INJECTION INTRAMUSCULAR; INTRAVENOUS; SUBCUTANEOUS at 01:30

## 2024-06-06 RX ADMIN — LIDOCAINE 1 PATCH: 4 CREAM TOPICAL at 12:09

## 2024-06-06 RX ADMIN — HYDROMORPHONE HYDROCHLORIDE 8 MILLIGRAM(S): 2 INJECTION INTRAMUSCULAR; INTRAVENOUS; SUBCUTANEOUS at 06:14

## 2024-06-06 RX ADMIN — INSULIN GLARGINE 45 UNIT(S): 100 INJECTION, SOLUTION SUBCUTANEOUS at 07:43

## 2024-06-06 RX ADMIN — Medication 1000 MILLIGRAM(S): at 22:54

## 2024-06-06 RX ADMIN — GABAPENTIN 100 MILLIGRAM(S): 400 CAPSULE ORAL at 06:13

## 2024-06-06 RX ADMIN — METHADONE HYDROCHLORIDE 5 MILLIGRAM(S): 40 TABLET ORAL at 17:53

## 2024-06-06 RX ADMIN — HYDROMORPHONE HYDROCHLORIDE 0.5 MILLIGRAM(S): 2 INJECTION INTRAMUSCULAR; INTRAVENOUS; SUBCUTANEOUS at 12:56

## 2024-06-06 RX ADMIN — Medication 1000 MILLIGRAM(S): at 22:10

## 2024-06-06 RX ADMIN — HYDROMORPHONE HYDROCHLORIDE 0.5 MILLIGRAM(S): 2 INJECTION INTRAMUSCULAR; INTRAVENOUS; SUBCUTANEOUS at 20:05

## 2024-06-06 RX ADMIN — Medication 1000 MILLIGRAM(S): at 13:41

## 2024-06-06 RX ADMIN — METHADONE HYDROCHLORIDE 5 MILLIGRAM(S): 40 TABLET ORAL at 06:14

## 2024-06-06 RX ADMIN — ATORVASTATIN CALCIUM 80 MILLIGRAM(S): 80 TABLET, FILM COATED ORAL at 22:11

## 2024-06-06 RX ADMIN — Medication 16 UNIT(S): at 17:18

## 2024-06-06 RX ADMIN — HYDROMORPHONE HYDROCHLORIDE 0.5 MILLIGRAM(S): 2 INJECTION INTRAMUSCULAR; INTRAVENOUS; SUBCUTANEOUS at 06:59

## 2024-06-06 RX ADMIN — HYDROMORPHONE HYDROCHLORIDE 8 MILLIGRAM(S): 2 INJECTION INTRAMUSCULAR; INTRAVENOUS; SUBCUTANEOUS at 17:52

## 2024-06-06 RX ADMIN — APIXABAN 5 MILLIGRAM(S): 2.5 TABLET, FILM COATED ORAL at 06:13

## 2024-06-06 RX ADMIN — Medication 9: at 12:09

## 2024-06-06 RX ADMIN — METHOCARBAMOL 750 MILLIGRAM(S): 500 TABLET, FILM COATED ORAL at 17:53

## 2024-06-06 RX ADMIN — HYDROMORPHONE HYDROCHLORIDE 0.5 MILLIGRAM(S): 2 INJECTION INTRAMUSCULAR; INTRAVENOUS; SUBCUTANEOUS at 00:43

## 2024-06-06 NOTE — PROGRESS NOTE ADULT - PROBLEM SELECTOR PLAN 5
- Hx T2DM, on home sliding scale (dose unknown), metformin, and 26U morning Lantus  - continue mod dose admelog corrective scale coverage qac/qhs  - continue admelog 13 units 3x/day before meals and lantus 40 units qam  - Diabetes NP to speak with patient in AM
- C/w home Lipitor
- Hx T2DM, on home sliding scale (dose unknown), metformin, and 26U morning Lantus  - continue mod dose admelog corrective scale coverage qac/qhs  - Fasting glucose remains elevated up to 372 in AM, increase admelog to 16 units 3x/day before meals and lantus 45 units qam  - Diabetes NP to speak with patient in AM
- C/w home Lipitor
- Hx T2DM, on home sliding scale (dose unknown), metformin, and 26U morning Lantus  - continue mod dose admelog corrective scale coverage qac/qhs  - Fasting glucose remains elevated up to 372 in AM, increase admelog to 16 units 3x/day before meals and lantus 45 units qam  - Diabetes NP to speak with patient in AM
- C/w home Lipitor
- C/w home Lipitor
- Hx T2DM, on home sliding scale (dose unknown), metformin, and 26U morning Lantus  - continue mod dose admelog corrective scale coverage qac/qhs  - continue admelog 7 units 3x/day before meals and lantus 21 units qam
- Hx T2DM, on home sliding scale (dose unknown), metformin, and 26U morning Lantus  - continue mod dose admelog corrective scale coverage qac/qhs  - continue admelog 10 units 3x/day before meals and lantus 30 units qam

## 2024-06-06 NOTE — PROGRESS NOTE ADULT - PROBLEM SELECTOR PROBLEM 7
HLD (hyperlipidemia)
Need for prophylactic measure
Need for prophylactic measure
HLD (hyperlipidemia)
Need for prophylactic measure

## 2024-06-06 NOTE — PROGRESS NOTE ADULT - PROBLEM SELECTOR PROBLEM 3
Chronic pain syndrome
DM (diabetes mellitus)
DM (diabetes mellitus)
Chronic pain syndrome
Chronic pain syndrome
DM (diabetes mellitus)
DM (diabetes mellitus)
Chronic pain syndrome
Chronic pain syndrome

## 2024-06-06 NOTE — PROGRESS NOTE ADULT - PROBLEM SELECTOR PROBLEM 1
DM (diabetes mellitus)
Fall

## 2024-06-06 NOTE — PROGRESS NOTE ADULT - PROBLEM SELECTOR PLAN 7
- C/w home Lipitor
#DVT PPX: Continue Eliquis
#DVT PPX: Continue Eliquis
- C/w home Lipitor
- C/w home Lipitor
#DVT PPX: Continue Eliquis
- C/w home Lipitor
- C/w home Lipitor

## 2024-06-06 NOTE — PROGRESS NOTE ADULT - PROBLEM SELECTOR PLAN 4
Chronic  - continue home Eliquis and Coreg
- C/w home Coreg w/hold parameters
Chronic  - continue home Eliquis and Coreg
Chronic  - continue home Eliquis and Coreg
- C/w home Coreg w/hold parameters
- C/w home Coreg w/hold parameters
Chronic  - continue home Eliquis and Coreg
- C/w home Coreg w/hold parameters
Chronic  - continue home Eliquis and Coreg

## 2024-06-06 NOTE — PROGRESS NOTE ADULT - PROBLEM SELECTOR PROBLEM 6
Medication management
HTN (hypertension)
Medication management
Need for prophylactic measure
HTN (hypertension)
Medication management
HTN (hypertension)

## 2024-06-06 NOTE — PROGRESS NOTE ADULT - PROBLEM SELECTOR PLAN 6
- C/w home Coreg w/hold parameters
- Patient does not have explicit medication list, however does remember most, which can be corroborated with surescripts and I-stop  - Patient's girlfriend to visit in the morning and bring in definitive list
- C/w home Coreg w/hold parameters
- C/w home Coreg w/hold parameters
- Patient does not have explicit medication list, however does remember most, which can be corroborated with surescripts and I-stop  - Pharmacy updated the med list
- Patient does not have explicit medication list, however does remember most, which can be corroborated with surescripts and I-stop  - Pharmacy updated the med list
#DVT PPX: Continue Eliquis
- C/w home Coreg w/hold parameters
- C/w home Coreg w/hold parameters

## 2024-06-06 NOTE — PROGRESS NOTE ADULT - PROBLEM SELECTOR PROBLEM 2
S/P cervical spinal fusion

## 2024-06-06 NOTE — PROGRESS NOTE ADULT - PROBLEM SELECTOR PROBLEM 4
Atrial fibrillation
Atrial fibrillation
HTN (hypertension)
Atrial fibrillation
Atrial fibrillation
HTN (hypertension)
Atrial fibrillation
HTN (hypertension)
HTN (hypertension)

## 2024-06-06 NOTE — PROGRESS NOTE ADULT - PROBLEM SELECTOR PROBLEM 5
HLD (hyperlipidemia)
DM (diabetes mellitus)
DM (diabetes mellitus)
HLD (hyperlipidemia)
HLD (hyperlipidemia)
DM (diabetes mellitus)
HLD (hyperlipidemia)
DM (diabetes mellitus)
DM (diabetes mellitus)

## 2024-06-06 NOTE — PROGRESS NOTE ADULT - PROBLEM SELECTOR PLAN 1
increase lantus 50 units qam  change high dose admelog corrective scale coverage qac/qhs  cont admelog 16 units 3x/day before meals  cont cons cho diet  goal bg 100-180 in hosp setting

## 2024-06-06 NOTE — PROGRESS NOTE ADULT - PROBLEM SELECTOR PLAN 8
#DVT PPX: Continue Eliquis
#DVT PPX: Continue Eliquis    Dispo: pending better glucose control and pain control
#DVT PPX: Continue Eliquis    Dispo: pending better glucose control and pain control
#DVT PPX: Continue Eliquis
#DVT PPX: Continue Eliquis

## 2024-06-06 NOTE — PHARMACOTHERAPY INTERVENTION NOTE - COMMENTS
As identified in expiring controlled substances report, patient prescribed clonazepam 1mg QHS and Lyrica 150mg TID. Order auto-discontinued after 7 days last night. Discussed with Dr. Moody and recommended re-ordering as clinically appropriate. Order re-entered per discussion.  As identified in expiring controlled substances report, patient prescribed clonazepam 1mg QHS and Lyrica 150mg TID. Order auto-discontinued after 7 days last night. Discussed with Dr. Moody and recommended re-ordering as clinically appropriate. MD aware and will reorder. As identified in expiring controlled substances report, patient prescribed clonazepam 1mg QHS and Lyrica 150mg TID. Order auto-discontinued after 7 days last night. Discussed with Dr. Moody and recommended re-ordering as clinically appropriate. MD aware and will reorder.    Patient also on gabapentin 100mg q8hrs and Lyrica 150mg TID for neuropathy. Discussed w/ MD duplication of therapy (PAPITO analogs) and increased risk of adverse effects (CNS/respiratory depression) and recommended discontinuing gabapentin. MD agreed and order discontinued.

## 2024-06-06 NOTE — PROGRESS NOTE ADULT - PROBLEM SELECTOR PLAN 1
- S/p mechanical fall with +HS PTA, now with severe neck, upper back pain  - CT Head No Cont: No evidence acute hemorrhage, mass, or mass effect.  - CT C/S No Cont: Postoperative changes and degenerative changes C2-C7. No changes, postop hardware adequately placed. No acute fracture.  - Rib XR - No acute radiographic findings in particular no fracture or pneumothorax. Denies chest pain, shortness of breath  - Patient on multiple controlled substance via outpt pain management  - C/w home tizanidine, Lyrica  - Oral opiate regimen as ordered   MRI lumbar spine outpatient on 5/24/24. F/u MRI thoracic spine- No evidence of thoracic spine fracture, traumatic malalignment or suspicious osseous lesion. Minor thoracic spine degenerative changes. No clinically significant spinal canal stenosis or neural foraminal narrowing thoracic spine. No thoracic cord compression, cord edema or other cord signal abnormality  - Discussed with Physiatry (Dr. Baird) on 6/4 - He does not recommend increasing PRN IV Dilaudid. Recommend restarting home methadone 5mg QID to see if it helps improve patient's pain. Patient states he follows up with Dr. Pham (pain management) as outpatient who provides the methadone, but states he would like a recommendation for a new pain management doctor.  - Trial Methocarbamol 750 BID given reported persistent pain, uptitrate as needed   - PT Consult - outpatient OT

## 2024-06-07 ENCOUNTER — TRANSCRIPTION ENCOUNTER (OUTPATIENT)
Age: 59
End: 2024-06-07

## 2024-06-07 VITALS
HEART RATE: 76 BPM | TEMPERATURE: 98 F | OXYGEN SATURATION: 93 % | SYSTOLIC BLOOD PRESSURE: 119 MMHG | DIASTOLIC BLOOD PRESSURE: 80 MMHG | RESPIRATION RATE: 18 BRPM

## 2024-06-07 LAB
ALBUMIN SERPL ELPH-MCNC: 3.1 G/DL — LOW (ref 3.3–5)
ALP SERPL-CCNC: 88 U/L — SIGNIFICANT CHANGE UP (ref 40–120)
ALT FLD-CCNC: 42 U/L — SIGNIFICANT CHANGE UP (ref 12–78)
ANION GAP SERPL CALC-SCNC: 5 MMOL/L — SIGNIFICANT CHANGE UP (ref 5–17)
AST SERPL-CCNC: 14 U/L — LOW (ref 15–37)
BILIRUB SERPL-MCNC: 0.3 MG/DL — SIGNIFICANT CHANGE UP (ref 0.2–1.2)
BUN SERPL-MCNC: 15 MG/DL — SIGNIFICANT CHANGE UP (ref 7–23)
CALCIUM SERPL-MCNC: 9.4 MG/DL — SIGNIFICANT CHANGE UP (ref 8.5–10.1)
CHLORIDE SERPL-SCNC: 103 MMOL/L — SIGNIFICANT CHANGE UP (ref 96–108)
CO2 SERPL-SCNC: 32 MMOL/L — HIGH (ref 22–31)
CREAT SERPL-MCNC: 0.71 MG/DL — SIGNIFICANT CHANGE UP (ref 0.5–1.3)
EGFR: 108 ML/MIN/1.73M2 — SIGNIFICANT CHANGE UP
GLUCOSE SERPL-MCNC: 226 MG/DL — HIGH (ref 70–99)
HCT VFR BLD CALC: 38.8 % — LOW (ref 39–50)
HGB BLD-MCNC: 12.4 G/DL — LOW (ref 13–17)
MCHC RBC-ENTMCNC: 27.7 PG — SIGNIFICANT CHANGE UP (ref 27–34)
MCHC RBC-ENTMCNC: 32 GM/DL — SIGNIFICANT CHANGE UP (ref 32–36)
MCV RBC AUTO: 86.8 FL — SIGNIFICANT CHANGE UP (ref 80–100)
NRBC # BLD: 0 /100 WBCS — SIGNIFICANT CHANGE UP (ref 0–0)
PLATELET # BLD AUTO: 214 K/UL — SIGNIFICANT CHANGE UP (ref 150–400)
POTASSIUM SERPL-MCNC: 4 MMOL/L — SIGNIFICANT CHANGE UP (ref 3.5–5.3)
POTASSIUM SERPL-SCNC: 4 MMOL/L — SIGNIFICANT CHANGE UP (ref 3.5–5.3)
PROT SERPL-MCNC: 6.3 G/DL — SIGNIFICANT CHANGE UP (ref 6–8.3)
RBC # BLD: 4.47 M/UL — SIGNIFICANT CHANGE UP (ref 4.2–5.8)
RBC # FLD: 14.7 % — HIGH (ref 10.3–14.5)
SODIUM SERPL-SCNC: 140 MMOL/L — SIGNIFICANT CHANGE UP (ref 135–145)
WBC # BLD: 6.16 K/UL — SIGNIFICANT CHANGE UP (ref 3.8–10.5)
WBC # FLD AUTO: 6.16 K/UL — SIGNIFICANT CHANGE UP (ref 3.8–10.5)

## 2024-06-07 PROCEDURE — 99285 EMERGENCY DEPT VISIT HI MDM: CPT

## 2024-06-07 PROCEDURE — 97162 PT EVAL MOD COMPLEX 30 MIN: CPT

## 2024-06-07 PROCEDURE — 80061 LIPID PANEL: CPT

## 2024-06-07 PROCEDURE — 80053 COMPREHEN METABOLIC PANEL: CPT

## 2024-06-07 PROCEDURE — 70450 CT HEAD/BRAIN W/O DYE: CPT | Mod: MC

## 2024-06-07 PROCEDURE — 85027 COMPLETE CBC AUTOMATED: CPT

## 2024-06-07 PROCEDURE — 82962 GLUCOSE BLOOD TEST: CPT

## 2024-06-07 PROCEDURE — 71100 X-RAY EXAM RIBS UNI 2 VIEWS: CPT

## 2024-06-07 PROCEDURE — 90715 TDAP VACCINE 7 YRS/> IM: CPT

## 2024-06-07 PROCEDURE — 96375 TX/PRO/DX INJ NEW DRUG ADDON: CPT

## 2024-06-07 PROCEDURE — 97165 OT EVAL LOW COMPLEX 30 MIN: CPT

## 2024-06-07 PROCEDURE — 36415 COLL VENOUS BLD VENIPUNCTURE: CPT

## 2024-06-07 PROCEDURE — 72125 CT NECK SPINE W/O DYE: CPT | Mod: MC

## 2024-06-07 PROCEDURE — 80048 BASIC METABOLIC PNL TOTAL CA: CPT

## 2024-06-07 PROCEDURE — 90471 IMMUNIZATION ADMIN: CPT

## 2024-06-07 PROCEDURE — 96374 THER/PROPH/DIAG INJ IV PUSH: CPT

## 2024-06-07 PROCEDURE — 96376 TX/PRO/DX INJ SAME DRUG ADON: CPT

## 2024-06-07 PROCEDURE — 99239 HOSP IP/OBS DSCHRG MGMT >30: CPT

## 2024-06-07 PROCEDURE — 83036 HEMOGLOBIN GLYCOSYLATED A1C: CPT

## 2024-06-07 PROCEDURE — 72146 MRI CHEST SPINE W/O DYE: CPT | Mod: MC

## 2024-06-07 RX ORDER — HYDROMORPHONE HYDROCHLORIDE 2 MG/ML
1 INJECTION INTRAMUSCULAR; INTRAVENOUS; SUBCUTANEOUS
Refills: 0 | DISCHARGE

## 2024-06-07 RX ORDER — SENNA PLUS 8.6 MG/1
2 TABLET ORAL
Qty: 0 | Refills: 0 | DISCHARGE
Start: 2024-06-07

## 2024-06-07 RX ORDER — TIZANIDINE 4 MG/1
1 TABLET ORAL
Qty: 0 | Refills: 0 | DISCHARGE
Start: 2024-06-07

## 2024-06-07 RX ORDER — TRAZODONE HCL 50 MG
1 TABLET ORAL
Refills: 0 | DISCHARGE

## 2024-06-07 RX ORDER — METHADONE HYDROCHLORIDE 40 MG/1
1 TABLET ORAL
Refills: 0 | DISCHARGE

## 2024-06-07 RX ORDER — MORPHINE SULFATE 50 MG/1
1 CAPSULE, EXTENDED RELEASE ORAL
Refills: 0 | DISCHARGE

## 2024-06-07 RX ORDER — TADALAFIL 10 MG/1
1 TABLET, FILM COATED ORAL
Refills: 0 | DISCHARGE

## 2024-06-07 RX ORDER — SERTRALINE 25 MG/1
2 TABLET, FILM COATED ORAL
Refills: 0 | DISCHARGE

## 2024-06-07 RX ORDER — PANTOPRAZOLE SODIUM 20 MG/1
1 TABLET, DELAYED RELEASE ORAL
Refills: 0 | DISCHARGE

## 2024-06-07 RX ORDER — ACETAMINOPHEN 500 MG
2 TABLET ORAL
Qty: 0 | Refills: 0 | DISCHARGE
Start: 2024-06-07

## 2024-06-07 RX ORDER — METHADONE HYDROCHLORIDE 40 MG/1
1 TABLET ORAL
Qty: 0 | Refills: 0 | DISCHARGE
Start: 2024-06-07

## 2024-06-07 RX ORDER — LIDOCAINE 4 G/100G
1 CREAM TOPICAL
Qty: 2 | Refills: 0
Start: 2024-06-07 | End: 2024-06-16

## 2024-06-07 RX ORDER — ATORVASTATIN CALCIUM 80 MG/1
1 TABLET, FILM COATED ORAL
Qty: 0 | Refills: 0 | DISCHARGE
Start: 2024-06-07

## 2024-06-07 RX ORDER — TRAZODONE HCL 50 MG
1 TABLET ORAL
Qty: 0 | Refills: 0 | DISCHARGE
Start: 2024-06-07

## 2024-06-07 RX ORDER — HYDROMORPHONE HYDROCHLORIDE 2 MG/ML
1 INJECTION INTRAMUSCULAR; INTRAVENOUS; SUBCUTANEOUS
Qty: 28 | Refills: 0
Start: 2024-06-07 | End: 2024-06-13

## 2024-06-07 RX ORDER — CARVEDILOL PHOSPHATE 80 MG/1
1 CAPSULE, EXTENDED RELEASE ORAL
Refills: 0 | DISCHARGE

## 2024-06-07 RX ORDER — CARVEDILOL PHOSPHATE 80 MG/1
1 CAPSULE, EXTENDED RELEASE ORAL
Qty: 0 | Refills: 0 | DISCHARGE
Start: 2024-06-07

## 2024-06-07 RX ORDER — POLYETHYLENE GLYCOL 3350 17 G/17G
17 POWDER, FOR SOLUTION ORAL
Qty: 0 | Refills: 0 | DISCHARGE
Start: 2024-06-07

## 2024-06-07 RX ORDER — APIXABAN 2.5 MG/1
1 TABLET, FILM COATED ORAL
Qty: 0 | Refills: 0 | DISCHARGE
Start: 2024-06-07

## 2024-06-07 RX ORDER — METHOCARBAMOL 500 MG/1
1 TABLET, FILM COATED ORAL
Qty: 14 | Refills: 0
Start: 2024-06-07 | End: 2024-06-13

## 2024-06-07 RX ORDER — TIZANIDINE 4 MG/1
1 TABLET ORAL
Refills: 0 | DISCHARGE

## 2024-06-07 RX ORDER — CLONAZEPAM 1 MG
1 TABLET ORAL
Qty: 0 | Refills: 0 | DISCHARGE
Start: 2024-06-07

## 2024-06-07 RX ORDER — SERTRALINE 25 MG/1
2 TABLET, FILM COATED ORAL
Qty: 0 | Refills: 0 | DISCHARGE
Start: 2024-06-07

## 2024-06-07 RX ORDER — PANTOPRAZOLE SODIUM 20 MG/1
1 TABLET, DELAYED RELEASE ORAL
Qty: 0 | Refills: 0 | DISCHARGE
Start: 2024-06-07

## 2024-06-07 RX ORDER — CLONAZEPAM 1 MG
0 TABLET ORAL
Refills: 0 | DISCHARGE

## 2024-06-07 RX ORDER — INSULIN GLARGINE 100 [IU]/ML
50 INJECTION, SOLUTION SUBCUTANEOUS
Qty: 0 | Refills: 0 | DISCHARGE

## 2024-06-07 RX ORDER — NALOXONE HYDROCHLORIDE 4 MG/.1ML
1 SPRAY NASAL
Qty: 1 | Refills: 0
Start: 2024-06-07

## 2024-06-07 RX ADMIN — Medication 3: at 12:05

## 2024-06-07 RX ADMIN — HYDROMORPHONE HYDROCHLORIDE 0.5 MILLIGRAM(S): 2 INJECTION INTRAMUSCULAR; INTRAVENOUS; SUBCUTANEOUS at 09:41

## 2024-06-07 RX ADMIN — HYDROMORPHONE HYDROCHLORIDE 0.5 MILLIGRAM(S): 2 INJECTION INTRAMUSCULAR; INTRAVENOUS; SUBCUTANEOUS at 03:01

## 2024-06-07 RX ADMIN — HYDROMORPHONE HYDROCHLORIDE 8 MILLIGRAM(S): 2 INJECTION INTRAMUSCULAR; INTRAVENOUS; SUBCUTANEOUS at 11:26

## 2024-06-07 RX ADMIN — PANTOPRAZOLE SODIUM 40 MILLIGRAM(S): 20 TABLET, DELAYED RELEASE ORAL at 05:51

## 2024-06-07 RX ADMIN — HYDROMORPHONE HYDROCHLORIDE 8 MILLIGRAM(S): 2 INJECTION INTRAMUSCULAR; INTRAVENOUS; SUBCUTANEOUS at 06:35

## 2024-06-07 RX ADMIN — METHADONE HYDROCHLORIDE 5 MILLIGRAM(S): 40 TABLET ORAL at 05:52

## 2024-06-07 RX ADMIN — APIXABAN 5 MILLIGRAM(S): 2.5 TABLET, FILM COATED ORAL at 05:52

## 2024-06-07 RX ADMIN — LIDOCAINE 1 PATCH: 4 CREAM TOPICAL at 01:44

## 2024-06-07 RX ADMIN — Medication 1000 MILLIGRAM(S): at 06:35

## 2024-06-07 RX ADMIN — Medication 1000 MILLIGRAM(S): at 13:42

## 2024-06-07 RX ADMIN — INSULIN GLARGINE 50 UNIT(S): 100 INJECTION, SOLUTION SUBCUTANEOUS at 07:58

## 2024-06-07 RX ADMIN — HYDROMORPHONE HYDROCHLORIDE 0.5 MILLIGRAM(S): 2 INJECTION INTRAMUSCULAR; INTRAVENOUS; SUBCUTANEOUS at 10:15

## 2024-06-07 RX ADMIN — Medication 16 UNIT(S): at 12:04

## 2024-06-07 RX ADMIN — HYDROMORPHONE HYDROCHLORIDE 8 MILLIGRAM(S): 2 INJECTION INTRAMUSCULAR; INTRAVENOUS; SUBCUTANEOUS at 05:51

## 2024-06-07 RX ADMIN — Medication 150 MILLIGRAM(S): at 05:51

## 2024-06-07 RX ADMIN — METHOCARBAMOL 750 MILLIGRAM(S): 500 TABLET, FILM COATED ORAL at 05:51

## 2024-06-07 RX ADMIN — Medication 6: at 07:57

## 2024-06-07 RX ADMIN — Medication 1000 MILLIGRAM(S): at 05:51

## 2024-06-07 RX ADMIN — Medication 150 MILLIGRAM(S): at 13:42

## 2024-06-07 RX ADMIN — METHADONE HYDROCHLORIDE 5 MILLIGRAM(S): 40 TABLET ORAL at 01:41

## 2024-06-07 RX ADMIN — HYDROMORPHONE HYDROCHLORIDE 0.5 MILLIGRAM(S): 2 INJECTION INTRAMUSCULAR; INTRAVENOUS; SUBCUTANEOUS at 03:45

## 2024-06-07 RX ADMIN — SERTRALINE 200 MILLIGRAM(S): 25 TABLET, FILM COATED ORAL at 11:26

## 2024-06-07 RX ADMIN — Medication 16 UNIT(S): at 07:56

## 2024-06-07 RX ADMIN — METHADONE HYDROCHLORIDE 5 MILLIGRAM(S): 40 TABLET ORAL at 11:26

## 2024-06-07 RX ADMIN — LIDOCAINE 1 PATCH: 4 CREAM TOPICAL at 11:26

## 2024-06-07 NOTE — PROGRESS NOTE ADULT - PROVIDER SPECIALTY LIST ADULT
Endocrinology
Physiatry
Physiatry
Endocrinology
Physiatry
Endocrinology
Endocrinology
Hospitalist

## 2024-06-07 NOTE — DISCHARGE NOTE NURSING/CASE MANAGEMENT/SOCIAL WORK - PATIENT PORTAL LINK FT
You can access the FollowMyHealth Patient Portal offered by [facility] by registering at the following website: [email or web address]. By joining [employer]’s FollowMyHealth portal, you will also be able to view your health information using other applications (apps) compatible with our system.

## 2024-06-07 NOTE — PROGRESS NOTE ADULT - ASSESSMENT
A/P 58y year old Male with low back pain, CRPS of the LUE, cervicalgia, lumbar and cervical spondylosis    Patient should follow outpatient, make an appointment to follow up with Dr Mock, or with other program with pain mgmt -- [facility]. also provided info for [facility] again -- as was done previously. Patient took notes on his phone.    Discussed with Dr De Leon outpatient regimen for discharge.    Discontinue IV medications, maintain oral dilaudid 8mg PO for 5-7day course -- follow up with his established pain mgmt.    Encouraged icing and modality use to mitigate need for opioids.    Discussed with primary team, nursing staff.  Primary team notes are reviewed  Discussed management/coordinated care with primary team/referring provider.  High risk of morbidity from treatment with: schedule two narcotic pain medications in excess of 60 MME daily    35 minutes spent during patient encounter:    ¦ preparing to see the patient   ¦ performing a medically appropriate examination and/or evaluation   ¦ referring and communicating with other health care professionals  ¦ documenting clinical information in the electronic or other health record     
57yo M PMH DM, HTN, HLD, CRPS, S/p C/S Fusion, Afib on Eliquis, chronic pain syndrome (hx of multiple neck surgeries (C2-C7 fusion) on dilaudid and methadone), presents with acute on chronic neck pain s/p mechanical fall w/head trauma 2 days ago. admitted for pain control.
A/P 58y year old Male with low back pain, CRPS of the LUE, cervicalgia, lumbar and cervical spondylosis    Patient with no nonverbal displays of pain -- was comfortably sitting in bed eating a meal and watching TV today prior to my entering the room, pain reported seems out of proportion to exam and observed behavior  Maintain regimen dilaudid IV 0.5mg prn, 8mg dilaudid PO Q6H  Eventually goal is to discontinue IV entirely, wean down dilaudid PO to home dose  Discussed with primary team, nursing staff.  Primary team notes are reviewed  Discussed management/coordinated care with primary team/referring provider.  High risk of morbidity from treatment with: schedule two narcotic pain medications in excess of 60 MME daily    35 minutes spent during patient encounter:    ¦ preparing to see the patient   ¦ performing a medically appropriate examination and/or evaluation   ¦ counseling and educating the patient/family/caregiver regarding risks of additional analgesic -- patient on more than double his home dosing  ¦ referring and communicating with other health care professionals  ¦ documenting clinical information in the electronic or other health record   
A/P 58y year old Male with low back pain, CRPS of the LUE, cervicalgia, lumbar and cervical spondylosis    Primary team considering MRI T/L spine, will discuss.  Maintain regimen dilaudid IV 0.5mg prn, 8mg dilaudid PO Q6H  Eventually goal is to discontinue IV entirely, wean down dilaudid PO to home dose    Discussed with primary team, nursing staff.  Primary team notes are reviewed  Discussed management/coordinated care with primary team/referring provider.  High risk of morbidity from treatment with: schedule two narcotic pain medications in excess of 60 MME daily    25 minutes spent during patient encounter:    ¦ preparing to see the patient   ¦ performing a medically appropriate examination and/or evaluation   ¦ referring and communicating with other health care professionals  ¦ documenting clinical information in the electronic or other health record       
A/P 58y year old Male with low back pain, CRPS of the LUE, cervicalgia, lumbar and cervical spondylosis    Maintain regimen dilaudid IV 0.5mg prn, 8mg dilaudid PO Q6H    Discussed with primary team, nursing staff.  Primary team notes are reviewed  Discussed management/coordinated care with primary team/referring provider.  High risk of morbidity from treatment with: schedule two narcotic pain medications in excess of 60 MME daily    25 minutes spent during patient encounter:    ¦ preparing to see the patient   ¦ performing a medically appropriate examination and/or evaluation   ¦ referring and communicating with other health care professionals  ¦ documenting clinical information in the electronic or other health record 
A/P 58y year old Male with low back pain, CRPS of the LUE, cervicalgia, lumbar and cervical spondylosis    Revision of regimen to dilaudid IV 0.5mg prn, 8mg dilaudid PO Q6H  Discussed with primary team, nursing staff.  Primary team notes are reviewed  Discussed management/coordinated care with primary team/referring provider.  High risk of morbidity from treatment with: schedule two narcotic pain medications in excess of 60 MME daily    35 minutes spent during patient encounter:    ¦ preparing to see the patient   ¦ performing a medically appropriate examination and/or evaluation   ¦ counseling and educating the patient/family/caregiver regarding strategies for additional pain control while maintaining more PO dosing rather than IV  ¦ ordering medications, tests, or procedures   ¦ referring and communicating with other health care professionals  ¦ documenting clinical information in the electronic or other health record 
57yo M PMH DM, HTN, HLD, CRPS, S/p C/S Fusion, on Eliquis, presents with acute on chronic neck pain s/p mechanical fall w/head trauma 2 days ago. admitted for pain control.
A/P 58y year old Male with low back pain, CRPS of the LUE, cervicalgia, lumbar and cervical spondylosis    Discussed outpatient procedures he could consider -- ablation, epidurals, stimulators, botox, eg.  Refer to outpatient Dr Mock, other options also discussed with patient  Maintain regimen dilaudid IV 0.5mg prn, 8mg dilaudid PO Q6H  Eventually goal is to discontinue IV entirely, wean down dilaudid PO to home dose    Discussed with primary team, nursing staff.  Primary team notes are reviewed  Discussed management/coordinated care with primary team/referring provider.  High risk of morbidity from treatment with: schedule two narcotic pain medications in excess of 60 MME daily    35 minutes spent during patient encounter:    ¦ preparing to see the patient   ¦ performing a medically appropriate examination and/or evaluation   ¦ referring and communicating with other health care professionals  ¦ documenting clinical information in the electronic or other health record   
A/P 58y year old Male with low back pain, CRPS of the LUE, cervicalgia, lumbar and cervical spondylosis    MRI discussed with patient, overall results  Discussed with Dr Pizarro regarding methadone resumption  Discussed with Dr Pizarro regarding ordering of MRI previously    Patient should follow outpatient, make an appointment to follow up with Dr Mock, or [facility]. also provided info for [facility].    Maintain regimen dilaudid IV 0.5mg prn, 8mg dilaudid PO Q6H  Eventually goal is to discontinue IV entirely, wean down dilaudid PO to home dose    Discussed with primary team, nursing staff.  Primary team notes are reviewed  Discussed management/coordinated care with primary team/referring provider.  High risk of morbidity from treatment with: schedule two narcotic pain medications in excess of 60 MME daily    55 minutes spent during patient encounter:    ¦ preparing to see the patient   ¦ performing a medically appropriate examination and/or evaluation   ¦ referring and communicating with other health care professionals  ¦ documenting clinical information in the electronic or other health record   
57yo M PMH DM, HTN, HLD, CRPS, S/p C/S Fusion, on Eliquis, presents with acute on chronic neck pain s/p mechanical fall w/head trauma 2 days ago. admitted for pain control.
59yo M PMH DM, HTN, HLD, CRPS, S/p C/S Fusion, Afib on Eliquis, chronic pain syndrome (hx of multiple neck surgeries (C2-C7 fusion) on dilaudid and methadone), presents with acute on chronic neck pain s/p mechanical fall w/head trauma 2 days ago. admitted for pain control.
59yo M PMH DM, HTN, HLD, CRPS, S/p C/S Fusion, Afib on Eliquis, chronic pain syndrome (hx of multiple neck surgeries (C2-C7 fusion) on dilaudid and methadone), presents with acute on chronic neck pain s/p mechanical fall w/head trauma 2 days ago. admitted for pain control.
57yo M PMH DM, HTN, HLD, CRPS, S/p C/S Fusion, on Eliquis, presents with acute on chronic neck pain s/p mechanical fall w/head trauma 2 days ago. admitted for pain control.
59yo M PMH DM, HTN, HLD, CRPS, S/p C/S Fusion, on Eliquis, presents with acute on chronic neck pain s/p mechanical fall w/head trauma 2 days ago. admitted for pain control.
59yo M PMH DM, HTN, HLD, CRPS, S/p C/S Fusion, Afib on Eliquis, chronic pain syndrome (hx of multiple neck surgeries (C2-C7 fusion) on dilaudid and methadone), presents with acute on chronic neck pain s/p mechanical fall w/head trauma 2 days ago. admitted for pain control.
59yo M PMH DM, HTN, HLD, CRPS, S/p C/S Fusion, Afib on Eliquis, chronic pain syndrome (hx of multiple neck surgeries (C2-C7 fusion) on dilaudid and methadone), presents with acute on chronic neck pain s/p mechanical fall w/head trauma 2 days ago. admitted for pain control.

## 2024-06-07 NOTE — PROGRESS NOTE ADULT - REASON FOR ADMISSION
head & neck pain s/p fall

## 2024-06-07 NOTE — PROGRESS NOTE ADULT - SUBJECTIVE AND OBJECTIVE BOX
Physical Medicine and Rehabilitation Subsequent Evaluation    Seen in followup for pain mgmt    NAD, no new complaints today.    ROS:  Constitutional: Denies fevers or chills  MSK: LUE pain, neck pain, low back pain stable today    Medications: reviewed and revised    Physical Exam:   Vitals: reviewed    Constitutional: Gen: In no acute distress, cooperative with exam and questioning   Neuro: LUE with paresthesiae and impaired sensation with palpation along dermatomes -- does not seem to fit a consistent dermatomal or peripheral nerve distribution pattern  MSK: ttp and hypertonicity to the paraspinal musculature from c spine to L spine. negative hoffmans, positive facet loading, negative SLR  Psychiatric: Awake alert fully oriented
Physical Medicine and Rehabilitation Subsequent Evaluation    Seen in followup for pain mgmt    Patient seen at bedside, complains of inadequately managed pain, requesting additional IV dilaudid  Results of thoracic spine MRI reviewed with patient -- no acute pathology exists to suggest patients need for medication beyond current dosing    MRI reviewed:  No evidence of thoracic spine fracture, traumatic malalignment or   suspicious osseous lesion.  Minor thoracic spine degenerative changes.  No clinically significant spinal canal stenosis or neural foraminal   narrowing thoracic spine.  No thoracic cord compression, cord edema or other cord signal abnormality    ROS:  Constitutional: Denies fevers or chills  MSK: LUE pain, neck pain, low back pain noted to be persistent     Medications: reviewed and revised    Physical Exam:   Vitals: reviewed    Constitutional: Gen: In no acute distress, cooperative with exam and questioning   Neuro: LUE with paresthesiae and impaired sensation with palpation along dermatomes -- does not seem to fit a consistent dermatomal or peripheral nerve distribution pattern  MSK: ttp and hypertonicity to the paraspinal musculature from c spine to L spine. negative hoffmans, positive facet loading, negative SLR  Psychiatric: Awake alert fully oriented    
CAPILLARY BLOOD GLUCOSE      POCT Blood Glucose.: 209 mg/dL (31 May 2024 07:32)  POCT Blood Glucose.: 208 mg/dL (30 May 2024 22:16)  POCT Blood Glucose.: 163 mg/dL (30 May 2024 21:16)  POCT Blood Glucose.: 224 mg/dL (30 May 2024 16:43)  POCT Blood Glucose.: 233 mg/dL (30 May 2024 11:55)      Vital Signs Last 24 Hrs  T(C): 36.6 (31 May 2024 05:04), Max: 36.6 (31 May 2024 05:04)  T(F): 97.8 (31 May 2024 05:04), Max: 97.8 (31 May 2024 05:04)  HR: 67 (31 May 2024 05:04) (65 - 67)  BP: 115/81 (31 May 2024 05:04) (115/81 - 148/90)  BP(mean): --  RR: 18 (31 May 2024 05:04) (18 - 18)  SpO2: 96% (31 May 2024 05:04) (93% - 96%)    Parameters below as of 31 May 2024 05:04  Patient On (Oxygen Delivery Method): room air        Respiratory: CTA B/L  CV: RRR, S1S2, no murmurs, rubs or gallops  Abdominal: Soft, NT, ND +BS, Last BM  Extremities: No edema, + peripheral pulses     05-30    140  |  105  |  10  ----------------------------<  258<H>  4.0   |  32<H>  |  0.75    Ca    9.2      30 May 2024 08:18    TPro  6.6  /  Alb  3.0<L>  /  TBili  0.3  /  DBili  x   /  AST  6<L>  /  ALT  32  /  AlkPhos  84  05-30      atorvastatin 80 milliGRAM(s) Oral at bedtime  dextrose 50% Injectable 25 Gram(s) IV Push once  dextrose 50% Injectable 12.5 Gram(s) IV Push once  dextrose Oral Gel 15 Gram(s) Oral once PRN  glucagon  Injectable 1 milliGRAM(s) IntraMuscular once  insulin glargine Injectable (LANTUS) 21 Unit(s) SubCutaneous every morning  insulin lispro (ADMELOG) corrective regimen sliding scale   SubCutaneous at bedtime  insulin lispro (ADMELOG) corrective regimen sliding scale   SubCutaneous three times a day before meals  insulin lispro Injectable (ADMELOG) 5 Unit(s) SubCutaneous three times a day before meals  
Physical Medicine and Rehabilitation Subsequent Evaluation    Seen in followup for pain mgmt    Patient seen at bedside, complains of inadequately managed pain  Requesting MRI  Advised that we will discuss with primary team.    ROS:  Constitutional: Denies fevers or chills  MSK: LUE pain, neck pain, low back pain noted to be persistent     Medications: reviewed and revised    Physical Exam:   Vitals: reviewed    Constitutional: Gen: In no acute distress, cooperative with exam and questioning   Neuro: LUE with paresthesiae and impaired sensation with palpation along dermatomes -- does not seem to fit a consistent dermatomal or peripheral nerve distribution pattern  MSK: ttp and hypertonicity to the paraspinal musculature from c spine to L spine. negative hoffmans, positive facet loading, negative SLR  Psychiatric: Awake alert fully oriented  
Physical Medicine and Rehabilitation Subsequent Evaluation    Seen in followup for pain mgmt    Patient seen at bedside, complains of inadequately managed pain, requesting additional IV dilaudid  Discussed his morphine equivalents currently vs at home, discussed risks of increasing and feasibility of discharging patient with the same regimen -- he notes Dr contreras his pain mgmt does not want to increase his oral medications   Discussed other options including ablations, epidurals, stimulators that he could consider outpatient    ROS:  Constitutional: Denies fevers or chills  MSK: LUE pain, neck pain, low back pain noted to be persistent     Medications: reviewed and revised    Physical Exam:   Vitals: reviewed    Constitutional: Gen: In no acute distress, cooperative with exam and questioning   Neuro: LUE with paresthesiae and impaired sensation with palpation along dermatomes -- does not seem to fit a consistent dermatomal or peripheral nerve distribution pattern  MSK: ttp and hypertonicity to the paraspinal musculature from c spine to L spine. negative hoffmans, positive facet loading, negative SLR  Psychiatric: Awake alert fully oriented  
CAPILLARY BLOOD GLUCOSE      POCT Blood Glucose.: 296 mg/dL (06 Jun 2024 07:24)  POCT Blood Glucose.: 191 mg/dL (05 Jun 2024 22:00)  POCT Blood Glucose.: 243 mg/dL (05 Jun 2024 16:57)  POCT Blood Glucose.: 246 mg/dL (05 Jun 2024 11:32)      Vital Signs Last 24 Hrs  T(C): 36.6 (06 Jun 2024 06:15), Max: 36.6 (05 Jun 2024 13:04)  T(F): 97.8 (06 Jun 2024 06:15), Max: 97.9 (05 Jun 2024 13:04)  HR: 67 (06 Jun 2024 06:15) (67 - 78)  BP: 107/67 (06 Jun 2024 06:15) (100/62 - 123/83)  BP(mean): --  RR: 18 (06 Jun 2024 06:15) (18 - 19)  SpO2: 94% (06 Jun 2024 06:15) (92% - 94%)    Parameters below as of 06 Jun 2024 06:15  Patient On (Oxygen Delivery Method): room air        General: WN/WD NAD  Respiratory: CTA B/L  CV: RRR, S1S2, no murmurs, rubs or gallops  Abdominal: Soft, NT, ND +BS, Last BM  Extremities: No edema, + peripheral pulses     06-05    138  |  103  |  14  ----------------------------<  379<H>  4.1   |  30  |  0.80    Ca    9.0      05 Jun 2024 07:44        atorvastatin 80 milliGRAM(s) Oral at bedtime  dextrose 50% Injectable 25 Gram(s) IV Push once  dextrose 50% Injectable 12.5 Gram(s) IV Push once  dextrose Oral Gel 15 Gram(s) Oral once PRN  glucagon  Injectable 1 milliGRAM(s) IntraMuscular once  insulin glargine Injectable (LANTUS) 45 Unit(s) SubCutaneous every morning  insulin lispro (ADMELOG) corrective regimen sliding scale   SubCutaneous at bedtime  insulin lispro (ADMELOG) corrective regimen sliding scale   SubCutaneous three times a day before meals  insulin lispro Injectable (ADMELOG) 16 Unit(s) SubCutaneous three times a day before meals  
Physical Medicine and Rehabilitation Subsequent Evaluation    Seen in followup for pain mgmt    Patient continues to request additional pain medication/dilaudid. Collateral hx obtained from nursing staff, patient appearing comfortable, watching tv, having meals, using his phone.    Discussed with Dr De Leon regarding patients outpatient pain regimen.    ROS:  Constitutional: Denies fevers or chills  MSK: LUE pain, neck pain, low back pain still persistent.    Medications: reviewed and revised    Physical Exam:   Vitals: reviewed    Constitutional: Gen: In no acute distress, cooperative with exam and questioning   Neuro: LUE with paresthesiae and impaired sensation with palpation along dermatomes -- does not seem to fit a consistent dermatomal or peripheral nerve distribution pattern  MSK: ttp and hypertonicity to the paraspinal musculature from c spine to L spine. negative hoffmans, positive facet loading, negative SLR  Psychiatric: Awake alert fully oriented
Physical Medicine and Rehabilitation Subsequent Evaluation    Seen in followup for pain mgmt    Patient seen at bedside, complains of inadequately managed pain  Noted and discussed that patient is on more than double his usual dosing of pain medication at home  Wants more IV dilaudid    ROS:  Constitutional: Denies fevers or chills  MSK: LUE pain, neck pain, low back pain noted to be persistent     Medications: reviewed and revised    Physical Exam:   Vitals: reviewed    Constitutional: Gen: In no acute distress, cooperative with exam and questioning   Neuro: LUE with paresthesiae and impaired sensation with palpation along dermatomes -- does not seem to fit a consistent dermatomal or peripheral nerve distribution pattern  MSK: ttp and hypertonicity to the paraspinal musculature from c spine to L spine. negative hoffmans, positive facet loading, negative SLR  Psychiatric: Awake alert fully oriented
Physical Medicine and Rehabilitation Subsequent Evaluation    Seen in followup for pain mgmt    The patient was seen and examined at bedside. Complains of persistent pain.  Was changed to IV dilaudid 1mg by primary team in interim  Revised this further following discussion with patient as increasing IV at this time will make it more difficult to transition to PO near discharge.     ROS:  Constitutional: Denies fevers or chills  MSK: LUE pain, neck pain, low back pain persistent     PAST MEDICAL & SURGICAL HISTORY:  Hypertension  Diabetes mellitus  Gastric ulcer  Spinal stenosis  H/O spinal cord compression  Spondylosis  H/O radiculopathy  H/O cervical spine surgery  History of back surgery  S/P cervical spinal fusion    Medications: reviewed and revised  acetaminophen     Tablet .. 1000 milliGRAM(s) Oral every 8 hours  aluminum hydroxide/magnesium hydroxide/simethicone Suspension 30 milliLiter(s) Oral every 4 hours PRN  apixaban 5 milliGRAM(s) Oral two times a day  atorvastatin 80 milliGRAM(s) Oral at bedtime  bisacodyl 5 milliGRAM(s) Oral daily PRN  carvedilol 25 milliGRAM(s) Oral every 12 hours  clonazePAM  Tablet 1 milliGRAM(s) Oral at bedtime  dextrose 10% Bolus 125 milliLiter(s) IV Bolus once  dextrose 5%. 1000 milliLiter(s) IV Continuous <Continuous>  dextrose 5%. 1000 milliLiter(s) IV Continuous <Continuous>  dextrose 50% Injectable 25 Gram(s) IV Push once  dextrose 50% Injectable 12.5 Gram(s) IV Push once  dextrose Oral Gel 15 Gram(s) Oral once PRN  gabapentin 100 milliGRAM(s) Oral every 8 hours  glucagon  Injectable 1 milliGRAM(s) IntraMuscular once  HYDROmorphone   Tablet 6 milliGRAM(s) Oral every 4 hours  HYDROmorphone  Injectable 0.5 milliGRAM(s) IV Push every 4 hours PRN  insulin glargine Injectable (LANTUS) 21 Unit(s) SubCutaneous every morning  insulin lispro (ADMELOG) corrective regimen sliding scale   SubCutaneous at bedtime  insulin lispro (ADMELOG) corrective regimen sliding scale   SubCutaneous three times a day before meals  lidocaine   4% Patch 1 Patch Transdermal daily  melatonin 3 milliGRAM(s) Oral at bedtime PRN  naloxone Injectable 0.4 milliGRAM(s) IV Push once  ondansetron Injectable 4 milliGRAM(s) IV Push every 8 hours PRN  pantoprazole    Tablet 40 milliGRAM(s) Oral before breakfast  polyethylene glycol 3350 17 Gram(s) Oral daily  pregabalin 150 milliGRAM(s) Oral three times a day  senna 2 Tablet(s) Oral at bedtime  sertraline 200 milliGRAM(s) Oral daily  tiZANidine 4 milliGRAM(s) Oral every 8 hours PRN  traZODone 150 milliGRAM(s) Oral at bedtime      Physical Exam:   Vitals: reviewed    Constitutional: Gen: In no acute distress, cooperative with exam and questioning   Neuro: LUE with paresthesiae and impaired sensation with palpation along dermatomes -- does not seem to fit a consistent dermatomal or peripheral nerve distribution pattern  MSK: ttp and hypertonicity to the paraspinal musculature from c spine to L spine. negative hoffmans, positive facet loading, negative SLR  Psychiatric: Awake alert fully oriented
CAPILLARY BLOOD GLUCOSE      POCT Blood Glucose.: 257 mg/dL (02 Jun 2024 11:28)  POCT Blood Glucose.: 265 mg/dL (02 Jun 2024 07:32)  POCT Blood Glucose.: 320 mg/dL (01 Jun 2024 22:13)  POCT Blood Glucose.: 255 mg/dL (01 Jun 2024 16:48)  POCT Blood Glucose.: 261 mg/dL (01 Jun 2024 11:55)      Vital Signs Last 24 Hrs  T(C): 36.7 (02 Jun 2024 11:45), Max: 36.7 (01 Jun 2024 12:19)  T(F): 98 (02 Jun 2024 11:45), Max: 98 (01 Jun 2024 12:19)  HR: 79 (02 Jun 2024 11:45) (73 - 79)  BP: 133/79 (02 Jun 2024 11:45) (111/74 - 133/79)  BP(mean): --  RR: 18 (02 Jun 2024 11:45) (17 - 18)  SpO2: 94% (02 Jun 2024 11:45) (92% - 94%)    Parameters below as of 02 Jun 2024 11:45  Patient On (Oxygen Delivery Method): room air        General: WN/WD NAD  Respiratory: CTA B/L  CV: RRR, S1S2, no murmurs, rubs or gallops  Abdominal: Soft, NT, ND +BS, Last BM  Extremities: No edema, + peripheral pulses     06-02    139  |  104  |  12  ----------------------------<  310<H>  4.0   |  31  |  0.77    Ca    9.2      02 Jun 2024 07:19        atorvastatin 80 milliGRAM(s) Oral at bedtime  dextrose 50% Injectable 25 Gram(s) IV Push once  dextrose 50% Injectable 12.5 Gram(s) IV Push once  dextrose Oral Gel 15 Gram(s) Oral once PRN  glucagon  Injectable 1 milliGRAM(s) IntraMuscular once  insulin glargine Injectable (LANTUS) 21 Unit(s) SubCutaneous every morning  insulin lispro (ADMELOG) corrective regimen sliding scale   SubCutaneous three times a day before meals  insulin lispro (ADMELOG) corrective regimen sliding scale   SubCutaneous at bedtime  insulin lispro Injectable (ADMELOG) 7 Unit(s) SubCutaneous three times a day before meals  
Patient is a 58y old  Male who presents with a chief complaint of head & neck pain s/p fall (29 May 2024 15:49)      INTERVAL HPI/OVERNIGHT EVENTS: Patient seen and examined at bedside. No overnight events. Patient c/o neck pain. Tolerating diet. Denies fever, chills, chest pain, shortness of breath, abdominal pain, nausea/vomiting, headache.    MEDICATIONS  (STANDING):  acetaminophen     Tablet .. 1000 milliGRAM(s) Oral every 8 hours  apixaban 5 milliGRAM(s) Oral two times a day  atorvastatin 80 milliGRAM(s) Oral at bedtime  carvedilol 25 milliGRAM(s) Oral every 12 hours  clonazePAM  Tablet 1 milliGRAM(s) Oral at bedtime  dextrose 10% Bolus 125 milliLiter(s) IV Bolus once  dextrose 5%. 1000 milliLiter(s) (100 mL/Hr) IV Continuous <Continuous>  dextrose 5%. 1000 milliLiter(s) (50 mL/Hr) IV Continuous <Continuous>  dextrose 50% Injectable 25 Gram(s) IV Push once  dextrose 50% Injectable 12.5 Gram(s) IV Push once  gabapentin 100 milliGRAM(s) Oral every 8 hours  glucagon  Injectable 1 milliGRAM(s) IntraMuscular once  insulin glargine Injectable (LANTUS) 21 Unit(s) SubCutaneous every morning  insulin lispro (ADMELOG) corrective regimen sliding scale   SubCutaneous at bedtime  insulin lispro (ADMELOG) corrective regimen sliding scale   SubCutaneous three times a day before meals  lidocaine   4% Patch 1 Patch Transdermal daily  naloxone Injectable 0.4 milliGRAM(s) IV Push once  pantoprazole    Tablet 40 milliGRAM(s) Oral before breakfast  polyethylene glycol 3350 17 Gram(s) Oral daily  pregabalin 150 milliGRAM(s) Oral three times a day  senna 2 Tablet(s) Oral at bedtime  sertraline 200 milliGRAM(s) Oral daily  traZODone 150 milliGRAM(s) Oral at bedtime    MEDICATIONS  (PRN):  aluminum hydroxide/magnesium hydroxide/simethicone Suspension 30 milliLiter(s) Oral every 4 hours PRN Dyspepsia  bisacodyl 5 milliGRAM(s) Oral daily PRN Constipation  dextrose Oral Gel 15 Gram(s) Oral once PRN Blood Glucose LESS THAN 70 milliGRAM(s)/deciliter  HYDROmorphone  Injectable 1 milliGRAM(s) IV Push every 4 hours PRN Severe Pain (7 - 10)  HYDROmorphone  Injectable 0.5 milliGRAM(s) IV Push every 4 hours PRN Moderate Pain (4 - 6)  melatonin 3 milliGRAM(s) Oral at bedtime PRN Insomnia  ondansetron Injectable 4 milliGRAM(s) IV Push every 8 hours PRN Nausea and/or Vomiting  tiZANidine 4 milliGRAM(s) Oral every 8 hours PRN Muscle Spasm      Allergies    No Known Allergies    Intolerances        REVIEW OF SYSTEMS:  CONSTITUTIONAL: No fever or chills  HEENT:  No headache, no sore throat  RESPIRATORY: No cough, wheezing, or shortness of breath  CARDIOVASCULAR: No chest pain, palpitations  GASTROINTESTINAL: No abd pain, nausea, vomiting, or diarrhea  GENITOURINARY: No dysuria, frequency, or hematuria  NEUROLOGICAL: no focal weakness or dizziness  MUSCULOSKELETAL: no myalgias, +neck pain    Vital Signs Last 24 Hrs  T(C): 36.3 (29 May 2024 12:46), Max: 36.6 (29 May 2024 01:00)  T(F): 97.4 (29 May 2024 12:46), Max: 97.8 (29 May 2024 01:00)  HR: 67 (29 May 2024 12:46) (67 - 80)  BP: 128/82 (29 May 2024 12:46) (127/87 - 143/85)  BP(mean): --  RR: 18 (29 May 2024 12:46) (18 - 18)  SpO2: 95% (29 May 2024 12:46) (94% - 96%)    Parameters below as of 29 May 2024 12:46  Patient On (Oxygen Delivery Method): room air        PHYSICAL EXAM:  GENERAL: NAD  HEENT:  anicteric, moist mucous membranes  CHEST/LUNG:  CTA b/l, no rales, wheezes, or rhonchi  HEART:  RRR, S1, S2  ABDOMEN:  BS+, soft, nontender, nondistended  EXTREMITIES: no edema, cyanosis, or calf tenderness  NERVOUS SYSTEM: answers questions and follows commands appropriately    LABS:                        12.6   7.74  )-----------( 293      ( 29 May 2024 05:10 )             39.7     CBC Full  -  ( 29 May 2024 05:10 )  WBC Count : 7.74 K/uL  Hemoglobin : 12.6 g/dL  Hematocrit : 39.7 %  Platelet Count - Automated : 293 K/uL  Mean Cell Volume : 87.3 fl  Mean Cell Hemoglobin : 27.7 pg  Mean Cell Hemoglobin Concentration : 31.7 gm/dL  Auto Neutrophil # : x  Auto Lymphocyte # : x  Auto Monocyte # : x  Auto Eosinophil # : x  Auto Basophil # : x  Auto Neutrophil % : x  Auto Lymphocyte % : x  Auto Monocyte % : x  Auto Eosinophil % : x  Auto Basophil % : x    29 May 2024 05:10    140    |  108    |  13     ----------------------------<  128    4.2     |  28     |  0.84     Ca    8.9        29 May 2024 05:10    TPro  6.6    /  Alb  3.0    /  TBili  0.4    /  DBili  x      /  AST  14     /  ALT  36     /  AlkPhos  83     29 May 2024 05:10      Urinalysis Basic - ( 29 May 2024 05:10 )    Color: x / Appearance: x / SG: x / pH: x  Gluc: 128 mg/dL / Ketone: x  / Bili: x / Urobili: x   Blood: x / Protein: x / Nitrite: x   Leuk Esterase: x / RBC: x / WBC x   Sq Epi: x / Non Sq Epi: x / Bacteria: x      CAPILLARY BLOOD GLUCOSE      POCT Blood Glucose.: 164 mg/dL (29 May 2024 17:13)  POCT Blood Glucose.: 149 mg/dL (29 May 2024 11:51)  POCT Blood Glucose.: 112 mg/dL (29 May 2024 07:35)  POCT Blood Glucose.: 124 mg/dL (29 May 2024 02:41)  POCT Blood Glucose.: 85 mg/dL (28 May 2024 20:24)          RADIOLOGY & ADDITIONAL TESTS:    Personally reviewed.     Consultant(s) Notes Reviewed:  [x] YES  [ ] NO    
CAPILLARY BLOOD GLUCOSE      POCT Blood Glucose.: 340 mg/dL (04 Jun 2024 07:44)  POCT Blood Glucose.: 324 mg/dL (03 Jun 2024 21:40)  POCT Blood Glucose.: 225 mg/dL (03 Jun 2024 16:58)  POCT Blood Glucose.: 271 mg/dL (03 Jun 2024 11:53)      Vital Signs Last 24 Hrs  T(C): 36.4 (04 Jun 2024 05:23), Max: 36.9 (03 Jun 2024 20:11)  T(F): 97.6 (04 Jun 2024 05:23), Max: 98.5 (03 Jun 2024 20:11)  HR: 70 (04 Jun 2024 06:56) (70 - 80)  BP: 133/84 (04 Jun 2024 06:56) (105/70 - 133/84)  BP(mean): --  RR: 18 (04 Jun 2024 05:23) (17 - 18)  SpO2: 93% (04 Jun 2024 05:23) (93% - 95%)    Parameters below as of 04 Jun 2024 05:23  Patient On (Oxygen Delivery Method): room air        General: WN/WD NAD  Respiratory: CTA B/L  CV: RRR, S1S2, no murmurs, rubs or gallops  Abdominal: Soft, NT, ND +BS, Last BM  Extremities: No edema, + peripheral pulses     06-03    138  |  105  |  13  ----------------------------<  341<H>  4.3   |  29  |  0.74    Ca    9.0      03 Jun 2024 07:46        atorvastatin 80 milliGRAM(s) Oral at bedtime  dextrose 50% Injectable 25 Gram(s) IV Push once  dextrose 50% Injectable 12.5 Gram(s) IV Push once  dextrose Oral Gel 15 Gram(s) Oral once PRN  glucagon  Injectable 1 milliGRAM(s) IntraMuscular once  insulin glargine Injectable (LANTUS) 30 Unit(s) SubCutaneous every morning  insulin lispro (ADMELOG) corrective regimen sliding scale   SubCutaneous three times a day before meals  insulin lispro (ADMELOG) corrective regimen sliding scale   SubCutaneous at bedtime  insulin lispro Injectable (ADMELOG) 10 Unit(s) SubCutaneous three times a day before meals  
Patient is a 58y old  Male who presents with a chief complaint of head & neck pain s/p fall (02 Jun 2024 11:51)      INTERVAL HPI/OVERNIGHT EVENTS: Patient seen and examined at bedside. No overnight events. Patient requesting pain medication this morning. Tolerating diet. Denies fever, chills, chest pain, shortness of breath, abdominal pain, nausea/vomiting, headache.    MEDICATIONS  (STANDING):  acetaminophen     Tablet .. 1000 milliGRAM(s) Oral every 8 hours  apixaban 5 milliGRAM(s) Oral two times a day  atorvastatin 80 milliGRAM(s) Oral at bedtime  carvedilol 25 milliGRAM(s) Oral every 12 hours  clonazePAM  Tablet 1 milliGRAM(s) Oral at bedtime  dextrose 10% Bolus 125 milliLiter(s) IV Bolus once  dextrose 5%. 1000 milliLiter(s) (100 mL/Hr) IV Continuous <Continuous>  dextrose 5%. 1000 milliLiter(s) (50 mL/Hr) IV Continuous <Continuous>  dextrose 50% Injectable 25 Gram(s) IV Push once  dextrose 50% Injectable 12.5 Gram(s) IV Push once  gabapentin 100 milliGRAM(s) Oral every 8 hours  glucagon  Injectable 1 milliGRAM(s) IntraMuscular once  HYDROmorphone   Tablet 8 milliGRAM(s) Oral every 6 hours  insulin lispro (ADMELOG) corrective regimen sliding scale   SubCutaneous three times a day before meals  insulin lispro (ADMELOG) corrective regimen sliding scale   SubCutaneous at bedtime  insulin lispro Injectable (ADMELOG) 10 Unit(s) SubCutaneous three times a day before meals  lidocaine   4% Patch 1 Patch Transdermal daily  naloxone Injectable 0.4 milliGRAM(s) IV Push once  pantoprazole    Tablet 40 milliGRAM(s) Oral before breakfast  polyethylene glycol 3350 17 Gram(s) Oral daily  pregabalin 150 milliGRAM(s) Oral three times a day  senna 2 Tablet(s) Oral at bedtime  sertraline 200 milliGRAM(s) Oral daily  traZODone 150 milliGRAM(s) Oral at bedtime    MEDICATIONS  (PRN):  aluminum hydroxide/magnesium hydroxide/simethicone Suspension 30 milliLiter(s) Oral every 4 hours PRN Dyspepsia  bisacodyl 5 milliGRAM(s) Oral daily PRN Constipation  dextrose Oral Gel 15 Gram(s) Oral once PRN Blood Glucose LESS THAN 70 milliGRAM(s)/deciliter  HYDROmorphone  Injectable 0.5 milliGRAM(s) IV Push every 6 hours PRN Severe Pain (7 - 10)  melatonin 3 milliGRAM(s) Oral at bedtime PRN Insomnia  ondansetron Injectable 4 milliGRAM(s) IV Push every 8 hours PRN Nausea and/or Vomiting  tiZANidine 4 milliGRAM(s) Oral every 8 hours PRN Muscle Spasm      Allergies    No Known Allergies    Intolerances        REVIEW OF SYSTEMS:  CONSTITUTIONAL: No fever or chills  HEENT:  No headache, no sore throat  RESPIRATORY: No cough, wheezing, or shortness of breath  CARDIOVASCULAR: No chest pain, palpitations  GASTROINTESTINAL: No abd pain, nausea, vomiting, or diarrhea  GENITOURINARY: No dysuria, frequency, or hematuria  NEUROLOGICAL: no focal weakness or dizziness  MUSCULOSKELETAL: no myalgias     Vital Signs Last 24 Hrs  T(C): 36.7 (02 Jun 2024 11:45), Max: 36.7 (01 Jun 2024 17:06)  T(F): 98 (02 Jun 2024 11:45), Max: 98 (01 Jun 2024 17:06)  HR: 79 (02 Jun 2024 11:45) (73 - 79)  BP: 133/79 (02 Jun 2024 11:45) (111/74 - 133/79)  BP(mean): --  RR: 18 (02 Jun 2024 11:45) (17 - 18)  SpO2: 94% (02 Jun 2024 11:45) (92% - 94%)    Parameters below as of 02 Jun 2024 11:45  Patient On (Oxygen Delivery Method): room air        PHYSICAL EXAM:  GENERAL: NAD  HEENT:  anicteric, moist mucous membranes  CHEST/LUNG:  CTA b/l, no rales, wheezes, or rhonchi  HEART:  RRR, S1, S2  ABDOMEN:  BS+, soft, nontender, nondistended  EXTREMITIES: no edema, cyanosis, or calf tenderness  NERVOUS SYSTEM: answers questions and follows commands appropriately    LABS:                        12.5   7.35  )-----------( 247      ( 02 Jun 2024 07:19 )             39.2     CBC Full  -  ( 02 Jun 2024 07:19 )  WBC Count : 7.35 K/uL  Hemoglobin : 12.5 g/dL  Hematocrit : 39.2 %  Platelet Count - Automated : 247 K/uL  Mean Cell Volume : 86.5 fl  Mean Cell Hemoglobin : 27.6 pg  Mean Cell Hemoglobin Concentration : 31.9 gm/dL  Auto Neutrophil # : x  Auto Lymphocyte # : x  Auto Monocyte # : x  Auto Eosinophil # : x  Auto Basophil # : x  Auto Neutrophil % : x  Auto Lymphocyte % : x  Auto Monocyte % : x  Auto Eosinophil % : x  Auto Basophil % : x    02 Jun 2024 07:19    139    |  104    |  12     ----------------------------<  310    4.0     |  31     |  0.77     Ca    9.2        02 Jun 2024 07:19        Urinalysis Basic - ( 02 Jun 2024 07:19 )    Color: x / Appearance: x / SG: x / pH: x  Gluc: 310 mg/dL / Ketone: x  / Bili: x / Urobili: x   Blood: x / Protein: x / Nitrite: x   Leuk Esterase: x / RBC: x / WBC x   Sq Epi: x / Non Sq Epi: x / Bacteria: x      CAPILLARY BLOOD GLUCOSE      POCT Blood Glucose.: 257 mg/dL (02 Jun 2024 11:28)  POCT Blood Glucose.: 265 mg/dL (02 Jun 2024 07:32)  POCT Blood Glucose.: 320 mg/dL (01 Jun 2024 22:13)  POCT Blood Glucose.: 255 mg/dL (01 Jun 2024 16:48)          RADIOLOGY & ADDITIONAL TESTS:    Personally reviewed.     Consultant(s) Notes Reviewed:  [x] YES  [ ] NO    
ROSE STRAUSS  58y  Male    HPI:  57yo M PMH DM, HTN, HLD, CRPS, S/p C/S Fusion, on Eliquis, presents with acute on chronic neck pain s/p mechanical fall w/head trauma 2 days ago. Pt states he was attempting to take his dog for a walk 2 days ago, when dog ran out while pt holding leash, pt's foot caught in door, resulting in fall with head strike to left side head/face. Denies loss of consciousness at any point, but states pain immediately following was so severe he was unable to get up for 10 minutes. States he currently has 10/10 intensity pain without palliative or provocative factors in his neck (radiating down L arm), and upper middle back, which is worse with inspiration. States radiation down L arm is baseline, however intensity of pain is not. States he has numbness, tingling, motor weakness, and cold sensation of L arm & hand, which is also baseline. Patient states he had been intermittently vomiting after the fall up until last episode was 4PM yesterday. Vomitus was always only gastric contents, never contained blood or bile. Denies fevers/chills, cp, palpitations, sob, cough, nausea, diarrhea, constipation, abdominal pain, dysuria, dyschezia, hematuria or hematochezia   ED Course:  Vitals: T 98, HR 67, /71, RR 18, SpO2 100% ORA  Pertinent Labs: no pertinent abnl labs  EKG: NSR with marked sinus arrhythmia. 78 BMP. QTc 430.  Given: Ofirmev x1, TDaP x1, 1mg IV Dilaudid x3, 0.5 mg IV dilaudid x1, Lidocaine patch x1, Robaxin x1, Zofran x1, NS Bolus 1L x1    Imaging:  CT Head No Cont: No evidence acute hemorrhage, mass, or mass effect.  CT C/S No Cont: Postoperative changes and degenerative changes C2-C7. No changes, postop hardware adequately placed. No acute fracture. (29 May 2024 01:34)            INTERVAL HPI/OVERNIGHT EVENTS: pt seen and examined at bed side this am. He was c/o pain in Right upper back/scapular region.         REVIEW OF SYSTEMS:  CONSTITUTIONAL: No fever, weight loss, or fatigue  EYES: No eye pain, visual disturbances, or discharge  ENMT:  No difficulty hearing, tinnitus, vertigo; No sinus or throat pain  NECK: +ve neck pain and  stiffness more on R side   RESPIRATORY: No cough, wheezing, chills or hemoptysis; No shortness of breath  CARDIOVASCULAR: No chest pain, palpitations, dizziness, or leg swelling  GASTROINTESTINAL: No abdominal or epigastric pain. No nausea, vomiting, or hematemesis; No diarrhea or constipation. No melena or hematochezia.  GENITOURINARY: No dysuria, frequency, hematuria, or incontinence  NEUROLOGICAL: No headaches, memory loss, loss of strength, numbness, or tremors  MUSCULOSKELETAL: No joint pain or swelling; No muscle, back, or extremity pain  PSYCHIATRIC: No depression, anxiety, mood swings, or difficulty sleeping      T(C): 37.1 (05-31-24 @ 20:18), Max: 37.1 (05-31-24 @ 20:18)  HR: 88 (05-31-24 @ 20:18) (67 - 88)  BP: 106/69 (05-31-24 @ 20:18) (106/69 - 115/81)  RR: 18 (05-31-24 @ 20:18) (18 - 18)  SpO2: 94% (05-31-24 @ 20:18) (93% - 96%)  Wt(kg): --Vital Signs Last 24 Hrs  T(C): 37.1 (31 May 2024 20:18), Max: 37.1 (31 May 2024 20:18)  T(F): 98.8 (31 May 2024 20:18), Max: 98.8 (31 May 2024 20:18)  HR: 88 (31 May 2024 20:18) (67 - 88)  BP: 106/69 (31 May 2024 20:18) (106/69 - 115/81)  BP(mean): --  RR: 18 (31 May 2024 20:18) (18 - 18)  SpO2: 94% (31 May 2024 20:18) (93% - 96%)    Parameters below as of 31 May 2024 20:18  Patient On (Oxygen Delivery Method): room air        PHYSICAL EXAM:  GENERAL: NAD, well-groomed, well-developed  HEAD:  Atraumatic, Normocephalic  EYES: EOMI, PERRLA, conjunctiva and sclera clear  ENMT: No tonsillar erythema, exudates, or enlargement; Moist mucous membranes, Good dentition, No lesions  NECK: Supple, No JVD, Normal thyroid  NERVOUS SYSTEM:  Alert & Oriented X3, Good concentration; Motor Strength 4/5 in LUE. 5/5 in RUE and b/l LE.  DTRs 2+ intact and symmetric  CHEST/LUNG: Clear to percussion bilaterally; No rales, rhonchi, wheezing, or rubs  HEART: Regular rate and rhythm; No murmurs, rubs, or gallops  ABDOMEN: Soft, Nontender, Nondistended; Bowel sounds present  EXTREMITIES:  2+ Peripheral Pulses, No clubbing, cyanosis, or edema  SKIN: No rashes or lesions  back. Right upper scapular region mild tenderness     Consultant(s) Notes Reviewed:  [x ] YES  [ ] NO  Care Discussed with Consultants/Other Providers [ x] YES  [ ] NO    LABS:                        12.8   5.61  )-----------( 266      ( 30 May 2024 08:18 )             40.1     05-30    140  |  105  |  10  ----------------------------<  258<H>  4.0   |  32<H>  |  0.75    Ca    9.2      30 May 2024 08:18    TPro  6.6  /  Alb  3.0<L>  /  TBili  0.3  /  DBili  x   /  AST  6<L>  /  ALT  32  /  AlkPhos  84  05-30      Urinalysis Basic - ( 30 May 2024 08:18 )    Color: x / Appearance: x / SG: x / pH: x  Gluc: 258 mg/dL / Ketone: x  / Bili: x / Urobili: x   Blood: x / Protein: x / Nitrite: x   Leuk Esterase: x / RBC: x / WBC x   Sq Epi: x / Non Sq Epi: x / Bacteria: x      CAPILLARY BLOOD GLUCOSE      POCT Blood Glucose.: 204 mg/dL (31 May 2024 21:35)  POCT Blood Glucose.: 204 mg/dL (31 May 2024 16:44)  POCT Blood Glucose.: 230 mg/dL (31 May 2024 11:30)  POCT Blood Glucose.: 209 mg/dL (31 May 2024 07:32)  POCT Blood Glucose.: 208 mg/dL (30 May 2024 22:16)        Urinalysis Basic - ( 30 May 2024 08:18 )    Color: x / Appearance: x / SG: x / pH: x  Gluc: 258 mg/dL / Ketone: x  / Bili: x / Urobili: x   Blood: x / Protein: x / Nitrite: x   Leuk Esterase: x / RBC: x / WBC x   Sq Epi: x / Non Sq Epi: x / Bacteria: x        RADIOLOGY & ADDITIONAL TESTS:    Imaging Personally Reviewed:  [ ] YES  [ ] NO    HEALTH ISSUES - PROBLEM Dx:  Fall    S/P cervical spinal fusion    DM (diabetes mellitus)    HTN (hypertension)    HLD (hyperlipidemia)    Need for prophylactic measure    Type 2 diabetes mellitus with hyperglycemia        
Patient is a 58y old  Male who presents with a chief complaint of head & neck pain s/p fall (31 May 2024 21:47)      INTERVAL HPI/OVERNIGHT EVENTS: Patient seen and examined at bedside. No overnight events. Tolerating diet. Requests MRI of thoracic/lumbar spine. Endorses pain is still uncontrolled. Denies fever, chills, chest pain, shortness of breath, abdominal pain, nausea/vomiting, headache.    MEDICATIONS  (STANDING):  acetaminophen     Tablet .. 1000 milliGRAM(s) Oral every 8 hours  apixaban 5 milliGRAM(s) Oral two times a day  atorvastatin 80 milliGRAM(s) Oral at bedtime  carvedilol 25 milliGRAM(s) Oral every 12 hours  clonazePAM  Tablet 1 milliGRAM(s) Oral at bedtime  dextrose 10% Bolus 125 milliLiter(s) IV Bolus once  dextrose 5%. 1000 milliLiter(s) (100 mL/Hr) IV Continuous <Continuous>  dextrose 5%. 1000 milliLiter(s) (50 mL/Hr) IV Continuous <Continuous>  dextrose 50% Injectable 25 Gram(s) IV Push once  dextrose 50% Injectable 12.5 Gram(s) IV Push once  gabapentin 100 milliGRAM(s) Oral every 8 hours  glucagon  Injectable 1 milliGRAM(s) IntraMuscular once  HYDROmorphone   Tablet 8 milliGRAM(s) Oral every 6 hours  insulin glargine Injectable (LANTUS) 21 Unit(s) SubCutaneous every morning  insulin lispro (ADMELOG) corrective regimen sliding scale   SubCutaneous three times a day before meals  insulin lispro (ADMELOG) corrective regimen sliding scale   SubCutaneous at bedtime  insulin lispro Injectable (ADMELOG) 7 Unit(s) SubCutaneous three times a day before meals  lidocaine   4% Patch 1 Patch Transdermal daily  naloxone Injectable 0.4 milliGRAM(s) IV Push once  pantoprazole    Tablet 40 milliGRAM(s) Oral before breakfast  polyethylene glycol 3350 17 Gram(s) Oral daily  pregabalin 150 milliGRAM(s) Oral three times a day  senna 2 Tablet(s) Oral at bedtime  sertraline 200 milliGRAM(s) Oral daily  traZODone 150 milliGRAM(s) Oral at bedtime    MEDICATIONS  (PRN):  aluminum hydroxide/magnesium hydroxide/simethicone Suspension 30 milliLiter(s) Oral every 4 hours PRN Dyspepsia  bisacodyl 5 milliGRAM(s) Oral daily PRN Constipation  dextrose Oral Gel 15 Gram(s) Oral once PRN Blood Glucose LESS THAN 70 milliGRAM(s)/deciliter  HYDROmorphone  Injectable 0.5 milliGRAM(s) IV Push every 6 hours PRN Severe Pain (7 - 10)  melatonin 3 milliGRAM(s) Oral at bedtime PRN Insomnia  ondansetron Injectable 4 milliGRAM(s) IV Push every 8 hours PRN Nausea and/or Vomiting  tiZANidine 4 milliGRAM(s) Oral every 8 hours PRN Muscle Spasm      Allergies    No Known Allergies    Intolerances        REVIEW OF SYSTEMS:  CONSTITUTIONAL: No fever or chills  HEENT:  No headache, no sore throat  RESPIRATORY: No cough, wheezing, or shortness of breath  CARDIOVASCULAR: No chest pain, palpitations  GASTROINTESTINAL: No abd pain, nausea, vomiting, or diarrhea  GENITOURINARY: No dysuria, frequency, or hematuria  NEUROLOGICAL: no focal weakness or dizziness  MUSCULOSKELETAL: no myalgias     Vital Signs Last 24 Hrs  T(C): 36.7 (01 Jun 2024 12:19), Max: 37.1 (31 May 2024 20:18)  T(F): 98 (01 Jun 2024 12:19), Max: 98.8 (31 May 2024 20:18)  HR: 73 (01 Jun 2024 12:19) (72 - 88)  BP: 125/82 (01 Jun 2024 12:19) (106/69 - 132/79)  BP(mean): --  RR: 18 (01 Jun 2024 12:19) (18 - 18)  SpO2: 93% (01 Jun 2024 12:19) (93% - 94%)    Parameters below as of 01 Jun 2024 12:19  Patient On (Oxygen Delivery Method): room air        PHYSICAL EXAM:  GENERAL: NAD  HEENT:  anicteric, moist mucous membranes  CHEST/LUNG:  CTA b/l, no rales, wheezes, or rhonchi  HEART:  RRR, S1, S2  ABDOMEN:  BS+, soft, nontender, nondistended  EXTREMITIES: no edema, cyanosis, or calf tenderness  NERVOUS SYSTEM: answers questions and follows commands appropriately    LABS:                        12.8   6.97  )-----------( 245      ( 01 Jun 2024 06:25 )             40.1     CBC Full  -  ( 01 Jun 2024 06:25 )  WBC Count : 6.97 K/uL  Hemoglobin : 12.8 g/dL  Hematocrit : 40.1 %  Platelet Count - Automated : 245 K/uL  Mean Cell Volume : 86.8 fl  Mean Cell Hemoglobin : 27.7 pg  Mean Cell Hemoglobin Concentration : 31.9 gm/dL  Auto Neutrophil # : x  Auto Lymphocyte # : x  Auto Monocyte # : x  Auto Eosinophil # : x  Auto Basophil # : x  Auto Neutrophil % : x  Auto Lymphocyte % : x  Auto Monocyte % : x  Auto Eosinophil % : x  Auto Basophil % : x    01 Jun 2024 06:25    141    |  104    |  8      ----------------------------<  267    4.5     |  34     |  0.83     Ca    9.3        01 Jun 2024 06:25        Urinalysis Basic - ( 01 Jun 2024 06:25 )    Color: x / Appearance: x / SG: x / pH: x  Gluc: 267 mg/dL / Ketone: x  / Bili: x / Urobili: x   Blood: x / Protein: x / Nitrite: x   Leuk Esterase: x / RBC: x / WBC x   Sq Epi: x / Non Sq Epi: x / Bacteria: x      CAPILLARY BLOOD GLUCOSE      POCT Blood Glucose.: 261 mg/dL (01 Jun 2024 11:55)  POCT Blood Glucose.: 265 mg/dL (01 Jun 2024 07:23)  POCT Blood Glucose.: 204 mg/dL (31 May 2024 21:35)  POCT Blood Glucose.: 204 mg/dL (31 May 2024 16:44)          RADIOLOGY & ADDITIONAL TESTS:    Personally reviewed.     Consultant(s) Notes Reviewed:  [x] YES  [ ] NO    
Patient is a 58y old  Male who presents with a chief complaint of head & neck pain s/p fall (29 May 2024 21:57)        HPI:  59yo M PMH DM, HTN, HLD, CRPS, S/p C/S Fusion, on Eliquis, presents with acute on chronic neck pain s/p mechanical fall w/head trauma 2 days ago. Pt states he was attempting to take his dog for a walk 2 days ago, when dog ran out while pt holding leash, pt's foot caught in door, resulting in fall with head strike to left side head/face. Denies loss of consciousness at any point, but states pain immediately following was so severe he was unable to get up for 10 minutes. States he currently has 10/10 intensity pain without palliative or provocative factors in his neck (radiating down L arm), and upper middle back, which is worse with inspiration. States radiation down L arm is baseline, however intensity of pain is not. States he has numbness, tingling, motor weakness, and cold sensation of L arm & hand, which is also baseline. Patient states he had been intermittently vomiting after the fall up until last episode was 4PM yesterday. Vomitus was always only gastric contents, never contained blood or bile. Denies fevers/chills, cp, palpitations, sob, cough, nausea, diarrhea, constipation, abdominal pain, dysuria, dyschezia, hematuria, hematochezia.    ED Course:  Vitals: T 98, HR 67, /71, RR 18, SpO2 100% ORA  Pertinent Labs: no pertinent abnl labs  EKG: NSR with marked sinus arrhythmia. 78 BMP. QTc 430.  Given: Ofirmev x1, TDaP x1, 1mg IV Dilaudid x3, 0.5 mg IV dilaudid x1, Lidocaine patch x1, Robaxin x1, Zofran x1, NS Bolus 1L x1    Imaging:  CT Head No Cont: No evidence acute hemorrhage, mass, or mass effect.  CT C/S No Cont: Postoperative changes and degenerative changes C2-C7. No changes, postop hardware adequately placed. No acute fracture. (29 May 2024 01:34)      SUBJECTIVE & OBJECTIVE: Pt seen and examined at bedside. compainig of intractable pain     PHYSICAL EXAM:  T(C): 37.1 (05-30-24 @ 05:32), Max: 37.1 (05-30-24 @ 05:32)  HR: 63 (05-30-24 @ 05:32) (63 - 74)  BP: 106/65 (05-30-24 @ 05:32) (106/65 - 136/82)  RR: 18 (05-30-24 @ 05:32) (18 - 18)  SpO2: 94% (05-30-24 @ 05:32) (93% - 95%)  Wt(kg): --   GENERAL: NAD, well-groomed, well-developed  HEAD:  Atraumatic, Normocephalic  EYES: EOMI, PERRLA, conjunctiva and sclera clear  ENMT: Moist mucous membranes  NECK: Supple, No JVD  NERVOUS SYSTEM:  Alert & Oriented X3, Motor Strength 5/5 B/L upper and lower extremities; DTRs 2+ intact and symmetric  CHEST/LUNG: Clear to auscultation bilaterally; No rales, rhonchi, wheezing, or rubs  HEART: Regular rate and rhythm; No murmurs, rubs, or gallops  ABDOMEN: Soft, Nontender, Nondistended; Bowel sounds present  EXTREMITIES:  2+ Peripheral Pulses, No clubbing, cyanosis, or edema        MEDICATIONS  (STANDING):  acetaminophen     Tablet .. 1000 milliGRAM(s) Oral every 8 hours  apixaban 5 milliGRAM(s) Oral two times a day  atorvastatin 80 milliGRAM(s) Oral at bedtime  carvedilol 25 milliGRAM(s) Oral every 12 hours  clonazePAM  Tablet 1 milliGRAM(s) Oral at bedtime  dextrose 10% Bolus 125 milliLiter(s) IV Bolus once  dextrose 5%. 1000 milliLiter(s) (100 mL/Hr) IV Continuous <Continuous>  dextrose 5%. 1000 milliLiter(s) (50 mL/Hr) IV Continuous <Continuous>  dextrose 50% Injectable 25 Gram(s) IV Push once  dextrose 50% Injectable 12.5 Gram(s) IV Push once  gabapentin 100 milliGRAM(s) Oral every 8 hours  glucagon  Injectable 1 milliGRAM(s) IntraMuscular once  HYDROmorphone   Tablet 6 milliGRAM(s) Oral every 4 hours  insulin glargine Injectable (LANTUS) 21 Unit(s) SubCutaneous every morning  insulin lispro (ADMELOG) corrective regimen sliding scale   SubCutaneous at bedtime  insulin lispro (ADMELOG) corrective regimen sliding scale   SubCutaneous three times a day before meals  insulin lispro Injectable (ADMELOG) 5 Unit(s) SubCutaneous three times a day before meals  lidocaine   4% Patch 1 Patch Transdermal daily  naloxone Injectable 0.4 milliGRAM(s) IV Push once  pantoprazole    Tablet 40 milliGRAM(s) Oral before breakfast  polyethylene glycol 3350 17 Gram(s) Oral daily  pregabalin 150 milliGRAM(s) Oral three times a day  senna 2 Tablet(s) Oral at bedtime  sertraline 200 milliGRAM(s) Oral daily  traZODone 150 milliGRAM(s) Oral at bedtime    MEDICATIONS  (PRN):  aluminum hydroxide/magnesium hydroxide/simethicone Suspension 30 milliLiter(s) Oral every 4 hours PRN Dyspepsia  bisacodyl 5 milliGRAM(s) Oral daily PRN Constipation  dextrose Oral Gel 15 Gram(s) Oral once PRN Blood Glucose LESS THAN 70 milliGRAM(s)/deciliter  HYDROmorphone  Injectable 1 milliGRAM(s) IV Push every 4 hours PRN Severe Pain (7 - 10)  melatonin 3 milliGRAM(s) Oral at bedtime PRN Insomnia  ondansetron Injectable 4 milliGRAM(s) IV Push every 8 hours PRN Nausea and/or Vomiting  tiZANidine 4 milliGRAM(s) Oral every 8 hours PRN Muscle Spasm      LABS:                        12.8   5.61  )-----------( 266      ( 30 May 2024 08:18 )             40.1     05-30    140  |  105  |  10  ----------------------------<  258<H>  4.0   |  32<H>  |  0.75    Ca    9.2      30 May 2024 08:18    TPro  6.6  /  Alb  3.0<L>  /  TBili  0.3  /  DBili  x   /  AST  6<L>  /  ALT  32  /  AlkPhos  84  05-30      Urinalysis Basic - ( 30 May 2024 08:18 )    Color: x / Appearance: x / SG: x / pH: x  Gluc: 258 mg/dL / Ketone: x  / Bili: x / Urobili: x   Blood: x / Protein: x / Nitrite: x   Leuk Esterase: x / RBC: x / WBC x   Sq Epi: x / Non Sq Epi: x / Bacteria: x        CAPILLARY BLOOD GLUCOSE      POCT Blood Glucose.: 258 mg/dL (30 May 2024 07:54)  POCT Blood Glucose.: 177 mg/dL (29 May 2024 21:42)  POCT Blood Glucose.: 164 mg/dL (29 May 2024 17:13)  POCT Blood Glucose.: 149 mg/dL (29 May 2024 11:51)      CAPILLARY BLOOD GLUCOSE      POCT Blood Glucose.: 258 mg/dL (30 May 2024 07:54)  POCT Blood Glucose.: 177 mg/dL (29 May 2024 21:42)  POCT Blood Glucose.: 164 mg/dL (29 May 2024 17:13)  POCT Blood Glucose.: 149 mg/dL (29 May 2024 11:51)    CAPILLARY BLOOD GLUCOSE      POCT Blood Glucose.: 258 mg/dL (30 May 2024 07:54)            RECENT CULTURES:      RADIOLOGY & ADDITIONAL TESTS:                        DVT/GI ppx  Discussed with pt @ bedside
Patient is a 58y old  Male who presents with a chief complaint of head & neck pain s/p fall (06 Jun 2024 09:32)        HPI:  59yo M PMH DM, HTN, HLD, CRPS, S/p C/S Fusion, on Eliquis, presents with acute on chronic neck pain s/p mechanical fall w/head trauma 2 days ago. Pt states he was attempting to take his dog for a walk 2 days ago, when dog ran out while pt holding leash, pt's foot caught in door, resulting in fall with head strike to left side head/face. Denies loss of consciousness at any point, but states pain immediately following was so severe he was unable to get up for 10 minutes. States he currently has 10/10 intensity pain without palliative or provocative factors in his neck (radiating down L arm), and upper middle back, which is worse with inspiration. States radiation down L arm is baseline, however intensity of pain is not. States he has numbness, tingling, motor weakness, and cold sensation of L arm & hand, which is also baseline. Patient states he had been intermittently vomiting after the fall up until last episode was 4PM yesterday. Vomitus was always only gastric contents, never contained blood or bile. Denies fevers/chills, cp, palpitations, sob, cough, nausea, diarrhea, constipation, abdominal pain, dysuria, dyschezia, hematuria, hematochezia.    ED Course:  Vitals: T 98, HR 67, /71, RR 18, SpO2 100% ORA  Pertinent Labs: no pertinent abnl labs  EKG: NSR with marked sinus arrhythmia. 78 BMP. QTc 430.  Given: Ofirmev x1, TDaP x1, 1mg IV Dilaudid x3, 0.5 mg IV dilaudid x1, Lidocaine patch x1, Robaxin x1, Zofran x1, NS Bolus 1L x1    Imaging:  CT Head No Cont: No evidence acute hemorrhage, mass, or mass effect.  CT C/S No Cont: Postoperative changes and degenerative changes C2-C7. No changes, postop hardware adequately placed. No acute fracture. (29 May 2024 01:34)      SUBJECTIVE & OBJECTIVE: Pt seen and examined at bedside. nad    PHYSICAL EXAM:  T(C): 36.4 (06-06-24 @ 11:02), Max: 36.6 (06-05-24 @ 13:04)  HR: 73 (06-06-24 @ 11:02) (67 - 78)  BP: 116/71 (06-06-24 @ 11:02) (100/62 - 123/83)  RR: 18 (06-06-24 @ 11:02) (18 - 19)  SpO2: 92% (06-06-24 @ 11:02) (92% - 94%)  Wt(kg): --   GENERAL: NAD, well-groomed, well-developed  HEAD:  Atraumatic, Normocephalic  EYES: EOMI, PERRLA, conjunctiva and sclera clear  ENMT: Moist mucous membranes  NECK: Supple, No JVD  NERVOUS SYSTEM:  Alert & Oriented X3, Motor Strength 5/5 B/L upper and lower extremities; DTRs 2+ intact and symmetric  CHEST/LUNG: Clear to auscultation bilaterally; No rales, rhonchi, wheezing, or rubs  HEART: Regular rate and rhythm; No murmurs, rubs, or gallops  ABDOMEN: Soft, Nontender, Nondistended; Bowel sounds present  EXTREMITIES:  2+ Peripheral Pulses, No clubbing, cyanosis, or edema        MEDICATIONS  (STANDING):  acetaminophen     Tablet .. 1000 milliGRAM(s) Oral every 8 hours  apixaban 5 milliGRAM(s) Oral two times a day  atorvastatin 80 milliGRAM(s) Oral at bedtime  carvedilol 25 milliGRAM(s) Oral every 12 hours  dextrose 10% Bolus 125 milliLiter(s) IV Bolus once  dextrose 5%. 1000 milliLiter(s) (100 mL/Hr) IV Continuous <Continuous>  dextrose 5%. 1000 milliLiter(s) (50 mL/Hr) IV Continuous <Continuous>  dextrose 50% Injectable 25 Gram(s) IV Push once  dextrose 50% Injectable 12.5 Gram(s) IV Push once  gabapentin 100 milliGRAM(s) Oral every 8 hours  glucagon  Injectable 1 milliGRAM(s) IntraMuscular once  HYDROmorphone   Tablet 8 milliGRAM(s) Oral every 6 hours  insulin lispro (ADMELOG) corrective regimen sliding scale   SubCutaneous three times a day before meals  insulin lispro (ADMELOG) corrective regimen sliding scale   SubCutaneous at bedtime  insulin lispro Injectable (ADMELOG) 16 Unit(s) SubCutaneous three times a day before meals  lidocaine   4% Patch 1 Patch Transdermal daily  methadone    Tablet 5 milliGRAM(s) Oral four times a day  methocarbamol 750 milliGRAM(s) Oral two times a day  naloxone Injectable 0.4 milliGRAM(s) IV Push once  pantoprazole    Tablet 40 milliGRAM(s) Oral before breakfast  polyethylene glycol 3350 17 Gram(s) Oral daily  senna 2 Tablet(s) Oral at bedtime  sertraline 200 milliGRAM(s) Oral daily  traZODone 150 milliGRAM(s) Oral at bedtime    MEDICATIONS  (PRN):  aluminum hydroxide/magnesium hydroxide/simethicone Suspension 30 milliLiter(s) Oral every 4 hours PRN Dyspepsia  bisacodyl 5 milliGRAM(s) Oral daily PRN Constipation  dextrose Oral Gel 15 Gram(s) Oral once PRN Blood Glucose LESS THAN 70 milliGRAM(s)/deciliter  HYDROmorphone  Injectable 0.5 milliGRAM(s) IV Push every 6 hours PRN Severe Pain (7 - 10)  melatonin 3 milliGRAM(s) Oral at bedtime PRN Insomnia  ondansetron Injectable 4 milliGRAM(s) IV Push every 8 hours PRN Nausea and/or Vomiting  tiZANidine 4 milliGRAM(s) Oral every 8 hours PRN Muscle Spasm      LABS:                        12.0   6.08  )-----------( 210      ( 06 Jun 2024 07:40 )             36.6     06-05    138  |  103  |  14  ----------------------------<  379<H>  4.1   |  30  |  0.80    Ca    9.0      05 Jun 2024 07:44        Urinalysis Basic - ( 05 Jun 2024 07:44 )    Color: x / Appearance: x / SG: x / pH: x  Gluc: 379 mg/dL / Ketone: x  / Bili: x / Urobili: x   Blood: x / Protein: x / Nitrite: x   Leuk Esterase: x / RBC: x / WBC x   Sq Epi: x / Non Sq Epi: x / Bacteria: x        CAPILLARY BLOOD GLUCOSE      POCT Blood Glucose.: 281 mg/dL (06 Jun 2024 11:17)  POCT Blood Glucose.: 296 mg/dL (06 Jun 2024 07:24)  POCT Blood Glucose.: 191 mg/dL (05 Jun 2024 22:00)  POCT Blood Glucose.: 243 mg/dL (05 Jun 2024 16:57)      CAPILLARY BLOOD GLUCOSE      POCT Blood Glucose.: 281 mg/dL (06 Jun 2024 11:17)  POCT Blood Glucose.: 296 mg/dL (06 Jun 2024 07:24)  POCT Blood Glucose.: 191 mg/dL (05 Jun 2024 22:00)  POCT Blood Glucose.: 243 mg/dL (05 Jun 2024 16:57)    CAPILLARY BLOOD GLUCOSE      POCT Blood Glucose.: 281 mg/dL (06 Jun 2024 11:17)            RECENT CULTURES:      RADIOLOGY & ADDITIONAL TESTS:                        DVT/GI ppx  Discussed with pt @ bedside
INTERVAL HPI/OVERNIGHT EVENTS: Patient seen and examined at bedside. Patient noted to be resting comfortably, but states he is in severe pain. States the pain is worst in his upper back and has residual numbness/tingling in his LUE. States the PO pain medication is not strong enough, would like to increase the IV dose of dilaudid. Patient denies any chest pain, SOB, abd pain at this time. Plan for MRI T-spine today.    ROS: All other review of systems is negative unless indicated above.    MEDICATIONS  (STANDING):  acetaminophen     Tablet .. 1000 milliGRAM(s) Oral every 8 hours  apixaban 5 milliGRAM(s) Oral two times a day  atorvastatin 80 milliGRAM(s) Oral at bedtime  carvedilol 25 milliGRAM(s) Oral every 12 hours  clonazePAM  Tablet 1 milliGRAM(s) Oral at bedtime  dextrose 10% Bolus 125 milliLiter(s) IV Bolus once  dextrose 5%. 1000 milliLiter(s) (100 mL/Hr) IV Continuous <Continuous>  dextrose 5%. 1000 milliLiter(s) (50 mL/Hr) IV Continuous <Continuous>  dextrose 50% Injectable 25 Gram(s) IV Push once  dextrose 50% Injectable 12.5 Gram(s) IV Push once  gabapentin 100 milliGRAM(s) Oral every 8 hours  glucagon  Injectable 1 milliGRAM(s) IntraMuscular once  HYDROmorphone   Tablet 8 milliGRAM(s) Oral every 6 hours  insulin glargine Injectable (LANTUS) 30 Unit(s) SubCutaneous every morning  insulin lispro (ADMELOG) corrective regimen sliding scale   SubCutaneous three times a day before meals  insulin lispro (ADMELOG) corrective regimen sliding scale   SubCutaneous at bedtime  insulin lispro Injectable (ADMELOG) 10 Unit(s) SubCutaneous three times a day before meals  lidocaine   4% Patch 1 Patch Transdermal daily  naloxone Injectable 0.4 milliGRAM(s) IV Push once  pantoprazole    Tablet 40 milliGRAM(s) Oral before breakfast  polyethylene glycol 3350 17 Gram(s) Oral daily  pregabalin 150 milliGRAM(s) Oral three times a day  senna 2 Tablet(s) Oral at bedtime  sertraline 200 milliGRAM(s) Oral daily  traZODone 150 milliGRAM(s) Oral at bedtime    MEDICATIONS  (PRN):  aluminum hydroxide/magnesium hydroxide/simethicone Suspension 30 milliLiter(s) Oral every 4 hours PRN Dyspepsia  bisacodyl 5 milliGRAM(s) Oral daily PRN Constipation  dextrose Oral Gel 15 Gram(s) Oral once PRN Blood Glucose LESS THAN 70 milliGRAM(s)/deciliter  HYDROmorphone  Injectable 0.5 milliGRAM(s) IV Push every 6 hours PRN Severe Pain (7 - 10)  melatonin 3 milliGRAM(s) Oral at bedtime PRN Insomnia  ondansetron Injectable 4 milliGRAM(s) IV Push every 8 hours PRN Nausea and/or Vomiting  tiZANidine 4 milliGRAM(s) Oral every 8 hours PRN Muscle Spasm      Allergies    No Known Allergies    Intolerances          Vital Signs Last 24 Hrs  T(C): 36.9 (03 Jun 2024 20:11), Max: 36.9 (03 Jun 2024 20:11)  T(F): 98.5 (03 Jun 2024 20:11), Max: 98.5 (03 Jun 2024 20:11)  HR: 80 (03 Jun 2024 20:11) (78 - 80)  BP: 105/70 (03 Jun 2024 20:11) (105/70 - 125/83)  BP(mean): --  RR: 18 (03 Jun 2024 20:11) (17 - 18)  SpO2: 95% (03 Jun 2024 20:11) (94% - 95%)    Parameters below as of 03 Jun 2024 20:11  Patient On (Oxygen Delivery Method): room air        Physical Exam:  GENERAL: patient appears well, no acute distress, appropriately interactive  EYES: EOMI, PERRL, pupils appear normal size, sclera clear, no exudates  NECK: +Paraspinal cervical hypertonicity, tenderness to palpation  LUNGS: CTA B/L, no w/r/r  HEART: soft S1/S2, regular rate and rhythm, no murmurs noted,  GASTROINTESTINAL: abdomen is soft, nontender, nondistended, normoactive bowel sounds  MUSCULOSKELETAL: Diffusely 5/5 throughout, with exception to LUE grossly 3-4/5  NEUROLOGIC: awake, alert, oriented x3, good muscle tone in all 4 extremities      LABS:                        11.8   6.35  )-----------( 223      ( 03 Jun 2024 07:46 )             36.9     06-03    138  |  105  |  13  ----------------------------<  341<H>  4.3   |  29  |  0.74    Ca    9.0      03 Jun 2024 07:46        Urinalysis Basic - ( 03 Jun 2024 07:46 )    Color: x / Appearance: x / SG: x / pH: x  Gluc: 341 mg/dL / Ketone: x  / Bili: x / Urobili: x   Blood: x / Protein: x / Nitrite: x   Leuk Esterase: x / RBC: x / WBC x   Sq Epi: x / Non Sq Epi: x / Bacteria: x        RADIOLOGY & ADDITIONAL TESTS:
Holly Daniels M.D.    Patient is a 58y old  Male who presents with a chief complaint of head & neck pain s/p fall (04 Jun 2024 15:35)      SUBJECTIVE / OVERNIGHT EVENTS: still with hyperglycemia up to 372 this AM. Continues to have back pain.     Patient denies chest pain, SOB, abd pain, N/V, fever, chills, dysuria or any other complaints. All remainder ROS negative.     MEDICATIONS  (STANDING):  acetaminophen     Tablet .. 1000 milliGRAM(s) Oral every 8 hours  apixaban 5 milliGRAM(s) Oral two times a day  atorvastatin 80 milliGRAM(s) Oral at bedtime  carvedilol 25 milliGRAM(s) Oral every 12 hours  clonazePAM  Tablet 1 milliGRAM(s) Oral at bedtime  dextrose 10% Bolus 125 milliLiter(s) IV Bolus once  dextrose 5%. 1000 milliLiter(s) (100 mL/Hr) IV Continuous <Continuous>  dextrose 5%. 1000 milliLiter(s) (50 mL/Hr) IV Continuous <Continuous>  dextrose 50% Injectable 25 Gram(s) IV Push once  dextrose 50% Injectable 12.5 Gram(s) IV Push once  gabapentin 100 milliGRAM(s) Oral every 8 hours  glucagon  Injectable 1 milliGRAM(s) IntraMuscular once  HYDROmorphone   Tablet 8 milliGRAM(s) Oral every 6 hours  insulin glargine Injectable (LANTUS) 45 Unit(s) SubCutaneous every morning  insulin lispro (ADMELOG) corrective regimen sliding scale   SubCutaneous three times a day before meals  insulin lispro (ADMELOG) corrective regimen sliding scale   SubCutaneous at bedtime  insulin lispro Injectable (ADMELOG) 16 Unit(s) SubCutaneous three times a day before meals  lidocaine   4% Patch 1 Patch Transdermal daily  methadone    Tablet 5 milliGRAM(s) Oral four times a day  methocarbamol 750 milliGRAM(s) Oral two times a day  naloxone Injectable 0.4 milliGRAM(s) IV Push once  pantoprazole    Tablet 40 milliGRAM(s) Oral before breakfast  polyethylene glycol 3350 17 Gram(s) Oral daily  pregabalin 150 milliGRAM(s) Oral three times a day  senna 2 Tablet(s) Oral at bedtime  sertraline 200 milliGRAM(s) Oral daily  traZODone 150 milliGRAM(s) Oral at bedtime    MEDICATIONS  (PRN):  aluminum hydroxide/magnesium hydroxide/simethicone Suspension 30 milliLiter(s) Oral every 4 hours PRN Dyspepsia  bisacodyl 5 milliGRAM(s) Oral daily PRN Constipation  dextrose Oral Gel 15 Gram(s) Oral once PRN Blood Glucose LESS THAN 70 milliGRAM(s)/deciliter  HYDROmorphone  Injectable 0.5 milliGRAM(s) IV Push every 6 hours PRN Severe Pain (7 - 10)  melatonin 3 milliGRAM(s) Oral at bedtime PRN Insomnia  ondansetron Injectable 4 milliGRAM(s) IV Push every 8 hours PRN Nausea and/or Vomiting  tiZANidine 4 milliGRAM(s) Oral every 8 hours PRN Muscle Spasm      I&O's Summary      PHYSICAL EXAM:  Vital Signs Last 24 Hrs  T(C): 36.7 (05 Jun 2024 05:49), Max: 37.2 (04 Jun 2024 20:06)  T(F): 98.1 (05 Jun 2024 05:49), Max: 98.9 (04 Jun 2024 20:06)  HR: 79 (05 Jun 2024 06:32) (71 - 80)  BP: 123/77 (05 Jun 2024 06:32) (116/74 - 147/68)  BP(mean): --  RR: 18 (05 Jun 2024 05:49) (18 - 18)  SpO2: 94% (05 Jun 2024 05:49) (93% - 95%)    Parameters below as of 05 Jun 2024 05:49  Patient On (Oxygen Delivery Method): room air        CONSTITUTIONAL: NAD, well-groomed, playing on his phone   RESPIRATORY: Normal respiratory effort; lungs are clear to auscultation bilaterally  CARDIOVASCULAR: Regular rate and rhythm; No lower extremity edema  ABDOMEN: Nontender to palpation, normoactive bowel sounds  PSYCH: A+O x3; affect appropriate  NEUROLOGY: CN 2-12 are intact and symmetric; no gross sensory deficits;   SKIN: No rashes; no palpable lesions    LABS:                        11.9   6.40  )-----------( 210      ( 05 Jun 2024 07:44 )             36.9     06-05    138  |  103  |  14  ----------------------------<  379<H>  4.1   |  30  |  0.80    Ca    9.0      05 Jun 2024 07:44            Urinalysis Basic - ( 05 Jun 2024 07:44 )    Color: x / Appearance: x / SG: x / pH: x  Gluc: 379 mg/dL / Ketone: x  / Bili: x / Urobili: x   Blood: x / Protein: x / Nitrite: x   Leuk Esterase: x / RBC: x / WBC x   Sq Epi: x / Non Sq Epi: x / Bacteria: x        CAPILLARY BLOOD GLUCOSE      POCT Blood Glucose.: 377 mg/dL (05 Jun 2024 07:43)  POCT Blood Glucose.: 240 mg/dL (04 Jun 2024 21:07)  POCT Blood Glucose.: 220 mg/dL (04 Jun 2024 16:44)  POCT Blood Glucose.: 294 mg/dL (04 Jun 2024 11:55)      RADIOLOGY & ADDITIONAL TESTS:  Results Reviewed:   Imaging Personally Reviewed:  Electrocardiogram Personally Reviewed:
INTERVAL HPI/OVERNIGHT EVENTS: Patient seen and examined at bedside. Patient states he is still not feeling well, states his upper back pain is still severe. States the IV dilaudid for severe pain is not enough, would like to increase dose of IV dilaudid. Educated patient on risks of opioid dependence. Patient already on increased doses of PO dilaudid (when compared to home dose) and if plan is to transition back to home, patient should try and avoid IV dilaudid unless absolutely necessary. Patient expressed understanding. Patient also concerned regarding his elevated FS, insulin regimen adjusted by Endocrinology.    ROS: All other review of systems is negative unless indicated above.    MEDICATIONS  (STANDING):  acetaminophen     Tablet .. 1000 milliGRAM(s) Oral every 8 hours  apixaban 5 milliGRAM(s) Oral two times a day  atorvastatin 80 milliGRAM(s) Oral at bedtime  carvedilol 25 milliGRAM(s) Oral every 12 hours  clonazePAM  Tablet 1 milliGRAM(s) Oral at bedtime  dextrose 10% Bolus 125 milliLiter(s) IV Bolus once  dextrose 5%. 1000 milliLiter(s) (100 mL/Hr) IV Continuous <Continuous>  dextrose 5%. 1000 milliLiter(s) (50 mL/Hr) IV Continuous <Continuous>  dextrose 50% Injectable 25 Gram(s) IV Push once  dextrose 50% Injectable 12.5 Gram(s) IV Push once  gabapentin 100 milliGRAM(s) Oral every 8 hours  glucagon  Injectable 1 milliGRAM(s) IntraMuscular once  HYDROmorphone   Tablet 8 milliGRAM(s) Oral every 6 hours  insulin glargine Injectable (LANTUS) 40 Unit(s) SubCutaneous every morning  insulin lispro (ADMELOG) corrective regimen sliding scale   SubCutaneous three times a day before meals  insulin lispro (ADMELOG) corrective regimen sliding scale   SubCutaneous at bedtime  insulin lispro Injectable (ADMELOG) 13 Unit(s) SubCutaneous three times a day before meals  lidocaine   4% Patch 1 Patch Transdermal daily  methadone    Tablet 5 milliGRAM(s) Oral four times a day  naloxone Injectable 0.4 milliGRAM(s) IV Push once  pantoprazole    Tablet 40 milliGRAM(s) Oral before breakfast  polyethylene glycol 3350 17 Gram(s) Oral daily  pregabalin 150 milliGRAM(s) Oral three times a day  senna 2 Tablet(s) Oral at bedtime  sertraline 200 milliGRAM(s) Oral daily  traZODone 150 milliGRAM(s) Oral at bedtime    MEDICATIONS  (PRN):  aluminum hydroxide/magnesium hydroxide/simethicone Suspension 30 milliLiter(s) Oral every 4 hours PRN Dyspepsia  bisacodyl 5 milliGRAM(s) Oral daily PRN Constipation  dextrose Oral Gel 15 Gram(s) Oral once PRN Blood Glucose LESS THAN 70 milliGRAM(s)/deciliter  HYDROmorphone  Injectable 0.5 milliGRAM(s) IV Push every 6 hours PRN Severe Pain (7 - 10)  melatonin 3 milliGRAM(s) Oral at bedtime PRN Insomnia  ondansetron Injectable 4 milliGRAM(s) IV Push every 8 hours PRN Nausea and/or Vomiting  tiZANidine 4 milliGRAM(s) Oral every 8 hours PRN Muscle Spasm      Allergies    No Known Allergies    Intolerances           Vital Signs Last 24 Hrs  T(C): 37.2 (04 Jun 2024 20:06), Max: 37.2 (04 Jun 2024 20:06)  T(F): 98.9 (04 Jun 2024 20:06), Max: 98.9 (04 Jun 2024 20:06)  HR: 80 (04 Jun 2024 20:06) (70 - 80)  BP: 129/78 (04 Jun 2024 20:06) (106/71 - 147/68)  BP(mean): --  RR: 18 (04 Jun 2024 20:06) (18 - 18)  SpO2: 94% (04 Jun 2024 20:06) (93% - 96%)    Parameters below as of 04 Jun 2024 20:06  Patient On (Oxygen Delivery Method): room air        Physical Exam:  GENERAL: patient appears well, no acute distress, appropriately interactive  EYES: EOMI, PERRL, pupils appear normal size, sclera clear, no exudates  NECK: +Paraspinal cervical hypertonicity, tenderness to palpation  LUNGS: CTA B/L, no w/r/r  HEART: soft S1/S2, regular rate and rhythm, no murmurs noted,  GASTROINTESTINAL: abdomen is soft, nontender, nondistended, normoactive bowel sounds  MUSCULOSKELETAL: Diffusely 5/5 throughout, with exception to LUE grossly 3-4/5  NEUROLOGIC: awake, alert, oriented x3, good muscle tone in all 4 extremities    LABS:                        12.2   6.41  )-----------( 214      ( 04 Jun 2024 09:12 )             38.0     06-04    139  |  104  |  12  ----------------------------<  359<H>  3.8   |  28  |  0.79    Ca    9.2      04 Jun 2024 09:12        Urinalysis Basic - ( 04 Jun 2024 09:12 )    Color: x / Appearance: x / SG: x / pH: x  Gluc: 359 mg/dL / Ketone: x  / Bili: x / Urobili: x   Blood: x / Protein: x / Nitrite: x   Leuk Esterase: x / RBC: x / WBC x   Sq Epi: x / Non Sq Epi: x / Bacteria: x        RADIOLOGY & ADDITIONAL TESTS:

## 2024-06-07 NOTE — DISCHARGE NOTE NURSING/CASE MANAGEMENT/SOCIAL WORK - NSDCVIVACCINE_GEN_ALL_CORE_FT
Tdap; 24-Oct-2021 12:15; Jahaira Ruiz (RN); [employer]; [id number] (Exp. Date: 29-Jul-2023); IntraMuscular; Deltoid Right.; 0.5 milliLiter(s); VIS (VIS Published: 09-May-2013, VIS Presented: 24-Oct-2021);   Tdap; 28-May-2024 15:45; Tanesha Prince (RN); [employer]; [id number] (Exp. Date: 28-Dec-2025); IntraMuscular; Deltoid Left.; 0.5 milliLiter(s); VIS (VIS Published: 09-May-2013, VIS Presented: 28-May-2024);

## 2024-06-07 NOTE — CASE MANAGEMENT PROGRESS NOTE - NSCMPROGRESSNOTE_GEN_ALL_CORE
Patient medically cleared for DC. IMM given and explained ,with patients verbalized understanding . Patient OOB ad janette w/o a device.. No skilled F/U need for transition home . Family to transport . CM to remain available

## 2024-06-08 ENCOUNTER — APPOINTMENT (OUTPATIENT)
Dept: ORTHOPEDIC SURGERY | Facility: CLINIC | Age: 59
End: 2024-06-08
Payer: MEDICARE

## 2024-06-08 PROCEDURE — 99214 OFFICE O/P EST MOD 30 MIN: CPT

## 2024-06-08 NOTE — REVIEW OF SYSTEMS
[Joint Pain] : joint pain [Joint Stiffness] : joint stiffness [Negative] : Heme/Lymph Oxybutynin Counseling:  I discussed with the patient the risks of oxybutynin including but not limited to skin rash, drowsiness, dry mouth, difficulty urinating, and blurred vision.

## 2024-06-10 NOTE — HISTORY OF PRESENT ILLNESS
[Lower back] : lower back [8] : 8 [7] : 7 [Dull/Aching] : dull/aching [Localized] : localized [Frequent] : frequent [Meds] : meds [Nothing helps with pain getting better] : Nothing helps with pain getting better [Sitting] : sitting [Standing] : standing [de-identified] : 6/8/24: Follow up L Spine. Reports over a week ago sustained a fall, was hospitalized for 8 days d/t severe T spine pain, had MRI completed. L Spine MRI review.   5/16/24: Follow Up Lower back. No changes since last visit. Would like to discuss sx options once again.  5/1/24: 58 year old male presenting with lower back pain for many years. He has been under that care of another ortho and pain management. Pain management has not been successful in managing his pain. He has done extended PT. he has had previous epidural without relief. No injury/trauma. Comes in with imaging from [facility]. He has had previous neck fusion C4-C7.  [] : no [FreeTextEntry5] : Pt state he fell downstairs and hurt his thoracic spine 06/07/24. Here to review MRI report for lumbar.

## 2024-06-10 NOTE — DATA REVIEWED
[MRI] : MRI [Lumbar Spine] : lumbar spine [I independently reviewed and interpreted images and report] : I independently reviewed and interpreted images and report [Report was reviewed and noted in the chart] : The report was reviewed and noted in the chart [FreeTextEntry1] : @PACS T Spine MRI 1. No fx's, spine cord compression or HNP.  O&C L Spine MRI 5/24/24 1. L4-5 severe disc space loss and facet OA, moderate b/l lateral recess stenosis, moderate R foraminal stenosis w/o signs of infectious spondylitis.     [facility] 3/30/2024:   Edema of the L4-L5 endplates and associated disc space without discrete epidural collection. Findings may be related to active degenerative change though underlying infection is not excluded given disc edema.   Degenerative change as detailed above.   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

## 2024-06-10 NOTE — IMAGING
[de-identified] : L Spine Inspection: No defects or deformities Palpation: No tenderness or spasms in b/l lumbar paraspinal musculature ROM: Full with stiffness Strength: 5/5 b/l hip flexors, knee extensors, ankle dorsiflexors, EHL, ankle plantarflexors Neuro: Sensation LT I Negative b/l SLR Toe and heal walk intact Gait non antalgic

## 2024-06-10 NOTE — ASSESSMENT
[FreeTextEntry1] : 57 y/o M with chronic low back pain for many years. MRI shows disc fully collapse at L4-5 with inflammatory endplate changes, r/o infectious spondyilitis. Managing with extensive conservative care, including PT, meds & ANTHONY's with minimal to no relief.   - Revisited surgical options: plan for L4-5 XLIF  - Kyle would like to pursue surgery, - D/t altered blood work, will repeat lab work prior to sx. but the MRI signal is reassuring in that this represents endplate edema as opposed to being suggestive of osteomyelitis versus discitis;    The risks and benefits of surgery have been discussed. Risks include but are not limited to bleeding, infection, reaction to anesthesia, injury to blood vessels and nerves, malunion, nonunion, DVT, PE, necessity of repeat surgery, CF leak/repair, chronic pain, loss of limb and death. The patient understands the risks and agrees with the surgical plan. All questions have been answered.

## 2024-06-12 ENCOUNTER — APPOINTMENT (OUTPATIENT)
Dept: PAIN MANAGEMENT | Facility: CLINIC | Age: 59
End: 2024-06-12
Payer: OTHER MISCELLANEOUS

## 2024-06-12 PROCEDURE — 99213 OFFICE O/P EST LOW 20 MIN: CPT

## 2024-06-12 RX ORDER — METHADONE HYDROCHLORIDE 5 MG/1
5 TABLET ORAL
Qty: 120 | Refills: 0 | Status: ACTIVE | COMMUNITY
Start: 2023-06-12 | End: 1900-01-01

## 2024-06-12 RX ORDER — MORPHINE SULFATE 15 MG/1
15 TABLET ORAL EVERY 6 HOURS
Qty: 120 | Refills: 0 | Status: ACTIVE | COMMUNITY
Start: 2024-04-09 | End: 1900-01-01

## 2024-06-12 NOTE — ASSESSMENT
[FreeTextEntry1] : Interim history The patient is tolerating their medications without problems.  Patient states that he is imminently scheduled for another operation in his cervical spine.  He had hospitalization at [facility] and was given 8 mg Dilaudid at discharge that he felt per hospitalist request.. The use of medications appears appropriate and there are no aberrant behaviors noted. Side effects to current medications are denied. Average pain score for the month is 8 out of ten. The patient's current medications are documented to the best of their ability. The  was obtained and reviewed prior to the visit, and any discrepancies were discussed with the patient. Objective information Since the last visit there are no additional radiologic studies, labs, or pain complaints. There are no changes in the patient's physical status. Plan The patient was given refill of their medication at their current level and will return to the office as needed for follow-up. The patient is showing no aberrant behavior or evidence of diversion. Opioid contract and opioid risk assessment on chart.  reviewed and any discrepancy discussed with patent. Applicable urine toxicology reviewed and recorded in the patient's electronic record. Urine toxicology is ordered for patient per office protocol or patient's risk assessment.  Patient will follow-up in 1 month unless new issues arise the patient has returned earlier.  Patient again instructed that when he has surgery that if his surgeon feels that he needs more pain medication or giving him at the present time the surgeon can write those medications without violating the patient's narcotic contract.  This note was generated by using Dragon medical dictation software.  A reasonable effort has been made for proofreading its contents, but typos may still remain.  If there are any questions or points of clarification needed, please notify my office.

## 2024-06-12 NOTE — HISTORY OF PRESENT ILLNESS
[Neck] : neck [Left Arm] : left arm [7] : 7 [Burning] : burning [Dull/Aching] : dull/aching [Shooting] : shooting [Constant] : constant [Household chores] : household chores [Leisure] : leisure [Sleep] : sleep [Meds] : meds [Disabled] : Work status: disabled [Home] : at home, [unfilled] , at the time of the visit. [Medical Office: ([facility])___] : at the medical office located in  [Verbal consent obtained from patient] : the patient, [unfilled] [] : Patient is currently playing sports: no [FreeTextEntry1] : LEFT HAND [FreeTextEntry6] : ELECTRIC SHOCK LIKE [FreeTextEntry7] : DOWN ARM [de-identified] : TOO MUCH ACTIVITY [de-identified] : 2022

## 2024-06-18 ENCOUNTER — NON-APPOINTMENT (OUTPATIENT)
Age: 59
End: 2024-06-18

## 2024-06-27 ENCOUNTER — APPOINTMENT (OUTPATIENT)
Dept: ORTHOPEDIC SURGERY | Facility: CLINIC | Age: 59
End: 2024-06-27
Payer: MEDICARE

## 2024-06-27 PROCEDURE — 72040 X-RAY EXAM NECK SPINE 2-3 VW: CPT

## 2024-06-27 PROCEDURE — 99214 OFFICE O/P EST MOD 30 MIN: CPT

## 2024-06-28 ENCOUNTER — APPOINTMENT (OUTPATIENT)
Dept: PAIN MANAGEMENT | Facility: CLINIC | Age: 59
End: 2024-06-28
Payer: OTHER MISCELLANEOUS

## 2024-06-28 VITALS — HEIGHT: 74 IN | BODY MASS INDEX: 32.73 KG/M2 | WEIGHT: 255 LBS

## 2024-06-28 DIAGNOSIS — M54.12 RADICULOPATHY, CERVICAL REGION: ICD-10-CM

## 2024-06-28 DIAGNOSIS — M51.26 OTHER INTERVERTEBRAL DISC DISPLACEMENT, LUMBAR REGION: ICD-10-CM

## 2024-06-28 DIAGNOSIS — M54.16 RADICULOPATHY, LUMBAR REGION: ICD-10-CM

## 2024-06-28 PROCEDURE — 99243 OFF/OP CNSLTJ NEW/EST LOW 30: CPT

## 2024-07-10 ENCOUNTER — APPOINTMENT (OUTPATIENT)
Dept: PAIN MANAGEMENT | Facility: CLINIC | Age: 59
End: 2024-07-10
Payer: OTHER MISCELLANEOUS

## 2024-07-10 DIAGNOSIS — M54.12 RADICULOPATHY, CERVICAL REGION: ICD-10-CM

## 2024-07-10 PROCEDURE — 99213 OFFICE O/P EST LOW 20 MIN: CPT

## 2024-07-23 ENCOUNTER — RX RENEWAL (OUTPATIENT)
Age: 59
End: 2024-07-23

## 2024-07-24 PROBLEM — I21.9 ACUTE MYOCARDIAL INFARCTION, UNSPECIFIED: Chronic | Status: ACTIVE | Noted: 2024-07-22

## 2024-07-30 ENCOUNTER — APPOINTMENT (OUTPATIENT)
Dept: FAMILY MEDICINE | Facility: CLINIC | Age: 59
End: 2024-07-30
Payer: COMMERCIAL

## 2024-07-30 VITALS
OXYGEN SATURATION: 94 % | HEART RATE: 78 BPM | TEMPERATURE: 98 F | RESPIRATION RATE: 16 BRPM | HEIGHT: 74 IN | WEIGHT: 264 LBS | SYSTOLIC BLOOD PRESSURE: 104 MMHG | BODY MASS INDEX: 33.88 KG/M2 | DIASTOLIC BLOOD PRESSURE: 69 MMHG

## 2024-07-30 DIAGNOSIS — Z01.818 ENCOUNTER FOR OTHER PREPROCEDURAL EXAMINATION: ICD-10-CM

## 2024-07-30 PROCEDURE — G2211 COMPLEX E/M VISIT ADD ON: CPT

## 2024-07-30 PROCEDURE — 99203 OFFICE O/P NEW LOW 30 MIN: CPT

## 2024-07-30 PROCEDURE — 99213 OFFICE O/P EST LOW 20 MIN: CPT

## 2024-07-30 NOTE — HISTORY OF PRESENT ILLNESS
[FreeTextEntry4] : 58-year-old male with significant PMH of DM2, HLD, GERD, HTN, is here for preoperative medical evaluation for lateral interbody fusion of L4-L5 on 8/9/24 with Dr. Isabel. No history of anesthesia reactions, no med allergies. No allergies to iodine or shellfish. No allergy to latex. Negative for smoking tobacco. Takes atorvastatin, carvedilol, iron, gabapentin, empagliflozin, insulin, metformin, pantoprazole, and tizanidine. He is on Eliquis. He can walk more than 4 city blocks without dyspnea. Denies history of heart failure, sleep apnea, or malnutrition.  Denies headache, dizziness, nausea, vomiting, diarrhea, vision changes, chest pain, dyspnea, abdominal pain, changes to bowel or bladder habits, or lower extremity swelling.

## 2024-08-09 ENCOUNTER — TRANSCRIPTION ENCOUNTER (OUTPATIENT)
Age: 59
End: 2024-08-09

## 2024-08-09 ENCOUNTER — APPOINTMENT (OUTPATIENT)
Dept: ORTHOPEDIC SURGERY | Facility: HOSPITAL | Age: 59
End: 2024-08-09

## 2024-08-09 PROCEDURE — 22558 ARTHRD ANT NTRBD MIN DSC LUM: CPT

## 2024-08-09 PROCEDURE — 22853 INSJ BIOMECHANICAL DEVICE: CPT

## 2024-08-09 PROCEDURE — 20930 SP BONE ALGRFT MORSEL ADD-ON: CPT

## 2024-08-13 ENCOUNTER — TRANSCRIPTION ENCOUNTER (OUTPATIENT)
Age: 59
End: 2024-08-13

## 2024-08-14 ENCOUNTER — NON-APPOINTMENT (OUTPATIENT)
Age: 59
End: 2024-08-14

## 2024-08-19 ENCOUNTER — OUTPATIENT (OUTPATIENT)
Dept: OUTPATIENT SERVICES | Facility: HOSPITAL | Age: 59
LOS: 1 days | Discharge: HOME | End: 2024-08-19
Payer: COMMERCIAL

## 2024-08-19 DIAGNOSIS — Z98.1 ARTHRODESIS STATUS: Chronic | ICD-10-CM

## 2024-08-19 DIAGNOSIS — Z98.890 OTHER SPECIFIED POSTPROCEDURAL STATES: Chronic | ICD-10-CM

## 2024-08-19 DIAGNOSIS — S34.104A UNSPECIFIED INJURY TO L4 LEVEL OF LUMBAR SPINAL CORD, INITIAL ENCOUNTER: ICD-10-CM

## 2024-08-19 PROCEDURE — 72100 X-RAY EXAM L-S SPINE 2/3 VWS: CPT | Mod: 26

## 2024-08-20 ENCOUNTER — APPOINTMENT (OUTPATIENT)
Dept: ORTHOPEDIC SURGERY | Facility: CLINIC | Age: 59
End: 2024-08-20

## 2024-08-20 ENCOUNTER — OUTPATIENT (OUTPATIENT)
Dept: OUTPATIENT SERVICES | Facility: HOSPITAL | Age: 59
LOS: 1 days | Discharge: HOME | End: 2024-08-20
Payer: MEDICARE

## 2024-08-20 DIAGNOSIS — Z98.890 OTHER SPECIFIED POSTPROCEDURAL STATES: Chronic | ICD-10-CM

## 2024-08-20 DIAGNOSIS — Z98.1 ARTHRODESIS STATUS: Chronic | ICD-10-CM

## 2024-08-20 DIAGNOSIS — I82.402 ACUTE EMBOLISM AND THROMBOSIS OF UNSPECIFIED DEEP VEINS OF LEFT LOWER EXTREMITY: ICD-10-CM

## 2024-08-20 PROCEDURE — 93971 EXTREMITY STUDY: CPT | Mod: 26,LT

## 2024-08-21 NOTE — DISCHARGE NOTE PROVIDER - PROVIDER TOKENS
ProviderL Estela Grewal  [phone]  pt is ready for d/c and have no script for Oxycodone to take home.    KOURTNEY Sosa  [phone]   PROVIDER:[TOKEN:[[id number]]]

## 2024-08-27 ENCOUNTER — OUTPATIENT (OUTPATIENT)
Dept: OUTPATIENT SERVICES | Facility: HOSPITAL | Age: 59
LOS: 1 days | Discharge: HOME | End: 2024-08-27
Payer: MEDICARE

## 2024-08-27 DIAGNOSIS — Z98.890 OTHER SPECIFIED POSTPROCEDURAL STATES: Chronic | ICD-10-CM

## 2024-08-27 DIAGNOSIS — Z98.1 ARTHRODESIS STATUS: Chronic | ICD-10-CM

## 2024-08-27 DIAGNOSIS — M43.8X9 OTHER SPECIFIED DEFORMING DORSOPATHIES, SITE UNSPECIFIED: ICD-10-CM

## 2024-08-27 PROCEDURE — 72131 CT LUMBAR SPINE W/O DYE: CPT | Mod: 26,MH

## 2024-08-29 ENCOUNTER — APPOINTMENT (OUTPATIENT)
Dept: ORTHOPEDIC SURGERY | Facility: CLINIC | Age: 59
End: 2024-08-29

## 2024-08-29 DIAGNOSIS — Z98.1 ARTHRODESIS STATUS: ICD-10-CM

## 2024-08-29 PROCEDURE — 99214 OFFICE O/P EST MOD 30 MIN: CPT | Mod: 24

## 2024-09-03 ENCOUNTER — APPOINTMENT (OUTPATIENT)
Dept: PAIN MANAGEMENT | Facility: CLINIC | Age: 59
End: 2024-09-03
Payer: MEDICARE

## 2024-09-03 DIAGNOSIS — M54.16 RADICULOPATHY, LUMBAR REGION: ICD-10-CM

## 2024-09-03 PROCEDURE — 99213 OFFICE O/P EST LOW 20 MIN: CPT

## 2024-09-03 NOTE — PHYSICAL EXAM
[de-identified] : L Spine Inspection: Incision w/o signs of infection Palpation: No tenderness. Spasm in b/l lumbar paraspinal musculature ROM: limited in all planes.  Strength: 5/5 b/l hip flexors, knee extensors, ankle dorsiflexors, EHL, ankle plantarflexors Neuro: Sensation LT I Negative b/l SLR Toe and heal walk intact Gait antalgic, ambulating with a cane.

## 2024-09-03 NOTE — IMAGING
[de-identified] : L Spine Inspection: No defects or deformities Palpation: No tenderness or spasms in b/l lumbar paraspinal musculature ROM: Full with stiffness Strength: 5/5 b/l hip flexors, knee extensors, ankle dorsiflexors, EHL, ankle plantarflexors Neuro: Sensation LT I Negative b/l SLR Toe and heal walk intact Gait non antalgic

## 2024-09-03 NOTE — IMAGING
[de-identified] : L Spine Inspection: No defects or deformities Palpation: No tenderness or spasms in b/l lumbar paraspinal musculature ROM: Full with stiffness Strength: 5/5 b/l hip flexors, knee extensors, ankle dorsiflexors, EHL, ankle plantarflexors Neuro: Sensation LT I Negative b/l SLR Toe and heal walk intact Gait non antalgic

## 2024-09-03 NOTE — HISTORY OF PRESENT ILLNESS
[Lower back] : lower back [8] : 8 [7] : 7 [Dull/Aching] : dull/aching [Localized] : localized [Frequent] : frequent [Meds] : meds [Nothing helps with pain getting better] : Nothing helps with pain getting better [Sitting] : sitting [Standing] : standing [de-identified] : DOS: 8/9/24 L4-5 XLIF  8/29/24: PO#1. 2 weeks s/p L4-5 XLIF. Pain has been unbearable since sx. Feels LLE is still weak. Ambulating with a cane. Postop CT scan review.   58 year old male presenting with lower back pain for many years. He has been under that care of another ortho and pain management. Pain management has not been successful in managing his pain. He has done extended PT. he has had previous epidural without relief. No injury/trauma. Comes in with imaging from [facility]. He has had previous neck fusion C4-C7.  [] : no [FreeTextEntry5] : Follow Up- L Spine. Has lumbar sx sched for 08/09/2024. No changes in low back pain. Would also have his neck evaluated as well. He is unhappy w/current PM. Weakness down left arm.

## 2024-09-03 NOTE — DATA REVIEWED
[MRI] : MRI [Lumbar Spine] : lumbar spine [Report was reviewed and noted in the chart] : The report was reviewed and noted in the chart [I independently reviewed and interpreted images and report] : I independently reviewed and interpreted images and report [CT Scan] : CT scan [FreeTextEntry1] : @PACS Postop CT scan 1. No signs of device subsiding.   @PACS T Spine MRI 1. No fx's, spine cord compression or HNP.  O&C L Spine MRI 5/24/24 1. L4-5 severe disc space loss and facet OA, moderate b/l lateral recess stenosis, moderate R foraminal stenosis w/o signs of infectious spondylitis.     [facility] 3/30/2024:   Edema of the L4-L5 endplates and associated disc space without discrete epidural collection. Findings may be related to active degenerative change though underlying infection is not excluded given disc edema.   Degenerative change as detailed above.   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

## 2024-09-03 NOTE — ASSESSMENT
[FreeTextEntry1] : PO#1. 2 weeks s/p L4-5 XLIF w/o posterior fixation. Postop CT scan w/o signs of device subsiding and left psoas edema. Incision healing removed stitches today. Kyle c/o of persisting LLE weakness discussed this is due to approach related procedure and, in his case, there was an extended period of muscle irritation. Advised it will progressively improve with time and PT.   - F/up in 2 weeks for updated LS xr's to eval device.

## 2024-09-03 NOTE — HISTORY OF PRESENT ILLNESS
[Neck] : neck [Left Arm] : left arm [Burning] : burning [Dull/Aching] : dull/aching [Shooting] : shooting [Constant] : constant [Household chores] : household chores [Leisure] : leisure [Sleep] : sleep [Meds] : meds [Disabled] : Work status: disabled [7] : 7 [Home] : at home, [unfilled] , at the time of the visit. [Medical Office: ([facility])___] : at the medical office located in  [Verbal consent obtained from patient] : the patient, [unfilled] [] : Patient is currently playing sports: no [FreeTextEntry1] : LEFT HAND [FreeTextEntry6] : ELECTRIC SHOCK LIKE [FreeTextEntry7] : DOWN ARM [de-identified] : TOO MUCH ACTIVITY [de-identified] : 2022

## 2024-09-03 NOTE — PHYSICAL EXAM
[de-identified] : L Spine Inspection: Incision w/o signs of infection Palpation: No tenderness. Spasm in b/l lumbar paraspinal musculature ROM: limited in all planes.  Strength: 5/5 b/l hip flexors, knee extensors, ankle dorsiflexors, EHL, ankle plantarflexors Neuro: Sensation LT I Negative b/l SLR Toe and heal walk intact Gait antalgic, ambulating with a cane.

## 2024-09-03 NOTE — HISTORY OF PRESENT ILLNESS
[Lower back] : lower back [8] : 8 [7] : 7 [Dull/Aching] : dull/aching [Localized] : localized [Frequent] : frequent [Meds] : meds [Nothing helps with pain getting better] : Nothing helps with pain getting better [Sitting] : sitting [Standing] : standing [de-identified] : DOS: 8/9/24 L4-5 XLIF  8/29/24: PO#1. 2 weeks s/p L4-5 XLIF. Pain has been unbearable since sx. Feels LLE is still weak. Ambulating with a cane. Postop CT scan review.   58 year old male presenting with lower back pain for many years. He has been under that care of another ortho and pain management. Pain management has not been successful in managing his pain. He has done extended PT. he has had previous epidural without relief. No injury/trauma. Comes in with imaging from [facility]. He has had previous neck fusion C4-C7.  [] : no [FreeTextEntry5] : Follow Up- L Spine. Has lumbar sx sched for 08/09/2024. No changes in low back pain. Would also have his neck evaluated as well. He is unhappy w/current PM. Weakness down left arm.

## 2024-09-03 NOTE — ASSESSMENT
[FreeTextEntry1] : Interim history The patient is tolerating their medications without problems.  Since last visit anterior posterior fusion of the spine.  States that after the hospitalization he was sent to rehab and is now back home.  Is given a small prescription of his medications due to the length of time that he was in rehab.  Patient states that the present time he is restricted to walking no more than 100 feet outside 450 feet inside and he is having significant discomfort in a radicular pattern in the L3-L4 area.  He is still under the care of a surgeon but is significantly limited in his abilities at the present time.  The use of medications appears appropriate and there are no aberrant behaviors noted. Side effects to current medications are denied. Average pain score for the month is 8 out of ten. The patient's current medications are documented to the best of their ability. The  was obtained and reviewed prior to the visit, and any discrepancies were discussed with the patient. Objective information Since the last visit there are no additional radiologic studies, labs, or pain complaints. There are no changes in the patient's physical status. Plan The patient was given refill of their medication at their current level and will return to the office as needed for follow-up.  Patient is instructed to continue with the postoperative plan from the surgeon that includes an increase in his pain medication that will not violate his narcotic contract with this office until he is back to his baseline level.  The patient is showing no aberrant behavior or evidence of diversion. Opioid contract and opioid risk assessment on chart.  reviewed and any discrepancy discussed with patent. Applicable urine toxicology reviewed and recorded in the patient's electronic record. Urine toxicology is ordered for patient per office protocol or patient's risk assessment.  Patient will follow-up in 1 month unless new issues arise the patient has returned earlier.  This note was generated by using Dragon medical dictation software.  A reasonable effort has been made for proofreading its contents, but typos may still remain.  If there are any questions or points of clarification needed, please notify my office.

## 2024-09-14 ENCOUNTER — EMERGENCY (EMERGENCY)
Facility: HOSPITAL | Age: 59
LOS: 1 days | Discharge: HOME | End: 2024-09-14
Attending: STUDENT IN AN ORGANIZED HEALTH CARE EDUCATION/TRAINING PROGRAM | Admitting: STUDENT IN AN ORGANIZED HEALTH CARE EDUCATION/TRAINING PROGRAM
Payer: MEDICARE

## 2024-09-14 VITALS
SYSTOLIC BLOOD PRESSURE: 144 MMHG | HEART RATE: 71 BPM | DIASTOLIC BLOOD PRESSURE: 84 MMHG | RESPIRATION RATE: 18 BRPM | OXYGEN SATURATION: 97 %

## 2024-09-14 VITALS
WEIGHT: 259.93 LBS | TEMPERATURE: 99 F | OXYGEN SATURATION: 96 % | HEIGHT: 74 IN | RESPIRATION RATE: 18 BRPM | HEART RATE: 78 BPM | SYSTOLIC BLOOD PRESSURE: 127 MMHG | DIASTOLIC BLOOD PRESSURE: 73 MMHG

## 2024-09-14 DIAGNOSIS — Z98.1 ARTHRODESIS STATUS: Chronic | ICD-10-CM

## 2024-09-14 DIAGNOSIS — Z98.890 OTHER SPECIFIED POSTPROCEDURAL STATES: Chronic | ICD-10-CM

## 2024-09-14 LAB
ACETONE SERPL-MCNC: NEGATIVE — SIGNIFICANT CHANGE UP
ALBUMIN SERPL ELPH-MCNC: 3.3 G/DL — SIGNIFICANT CHANGE UP (ref 3.3–5)
ALP SERPL-CCNC: 127 U/L — HIGH (ref 40–120)
ALT FLD-CCNC: 31 U/L — SIGNIFICANT CHANGE UP (ref 10–45)
ANION GAP SERPL CALC-SCNC: 8 MMOL/L — SIGNIFICANT CHANGE UP (ref 5–17)
APPEARANCE UR: CLEAR — SIGNIFICANT CHANGE UP
AST SERPL-CCNC: 8 U/L — LOW (ref 10–40)
BASOPHILS # BLD AUTO: 0.04 K/UL — SIGNIFICANT CHANGE UP (ref 0–0.2)
BASOPHILS NFR BLD AUTO: 0.7 % — SIGNIFICANT CHANGE UP (ref 0–2)
BILIRUB SERPL-MCNC: 0.2 MG/DL — SIGNIFICANT CHANGE UP (ref 0.2–1.2)
BILIRUB UR-MCNC: NEGATIVE — SIGNIFICANT CHANGE UP
BUN SERPL-MCNC: 15 MG/DL — SIGNIFICANT CHANGE UP (ref 7–23)
CALCIUM SERPL-MCNC: 9.4 MG/DL — SIGNIFICANT CHANGE UP (ref 8.4–10.5)
CHLORIDE SERPL-SCNC: 100 MMOL/L — SIGNIFICANT CHANGE UP (ref 96–108)
CO2 SERPL-SCNC: 27 MMOL/L — SIGNIFICANT CHANGE UP (ref 22–31)
COLOR SPEC: YELLOW — SIGNIFICANT CHANGE UP
CREAT SERPL-MCNC: 0.88 MG/DL — SIGNIFICANT CHANGE UP (ref 0.5–1.3)
DIFF PNL FLD: NEGATIVE — SIGNIFICANT CHANGE UP
EGFR: 100 ML/MIN/1.73M2 — SIGNIFICANT CHANGE UP
EOSINOPHIL # BLD AUTO: 0.16 K/UL — SIGNIFICANT CHANGE UP (ref 0–0.5)
EOSINOPHIL NFR BLD AUTO: 2.6 % — SIGNIFICANT CHANGE UP (ref 0–6)
GLUCOSE BLDC GLUCOMTR-MCNC: 177 MG/DL — HIGH (ref 70–99)
GLUCOSE SERPL-MCNC: 287 MG/DL — HIGH (ref 70–99)
GLUCOSE UR QL: >=1000 MG/DL
HCT VFR BLD CALC: 35.7 % — LOW (ref 39–50)
HGB BLD-MCNC: 11.8 G/DL — LOW (ref 13–17)
IMM GRANULOCYTES NFR BLD AUTO: 0.3 % — SIGNIFICANT CHANGE UP (ref 0–0.9)
KETONES UR-MCNC: NEGATIVE MG/DL — SIGNIFICANT CHANGE UP
LEUKOCYTE ESTERASE UR-ACNC: NEGATIVE — SIGNIFICANT CHANGE UP
LYMPHOCYTES # BLD AUTO: 2.06 K/UL — SIGNIFICANT CHANGE UP (ref 1–3.3)
LYMPHOCYTES # BLD AUTO: 33.6 % — SIGNIFICANT CHANGE UP (ref 13–44)
MAGNESIUM SERPL-MCNC: 1.7 MG/DL — SIGNIFICANT CHANGE UP (ref 1.6–2.6)
MCHC RBC-ENTMCNC: 27.9 PG — SIGNIFICANT CHANGE UP (ref 27–34)
MCHC RBC-ENTMCNC: 33.1 GM/DL — SIGNIFICANT CHANGE UP (ref 32–36)
MCV RBC AUTO: 84.4 FL — SIGNIFICANT CHANGE UP (ref 80–100)
MONOCYTES # BLD AUTO: 0.49 K/UL — SIGNIFICANT CHANGE UP (ref 0–0.9)
MONOCYTES NFR BLD AUTO: 8 % — SIGNIFICANT CHANGE UP (ref 2–14)
NEUTROPHILS # BLD AUTO: 3.37 K/UL — SIGNIFICANT CHANGE UP (ref 1.8–7.4)
NEUTROPHILS NFR BLD AUTO: 54.8 % — SIGNIFICANT CHANGE UP (ref 43–77)
NITRITE UR-MCNC: NEGATIVE — SIGNIFICANT CHANGE UP
NRBC # BLD: 0 /100 WBCS — SIGNIFICANT CHANGE UP (ref 0–0)
PH UR: 6 — SIGNIFICANT CHANGE UP (ref 5–8)
PHOSPHATE SERPL-MCNC: 3.1 MG/DL — SIGNIFICANT CHANGE UP (ref 2.5–4.5)
PLATELET # BLD AUTO: 268 K/UL — SIGNIFICANT CHANGE UP (ref 150–400)
POTASSIUM SERPL-MCNC: 4.2 MMOL/L — SIGNIFICANT CHANGE UP (ref 3.5–5.3)
POTASSIUM SERPL-SCNC: 4.2 MMOL/L — SIGNIFICANT CHANGE UP (ref 3.5–5.3)
PROT SERPL-MCNC: 7 G/DL — SIGNIFICANT CHANGE UP (ref 6–8.3)
PROT UR-MCNC: NEGATIVE MG/DL — SIGNIFICANT CHANGE UP
RBC # BLD: 4.23 M/UL — SIGNIFICANT CHANGE UP (ref 4.2–5.8)
RBC # FLD: 14.2 % — SIGNIFICANT CHANGE UP (ref 10.3–14.5)
SODIUM SERPL-SCNC: 135 MMOL/L — SIGNIFICANT CHANGE UP (ref 135–145)
SP GR SPEC: 1.04 — HIGH (ref 1–1.03)
UROBILINOGEN FLD QL: 0.2 MG/DL — SIGNIFICANT CHANGE UP (ref 0.2–1)
WBC # BLD: 6.14 K/UL — SIGNIFICANT CHANGE UP (ref 3.8–10.5)
WBC # FLD AUTO: 6.14 K/UL — SIGNIFICANT CHANGE UP (ref 3.8–10.5)

## 2024-09-14 PROCEDURE — 96374 THER/PROPH/DIAG INJ IV PUSH: CPT

## 2024-09-14 PROCEDURE — 82009 KETONE BODYS QUAL: CPT

## 2024-09-14 PROCEDURE — 93005 ELECTROCARDIOGRAM TRACING: CPT

## 2024-09-14 PROCEDURE — 81003 URINALYSIS AUTO W/O SCOPE: CPT

## 2024-09-14 PROCEDURE — 99284 EMERGENCY DEPT VISIT MOD MDM: CPT | Mod: 25

## 2024-09-14 PROCEDURE — 85025 COMPLETE CBC W/AUTO DIFF WBC: CPT

## 2024-09-14 PROCEDURE — 84100 ASSAY OF PHOSPHORUS: CPT

## 2024-09-14 PROCEDURE — 36415 COLL VENOUS BLD VENIPUNCTURE: CPT

## 2024-09-14 PROCEDURE — 93010 ELECTROCARDIOGRAM REPORT: CPT

## 2024-09-14 PROCEDURE — 80053 COMPREHEN METABOLIC PANEL: CPT

## 2024-09-14 PROCEDURE — 82962 GLUCOSE BLOOD TEST: CPT

## 2024-09-14 PROCEDURE — 99284 EMERGENCY DEPT VISIT MOD MDM: CPT

## 2024-09-14 PROCEDURE — 83735 ASSAY OF MAGNESIUM: CPT

## 2024-09-14 RX ORDER — INSULIN GLARGINE 100 [IU]/ML
0 INJECTION, SOLUTION SUBCUTANEOUS
Refills: 0
Start: 2024-09-14

## 2024-09-14 RX ORDER — INSULIN GLARGINE 100 [IU]/ML
46 INJECTION, SOLUTION SUBCUTANEOUS
Qty: 1 | Refills: 0
Start: 2024-09-14 | End: 2024-09-20

## 2024-09-14 RX ORDER — HYDROMORPHONE HYDROCHLORIDE 2 MG/1
1 TABLET ORAL ONCE
Refills: 0 | Status: DISCONTINUED | OUTPATIENT
Start: 2024-09-14 | End: 2024-09-14

## 2024-09-14 RX ORDER — INSULIN GLARGINE 100 [IU]/ML
46 INJECTION, SOLUTION SUBCUTANEOUS
Qty: 1 | Refills: 0
Start: 2024-09-14 | End: 2024-12-26

## 2024-09-14 RX ORDER — HYDROMORPHONE HYDROCHLORIDE 2 MG/1
6 TABLET ORAL ONCE
Refills: 0 | Status: DISCONTINUED | OUTPATIENT
Start: 2024-09-14 | End: 2024-09-14

## 2024-09-14 RX ADMIN — HYDROMORPHONE HYDROCHLORIDE 1 MILLIGRAM(S): 2 TABLET ORAL at 20:16

## 2024-09-14 NOTE — ED PROVIDER NOTE - OBJECTIVE STATEMENT
59-year-old male history of insulin-dependent diabetes, spinal stenosis sp recent spinal surgery and rehab, coming from home with elevated FS. Patient admits was feeling weak and LH, found FS was in 500s. Took 10 units insulin prior to ED arrival.  Denies nausea vomiting abdominal pain chest pain shortness of breath difficulty breathing  Patient admits when he was discharged did not receive his long-acting insulin just discharged on 2 fast acting insulin. requesting medication refill

## 2024-09-14 NOTE — ED ADULT TRIAGE NOTE - CHIEF COMPLAINT QUOTE
Patient brought in by EMS with complaint of lightheadedness, dizziness and hyperglycemia. Patient took 10 units of fast acting insulin. Patient recently had back surgery.

## 2024-09-14 NOTE — ED PROVIDER NOTE - PATIENT PORTAL LINK FT
You can access the FollowMyHealth Patient Portal offered by [facility] by registering at the following website: [email or web address]. By joining [facility]’s FollowMyHealth portal, you will also be able to view your health information using other applications (apps) compatible with our system.

## 2024-09-14 NOTE — ED ADULT NURSE NOTE - NSICDXPASTMEDICALHX_GEN_ALL_CORE_FT
PAST MEDICAL HISTORY:  Diabetes mellitus     Gastric ulcer     H/O radiculopathy     H/O spinal cord compression     Heart attack     Hypertension     Spinal stenosis     Spondylosis

## 2024-09-14 NOTE — ED ADULT NURSE NOTE - OBJECTIVE STATEMENT
Pt BIB EMS from home for elevated blood sugar. Pt with hx DM II. Pt reports recent spinal fusion 8/2024 and change in insulin during his hospitalization last month. Pt reports being sent home with 2 short acting insulins, although he used to be on a short acting and a long acting insulin. Pt reports feeling dizzy, dry and thirsty. Denies n/v, vision changes or weakness, Pt reports checking his sugar at home and the result being in the 500's- he took 10 units of short acting insulin and activating EMS. On arrival, pts FS= 346.

## 2024-09-14 NOTE — ED ADULT NURSE NOTE - HOW OFTEN DO YOU HAVE A DRINK CONTAINING ALCOHOL?
Breast Surgery Post-Operative Visit    Diagnosis: Right breast fibroadenoma status post surgical excision on February 8, 2018. Stage: N/A    Disease Status:  Surgical treatment complete, no further treatment pending. History:    This 27year old woman 30 tablet Rfl: 0   Probiotic Product (PROBIOTIC OR) Take by mouth. Disp:  Rfl:    Multiple Vitamins-Minerals (MULTI-VITAMIN/MINERALS) Oral Tab Take 1 tablet by mouth daily.  Disp:  Rfl:        Allergies:    No Known Allergies    Family History:   Family His constipation, yellowing of the skin, indigestion, nausea, change in bowel habits, diarrhea, abdominal pain or vomiting blood.      Genitourinary:  The patient denies frequent urination, needing to get up at night to urinate, urinary hesitancy or retaining u and Plan:  I had a discussion with the Patient and Family Member regarding her breast exam. On exam today I found her to be healing well since the surgery with no signs of infection.   I personally reviewed her pathology which showed a benign fibroadenoma f Never

## 2024-09-14 NOTE — ED ADULT NURSE NOTE - NSFALLHARMRISKINTERV_ED_ALL_ED

## 2024-09-14 NOTE — ED PROVIDER NOTE - CLINICAL SUMMARY MEDICAL DECISION MAKING FREE TEXT BOX
59-year-old male history of insulin-dependent diabetes, spinal stenosis sp recent spinal surgery and rehab, coming from home with elevated FS. Patient admits was feeling weak and LH, found FS was in 500s. Took 10 units insulin prior to ED arrival.  Denies nausea vomiting abdominal pain chest pain shortness of breath difficulty breathing  Patient admits when he was discharged did not receive his long-acting insulin just discharged on 2 fast acting insulin. requesting medication refill  Exam as stated  No evidence of DKA. Glucose improved after IVF. Pt co of pain chronically and given IV meds  Pt has OT and pain mgmt fu  Patient to be discharged from ED. Any available test results were discussed with patient and/or family. Verbal instructions given, including instructions to return to ED immediately for any new, worsening, or concerning symptoms. Patient endorsed understanding. Written discharge instructions additionally given, including follow-up plan.

## 2024-09-16 ENCOUNTER — APPOINTMENT (OUTPATIENT)
Dept: ORTHOPEDIC SURGERY | Facility: CLINIC | Age: 59
End: 2024-09-16

## 2024-09-18 NOTE — ED ADULT NURSE NOTE - NS_NURSE_BED_LOCATION_ED_ALL_ED_FT
Recall placed.       Documentation copied into the chart for continuity.  Senthil Quintero MD P Khan, M Gi Nurse Msg Pool  Please enter 5 yr recall    thanks  
Cath Lab

## 2024-09-19 ENCOUNTER — APPOINTMENT (OUTPATIENT)
Dept: ORTHOPEDIC SURGERY | Facility: CLINIC | Age: 59
End: 2024-09-19
Payer: MEDICARE

## 2024-09-19 DIAGNOSIS — Z98.1 ARTHRODESIS STATUS: ICD-10-CM

## 2024-09-19 PROCEDURE — 72100 X-RAY EXAM L-S SPINE 2/3 VWS: CPT

## 2024-09-19 PROCEDURE — 99024 POSTOP FOLLOW-UP VISIT: CPT

## 2024-09-20 ENCOUNTER — APPOINTMENT (OUTPATIENT)
Dept: MRI IMAGING | Facility: CLINIC | Age: 59
End: 2024-09-20
Payer: MEDICARE

## 2024-09-20 PROCEDURE — 72148 MRI LUMBAR SPINE W/O DYE: CPT | Mod: MH

## 2024-09-23 NOTE — PHYSICAL EXAM
[Implants in position] : Implants in position [No loosening of hardware] : No loosening of hardware [de-identified] : L Spine Inspection: Incision w/o signs of infection Palpation: No tenderness. Spasm in b/l lumbar paraspinal musculature ROM: limited in all planes.  Strength: 5/5 b/l hip flexors, knee extensors, ankle dorsiflexors, EHL, ankle plantarflexors Neuro: Sensation LT I Negative b/l SLR Toe and heal walk intact Gait antalgic, ambulating with a cane.

## 2024-09-23 NOTE — IMAGING
[de-identified] : L Spine Inspection: No defects or deformities Palpation: No tenderness or spasms in b/l lumbar paraspinal musculature ROM: Full with stiffness Strength: 5/5 b/l hip flexors, knee extensors, ankle dorsiflexors, EHL, ankle plantarflexors Neuro: Sensation LT I Negative b/l SLR Toe and heal walk intact Gait non antalgic

## 2024-09-23 NOTE — DATA REVIEWED
[CT Scan] : CT scan [MRI] : MRI [Lumbar Spine] : lumbar spine [Report was reviewed and noted in the chart] : The report was reviewed and noted in the chart [I independently reviewed and interpreted images and report] : I independently reviewed and interpreted images and report [FreeTextEntry1] : @PACS Postop CT scan 1. No signs of device subsiding.   @PACS T Spine MRI 1. No fx's, spine cord compression or HNP.  [facility] L Spine MRI 5/24/24 1. L4-5 severe disc space loss and facet OA, moderate b/l lateral recess stenosis, moderate R foraminal stenosis w/o signs of infectious spondylitis.     [facility] 3/30/2024:   Edema of the L4-L5 endplates and associated disc space without discrete epidural collection. Findings may be related to active degenerative change though underlying infection is not excluded given disc edema.   Degenerative change as detailed above.   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

## 2024-09-23 NOTE — ASSESSMENT
[FreeTextEntry1] : PO#2. 6weeks s/p L4-5 XLIF w/o posterior fixation. Postop CT scan w/o signs of device subsiding and left psoas edema. Sustained a fall yesterday d/t persisting LLE weakness, xr's today w/o hardware migration.   - Order postop L Spine MRI to eval possible hematoma surrounding psoas muscle that explains the persisting weakness.  - Will call Kyle to go over results.

## 2024-09-23 NOTE — PHYSICAL EXAM
[Implants in position] : Implants in position [No loosening of hardware] : No loosening of hardware [de-identified] : L Spine Inspection: Incision w/o signs of infection Palpation: No tenderness. Spasm in b/l lumbar paraspinal musculature ROM: limited in all planes.  Strength: 5/5 b/l hip flexors, knee extensors, ankle dorsiflexors, EHL, ankle plantarflexors Neuro: Sensation LT I Negative b/l SLR Toe and heal walk intact Gait antalgic, ambulating with a cane.

## 2024-09-23 NOTE — HISTORY OF PRESENT ILLNESS
[Lower back] : lower back [8] : 8 [7] : 7 [Dull/Aching] : dull/aching [Localized] : localized [Frequent] : frequent [Meds] : meds [Nothing helps with pain getting better] : Nothing helps with pain getting better [Sitting] : sitting [Standing] : standing [de-identified] : DOS: 8/9/24 L4-5 XLIF  9/19/24: PO#2 L Spine. Fell in [facility] parking lot yesterday d/t left leg being weak and giving out. Ambulating with a cane.   8/29/24: PO#1. 2 weeks s/p L4-5 XLIF. Pain has been unbearable since sx. Feels LLE is still weak. Ambulating with a cane. Postop CT scan review.   58 year old male presenting with lower back pain for many years. He has been under that care of another ortho and pain management. Pain management has not been successful in managing his pain. He has done extended PT. he has had previous epidural without relief. No injury/trauma. Comes in with imaging from [facility]. He has had previous neck fusion C4-C7.  [] : no

## 2024-09-23 NOTE — IMAGING
[de-identified] : L Spine Inspection: No defects or deformities Palpation: No tenderness or spasms in b/l lumbar paraspinal musculature ROM: Full with stiffness Strength: 5/5 b/l hip flexors, knee extensors, ankle dorsiflexors, EHL, ankle plantarflexors Neuro: Sensation LT I Negative b/l SLR Toe and heal walk intact Gait non antalgic

## 2024-09-23 NOTE — HISTORY OF PRESENT ILLNESS
[Lower back] : lower back [8] : 8 [7] : 7 [Dull/Aching] : dull/aching [Localized] : localized [Frequent] : frequent [Meds] : meds [Nothing helps with pain getting better] : Nothing helps with pain getting better [Sitting] : sitting [Standing] : standing [de-identified] : DOS: 8/9/24 L4-5 XLIF  9/19/24: PO#2 L Spine. Fell in [employer] parking lot yesterday d/t left leg being weak and giving out. Ambulating with a cane.   8/29/24: PO#1. 2 weeks s/p L4-5 XLIF. Pain has been unbearable since sx. Feels LLE is still weak. Ambulating with a cane. Postop CT scan review.   58 year old male presenting with lower back pain for many years. He has been under that care of another ortho and pain management. Pain management has not been successful in managing his pain. He has done extended PT. he has had previous epidural without relief. No injury/trauma. Comes in with imaging from [facility]. He has had previous neck fusion C4-C7.  [] : no

## 2024-10-02 ENCOUNTER — APPOINTMENT (OUTPATIENT)
Dept: PAIN MANAGEMENT | Facility: CLINIC | Age: 59
End: 2024-10-02
Payer: OTHER MISCELLANEOUS

## 2024-10-02 DIAGNOSIS — M54.12 RADICULOPATHY, CERVICAL REGION: ICD-10-CM

## 2024-10-02 PROCEDURE — 99213 OFFICE O/P EST LOW 20 MIN: CPT

## 2024-10-02 NOTE — HISTORY OF PRESENT ILLNESS
[Neck] : neck [Left Arm] : left arm [7] : 7 [Burning] : burning [Dull/Aching] : dull/aching [Shooting] : shooting [Constant] : constant [Household chores] : household chores [Leisure] : leisure [Sleep] : sleep [Meds] : meds [Disabled] : Work status: disabled [] : Patient is currently playing sports: no [FreeTextEntry1] : LEFT HAND [FreeTextEntry6] : ELECTRIC SHOCK LIKE [FreeTextEntry7] : DOWN ARM [de-identified] : TOO MUCH ACTIVITY [de-identified] : 2022

## 2024-10-17 ENCOUNTER — APPOINTMENT (OUTPATIENT)
Dept: ORTHOPEDIC SURGERY | Facility: CLINIC | Age: 59
End: 2024-10-17
Payer: MEDICARE

## 2024-10-17 VITALS — WEIGHT: 264 LBS | HEIGHT: 74 IN | BODY MASS INDEX: 33.88 KG/M2

## 2024-10-17 DIAGNOSIS — Z98.1 ARTHRODESIS STATUS: ICD-10-CM

## 2024-10-17 PROCEDURE — 72100 X-RAY EXAM L-S SPINE 2/3 VWS: CPT

## 2024-10-17 PROCEDURE — 99024 POSTOP FOLLOW-UP VISIT: CPT

## 2024-10-18 NOTE — ED ADULT NURSE REASSESSMENT NOTE - NURSING MUSC STRENGTH
----- Message from Iqra FARLEY sent at 10/18/2024 10:55 AM CDT -----  Please call pt with results  ----- Message -----  From: Jovan Frances MD  Sent: 10/18/2024  10:12 AM CDT  To: Brian Pulm Rt Pool; m Pulmonary  Pool    Please let patient know home sleep study did not show significant sleep apnea.  Since home sleep test can be falsely negative I have ordered an in lab sleep study.  Please also get patient scheduled at next available with me.  ----- Message -----  From: Serene Dawson  Sent: 10/2/2024   9:08 AM CDT  To: VARGH[facility] Pul Result Pool   hand grasp, leg strength strong and equal bilaterally

## 2024-10-30 ENCOUNTER — APPOINTMENT (OUTPATIENT)
Dept: PAIN MANAGEMENT | Facility: CLINIC | Age: 59
End: 2024-10-30
Payer: OTHER MISCELLANEOUS

## 2024-10-30 DIAGNOSIS — M54.12 RADICULOPATHY, CERVICAL REGION: ICD-10-CM

## 2024-10-30 PROCEDURE — 99213 OFFICE O/P EST LOW 20 MIN: CPT

## 2024-10-30 NOTE — DISCHARGE NOTE PROVIDER - NSDCQMSTROKE_NEU_ALL_CORE
Called patient to schedule follow up with Maye and her CPFT, left VM and sending letter for patient to call.    No

## 2024-11-14 ENCOUNTER — APPOINTMENT (OUTPATIENT)
Dept: ORTHOPEDIC SURGERY | Facility: CLINIC | Age: 59
End: 2024-11-14

## 2024-11-25 ENCOUNTER — APPOINTMENT (OUTPATIENT)
Dept: PAIN MANAGEMENT | Facility: CLINIC | Age: 59
End: 2024-11-25
Payer: COMMERCIAL

## 2024-11-25 DIAGNOSIS — M54.16 RADICULOPATHY, LUMBAR REGION: ICD-10-CM

## 2024-11-25 PROCEDURE — 99213 OFFICE O/P EST LOW 20 MIN: CPT

## 2024-12-02 RX ORDER — MORPHINE SULFATE 15 MG/1
15 TABLET ORAL EVERY 6 HOURS
Qty: 120 | Refills: 0 | Status: ACTIVE | COMMUNITY
Start: 2024-12-02 | End: 1900-01-01

## 2024-12-06 NOTE — PROGRESS NOTE ADULT - ATTENDING COMMENTS
----- Message from Odilia sent at 12/6/2024  8:43 AM CST -----  Contact: [phone]  Symptom: Body Aches  Outcome: Schedule an appointment to be seen within 24 hours.  Reason: Fever    The caller accepted this outcome.    She states the pt is having fever and congestion she thinks the pt has the flu please advise   Admitted with late presentation inferior wall STEMI, not revascularized, complicated by DKA  A+Ox1-2, unclear etiology - Precedex started for agitation  DAPT with ASA, Ticagrelor; Lipitor 80  Afib/flutter that is now rate controlled  Complained of chest pain throughout the night despite Lopressor, Morphine and max dose Nitro gtt  Interventional Cardiology team was contacted overnight and deferred taking him ot the lab  Repeat TTE now that he is rate controlled  O2 sats high 90s on nasal cannula - wean to room air  Non-oliguric JAXON and AG metabolic acidosis in the setting of DKA   Continue bicarb drip, hydrate aggressively, check electrolytes q2-3h, start D5-1/2NS for blood sugar in the 100s  H/H acceptable  COVID negative, no antibiotics   Sugars poorly controlled in DKA, likely precipitated by STEMI - continue Insulin drip  Katelyn 3/1 Admitted with late presentation inferior wall STEMI, not revascularized, complicated by DKA  A+Ox1-2, unclear etiology although per family patient is an alcoholic - Precedex started for agitation, check CT head and start CIWA  DAPT with ASA, Ticagrelor; Lipitor 80  Afib/flutter that is now rate controlled  Complained of chest pain throughout the night despite Lopressor, Morphine and max dose Nitro gtt  Interventional Cardiology team was contacted overnight and deferred taking him ot the lab  Repeat TTE now that he is rate controlled  O2 sats high 90s on nasal cannula - wean to room air  Non-oliguric JAXON and AG metabolic acidosis in the setting of DKA   Continue bicarb drip, hydrate aggressively, check electrolytes q2-3h, start D5-1/2NS for blood sugar in the 100s  H/H acceptable  COVID negative, no antibiotics   Sugars poorly controlled, in DKA with AG still open, likely precipitated by STEMI - continue Insulin drip  Katelyn 3/1  Brother and sister were updated at the bedside

## 2024-12-12 ENCOUNTER — RX RENEWAL (OUTPATIENT)
Age: 59
End: 2024-12-12

## 2024-12-19 ENCOUNTER — EMERGENCY (EMERGENCY)
Facility: HOSPITAL | Age: 59
LOS: 1 days | Discharge: HOME | End: 2024-12-19
Attending: STUDENT IN AN ORGANIZED HEALTH CARE EDUCATION/TRAINING PROGRAM | Admitting: STUDENT IN AN ORGANIZED HEALTH CARE EDUCATION/TRAINING PROGRAM
Payer: MEDICARE

## 2024-12-19 VITALS
SYSTOLIC BLOOD PRESSURE: 132 MMHG | DIASTOLIC BLOOD PRESSURE: 78 MMHG | RESPIRATION RATE: 20 BRPM | HEART RATE: 88 BPM | OXYGEN SATURATION: 98 %

## 2024-12-19 VITALS
RESPIRATION RATE: 18 BRPM | HEIGHT: 73 IN | WEIGHT: 244.93 LBS | SYSTOLIC BLOOD PRESSURE: 133 MMHG | HEART RATE: 89 BPM | DIASTOLIC BLOOD PRESSURE: 89 MMHG | OXYGEN SATURATION: 96 % | TEMPERATURE: 97 F

## 2024-12-19 DIAGNOSIS — Z98.890 OTHER SPECIFIED POSTPROCEDURAL STATES: Chronic | ICD-10-CM

## 2024-12-19 DIAGNOSIS — Z98.1 ARTHRODESIS STATUS: Chronic | ICD-10-CM

## 2024-12-19 LAB
ACETONE SERPL-MCNC: ABNORMAL
ALBUMIN SERPL ELPH-MCNC: 3.5 G/DL — SIGNIFICANT CHANGE UP (ref 3.3–5)
ALP SERPL-CCNC: 112 U/L — SIGNIFICANT CHANGE UP (ref 40–120)
ALT FLD-CCNC: 34 U/L — SIGNIFICANT CHANGE UP (ref 10–45)
ANION GAP SERPL CALC-SCNC: 12 MMOL/L — SIGNIFICANT CHANGE UP (ref 5–17)
APPEARANCE UR: CLEAR — SIGNIFICANT CHANGE UP
AST SERPL-CCNC: 11 U/L — SIGNIFICANT CHANGE UP (ref 10–40)
BASE EXCESS BLDV CALC-SCNC: -3.7 MMOL/L — LOW (ref -2–3)
BASOPHILS # BLD AUTO: 0.05 K/UL — SIGNIFICANT CHANGE UP (ref 0–0.2)
BASOPHILS NFR BLD AUTO: 0.5 % — SIGNIFICANT CHANGE UP (ref 0–2)
BILIRUB SERPL-MCNC: 0.4 MG/DL — SIGNIFICANT CHANGE UP (ref 0.2–1.2)
BILIRUB UR-MCNC: NEGATIVE — SIGNIFICANT CHANGE UP
BUN SERPL-MCNC: 11 MG/DL — SIGNIFICANT CHANGE UP (ref 7–23)
CALCIUM SERPL-MCNC: 9.4 MG/DL — SIGNIFICANT CHANGE UP (ref 8.4–10.5)
CHLORIDE SERPL-SCNC: 98 MMOL/L — SIGNIFICANT CHANGE UP (ref 96–108)
CO2 BLDV-SCNC: 22 MMOL/L — SIGNIFICANT CHANGE UP (ref 22–26)
CO2 SERPL-SCNC: 25 MMOL/L — SIGNIFICANT CHANGE UP (ref 22–31)
COLOR SPEC: YELLOW — SIGNIFICANT CHANGE UP
CREAT SERPL-MCNC: 0.88 MG/DL — SIGNIFICANT CHANGE UP (ref 0.5–1.3)
DIFF PNL FLD: NEGATIVE — SIGNIFICANT CHANGE UP
EGFR: 100 ML/MIN/1.73M2 — SIGNIFICANT CHANGE UP
EOSINOPHIL # BLD AUTO: 0.03 K/UL — SIGNIFICANT CHANGE UP (ref 0–0.5)
EOSINOPHIL NFR BLD AUTO: 0.3 % — SIGNIFICANT CHANGE UP (ref 0–6)
GAS PNL BLDV: SIGNIFICANT CHANGE UP
GLUCOSE BLDC GLUCOMTR-MCNC: 314 MG/DL — HIGH (ref 70–99)
GLUCOSE BLDC GLUCOMTR-MCNC: 320 MG/DL — HIGH (ref 70–99)
GLUCOSE BLDC GLUCOMTR-MCNC: 345 MG/DL — HIGH (ref 70–99)
GLUCOSE SERPL-MCNC: 434 MG/DL — HIGH (ref 70–99)
GLUCOSE UR QL: >=1000 MG/DL
HCO3 BLDV-SCNC: 21 MMOL/L — LOW (ref 22–29)
HCT VFR BLD CALC: 39.2 % — SIGNIFICANT CHANGE UP (ref 39–50)
HGB BLD-MCNC: 13 G/DL — SIGNIFICANT CHANGE UP (ref 13–17)
IMM GRANULOCYTES NFR BLD AUTO: 0.3 % — SIGNIFICANT CHANGE UP (ref 0–0.9)
KETONES UR-MCNC: 40 MG/DL
LEUKOCYTE ESTERASE UR-ACNC: NEGATIVE — SIGNIFICANT CHANGE UP
LYMPHOCYTES # BLD AUTO: 1.35 K/UL — SIGNIFICANT CHANGE UP (ref 1–3.3)
LYMPHOCYTES # BLD AUTO: 14.6 % — SIGNIFICANT CHANGE UP (ref 13–44)
MAGNESIUM SERPL-MCNC: 1.6 MG/DL — SIGNIFICANT CHANGE UP (ref 1.6–2.6)
MCHC RBC-ENTMCNC: 27.4 PG — SIGNIFICANT CHANGE UP (ref 27–34)
MCHC RBC-ENTMCNC: 33.2 G/DL — SIGNIFICANT CHANGE UP (ref 32–36)
MCV RBC AUTO: 82.5 FL — SIGNIFICANT CHANGE UP (ref 80–100)
MONOCYTES # BLD AUTO: 0.44 K/UL — SIGNIFICANT CHANGE UP (ref 0–0.9)
MONOCYTES NFR BLD AUTO: 4.8 % — SIGNIFICANT CHANGE UP (ref 2–14)
NEUTROPHILS # BLD AUTO: 7.36 K/UL — SIGNIFICANT CHANGE UP (ref 1.8–7.4)
NEUTROPHILS NFR BLD AUTO: 79.5 % — HIGH (ref 43–77)
NITRITE UR-MCNC: NEGATIVE — SIGNIFICANT CHANGE UP
NRBC # BLD: 0 /100 WBCS — SIGNIFICANT CHANGE UP (ref 0–0)
PCO2 BLDV: 32 MMHG — LOW (ref 42–55)
PH BLDV: 7.42 — SIGNIFICANT CHANGE UP (ref 7.32–7.43)
PH UR: 6 — SIGNIFICANT CHANGE UP (ref 5–8)
PHOSPHATE SERPL-MCNC: 2.6 MG/DL — SIGNIFICANT CHANGE UP (ref 2.5–4.5)
PLATELET # BLD AUTO: 326 K/UL — SIGNIFICANT CHANGE UP (ref 150–400)
PO2 BLDV: 66 MMHG — HIGH (ref 25–45)
POTASSIUM SERPL-MCNC: 4.3 MMOL/L — SIGNIFICANT CHANGE UP (ref 3.5–5.3)
POTASSIUM SERPL-SCNC: 4.3 MMOL/L — SIGNIFICANT CHANGE UP (ref 3.5–5.3)
PROT SERPL-MCNC: 7.5 G/DL — SIGNIFICANT CHANGE UP (ref 6–8.3)
PROT UR-MCNC: NEGATIVE MG/DL — SIGNIFICANT CHANGE UP
RBC # BLD: 4.75 M/UL — SIGNIFICANT CHANGE UP (ref 4.2–5.8)
RBC # FLD: 13.5 % — SIGNIFICANT CHANGE UP (ref 10.3–14.5)
SAO2 % BLDV: 95.7 % — HIGH (ref 67–88)
SODIUM SERPL-SCNC: 135 MMOL/L — SIGNIFICANT CHANGE UP (ref 135–145)
SP GR SPEC: 1.04 — HIGH (ref 1–1.03)
UROBILINOGEN FLD QL: 0.2 MG/DL — SIGNIFICANT CHANGE UP (ref 0.2–1)
WBC # BLD: 9.26 K/UL — SIGNIFICANT CHANGE UP (ref 3.8–10.5)
WBC # FLD AUTO: 9.26 K/UL — SIGNIFICANT CHANGE UP (ref 3.8–10.5)

## 2024-12-19 PROCEDURE — 81003 URINALYSIS AUTO W/O SCOPE: CPT

## 2024-12-19 PROCEDURE — 93005 ELECTROCARDIOGRAM TRACING: CPT

## 2024-12-19 PROCEDURE — 96374 THER/PROPH/DIAG INJ IV PUSH: CPT

## 2024-12-19 PROCEDURE — 71045 X-RAY EXAM CHEST 1 VIEW: CPT

## 2024-12-19 PROCEDURE — 99285 EMERGENCY DEPT VISIT HI MDM: CPT | Mod: 25

## 2024-12-19 PROCEDURE — 85025 COMPLETE CBC W/AUTO DIFF WBC: CPT

## 2024-12-19 PROCEDURE — 83735 ASSAY OF MAGNESIUM: CPT

## 2024-12-19 PROCEDURE — 82009 KETONE BODYS QUAL: CPT

## 2024-12-19 PROCEDURE — 93010 ELECTROCARDIOGRAM REPORT: CPT

## 2024-12-19 PROCEDURE — 84100 ASSAY OF PHOSPHORUS: CPT

## 2024-12-19 PROCEDURE — 36415 COLL VENOUS BLD VENIPUNCTURE: CPT

## 2024-12-19 PROCEDURE — 99285 EMERGENCY DEPT VISIT HI MDM: CPT

## 2024-12-19 PROCEDURE — 82962 GLUCOSE BLOOD TEST: CPT

## 2024-12-19 PROCEDURE — 80053 COMPREHEN METABOLIC PANEL: CPT

## 2024-12-19 PROCEDURE — 71045 X-RAY EXAM CHEST 1 VIEW: CPT | Mod: 26

## 2024-12-19 PROCEDURE — 82803 BLOOD GASES ANY COMBINATION: CPT

## 2024-12-19 RX ORDER — HYDROMORPHONE HYDROCHLORIDE 2 MG/1
4 TABLET ORAL ONCE
Refills: 0 | Status: DISCONTINUED | OUTPATIENT
Start: 2024-12-19 | End: 2024-12-19

## 2024-12-19 RX ORDER — HYDROMORPHONE HYDROCHLORIDE 2 MG/1
8 TABLET ORAL ONCE
Refills: 0 | Status: DISCONTINUED | OUTPATIENT
Start: 2024-12-19 | End: 2024-12-19

## 2024-12-19 RX ORDER — SODIUM CHLORIDE 9 MG/ML
1000 INJECTION, SOLUTION INTRAMUSCULAR; INTRAVENOUS; SUBCUTANEOUS ONCE
Refills: 0 | Status: COMPLETED | OUTPATIENT
Start: 2024-12-19 | End: 2024-12-19

## 2024-12-19 RX ORDER — INSULIN REG, HUM S-S BUFF 100/ML
10 VIAL (ML) INJECTION ONCE
Refills: 0 | Status: COMPLETED | OUTPATIENT
Start: 2024-12-19 | End: 2024-12-19

## 2024-12-19 RX ADMIN — Medication 10 UNIT(S): at 20:52

## 2024-12-19 RX ADMIN — SODIUM CHLORIDE 1000 MILLILITER(S): 9 INJECTION, SOLUTION INTRAMUSCULAR; INTRAVENOUS; SUBCUTANEOUS at 18:33

## 2024-12-19 RX ADMIN — HYDROMORPHONE HYDROCHLORIDE 8 MILLIGRAM(S): 2 TABLET ORAL at 22:45

## 2024-12-19 RX ADMIN — SODIUM CHLORIDE 1000 MILLILITER(S): 9 INJECTION, SOLUTION INTRAMUSCULAR; INTRAVENOUS; SUBCUTANEOUS at 21:24

## 2024-12-19 NOTE — ED ADULT TRIAGE NOTE - CHIEF COMPLAINT QUOTE
Patient brought in by EMS from home with complaint of hyperglycemia, nausea, lightheadedness and dizziness for the past couple of days. Denies any falls or hitting his head. Patient take Eliquis. Patient was given Zofran 4mg by EMS

## 2024-12-19 NOTE — ED ADULT NURSE NOTE - NSFALLUNIVINTERV_ED_ALL_ED
Bed/Stretcher in lowest position, wheels locked, appropriate side rails in place/Call bell, personal items and telephone in reach/Instruct patient to call for assistance before getting out of bed/chair/stretcher/Non-slip footwear applied when patient is off stretcher/[place] to call system/Physically safe environment - no spills, clutter or unnecessary equipment/Purposeful proactive rounding/Room/bathroom lighting operational, light cord in reach

## 2024-12-19 NOTE — ED PROVIDER NOTE - PHYSICAL EXAMINATION
CONSTITUTIONAL: NAD  SKIN: Warm dry  HEAD: NCAT  EYES: NL inspection  ENT: MMM  NECK: Supple  CARD: RRR  RESP: Unlabored respirations  ABD: S/NT no R/G  EXT: no pedal edema  NEURO: Grossly unremarkable  PSYCH: Cooperative, appropriate.

## 2024-12-19 NOTE — ED PROVIDER NOTE - OBJECTIVE STATEMENT
60 yo m insulin dep diabetic, chronic pain on diluadid po pw generalized weakness, lh, dizziness and nausea x days. Admits has been compliant with his medication, both long acting and short acting insulin but will need a refill. Reached out to his endocrinologist to schedule an apt today. Denies any fevers, cough, cp, sob, hematuria, dysuria, abdominal pain

## 2024-12-19 NOTE — ED ADULT NURSE NOTE - OBJECTIVE STATEMENT
Patient BIBAS for hyperglycemic event, Patient A&Ox4 RA no signs or symptoms of cardiac or respiratory distress @this time, patient denies pain, patient states he has been having trouble getting his long acting insulin prescription filled, patient said his home monitor was reading over 400 and fells nauseous with blurred vision, patient states pmh of DKA with MI, safety measures maintained, call bell within reach, nursing care continued

## 2024-12-19 NOTE — ED ADULT TRIAGE NOTE - HEIGHT IN FEET
----- Message from Grace Perez MD sent at 10/3/2022  6:02 PM CDT -----  Please let patient know that strep test is negative.  Please get the mono test     6

## 2024-12-19 NOTE — ED ADULT NURSE NOTE - NS_SISCREENINGSR_GEN_ALL_ED
Interventional Cardiology  Follow-Up Note  Established Clinic Visit    Chief Complaint:   Chief Complaint   Patient presents with   • Follow-up     6 month follow up   • Office Visit        History of Present Illness:   Silvano De León is a 86 year old male with a past medical significant for atrial fibrillation (found on 07/13/2020) s/p RIN/ DCCV on 09/04/2020 with recurrence s/p DCCV on 10/02/2020, hyperlipidemia, severe coronary calcium score of 2957, and moderate to severe aortic valve stenosis who presents for work-in visit to review CTA coronary findings.    Now reports sharp poking chest pain in left rib area, approx half a dozen times a day, which is new.  Does not radiate.    Routine ECG on 07/13/2020 OV revealed new onset, rate controlled atrial fibrillation.  ECG was repeated 3 times.  Specifically denies palpitations.  Therefore, Eliquis was initiated for CVA prophylaxis and nuclear stress test as well as sleep study was ordered.    Nuclear stress test performed revealed moderate fixed perfusion defect involving the inferior wall.  No reversible ischemia is noted.  The patient was scheduled for coronary angiogram on 08/14/2020 however, the AM of the procedure, insurance requested for a [id number] which I did & it was denied as it was a \"fixed\" defect on nuclear stress test.  Discussed pursuing CTA coronary but unfortunately, patient arrived for the study & was unable to be performed due to underlying atrial fibrillation.  Thus, we discussed pursuing RIN/ DCCV to achieve NSR before proceeding with CTA coronary.  RIN/ DCCV performed on 09/04/2020 -> NSR.  He scheduled his CTA coronary for 09/22/2020 & was again found to be back in atrial fibrillation/ flutter.  Repeat DCCV was performed on 10/02/2020 -> NSR after which we pursued CTA coronary at [facility] which revealed likely > 50% LM stenosis and 70% stenosis in LAD & severely elevated coronary calcium score of 2957.    Amiodarone was apparently causing pain  in his joints while on 400mg BID thus he discontinued it after the 5th day - he then started 200mg once a day which he is tolerating without issues.    Taking Eliquis without events.  It was supposedly quite expensive however, he had the 30-day free coupon.  He has since learned that it needs to be via mail order.    Split night sleep study has not been performed as patient still has reservations about it.  After a lengthy discussion, especially in the setting of recurrent atrial fibrillation, he is now willing to pursue it.    Goes to bed at 8 or 9PM but wakes up every 2 to 3 hours to use the BR.  Wakes up at 6 or 7AM.  Naps a couple of times a day.  Admits to snoring & has a dry mouth.    Able to walk 2 blocks and climb 2 flights of stairs without stopping.  Lives in a trilevel - 3 or 4 steps up and 3 - 4 steps down.  Works the yard and continues to be active.  Does have lower back pain.    He has been doing well without complaints.  Denies chest pain/ palpitations/ shortness of breath/ dizziness/ syncope/ dyspnea on exertion/ lower extremity edema.    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    Review of Systems:  (For the following ROS, pertinent positives are in bold; pertinent negatives are in italics.)  CONSTITUTIONAL: fevers, chills, sweats, weight loss, weight gain, fatigue  EYES: vision changes, double vision, blurring  ENMT: hearing loss, tinnitus, epistaxis, sores, voice changes, sore throat, sinus congestion, sinus pain, rhinorrhea   CV: chest pain, dyspnea, palpitations, orthopnea, PND, LE swelling  RESP: cough, sputum, dyspnea, hemoptysis, pleuritic pain  GI: abdominal pain, bloating, nausea, vomiting,  hematemesis, diarrhea, constipation, BRBPR, melena, GERD/heartburn  : hematuria, dysuria, frequency, urgency, nocturia  MUSCULOSKELETAL: pain, muscle weakness, joint pain, joint swelling, decreased ROM, back pain  SKIN: rash, pain, pruritis, lesions, lumps, skin breakdown  NEURO: headache, dizziness, LOC, weakness, paresthesias, gait problems, seizures, diplopia, numbness, memory loss   PSYCHIATRIC: depressed mood, diminished interest/pleasure, difficulty sleeping, decreased concentration, anxiety   HEMATOLOGIC: anemia, easy bruising, persistent bleeding, DVT/Clots  All the above systems were reviewed.     Past Medical History:  Past Medical History:   Diagnosis Date   • Aortic valve disease 10/25/2000    Heavily calcified aortic valve with moderate stenosis and trace regurgitation per TTE 2016; Cardiology follows   • Atrial fibrillation (CMS/HCC) 07/13/2020    Incidentally found on ECG during OV s/p RIN/ DCCV on 09/04/2020   • Bilateral nonexudative age-related macular degeneration     ophth following   • BPH (benign prostatic hyperplasia)    • Carotid artery stenosis 1995    <50% stenosis bl with last US 2016; Cardiology follows   • Elevated coronary artery calcium score 10/02/2020    CCS of 2957   • Gout    • Mixed hyperlipidemia 1990   • Personal history of colonic polyps     tub adenoma 2002 but 2007 and 2011 Cscope without polyps   • PUD (peptic ulcer disease) 2012    resolved on 2013 EGD   • PUD (peptic ulcer disease) 1/1/2012    resolved on 2013 EGD   • Rosacea        Past Surgical History:  Past Surgical History:   Procedure Laterality Date   • Cardiovascular stress test  07/2018    normal   • Cataract extraction w/ intraocular lens implant  7/5/11, 8/23/11    R then L   • Eye surgery Left 11/2016    YAG laser treatment   • Inguinal hernia repair Bilateral    • Knee surgery Right     ligament repair   • Vitrectomy Right 10/2010    for macular hole       Allergies:  ALLERGIES:  Niacin      Medications:  Current Outpatient Medications   Medication Sig Dispense Refill   • metoPROLOL succinate (TOPROL-XL) 25 MG 24 hr tablet TK 1 T PO QD     • allopurinol (ZYLOPRIM) 100 MG tablet Take 1 tablet by mouth 2 times daily. 180 tablet 0   • AMIODarone (PACERONE) 200 MG tablet Take 200 mg by mouth daily.  30 tablet 0   • apixaBAN (ELIQUIS) 5 MG Tab Take 1 tablet by mouth every 12 hours. 180 tablet 1   • latanoprost (XALATAN) 0.005 % ophthalmic solution Place 1 drop into left eye nightly. 7.5 mL 3   • atorvastatin (LIPITOR) 40 MG tablet Take 1 tablet by mouth nightly. 90 tablet 3   • ezetimibe (ZETIA) 10 MG tablet Take 1 tablet by mouth daily. 90 tablet 3   • omeprazole (PRILOSEC) 20 MG capsule Take 1 capsule by mouth daily. 90 capsule 3   • aspirin 81 MG EC tablet Take 81 mg by mouth daily.     • Multiple Vitamins-Minerals (OCUVITE EYE HEALTH FORMULA) Cap Take 1 tablet by mouth daily.       No current facility-administered medications for this visit.        Social History:  Employment: retired   Social History     Substance and Sexual Activity   Alcohol Use Yes   • Alcohol/week: 14.0 standard drinks   • Types: 14 Cans of beer per week   • Frequency: 4 or more times a week   • Drinks per session: 1 or 2    Comment: 2 beers daily     Social History     Tobacco Use   Smoking Status Never Smoker   Smokeless Tobacco Never Used     Social History     Substance and Sexual Activity   Drug Use No             Family History:  Family History   Problem Relation Age of Onset   • Heart disease Mother 60   • Heart disease Father 77   • Cancer Sister         bone     Family History reviewed. Other than mentioned above, there is no pertinent family history.    Physical Exam:  Visit Vitals  /60 (BP Location: RUE - Right upper extremity, Patient Position: Sitting, Cuff Size: Regular)   Pulse 71   Resp 16   Ht 5' 11\" (1.803 m)   Wt 89 kg (196 lb 3.2 oz)   SpO2 97%   BMI 27.36 kg/m²     General:  Alert and  oriented x3, No apparent distress  HEENT: anicteric, moist mucous membranes, EOMI  Neck: Supple, no elevated JVD, no thyromegaly    Cardiovascular:  Heart: irregularly irregular rhythm, variable S1 normal S2, III/VI crescendo-decrescendo systolic murmur loudest at base, soft diastolic murmur  Carotid Bruits: No   Pulses: 2+    Pulmonary: Lungs CTA b/l  GI: Abdomen soft, NT, ND, +BS, no HSM  Extremities: No edema  Musculoskeletal: +5/5 strength in all extremities  Neuro: No focal deficits or asymmetry.  CNs grossly intact  Skin: No obvious skin lesions or rashes  Psychiatric: Normal affect    Reviewed Data:    Labs:   Orders Only on 09/29/2020   Component Date Value Ref Range Status   • COVID-19 SOURCE 09/29/2020 NASAL SWAB   Final   • COVID-19 RESULT 09/29/2020 Not Detected  Not Detected Final   Lab Services on 09/25/2020   Component Date Value Ref Range Status   • PROTHROMBIN TIME, THERAPEUTIC 09/25/2020 12.1* 9.5 - 11.5 s Final   • INTERNATIONAL NORMALIZED RATIO 09/25/2020 1.1   Final   • MG (MAGNESIUM, SERUM) 09/25/2020 2.0  1.6 - 2.6 mg/dL Final   • WHITE BLOOD CELL COUNT 09/25/2020 7.3  4.0 - 10.0 10*3/uL Final   • RED BLOOD CELL COUNT 09/25/2020 5.25  3.90 - 5.70 10*6/uL Final   • HEMOGLOBIN 09/25/2020 16.2  13.7 - 17.5 g/dL Final   • HEMATOCRIT 09/25/2020 49.0  40.0 - 51.0 % Final   • MEAN CORPUSCULAR VOLUME 09/25/2020 93.3  79.0 - 95.0 fL Final   • MEAN CORPUSCULAR HGB 09/25/2020 30.9  27.0 - 34.0 pg Final   • MEAN CORPUSCULAR HGB CONCENTRATION 09/25/2020 33.1  32.0 - 36.0 % Final   • PLATELET COUNT 09/25/2020 235  150 - 400 10*3/uL Final   • MEAN PLATELET VOLUME 09/25/2020 9.8  8.6 - 12.4 fL Final   • RED CELL DISTRIBUTION WIDTH 09/25/2020 13.7  11.3 - 14.8 % Final   • NEUTROPHIL PERCENT 09/25/2020 65.2  34.0 - 73.5 % Final   • NEUTROPHIL ABSOLUTE # 09/25/2020 4.8  1.4 - 6.5 10*3/uL Final   • IMMATURE GRANULOCYTE PERCENT 09/25/2020 0.5  0.0 - 0.5 % Final   • IMMATURE GRANULOCYTE ABSOLUTE 09/25/2020 0.04   0.00 - 0.05 10*3/uL Final   • LYMPH PERCENT 09/25/2020 23.5  20.5 - 51.1 % Final   • LYMPHOCYTE ABSOLUTE # 09/25/2020 1.7  1.2 - 3.4 10*3/uL Final   • MONOCYTE PERCENT 09/25/2020 7.4  4.3 - 12.9 % Final   • MONOCYTE ABSOLUTE # 09/25/2020 0.5  0.2 - 0.9 10*3/uL Final   • EOSINOPHIL % 09/25/2020 2.7  0.0 - 10.0 % Final   • EOSINOPHIL ABSOLUTE # 09/25/2020 0.2  0.0 - 0.5 10*3/uL Final   • BASOPHIL % 09/25/2020 0.7  0.0 - 1.2 % Final   • BASOPHIL ABSOLUTE # 09/25/2020 0.1  0.0 - 0.1 10*3/uL Final   • DIFFERENTIAL TYPE 09/25/2020 AUTO DIFF   Final   • BLOOD UREA NITROGEN 09/25/2020 14  6 - 27 mg/dL Final   • CHLORIDE, SERUM 09/25/2020 103  96 - 107 mmol/L Final   • CREATININE, SERUM 09/25/2020 0.9  0.6 - 1.6 mg/dL Final   • CO2 VENOUS 09/25/2020 30  22 - 32 mmol/L Final   • GLUCOSE, RANDOM 09/25/2020 123  70 - 200 mg/dL Final   • K (POTASSIUM, SERUM) 09/25/2020 5.0  3.5 - 5.3 mmol/L Final   • NA (SODIUM, SERUM) 09/25/2020 138  136 - 146 mmol/L Final   • CALCIUM, SERUM 09/25/2020 10.0  8.6 - 10.6 mg/dL Final   • EGFR*  09/25/2020 >60  >60 mL/min/[1.73m2] Final   • EGFR* NON- 09/25/2020 >60  >60 mL/min/[1.73m2] Final   Orders Only on 09/01/2020   Component Date Value Ref Range Status   • COVID-19 SOURCE 09/01/2020 NP SWAB   Final   • COVID-19 RESULT 09/01/2020 Not Detected  Not Detected Final   Orders Only on 08/11/2020   Component Date Value Ref Range Status   • COVID-19 SOURCE 08/11/2020 NP SWAB   Final   • COVID-19 RESULT 08/11/2020 Not Detected  Not Detected Final   Lab Services on 08/10/2020   Component Date Value Ref Range Status   • PROTHROMBIN TIME, THERAPEUTIC 08/10/2020 11.8* 9.5 - 11.5 s Final   • INTERNATIONAL NORMALIZED RATIO 08/10/2020 1.1   Final   • MG (MAGNESIUM, SERUM) 08/10/2020 2.1  1.7 - 2.6 mg/dL Final   • WHITE BLOOD CELL COUNT 08/10/2020 7.3  4.0 - 10.0 10*3/uL Final   • RED BLOOD CELL COUNT 08/10/2020 5.35  3.90 - 5.70 10*6/uL Final   • HEMOGLOBIN 08/10/2020 16.2   13.7 - 17.5 g/dL Final   • HEMATOCRIT 08/10/2020 49.0  40.0 - 51.0 % Final   • MEAN CORPUSCULAR VOLUME 08/10/2020 91.6  79.0 - 95.0 fL Final   • MEAN CORPUSCULAR HGB 08/10/2020 30.3  27.0 - 34.0 pg Final   • MEAN CORPUSCULAR HGB CONCENTRATION 08/10/2020 33.1  32.0 - 36.0 % Final   • PLATELET COUNT 08/10/2020 199  150 - 400 10*3/uL Final   • MEAN PLATELET VOLUME 08/10/2020 10.4  8.6 - 12.4 fL Final   • RED CELL DISTRIBUTION WIDTH 08/10/2020 13.9  11.3 - 14.8 % Final   • NEUTROPHIL PERCENT 08/10/2020 72.2  34.0 - 73.5 % Final   • NEUTROPHIL ABSOLUTE # 08/10/2020 5.3  1.4 - 6.5 10*3/uL Final   • IMMATURE GRANULOCYTE PERCENT 08/10/2020 0.3  0.0 - 0.5 % Final   • IMMATURE GRANULOCYTE ABSOLUTE 08/10/2020 0.02  0.00 - 0.05 10*3/uL Final   • LYMPH PERCENT 08/10/2020 18.0* 20.5 - 51.1 % Final   • LYMPHOCYTE ABSOLUTE # 08/10/2020 1.3  1.2 - 3.4 10*3/uL Final   • MONOCYTE PERCENT 08/10/2020 7.6  4.3 - 12.9 % Final   • MONOCYTE ABSOLUTE # 08/10/2020 0.6  0.2 - 0.9 10*3/uL Final   • EOSINOPHIL % 08/10/2020 1.4  0.0 - 10.0 % Final   • EOSINOPHIL ABSOLUTE # 08/10/2020 0.1  0.0 - 0.5 10*3/uL Final   • BASOPHIL % 08/10/2020 0.5  0.0 - 1.2 % Final   • BASOPHIL ABSOLUTE # 08/10/2020 0.0  0.0 - 0.1 10*3/uL Final   • DIFFERENTIAL TYPE 08/10/2020 AUTO DIFF   Final   • NA (SODIUM, SERUM) 08/10/2020 137  136 - 146 mmol/L Final   • K (POTASSIUM, SERUM) 08/10/2020 4.4  3.5 - 5.3 mmol/L Final   • GLUCOSE, RANDOM 08/10/2020 92  70 - 200 mg/dL Final   • CREATININE, SERUM 08/10/2020 0.9  0.6 - 1.6 mg/dL Final   • CO2 VENOUS 08/10/2020 30  22 - 32 mmol/L Final   • CHLORIDE, SERUM 08/10/2020 100  96 - 107 mmol/L Final   • CALCIUM, SERUM 08/10/2020 10.4  8.6 - 10.6 mg/dL Final   • BLOOD UREA NITROGEN 08/10/2020 14  6 - 27 mg/dL Final   • EGFR -R 08/10/2020 >60  >60 mL/min/[1.73m2] Final   • EGFR NON--R 08/10/2020 >60  >60 mL/min/[1.73m2] Final   Ancillary Procedure on 08/07/2020   Component Date Value Ref Range  Status   • Predicted HR Max 08/07/2020 136  BPM Final   Lab Services on 07/14/2020   Component Date Value Ref Range Status   • WHITE BLOOD CELL COUNT 07/14/2020 7.4  4.0 - 10.0 10*3/uL Final   • RED BLOOD CELL COUNT 07/14/2020 5.35  3.90 - 5.70 10*6/uL Final   • HEMOGLOBIN 07/14/2020 16.6  13.7 - 17.5 g/dL Final   • HEMATOCRIT 07/14/2020 49.2  40.0 - 51.0 % Final   • MEAN CORPUSCULAR VOLUME 07/14/2020 92.0  79.0 - 95.0 fL Final   • MEAN CORPUSCULAR HGB 07/14/2020 31.0  27.0 - 34.0 pg Final   • MEAN CORPUSCULAR HGB CONCENTRATION 07/14/2020 33.7  32.0 - 36.0 % Final   • PLATELET COUNT 07/14/2020 193  150 - 400 10*3/uL Final   • MEAN PLATELET VOLUME 07/14/2020 9.9  8.6 - 12.4 fL Final   • RED CELL DISTRIBUTION WIDTH 07/14/2020 13.7  11.3 - 14.8 % Final   • NEUTROPHIL PERCENT 07/14/2020 57.3  34.0 - 73.5 % Final   • NEUTROPHIL ABSOLUTE # 07/14/2020 4.2  1.4 - 6.5 10*3/uL Final   • LYMPH PERCENT 07/14/2020 29.5  20.5 - 51.1 % Final   • LYMPHOCYTE ABSOLUTE # 07/14/2020 2.2  1.2 - 3.4 10*3/uL Final   • MONOCYTE PERCENT 07/14/2020 9.5  4.3 - 12.9 % Final   • MONOCYTE ABSOLUTE # 07/14/2020 0.7  0.2 - 0.9 10*3/uL Final   • EOSINOPHIL % 07/14/2020 3.4  0.0 - 10.0 % Final   • EOSINOPHIL ABSOLUTE # 07/14/2020 0.3  0.0 - 0.5 10*3/uL Final   • BASOPHIL % 07/14/2020 0.3  0.0 - 1.2 % Final   • BASOPHIL ABSOLUTE # 07/14/2020 0.0  0.0 - 0.1 10*3/uL Final   • DIFFERENTIAL TYPE 07/14/2020 AUTO DIFF   Final   • CALCIUM, SERUM 07/14/2020 9.9  8.6 - 10.6 mg/dL Final   • ALBUMIN, SERUM 07/14/2020 4.4  3.6 - 5.1 g/dL Final   • ALKALINE PHOSPHATASE([id number]) 07/14/2020 72  45 - 115 U/L Final   • ALANINE AMINOTRANSFERASE(SGPT) 07/14/2020 42  5 - 49 U/L Final   • ASPARTATE AMINOTRNSFRASE(SGOT) 07/14/2020 44* 14 - 43 U/L Final   • BILIRUBIN, TOTAL 07/14/2020 1.8* 0.0 - 1.3 mg/dL Final   • BLOOD UREA NITROGEN 07/14/2020 13  6 - 27 mg/dL Final   • CHLORIDE, SERUM 07/14/2020 102  96 - 107 mmol/L Final   • CO2 VENOUS 07/14/2020 28  22 - 32 mmol/L  Final   • CREATININE, SERUM 07/14/2020 0.8  0.6 - 1.6 mg/dL Final   • K (POTASSIUM, SERUM) 07/14/2020 4.9  3.5 - 5.3 mmol/L Final   • NA (SODIUM, SERUM) 07/14/2020 138  136 - 146 mmol/L Final   • GLUCOSE, FASTING 07/14/2020 103* 60 - 100 mg/dL Final   • PROTEIN, TOTAL SERUM 07/14/2020 7.1  6.4 - 8.5 g/dL Final   • EGFR*  07/14/2020 >60  >60 mL/min/[1.73m2] Final   • EGFR* NON- 07/14/2020 >60  >60 mL/min/[1.73m2] Final   • TSH (WITH REFLEX TO FREE T4) 07/14/2020 2.86  0.30 - 4.82 m[iU]/L Final   • MG (MAGNESIUM, SERUM) 07/14/2020 1.9  1.6 - 2.6 mg/dL Final   • CHOLESTEROL, TOTAL 07/14/2020 173  140 - 200 mg/dL Final   • HDL CHOLESTEROL 07/14/2020 43  >40 mg/dL Final   • LDL CHOL, CALCULATED 07/14/2020 112* 30 - 100 mg/dL Final   • TRIGLYCERIDES 07/14/2020 89  0 - 200 mg/dL Final   • 25-HYDROXY D2 07/14/2020 <1.0  ng/mL Final   • 25-HYDROXY D3 07/14/2020 21.3  ng/mL Final   • 25-HYDROXY D TOTAL 07/14/2020 21.3* 30.0 - 80.0 ng/mL Final   • URIC ACID, SERUM 07/14/2020 6.0  3.5 - 8.5 mg/dL Final     ECG:  Date: 10/07/2020  I have reviewed the ECG with the following interpretation:  Normal sinus rhythm at 71 bpm, 1st degree AV block, LAFB, LAE, occasional PAC    ECG:  Date: 09/23/2020  I have reviewed the ECG with the following interpretation:  Atrial flutter/ fibrillation at 61 bpm, LAFB    ECG:  Date: 08/26/2020  I have reviewed the ECG with the following interpretation:  Atrial fibrillation at 73 bpm, LAFB    ECG:  Date: 08/10/2020  I have reviewed the ECG with the following interpretation:  Coarse atrial fibrillation at 67 bpm, LAFB    ECG:  Date: 07/13/2020  I have reviewed the ECG with the following interpretation:  Atrial fibrillation at 74 bpm, LAFB    ECG:  Date: 06/12/2019  I have reviewed the ECG with the following interpretation:  Normal sinus rhythm at 74 bpm, LAFB    ECG:  Date: 05/31/2018  I have reviewed the ECG with the following interpretation:  Sinus rhythm at 82 bpm,  LAD, isolated PVC    ECG:  Date: 2016  I have reviewed the ECG with the following interpretation:  Normal sinus rhythm at 77 bpm, LAD, no ST-T abnormalities    CTA Coronary:  Date: 10/02/2020  I have reviewed the results with following summary:  Coronary artery calcium score:   Left Main: 776  Left anterior descendin  Left Circumflex: 23  Right coronary artery: 820    TOTAL: 2957.  Plaque burden: Severe    LM: There are severe calcifications of the left main artery with suspected areas of greater than 50% stenosis of the distal left main artery.  LAD: There are severe calcifications noted throughout the left anterior descending artery with suspected areas of greater than 70% stenosis. Evaluation mildly limited due to motion artifact.  LCx: There is mild amount of atherosclerotic calcification in the left circumflex without high-grade stenosis.  RCA: Evaluation the right coronary artery is limited due to motion related artifact. There are mild to moderate scattered calcifications within the right posterior pulmonary artery.    1.  Evaluation mildly limited due to cardiac motion artifact.   2.  The total calcium score is at the 90th percentile for subjects of the same age, gender, race/ethnicity.  3.  Severe left main coronary artery calcifications with suspected greater than 50% stenosis. Severe left anterior descending calcifications are suspected greater than 70% stenosis. Recommend cardiac catheterization for further evaluation.   4.  No definite high-grade stenosis of the left circumflex artery.  5.  Right coronary artery is not well evaluated due to motion artifact.  6.  Mild aneurysmal dilatation of the ascending thoracic aorta measuring 4.0 cm.    RIN: Date: 2020  I have reviewed the echo results with following summary:  Normal left ventricular size and systolic function. Ejection fraction is estimated at 55%.  No discrete wall motion abnormality is identified.  Mild left atrial enlargement.  No thrombus was visualized within the left atrium or atrial appendage.    Moderate calcification of the aortic valve with moderate to severely reduced leaflet excursion.  The aortic valve area is 0.72 cm2 by planimetry with a maximum gradient of 55 mmHg and a mean gradient of 36 mmHg, consistent with moderate to severe aortic stenosis.  Mild mitral regurgitation.  Small amount of plaque noted in the aorta.    Nuclear Stress Test:  Date: 08/07/2020  I have reviewed the results with following summary:  Moderate fixed perfusion defect involving the inferior wall. No reversible ischemia is noted.  Normal left ventricular volume with normal systolic thickening and function and an ejection fraction of 61%.    Echo: Date: 06/10/2020  I have reviewed the echo results with following summary:  1. Left ventricle: The cavity size is normal. Wall thickness is moderately increased. There is concentric hypertrophy. Systolic function is normal. The estimated ejection fraction is 55-60%.  2. Aortic valve: Not well visualized. The leaflets are moderately thickened and calcified. Transvalvular velocity is increased, due to stenosis. There is moderate to severe stenosis. Trivial regurgitation.  The mean systolic gradient is 39mm Hg. The peak systolic gradient is 65mm Hg.  3. Mitral valve: The annulus is calcified. The leaflets are mildly thickened. Trace regurgitation.  4. Left atrium: The atrium is mildly dilated.  5. Pulmonary arteries: Systolic pressure is within the normal range, estimated to be 28mm Hg.  Impressions:  When compared to previous study there has been a slight increase of aortic valve gradients (05/18/18 AV peak gradient= 58mmHg, Mean= 31mmHg).    US Aorta:  Date: 06/17/2019  I have reviewed the results with following summary:  1.  No evidence of abdominal aortic aneurysm of visualized abdominal aorta.  2.  The aortic bifurcation into the right and left common iliac arteries is not visualized due to bowel  gas.    Echo: Date: 05/07/2018  I have reviewed the echo results with following summary:  * Normal LV size with normal function. LVEF=65%.  * There is moderate concentric hypertrophy.  * Normal diastolic function.  * No obvious wall motion abnormality noted.    * Normal right ventricular size.  * Normal left atrium. Normal right atrium.  * Trileaflet aortic valve with sclerocalcific changes noted.  There is moderate aortic stenosis.  * There is trace  mitral regurgitation. The mitral valve is calcified.  * Normal tricuspid valve. There is trace tricuspid regurgitation.  * Normal PA pressure (15 mmHg).    Echo: Date: 05/01/2017  I have reviewed the echo results with following summary:  * Normal LV size with normal function. LVEF=60%.  * Normal LV filling.  * There is mild concentric hypertrophy.  * Mildly dilated right ventricle with normal function.  * The aortic root is normal.  * Normal left atrium. Normal right atrium.  * Trileaflet aortic valve with significant sclerocalcific changes. There is mild aortic regurgitation. There is moderate aortic stenosis.   * There is trace mitral regurgitation. The mitral valve is thickened.  * Normal tricuspid valve. There is trace tricuspid regurgitation.  * Normal PA pressure (21 mmHg).  * Pulmonic valve not well seen, probably normal.    Echo: Date: 04/27/2016  I have reviewed the echo results with following summary:  * Normal LV size with normal function. LVEF=60%.   * There is moderate concentric hypertrophy.  * Normal right ventricular size with normal function.  * Mildly enlarged left atrium. Normal right atrium.  * Heavily sclerotic trileaflet aortic valve. There is at least moderate aortic stenosis with a valve area of 0.93cm. Peak grad=39mmHg, Mean grad=24mmHg. There is mild aortic regurgitation.  * Normal mitral valve. There is trace mitral regurgitation. No prolapse.  * Normal tricuspid valve. There is no tricuspid regurgitation.  * Normal PA pressure(18mmHg).  *  Normal pulmonic valve.    Carotid Duplex Ultrasound:  Date: 04/27/2016  I have reviewed the results with following summary:  * Right ICA stenosis: <50%.  * Left ICA stenosis: <50%.  * There is antegrade flow in both the right and left vertebral arteries.    Echo: Date: 09/08/2014  I have reviewed the echo results with following summary:  * Technically limited study with sub-optimal views  * Normal LV size with normal function. LVEF=60%. Left ventricular filling pattern c/w diastolic dysfunction. There is moderate concentric hypertrophy.  * Normal right ventricular size with normal function.  * Mildly dilated left atrium.  * Normal right atrium.  * Heavily calcified aortic valve with moderate stenosis and mild regurgitation.   * There is trace mitral annular calcification and trace regurgitation.  * Normal tricuspid valve. There is mild tricuspid regurgitation.  * Normal PA pressure (22 mmHg).  * Pulmonic valve not clearly visualized.     Nuclear Stress Test:  Date: 04/27/2010  I have reviewed the results with following summary:  1. Normal myocardial perfusion examination.   2. The overall quality of the study is good.  3. Normal perfusion imaging without evidence of inducible ischemia or infarct.  4. Normal left ventricular volume with normal systolic thickening and function and an ejection fraction of 65%.    Carotid Duplex Doppler:  Date: 03/25/2011  I have reviewed the results with following summary:  1 - 39% stenosis of the right internal carotid artery  1 - 39% stenosis of the left internal carotid artery  Vertebral flow is antegrade bilaterally      Problem List:  Patient Active Problem List   Diagnosis   • Carotid artery disease (CMS/HCC)   • Gout   • Hyperplasia of prostate   • Mixed hyperlipidemia   • Nodular calcific aortic valve stenosis   • Personal history of colonic polyps   • Rosacea   • PUD (peptic ulcer disease)   • Early dry stage nonexudative age-related macular degeneration of right eye   •  Intermediate stage nonexudative age-related macular degeneration of left eye   • Disorder of arteries and arterioles, unspecified (CMS/HCC)   • BPH (benign prostatic hyperplasia)   • Atrial fibrillation (CMS/HCC)   • Elevated coronary artery calcium score       Assessment/Plan:    1. Abnormal Nuclear Stress Test      Abnormal CTA Coronary      Severely Elevated Coronary Calcium Score  10/02/2020: CCS of 2,957.  Severe LM coronary artery calcifications with suspected > 50% stenosis. Severe LAD calcifications are suspected > 70% stenosis  08/07/2020: Nuclear stress test revealed moderate fixed perfusion defect involving the inferior wall. No reversible ischemia is noted  - Continue antiplatelet therapy: Aspirin 81mg daily  - Statin therapy as below to delay progression and induce regression of atherosclerotic lesions  - Reviewed nuclear stress test findings.  This defect is new when compared to 04/27/2010 nuclear stress test report  - Recommended cardiac catheterization which was scheduled for 08/14/2020 however, the AM of the procedure, insurance requested for a [id number] which I did & it was denied as it was a \"fixed\" defect on nuclear stress test  - Given severe coronary calcium score and high risk CTA coronary findings, STRONGLY recommend coronary angiogram.  Procedure described in detail.  Benefits & risks (reaction to contrast, contrast induced nephropathy, bleeding, CVA, MI, and death) associated with cardiac catheterization explained to patient including need for compliance with dual antiplatelet therapy if PCI was performed.  Patient expresses understanding and is agreeable to proceed  - Unfortunately, given DCCV on 10/02/2020, will have to wait 4 weeks due to need for uninterrupted full anticoagulation    2. Aortic Valve Disease, Moderate to Severe  06/10/2020: moderate to severe stenosis with trivial regurgitation.  Mean systolic grad of 39mmHg & peak systolic grad of 65mmHg  05/07/2018: moderate AS with  TONY of 0.97cm, with peak grad of 58 mmHg & mean grad of 31mmHg  05/01/2017: mild aortic regurgitation & moderate aortic stenosis with a valve area of 1.04 cm (Peak grad=49mmHg, Mean Grad=27mmHg)  04/27/2016: Heavily calcified aortic valve with moderate aortic stenosis with a valve area of 0.93cm. Peak grad=39mmHg, Mean grad=24mmHg. There is mild aortic regurgitation  - Reviewed surveillance echocardiogram findings  - Education provided re: symptoms to monitor for with severe AS    3. Persistent Atrial Fibrillation  10/02/2020: s/p DCCV  09/22/2020: Found to be back in atrial fibrillation/ flutter  09/04/2020: s/p RIN/ DCCV with initiation of Amiodarone  07/13/2020: Incidentally found on ECG  - Rate controlled without medications  - Continue Eliquis 5mg BID for CVA prophylaxis given [id number] Score of 3  - Discussed PVI with cryoballoon in the near future  - Reviewed nuclear stress test as above  - Recommend split night sleep study as below.  Emphasized its importance.  He is now willing  - Screening for amiodarone toxicity: baseline PFT (ordered), TSH (ordered) & LFT WNL (prdered)    4. Carotid Artery Disease  04/27/2016:  Mild atherosclerotic plaque and calcification without significant stenosis  - Continue antiplatelet therapy: Aspirin 81mg daily  - Reviewed surveillance carotid doppler ultrasound results     5. Hyperlipidemia  It was increased to 80mg qhs in Oct 2015 but he felt that it was \"too much\" thus decreased it to 40mg on his own.  There was a question of it causing leg cramps  07/14/2020:      TG 89  02/04/2019:      TG 78  05/09/2017:   03/04/2003:   - In the past, we had discussed the benefits of statin use and importance of LDL control.  If he experiences leg cramps from the Atorvastatin 80mg, we can change to Rosuvastatin.  However, it seems from our discussion that he is reluctant to change medications as he kept comparing himself to his friends, daughter & family  members and how they discontinued their medications or are not on statins etc.  We had also previously discussed the possibility of just changing it to Rosuvastatin 20mg qhs.  After much discussion, patient always chose to keep current regimen  - Strongly recommend high intensity statin.  He was quite resistant but ultimately, after prolonged education, he is willing to trial  - Change Atorvastatin 40mg qhs to Rosuvastatin 20mg qhs with plans to increase to 40mg qhs if he tolerates without issues  - Continue Zetia 10mg daily  - Advised coenzyme Q10 for management of muscle aches and soreness  - Check FLP & AST/ ALT a few days prior to coronary angiogram    6. At Risk For Sleep Apnea  With daytime fatigue, excessive somnolence, snoring, and interrupted sleep  - Recommend split night sleep study  - Education provided re: detriments of untreated sleep apnea.  Patient still has reservations about it  - After much discussion, he is now willing    7. Prevention Cardiology  - Use of Aspirin for Primary Prevention: Patient is already on aspirin, risks and benefits discussed.  - Silvano's BMI is Body mass index is 27.36 kg/m².  - Diet and exercise counseling provided  - Does patient smoke: No.  Started smoking at age 8, at most 1ppd, quit at age 60 years then started smoking cigars until 1999  - Reviewed US aorta: no abdominal aortic aneurysm    Return to Clinic in 6 weeks or sooner if needed.  Will need ECG during next OV.    Electronically Signed by:    Layne Carpio MD, 10/07/20   Negative

## 2024-12-19 NOTE — ED PROVIDER NOTE - PATIENT PORTAL LINK FT
You can access the FollowMyHealth Patient Portal offered by [facility] by registering at the following website: [email or web address]. By joining [employer]’s FollowMyHealth portal, you will also be able to view your health information using other applications (apps) compatible with our system.

## 2024-12-19 NOTE — ED PROVIDER NOTE - CLINICAL SUMMARY MEDICAL DECISION MAKING FREE TEXT BOX
Labs ivf ordered. Given insulin and IVF. Patient without DKA/HHS. No abd pain, nausea vomiting. Pt spoke to endo about fu for insulin adjustment  Patient to be discharged from ED. Any available test results were discussed with patient and/or family. Verbal instructions given, including instructions to return to ED immediately for any new, worsening, or concerning symptoms. Patient endorsed understanding. Written discharge instructions additionally given, including follow-up plan. 60 yo m insulin dep diabetic, chronic pain on diluadid po pw generalized weakness, lh, dizziness and nausea x days. Admits has been compliant with his medication, both long acting and short acting insulin but will need a refill. Reached out to his endocrinologist to schedule an apt today. Denies any fevers, cough, cp, sob, hematuria, dysuria, abdominal pain  Exam as stated   Labs ivf ordered. Given insulin and IVF. Patient without DKA/HHS. No abd pain, nausea vomiting. Pt spoke to endo about fu for insulin adjustment. Sxs improved tolerated PO. While in ED pt requested his home pain rx, 8mg hydromorphone. At first was requesting IV but bc dc'ing will give PO   Patient to be discharged from ED. Any available test results were discussed with patient and/or family. Verbal instructions given, including instructions to return to ED immediately for any new, worsening, or concerning symptoms. Patient endorsed understanding. Written discharge instructions additionally given, including follow-up plan.

## 2024-12-23 ENCOUNTER — APPOINTMENT (OUTPATIENT)
Dept: PAIN MANAGEMENT | Facility: CLINIC | Age: 59
End: 2024-12-23
Payer: OTHER MISCELLANEOUS

## 2024-12-23 DIAGNOSIS — M54.12 RADICULOPATHY, CERVICAL REGION: ICD-10-CM

## 2024-12-23 DIAGNOSIS — M96.1 POSTLAMINECTOMY SYNDROME, NOT ELSEWHERE CLASSIFIED: ICD-10-CM

## 2024-12-23 PROCEDURE — 99213 OFFICE O/P EST LOW 20 MIN: CPT

## 2025-01-11 ENCOUNTER — INPATIENT (INPATIENT)
Facility: HOSPITAL | Age: 60
LOS: 5 days | Discharge: HOME | DRG: 639 | End: 2025-01-17
Attending: HOSPITALIST | Admitting: STUDENT IN AN ORGANIZED HEALTH CARE EDUCATION/TRAINING PROGRAM
Payer: MEDICARE

## 2025-01-11 VITALS
WEIGHT: 238.1 LBS | HEART RATE: 97 BPM | DIASTOLIC BLOOD PRESSURE: 98 MMHG | HEIGHT: 73 IN | TEMPERATURE: 98 F | RESPIRATION RATE: 18 BRPM | OXYGEN SATURATION: 97 % | SYSTOLIC BLOOD PRESSURE: 169 MMHG

## 2025-01-11 DIAGNOSIS — Z98.1 ARTHRODESIS STATUS: Chronic | ICD-10-CM

## 2025-01-11 DIAGNOSIS — Z98.890 OTHER SPECIFIED POSTPROCEDURAL STATES: Chronic | ICD-10-CM

## 2025-01-11 PROCEDURE — 93010 ELECTROCARDIOGRAM REPORT: CPT

## 2025-01-11 PROCEDURE — 99291 CRITICAL CARE FIRST HOUR: CPT

## 2025-01-11 RX ORDER — MORPHINE SULFATE 15 MG
4 TABLET, EXTENDED RELEASE ORAL ONCE
Refills: 0 | Status: DISCONTINUED | OUTPATIENT
Start: 2025-01-11 | End: 2025-01-11

## 2025-01-11 RX ORDER — ONDANSETRON 4 MG/1
4 TABLET ORAL ONCE
Refills: 0 | Status: COMPLETED | OUTPATIENT
Start: 2025-01-11 | End: 2025-01-11

## 2025-01-11 RX ORDER — SODIUM CHLORIDE 9 MG/ML
1000 INJECTION, SOLUTION INTRAMUSCULAR; INTRAVENOUS; SUBCUTANEOUS ONCE
Refills: 0 | Status: COMPLETED | OUTPATIENT
Start: 2025-01-11 | End: 2025-01-11

## 2025-01-11 NOTE — ED ADULT TRIAGE NOTE - ACCOMPANIED BY
EMT/paramedic
Quality 130: Documentation Of Current Medications In The Medical Record: Current Medications Documented
Detail Level: Detailed

## 2025-01-11 NOTE — ED PROVIDER NOTE - CRITICAL CARE ATTENDING CONTRIBUTION TO CARE
pt has a life threatening condition that required critical care time of 40 minutes including assessment/reassessment, documentation, ordering and interpreting ancillary studies, discussion with ED staff and consultants, patient and their family, and excludes time spent on separately billable procedures.

## 2025-01-11 NOTE — ED ADULT TRIAGE NOTE - ARRIVAL INFO ADDITIONAL COMMENTS
Patient presents to the ED from home for nausea, vomiting and diarrhea for three days. Unable to tolerate any PO intake. Hx DM with DKA and subsequent MI. Unable to take insulin due to illness, FS en route 200. Complaining of back and abdominal pain. Takes Eliquis, Carvedilol, metformin, Lantus, Jardiance, Dilaudid due to prior spinal injury, Methadone.

## 2025-01-11 NOTE — ED PROVIDER NOTE - PHYSICAL EXAMINATION
exam:   General:  NAD.   HEENT: eyes perrl, nose normal, OP no erythema/exudate/swelling.   cor: RRR, s1s2, 2+rad pulses.   lungs: ctabl, no resp distress.   abd: soft, ntnd.   : no cvat  neuro: a&ox3, cn2-12 intact, COBOS, 5/5 strength c nl sensation all extremities, nl coordination.   MSK: no extremity swelling.  Skin: normal, no rash

## 2025-01-11 NOTE — ED PROVIDER NOTE - CLINICAL SUMMARY MEDICAL DECISION MAKING FREE TEXT BOX
59-year-old male with history of diabetes, hypertension, DKA, MI, cervical fusion complaining of nausea and vomiting for the last 3 days, nonbloody nonbilious, multiple episodes daily associated with nonbloody watery diarrhea several times a day.  He has intermittent abdominal pain, usually right before vomiting.  He states he has not been able to tolerate any p.o. food or water or even his medications.  He feels dizzy and lightheaded.  No chest pain.  He complains of some shortness of breath.  No fever.  Positive chills.  No hematuria dysuria.  He has had decreased urine output.  Complains of chronic back and left arm pains for which he sees pain management.  He has not been able to take his Dilaudid 8 mg for chronic pain due to vomiting.  No sick contacts or travel.  No prior abdominal surgical history    Patient in no distress.  Vitals notable for high blood pressure, 169/98.  Abdomen soft and nontender.  Most likely viral gastroenteritis.  Partial DDx: Viral syndrome, colitis, pancreatitis, DKA.  Rule out dehydration, electrolyte abnormality, DKA.  Check labs EKG.  Give IV fluids Zofran, morphine.  Reassess 59-year-old male with history of diabetes, hypertension, DKA, MI, cervical fusion complaining of nausea and vomiting for the last 3 days, nonbloody nonbilious, multiple episodes daily associated with nonbloody watery diarrhea several times a day.  He has intermittent abdominal pain, usually right before vomiting.  He states he has not been able to tolerate any p.o. food or water or even his medications.  He feels dizzy and lightheaded.  No chest pain.  He complains of some shortness of breath.  No fever.  Positive chills.  No hematuria dysuria.  He has had decreased urine output.  Complains of chronic back and left arm pains for which he sees pain management.  He has not been able to take his Dilaudid 8 mg for chronic pain due to vomiting.  No sick contacts or travel.  No prior abdominal surgical history    Patient in no distress.  Vitals notable for high blood pressure, 169/98.  Abdomen soft and nontender.  Most likely viral gastroenteritis.  Partial DDx: Viral syndrome, colitis, pancreatitis, DKA.  Rule out dehydration, electrolyte abnormality, DKA.  Check labs EKG.  Give IV fluids Zofran, morphine.  Reassess    Update: Labs remarkable for leukocytosis, 14K he also has an anion gap of 22, glucose 219 large acetone and venous pH 7.28, suggesting DKA.  Additional IV fluid boluses ordered.  CT abdomen done, showing mild wall thickening of transverse and descending colon, may reflect colitis versus underdistention.  Zosyn was given.  Case was discussed with ICU 0300.  They would like to continue treatment/IV boluses in ED and reassess, repeat labs 59-year-old male with history of diabetes, hypertension, DKA, MI, cervical fusion complaining of nausea and vomiting for the last 3 days, nonbloody nonbilious, multiple episodes daily associated with nonbloody watery diarrhea several times a day.  He has intermittent abdominal pain, usually right before vomiting.  He states he has not been able to tolerate any p.o. food or water or even his medications.  He feels dizzy and lightheaded.  No chest pain.  He complains of some shortness of breath.  No fever.  Positive chills.  No hematuria dysuria.  He has had decreased urine output.  Complains of chronic back and left arm pains for which he sees pain management.  He has not been able to take his Dilaudid 8 mg for chronic pain due to vomiting.  No sick contacts or travel.  No prior abdominal surgical history    Patient in no distress.  Vitals notable for high blood pressure, 169/98.  Abdomen soft and nontender.  Most likely viral gastroenteritis.  Partial DDx: Viral syndrome, colitis, pancreatitis, DKA.  Rule out dehydration, electrolyte abnormality, DKA.  Check labs EKG.  Give IV fluids Zofran, morphine.  Reassess    Update: Labs remarkable for leukocytosis, 14K he also has an anion gap of 22, glucose 219 large acetone and venous pH 7.28, suggesting DKA.  Additional IV fluid boluses ordered.  CT abdomen done, showing mild wall thickening of transverse and descending colon, may reflect colitis versus underdistention.  Zosyn was given.  Case was discussed with ICU 0300.  They would like to continue treatment/IV boluses in ED and reassess, repeat labs    0600: Discussed with ICU.  Anion gap closed, normal.  They recommend admit to floors.  Can stop insulin drip.  Case discussed with attending hospitalist Dr. Meraz, accepting admission 59-year-old male with history of diabetes, hypertension, DKA, MI, cervical fusion complaining of nausea and vomiting for the last 3 days, nonbloody nonbilious, multiple episodes daily associated with nonbloody watery diarrhea several times a day.  He has intermittent abdominal pain, usually right before vomiting.  He states he has not been able to tolerate any p.o. food or water or even his medications.  He feels dizzy and lightheaded.  No chest pain.  He complains of some shortness of breath.  No fever.  Positive chills.  No hematuria dysuria.  He has had decreased urine output.  Complains of chronic back and left arm pains for which he sees pain management.  He has not been able to take his Dilaudid 8 mg for chronic pain due to vomiting.  No sick contacts or travel.  No prior abdominal surgical history    Patient in no distress.  Vitals notable for high blood pressure, 169/98.  Abdomen soft and nontender.  Most likely viral gastroenteritis.  Partial DDx: Viral syndrome, colitis, pancreatitis, DKA.  Rule out dehydration, electrolyte abnormality, DKA.  Check labs EKG.  Give IV fluids Zofran, morphine.  Reassess    Update: Labs remarkable for leukocytosis, 14K he also has an anion gap of 22, glucose 219 large acetone and venous pH 7.28, suggesting DKA.  Additional IV fluid boluses ordered.  CT abdomen done, showing mild wall thickening of transverse and descending colon, may reflect colitis versus underdistention.  Zosyn was given.  Case was discussed with ICU 0300.  They would like to continue treatment/IV boluses in ED and reassess, repeat labs    0600: Discussed with ICU.  Anion gap closed, normal.  They recommend admit to floors.  Can stop insulin drip.  Case discussed with attending hospitalist Dr. Meraz, accepting admission  0620: ICU PA d/w Dr Cruz, they now would like pt to continue insulin drip for now and admit to icu.

## 2025-01-11 NOTE — ED PROVIDER NOTE - OBJECTIVE STATEMENT
59-year-old male with history of diabetes, hypertension, DKA, MI, cervical fusion complaining of nausea and vomiting for the last 3 days, nonbloody nonbilious, multiple episodes daily associated with nonbloody watery diarrhea several times a day.  He has intermittent abdominal pain, usually right before vomiting.  He states he has not been able to tolerate any p.o. food or water or even his medications.  He feels dizzy and lightheaded.  No chest pain.  He complains of some shortness of breath.  No fever.  Positive chills.  No hematuria dysuria.  He has had decreased urine output.  Complains of chronic back and left arm pains for which he sees pain management.  He has not been able to take his Dilaudid 8 mg for chronic pain due to vomiting.  No sick contacts or travel.  No prior abdominal surgical history

## 2025-01-12 DIAGNOSIS — I77.9 DISORDER OF ARTERIES AND ARTERIOLES, UNSPECIFIED: ICD-10-CM

## 2025-01-12 DIAGNOSIS — E11.10 TYPE 2 DIABETES MELLITUS WITH KETOACIDOSIS WITHOUT COMA: ICD-10-CM

## 2025-01-12 DIAGNOSIS — K27.9 PEPTIC ULCER, SITE UNSPECIFIED, UNSPECIFIED AS ACUTE OR CHRONIC, WITHOUT HEMORRHAGE OR PERFORATION: ICD-10-CM

## 2025-01-12 DIAGNOSIS — R93.89 ABNORMAL FINDINGS ON DIAGNOSTIC IMAGING OF OTHER SPECIFIED BODY STRUCTURES: ICD-10-CM

## 2025-01-12 DIAGNOSIS — I25.9 CHRONIC ISCHEMIC HEART DISEASE, UNSPECIFIED: ICD-10-CM

## 2025-01-12 DIAGNOSIS — I65.29 OCCLUSION AND STENOSIS OF UNSPECIFIED CAROTID ARTERY: ICD-10-CM

## 2025-01-12 DIAGNOSIS — Z29.9 ENCOUNTER FOR PROPHYLACTIC MEASURES, UNSPECIFIED: ICD-10-CM

## 2025-01-12 DIAGNOSIS — M54.9 DORSALGIA, UNSPECIFIED: ICD-10-CM

## 2025-01-12 DIAGNOSIS — F41.9 ANXIETY DISORDER, UNSPECIFIED: ICD-10-CM

## 2025-01-12 LAB
ACETONE SERPL-MCNC: ABNORMAL
ALBUMIN SERPL ELPH-MCNC: 4 G/DL — SIGNIFICANT CHANGE UP (ref 3.3–5)
ALP SERPL-CCNC: 111 U/L — SIGNIFICANT CHANGE UP (ref 40–120)
ALT FLD-CCNC: 27 U/L — SIGNIFICANT CHANGE UP (ref 10–45)
ANION GAP SERPL CALC-SCNC: 15 MMOL/L — SIGNIFICANT CHANGE UP (ref 5–17)
ANION GAP SERPL CALC-SCNC: 18 MMOL/L — HIGH (ref 5–17)
ANION GAP SERPL CALC-SCNC: 22 MMOL/L — HIGH (ref 5–17)
ANION GAP SERPL CALC-SCNC: 8 MMOL/L — SIGNIFICANT CHANGE UP (ref 5–17)
APPEARANCE UR: CLEAR — SIGNIFICANT CHANGE UP
AST SERPL-CCNC: 9 U/L — LOW (ref 10–40)
B-OH-BUTYR SERPL-SCNC: 3.2 MMOL/L — HIGH
BASE EXCESS BLDV CALC-SCNC: -11.3 MMOL/L — LOW (ref -2–3)
BASE EXCESS BLDV CALC-SCNC: -13.1 MMOL/L — LOW (ref -2–3)
BASE EXCESS BLDV CALC-SCNC: -14.1 MMOL/L — LOW (ref -2–3)
BASOPHILS # BLD AUTO: 0.06 K/UL — SIGNIFICANT CHANGE UP (ref 0–0.2)
BASOPHILS NFR BLD AUTO: 0.4 % — SIGNIFICANT CHANGE UP (ref 0–2)
BILIRUB SERPL-MCNC: 0.6 MG/DL — SIGNIFICANT CHANGE UP (ref 0.2–1.2)
BILIRUB UR-MCNC: NEGATIVE — SIGNIFICANT CHANGE UP
BUN SERPL-MCNC: 10 MG/DL — SIGNIFICANT CHANGE UP (ref 7–23)
BUN SERPL-MCNC: 10 MG/DL — SIGNIFICANT CHANGE UP (ref 7–23)
BUN SERPL-MCNC: 8 MG/DL — SIGNIFICANT CHANGE UP (ref 7–23)
BUN SERPL-MCNC: 9 MG/DL — SIGNIFICANT CHANGE UP (ref 7–23)
CALCIUM SERPL-MCNC: 8 MG/DL — LOW (ref 8.4–10.5)
CALCIUM SERPL-MCNC: 8.2 MG/DL — LOW (ref 8.4–10.5)
CALCIUM SERPL-MCNC: 8.4 MG/DL — SIGNIFICANT CHANGE UP (ref 8.4–10.5)
CALCIUM SERPL-MCNC: 9.4 MG/DL — SIGNIFICANT CHANGE UP (ref 8.4–10.5)
CHLORIDE SERPL-SCNC: 100 MMOL/L — SIGNIFICANT CHANGE UP (ref 96–108)
CHLORIDE SERPL-SCNC: 105 MMOL/L — SIGNIFICANT CHANGE UP (ref 96–108)
CHLORIDE SERPL-SCNC: 106 MMOL/L — SIGNIFICANT CHANGE UP (ref 96–108)
CHLORIDE SERPL-SCNC: 106 MMOL/L — SIGNIFICANT CHANGE UP (ref 96–108)
CO2 BLDV-SCNC: 14 MMOL/L — LOW (ref 22–26)
CO2 BLDV-SCNC: 14 MMOL/L — LOW (ref 22–26)
CO2 BLDV-SCNC: 17 MMOL/L — LOW (ref 22–26)
CO2 SERPL-SCNC: 14 MMOL/L — LOW (ref 22–31)
CO2 SERPL-SCNC: 15 MMOL/L — LOW (ref 22–31)
CO2 SERPL-SCNC: 18 MMOL/L — LOW (ref 22–31)
CO2 SERPL-SCNC: 23 MMOL/L — SIGNIFICANT CHANGE UP (ref 22–31)
COLOR SPEC: YELLOW — SIGNIFICANT CHANGE UP
CREAT SERPL-MCNC: 0.81 MG/DL — SIGNIFICANT CHANGE UP (ref 0.5–1.3)
CREAT SERPL-MCNC: 0.91 MG/DL — SIGNIFICANT CHANGE UP (ref 0.5–1.3)
CREAT SERPL-MCNC: 0.94 MG/DL — SIGNIFICANT CHANGE UP (ref 0.5–1.3)
CREAT SERPL-MCNC: 1.04 MG/DL — SIGNIFICANT CHANGE UP (ref 0.5–1.3)
DIFF PNL FLD: NEGATIVE — SIGNIFICANT CHANGE UP
EGFR: 103 ML/MIN/1.73M2 — SIGNIFICANT CHANGE UP
EGFR: 84 ML/MIN/1.73M2 — SIGNIFICANT CHANGE UP
EGFR: 94 ML/MIN/1.73M2 — SIGNIFICANT CHANGE UP
EGFR: 99 ML/MIN/1.73M2 — SIGNIFICANT CHANGE UP
EOSINOPHIL # BLD AUTO: 0 K/UL — SIGNIFICANT CHANGE UP (ref 0–0.5)
EOSINOPHIL NFR BLD AUTO: 0 % — SIGNIFICANT CHANGE UP (ref 0–6)
GAS PNL BLDV: SIGNIFICANT CHANGE UP
GLUCOSE BLDC GLUCOMTR-MCNC: 101 MG/DL — HIGH (ref 70–99)
GLUCOSE BLDC GLUCOMTR-MCNC: 103 MG/DL — HIGH (ref 70–99)
GLUCOSE BLDC GLUCOMTR-MCNC: 122 MG/DL — HIGH (ref 70–99)
GLUCOSE BLDC GLUCOMTR-MCNC: 133 MG/DL — HIGH (ref 70–99)
GLUCOSE BLDC GLUCOMTR-MCNC: 144 MG/DL — HIGH (ref 70–99)
GLUCOSE BLDC GLUCOMTR-MCNC: 145 MG/DL — HIGH (ref 70–99)
GLUCOSE BLDC GLUCOMTR-MCNC: 146 MG/DL — HIGH (ref 70–99)
GLUCOSE BLDC GLUCOMTR-MCNC: 156 MG/DL — HIGH (ref 70–99)
GLUCOSE BLDC GLUCOMTR-MCNC: 158 MG/DL — HIGH (ref 70–99)
GLUCOSE SERPL-MCNC: 141 MG/DL — HIGH (ref 70–99)
GLUCOSE SERPL-MCNC: 188 MG/DL — HIGH (ref 70–99)
GLUCOSE SERPL-MCNC: 201 MG/DL — HIGH (ref 70–99)
GLUCOSE SERPL-MCNC: 219 MG/DL — HIGH (ref 70–99)
GLUCOSE UR QL: >=1000 MG/DL
HCO3 BLDV-SCNC: 13 MMOL/L — LOW (ref 22–29)
HCO3 BLDV-SCNC: 14 MMOL/L — LOW (ref 22–29)
HCO3 BLDV-SCNC: 16 MMOL/L — LOW (ref 22–29)
HCT VFR BLD CALC: 38.8 % — LOW (ref 39–50)
HCT VFR BLD CALC: 46.4 % — SIGNIFICANT CHANGE UP (ref 39–50)
HGB BLD-MCNC: 12.6 G/DL — LOW (ref 13–17)
HGB BLD-MCNC: 14.7 G/DL — SIGNIFICANT CHANGE UP (ref 13–17)
IMM GRANULOCYTES NFR BLD AUTO: 0.4 % — SIGNIFICANT CHANGE UP (ref 0–0.9)
KETONES UR-MCNC: >=160 MG/DL
LACTATE SERPL-SCNC: 0.7 MMOL/L — SIGNIFICANT CHANGE UP (ref 0.7–2)
LEUKOCYTE ESTERASE UR-ACNC: NEGATIVE — SIGNIFICANT CHANGE UP
LIDOCAIN IGE QN: 25 U/L — SIGNIFICANT CHANGE UP (ref 16–77)
LYMPHOCYTES # BLD AUTO: 16.4 % — SIGNIFICANT CHANGE UP (ref 13–44)
LYMPHOCYTES # BLD AUTO: 2.3 K/UL — SIGNIFICANT CHANGE UP (ref 1–3.3)
MAGNESIUM SERPL-MCNC: 1.4 MG/DL — LOW (ref 1.6–2.6)
MAGNESIUM SERPL-MCNC: 1.7 MG/DL — SIGNIFICANT CHANGE UP (ref 1.6–2.6)
MCHC RBC-ENTMCNC: 26.9 PG — LOW (ref 27–34)
MCHC RBC-ENTMCNC: 27.5 PG — SIGNIFICANT CHANGE UP (ref 27–34)
MCHC RBC-ENTMCNC: 31.7 G/DL — LOW (ref 32–36)
MCHC RBC-ENTMCNC: 32.5 G/DL — SIGNIFICANT CHANGE UP (ref 32–36)
MCV RBC AUTO: 84.5 FL — SIGNIFICANT CHANGE UP (ref 80–100)
MCV RBC AUTO: 85 FL — SIGNIFICANT CHANGE UP (ref 80–100)
MONOCYTES # BLD AUTO: 0.82 K/UL — SIGNIFICANT CHANGE UP (ref 0–0.9)
MONOCYTES NFR BLD AUTO: 5.8 % — SIGNIFICANT CHANGE UP (ref 2–14)
NEUTROPHILS # BLD AUTO: 10.8 K/UL — HIGH (ref 1.8–7.4)
NEUTROPHILS NFR BLD AUTO: 77 % — SIGNIFICANT CHANGE UP (ref 43–77)
NITRITE UR-MCNC: NEGATIVE — SIGNIFICANT CHANGE UP
NRBC # BLD: 0 /100 WBCS — SIGNIFICANT CHANGE UP (ref 0–0)
NRBC # BLD: 0 /100 WBCS — SIGNIFICANT CHANGE UP (ref 0–0)
PCO2 BLDV: 29 MMHG — LOW (ref 42–55)
PCO2 BLDV: 29 MMHG — LOW (ref 42–55)
PCO2 BLDV: 34 MMHG — LOW (ref 42–55)
PH BLDV: 7.26 — LOW (ref 7.32–7.43)
PH BLDV: 7.27 — LOW (ref 7.32–7.43)
PH BLDV: 7.28 — LOW (ref 7.32–7.43)
PH UR: 5.5 — SIGNIFICANT CHANGE UP (ref 5–8)
PLATELET # BLD AUTO: 352 K/UL — SIGNIFICANT CHANGE UP (ref 150–400)
PLATELET # BLD AUTO: 463 K/UL — HIGH (ref 150–400)
PO2 BLDV: 49 MMHG — HIGH (ref 25–45)
PO2 BLDV: 52 MMHG — HIGH (ref 25–45)
PO2 BLDV: 74 MMHG — HIGH (ref 25–45)
POTASSIUM SERPL-MCNC: 3.5 MMOL/L — SIGNIFICANT CHANGE UP (ref 3.5–5.3)
POTASSIUM SERPL-MCNC: 3.6 MMOL/L — SIGNIFICANT CHANGE UP (ref 3.5–5.3)
POTASSIUM SERPL-MCNC: 3.8 MMOL/L — SIGNIFICANT CHANGE UP (ref 3.5–5.3)
POTASSIUM SERPL-MCNC: 4 MMOL/L — SIGNIFICANT CHANGE UP (ref 3.5–5.3)
POTASSIUM SERPL-SCNC: 3.5 MMOL/L — SIGNIFICANT CHANGE UP (ref 3.5–5.3)
POTASSIUM SERPL-SCNC: 3.6 MMOL/L — SIGNIFICANT CHANGE UP (ref 3.5–5.3)
POTASSIUM SERPL-SCNC: 3.8 MMOL/L — SIGNIFICANT CHANGE UP (ref 3.5–5.3)
POTASSIUM SERPL-SCNC: 4 MMOL/L — SIGNIFICANT CHANGE UP (ref 3.5–5.3)
PROT SERPL-MCNC: 8.4 G/DL — HIGH (ref 6–8.3)
PROT UR-MCNC: 30 MG/DL
RBC # BLD: 4.59 M/UL — SIGNIFICANT CHANGE UP (ref 4.2–5.8)
RBC # BLD: 5.46 M/UL — SIGNIFICANT CHANGE UP (ref 4.2–5.8)
RBC # FLD: 14.5 % — SIGNIFICANT CHANGE UP (ref 10.3–14.5)
RBC # FLD: 14.6 % — HIGH (ref 10.3–14.5)
SAO2 % BLDV: 82.2 % — SIGNIFICANT CHANGE UP (ref 67–88)
SAO2 % BLDV: 85.2 % — SIGNIFICANT CHANGE UP (ref 67–88)
SAO2 % BLDV: 95.2 % — HIGH (ref 67–88)
SODIUM SERPL-SCNC: 136 MMOL/L — SIGNIFICANT CHANGE UP (ref 135–145)
SODIUM SERPL-SCNC: 137 MMOL/L — SIGNIFICANT CHANGE UP (ref 135–145)
SODIUM SERPL-SCNC: 138 MMOL/L — SIGNIFICANT CHANGE UP (ref 135–145)
SODIUM SERPL-SCNC: 139 MMOL/L — SIGNIFICANT CHANGE UP (ref 135–145)
SP GR SPEC: 1.03 — HIGH (ref 1–1.03)
TROPONIN I, HIGH SENSITIVITY RESULT: 22.2 NG/L — SIGNIFICANT CHANGE UP
UROBILINOGEN FLD QL: 0.2 MG/DL — SIGNIFICANT CHANGE UP (ref 0.2–1)
WBC # BLD: 11.23 K/UL — HIGH (ref 3.8–10.5)
WBC # BLD: 14.04 K/UL — HIGH (ref 3.8–10.5)
WBC # FLD AUTO: 11.23 K/UL — HIGH (ref 3.8–10.5)
WBC # FLD AUTO: 14.04 K/UL — HIGH (ref 3.8–10.5)

## 2025-01-12 PROCEDURE — 74177 CT ABD & PELVIS W/CONTRAST: CPT | Mod: 26

## 2025-01-12 RX ORDER — TIZANIDINE 4 MG/1
4 TABLET ORAL EVERY 8 HOURS
Refills: 0 | Status: DISCONTINUED | OUTPATIENT
Start: 2025-01-12 | End: 2025-01-17

## 2025-01-12 RX ORDER — PANTOPRAZOLE 40 MG/1
40 TABLET, DELAYED RELEASE ORAL EVERY 12 HOURS
Refills: 0 | Status: DISCONTINUED | OUTPATIENT
Start: 2025-01-12 | End: 2025-01-13

## 2025-01-12 RX ORDER — SODIUM CHLORIDE 9 MG/ML
1000 INJECTION, SOLUTION INTRAVENOUS
Refills: 0 | Status: DISCONTINUED | OUTPATIENT
Start: 2025-01-12 | End: 2025-01-12

## 2025-01-12 RX ORDER — PIPERACILLIN AND TAZOBACTAM 3; .375 G/15ML; G/15ML
3.38 INJECTION, POWDER, LYOPHILIZED, FOR SOLUTION INTRAVENOUS ONCE
Refills: 0 | Status: COMPLETED | OUTPATIENT
Start: 2025-01-12 | End: 2025-01-12

## 2025-01-12 RX ORDER — SODIUM CHLORIDE 9 MG/ML
1000 INJECTION, SOLUTION INTRAMUSCULAR; INTRAVENOUS; SUBCUTANEOUS ONCE
Refills: 0 | Status: COMPLETED | OUTPATIENT
Start: 2025-01-12 | End: 2025-01-12

## 2025-01-12 RX ORDER — DEXTROSE MONOHYDRATE 25 G/50ML
25 INJECTION, SOLUTION INTRAVENOUS
Refills: 0 | Status: DISCONTINUED | OUTPATIENT
Start: 2025-01-12 | End: 2025-01-12

## 2025-01-12 RX ORDER — MAGNESIUM SULFATE 500 MG/ML
1 INJECTION, SOLUTION INTRAMUSCULAR; INTRAVENOUS ONCE
Refills: 0 | Status: COMPLETED | OUTPATIENT
Start: 2025-01-12 | End: 2025-01-12

## 2025-01-12 RX ORDER — INSULIN HUMAN 100 [IU]/ML
6 INJECTION, SOLUTION SUBCUTANEOUS
Qty: 100 | Refills: 0 | Status: DISCONTINUED | OUTPATIENT
Start: 2025-01-12 | End: 2025-01-12

## 2025-01-12 RX ORDER — MAGNESIUM SULFATE 500 MG/ML
2 INJECTION, SOLUTION INTRAMUSCULAR; INTRAVENOUS ONCE
Refills: 0 | Status: COMPLETED | OUTPATIENT
Start: 2025-01-12 | End: 2025-01-12

## 2025-01-12 RX ORDER — ONDANSETRON 4 MG/1
4 TABLET ORAL EVERY 8 HOURS
Refills: 0 | Status: DISCONTINUED | OUTPATIENT
Start: 2025-01-12 | End: 2025-01-12

## 2025-01-12 RX ORDER — CHLORHEXIDINE GLUCONATE 1.2 MG/ML
1 RINSE ORAL
Refills: 0 | Status: DISCONTINUED | OUTPATIENT
Start: 2025-01-12 | End: 2025-01-14

## 2025-01-12 RX ORDER — DEXTROSE MONOHYDRATE 25 G/50ML
50 INJECTION, SOLUTION INTRAVENOUS
Refills: 0 | Status: DISCONTINUED | OUTPATIENT
Start: 2025-01-12 | End: 2025-01-12

## 2025-01-12 RX ORDER — DEXTROSE MONOHYDRATE 25 G/50ML
25 INJECTION, SOLUTION INTRAVENOUS ONCE
Refills: 0 | Status: DISCONTINUED | OUTPATIENT
Start: 2025-01-12 | End: 2025-01-12

## 2025-01-12 RX ORDER — HYDROMORPHONE HCL 4 MG
4 TABLET ORAL EVERY 4 HOURS
Refills: 0 | Status: DISCONTINUED | OUTPATIENT
Start: 2025-01-12 | End: 2025-01-12

## 2025-01-12 RX ORDER — DEXTROSE MONOHYDRATE 25 G/50ML
15 INJECTION, SOLUTION INTRAVENOUS ONCE
Refills: 0 | Status: DISCONTINUED | OUTPATIENT
Start: 2025-01-12 | End: 2025-01-12

## 2025-01-12 RX ORDER — SODIUM CHLORIDE 9 MG/ML
1000 INJECTION, SOLUTION INTRAVENOUS
Refills: 0 | Status: DISCONTINUED | OUTPATIENT
Start: 2025-01-12 | End: 2025-01-17

## 2025-01-12 RX ORDER — CLONAZEPAM 2 MG
1 TABLET ORAL
Refills: 0 | Status: DISCONTINUED | OUTPATIENT
Start: 2025-01-12 | End: 2025-01-17

## 2025-01-12 RX ORDER — SODIUM CHLORIDE 9 MG/ML
1000 INJECTION, SOLUTION INTRAVENOUS
Refills: 0 | Status: DISCONTINUED | OUTPATIENT
Start: 2025-01-12 | End: 2025-01-13

## 2025-01-12 RX ORDER — INSULIN LISPRO 100/ML
VIAL (ML) SUBCUTANEOUS AT BEDTIME
Refills: 0 | Status: DISCONTINUED | OUTPATIENT
Start: 2025-01-12 | End: 2025-01-14

## 2025-01-12 RX ORDER — INSULIN LISPRO 100/ML
VIAL (ML) SUBCUTANEOUS
Refills: 0 | Status: DISCONTINUED | OUTPATIENT
Start: 2025-01-12 | End: 2025-01-14

## 2025-01-12 RX ORDER — CYANOCOBALAMIN 1000 UG/ML
1000 INJECTION, SOLUTION INTRAMUSCULAR; SUBCUTANEOUS DAILY
Refills: 0 | Status: DISCONTINUED | OUTPATIENT
Start: 2025-01-12 | End: 2025-01-17

## 2025-01-12 RX ORDER — DEXTROSE MONOHYDRATE 25 G/50ML
25 INJECTION, SOLUTION INTRAVENOUS ONCE
Refills: 0 | Status: DISCONTINUED | OUTPATIENT
Start: 2025-01-12 | End: 2025-01-17

## 2025-01-12 RX ORDER — HYDROMORPHONE HCL 4 MG
1 TABLET ORAL ONCE
Refills: 0 | Status: DISCONTINUED | OUTPATIENT
Start: 2025-01-12 | End: 2025-01-12

## 2025-01-12 RX ORDER — TIZANIDINE 4 MG/1
2 TABLET ORAL
Refills: 0 | DISCHARGE

## 2025-01-12 RX ORDER — HYDROMORPHONE HCL 4 MG
6 TABLET ORAL EVERY 6 HOURS
Refills: 0 | Status: DISCONTINUED | OUTPATIENT
Start: 2025-01-12 | End: 2025-01-14

## 2025-01-12 RX ORDER — SERTRALINE HYDROCHLORIDE 25 MG/1
200 TABLET ORAL DAILY
Refills: 0 | Status: DISCONTINUED | OUTPATIENT
Start: 2025-01-12 | End: 2025-01-17

## 2025-01-12 RX ORDER — TRAZODONE HYDROCHLORIDE 150 MG/1
150 TABLET ORAL AT BEDTIME
Refills: 0 | Status: DISCONTINUED | OUTPATIENT
Start: 2025-01-12 | End: 2025-01-17

## 2025-01-12 RX ORDER — DEXTROSE MONOHYDRATE 25 G/50ML
12.5 INJECTION, SOLUTION INTRAVENOUS ONCE
Refills: 0 | Status: DISCONTINUED | OUTPATIENT
Start: 2025-01-12 | End: 2025-01-12

## 2025-01-12 RX ORDER — HEPARIN SODIUM 1000 [USP'U]/ML
5000 INJECTION, SOLUTION INTRAVENOUS; SUBCUTANEOUS EVERY 8 HOURS
Refills: 0 | Status: DISCONTINUED | OUTPATIENT
Start: 2025-01-12 | End: 2025-01-14

## 2025-01-12 RX ORDER — HYDROMORPHONE HCL 4 MG
4 TABLET ORAL EVERY 6 HOURS
Refills: 0 | Status: DISCONTINUED | OUTPATIENT
Start: 2025-01-12 | End: 2025-01-14

## 2025-01-12 RX ORDER — INSULIN GLARGINE-YFGN 100 [IU]/ML
46 INJECTION, SOLUTION SUBCUTANEOUS ONCE
Refills: 0 | Status: COMPLETED | OUTPATIENT
Start: 2025-01-12 | End: 2025-01-12

## 2025-01-12 RX ORDER — INSULIN LISPRO 100/ML
VIAL (ML) SUBCUTANEOUS
Refills: 0 | Status: DISCONTINUED | OUTPATIENT
Start: 2025-01-12 | End: 2025-01-12

## 2025-01-12 RX ORDER — METHADONE HYDROCHLORIDE 10 MG/1
5 TABLET ORAL
Refills: 0 | Status: DISCONTINUED | OUTPATIENT
Start: 2025-01-12 | End: 2025-01-17

## 2025-01-12 RX ORDER — PYRIDOXINE HCL (VITAMIN B6) 100 MG
50 TABLET ORAL DAILY
Refills: 0 | Status: DISCONTINUED | OUTPATIENT
Start: 2025-01-12 | End: 2025-01-17

## 2025-01-12 RX ORDER — ONDANSETRON 4 MG/1
4 TABLET ORAL EVERY 6 HOURS
Refills: 0 | Status: DISCONTINUED | OUTPATIENT
Start: 2025-01-12 | End: 2025-01-17

## 2025-01-12 RX ORDER — GLUCAGON INJECTION, SOLUTION 0.5 MG/.1ML
1 INJECTION, SOLUTION SUBCUTANEOUS ONCE
Refills: 0 | Status: DISCONTINUED | OUTPATIENT
Start: 2025-01-12 | End: 2025-01-12

## 2025-01-12 RX ORDER — DEXTROSE MONOHYDRATE 25 G/50ML
12.5 INJECTION, SOLUTION INTRAVENOUS ONCE
Refills: 0 | Status: DISCONTINUED | OUTPATIENT
Start: 2025-01-12 | End: 2025-01-17

## 2025-01-12 RX ORDER — B COMPLEX, C NO.20/FOLIC ACID 1 MG
1 CAPSULE ORAL DAILY
Refills: 0 | Status: DISCONTINUED | OUTPATIENT
Start: 2025-01-12 | End: 2025-01-17

## 2025-01-12 RX ORDER — INSULIN HUMAN 100 [IU]/ML
6 INJECTION, SOLUTION SUBCUTANEOUS
Qty: 50 | Refills: 0 | Status: DISCONTINUED | OUTPATIENT
Start: 2025-01-12 | End: 2025-01-12

## 2025-01-12 RX ORDER — HYDROMORPHONE HCL 4 MG
2 TABLET ORAL EVERY 4 HOURS
Refills: 0 | Status: DISCONTINUED | OUTPATIENT
Start: 2025-01-12 | End: 2025-01-12

## 2025-01-12 RX ORDER — HYDROMORPHONE HCL 4 MG
2 TABLET ORAL EVERY 6 HOURS
Refills: 0 | Status: DISCONTINUED | OUTPATIENT
Start: 2025-01-12 | End: 2025-01-14

## 2025-01-12 RX ORDER — INSULIN LISPRO 100/ML
VIAL (ML) SUBCUTANEOUS AT BEDTIME
Refills: 0 | Status: DISCONTINUED | OUTPATIENT
Start: 2025-01-12 | End: 2025-01-12

## 2025-01-12 RX ORDER — INSULIN GLARGINE-YFGN 100 [IU]/ML
46 INJECTION, SOLUTION SUBCUTANEOUS EVERY MORNING
Refills: 0 | Status: DISCONTINUED | OUTPATIENT
Start: 2025-01-13 | End: 2025-01-13

## 2025-01-12 RX ORDER — HYDROMORPHONE HCL 4 MG
6 TABLET ORAL EVERY 4 HOURS
Refills: 0 | Status: DISCONTINUED | OUTPATIENT
Start: 2025-01-12 | End: 2025-01-12

## 2025-01-12 RX ORDER — INSULIN HUMAN 100 [IU]/ML
2 INJECTION, SOLUTION SUBCUTANEOUS
Qty: 50 | Refills: 0 | Status: DISCONTINUED | OUTPATIENT
Start: 2025-01-12 | End: 2025-01-12

## 2025-01-12 RX ORDER — INSULIN GLARGINE-YFGN 100 [IU]/ML
42 INJECTION, SOLUTION SUBCUTANEOUS EVERY MORNING
Refills: 0 | Status: DISCONTINUED | OUTPATIENT
Start: 2025-01-12 | End: 2025-01-12

## 2025-01-12 RX ADMIN — SODIUM CHLORIDE 150 MILLILITER(S): 9 INJECTION, SOLUTION INTRAVENOUS at 07:50

## 2025-01-12 RX ADMIN — ONDANSETRON 4 MILLIGRAM(S): 4 TABLET ORAL at 10:24

## 2025-01-12 RX ADMIN — Medication 6 MILLIGRAM(S): at 10:24

## 2025-01-12 RX ADMIN — INSULIN HUMAN 6 UNIT(S)/HR: 100 INJECTION, SOLUTION SUBCUTANEOUS at 03:57

## 2025-01-12 RX ADMIN — SODIUM CHLORIDE 125 MILLILITER(S): 9 INJECTION, SOLUTION INTRAVENOUS at 18:28

## 2025-01-12 RX ADMIN — MAGNESIUM SULFATE 100 GRAM(S): 500 INJECTION, SOLUTION INTRAMUSCULAR; INTRAVENOUS at 15:48

## 2025-01-12 RX ADMIN — Medication 1 MILLIGRAM(S): at 01:59

## 2025-01-12 RX ADMIN — SODIUM CHLORIDE 1000 MILLILITER(S): 9 INJECTION, SOLUTION INTRAMUSCULAR; INTRAVENOUS; SUBCUTANEOUS at 01:17

## 2025-01-12 RX ADMIN — HEPARIN SODIUM 5000 UNIT(S): 1000 INJECTION, SOLUTION INTRAVENOUS; SUBCUTANEOUS at 14:25

## 2025-01-12 RX ADMIN — SODIUM CHLORIDE 1000 MILLILITER(S): 9 INJECTION, SOLUTION INTRAMUSCULAR; INTRAVENOUS; SUBCUTANEOUS at 02:24

## 2025-01-12 RX ADMIN — PANTOPRAZOLE 40 MILLIGRAM(S): 40 TABLET, DELAYED RELEASE ORAL at 16:57

## 2025-01-12 RX ADMIN — Medication 4 MILLIGRAM(S): at 00:50

## 2025-01-12 RX ADMIN — Medication 1 MILLIGRAM(S): at 02:14

## 2025-01-12 RX ADMIN — MAGNESIUM SULFATE 25 GRAM(S): 500 INJECTION, SOLUTION INTRAMUSCULAR; INTRAVENOUS at 06:19

## 2025-01-12 RX ADMIN — SODIUM CHLORIDE 1000 MILLILITER(S): 9 INJECTION, SOLUTION INTRAMUSCULAR; INTRAVENOUS; SUBCUTANEOUS at 00:32

## 2025-01-12 RX ADMIN — Medication 1 MILLIGRAM(S): at 06:32

## 2025-01-12 RX ADMIN — Medication 6 MILLIGRAM(S): at 20:17

## 2025-01-12 RX ADMIN — PIPERACILLIN AND TAZOBACTAM 200 GRAM(S): 3; .375 INJECTION, POWDER, LYOPHILIZED, FOR SOLUTION INTRAVENOUS at 03:15

## 2025-01-12 RX ADMIN — SODIUM CHLORIDE 150 MILLILITER(S): 9 INJECTION, SOLUTION INTRAVENOUS at 12:27

## 2025-01-12 RX ADMIN — SODIUM CHLORIDE 300 MILLILITER(S): 9 INJECTION, SOLUTION INTRAVENOUS at 03:56

## 2025-01-12 RX ADMIN — INSULIN HUMAN 6 UNIT(S)/HR: 100 INJECTION, SOLUTION SUBCUTANEOUS at 07:50

## 2025-01-12 RX ADMIN — Medication 1 MILLIGRAM(S): at 06:17

## 2025-01-12 RX ADMIN — ONDANSETRON 4 MILLIGRAM(S): 4 TABLET ORAL at 00:31

## 2025-01-12 RX ADMIN — Medication 6 MILLIGRAM(S): at 10:39

## 2025-01-12 RX ADMIN — Medication 6 MILLIGRAM(S): at 19:47

## 2025-01-12 RX ADMIN — Medication 4 MILLIGRAM(S): at 00:32

## 2025-01-12 RX ADMIN — INSULIN GLARGINE-YFGN 46 UNIT(S): 100 INJECTION, SOLUTION SUBCUTANEOUS at 10:18

## 2025-01-12 RX ADMIN — Medication 1 MILLIGRAM(S): at 01:17

## 2025-01-12 RX ADMIN — Medication 6 MILLIGRAM(S): at 15:16

## 2025-01-12 RX ADMIN — Medication 1 MILLIGRAM(S): at 01:32

## 2025-01-12 RX ADMIN — HEPARIN SODIUM 5000 UNIT(S): 1000 INJECTION, SOLUTION INTRAVENOUS; SUBCUTANEOUS at 21:52

## 2025-01-12 NOTE — PROGRESS NOTE ADULT - NS ATTEND AMEND GEN_ALL_CORE FT
Pt seen and examined  Pt feeling better   complain of GI Discomfort, CT reviewed unsure if transverse colon vs gastritis on CT   GI Consult called  insulin drip to be transitioned  to lantus as glucose and AG is more stable   patient  main concern is pain control  will need to get home list / pharm list as list of meds appear to have high opoids,      See ACP separate notes.   will try to have proper pain control  if glucose stable after transition to lantus, can transfer to Medical floor

## 2025-01-12 NOTE — PROGRESS NOTE ADULT - ASSESSMENT
59 year old male with PMH type 2 DM, CAD, anxiety/depression, carotid artery stenosis, PUD and chronic pain from orthopedic injuries presenting with 3 day history of anorexia, nausea with bilious vomiting, and diarrhea.  On evaluation in ED found to be in DKA

## 2025-01-12 NOTE — PROGRESS NOTE ADULT - SUBJECTIVE AND OBJECTIVE BOX
Complaining of left arm and leg pain that he states is chronic in nature, 7/10.  Still with intermittent episodes of nausea, no vomiting or loose BM's since admission to ICU.  No SOB, chest pain, nausea, vomiting, lightheadedness, dizziness, cough, chills.    Vital Signs Last 24 Hrs  T(C): 36.2 (12 Jan 2025 08:29), Max: 36.9 (11 Jan 2025 23:04)  T(F): 97.2 (12 Jan 2025 08:29), Max: 98.5 (11 Jan 2025 23:04)  HR: 80 (12 Jan 2025 12:00) (69 - 99)  BP: 104/61 (12 Jan 2025 12:00) (104/61 - 174/99)  BP(mean): 73 (12 Jan 2025 12:00) (73 - 102)  RR: 11 (12 Jan 2025 12:00) (11 - 18)  SpO2: 94% RA (12 Jan 2025 12:00) (94% - 100%)    Labs                        12.6   11.23 )-----------( 352      ( 12 Jan 2025 05:02 )             38.8     139  |  106  |  9   ----------------------------<  201[H]  3.6   |  18[L]  |  0.94    Ca    8.0[L]      12 Jan 2025 05:02  Mg     1.4     01-12  TPro  8.4[H]  /  Alb  4.0  /  TBili  0.6  /  DBili  x   /  AST  9[L]  /  ALT  27  /  AlkPhos  111  01-12    Glucose Trend  01-12-25 @ 12:51   -  -- -- 103[H]  01-12-25 @ 12:05   -  -- -- 101[H]  01-12-25 @ 10:53   -  -- -- 122[H]  01-12-25 @ 09:54   -  -- -- 145[H]  01-12-25 @ 09:03   -  -- -- 146[H]  01-12-25 @ 08:07   -  -- -- 156[H]  01-12-25 @ 07:02   -  -- -- 158[H]  01-12-25 @ 05:59   -  -- -- 200[H]  01-12-25 @ 05:02   -  201[H] -- --  01-12-25 @ 04:58   -  -- -- 194[H]    Current Medications  chlorhexidine 2% Cloths 1 Application(s) Topical <User Schedule>  cyanocobalamin 1000 MICROGram(s) Oral daily  heparin   Injectable 5000 Unit(s) SubCutaneous every 8 hours  insulin lispro (ADMELOG) corrective regimen sliding scale   SubCutaneous three times a day before meals  insulin lispro (ADMELOG) corrective regimen sliding scale   SubCutaneous at bedtime  methadone    Tablet 5 milliGRAM(s) Oral four times a day  multivitamin 1 Tablet(s) Oral daily  pantoprazole  Injectable 40 milliGRAM(s) IV Push every 12 hours  pyridoxine 50 milliGRAM(s) Oral daily  sertraline 200 milliGRAM(s) Oral daily  tiZANidine 4 milliGRAM(s) Oral every 8 hours  traZODone 150 milliGRAM(s) Oral at bedtime  clonazePAM  Tablet 1 milliGRAM(s) Oral four times a day PRN for anxiety  HYDROmorphone  Injectable 2 milliGRAM(s) IV Push every 4 hours PRN Mild Pain (1 - 3)  HYDROmorphone  Injectable 4 milliGRAM(s) IV Push every 4 hours PRN Moderate Pain (4 - 6)  HYDROmorphone  Injectable 6 milliGRAM(s) IV Push every 4 hours PRN Severe Pain (7 - 10)  ondansetron Injectable 4 milliGRAM(s) IV Push every 8 hours PRN Nausea and/or Vomiting    Physical Exam  Gen:  WN/WD Male resting in bed, NAD  ENT:  NC/AT, no JVD noted  Thorax:  Symmetric, no retractions  Lung:  CTA b/l  CV:  S1, S2. RRR  Abd:  Soft, no-distended.  Diffusely tender with no rebound.  BS normoactive, no masses to palp  Extrem:  No C/C/E, DP/radial pulses +2  Neuro:  No gross motor/sensory deficits  Psych:  Alert and oriented x3, calm and cooperative Critical care progress note, addendum to H&P    HPI:  59-year-old male with history of IDDM, hypertension, DKA, CAD with h/o  MI, cervical fusion presented to ED with C/O nausea and vomiting for the last 3 days, nonbloody nonbilious, multiple episodes daily associated with nonbloody watery diarrhea several times a day.  He has intermittent abdominal pain, usually right before vomiting.  He states he has not been able to tolerate any p.o. food or water or even his medications.  He feels dizzy and lightheaded.  No chest pain.  He complains of some shortness of breath.  No fever.  Positive chills.  No hematuria dysuria.  He has had decreased urine output.  Complains of chronic back and left arm pains for which he sees pain management. No sick contacts or travel.  No prior abdominal surgical history. In ED found to be in DKA with GAP 22, PH 7.26 stable electrolytes. In ED received 3 L IV bolus and started on insulin drip with D5%. ICU called for DKA.   Pt seen and examined in ED, pt states of feeling weak and not well. Deneis cough, fever.     Events last 24 hours:   S/P 3 L IV fluid bolus in ED  GAP dropped from 22->18-->now 15  Insulin drip initiated with D5%,   PH 7.27 with bicarb 17      This am   Complaining of left arm and leg pain that he states is chronic in nature, 7/10.  Still with intermittent episodes of nausea, no vomiting or loose BM's since admission to ICU.  No SOB, chest pain, nausea, vomiting, lightheadedness, dizziness, cough, chills.  feeling better    PAST MEDICAL & SURGICAL HISTORY:  Hypertension  Diabetes mellitus  Gastric ulcer  Spinal stenosis  H/O spinal cord compression  Spondylosis  H/O radiculopathy  Heart attack  H/O cervical spine surgery  S/P cervical spinal fusion    FAMILY HISTORY:  FH: hypertension (Father)    FH: diabetes mellitus (Mother)      Social History: denies active smoking, drinking, drug use    Home Medications:  Foltanx oral tablet: 1 tab(s) orally once a day (12 Jan 2025 11:13)  Foltanx oral tablet: 1 tab(s) orally once a day (12 Jan 2025 11:04)  KlonoPIN 1 mg oral tablet: 1 tab(s) orally 4 times a day as needed for  anxiety (12 Jan 2025 11:13)  methadone 5 mg oral tablet: 1 tab(s) orally every 6 hours (12 Jan 2025 11:13)  morphine 15 mg/12 hr oral tablet, extended release: 1 orally every 6 hours (12 Jan 2025 11:13)  pantoprazole 40 mg oral delayed release tablet: 1 tab(s) orally once a day (before a meal) (12 Jan 2025 06:14)  sertraline 100 mg oral tablet: 2 tab(s) orally once a day (12 Jan 2025 06:14)  tiZANidine 4 mg oral capsule: 2 cap(s) orally 3 times a day (12 Jan 2025 11:13)  traZODone 150 mg oral tablet: 1 tab(s) orally once a day (at bedtime) (12 Jan 2025 06:14)    Allergies    No Known Allergies    Intolerances          Vital Signs Last 24 Hrs  T(C): 36.2 (12 Jan 2025 08:29), Max: 36.9 (11 Jan 2025 23:04)  T(F): 97.2 (12 Jan 2025 08:29), Max: 98.5 (11 Jan 2025 23:04)  HR: 80 (12 Jan 2025 12:00) (69 - 99)  BP: 104/61 (12 Jan 2025 12:00) (104/61 - 174/99)  BP(mean): 73 (12 Jan 2025 12:00) (73 - 102)  RR: 11 (12 Jan 2025 12:00) (11 - 18)  SpO2: 94% RA (12 Jan 2025 12:00) (94% - 100%)    Labs                        12.6   11.23 )-----------( 352      ( 12 Jan 2025 05:02 )             38.8     139  |  106  |  9   ----------------------------<  201[H]  3.6   |  18[L]  |  0.94    Ca    8.0[L]      12 Jan 2025 05:02  Mg     1.4     01-12  TPro  8.4[H]  /  Alb  4.0  /  TBili  0.6  /  DBili  x   /  AST  9[L]  /  ALT  27  /  AlkPhos  111  01-12     COVID  02-19-24 @ 18:54  COVID -   NotDetec  10-05-23 @ 10:15  COVID -   NotDetec  03-31-23 @ 10:38  COVID -   Detected[!]  03-29-23 @ 20:30  COVID -   Detected[!]  01-09-23 @ 11:00  COVID -   Detected[!]  12-02-22 @ 20:24  COVID -   NotDetec  03-01-22 @ 12:02  COVID -   NotDetec  02-01-22 @ 13:09  COVID -   NotDetec    Trend Cardiac Enzymes  01-12-25 @ 00:15  [id number]-GACYI-CPKMM/Trop I - -- - --  - --  -  --  /  22.2    Trend BNP  03-30-23 @ 06:45   -  62  12-02-22 @ 20:23   -  86  03-01-22 @ 15:28   -  5054[H]    Procalcitonin Trend  01-09-23 @ 13:05   -   0.08    WBC Trend  01-12-25 @ 05:02   -  11.23[H]  01-12-25 @ 00:15   -  14.04[H]    H/H Trend  01-12-25 @ 05:02   -   12.6[L]/ 38.8[L]  01-12-25 @ 00:15   -   14.7/ 46.4  12-19-24 @ 18:21   -   13.0/ 39.2    Stool Occult Blood    Platelet Trend  01-12-25 @ 05:02   -  352  01-12-25 @ 00:15   -  463[H]    Trend Sodium  01-12-25 @ 05:02   -  139  01-12-25 @ 02:15   -  138  01-12-25 @ 00:15   -  136    Trend Potassium  01-12-25 @ 05:02   -  3.6  01-12-25 @ 02:15   -  3.8  01-12-25 @ 00:15   -  4.0    Trend Bun/Cr  01-12-25 @ 05:02  BUN/CR -  9 / 0.94  01-12-25 @ 02:15  BUN/CR -  10 / 0.91  01-12-25 @ 00:15  BUN/CR -  10 / 1.04    Lactic Acid Trend  01-12-25 @ 03:00   -   0.7    ABG Trend  03-04-22 @ 18:19   - 7.51[H]/22[L]/130[H]/98.8[H]  03-04-22 @ 07:57   - 7.52[H]/25[L]/122[H]/99.3[H]  03-04-22 @ 00:30   - 7.57[H]/<19[L]/116[H]/98.0  03-03-22 @ 04:02   - 7.52[H]/23[L]/95/99.0[H]  03-03-22 @ 00:23   - 7.49[H]/23[L]/120[H]/99.1[H]  03-02-22 @ 20:27   - 7.45/22[L]/144[H]/99.4[H]  03-02-22 @ 18:52   - 7.41/22[L]/112[H]/99.3[H]    Trend AST/ALT/ALK Phos/Bili  01-12-25 @ 00:15   9[L]/27/111/0.6  12-19-24 @ 18:21   11/34/112/0.4  09-14-24 @ 18:30   8[L]/31/127[H]/0.2  07-22-24 @ 11:30   5[L]/26/101/0.4  06-07-24 @ 08:24   14[L]/42/88/0.3  05-30-24 @ 08:18   6[L]/32/84/0.3     Ammonia Trend  03-03-22 @ 17:03   -   15      Amylase / Lipase Trend  01-12-25 @ 00:15   -   -- / 25      Albumin Trend  01-12-25 @ 00:15   -   4.0  12-19-24 @ 18:21   -   3.5  09-14-24 @ 18:30   -   3.3  07-22-24 @ 11:30   -   3.6  06-07-24 @ 08:24   -   3.1[L]  05-30-24 @ 08:18   -   3.0[L]      PTT - PT - INR Trend  07-22-24 @ 11:30   -   35.0 - 10.9 - 0.91  11-28-23 @ 12:00   -   -- - 12.1 - 1.11  03-29-23 @ 20:30   -   19.5[L] - 12.2 - 1.05    Glucose Trend  01-12-25 @ 12:51   -  -- -- 103[H]  01-12-25 @ 12:05   -  -- -- 101[H]  01-12-25 @ 10:53   -  -- -- 122[H]  01-12-25 @ 09:54   -  -- -- 145[H]  01-12-25 @ 09:03   -  -- -- 146[H]  01-12-25 @ 08:07   -  -- -- 156[H]  01-12-25 @ 07:02   -  -- -- 158[H]  01-12-25 @ 05:59   -  -- -- 200[H]  01-12-25 @ 05:02   -  201[H] -- --  01-12-25 @ 04:58   -  -- -- 194[H]    A1C with Estimated Average Glucose Result: 8.1 % *H* [4.0 - 5.6] (07-22-24 @ 11:30)        Current Medications  chlorhexidine 2% Cloths 1 Application(s) Topical <User Schedule>  cyanocobalamin 1000 MICROGram(s) Oral daily  heparin   Injectable 5000 Unit(s) SubCutaneous every 8 hours  insulin lispro (ADMELOG) corrective regimen sliding scale   SubCutaneous three times a day before meals  insulin lispro (ADMELOG) corrective regimen sliding scale   SubCutaneous at bedtime  methadone    Tablet 5 milliGRAM(s) Oral four times a day  multivitamin 1 Tablet(s) Oral daily  pantoprazole  Injectable 40 milliGRAM(s) IV Push every 12 hours  pyridoxine 50 milliGRAM(s) Oral daily  sertraline 200 milliGRAM(s) Oral daily  tiZANidine 4 milliGRAM(s) Oral every 8 hours  traZODone 150 milliGRAM(s) Oral at bedtime  clonazePAM  Tablet 1 milliGRAM(s) Oral four times a day PRN for anxiety  HYDROmorphone  Injectable 2 milliGRAM(s) IV Push every 4 hours PRN Mild Pain (1 - 3)  HYDROmorphone  Injectable 4 milliGRAM(s) IV Push every 4 hours PRN Moderate Pain (4 - 6)  HYDROmorphone  Injectable 6 milliGRAM(s) IV Push every 4 hours PRN Severe Pain (7 - 10)  ondansetron Injectable 4 milliGRAM(s) IV Push every 8 hours PRN Nausea and/or Vomiting    Physical Exam  Gen:  WN/WD Male resting in bed, NAD  ENT:  NC/AT, no JVD noted  Thorax:  Symmetric, no retractions  Lung:  CTA b/l  CV:  S1, S2. RRR  Abd:  Soft, no-distended.  Diffusely tender with no rebound.  BS normoactive, no masses to palp  Extrem:  No C/C/E, DP/radial pulses +2  Neuro:  No gross motor/sensory deficits  Psych:  Alert and oriented x3, calm and cooperative

## 2025-01-12 NOTE — CONSULT NOTE ADULT - SUBJECTIVE AND OBJECTIVE BOX
GI consult    HPI:  59-year-old male with history of IDDM, hypertension, DKA, MI, cervical fusion, back surgery presented to ED with C/O nausea and vomiting for the last 3 days, nonbloody nonbilious, multiple episodes daily associated with nonbloody watery diarrhea several times a day.   No chest pain.  He complains of some shortness of breath.  No fever.  Positive chills.  No hematuria dysuria. + decreased urine output  Complains of chronic back and left arm pains for which he sees pain management. No sick contacts or travel.  Does not recall eating out prior. No  prior abdominal surgical history. In ED found to be in DKA with GAP 22, PH 7.26 stable electrolytes. In ED received 3 L IV bolus and started on insulin drip with D5%. ICU called for DKA.   Reports having had a colonoscopy >5 years ago which revealed a polyp. Has had multiple endoscopies- "Ulcers" per patient. Last one "years ago".  Takes Pantoprazole at home. Is also on chronic Dilaudid and Methadone at home post back surgery several months ago. In process of finding a new cardiologist. Pt reports a gnawing discomfort in the central abdomen that has been present since the onset of the episode.   GI being consulted for CT scan findings: Question of Gastritis vs Colitis      MEDICATIONS  (STANDING):  chlorhexidine 2% Cloths 1 Application(s) Topical <User Schedule>  cyanocobalamin 1000 MICROGram(s) Oral daily  dextrose 5%. 1000 milliLiter(s) (150 mL/Hr) IV Continuous <Continuous>  dextrose 5%. 1000 milliLiter(s) (50 mL/Hr) IV Continuous <Continuous>  dextrose 5%. 1000 milliLiter(s) (100 mL/Hr) IV Continuous <Continuous>  dextrose 50% Injectable 25 Gram(s) IV Push once  dextrose 50% Injectable 12.5 Gram(s) IV Push once  heparin   Injectable 5000 Unit(s) SubCutaneous every 8 hours  insulin lispro (ADMELOG) corrective regimen sliding scale   SubCutaneous three times a day before meals  insulin lispro (ADMELOG) corrective regimen sliding scale   SubCutaneous at bedtime  insulin regular Infusion 6 Unit(s)/Hr (6 mL/Hr) IV Continuous <Continuous>  methadone    Tablet 5 milliGRAM(s) Oral four times a day  multivitamin 1 Tablet(s) Oral daily  pantoprazole  Injectable 40 milliGRAM(s) IV Push every 12 hours  pyridoxine 50 milliGRAM(s) Oral daily  sertraline 200 milliGRAM(s) Oral daily  tiZANidine 4 milliGRAM(s) Oral every 8 hours  traZODone 150 milliGRAM(s) Oral at bedtime    MEDICATIONS  (PRN):  clonazePAM  Tablet 1 milliGRAM(s) Oral four times a day PRN for anxiety  HYDROmorphone  Injectable 2 milliGRAM(s) IV Push every 4 hours PRN Mild Pain (1 - 3)  HYDROmorphone  Injectable 4 milliGRAM(s) IV Push every 4 hours PRN Moderate Pain (4 - 6)  HYDROmorphone  Injectable 6 milliGRAM(s) IV Push every 4 hours PRN Severe Pain (7 - 10)  ondansetron Injectable 4 milliGRAM(s) IV Push every 8 hours PRN Nausea and/or Vomiting      Allergies    No Known Allergies    Intolerances        PAST MEDICAL & SURGICAL HISTORY:  Hypertension      Diabetes mellitus      Gastric ulcer      Spinal stenosis      H/O spinal cord compression      Spondylosis      H/O radiculopathy      Heart attack      H/O cervical spine surgery      S/P cervical spinal fusion          Social History:      FAMILY HISTORY:  FH: hypertension (Father)    FH: diabetes mellitus (Mother)        REVIEW OF SYSTEMS: Otherwise negative       PHYSICAL EXAM:   Vital Signs:  Vital Signs Last 24 Hrs  T(C): 36.2 (12 Jan 2025 08:29), Max: 36.9 (11 Jan 2025 23:04)  T(F): 97.2 (12 Jan 2025 08:29), Max: 98.5 (11 Jan 2025 23:04)  HR: 83 (12 Jan 2025 08:29) (69 - 97)  BP: 123/77 (12 Jan 2025 08:29) (123/77 - 174/99)  BP(mean): --  RR: 15 (12 Jan 2025 08:29) (15 - 18)  SpO2: 99% (12 Jan 2025 08:29) (97% - 100%)    Parameters below as of 12 Jan 2025 07:50  Patient On (Oxygen Delivery Method): room air      Daily Height in cm: 185.42 (11 Jan 2025 23:04)    Daily I&O's Summary    12 Jan 2025 07:01  -  12 Jan 2025 11:24  --------------------------------------------------------  IN: 312 mL / OUT: 0 mL / NET: 312 mL        GENERAL:  Appears stated age, well-groomed, well-nourished, no distress  CHEST:  Full & symmetric excursion, no increased effort,  HEART:  Regular rhythm, S1, S2,   ABDOMEN:  Soft, non-tender, non-distended, normoactive bowel sounds  NEURO:  Alert, oriented, no asterixis, no tremor, no encephalopathy      LABS:                        12.6   11.23 )-----------( 352      ( 12 Jan 2025 05:02 )             38.8     01-12    139  |  106  |  9   ----------------------------<  201[H]  3.6   |  18[L]  |  0.94    Ca    8.0[L]      12 Jan 2025 05:02  Mg     1.4     01-12    TPro  8.4[H]  /  Alb  4.0  /  TBili  0.6  /  DBili  x   /  AST  9[L]  /  ALT  27  /  AlkPhos  111  01-12      Urinalysis Basic - ( 12 Jan 2025 05:02 )    Color: x / Appearance: x / SG: x / pH: x  Gluc: 201 mg/dL / Ketone: x  / Bili: x / Urobili: x   Blood: x / Protein: x / Nitrite: x   Leuk Esterase: x / RBC: x / WBC x   Sq Epi: x / Non Sq Epi: x / Bacteria: x      amylase   lipaseLipase: 25 U/L (01-12 @ 00:15)        RADIOLOGY & ADDITIONAL TESTS: CT scan Abdomen/ Pelvis     FINDINGS:  LOWER CHEST: Within normal limits.  LIVER: Within normal limits.  BILE DUCTS: Normal caliber.  GALLBLADDER: Within normal limits.  SPLEEN: Within normal limits.  PANCREAS: Atrophic.  ADRENALS: Within normal limits.  KIDNEYS/URETERS: No renal stones or hydronephrosis. Stable mild   nonspecific bilateral perinephric stranding.  BLADDER: Within normal limits.  REPRODUCTIVE ORGANS: Prostate within normal limits.    BOWEL: Mild diffuse wall thickening of the transverse and descending   colon with trace pericolonic stranding in the region of the distal   transverse colon. No bowel obstruction. Colonic diverticulosis. Appendix   is normal.  PERITONEUM/RETROPERITONEUM: Within normal limits.  VESSELS: Within normal limits.  LYMPH NODES: No lymphadenopathy.  ABDOMINAL WALL: Within normal limits.  BONES: Degenerative changes. New L4-L5 disc spacer. Stable   benign-appearing lytic lesion in the left ilium.    IMPRESSION:  Mild diffuse wall thickening of the transverse and descending colon with   trace pericolonic stranding in the region of the distal transverse colon.   Findings are nonspecific and may reflect colitis versus underdistention.   Recommend clinical correlation.

## 2025-01-12 NOTE — PROGRESS NOTE ADULT - PROBLEM SELECTOR PLAN 4
Reports having a "heart attack" while in DKA coma for several days a few years ago.  Previously on beta blocker, echocardiogram 10/23 with no egregious findings.  Currently seeking new cardiologist for outpatient follow up

## 2025-01-12 NOTE — PROGRESS NOTE ADULT - PROBLEM SELECTOR PLAN 1
Reports first and only previous episode about 4 years ago.  Current event likely secondary to GI disturbance, anion gap now closed and transitioned to home Lantus dose with sliding scale coverage.  Continue to monitor blood sugar, monitor electrolytes.  Continue hydration with D5, patient does not feel he can tolerate PO at this time.  Ha1c in July 2024 8.1, recheck in AM

## 2025-01-12 NOTE — PROGRESS NOTE ADULT - PROBLEM SELECTOR PLAN 6
MEDICARE WELLNESS VISIT NOTE    HISTORY OF PRESENT ILLNESS:   Alice Heaton presents for her Subsequent Annual Medicare Wellness Visit.   She has no current complaints or concerns.      Patient Care Team:  JOSÉ ANTONIO Lowe as PCP - General (Nurse Practitioner)  ROZINA Diaz MD (Neurology)  JOSÉ ANTONIO Newell as Nurse Practitioner (Neurology)  Pastor Mccord MD as General Surgeon (Colon & Rectal Surgery)  JOSÉ ANTONIO Rios as Nurse Practitioner (Nurse Practitioner)        Patient Active Problem List   Diagnosis   • Vertigo   • Presbyopia   • Osteoarthrosis   • Hyperopia   • Fibromyalgia   • Hypertension complicating diabetes (CMS/HCC)   • Type 2 diabetes mellitus without complication, without long-term current use of insulin (CMS/HCC)   • Cortical senile cataract   • Astigmatism   • Hyperlipidemia associated with type 2 diabetes mellitus (CMS/HCC)   • Paroxysmal atrial fibrillation (CMS/HCC)   • S/P total hip arthroplasty   • Essential tremor   • Esophageal spasm   • Vitamin D deficiency   • Diastolic dysfunction   • History of stroke   • Bilateral carotid artery stenosis   • Anticoagulant long-term use   • Osteopenia of multiple sites   • Obesity (BMI 30.0-34.9)   • Disorder of left ear   • Chronic diastolic (congestive) heart failure (CMS/HCC)   • Vertigo, labyrinthine, left         Past Medical History:   Diagnosis Date   • Anticoagulant long-term use 1/15/2018   • Arthritis    • Atrial fibrillation (CMS/HCC)    • Atypical chest pain 08/2013    Normal perfusion study 08/27/2013   • Bilateral carotid artery stenosis 6/27/2016   • Cataract    • Diabetes mellitus, type 2 (CMS/HCC)    • Disorder of bone and cartilage, unspecified 03/18/2009   • Dysfunction of eustachian tube    • Esophageal spasm    • Essential tremor    • Fibromyalgia    • Hip pain    • HTN (hypertension)    • Hypercholesterolemia 12/4/2013    Goal is <100   • Intermittent vertigo    • Knee pain    • Malignant neoplasm (CMS/HCC)   Continue medical regimen and supportive care    skin   • Osteopenia of multiple sites 5/2/2019   • PONV (postoperative nausea and vomiting)    • Positive colorectal cancer screening using Cologuard test 3/27/2019         Past Surgical History:   Procedure Laterality Date   • Back surgery  06/1976    L5 diskectomy   • Bunionectomy  1999   • Colonoscopy  01/07/2009   • Colonoscopy  03/29/2019   • D and c  1975   • Echocardiogram  10/13/14, 6/30/14    10/13/14 EF 65%   • Gynecologic cryosurgery  1975   • Holter monitor  8/5/14   • Hysterectomy  1976   • Knee surgery  09/17/2007   • Knee surgery  6/2007   • Nm myocardial perfusion rest or stress multi  10/13/14    no ischemic changes   • Total hip arthroplasty  6/30/2014    right   • Total hip arthroplasty Left 3/9/15   • Us aorta complete  2/6/09         Social History     Tobacco Use   • Smoking status: Never Smoker   • Smokeless tobacco: Never Used   Substance Use Topics   • Alcohol use: Yes     Alcohol/week: 1.0 standard drinks     Types: 1 Standard drinks or equivalent per week     Comment: rarely   • Drug use: No     Drug use:    Drug Use:    No              Family History   Problem Relation Age of Onset   • Cancer Brother 74        stage IV non-small cell lung cancer squamous carcinoma   • Osteoarthritis Brother    • Heart disease Brother    • Hypertension Brother    • Cancer Sister 74        adenocarcinoma   • Cataracts Sister    • Osteoarthritis Sister    • Heart disease Sister    • Hypertension Sister    • Cancer Sister 74        \"popcorn\", started with cancer on the breast   • Cataracts Sister    • Osteoarthritis Sister    • Heart disease Sister    • Psychiatric Mother         dementia   • Osteoarthritis Mother    • COPD Father    • Other Father 74        aortic aneurysm   • Osteoarthritis Father    • Heart disease Father    • Other Sister 77        Abiola's   • Neurological Disorder Sister         McLeod's   • Osteoarthritis Sister    • Other Sister 59        McLeod's   • Neurological  Disorder Sister 59        [place]'s   • Osteoarthritis Sister    • Parkinsonism Son    • Osteoarthritis Sister    • Neurologic Disorder Brother         Cerebral palsy   • Osteoarthritis Brother    • Heart disease Sister    • Hypertension Sister    • Stroke Sister    • Hypertension Sister        Current Outpatient Medications   Medication Sig Dispense Refill   • lisinopril (ZESTRIL) 20 MG tablet Take 1 tablet by mouth daily. 90 tablet 1   • L-Lysine HCl 500 MG Cap      • meclizine (ANTIVERT) 25 MG tablet Take 1 tablet by mouth 3 times daily as needed for Dizziness. 90 tablet 3   • blood glucose test strip Test blood sugar one time daily as directed.  E11.49,E11.65 LOS 99 years True METRIX 100 each 6   • Lancets Misc. Kit Test blood sugar one time daily as directed.  Type II or unspecified type diabetes mellitus with neurological manifestations, uncontrolled E11.49,E11.65 LOS 99 years True METRIX 100 kit 6   • primidone (MYSOLINE) 50 MG tablet Take 1 tablet by mouth 4 times daily. 360 tablet 3   • triamcinolone (ARISTOCORT) 0.1 % cream Apply to left upper arm rash twice daily for one week, repeat as needed. 15 g 3   • pantoprazole (PROTONIX) 40 MG tablet Take 1 tablet by mouth daily. 90 tablet 3   • metoPROLOL tartrate (LOPRESSOR) 50 MG tablet Take 1 tablet by mouth 2 times daily. 180 tablet 1   • metoPROLOL tartrate (LOPRESSOR) 25 MG tablet Take 1 tablet by mouth daily. TAKES WITH 50MG IN THE MORNING TO EQUAL 75MG IN AM. 90 tablet 1   • metFORMIN (GLUCOPHAGE-XR) 500 MG 24 hr tablet Take 2 tablets by mouth 2 times daily. 360 tablet 3   • atorvastatin (LIPITOR) 80 MG tablet Take 1 tablet by mouth nightly. 90 tablet 3   • amLODIPine (NORVASC) 5 MG tablet Take 1 tablet by mouth daily. 90 tablet 3   • dabigatran (PRADAXA) 75 MG Cap Take 1 capsule by mouth every 12 hours. Do not start before December 17, 2020. 180 capsule 3   • Cholecalciferol 125 mcg (5,000 units) tablet Take 10,000 Units by mouth daily.     • Misc  Natural Products (DANDELION ROOT PO) Take by mouth as needed.     • Ascorbic Acid (vitamin C) 500 MG tablet Take 500 mg by mouth daily.     • nitroGLYcerin (NITROSTAT) 0.4 MG sublingual tablet Place 1 tablet under the tongue every 5 minutes as needed for Chest pain. Pain unrelieved after 3 doses,seek medical attention at [facility] 25 tablet 12   • cetirizine (ZYRTEC) 5 MG tablet Take 5 mg by mouth nightly.      • CALCIUM CITRATE-VITAMIN D PO Take 1 tablet by mouth daily.     • TRUEPLUS LANCETS 30G Misc Test blood sugars daily. 100 each 7   • Omega 3-6-9 Fatty Acids (OMEGA 3-6-9 COMPLEX) capsule Take 2 capsules by mouth daily.     • NON FORMULARY Take 12 capsules by mouth daily. Indications: Ambrotose glyconutritional dietary supplement     • diphenhydramine-acetaminophen (TYLENOL PM)  MG Tab Take 2 tablets by mouth nightly. Dose: 2 tablets = 1000mg- 50mg     • Naphazoline-Pheniramine 0.027-0.315 % Solution Place 1 drop into both eyes daily.     • sodium chloride (OCEAN) 0.65 % nasal spray Spray 1 spray in each nostril daily.     • Menthol, Topical Analgesic, (BIOFREEZE EX) Apply 1 application topically daily. Indications: Hip Pain, Knee Pain     • peppermint oil liquid Apply 1 application topically nightly as needed. Indications: Dizziness Patient applies oil to end of nose to help with dizziness at bedtime     • psyllium (METAMUCIL) 0.52 G capsule Take by mouth as directed. 2 capsules (= 1.04 g) every morning with breakfast   1 capsule (= 0.52 g) every evening with dinner     • dulaglutide (Trulicity) 1.5 MG/0.5ML pen-injector Inject 1.5 mg into the skin every 7 days. 6 mL 1     No current facility-administered medications for this visit.        The following items on the Medicare Health Risk Assessment were found to be positive  3.) During the past 4 weeks, how would you rate your health?: Fair     5.) Do you do moderate to strenuous exercise (brisk walk) for about 20 minutes for 3 or more days  per week?: No, I usually do not exercise this much     6 b.) How many servings of High Fiber / Whole Grain Foods to you have each day ( 1 serving = 1 cup cold cereal, 1/2 cup cooked cereal, 1 slice bread): 1 per day     7b.) Do you feel unsteady when standing or walking?: Yes     7c.) Do you worry about falling?: Yes     11j.) Driven/Ridden in a car without wearing your seatbelt: Seldom     15.) How confident are you that you can control and manage most of your health problems?: Somewhat confident         Vision and Hearing screens: Not performed    Advance Directive:   The patient has the following documents:  Power of  for Health Care    Cognitive/Functional Status: no evidence of cognitive dysfunction by direct observation    Opioid Review: Alice is not taking opioid medications.    Recent PHQ 2/9 Score:    PHQ 2:  Date Adult PHQ 2 Score Adult PHQ 2 Interpretation   8/9/2021 1 No further screening needed       PHQ 9:  Date Adult PHQ 9 Score Adult PHQ 9 Interpretation   8/9/2021 3 Minimal Depression       DEPRESSION ASSESSMENT/PLAN:  Mild symptoms, will monitor and reevaluate.     Body mass index is 32.06 kg/m².    BMI ASSESSMENT/PLAN:  Patient is obese.    See patient education in AVS and Follow-up in 6 months        Needed Screening/Treatment:   Bone density and Immunizations reviewed and patient needs: Herpes Zoster, TDAP and COVID 19 vaccines     Needed follow up:  None    See orders.   See Patient Instructions section.   Return in about 3 months (around 11/9/2021) for Chronic disease follow-up, Non-fasting labs prior.        OUTPATIENT PROGRESS NOTE    Subjective      Chief Complaint Medicare Wellness Visit and Condition (multiple chronics )    HPI     PAROXYSMAL ATRIAL FIBRILLATION, CHRONIC DIASTOLIC CHF, HYPERLIPIDEMIA, HYPERTENSION:   Alice is followed by Cardiology. Her last visit was 6/28/2021.  Recommendation was to follow-up in one year.  She is currently taking amlodipine 5 mg daily,  atorvastatin 80 mg daily, lisinopril 20 mg daily, Metoprolol BID, OMEGA 3 fatty acids, PRADAXA, and nitroglycerin as needed. She denies any chest pain, shortness of breath, myalgias.     HISTORY OF CVA, BILATERAL CAROTID STENOSIS:  Management by [facility] [place].  Her last visit was 12-16-20.  Recommendation was to continue Pradaxa which was renewed at that time.  Recommendation to continue statin therapy.  The recommendation was to follow-up in six months for repeat carotid Doppler ultrasound.  She does have an upcoming visit 8/11/2021 to discuss these results.     ESSENTIAL TREMOR, HISTORY OF STROKE, VERTIGO:  Management by [facility].  Her last visit was 10/14/2020.  She takes primidone 4 a day and meclizine.  She has an appointment scheduled for 10/13/2021. She reports symptoms are stable. She follows annually.     OSTEOPENIA:  DEXA scan was last 3/21/2019.  She is due for a DEXA scan this year.   She takes calcium, vitamin-D and vitamin-C    FIBROMYALGIA:  Patient is currently taking a dietary supplement which she feels is working very well most of the time. She reports she was having upper bilateral arm pain but she increased her supplement and this improved.   She reports symptoms are well controlled at this time.    GERD:   Alice is currently taking pantoprazole 40 mg daily. She reports symptoms are well controlled. Denies any abdominal pain, nausea, vomiting, diarrhea, constipation, blood in stool, black/tarry stools.     INSOMNIA:  Patient reports that she takes Tylenol for bed and typically sleeps pretty well. She reports the last few weeks sleeping was tough but her  was not feeling great and she is his care giver.     Hypertension complicating diabetes:  Patient is currently taking metoprolol 75 mg in the morning and 50 mg in the afternoon, lisinopril 20 mg and amlodipine 5 mg daily.  She denies any chest pain, shortness breath, lower  extremity edema.  This is followed by Cardiology.    HYPERLIPIDEMIA:   Alice is currently taking atorvastatin 80 mg daily, omega-3-6-9 complex. Tolerating medication well. She denies chest pain, shortness of breath, myalgias. Last lipid panel was 8/5/2020.     OBESITY:  Weight since last visit stable.    DIABETES:   Current medications include:  Metformin 500 mg, two tabs twice daily, Trulicity 0.75 mg once weekly. She does report blood sugars have been running high since switching medication related to insurance coverage.   Alice is compliant with medications and having no medication side effects.   She denies hypoglycemic symptoms or foot complaints.   She denies polydipsia, polyuria, or vision changes.     Diabetes Health Maintenance  Last hemoglobin A1c:  Hemoglobin A1C (%)   Date Value   08/05/2021 7.8 (H)     Last lipid panel:  8/5/2021  Last microalbuminuria:  8/5/2021  Last comprehensive foot examination:  10/20/2020  Last dilated eye examination:  10/7/2019 - scheduled for soon   Statin: Yes  ACE/ARB: Yes  Immunizations up to date:  COVID-19, tetanus, shingles vaccines due    Medications  Medications were reviewed and updated today.    Histories  I have personally reviewed and updated the patient's past medical, past surgical, family and social histories during today's visit.    Review of Systems  See HPI    Objective   Visit Vitals  /62   Pulse 73   Resp 16   Ht 5' 3\" (1.6 m)   Wt 82.1 kg   SpO2 97%   BMI 32.06 kg/m²      Physical Exam    General Appearance:  appears healthy, alert, polite and cooperative, in no distress.  Skin:   Skin color normal; no lesions or rash noted.  Lungs:    normal respiratory effort, clear to auscultation.  Heart:  regular rate and rhythm, normal SI S2, no murmurs.  Psych:  Affect is normal, Oriented to person, place and time.    Diabetic Foot Exam Documentation     Normal Bilateral Foot Exam:  Skin integrity is normal. Dorsalis pedis and posterior tibial pulses are  present.  Pressure sensation using the Fieldon-Rodolfo monofilament is present.    ASSESSMENT/PLAN:     1. Medicare annual wellness visit, subsequent    2. Hypertension complicating diabetes (CMS/[facility])    3. Hyperlipidemia associated with type 2 diabetes mellitus (CMS/[facility])    4. Paroxysmal atrial fibrillation (CMS/[facility])    5. Chronic diastolic (congestive) heart failure (CMS/[facility])    6. Bilateral carotid artery stenosis    7. Anticoagulant long-term use    8. Type 2 diabetes mellitus without complication, without long-term current use of insulin (CMS/[facility])    9. Obesity (BMI 30.0-34.9)    10. Vitamin D deficiency    11. Fibromyalgia    12. Osteopenia of multiple sites    13. History of stroke      PAROXYSMAL ATRIAL FIBRILLATION, CHRONIC DIASTOLIC CHF, HYPERLIPIDEMIA, HYPERTENSION:   Stable; continue follow-up with cardiology as planned     HISTORY OF CVA, BILATERAL CAROTID STENOSIS:  Stable; Continue follow-up planned Wednesday     ESSENTIAL TREMOR, HISTORY OF STROKE, VERTIGO:  Stable; Continue with follow-up in October as planned    OSTEOPENIA:  DEXA ordered, patient will wait for call to schedule    FIBROMYALGIA:  Stable; continue current medications     GERD:   Stable; Continue current medications      INSOMNIA:  Stable; Continue current management     Hypertension complicating diabetes:  At goal; Continue current management     HYPERLIPIDEMIA:   Stable; Continue current medications     OBESITY:  Weight since last visit stable.    DIABETES:   Increased Trulicity to 1.5 mg weekly. She will continue to monitor home blood sugars and call with concerns. Will follow-up for A1C in 3 months with visit to discuss treatment    HM:   -Patient nervous about vaccines due to previous reactions. 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 She is being very careful to follow COVID guidelines.   -DEXA ordered  -Foot exam done today  -Eye exam scheduled  upcoming

## 2025-01-12 NOTE — H&P ADULT - ASSESSMENT
59-year-old male with history of IDDM, hypertension, DKA, MI, cervical fusion presented to ED with C/O nausea and vomiting for the last 3 days, nonbloody nonbilious, multiple episodes daily associated with nonbloody watery diarrhea several times a day.  He has intermittent abdominal pain, usually right before vomiting.  He states he has not been able to tolerate any p.o. food or water or even his medications.  He feels dizzy and lightheaded.  No chest pain.  He complains of some shortness of breath.  No fever.  Positive chills.  No hematuria dysuria.  He has had decreased urine output.  Complains of chronic back and left arm pains for which he sees pain management. No sick contacts or travel.  No prior abdominal surgical history. In ED found to be in DKA with GAP 22, PH 7.26 stable electrolytes. In ED received 3 L IV bolus and started on insulin drip with D5%. ICU called for DKA.         Assessment:  1. DKA  2. NIDDM  3. N/V      Plan:  Admit to ICU for DKA    NEURO:   Monitor mental status closely, avoid neurosuppresants.     CV:   -Maintain goal MAP >65.   -Monitor BP  -Keep K~4 and Mg>2 for optimal arrhythmia suppression.  - Serial of labs + AM, including Lactate, abg, CMP, CBC,  mag, phos    PULM:   -Pt currently on RA with stable O2  -Monitor O2 sat, goal O2 sat>94%    GI:  -NPO    -PPI for ppx  -Zofran PRN for N/V    RENAL:  -Renal function currently WNL.  -trend lytes/Scr daily with BMP  -I's and O's, goal UOP 0.5 cc/kg/hr  -renal dose meds and avoid nephrotoxins   -mIVF     ENDO:   -Insulin drip per protocol  - BMP/mag/phos Q4 hours  - Hourly BG  - Pt Home insulin regimen on hold while on insulin drip (dose Lantus 47units with ISS)  -NPO while on insulin drip    ID:  -Afebrile   -FU UA  -Send blood and U culture    HEME:   -VTE ppx with SQH    GOC: Full Code    Plan discussed with YVONNE TAVAREZ and ICU attending who agrees       CRITICAL CARE TIME SPENT:   Time spent on this patient encounter, which includes documenting this note in the electronic medical record, was 61 minutes including assessing the presenting problems with associated risks, reviewing the medical record to prepare for the encounter, and meeting face to face with patient to obtain additional history. I have also performed an appropriate physical exam, made interventions listed and ordered and interpreted appropriate diagnostic studies as documented. To improve communication and patient saftey, I have coordinated care with the multidisciplinary team including the bedside nurse, appropriate attending of record and consultants as needed.

## 2025-01-12 NOTE — CHART NOTE - NSCHARTNOTEFT_GEN_A_CORE
Spoke to pharmacist at patient's primary pharmacy, [facility] in [place].  Below is a list of currently prescribed medication and date last picked up:    Klonopin 1mg PO four times daily PRN, 1/10/25  Trazadone 150mg PO HS daily, 1/10/25  Foltanx 1 tab PO daily, 1/10/25  Sertraline 200mg PO HS daily, 1/10/25  Tizanadine 4mg PO TID 12/27/24  Methadone 5mg PO every 6 hours 12/27/24  Morphine IR 15mg PO every 6 hours 12/27/24  Lantus 46 units SQ daily  Sliding scale Novolog with meals    Of note, Morphine IR was confirmed prescribed and recently picked up by outpatient pharmacy but is not reflected in [facility] iSTOP.  Patient states that his chronic pain is usually controlled on 8mg Dilaudid PO every 6 hours with Methadone 5mg PO every 6 hours, but due to recent outpatient shortages of the 8mg formulation he was switched to Morphine IR by his pain specialist Dr. Pham with only moderate relief of pain.

## 2025-01-12 NOTE — PROGRESS NOTE ADULT - PROBLEM SELECTOR PROBLEM 5
Chronic back pain Orientation to room/Bed in low position, brakes on/Side rails x 2 or 4 up, assess large gaps, such that a patient could get extremity or other body part entrapped, use additional safety procedures/Call light is within reach, educate patient/family on its functionality/Environment clear of unused equipment, furniture's in place, clear of hazards/Assess for adequate lighting, leave nightlight on/Patient and family education available to parents and patient/Check patient minimum every 1 hour/Remove all unused equipment out of the room/Keep bed in the lowest position, unless patient is directly attended

## 2025-01-12 NOTE — H&P ADULT - HISTORY OF PRESENT ILLNESS
: 59-year-old male with history of IDDM, hypertension, DKA, MI, cervical fusion presented to ED with C/O nausea and vomiting for the last 3 days, nonbloody nonbilious, multiple episodes daily associated with nonbloody watery diarrhea several times a day.  He has intermittent abdominal pain, usually right before vomiting.  He states he has not been able to tolerate any p.o. food or water or even his medications.  He feels dizzy and lightheaded.  No chest pain.  He complains of some shortness of breath.  No fever.  Positive chills.  No hematuria dysuria.  He has had decreased urine output.  Complains of chronic back and left arm pains for which he sees pain management. No sick contacts or travel.  No prior abdominal surgical history. In ED found to be in DKA with GAP 22, PH 7.26 stable electrolytes. In ED received 3 L IV bolus and started on insulin drip with D5%. ICU called for DKA.   Pt seen and examined in ED, pt states of feeling weak and not well. Deneis cough, fever.     Events last 24 hours:   S/P 3 L IV fluid bolus in ED  GAP dropped from 22->18-->now 15  Insulin drip initiated with D5%,   PH 7.27 with bicarb 17

## 2025-01-12 NOTE — CONSULT NOTE ADULT - ASSESSMENT
HPI:  59-year-old male with history of IDDM, hypertension, DKA, MI, cervical fusion, back surgery presented to ED with C/O nausea and vomiting for the last 3 days, nonbloody nonbilious, multiple episodes daily associated with nonbloody watery diarrhea several times a day.   No chest pain.  He complains of some shortness of breath.  No fever.  Positive chills.  No hematuria dysuria. + decreased urine output  Complains of chronic back and left arm pains for which he sees pain management. No sick contacts or travel.  Does not recall eating out prior. No  prior abdominal surgical history. In ED found to be in DKA with GAP 22, PH 7.26 stable electrolytes. In ED received 3 L IV bolus and started on insulin drip with D5%. ICU called for DKA.   Reports having had a colonoscopy >5 years ago which revealed a polyp. Has had multiple endoscopies- "Ulcers" per patient. Last one "years ago".  Takes Pantoprazole at home. Is also on chronic Dilaudid and Methadone at home post back surgery several months ago. In process of finding a new cardiologist. Pt reports a gnawing discomfort in the central abdomen that has been present since the onset of the episode.   GI being consulted for CT scan findings: Question of Gastritis vs Colitis

## 2025-01-12 NOTE — ED ADULT NURSE NOTE - NSFALLHARMRISKINTERV_ED_ALL_ED

## 2025-01-12 NOTE — CONSULT NOTE ADULT - PROBLEM SELECTOR RECOMMENDATION 9
CT showing gastritis likely due to history of vomiting.    supportive care    PPI daily    Diet as tolerated.    Can discuss eventual upper endoscopy once out of DKA and more stable

## 2025-01-12 NOTE — PROGRESS NOTE ADULT - PROBLEM SELECTOR PLAN 2
With evidence of colitis (gastritis?) on CT abdomen.  Appreciate GI evaluation, PPI twice daily and antiemetics PRN.  Possible upper endoscopy when medically stable.

## 2025-01-12 NOTE — PROGRESS NOTE ADULT - PROBLEM SELECTOR PLAN 3
With stenosis on MRI head 2022.  Previously on Eliquis.  No current complaints of lightheadedness, dizziness, syncope, headache, or visual changes, follow up outpatient

## 2025-01-12 NOTE — PROGRESS NOTE ADULT - PROBLEM SELECTOR PLAN 5
Reports being under the care of Dr. Pham, pain management.  Had previously been on 8mg of Dilaudid PO every 6 hours for pain with 5mg methadone spaced around Dilaudid, changed to Morphine 15 IR due to recent shortages of PO Dilaudid with methadone.      Patient reported 8/10 pain in left extremities and back.  Currently NPO, tolerated 6mg of IV Dilaudid extremely well remaining alert and hemodynamically stable, reported acceptable level of pain control.  Continue current pain control regimen, modify as tolerated.

## 2025-01-12 NOTE — PATIENT PROFILE ADULT - FALL HARM RISK - HARM RISK INTERVENTIONS

## 2025-01-12 NOTE — ED ADULT NURSE NOTE - OBJECTIVE STATEMENT
Patient A&Ox4 BIBEMS from home c/o nausea, vomiting, and diarrhea x3 days. Pt unable to tolerate anything PO, including his medications. Hx DM with DKA and MI. Pt also c/o abdominal and back pain. Denies CP/ SOB/ dizziness.

## 2025-01-13 DIAGNOSIS — R93.89 ABNORMAL FINDINGS ON DIAGNOSTIC IMAGING OF OTHER SPECIFIED BODY STRUCTURES: ICD-10-CM

## 2025-01-13 LAB
A1C WITH ESTIMATED AVERAGE GLUCOSE RESULT: 9.2 % — HIGH (ref 4–5.6)
ANION GAP SERPL CALC-SCNC: 5 MMOL/L — SIGNIFICANT CHANGE UP (ref 5–17)
ANION GAP SERPL CALC-SCNC: 7 MMOL/L — SIGNIFICANT CHANGE UP (ref 5–17)
BUN SERPL-MCNC: 4 MG/DL — LOW (ref 7–23)
BUN SERPL-MCNC: 5 MG/DL — LOW (ref 7–23)
CALCIUM SERPL-MCNC: 8.6 MG/DL — SIGNIFICANT CHANGE UP (ref 8.4–10.5)
CALCIUM SERPL-MCNC: 8.7 MG/DL — SIGNIFICANT CHANGE UP (ref 8.4–10.5)
CHLORIDE SERPL-SCNC: 104 MMOL/L — SIGNIFICANT CHANGE UP (ref 96–108)
CHLORIDE SERPL-SCNC: 106 MMOL/L — SIGNIFICANT CHANGE UP (ref 96–108)
CO2 SERPL-SCNC: 27 MMOL/L — SIGNIFICANT CHANGE UP (ref 22–31)
CO2 SERPL-SCNC: 29 MMOL/L — SIGNIFICANT CHANGE UP (ref 22–31)
CREAT SERPL-MCNC: 0.65 MG/DL — SIGNIFICANT CHANGE UP (ref 0.5–1.3)
CREAT SERPL-MCNC: 0.76 MG/DL — SIGNIFICANT CHANGE UP (ref 0.5–1.3)
CULTURE RESULTS: SIGNIFICANT CHANGE UP
EGFR: 105 ML/MIN/1.73M2 — SIGNIFICANT CHANGE UP
EGFR: 110 ML/MIN/1.73M2 — SIGNIFICANT CHANGE UP
ESTIMATED AVERAGE GLUCOSE: 217 MG/DL — HIGH (ref 68–114)
GLUCOSE BLDC GLUCOMTR-MCNC: 116 MG/DL — HIGH (ref 70–99)
GLUCOSE BLDC GLUCOMTR-MCNC: 150 MG/DL — HIGH (ref 70–99)
GLUCOSE BLDC GLUCOMTR-MCNC: 154 MG/DL — HIGH (ref 70–99)
GLUCOSE BLDC GLUCOMTR-MCNC: 187 MG/DL — HIGH (ref 70–99)
GLUCOSE BLDC GLUCOMTR-MCNC: 83 MG/DL — SIGNIFICANT CHANGE UP (ref 70–99)
GLUCOSE SERPL-MCNC: 158 MG/DL — HIGH (ref 70–99)
GLUCOSE SERPL-MCNC: 83 MG/DL — SIGNIFICANT CHANGE UP (ref 70–99)
HCT VFR BLD CALC: 35 % — LOW (ref 39–50)
HGB BLD-MCNC: 11.4 G/DL — LOW (ref 13–17)
MAGNESIUM SERPL-MCNC: 1.6 MG/DL — SIGNIFICANT CHANGE UP (ref 1.6–2.6)
MAGNESIUM SERPL-MCNC: 1.7 MG/DL — SIGNIFICANT CHANGE UP (ref 1.6–2.6)
MCHC RBC-ENTMCNC: 27 PG — SIGNIFICANT CHANGE UP (ref 27–34)
MCHC RBC-ENTMCNC: 32.6 G/DL — SIGNIFICANT CHANGE UP (ref 32–36)
MCV RBC AUTO: 82.9 FL — SIGNIFICANT CHANGE UP (ref 80–100)
NRBC # BLD: 0 /100 WBCS — SIGNIFICANT CHANGE UP (ref 0–0)
PHOSPHATE SERPL-MCNC: 1.7 MG/DL — LOW (ref 2.5–4.5)
PHOSPHATE SERPL-MCNC: 2.5 MG/DL — SIGNIFICANT CHANGE UP (ref 2.5–4.5)
PLATELET # BLD AUTO: 309 K/UL — SIGNIFICANT CHANGE UP (ref 150–400)
POTASSIUM SERPL-MCNC: 3.2 MMOL/L — LOW (ref 3.5–5.3)
POTASSIUM SERPL-MCNC: 4 MMOL/L — SIGNIFICANT CHANGE UP (ref 3.5–5.3)
POTASSIUM SERPL-SCNC: 3.2 MMOL/L — LOW (ref 3.5–5.3)
POTASSIUM SERPL-SCNC: 4 MMOL/L — SIGNIFICANT CHANGE UP (ref 3.5–5.3)
RBC # BLD: 4.22 M/UL — SIGNIFICANT CHANGE UP (ref 4.2–5.8)
RBC # FLD: 14.4 % — SIGNIFICANT CHANGE UP (ref 10.3–14.5)
SODIUM SERPL-SCNC: 138 MMOL/L — SIGNIFICANT CHANGE UP (ref 135–145)
SODIUM SERPL-SCNC: 140 MMOL/L — SIGNIFICANT CHANGE UP (ref 135–145)
SPECIMEN SOURCE: SIGNIFICANT CHANGE UP
WBC # BLD: 7.59 K/UL — SIGNIFICANT CHANGE UP (ref 3.8–10.5)
WBC # FLD AUTO: 7.59 K/UL — SIGNIFICANT CHANGE UP (ref 3.8–10.5)

## 2025-01-13 PROCEDURE — 99232 SBSQ HOSP IP/OBS MODERATE 35: CPT | Mod: FS

## 2025-01-13 RX ORDER — SODIUM CHLORIDE 9 MG/ML
1000 INJECTION, SOLUTION INTRAVENOUS
Refills: 0 | Status: DISCONTINUED | OUTPATIENT
Start: 2025-01-13 | End: 2025-01-14

## 2025-01-13 RX ORDER — PIPERACILLIN AND TAZOBACTAM 3; .375 G/15ML; G/15ML
3.38 INJECTION, POWDER, LYOPHILIZED, FOR SOLUTION INTRAVENOUS ONCE
Refills: 0 | Status: COMPLETED | OUTPATIENT
Start: 2025-01-13 | End: 2025-01-13

## 2025-01-13 RX ORDER — MAG HYDROX/ALUMINUM HYD/SIMETH 200-200-20
30 SUSPENSION, ORAL (FINAL DOSE FORM) ORAL EVERY 4 HOURS
Refills: 0 | Status: DISCONTINUED | OUTPATIENT
Start: 2025-01-13 | End: 2025-01-17

## 2025-01-13 RX ORDER — POTASSIUM PHOSPHATE, MONOBASIC AND POTASSIUM PHOSPHATE, DIBASIC 224; 236 MG/ML; MG/ML
30 INJECTION, SOLUTION INTRAVENOUS ONCE
Refills: 0 | Status: COMPLETED | OUTPATIENT
Start: 2025-01-13 | End: 2025-01-13

## 2025-01-13 RX ORDER — POTASSIUM CHLORIDE 600 MG/1
10 TABLET, FILM COATED, EXTENDED RELEASE ORAL
Refills: 0 | Status: COMPLETED | OUTPATIENT
Start: 2025-01-13 | End: 2025-01-13

## 2025-01-13 RX ORDER — INSULIN GLARGINE-YFGN 100 [IU]/ML
40 INJECTION, SOLUTION SUBCUTANEOUS EVERY MORNING
Refills: 0 | Status: DISCONTINUED | OUTPATIENT
Start: 2025-01-13 | End: 2025-01-14

## 2025-01-13 RX ORDER — PANTOPRAZOLE 40 MG/1
40 TABLET, DELAYED RELEASE ORAL DAILY
Refills: 0 | Status: DISCONTINUED | OUTPATIENT
Start: 2025-01-13 | End: 2025-01-14

## 2025-01-13 RX ORDER — POLYETHYLENE GLYCOL 3350 17 G/DOSE
17 POWDER (GRAM) ORAL DAILY
Refills: 0 | Status: DISCONTINUED | OUTPATIENT
Start: 2025-01-13 | End: 2025-01-17

## 2025-01-13 RX ORDER — PIPERACILLIN AND TAZOBACTAM 3; .375 G/15ML; G/15ML
3.38 INJECTION, POWDER, LYOPHILIZED, FOR SOLUTION INTRAVENOUS EVERY 8 HOURS
Refills: 0 | Status: DISCONTINUED | OUTPATIENT
Start: 2025-01-13 | End: 2025-01-15

## 2025-01-13 RX ADMIN — Medication 6 MILLIGRAM(S): at 17:37

## 2025-01-13 RX ADMIN — PIPERACILLIN AND TAZOBACTAM 25 GRAM(S): 3; .375 INJECTION, POWDER, LYOPHILIZED, FOR SOLUTION INTRAVENOUS at 12:26

## 2025-01-13 RX ADMIN — Medication 4 MILLIGRAM(S): at 14:00

## 2025-01-13 RX ADMIN — POTASSIUM CHLORIDE 100 MILLIEQUIVALENT(S): 600 TABLET, FILM COATED, EXTENDED RELEASE ORAL at 11:11

## 2025-01-13 RX ADMIN — HEPARIN SODIUM 5000 UNIT(S): 1000 INJECTION, SOLUTION INTRAVENOUS; SUBCUTANEOUS at 21:32

## 2025-01-13 RX ADMIN — HEPARIN SODIUM 5000 UNIT(S): 1000 INJECTION, SOLUTION INTRAVENOUS; SUBCUTANEOUS at 15:13

## 2025-01-13 RX ADMIN — POTASSIUM PHOSPHATE, MONOBASIC AND POTASSIUM PHOSPHATE, DIBASIC 83.33 MILLIMOLE(S): 224; 236 INJECTION, SOLUTION INTRAVENOUS at 09:57

## 2025-01-13 RX ADMIN — CHLORHEXIDINE GLUCONATE 1 APPLICATION(S): 1.2 RINSE ORAL at 05:04

## 2025-01-13 RX ADMIN — SODIUM CHLORIDE 75 MILLILITER(S): 9 INJECTION, SOLUTION INTRAVENOUS at 17:37

## 2025-01-13 RX ADMIN — PIPERACILLIN AND TAZOBACTAM 25 GRAM(S): 3; .375 INJECTION, POWDER, LYOPHILIZED, FOR SOLUTION INTRAVENOUS at 21:33

## 2025-01-13 RX ADMIN — PANTOPRAZOLE 40 MILLIGRAM(S): 40 TABLET, DELAYED RELEASE ORAL at 12:27

## 2025-01-13 RX ADMIN — SODIUM CHLORIDE 75 MILLILITER(S): 9 INJECTION, SOLUTION INTRAVENOUS at 08:52

## 2025-01-13 RX ADMIN — ONDANSETRON 4 MILLIGRAM(S): 4 TABLET ORAL at 08:52

## 2025-01-13 RX ADMIN — HEPARIN SODIUM 5000 UNIT(S): 1000 INJECTION, SOLUTION INTRAVENOUS; SUBCUTANEOUS at 05:03

## 2025-01-13 RX ADMIN — Medication 6 MILLIGRAM(S): at 08:52

## 2025-01-13 RX ADMIN — Medication 6 MILLIGRAM(S): at 02:18

## 2025-01-13 RX ADMIN — PIPERACILLIN AND TAZOBACTAM 200 GRAM(S): 3; .375 INJECTION, POWDER, LYOPHILIZED, FOR SOLUTION INTRAVENOUS at 09:57

## 2025-01-13 RX ADMIN — Medication 6 MILLIGRAM(S): at 02:48

## 2025-01-13 RX ADMIN — Medication 30 MILLILITER(S): at 08:52

## 2025-01-13 RX ADMIN — PANTOPRAZOLE 40 MILLIGRAM(S): 40 TABLET, DELAYED RELEASE ORAL at 05:03

## 2025-01-13 RX ADMIN — Medication 2: at 17:37

## 2025-01-13 RX ADMIN — Medication 6 MILLIGRAM(S): at 23:54

## 2025-01-13 RX ADMIN — POTASSIUM CHLORIDE 100 MILLIEQUIVALENT(S): 600 TABLET, FILM COATED, EXTENDED RELEASE ORAL at 09:58

## 2025-01-13 RX ADMIN — Medication 4 MILLIGRAM(S): at 13:00

## 2025-01-13 RX ADMIN — POTASSIUM CHLORIDE 100 MILLIEQUIVALENT(S): 600 TABLET, FILM COATED, EXTENDED RELEASE ORAL at 08:53

## 2025-01-13 RX ADMIN — Medication 6 MILLIGRAM(S): at 18:37

## 2025-01-13 RX ADMIN — INSULIN GLARGINE-YFGN 40 UNIT(S): 100 INJECTION, SOLUTION SUBCUTANEOUS at 09:57

## 2025-01-13 RX ADMIN — TIZANIDINE 4 MILLIGRAM(S): 4 TABLET ORAL at 15:14

## 2025-01-13 RX ADMIN — Medication 6 MILLIGRAM(S): at 09:07

## 2025-01-13 NOTE — ADVANCED PRACTICE NURSE CONSULT - RECOMMEDATIONS
1.	BG Goal 140- 180 mg/dL   2.	Decrease insulin glargine Injectable (LANTUS) from 40 to 36 Unit(s) SubCutaneous at bedtime  3.	C/W insulin lispro (ADMELOG) moderate corrective regimen sliding scale   SubCutaneous at bedtime  4.	C/W insulin lispro (ADMELOG) moderate corrective regimen sliding scale   SubCutaneous three times a day before meals  5.	add insulin lispro Injectable (ADMELOG) 4 Unit(s) SubCutaneous three times a day before meals        Discharge Recommendations:    1.	Lantus Solostar Pen 100 units/ml (on favorite list) 46  units SubCutaneous DAILY- dose per needs at time of discharge  2.	Metformin  mg tablets orally- 2 tablet twice a day with food  3.	Jardiance 25 mg orally daily  4.	FS Tono 2 sensors- quantity 2  5.	Follow up with Endocrinology and diabetes educator         1.	BG Goal 140- 180 mg/dL   2.	Decrease insulin glargine Injectable (LANTUS) from 40 to 36 Unit(s) SubCutaneous at bedtime  3.	C/W insulin lispro (ADMELOG) moderate corrective regimen sliding scale   SubCutaneous at bedtime  4.	C/W insulin lispro (ADMELOG) moderate corrective regimen sliding scale   SubCutaneous three times a day before meals  5.	add insulin lispro Injectable (ADMELOG) 2 Unit(s) SubCutaneous three times a day before meals        Discharge Recommendations:    1.	Lantus Solostar Pen 100 units/ml (on favorite list) 46  units SubCutaneous DAILY- dose per needs at time of discharge  2.	Metformin  mg tablets orally- 2 tablet twice a day with food  3.	Jardiance 25 mg orally daily  4.	FS Tono 2 sensors- quantity 2  5.	Follow up with Endocrinology and diabetes educator

## 2025-01-13 NOTE — DIETITIAN INITIAL EVALUATION ADULT - OTHER INFO
59-year-old male with history of IDDM, hypertension, DKA, MI, cervical fusion presented to ED with C/O nausea and vomiting for the last 3 days, nonbloody nonbilious, multiple episodes daily associated with nonbloody watery diarrhea several times a day. Patient admitted with DKA , insulin infusion initiated , transitioned to Basal regimen , Diet advanced to clear fluids with Ensure Clear TID, GI note reviewed , (+) gastritis probable due to vomiting . appetite reported usually good ,UBW reported 245lbs, last BM (1/11) , Labs /POCT reviewed , D5% LR @ 75ml/hr x 24 hrs , Potassium , Magnesium repletion noted ,  suggest advance diet as medically indicated

## 2025-01-13 NOTE — DIETITIAN INITIAL EVALUATION ADULT - ORAL INTAKE PTA/DIET HISTORY
Patient reports hx of DM x 6 hrs , last known A1c 7.5% as per patient , reports aware of diet principles

## 2025-01-13 NOTE — PROGRESS NOTE ADULT - ASSESSMENT
HPI:  59-year-old male with history of IDDM, hypertension, DKA, MI, cervical fusion, back surgery presented to ED with C/O nausea and vomiting for the last 3 days, nonbloody nonbilious, multiple episodes daily associated with nonbloody watery diarrhea several times a day.      Patient reports having had a colonoscopy >5 years ago which revealed a polyp.   Has had multiple endoscopies- "Ulcers" per patient. Last one "years ago".    On pantoprazole at home.   Pt reports a gnawing discomfort in the central abdomen that has been present since the onset of the episode.     GI being consulted for CT scan findings: Question of Gastritis vs Colitis

## 2025-01-13 NOTE — PROGRESS NOTE ADULT - NS ATTEND AMEND GEN_ALL_CORE FT
Agree with assessment and plan as above. I attest my time as attending was greater than 50% of the total time of 30 min on patient care (ACP <= 25 mins) on this DOS. Time spent includes review of hospital course, labs, vitals, medical records, patient evaluation, contact with family (when present), discussion of plan with the primary team, coordinating services and documenting encounter.     Non-specific colonic wall thickening seen on CT. Patient with nausea and vomiting which could both possibly be sequelae of his underlying DKA. Would continue to monitor. Can obtain GI PCR if stool consistency does no improve. Otherwise can use loperamide to treat symptomatically if suspicion for bacterial infection is low.

## 2025-01-13 NOTE — PROGRESS NOTE ADULT - SUBJECTIVE AND OBJECTIVE BOX
INTERVAL HPI/OVERNIGHT EVENTS:  Patient seen and examined at bedside. Denies abdominal pain, denies D, no hemetemesis and hemetochezia.   Poor appetite, on clear liquid diet          MEDICATIONS  (STANDING):  chlorhexidine 2% Cloths 1 Application(s) Topical <User Schedule>  cyanocobalamin 1000 MICROGram(s) Oral daily  dextrose 5% + lactated ringers. 1000 milliLiter(s) (75 mL/Hr) IV Continuous <Continuous>  dextrose 5%. 1000 milliLiter(s) (50 mL/Hr) IV Continuous <Continuous>  dextrose 5%. 1000 milliLiter(s) (100 mL/Hr) IV Continuous <Continuous>  dextrose 50% Injectable 25 Gram(s) IV Push once  dextrose 50% Injectable 12.5 Gram(s) IV Push once  heparin   Injectable 5000 Unit(s) SubCutaneous every 8 hours  insulin glargine Injectable (LANTUS) 40 Unit(s) SubCutaneous every morning  insulin lispro (ADMELOG) corrective regimen sliding scale   SubCutaneous three times a day before meals  insulin lispro (ADMELOG) corrective regimen sliding scale   SubCutaneous at bedtime  methadone    Tablet 5 milliGRAM(s) Oral four times a day  multivitamin 1 Tablet(s) Oral daily  pantoprazole  Injectable 40 milliGRAM(s) IV Push daily  piperacillin/tazobactam IVPB.. 3.375 Gram(s) IV Intermittent every 8 hours  pyridoxine 50 milliGRAM(s) Oral daily  sertraline 200 milliGRAM(s) Oral daily  tiZANidine 4 milliGRAM(s) Oral every 8 hours  traZODone 150 milliGRAM(s) Oral at bedtime    MEDICATIONS  (PRN):  aluminum hydroxide/magnesium hydroxide/simethicone Suspension 30 milliLiter(s) Oral every 4 hours PRN Dyspepsia  clonazePAM  Tablet 1 milliGRAM(s) Oral four times a day PRN for anxiety  HYDROmorphone  Injectable 2 milliGRAM(s) IV Push every 6 hours PRN Mild Pain (1 - 3)  HYDROmorphone  Injectable 4 milliGRAM(s) IV Push every 6 hours PRN Moderate Pain (4 - 6)  HYDROmorphone  Injectable 6 milliGRAM(s) IV Push every 6 hours PRN Severe Pain (7 - 10)  ondansetron Injectable 4 milliGRAM(s) IV Push every 6 hours PRN Nausea and/or Vomiting  polyethylene glycol 3350 17 Gram(s) Oral daily PRN Constipation      Allergies    No Known Allergies    Intolerances          Vital Signs Last 24 Hrs  T(C): 37 (13 Jan 2025 05:00), Max: 37 (13 Jan 2025 05:00)  T(F): 98.6 (13 Jan 2025 05:00), Max: 98.6 (13 Jan 2025 05:00)  HR: 74 (13 Jan 2025 06:00) (63 - 86)  BP: 160/77 (13 Jan 2025 06:00) (121/61 - 160/77)  BP(mean): 99 (13 Jan 2025 06:00) (80 - 100)  RR: 15 (13 Jan 2025 06:00) (10 - 19)  SpO2: 98% (13 Jan 2025 06:00) (95% - 99%)    Parameters below as of 13 Jan 2025 04:00  Patient On (Oxygen Delivery Method): room air        PHYSICAL EXAM:    Constitutional: Well-developed  ENTT: clear conjunctivae and sclera  Respiratory: clear to auscultation  Cardiovascular: S1 and S2  Gastrointestinal: +Bowel Sounds all quadrants, soft, Non tender, non distended  Extremities: No peripheral edema, neg clubbing, cyanosis  Neurological: A/O x 3  Skin: warm and dry      LABS:                        11.4   7.59  )-----------( 309      ( 13 Jan 2025 05:30 )             35.0     01-13    140  |  106  |  5[L]  ----------------------------<  83  3.2[L]   |  29  |  0.76    Ca    8.6      13 Jan 2025 05:30  Phos  1.7     01-13  Mg     1.7     01-13    TPro  8.4[H]  /  Alb  4.0  /  TBili  0.6  /  DBili  x   /  AST  9[L]  /  ALT  27  /  AlkPhos  111  01-12      Urinalysis Basic - ( 13 Jan 2025 05:30 )    Color: x / Appearance: x / SG: x / pH: x  Gluc: 83 mg/dL / Ketone: x  / Bili: x / Urobili: x   Blood: x / Protein: x / Nitrite: x   Leuk Esterase: x / RBC: x / WBC x   Sq Epi: x / Non Sq Epi: x / Bacteria: x      LIVER FUNCTIONS - ( 12 Jan 2025 00:15 )  Alb: 4.0 g/dL / Pro: 8.4 g/dL / ALK PHOS: 111 U/L / ALT: 27 U/L / AST: 9 U/L / GGT: x             RADIOLOGY & ADDITIONAL TESTS:

## 2025-01-13 NOTE — PROGRESS NOTE ADULT - ASSESSMENT
Physical Examination:  GENERAL:               Alert, Oriented, No acute distress.    HEENT:                    Pupils equal, reactive to light.  Symmetric. No JVD, Moist MM  PULM:                     Bilateral air entry, Clear to auscultation bilaterally, no significant sputum production, No Rales, No Rhonchi, No Wheezing  CVS:                         S1, S2,  No Murmur  ABD:                        Soft, nondistended, nontender, normoactive bowel sounds,   EXT:                         No edema, nontender, No Cyanosis or Clubbing   Vascular:                Warm Extremities, Normal Capillary refill, Normal Distal Pulses  SKIN:                       Warm and well perfused, no rashes noted.   NEURO:                  Alert, oriented, interactive, nonfocal, follows commands  PSYC:                      Calm, + Insight.        Assessment  DKA in pt with IDDM   Coliits on CT  CAD , HTN  PUD (peptic ulcer disease)  Chronic Anxiety and chronic pain due multiple orthopedic injuries and surgeries with spinal stenosis           Plan  On clear liquid diet  as low glucose added d5 to IVF  and decreased lantus  Advance diet and lantus as tolerated  IV antibiotics for colitis  GI eval          PMD:			 	                   Notified(Date):  Family Updated: 		                                 Date:       Sedation & Analgesia:	  Diet/Nutrition:		 Diet, Clear Liquid:   Consistent Carbohydrate Gestational Diabetic 3 Snacks  DASH/TLC Sodium & Cholesterol Restricted  Supplement Feeding Modality:  Oral  Ensure Clear Cans or Servings Per Day:  1       Frequency:  Three Times a day (01-13-25 @ 08:09) [Active]        GI PPx:			pantoprazole  Injectable 40 milliGRAM(s) IV Push daily  DVT Ppx:		heparin   Injectable 5000 Unit(s) SubCutaneous every 8 hours         Activity:		    Head of Bed:               35-45 Deg  Glycemic Control:          cyanocobalamin 1000 MICROGram(s) Oral daily  dextrose 5% + lactated ringers. 1000 milliLiter(s) IV Continuous <Continuous>  dextrose 5%. 1000 milliLiter(s) IV Continuous <Continuous>  dextrose 5%. 1000 milliLiter(s) IV Continuous <Continuous>  dextrose 50% Injectable 25 Gram(s) IV Push once  dextrose 50% Injectable 12.5 Gram(s) IV Push once  insulin glargine Injectable (LANTUS) 40 Unit(s) SubCutaneous every morning  insulin lispro (ADMELOG) corrective regimen sliding scale   SubCutaneous three times a day before meals  insulin lispro (ADMELOG) corrective regimen sliding scale   SubCutaneous at bedtime  multivitamin 1 Tablet(s) Oral daily  pyridoxine 50 milliGRAM(s) Oral daily      Lines: PIV     Restraints were deemed necessary to prevent removal of life-sustaining devices [  ] YES   [   x ]  NO    Disposition: can transfer to medical floor     Goals of Care: Full Code

## 2025-01-13 NOTE — DIETITIAN INITIAL EVALUATION ADULT - PERTINENT LABORATORY DATA
01-13    140  |  106  |  5[L]  ----------------------------<  83  3.2[L]   |  29  |  0.76    Ca    8.6      13 Jan 2025 05:30  Phos  1.7     01-13  Mg     1.7     01-13    TPro  8.4[H]  /  Alb  4.0  /  TBili  0.6  /  DBili  x   /  AST  9[L]  /  ALT  27  /  AlkPhos  111  01-12  POCT Blood Glucose.: 116 mg/dL (01-13-25 @ 09:56)  A1C with Estimated Average Glucose Result: 8.1 % (07-22-24 @ 11:30)  A1C with Estimated Average Glucose Result: 9.3 % (05-30-24 @ 08:18)  A1C with Estimated Average Glucose Result: 9.4 % (05-29-24 @ 05:10)

## 2025-01-13 NOTE — ADVANCED PRACTICE NURSE CONSULT - ASSESSMENT
HPI 59-year-old male with history of Type 2 Diabetes, hypertension, euDKA, MI, cervical fusion presented to ED with C/O nausea and vomiting for the last 3 days, nonbloody nonbilious, multiple episodes daily associated with nonbloody watery diarrhea several times a day.  He has intermittent abdominal pain, usually right before vomiting.  He states he has not been able to tolerate any p.o. food or water or even his medications.  He feels dizzy and lightheaded.  No chest pain.  He complains of some shortness of breath.  No fever.  Positive chills.  No hematuria dysuria.  He has had decreased urine output.  Complains of chronic back and left arm pains for which he sees pain management. No sick contacts or travel.  No prior abdominal surgical history. In ED found to be in euDKA with GAP 22, PH 7.26 stable electrolytes. In ED received 3 L IV bolus and started on insulin drip with D5%. Transitioned off Insulin infusion with lantus basal insulin once gap closed for > 8 hrs.   Pt states he did not take his insulin as he was too weak even though he knew about risk of euDKA due to SGLT2I use.     HgbA1c- pending  eGFR-  83 (1/13/25)   	  Current In-patient Diabetes Regimen-  Lantus 42 units daily  Moderate correction before meals  Moderate correction HS    Home Diabetes medication Regimen-  Lantus 46 units  Metformin 1000 mg twice a day  Jardiance 25 ng orally daily  FS Tono 2 CGM    Endocrinology- Iftikhar. Pt requested contact info for Dr Johnson- provided to pt and advised to also schedule follow up with Dr [facility]s [facility]    Pt is knowledgeable regarding Type 2 Diabetes disease process, progression, management including medication, healthy eating, consequences of persistent hyperglycemia and risk of euDKA when ill if he omits insulin and or cannot eat for more then 12 hrs. Pt states he was too weak to self administer insulin during illness. Educated pt to teach his llive in girlfriend how to administer insulin if needed, educated pt to come to ED if N/V/D over 8 hrs, educated on sick day management while on SGLT2I and risk of euDKA, on A1c goal- 7.0- 7.5. BG goals (100- 140), when to contact healthcare provider, when to check BG and keeping BG log- provided BG Log.   Educated pt on s/s of hyperglycemia and Tx and S/S of hypoglycemia and tx per 15/15 rule. Pt validated education via teach back.    Pt does know how to check BG and use BG meter and [facility] Tono 2 CGM  Pt states he can safely administer own insulin.     Provided written resources of Leaving the hospital with Diabetes, A1c goal, BG Goals, BG Monitoring, Insulin pen instruction, sharps disposal, Hyper and Hypoglycemia s/s and tx, sick day management, and Preventing complications due to diabetes.

## 2025-01-13 NOTE — DIETITIAN INITIAL EVALUATION ADULT - PERTINENT MEDS FT
MEDICATIONS  (STANDING):  chlorhexidine 2% Cloths 1 Application(s) Topical <User Schedule>  cyanocobalamin 1000 MICROGram(s) Oral daily  dextrose 5% + lactated ringers. 1000 milliLiter(s) (75 mL/Hr) IV Continuous <Continuous>  dextrose 5%. 1000 milliLiter(s) (50 mL/Hr) IV Continuous <Continuous>  dextrose 5%. 1000 milliLiter(s) (100 mL/Hr) IV Continuous <Continuous>  dextrose 50% Injectable 25 Gram(s) IV Push once  dextrose 50% Injectable 12.5 Gram(s) IV Push once  heparin   Injectable 5000 Unit(s) SubCutaneous every 8 hours  insulin glargine Injectable (LANTUS) 40 Unit(s) SubCutaneous every morning  insulin lispro (ADMELOG) corrective regimen sliding scale   SubCutaneous three times a day before meals  insulin lispro (ADMELOG) corrective regimen sliding scale   SubCutaneous at bedtime  methadone    Tablet 5 milliGRAM(s) Oral four times a day  multivitamin 1 Tablet(s) Oral daily  pantoprazole  Injectable 40 milliGRAM(s) IV Push daily  piperacillin/tazobactam IVPB.- 3.375 Gram(s) IV Intermittent once  piperacillin/tazobactam IVPB.. 3.375 Gram(s) IV Intermittent every 8 hours  potassium chloride  10 mEq/100 mL IVPB 10 milliEquivalent(s) IV Intermittent every 1 hour  pyridoxine 50 milliGRAM(s) Oral daily  sertraline 200 milliGRAM(s) Oral daily  tiZANidine 4 milliGRAM(s) Oral every 8 hours  traZODone 150 milliGRAM(s) Oral at bedtime    MEDICATIONS  (PRN):  aluminum hydroxide/magnesium hydroxide/simethicone Suspension 30 milliLiter(s) Oral every 4 hours PRN Dyspepsia  clonazePAM  Tablet 1 milliGRAM(s) Oral four times a day PRN for anxiety  HYDROmorphone  Injectable 2 milliGRAM(s) IV Push every 6 hours PRN Mild Pain (1 - 3)  HYDROmorphone  Injectable 4 milliGRAM(s) IV Push every 6 hours PRN Moderate Pain (4 - 6)  HYDROmorphone  Injectable 6 milliGRAM(s) IV Push every 6 hours PRN Severe Pain (7 - 10)  ondansetron Injectable 4 milliGRAM(s) IV Push every 6 hours PRN Nausea and/or Vomiting  polyethylene glycol 3350 17 Gram(s) Oral daily PRN Constipation

## 2025-01-13 NOTE — DIETITIAN INITIAL EVALUATION ADULT - WEIGHT (KG)
86.1 Scribe Attestation (For Scribes USE Only)... Attending Attestation (For Attendings USE Only).../Scribe Attestation (For Scribes USE Only)...

## 2025-01-13 NOTE — PROGRESS NOTE ADULT - SUBJECTIVE AND OBJECTIVE BOX
Follow-up Critical Care Progress Note  Chief Complaint : Type 2 diabetes mellitus with ketoacidosis without coma      patient seen and examined  comfortable  pain intermittently controled  states not tolerating diet        Allergies :No Known Allergies      PAST MEDICAL & SURGICAL HISTORY:  Hypertension  Diabetes mellitus  Gastric ulcer  Spinal stenosis  H/O spinal cord compression  Spondylosis  H/O radiculopathy  Heart attack  H/O cervical spine surgery  S/P cervical spinal fusion        Medications:  MEDICATIONS  (STANDING):  chlorhexidine 2% Cloths 1 Application(s) Topical <User Schedule>  cyanocobalamin 1000 MICROGram(s) Oral daily  dextrose 5% + lactated ringers. 1000 milliLiter(s) (75 mL/Hr) IV Continuous <Continuous>  dextrose 5%. 1000 milliLiter(s) (50 mL/Hr) IV Continuous <Continuous>  dextrose 5%. 1000 milliLiter(s) (100 mL/Hr) IV Continuous <Continuous>  dextrose 50% Injectable 25 Gram(s) IV Push once  dextrose 50% Injectable 12.5 Gram(s) IV Push once  heparin   Injectable 5000 Unit(s) SubCutaneous every 8 hours  insulin glargine Injectable (LANTUS) 40 Unit(s) SubCutaneous every morning  insulin lispro (ADMELOG) corrective regimen sliding scale   SubCutaneous three times a day before meals  insulin lispro (ADMELOG) corrective regimen sliding scale   SubCutaneous at bedtime  methadone    Tablet 5 milliGRAM(s) Oral four times a day  multivitamin 1 Tablet(s) Oral daily  pantoprazole  Injectable 40 milliGRAM(s) IV Push daily  piperacillin/tazobactam IVPB.- 3.375 Gram(s) IV Intermittent once  piperacillin/tazobactam IVPB.. 3.375 Gram(s) IV Intermittent every 8 hours  pyridoxine 50 milliGRAM(s) Oral daily  sertraline 200 milliGRAM(s) Oral daily  tiZANidine 4 milliGRAM(s) Oral every 8 hours  traZODone 150 milliGRAM(s) Oral at bedtime    MEDICATIONS  (PRN):  aluminum hydroxide/magnesium hydroxide/simethicone Suspension 30 milliLiter(s) Oral every 4 hours PRN Dyspepsia  clonazePAM  Tablet 1 milliGRAM(s) Oral four times a day PRN for anxiety  HYDROmorphone  Injectable 2 milliGRAM(s) IV Push every 6 hours PRN Mild Pain (1 - 3)  HYDROmorphone  Injectable 4 milliGRAM(s) IV Push every 6 hours PRN Moderate Pain (4 - 6)  HYDROmorphone  Injectable 6 milliGRAM(s) IV Push every 6 hours PRN Severe Pain (7 - 10)  ondansetron Injectable 4 milliGRAM(s) IV Push every 6 hours PRN Nausea and/or Vomiting  polyethylene glycol 3350 17 Gram(s) Oral daily PRN Constipation      Antibiotics History  piperacillin/tazobactam IVPB. 3.375 Gram(s) IV Intermittent once, 01-13-25 @ 08:09, Stop order after: 1 Doses  piperacillin/tazobactam IVPB.- 3.375 Gram(s) IV Intermittent once, 01-13-25 @ 12:00  piperacillin/tazobactam IVPB.. 3.375 Gram(s) IV Intermittent every 8 hours, 01-13-25 @ 22:00, Stop order after: 7 Days  piperacillin/tazobactam IVPB... 3.375 Gram(s) IV Intermittent once, 01-12-25 @ 02:39, Stop order after: 1 Doses      Heme Medications   heparin   Injectable 5000 Unit(s) SubCutaneous every 8 hours, 01-12-25 @ 06:30      GI Medications  aluminum hydroxide/magnesium hydroxide/simethicone Suspension 30 milliLiter(s) Oral every 4 hours, 01-13-25 @ 08:35, Routine PRN  pantoprazole  Injectable 40 milliGRAM(s) IV Push daily, 01-13-25 @ 08:35,   polyethylene glycol 3350 17 Gram(s) Oral daily, 01-13-25 @ 08:34, Routine PRN      COVID  02-19-24 @ 18:54  COVID -   NotDetec  10-05-23 @ 10:15  COVID -   NotDetec  03-31-23 @ 10:38  COVID -   Detected[!]     Trend Cardiac Enzymes  01-12-25 @ 00:15  [id number]/Trop I - -- - --  - --  -  --  /  22.2    Trend BNP  03-30-23 @ 06:45   -  62  12-02-22 @ 20:23   -  86  03-01-22 @ 15:28   -  5054[H]    Procalcitonin Trend  01-09-23 @ 13:05   -   0.08    WBC Trend  01-13-25 @ 05:30   -  7.59  01-12-25 @ 05:02   -  11.23[H]  01-12-25 @ 00:15   -  14.04[H]    H/H Trend  01-13-25 @ 05:30   -   11.4[L]/ 35.0[L]  01-12-25 @ 05:02   -   12.6[L]/ 38.8[L]  01-12-25 @ 00:15   -   14.7/ 46.4  12-19-24 @ 18:21   -   13.0/ 39.2    Stool Occult Blood    Platelet Trend  01-13-25 @ 05:30   -  309  01-12-25 @ 05:02   -  352  01-12-25 @ 00:15   -  463[H]    Trend Sodium  01-13-25 @ 05:30   -  140  01-12-25 @ 14:18   -  137  01-12-25 @ 05:02   -  139  01-12-25 @ 02:15   -  138  01-12-25 @ 00:15   -  136    Trend Potassium  01-13-25 @ 05:30   -  3.2[L]  01-12-25 @ 14:18   -  3.5  01-12-25 @ 05:02   -  3.6  01-12-25 @ 02:15   -  3.8  01-12-25 @ 00:15   -  4.0    Trend Bun/Cr  01-13-25 @ 05:30  BUN/CR -  5[L] / 0.76  01-12-25 @ 14:18  BUN/CR -  8 / 0.81  01-12-25 @ 05:02  BUN/CR -  9 / 0.94  01-12-25 @ 02:15  BUN/CR -  10 / 0.91  01-12-25 @ 00:15  BUN/CR -  10 / 1.04    Lactic Acid Trend  01-12-25 @ 03:00   -   0.7    ABG Trend  03-04-22 @ 18:19   - 7.51[H]/22[L]/130[H]/98.8[H]  03-04-22 @ 07:57   - 7.52[H]/25[L]/122[H]/99.3[H]  03-04-22 @ 00:30   - 7.57[H]/<19[L]/116[H]/98.0  03-03-22 @ 04:02   - 7.52[H]/23[L]/95/99.0[H]  03-03-22 @ 00:23   - 7.49[H]/23[L]/120[H]/99.1[H]  03-02-22 @ 20:27   - 7.45/22[L]/144[H]/99.4[H]  03-02-22 @ 18:52   - 7.41/22[L]/112[H]/99.3[H]  03-02-22 @ 12:13   - 7.39/26[L]/136[H]/99.3[H]  03-02-22 @ 07:15   - 7.28[L]/22[L]/138[H]/98.9[H]  03-02-22 @ 05:19   - 7.28[L]/21[L]/150[H]/99.6[H]  03-02-22 @ 03:26   - 7.29[L]/21[L]/156[H]/99.6[H]  03-02-22 @ 01:33   - 7.28[L]/21[L]/139[H]/99.8[H]    Trend AST/ALT/ALK Phos/Bili  01-12-25 @ 00:15   9[L]/27/111/0.6  12-19-24 @ 18:21   11/34/112/0.4  09-14-24 @ 18:30   8[L]/31/127[H]/0.2  07-22-24 @ 11:30   5[L]/26/101/0.4  06-07-24 @ 08:24   14[L]/42/88/0.3  05-30-24 @ 08:18   6[L]/32/84/0.3  05-29-24 @ 05:10   14[L]/36/83/0.4  05-28-24 @ 19:20   14[L]/36/89/0.5  02-20-24 @ 13:50   6[L]/26/110/0.6  02-19-24 @ 18:53   17/30/119/0.6  11-25-23 @ 19:13   7[L]/27/88/0.3  10-30-23 @ 04:20   16/22/94/0.4      Ammonia Trend  03-03-22 @ 17:03   -   15      Amylase / Lipase Trend  01-12-25 @ 00:15   -   -- / 25      Albumin Trend  01-12-25 @ 00:15   -   4.0  12-19-24 @ 18:21   -   3.5  09-14-24 @ 18:30   -   3.3  07-22-24 @ 11:30   -   3.6  06-07-24 @ 08:24   -   3.1[L]  05-30-24 @ 08:18   -   3.0[L]      PTT - PT - INR Trend  07-22-24 @ 11:30   -   35.0 - 10.9 - 0.91  11-28-23 @ 12:00   -   -- - 12.1 - 1.11  03-29-23 @ 20:30   -   19.5[L] - 12.2 - 1.05    Glucose Trend  01-13-25 @ 09:56   -  -- -- 116[H]  01-13-25 @ 07:30   -  -- -- 83  01-13-25 @ 05:30   -  83 -- --  01-12-25 @ 21:04   -  -- -- 133[H]  01-12-25 @ 15:17   -  -- -- 144[H]  01-12-25 @ 14:18   -  141[H] -- --  01-12-25 @ 12:51   -  -- -- 103[H]  01-12-25 @ 12:05   -  -- -- 101[H]  01-12-25 @ 10:53   -  -- -- 122[H]  01-12-25 @ 09:54   -  -- -- 145[H]    A1C with Estimated Average Glucose Result: 8.1 % *H* [4.0 - 5.6] (07-22-24 @ 11:30)      LABS:                        11.4   7.59  )-----------( 309      ( 13 Jan 2025 05:30 )             35.0     01-13    140  |  106  |  5[L]  ----------------------------<  83  3.2[L]   |  29  |  0.76    Ca    8.6      13 Jan 2025 05:30  Phos  1.7     01-13  Mg     1.7     01-13    TPro  8.4[H]  /  Alb  4.0  /  TBili  0.6  /  DBili  x   /  AST  9[L]  /  ALT  27  /  AlkPhos  111  01-12         CULTURES: (if applicable)    Culture - Blood (collected 01-12-25 @ 03:05)  Source: .Blood BLOOD  Preliminary Report (01-13-25 @ 09:01):    No growth at 24 hours    Culture - Blood (collected 01-12-25 @ 03:05)  Source: .Blood BLOOD  Preliminary Report (01-13-25 @ 09:01):    No growth at 24 hours    Culture - Urine (collected 01-12-25 @ 03:00)  Source: Clean Catch Clean Catch (Midstream)  Final Report (01-13-25 @ 07:38):    <10,000 CFU/mL Normal Urogenital Grecia         RADIOLOGY  CXR:      CT:    FINDINGS:  LOWER CHEST: Within normal limits.    LIVER: Within normal limits.  BILE DUCTS: Normal caliber.  GALLBLADDER: Within normal limits.  SPLEEN: Within normal limits.  PANCREAS: Atrophic.  ADRENALS: Within normal limits.  KIDNEYS/URETERS: No renal stones or hydronephrosis. Stable mild   nonspecific bilateral perinephric stranding.    BLADDER: Within normal limits.  REPRODUCTIVE ORGANS: Prostate within normal limits.    BOWEL: Mild diffuse wall thickening of the transverse and descending   colon with trace pericolonic stranding in the region of the distal   transverse colon. No bowel obstruction. Colonic diverticulosis. Appendix   is normal.  PERITONEUM/RETROPERITONEUM: Within normal limits.  VESSELS: Within normal limits.  LYMPH NODES: No lymphadenopathy.  ABDOMINAL WALL: Within normal limits.  BONES: Degenerative changes. New L4-L5 disc spacer. Stable   benign-appearing lytic lesion in the left ilium.    IMPRESSION:  Mild diffuse wall thickening of the transverse and descending colon with   trace pericolonic stranding in the region of the distal transverse colon.   Findings are nonspecific and may reflect colitis versus underdistention.   Recommend clinical correlation.    --- End of Report ---    ECHO:  < from: TTE Echo Complete w/o Contrast w/ Doppler (10.06.23 @ 07:58) >    Summary:   1. Left ventricular ejection fraction, by visual estimation, is 55 to   60%.   2. Normal global left ventricular systolic function.   3. Normal left ventricular internal cavity size.   4. Mildly enlarged left atrium.   5. Trace mitral valve regurgitation.   6. Sclerotic aortic valve with normal opening.   7. Unable to assess estimated PA pressure, minimal TR.    < end of copied text >      VITALS:  T(C): 37 (01-13-25 @ 05:00), Max: 37 (01-13-25 @ 05:00)  T(F): 98.6 (01-13-25 @ 05:00), Max: 98.6 (01-13-25 @ 05:00)  HR: 74 (01-13-25 @ 06:00) (63 - 86)  BP: 160/77 (01-13-25 @ 06:00) (104/61 - 160/77)  BP(mean): 99 (01-13-25 @ 06:00) (73 - 100)  ABP: --  ABP(mean): --  RR: 15 (01-13-25 @ 06:00) (10 - 19)  SpO2: 98% (01-13-25 @ 06:00) (94% - 99%)  CVP(mm Hg): --  CVP(cm H2O): --    Ins and Outs     01-12-25 @ 07:01  -  01-13-25 @ 07:00  --------------------------------------------------------  IN: 2862 mL / OUT: 1500 mL / NET: 1362 mL    01-13-25 @ 07:01  -  01-13-25 @ 11:52  --------------------------------------------------------  IN: 0 mL / OUT: 400 mL / NET: -400 mL        Height (cm): 185.4 (01-11-25 @ 23:04)  Weight (kg): 108 (01-11-25 @ 23:04)  BMI (kg/m2): 31.4 (01-11-25 @ 23:04)        I&O's Detail    12 Jan 2025 07:01  -  13 Jan 2025 07:00  --------------------------------------------------------  IN:    dextrose 5%: 1350 mL    Insulin: 12 mL    Lactated Ringers: 1500 mL  Total IN: 2862 mL    OUT:    Voided (mL): 1500 mL  Total OUT: 1500 mL    Total NET: 1362 mL      13 Jan 2025 07:01  -  13 Jan 2025 11:52  --------------------------------------------------------  IN:  Total IN: 0 mL    OUT:    Voided (mL): 400 mL  Total OUT: 400 mL    Total NET: -400 mL

## 2025-01-13 NOTE — PROGRESS NOTE ADULT - PROBLEM SELECTOR PLAN 1
CT showing gastritis likely due to history of vomiting.    -Continue supportive care  -PPI daily  -Diet as tolerated.  -Outpatient follow up for possible upper endoscopy

## 2025-01-14 LAB
ALBUMIN SERPL ELPH-MCNC: 2.8 G/DL — LOW (ref 3.3–5)
ALP SERPL-CCNC: 73 U/L — SIGNIFICANT CHANGE UP (ref 40–120)
ALT FLD-CCNC: 25 U/L — SIGNIFICANT CHANGE UP (ref 10–45)
ANION GAP SERPL CALC-SCNC: 8 MMOL/L — SIGNIFICANT CHANGE UP (ref 5–17)
AST SERPL-CCNC: 11 U/L — SIGNIFICANT CHANGE UP (ref 10–40)
BASOPHILS # BLD AUTO: 0.03 K/UL — SIGNIFICANT CHANGE UP (ref 0–0.2)
BASOPHILS NFR BLD AUTO: 0.4 % — SIGNIFICANT CHANGE UP (ref 0–2)
BILIRUB SERPL-MCNC: 0.3 MG/DL — SIGNIFICANT CHANGE UP (ref 0.2–1.2)
BUN SERPL-MCNC: 3 MG/DL — LOW (ref 7–23)
CALCIUM SERPL-MCNC: 8.8 MG/DL — SIGNIFICANT CHANGE UP (ref 8.4–10.5)
CHLORIDE SERPL-SCNC: 105 MMOL/L — SIGNIFICANT CHANGE UP (ref 96–108)
CO2 SERPL-SCNC: 28 MMOL/L — SIGNIFICANT CHANGE UP (ref 22–31)
CREAT SERPL-MCNC: 0.61 MG/DL — SIGNIFICANT CHANGE UP (ref 0.5–1.3)
EGFR: 112 ML/MIN/1.73M2 — SIGNIFICANT CHANGE UP
EOSINOPHIL # BLD AUTO: 0.23 K/UL — SIGNIFICANT CHANGE UP (ref 0–0.5)
EOSINOPHIL NFR BLD AUTO: 3.4 % — SIGNIFICANT CHANGE UP (ref 0–6)
GLUCOSE BLDC GLUCOMTR-MCNC: 111 MG/DL — HIGH (ref 70–99)
GLUCOSE BLDC GLUCOMTR-MCNC: 160 MG/DL — HIGH (ref 70–99)
GLUCOSE BLDC GLUCOMTR-MCNC: 175 MG/DL — HIGH (ref 70–99)
GLUCOSE BLDC GLUCOMTR-MCNC: 98 MG/DL — SIGNIFICANT CHANGE UP (ref 70–99)
GLUCOSE SERPL-MCNC: 90 MG/DL — SIGNIFICANT CHANGE UP (ref 70–99)
HCT VFR BLD CALC: 36.2 % — LOW (ref 39–50)
HGB BLD-MCNC: 12.1 G/DL — LOW (ref 13–17)
IMM GRANULOCYTES NFR BLD AUTO: 0.3 % — SIGNIFICANT CHANGE UP (ref 0–0.9)
LYMPHOCYTES # BLD AUTO: 2.63 K/UL — SIGNIFICANT CHANGE UP (ref 1–3.3)
LYMPHOCYTES # BLD AUTO: 39 % — SIGNIFICANT CHANGE UP (ref 13–44)
MAGNESIUM SERPL-MCNC: 1.4 MG/DL — LOW (ref 1.6–2.6)
MCHC RBC-ENTMCNC: 27.3 PG — SIGNIFICANT CHANGE UP (ref 27–34)
MCHC RBC-ENTMCNC: 33.4 G/DL — SIGNIFICANT CHANGE UP (ref 32–36)
MCV RBC AUTO: 81.7 FL — SIGNIFICANT CHANGE UP (ref 80–100)
MONOCYTES # BLD AUTO: 0.55 K/UL — SIGNIFICANT CHANGE UP (ref 0–0.9)
MONOCYTES NFR BLD AUTO: 8.1 % — SIGNIFICANT CHANGE UP (ref 2–14)
NEUTROPHILS # BLD AUTO: 3.29 K/UL — SIGNIFICANT CHANGE UP (ref 1.8–7.4)
NEUTROPHILS NFR BLD AUTO: 48.8 % — SIGNIFICANT CHANGE UP (ref 43–77)
NRBC # BLD: 0 /100 WBCS — SIGNIFICANT CHANGE UP (ref 0–0)
PHOSPHATE SERPL-MCNC: 2.5 MG/DL — SIGNIFICANT CHANGE UP (ref 2.5–4.5)
PLATELET # BLD AUTO: 296 K/UL — SIGNIFICANT CHANGE UP (ref 150–400)
POTASSIUM SERPL-MCNC: 3 MMOL/L — LOW (ref 3.5–5.3)
POTASSIUM SERPL-SCNC: 3 MMOL/L — LOW (ref 3.5–5.3)
PROT SERPL-MCNC: 6 G/DL — SIGNIFICANT CHANGE UP (ref 6–8.3)
RBC # BLD: 4.43 M/UL — SIGNIFICANT CHANGE UP (ref 4.2–5.8)
RBC # FLD: 14.4 % — SIGNIFICANT CHANGE UP (ref 10.3–14.5)
SODIUM SERPL-SCNC: 141 MMOL/L — SIGNIFICANT CHANGE UP (ref 135–145)
WBC # BLD: 6.75 K/UL — SIGNIFICANT CHANGE UP (ref 3.8–10.5)
WBC # FLD AUTO: 6.75 K/UL — SIGNIFICANT CHANGE UP (ref 3.8–10.5)

## 2025-01-14 PROCEDURE — 99233 SBSQ HOSP IP/OBS HIGH 50: CPT | Mod: FS

## 2025-01-14 PROCEDURE — 99233 SBSQ HOSP IP/OBS HIGH 50: CPT

## 2025-01-14 RX ORDER — POTASSIUM CHLORIDE 600 MG/1
10 TABLET, FILM COATED, EXTENDED RELEASE ORAL
Refills: 0 | Status: COMPLETED | OUTPATIENT
Start: 2025-01-14 | End: 2025-01-14

## 2025-01-14 RX ORDER — PANTOPRAZOLE 40 MG/1
40 TABLET, DELAYED RELEASE ORAL
Refills: 0 | Status: DISCONTINUED | OUTPATIENT
Start: 2025-01-14 | End: 2025-01-17

## 2025-01-14 RX ORDER — INSULIN GLARGINE-YFGN 100 [IU]/ML
36 INJECTION, SOLUTION SUBCUTANEOUS AT BEDTIME
Refills: 0 | Status: DISCONTINUED | OUTPATIENT
Start: 2025-01-14 | End: 2025-01-14

## 2025-01-14 RX ORDER — SUCRALFATE 1 G/10ML
1 SUSPENSION ORAL EVERY 6 HOURS
Refills: 0 | Status: DISCONTINUED | OUTPATIENT
Start: 2025-01-14 | End: 2025-01-17

## 2025-01-14 RX ORDER — APIXABAN 5 MG/1
5 TABLET, FILM COATED ORAL EVERY 12 HOURS
Refills: 0 | Status: DISCONTINUED | OUTPATIENT
Start: 2025-01-14 | End: 2025-01-17

## 2025-01-14 RX ORDER — DEXTROSE MONOHYDRATE 25 G/50ML
15 INJECTION, SOLUTION INTRAVENOUS ONCE
Refills: 0 | Status: DISCONTINUED | OUTPATIENT
Start: 2025-01-14 | End: 2025-01-17

## 2025-01-14 RX ORDER — INSULIN GLARGINE-YFGN 100 [IU]/ML
36 INJECTION, SOLUTION SUBCUTANEOUS ONCE
Refills: 0 | Status: COMPLETED | OUTPATIENT
Start: 2025-01-14 | End: 2025-01-14

## 2025-01-14 RX ORDER — INSULIN LISPRO 100/ML
VIAL (ML) SUBCUTANEOUS
Refills: 0 | Status: DISCONTINUED | OUTPATIENT
Start: 2025-01-14 | End: 2025-01-17

## 2025-01-14 RX ORDER — ATORVASTATIN CALCIUM 40 MG/1
80 TABLET, FILM COATED ORAL AT BEDTIME
Refills: 0 | Status: DISCONTINUED | OUTPATIENT
Start: 2025-01-14 | End: 2025-01-17

## 2025-01-14 RX ORDER — HYDROMORPHONE HCL 4 MG
8 TABLET ORAL EVERY 6 HOURS
Refills: 0 | Status: DISCONTINUED | OUTPATIENT
Start: 2025-01-14 | End: 2025-01-17

## 2025-01-14 RX ORDER — POTASSIUM CHLORIDE 600 MG/1
40 TABLET, FILM COATED, EXTENDED RELEASE ORAL EVERY 4 HOURS
Refills: 0 | Status: DISCONTINUED | OUTPATIENT
Start: 2025-01-14 | End: 2025-01-14

## 2025-01-14 RX ORDER — GLUCAGON INJECTION, SOLUTION 0.5 MG/.1ML
1 INJECTION, SOLUTION SUBCUTANEOUS ONCE
Refills: 0 | Status: DISCONTINUED | OUTPATIENT
Start: 2025-01-14 | End: 2025-01-17

## 2025-01-14 RX ORDER — INSULIN LISPRO 100/ML
VIAL (ML) SUBCUTANEOUS AT BEDTIME
Refills: 0 | Status: DISCONTINUED | OUTPATIENT
Start: 2025-01-14 | End: 2025-01-17

## 2025-01-14 RX ORDER — INSULIN LISPRO 100/ML
2 VIAL (ML) SUBCUTANEOUS
Refills: 0 | Status: DISCONTINUED | OUTPATIENT
Start: 2025-01-14 | End: 2025-01-17

## 2025-01-14 RX ORDER — CARVEDILOL 25 MG/1
25 TABLET, FILM COATED ORAL EVERY 12 HOURS
Refills: 0 | Status: DISCONTINUED | OUTPATIENT
Start: 2025-01-14 | End: 2025-01-17

## 2025-01-14 RX ORDER — POLYSORBATE 80 100 MG/10ML
1 SOLUTION/ DROPS OPHTHALMIC THREE TIMES A DAY
Refills: 0 | Status: DISCONTINUED | OUTPATIENT
Start: 2025-01-14 | End: 2025-01-17

## 2025-01-14 RX ORDER — HYDROMORPHONE HCL 4 MG
6 TABLET ORAL EVERY 4 HOURS
Refills: 0 | Status: DISCONTINUED | OUTPATIENT
Start: 2025-01-14 | End: 2025-01-15

## 2025-01-14 RX ORDER — INSULIN GLARGINE-YFGN 100 [IU]/ML
36 INJECTION, SOLUTION SUBCUTANEOUS EVERY MORNING
Refills: 0 | Status: DISCONTINUED | OUTPATIENT
Start: 2025-01-15 | End: 2025-01-15

## 2025-01-14 RX ORDER — GABAPENTIN 300 MG/1
300 CAPSULE ORAL EVERY 8 HOURS
Refills: 0 | Status: DISCONTINUED | OUTPATIENT
Start: 2025-01-14 | End: 2025-01-17

## 2025-01-14 RX ADMIN — Medication 6 MILLIGRAM(S): at 21:10

## 2025-01-14 RX ADMIN — Medication 1: at 17:17

## 2025-01-14 RX ADMIN — CHLORHEXIDINE GLUCONATE 1 APPLICATION(S): 1.2 RINSE ORAL at 05:27

## 2025-01-14 RX ADMIN — Medication 6 MILLIGRAM(S): at 07:31

## 2025-01-14 RX ADMIN — Medication 6 MILLIGRAM(S): at 15:53

## 2025-01-14 RX ADMIN — PIPERACILLIN AND TAZOBACTAM 25 GRAM(S): 3; .375 INJECTION, POWDER, LYOPHILIZED, FOR SOLUTION INTRAVENOUS at 05:27

## 2025-01-14 RX ADMIN — POTASSIUM CHLORIDE 100 MILLIEQUIVALENT(S): 600 TABLET, FILM COATED, EXTENDED RELEASE ORAL at 15:53

## 2025-01-14 RX ADMIN — Medication 4 MILLIGRAM(S): at 02:41

## 2025-01-14 RX ADMIN — Medication 6 MILLIGRAM(S): at 00:30

## 2025-01-14 RX ADMIN — Medication 2 UNIT(S): at 12:55

## 2025-01-14 RX ADMIN — HEPARIN SODIUM 5000 UNIT(S): 1000 INJECTION, SOLUTION INTRAVENOUS; SUBCUTANEOUS at 05:27

## 2025-01-14 RX ADMIN — POLYSORBATE 80 1 DROP(S): 100 SOLUTION/ DROPS OPHTHALMIC at 12:32

## 2025-01-14 RX ADMIN — Medication 6 MILLIGRAM(S): at 07:00

## 2025-01-14 RX ADMIN — Medication 2 UNIT(S): at 17:17

## 2025-01-14 RX ADMIN — Medication 1: at 12:56

## 2025-01-14 RX ADMIN — PIPERACILLIN AND TAZOBACTAM 25 GRAM(S): 3; .375 INJECTION, POWDER, LYOPHILIZED, FOR SOLUTION INTRAVENOUS at 21:47

## 2025-01-14 RX ADMIN — ONDANSETRON 4 MILLIGRAM(S): 4 TABLET ORAL at 05:27

## 2025-01-14 RX ADMIN — PIPERACILLIN AND TAZOBACTAM 25 GRAM(S): 3; .375 INJECTION, POWDER, LYOPHILIZED, FOR SOLUTION INTRAVENOUS at 14:23

## 2025-01-14 RX ADMIN — Medication 4 MILLIGRAM(S): at 02:13

## 2025-01-14 RX ADMIN — ONDANSETRON 4 MILLIGRAM(S): 4 TABLET ORAL at 11:39

## 2025-01-14 RX ADMIN — INSULIN GLARGINE-YFGN 36 UNIT(S): 100 INJECTION, SOLUTION SUBCUTANEOUS at 13:46

## 2025-01-14 RX ADMIN — POTASSIUM CHLORIDE 100 MILLIEQUIVALENT(S): 600 TABLET, FILM COATED, EXTENDED RELEASE ORAL at 13:46

## 2025-01-14 RX ADMIN — Medication 6 MILLIGRAM(S): at 20:13

## 2025-01-14 RX ADMIN — POTASSIUM CHLORIDE 100 MILLIEQUIVALENT(S): 600 TABLET, FILM COATED, EXTENDED RELEASE ORAL at 12:31

## 2025-01-14 RX ADMIN — Medication 6 MILLIGRAM(S): at 11:39

## 2025-01-14 NOTE — PROGRESS NOTE ADULT - NS ATTEND AMEND GEN_ALL_CORE FT
Agree with the assessment and plan of LAURENT Wheeler.  DKA improved.  Outpatient GI evaluation recommended for EGD +/- colonoscopy.  T>50min

## 2025-01-14 NOTE — PROGRESS NOTE ADULT - NS ATTEND AMEND GEN_ALL_CORE FT
60 y/o M with IDDM, HTN, hx DKA, MI, A fib on eliquis, cervical fusion presented to ED with nausea and vomiting. In ED found to be in DKA with GAP 22, PH 7.26 stable electrolytes. Admitted to ICU for DKA, medically optimized and transitioned to medical floors. resume home meds as indicated. cont zosyn for colitis. add carafate. unable to tolerate regular diet, resume full liquids. 58 y/o M with IDDM, HTN, hx DKA, MI, chronic A fib on eliquis, cervical fusion presented to ED with nausea and vomiting. In ED found to be in DKA with GAP 22, PH 7.26 stable electrolytes. Admitted to ICU for DKA, medically optimized and transitioned to medical floors. resume home meds as indicated. cont zosyn for colitis. add carafate. unable to tolerate regular diet, resume full liquids.

## 2025-01-14 NOTE — PROGRESS NOTE ADULT - SUBJECTIVE AND OBJECTIVE BOX
Patient is a 59y old  Male who presents with a chief complaint of DKA (14 Jan 2025 07:49)    Patient seen and examined at bedside. No overnight events reported. Patient c/o stomach discomfort and chronic back pain     ALLERGIES:  No Known Allergies    MEDICATIONS  (STANDING):  apixaban 5 milliGRAM(s) Oral every 12 hours  atorvastatin 80 milliGRAM(s) Oral at bedtime  carvedilol 25 milliGRAM(s) Oral every 12 hours  cyanocobalamin 1000 MICROGram(s) Oral daily  dextrose 5%. 1000 milliLiter(s) (50 mL/Hr) IV Continuous <Continuous>  dextrose 5%. 1000 milliLiter(s) (100 mL/Hr) IV Continuous <Continuous>  dextrose 50% Injectable 25 Gram(s) IV Push once  dextrose 50% Injectable 12.5 Gram(s) IV Push once  gabapentin 300 milliGRAM(s) Oral every 8 hours  glucagon  Injectable 1 milliGRAM(s) IntraMuscular once  HYDROmorphone   Tablet 8 milliGRAM(s) Oral every 6 hours  insulin glargine Injectable (LANTUS) 36 Unit(s) SubCutaneous once  insulin lispro (ADMELOG) corrective regimen sliding scale   SubCutaneous three times a day before meals  insulin lispro (ADMELOG) corrective regimen sliding scale   SubCutaneous at bedtime  insulin lispro Injectable (ADMELOG) 2 Unit(s) SubCutaneous three times a day before meals  methadone    Tablet 5 milliGRAM(s) Oral four times a day  multivitamin 1 Tablet(s) Oral daily  pantoprazole    Tablet 40 milliGRAM(s) Oral before breakfast  piperacillin/tazobactam IVPB.. 3.375 Gram(s) IV Intermittent every 8 hours  pyridoxine 50 milliGRAM(s) Oral daily  sertraline 200 milliGRAM(s) Oral daily  sucralfate 1 Gram(s) Oral every 6 hours  tiZANidine 4 milliGRAM(s) Oral every 8 hours  traZODone 150 milliGRAM(s) Oral at bedtime    MEDICATIONS  (PRN):  aluminum hydroxide/magnesium hydroxide/simethicone Suspension 30 milliLiter(s) Oral every 4 hours PRN Dyspepsia  artificial tears (preservative free) Ophthalmic Solution 1 Drop(s) Both EYES three times a day PRN Dry Eyes  clonazePAM  Tablet 1 milliGRAM(s) Oral four times a day PRN for anxiety  dextrose Oral Gel 15 Gram(s) Oral once PRN Blood Glucose LESS THAN 70 milliGRAM(s)/deciliter  HYDROmorphone  Injectable 6 milliGRAM(s) IV Push every 4 hours PRN Pain  ondansetron Injectable 4 milliGRAM(s) IV Push every 6 hours PRN Nausea and/or Vomiting  polyethylene glycol 3350 17 Gram(s) Oral daily PRN Constipation    Vital Signs Last 24 Hrs  T(F): 97.7 (14 Jan 2025 06:05), Max: 98.6 (13 Jan 2025 13:53)  HR: 75 (14 Jan 2025 06:05) (71 - 75)  BP: 118/76 (14 Jan 2025 06:05) (118/76 - 184/94)  RR: 17 (14 Jan 2025 06:05) (9 - 17)  SpO2: 94% (14 Jan 2025 06:05) (94% - 99%)    I&O's Summary  13 Jan 2025 07:01  -  14 Jan 2025 07:00  --------------------------------------------------------  IN: 0 mL / OUT: 400 mL / NET: -400 mL      PHYSICAL EXAM:  General: NAD, A/O x 3, appears tired   ENT: No gross hearing impairment, Moist mucous membranes, no thrush  Neck: Supple, No JVD  Lungs: Clear to auscultation bilaterally, good air entry, non-labored breathing  Cardio: RRR, S1/S2, No murmur  Abdomen: Soft, minimally tender throughout, Nondistended; Bowel sounds present  Extremities: No calf tenderness, No cyanosis, No pitting edema  Psych: Appropriate mood and affect    LABS:                        12.1   6.75  )-----------( 296      ( 14 Jan 2025 07:05 )             36.2     01-14    141  |  105  |  3   ----------------------------<  90  3.0   |  28  |  0.61    Ca    8.8      14 Jan 2025 07:05  Phos  2.5     01-14  Mg     1.4     01-14    TPro  6.0  /  Alb  2.8  /  TBili  0.3  /  DBili  x   /  AST  11  /  ALT  25  /  AlkPhos  73  01-14      Lipase: 25 U/L (01-12-25 @ 00:15)        Lactate, Blood: 0.7 mmol/L (01-12 @ 03:00)      CARDIAC MARKERS ( 12 Jan 2025 00:15 )  x     / 22.2 ng/L / x     / x     / x              05:02 - VBG - pH: 7.27  | pCO2: 34    | pO2: 49    | Lactate:        02:15 - VBG - pH: 7.26  | pCO2: 29    | pO2: 74    | Lactate:        00:15 - VBG - pH: 7.28  | pCO2: 29    | pO2: 52    | Lactate:                  POCT Blood Glucose.: 98 mg/dL (14 Jan 2025 08:03)  POCT Blood Glucose.: 154 mg/dL (13 Jan 2025 21:31)  POCT Blood Glucose.: 187 mg/dL (13 Jan 2025 17:18)  POCT Blood Glucose.: 150 mg/dL (13 Jan 2025 12:25)      Urinalysis Basic - ( 14 Jan 2025 07:05 )    Color: x / Appearance: x / SG: x / pH: x  Gluc: 90 mg/dL / Ketone: x  / Bili: x / Urobili: x   Blood: x / Protein: x / Nitrite: x   Leuk Esterase: x / RBC: x / WBC x   Sq Epi: x / Non Sq Epi: x / Bacteria: x        Culture - Blood (collected 12 Jan 2025 03:05)  Source: .Blood BLOOD  Preliminary Report (14 Jan 2025 09:01):    No growth at 48 Hours    Culture - Blood (collected 12 Jan 2025 03:05)  Source: .Blood BLOOD  Preliminary Report (14 Jan 2025 09:01):    No growth at 48 Hours    Culture - Urine (collected 12 Jan 2025 03:00)  Source: Clean Catch Clean Catch (Midstream)  Final Report (13 Jan 2025 07:38):    <10,000 CFU/mL Normal Urogenital Grecia        RADIOLOGY & ADDITIONAL TESTS:    Care Discussed with Consultants/Other Providers:

## 2025-01-14 NOTE — PHARMACOTHERAPY INTERVENTION NOTE - COMMENTS
confirmed outpatient fill history for eliquis - last filled 8/28/24 for 90 day supply and coreg - last filled 8/28/24 for 30 day supply
PPI BID > PO

## 2025-01-14 NOTE — PHYSICAL THERAPY INITIAL EVALUATION ADULT - ADDITIONAL COMMENTS
Pt lives in a private home with his girlfriend. He uses a SAC at baseline due to instability caused by his pain. His left UE and LE are chronically weak due to avoidance of use from pain.

## 2025-01-14 NOTE — PROGRESS NOTE ADULT - PROBLEM SELECTOR PLAN 1
CT showing gastritis likely due to history of vomiting.    -Continue supportive care  -PPI daily  -Diet as tolerated.  -Outpatient follow up for possible upper endoscopy CT showing gastritis likely due to history of vomiting.    -Continue supportive care  -PPI daily  -Diet as tolerated.  -Outpatient follow up for EGD

## 2025-01-14 NOTE — CDI QUERY NOTE - NSCDIOTHERTXTBX_GEN_ALL_CORE_HH
Clinical documentation and/or evidence in the medical record indicates that this patient has atrial fibrillation.  In order to ensure accurate coding and accuracy of the clinical record, the documentation in this patient’s medical record requires additional clarification.      Please include more specific documentation as to the type of atrial fibrillation in your progress note and/or discharge summary.    Supporting documentation and/or clinical evidence:   Notes - #Hx A Fib on eliquis  - Will resume eliquis, coreg, atorvastatin (home meds)      Please clarify if the patient was found to have one of the following:      •	Chronic atrial fibrillation  •	Paroxysmal atrial fibrillation  •	Permanent atrial fibrillation  •	Persistent atrial fibrillation  •	Other (specify)      Thank you

## 2025-01-14 NOTE — PROGRESS NOTE ADULT - SUBJECTIVE AND OBJECTIVE BOX
INTERVAL HPI / OVERNIGHT EVENTS:    Patient seen and examined at bedside. Admits to abdominal discomfort, denies Diarrea, no blood in stools  No events overnight    MEDICATIONS  (STANDING):  apixaban 5 milliGRAM(s) Oral every 12 hours  atorvastatin 80 milliGRAM(s) Oral at bedtime  carvedilol 25 milliGRAM(s) Oral every 12 hours  cyanocobalamin 1000 MICROGram(s) Oral daily  dextrose 5%. 1000 milliLiter(s) (50 mL/Hr) IV Continuous <Continuous>  dextrose 5%. 1000 milliLiter(s) (100 mL/Hr) IV Continuous <Continuous>  dextrose 50% Injectable 25 Gram(s) IV Push once  dextrose 50% Injectable 12.5 Gram(s) IV Push once  gabapentin 300 milliGRAM(s) Oral every 8 hours  glucagon  Injectable 1 milliGRAM(s) IntraMuscular once  HYDROmorphone   Tablet 8 milliGRAM(s) Oral every 6 hours  insulin lispro (ADMELOG) corrective regimen sliding scale   SubCutaneous three times a day before meals  insulin lispro (ADMELOG) corrective regimen sliding scale   SubCutaneous at bedtime  insulin lispro Injectable (ADMELOG) 2 Unit(s) SubCutaneous three times a day before meals  magnesium oxide 400 milliGRAM(s) Oral three times a day with meals  methadone    Tablet 5 milliGRAM(s) Oral four times a day  multivitamin 1 Tablet(s) Oral daily  pantoprazole    Tablet 40 milliGRAM(s) Oral before breakfast  piperacillin/tazobactam IVPB.. 3.375 Gram(s) IV Intermittent every 8 hours  pyridoxine 50 milliGRAM(s) Oral daily  sertraline 200 milliGRAM(s) Oral daily  sucralfate 1 Gram(s) Oral every 6 hours  tiZANidine 4 milliGRAM(s) Oral every 8 hours  traZODone 150 milliGRAM(s) Oral at bedtime    MEDICATIONS  (PRN):  aluminum hydroxide/magnesium hydroxide/simethicone Suspension 30 milliLiter(s) Oral every 4 hours PRN Dyspepsia  artificial tears (preservative free) Ophthalmic Solution 1 Drop(s) Both EYES three times a day PRN Dry Eyes  clonazePAM  Tablet 1 milliGRAM(s) Oral four times a day PRN for anxiety  dextrose Oral Gel 15 Gram(s) Oral once PRN Blood Glucose LESS THAN 70 milliGRAM(s)/deciliter  HYDROmorphone  Injectable 6 milliGRAM(s) IV Push every 4 hours PRN Pain  ondansetron Injectable 4 milliGRAM(s) IV Push every 6 hours PRN Nausea and/or Vomiting  polyethylene glycol 3350 17 Gram(s) Oral daily PRN Constipation      Allergies    No Known Allergies    Intolerances          Vital Signs Last 24 Hrs  T(C): 36.7 (14 Jan 2025 12:27), Max: 36.7 (13 Jan 2025 20:38)  T(F): 98.1 (14 Jan 2025 12:27), Max: 98.1 (14 Jan 2025 12:27)  HR: 79 (14 Jan 2025 12:27) (71 - 79)  BP: 138/79 (14 Jan 2025 12:27) (118/76 - 153/94)  BP(mean): --  RR: 17 (14 Jan 2025 12:27) (16 - 17)  SpO2: 95% (14 Jan 2025 12:27) (94% - 97%)    Parameters below as of 14 Jan 2025 12:27  Patient On (Oxygen Delivery Method): room air        PHYSICAL EXAM:    Constitutional: Well-developed  ENTT: clear conjunctivae and sclera  Respiratory: clear to auscultation  Cardiovascular: S1 and S2  Gastrointestinal: +Bowel Sounds all quadrants, soft, Non tender, non distended  Extremities: No peripheral edema, neg clubbing, cyanosis  Neurological: A/O x 3  Skin: warm and dry      LABS:                        12.1   6.75  )-----------( 296      ( 14 Jan 2025 07:05 )             36.2     01-14    141  |  105  |  3[L]  ----------------------------<  90  3.0[L]   |  28  |  0.61    Ca    8.8      14 Jan 2025 07:05  Phos  2.5     01-14  Mg     1.4     01-14    TPro  6.0  /  Alb  2.8[L]  /  TBili  0.3  /  DBili  x   /  AST  11  /  ALT  25  /  AlkPhos  73  01-14      Urinalysis Basic - ( 14 Jan 2025 07:05 )    Color: x / Appearance: x / SG: x / pH: x  Gluc: 90 mg/dL / Ketone: x  / Bili: x / Urobili: x   Blood: x / Protein: x / Nitrite: x   Leuk Esterase: x / RBC: x / WBC x   Sq Epi: x / Non Sq Epi: x / Bacteria: x      LIVER FUNCTIONS - ( 14 Jan 2025 07:05 )  Alb: 2.8 g/dL / Pro: 6.0 g/dL / ALK PHOS: 73 U/L / ALT: 25 U/L / AST: 11 U/L / GGT: x             RADIOLOGY & ADDITIONAL TESTS:   GI Follow up    Patient seen and examined at bedside. Admits to abdominal discomfort, denies diarrhea, blood in stools  No events overnight    MEDICATIONS  (STANDING):  apixaban 5 milliGRAM(s) Oral every 12 hours  atorvastatin 80 milliGRAM(s) Oral at bedtime  carvedilol 25 milliGRAM(s) Oral every 12 hours  cyanocobalamin 1000 MICROGram(s) Oral daily  dextrose 5%. 1000 milliLiter(s) (50 mL/Hr) IV Continuous <Continuous>  dextrose 5%. 1000 milliLiter(s) (100 mL/Hr) IV Continuous <Continuous>  dextrose 50% Injectable 25 Gram(s) IV Push once  dextrose 50% Injectable 12.5 Gram(s) IV Push once  gabapentin 300 milliGRAM(s) Oral every 8 hours  glucagon  Injectable 1 milliGRAM(s) IntraMuscular once  HYDROmorphone   Tablet 8 milliGRAM(s) Oral every 6 hours  insulin lispro (ADMELOG) corrective regimen sliding scale   SubCutaneous three times a day before meals  insulin lispro (ADMELOG) corrective regimen sliding scale   SubCutaneous at bedtime  insulin lispro Injectable (ADMELOG) 2 Unit(s) SubCutaneous three times a day before meals  magnesium oxide 400 milliGRAM(s) Oral three times a day with meals  methadone    Tablet 5 milliGRAM(s) Oral four times a day  multivitamin 1 Tablet(s) Oral daily  pantoprazole    Tablet 40 milliGRAM(s) Oral before breakfast  piperacillin/tazobactam IVPB.. 3.375 Gram(s) IV Intermittent every 8 hours  pyridoxine 50 milliGRAM(s) Oral daily  sertraline 200 milliGRAM(s) Oral daily  sucralfate 1 Gram(s) Oral every 6 hours  tiZANidine 4 milliGRAM(s) Oral every 8 hours  traZODone 150 milliGRAM(s) Oral at bedtime    MEDICATIONS  (PRN):  aluminum hydroxide/magnesium hydroxide/simethicone Suspension 30 milliLiter(s) Oral every 4 hours PRN Dyspepsia  artificial tears (preservative free) Ophthalmic Solution 1 Drop(s) Both EYES three times a day PRN Dry Eyes  clonazePAM  Tablet 1 milliGRAM(s) Oral four times a day PRN for anxiety  dextrose Oral Gel 15 Gram(s) Oral once PRN Blood Glucose LESS THAN 70 milliGRAM(s)/deciliter  HYDROmorphone  Injectable 6 milliGRAM(s) IV Push every 4 hours PRN Pain  ondansetron Injectable 4 milliGRAM(s) IV Push every 6 hours PRN Nausea and/or Vomiting  polyethylene glycol 3350 17 Gram(s) Oral daily PRN Constipation      Allergies    No Known Allergies    Intolerances          Vital Signs Last 24 Hrs  T(C): 36.7 (14 Jan 2025 12:27), Max: 36.7 (13 Jan 2025 20:38)  T(F): 98.1 (14 Jan 2025 12:27), Max: 98.1 (14 Jan 2025 12:27)  HR: 79 (14 Jan 2025 12:27) (71 - 79)  BP: 138/79 (14 Jan 2025 12:27) (118/76 - 153/94)  BP(mean): --  RR: 17 (14 Jan 2025 12:27) (16 - 17)  SpO2: 95% (14 Jan 2025 12:27) (94% - 97%)    Parameters below as of 14 Jan 2025 12:27  Patient On (Oxygen Delivery Method): room air        PHYSICAL EXAM:    Constitutional: Well-developed  HEENT: EOMI, sclerae anicteric  Respiratory: clear to auscultation  Cardiovascular: S1 and S2  Gastrointestinal: +Bowel Sounds all quadrants, soft, Non tender, non distended  Extremities: No peripheral edema, neg clubbing, cyanosis  Neurological: A/O x 3  Skin: warm and dry      LABS:                        12.1   6.75  )-----------( 296      ( 14 Jan 2025 07:05 )             36.2     01-14    141  |  105  |  3[L]  ----------------------------<  90  3.0[L]   |  28  |  0.61    Ca    8.8      14 Jan 2025 07:05  Phos  2.5     01-14  Mg     1.4     01-14    TPro  6.0  /  Alb  2.8[L]  /  TBili  0.3  /  DBili  x   /  AST  11  /  ALT  25  /  AlkPhos  73  01-14      Urinalysis Basic - ( 14 Jan 2025 07:05 )    Color: x / Appearance: x / SG: x / pH: x  Gluc: 90 mg/dL / Ketone: x  / Bili: x / Urobili: x   Blood: x / Protein: x / Nitrite: x   Leuk Esterase: x / RBC: x / WBC x   Sq Epi: x / Non Sq Epi: x / Bacteria: x      LIVER FUNCTIONS - ( 14 Jan 2025 07:05 )  Alb: 2.8 g/dL / Pro: 6.0 g/dL / ALK PHOS: 73 U/L / ALT: 25 U/L / AST: 11 U/L / GGT: x

## 2025-01-14 NOTE — PHYSICAL THERAPY INITIAL EVALUATION ADULT - PERTINENT HX OF CURRENT PROBLEM, REHAB EVAL
59-year-old male with history of IDDM, hypertension, DKA, MI, cervical fusion presented to ED with C/O nausea and vomiting for the last 3 days, nonbloody nonbilious, multiple episodes daily associated with nonbloody watery diarrhea several times a day.  He has intermittent abdominal pain, usually right before vomiting.  He states he has not been able to tolerate any p.o. food or water or even his medications.  He feels dizzy and lightheaded.  No chest pain.  He complains of some shortness of breath.  No fever.  Positive chills.  No hematuria dysuria.  He has had decreased urine output.  Complains of chronic back and left arm pains for which he sees pain management. No sick contacts or travel.  No prior abdominal surgical history. In ED found to be in DKA

## 2025-01-14 NOTE — PROGRESS NOTE ADULT - ASSESSMENT
59-year-old male with history of IDDM, hypertension, DKA, MI, cervical fusion presented to ED with nausea and vomiting. (HIstory of A Fib on eliquis?)   In ED found to be in DKA with GAP 22, PH 7.26 stable electrolytes. In ED received 3 L IV bolus and started on insulin drip with D5%. Admitted to ICU for DKA. He was stabilized, transferred to floor on 1/14     #DKA in pt with IDDM   - A1C 9.2%  - Blood glucose levels improved  - Decrease lantus to 36 units qHS, add admelog pre-meal 2 units TID, sliding scale insulin  - Appreciate recommendations from diabetes specialist nurse   - Outpatient follow up with Endocrinology and diabetes educator    #Colitis on CT  - Continue current diet  - Continue zosyn for now   - GI consulted, appreciate recs     #CAD, HTN  ? on no meds?     #PUD (peptic ulcer disease)  - Continue PPI  - On no meds at home     #Hisotory of A Fib?    #Chronic anxiety and chronic pain due multiple orthopedic injuries and surgeries with spinal stenosis   - Continue sertraline, tizanadine, trazodone, klonopin PRN (home meds)  - Also on Foltanx 1 tab PO daily at home - ? not in formulary  - Currently on IV dilaudid, PO methadone  - Home pain regimen includes methadone and PO morphine  - ?Switch to PO morphine?    #DVT ppx  - Heparin     Dispo:       59-year-old male with history of IDDM, hypertension, DKA, MI, A fib on eliquis, cervical fusion presented to ED with nausea and vomiting. In ED found to be in DKA with GAP 22, PH 7.26 stable electrolytes. In ED received 3 L IV bolus and started on insulin drip with D5%. Admitted to ICU for DKA. He was stabilized, transferred to floor on 1/14     #DKA in pt with IDDM   - A1C 9.2%  - Blood glucose levels improved  - Decrease lantus to 36 units q morning, add admelog pre-meal 2 units TID, sliding scale insulin  - Appreciate recommendations from diabetes specialist nurse   - Outpatient follow up with Endocrinology and diabetes educator upon DC    #Colitis on CT  - Continue current diet  - Continue zosyn for now   - GI consulted, appreciate recs     #CAD, HTN  #Hx A Fib on eliquis  - Will resume eliquis, coreg, atorvastatin (home meds)    #PUD (peptic ulcer disease)  - Continue PPI  - On no meds at home     #Chronic anxiety and chronic pain due multiple orthopedic injuries and surgeries with spinal stenosis   - Continue sertraline, tizanadine, trazodone, klonopin PRN (home meds)  - Resume gabapentin  -   - Also on Foltanx 1 tab PO daily at home - ? not in formulary  - Currently on IV dilaudid, PO methadone  - Home pain regimen includes methadone and PO morphine  - ?Switch to PO morphine?    #DVT ppx  - Heparin     Dispo: PT recommended outpatient PT      59-year-old male with history of IDDM, hypertension, DKA, MI, A fib on eliquis, cervical fusion presented to ED with nausea and vomiting. In ED found to be in DKA with GAP 22, PH 7.26 stable electrolytes. In ED received 3 L IV bolus and started on insulin drip with D5%. Admitted to ICU for DKA. He was stabilized, transferred to floor on 1/14     #DKA in pt with IDDM   - A1C 9.2%  - Blood glucose levels improved  - Decrease lantus to 36 units q morning, add admelog pre-meal 2 units TID, sliding scale insulin  - Appreciate recommendations from diabetes specialist nurse   - Outpatient follow up with Endocrinology and diabetes educator upon DC    #Colitis on CT  - Continue current diet  - Continue zosyn for now   - Zofran as needed for nausea   - GI consulted, appreciate recs     #CAD, HTN  #Hx A Fib on eliquis  - Will resume eliquis, coreg, atorvastatin (home meds)    #PUD (peptic ulcer disease)  - Continue PPI  - On no meds at home   - Start carafate     #Chronic anxiety and chronic pain due multiple orthopedic injuries and surgeries with spinal stenosis   - Continue sertraline, tizanadine, trazodone, klonopin PRN (home meds)  - Resume gabapentin  - Resume home dose Dilaudid 8mg PO q6h  - Continue IV Dilaudid PRN for severe breakthrough pain  - Start carafate for stomach discomfort     #DVT ppx  - Eliquis    Dispo: PT recommended outpatient PT. Home with outpatient PT once symptoms improved and tolerating diet     Patient requested we update his girlfriend Clemencia at [phone] - called, no answer, voicemail full, will try to update at bedside

## 2025-01-14 NOTE — PROGRESS NOTE ADULT - ASSESSMENT
59-year-old male with history of IDDM, hypertension, DKA, MI, cervical fusion, back surgery presented to ED with C/O nausea and vomiting for the last 3 days, nonbloody nonbilious, multiple episodes daily associated with nonbloody watery diarrhea several times a day.      Patient reports having had a colonoscopy >5 years ago which revealed a polyp.   Has had multiple endoscopies- "Ulcers" per patient. Last one "years ago".    On pantoprazole at home.   Pt still reporting abdominal discomfort in the abdomen since the onset of the episode.     GI being consulted for CT scan findings: Question of Gastritis vs Colitis

## 2025-01-14 NOTE — PHYSICAL THERAPY INITIAL EVALUATION ADULT - LEVEL OF CONSCIOUSNESS, REHAB EVAL
No. LIAM screening performed.  STOP BANG Legend: 0-2 = LOW Risk; 3-4 = INTERMEDIATE Risk; 5-8 = HIGH Risk
alert

## 2025-01-15 LAB
ANION GAP SERPL CALC-SCNC: 4 MMOL/L — LOW (ref 5–17)
BUN SERPL-MCNC: 3 MG/DL — LOW (ref 7–23)
CALCIUM SERPL-MCNC: 9 MG/DL — SIGNIFICANT CHANGE UP (ref 8.4–10.5)
CHLORIDE SERPL-SCNC: 106 MMOL/L — SIGNIFICANT CHANGE UP (ref 96–108)
CO2 SERPL-SCNC: 32 MMOL/L — HIGH (ref 22–31)
CREAT SERPL-MCNC: 0.63 MG/DL — SIGNIFICANT CHANGE UP (ref 0.5–1.3)
EGFR: 111 ML/MIN/1.73M2 — SIGNIFICANT CHANGE UP
GLUCOSE BLDC GLUCOMTR-MCNC: 109 MG/DL — HIGH (ref 70–99)
GLUCOSE BLDC GLUCOMTR-MCNC: 172 MG/DL — HIGH (ref 70–99)
GLUCOSE BLDC GLUCOMTR-MCNC: 186 MG/DL — HIGH (ref 70–99)
GLUCOSE BLDC GLUCOMTR-MCNC: 189 MG/DL — HIGH (ref 70–99)
GLUCOSE BLDC GLUCOMTR-MCNC: 214 MG/DL — HIGH (ref 70–99)
GLUCOSE SERPL-MCNC: 120 MG/DL — HIGH (ref 70–99)
HCT VFR BLD CALC: 36.3 % — LOW (ref 39–50)
HGB BLD-MCNC: 12.1 G/DL — LOW (ref 13–17)
MAGNESIUM SERPL-MCNC: 1.5 MG/DL — LOW (ref 1.6–2.6)
MCHC RBC-ENTMCNC: 27.5 PG — SIGNIFICANT CHANGE UP (ref 27–34)
MCHC RBC-ENTMCNC: 33.3 G/DL — SIGNIFICANT CHANGE UP (ref 32–36)
MCV RBC AUTO: 82.5 FL — SIGNIFICANT CHANGE UP (ref 80–100)
NRBC # BLD: 0 /100 WBCS — SIGNIFICANT CHANGE UP (ref 0–0)
PLATELET # BLD AUTO: 283 K/UL — SIGNIFICANT CHANGE UP (ref 150–400)
POTASSIUM SERPL-MCNC: 3.5 MMOL/L — SIGNIFICANT CHANGE UP (ref 3.5–5.3)
POTASSIUM SERPL-SCNC: 3.5 MMOL/L — SIGNIFICANT CHANGE UP (ref 3.5–5.3)
RBC # BLD: 4.4 M/UL — SIGNIFICANT CHANGE UP (ref 4.2–5.8)
RBC # FLD: 14.5 % — SIGNIFICANT CHANGE UP (ref 10.3–14.5)
SODIUM SERPL-SCNC: 142 MMOL/L — SIGNIFICANT CHANGE UP (ref 135–145)
WBC # BLD: 5.72 K/UL — SIGNIFICANT CHANGE UP (ref 3.8–10.5)
WBC # FLD AUTO: 5.72 K/UL — SIGNIFICANT CHANGE UP (ref 3.8–10.5)

## 2025-01-15 PROCEDURE — 99232 SBSQ HOSP IP/OBS MODERATE 35: CPT | Mod: FS

## 2025-01-15 PROCEDURE — 99233 SBSQ HOSP IP/OBS HIGH 50: CPT

## 2025-01-15 RX ORDER — HYDROMORPHONE HCL 4 MG
4 TABLET ORAL ONCE
Refills: 0 | Status: DISCONTINUED | OUTPATIENT
Start: 2025-01-15 | End: 2025-01-15

## 2025-01-15 RX ORDER — INSULIN GLARGINE-YFGN 100 [IU]/ML
30 INJECTION, SOLUTION SUBCUTANEOUS EVERY MORNING
Refills: 0 | Status: DISCONTINUED | OUTPATIENT
Start: 2025-01-16 | End: 2025-01-17

## 2025-01-15 RX ORDER — MAGNESIUM SULFATE 500 MG/ML
2 INJECTION, SOLUTION INTRAMUSCULAR; INTRAVENOUS ONCE
Refills: 0 | Status: COMPLETED | OUTPATIENT
Start: 2025-01-15 | End: 2025-01-15

## 2025-01-15 RX ORDER — PANTOPRAZOLE 40 MG/1
40 TABLET, DELAYED RELEASE ORAL ONCE
Refills: 0 | Status: COMPLETED | OUTPATIENT
Start: 2025-01-15 | End: 2025-01-15

## 2025-01-15 RX ORDER — CYCLOBENZAPRINE HCL 10 MG
5 TABLET ORAL ONCE
Refills: 0 | Status: COMPLETED | OUTPATIENT
Start: 2025-01-15 | End: 2025-01-15

## 2025-01-15 RX ORDER — LIDOCAINE 50 MG/G
1 OINTMENT TOPICAL EVERY 24 HOURS
Refills: 0 | Status: DISCONTINUED | OUTPATIENT
Start: 2025-01-15 | End: 2025-01-17

## 2025-01-15 RX ADMIN — Medication 4 MILLIGRAM(S): at 17:32

## 2025-01-15 RX ADMIN — Medication 8 MILLIGRAM(S): at 17:08

## 2025-01-15 RX ADMIN — GABAPENTIN 300 MILLIGRAM(S): 300 CAPSULE ORAL at 21:32

## 2025-01-15 RX ADMIN — Medication 8 MILLIGRAM(S): at 12:24

## 2025-01-15 RX ADMIN — GABAPENTIN 300 MILLIGRAM(S): 300 CAPSULE ORAL at 14:12

## 2025-01-15 RX ADMIN — TIZANIDINE 4 MILLIGRAM(S): 4 TABLET ORAL at 21:32

## 2025-01-15 RX ADMIN — PIPERACILLIN AND TAZOBACTAM 25 GRAM(S): 3; .375 INJECTION, POWDER, LYOPHILIZED, FOR SOLUTION INTRAVENOUS at 14:10

## 2025-01-15 RX ADMIN — Medication 6 MILLIGRAM(S): at 08:28

## 2025-01-15 RX ADMIN — CYANOCOBALAMIN 1000 MICROGRAM(S): 1000 INJECTION, SOLUTION INTRAMUSCULAR; SUBCUTANEOUS at 12:12

## 2025-01-15 RX ADMIN — LIDOCAINE 1 PATCH: 50 OINTMENT TOPICAL at 21:15

## 2025-01-15 RX ADMIN — Medication 8 MILLIGRAM(S): at 12:13

## 2025-01-15 RX ADMIN — Medication 8 MILLIGRAM(S): at 17:31

## 2025-01-15 RX ADMIN — SUCRALFATE 1 GRAM(S): 1 SUSPENSION ORAL at 10:33

## 2025-01-15 RX ADMIN — Medication 6 MILLIGRAM(S): at 00:17

## 2025-01-15 RX ADMIN — INSULIN GLARGINE-YFGN 36 UNIT(S): 100 INJECTION, SOLUTION SUBCUTANEOUS at 08:28

## 2025-01-15 RX ADMIN — MAGNESIUM SULFATE 25 GRAM(S): 500 INJECTION, SOLUTION INTRAMUSCULAR; INTRAVENOUS at 14:09

## 2025-01-15 RX ADMIN — METHADONE HYDROCHLORIDE 5 MILLIGRAM(S): 10 TABLET ORAL at 17:08

## 2025-01-15 RX ADMIN — Medication 400 MILLIGRAM(S): at 12:13

## 2025-01-15 RX ADMIN — Medication 1: at 12:13

## 2025-01-15 RX ADMIN — TRAZODONE HYDROCHLORIDE 150 MILLIGRAM(S): 150 TABLET ORAL at 21:32

## 2025-01-15 RX ADMIN — LIDOCAINE 1 PATCH: 50 OINTMENT TOPICAL at 16:40

## 2025-01-15 RX ADMIN — PIPERACILLIN AND TAZOBACTAM 25 GRAM(S): 3; .375 INJECTION, POWDER, LYOPHILIZED, FOR SOLUTION INTRAVENOUS at 06:04

## 2025-01-15 RX ADMIN — CARVEDILOL 25 MILLIGRAM(S): 25 TABLET, FILM COATED ORAL at 17:08

## 2025-01-15 RX ADMIN — Medication 2 UNIT(S): at 17:08

## 2025-01-15 RX ADMIN — Medication 6 MILLIGRAM(S): at 04:27

## 2025-01-15 RX ADMIN — ATORVASTATIN CALCIUM 80 MILLIGRAM(S): 40 TABLET, FILM COATED ORAL at 21:32

## 2025-01-15 RX ADMIN — APIXABAN 5 MILLIGRAM(S): 5 TABLET, FILM COATED ORAL at 17:08

## 2025-01-15 RX ADMIN — ONDANSETRON 4 MILLIGRAM(S): 4 TABLET ORAL at 14:12

## 2025-01-15 RX ADMIN — TIZANIDINE 4 MILLIGRAM(S): 4 TABLET ORAL at 14:10

## 2025-01-15 RX ADMIN — PANTOPRAZOLE 40 MILLIGRAM(S): 40 TABLET, DELAYED RELEASE ORAL at 16:39

## 2025-01-15 RX ADMIN — Medication 2 UNIT(S): at 12:13

## 2025-01-15 RX ADMIN — SUCRALFATE 1 GRAM(S): 1 SUSPENSION ORAL at 19:21

## 2025-01-15 RX ADMIN — Medication 6 MILLIGRAM(S): at 09:22

## 2025-01-15 RX ADMIN — Medication 1 TABLET(S): at 12:12

## 2025-01-15 RX ADMIN — Medication 6 MILLIGRAM(S): at 01:15

## 2025-01-15 RX ADMIN — Medication 2: at 17:09

## 2025-01-15 RX ADMIN — Medication 5 MILLIGRAM(S): at 16:40

## 2025-01-15 RX ADMIN — Medication 50 MILLIGRAM(S): at 12:12

## 2025-01-15 RX ADMIN — SERTRALINE HYDROCHLORIDE 200 MILLIGRAM(S): 25 TABLET ORAL at 12:12

## 2025-01-15 RX ADMIN — METHADONE HYDROCHLORIDE 5 MILLIGRAM(S): 10 TABLET ORAL at 12:12

## 2025-01-15 NOTE — CHART NOTE - NSCHARTNOTEFT_GEN_A_CORE
Nutrition Follow Up Note  Hospital Course (Per Electronic Medical Record):   Source: Medical Record [X] MD[X] Patient [X]  Nursing Staff [X]     Diet: Consistent Carbohydrate with snack     Patient with c/o of nausea , diet changed to full fluids this morning , tolerated no vomiting noted , Patient with c/o constant nausea & stomach pain , GI note reviewed , (+) gastritis ,Patient receiving  Protonix , Carafate & Zofran as scheduled , Diet to be advanced to Consistent Carbohydrate , encouraged patient attempt to increase po intake & request antinausea meds as needed , Hx of long term pain meds , encouraged patient to take pain meds with food. Labs / POCT reviewed , Insulin regimen noted , Magnesium being supplemented , Will follow clinical course & monitor tolerance to po diet     Current Weight: no follow up weight noted    Pertinent Medications: MEDICATIONS  (STANDING):  apixaban 5 milliGRAM(s) Oral every 12 hours  atorvastatin 80 milliGRAM(s) Oral at bedtime  carvedilol 25 milliGRAM(s) Oral every 12 hours  cyanocobalamin 1000 MICROGram(s) Oral daily  dextrose 5%. 1000 milliLiter(s) (50 mL/Hr) IV Continuous <Continuous>  dextrose 5%. 1000 milliLiter(s) (100 mL/Hr) IV Continuous <Continuous>  dextrose 50% Injectable 25 Gram(s) IV Push once  dextrose 50% Injectable 12.5 Gram(s) IV Push once  gabapentin 300 milliGRAM(s) Oral every 8 hours  glucagon  Injectable 1 milliGRAM(s) IntraMuscular once  HYDROmorphone   Tablet 8 milliGRAM(s) Oral every 6 hours  insulin glargine Injectable (LANTUS) 36 Unit(s) SubCutaneous every morning  insulin lispro (ADMELOG) corrective regimen sliding scale   SubCutaneous three times a day before meals  insulin lispro (ADMELOG) corrective regimen sliding scale   SubCutaneous at bedtime  insulin lispro Injectable (ADMELOG) 2 Unit(s) SubCutaneous three times a day before meals  magnesium oxide 400 milliGRAM(s) Oral three times a day with meals  methadone    Tablet 5 milliGRAM(s) Oral four times a day  multivitamin 1 Tablet(s) Oral daily  pantoprazole    Tablet 40 milliGRAM(s) Oral before breakfast  piperacillin/tazobactam IVPB.. 3.375 Gram(s) IV Intermittent every 8 hours  pyridoxine 50 milliGRAM(s) Oral daily  sertraline 200 milliGRAM(s) Oral daily  sucralfate 1 Gram(s) Oral every 6 hours  tiZANidine 4 milliGRAM(s) Oral every 8 hours  traZODone 150 milliGRAM(s) Oral at bedtime    MEDICATIONS  (PRN):  aluminum hydroxide/magnesium hydroxide/simethicone Suspension 30 milliLiter(s) Oral every 4 hours PRN Dyspepsia  artificial tears (preservative free) Ophthalmic Solution 1 Drop(s) Both EYES three times a day PRN Dry Eyes  clonazePAM  Tablet 1 milliGRAM(s) Oral four times a day PRN for anxiety  dextrose Oral Gel 15 Gram(s) Oral once PRN Blood Glucose LESS THAN 70 milliGRAM(s)/deciliter  HYDROmorphone  Injectable 6 milliGRAM(s) IV Push every 4 hours PRN Pain  ondansetron Injectable 4 milliGRAM(s) IV Push every 6 hours PRN Nausea and/or Vomiting  polyethylene glycol 3350 17 Gram(s) Oral daily PRN Constipation      Pertinent Labs:  01-15 Na142 mmol/L Glu 120 mg/dL[H] K+ 3.5 mmol/L Cr  0.63 mg/dL BUN 3 mg/dL[L] 01-14 Phos 2.5 mg/dL 01-14 Alb 2.8 g/dL[L]  Hgb 12.1g/dl<L>, Hct36.3%   POCT 109,111,160.175    Skin: intact    Edema: none    Last BM: (1/11)     Estimated Needs:   [X] No Change since Previous Assessment      Previous Nutrition Diagnosis: Altered Nutrition related labs     Nutrition Diagnosis is [X] resolved         Interventions:   1. monitor tolerance to po diet     Monitoring & Evaluation: will monitor:  [X] PO Intake   [X] Follow Up (Per Protocol)  [X] Tolerance to Diet Prescription       RD to follow as per Nutrition protocol  Zaina Lorenzo RDN

## 2025-01-15 NOTE — PROGRESS NOTE ADULT - PROBLEM SELECTOR PLAN 1
CT showing gastritis likely due to history of vomiting.    -Continue supportive care  -PPI daily  -Diet as tolerated.  -Outpatient follow up for EGD  -Will sign up CT showing gastritis likely due to history of vomiting.    -Continue supportive care  -PPI daily  -Diet as tolerated.  -Outpatient follow up for EGD  -Will sign off

## 2025-01-15 NOTE — PROGRESS NOTE ADULT - NS ATTEND AMEND GEN_ALL_CORE FT
60 y/o M with IDDM, HTN, hx DKA, MI, chronic A fib on eliquis, cervical fusion presented to ED with nausea and vomiting. In ED found to be in DKA with GAP 22, PH 7.26 stable electrolytes. Admitted to ICU for DKA, medically optimized and transitioned to medical floors. nausea improving - resume regular diet. DC IV dilaudid.  GI appreciated - f/u outpatient for EGD. DC zosyn.  Anticipate discharge home 24-48h.

## 2025-01-15 NOTE — PROGRESS NOTE ADULT - SUBJECTIVE AND OBJECTIVE BOX
Patient is a 59y old  Male who presents with a chief complaint of DKA (14 Jan 2025 16:33)      Patient seen and examined at bedside. No overnight events reported. Patient states he feels nauseas at times and still has some pain which is chronic. Patient requested hash browns this am with breakfast. Patient denies chest pain or SOB.     ALLERGIES:  No Known Allergies    MEDICATIONS  (STANDING):  apixaban 5 milliGRAM(s) Oral every 12 hours  atorvastatin 80 milliGRAM(s) Oral at bedtime  carvedilol 25 milliGRAM(s) Oral every 12 hours  cyanocobalamin 1000 MICROGram(s) Oral daily  dextrose 5%. 1000 milliLiter(s) (50 mL/Hr) IV Continuous <Continuous>  dextrose 5%. 1000 milliLiter(s) (100 mL/Hr) IV Continuous <Continuous>  dextrose 50% Injectable 25 Gram(s) IV Push once  dextrose 50% Injectable 12.5 Gram(s) IV Push once  gabapentin 300 milliGRAM(s) Oral every 8 hours  glucagon  Injectable 1 milliGRAM(s) IntraMuscular once  HYDROmorphone   Tablet 8 milliGRAM(s) Oral every 6 hours  insulin glargine Injectable (LANTUS) 36 Unit(s) SubCutaneous every morning  insulin lispro (ADMELOG) corrective regimen sliding scale   SubCutaneous three times a day before meals  insulin lispro (ADMELOG) corrective regimen sliding scale   SubCutaneous at bedtime  insulin lispro Injectable (ADMELOG) 2 Unit(s) SubCutaneous three times a day before meals  magnesium oxide 400 milliGRAM(s) Oral three times a day with meals  methadone    Tablet 5 milliGRAM(s) Oral four times a day  multivitamin 1 Tablet(s) Oral daily  pantoprazole    Tablet 40 milliGRAM(s) Oral before breakfast  piperacillin/tazobactam IVPB.. 3.375 Gram(s) IV Intermittent every 8 hours  pyridoxine 50 milliGRAM(s) Oral daily  sertraline 200 milliGRAM(s) Oral daily  sucralfate 1 Gram(s) Oral every 6 hours  tiZANidine 4 milliGRAM(s) Oral every 8 hours  traZODone 150 milliGRAM(s) Oral at bedtime    MEDICATIONS  (PRN):  aluminum hydroxide/magnesium hydroxide/simethicone Suspension 30 milliLiter(s) Oral every 4 hours PRN Dyspepsia  artificial tears (preservative free) Ophthalmic Solution 1 Drop(s) Both EYES three times a day PRN Dry Eyes  clonazePAM  Tablet 1 milliGRAM(s) Oral four times a day PRN for anxiety  dextrose Oral Gel 15 Gram(s) Oral once PRN Blood Glucose LESS THAN 70 milliGRAM(s)/deciliter  ondansetron Injectable 4 milliGRAM(s) IV Push every 6 hours PRN Nausea and/or Vomiting  polyethylene glycol 3350 17 Gram(s) Oral daily PRN Constipation    Vital Signs Last 24 Hrs  T(F): 98.5 (14 Jan 2025 20:17), Max: 98.5 (14 Jan 2025 20:17)  HR: 76 (14 Jan 2025 20:17) (76 - 79)  BP: 170/94 (14 Jan 2025 20:17) (138/79 - 170/94)  RR: 16 (14 Jan 2025 20:17) (16 - 17)  SpO2: 97% (14 Jan 2025 20:17) (95% - 97%)  I&O's Summary    PHYSICAL EXAM:  General: NAD, A/O x 3  ENT: No gross hearing impairment, Moist mucous membranes, no thrush  Neck: Supple, No JVD  Lungs: Clear to auscultation bilaterally, good air entry, non-labored breathing  Cardio: RRR, S1/S2, No murmur  Abdomen: Soft, mildly tender, Nondistended; Bowel sounds present  Extremities: No calf tenderness, No cyanosis, No pitting edema  Psych: Appropriate mood and affect    LABS:                        12.1   5.72  )-----------( 283      ( [address] 2025 07:11 )             36.3     01-15    142  |  106  |  3   ----------------------------<  120  3.5   |  32  |  0.63    Ca    9.0      [address] 2025 07:11  Phos  2.5     01-14  Mg     1.5     01-15    TPro  6.0  /  Alb  2.8  /  TBili  0.3  /  DBili  x   /  AST  11  /  ALT  25  /  AlkPhos  73  01-14      Lipase: 25 U/L (01-12-25 @ 00:15)                              POCT Blood Glucose.: 109 mg/dL ([address] 2025 08:10)  POCT Blood Glucose.: 111 mg/dL (14 Jan 2025 21:00)  POCT Blood Glucose.: 160 mg/dL (14 Jan 2025 17:07)  POCT Blood Glucose.: 175 mg/dL (14 Jan 2025 12:37)      Urinalysis Basic - ( [address] 2025 07:11 )    Color: x / Appearance: x / SG: x / pH: x  Gluc: 120 mg/dL / Ketone: x  / Bili: x / Urobili: x   Blood: x / Protein: x / Nitrite: x   Leuk Esterase: x / RBC: x / WBC x   Sq Epi: x / Non Sq Epi: x / Bacteria: x        Culture - Blood (collected 12 Jan 2025 03:05)  Source: .Blood BLOOD  Preliminary Report ([address] 2025 09:01):    No growth at 72 Hours    Culture - Blood (collected 12 Jan 2025 03:05)  Source: .Blood BLOOD  Preliminary Report ([address] 2025 09:01):    No growth at 72 Hours    Culture - Urine (collected 12 Jan 2025 03:00)  Source: Clean Catch Clean Catch (Midstream)  Final Report (13 Jan 2025 07:38):    <10,000 CFU/mL Normal Urogenital Grecia        RADIOLOGY & ADDITIONAL TESTS:    Care Discussed with Consultants/Other Providers:

## 2025-01-15 NOTE — PROGRESS NOTE ADULT - ASSESSMENT
59-year-old male with history of IDDM, hypertension, DKA, MI, cervical fusion, back surgery presented to ED with C/O nausea and vomiting for the last 3 days, nonbloody nonbilious, multiple episodes daily associated with nonbloody watery diarrhea several times a day.      Patient colonoscopy >5 years ago which revealed a polyp.   Has had multiple endoscopies- "Ulcers" per patient. Last one "years ago".    On pantoprazole at home.   Pt still reporting abdominal discomfort  GI being consulted for CT scan findings: Question of Gastritis vs Colitis

## 2025-01-15 NOTE — PROGRESS NOTE ADULT - SUBJECTIVE AND OBJECTIVE BOX
GI Follow up    Patient seen and examined at bedside. Admits to abdominal discomfort, denies diarrhea, blood in stools. Complains of back pain   No events overnight    MEDICATIONS  (STANDING):  apixaban 5 milliGRAM(s) Oral every 12 hours  atorvastatin 80 milliGRAM(s) Oral at bedtime  carvedilol 25 milliGRAM(s) Oral every 12 hours  cyanocobalamin 1000 MICROGram(s) Oral daily  dextrose 5%. 1000 milliLiter(s) (50 mL/Hr) IV Continuous <Continuous>  dextrose 5%. 1000 milliLiter(s) (100 mL/Hr) IV Continuous <Continuous>  dextrose 50% Injectable 25 Gram(s) IV Push once  dextrose 50% Injectable 12.5 Gram(s) IV Push once  gabapentin 300 milliGRAM(s) Oral every 8 hours  glucagon  Injectable 1 milliGRAM(s) IntraMuscular once  HYDROmorphone   Tablet 8 milliGRAM(s) Oral every 6 hours  insulin lispro (ADMELOG) corrective regimen sliding scale   SubCutaneous three times a day before meals  insulin lispro (ADMELOG) corrective regimen sliding scale   SubCutaneous at bedtime  insulin lispro Injectable (ADMELOG) 2 Unit(s) SubCutaneous three times a day before meals  magnesium oxide 400 milliGRAM(s) Oral three times a day with meals  methadone    Tablet 5 milliGRAM(s) Oral four times a day  multivitamin 1 Tablet(s) Oral daily  pantoprazole    Tablet 40 milliGRAM(s) Oral before breakfast  piperacillin/tazobactam IVPB.. 3.375 Gram(s) IV Intermittent every 8 hours  pyridoxine 50 milliGRAM(s) Oral daily  sertraline 200 milliGRAM(s) Oral daily  sucralfate 1 Gram(s) Oral every 6 hours  tiZANidine 4 milliGRAM(s) Oral every 8 hours  traZODone 150 milliGRAM(s) Oral at bedtime    MEDICATIONS  (PRN):  aluminum hydroxide/magnesium hydroxide/simethicone Suspension 30 milliLiter(s) Oral every 4 hours PRN Dyspepsia  artificial tears (preservative free) Ophthalmic Solution 1 Drop(s) Both EYES three times a day PRN Dry Eyes  clonazePAM  Tablet 1 milliGRAM(s) Oral four times a day PRN for anxiety  dextrose Oral Gel 15 Gram(s) Oral once PRN Blood Glucose LESS THAN 70 milliGRAM(s)/deciliter  HYDROmorphone  Injectable 6 milliGRAM(s) IV Push every 4 hours PRN Pain  ondansetron Injectable 4 milliGRAM(s) IV Push every 6 hours PRN Nausea and/or Vomiting  polyethylene glycol 3350 17 Gram(s) Oral daily PRN Constipation      Allergies    No Known Allergies    Intolerances          Vital Signs Last 24 Hrs  T(C): 36.7 (14 Jan 2025 12:27), Max: 36.7 (13 Jan 2025 20:38)  T(F): 98.1 (14 Jan 2025 12:27), Max: 98.1 (14 Jan 2025 12:27)  HR: 79 (14 Jan 2025 12:27) (71 - 79)  BP: 138/79 (14 Jan 2025 12:27) (118/76 - 153/94)  BP(mean): --  RR: 17 (14 Jan 2025 12:27) (16 - 17)  SpO2: 95% (14 Jan 2025 12:27) (94% - 97%)    Parameters below as of 14 Jan 2025 12:27  Patient On (Oxygen Delivery Method): room air        PHYSICAL EXAM:    Constitutional: Well-developed  HEENT: EOMI, sclerae anicteric  Respiratory: clear to auscultation  Cardiovascular: S1 and S2  Gastrointestinal: +Bowel Sounds all quadrants, soft, Non tender, non distended  Extremities: No peripheral edema, neg clubbing, cyanosis  Neurological: A/O x 3  Skin: warm and dry      LABS:                        12.1   6.75  )-----------( 296      ( 14 Jan 2025 07:05 )             36.2     01-14    141  |  105  |  3[L]  ----------------------------<  90  3.0[L]   |  28  |  0.61    Ca    8.8      14 Jan 2025 07:05  Phos  2.5     01-14  Mg     1.4     01-14    TPro  6.0  /  Alb  2.8[L]  /  TBili  0.3  /  DBili  x   /  AST  11  /  ALT  25  /  AlkPhos  73  01-14      Urinalysis Basic - ( 14 Jan 2025 07:05 )    Color: x / Appearance: x / SG: x / pH: x  Gluc: 90 mg/dL / Ketone: x  / Bili: x / Urobili: x   Blood: x / Protein: x / Nitrite: x   Leuk Esterase: x / RBC: x / WBC x   Sq Epi: x / Non Sq Epi: x / Bacteria: x      LIVER FUNCTIONS - ( 14 Jan 2025 07:05 )  Alb: 2.8 g/dL / Pro: 6.0 g/dL / ALK PHOS: 73 U/L / ALT: 25 U/L / AST: 11 U/L / GGT: x

## 2025-01-16 ENCOUNTER — TRANSCRIPTION ENCOUNTER (OUTPATIENT)
Age: 60
End: 2025-01-16

## 2025-01-16 LAB
ANION GAP SERPL CALC-SCNC: 7 MMOL/L — SIGNIFICANT CHANGE UP (ref 5–17)
BUN SERPL-MCNC: 7 MG/DL — SIGNIFICANT CHANGE UP (ref 7–23)
CALCIUM SERPL-MCNC: 8.7 MG/DL — SIGNIFICANT CHANGE UP (ref 8.4–10.5)
CHLORIDE SERPL-SCNC: 103 MMOL/L — SIGNIFICANT CHANGE UP (ref 96–108)
CO2 SERPL-SCNC: 31 MMOL/L — SIGNIFICANT CHANGE UP (ref 22–31)
CREAT SERPL-MCNC: 0.79 MG/DL — SIGNIFICANT CHANGE UP (ref 0.5–1.3)
EGFR: 104 ML/MIN/1.73M2 — SIGNIFICANT CHANGE UP
GLUCOSE BLDC GLUCOMTR-MCNC: 153 MG/DL — HIGH (ref 70–99)
GLUCOSE BLDC GLUCOMTR-MCNC: 254 MG/DL — HIGH (ref 70–99)
GLUCOSE BLDC GLUCOMTR-MCNC: 272 MG/DL — HIGH (ref 70–99)
GLUCOSE BLDC GLUCOMTR-MCNC: 346 MG/DL — HIGH (ref 70–99)
GLUCOSE SERPL-MCNC: 244 MG/DL — HIGH (ref 70–99)
HCT VFR BLD CALC: 36 % — LOW (ref 39–50)
HGB BLD-MCNC: 11.6 G/DL — LOW (ref 13–17)
MAGNESIUM SERPL-MCNC: 1.6 MG/DL — SIGNIFICANT CHANGE UP (ref 1.6–2.6)
MCHC RBC-ENTMCNC: 27 PG — SIGNIFICANT CHANGE UP (ref 27–34)
MCHC RBC-ENTMCNC: 32.2 G/DL — SIGNIFICANT CHANGE UP (ref 32–36)
MCV RBC AUTO: 83.9 FL — SIGNIFICANT CHANGE UP (ref 80–100)
NRBC # BLD: 0 /100 WBCS — SIGNIFICANT CHANGE UP (ref 0–0)
PLATELET # BLD AUTO: 277 K/UL — SIGNIFICANT CHANGE UP (ref 150–400)
POTASSIUM SERPL-MCNC: 3.5 MMOL/L — SIGNIFICANT CHANGE UP (ref 3.5–5.3)
POTASSIUM SERPL-SCNC: 3.5 MMOL/L — SIGNIFICANT CHANGE UP (ref 3.5–5.3)
RBC # BLD: 4.29 M/UL — SIGNIFICANT CHANGE UP (ref 4.2–5.8)
RBC # FLD: 14.6 % — HIGH (ref 10.3–14.5)
SODIUM SERPL-SCNC: 141 MMOL/L — SIGNIFICANT CHANGE UP (ref 135–145)
WBC # BLD: 5.43 K/UL — SIGNIFICANT CHANGE UP (ref 3.8–10.5)
WBC # FLD AUTO: 5.43 K/UL — SIGNIFICANT CHANGE UP (ref 3.8–10.5)

## 2025-01-16 PROCEDURE — 99233 SBSQ HOSP IP/OBS HIGH 50: CPT | Mod: FS

## 2025-01-16 PROCEDURE — 74176 CT ABD & PELVIS W/O CONTRAST: CPT | Mod: 26

## 2025-01-16 RX ORDER — MORPHINE SULFATE 15 MG
15 TABLET, EXTENDED RELEASE ORAL EVERY 6 HOURS
Refills: 0 | Status: DISCONTINUED | OUTPATIENT
Start: 2025-01-16 | End: 2025-01-17

## 2025-01-16 RX ORDER — SENNOSIDES 8.6 MG/1
2 TABLET, FILM COATED ORAL AT BEDTIME
Refills: 0 | Status: DISCONTINUED | OUTPATIENT
Start: 2025-01-16 | End: 2025-01-17

## 2025-01-16 RX ORDER — MORPHINE SULFATE 15 MG
15 TABLET, EXTENDED RELEASE ORAL EVERY 6 HOURS
Refills: 0 | Status: DISCONTINUED | OUTPATIENT
Start: 2025-01-16 | End: 2025-01-16

## 2025-01-16 RX ADMIN — LIDOCAINE 1 PATCH: 50 OINTMENT TOPICAL at 19:00

## 2025-01-16 RX ADMIN — Medication 8 MILLIGRAM(S): at 00:18

## 2025-01-16 RX ADMIN — APIXABAN 5 MILLIGRAM(S): 5 TABLET, FILM COATED ORAL at 05:53

## 2025-01-16 RX ADMIN — PANTOPRAZOLE 40 MILLIGRAM(S): 40 TABLET, DELAYED RELEASE ORAL at 05:53

## 2025-01-16 RX ADMIN — METHADONE HYDROCHLORIDE 5 MILLIGRAM(S): 10 TABLET ORAL at 18:16

## 2025-01-16 RX ADMIN — Medication 1: at 12:18

## 2025-01-16 RX ADMIN — GABAPENTIN 300 MILLIGRAM(S): 300 CAPSULE ORAL at 05:53

## 2025-01-16 RX ADMIN — Medication 2 UNIT(S): at 17:06

## 2025-01-16 RX ADMIN — Medication 8 MILLIGRAM(S): at 05:53

## 2025-01-16 RX ADMIN — SERTRALINE HYDROCHLORIDE 200 MILLIGRAM(S): 25 TABLET ORAL at 12:17

## 2025-01-16 RX ADMIN — Medication 50 MILLIGRAM(S): at 12:17

## 2025-01-16 RX ADMIN — Medication 2 UNIT(S): at 08:38

## 2025-01-16 RX ADMIN — GABAPENTIN 300 MILLIGRAM(S): 300 CAPSULE ORAL at 14:20

## 2025-01-16 RX ADMIN — TRAZODONE HYDROCHLORIDE 150 MILLIGRAM(S): 150 TABLET ORAL at 21:19

## 2025-01-16 RX ADMIN — CYANOCOBALAMIN 1000 MICROGRAM(S): 1000 INJECTION, SOLUTION INTRAMUSCULAR; SUBCUTANEOUS at 12:17

## 2025-01-16 RX ADMIN — Medication 3: at 17:06

## 2025-01-16 RX ADMIN — SUCRALFATE 1 GRAM(S): 1 SUSPENSION ORAL at 12:16

## 2025-01-16 RX ADMIN — Medication 15 MILLIGRAM(S): at 15:07

## 2025-01-16 RX ADMIN — METHADONE HYDROCHLORIDE 5 MILLIGRAM(S): 10 TABLET ORAL at 00:18

## 2025-01-16 RX ADMIN — LIDOCAINE 1 PATCH: 50 OINTMENT TOPICAL at 04:42

## 2025-01-16 RX ADMIN — Medication 8 MILLIGRAM(S): at 18:16

## 2025-01-16 RX ADMIN — SUCRALFATE 1 GRAM(S): 1 SUSPENSION ORAL at 05:53

## 2025-01-16 RX ADMIN — INSULIN GLARGINE-YFGN 30 UNIT(S): 100 INJECTION, SOLUTION SUBCUTANEOUS at 08:37

## 2025-01-16 RX ADMIN — Medication 2 UNIT(S): at 12:17

## 2025-01-16 RX ADMIN — Medication 15 MILLIGRAM(S): at 21:14

## 2025-01-16 RX ADMIN — TIZANIDINE 4 MILLIGRAM(S): 4 TABLET ORAL at 05:53

## 2025-01-16 RX ADMIN — Medication 8 MILLIGRAM(S): at 00:55

## 2025-01-16 RX ADMIN — METHADONE HYDROCHLORIDE 5 MILLIGRAM(S): 10 TABLET ORAL at 12:17

## 2025-01-16 RX ADMIN — CARVEDILOL 25 MILLIGRAM(S): 25 TABLET, FILM COATED ORAL at 18:16

## 2025-01-16 RX ADMIN — Medication 8 MILLIGRAM(S): at 13:16

## 2025-01-16 RX ADMIN — Medication 8 MILLIGRAM(S): at 06:40

## 2025-01-16 RX ADMIN — Medication 3: at 08:38

## 2025-01-16 RX ADMIN — Medication 8 MILLIGRAM(S): at 12:16

## 2025-01-16 RX ADMIN — ATORVASTATIN CALCIUM 80 MILLIGRAM(S): 40 TABLET, FILM COATED ORAL at 21:09

## 2025-01-16 RX ADMIN — SUCRALFATE 1 GRAM(S): 1 SUSPENSION ORAL at 00:18

## 2025-01-16 RX ADMIN — Medication 2: at 21:23

## 2025-01-16 RX ADMIN — TIZANIDINE 4 MILLIGRAM(S): 4 TABLET ORAL at 21:09

## 2025-01-16 RX ADMIN — GABAPENTIN 300 MILLIGRAM(S): 300 CAPSULE ORAL at 21:19

## 2025-01-16 RX ADMIN — METHADONE HYDROCHLORIDE 5 MILLIGRAM(S): 10 TABLET ORAL at 05:54

## 2025-01-16 RX ADMIN — SENNOSIDES 2 TABLET(S): 8.6 TABLET, FILM COATED ORAL at 21:19

## 2025-01-16 RX ADMIN — APIXABAN 5 MILLIGRAM(S): 5 TABLET, FILM COATED ORAL at 18:16

## 2025-01-16 RX ADMIN — TIZANIDINE 4 MILLIGRAM(S): 4 TABLET ORAL at 14:20

## 2025-01-16 RX ADMIN — SUCRALFATE 1 GRAM(S): 1 SUSPENSION ORAL at 18:16

## 2025-01-16 RX ADMIN — CARVEDILOL 25 MILLIGRAM(S): 25 TABLET, FILM COATED ORAL at 05:53

## 2025-01-16 RX ADMIN — LIDOCAINE 1 PATCH: 50 OINTMENT TOPICAL at 14:20

## 2025-01-16 RX ADMIN — Medication 15 MILLIGRAM(S): at 22:00

## 2025-01-16 RX ADMIN — Medication 1 TABLET(S): at 12:17

## 2025-01-16 NOTE — DISCHARGE NOTE PROVIDER - CARE PROVIDER_API CALL
Starla Johnson  Endocrinology/Metab/Diabetes  [address] [place] [address]  Phone: [phone]  Fax: [phone]  Follow Up Time:     Hema Corbin  Gastroenterology  [address] [place] [address], [address]  [place] [address]  Phone: [phone]  Fax: [phone]  Follow Up Time:    Starla Johnson  Endocrinology/Metab/Diabetes  [address] [place] [address]  Phone: [phone]  Fax: [phone]  Follow Up Time:     Hema Corbin  Gastroenterology  [address] [facility], [address]  [place] [address]  Phone: [phone]  Fax: [phone]  Follow Up Time:     Noman Mckinney  Dietitiana nutritionist  Follow Up Time:

## 2025-01-16 NOTE — DISCHARGE NOTE NURSING/CASE MANAGEMENT/SOCIAL WORK - FINANCIAL ASSISTANCE
[facility] provides services at a reduced cost to those who are determined to be eligible through [facility]’s financial assistance program. Information regarding [facility]’s financial assistance program can be found by going to [email or web address] or by calling [phone].

## 2025-01-16 NOTE — PROGRESS NOTE ADULT - SUBJECTIVE AND OBJECTIVE BOX
Patient is a 59y old  Male who presents with a chief complaint of DKA (16 Jan 2025 07:53)      Patient seen and examined at bedside. No overnight events reported. Patient states that with eating he feels nausea and abdominal cramping. He also states he has not had a bowel movement in days. Denies chest pain, sob, nausea, vomiting.      ALLERGIES:  No Known Allergies    MEDICATIONS  (STANDING):  apixaban 5 milliGRAM(s) Oral every 12 hours  atorvastatin 80 milliGRAM(s) Oral at bedtime  carvedilol 25 milliGRAM(s) Oral every 12 hours  cyanocobalamin 1000 MICROGram(s) Oral daily  dextrose 5%. 1000 milliLiter(s) (50 mL/Hr) IV Continuous <Continuous>  dextrose 5%. 1000 milliLiter(s) (100 mL/Hr) IV Continuous <Continuous>  dextrose 50% Injectable 25 Gram(s) IV Push once  dextrose 50% Injectable 12.5 Gram(s) IV Push once  gabapentin 300 milliGRAM(s) Oral every 8 hours  glucagon  Injectable 1 milliGRAM(s) IntraMuscular once  HYDROmorphone   Tablet 8 milliGRAM(s) Oral every 6 hours  insulin glargine Injectable (LANTUS) 30 Unit(s) SubCutaneous every morning  insulin lispro (ADMELOG) corrective regimen sliding scale   SubCutaneous three times a day before meals  insulin lispro (ADMELOG) corrective regimen sliding scale   SubCutaneous at bedtime  insulin lispro Injectable (ADMELOG) 2 Unit(s) SubCutaneous three times a day before meals  lidocaine   4% Patch 1 Patch Transdermal every 24 hours  methadone    Tablet 5 milliGRAM(s) Oral four times a day  multivitamin 1 Tablet(s) Oral daily  pantoprazole    Tablet 40 milliGRAM(s) Oral before breakfast  pyridoxine 50 milliGRAM(s) Oral daily  sertraline 200 milliGRAM(s) Oral daily  sucralfate 1 Gram(s) Oral every 6 hours  tiZANidine 4 milliGRAM(s) Oral every 8 hours  traZODone 150 milliGRAM(s) Oral at bedtime    MEDICATIONS  (PRN):  aluminum hydroxide/magnesium hydroxide/simethicone Suspension 30 milliLiter(s) Oral every 4 hours PRN Dyspepsia  artificial tears (preservative free) Ophthalmic Solution 1 Drop(s) Both EYES three times a day PRN Dry Eyes  clonazePAM  Tablet 1 milliGRAM(s) Oral four times a day PRN for anxiety  dextrose Oral Gel 15 Gram(s) Oral once PRN Blood Glucose LESS THAN 70 milliGRAM(s)/deciliter  ondansetron Injectable 4 milliGRAM(s) IV Push every 6 hours PRN Nausea and/or Vomiting  polyethylene glycol 3350 17 Gram(s) Oral daily PRN Constipation    Vital Signs Last 24 Hrs  T(F): 97.5 (16 Jan 2025 06:04), Max: 97.6 ([address] 2025 13:20)  HR: 75 (16 Jan 2025 06:04) (70 - 83)  BP: 130/80 (16 Jan 2025 06:04) (130/80 - 155/81)  RR: 17 (16 Jan 2025 06:04) (16 - 17)  SpO2: 95% (16 Jan 2025 06:04) (95% - 97%)  I&O's Summary    PHYSICAL EXAM:  General: NAD, A/O x 3  ENT: No gross hearing impairment, Moist mucous membranes, no thrush  Neck: Supple, No JVD  Lungs: Clear to auscultation bilaterally, good air entry, non-labored breathing  Cardio: RRR, S1/S2, No murmur  Abdomen: Soft, tender, distended; Bowel sounds present  Extremities: No calf tenderness, No cyanosis, No pitting edema  Psych: Appropriate mood and affect    LABS:                        11.6   5.43  )-----------( 277      ( 16 Jan 2025 07:48 )             36.0     01-16    141  |  103  |  7   ----------------------------<  244  3.5   |  31  |  0.79    Ca    8.7      16 Jan 2025 07:48  Phos  2.5     01-14  Mg     1.5     01-15    TPro  6.0  /  Alb  2.8  /  TBili  0.3  /  DBili  x   /  AST  11  /  ALT  25  /  AlkPhos  73  01-14                                  POCT Blood Glucose.: 272 mg/dL (16 Jan 2025 07:58)  POCT Blood Glucose.: 172 mg/dL ([address] 2025 21:31)  POCT Blood Glucose.: 214 mg/dL ([address] 2025 16:59)  POCT Blood Glucose.: 186 mg/dL ([address] 2025 12:25)  POCT Blood Glucose.: 189 mg/dL ([address] 2025 12:06)      Urinalysis Basic - ( 16 Jan 2025 07:48 )    Color: x / Appearance: x / SG: x / pH: x  Gluc: 244 mg/dL / Ketone: x  / Bili: x / Urobili: x   Blood: x / Protein: x / Nitrite: x   Leuk Esterase: x / RBC: x / WBC x   Sq Epi: x / Non Sq Epi: x / Bacteria: x        Culture - Blood (collected 12 Jan 2025 03:05)  Source: .Blood BLOOD  Preliminary Report (16 Jan 2025 09:01):    No growth at 4 days    Culture - Blood (collected 12 Jan 2025 03:05)  Source: .Blood BLOOD  Preliminary Report (16 Jan 2025 09:01):    No growth at 4 days    Culture - Urine (collected 12 Jan 2025 03:00)  Source: Clean Catch Clean Catch (Midstream)  Final Report (13 Jan 2025 07:38):    <10,000 CFU/mL Normal Urogenital Grecia        RADIOLOGY & ADDITIONAL TESTS:    Care Discussed with Consultants/Other Providers:

## 2025-01-16 NOTE — DISCHARGE NOTE PROVIDER - NSDCFUSCHEDAPPT_GEN_ALL_CORE_FT
[facility] [address]  Scheduled Appointment: 01/22/2025    [place] [facility]  [place] [address]  Scheduled Appointment: 01/22/2025     [facility]  FAMILY[address]  Scheduled Appointment: 01/22/2025

## 2025-01-16 NOTE — DISCHARGE NOTE PROVIDER - NSDCCPCAREPLAN_GEN_ALL_CORE_FT
PRINCIPAL DISCHARGE DIAGNOSIS  Diagnosis: DKA (diabetic ketoacidosis)  Assessment and Plan of Treatment: You were admitted for nausea and vomiting  You were diagnosed with DKA  You were treated with insulin   Follow up with endocrinology outpatient   Follow up with primary care provider outpatient within 1 week      SECONDARY DISCHARGE DIAGNOSES  Diagnosis: Colitis  Assessment and Plan of Treatment: You were treated with antibiotics  Follow up with GI outpatient     PRINCIPAL DISCHARGE DIAGNOSIS  Diagnosis: DKA (diabetic ketoacidosis)  Assessment and Plan of Treatment: You were admitted for nausea and vomiting  You were diagnosed with DKA  You were treated with insulin   Continue to take lantus 36 units subcutaneously daily, jardiance daily, and metformin twice a day  Continue to monitor glucose levels  Follow up with endocrinology outpatient   Follow up with primary care provider outpatient within 1 week      SECONDARY DISCHARGE DIAGNOSES  Diagnosis: Colitis  Assessment and Plan of Treatment: You were treated with antibiotics  Follow up with GI outpatient     PRINCIPAL DISCHARGE DIAGNOSIS  Diagnosis: DKA (diabetic ketoacidosis)  Assessment and Plan of Treatment: You were admitted for nausea and vomiting  You were diagnosed with DKA  You were treated with insulin   Continue to take lantus 36 units subcutaneously daily, jardiance daily, and metformin twice a day  Continue to monitor glucose levels, you were educated about blood glucose range goals  Follow up with endocrinology outpatient   Follow up with PCP outpatient   Follow up with primary care provider outpatient within 1 week      SECONDARY DISCHARGE DIAGNOSES  Diagnosis: Colitis  Assessment and Plan of Treatment: You were treated with antibiotics  Follow up with GI outpatient    Diagnosis: Chronic back pain  Assessment and Plan of Treatment: Follow up with your pain management doctor outpatient  DO NOT drive or operate machinery while taking pain medication  You were prescribed gabapentin for pain  If you feel dizzy with this medication or If worsening of symptoms return to ER  Continue bowel regimen for constipation

## 2025-01-16 NOTE — DISCHARGE NOTE PROVIDER - CARE PROVIDERS DIRECT ADDRESSES
,[email or web address],DirectAddress_Unknown [email or web address],DirectAddress_Unknown,DirectAddress_Unknown

## 2025-01-16 NOTE — PROGRESS NOTE ADULT - NS ATTEND AMEND GEN_ALL_CORE FT
patient still with pain, currentlyjust on home dose of medications for pain. confirmed via pharamcist and redid his med rec  CT showed distended abdomin with food material spoke w/ GI to re-evaluate if he can get EGD here

## 2025-01-16 NOTE — ADVANCED PRACTICE NURSE CONSULT - ASSESSMENT
BG Labile with varying appetite. Pt for discharge today. Pt was educated on importance of follow up with Endocrinology and diabetes educator to improve his glycemic control.

## 2025-01-16 NOTE — DISCHARGE NOTE PROVIDER - HOSPITAL COURSE
Hospital Course  HPI:  : 59-year-old male with history of IDDM, hypertension, DKA, MI, cervical fusion presented to ED with C/O nausea and vomiting for the last 3 days, nonbloody nonbilious, multiple episodes daily associated with nonbloody watery diarrhea several times a day.  He has intermittent abdominal pain, usually right before vomiting.  He states he has not been able to tolerate any p.o. food or water or even his medications.  He feels dizzy and lightheaded.  No chest pain.  He complains of some shortness of breath.  No fever.  Positive chills.  No hematuria dysuria.  He has had decreased urine output.  Complains of chronic back and left arm pains for which he sees pain management. No sick contacts or travel.  No prior abdominal surgical history. In ED found to be in DKA with GAP 22, PH 7.26 stable electrolytes. In ED received 3 L IV bolus and started on insulin drip with D5%. ICU called for DKA.   Pt seen and examined in ED, pt states of feeling weak and not well. Denies cough, fever.     Events last 24 hours:   S/P 3 L IV fluid bolus in ED  GAP dropped from 22->18-->now 15  Insulin drip initiated with D5%,   PH 7.27 with bicarb 17  (12 Jan 2025 06:21)    Patient was admitted to ICU for DKA and was treated with insulin ggt. Blood sugar better controlled, downgraded to medical floor. Blood and urine cultures with no growth. Colitis on CTAP, patient treated with zosyn. GI consulted, diet advanced as tolerated, patient can have EGD outpatient. DM nurse educator consulted, education and teaching performed.       You were admitted for nausea and vomiting   You were diagnosed with diabetic ketoacidosis   You were treated with insulin  Follow up with endocrinologist outpatient     You will need to follow up with your primary care physician.    Source of Infection:  Antibiotic / Last Day:    Palliative Care / Advanced Care Planning  Code Status:  Patient/Family agreeable to Hospice/Palliative (Y/N)?  Summary of Goals of Care Conversation:    Discharging Provider:  STAN Granger  Contact Info: [phone] - Please call with any questions or concerns.    Outpatient Provider:     SNF Provider: Hospital Course  HPI:  : 59-year-old male with history of IDDM, hypertension, DKA, MI, cervical fusion presented to ED with C/O nausea and vomiting for the last 3 days, nonbloody nonbilious, multiple episodes daily associated with nonbloody watery diarrhea several times a day.  He has intermittent abdominal pain, usually right before vomiting.  He states he has not been able to tolerate any p.o. food or water or even his medications.  He feels dizzy and lightheaded.  No chest pain.  He complains of some shortness of breath.  No fever.  Positive chills.  No hematuria dysuria.  He has had decreased urine output.  Complains of chronic back and left arm pains for which he sees pain management. No sick contacts or travel.  No prior abdominal surgical history. In ED found to be in DKA with GAP 22, PH 7.26 stable electrolytes. In ED received 3 L IV bolus and started on insulin drip with D5%. ICU called for DKA.   Pt seen and examined in ED, pt states of feeling weak and not well. Denies cough, fever.     Events last 24 hours:   S/P 3 L IV fluid bolus in ED  GAP dropped from 22->18-->now 15  Insulin drip initiated with D5%,   PH 7.27 with bicarb 17  (12 Jan 2025 06:21)    Patient was admitted to ICU for DKA and was treated with insulin ggt. Blood sugar better controlled, downgraded to medical floor. Blood and urine cultures with no growth. Colitis on CTAP, patient treated with zosyn. Repeat CTAP performed. GI consulted, diet advanced as tolerated, patient can have EGD outpatient. DM nurse educator consulted, education and teaching performed.       You were admitted for nausea and vomiting   You were diagnosed with diabetic ketoacidosis   You were treated with insulin  Follow up with endocrinologist outpatient     You will need to follow up with your primary care physician.    Discharging Provider:  STAN Granger  Contact Info: [phone] - Please call with any questions or concerns.    Outpatient Provider:

## 2025-01-16 NOTE — PROGRESS NOTE ADULT - ASSESSMENT
59-year-old male with history of IDDM, hypertension, DKA, MI, A fib on eliquis, cervical fusion presented to ED with nausea and vomiting. In ED found to be in DKA with GAP 22, PH 7.26 stable electrolytes. In ED received 3 L IV bolus and started on insulin drip with D5%. Admitted to ICU for DKA. He was stabilized, transferred to floor on 1/14     #DKA in pt with IDDM   - A1C 9.2%  - Blood glucose levels improved  - Decreased lantus to 30 units q morning, added admelog pre-meal 2 units TID, sliding scale insulin (held pre-meal this am due to glucose of 109 and not much po intake recently)  - Appreciate recommendations from diabetes specialist nurse   - Outpatient follow up with Endocrinology and diabetes educator upon DC    #Colitis on CT  - Continue current diet, patient requesting regular food   - Patient without BM and with abdominal cramping, ordered repeat CTAP without contrast  - Bowel regimen   - Zofran as needed for nausea   - GI consulted, appreciate recs     #Hypomagnesemia-resolved  -Replete  -Monitor     #CAD, HTN  #Hx A Fib on eliquis  - Resumed eliquis, coreg, atorvastatin (home meds)    #PUD (peptic ulcer disease)  - Continue PPI  - On no meds at home   - Started carafate     #Chronic anxiety and chronic pain due multiple orthopedic injuries and surgeries with spinal stenosis   - Continue sertraline, tizanadine, trazodone, klonopin PRN (home meds)  - Resume gabapentin  - Resume home dose Dilaudid 8mg PO q6h  - Started carafate for stomach discomfort     #DVT ppx  - Eliquis    Dispo: PT recommended outpatient PT. Home with outpatient PT once symptoms improved and tolerating diet     Patient requested we update his girlfriend Clemencia at [phone] - Updated

## 2025-01-16 NOTE — DISCHARGE NOTE NURSING/CASE MANAGEMENT/SOCIAL WORK - NSDCVIVACCINE_GEN_ALL_CORE_FT
Tdap; 24-Oct-2021 12:15; Jahaira Ruiz (RN); [employer]; [id number] (Exp. Date: 29-Jul-2023); IntraMuscular; Deltoid Right.; 0.5 milliLiter(s); VIS (VIS Published: 09-May-2013, VIS Presented: 24-Oct-2021);   Tdap; 28-May-2024 15:45; Tanesha Prince (RN); [employer]; [id number] (Exp. Date: 28-Dec-2025); IntraMuscular; Deltoid Left.; 0.5 milliLiter(s); VIS (VIS Published: 09-May-2013, VIS Presented: 28-May-2024);

## 2025-01-16 NOTE — DISCHARGE NOTE PROVIDER - PROVIDER TOKENS
PROVIDER:[TOKEN:[[id number]]],PROVIDER:[TOKEN:[[id number]]] PROVIDER:[TOKEN:[[id number]]],PROVIDER:[TOKEN:[[id number]]],PROVIDER:[TOKEN:[[id number]:[facility]:[id number]]]

## 2025-01-16 NOTE — DISCHARGE NOTE NURSING/CASE MANAGEMENT/SOCIAL WORK - PATIENT PORTAL LINK FT
You can access the FollowMyHealth Patient Portal offered by [facility] by registering at the following website: [email or web address]. By joining [facility]’s FollowMyHealth portal, you will also be able to view your health information using other applications (apps) compatible with our system.

## 2025-01-16 NOTE — ADVANCED PRACTICE NURSE CONSULT - RECOMMEDATIONS
Discharge Recommendations:    1.	Lantus Solostar Pen 100 units/ml (on favorite list) 36  units SubCutaneous DAILY  2.	Metformin  mg tablets orally- 2 tablet twice a day with food  3.	Jardiance 25 mg orally daily  4.	FS Tono 2 sensors- quantity 2  5.	Follow up with Endocrinology and diabetes educator- provided Dr Johnson and Noman Mckinney contact information.

## 2025-01-17 VITALS
OXYGEN SATURATION: 94 % | SYSTOLIC BLOOD PRESSURE: 127 MMHG | TEMPERATURE: 98 F | RESPIRATION RATE: 17 BRPM | HEART RATE: 76 BPM | DIASTOLIC BLOOD PRESSURE: 80 MMHG

## 2025-01-17 DIAGNOSIS — R93.3 ABNORMAL FINDINGS ON DIAGNOSTIC IMAGING OF OTHER PARTS OF DIGESTIVE TRACT: ICD-10-CM

## 2025-01-17 LAB
ANION GAP SERPL CALC-SCNC: 7 MMOL/L — SIGNIFICANT CHANGE UP (ref 5–17)
BUN SERPL-MCNC: 7 MG/DL — SIGNIFICANT CHANGE UP (ref 7–23)
CALCIUM SERPL-MCNC: 9 MG/DL — SIGNIFICANT CHANGE UP (ref 8.4–10.5)
CHLORIDE SERPL-SCNC: 103 MMOL/L — SIGNIFICANT CHANGE UP (ref 96–108)
CO2 SERPL-SCNC: 31 MMOL/L — SIGNIFICANT CHANGE UP (ref 22–31)
CREAT SERPL-MCNC: 0.67 MG/DL — SIGNIFICANT CHANGE UP (ref 0.5–1.3)
CULTURE RESULTS: SIGNIFICANT CHANGE UP
CULTURE RESULTS: SIGNIFICANT CHANGE UP
EGFR: 109 ML/MIN/1.73M2 — SIGNIFICANT CHANGE UP
GLUCOSE BLDC GLUCOMTR-MCNC: 199 MG/DL — HIGH (ref 70–99)
GLUCOSE BLDC GLUCOMTR-MCNC: 250 MG/DL — HIGH (ref 70–99)
GLUCOSE SERPL-MCNC: 262 MG/DL — HIGH (ref 70–99)
HCT VFR BLD CALC: 37.4 % — LOW (ref 39–50)
HGB BLD-MCNC: 12.2 G/DL — LOW (ref 13–17)
MAGNESIUM SERPL-MCNC: 1.4 MG/DL — LOW (ref 1.6–2.6)
MCHC RBC-ENTMCNC: 27.3 PG — SIGNIFICANT CHANGE UP (ref 27–34)
MCHC RBC-ENTMCNC: 32.6 G/DL — SIGNIFICANT CHANGE UP (ref 32–36)
MCV RBC AUTO: 83.7 FL — SIGNIFICANT CHANGE UP (ref 80–100)
NRBC # BLD: 0 /100 WBCS — SIGNIFICANT CHANGE UP (ref 0–0)
PLATELET # BLD AUTO: 297 K/UL — SIGNIFICANT CHANGE UP (ref 150–400)
POTASSIUM SERPL-MCNC: 3.5 MMOL/L — SIGNIFICANT CHANGE UP (ref 3.5–5.3)
POTASSIUM SERPL-SCNC: 3.5 MMOL/L — SIGNIFICANT CHANGE UP (ref 3.5–5.3)
RBC # BLD: 4.47 M/UL — SIGNIFICANT CHANGE UP (ref 4.2–5.8)
RBC # FLD: 14.6 % — HIGH (ref 10.3–14.5)
SODIUM SERPL-SCNC: 141 MMOL/L — SIGNIFICANT CHANGE UP (ref 135–145)
SPECIMEN SOURCE: SIGNIFICANT CHANGE UP
SPECIMEN SOURCE: SIGNIFICANT CHANGE UP
WBC # BLD: 7.32 K/UL — SIGNIFICANT CHANGE UP (ref 3.8–10.5)
WBC # FLD AUTO: 7.32 K/UL — SIGNIFICANT CHANGE UP (ref 3.8–10.5)

## 2025-01-17 PROCEDURE — 99291 CRITICAL CARE FIRST HOUR: CPT

## 2025-01-17 PROCEDURE — 99239 HOSP IP/OBS DSCHRG MGMT >30: CPT

## 2025-01-17 PROCEDURE — 36415 COLL VENOUS BLD VENIPUNCTURE: CPT

## 2025-01-17 PROCEDURE — 85025 COMPLETE CBC W/AUTO DIFF WBC: CPT

## 2025-01-17 PROCEDURE — 82962 GLUCOSE BLOOD TEST: CPT

## 2025-01-17 PROCEDURE — 84100 ASSAY OF PHOSPHORUS: CPT

## 2025-01-17 PROCEDURE — 74177 CT ABD & PELVIS W/CONTRAST: CPT | Mod: MC

## 2025-01-17 PROCEDURE — 99233 SBSQ HOSP IP/OBS HIGH 50: CPT | Mod: FS

## 2025-01-17 PROCEDURE — 96376 TX/PRO/DX INJ SAME DRUG ADON: CPT

## 2025-01-17 PROCEDURE — 80048 BASIC METABOLIC PNL TOTAL CA: CPT

## 2025-01-17 PROCEDURE — 80053 COMPREHEN METABOLIC PANEL: CPT

## 2025-01-17 PROCEDURE — 93005 ELECTROCARDIOGRAM TRACING: CPT

## 2025-01-17 PROCEDURE — 97162 PT EVAL MOD COMPLEX 30 MIN: CPT

## 2025-01-17 PROCEDURE — 83036 HEMOGLOBIN GLYCOSYLATED A1C: CPT

## 2025-01-17 PROCEDURE — 74176 CT ABD & PELVIS W/O CONTRAST: CPT | Mod: MC

## 2025-01-17 PROCEDURE — 83735 ASSAY OF MAGNESIUM: CPT

## 2025-01-17 PROCEDURE — 96375 TX/PRO/DX INJ NEW DRUG ADDON: CPT

## 2025-01-17 PROCEDURE — 85027 COMPLETE CBC AUTOMATED: CPT

## 2025-01-17 PROCEDURE — 96374 THER/PROPH/DIAG INJ IV PUSH: CPT

## 2025-01-17 RX ORDER — HYDROMORPHONE HCL 4 MG
1 TABLET ORAL
Qty: 0 | Refills: 0 | DISCHARGE
Start: 2025-01-17

## 2025-01-17 RX ORDER — B COMPLEX, C NO.20/FOLIC ACID 1 MG
1 CAPSULE ORAL
Qty: 0 | Refills: 0 | DISCHARGE
Start: 2025-01-17

## 2025-01-17 RX ORDER — CYANOCOBALAMIN 1000 UG/ML
1 INJECTION, SOLUTION INTRAMUSCULAR; SUBCUTANEOUS
Qty: 0 | Refills: 0 | DISCHARGE
Start: 2025-01-17

## 2025-01-17 RX ORDER — ISOPROPYL ALCOHOL, BENZOCAINE .7; .06 ML/ML; ML/ML
0 SWAB TOPICAL
Qty: 100 | Refills: 1
Start: 2025-01-17

## 2025-01-17 RX ORDER — GABAPENTIN 300 MG/1
1 CAPSULE ORAL
Qty: 30 | Refills: 0
Start: 2025-01-17 | End: 2025-01-26

## 2025-01-17 RX ORDER — CARVEDILOL 25 MG/1
1 TABLET, FILM COATED ORAL
Qty: 60 | Refills: 0
Start: 2025-01-17 | End: 2025-02-15

## 2025-01-17 RX ORDER — METFORMIN 850 MG/1
2 TABLET ORAL
Qty: 120 | Refills: 0
Start: 2025-01-17 | End: 2025-02-15

## 2025-01-17 RX ORDER — MAGNESIUM SULFATE 500 MG/ML
2 INJECTION, SOLUTION INTRAMUSCULAR; INTRAVENOUS ONCE
Refills: 0 | Status: COMPLETED | OUTPATIENT
Start: 2025-01-17 | End: 2025-01-17

## 2025-01-17 RX ORDER — APIXABAN 5 MG/1
1 TABLET, FILM COATED ORAL
Qty: 0 | Refills: 0 | DISCHARGE
Start: 2025-01-17

## 2025-01-17 RX ORDER — PYRIDOXINE HCL (VITAMIN B6) 100 MG
1 TABLET ORAL
Qty: 0 | Refills: 0 | DISCHARGE
Start: 2025-01-17

## 2025-01-17 RX ORDER — B COMPLEX, C NO.20/FOLIC ACID 1 MG
1 CAPSULE ORAL
Refills: 0 | DISCHARGE

## 2025-01-17 RX ORDER — SENNOSIDES 8.6 MG/1
2 TABLET, FILM COATED ORAL
Qty: 28 | Refills: 0
Start: 2025-01-17 | End: 2025-01-30

## 2025-01-17 RX ORDER — INSULIN GLARGINE-YFGN 100 [IU]/ML
36 INJECTION, SOLUTION SUBCUTANEOUS
Qty: 1 | Refills: 0
Start: 2025-01-17 | End: 2025-02-15

## 2025-01-17 RX ORDER — GABAPENTIN 300 MG/1
1 CAPSULE ORAL
Qty: 21 | Refills: 0
Start: 2025-01-17 | End: 2025-01-23

## 2025-01-17 RX ORDER — ATORVASTATIN CALCIUM 40 MG/1
1 TABLET, FILM COATED ORAL
Qty: 0 | Refills: 0 | DISCHARGE
Start: 2025-01-17

## 2025-01-17 RX ORDER — EMPAGLIFLOZIN 10 MG/1
1 TABLET, FILM COATED ORAL
Qty: 30 | Refills: 0
Start: 2025-01-17 | End: 2025-02-15

## 2025-01-17 RX ORDER — SUCRALFATE 1 G/10ML
1 SUSPENSION ORAL
Qty: 120 | Refills: 0
Start: 2025-01-17 | End: 2025-02-15

## 2025-01-17 RX ORDER — POLYETHYLENE GLYCOL 3350 17 G/DOSE
17 POWDER (GRAM) ORAL
Qty: 85 | Refills: 0
Start: 2025-01-17 | End: 2025-01-21

## 2025-01-17 RX ADMIN — METHADONE HYDROCHLORIDE 5 MILLIGRAM(S): 10 TABLET ORAL at 06:09

## 2025-01-17 RX ADMIN — TIZANIDINE 4 MILLIGRAM(S): 4 TABLET ORAL at 06:01

## 2025-01-17 RX ADMIN — SUCRALFATE 1 GRAM(S): 1 SUSPENSION ORAL at 11:14

## 2025-01-17 RX ADMIN — Medication 15 MILLIGRAM(S): at 12:51

## 2025-01-17 RX ADMIN — GABAPENTIN 300 MILLIGRAM(S): 300 CAPSULE ORAL at 06:07

## 2025-01-17 RX ADMIN — CYANOCOBALAMIN 1000 MICROGRAM(S): 1000 INJECTION, SOLUTION INTRAMUSCULAR; SUBCUTANEOUS at 11:13

## 2025-01-17 RX ADMIN — GABAPENTIN 300 MILLIGRAM(S): 300 CAPSULE ORAL at 13:43

## 2025-01-17 RX ADMIN — Medication 15 MILLIGRAM(S): at 03:23

## 2025-01-17 RX ADMIN — Medication 8 MILLIGRAM(S): at 01:00

## 2025-01-17 RX ADMIN — PANTOPRAZOLE 40 MILLIGRAM(S): 40 TABLET, DELAYED RELEASE ORAL at 06:02

## 2025-01-17 RX ADMIN — Medication 8 MILLIGRAM(S): at 07:00

## 2025-01-17 RX ADMIN — SUCRALFATE 1 GRAM(S): 1 SUSPENSION ORAL at 06:01

## 2025-01-17 RX ADMIN — CARVEDILOL 25 MILLIGRAM(S): 25 TABLET, FILM COATED ORAL at 06:00

## 2025-01-17 RX ADMIN — Medication 1 TABLET(S): at 11:13

## 2025-01-17 RX ADMIN — Medication 8 MILLIGRAM(S): at 11:13

## 2025-01-17 RX ADMIN — INSULIN GLARGINE-YFGN 30 UNIT(S): 100 INJECTION, SOLUTION SUBCUTANEOUS at 08:13

## 2025-01-17 RX ADMIN — TIZANIDINE 4 MILLIGRAM(S): 4 TABLET ORAL at 13:43

## 2025-01-17 RX ADMIN — Medication 2: at 12:46

## 2025-01-17 RX ADMIN — Medication 8 MILLIGRAM(S): at 06:09

## 2025-01-17 RX ADMIN — Medication 1: at 08:14

## 2025-01-17 RX ADMIN — Medication 15 MILLIGRAM(S): at 03:53

## 2025-01-17 RX ADMIN — Medication 8 MILLIGRAM(S): at 00:00

## 2025-01-17 RX ADMIN — METHADONE HYDROCHLORIDE 5 MILLIGRAM(S): 10 TABLET ORAL at 00:00

## 2025-01-17 RX ADMIN — LIDOCAINE 1 PATCH: 50 OINTMENT TOPICAL at 06:49

## 2025-01-17 RX ADMIN — Medication 50 MILLIGRAM(S): at 11:13

## 2025-01-17 RX ADMIN — Medication 2 UNIT(S): at 12:45

## 2025-01-17 RX ADMIN — APIXABAN 5 MILLIGRAM(S): 5 TABLET, FILM COATED ORAL at 06:09

## 2025-01-17 RX ADMIN — MAGNESIUM SULFATE 25 GRAM(S): 500 INJECTION, SOLUTION INTRAMUSCULAR; INTRAVENOUS at 11:37

## 2025-01-17 RX ADMIN — SUCRALFATE 1 GRAM(S): 1 SUSPENSION ORAL at 00:00

## 2025-01-17 RX ADMIN — METHADONE HYDROCHLORIDE 5 MILLIGRAM(S): 10 TABLET ORAL at 11:13

## 2025-01-17 RX ADMIN — SERTRALINE HYDROCHLORIDE 200 MILLIGRAM(S): 25 TABLET ORAL at 11:13

## 2025-01-17 RX ADMIN — Medication 2 UNIT(S): at 08:13

## 2025-01-17 NOTE — PROGRESS NOTE ADULT - ASSESSMENT
59-year-old male with history of IDDM, hypertension, DKA, MI, cervical fusion, back surgery presented to ED with C/O nausea and vomiting for the last 3 days, nonbloody nonbilious, multiple episodes daily associated with nonbloody watery diarrhea several times a day.      GI being consulted for CT scan findings: Question of Gastritis vs Colitis  Patient colonoscopy >5 years ago which revealed a polyp.   Has had multiple endoscopies- "Ulcers" per patient. Last one "years ago".    Pt still reporting abdominal discomfort  Patient agrees to have EGD as outpatient

## 2025-01-17 NOTE — PROGRESS NOTE ADULT - SUBJECTIVE AND OBJECTIVE BOX
GI Follow up   Patient seen and examined at bedside. Admits to general abdominal discomfort today, denies N/V/D, no hematemesis and hematochezia        MEDICATIONS  (STANDING):  apixaban 5 milliGRAM(s) Oral every 12 hours  atorvastatin 80 milliGRAM(s) Oral at bedtime  carvedilol 25 milliGRAM(s) Oral every 12 hours  cyanocobalamin 1000 MICROGram(s) Oral daily  dextrose 5%. 1000 milliLiter(s) (50 mL/Hr) IV Continuous <Continuous>  dextrose 5%. 1000 milliLiter(s) (100 mL/Hr) IV Continuous <Continuous>  dextrose 50% Injectable 25 Gram(s) IV Push once  dextrose 50% Injectable 12.5 Gram(s) IV Push once  gabapentin 300 milliGRAM(s) Oral every 8 hours  glucagon  Injectable 1 milliGRAM(s) IntraMuscular once  HYDROmorphone   Tablet 8 milliGRAM(s) Oral every 6 hours  insulin glargine Injectable (LANTUS) 30 Unit(s) SubCutaneous every morning  insulin lispro (ADMELOG) corrective regimen sliding scale   SubCutaneous three times a day before meals  insulin lispro (ADMELOG) corrective regimen sliding scale   SubCutaneous at bedtime  insulin lispro Injectable (ADMELOG) 2 Unit(s) SubCutaneous three times a day before meals  lidocaine   4% Patch 1 Patch Transdermal every 24 hours  methadone    Tablet 5 milliGRAM(s) Oral four times a day  multivitamin 1 Tablet(s) Oral daily  pantoprazole    Tablet 40 milliGRAM(s) Oral before breakfast  pyridoxine 50 milliGRAM(s) Oral daily  senna 2 Tablet(s) Oral at bedtime  sertraline 200 milliGRAM(s) Oral daily  sucralfate 1 Gram(s) Oral every 6 hours  tiZANidine 4 milliGRAM(s) Oral every 8 hours  traZODone 150 milliGRAM(s) Oral at bedtime    MEDICATIONS  (PRN):  aluminum hydroxide/magnesium hydroxide/simethicone Suspension 30 milliLiter(s) Oral every 4 hours PRN Dyspepsia  artificial tears (preservative free) Ophthalmic Solution 1 Drop(s) Both EYES three times a day PRN Dry Eyes  clonazePAM  Tablet 1 milliGRAM(s) Oral four times a day PRN for anxiety  dextrose Oral Gel 15 Gram(s) Oral once PRN Blood Glucose LESS THAN 70 milliGRAM(s)/deciliter  morphine Concentrate 15 milliGRAM(s) Oral every 6 hours PRN Severe Pain (7 - 10)  ondansetron Injectable 4 milliGRAM(s) IV Push every 6 hours PRN Nausea and/or Vomiting  polyethylene glycol 3350 17 Gram(s) Oral daily PRN Constipation      Allergies    No Known Allergies    Intolerances          Vital Signs Last 24 Hrs  T(C): 36.4 (17 Jan 2025 12:50), Max: 36.6 (16 Jan 2025 20:32)  T(F): 97.6 (17 Jan 2025 12:50), Max: 97.9 (16 Jan 2025 20:32)  HR: 76 (17 Jan 2025 12:50) (73 - 78)  BP: 127/80 (17 Jan 2025 12:50) (127/76 - 138/80)  BP(mean): 90 (17 Jan 2025 03:23) (90 - 90)  RR: 17 (17 Jan 2025 12:50) (17 - 20)  SpO2: 94% (17 Jan 2025 12:50) (92% - 95%)    Parameters below as of 17 Jan 2025 12:50  Patient On (Oxygen Delivery Method): room air        PHYSICAL EXAM:    Constitutional: Well-developed  ENTT: clear conjunctivae and sclera  Respiratory: clear to auscultation  Cardiovascular: S1 and S2  Gastrointestinal: +Bowel Sounds all quadrants, soft, Non tender, non distended  Extremities: No peripheral edema, neg clubbing, cyanosis  Neurological: A/O x 3  Skin: warm and dry      LABS:                        12.2   7.32  )-----------( 297      ( 17 Jan 2025 06:34 )             37.4     01-17    141  |  103  |  7   ----------------------------<  262[H]  3.5   |  31  |  0.67    Ca    9.0      17 Jan 2025 06:34  Mg     1.4     01-17        Urinalysis Basic - ( 17 Jan 2025 06:34 )    Color: x / Appearance: x / SG: x / pH: x  Gluc: 262 mg/dL / Ketone: x  / Bili: x / Urobili: x   Blood: x / Protein: x / Nitrite: x   Leuk Esterase: x / RBC: x / WBC x   Sq Epi: x / Non Sq Epi: x / Bacteria: x      LIVER FUNCTIONS - ( 14 Jan 2025 07:05 )  Alb: 2.8 g/dL / Pro: 6.0 g/dL / ALK PHOS: 73 U/L / ALT: 25 U/L / AST: 11 U/L / GGT: x             RADIOLOGY & ADDITIONAL TESTS:   GI Follow up     Patient seen and examined at bedside. Admits to general abdominal discomfort today, denies N/V, denies GI bleeding.        MEDICATIONS  (STANDING):  apixaban 5 milliGRAM(s) Oral every 12 hours  atorvastatin 80 milliGRAM(s) Oral at bedtime  carvedilol 25 milliGRAM(s) Oral every 12 hours  cyanocobalamin 1000 MICROGram(s) Oral daily  dextrose 5%. 1000 milliLiter(s) (50 mL/Hr) IV Continuous <Continuous>  dextrose 5%. 1000 milliLiter(s) (100 mL/Hr) IV Continuous <Continuous>  dextrose 50% Injectable 25 Gram(s) IV Push once  dextrose 50% Injectable 12.5 Gram(s) IV Push once  gabapentin 300 milliGRAM(s) Oral every 8 hours  glucagon  Injectable 1 milliGRAM(s) IntraMuscular once  HYDROmorphone   Tablet 8 milliGRAM(s) Oral every 6 hours  insulin glargine Injectable (LANTUS) 30 Unit(s) SubCutaneous every morning  insulin lispro (ADMELOG) corrective regimen sliding scale   SubCutaneous three times a day before meals  insulin lispro (ADMELOG) corrective regimen sliding scale   SubCutaneous at bedtime  insulin lispro Injectable (ADMELOG) 2 Unit(s) SubCutaneous three times a day before meals  lidocaine   4% Patch 1 Patch Transdermal every 24 hours  methadone    Tablet 5 milliGRAM(s) Oral four times a day  multivitamin 1 Tablet(s) Oral daily  pantoprazole    Tablet 40 milliGRAM(s) Oral before breakfast  pyridoxine 50 milliGRAM(s) Oral daily  senna 2 Tablet(s) Oral at bedtime  sertraline 200 milliGRAM(s) Oral daily  sucralfate 1 Gram(s) Oral every 6 hours  tiZANidine 4 milliGRAM(s) Oral every 8 hours  traZODone 150 milliGRAM(s) Oral at bedtime    MEDICATIONS  (PRN):  aluminum hydroxide/magnesium hydroxide/simethicone Suspension 30 milliLiter(s) Oral every 4 hours PRN Dyspepsia  artificial tears (preservative free) Ophthalmic Solution 1 Drop(s) Both EYES three times a day PRN Dry Eyes  clonazePAM  Tablet 1 milliGRAM(s) Oral four times a day PRN for anxiety  dextrose Oral Gel 15 Gram(s) Oral once PRN Blood Glucose LESS THAN 70 milliGRAM(s)/deciliter  morphine Concentrate 15 milliGRAM(s) Oral every 6 hours PRN Severe Pain (7 - 10)  ondansetron Injectable 4 milliGRAM(s) IV Push every 6 hours PRN Nausea and/or Vomiting  polyethylene glycol 3350 17 Gram(s) Oral daily PRN Constipation      Allergies    No Known Allergies    Intolerances          Vital Signs Last 24 Hrs  T(C): 36.4 (17 Jan 2025 12:50), Max: 36.6 (16 Jan 2025 20:32)  T(F): 97.6 (17 Jan 2025 12:50), Max: 97.9 (16 Jan 2025 20:32)  HR: 76 (17 Jan 2025 12:50) (73 - 78)  BP: 127/80 (17 Jan 2025 12:50) (127/76 - 138/80)  BP(mean): 90 (17 Jan 2025 03:23) (90 - 90)  RR: 17 (17 Jan 2025 12:50) (17 - 20)  SpO2: 94% (17 Jan 2025 12:50) (92% - 95%)    Parameters below as of 17 Jan 2025 12:50  Patient On (Oxygen Delivery Method): room air        PHYSICAL EXAM:    Constitutional: Well-developed, NAD  HEENT: anicteric sclerae, EOMI  Respiratory: clear  Cardiovascular:RRR  Gastrointestinal: +Bowel Sounds, soft, nondistended  Extremities: No peripheral edema, neg clubbing, cyanosis  Neurological: A/O x 3  Skin: warm and dry      LABS:                        12.2   7.32  )-----------( 297      ( 17 Jan 2025 06:34 )             37.4     01-17    141  |  103  |  7   ----------------------------<  262[H]  3.5   |  31  |  0.67    Ca    9.0      17 Jan 2025 06:34  Mg     1.4     01-17        Urinalysis Basic - ( 17 Jan 2025 06:34 )    Color: x / Appearance: x / SG: x / pH: x  Gluc: 262 mg/dL / Ketone: x  / Bili: x / Urobili: x   Blood: x / Protein: x / Nitrite: x   Leuk Esterase: x / RBC: x / WBC x   Sq Epi: x / Non Sq Epi: x / Bacteria: x      LIVER FUNCTIONS - ( 14 Jan 2025 07:05 )  Alb: 2.8 g/dL / Pro: 6.0 g/dL / ALK PHOS: 73 U/L / ALT: 25 U/L / AST: 11 U/L / GGT: x

## 2025-01-17 NOTE — PROGRESS NOTE ADULT - PROBLEM SELECTOR PLAN 1
CT showing gastritis likely due to history of vomiting.    -Continue supportive care  -PPI daily  -Diet as tolerated.  -Outpatient follow up for EGD  -Will sign off

## 2025-01-17 NOTE — PROGRESS NOTE ADULT - PROVIDER SPECIALTY LIST ADULT
Critical Care
Critical Care
Gastroenterology
Hospitalist
Gastroenterology

## 2025-01-17 NOTE — PROGRESS NOTE ADULT - NS ATTEND AMEND GEN_ALL_CORE FT
Agree with the assessment and plan of LAURENT Wheeler. Stable, plan for DC with outpatient GI follow up as above. T>50min.

## 2025-01-17 NOTE — PROGRESS NOTE ADULT - SUBJECTIVE AND OBJECTIVE BOX
Patient is a 59y old  Male who presents with a chief complaint of DKA (16 Jan 2025 09:09)      Patient seen and examined at bedside. No overnight events reported.     ALLERGIES:  No Known Allergies    MEDICATIONS  (STANDING):  apixaban 5 milliGRAM(s) Oral every 12 hours  atorvastatin 80 milliGRAM(s) Oral at bedtime  carvedilol 25 milliGRAM(s) Oral every 12 hours  cyanocobalamin 1000 MICROGram(s) Oral daily  dextrose 5%. 1000 milliLiter(s) (50 mL/Hr) IV Continuous <Continuous>  dextrose 5%. 1000 milliLiter(s) (100 mL/Hr) IV Continuous <Continuous>  dextrose 50% Injectable 25 Gram(s) IV Push once  dextrose 50% Injectable 12.5 Gram(s) IV Push once  gabapentin 300 milliGRAM(s) Oral every 8 hours  glucagon  Injectable 1 milliGRAM(s) IntraMuscular once  HYDROmorphone   Tablet 8 milliGRAM(s) Oral every 6 hours  insulin glargine Injectable (LANTUS) 30 Unit(s) SubCutaneous every morning  insulin lispro (ADMELOG) corrective regimen sliding scale   SubCutaneous three times a day before meals  insulin lispro (ADMELOG) corrective regimen sliding scale   SubCutaneous at bedtime  insulin lispro Injectable (ADMELOG) 2 Unit(s) SubCutaneous three times a day before meals  lidocaine   4% Patch 1 Patch Transdermal every 24 hours  methadone    Tablet 5 milliGRAM(s) Oral four times a day  multivitamin 1 Tablet(s) Oral daily  pantoprazole    Tablet 40 milliGRAM(s) Oral before breakfast  pyridoxine 50 milliGRAM(s) Oral daily  senna 2 Tablet(s) Oral at bedtime  sertraline 200 milliGRAM(s) Oral daily  sucralfate 1 Gram(s) Oral every 6 hours  tiZANidine 4 milliGRAM(s) Oral every 8 hours  traZODone 150 milliGRAM(s) Oral at bedtime    MEDICATIONS  (PRN):  aluminum hydroxide/magnesium hydroxide/simethicone Suspension 30 milliLiter(s) Oral every 4 hours PRN Dyspepsia  artificial tears (preservative free) Ophthalmic Solution 1 Drop(s) Both EYES three times a day PRN Dry Eyes  clonazePAM  Tablet 1 milliGRAM(s) Oral four times a day PRN for anxiety  dextrose Oral Gel 15 Gram(s) Oral once PRN Blood Glucose LESS THAN 70 milliGRAM(s)/deciliter  morphine Concentrate 15 milliGRAM(s) Oral every 6 hours PRN Severe Pain (7 - 10)  ondansetron Injectable 4 milliGRAM(s) IV Push every 6 hours PRN Nausea and/or Vomiting  polyethylene glycol 3350 17 Gram(s) Oral daily PRN Constipation    Vital Signs Last 24 Hrs  T(F): 97.5 (17 Jan 2025 06:33), Max: 97.9 (16 Jan 2025 20:32)  HR: 73 (17 Jan 2025 06:33) (64 - 78)  BP: 127/76 (17 Jan 2025 06:33) (106/71 - 138/80)  RR: 17 (17 Jan 2025 06:33) (17 - 20)  SpO2: 95% (17 Jan 2025 06:33) (92% - 95%)  I&O's Summary    PHYSICAL EXAM:  General: NAD, A/O x 3  ENT: No gross hearing impairment, Moist mucous membranes, no thrush  Neck: Supple, No JVD  Lungs: Clear to auscultation bilaterally, good air entry, non-labored breathing  Cardio: RRR, S1/S2, No murmur  Abdomen: Soft, Nontender, Nondistended; Bowel sounds present  Extremities: No calf tenderness, No cyanosis, No pitting edema    LABS:                        12.2   7.32  )-----------( 297      ( 17 Jan 2025 06:34 )             37.4     01-17    141  |  103  |  7   ----------------------------<  262  3.5   |  31  |  0.67    Ca    9.0      17 Jan 2025 06:34  Mg     1.4     01-17                                    POCT Blood Glucose.: 199 mg/dL (17 Jan 2025 08:03)  POCT Blood Glucose.: 346 mg/dL (16 Jan 2025 21:22)  POCT Blood Glucose.: 254 mg/dL (16 Jan 2025 16:47)  POCT Blood Glucose.: 153 mg/dL (16 Jan 2025 11:52)      Urinalysis Basic - ( 17 Jan 2025 06:34 )    Color: x / Appearance: x / SG: x / pH: x  Gluc: 262 mg/dL / Ketone: x  / Bili: x / Urobili: x   Blood: x / Protein: x / Nitrite: x   Leuk Esterase: x / RBC: x / WBC x   Sq Epi: x / Non Sq Epi: x / Bacteria: x        Culture - Blood (collected 12 Jan 2025 03:05)  Source: .Blood BLOOD  Final Report (17 Jan 2025 09:00):    No growth at 5 days    Culture - Blood (collected 12 Jan 2025 03:05)  Source: .Blood BLOOD  Final Report (17 Jan 2025 09:00):    No growth at 5 days    Culture - Urine (collected 12 Jan 2025 03:00)  Source: Clean Catch Clean Catch (Midstream)  Final Report (13 Jan 2025 07:38):    <10,000 CFU/mL Normal Urogenital Grecia        RADIOLOGY & ADDITIONAL TESTS:    Care Discussed with Consultants/Other Providers:

## 2025-01-17 NOTE — PROGRESS NOTE ADULT - ASSESSMENT
59-year-old male with history of IDDM, hypertension, DKA, MI, A fib on eliquis, cervical fusion presented to ED with nausea and vomiting. In ED found to be in DKA with GAP 22, PH 7.26 stable electrolytes. In ED received 3 L IV bolus and started on insulin drip with D5%. Admitted to ICU for DKA. He was stabilized, transferred to floor on 1/14     #DKA in pt with IDDM   - A1C 9.2%  - Blood glucose levels improved  - Decreased lantus to 30 units q morning, added admelog pre-meal 2 units TID, sliding scale insulin (held pre-meal this am due to glucose of 109 and not much po intake recently)  - Appreciate recommendations from diabetes specialist nurse   - Outpatient follow up with Endocrinology and diabetes educator upon DC    #Colitis on CT  - Continue current diet, patient requesting regular food   - Repeat CTAP reviewed  - Bowel regimen   - Zofran as needed for nausea   - GI consulted, appreciate recs, f/u outpatient     #Hypomagnesemia  -Replete  -Monitor     #CAD, HTN  #Hx A Fib on eliquis  - Resumed eliquis, coreg, atorvastatin (home meds)    #PUD (peptic ulcer disease)  - Continue PPI  - On no meds at home   - Started carafate     #Chronic anxiety and chronic pain due multiple orthopedic injuries and surgeries with spinal stenosis   - Continue sertraline, tizanadine, trazodone, klonopin PRN (home meds)  - Resume gabapentin  - Resume home dose Dilaudid 8mg PO q6h  - Started carafate for stomach discomfort     #DVT ppx  - Eliquis    Dispo: PT recommended outpatient PT. Home with outpatient PT once symptoms improved and tolerating diet     Patient's girlfriend Clemencia at [phone] - Updated

## 2025-01-19 ENCOUNTER — EMERGENCY (EMERGENCY)
Facility: HOSPITAL | Age: 60
LOS: 1 days | Discharge: HOME | End: 2025-01-19
Attending: STUDENT IN AN ORGANIZED HEALTH CARE EDUCATION/TRAINING PROGRAM | Admitting: HOSPITALIST
Payer: MEDICARE

## 2025-01-19 VITALS
SYSTOLIC BLOOD PRESSURE: 168 MMHG | OXYGEN SATURATION: 97 % | HEART RATE: 88 BPM | DIASTOLIC BLOOD PRESSURE: 116 MMHG | RESPIRATION RATE: 19 BRPM | WEIGHT: 240.08 LBS | TEMPERATURE: 98 F | HEIGHT: 73 IN

## 2025-01-19 DIAGNOSIS — Z98.890 OTHER SPECIFIED POSTPROCEDURAL STATES: Chronic | ICD-10-CM

## 2025-01-19 DIAGNOSIS — Z98.1 ARTHRODESIS STATUS: Chronic | ICD-10-CM

## 2025-01-19 LAB
ACETONE SERPL-MCNC: ABNORMAL
ALBUMIN SERPL ELPH-MCNC: 3.6 G/DL — SIGNIFICANT CHANGE UP (ref 3.3–5)
ALP SERPL-CCNC: 98 U/L — SIGNIFICANT CHANGE UP (ref 40–120)
ALT FLD-CCNC: 34 U/L — SIGNIFICANT CHANGE UP (ref 10–45)
ANION GAP SERPL CALC-SCNC: 9 MMOL/L — SIGNIFICANT CHANGE UP (ref 5–17)
APPEARANCE UR: CLEAR — SIGNIFICANT CHANGE UP
APTT BLD: 32.9 SEC — SIGNIFICANT CHANGE UP (ref 24.5–35.6)
AST SERPL-CCNC: 14 U/L — SIGNIFICANT CHANGE UP (ref 10–40)
BACTERIA # UR AUTO: NEGATIVE /HPF — SIGNIFICANT CHANGE UP
BASE EXCESS BLDV CALC-SCNC: 8.1 MMOL/L — HIGH (ref -2–3)
BASOPHILS # BLD AUTO: 0.04 K/UL — SIGNIFICANT CHANGE UP (ref 0–0.2)
BASOPHILS NFR BLD AUTO: 0.4 % — SIGNIFICANT CHANGE UP (ref 0–2)
BILIRUB SERPL-MCNC: 0.6 MG/DL — SIGNIFICANT CHANGE UP (ref 0.2–1.2)
BILIRUB UR-MCNC: NEGATIVE — SIGNIFICANT CHANGE UP
BLOOD GAS COMMENTS, VENOUS: SIGNIFICANT CHANGE UP
BUN SERPL-MCNC: 11 MG/DL — SIGNIFICANT CHANGE UP (ref 7–23)
CALCIUM SERPL-MCNC: 10.1 MG/DL — SIGNIFICANT CHANGE UP (ref 8.4–10.5)
CHLORIDE SERPL-SCNC: 100 MMOL/L — SIGNIFICANT CHANGE UP (ref 96–108)
CO2 BLDV-SCNC: 32 MMOL/L — HIGH (ref 22–26)
CO2 SERPL-SCNC: 30 MMOL/L — SIGNIFICANT CHANGE UP (ref 22–31)
COLOR SPEC: YELLOW — SIGNIFICANT CHANGE UP
CREAT SERPL-MCNC: 0.86 MG/DL — SIGNIFICANT CHANGE UP (ref 0.5–1.3)
DIFF PNL FLD: NEGATIVE — SIGNIFICANT CHANGE UP
EGFR: 101 ML/MIN/1.73M2 — SIGNIFICANT CHANGE UP
EOSINOPHIL # BLD AUTO: 0.09 K/UL — SIGNIFICANT CHANGE UP (ref 0–0.5)
EOSINOPHIL NFR BLD AUTO: 0.8 % — SIGNIFICANT CHANGE UP (ref 0–6)
EPI CELLS # UR: SIGNIFICANT CHANGE UP
GAS PNL BLDV: SIGNIFICANT CHANGE UP
GLUCOSE BLDC GLUCOMTR-MCNC: 207 MG/DL — HIGH (ref 70–99)
GLUCOSE SERPL-MCNC: 251 MG/DL — HIGH (ref 70–99)
GLUCOSE UR QL: 500 MG/DL
HCO3 BLDV-SCNC: 31 MMOL/L — HIGH (ref 22–29)
HCT VFR BLD CALC: 43.4 % — SIGNIFICANT CHANGE UP (ref 39–50)
HGB BLD-MCNC: 14.2 G/DL — SIGNIFICANT CHANGE UP (ref 13–17)
IMM GRANULOCYTES NFR BLD AUTO: 0.4 % — SIGNIFICANT CHANGE UP (ref 0–0.9)
INR BLD: 0.97 RATIO — SIGNIFICANT CHANGE UP (ref 0.85–1.16)
KETONES UR-MCNC: NEGATIVE MG/DL — SIGNIFICANT CHANGE UP
LEUKOCYTE ESTERASE UR-ACNC: NEGATIVE — SIGNIFICANT CHANGE UP
LIDOCAIN IGE QN: 14 U/L — LOW (ref 16–77)
LYMPHOCYTES # BLD AUTO: 1.21 K/UL — SIGNIFICANT CHANGE UP (ref 1–3.3)
LYMPHOCYTES # BLD AUTO: 11.4 % — LOW (ref 13–44)
MAGNESIUM SERPL-MCNC: 1.1 MG/DL — LOW (ref 1.6–2.6)
MCHC RBC-ENTMCNC: 27.2 PG — SIGNIFICANT CHANGE UP (ref 27–34)
MCHC RBC-ENTMCNC: 32.7 G/DL — SIGNIFICANT CHANGE UP (ref 32–36)
MCV RBC AUTO: 83.1 FL — SIGNIFICANT CHANGE UP (ref 80–100)
MONOCYTES # BLD AUTO: 0.46 K/UL — SIGNIFICANT CHANGE UP (ref 0–0.9)
MONOCYTES NFR BLD AUTO: 4.3 % — SIGNIFICANT CHANGE UP (ref 2–14)
NEUTROPHILS # BLD AUTO: 8.79 K/UL — HIGH (ref 1.8–7.4)
NEUTROPHILS NFR BLD AUTO: 82.7 % — HIGH (ref 43–77)
NITRITE UR-MCNC: NEGATIVE — SIGNIFICANT CHANGE UP
NRBC # BLD: 0 /100 WBCS — SIGNIFICANT CHANGE UP (ref 0–0)
PCO2 BLDV: 38 MMHG — LOW (ref 42–55)
PH BLDV: 7.52 — HIGH (ref 7.32–7.43)
PH UR: 8.5 (ref 5–8)
PHOSPHATE SERPL-MCNC: 1.2 MG/DL — LOW (ref 2.5–4.5)
PLATELET # BLD AUTO: 369 K/UL — SIGNIFICANT CHANGE UP (ref 150–400)
PO2 BLDV: <35 MMHG — LOW (ref 25–45)
POTASSIUM SERPL-MCNC: 4 MMOL/L — SIGNIFICANT CHANGE UP (ref 3.5–5.3)
POTASSIUM SERPL-SCNC: 4 MMOL/L — SIGNIFICANT CHANGE UP (ref 3.5–5.3)
PROT SERPL-MCNC: 7.6 G/DL — SIGNIFICANT CHANGE UP (ref 6–8.3)
PROT UR-MCNC: NEGATIVE MG/DL — SIGNIFICANT CHANGE UP
PROTHROM AB SERPL-ACNC: 11.5 SEC — SIGNIFICANT CHANGE UP (ref 9.9–13.4)
RBC # BLD: 5.22 M/UL — SIGNIFICANT CHANGE UP (ref 4.2–5.8)
RBC # FLD: 14.4 % — SIGNIFICANT CHANGE UP (ref 10.3–14.5)
RBC CASTS # UR COMP ASSIST: 0 /HPF — SIGNIFICANT CHANGE UP (ref 0–4)
SAO2 % BLDV: 31.3 % — LOW (ref 67–88)
SODIUM SERPL-SCNC: 139 MMOL/L — SIGNIFICANT CHANGE UP (ref 135–145)
SP GR SPEC: 1.03 — HIGH (ref 1–1.03)
UROBILINOGEN FLD QL: 0.2 MG/DL — SIGNIFICANT CHANGE UP (ref 0.2–1)
WBC # BLD: 10.63 K/UL — HIGH (ref 3.8–10.5)
WBC # FLD AUTO: 10.63 K/UL — HIGH (ref 3.8–10.5)
WBC UR QL: 0 /HPF — SIGNIFICANT CHANGE UP (ref 0–5)

## 2025-01-19 PROCEDURE — 74177 CT ABD & PELVIS W/CONTRAST: CPT | Mod: 26

## 2025-01-19 PROCEDURE — 93010 ELECTROCARDIOGRAM REPORT: CPT

## 2025-01-19 PROCEDURE — 99285 EMERGENCY DEPT VISIT HI MDM: CPT

## 2025-01-19 PROCEDURE — 99223 1ST HOSP IP/OBS HIGH 75: CPT

## 2025-01-19 RX ORDER — SENNOSIDES 8.6 MG/1
2 TABLET, FILM COATED ORAL AT BEDTIME
Refills: 0 | Status: ACTIVE | OUTPATIENT
Start: 2025-01-19 | End: 2025-12-18

## 2025-01-19 RX ORDER — DEXTROSE MONOHYDRATE 25 G/50ML
12.5 INJECTION, SOLUTION INTRAVENOUS ONCE
Refills: 0 | Status: ACTIVE | OUTPATIENT
Start: 2025-01-19

## 2025-01-19 RX ORDER — HYDROMORPHONE HCL 4 MG
0.5 TABLET ORAL EVERY 4 HOURS
Refills: 0 | Status: DISCONTINUED | OUTPATIENT
Start: 2025-01-19 | End: 2025-01-20

## 2025-01-19 RX ORDER — SUCRALFATE 1 G/10ML
1 SUSPENSION ORAL EVERY 6 HOURS
Refills: 0 | Status: ACTIVE | OUTPATIENT
Start: 2025-01-19 | End: 2025-12-18

## 2025-01-19 RX ORDER — DEXTROSE MONOHYDRATE 25 G/50ML
15 INJECTION, SOLUTION INTRAVENOUS ONCE
Refills: 0 | Status: ACTIVE | OUTPATIENT
Start: 2025-01-19 | End: 2025-12-18

## 2025-01-19 RX ORDER — ATORVASTATIN CALCIUM 40 MG/1
80 TABLET, FILM COATED ORAL AT BEDTIME
Refills: 0 | Status: ACTIVE | OUTPATIENT
Start: 2025-01-19 | End: 2025-12-18

## 2025-01-19 RX ORDER — DEXTROSE MONOHYDRATE 25 G/50ML
25 INJECTION, SOLUTION INTRAVENOUS ONCE
Refills: 0 | Status: ACTIVE | OUTPATIENT
Start: 2025-01-19

## 2025-01-19 RX ORDER — HYDROMORPHONE HCL 4 MG
0.5 TABLET ORAL ONCE
Refills: 0 | Status: DISCONTINUED | OUTPATIENT
Start: 2025-01-19 | End: 2025-01-19

## 2025-01-19 RX ORDER — INSULIN LISPRO 100/ML
VIAL (ML) SUBCUTANEOUS
Refills: 0 | Status: ACTIVE | OUTPATIENT
Start: 2025-01-19 | End: 2025-12-18

## 2025-01-19 RX ORDER — PYRIDOXINE HCL (VITAMIN B6) 100 MG
50 TABLET ORAL DAILY
Refills: 0 | Status: ACTIVE | OUTPATIENT
Start: 2025-01-19 | End: 2025-12-18

## 2025-01-19 RX ORDER — TIZANIDINE 4 MG/1
8 TABLET ORAL THREE TIMES A DAY
Refills: 0 | Status: ACTIVE | OUTPATIENT
Start: 2025-01-19 | End: 2025-12-18

## 2025-01-19 RX ORDER — CARVEDILOL 25 MG/1
25 TABLET, FILM COATED ORAL EVERY 12 HOURS
Refills: 0 | Status: ACTIVE | OUTPATIENT
Start: 2025-01-19 | End: 2025-12-18

## 2025-01-19 RX ORDER — APIXABAN 5 MG/1
5 TABLET, FILM COATED ORAL EVERY 12 HOURS
Refills: 0 | Status: ACTIVE | OUTPATIENT
Start: 2025-01-20

## 2025-01-19 RX ORDER — APIXABAN 5 MG/1
5 TABLET, FILM COATED ORAL ONCE
Refills: 0 | Status: COMPLETED | OUTPATIENT
Start: 2025-01-19 | End: 2025-01-19

## 2025-01-19 RX ORDER — ONDANSETRON 4 MG/1
4 TABLET ORAL ONCE
Refills: 0 | Status: COMPLETED | OUTPATIENT
Start: 2025-01-19 | End: 2025-01-19

## 2025-01-19 RX ORDER — SOD PHOS DI, MONO/K PHOS MONO 250 MG
1 TABLET ORAL
Refills: 0 | Status: ACTIVE | OUTPATIENT
Start: 2025-01-19 | End: 2025-01-21

## 2025-01-19 RX ORDER — TRAZODONE HYDROCHLORIDE 150 MG/1
150 TABLET ORAL AT BEDTIME
Refills: 0 | Status: ACTIVE | OUTPATIENT
Start: 2025-01-19 | End: 2025-12-18

## 2025-01-19 RX ORDER — SERTRALINE HYDROCHLORIDE 25 MG/1
200 TABLET ORAL DAILY
Refills: 0 | Status: ACTIVE | OUTPATIENT
Start: 2025-01-19 | End: 2025-12-18

## 2025-01-19 RX ORDER — CLONAZEPAM 2 MG
1 TABLET ORAL
Refills: 0 | Status: DISCONTINUED | OUTPATIENT
Start: 2025-01-19 | End: 2025-01-19

## 2025-01-19 RX ORDER — MAGNESIUM SULFATE 500 MG/ML
2 INJECTION, SOLUTION INTRAMUSCULAR; INTRAVENOUS
Refills: 0 | Status: COMPLETED | OUTPATIENT
Start: 2025-01-19 | End: 2025-01-19

## 2025-01-19 RX ORDER — MAGNESIUM SULFATE 500 MG/ML
2 INJECTION, SOLUTION INTRAMUSCULAR; INTRAVENOUS ONCE
Refills: 0 | Status: COMPLETED | OUTPATIENT
Start: 2025-01-19 | End: 2025-01-19

## 2025-01-19 RX ORDER — PANTOPRAZOLE 40 MG/1
40 TABLET, DELAYED RELEASE ORAL DAILY
Refills: 0 | Status: ACTIVE | OUTPATIENT
Start: 2025-01-19 | End: 2025-12-18

## 2025-01-19 RX ORDER — GABAPENTIN 300 MG/1
300 CAPSULE ORAL EVERY 8 HOURS
Refills: 0 | Status: ACTIVE | OUTPATIENT
Start: 2025-01-19 | End: 2025-12-18

## 2025-01-19 RX ORDER — SODIUM CHLORIDE 9 MG/ML
1000 INJECTION, SOLUTION INTRAVENOUS
Refills: 0 | Status: DISCONTINUED | OUTPATIENT
Start: 2025-01-19 | End: 2025-01-19

## 2025-01-19 RX ORDER — HYDROMORPHONE HCL 4 MG
8 TABLET ORAL EVERY 6 HOURS
Refills: 0 | Status: DISCONTINUED | OUTPATIENT
Start: 2025-01-19 | End: 2025-01-19

## 2025-01-19 RX ORDER — SODIUM CHLORIDE 9 MG/ML
1000 INJECTION, SOLUTION INTRAVENOUS
Refills: 0 | Status: ACTIVE | OUTPATIENT
Start: 2025-01-19 | End: 2025-12-18

## 2025-01-19 RX ORDER — TIZANIDINE 4 MG/1
2 TABLET ORAL
Refills: 0 | DISCHARGE

## 2025-01-19 RX ORDER — ACETAMINOPHEN 80 MG/.8ML
1000 SOLUTION/ DROPS ORAL ONCE
Refills: 0 | Status: ACTIVE | OUTPATIENT
Start: 2025-01-19

## 2025-01-19 RX ORDER — MORPHINE SULFATE 15 MG
1 TABLET, EXTENDED RELEASE ORAL
Refills: 0 | DISCHARGE

## 2025-01-19 RX ORDER — CYANOCOBALAMIN 1000 UG/ML
1000 INJECTION, SOLUTION INTRAMUSCULAR; SUBCUTANEOUS DAILY
Refills: 0 | Status: ACTIVE | OUTPATIENT
Start: 2025-01-19 | End: 2025-12-18

## 2025-01-19 RX ORDER — ONDANSETRON 4 MG/1
4 TABLET ORAL EVERY 6 HOURS
Refills: 0 | Status: ACTIVE | OUTPATIENT
Start: 2025-01-19 | End: 2025-12-18

## 2025-01-19 RX ORDER — HYDROMORPHONE HCL 4 MG
1 TABLET ORAL EVERY 6 HOURS
Refills: 0 | Status: DISCONTINUED | OUTPATIENT
Start: 2025-01-19 | End: 2025-01-20

## 2025-01-19 RX ORDER — FAMOTIDINE 20 MG/1
20 TABLET, FILM COATED ORAL ONCE
Refills: 0 | Status: COMPLETED | OUTPATIENT
Start: 2025-01-19 | End: 2025-01-19

## 2025-01-19 RX ORDER — POLYETHYLENE GLYCOL 3350 17 G/DOSE
17 POWDER (GRAM) ORAL
Refills: 0 | Status: ACTIVE | OUTPATIENT
Start: 2025-01-19 | End: 2025-12-18

## 2025-01-19 RX ORDER — INSULIN GLARGINE-YFGN 100 [IU]/ML
30 INJECTION, SOLUTION SUBCUTANEOUS AT BEDTIME
Refills: 0 | Status: DISCONTINUED | OUTPATIENT
Start: 2025-01-19 | End: 2025-01-19

## 2025-01-19 RX ORDER — METHADONE HYDROCHLORIDE 10 MG/1
1 TABLET ORAL
Refills: 0 | DISCHARGE

## 2025-01-19 RX ORDER — SOD PHOS DI, MONO/K PHOS MONO 250 MG
1 TABLET ORAL ONCE
Refills: 0 | Status: COMPLETED | OUTPATIENT
Start: 2025-01-19 | End: 2025-01-19

## 2025-01-19 RX ORDER — B COMPLEX, C NO.20/FOLIC ACID 1 MG
1 CAPSULE ORAL DAILY
Refills: 0 | Status: ACTIVE | OUTPATIENT
Start: 2025-01-19 | End: 2025-12-18

## 2025-01-19 RX ORDER — INSULIN GLARGINE-YFGN 100 [IU]/ML
36 INJECTION, SOLUTION SUBCUTANEOUS AT BEDTIME
Refills: 0 | Status: ACTIVE | OUTPATIENT
Start: 2025-01-19 | End: 2025-12-18

## 2025-01-19 RX ORDER — GLUCAGON INJECTION, SOLUTION 0.5 MG/.1ML
1 INJECTION, SOLUTION SUBCUTANEOUS ONCE
Refills: 0 | Status: ACTIVE | OUTPATIENT
Start: 2025-01-19 | End: 2025-12-18

## 2025-01-19 RX ORDER — INSULIN LISPRO 100/ML
VIAL (ML) SUBCUTANEOUS AT BEDTIME
Refills: 0 | Status: ACTIVE | OUTPATIENT
Start: 2025-01-19 | End: 2025-12-18

## 2025-01-19 RX ORDER — HYDRALAZINE HYDROCHLORIDE 10 MG/1
10 TABLET ORAL EVERY 6 HOURS
Refills: 0 | Status: ACTIVE | OUTPATIENT
Start: 2025-01-19 | End: 2025-12-18

## 2025-01-19 RX ORDER — HYDROMORPHONE HCL 4 MG
4 TABLET ORAL EVERY 4 HOURS
Refills: 0 | Status: DISCONTINUED | OUTPATIENT
Start: 2025-01-19 | End: 2025-01-19

## 2025-01-19 RX ORDER — CLONAZEPAM 2 MG
1 TABLET ORAL
Refills: 0 | DISCHARGE

## 2025-01-19 RX ORDER — ACETAMINOPHEN 80 MG/.8ML
650 SOLUTION/ DROPS ORAL EVERY 6 HOURS
Refills: 0 | Status: ACTIVE | OUTPATIENT
Start: 2025-01-19 | End: 2025-12-18

## 2025-01-19 RX ORDER — METHADONE HYDROCHLORIDE 10 MG/1
5 TABLET ORAL EVERY 6 HOURS
Refills: 0 | Status: COMPLETED | OUTPATIENT
Start: 2025-01-19 | End: 2025-01-26

## 2025-01-19 RX ORDER — SODIUM CHLORIDE 9 MG/ML
1000 INJECTION, SOLUTION INTRAMUSCULAR; INTRAVENOUS; SUBCUTANEOUS ONCE
Refills: 0 | Status: COMPLETED | OUTPATIENT
Start: 2025-01-19 | End: 2025-01-19

## 2025-01-19 RX ORDER — APIXABAN 5 MG/1
TABLET, FILM COATED ORAL
Refills: 0 | Status: ACTIVE | OUTPATIENT
Start: 2025-01-19

## 2025-01-19 RX ORDER — SODIUM CHLORIDE 9 MG/ML
1000 INJECTION, SOLUTION INTRAVENOUS
Refills: 0 | Status: COMPLETED | OUTPATIENT
Start: 2025-01-19 | End: 2025-01-19

## 2025-01-19 RX ADMIN — APIXABAN 5 MILLIGRAM(S): 5 TABLET, FILM COATED ORAL at 20:27

## 2025-01-19 RX ADMIN — MAGNESIUM SULFATE 25 GRAM(S): 500 INJECTION, SOLUTION INTRAMUSCULAR; INTRAVENOUS at 20:19

## 2025-01-19 RX ADMIN — MAGNESIUM SULFATE 25 GRAM(S): 500 INJECTION, SOLUTION INTRAMUSCULAR; INTRAVENOUS at 22:36

## 2025-01-19 RX ADMIN — FAMOTIDINE 20 MILLIGRAM(S): 20 TABLET, FILM COATED ORAL at 17:30

## 2025-01-19 RX ADMIN — Medication 0.5 MILLIGRAM(S): at 20:19

## 2025-01-19 RX ADMIN — SODIUM CHLORIDE 1000 MILLILITER(S): 9 INJECTION, SOLUTION INTRAMUSCULAR; INTRAVENOUS; SUBCUTANEOUS at 16:36

## 2025-01-19 RX ADMIN — Medication 1 MILLIGRAM(S): at 22:33

## 2025-01-19 RX ADMIN — Medication 0.5 MILLIGRAM(S): at 16:02

## 2025-01-19 RX ADMIN — Medication 1 PACKET(S): at 16:33

## 2025-01-19 RX ADMIN — MAGNESIUM SULFATE 25 GRAM(S): 500 INJECTION, SOLUTION INTRAMUSCULAR; INTRAVENOUS at 16:30

## 2025-01-19 RX ADMIN — FAMOTIDINE 100 MILLIGRAM(S): 20 TABLET, FILM COATED ORAL at 17:00

## 2025-01-19 RX ADMIN — Medication 1 MILLIGRAM(S): at 22:48

## 2025-01-19 RX ADMIN — SODIUM CHLORIDE 50 MILLILITER(S): 9 INJECTION, SOLUTION INTRAVENOUS at 20:19

## 2025-01-19 RX ADMIN — SODIUM CHLORIDE 1000 MILLILITER(S): 9 INJECTION, SOLUTION INTRAMUSCULAR; INTRAVENOUS; SUBCUTANEOUS at 15:36

## 2025-01-19 RX ADMIN — HYDRALAZINE HYDROCHLORIDE 10 MILLIGRAM(S): 10 TABLET ORAL at 20:49

## 2025-01-19 RX ADMIN — ONDANSETRON 4 MILLIGRAM(S): 4 TABLET ORAL at 17:00

## 2025-01-19 RX ADMIN — INSULIN GLARGINE-YFGN 36 UNIT(S): 100 INJECTION, SOLUTION SUBCUTANEOUS at 22:37

## 2025-01-19 RX ADMIN — ONDANSETRON 4 MILLIGRAM(S): 4 TABLET ORAL at 15:36

## 2025-01-19 RX ADMIN — MAGNESIUM SULFATE 2 GRAM(S): 500 INJECTION, SOLUTION INTRAMUSCULAR; INTRAVENOUS at 18:30

## 2025-01-19 RX ADMIN — Medication 0.5 MILLIGRAM(S): at 16:17

## 2025-01-19 NOTE — ED ADULT NURSE NOTE - OBJECTIVE STATEMENT
pt came in via EMS from Home complaining of abdominal cramping a/w nausea and vomiting, pt reports being discharged 2 days ago from [place] with similar symptoms. pt received 4mg SL Zofran in route. pt with PMH DM2,  on arrival. CBG monitor on left upper arm on arrival.

## 2025-01-19 NOTE — PATIENT PROFILE ADULT - FALL HARM RISK - HARM RISK INTERVENTIONS

## 2025-01-19 NOTE — ED ADULT NURSE REASSESSMENT NOTE - NS ED NURSE REASSESS COMMENT FT1
NG tube placed by resident at this time. pt tolerated well. NG tube placed by resident at this time. pt tolerated well. pt with yellow drainage from NG tube at this time

## 2025-01-19 NOTE — H&P ADULT - ASSESSMENT
59-year-old male, from Home,  with history of NIDDM, hypertension, DKA, MI, A fib on ELIQUIS, cervical fusion presented to ED with crampy abdominal pain, nausea and vomiting since yesterday. unable to tolerate PO since this am. : VS showed : BP: 168/116 mm Hg. HR: 88 /min. RR: 19 /min. Temp: 97.5 Degrees F, 36.4 Degrees C. O2 sat: 97 %. room air. Labs: mild leucocytosis with left shift. K/BUN/Cr: normal. low Mg and PO4. POCT: >200. CT A/P with IC: none acute. ABG: metabolic alkalosis. DKA was ruled out.  Placed on observation for severe abdominal pain, intractable nausea/vomiting, poor PO.     Moderate to severe abdominal pain  Intractable nausea/vomiting  Inability to tolerate PO  PUD (peptic ulcer disease)    -admit med/surg  - s/p famotidine, Dilaudid, zofran IV  - Patient reported band like pain mostly mid abdomen: ? related to spinal DJD.   - CT A/P: none acute  - Patient is on multiple pain meds for his chronic pain.  discharged from [facility] on Jan 16 with Morpholine ER Q6hrs, Methadone 5 mg Q6hrs. per chart review, he was on dilaudid 8 mg PO Q6hrs while inpaitent.   - unable to check HCS to verify paitent's home opioids and BDZ. will continue with Dilaudid, methadone for now. will retry in am.   - clear liquid diet. patient to inform RN if not tolerating. advance diet as tolerated  - IV protonix  - IV zofran PRN  - Continue Carafat  - IVF  - GI cx   NIDDM  h/o DKA this month  - A1C 9.2%  - FSBS not on target  - home meds: lantus 36 unit at h/s, will continue same  - moderate ISS TIDAC. low ISS at h/s  - will defer pre-meals at this time as patient has poor PO and on CLD  - Outpatient follow up with Endocrinology and diabetes educator upon DC  - consistent card diet    Hypomagnesemia  -Repleted  - repeat Mg i am    CAD,  PAF  HTN: uncontrolled  - Eliquis, Coreg,   - hydralazine IV PRN for BP>160/100.  - DC IVF if tolerating diet    HLD  - atorvastatin     Chronic anxiety and chronic pain due multiple orthopedic injuries and surgeries with spinal stenosis   - Continue sertraline, tizanadine, trazodone, klonopin PRN,  gabapentin  - c/w methadone home dose  - c/w Tylenol for mild,  Dilaudid 4mg/8mg PO q6h for md/severe pain. ordered one dose of IV tylenol and one dose of IV dilaudid 0.5 mg if  tylenol not helping  - very  for Morphine ER, resume if patient was taking home. confusing as patient did not see any MD after DC and per EMR, he was taking dilaudid Q6hrs at home. unclear why he was dced with morphine ER along with Methadone  - Utox    DVT ppx  - Eliquis    patient will update family

## 2025-01-19 NOTE — ED ADULT TRIAGE NOTE - CHIEF COMPLAINT QUOTE
pt presents via EMS from Home complaining of abdominal cramping a/w nausea and vomiting, pt reports being discharged 2 days ago from [place] with similar symptoms. pt received 4mg SL Zofran in route.

## 2025-01-19 NOTE — PATIENT PROFILE ADULT - ARRIVAL FROM
What Type Of Note Output Would You Prefer (Optional)?: Standard Output How Severe Is Your Acne?: mild Is This A New Presentation, Or A Follow-Up?: Follow Up Acne Home

## 2025-01-19 NOTE — ED PROVIDER NOTE - PHYSICAL EXAMINATION
VITAL SIGNS: I have reviewed nursing notes and confirm.   GEN: Well-developed; well-nourished; in no acute distress. Speaking full sentences.  SKIN: Warm, pink, no rash, no diaphoresis, no cyanosis, well perfused.   HEAD: Normocephalic; atraumatic.    NECK: Supple; non tender. Full range of motion.   EYES: Pupils 3mm equal, round, reactive to light and accomodation, conjunctiva and sclera clear. Extra-ocular movements intact bilaterally.  ENT: No nasal discharge; airway clear. Trachea is midline.    CV: RRR. S1, S2 normal; no murmurs, gallops, or rubs. Capillary refill < 2 seconds throughout. Distal pulses intact 2+ throughout.  RESP: CTA bilaterally. No wheezes, rales, or rhonchi.   ABD: Normal bowel sounds, soft, non-distended, non-tender, no rebound, no guarding, no rigidity   MSK: Normal range of motion and movement of all 4 extremities.   BACK: No thoracolumbar midline or paravertebral tenderness.    NEURO: Alert & oriented x 3, Grossly unremarkable. Sensory and motor intact throughout. No focal deficits.  Normal speech and coordination.

## 2025-01-19 NOTE — ED PROVIDER NOTE - OBJECTIVE STATEMENT
59-year-old male with  past medical history of  insulin-dependent diabetes, spinal stenosis, hypertension presenting with abdominal pain and nausea and vomiting   Since last night.  Was recently discharged from the hospital 2 days ago for DKA.  Describes epigastric abdominal pain that is intermittent and crampy and nonradiating associated with inability to tolerate oral intake.  Having nonbilious nonbloody vomiting when he tries to eat or drink.  Denies any recent sick contacts. Denies any chest pain,   shortness of breath,  headaches, fevers, chills, diarrhea  weakness, syncope, hematuria, dysuria, urinary symptoms, subjective neurological deficits.

## 2025-01-19 NOTE — PATIENT PROFILE ADULT - FUNCTIONAL ASSESSMENT - BASIC MOBILITY 6.
How Severe Is Your Skin Lesion?: mild Has Your Skin Lesion Been Treated?: not been treated Is This A New Presentation, Or A Follow-Up?: Skin Lesion  3-calculated by average/Not able to assess (calculate score using [facility] averaging method)

## 2025-01-19 NOTE — H&P ADULT - NSHPLABSRESULTS_GEN_ALL_CORE
14.2   10.63 )-----------( 369      ( 19 Jan 2025 15:30 )             43.4       01-19    139  |  100  |  11  ----------------------------<  251[H]  4.0   |  30  |  0.86    Ca    10.1      19 Jan 2025 15:30  Phos  1.2     01-19  Mg     1.1     01-19    TPro  7.6  /  Alb  3.6  /  TBili  0.6  /  DBili  x   /  AST  14  /  ALT  34  /  AlkPhos  98  01-19    PT/INR - ( 19 Jan 2025 15:30 )   PT: 11.5 sec;   INR: 0.97 ratio         PTT - ( 19 Jan 2025 15:30 )  PTT:32.9 sec    < from: CT Abdomen and Pelvis w/ IV Cont (01.19.25 @ 15:54) >    FINDINGS:  LOWER CHEST: Small hiatal hernia.    LIVER: Tiny hypodense focus segment 8, too small to characterize..  BILE DUCTS: Normal caliber.  GALLBLADDER: Within normal limits.  SPLEEN: Within normal limits.  PANCREAS: Within normal limits.  ADRENALS: Within normal limits.  KIDNEYS/URETERS: Within normal limits.    BLADDER: Within normal limits.  REPRODUCTIVE ORGANS: Prostate within normal limits.    BOWEL: No bowel obstruction. Appendix is normal.  PERITONEUM/RETROPERITONEUM: Within normal limits.  VESSELS: Within normal limits.  LYMPH NODES: No lymphadenopathy.  ABDOMINAL WALL: Within normal limits.  BONES: Degenerative changes. L4-L5 disc spacer placement.    IMPRESSION:  Normal appendix. No bowel obstruction or free air. No significant   abnormality.    < end of copied text >

## 2025-01-19 NOTE — ED PROVIDER NOTE - CLINICAL SUMMARY MEDICAL DECISION MAKING FREE TEXT BOX
Dr. Kirill Booth MD, EM And Medical Toxicology Attendin-year-old male with  past medical history of  insulin-dependent diabetes, spinal stenosis, hypertension presenting with abdominal pain and nausea and vomiting   Since last night.  Was recently discharged from the hospital 2 days ago for DKA.  Describes epigastric abdominal pain that is intermittent and crampy and nonradiating associated with inability to tolerate oral intake.  Having nonbilious nonbloody vomiting when he tries to eat or drink.  Denies any recent sick contacts. Denies any chest pain,   shortness of breath,  headaches, fevers, chills, diarrhea  weakness, syncope, hematuria, dysuria, urinary symptoms, subjective neurological deficits.  History obtained from independent historian: N/A  External note reviewed: N/A  DDx includes but is not limited to: intractable vomiting, rule out dka, rule out infectious sources, cholecystitis, gastroenteritis   Decision making, ED course, independent interpretation of imaging studies, and consults:   - cbc/cmp, lipase, CT AP, zofran for antiemetics.  - CT negative for pathology, labs showing low mag and phos, will replete  - On re-evaluation patient unable to tolerate po intake, still nauseous, repeat dose of zofran. famotidine. To be placed in obs. Case discussed w/ Dr. Damian hospitalist whom accepts the patient.   Consideration hospitalization vs de-escalation of care:  observation for intractable vomiting,  Disposition:  observation

## 2025-01-19 NOTE — ED ADULT NURSE NOTE - CHIEF COMPLAINT QUOTE
pt presents via EMS from Home complaining of abdominal cramping a/w nausea and vomiting, pt reports being discharged 2 days ago from [place] with similar symptoms. pt received 4mg SL Zofran in route.

## 2025-01-19 NOTE — H&P ADULT - HISTORY OF PRESENT ILLNESS
59-year-old male, from Home,  with history of NIDDM, hypertension, DKA, MI, A fib on ELIQUIS, cervical fusion presented to ED with crampy abdominal pain, nausea and vomiting since yesterday. unable to tolerate PO since this am. reported diffuse midabdominal pain, no radiation, moderate to severe, associated with nausea and vomiting, unable to tolerate any food or drink.  + constipation last BM 4 days back. + back pain. denied fever/chills/dysuria/urinary frequency/urgency/headaches/presyncope/CP/SOB.dizziness/palpitation. all other ROS negative. Patient came to ER on Jan 12 with same symptoms and was admitted and treated for DKA. CT showing gastritis. seen by GI and suggested OP EGD.     In ER today: VS showed : BP: 168/116 mm Hg. HR: 88 /min. RR: 19 /min. Temp: 97.5 Degrees F, 36.4 Degrees C. O2 sat: 97 %. room air  Labs: mild leucocytosis with left shift. K/BUN/Cr: normal. low Mg and PO4.   POCT: >200  CT A/P with IC: none acute  ABG: metabolic alkalosis  DKA was ruled out.  Placed on observation for severe abdominal pain, intractable nausea/vomiting, poor PO.

## 2025-01-20 LAB
ALBUMIN SERPL ELPH-MCNC: 3.1 G/DL — LOW (ref 3.3–5)
ALP SERPL-CCNC: 102 U/L — SIGNIFICANT CHANGE UP (ref 40–120)
ALT FLD-CCNC: 29 U/L — SIGNIFICANT CHANGE UP (ref 10–45)
ANION GAP SERPL CALC-SCNC: 16 MMOL/L — SIGNIFICANT CHANGE UP (ref 5–17)
AST SERPL-CCNC: 12 U/L — SIGNIFICANT CHANGE UP (ref 10–40)
BILIRUB SERPL-MCNC: 0.6 MG/DL — SIGNIFICANT CHANGE UP (ref 0.2–1.2)
BUN SERPL-MCNC: 11 MG/DL — SIGNIFICANT CHANGE UP (ref 7–23)
CALCIUM SERPL-MCNC: 9 MG/DL — SIGNIFICANT CHANGE UP (ref 8.4–10.5)
CHLORIDE SERPL-SCNC: 100 MMOL/L — SIGNIFICANT CHANGE UP (ref 96–108)
CO2 SERPL-SCNC: 22 MMOL/L — SIGNIFICANT CHANGE UP (ref 22–31)
CREAT SERPL-MCNC: 0.92 MG/DL — SIGNIFICANT CHANGE UP (ref 0.5–1.3)
CULTURE RESULTS: SIGNIFICANT CHANGE UP
EGFR: 97 ML/MIN/1.73M2 — SIGNIFICANT CHANGE UP
GLUCOSE BLDC GLUCOMTR-MCNC: 169 MG/DL — HIGH (ref 70–99)
GLUCOSE BLDC GLUCOMTR-MCNC: 179 MG/DL — HIGH (ref 70–99)
GLUCOSE BLDC GLUCOMTR-MCNC: 186 MG/DL — HIGH (ref 70–99)
GLUCOSE BLDC GLUCOMTR-MCNC: 194 MG/DL — HIGH (ref 70–99)
GLUCOSE BLDC GLUCOMTR-MCNC: 472 MG/DL — CRITICAL HIGH (ref 70–99)
GLUCOSE SERPL-MCNC: 208 MG/DL — HIGH (ref 70–99)
HCT VFR BLD CALC: 40.2 % — SIGNIFICANT CHANGE UP (ref 39–50)
HGB BLD-MCNC: 13.5 G/DL — SIGNIFICANT CHANGE UP (ref 13–17)
MAGNESIUM SERPL-MCNC: 2.2 MG/DL — SIGNIFICANT CHANGE UP (ref 1.6–2.6)
MCHC RBC-ENTMCNC: 27.5 PG — SIGNIFICANT CHANGE UP (ref 27–34)
MCHC RBC-ENTMCNC: 33.6 G/DL — SIGNIFICANT CHANGE UP (ref 32–36)
MCV RBC AUTO: 81.9 FL — SIGNIFICANT CHANGE UP (ref 80–100)
NRBC # BLD: 0 /100 WBCS — SIGNIFICANT CHANGE UP (ref 0–0)
PHOSPHATE SERPL-MCNC: 3.8 MG/DL — SIGNIFICANT CHANGE UP (ref 2.5–4.5)
PLATELET # BLD AUTO: 360 K/UL — SIGNIFICANT CHANGE UP (ref 150–400)
POTASSIUM SERPL-MCNC: 4.1 MMOL/L — SIGNIFICANT CHANGE UP (ref 3.5–5.3)
POTASSIUM SERPL-SCNC: 4.1 MMOL/L — SIGNIFICANT CHANGE UP (ref 3.5–5.3)
PROT SERPL-MCNC: 7 G/DL — SIGNIFICANT CHANGE UP (ref 6–8.3)
RBC # BLD: 4.91 M/UL — SIGNIFICANT CHANGE UP (ref 4.2–5.8)
RBC # FLD: 14.6 % — HIGH (ref 10.3–14.5)
SODIUM SERPL-SCNC: 138 MMOL/L — SIGNIFICANT CHANGE UP (ref 135–145)
SPECIMEN SOURCE: SIGNIFICANT CHANGE UP
WBC # BLD: 12.16 K/UL — HIGH (ref 3.8–10.5)
WBC # FLD AUTO: 12.16 K/UL — HIGH (ref 3.8–10.5)

## 2025-01-20 PROCEDURE — 99233 SBSQ HOSP IP/OBS HIGH 50: CPT

## 2025-01-20 RX ORDER — HYDROMORPHONE HCL 4 MG
1 TABLET ORAL EVERY 6 HOURS
Refills: 0 | Status: DISCONTINUED | OUTPATIENT
Start: 2025-01-20 | End: 2025-01-21

## 2025-01-20 RX ORDER — HYDROMORPHONE HCL 4 MG
2 TABLET ORAL EVERY 6 HOURS
Refills: 0 | Status: DISCONTINUED | OUTPATIENT
Start: 2025-01-20 | End: 2025-01-21

## 2025-01-20 RX ORDER — MORPHINE SULFATE 15 MG
15 TABLET, EXTENDED RELEASE ORAL EVERY 6 HOURS
Refills: 0 | Status: DISCONTINUED | OUTPATIENT
Start: 2025-01-20 | End: 2025-01-21

## 2025-01-20 RX ADMIN — Medication 1 MILLIGRAM(S): at 05:50

## 2025-01-20 RX ADMIN — Medication 0.5 MILLIGRAM(S): at 10:45

## 2025-01-20 RX ADMIN — PANTOPRAZOLE 40 MILLIGRAM(S): 40 TABLET, DELAYED RELEASE ORAL at 11:43

## 2025-01-20 RX ADMIN — Medication 15 MILLIGRAM(S): at 22:43

## 2025-01-20 RX ADMIN — Medication 0.5 MILLIGRAM(S): at 00:54

## 2025-01-20 RX ADMIN — Medication 15 MILLIGRAM(S): at 09:46

## 2025-01-20 RX ADMIN — Medication 1 MILLIGRAM(S): at 16:22

## 2025-01-20 RX ADMIN — Medication 0.5 MILLIGRAM(S): at 11:15

## 2025-01-20 RX ADMIN — Medication 1 MILLIGRAM(S): at 22:28

## 2025-01-20 RX ADMIN — Medication 2 MILLIGRAM(S): at 13:45

## 2025-01-20 RX ADMIN — Medication 2: at 08:37

## 2025-01-20 RX ADMIN — INSULIN GLARGINE-YFGN 36 UNIT(S): 100 INJECTION, SOLUTION SUBCUTANEOUS at 22:18

## 2025-01-20 RX ADMIN — Medication 15 MILLIGRAM(S): at 10:15

## 2025-01-20 RX ADMIN — Medication 15 MILLIGRAM(S): at 22:16

## 2025-01-20 RX ADMIN — Medication 2: at 11:44

## 2025-01-20 RX ADMIN — Medication 2 MILLIGRAM(S): at 19:45

## 2025-01-20 RX ADMIN — Medication 1 MILLIGRAM(S): at 22:43

## 2025-01-20 RX ADMIN — Medication 2 MILLIGRAM(S): at 13:13

## 2025-01-20 RX ADMIN — ONDANSETRON 4 MILLIGRAM(S): 4 TABLET ORAL at 05:41

## 2025-01-20 RX ADMIN — Medication 1 MILLIGRAM(S): at 16:50

## 2025-01-20 RX ADMIN — Medication 2 MILLIGRAM(S): at 19:30

## 2025-01-20 RX ADMIN — Medication 2: at 17:09

## 2025-01-20 RX ADMIN — Medication 1 MILLIGRAM(S): at 05:35

## 2025-01-20 RX ADMIN — Medication 0.5 MILLIGRAM(S): at 00:39

## 2025-01-20 NOTE — PROGRESS NOTE ADULT - ASSESSMENT
59 year old male with PMH NIDDM, HTN, DKA, Afib on eliquis, Cervical Fusion presented with crampy abdominal pain, nausea and vomiting and inability to tolerate po.      Probable Gastroparesis  Intractable Nausea and Vomiting, Abdominal Pain  Hx PUD  CT AP without acute pathology  Patient is on multiple meds for chronic pain, discharged from [facility] on Jan 16 with morpholine ER Q 6 hours, methadone 5 q 6 hours  As per chart review was on dilaudid 8 mg po Q 6 hours while inpatient  Continue pain management, currently in dilaudid as needed, methadone  Trial CLD, IVF, GI consult  Follow up Gastric emptying study, advance diet as tolerated    NIDDM  Hx DKA this month  A1c 9.2  continue lantus 36 untis qhs, mod ISS TIDAC, low ISS at hs  defer pre meals at this time as pt has poor po intake  outpatient follow up with endocrinology and diabetes educator  consistent carb diet    Hypomagnesemia  repleted, monitor    CAD  Paroxysmal Atrial Fibrillation  HTN, uncontrolled  continue eliquis, coreg  hydralazine IV PRN for BP > 160/100    HLD  continue statin    Chronic Anxiety and Chronic Pain Syndrome due to multiple orthopedic surgeries, Spinal Stenosis  continue sertraline, tizanadine, trazodone, klonopin prn, gabapentin  continue home methadone     DVT PPx  eliquis    patient will update family 59 year old male with PMH NIDDM, HTN, DKA, Afib on eliquis, Cervical Fusion presented with crampy abdominal pain, nausea and vomiting and inability to tolerate po.      Probable Gastroparesis  Intractable Nausea and Vomiting, Abdominal Pain  Hx PUD  CT AP without acute pathology  Patient is on multiple meds for chronic pain, discharged from [facility] on Jan 16 with morpholine ER Q 6 hours, methadone 5 q 6 hours  As per chart review was on dilaudid 8 mg po Q 6 hours while inpatient. he is chronically on dilauded and switched to morphine before last hospitlization due to medication shortage   Continue pain management, currently in dilaudid as needed, methadone and added morphine   Trial CLD, IVF, GI consult  Follow up Gastric emptying study, advance diet as tolerated    NIDDM  Hx DKA this month  A1c 9.2  continue lantus 36 untis qhs, mod ISS TIDAC, low ISS at hs  defer pre meals at this time as pt has poor po intake  outpatient follow up with endocrinology and diabetes educator  consistent carb diet    Hypomagnesemia  repleted, monitor    CAD  Paroxysmal Atrial Fibrillation  HTN, uncontrolled  continue eliquis, coreg  hydralazine IV PRN for BP > 160/100    HLD  continue statin    Chronic Anxiety and Chronic Pain Syndrome due to multiple orthopedic surgeries, Spinal Stenosis  continue sertraline, tizanadine, trazodone, klonopin prn, gabapentin  continue home methadone     DVT PPx  eliquis    patient will update family

## 2025-01-20 NOTE — PROGRESS NOTE ADULT - SUBJECTIVE AND OBJECTIVE BOX
Patient is a 59y old  Male who presents with a chief complaint of intractable nausea/vomiting/ poor PO. abdominal pain (19 Jan 2025 19:20)    Patient seen and examined at bedside.  no acute overnight events    ALLERGIES:  No Known Allergies        Vital Signs Last 24 Hrs  T(F): 98.2 (20 Jan 2025 09:41), Max: 98.6 (19 Jan 2025 20:00)  HR: 98 (20 Jan 2025 09:41) (81 - 98)  BP: 150/86 (20 Jan 2025 09:41) (150/86 - 194/100)  RR: 18 (20 Jan 2025 09:41) (17 - 20)  SpO2: 95% (20 Jan 2025 09:41) (95% - 99%)  I&O's Summary    19 Jan 2025 07:01  -  20 Jan 2025 07:00  --------------------------------------------------------  IN: 0 mL / OUT: 1000 mL / NET: -1000 mL      MEDICATIONS:  acetaminophen     Tablet .. 650 milliGRAM(s) Oral every 6 hours PRN  acetaminophen   IVPB .. 1000 milliGRAM(s) IV Intermittent once  apixaban 5 milliGRAM(s) Oral every 12 hours  apixaban      atorvastatin 80 milliGRAM(s) Oral at bedtime  carvedilol 25 milliGRAM(s) Oral every 12 hours  clonazePAM  Tablet 1 milliGRAM(s) Oral four times a day PRN  cyanocobalamin 1000 MICROGram(s) Oral daily  dextrose 5%. 1000 milliLiter(s) IV Continuous <Continuous>  dextrose 5%. 1000 milliLiter(s) IV Continuous <Continuous>  dextrose 50% Injectable 25 Gram(s) IV Push once  dextrose 50% Injectable 12.5 Gram(s) IV Push once  dextrose 50% Injectable 25 Gram(s) IV Push once  dextrose Oral Gel 15 Gram(s) Oral once PRN  gabapentin 300 milliGRAM(s) Oral every 8 hours  glucagon  Injectable 1 milliGRAM(s) IntraMuscular once  hydrALAZINE Injectable 10 milliGRAM(s) IV Push every 6 hours PRN  HYDROmorphone  Injectable 1 milliGRAM(s) IV Push every 6 hours PRN  HYDROmorphone  Injectable 2 milliGRAM(s) IV Push every 6 hours PRN  insulin glargine Injectable (LANTUS) 36 Unit(s) SubCutaneous at bedtime  insulin lispro (ADMELOG) corrective regimen sliding scale   SubCutaneous three times a day before meals  insulin lispro (ADMELOG) corrective regimen sliding scale   SubCutaneous at bedtime  methadone    Tablet 5 milliGRAM(s) Oral every 6 hours  morphine   Solution 15 milliGRAM(s) Oral every 6 hours PRN  multivitamin 1 Tablet(s) Oral daily  ondansetron Injectable 4 milliGRAM(s) IV Push every 6 hours PRN  pantoprazole  Injectable 40 milliGRAM(s) IV Push daily  polyethylene glycol 3350 17 Gram(s) Oral two times a day  potassium phosphate / sodium phosphate Powder (PHOS-NaK) 1 Packet(s) Oral two times a day  pyridoxine 50 milliGRAM(s) Oral daily  senna 2 Tablet(s) Oral at bedtime  sertraline 200 milliGRAM(s) Oral daily  sucralfate 1 Gram(s) Oral every 6 hours  tiZANidine 8 milliGRAM(s) Oral three times a day  traZODone 150 milliGRAM(s) Oral at bedtime      PHYSICAL EXAM:  General: NAD, A/O x 3  ENT: MMM  Neck: Supple, No JVD  Lungs: Clear to auscultation bilaterally  Cardio: RRR, S1/S2, No murmurs  Abdomen: Soft, Nontender, Nondistended; Bowel sounds present  Extremities: No cyanosis, No edema    LABS:                        13.5   12.16 )-----------( 360      ( 20 Jan 2025 06:25 )             40.2     01-20    138  |  100  |  11  ----------------------------<  208  4.1   |  22  |  0.92    Ca    9.0      20 Jan 2025 06:25  Phos  3.8     01-20  Mg     2.2     01-20    TPro  7.0  /  Alb  3.1  /  TBili  0.6  /  DBili  x   /  AST  12  /  ALT  29  /  AlkPhos  102  01-20      PT/INR - ( 19 Jan 2025 15:30 )   PT: 11.5 sec;   INR: 0.97 ratio         PTT - ( 19 Jan 2025 15:30 )  PTT:32.9 sec            15:30 - VBG - pH: 7.52  | pCO2: 38    | pO2: <35   | Lactate:                  POCT Blood Glucose.: 169 mg/dL (20 Jan 2025 11:24)  POCT Blood Glucose.: 179 mg/dL (20 Jan 2025 08:30)  POCT Blood Glucose.: 472 mg/dL (20 Jan 2025 08:24)  POCT Blood Glucose.: 207 mg/dL (19 Jan 2025 21:59)  POCT Blood Glucose.: 246 mg/dL (19 Jan 2025 15:30)      Urinalysis Basic - ( 20 Jan 2025 06:25 )    Color: x / Appearance: x / SG: x / pH: x  Gluc: 208 mg/dL / Ketone: x  / Bili: x / Urobili: x   Blood: x / Protein: x / Nitrite: x   Leuk Esterase: x / RBC: x / WBC x   Sq Epi: x / Non Sq Epi: x / Bacteria: x            RADIOLOGY & ADDITIONAL TESTS:    Care Discussed with Consultants/Other Providers:    Patient is a 59y old  Male who presents with a chief complaint of intractable nausea/vomiting/ poor PO. abdominal pain (19 Jan 2025 19:20)    Patient seen and examined at bedside.  no acute overnight events    ALLERGIES:  No Known Allergies        Vital Signs Last 24 Hrs  T(F): 98.2 (20 Jan 2025 09:41), Max: 98.6 (19 Jan 2025 20:00)  HR: 98 (20 Jan 2025 09:41) (81 - 98)  BP: 150/86 (20 Jan 2025 09:41) (150/86 - 194/100)  RR: 18 (20 Jan 2025 09:41) (17 - 20)  SpO2: 95% (20 Jan 2025 09:41) (95% - 99%)  I&O's Summary    19 Jan 2025 07:01  -  20 Jan 2025 07:00  --------------------------------------------------------  IN: 0 mL / OUT: 1000 mL / NET: -1000 mL      MEDICATIONS:  acetaminophen     Tablet .. 650 milliGRAM(s) Oral every 6 hours PRN  acetaminophen   IVPB .. 1000 milliGRAM(s) IV Intermittent once  apixaban 5 milliGRAM(s) Oral every 12 hours  apixaban      atorvastatin 80 milliGRAM(s) Oral at bedtime  carvedilol 25 milliGRAM(s) Oral every 12 hours  clonazePAM  Tablet 1 milliGRAM(s) Oral four times a day PRN  cyanocobalamin 1000 MICROGram(s) Oral daily  dextrose 5%. 1000 milliLiter(s) IV Continuous <Continuous>  dextrose 5%. 1000 milliLiter(s) IV Continuous <Continuous>  dextrose 50% Injectable 25 Gram(s) IV Push once  dextrose 50% Injectable 12.5 Gram(s) IV Push once  dextrose 50% Injectable 25 Gram(s) IV Push once  dextrose Oral Gel 15 Gram(s) Oral once PRN  gabapentin 300 milliGRAM(s) Oral every 8 hours  glucagon  Injectable 1 milliGRAM(s) IntraMuscular once  hydrALAZINE Injectable 10 milliGRAM(s) IV Push every 6 hours PRN  HYDROmorphone  Injectable 1 milliGRAM(s) IV Push every 6 hours PRN  HYDROmorphone  Injectable 2 milliGRAM(s) IV Push every 6 hours PRN  insulin glargine Injectable (LANTUS) 36 Unit(s) SubCutaneous at bedtime  insulin lispro (ADMELOG) corrective regimen sliding scale   SubCutaneous three times a day before meals  insulin lispro (ADMELOG) corrective regimen sliding scale   SubCutaneous at bedtime  methadone    Tablet 5 milliGRAM(s) Oral every 6 hours  morphine   Solution 15 milliGRAM(s) Oral every 6 hours PRN  multivitamin 1 Tablet(s) Oral daily  ondansetron Injectable 4 milliGRAM(s) IV Push every 6 hours PRN  pantoprazole  Injectable 40 milliGRAM(s) IV Push daily  polyethylene glycol 3350 17 Gram(s) Oral two times a day  potassium phosphate / sodium phosphate Powder (PHOS-NaK) 1 Packet(s) Oral two times a day  pyridoxine 50 milliGRAM(s) Oral daily  senna 2 Tablet(s) Oral at bedtime  sertraline 200 milliGRAM(s) Oral daily  sucralfate 1 Gram(s) Oral every 6 hours  tiZANidine 8 milliGRAM(s) Oral three times a day  traZODone 150 milliGRAM(s) Oral at bedtime      PHYSICAL EXAM:  General: NAD, A/O x 3  ENT: MMM, no thrush  Neck: Supple, No JVD  Lungs: Clear to auscultation bilaterally, non labored, good air entry  Cardio: RRR, S1/S2, No murmurs  Abdomen: Soft, Nontender, Nondistended; Bowel sounds present  Extremities: No cyanosis, No edema    LABS:                        13.5   12.16 )-----------( 360      ( 20 Jan 2025 06:25 )             40.2     01-20    138  |  100  |  11  ----------------------------<  208  4.1   |  22  |  0.92    Ca    9.0      20 Jan 2025 06:25  Phos  3.8     01-20  Mg     2.2     01-20    TPro  7.0  /  Alb  3.1  /  TBili  0.6  /  DBili  x   /  AST  12  /  ALT  29  /  AlkPhos  102  01-20      PT/INR - ( 19 Jan 2025 15:30 )   PT: 11.5 sec;   INR: 0.97 ratio         PTT - ( 19 Jan 2025 15:30 )  PTT:32.9 sec            15:30 - VBG - pH: 7.52  | pCO2: 38    | pO2: <35   | Lactate:                  POCT Blood Glucose.: 169 mg/dL (20 Jan 2025 11:24)  POCT Blood Glucose.: 179 mg/dL (20 Jan 2025 08:30)  POCT Blood Glucose.: 472 mg/dL (20 Jan 2025 08:24)  POCT Blood Glucose.: 207 mg/dL (19 Jan 2025 21:59)  POCT Blood Glucose.: 246 mg/dL (19 Jan 2025 15:30)      Urinalysis Basic - ( 20 Jan 2025 06:25 )    Color: x / Appearance: x / SG: x / pH: x  Gluc: 208 mg/dL / Ketone: x  / Bili: x / Urobili: x   Blood: x / Protein: x / Nitrite: x   Leuk Esterase: x / RBC: x / WBC x   Sq Epi: x / Non Sq Epi: x / Bacteria: x            RADIOLOGY & ADDITIONAL TESTS:    Care Discussed with Consultants/Other Providers:

## 2025-01-20 NOTE — CHART NOTE - NSCHARTNOTEFT_GEN_A_CORE
Patient was not tolerating PO intake, wanted pain medications. Dilaudid 8mg and 4mg PO oral meds switched to 1mg and 0.5 IV.

## 2025-01-21 ENCOUNTER — TRANSCRIPTION ENCOUNTER (OUTPATIENT)
Age: 60
End: 2025-01-21

## 2025-01-21 VITALS
SYSTOLIC BLOOD PRESSURE: 135 MMHG | OXYGEN SATURATION: 96 % | TEMPERATURE: 99 F | RESPIRATION RATE: 17 BRPM | HEART RATE: 95 BPM | DIASTOLIC BLOOD PRESSURE: 84 MMHG

## 2025-01-21 DIAGNOSIS — R11.2 NAUSEA WITH VOMITING, UNSPECIFIED: ICD-10-CM

## 2025-01-21 LAB
ANION GAP SERPL CALC-SCNC: 9 MMOL/L — SIGNIFICANT CHANGE UP (ref 5–17)
BUN SERPL-MCNC: 14 MG/DL — SIGNIFICANT CHANGE UP (ref 7–23)
CALCIUM SERPL-MCNC: 9 MG/DL — SIGNIFICANT CHANGE UP (ref 8.4–10.5)
CHLORIDE SERPL-SCNC: 101 MMOL/L — SIGNIFICANT CHANGE UP (ref 96–108)
CO2 SERPL-SCNC: 28 MMOL/L — SIGNIFICANT CHANGE UP (ref 22–31)
CREAT SERPL-MCNC: 0.67 MG/DL — SIGNIFICANT CHANGE UP (ref 0.5–1.3)
EGFR: 109 ML/MIN/1.73M2 — SIGNIFICANT CHANGE UP
GLUCOSE BLDC GLUCOMTR-MCNC: 155 MG/DL — HIGH (ref 70–99)
GLUCOSE BLDC GLUCOMTR-MCNC: 156 MG/DL — HIGH (ref 70–99)
GLUCOSE SERPL-MCNC: 164 MG/DL — HIGH (ref 70–99)
HCT VFR BLD CALC: 37.2 % — LOW (ref 39–50)
HGB BLD-MCNC: 12.3 G/DL — LOW (ref 13–17)
MCHC RBC-ENTMCNC: 27.3 PG — SIGNIFICANT CHANGE UP (ref 27–34)
MCHC RBC-ENTMCNC: 33.1 G/DL — SIGNIFICANT CHANGE UP (ref 32–36)
MCV RBC AUTO: 82.7 FL — SIGNIFICANT CHANGE UP (ref 80–100)
NRBC # BLD: 0 /100 WBCS — SIGNIFICANT CHANGE UP (ref 0–0)
PLATELET # BLD AUTO: 290 K/UL — SIGNIFICANT CHANGE UP (ref 150–400)
POTASSIUM SERPL-MCNC: 3.7 MMOL/L — SIGNIFICANT CHANGE UP (ref 3.5–5.3)
POTASSIUM SERPL-SCNC: 3.7 MMOL/L — SIGNIFICANT CHANGE UP (ref 3.5–5.3)
RBC # BLD: 4.5 M/UL — SIGNIFICANT CHANGE UP (ref 4.2–5.8)
RBC # FLD: 15 % — HIGH (ref 10.3–14.5)
SODIUM SERPL-SCNC: 138 MMOL/L — SIGNIFICANT CHANGE UP (ref 135–145)
WBC # BLD: 7.46 K/UL — SIGNIFICANT CHANGE UP (ref 3.8–10.5)
WBC # FLD AUTO: 7.46 K/UL — SIGNIFICANT CHANGE UP (ref 3.8–10.5)

## 2025-01-21 PROCEDURE — 85610 PROTHROMBIN TIME: CPT

## 2025-01-21 PROCEDURE — 99239 HOSP IP/OBS DSCHRG MGMT >30: CPT

## 2025-01-21 PROCEDURE — 85027 COMPLETE CBC AUTOMATED: CPT

## 2025-01-21 PROCEDURE — 84100 ASSAY OF PHOSPHORUS: CPT

## 2025-01-21 PROCEDURE — 85730 THROMBOPLASTIN TIME PARTIAL: CPT

## 2025-01-21 PROCEDURE — 85025 COMPLETE CBC W/AUTO DIFF WBC: CPT

## 2025-01-21 PROCEDURE — 96376 TX/PRO/DX INJ SAME DRUG ADON: CPT

## 2025-01-21 PROCEDURE — 96372 THER/PROPH/DIAG INJ SC/IM: CPT

## 2025-01-21 PROCEDURE — 96368 THER/DIAG CONCURRENT INF: CPT

## 2025-01-21 PROCEDURE — 96375 TX/PRO/DX INJ NEW DRUG ADDON: CPT

## 2025-01-21 PROCEDURE — 81001 URINALYSIS AUTO W/SCOPE: CPT

## 2025-01-21 PROCEDURE — 96365 THER/PROPH/DIAG IV INF INIT: CPT | Mod: XU

## 2025-01-21 PROCEDURE — 80048 BASIC METABOLIC PNL TOTAL CA: CPT

## 2025-01-21 PROCEDURE — 82009 KETONE BODYS QUAL: CPT

## 2025-01-21 PROCEDURE — 93005 ELECTROCARDIOGRAM TRACING: CPT

## 2025-01-21 PROCEDURE — 83735 ASSAY OF MAGNESIUM: CPT

## 2025-01-21 PROCEDURE — 82803 BLOOD GASES ANY COMBINATION: CPT

## 2025-01-21 PROCEDURE — 74177 CT ABD & PELVIS W/CONTRAST: CPT | Mod: MC

## 2025-01-21 PROCEDURE — 87086 URINE CULTURE/COLONY COUNT: CPT

## 2025-01-21 PROCEDURE — 82962 GLUCOSE BLOOD TEST: CPT

## 2025-01-21 PROCEDURE — 83690 ASSAY OF LIPASE: CPT

## 2025-01-21 PROCEDURE — 80053 COMPREHEN METABOLIC PANEL: CPT

## 2025-01-21 PROCEDURE — G0378: CPT

## 2025-01-21 PROCEDURE — 99285 EMERGENCY DEPT VISIT HI MDM: CPT | Mod: 25

## 2025-01-21 PROCEDURE — 36415 COLL VENOUS BLD VENIPUNCTURE: CPT

## 2025-01-21 RX ORDER — MORPHINE SULFATE 15 MG
15 TABLET, EXTENDED RELEASE ORAL EVERY 6 HOURS
Refills: 0 | Status: COMPLETED | OUTPATIENT
Start: 2025-01-21 | End: 2025-01-28

## 2025-01-21 RX ADMIN — Medication 1 MILLIGRAM(S): at 04:46

## 2025-01-21 RX ADMIN — Medication 2 MILLIGRAM(S): at 01:41

## 2025-01-21 RX ADMIN — Medication 1 MILLIGRAM(S): at 05:01

## 2025-01-21 RX ADMIN — Medication 2 MILLIGRAM(S): at 08:00

## 2025-01-21 RX ADMIN — Medication 2 MILLIGRAM(S): at 07:33

## 2025-01-21 RX ADMIN — Medication 2 MILLIGRAM(S): at 01:26

## 2025-01-21 NOTE — PROGRESS NOTE ADULT - ASSESSMENT
59 year old male with PMH NIDDM, HTN, DKA, Afib on eliquis, Cervical Fusion presented with crampy abdominal pain, nausea and vomiting and inability to tolerate po.      Possible Gastroparesis  Intractable Nausea and Vomiting, Abdominal Pain  Hx PUD  CT of A/P without acute pathology  Patient is on multiple meds for chronic pain, discharged from [facility] on Jan 16 with morphine ER Q 6 hours, methadone 5 q 6 hours  As per chart review was on dilaudid 8 mg po Q 6 hours while inpatient. he is chronically on dilauded and switched to morphine before last admission due to medication shortage   Continue pain management, stop Dilaudid today, methadone and added morphine   GI consult  Pt can have Gastric emptying study as outpatient  Advance diet today and see how he tolerates    NIDDM  Hx DKA this month  A1c 9.2  continue lantus 36 untis qhs, mod ISS TIDAC, low ISS at hs  defer pre meals at this time as pt has poor po intake  outpatient follow up with endocrinology and diabetes educator  consistent carb diet    Hypomagnesemia  repleted, monitor mag levels    CAD  Paroxysmal Atrial Fibrillation  HTN, uncontrolled  continue eliquis, coreg  hydralazine IV PRN for BP > 160/100    HLD  continue statin    Chronic Anxiety and Chronic Pain Syndrome due to multiple orthopedic surgeries, Spinal Stenosis  continue sertraline, tizanadine, trazodone, klonopin prn, gabapentin  continue home methadone  Dr Todd had conversation with pt's Pain mgmt MD about regimen, patient has an appt for next week outpt.      DVT PPx  eliquis    Dispo:  D/C home in next 24 hrs. patient will update family   59 year old male with PMH NIDDM, HTN, DKA, Afib on eliquis, Cervical Fusion presented with crampy abdominal pain, nausea and vomiting and inability to tolerate po.      Possible Gastroparesis  Intractable Nausea and Vomiting, Abdominal Pain  Hx PUD  CT of A/P without acute pathology  Patient is on multiple meds for chronic pain, discharged from [facility] on Jan 16 with morphine ER Q 6 hours, methadone 5 q 6 hours  As per chart review was on dilaudid 8 mg po Q 6 hours while inpatient. he is chronically on dilauded and switched to morphine before last admission due to medication shortage   writer spoke to his pain management physician and confirmed him being on morphine as well as methadone as well as the recent switch due to the shortage and the inability to finding Dilaudid outpatient   Dr. Littlejohn offered to see the patient today at the office to address chronic pain, but he refused to go  spoke to GI  and no need for inpatient workup, as per [facility] team patient had a recent EGD and can follow up outpatient  suspect gastropresis- however, unable to get the study done in patient due to pt being on morphine.    Continue pain management, stop Dilaudid today,  and continue methadone and  continue with morphine   diet was advanced, but patient refused to eat and signed out AMA  discussed risks of leaving which could include death or permanent disability and he elected to leave, he is of sound. His partner Clemencia was present during the conversation as well      NIDDM  Hx DKA this month  A1c 9.2  continue lantus 36 untis qhs, mod ISS TIDAC, low ISS at hs  defer pre meals at this time as pt has poor po intake  outpatient follow up with endocrinology and diabetes educator  consistent carb diet    Hypomagnesemia  repleted, monitor mag levels    CAD  Paroxysmal Atrial Fibrillation  HTN, uncontrolled  continue eliquis, coreg  hydralazine IV PRN for BP > 160/100    HLD  continue statin    Chronic Anxiety and Chronic Pain Syndrome due to multiple orthopedic surgeries, Spinal Stenosis  continue sertraline, tizanadine, trazodone, klonopin prn, gabapentin  continue home methadone  Dr Todd had conversation with pt's Pain mgmt MD about regimen, patient has an appt for next week outpt.   he refused appt at office today      DVT PPx  eliquis    AMA- his girlfriend Clemencia is taking him home

## 2025-01-21 NOTE — DISCHARGE NOTE NURSING/CASE MANAGEMENT/SOCIAL WORK - PATIENT PORTAL LINK FT
You can access the FollowMyHealth Patient Portal offered by [facility] by registering at the following website: [email or web address]. By joining [facility]’s FollowMyHealth portal, you will also be able to view your health information using other applications (apps) compatible with our system.

## 2025-01-21 NOTE — DISCHARGE NOTE PROVIDER - NSDCMRMEDTOKEN_GEN_ALL_CORE_FT
apixaban 5 mg oral tablet: 1 tab(s) orally every 12 hours  atorvastatin 80 mg oral tablet: 1 tab(s) orally once a day (at bedtime)  carvedilol 25 mg oral tablet: 1 tab(s) orally every 12 hours  cyanocobalamin 1000 mcg oral tablet: 1 tab(s) orally once a day  Freestyle Tono 2 Sensors: Apply 1 sensor every 14 days  gabapentin 300 mg oral capsule: 1 cap(s) orally every 8 hours  Jardiance 25 mg oral tablet: 1 tab(s) orally once a day  KlonoPIN 1 mg oral tablet: 1 tab(s) orally 4 times a day as needed for  anxiety  Lantus Solostar Pen 100 units/mL subcutaneous solution: 36 unit(s) subcutaneous once a day unit(s) subcutaneous once a day  metFORMIN 500 mg oral tablet, extended release: 2 tab(s) orally 2 times a day  methadone 5 mg oral tablet: 1 tab(s) orally every 6 hours  morphine 15 mg/12 hr oral tablet, extended release: 1 orally every 6 hours  Multiple Vitamins oral tablet: 1 tab(s) orally once a day  pantoprazole 40 mg oral delayed release tablet: 1 tab(s) orally once a day (before a meal)  polyethylene glycol 3350 oral powder for reconstitution: 17 gram(s) orally once a day as needed for Constipation  pyridoxine 50 mg oral tablet: 1 tab(s) orally once a day  senna leaf extract oral tablet: 2 tab(s) orally once a day (at bedtime) as needed for  constipation  sertraline 100 mg oral tablet: 2 tab(s) orally once a day  sucralfate 1 g oral tablet: 1 tab(s) orally every 6 hours  tiZANidine 4 mg oral capsule: 2 cap(s) orally 3 times a day  traZODone 150 mg oral tablet: 1 tab(s) orally once a day (at bedtime)

## 2025-01-21 NOTE — DISCHARGE NOTE PROVIDER - HOSPITAL COURSE
Hospital Course  HPI:  59-year-old male, from Home,  with history of NIDDM, hypertension, DKA, MI, A fib on ELIQUIS, cervical fusion presented to ED with crampy abdominal pain, nausea and vomiting since yesterday. unable to tolerate PO since this am. reported diffuse midabdominal pain, no radiation, moderate to severe, associated with nausea and vomiting, unable to tolerate any food or drink.  + constipation last BM 4 days back. + back pain. denied fever/chills/dysuria/urinary frequency/urgency/headaches/presyncope/CP/SOB.dizziness/palpitation. all other ROS negative. Patient came to ER on Jan 12 with same symptoms and was admitted and treated for DKA. CT showing gastritis. seen by GI and suggested OP EGD.     In ER today: VS showed : BP: 168/116 mm Hg. HR: 88 /min. RR: 19 /min. Temp: 97.5 Degrees F, 36.4 Degrees C. O2 sat: 97 %. room air  Labs: mild leucocytosis with left shift. K/BUN/Cr: normal. low Mg and PO4.   POCT: >200  CT A/P with IC: none acute  ABG: metabolic alkalosis  DKA was ruled out.  Placed on observation for severe abdominal pain, intractable nausea/vomiting, poor PO.      (19 Jan 2025 19:20)      You were admitted for   59 year old male with PMH NIDDM, HTN, DKA, Afib on eliquis, Cervical Fusion presented with crampy abdominal pain, nausea and vomiting and inability to tolerate po.      Possible Gastroparesis  Intractable Nausea and Vomiting, Abdominal Pain  Hx PUD  CT of A/P without acute pathology  Patient is on multiple meds for chronic pain, discharged from [facility] on Jan 16 with morphine ER Q 6 hours, methadone 5 q 6 hours  As per chart review was on dilaudid 8 mg po Q 6 hours while inpatient. he is chronically on dilauded and switched to morphine before last admission due to medication shortage   writer spoke to his pain management physician Dr. Trejo and confirmed him being on morphine as well as methadone as well as the recent switch due to the shortage and the inability to finding Dilaudid outpatient   Dr. Littlejohn offered to see the patient today at the office to address chronic pain, but he refused to go  spoke to GI  and no need for inpatient workup, as per [facility] team patient had a recent EGD and can follow up outpatient  suspect gastropresis- however, unable to get the study done in patient due to pt being on morphine.    Continue pain management, stop Dilaudid today,  and continue methadone and  continue with morphine   diet was advanced, but patient refused to eat and signed out AMA  discussed risks of leaving which could include death or permanent disability and he elected to leave, he is of sound. His partner Clemencia was present during the conversation as well      NIDDM  Hx DKA this month  A1c 9.2  continue lantus 36 untis qhs, mod ISS TIDAC, low ISS at hs  defer pre meals at this time as pt has poor po intake  outpatient follow up with endocrinology and diabetes educator  consistent carb diet    Hypomagnesemia  repleted, monitor mag levels    CAD  Paroxysmal Atrial Fibrillation  HTN, uncontrolled  continue eliquis, coreg  hydralazine IV PRN for BP > 160/100    HLD  continue statin    Chronic Anxiety and Chronic Pain Syndrome due to multiple orthopedic surgeries, Spinal Stenosis  continue sertraline, tizanadine, trazodone, klonopin prn, gabapentin  continue home methadone  Dr Todd had conversation with pt's Pain mgmt MD about regimen, patient has an appt for next week outpt.   he refused appt at office today      DVT PPx  eliquis    AMA- his girlfriend Clemencia is taking him home             Discharging Provider:  Pilar Hoang DO  Contact Info: Cell [phone]  - Please call with any questions or concerns.    Outpatient Provider:    Signout given to  SNF Provider:

## 2025-01-21 NOTE — DISCHARGE NOTE PROVIDER - NSDCFUSCHEDAPPT_GEN_ALL_CORE_FT
[facility]  ONCPAINMGT [address]  Scheduled Appointment: 01/22/2025    [facility]  [facility] [address] [facility]  Scheduled Appointment: 01/22/2025

## 2025-01-21 NOTE — CONSULT NOTE ADULT - SUBJECTIVE AND OBJECTIVE BOX
INTERVAL HPI/OVERNIGHT EVENTS:  HPI:  59-year-old male, from Home,  with history of NIDDM, hypertension, DKA, MI, A fib on ELIQUIS, cervical fusion presented to ED with crampy abdominal pain, nausea and vomiting since yesterday. unable to tolerate PO since this am. reported diffuse midabdominal pain, no radiation, moderate to severe, associated with nausea and vomiting, unable to tolerate any food or drink.  + constipation last BM 4 days back. + back pain. denied fever/chills/dysuria/urinary frequency/urgency/headaches/presyncope/CP/SOB.dizziness/palpitation. all other ROS negative. Patient came to ER on Jan 12 with same symptoms and was admitted and treated for DKA. CT showing gastritis. seen by GI and suggested OP EGD.     In ER today: VS showed : BP: 168/116 mm Hg. HR: 88 /min. RR: 19 /min. Temp: 97.5 Degrees F, 36.4 Degrees C. O2 sat: 97 %. room air  Labs: mild leucocytosis with left shift. K/BUN/Cr: normal. low Mg and PO4.   POCT: >200  CT A/P with IC: none acute  ABG: metabolic alkalosis  DKA was ruled out.  Placed on observation for severe abdominal pain, intractable nausea/vomiting, poor PO.      (19 Jan 2025 19:20)    GI Consultation to evaluate for gastroparesis. Patient seen examined at bed side, reports abdominal discomfort, nausea, vomiting again started yesterday.  He was hospitalized on Jan 12 with same symptoms and was admitted and treated for DKA. CT showing gastritis.   He was seen by our team last admission and was advised OP EGD, as per patient he didn't had a chance to do out patient F/U. He is taking Pantoprazole daily. He is on Dilaudid and Methadone daily for back pain. Discussed in detail his condition, informed him Upper GI Series can not be done while he is on Morphine  pain meds. Advised he need out patient F/U with GI for further work up once blood sugar and pain is well controlled. He had EGD 10/2023 with Dr Liang and colonoscopy 5 years ago with another provider.     Denies abdominal pain, nausea, vomiting diarrhea or constipation     MEDICATIONS  (STANDING):  acetaminophen   IVPB .. 1000 milliGRAM(s) IV Intermittent once  apixaban 5 milliGRAM(s) Oral every 12 hours  apixaban      atorvastatin 80 milliGRAM(s) Oral at bedtime  carvedilol 25 milliGRAM(s) Oral every 12 hours  cyanocobalamin 1000 MICROGram(s) Oral daily  dextrose 5%. 1000 milliLiter(s) (50 mL/Hr) IV Continuous <Continuous>  dextrose 5%. 1000 milliLiter(s) (100 mL/Hr) IV Continuous <Continuous>  dextrose 50% Injectable 25 Gram(s) IV Push once  dextrose 50% Injectable 12.5 Gram(s) IV Push once  dextrose 50% Injectable 25 Gram(s) IV Push once  gabapentin 300 milliGRAM(s) Oral every 8 hours  glucagon  Injectable 1 milliGRAM(s) IntraMuscular once  insulin glargine Injectable (LANTUS) 36 Unit(s) SubCutaneous at bedtime  insulin lispro (ADMELOG) corrective regimen sliding scale   SubCutaneous three times a day before meals  insulin lispro (ADMELOG) corrective regimen sliding scale   SubCutaneous at bedtime  methadone    Tablet 5 milliGRAM(s) Oral every 6 hours  morphine   Solution 15 milliGRAM(s) Oral every 6 hours  multivitamin 1 Tablet(s) Oral daily  pantoprazole  Injectable 40 milliGRAM(s) IV Push daily  polyethylene glycol 3350 17 Gram(s) Oral two times a day  potassium phosphate / sodium phosphate Powder (PHOS-NaK) 1 Packet(s) Oral two times a day  pyridoxine 50 milliGRAM(s) Oral daily  senna 2 Tablet(s) Oral at bedtime  sertraline 200 milliGRAM(s) Oral daily  sucralfate 1 Gram(s) Oral every 6 hours  tiZANidine 8 milliGRAM(s) Oral three times a day  traZODone 150 milliGRAM(s) Oral at bedtime    MEDICATIONS  (PRN):  acetaminophen     Tablet .. 650 milliGRAM(s) Oral every 6 hours PRN Temp greater or equal to 38C (100.4F), Mild Pain (1 - 3)  clonazePAM  Tablet 1 milliGRAM(s) Oral four times a day PRN for anxiety  dextrose Oral Gel 15 Gram(s) Oral once PRN Blood Glucose LESS THAN 70 milliGRAM(s)/deciliter  hydrALAZINE Injectable 10 milliGRAM(s) IV Push every 6 hours PRN SBP>160 or DBP>100  ondansetron Injectable 4 milliGRAM(s) IV Push every 6 hours PRN Nausea and/or Vomiting      Allergies    No Known Allergies    Intolerances        PAST MEDICAL & SURGICAL HISTORY:  Hypertension      Diabetes mellitus      Gastric ulcer      Spinal stenosis      H/O spinal cord compression      Spondylosis      H/O radiculopathy      Heart attack      H/O cervical spine surgery      S/P cervical spinal fusion          REVIEW OF SYSTEMS 	  See HPI     PHYSICAL EXAM:   Vital Signs:  Vital Signs Last 24 Hrs  T(C): 36.8 (21 Jan 2025 06:04), Max: 36.9 (20 Jan 2025 22:09)  T(F): 98.2 (21 Jan 2025 06:04), Max: 98.4 (20 Jan 2025 22:09)  HR: 76 (21 Jan 2025 06:04) (76 - 94)  BP: 101/68 (21 Jan 2025 06:04) (101/68 - 109/74)  BP(mean): --  RR: 17 (21 Jan 2025 06:04) (17 - 18)  SpO2: 97% (21 Jan 2025 06:04) (94% - 97%)    Parameters below as of 21 Jan 2025 06:04  Patient On (Oxygen Delivery Method): room air      Daily     Daily I&O's Summary      GENERAL:  Appears stated age  HEENT:  NC/AT,  conjunctivae clear and pink  CHEST:  Full & symmetric excursion  HEART:  Regular rhythm, S1, S2  ABDOMEN:  Soft, mild tender generalized , non-distended, normoactive bowel sounds  EXTREMITIES :  no cyanosis,clubbing or edema  SKIN:  No rash/warm/dry  NEURO:  Alert, oriented      LABS:                        12.3   7.46  )-----------( 290      ( 21 Jan 2025 08:31 )             37.2     01-21    138  |  101  |  14  ----------------------------<  164[H]  3.7   |  28  |  0.67    Ca    9.0      21 Jan 2025 08:31  Phos  3.8     01-20  Mg     2.2     01-20    TPro  7.0  /  Alb  3.1[L]  /  TBili  0.6  /  DBili  x   /  AST  12  /  ALT  29  /  AlkPhos  102  01-20    PT/INR - ( 19 Jan 2025 15:30 )   PT: 11.5 sec;   INR: 0.97 ratio         PTT - ( 19 Jan 2025 15:30 )  PTT:32.9 sec  Urinalysis Basic - ( 21 Jan 2025 08:31 )    Color: x / Appearance: x / SG: x / pH: x  Gluc: 164 mg/dL / Ketone: x  / Bili: x / Urobili: x   Blood: x / Protein: x / Nitrite: x   Leuk Esterase: x / RBC: x / WBC x   Sq Epi: x / Non Sq Epi: x / Bacteria: x      amylase   lipaseLipase: 14 U/L (01-19 @ 15:30)    RADIOLOGY & ADDITIONAL TESTS:

## 2025-01-21 NOTE — PROGRESS NOTE ADULT - SUBJECTIVE AND OBJECTIVE BOX
Patient is a 59y old  Male who presents with a chief complaint of intractable nausea/vomiting/ poor PO. abdominal pain (20 Jan 2025 12:35)      Patient seen and examined at bedside. No overnight events reported.   Pt c/o mild nausea, no vomiting, no abd pain, he has persistent back and neck pain, described as a 7 today.     ALLERGIES:  No Known Allergies    MEDICATIONS  (STANDING):  acetaminophen   IVPB .. 1000 milliGRAM(s) IV Intermittent once  apixaban 5 milliGRAM(s) Oral every 12 hours  apixaban      atorvastatin 80 milliGRAM(s) Oral at bedtime  carvedilol 25 milliGRAM(s) Oral every 12 hours  cyanocobalamin 1000 MICROGram(s) Oral daily  dextrose 5%. 1000 milliLiter(s) (50 mL/Hr) IV Continuous <Continuous>  dextrose 5%. 1000 milliLiter(s) (100 mL/Hr) IV Continuous <Continuous>  dextrose 50% Injectable 25 Gram(s) IV Push once  dextrose 50% Injectable 12.5 Gram(s) IV Push once  dextrose 50% Injectable 25 Gram(s) IV Push once  gabapentin 300 milliGRAM(s) Oral every 8 hours  glucagon  Injectable 1 milliGRAM(s) IntraMuscular once  insulin glargine Injectable (LANTUS) 36 Unit(s) SubCutaneous at bedtime  insulin lispro (ADMELOG) corrective regimen sliding scale   SubCutaneous three times a day before meals  insulin lispro (ADMELOG) corrective regimen sliding scale   SubCutaneous at bedtime  methadone    Tablet 5 milliGRAM(s) Oral every 6 hours  morphine   Solution 15 milliGRAM(s) Oral every 6 hours  multivitamin 1 Tablet(s) Oral daily  pantoprazole  Injectable 40 milliGRAM(s) IV Push daily  polyethylene glycol 3350 17 Gram(s) Oral two times a day  potassium phosphate / sodium phosphate Powder (PHOS-NaK) 1 Packet(s) Oral two times a day  pyridoxine 50 milliGRAM(s) Oral daily  senna 2 Tablet(s) Oral at bedtime  sertraline 200 milliGRAM(s) Oral daily  sucralfate 1 Gram(s) Oral every 6 hours  tiZANidine 8 milliGRAM(s) Oral three times a day  traZODone 150 milliGRAM(s) Oral at bedtime    MEDICATIONS  (PRN):  acetaminophen     Tablet .. 650 milliGRAM(s) Oral every 6 hours PRN Temp greater or equal to 38C (100.4F), Mild Pain (1 - 3)  clonazePAM  Tablet 1 milliGRAM(s) Oral four times a day PRN for anxiety  dextrose Oral Gel 15 Gram(s) Oral once PRN Blood Glucose LESS THAN 70 milliGRAM(s)/deciliter  hydrALAZINE Injectable 10 milliGRAM(s) IV Push every 6 hours PRN SBP>160 or DBP>100  ondansetron Injectable 4 milliGRAM(s) IV Push every 6 hours PRN Nausea and/or Vomiting    Vital Signs Last 24 Hrs  T(F): 98.2 (21 Jan 2025 06:04), Max: 98.4 (20 Jan 2025 22:09)  HR: 76 (21 Jan 2025 06:04) (76 - 100)  BP: 101/68 (21 Jan 2025 06:04) (101/68 - 147/90)  RR: 17 (21 Jan 2025 06:04) (17 - 18)  SpO2: 97% (21 Jan 2025 06:04) (94% - 97%)  I&O's Summary    PHYSICAL EXAM:  General: NAD, A/O x 3  ENT: No gross hearing impairment, Moist mucous membranes, no thrush  Neck: Supple, No JVD  Lungs: Clear to auscultation bilaterally, good air entry, non-labored breathing  Cardio: RRR, S1/S2, No murmur  Abdomen: Softly distended; Bowel sounds present, nontender  Extremities: No calf tenderness, No cyanosis, No pitting edema  Psych: Appropriate mood and affect    LABS:                        12.3   7.46  )-----------( 290      ( 21 Jan 2025 08:31 )             37.2     01-21    138  |  101  |  14  ----------------------------<  164  3.7   |  28  |  0.67    Ca    9.0      21 Jan 2025 08:31  Phos  3.8     01-20  Mg     2.2     01-20    TPro  7.0  /  Alb  3.1  /  TBili  0.6  /  DBili  x   /  AST  12  /  ALT  29  /  AlkPhos  102  01-20      Lipase: 14 U/L (01-19-25 @ 15:30)      PT/INR - ( 19 Jan 2025 15:30 )   PT: 11.5 sec;   INR: 0.97 ratio         PTT - ( 19 Jan 2025 15:30 )  PTT:32.9 sec              15:30 - VBG - pH: 7.52  | pCO2: 38    | pO2: <35   | Lactate:                  POCT Blood Glucose.: 156 mg/dL (21 Jan 2025 08:34)  POCT Blood Glucose.: 194 mg/dL (20 Jan 2025 21:35)  POCT Blood Glucose.: 186 mg/dL (20 Jan 2025 17:02)  POCT Blood Glucose.: 169 mg/dL (20 Jan 2025 11:24)      Urinalysis Basic - ( 21 Jan 2025 08:31 )    Color: x / Appearance: x / SG: x / pH: x  Gluc: 164 mg/dL / Ketone: x  / Bili: x / Urobili: x   Blood: x / Protein: x / Nitrite: x   Leuk Esterase: x / RBC: x / WBC x   Sq Epi: x / Non Sq Epi: x / Bacteria: x        Culture - Urine (collected 19 Jan 2025 16:05)  Source: Clean Catch Clean Catch (Midstream)  Final Report (20 Jan 2025 21:27):    <10,000 CFU/mL Normal Urogenital Grecia        RADIOLOGY & ADDITIONAL TESTS:  < from: CT Abdomen and Pelvis w/ IV Cont (01.19.25 @ 15:54) >    IMPRESSION:  Normal appendix. No bowel obstruction or free air. No significant   abnormality.      < end of copied text >    Care Discussed with Consultants/Other Providers:

## 2025-01-21 NOTE — CONSULT NOTE ADULT - CONSULT REASON
[facility] - Progress Note     Patient:  Jeremie Ceballos   Date of birth 1986, Medical record number [id number]  Date of Visit:  06/06/2022   Date of Admission: 5/29/2022          Assessment and Recommendations:   ASSESSMENT:  1. 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 There is also disruption of the aorto-mitral curtain adjacent to the aortic valve, also concerning for further dehiscence near the prosthetic aortic valve.   3. Congestive hepatopathy and ascites - no pocket to tap when evaluated 5/31  4. Hx endocarditis s/p bioprosthetic AVR (12/2018, 9/2019, 1/2022) and bioprosthetic MVR (9/2019, 1/2022)  5. Hx HCV- genotype 1a treated with 12 weeks [facility] elbasvir [facility] recurrent HCV with positive RNA 1/2019   - Screening antibody will remain positive lifelong, HCV RNA pending    DISCUSSION:    Jeremie Ceballos is a 34 yo man with history of infective endocarditis s/p bioprosthetic AVF and MVR who presents with ~5 week duration of malaise. He was found to have Neisseria elongata bacteremia and dehiscence of the mitral valve, likely also with aortic valve involvement. His fevers have improved with ceftriaxone. Surgical options, including valve replacement or heart transplant, are being discussed.     Neisseria elongata is an oral commensal organism related to the HACEK organisms known to cause endocarditis - it's most closely related to Kingella. There are 36 reported cases of N elongata endocarditis in the literature, almost all involving the mitral or aortic valves.    https://doi.org/10.1016/j.idnow.2021.01.013    About half were prosthetic valve endocarditis and the most common risk factor for  infection was dental work. Mr. Ceballos's presentation does fit with Neisseria endocarditis in that it most commonly (but not always) presents subacutely. However, in the published cases visible vegetations were common so it is interesting that he has valve dehiscence without vegetations. The report describes that many cases were successfully treated with medical therapy alone but did not specify whether that included the cases of prosthetic valve endocarditis. In this review of cases, about 40% of valves were replaced surgically. In an additional case report and review, a propensity for root abscesses is noted and surgical intervention is more highly encouraged:     http://dx.doi.org/10.33061/01.2021.0017    For Mr. Ceballos, we assume that the valves are infected. The preferred therapy for PVE with this organism is a beta lactam (pcn or ceftriaxone) plus gentamicin. Given this, as well as new susceptibility data, recommend adding gentamicin today for planned duration of two weeks, in combination with ceftriaxone (planned duration of 8 weeks for ceftriaxone).     RECOMMENDATION:  1. Continue ceftriaxone 2g IV q12h (CNS dosing given MRI with cerebellar septic emboli)  2. Recommend adding gentamicin - dosing per pharmacy (currently 7mg/kg daily for extended duration bacteremia dosing). Anticipate continuing this for two weeks, following by ceftriaxone monotherapy.  3. Not sure what other options may be available. He has been sober for 6 months, so still about 6 months to go before any consideration of transplant from my understanding (certainly defer to our surgical colleagues on this).  4. Blood cultures have been negative - he will need a PICC eventually and ok to place any time    Recommendations discussed with primary team.    Merritt Lloyd MD  Division of Infectious Diseases and International Medicine  P: [phone]        Interval History:     Seen and examined.  Tired and slow to respond, which mother (at  bedside) says had started a week or so prior to admission but has not changed since. Denies fevers, chills, N/V, headaches. Long discussion about abx and cardiac infection with patient and his mother today.         History of Present Illness:     Jeremie Ceballos is a 35 year old male with history significant for infective endocarditis s/p bioprosthetic AVR (12/2018, 9/2019, 1/2022) and bioprosthetic MVR (9.2019, 1/2022), treated HCV (2017) with recurrence (2019) in setting of active IVDU, a.fib, and polysubstance abuse (IV opioid; inhaled meth. Last use ~Jaunary 2022) who presents to ED on 5/29/22 with about 5 weeks of feeling unwell.  Symptoms include fever, chills, malaise, fatigue, myalgias, nausea, poor appetite, diarrhea, RUQ abd pain, dental pain (resolved). Fever to 101.6 on arrival with WBC 17.5-->19.8 and -->160. BCx x3 on 5/29/22 - NGTD. TTE with rocking of bioprosthetic mitral valve. Denies drug use since his hospitalization in January. Did have dental pain, spontaneously resolved and no dental cleaning/procedures recently. No skin symptoms. Primary localizing symptoms are GI. CT abd/pelvis with ascites, hepatic congestion, pleural effusion. US abdomen with gallbladder wall thickening, possibly related to volume overload. Enteric panel and C.diff negative. HAV IgG positive, IgM negative (no acute infection). HCV pcr negative.          Review of Systems:   4 point ROS including Respiratory, CV, GI and , other than that noted in the HPI,  is negative           Allergies:     Allergies   Allergen Reactions     Amoxicillin      As a child, unsure of reaction     Amoxicillin Unknown     Other reaction(s): *Unknown - Pt Doesn't Remember, Unknown  As a child, unsure of reaction  As a child, unsure of reaction  Tolerated Pip/tazo infusion 12/18/2021 - [facility]  5/2022: tolerated Zosyn without issue.              Physical Exam:   Vitals were reviewed  Patient Vitals for the past 24 hrs:   BP Temp  Temp src Pulse Resp SpO2 Weight   06/06/22 1525 103/81 97.6  F (36.4  C) Oral 101 11 97 % --   06/06/22 1300 -- -- -- -- -- -- 79.1 kg (174 lb 6.1 oz)   06/06/22 1200 114/84 97.8  F (36.6  C) Oral 99 15 96 % --   06/06/22 1000 108/78 -- -- 97 21 96 % --   06/06/22 0805 -- -- -- -- -- 95 % --   06/06/22 0800 104/83 -- -- 94 17 92 % --   06/06/22 0755 -- -- -- -- -- (!) 88 % --   06/06/22 0734 106/79 97.8  F (36.6  C) Oral 93 -- 95 % --   06/06/22 0630 106/77 -- -- 95 14 98 % --   06/06/22 0615 108/88 -- -- 96 18 97 % --   06/06/22 0600 108/83 -- -- 93 19 98 % --   06/06/22 0545 92/67 -- -- 94 16 98 % --   06/06/22 0530 108/74 -- -- 95 16 97 % --   06/06/22 0515 108/79 -- -- 94 12 97 % --   06/06/22 0500 92/62 -- -- 93 16 99 % --   06/06/22 0415 103/81 -- -- 92 28 93 % --   06/06/22 0400 106/80 97.8  F (36.6  C) Oral 92 19 90 % --   06/06/22 0345 103/82 -- -- 92 18 95 % --   06/06/22 0330 106/82 -- -- 92 21 96 % --   06/06/22 0315 111/78 -- -- 91 14 97 % --   06/06/22 0300 115/85 -- -- 91 18 97 % --   06/06/22 0245 114/86 -- -- 92 20 97 % --   06/06/22 0230 109/83 -- -- 90 18 93 % --   06/06/22 0215 105/84 -- -- 89 21 94 % --   06/06/22 0200 110/84 -- -- 89 16 95 % --   06/06/22 0145 110/86 -- -- 89 17 95 % --   06/06/22 0130 112/82 -- -- 89 -- 98 % --   06/06/22 0115 105/77 -- -- 89 16 97 % --   06/06/22 0100 112/81 -- -- 89 16 98 % --   06/06/22 0045 108/86 -- -- 89 20 98 % --   06/06/22 0030 112/82 -- -- 91 19 99 % --   06/06/22 0015 105/83 -- -- 90 10 97 % --   06/06/22 0000 110/80 98.2  F (36.8  C) Oral 87 25 98 % --   06/05/22 2345 105/84 -- -- 87 14 98 % --   06/05/22 2330 110/85 -- -- 92 19 98 % --   06/05/22 2315 105/84 -- -- 87 23 98 % --   06/05/22 2300 110/85 -- -- 88 19 98 % --   06/05/22 2245 100/81 -- -- 106 25 95 % --   06/05/22 2230 104/82 -- -- 109 23 98 % --   06/05/22 2215 104/86 -- -- 89 21 99 % --   06/05/22 2200 -- -- -- 107 25 -- --   06/05/22 2145 101/78 -- -- 112 10 95 % --   06/05/22 2130  97/78 -- -- 105 21 96 % --   06/05/22 2115 110/77 -- -- 120 -- 96 % --   06/05/22 2100 -- -- -- (!) 124 14 96 % --   06/05/22 2045 108/78 -- -- 112 -- 96 % --   06/05/22 2030 98/74 -- -- 112 21 96 % --   06/05/22 2021 -- 97.8  F (36.6  C) Oral -- -- -- --   06/05/22 2015 100/85 -- -- 112 18 94 % --   06/05/22 2000 -- -- -- 120 15 93 % --   06/05/22 1945 107/85 -- -- 116 30 90 % --   06/05/22 1930 111/76 -- -- (!) 133 24 93 % --   06/05/22 1915 100/81 -- -- (!) 126 18 96 % --   06/05/22 1900 -- -- -- (!) 125 14 96 % --   06/05/22 1800 (!) 106/92 -- -- 118 14 96 % --   06/05/22 1745 120/89 -- -- (!) 125 25 97 % --   06/05/22 1730 108/83 -- -- (!) 121 20 96 % --   06/05/22 1705 107/76 -- -- (!) 135 23 -- --   06/05/22 1700 (!) 119/91 -- -- (!) 140 20 -- --   06/05/22 1655 110/87 -- -- (!) 141 16 -- --       Physical Examination:  Exam:  GENERAL:  Well-developed, well-nourished, sitting in bed in no acute distress.   ENT:  Head is normocephalic, atraumatic. Anterior oropharynx without ulcers.  EYES:  Eyes have anicteric sclerae.    NECK:  Supple.  LUNGS:  Normal respiratory effort.   ABDOMEN:  Non-distended.   EXT: Extremities without visible edema.   SKIN:  No acute rashes.  Line is in place without any surrounding erythema.  NEUROLOGIC:  Grossly nonfocal.          Laboratory Data:     Reviewed.  Pertinent for:    Microbiology:  Culture   Date Value Ref Range Status   06/05/2022 No growth after 1 day  Preliminary   06/05/2022 No growth after 1 day  Preliminary   05/31/2022 No Growth  Final   05/30/2022 No Growth  Final   05/30/2022 No Growth  Final   05/30/2022 No Growth  Final   05/29/2022 Positive on the 1st day of incubation (A)  Final   05/29/2022 Neisseria elongata (AA)  Final     Comment:     1 of 2 bottles  Susceptibilities done on previous cultures   05/29/2022 Positive on the 1st day of incubation (A)  Final   05/29/2022 Neisseria elongata (AA)  Final     Comment:     1 of 2 bottles  Susceptibilities done on  previous cultures   05/29/2022 Positive on the 1st day of incubation (A)  Final   05/29/2022 Neisseria elongata (AA)  Final     Comment:     1 of 2 bottles   01/21/2022 1+ Candida albicans (A)  Final     Comment:     Susceptibilities not routinely done   01/21/2022 Candida albicans (A)  Final   01/20/2022 No Growth  Final   01/20/2022 No Growth  Final   01/20/2022 No Growth  Final   01/19/2022 No anaerobic organisms isolated  Final   01/19/2022 No Growth  Final   01/19/2022 No Growth  Final   01/19/2022 No anaerobic organisms isolated  Final   01/19/2022 No Growth  Final   01/19/2022 No Growth  Final   01/19/2022 No anaerobic organisms isolated  Final   01/19/2022 No Growth  Final   01/19/2022 No Growth  Final   01/14/2022 No Growth  Final   01/14/2022 No Growth  Final   01/10/2022 No Growth  Final   01/10/2022 No Growth  Final   01/04/2022 No Growth  Final   01/04/2022 No Growth  Final   01/04/2022 No Growth  Final   01/04/2022 1+ Normal rubens  Final   01/03/2022 No Growth  Final   01/03/2022 No Growth  Final   01/02/2022 No Growth  Final     Last check of C difficile  C Difficile Toxin B by PCR   Date Value Ref Range Status   05/30/2022 Negative Negative Final     Comment:     A negative result does not exclude actual disease due to C. difficile and may be due to improper collection, handling and storage of the specimen or the number of organisms in the specimen is below the detection limit of the assay.     Inflammatory Markers    Recent Labs   Lab Test 05/30/22  0617 05/29/22  2240 02/23/22  1615 02/16/22  1600 01/31/22  0509 01/29/22  0608 01/25/22  0321 01/24/22  0458 08/07/19  0648 08/04/19  2130 03/03/19  0047 02/28/19  0612 02/21/19  0552 02/21/19  0027   SED  --   --  13 18*  --   --   --   --   --  20* 9 9 9 9   .0* 170.0* 7.2 11.0* 18.0* 27.0* 120.0* 170.0*   < > 98.0* 16.0* 5.6  --  62.8*    < > = values in this interval not displayed.     Metabolic Studies       Recent Labs   Lab Test  06/06/22  1210 06/06/22  0750 06/06/22  0744 06/06/22  0410 06/06/22  0121 06/06/22  0007 06/05/22 2048 06/05/22 2014 06/05/22  1653 06/05/22  1645 06/05/22  1335 06/05/22  0818 06/05/22  0730 06/04/22  1722 06/04/22  1556 06/04/22  0652 01/19/22  0529 01/18/22  0641   NA  --  133  --   --   --   --   --   --   --  133 130*  --  132*  --  134 133   < > 138   POTASSIUM  --  4.3  --   --  4.6  --  5.1  --   --  5.6* 5.7*  --  5.3   < > 5.4* 4.3   < > 3.6   CHLORIDE  --  100  --   --   --   --   --   --   --  98 99  --  97  --  101 100   < > 107   CO2  --  26  --   --   --   --   --   --   --  16* 16*  --  16*  --  22 24   < > 27   ANIONGAP  --  7  --   --   --   --   --   --   --  19* 15*  --  19*  --  11 9   < > 4   BUN  --  42*  --   --   --   --   --   --   --  48* 45*  --  40*  --  28 24   < > 14   CR  --  1.14  --   --   --   --   --   --   --  1.58* 1.49*  --  1.48*  --  1.02 0.84   < > 0.76   GFRESTIMATED  --  87  --   --   --   --   --   --   --  59* 63  --  64  --  >90 >90   < > >90   * 107* 110* 119*  --  135*  --  133*   < > 161* 122*   < > 96   < > 45* 94   < > 93   A1C  --   --   --   --   --   --   --   --   --   --   --   --   --   --   --   --   --  5.6   KAILEY  --  8.2*  --   --   --   --   --   --   --  8.3* 8.5  --  8.9  --  8.6 8.8   < > 8.9   PHOS  --   --   --   --   --   --   --   --   --   --   --   --  6.2*  --  4.6* 3.0   < > 3.6   MAG  --   --   --   --   --   --   --   --   --   --  2.3  --   --   --  2.1 2.0   < > 1.9   LACT  --  2.2*  --   --  3.3*  --  6.5*  --   --  11.2* 12.4*   < >  --   --   --   --    < >  --    CKT  --   --   --   --   --   --   --   --   --   --   --   --  367*  --   --   --   --   --     < > = values in this interval not displayed.     Hepatic Studies    Recent Labs   Lab Test 06/06/22  0750 06/05/22  1645 06/05/22  1335 06/05/22  0730 06/04/22  0652 06/03/22  0623   BILITOTAL 3.1* 2.9* 3.3* 2.9* 2.4* 2.5*   ALKPHOS 180* 184* 184* 181* 162* 164*   ALBUMIN  2.4* 2.5* 2.6* 2.5* 2.5* 2.5*   AST 4,594* 4,960* 4,611* 4,060* 774* 629*   ALT 2,068* 1,904* 1,803* 1,557* 494* 428*     Pancreatitis testing    Recent Labs   Lab Test 05/29/22  2240 08/28/20  1417   LIPASE 174  --    TRIG  --  34     Hematology Studies      Recent Labs   Lab Test 06/06/22  0750 06/05/22  1335 06/05/22  0730 06/04/22  0652 06/03/22  0623 06/02/22  0712 01/02/22 2000 08/28/20  1417 09/11/19  0613 09/10/19  0447 09/09/19  0520 09/08/19  0948 09/07/19  0454 09/06/19  0347   WBC 10.3 15.0* 13.2* 9.0 7.6 8.2   < > 6.7   < > 9.4 8.2 9.9 9.8 12.3*   ANEU  --   --   --   --   --   --   --  4.3  --  6.4 5.5 7.6 7.7 10.4*   ALYM  --   --   --   --   --   --   --  1.4  --  1.4 1.4 1.0 0.8 1.0   CHRISTIAN  --   --   --   --   --   --   --  0.7  --  1.1 0.9 0.8 0.7 0.7   AEOS  --   --   --   --   --   --   --  0.3  --  0.4 0.3 0.3 0.3 0.1   HGB 9.2* 9.6* 9.7* 8.9* 8.6* 8.7*   < > 13.8   < > 9.6* 9.4* 9.5* 8.3* 8.5*   HCT 29.6* 31.4* 32.7* 27.6* 26.8* 26.7*   < > 41.2   < > 32.2* 30.9* 31.4* 27.4* 27.5*   PLT 85* 134* 157 257 268 395   < > 190   < > 193 147* 141* 99* 144*    < > = values in this interval not displayed.     Arterial Blood Gas Testing    Recent Labs   Lab Test 06/06/22  0750 06/05/22  1645 01/24/22  0939 01/24/22  0459 01/23/22  2009 01/23/22  1831 01/23/22  1739   PH  --   --  7.45 7.43 7.41 7.42 7.61*   PCO2  --   --  45 50* 50* 46* 28*   PO2  --   --  168* 143* 145* 127* 127*   HCO3  --   --  31* 33* 31* 30* 28   O2PER 21 0 40 40 40 40 40      Urine Studies     Recent Labs   Lab Test 06/05/22  1743 05/30/22  0340 01/22/22  0305 01/18/22  1440 01/02/22  2126 12/16/18  2050   URINEPH 5.5 5.5 5.5 6.0 5.5 5.5   NITRITE Negative Negative Negative Negative Negative Negative   LEUKEST Negative Negative Negative Negative Negative Negative   WBCU 2 4 0  --  0 <1     Vancomycin Levels     Recent Labs   Lab Test 05/31/22  1421 08/15/19  0916 08/13/19  1715 08/06/19  0651 12/22/18  0921 12/20/18  0941    VANCOMYCIN 18.6 14.6 10.5 21.1 23.6 11.3     Gentamicin levels    Recent Labs   Lab Test 01/25/22  1405 01/25/22  1146 01/21/22  1240 01/18/22  1333 01/18/22  0916 01/17/22  1326 01/03/22  0925 08/06/19  1315   GENT 2.4 0.3 1.0 0.3 0.8 0.4 0.2 0.8     Transplant Immunosuppression Labs Latest Ref Rng & Units 6/6/2022 6/5/2022 6/5/2022 6/5/2022 6/4/2022   Creat 0.66 - 1.25 mg/dL 1.14 1.58(H) 1.49(H) 1.48(H) 1.02   BUN 7 - 30 mg/dL 42(H) 48(H) 45(H) 40(H) 28   WBC 4.0 - 11.0 10e3/uL 10.3 - 15.0(H) 13.2(H) -   Neutrophil % - - 83 - -   ANEU 1.6 - 8.3 10e9/L - - - - -          Imaging:   US Abd Complete w Abd/Pel Duplex Complete Port  Result Date: 6/5/2022  Impression: 1.  To-and-fro flow visualized in the portal veins and splenic vein. This was also present on the prior ultrasound and can be seen with right-sided cardiac dysfunction/heart failure (history provided on prior ultrasound). This is also consistent with enlarged hepatic veins and IVC. The vasculature in the liver is patent. 2.  The gallbladder wall is thickened, likely due to volume overload. 3.  Small to moderate amount of ascites throughout the abdomen. Small right-sided pleural effusion. 4.  Liver is enlarged. Surface contours do not appear overtly nodular. I have personally reviewed the examination and initial interpretation and I agree with the findings. HELGA MORRISON MD   SYSTEM ID:  [id number]    US Abdominal Doppler Complete  Result Date: 5/31/2022  Impression: 1. Portal veins with pulsatile antegrade flow consistent with history of heart failure. 2. Hepatic artery and hepatic veins are patent. MIHAELA ANN MD   SYSTEM ID:  [id number]    XR Chest Port 1 View  Result Date: 6/5/2022  Impression: 1. Unchanged moderate right-sided pleural effusion and associated atelectasis. 2. Similar appearance of pulmonary opacities at the lung bases which could represent infection/aspiration or atelectasis. I have personally reviewed the examination and  evaluate for gastroparesis initial interpretation and I agree with the findings. HELGA MORRISON MD   SYSTEM ID:  [id number]    XR Abdomen Port 1 View  Result Date: 6/5/2022  IMPRESSION: 1. Nonobstructive bowel gas pattern. Mild gaseous distention of the stomach. 2. Inferior most median sternotomy wire has a subtle lucency near the twist, which may represent fracture of the wire. I have personally reviewed the examination and initial interpretation and I agree with the findings. HELGA MORRISON MD   SYSTEM ID:  [id number]    MR Brain w/o & w Contrast  Result Date: 6/5/2022  Impression: Embolic phenomena of varying stages. There are punctate acute infarcts in the left cerebellum laterally, subacute infarcts in the left cerebellum medially and late subacute infarct within the left precentral gyrus. Additionally there are numerous foci of susceptibility artifact or increased in the prior exam which likely correspond to tiny old emboli. [Urgent Result: Infarction] Finding was identified on 6/5/2022 3:31 PM. Dr Mittal was contacted by Dr. Mack Salinas at 6/5/2022 3:50 PM and verbalized understanding of the urgent finding. I have personally reviewed the examination and initial interpretation and I agree with the findings. JAUN BULL MD   SYSTEM ID:  [id number]    Transesophageal Echocardiogram  Result Date: 6/1/2022  Interpretation Summary CLARK to evaluate for endocarditis. Status post aortic valve replacement (23 mm Nj Inspiris Resilia bovine bioprosthesis), mitral valve replacement (29 mm St. Gamaliel Epic porcine bioprosthesis) with repalcement of aorto-mitral fibrous continuity with bovine pericardial closure in January 2022. 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 There is also disruption of the aorto-mitral curtain adjacent to the aortic valve, also concerning for further dehiscence near the prosthetic aortic valve. The etiology is not clear because lack of hallmarks of endocarditis, such as mitral  and aortic valves vegetations or an aortic root abscess. However, endocarditis should still be considered in the differential due to the degree of valvular destruction in the presence of a bacteremia.  Results discussed with Dr. Peter (operating surgeon in 01/2022) at 3:45 PM on 06/01/2022 and Medicine (primary) team at 4:00 PM.  Report approved by: Nathanael Martínez 06/01/2022 08:08 PM       Echo Limited  Result Date: 6/5/2022  Interpretation Summary s/p mitral valve replacement (29 mm St. Gamaliel Epic porcine bioprosthesis) with replacement of aorto-mitral fibrous continuity with bovine pericardial closure. Small mobile echodensity (likely a vegetation) noted on MV (on the ventricular aspect of posterior strut). Paravalvular MR reported in the previous CLARK cannot be well visualized in this study. Mean gradient is mildly elevated across the MVR (6 mmHg). PV is high at 2.4 m/s (likely due to severe paravalvular MR that was seen in the previous CLARK).  Status post aortic valve replacement (23 mm Nj Inspiris Resilia bovine bioprosthesis),The bioprosthetic AVR function is normal. The surrounding aortic wall is thickened. No valvular or paravalvular AI. Suspect aortic root abscess. Recommend cardiac CT.  The visual ejection fraction is 55-60%. Global right ventricular function is normal.   Report approved by: Bar AMARO 06/05/2022 12:55 PM       CT Dental wo Contrastq  Result Date: 5/30/2022  IMPRESSION: Unchanged dental caries involving the bilateral third maxillary molars since 1/15/2022. No periapical lucencies. I have personally reviewed the examination and initial interpretation and I agree with the findings. ANTIONETTE KELLOGG MD   SYSTEM ID:  [id number]

## 2025-01-21 NOTE — DISCHARGE NOTE NURSING/CASE MANAGEMENT/SOCIAL WORK - FINANCIAL ASSISTANCE
[facility] provides services at a reduced cost to those who are determined to be eligible through [facility]’s financial assistance program. Information regarding [facility]’s financial assistance program can be found by going to [email or web address] or by calling [phone].

## 2025-01-21 NOTE — CONSULT NOTE ADULT - ASSESSMENT
HPI:  59-year-old male, from Home,  with history of NIDDM, hypertension, DKA, MI, A fib on ELIQUIS, cervical fusion presented to ED with crampy abdominal pain, nausea and vomiting since yesterday.   Reports having had a colonoscopy >5 years ago which revealed a polyp. Has had multiple endoscopies- "Ulcers" per patient. Last one "years ago".  Takes Pantoprazole at home. Is also on chronic Dilaudid and Methadone at home post back surgery several months ago. In process of finding a new cardiologist. Pt reports a gnawing discomfort in the central abdomen that has been present since the onset of the episode.     GI Consultation to evaluate for gastroparesis  Reports having had a colonoscopy >5 years ago which revealed a polyp. Has had multiple endoscopies- "Ulcers" per patient. Last one "years ago".  Takes Pantoprazole at home. Is also on chronic Dilaudid and Methadone at home post back surgery several months ago.  Last EGD 10/2023 with Dr Liang showed non erosive gastritis, advised F/U for gastroparesis treatment

## 2025-01-21 NOTE — CONSULT NOTE ADULT - PROBLEM SELECTOR RECOMMENDATION 9
Nausea vomiting, abdominal discomfort on patient with DM and chronic pain meds   Antiemetic PRN  Continue PPI daily   No plan for urgent  EGD   Need out patient work up for gastroparesis   Upper GI series can not be done as inpatient because He is on Morphine   Need Out patient Pain management , endocrine and GI F/U  Last EGD 10/2023 with Dr Liang , result in [place]

## 2025-01-22 ENCOUNTER — APPOINTMENT (OUTPATIENT)
Dept: FAMILY MEDICINE | Facility: CLINIC | Age: 60
End: 2025-01-22

## 2025-01-22 ENCOUNTER — NON-APPOINTMENT (OUTPATIENT)
Age: 60
End: 2025-01-22

## 2025-01-22 ENCOUNTER — APPOINTMENT (OUTPATIENT)
Dept: PAIN MANAGEMENT | Facility: CLINIC | Age: 60
End: 2025-01-22

## 2025-01-22 DIAGNOSIS — M54.12 RADICULOPATHY, CERVICAL REGION: ICD-10-CM

## 2025-01-22 PROCEDURE — 99213 OFFICE O/P EST LOW 20 MIN: CPT

## 2025-01-28 ENCOUNTER — APPOINTMENT (OUTPATIENT)
Dept: PAIN MANAGEMENT | Facility: CLINIC | Age: 60
End: 2025-01-28

## (undated) DEVICE — TRAY EPIDURAL SINGLE DOSE

## (undated) DEVICE — SOL IRR POUR H2O 1000ML

## (undated) DEVICE — TUBING CAP SET ERBEFLO CLEVERCAP HYBRID CO2 FOR OLYMPUS SCOPES AND UCR

## (undated) DEVICE — SOL INJ LR 500ML

## (undated) DEVICE — CONTAINER FORMALIN BUFF 10% 60ML

## (undated) DEVICE — PACK IV START WITH CHG

## (undated) DEVICE — NDL SPNL WHIT 22GX3.5IN

## (undated) DEVICE — KIT ENDO PROCEDURE CUST W/VLV

## (undated) DEVICE — FORCEP RADIAL JAW 4 W NDL 2.4MM 2.8MM 240CM ORANGE DISP

## (undated) DEVICE — GLV 8 ULTRAFREE MAX

## (undated) DEVICE — CATH IV SAFE 24GX3/4

## (undated) DEVICE — GLV 7.5 ULTRAFREE MAX

## (undated) DEVICE — CATH IV SAFE BC BLU 22GAX1.0"